# Patient Record
Sex: MALE | Race: BLACK OR AFRICAN AMERICAN | NOT HISPANIC OR LATINO | Employment: OTHER | URBAN - NONMETROPOLITAN AREA
[De-identification: names, ages, dates, MRNs, and addresses within clinical notes are randomized per-mention and may not be internally consistent; named-entity substitution may affect disease eponyms.]

---

## 2017-01-24 ENCOUNTER — OFFICE VISIT (OUTPATIENT)
Dept: OPHTHALMOLOGY | Facility: CLINIC | Age: 67
End: 2017-01-24

## 2017-01-24 DIAGNOSIS — IMO0002 NUCLEAR CATARACT, LEFT: ICD-10-CM

## 2017-01-24 DIAGNOSIS — H25.012 CORTICAL AGE-RELATED CATARACT, LEFT: ICD-10-CM

## 2017-01-24 DIAGNOSIS — H47.20 OPTIC ATROPHY: ICD-10-CM

## 2017-01-24 DIAGNOSIS — H25.049 POSTERIOR SUBCAPSULAR POLAR AGE-RELATED CATARACT, UNSPECIFIED LATERALITY: ICD-10-CM

## 2017-01-24 DIAGNOSIS — H04.551: ICD-10-CM

## 2017-01-24 DIAGNOSIS — H40.1122 PRIMARY OPEN ANGLE GLAUCOMA OF LEFT EYE, MODERATE STAGE: Primary | ICD-10-CM

## 2017-01-24 PROBLEM — H25.019 CORTICAL AGE-RELATED CATARACT: Status: ACTIVE | Noted: 2017-01-24

## 2017-01-24 PROCEDURE — 76514 ECHO EXAM OF EYE THICKNESS: CPT | Performed by: OPHTHALMOLOGY

## 2017-01-24 PROCEDURE — 92083 EXTENDED VISUAL FIELD XM: CPT | Performed by: OPHTHALMOLOGY

## 2017-01-24 PROCEDURE — 92014 COMPRE OPH EXAM EST PT 1/>: CPT | Performed by: OPHTHALMOLOGY

## 2017-01-24 RX ORDER — DORZOLAMIDE HCL 20 MG/ML
1 SOLUTION/ DROPS OPHTHALMIC 3 TIMES DAILY
Qty: 1 EACH | Refills: 12 | Status: SHIPPED | OUTPATIENT
Start: 2017-01-24

## 2017-01-24 NOTE — PROGRESS NOTES
Subjective   Cristo Musa is a 67 y.o. male.   Chief Complaint   Patient presents with   • Glaucoma     HPI     Glaucoma   In both eyes.  Treatment compliance is always.           Comments   decr vision OD>OS; CE OD, cataract OS       Last edited by Inderjit Grant MD on 1/24/2017  9:46 AM. (History)          Review of Systems    Objective   Visual Acuity (Snellen - Linear)      Right Left   Dist cc LP 20/100 -1            Wearing Rx      Sphere Cylinder Add   Right [place]rett Sphere +2.75   Left -1.00 Sphere +2.75                 Pupils      Light React APD   Right 3 1 +2   Left 3 2             Not recorded            Tonometry (Applanation, 9:46 AM)      Right Left   Pressure 15 17              Main Ophthalmology Exam     External Exam      Right Left    External NT mass over NLS no reflux Normal      Slit Lamp Exam      Right Left    Lids/Lashes Normal Normal    Conjunctiva/Sclera White and quiet White and quiet    Cornea Clear Clear    Anterior Chamber Deep and quiet Deep and quiet    Iris Round and reactive Round and reactive    Lens Posterior chamber intraocular lens 2+ Nuclear sclerosis, 1+ Cortical cataract, 3+ Posterior subcapsular cataract    Vitreous Normal Normal      Fundus Exam      Right Left    Disc 3+ Optic disc atrophy Thin rim 0.1 ST,IT diff view    Macula Normal Normal    Vessels Sheathing Normal    Periphery Normal Normal            Chavez Visual Field :   OD- unable  OS- central island, marked constriction, change from 2009    Assessment/Plan   Cristo was seen today for glaucoma.    Diagnoses and all orders for this visit:    Primary open angle glaucoma of left eye, moderate stage  Comments:  would like lower IOP   Orders:  -     dorzolamide (TRUSOPT) 2 % ophthalmic solution; Administer 1 drop into the left eye 3 (Three) Times a Day.    Nuclear cataract, left    Optic atrophy    Acquired stenosis of nasolacrimal duct, right  Comments:  s/p DCR    Posterior subcapsular polar  age-related cataract, unspecified laterality    Cortical age-related cataract, left        Add Trusopt OS tid, Alph TID, latanoprost  F/u 3 weeks  Consider CE OS, possible trabec

## 2017-01-30 ENCOUNTER — OFFICE VISIT (OUTPATIENT)
Dept: CARDIAC SURGERY | Facility: CLINIC | Age: 67
End: 2017-01-30

## 2017-01-30 VITALS
HEART RATE: 66 BPM | HEIGHT: 68 IN | OXYGEN SATURATION: 99 % | DIASTOLIC BLOOD PRESSURE: 71 MMHG | TEMPERATURE: 98.2 F | WEIGHT: 207.5 LBS | BODY MASS INDEX: 31.45 KG/M2 | SYSTOLIC BLOOD PRESSURE: 113 MMHG

## 2017-01-30 DIAGNOSIS — I10 ESSENTIAL HYPERTENSION: ICD-10-CM

## 2017-01-30 DIAGNOSIS — I77.0 ARTERIOVENOUS FISTULA, ACQUIRED (HCC): ICD-10-CM

## 2017-01-30 DIAGNOSIS — Z79.4 TYPE 2 DIABETES MELLITUS WITH BOTH EYES AFFECTED BY MODERATE NONPROLIFERATIVE RETINOPATHY WITHOUT MACULAR EDEMA, WITH LONG-TERM CURRENT USE OF INSULIN (HCC): ICD-10-CM

## 2017-01-30 DIAGNOSIS — N18.6 ESRD (END STAGE RENAL DISEASE) (HCC): Primary | ICD-10-CM

## 2017-01-30 DIAGNOSIS — E11.3393 TYPE 2 DIABETES MELLITUS WITH BOTH EYES AFFECTED BY MODERATE NONPROLIFERATIVE RETINOPATHY WITHOUT MACULAR EDEMA, WITH LONG-TERM CURRENT USE OF INSULIN (HCC): ICD-10-CM

## 2017-01-30 PROCEDURE — 99214 OFFICE O/P EST MOD 30 MIN: CPT | Performed by: THORACIC SURGERY (CARDIOTHORACIC VASCULAR SURGERY)

## 2017-01-30 NOTE — PROGRESS NOTES
1/30/2017    Cristo Musa  1950      Chief Complaint:  ESRD  HPI:      PCP:  Cory Rodríguez MD  Nephrology:  Dr Vilchis,  [facility]  Cardiology:  Dr Nieto        Resides:  [facility].    67 y.o. male with ESRD on  hemodialysis, HTN, COPD, peripheral neuropathy, DM, CAD, Hepatitis C, nonischemic cardiomyopathy (EF 30%)..  former smoker.  Long term access for hemodialysis placed 12/23/14.  He returns today after callling requesting to be seen due to LUE pain and bulging areas of RUE AV fistula areas.  Denies any neurosensory symptoms of RUE does have of LUE.  Has DM with neuropathy.  RIGHT AV graft functioning well at dialysis.  No problems during dialysis.  No other associated signs, symptoms or modifying factors.    11/13/2014 Upper extremity vein mapping:  RIGHT cephalic 1.2-2.5mm, basilic 1.6-2.0mm.  LEFT cephalic 0.8-2.6mm, basilic 1.2-2.6mm.  12/23/14  LEFT Brachial artery to axillary vein AV Graft (7mm Artegraft)  01/07/15 LUE Incisions  open thru skin only.  Proximal Length:  4.5-5 cm depth 0.25 width 0.2  Distal  Length: 4. cm depth 0.2 width 0.2  Beefy red bed  Small amt erythema proximal incision.  1/13/15: LUE Incision:healed  3/24/15  Revision of left arteriovenous graft (distal interposition graft with a 6 mm Artegraft).  Thrombectomy, left arteriovenous graft, open.  Left fistulogram.  Balloon angioplasty of left subclavian vein, innominate vein, swing site (6 x 20 mm Bard Conquest balloon, 8 x 20 mm Bard Conquest balloon). Venous ultrasound of unilateral extremity.  3/31/15 Revision of left arteriovenous graft, open. Open thrombectomy of left arteriovenous graft. Left upper extremity fistulogram.  Balloon angioplasty of left subclavian vein/innominate vein/swing site (6 x 21 mm Bard-Conquest balloon, 8 x 21 mm Bard-Conquest balloon). Venous ultrasound unilateral extremity.   4/10/15   Excision of left brachial to axillary arteriovenous bridge graft  (bovine) with reconstruction of both the brachial artery and axillary vein with vein patch angioplasty from left cephalic vein. Placement of femoral arterial and venous lines using ultrasound guidance.  4/19/15  Placement LEFT IJ Tunneled Cath  5/14/15   Placement of a right forearm arteriovenous bridge graft.  5/19/15   Exchange of right femoral tunneled dialysis catheter using fluoroscopic guidance.   8/11/15  Entrobacter bacteremia of LUE AV Graft, which was removed and completed 6 wks of IV antx with neg CS.    9/22/15  Fistula Duplex:  maxPSV 94cm/s. flows 258-632ml/m. size 5.7-9.2mm. Patent Multiple areas of hematomas with sm area of needle oozing.    10/15/15: RIGHT Fistula duplex: maxPSV 233cm/s. flows 214-701ml/m. size 4.4-6.2mm.  1/26/16: RIGHT fistula duplex: maxPSV 321cm/s. flows 612-1866ml/m. size 3.8-8.1mm.  7/29/16: RIGHT TPA Lysis/Mech Thrombectomy  9/23/16: RIGHT groin Tunnel Cath Placement  10/5/16: RIGHT brachial-axillary vein AV graft (artegraft)        12/5/2016 RIGHT fistula duplex: maxPSV 265cm/s. Flows 1612-1986ml/m. size 5.8-8.1mm.    The following portions of the patient's history were reviewed and updated as appropriate: allergies, current medications, past family history, past medical history, past social history, past surgical history and problem list.  Recent images independently reviewed.  Available laboratory values reviewed.    PMH:  Past Medical History   Diagnosis Date   • Cataract    • CHF (congestive heart failure)    • CKD (chronic kidney disease)    • Clostridium difficile infection    • COPD (chronic obstructive pulmonary disease)    • Coronary artery disease    • Diabetes mellitus    • Glaucoma    • Hepatitis C    • History of transfusion    • Hypertension    • Nephropathy, diabetic    • Pacemaker    • Pulmonary embolism        ALLERGIES:  Allergies   Allergen Reactions   • Motrin [Ibuprofen] Diarrhea and Nausea And Vomiting         MEDICATIONS:    Current Outpatient  Prescriptions:   •  acetaminophen (TYLENOL) 325 MG tablet, Take 650 mg by mouth every 6 (six) hours as needed for mild pain (1-3)., Disp: , Rfl:   •  brimonidine (ALPHAGAN P) 0.1 % solution ophthalmic solution, 1 drop 3 (three) times a day., Disp: , Rfl:   •  calcium acetate (PHOSLO) 667 MG capsule, Take 667 mg by mouth 3 (three) times a day with meals., Disp: , Rfl:   •  dorzolamide (TRUSOPT) 2 % ophthalmic solution, Administer 1 drop into the left eye 3 (Three) Times a Day., Disp: 1 each, Rfl: 12  •  fluticasone (FLONASE) 50 MCG/ACT nasal spray, 2 sprays into each nostril daily. Administer 2 sprays in each nostril for each dose., Disp: , Rfl:   •  gabapentin (NEURONTIN) 300 MG capsule, Take 300 mg by mouth 3 (three) times a day., Disp: , Rfl:   •  hydrALAZINE (APRESOLINE) 10 MG tablet, Take 10 mg by mouth daily as needed., Disp: , Rfl:   •  hydrALAZINE (APRESOLINE) 25 MG tablet, Take 25 mg by mouth 3 (three) times a day., Disp: , Rfl:   •  insulin aspart (NovoLOG) 100 UNIT/ML injection, Inject  under the skin 3 (three) times a day before meals. Patient unsure of scale, Disp: , Rfl:   •  insulin detemir (LEVEMIR) 100 UNIT/ML injection, Inject 7 Units under the skin every night., Disp: , Rfl:   •  latanoprost (XALATAN) 0.005 % ophthalmic solution, Administer 1 drop to both eyes every night., Disp: , Rfl:   •  lisinopril (PRINIVIL,ZESTRIL) 40 MG tablet, Take 40 mg by mouth daily., Disp: , Rfl:   •  loratadine (CLARITIN) 10 MG tablet, Take 10 mg by mouth daily., Disp: , Rfl:   •  lubiprostone (AMITIZA) 24 MCG capsule, Take 24 mcg by mouth 2 (two) times a day with meals., Disp: , Rfl:   •  omeprazole (PriLOSEC) 20 MG capsule, Take 20 mg by mouth 2 (two) times a day., Disp: , Rfl:   •  ondansetron (ZOFRAN) 8 MG tablet, Take 8 mg by mouth every 12 (twelve) hours as needed for nausea or vomiting., Disp: , Rfl:   •  PARoxetine (PAXIL) 20 MG tablet, Take 20 mg by mouth every night., Disp: , Rfl:   •  polyethylene glycol  (MIRALAX) packet, Take 17 g by mouth daily as needed., Disp: , Rfl:   •  traZODone (DESYREL) 50 MG tablet, Take 25 mg by mouth every night., Disp: , Rfl:   •  ZOLPIDEM TARTRATE PO, Take 5 mg by mouth every night., Disp: , Rfl:     Review of Systems   Review of Systems   Constitution: Positive for malaise/fatigue. Negative for weakness and weight loss.   Cardiovascular: Negative for chest pain, claudication and dyspnea on exertion.   Respiratory: Negative for cough and shortness of breath.    Hematologic/Lymphatic: Bruises/bleeds easily.   Skin: Negative for color change and poor wound healing.   Neurological: Negative for dizziness and numbness.       Physical Exam   Physical Exam   Constitutional: He is oriented to person, place, and time. He is active and cooperative. He does not appear ill. No distress.   HENT:   Right Ear: Hearing normal.   Left Ear: Hearing normal.   Nose: No nasal deformity. No epistaxis.   Mouth/Throat: He does not have dentures. Normal dentition.   Cardiovascular: Normal rate and regular rhythm.    No murmur heard.  Pulses:       Carotid pulses are 2+ on the right side, and 2+ on the left side.       Radial pulses are 2+ on the right side, and 2+ on the left side.        Dorsalis pedis pulses are 1+ on the right side, and 1+ on the left side.        Posterior tibial pulses are 1+ on the right side, and 1+ on the left side.   RIGHT AV graft good pulsatile thrill.   Pulmonary/Chest: Effort normal and breath sounds normal.   Abdominal: Soft. He exhibits no distension and no mass. There is no tenderness.   Musculoskeletal: He exhibits no deformity.   Gait normal.    Neurological: He is alert and oriented to person, place, and time. He has normal strength.   Skin: Skin is warm and dry. No cyanosis or erythema. No pallor.   No venous staining   Psychiatric: He has a normal mood and affect. His speech is normal. Judgment and thought content normal.   Results for JOSUE SÁNCHEZ (MRN  [id number]) as of 1/30/2017 16:45   Ref. Range 10/5/2016 11:39   Creatinine Latest Ref Range: 0.7 - 1.3 mg/dl 5.3 (H)   BUN Latest Ref Range: 7 - 21 mg/dl 44 (H)       ASSESSMENT:  Cristo was seen today for chronic renal failure.    Diagnoses and all orders for this visit:    ESRD (end stage renal disease)  -     Duplex Hemodialysis Access CAR; Future    Essential hypertension    Type 2 diabetes mellitus with both eyes affected by moderate nonproliferative retinopathy without macular edema, with long-term current use of insulin    Arteriovenous fistula, acquired    PLAN:  Detailed discussion with Mr Musa regarding situation and options.  Functional long term dialysis access.  ready for tunnel cath removal.      Risks:  bleeding, catheter breakage which may require retrieval.  Benefits:  catheter removal, decrease incidence infection, blood clots.  Options:  none.  Understands and wishes to proceed.    Remove tunnel cath today.  Return in 3 months with fistula duplex.  Recommended regular physical activity, progressive walking program.  Continue current medications as directed.  Will Obtain relevant old records.    Thank you for the opportunity to participate in this patient's care.    Copy to primary care provider.    EMR Dragon/Transcription disclaimer:   Much of this encounter note is an electronic transcription/translation of spoken language to printed text. The electronic translation of spoken language may permit erroneous, or at times, nonsensical words or phrases to be inadvertently transcribed; Although I have reviewed the note for such errors, some may still exist.

## 2017-01-30 NOTE — LETTER
January 30, 2017     Cory Rodríguez MD  [facility] [address] [place] [address]    Patient: Cristo Musa   YOB: 1950   Date of Visit: 1/30/2017       Dear Dr. Anne MD:    Thank you for referring Cristo Musa to me for evaluation. Below are the relevant portions of my assessment and plan of care.    If you have questions, please do not hesitate to call me. I look forward to following Cristo along with you.         Sincerely,        Jerson Singh MD        CC: MD Jerson Torres MD  1/30/2017  4:52 PM  Signed  1/30/2017    Cristo Musa  1950      Chief Complaint:  ESRD  HPI:      PCP:  Cory Rodríguez MD  Nephrology:  Dr Vilchis,  [facility]  Cardiology:  Dr Nieto        Resides:  [facility].    67 y.o. male with ESRD on  hemodialysis, HTN, COPD, peripheral neuropathy, DM, CAD, Hepatitis C, nonischemic cardiomyopathy (EF 30%)..  former smoker.  Long term access for hemodialysis placed 12/23/14.  He returns today after callling requesting to be seen due to LUE pain and bulging areas of RUE AV fistula areas.  Denies any neurosensory symptoms of RUE does have of LUE.  Has DM with neuropathy.  RIGHT AV graft functioning well at dialysis.  No problems during dialysis.  No other associated signs, symptoms or modifying factors.    11/13/2014 Upper extremity vein mapping:  RIGHT cephalic 1.2-2.5mm, basilic 1.6-2.0mm.  LEFT cephalic 0.8-2.6mm, basilic 1.2-2.6mm.  12/23/14  LEFT Brachial artery to axillary vein AV Graft (7mm Artegraft)  01/07/15 LUE Incisions  open thru skin only.  Proximal Length:  4.5-5 cm depth 0.25 width 0.2  Distal  Length: 4. cm depth 0.2 width 0.2  Beefy red bed  Small amt erythema proximal incision.  1/13/15: LUE Incision:healed  3/24/15  Revision of left arteriovenous graft (distal interposition graft with a 6 mm Artegraft).  Thrombectomy, left arteriovenous graft, open.  Left  fistulogram.  Balloon angioplasty of left subclavian vein, innominate vein, swing site (6 x 20 mm Bard Conquest balloon, 8 x 20 mm Bard Conquest balloon). Venous ultrasound of unilateral extremity.  3/31/15 Revision of left arteriovenous graft, open. Open thrombectomy of left arteriovenous graft. Left upper extremity fistulogram.  Balloon angioplasty of left subclavian vein/innominate vein/swing site (6 x 21 mm Bard-Conquest balloon, 8 x 21 mm Bard-Conquest balloon). Venous ultrasound unilateral extremity.   4/10/15   Excision of left brachial to axillary arteriovenous bridge graft (bovine) with reconstruction of both the brachial artery and axillary vein with vein patch angioplasty from left cephalic vein. Placement of femoral arterial and venous lines using ultrasound guidance.  4/19/15  Placement LEFT IJ Tunneled Cath  5/14/15   Placement of a right forearm arteriovenous bridge graft.  5/19/15   Exchange of right femoral tunneled dialysis catheter using fluoroscopic guidance.   8/11/15  Entrobacter bacteremia of LUE AV Graft, which was removed and completed 6 wks of IV antx with neg CS.    9/22/15  Fistula Duplex:  maxPSV 94cm/s. flows 258-632ml/m. size 5.7-9.2mm. Patent Multiple areas of hematomas with sm area of needle oozing.    10/15/15: RIGHT Fistula duplex: maxPSV 233cm/s. flows 214-701ml/m. size 4.4-6.2mm.  1/26/16: RIGHT fistula duplex: maxPSV 321cm/s. flows 612-1866ml/m. size 3.8-8.1mm.  7/29/16: RIGHT TPA Lysis/Mech Thrombectomy  9/23/16: RIGHT groin Tunnel Cath Placement  10/5/16: RIGHT brachial-axillary vein AV graft (artegraft)        12/5/2016 RIGHT fistula duplex: maxPSV 265cm/s. Flows 1612-1986ml/m. size 5.8-8.1mm.    The following portions of the patient's history were reviewed and updated as appropriate: allergies, current medications, past family history, past medical history, past social history, past surgical history and problem list.  Recent images independently reviewed.  Available  laboratory values reviewed.    PMH:  Past Medical History   Diagnosis Date   • Cataract    • CHF (congestive heart failure)    • CKD (chronic kidney disease)    • Clostridium difficile infection    • COPD (chronic obstructive pulmonary disease)    • Coronary artery disease    • Diabetes mellitus    • Glaucoma    • Hepatitis C    • History of transfusion    • Hypertension    • Nephropathy, diabetic    • Pacemaker    • Pulmonary embolism        ALLERGIES:  Allergies   Allergen Reactions   • Motrin [Ibuprofen] Diarrhea and Nausea And Vomiting         MEDICATIONS:    Current Outpatient Prescriptions:   •  acetaminophen (TYLENOL) 325 MG tablet, Take 650 mg by mouth every 6 (six) hours as needed for mild pain (1-3)., Disp: , Rfl:   •  brimonidine (ALPHAGAN P) 0.1 % solution ophthalmic solution, 1 drop 3 (three) times a day., Disp: , Rfl:   •  calcium acetate (PHOSLO) 667 MG capsule, Take 667 mg by mouth 3 (three) times a day with meals., Disp: , Rfl:   •  dorzolamide (TRUSOPT) 2 % ophthalmic solution, Administer 1 drop into the left eye 3 (Three) Times a Day., Disp: 1 each, Rfl: 12  •  fluticasone (FLONASE) 50 MCG/ACT nasal spray, 2 sprays into each nostril daily. Administer 2 sprays in each nostril for each dose., Disp: , Rfl:   •  gabapentin (NEURONTIN) 300 MG capsule, Take 300 mg by mouth 3 (three) times a day., Disp: , Rfl:   •  hydrALAZINE (APRESOLINE) 10 MG tablet, Take 10 mg by mouth daily as needed., Disp: , Rfl:   •  hydrALAZINE (APRESOLINE) 25 MG tablet, Take 25 mg by mouth 3 (three) times a day., Disp: , Rfl:   •  insulin aspart (NovoLOG) 100 UNIT/ML injection, Inject  under the skin 3 (three) times a day before meals. Patient unsure of scale, Disp: , Rfl:   •  insulin detemir (LEVEMIR) 100 UNIT/ML injection, Inject 7 Units under the skin every night., Disp: , Rfl:   •  latanoprost (XALATAN) 0.005 % ophthalmic solution, Administer 1 drop to both eyes every night., Disp: , Rfl:   •  lisinopril (PRINIVIL,ZESTRIL)  40 MG tablet, Take 40 mg by mouth daily., Disp: , Rfl:   •  loratadine (CLARITIN) 10 MG tablet, Take 10 mg by mouth daily., Disp: , Rfl:   •  lubiprostone (AMITIZA) 24 MCG capsule, Take 24 mcg by mouth 2 (two) times a day with meals., Disp: , Rfl:   •  omeprazole (PriLOSEC) 20 MG capsule, Take 20 mg by mouth 2 (two) times a day., Disp: , Rfl:   •  ondansetron (ZOFRAN) 8 MG tablet, Take 8 mg by mouth every 12 (twelve) hours as needed for nausea or vomiting., Disp: , Rfl:   •  PARoxetine (PAXIL) 20 MG tablet, Take 20 mg by mouth every night., Disp: , Rfl:   •  polyethylene glycol (MIRALAX) packet, Take 17 g by mouth daily as needed., Disp: , Rfl:   •  traZODone (DESYREL) 50 MG tablet, Take 25 mg by mouth every night., Disp: , Rfl:   •  ZOLPIDEM TARTRATE PO, Take 5 mg by mouth every night., Disp: , Rfl:     Review of Systems   Review of Systems   Constitution: Positive for malaise/fatigue. Negative for weakness and weight loss.   Cardiovascular: Negative for chest pain, claudication and dyspnea on exertion.   Respiratory: Negative for cough and shortness of breath.    Hematologic/Lymphatic: Bruises/bleeds easily.   Skin: Negative for color change and poor wound healing.   Neurological: Negative for dizziness and numbness.       Physical Exam   Physical Exam   Constitutional: He is oriented to person, place, and time. He is active and cooperative. He does not appear ill. No distress.   HENT:   Right Ear: Hearing normal.   Left Ear: Hearing normal.   Nose: No nasal deformity. No epistaxis.   Mouth/Throat: He does not have dentures. Normal dentition.   Cardiovascular: Normal rate and regular rhythm.    No murmur heard.  Pulses:       Carotid pulses are 2+ on the right side, and 2+ on the left side.       Radial pulses are 2+ on the right side, and 2+ on the left side.        Dorsalis pedis pulses are 1+ on the right side, and 1+ on the left side.        Posterior tibial pulses are 1+ on the right side, and 1+ on the left  side.   RIGHT AV graft good pulsatile thrill.   Pulmonary/Chest: Effort normal and breath sounds normal.   Abdominal: Soft. He exhibits no distension and no mass. There is no tenderness.   Musculoskeletal: He exhibits no deformity.   Gait normal.    Neurological: He is alert and oriented to person, place, and time. He has normal strength.   Skin: Skin is warm and dry. No cyanosis or erythema. No pallor.   No venous staining   Psychiatric: He has a normal mood and affect. His speech is normal. Judgment and thought content normal.   Results for CRISTO MUSA (MRN [id number]) as of 1/30/2017 16:45   Ref. Range 10/5/2016 11:39   Creatinine Latest Ref Range: 0.7 - 1.3 mg/dl 5.3 (H)   BUN Latest Ref Range: 7 - 21 mg/dl 44 (H)       ASSESSMENT:  Cristo was seen today for chronic renal failure.    Diagnoses and all orders for this visit:    ESRD (end stage renal disease)  -     Duplex Hemodialysis Access CAR; Future    Essential hypertension    Type 2 diabetes mellitus with both eyes affected by moderate nonproliferative retinopathy without macular edema, with long-term current use of insulin    Arteriovenous fistula, acquired    PLAN:  Detailed discussion with Mr Musa regarding situation and options.  Functional long term dialysis access.  ready for tunnel cath removal.      Risks:  bleeding, catheter breakage which may require retrieval.  Benefits:  catheter removal, decrease incidence infection, blood clots.  Options:  none.  Understands and wishes to proceed.    Remove tunnel cath today.  Return in 3 months with fistula duplex.  Recommended regular physical activity, progressive walking program.  Continue current medications as directed.  Will Obtain relevant old records.    Thank you for the opportunity to participate in this patient's care.    Copy to primary care provider.    EMR Dragon/Transcription disclaimer:   Much of this encounter note is an electronic transcription/translation of spoken  language to printed text. The electronic translation of spoken language may permit erroneous, or at times, nonsensical words or phrases to be inadvertently transcribed; Although I have reviewed the note for such errors, some may still exist.

## 2017-01-30 NOTE — MR AVS SNAPSHOT
Cristo Musa   1/30/2017 3:40 PM   Office Visit    Dept Phone:  [phone]   Encounter #:  [id number]    Provider:  Jerson Singh MD   Department:  [facility] CARDIOTHORACIC AND VASCULAR SURGERY                Your Full Care Plan              Today's Medication Changes          These changes are accurate as of: 1/30/17  4:10 PM.  If you have any questions, ask your nurse or doctor.               Stop taking medication(s)listed here:     albuterol (2.5 MG/3ML) 0.083% nebulizer solution   Commonly known as:  PROVENTIL   Stopped by:  Jerson Singh MD           albuterol 108 (90 BASE) MCG/ACT inhaler   Commonly known as:  PROVENTIL HFA;VENTOLIN HFA   Stopped by:  Jerson Singh MD           aspirin 81 MG chewable tablet   Stopped by:  Jerson Singh MD           bisacodyl 5 MG EC tablet   Commonly known as:  DULCOLAX   Stopped by:  Jerson Singh MD           docusate sodium 100 MG capsule   Commonly known as:  COLACE   Stopped by:  Jerson Singh MD           ipratropium 0.02 % nebulizer solution   Commonly known as:  ATROVENT   Stopped by:  Jerson Singh MD                      Your Updated Medication List          This list is accurate as of: 1/30/17  4:10 PM.  Always use your most recent med list.                acetaminophen 325 MG tablet   Commonly known as:  TYLENOL       brimonidine 0.1 % solution ophthalmic solution   Commonly known as:  ALPHAGAN P       calcium acetate 667 MG capsule   Commonly known as:  PHOSLO       dorzolamide 2 % ophthalmic solution   Commonly known as:  TRUSOPT   Administer 1 drop into the left eye 3 (Three) Times a Day.       fluticasone 50 MCG/ACT nasal spray   Commonly known as:  FLONASE       gabapentin 300 MG capsule   Commonly known as:  NEURONTIN       * hydrALAZINE 10 MG tablet   Commonly known as:  APRESOLINE       * hydrALAZINE 25 MG tablet   Commonly known as:   APRESOLINE       insulin aspart 100 UNIT/ML injection   Commonly known as:  novoLOG       insulin detemir 100 UNIT/ML injection   Commonly known as:  LEVEMIR       latanoprost 0.005 % ophthalmic solution   Commonly known as:  XALATAN       lisinopril 40 MG tablet   Commonly known as:  PRINIVIL,ZESTRIL       loratadine 10 MG tablet   Commonly known as:  CLARITIN       lubiprostone 24 MCG capsule   Commonly known as:  AMITIZA       omeprazole 20 MG capsule   Commonly known as:  priLOSEC       ondansetron 8 MG tablet   Commonly known as:  ZOFRAN       PARoxetine 20 MG tablet   Commonly known as:  PAXIL       polyethylene glycol packet   Commonly known as:  MIRALAX       traZODone 50 MG tablet   Commonly known as:  DESYREL       ZOLPIDEM TARTRATE PO       * Notice:  This list has 2 medication(s) that are the same as other medications prescribed for you. Read the directions carefully, and ask your doctor or other care provider to review them with you.            You Were Diagnosed With        Codes Comments    ESRD (end stage renal disease)    -  Primary ICD-10-CM: N18.6  ICD-9-CM: 585.6     Essential hypertension     ICD-10-CM: I10  ICD-9-CM: 401.9     Type 2 diabetes mellitus with both eyes affected by moderate nonproliferative retinopathy without macular edema, with long-term current use of insulin     ICD-10-CM: E11.3393, Z79.4  ICD-9-CM: 250.50, 362.05, V58.67     Arteriovenous fistula, acquired     ICD-10-CM: I77.0  ICD-9-CM: 447.0       Instructions     None    Patient Instructions History      Upcoming Appointments     Visit Type Date Time Department    OFFICE VISIT 1/30/2017  3:40 PM [facility] CT VAS SURGERY MAD    OFFICE VISIT 2/14/2017  9:20 AM [facility] OPHTHALMOLOGY MAD    OFFICE VISIT 5/4/2017  2:20 PM [facility] CT VAS SURGERY [facility] VASCULAR [place] 5/4/2017  2:00 PM [facility] CT VAS SURGERY [facility] Signup     [facility] allows you to send messages to your doctor, view your test results, renew your  "prescriptions, schedule appointments, and more. To sign up, go to [email or web address] and click on the Sign Up Now link in the New User? box. Enter your [employer] Activation Code exactly as it appears below along with the last four digits of your Social Security Number and your Date of Birth () to complete the sign-up process. If you do not sign up before the expiration date, you must request a new code.    [employer] Activation Code: [email or web address]  Expires: 2017 10:24 AM    If you have questions, you can email [email or web address] or call [phone] to talk to our [employer] staff. Remember, [employer] is NOT to be used for urgent needs. For medical emergencies, dial 911.               Other Info from Your Visit           Your Appointments     2017  9:20 AM CST   Office Visit with Inderjit Grant MD   [facility] OPHTHALMOLOGY (--)    [address] [place] [address]  Medical [address] [address] [phone]           Arrive 15 minutes prior to appointment.            May 04, 2017  2:00 PM CDT   VASCULAR ULTRASOUND VISIT with [facility] VAS ROOM   [facility] CARDIOTHORACIC AND VASCULAR SURGERY (--)    [address] [place] [address] Dr  Medical [address] [address] [phone]            May 04, 2017  2:20 PM CDT   Office Visit with Jerson Singh MD   [facility] CARDIOTHORACIC AND VASCULAR SURGERY (--)    [address] [place] [address]  Medical [address] [address] [phone]           Arrive 15 minutes prior to appointment.              Allergies     Motrin [Ibuprofen]  Diarrhea, Nausea And Vomiting      Reason for Visit     Chronic Renal Failure removal of tunneled catheter      Vital Signs     Blood Pressure Pulse Temperature Height Weight Oxygen Saturation    113/71 (BP Location: Left arm) 66 98.2 °F (36.8 °C) (Temporal Artery ) 68\" (172.7 cm) 207 lb 8 oz (94.1 kg) 99%    Body Mass Index Smoking " Status                31.55 kg/m2 Former Smoker          Problems and Diagnoses Noted     Acquired arteriovenous fistula    Diabetes    ESRD (end stage renal disease)    High blood pressure

## 2017-01-30 NOTE — Clinical Note
January 30, 2017     Cory Rodríguez MD  [address] [place] [address] [address] [place] [address]    Patient: Cristo Musa   YOB: 1950   Date of Visit: 1/30/2017       Dear Dr. Anne MD:    Cristo Taylorzier was in my office today. Below are the relevant portions of my assessment and plan of care.           If you have questions, please do not hesitate to call me. I look forward to following Cristo along with you.         Sincerely,        Jerson Singh MD        CC: No Recipients

## 2017-02-14 ENCOUNTER — OFFICE VISIT (OUTPATIENT)
Dept: OPHTHALMOLOGY | Facility: CLINIC | Age: 67
End: 2017-02-14

## 2017-02-14 DIAGNOSIS — H04.551: ICD-10-CM

## 2017-02-14 DIAGNOSIS — IMO0002 NUCLEAR CATARACT, LEFT: ICD-10-CM

## 2017-02-14 DIAGNOSIS — H47.20 OPTIC ATROPHY: ICD-10-CM

## 2017-02-14 DIAGNOSIS — H25.042: ICD-10-CM

## 2017-02-14 DIAGNOSIS — H40.1123 PRIMARY OPEN ANGLE GLAUCOMA OF LEFT EYE, SEVERE STAGE: Primary | ICD-10-CM

## 2017-02-14 DIAGNOSIS — Z96.1 PSEUDOPHAKIA: ICD-10-CM

## 2017-02-14 DIAGNOSIS — H25.012 CORTICAL AGE-RELATED CATARACT, LEFT: ICD-10-CM

## 2017-02-14 DIAGNOSIS — H50.10 EXOTROPIA: ICD-10-CM

## 2017-02-14 PROCEDURE — 99213 OFFICE O/P EST LOW 20 MIN: CPT | Performed by: OPHTHALMOLOGY

## 2017-02-14 NOTE — PROGRESS NOTES
Subjective   Cristo Musa is a 67 y.o. male.   Chief Complaint   Patient presents with   • Glaucoma     HPI     Glaucoma   Laterality: both eyes           Comments   F/u for IOP, desires cataract surgery       Last edited by Inderjit Grant MD on 2/14/2017 10:07 AM. (History)          Review of Systems    Objective   Visual Acuity (Snellen - Linear)      Right Left   Dist sc LP 20/400            Manifest Refraction      Sphere   Right    Left ni                Not recorded            Tonometry (Applanation, 9:57 AM)      Right Left   Pressure 15 13              Main Ophthalmology Exam     External Exam      Right Left    External NT mass over NLS no reflux Normal      Slit Lamp Exam      Right Left    Lids/Lashes Normal Normal    Conjunctiva/Sclera White and quiet White and quiet    Cornea Clear Clear    Anterior Chamber Deep and quiet Deep and quiet    Iris Round and reactive Round and reactive    Lens Posterior chamber intraocular lens 2+ Nuclear sclerosis, 1+ Cortical cataract, 3+ Posterior subcapsular cataract    Vitreous Normal Normal                Assessment/Plan   Diagnoses and all orders for this visit:    Primary open angle glaucoma of left eye, severe stage  Comments:  [place], IOP down with dorzolamide    Nuclear cataract, left    Posterior subcapsular polar age-related cataract, left    Optic atrophy  Comments:  OD    Pseudophakia  Comments:  OD    Exotropia    Cortical age-related cataract, left    Acquired stenosis of nasolacrimal duct, right        Continue current drops; Trusopt, latanoprost, Alphagan  Pt desires cataract surgery, has transportation prob, wants local surgeon  Dr Albina monroy

## 2017-02-24 ENCOUNTER — OFFICE VISIT (OUTPATIENT)
Dept: FAMILY MEDICINE CLINIC | Facility: CLINIC | Age: 67
End: 2017-02-24

## 2017-02-24 PROCEDURE — 99307 SBSQ NF CARE SF MDM 10: CPT | Performed by: STUDENT IN AN ORGANIZED HEALTH CARE EDUCATION/TRAINING PROGRAM

## 2017-02-24 NOTE — PROGRESS NOTES
Date of Nursing Home Visit:  2017  Encounter Provider:  Cory Rodríguez MD  Patient Name: Cristo Musa  :  1950    Subjective   Patient feeling well today.  He had recent fistula in his right arm.  He has cataract surgery coming up next week.  No complaints of chest pain, shortness of breath, abdominal pain, or nausea/vomiting/diarrhea/constipation.  He is sleeping well after stopping Ambien and using only trazodone.  His anxiety is well controlled with paroxetine and 0.5 mg Ativan as needed.    Objective    Vitals  Temperature: 98.6  Heart rate: 70  Blood pressure: 102/60  Respiratory rate: 18  Oxygen saturation: 100% on room air    Physical Exam   Constitutional: He is oriented to person, place, and time and well-developed, well-nourished, and in no distress.   HENT:   Head: Normocephalic and atraumatic.   Right Ear: External ear normal.   Left Ear: External ear normal.   Mouth/Throat: Oropharynx is clear and moist.   Eyes: Conjunctivae and EOM are normal. Pupils are equal, round, and reactive to light.   Cardiovascular: Normal rate, regular rhythm and normal heart sounds.    Pulmonary/Chest: Effort normal and breath sounds normal. He has no wheezes.   Abdominal: Soft. Bowel sounds are normal. He exhibits no distension. There is no tenderness.   Musculoskeletal: He exhibits no edema.   Neurological: He is alert and oriented to person, place, and time.   Skin: Skin is warm and dry.   Psychiatric: Mood, affect and judgment normal.       Assessment/Plan     Patient is a 67-year-old man with:  1.  ESRD: On  dialysis  2.  Hypertension: Hydralazine 3 times a day, well controlled  3.  Anxiety: Paroxetine nightly, Ativan PRN  4.  Insomnia: Trazodone  5.  Diabetes mellitus type 2: Controlled with sliding scale insulin  6.  Peripheral neuropathy: Gabapentin  7.  History of heart block: Followed by Dr. Nieto  8.  GERD: Famotidine  9.  Glaucoma: Continue  eyedrops  10.  Chronic pain: Norcos    FULL CODE          This document has been electronically signed by Cory Rodríguez MD on February 27, 2017 5:23 PM

## 2017-02-28 NOTE — PROGRESS NOTES
I reviewed the chart.Shirley JUDD        This document has been electronically signed by Oz Ayoub MD on February 28, 2017 5:52 PM

## 2017-03-23 ENCOUNTER — OFFICE VISIT (OUTPATIENT)
Dept: FAMILY MEDICINE CLINIC | Facility: CLINIC | Age: 67
End: 2017-03-23

## 2017-03-23 PROCEDURE — 99308 SBSQ NF CARE LOW MDM 20: CPT | Performed by: STUDENT IN AN ORGANIZED HEALTH CARE EDUCATION/TRAINING PROGRAM

## 2017-03-23 NOTE — PROGRESS NOTES
Date of NH Visit:  2017  Encounter Provider:  Cory Rodríguez MD  Patient Name: Cristo Musa  :  1950    Subjective   Patient feeling well.  He has his cataract surgery scheduled for 3/31.  It was moved back for insurance.  He needs clearance for a dental extraction.  He is denying any symptoms of chest pain, palpitations, shortness of breath, N/V/D/C, or leg edema.    Objective    Vitals:    17 1221   BP: 104/62   Pulse: 64   Resp: 20   Temp: 98.1 °F (36.7 °C)   SpO2: 100%      Gen.: A&Ox3, NAD  Pulm: CTAB  Cardiac: RRR  Abdomen: Soft, nontender, nondistended  Extremities no lower extremity edema, cyanosis, or clubbing  Feet:  Sensation to filament intact bilaterally, vibratory sensation absent bilaterally, sharp/dull intact bilaterally, 1+ pedal pulse bilaterally, proprioception intact bilaterally    Assessment/Plan     Patient is a 67-year-old man with:  1. ESRD: On  dialysis  2. Hypertension: Hydralazine 3 times a day, well controlled  3. Anxiety: increased Paroxetine to 30 mg nightly, Ativan PRN  4. Insomnia: Trazodone  5. Diabetes mellitus type 2: A1C was 5.8, will increased cutoffs by 30 for SSI, now:   <60: call MD   : 0 units   181-230: 3 units   231-280: 6 units   280-330: 9 units   330-350: 12 units   >350: 15 units, call MD  6. Peripheral neuropathy: Gabapentin  7. History of heart block: Followed by Dr. Nieto  8. GERD: Famotidine  9. Glaucoma: Continue eyedrops  10. Chronic pain: Norcos     FULL CODE          This document has been electronically signed by Cory Rodríguez MD on 2017 12:29 PM

## 2017-03-27 VITALS
RESPIRATION RATE: 20 BRPM | OXYGEN SATURATION: 100 % | TEMPERATURE: 98.1 F | DIASTOLIC BLOOD PRESSURE: 62 MMHG | SYSTOLIC BLOOD PRESSURE: 104 MMHG | HEART RATE: 64 BPM

## 2017-03-27 PROBLEM — F51.01 PRIMARY INSOMNIA: Status: ACTIVE | Noted: 2017-03-27

## 2017-03-27 PROBLEM — G89.29 CHRONIC PAIN: Status: ACTIVE | Noted: 2017-03-27

## 2017-03-27 PROBLEM — F41.9 ANXIETY: Status: ACTIVE | Noted: 2017-03-27

## 2017-03-30 ENCOUNTER — ANESTHESIA EVENT (OUTPATIENT)
Dept: PERIOP | Facility: HOSPITAL | Age: 67
End: 2017-03-30

## 2017-03-31 ENCOUNTER — ANESTHESIA (OUTPATIENT)
Dept: PERIOP | Facility: HOSPITAL | Age: 67
End: 2017-03-31

## 2017-03-31 ENCOUNTER — HOSPITAL ENCOUNTER (OUTPATIENT)
Facility: HOSPITAL | Age: 67
Setting detail: HOSPITAL OUTPATIENT SURGERY
Discharge: HOME | End: 2017-03-31
Attending: OPHTHALMOLOGY | Admitting: OPHTHALMOLOGY

## 2017-03-31 VITALS
BODY MASS INDEX: 31.78 KG/M2 | WEIGHT: 209.66 LBS | DIASTOLIC BLOOD PRESSURE: 68 MMHG | RESPIRATION RATE: 18 BRPM | TEMPERATURE: 96.9 F | SYSTOLIC BLOOD PRESSURE: 125 MMHG | HEIGHT: 68 IN | HEART RATE: 61 BPM | OXYGEN SATURATION: 95 %

## 2017-03-31 LAB — GLUCOSE BLDC GLUCOMTR-MCNC: 171 MG/DL (ref 70–130)

## 2017-03-31 PROCEDURE — 25010000002 VANCOMYCIN PER 500 MG: Performed by: OPHTHALMOLOGY

## 2017-03-31 PROCEDURE — 82962 GLUCOSE BLOOD TEST: CPT

## 2017-03-31 PROCEDURE — C1780 LENS, INTRAOCULAR (NEW TECH): HCPCS | Performed by: OPHTHALMOLOGY

## 2017-03-31 PROCEDURE — 25010000002 FENTANYL CITRATE (PF) 100 MCG/2ML SOLUTION: Performed by: NURSE ANESTHETIST, CERTIFIED REGISTERED

## 2017-03-31 PROCEDURE — 25010000002 MIDAZOLAM PER 1 MG: Performed by: NURSE ANESTHETIST, CERTIFIED REGISTERED

## 2017-03-31 DEVICE — LENS ACRYSOF IQ 6X13MM SN60WF 19.0: Type: IMPLANTABLE DEVICE | Site: EYE | Status: FUNCTIONAL

## 2017-03-31 RX ORDER — PHENYLEPHRINE HCL 2.5 %
1 DROPS OPHTHALMIC (EYE)
Status: COMPLETED | OUTPATIENT
Start: 2017-03-31 | End: 2017-03-31

## 2017-03-31 RX ORDER — BRIMONIDINE TARTRATE 0.15 %
DROPS OPHTHALMIC (EYE) AS NEEDED
Status: DISCONTINUED | OUTPATIENT
Start: 2017-03-31 | End: 2017-03-31 | Stop reason: HOSPADM

## 2017-03-31 RX ORDER — ONDANSETRON 2 MG/ML
4 INJECTION INTRAMUSCULAR; INTRAVENOUS ONCE AS NEEDED
Status: CANCELLED | OUTPATIENT
Start: 2017-03-31

## 2017-03-31 RX ORDER — BALANCED SALT SOLUTION ENRICHED WITH BICARBONATE, DEXTROSE, AND GLUTATHIONE
KIT INTRAOCULAR AS NEEDED
Status: DISCONTINUED | OUTPATIENT
Start: 2017-03-31 | End: 2017-03-31 | Stop reason: HOSPADM

## 2017-03-31 RX ORDER — SODIUM CHLORIDE 9 MG/ML
1000 INJECTION, SOLUTION INTRAVENOUS CONTINUOUS PRN
Status: DISCONTINUED | OUTPATIENT
Start: 2017-03-31 | End: 2017-03-31 | Stop reason: HOSPADM

## 2017-03-31 RX ORDER — TETRACAINE HYDROCHLORIDE 5 MG/ML
1 SOLUTION OPHTHALMIC
Status: COMPLETED | OUTPATIENT
Start: 2017-03-31 | End: 2017-03-31

## 2017-03-31 RX ORDER — PROMETHAZINE HYDROCHLORIDE 25 MG/1
25 TABLET ORAL ONCE AS NEEDED
Status: CANCELLED | OUTPATIENT
Start: 2017-03-31

## 2017-03-31 RX ORDER — FENTANYL CITRATE 50 UG/ML
INJECTION, SOLUTION INTRAMUSCULAR; INTRAVENOUS AS NEEDED
Status: DISCONTINUED | OUTPATIENT
Start: 2017-03-31 | End: 2017-03-31 | Stop reason: SURG

## 2017-03-31 RX ORDER — PROMETHAZINE HYDROCHLORIDE 25 MG/ML
12.5 INJECTION, SOLUTION INTRAMUSCULAR; INTRAVENOUS ONCE AS NEEDED
Status: CANCELLED | OUTPATIENT
Start: 2017-03-31

## 2017-03-31 RX ORDER — MIDAZOLAM HYDROCHLORIDE 1 MG/ML
INJECTION INTRAMUSCULAR; INTRAVENOUS AS NEEDED
Status: DISCONTINUED | OUTPATIENT
Start: 2017-03-31 | End: 2017-03-31 | Stop reason: SURG

## 2017-03-31 RX ORDER — PROMETHAZINE HYDROCHLORIDE 25 MG/1
25 SUPPOSITORY RECTAL ONCE AS NEEDED
Status: CANCELLED | OUTPATIENT
Start: 2017-03-31

## 2017-03-31 RX ORDER — FLURBIPROFEN SODIUM 0.3 MG/ML
1 SOLUTION/ DROPS OPHTHALMIC
Status: COMPLETED | OUTPATIENT
Start: 2017-03-31 | End: 2017-03-31

## 2017-03-31 RX ORDER — PREDNISOLONE ACETATE 10 MG/ML
SUSPENSION/ DROPS OPHTHALMIC AS NEEDED
Status: DISCONTINUED | OUTPATIENT
Start: 2017-03-31 | End: 2017-03-31 | Stop reason: HOSPADM

## 2017-03-31 RX ORDER — TETRACAINE HYDROCHLORIDE 5 MG/ML
SOLUTION OPHTHALMIC AS NEEDED
Status: DISCONTINUED | OUTPATIENT
Start: 2017-03-31 | End: 2017-03-31 | Stop reason: HOSPADM

## 2017-03-31 RX ORDER — CYCLOPENTOLATE HYDROCHLORIDE 10 MG/ML
1 SOLUTION/ DROPS OPHTHALMIC
Status: COMPLETED | OUTPATIENT
Start: 2017-03-31 | End: 2017-03-31

## 2017-03-31 RX ADMIN — PHENYLEPHRINE HYDROCHLORIDE 1 DROP: 25 SOLUTION/ DROPS OPHTHALMIC at 11:10

## 2017-03-31 RX ADMIN — FENTANYL CITRATE 25 MCG: 50 INJECTION, SOLUTION INTRAMUSCULAR; INTRAVENOUS at 12:02

## 2017-03-31 RX ADMIN — FLURBIPROFEN SODIUM 1 DROP: 0.3 SOLUTION/ DROPS OPHTHALMIC at 11:20

## 2017-03-31 RX ADMIN — FLURBIPROFEN SODIUM 1 DROP: 0.3 SOLUTION/ DROPS OPHTHALMIC at 11:30

## 2017-03-31 RX ADMIN — TETRACAINE HYDROCHLORIDE 1 DROP: 5 SOLUTION OPHTHALMIC at 11:31

## 2017-03-31 RX ADMIN — CYCLOPENTOLATE HYDROCHLORIDE 1 DROP: 10 SOLUTION/ DROPS OPHTHALMIC at 11:30

## 2017-03-31 RX ADMIN — PHENYLEPHRINE HYDROCHLORIDE 1 DROP: 25 SOLUTION/ DROPS OPHTHALMIC at 11:30

## 2017-03-31 RX ADMIN — CYCLOPENTOLATE HYDROCHLORIDE 1 DROP: 10 SOLUTION/ DROPS OPHTHALMIC at 11:10

## 2017-03-31 RX ADMIN — FENTANYL CITRATE 50 MCG: 50 INJECTION, SOLUTION INTRAMUSCULAR; INTRAVENOUS at 12:05

## 2017-03-31 RX ADMIN — SODIUM CHLORIDE 1000 ML: 900 INJECTION, SOLUTION INTRAVENOUS at 11:16

## 2017-03-31 RX ADMIN — PHENYLEPHRINE HYDROCHLORIDE 1 DROP: 25 SOLUTION/ DROPS OPHTHALMIC at 11:35

## 2017-03-31 RX ADMIN — FLURBIPROFEN SODIUM 1 DROP: 0.3 SOLUTION/ DROPS OPHTHALMIC at 11:10

## 2017-03-31 RX ADMIN — MIDAZOLAM 1 MG: 1 INJECTION INTRAMUSCULAR; INTRAVENOUS at 12:04

## 2017-03-31 RX ADMIN — FENTANYL CITRATE 25 MCG: 50 INJECTION, SOLUTION INTRAMUSCULAR; INTRAVENOUS at 12:09

## 2017-03-31 RX ADMIN — CYCLOPENTOLATE HYDROCHLORIDE 1 DROP: 10 SOLUTION/ DROPS OPHTHALMIC at 11:20

## 2017-03-31 RX ADMIN — TETRACAINE HYDROCHLORIDE 1 DROP: 5 SOLUTION OPHTHALMIC at 11:10

## 2017-03-31 RX ADMIN — MIDAZOLAM 1 MG: 1 INJECTION INTRAMUSCULAR; INTRAVENOUS at 12:07

## 2017-03-31 RX ADMIN — PHENYLEPHRINE HYDROCHLORIDE 1 DROP: 25 SOLUTION/ DROPS OPHTHALMIC at 11:20

## 2017-03-31 NOTE — ANESTHESIA PREPROCEDURE EVALUATION
Anesthesia Evaluation     Patient summary reviewed and Nursing notes reviewed   NPO Status: > 8 hours   Airway   Mallampati: II  TM distance: >3 FB  Neck ROM: full  possible difficult intubation  Dental    (+) poor dentation    Pulmonary - normal exam    breath sounds clear to auscultation  (+) pulmonary embolism, COPD moderate,   Cardiovascular     ECG reviewed  Rhythm: regular  Rate: normal    (+) hypertension, CAD, dysrhythmias (Complete Heart Block;pacemaker inserted 4/11/16), CHF, murmur (Grade II/VI systolic murmur.),     ROS comment: EKG:NSR with LBBB.    Neuro/Psych  (+) psychiatric history Anxiety,    GI/Hepatic/Renal/Endo    (+)  hepatitis C, chronic renal disease CRI, diabetes mellitus type 2 using insulin,     Musculoskeletal (-) negative ROS    Abdominal   (+) obese,    Substance History - negative use     OB/GYN negative ob/gyn ROS         Other - negative ROS                                   Anesthesia Plan    ASA 3     MAC     intravenous induction   Anesthetic plan and risks discussed with patient.

## 2017-03-31 NOTE — ANESTHESIA POSTPROCEDURE EVALUATION
Patient: Cristo Musa    Procedure Summary     Date Anesthesia Start Anesthesia Stop Room / Location    03/31/17 1201 1221 [facility] OR 06 / [facility]       Procedure Diagnosis Surgeon Provider    REMOVE CATARACT AND IMPLANT INTRAOCULAR LENS LEFT EYE (Left Eye) Age-related nuclear cataract of eye, left  (CATARACT OF EYE LEFT) MD Kristopher Quiroz MD          Anesthesia Type: MAC  Last vitals  BP      Temp      Pulse     Resp      SpO2        Post Anesthesia Care and Evaluation    Patient location during evaluation: bedside  Patient participation: complete - patient participated  Level of consciousness: awake and alert  Pain score: 0  Pain management: adequate  Airway patency: patent  Anesthetic complications: No anesthetic complications  PONV Status: none  Cardiovascular status: acceptable and stable  Respiratory status: acceptable and spontaneous ventilation  Hydration status: acceptable

## 2017-04-27 ENCOUNTER — OFFICE VISIT (OUTPATIENT)
Dept: FAMILY MEDICINE CLINIC | Facility: CLINIC | Age: 67
End: 2017-04-27

## 2017-04-27 PROCEDURE — 99308 SBSQ NF CARE LOW MDM 20: CPT | Performed by: STUDENT IN AN ORGANIZED HEALTH CARE EDUCATION/TRAINING PROGRAM

## 2017-04-27 NOTE — PROGRESS NOTES
Date of Office Visit:  2017  Encounter Provider:  Cory Rodríguez MD  Place of Service:  [facility] FAMILY MEDIC[facility]  Patient Name: Cristo Musa  :  1950        Subjective   Patient feeling well.  His cataract surgery was about 3 weeks ago. He can see better. His chronic pain is well controlled, as is his anxiety. He is denying any symptoms of chest pain, palpitations, shortness of breath, N/V/D/C, or leg edema.    Objective    Vitals:    17 1145   BP: 136/82   Pulse: 61   Resp: 18   Temp: 97.8 °F (36.6 °C)   SpO2: 95%      Gen.: A&Ox3, NAD  Pulm: CTAB  Cardiac: RRR  Abdomen: Soft, nontender, nondistended  Extremities no lower extremity edema, cyanosis, or clubbing    Assessment/Plan     Patient is a 67-year-old man with:  1. ESRD: On  dialysis  2. Hypertension: Hydralazine 3 times a day, well controlled  3. Anxiety: Paroxetine 30 mg nightly, Ativan PRN  4. Insomnia: Trazodone  5. Diabetes mellitus type 2: SSI  6. Peripheral neuropathy: Gabapentin  7. History of heart block: Followed by Dr. Nieto  8. GERD: Famotidine  9. Glaucoma: Continue eyedrops  10. Chronic pain: Norcos     FULL CODE          This document has been electronically signed by Cory Rodríguez MD on 2017 11:48 AM

## 2017-04-30 VITALS
RESPIRATION RATE: 18 BRPM | TEMPERATURE: 97.8 F | DIASTOLIC BLOOD PRESSURE: 82 MMHG | HEART RATE: 61 BPM | SYSTOLIC BLOOD PRESSURE: 136 MMHG | OXYGEN SATURATION: 95 %

## 2017-05-05 ENCOUNTER — TELEPHONE (OUTPATIENT)
Dept: FAMILY MEDICINE CLINIC | Facility: CLINIC | Age: 67
End: 2017-05-05

## 2017-05-22 ENCOUNTER — OFFICE VISIT (OUTPATIENT)
Dept: CARDIAC SURGERY | Facility: CLINIC | Age: 67
End: 2017-05-22

## 2017-05-22 VITALS
BODY MASS INDEX: 31.83 KG/M2 | HEIGHT: 68 IN | OXYGEN SATURATION: 93 % | DIASTOLIC BLOOD PRESSURE: 67 MMHG | WEIGHT: 210 LBS | HEART RATE: 70 BPM | SYSTOLIC BLOOD PRESSURE: 112 MMHG | TEMPERATURE: 98.9 F

## 2017-05-22 DIAGNOSIS — N18.6 ESRD (END STAGE RENAL DISEASE) (HCC): Primary | ICD-10-CM

## 2017-05-22 PROCEDURE — 99214 OFFICE O/P EST MOD 30 MIN: CPT | Performed by: THORACIC SURGERY (CARDIOTHORACIC VASCULAR SURGERY)

## 2017-05-22 RX ORDER — SODIUM CHLORIDE 0.9 % (FLUSH) 0.9 %
1-10 SYRINGE (ML) INJECTION AS NEEDED
Status: CANCELLED | OUTPATIENT
Start: 2017-06-01

## 2017-05-22 RX ORDER — SODIUM CHLORIDE 9 MG/ML
30 INJECTION, SOLUTION INTRAVENOUS CONTINUOUS
Status: CANCELLED | OUTPATIENT
Start: 2017-06-01

## 2017-05-22 RX ORDER — HYDROCODONE BITARTRATE AND ACETAMINOPHEN 10; 325 MG/1; MG/1
1 TABLET ORAL EVERY 6 HOURS PRN
COMMUNITY
End: 2017-09-29 | Stop reason: SDUPTHER

## 2017-05-30 ENCOUNTER — OFFICE VISIT (OUTPATIENT)
Dept: FAMILY MEDICINE CLINIC | Facility: CLINIC | Age: 67
End: 2017-05-30

## 2017-05-30 PROCEDURE — 99308 SBSQ NF CARE LOW MDM 20: CPT | Performed by: STUDENT IN AN ORGANIZED HEALTH CARE EDUCATION/TRAINING PROGRAM

## 2017-05-31 VITALS
OXYGEN SATURATION: 98 % | HEART RATE: 66 BPM | TEMPERATURE: 97.9 F | SYSTOLIC BLOOD PRESSURE: 114 MMHG | DIASTOLIC BLOOD PRESSURE: 56 MMHG | RESPIRATION RATE: 20 BRPM

## 2017-06-01 ENCOUNTER — HOSPITAL ENCOUNTER (OUTPATIENT)
Facility: HOSPITAL | Age: 67
Setting detail: HOSPITAL OUTPATIENT SURGERY
Discharge: NURSING, REHAB OR LONG-TERM CARE | End: 2017-06-01
Attending: THORACIC SURGERY (CARDIOTHORACIC VASCULAR SURGERY) | Admitting: THORACIC SURGERY (CARDIOTHORACIC VASCULAR SURGERY)

## 2017-06-01 VITALS
RESPIRATION RATE: 18 BRPM | DIASTOLIC BLOOD PRESSURE: 69 MMHG | SYSTOLIC BLOOD PRESSURE: 137 MMHG | BODY MASS INDEX: 32.28 KG/M2 | OXYGEN SATURATION: 96 % | HEART RATE: 56 BPM | TEMPERATURE: 96.3 F | WEIGHT: 212.96 LBS | HEIGHT: 68 IN

## 2017-06-01 DIAGNOSIS — N18.6 ESRD (END STAGE RENAL DISEASE) (HCC): ICD-10-CM

## 2017-06-01 LAB — GLUCOSE BLDC GLUCOMTR-MCNC: 281 MG/DL (ref 70–130)

## 2017-06-01 PROCEDURE — 25010000002 HEPARIN (PORCINE) PER 1000 UNITS: Performed by: THORACIC SURGERY (CARDIOTHORACIC VASCULAR SURGERY)

## 2017-06-01 PROCEDURE — 0 IOPAMIDOL 61 % SOLUTION: Performed by: THORACIC SURGERY (CARDIOTHORACIC VASCULAR SURGERY)

## 2017-06-01 PROCEDURE — C1725 CATH, TRANSLUMIN NON-LASER: HCPCS | Performed by: THORACIC SURGERY (CARDIOTHORACIC VASCULAR SURGERY)

## 2017-06-01 PROCEDURE — C1894 INTRO/SHEATH, NON-LASER: HCPCS | Performed by: THORACIC SURGERY (CARDIOTHORACIC VASCULAR SURGERY)

## 2017-06-01 PROCEDURE — 25010000002 MIDAZOLAM PER 1 MG: Performed by: THORACIC SURGERY (CARDIOTHORACIC VASCULAR SURGERY)

## 2017-06-01 PROCEDURE — 25010000002 FENTANYL CITRATE (PF) 100 MCG/2ML SOLUTION: Performed by: THORACIC SURGERY (CARDIOTHORACIC VASCULAR SURGERY)

## 2017-06-01 PROCEDURE — C1769 GUIDE WIRE: HCPCS | Performed by: THORACIC SURGERY (CARDIOTHORACIC VASCULAR SURGERY)

## 2017-06-01 PROCEDURE — 36907 BALO ANGIOP CTR DIALYSIS SEG: CPT | Performed by: THORACIC SURGERY (CARDIOTHORACIC VASCULAR SURGERY)

## 2017-06-01 PROCEDURE — 82962 GLUCOSE BLOOD TEST: CPT

## 2017-06-01 PROCEDURE — 36901 INTRO CATH DIALYSIS CIRCUIT: CPT | Performed by: THORACIC SURGERY (CARDIOTHORACIC VASCULAR SURGERY)

## 2017-06-01 RX ORDER — ASPIRIN 81 MG/1
81 TABLET, CHEWABLE ORAL DAILY
Status: DISCONTINUED | OUTPATIENT
Start: 2017-06-01 | End: 2017-06-01 | Stop reason: HOSPADM

## 2017-06-01 RX ORDER — SEVELAMER CARBONATE 800 MG/1
2400 TABLET, FILM COATED ORAL
Status: ON HOLD | COMMUNITY
End: 2018-07-26 | Stop reason: ALTCHOICE

## 2017-06-01 RX ORDER — LORAZEPAM 0.5 MG/1
0.5 TABLET ORAL EVERY 12 HOURS PRN
COMMUNITY
End: 2017-12-30

## 2017-06-01 RX ORDER — HYDROCODONE BITARTRATE AND ACETAMINOPHEN 10; 325 MG/1; MG/1
1 TABLET ORAL EVERY 4 HOURS PRN
Status: DISCONTINUED | OUTPATIENT
Start: 2017-06-01 | End: 2017-06-01 | Stop reason: HOSPADM

## 2017-06-01 RX ORDER — ACETAMINOPHEN 325 MG/1
650 TABLET ORAL EVERY 4 HOURS PRN
Status: DISCONTINUED | OUTPATIENT
Start: 2017-06-01 | End: 2017-06-01 | Stop reason: HOSPADM

## 2017-06-01 RX ORDER — ASPIRIN 81 MG/1
81 TABLET ORAL DAILY
Qty: 150 TABLET | Refills: 2 | Status: SHIPPED | OUTPATIENT
Start: 2017-06-01 | End: 2018-06-01

## 2017-06-01 RX ORDER — FENTANYL CITRATE 50 UG/ML
INJECTION, SOLUTION INTRAMUSCULAR; INTRAVENOUS AS NEEDED
Status: DISCONTINUED | OUTPATIENT
Start: 2017-06-01 | End: 2017-06-01 | Stop reason: HOSPADM

## 2017-06-01 RX ORDER — MIDAZOLAM HYDROCHLORIDE 1 MG/ML
INJECTION INTRAMUSCULAR; INTRAVENOUS AS NEEDED
Status: DISCONTINUED | OUTPATIENT
Start: 2017-06-01 | End: 2017-06-01 | Stop reason: HOSPADM

## 2017-06-01 RX ORDER — SODIUM CHLORIDE 0.9 % (FLUSH) 0.9 %
1-10 SYRINGE (ML) INJECTION AS NEEDED
Status: DISCONTINUED | OUTPATIENT
Start: 2017-06-01 | End: 2017-06-01 | Stop reason: HOSPADM

## 2017-06-01 RX ORDER — HEPARIN SODIUM 1000 [USP'U]/ML
INJECTION, SOLUTION INTRAVENOUS; SUBCUTANEOUS AS NEEDED
Status: DISCONTINUED | OUTPATIENT
Start: 2017-06-01 | End: 2017-06-01 | Stop reason: HOSPADM

## 2017-06-01 RX ORDER — SODIUM CHLORIDE 9 MG/ML
30 INJECTION, SOLUTION INTRAVENOUS CONTINUOUS
Status: DISCONTINUED | OUTPATIENT
Start: 2017-06-01 | End: 2017-06-01 | Stop reason: HOSPADM

## 2017-06-01 NOTE — PLAN OF CARE
Problem: Patient Care Overview (Adult)  Goal: Discharge Needs Assessment    06/01/17 0670   Self-Care   Equipment Currently Used at Home walker, rolling;wheelchair

## 2017-06-01 NOTE — H&P (VIEW-ONLY)
5/22/2017    Cristo Musa  1950      Chief Complaint:  ESRD  HPI:      PCP:  Cory Rodríguez MD  Nephrology:  Dr Vilchis,  [facility]  Cardiology:  Dr Nieto        Resides:  [facility].    67 y.o. male with ESRD on  hemodialysis, HTN, COPD, peripheral neuropathy, DM, CAD, Hepatitis C, nonischemic cardiomyopathy (EF 30%)..  former smoker.  Long term access for hemodialysis placed 12/23/14.  He returns today after callling requesting to be seen due to LUE pain and bulging areas of RUE AV fistula areas.  Denies any neurosensory symptoms of RUE does have of LUE.  Has DM with neuropathy.  RIGHT AV graft functioning well at dialysis.  No problems during dialysis.  No other associated signs, symptoms or modifying factors.    11/13/2014 Upper extremity vein mapping:  RIGHT cephalic 1.2-2.5mm, basilic 1.6-2.0mm.  LEFT cephalic 0.8-2.6mm, basilic 1.2-2.6mm.  12/23/14  LEFT Brachial artery to axillary vein AV Graft (7mm Artegraft)  01/07/15 LUE Incisions  open thru skin only.  Proximal Length:  4.5-5 cm depth 0.25 width 0.2  Distal  Length: 4. cm depth 0.2 width 0.2  Beefy red bed  Small amt erythema proximal incision.  1/13/15: LUE Incision:healed  3/24/15  Revision of left arteriovenous graft (distal interposition graft with a 6 mm Artegraft).  Thrombectomy, left arteriovenous graft, open.  Left fistulogram.  Balloon angioplasty of left subclavian vein, innominate vein, swing site (6 x 20 mm Bard Conquest balloon, 8 x 20 mm Bard Conquest balloon). Venous ultrasound of unilateral extremity.  3/31/15 Revision of left arteriovenous graft, open. Open thrombectomy of left arteriovenous graft. Left upper extremity fistulogram.  Balloon angioplasty of left subclavian vein/innominate vein/swing site (6 x 21 mm Bard-Conquest balloon, 8 x 21 mm Bard-Conquest balloon). Venous ultrasound unilateral extremity.   4/10/15   Excision of left brachial to axillary arteriovenous bridge graft  (bovine) with reconstruction of both the brachial artery and axillary vein with vein patch angioplasty from left cephalic vein. Placement of femoral arterial and venous lines using ultrasound guidance.  4/19/15  Placement LEFT IJ Tunneled Cath  5/14/15   Placement of a right forearm arteriovenous bridge graft.  5/19/15   Exchange of right femoral tunneled dialysis catheter using fluoroscopic guidance.   8/11/15  Entrobacter bacteremia of LUE AV Graft, which was removed and completed 6 wks of IV antx with neg CS.    9/22/15  Fistula Duplex:  maxPSV 94cm/s. flows 258-632ml/m. size 5.7-9.2mm. Patent Multiple areas of hematomas with sm area of needle oozing.    10/15/15: RIGHT Fistula duplex: maxPSV 233cm/s. flows 214-701ml/m. size 4.4-6.2mm.  1/26/16: RIGHT fistula duplex: maxPSV 321cm/s. flows 612-1866ml/m. size 3.8-8.1mm.  7/29/16: RIGHT TPA Lysis/Mech Thrombectomy  9/23/16: RIGHT groin Tunnel Cath Placement  10/5/16: RIGHT brachial-axillary vein AV graft (artegraft)        12/5/2016 RIGHT fistula duplex: maxPSV 265cm/s. Flows 1612-1986ml/m. size 5.8-8.1mm.         5/4/2017 RIGHT fistula duplex: maxPSV 530cm/s. Flows 642-1444ml/m. size 3.5-7.4mm.    The following portions of the patient's history were reviewed and updated as appropriate: allergies, current medications, past family history, past medical history, past social history, past surgical history and problem list.  Recent images independently reviewed.  Available laboratory values reviewed.    PMH:  Past Medical History:   Diagnosis Date   • Cataract    • CHF (congestive heart failure)    • CKD (chronic kidney disease)    • Clostridium difficile infection    • COPD (chronic obstructive pulmonary disease)    • Coronary artery disease    • Diabetes mellitus    • Glaucoma    • Hepatitis C    • History of transfusion    • Hypertension    • Nephropathy, diabetic    • Pacemaker    • Pulmonary embolism        ALLERGIES:  Allergies   Allergen Reactions   • Lisinopril  Other (See Comments)     unknown   • Motrin [Ibuprofen] Diarrhea and Nausea And Vomiting         MEDICATIONS:    Current Outpatient Prescriptions:   •  brimonidine (ALPHAGAN P) 0.1 % solution ophthalmic solution, 1 drop 3 (three) times a day., Disp: , Rfl:   •  calcium acetate (PHOSLO) 667 MG capsule, Take 667 mg by mouth 3 (Three) Times a Day With Meals. 2 capsules 3 times daily, Disp: , Rfl:   •  dorzolamide (TRUSOPT) 2 % ophthalmic solution, Administer 1 drop into the left eye 3 (Three) Times a Day., Disp: 1 each, Rfl: 12  •  fluticasone (FLONASE) 50 MCG/ACT nasal spray, 2 sprays into each nostril daily. Administer 2 sprays in each nostril for each dose., Disp: , Rfl:   •  gabapentin (NEURONTIN) 300 MG capsule, Take 300 mg by mouth 3 (three) times a day., Disp: , Rfl:   •  hydrALAZINE (APRESOLINE) 25 MG tablet, Take 25 mg by mouth 3 (three) times a day., Disp: , Rfl:   •  HYDROcodone-acetaminophen (NORCO)  MG per tablet, Take 1 tablet by mouth Every 6 (Six) Hours As Needed for Moderate Pain (4-6)., Disp: , Rfl:   •  insulin aspart (NovoLOG) 100 UNIT/ML injection, Inject  under the skin 3 (three) times a day before meals. Patient unsure of scale, Disp: , Rfl:   •  latanoprost (XALATAN) 0.005 % ophthalmic solution, Administer 1 drop to both eyes every night., Disp: , Rfl:   •  loratadine (CLARITIN) 10 MG tablet, Take 10 mg by mouth daily., Disp: , Rfl:   •  ondansetron (ZOFRAN) 8 MG tablet, Take 8 mg by mouth every 12 (twelve) hours as needed for nausea or vomiting., Disp: , Rfl:   •  PARoxetine (PAXIL) 30 MG tablet, Take 30 mg by mouth Every Morning., Disp: , Rfl:   •  traZODone (DESYREL) 50 MG tablet, Take 50 mg by mouth Every Night., Disp: , Rfl:     Review of Systems   Review of Systems   Constitution: Positive for malaise/fatigue. Negative for weakness and weight loss.   Eyes: Positive for blurred vision.   Cardiovascular: Negative for chest pain, claudication and dyspnea on exertion.   Respiratory:  Negative for cough and shortness of breath.    Hematologic/Lymphatic: Bruises/bleeds easily.   Skin: Negative for color change and poor wound healing.   Neurological: Negative for dizziness and numbness.       Physical Exam   Physical Exam   Constitutional: He is oriented to person, place, and time. He is active and cooperative. He does not appear ill. No distress.   HENT:   Right Ear: Hearing normal.   Left Ear: Hearing normal.   Nose: No nasal deformity. No epistaxis.   Mouth/Throat: He does not have dentures. Normal dentition.   Cardiovascular: Normal rate and regular rhythm.    No murmur heard.  Pulses:       Carotid pulses are 2+ on the right side, and 2+ on the left side.       Radial pulses are 2+ on the right side, and 2+ on the left side.        Dorsalis pedis pulses are 1+ on the right side, and 1+ on the left side.        Posterior tibial pulses are 1+ on the right side, and 1+ on the left side.   RIGHT AV graft  Pulsatile.   Pulmonary/Chest: Effort normal and breath sounds normal.   Abdominal: Soft. He exhibits no distension and no mass. There is no tenderness.   Musculoskeletal: He exhibits no deformity.   Gait normal.    Neurological: He is alert and oriented to person, place, and time. He has normal strength.   Skin: Skin is warm and dry. No cyanosis or erythema. No pallor.   No venous staining   Psychiatric: He has a normal mood and affect. His speech is normal. Judgment and thought content normal.   Results for CRISTO SÁNCHEZ (MRN [id number]) as of 1/30/2017 16:45   Ref. Range 10/5/2016 11:39   Creatinine Latest Ref Range: 0.7 - 1.3 mg/dl 5.3 (H)   BUN Latest Ref Range: 7 - 21 mg/dl 44 (H)       ASSESSMENT:  Cristo was seen today for hemodialysis access.    Diagnoses and all orders for this visit:    ESRD (end stage renal disease)  -     Case Request; Standing  -     sodium chloride 0.9 % flush 1-10 mL; Infuse 1-10 mL into a venous catheter As Needed for Line Care.  -     sodium chloride  0.9 % infusion; Infuse 30 mL/hr into a venous catheter Continuous.  -     ceFAZolin (ANCEF) 2 g in sodium chloride 0.9 % 100 mL IVPB; Infuse 2 g into a venous catheter 1 (One) Time.  -     Case Request    Other orders  -     Provide NPO Instructions to Patient  -     Clorhexidine Skin Prep  -     Obtain informed consent; Standing  -     Chlorhexidine 4%/Hibiclens Skin Prep; Standing  -     Insert Peripheral IV; Standing  -     Saline Lock & Maintain IV Access; Standing    PLAN:  Detailed discussion with Mr Musa regarding situation and options.  Functional long term dialysis access.   increased velocity by duplex ultrasound indicating significant stenosis and impending graft occlusion.      Risks including but not limited to bleeding, infection, blood vessel or nerve injury, inability to maintain patent graft, need for tunnel catheter placement.  Benefits:  maintenance of patent dialysis access.  Options:  surgical vs endovascular evaluation and treatment.  Understands and wishes to proceed.    RIGHT  fistulagram, possible thrombolysis, angioplasty, stent, tunnel cath.  Local/IVsedation.  SDS.  6/1/2017    Recommended regular physical activity, progressive walking program.  Continue current medications as directed.  Will Obtain relevant old records.    Thank you for the opportunity to participate in this patient's care.    Copy to primary care provider.    EMR Dragon/Transcription disclaimer:   Much of this encounter note is an electronic transcription/translation of spoken language to printed text. The electronic translation of spoken language may permit erroneous, or at times, nonsensical words or phrases to be inadvertently transcribed; Although I have reviewed the note for such errors, some may still exist.

## 2017-06-03 ENCOUNTER — HOSPITAL ENCOUNTER (EMERGENCY)
Facility: HOSPITAL | Age: 67
Discharge: HOME | End: 2017-06-04
Attending: FAMILY MEDICINE | Admitting: FAMILY MEDICINE

## 2017-06-03 VITALS
HEART RATE: 66 BPM | BODY MASS INDEX: 31.67 KG/M2 | DIASTOLIC BLOOD PRESSURE: 74 MMHG | TEMPERATURE: 97.9 F | RESPIRATION RATE: 18 BRPM | OXYGEN SATURATION: 98 % | SYSTOLIC BLOOD PRESSURE: 158 MMHG | WEIGHT: 209 LBS | HEIGHT: 68 IN

## 2017-06-03 DIAGNOSIS — Z79.4 TYPE 2 DIABETES MELLITUS WITH HYPERGLYCEMIA, WITH LONG-TERM CURRENT USE OF INSULIN (HCC): Primary | ICD-10-CM

## 2017-06-03 DIAGNOSIS — E11.65 TYPE 2 DIABETES MELLITUS WITH HYPERGLYCEMIA, WITH LONG-TERM CURRENT USE OF INSULIN (HCC): Primary | ICD-10-CM

## 2017-06-03 LAB
ALBUMIN SERPL-MCNC: 4.2 G/DL (ref 3.4–4.8)
ALBUMIN/GLOB SERPL: 0.7 G/DL (ref 1.1–1.8)
ALP SERPL-CCNC: 195 U/L (ref 38–126)
ALT SERPL W P-5'-P-CCNC: 30 U/L (ref 21–72)
ANION GAP SERPL CALCULATED.3IONS-SCNC: 16 MMOL/L (ref 5–15)
AST SERPL-CCNC: 44 U/L (ref 17–59)
BASOPHILS # BLD AUTO: 0.02 10*3/MM3 (ref 0–0.2)
BASOPHILS NFR BLD AUTO: 0.3 % (ref 0–2)
BILIRUB SERPL-MCNC: 0.8 MG/DL (ref 0.2–1.3)
BUN BLD-MCNC: 40 MG/DL (ref 7–21)
BUN/CREAT SERPL: 5.5 (ref 7–25)
CALCIUM SPEC-SCNC: 9.2 MG/DL (ref 8.4–10.2)
CHLORIDE SERPL-SCNC: 86 MMOL/L (ref 95–110)
CO2 SERPL-SCNC: 32 MMOL/L (ref 22–31)
CREAT BLD-MCNC: 7.25 MG/DL (ref 0.7–1.3)
DEPRECATED RDW RBC AUTO: 48.5 FL (ref 35.1–43.9)
EOSINOPHIL # BLD AUTO: 0.1 10*3/MM3 (ref 0–0.7)
EOSINOPHIL NFR BLD AUTO: 1.4 % (ref 0–7)
ERYTHROCYTE [DISTWIDTH] IN BLOOD BY AUTOMATED COUNT: 14.4 % (ref 11.5–14.5)
GFR SERPL CREATININE-BSD FRML MDRD: 9 ML/MIN/1.73 (ref 49–113)
GLOBULIN UR ELPH-MCNC: 5.8 GM/DL (ref 2.3–3.5)
GLUCOSE BLD-MCNC: 205 MG/DL (ref 60–100)
GLUCOSE BLDC GLUCOMTR-MCNC: 359 MG/DL (ref 70–130)
HCT VFR BLD AUTO: 34.6 % (ref 39–49)
HGB BLD-MCNC: 11.7 G/DL (ref 13.7–17.3)
HOLD SPECIMEN: NORMAL
HOLD SPECIMEN: NORMAL
IMM GRANULOCYTES # BLD: 0.03 10*3/MM3 (ref 0–0.02)
IMM GRANULOCYTES NFR BLD: 0.4 % (ref 0–0.5)
LYMPHOCYTES # BLD AUTO: 2.27 10*3/MM3 (ref 0.6–4.2)
LYMPHOCYTES NFR BLD AUTO: 31.7 % (ref 10–50)
MCH RBC QN AUTO: 31.5 PG (ref 26.5–34)
MCHC RBC AUTO-ENTMCNC: 33.8 G/DL (ref 31.5–36.3)
MCV RBC AUTO: 93.3 FL (ref 80–98)
MONOCYTES # BLD AUTO: 0.84 10*3/MM3 (ref 0–0.9)
MONOCYTES NFR BLD AUTO: 11.7 % (ref 0–12)
NEUTROPHILS # BLD AUTO: 3.89 10*3/MM3 (ref 2–8.6)
NEUTROPHILS NFR BLD AUTO: 54.5 % (ref 37–80)
PLATELET # BLD AUTO: 145 10*3/MM3 (ref 150–450)
PMV BLD AUTO: 9 FL (ref 8–12)
POTASSIUM BLD-SCNC: 3.9 MMOL/L (ref 3.5–5.1)
PROT SERPL-MCNC: 10 G/DL (ref 6.3–8.6)
RBC # BLD AUTO: 3.71 10*6/MM3 (ref 4.37–5.74)
SODIUM BLD-SCNC: 134 MMOL/L (ref 137–145)
WBC NRBC COR # BLD: 7.15 10*3/MM3 (ref 3.2–9.8)
WHOLE BLOOD HOLD SPECIMEN: NORMAL
WHOLE BLOOD HOLD SPECIMEN: NORMAL

## 2017-06-03 PROCEDURE — 80053 COMPREHEN METABOLIC PANEL: CPT | Performed by: FAMILY MEDICINE

## 2017-06-03 PROCEDURE — 82962 GLUCOSE BLOOD TEST: CPT

## 2017-06-03 PROCEDURE — 85025 COMPLETE CBC W/AUTO DIFF WBC: CPT | Performed by: FAMILY MEDICINE

## 2017-06-03 PROCEDURE — 36415 COLL VENOUS BLD VENIPUNCTURE: CPT

## 2017-06-03 PROCEDURE — 99284 EMERGENCY DEPT VISIT MOD MDM: CPT

## 2017-06-03 RX ORDER — SODIUM CHLORIDE 0.9 % (FLUSH) 0.9 %
10 SYRINGE (ML) INJECTION AS NEEDED
Status: DISCONTINUED | OUTPATIENT
Start: 2017-06-03 | End: 2017-06-04 | Stop reason: HOSPADM

## 2017-06-03 RX ORDER — DEXTROSE MONOHYDRATE 25 G/50ML
25 INJECTION, SOLUTION INTRAVENOUS
Status: DISCONTINUED | OUTPATIENT
Start: 2017-06-03 | End: 2017-06-04 | Stop reason: HOSPADM

## 2017-06-03 RX ORDER — NICOTINE POLACRILEX 4 MG
15 LOZENGE BUCCAL
Status: DISCONTINUED | OUTPATIENT
Start: 2017-06-03 | End: 2017-06-04 | Stop reason: HOSPADM

## 2017-06-03 RX ADMIN — Medication 10 ML: at 17:24

## 2017-06-03 NOTE — ED NOTES
Pt came in to the ED today from the nursing home with blood glucose level of 414. Upon arrival blood glucose was 359. Pt is a dialysis pt and has an AV fistula in his right arm. NO BP OR IV ON RIGHT SIDE!     Shyla Dye RN  06/03/17 4703

## 2017-06-03 NOTE — ED NOTES
Blood Glucose is currently 199. Dr. Gordon notified of this finding.      Shyla Dye RN  06/03/17 3534

## 2017-06-03 NOTE — ED NOTES
Contacted [facility] in [employer] and ordered a Diabetic Tray     Dianne Brooks  06/03/17 0418

## 2017-06-03 NOTE — ED PROVIDER NOTES
Subjective   HPI Comments: Was sent here from the nursing home because his sugar was too high. Patient says they gave him insulin in the nursing home.   Goes to  [facility], did not miss his session yesterday.      Review of Systems   Constitutional: Negative for activity change, appetite change, fatigue and fever.   HENT: Negative for ear pain and sore throat.    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 Negative for pain.   Respiratory: Negative for cough and shortness of breath.    Cardiovascular: Negative for chest pain and palpitations.   Gastrointestinal: Negative for abdominal pain and nausea.   Endocrine: Negative for cold intolerance and heat intolerance.   Genitourinary: Negative for difficulty urinating and dysuria.   Musculoskeletal: Positive for gait problem. Negative for arthralgias.   Skin: Negative for color change and rash.   Neurological: Negative for dizziness, weakness and headaches.   Hematological: Negative for adenopathy. Does not bruise/bleed easily.   Psychiatric/Behavioral: Negative for agitation, confusion and sleep disturbance.       Past Medical History:   Diagnosis Date   • Cataract    • CHF (congestive heart failure)    • CKD (chronic kidney disease)    • Clostridium difficile infection    • COPD (chronic obstructive pulmonary disease)    • Coronary artery disease    • Diabetes mellitus    • Glaucoma    • Hepatitis C    • History of transfusion    • Hypertension    • Nephropathy, diabetic    • Pacemaker    • Pulmonary embolism        Allergies   Allergen Reactions   • Lisinopril Other (See Comments)     unknown   • Motrin [Ibuprofen] Diarrhea and Nausea And Vomiting       Past Surgical History:   Procedure Laterality Date   • ABDOMINAL SURGERY     • ARTERIOVENOUS FISTULA/SHUNT SURGERY Right     forearm loop   • ARTERIOVENOUS FISTULA/SHUNT SURGERY Left     removed past upper arm   • ARTERIOVENOUS FISTULA/SHUNT SURGERY W/  HEMODIALYSIS CATHETER INSERTION Right 4/4/2016    Procedure: RIGHT ARM AV FISTULA DECLOT REVISION REPAIR BRACHIAL ANASTOMOTIC PSUEDOANEURYSM LEFT FEMORAL RICHARDSON FISTULOGRAM WITH ANGIOPLASTY;  Surgeon: Jerson Carpenter MD;  Location: [facility] OR 18/19;  Service:    • CATARACT EXTRACTION W/ INTRAOCULAR LENS IMPLANT Left 3/31/2017    Procedure: REMOVE CATARACT AND IMPLANT INTRAOCULAR LENS LEFT EYE;  Surgeon: Hilton Montemayor MD;  Location: [facility];  Service:    • EYE SURGERY     • HERNIA REPAIR     • IMPLANTABLE CARDIOVERTER DEFIBRILLATOR LEAD REPLACEMENT/POCKET REVISION Right 4/11/2016    Procedure: PACEMAKER LEADS X 3 AND BATTERY EXTRACTION WITH EXIMER LASER;  Surgeon: Vern Reeves MD;  Location: [facility] OR 18/19;  Service:    • INSERTION HEMODIALYSIS CATHETER Right 05/2015    jugular   • PACEMAKER IMPLANTATION  2008    dual chamber [employer] [place]   • REMOVAL HEMODIALYSIS CATHETER Right 05/2015    femoral vein       Family History   Problem Relation Age of Onset   • COPD Sister    • Diabetes Brother    • Early death Brother    • Hyperlipidemia Brother    • Hypertension Brother    • Coronary artery disease Mother    • Diabetes Mother    • Hypertension Mother    • Stroke Other    • Other Other      Respiratory disorder       Social History     Social History   • Marital status:      Spouse name: N/A   • Number of children: N/A   • Years of education: N/A     Social History Main Topics   • Smoking status: Former Smoker   • Smokeless tobacco: None      Comment: quit 20 years ago    • Alcohol use No   • Drug use: No   • Sexual activity: No     Other Topics Concern   • None     Social History Narrative           Objective   Physical Exam   Constitutional: He appears well-developed and well-nourished.       Procedures         ED Course  ED Course   Comment By Time   Patients blood glucose decreased from insulin administered in NH prior to arrival at ED. Janel Sena  MD Heidi 06/03 1746                  [facility]    Final diagnoses:   Type 2 diabetes mellitus with hyperglycemia, with long-term current use of insulin             This document has been electronically signed by Janel Gordon MD on Rosibel 3, 2017 6:00 PM         Janel Gordon MD  Resident  06/03/17 1087

## 2017-06-03 NOTE — ED NOTES
Pt report given to nursing home LPN (Crystal). Pt to return to nursing home via EMS.      Shyla Dye RN  06/03/17 8404

## 2017-06-04 LAB — GLUCOSE BLDC GLUCOMTR-MCNC: 199 MG/DL (ref 70–130)

## 2017-06-13 ENCOUNTER — PREP FOR SURGERY (OUTPATIENT)
Dept: OTHER | Facility: HOSPITAL | Age: 67
End: 2017-06-13

## 2017-06-13 DIAGNOSIS — T82.858D AV FISTULA STENOSIS, SUBSEQUENT ENCOUNTER: Primary | ICD-10-CM

## 2017-06-13 RX ORDER — SODIUM CHLORIDE 0.9 % (FLUSH) 0.9 %
1-10 SYRINGE (ML) INJECTION AS NEEDED
Status: CANCELLED | OUTPATIENT
Start: 2017-07-27

## 2017-06-18 NOTE — ED PROVIDER NOTES
Subjective   HPI Comments: Was sent here from the nursing home because his sugar was too high. Patient says they gave him insulin in the nursing home.   Goes to  [facility], did not miss his session yesterday.      Review of Systems   Constitutional: Negative for appetite change, chills, diaphoresis, fatigue and fever.   HENT: Negative for congestion, ear discharge, ear pain, nosebleeds, rhinorrhea, sinus pressure, sore throat and trouble swallowing.    Eyes: Positive for visual disturbance (chronic). Negative for discharge and redness.   Respiratory: Negative for apnea, cough, chest tightness, shortness of breath and wheezing.    Cardiovascular: Negative for chest pain.   Gastrointestinal: Negative for abdominal pain, diarrhea, nausea and vomiting.   Endocrine: Negative for polyuria.   Genitourinary: Negative for dysuria, frequency and urgency.   Musculoskeletal: Negative for myalgias and neck pain.   Skin: Negative for color change and rash.   Allergic/Immunologic: Negative for immunocompromised state.   Neurological: Negative for dizziness, seizures, syncope, weakness, light-headedness and headaches.   Hematological: Negative for adenopathy. Does not bruise/bleed easily.   Psychiatric/Behavioral: Negative for behavioral problems and confusion.   All other systems reviewed and are negative.      Past Medical History:   Diagnosis Date   • Cataract    • CHF (congestive heart failure)    • CKD (chronic kidney disease)    • Clostridium difficile infection    • COPD (chronic obstructive pulmonary disease)    • Coronary artery disease    • Diabetes mellitus    • Glaucoma    • Hepatitis C    • History of transfusion    • Hypertension    • Nephropathy, diabetic    • Pacemaker    • Pulmonary embolism        Allergies   Allergen Reactions   • Lisinopril Other (See Comments)     unknown   • Motrin [Ibuprofen] Diarrhea and Nausea And Vomiting       Past Surgical History:   Procedure Laterality Date   • ABDOMINAL SURGERY     •  ARTERIOVENOUS FISTULA/SHUNT SURGERY Right     forearm loop   • ARTERIOVENOUS FISTULA/SHUNT SURGERY Left     removed past upper arm   • ARTERIOVENOUS FISTULA/SHUNT SURGERY W/ HEMODIALYSIS CATHETER INSERTION Right 4/4/2016    Procedure: RIGHT ARM AV FISTULA DECLOT REVISION REPAIR BRACHIAL ANASTOMOTIC PSUEDOANEURYSM LEFT FEMORAL RICHARDSON FISTULOGRAM WITH ANGIOPLASTY;  Surgeon: Jerson Carpenter MD;  Location: [facility] OR 18/19;  Service:    • CATARACT EXTRACTION W/ INTRAOCULAR LENS IMPLANT Left 3/31/2017    Procedure: REMOVE CATARACT AND IMPLANT INTRAOCULAR LENS LEFT EYE;  Surgeon: Hilton Montemayor MD;  Location: [facility] OR;  Service:    • EYE SURGERY     • HERNIA REPAIR     • IMPLANTABLE CARDIOVERTER DEFIBRILLATOR LEAD REPLACEMENT/POCKET REVISION Right 4/11/2016    Procedure: PACEMAKER LEADS X 3 AND BATTERY EXTRACTION WITH EXIMER LASER;  Surgeon: Vern Reeves MD;  Location: [facility] OR 18/19;  Service:    • INSERTION HEMODIALYSIS CATHETER Right 05/2015    jugular   • INTERVENTIONAL RADIOLOGY PROCEDURE Right 6/1/2017    Procedure: RIGHT dialysis fistulagram & angioplasty;  Surgeon: Jerson Singh MD;  Location: [facility] ANGIO INVASIVE LOCATION;  Service:    • PACEMAKER IMPLANTATION  2008    dual chamber [employer] [place]   • REMOVAL HEMODIALYSIS CATHETER Right 05/2015    femoral vein       Family History   Problem Relation Age of Onset   • COPD Sister    • Diabetes Brother    • Early death Brother    • Hyperlipidemia Brother    • Hypertension Brother    • Coronary artery disease Mother    • Diabetes Mother    • Hypertension Mother    • Stroke Other    • Other Other      Respiratory disorder       Social History     Social History   • Marital status:      Spouse name: N/A   • Number of children: N/A   • Years of education: N/A     Social History Main Topics   • Smoking status: Former Smoker   • Smokeless tobacco: None      Comment: quit 20 years ago    • Alcohol use No   • Drug  use: No   • Sexual activity: No     Other Topics Concern   • None     Social History Narrative           Objective   Physical Exam   Constitutional: He is oriented to person, place, and time. He appears well-developed and well-nourished.   HENT:   Head: Normocephalic and atraumatic.   Nose: Nose normal.   Mouth/Throat: Oropharynx is clear and moist.   Eyes: Conjunctivae and EOM are normal. Right eye exhibits no discharge. Left eye exhibits no discharge. No scleral icterus.   Neck: Normal range of motion. Neck supple. No tracheal deviation present.   Cardiovascular: Normal rate, regular rhythm and normal heart sounds.    No murmur heard.  Pulmonary/Chest: Effort normal and breath sounds normal. No stridor. No respiratory distress. He has no wheezes. He has no rales.   Abdominal: Soft. Bowel sounds are normal. He exhibits no distension and no mass. There is no tenderness. There is no rebound and no guarding.   Musculoskeletal: He exhibits edema.   Neurological: He is alert and oriented to person, place, and time. Coordination normal.   Skin: Skin is warm and dry. No rash noted. No erythema.   Psychiatric: He has a normal mood and affect. His behavior is normal. Thought content normal.   Nursing note and vitals reviewed.      Procedures         ED Course  ED Course   Comment By Time   Patients blood glucose decreased from insulin administered in NH prior to arrival at ED. Janel Gordon MD 06/03 3316          Labs Reviewed   COMPREHENSIVE METABOLIC PANEL - Abnormal; Notable for the following:        Result Value    Glucose 205 (*)     BUN 40 (*)     Creatinine 7.25 (*)     Sodium 134 (*)     Chloride 86 (*)     CO2 32.0 (*)     Total Protein 10.0 (*)     Alkaline Phosphatase 195 (*)     eGFR  African Amer 9 (*)     Globulin 5.8 (*)     A/G Ratio 0.7 (*)     BUN/Creatinine Ratio 5.5 (*)     Anion Gap 16.0 (*)     All other components within normal limits   CBC WITH AUTO DIFFERENTIAL - Abnormal; Notable for  the following:     RBC 3.71 (*)     Hemoglobin 11.7 (*)     Hematocrit 34.6 (*)     RDW-SD 48.5 (*)     Platelets 145 (*)     Immature Grans, Absolute 0.03 (*)     All other components within normal limits   POCT GLUCOSE FINGERSTICK - Abnormal; Notable for the following:     Glucose 359 (*)     All other components within normal limits   POCT GLUCOSE FINGERSTICK - Abnormal; Notable for the following:     Glucose 199 (*)     All other components within normal limits   RAINBOW DRAW    Narrative:     The following orders were created for panel order [place] Draw.  Procedure                               Abnormality         Status                     ---------                               -----------         ------                     Light Blue Top[[id number]]                                   Final result               Green Top (Gel)[[id number]]                                  Final result               Lavender Top[[id number]]                                     Final result               Gold Top - SST[[id number]]                                   Final result                 Please view results for these tests on the individual orders.   CBC AND DIFFERENTIAL    Narrative:     The following orders were created for panel order CBC & Differential.  Procedure                               Abnormality         Status                     ---------                               -----------         ------                     CBC Auto Differential[[id number]]        Abnormal            Final result                 Please view results for these tests on the individual orders.   LIGHT BLUE TOP   GREEN TOP   LAVENDER TOP   GOLD TOP - SST       No orders to display                 MDM    Final diagnoses:   Type 2 diabetes mellitus with hyperglycemia, with long-term current use of insulin            Salvador Elmore MD  06/18/17 0711

## 2017-06-29 ENCOUNTER — OFFICE VISIT (OUTPATIENT)
Dept: FAMILY MEDICINE CLINIC | Facility: CLINIC | Age: 67
End: 2017-06-29

## 2017-06-29 PROCEDURE — 99308 SBSQ NF CARE LOW MDM 20: CPT | Performed by: STUDENT IN AN ORGANIZED HEALTH CARE EDUCATION/TRAINING PROGRAM

## 2017-06-29 NOTE — PROGRESS NOTES
MONTHLY NURSING HOME VISIT    NAME: Cristo Musa  : 1950  MRN: [id number]    DATE & TIME SEEN: 17 1700    PCP: Cory Rodríguez MD    NURSING HOME: [facility]    Chief Complaint: Monthly Nursing Home Visit     Subjective:     Patient is well.  His has clindamycin started  and has a dental appointment scheduled.  He reports good sleep and good control of his anxiety.  He denies chest pain, shortness of breath, N/V/D, or urinary complaints.    Current Meds:    Current Outpatient Prescriptions:   •  aspirin 81 MG EC tablet, Take 1 tablet by mouth Daily., Disp: 150 tablet, Rfl: 2  •  brimonidine (ALPHAGAN P) 0.1 % solution ophthalmic solution, 1 drop 3 (three) times a day., Disp: , Rfl:   •  calcium acetate (PHOSLO) 667 MG capsule, Take 667 mg by mouth 3 (Three) Times a Day With Meals. 2 capsules 3 times daily, Disp: , Rfl:   •  dorzolamide (TRUSOPT) 2 % ophthalmic solution, Administer 1 drop into the left eye 3 (Three) Times a Day., Disp: 1 each, Rfl: 12  •  fluticasone (FLONASE) 50 MCG/ACT nasal spray, 2 sprays into each nostril daily. Administer 2 sprays in each nostril for each dose., Disp: , Rfl:   •  gabapentin (NEURONTIN) 300 MG capsule, Take 300 mg by mouth 3 (three) times a day., Disp: , Rfl:   •  hydrALAZINE (APRESOLINE) 25 MG tablet, Take 25 mg by mouth 3 (three) times a day., Disp: , Rfl:   •  HYDROcodone-acetaminophen (NORCO)  MG per tablet, Take 1 tablet by mouth Every 6 (Six) Hours As Needed for Moderate Pain (4-6)., Disp: , Rfl:   •  insulin aspart (NovoLOG) 100 UNIT/ML injection, Inject  under the skin 3 (three) times a day before meals. Patient unsure of scale, Disp: , Rfl:   •  latanoprost (XALATAN) 0.005 % ophthalmic solution, Administer 1 drop to both eyes every night., Disp: , Rfl:   •  loratadine (CLARITIN) 10 MG tablet, Take 10 mg by mouth daily., Disp: , Rfl:   •  LORazepam (ATIVAN) 0.5 MG tablet, Take 0.5 mg by mouth Every 12 (Twelve) Hours As  Needed for Anxiety., Disp: , Rfl:   •  ondansetron (ZOFRAN) 8 MG tablet, Take 8 mg by mouth every 12 (twelve) hours as needed for nausea or vomiting., Disp: , Rfl:   •  PARoxetine (PAXIL) 30 MG tablet, Take 30 mg by mouth Every Morning., Disp: , Rfl:   •  sevelamer (RENVELA) 800 MG tablet, Take 800 mg by mouth 3 (Three) Times a Day With Meals., Disp: , Rfl:   •  traZODone (DESYREL) 50 MG tablet, Take 50 mg by mouth Every Night., Disp: , Rfl:     Allergies:  Lisinopril and Motrin [ibuprofen]    Review of Systems:  Review of Systems   Respiratory: Negative for cough and shortness of breath.    Cardiovascular: Negative for chest pain and palpitations.   Gastrointestinal: Negative for abdominal pain, diarrhea, nausea and vomiting.   Genitourinary: Negative for difficulty urinating.   Psychiatric/Behavioral: The patient is not nervous/anxious.        Objective:     /64  Pulse 67  Temp 97.7 °F (36.5 °C)  Resp 20  SpO2 99%  Physical Exam   Constitutional: He is oriented to person, place, and time. He appears well-developed and well-nourished.   HENT:   Head: Normocephalic and atraumatic.   Eyes: Conjunctivae and EOM are normal. Pupils are equal, round, and reactive to light.   Neck: Normal range of motion. Neck supple.   Cardiovascular: Normal rate, regular rhythm and normal heart sounds.    Pulmonary/Chest: Effort normal and breath sounds normal. He has no wheezes.   Abdominal: Soft. Bowel sounds are normal. There is no tenderness.   Musculoskeletal: He exhibits no edema.   Neurological: He is alert and oriented to person, place, and time.   Skin: Skin is warm and dry.          Assessment/Plan:      67 y.o. male with:  1.  ESRD: MWF HD  2.  Hypertension: Hydralazine  3.  Anxiety: Paroxetine, Ativan when necessary  4.  Dental abscess: Continue antibiotics and norcos for pain  5.  Insomnia: Trazodone  6.  Diabetes mellitus type 2: Currently getting 25 units Lantus nightly, 4 units NovoLog with meals, and sliding  scale coverage.  Will keep as is for now and reassess shortly to see if any changes are appropriate  7.  Peripheral neuropathy: Gabapentin  8.  GERD: Famotidine  9.  Glaucoma: Continue eyedrops  10.  History of heart block  11.  Chronic pain: Norcos    CODE STATUS: full code    Dr Ayoub is the attending on record for this patient, he is aware of the patient's status and agrees with the above.             This document has been electronically signed by Cory Rodríguez MD on June 30, 2017 5:21 PM

## 2017-06-30 VITALS
OXYGEN SATURATION: 99 % | SYSTOLIC BLOOD PRESSURE: 120 MMHG | HEART RATE: 67 BPM | DIASTOLIC BLOOD PRESSURE: 64 MMHG | RESPIRATION RATE: 20 BRPM | TEMPERATURE: 97.7 F

## 2017-07-27 ENCOUNTER — OFFICE VISIT (OUTPATIENT)
Dept: FAMILY MEDICINE CLINIC | Facility: CLINIC | Age: 67
End: 2017-07-27

## 2017-07-27 DIAGNOSIS — Z79.4 TYPE 2 DIABETES MELLITUS WITH BOTH EYES AFFECTED BY MODERATE NONPROLIFERATIVE RETINOPATHY WITHOUT MACULAR EDEMA, WITH LONG-TERM CURRENT USE OF INSULIN (HCC): ICD-10-CM

## 2017-07-27 DIAGNOSIS — I10 ESSENTIAL HYPERTENSION: Primary | ICD-10-CM

## 2017-07-27 DIAGNOSIS — E11.3393 TYPE 2 DIABETES MELLITUS WITH BOTH EYES AFFECTED BY MODERATE NONPROLIFERATIVE RETINOPATHY WITHOUT MACULAR EDEMA, WITH LONG-TERM CURRENT USE OF INSULIN (HCC): ICD-10-CM

## 2017-07-27 DIAGNOSIS — G89.29 OTHER CHRONIC PAIN: ICD-10-CM

## 2017-07-27 DIAGNOSIS — F41.9 ANXIETY: ICD-10-CM

## 2017-07-27 DIAGNOSIS — F51.01 PRIMARY INSOMNIA: ICD-10-CM

## 2017-07-27 PROCEDURE — 99308 SBSQ NF CARE LOW MDM 20: CPT | Performed by: STUDENT IN AN ORGANIZED HEALTH CARE EDUCATION/TRAINING PROGRAM

## 2017-07-27 NOTE — PROGRESS NOTES
MONTHLY NURSING HOME VISIT    NAME: Cristo Musa  : 1950  MRN: [id number]    DATE & TIME SEEN: 17 15:00    PCP: Cory Rodríguez MD    NURSING HOME: [facility]    Chief Complaint: Monthly Nursing Home Visit    Subjective:     Cristo Musa is a 67 y.o. male. He is feeling well. His tooth that was bothering him has come out. So he canceled his dentist appointment. His only complaint today is trouble sleeping. We recently lowered his trazodone from 50mg to 25mg. He also has a rash on his r arm for which he has been using a cream. No CP, sob, edema, n/v/d.     Current Meds:    Current Outpatient Prescriptions:   •  aspirin 81 MG EC tablet, Take 1 tablet by mouth Daily., Disp: 150 tablet, Rfl: 2  •  brimonidine (ALPHAGAN P) 0.1 % solution ophthalmic solution, 1 drop 3 (three) times a day., Disp: , Rfl:   •  calcium acetate (PHOSLO) 667 MG capsule, Take 667 mg by mouth 3 (Three) Times a Day With Meals. 2 capsules 3 times daily, Disp: , Rfl:   •  dorzolamide (TRUSOPT) 2 % ophthalmic solution, Administer 1 drop into the left eye 3 (Three) Times a Day., Disp: 1 each, Rfl: 12  •  fluticasone (FLONASE) 50 MCG/ACT nasal spray, 2 sprays into each nostril daily. Administer 2 sprays in each nostril for each dose., Disp: , Rfl:   •  gabapentin (NEURONTIN) 300 MG capsule, Take 300 mg by mouth 3 (three) times a day., Disp: , Rfl:   •  hydrALAZINE (APRESOLINE) 25 MG tablet, Take 25 mg by mouth 3 (three) times a day., Disp: , Rfl:   •  HYDROcodone-acetaminophen (NORCO)  MG per tablet, Take 1 tablet by mouth Every 6 (Six) Hours As Needed for Moderate Pain (4-6)., Disp: , Rfl:   •  insulin aspart (NovoLOG) 100 UNIT/ML injection, Inject  under the skin 3 (three) times a day before meals. Patient unsure of scale, Disp: , Rfl:   •  latanoprost (XALATAN) 0.005 % ophthalmic solution, Administer 1 drop to both eyes every night., Disp: , Rfl:   •  loratadine (CLARITIN) 10 MG tablet,  Take 10 mg by mouth daily., Disp: , Rfl:   •  LORazepam (ATIVAN) 0.5 MG tablet, Take 0.5 mg by mouth Every 12 (Twelve) Hours As Needed for Anxiety., Disp: , Rfl:   •  ondansetron (ZOFRAN) 8 MG tablet, Take 8 mg by mouth every 12 (twelve) hours as needed for nausea or vomiting., Disp: , Rfl:   •  PARoxetine (PAXIL) 30 MG tablet, Take 30 mg by mouth Every Morning., Disp: , Rfl:   •  sevelamer (RENVELA) 800 MG tablet, Take 800 mg by mouth 3 (Three) Times a Day With Meals., Disp: , Rfl:   •  traZODone (DESYREL) 50 MG tablet, Take 50 mg by mouth Every Night., Disp: , Rfl:     Allergies:  Lisinopril and Motrin [ibuprofen]    Review of Systems:  Review of Systems   Constitutional: Negative for activity change, appetite change, fatigue and fever.   HENT: Negative for ear pain and sore throat.    Eyes: Negative for pain and visual disturbance.   Respiratory: Negative for cough, shortness of breath and wheezing.    Cardiovascular: Negative for chest pain and palpitations.   Gastrointestinal: Negative for abdominal pain, diarrhea, nausea and vomiting.   Musculoskeletal: Positive for arthralgias and back pain. Negative for gait problem.   Skin: Positive for rash. Negative for color change.   Neurological: Negative for dizziness, weakness and headaches.   Psychiatric/Behavioral: Positive for sleep disturbance. Negative for agitation and confusion.       Objective:     /72  Pulse 74  Temp 96.8 °F (36 °C)  Resp 20  SpO2 98%  Physical Exam   Constitutional: He is oriented to person, place, and time. He appears well-developed and well-nourished.   HENT:   Head: Normocephalic and atraumatic.   Right Ear: External ear normal.   Left Ear: External ear normal.   Nose: Nose normal.   Eyes: Conjunctivae and EOM are normal. Pupils are equal, round, and reactive to light.   Neck: Normal range of motion. Neck supple.   Cardiovascular: Normal rate, regular rhythm and normal heart sounds.    Pulmonary/Chest: Effort normal and breath  sounds normal. He has no wheezes.   Abdominal: Soft. Bowel sounds are normal. He exhibits no distension. There is no tenderness.   Musculoskeletal: Normal range of motion. He exhibits no edema or deformity.   Neurological: He is alert and oriented to person, place, and time. No cranial nerve deficit.   Skin: Skin is warm.   Psychiatric: He has a normal mood and affect. His behavior is normal.   Nursing note and vitals reviewed.       Assessment/Plan:      67 y.o. male with:  Problem List Items Addressed This Visit        Cardiovascular and Mediastinum    Hypertension - Primary       Endocrine    Diabetes mellitus       Other    Primary insomnia    Chronic pain    Anxiety        Will increase trazodone from 25mg to 50mg.    CODE STATUS: Full code    Dr Ayoub is the attending on record for this patient, he is aware of the patient's status and agrees with the above.          This document has been electronically signed by Cory Rodríguez MD on July 31, 2017 11:51 AM

## 2017-07-28 DIAGNOSIS — N18.6 ESRD (END STAGE RENAL DISEASE) (HCC): Primary | ICD-10-CM

## 2017-07-31 VITALS
SYSTOLIC BLOOD PRESSURE: 126 MMHG | TEMPERATURE: 96.8 F | DIASTOLIC BLOOD PRESSURE: 72 MMHG | RESPIRATION RATE: 20 BRPM | OXYGEN SATURATION: 98 % | HEART RATE: 74 BPM

## 2017-08-01 ENCOUNTER — HOSPITAL ENCOUNTER (OUTPATIENT)
Facility: HOSPITAL | Age: 67
Setting detail: HOSPITAL OUTPATIENT SURGERY
End: 2017-08-01
Attending: THORACIC SURGERY (CARDIOTHORACIC VASCULAR SURGERY) | Admitting: THORACIC SURGERY (CARDIOTHORACIC VASCULAR SURGERY)

## 2017-08-01 DIAGNOSIS — N18.6 ESRD (END STAGE RENAL DISEASE) (HCC): ICD-10-CM

## 2017-08-14 DIAGNOSIS — N18.6 ESRD (END STAGE RENAL DISEASE) ON DIALYSIS (HCC): Primary | ICD-10-CM

## 2017-08-14 DIAGNOSIS — Z99.2 ESRD (END STAGE RENAL DISEASE) ON DIALYSIS (HCC): Primary | ICD-10-CM

## 2017-08-14 RX ORDER — SODIUM CHLORIDE 9 MG/ML
100 INJECTION, SOLUTION INTRAVENOUS CONTINUOUS
Status: CANCELLED | OUTPATIENT
Start: 2017-08-24

## 2017-08-14 RX ORDER — SODIUM CHLORIDE 0.9 % (FLUSH) 0.9 %
1-10 SYRINGE (ML) INJECTION AS NEEDED
Status: CANCELLED | OUTPATIENT
Start: 2017-08-24

## 2017-08-24 ENCOUNTER — HOSPITAL ENCOUNTER (OUTPATIENT)
Facility: HOSPITAL | Age: 67
Setting detail: HOSPITAL OUTPATIENT SURGERY
Discharge: HOME | End: 2017-08-24
Attending: THORACIC SURGERY (CARDIOTHORACIC VASCULAR SURGERY) | Admitting: THORACIC SURGERY (CARDIOTHORACIC VASCULAR SURGERY)

## 2017-08-24 VITALS
RESPIRATION RATE: 20 BRPM | TEMPERATURE: 97 F | HEART RATE: 55 BPM | SYSTOLIC BLOOD PRESSURE: 150 MMHG | BODY MASS INDEX: 33.18 KG/M2 | DIASTOLIC BLOOD PRESSURE: 71 MMHG | WEIGHT: 218.92 LBS | OXYGEN SATURATION: 98 % | HEIGHT: 68 IN

## 2017-08-24 DIAGNOSIS — Z99.2 ESRD (END STAGE RENAL DISEASE) ON DIALYSIS (HCC): ICD-10-CM

## 2017-08-24 DIAGNOSIS — N18.6 ESRD (END STAGE RENAL DISEASE) ON DIALYSIS (HCC): ICD-10-CM

## 2017-08-24 PROCEDURE — 36907 BALO ANGIOP CTR DIALYSIS SEG: CPT | Performed by: THORACIC SURGERY (CARDIOTHORACIC VASCULAR SURGERY)

## 2017-08-24 PROCEDURE — 36902 INTRO CATH DIALYSIS CIRCUIT: CPT | Performed by: THORACIC SURGERY (CARDIOTHORACIC VASCULAR SURGERY)

## 2017-08-24 PROCEDURE — C1894 INTRO/SHEATH, NON-LASER: HCPCS | Performed by: THORACIC SURGERY (CARDIOTHORACIC VASCULAR SURGERY)

## 2017-08-24 PROCEDURE — C1725 CATH, TRANSLUMIN NON-LASER: HCPCS | Performed by: THORACIC SURGERY (CARDIOTHORACIC VASCULAR SURGERY)

## 2017-08-24 PROCEDURE — 0 IOPAMIDOL 61 % SOLUTION: Performed by: THORACIC SURGERY (CARDIOTHORACIC VASCULAR SURGERY)

## 2017-08-24 PROCEDURE — 25010000002 FENTANYL CITRATE (PF) 100 MCG/2ML SOLUTION: Performed by: THORACIC SURGERY (CARDIOTHORACIC VASCULAR SURGERY)

## 2017-08-24 PROCEDURE — 25010000003 CEFAZOLIN PER 500 MG: Performed by: NURSE PRACTITIONER

## 2017-08-24 PROCEDURE — C1769 GUIDE WIRE: HCPCS | Performed by: THORACIC SURGERY (CARDIOTHORACIC VASCULAR SURGERY)

## 2017-08-24 PROCEDURE — 25010000002 HEPARIN (PORCINE) PER 1000 UNITS: Performed by: THORACIC SURGERY (CARDIOTHORACIC VASCULAR SURGERY)

## 2017-08-24 PROCEDURE — 25010000002 MIDAZOLAM PER 1 MG: Performed by: THORACIC SURGERY (CARDIOTHORACIC VASCULAR SURGERY)

## 2017-08-24 RX ORDER — MIDAZOLAM HYDROCHLORIDE 1 MG/ML
INJECTION INTRAMUSCULAR; INTRAVENOUS AS NEEDED
Status: DISCONTINUED | OUTPATIENT
Start: 2017-08-24 | End: 2017-08-24 | Stop reason: HOSPADM

## 2017-08-24 RX ORDER — SODIUM CHLORIDE 9 MG/ML
100 INJECTION, SOLUTION INTRAVENOUS CONTINUOUS
Status: DISCONTINUED | OUTPATIENT
Start: 2017-08-24 | End: 2017-08-24 | Stop reason: HOSPADM

## 2017-08-24 RX ORDER — INSULIN GLARGINE 100 [IU]/ML
25 INJECTION, SOLUTION SUBCUTANEOUS NIGHTLY
COMMUNITY
End: 2018-11-13 | Stop reason: HOSPADM

## 2017-08-24 RX ORDER — ACETAMINOPHEN 325 MG/1
650 TABLET ORAL EVERY 4 HOURS PRN
Status: CANCELLED | OUTPATIENT
Start: 2017-08-24

## 2017-08-24 RX ORDER — SODIUM CHLORIDE 0.9 % (FLUSH) 0.9 %
1-10 SYRINGE (ML) INJECTION AS NEEDED
Status: DISCONTINUED | OUTPATIENT
Start: 2017-08-24 | End: 2017-08-24 | Stop reason: HOSPADM

## 2017-08-24 RX ORDER — HEPARIN SODIUM 1000 [USP'U]/ML
INJECTION, SOLUTION INTRAVENOUS; SUBCUTANEOUS AS NEEDED
Status: DISCONTINUED | OUTPATIENT
Start: 2017-08-24 | End: 2017-08-24 | Stop reason: HOSPADM

## 2017-08-24 RX ORDER — FENTANYL CITRATE 50 UG/ML
INJECTION, SOLUTION INTRAMUSCULAR; INTRAVENOUS AS NEEDED
Status: DISCONTINUED | OUTPATIENT
Start: 2017-08-24 | End: 2017-08-24 | Stop reason: HOSPADM

## 2017-08-24 RX ORDER — FAMOTIDINE 20 MG/1
20 TABLET, FILM COATED ORAL
COMMUNITY

## 2017-08-24 RX ADMIN — CEFAZOLIN SODIUM 2 G: 1 INJECTION, POWDER, FOR SOLUTION INTRAMUSCULAR; INTRAVENOUS at 07:38

## 2017-08-30 ENCOUNTER — OFFICE VISIT (OUTPATIENT)
Dept: FAMILY MEDICINE CLINIC | Facility: CLINIC | Age: 67
End: 2017-08-30

## 2017-08-30 DIAGNOSIS — H40.1122 PRIMARY OPEN ANGLE GLAUCOMA OF LEFT EYE, MODERATE STAGE: ICD-10-CM

## 2017-08-30 DIAGNOSIS — I44.2 COMPLETE HEART BLOCK (HCC): ICD-10-CM

## 2017-08-30 DIAGNOSIS — F51.01 PRIMARY INSOMNIA: ICD-10-CM

## 2017-08-30 DIAGNOSIS — G89.29 OTHER CHRONIC PAIN: ICD-10-CM

## 2017-08-30 DIAGNOSIS — F41.9 ANXIETY: ICD-10-CM

## 2017-08-30 DIAGNOSIS — I10 ESSENTIAL HYPERTENSION: ICD-10-CM

## 2017-08-30 DIAGNOSIS — N18.6 ESRD (END STAGE RENAL DISEASE) (HCC): Primary | ICD-10-CM

## 2017-08-30 PROCEDURE — 99308 SBSQ NF CARE LOW MDM 20: CPT | Performed by: STUDENT IN AN ORGANIZED HEALTH CARE EDUCATION/TRAINING PROGRAM

## 2017-08-31 VITALS
TEMPERATURE: 98.2 F | DIASTOLIC BLOOD PRESSURE: 78 MMHG | RESPIRATION RATE: 18 BRPM | HEART RATE: 66 BPM | SYSTOLIC BLOOD PRESSURE: 102 MMHG | OXYGEN SATURATION: 96 %

## 2017-09-25 ENCOUNTER — TELEPHONE (OUTPATIENT)
Dept: FAMILY MEDICINE CLINIC | Facility: CLINIC | Age: 67
End: 2017-09-25

## 2017-09-25 NOTE — TELEPHONE ENCOUNTER
PATIENT SLIPPED WHEN GETTING INTO VEHICLE FOR DIALYSIS THIS MORNING.  HE HAD HIS WALKER. HE HAS NO INJURIES AND IS FINE.    LAURA FOY / STANTON SAWYER

## 2017-09-29 ENCOUNTER — TELEPHONE (OUTPATIENT)
Dept: FAMILY MEDICINE CLINIC | Facility: CLINIC | Age: 67
End: 2017-09-29

## 2017-09-29 RX ORDER — HYDROCODONE BITARTRATE AND ACETAMINOPHEN 10; 325 MG/1; MG/1
1 TABLET ORAL EVERY 6 HOURS PRN
Qty: 120 TABLET | Refills: 0 | Status: ON HOLD | OUTPATIENT
Start: 2017-09-29 | End: 2018-03-16

## 2017-10-02 ENCOUNTER — HOSPITAL ENCOUNTER (EMERGENCY)
Facility: HOSPITAL | Age: 67
Discharge: HOME | End: 2017-10-02
Attending: FAMILY MEDICINE | Admitting: FAMILY MEDICINE

## 2017-10-02 ENCOUNTER — APPOINTMENT (OUTPATIENT)
Dept: GENERAL RADIOLOGY | Facility: HOSPITAL | Age: 67
End: 2017-10-02

## 2017-10-02 VITALS
BODY MASS INDEX: 33.65 KG/M2 | DIASTOLIC BLOOD PRESSURE: 64 MMHG | OXYGEN SATURATION: 95 % | RESPIRATION RATE: 20 BRPM | SYSTOLIC BLOOD PRESSURE: 139 MMHG | HEART RATE: 46 BPM | TEMPERATURE: 97.4 F | HEIGHT: 68 IN | WEIGHT: 222 LBS

## 2017-10-02 DIAGNOSIS — R53.1 WEAKNESS: Primary | ICD-10-CM

## 2017-10-02 DIAGNOSIS — Z99.2 ESRD (END STAGE RENAL DISEASE) ON DIALYSIS (HCC): ICD-10-CM

## 2017-10-02 DIAGNOSIS — N18.6 ESRD (END STAGE RENAL DISEASE) ON DIALYSIS (HCC): ICD-10-CM

## 2017-10-02 LAB
ALBUMIN SERPL-MCNC: 4.1 G/DL (ref 3.4–4.8)
ALBUMIN/GLOB SERPL: 0.8 G/DL (ref 1.1–1.8)
ALP SERPL-CCNC: 132 U/L (ref 38–126)
ALT SERPL W P-5'-P-CCNC: 38 U/L (ref 21–72)
ANION GAP SERPL CALCULATED.3IONS-SCNC: 20 MMOL/L (ref 5–15)
AST SERPL-CCNC: 52 U/L (ref 17–59)
BASOPHILS # BLD AUTO: 0.02 10*3/MM3 (ref 0–0.2)
BASOPHILS NFR BLD AUTO: 0.3 % (ref 0–2)
BILIRUB SERPL-MCNC: 1 MG/DL (ref 0.2–1.3)
BUN BLD-MCNC: 70 MG/DL (ref 7–21)
BUN/CREAT SERPL: 6.1 (ref 7–25)
CALCIUM SPEC-SCNC: 9.1 MG/DL (ref 8.4–10.2)
CHLORIDE SERPL-SCNC: 84 MMOL/L (ref 95–110)
CK MB SERPL-CCNC: 0.41 NG/ML (ref 0–5)
CK SERPL-CCNC: 29 U/L (ref 55–170)
CO2 SERPL-SCNC: 32 MMOL/L (ref 22–31)
CREAT BLD-MCNC: 11.4 MG/DL (ref 0.7–1.3)
DEPRECATED RDW RBC AUTO: 45.9 FL (ref 35.1–43.9)
EOSINOPHIL # BLD AUTO: 0.08 10*3/MM3 (ref 0–0.7)
EOSINOPHIL NFR BLD AUTO: 1.1 % (ref 0–7)
ERYTHROCYTE [DISTWIDTH] IN BLOOD BY AUTOMATED COUNT: 13.4 % (ref 11.5–14.5)
GFR SERPL CREATININE-BSD FRML MDRD: 5 ML/MIN/1.73 (ref 49–113)
GLOBULIN UR ELPH-MCNC: 5 GM/DL (ref 2.3–3.5)
GLUCOSE BLD-MCNC: 236 MG/DL (ref 60–100)
GLUCOSE BLDC GLUCOMTR-MCNC: 229 MG/DL (ref 70–130)
HCT VFR BLD AUTO: 34.8 % (ref 39–49)
HGB BLD-MCNC: 11.5 G/DL (ref 13.7–17.3)
IMM GRANULOCYTES # BLD: 0.01 10*3/MM3 (ref 0–0.02)
IMM GRANULOCYTES NFR BLD: 0.1 % (ref 0–0.5)
LYMPHOCYTES # BLD AUTO: 1.95 10*3/MM3 (ref 0.6–4.2)
LYMPHOCYTES NFR BLD AUTO: 27 % (ref 10–50)
MAGNESIUM SERPL-MCNC: 2.2 MG/DL (ref 1.6–2.3)
MCH RBC QN AUTO: 31.1 PG (ref 26.5–34)
MCHC RBC AUTO-ENTMCNC: 33 G/DL (ref 31.5–36.3)
MCV RBC AUTO: 94.1 FL (ref 80–98)
MONOCYTES # BLD AUTO: 0.56 10*3/MM3 (ref 0–0.9)
MONOCYTES NFR BLD AUTO: 7.8 % (ref 0–12)
NEUTROPHILS # BLD AUTO: 4.6 10*3/MM3 (ref 2–8.6)
NEUTROPHILS NFR BLD AUTO: 63.7 % (ref 37–80)
NT-PROBNP SERPL-MCNC: 4630 PG/ML (ref 0–900)
PLATELET # BLD AUTO: 128 10*3/MM3 (ref 150–450)
PMV BLD AUTO: 9.5 FL (ref 8–12)
POTASSIUM BLD-SCNC: 4.8 MMOL/L (ref 3.5–5.1)
PROT SERPL-MCNC: 9.1 G/DL (ref 6.3–8.6)
RBC # BLD AUTO: 3.7 10*6/MM3 (ref 4.37–5.74)
SODIUM BLD-SCNC: 136 MMOL/L (ref 137–145)
TROPONIN I SERPL-MCNC: 0.02 NG/ML
TSH SERPL DL<=0.05 MIU/L-ACNC: 1.62 MIU/ML (ref 0.46–4.68)
WBC NRBC COR # BLD: 7.22 10*3/MM3 (ref 3.2–9.8)
WHOLE BLOOD HOLD SPECIMEN: NORMAL

## 2017-10-02 PROCEDURE — 82962 GLUCOSE BLOOD TEST: CPT

## 2017-10-02 PROCEDURE — 83735 ASSAY OF MAGNESIUM: CPT | Performed by: FAMILY MEDICINE

## 2017-10-02 PROCEDURE — 71020 HC CHEST PA AND LATERAL: CPT

## 2017-10-02 PROCEDURE — 82553 CREATINE MB FRACTION: CPT | Performed by: FAMILY MEDICINE

## 2017-10-02 PROCEDURE — 93005 ELECTROCARDIOGRAM TRACING: CPT

## 2017-10-02 PROCEDURE — 72100 X-RAY EXAM L-S SPINE 2/3 VWS: CPT

## 2017-10-02 PROCEDURE — 93010 ELECTROCARDIOGRAM REPORT: CPT | Performed by: INTERNAL MEDICINE

## 2017-10-02 PROCEDURE — 83880 ASSAY OF NATRIURETIC PEPTIDE: CPT | Performed by: FAMILY MEDICINE

## 2017-10-02 PROCEDURE — 82550 ASSAY OF CK (CPK): CPT | Performed by: FAMILY MEDICINE

## 2017-10-02 PROCEDURE — 80053 COMPREHEN METABOLIC PANEL: CPT | Performed by: FAMILY MEDICINE

## 2017-10-02 PROCEDURE — 99284 EMERGENCY DEPT VISIT MOD MDM: CPT

## 2017-10-02 PROCEDURE — 73030 X-RAY EXAM OF SHOULDER: CPT

## 2017-10-02 PROCEDURE — 73523 X-RAY EXAM HIPS BI 5/> VIEWS: CPT

## 2017-10-02 PROCEDURE — 84484 ASSAY OF TROPONIN QUANT: CPT | Performed by: FAMILY MEDICINE

## 2017-10-02 PROCEDURE — 85025 COMPLETE CBC W/AUTO DIFF WBC: CPT | Performed by: FAMILY MEDICINE

## 2017-10-02 PROCEDURE — 84443 ASSAY THYROID STIM HORMONE: CPT | Performed by: FAMILY MEDICINE

## 2017-10-02 RX ORDER — SODIUM CHLORIDE 0.9 % (FLUSH) 0.9 %
10 SYRINGE (ML) INJECTION AS NEEDED
Status: DISCONTINUED | OUTPATIENT
Start: 2017-10-02 | End: 2017-10-02 | Stop reason: HOSPADM

## 2017-10-02 NOTE — ED NOTES
Per , lab has attempted to stick pt x 4 unsuccessfully and that is the max amt of attempts allowed by lab per policy.     Roya Chavez RN  10/02/17 0924

## 2017-10-02 NOTE — ED PROVIDER NOTES
Subjective   HPI Comments: 67 year old with h/o end stage renal disease , hemodialysis , hypertension presents for weakness and hip pain and left shoulder pain after he fell.      Patient is a 67 y.o. male presenting with weakness.   History provided by:  Patient  Weakness - Generalized   Severity:  Moderate  Onset quality:  Sudden  Duration:  2 hours  Timing:  Constant  Progression:  Worsening  Chronicity:  New  Context: not alcohol use, not allergies, not dehydration and not drug use    Relieved by:  Nothing  Worsened by:  Nothing  Ineffective treatments:  None tried  Associated symptoms: no abdominal pain, no anorexia, no aphasia, no cough, no dizziness, no drooling, no dysuria, no numbness in extremities, no falls, no headaches, no myalgias, no nausea and no near-syncope    Risk factors: no anemia, no neurologic disease and no new medications        Review of Systems   Constitutional: Negative for activity change, appetite change, chills, diaphoresis and fatigue.   HENT: Negative for congestion, dental problem and drooling.    Respiratory: Negative for apnea, cough, choking and chest tightness.    Cardiovascular: Negative for near-syncope.   Gastrointestinal: Negative for abdominal pain, anorexia and nausea.   Endocrine: Negative for cold intolerance, heat intolerance, polydipsia and polyphagia.   Genitourinary: Negative for dysuria.   Musculoskeletal: Negative for falls and myalgias.   Neurological: Negative for dizziness, facial asymmetry, light-headedness and headaches.       Past Medical History:   Diagnosis Date   • Cataract    • CHF (congestive heart failure)    • CKD (chronic kidney disease)    • Clostridium difficile infection    • COPD (chronic obstructive pulmonary disease)    • Coronary artery disease    • Diabetes mellitus    • Glaucoma    • Hepatitis C    • History of transfusion    • Hypertension    • Nephropathy, diabetic    • Pacemaker    • Pulmonary embolism        Allergies   Allergen Reactions    • Lisinopril Other (See Comments)     unknown   • Motrin [Ibuprofen] Diarrhea and Nausea And Vomiting       Past Surgical History:   Procedure Laterality Date   • ABDOMINAL SURGERY     • ARTERIOVENOUS FISTULA/SHUNT SURGERY Right     forearm loop   • ARTERIOVENOUS FISTULA/SHUNT SURGERY Left     removed past upper arm   • ARTERIOVENOUS FISTULA/SHUNT SURGERY W/ HEMODIALYSIS CATHETER INSERTION Right 4/4/2016    Procedure: RIGHT ARM AV FISTULA DECLOT REVISION REPAIR BRACHIAL ANASTOMOTIC PSUEDOANEURYSM LEFT FEMORAL RICHARDSON FISTULOGRAM WITH ANGIOPLASTY;  Surgeon: Jerson Carpenter MD;  Location: [facility] OR 18/19;  Service:    • CATARACT EXTRACTION W/ INTRAOCULAR LENS IMPLANT Left 3/31/2017    Procedure: REMOVE CATARACT AND IMPLANT INTRAOCULAR LENS LEFT EYE;  Surgeon: Hilton Montemayor MD;  Location: [facility] OR;  Service:    • EYE SURGERY     • HERNIA REPAIR     • IMPLANTABLE CARDIOVERTER DEFIBRILLATOR LEAD REPLACEMENT/POCKET REVISION Right 4/11/2016    Procedure: PACEMAKER LEADS X 3 AND BATTERY EXTRACTION WITH EXIMER LASER;  Surgeon: Vern Reeves MD;  Location: [facility] OR 18/19;  Service:    • INSERTION HEMODIALYSIS CATHETER Right 05/2015    jugular   • INTERVENTIONAL RADIOLOGY PROCEDURE Right 6/1/2017    Procedure: RIGHT dialysis fistulagram & angioplasty;  Surgeon: Jerson Singh MD;  Location: [facility] ANGIO INVASIVE LOCATION;  Service:    • INTERVENTIONAL RADIOLOGY PROCEDURE Right 8/24/2017    Procedure: IR dialysis fistulagram;  Surgeon: Jerson Singh MD;  Location: [facility] ANGIO INVASIVE LOCATION;  Service:    • PACEMAKER IMPLANTATION  2008    dual chamber [employer]   • REMOVAL HEMODIALYSIS CATHETER Right 05/2015    femoral vein       Family History   Problem Relation Age of Onset   • COPD Sister    • Diabetes Brother    • Early death Brother    • Hyperlipidemia Brother    • Hypertension Brother    • Coronary artery disease Mother    • Diabetes Mother    •  "Hypertension Mother    • Stroke Other    • Other Other      Respiratory disorder       Social History     Social History   • Marital status: Single     Spouse name: N/A   • Number of children: N/A   • Years of education: N/A     Social History Main Topics   • Smoking status: Former Smoker   • Smokeless tobacco: None      Comment: quit 20 years ago    • Alcohol use No   • Drug use: No   • Sexual activity: No     Other Topics Concern   • None     Social History Narrative           Objective    /64 (BP Location: Left arm, Patient Position: Lying)  Pulse (!) 46  Temp 97.4 °F (36.3 °C) (Oral)   Resp 20  Ht 68\" (172.7 cm)  Wt 222 lb (101 kg)  SpO2 95%  BMI 33.75 kg/m2    Physical Exam   Constitutional: He is oriented to person, place, and time. He appears well-developed and well-nourished.   HENT:   Head: Normocephalic and atraumatic.   Right Ear: External ear normal.   Left Ear: External ear normal.   Nose: Nose normal.   Mouth/Throat: Oropharynx is clear and moist.   Eyes: Conjunctivae and EOM are normal. Pupils are equal, round, and reactive to light.   Neck: Normal range of motion. Neck supple.   Cardiovascular: Normal rate, regular rhythm, normal heart sounds and intact distal pulses.    Pulmonary/Chest: Effort normal and breath sounds normal. No accessory muscle usage. No respiratory distress. He exhibits no tenderness and no deformity.   Abdominal: Soft. Bowel sounds are normal. There is no tenderness.   Musculoskeletal: Normal range of motion.   Neurological: He is alert and oriented to person, place, and time. He has normal reflexes.   Skin: Skin is warm.   Psychiatric: He has a normal mood and affect. His behavior is normal. Judgment and thought content normal.   Nursing note and vitals reviewed.      Procedures         ED Course  ED Course      Awaiting blood work and x ray reports.     Multiple attempts for blood draw.   Reviewed blood results and x rays .   Discussed with him and reassured.   He " is to go to dialysis center via [facility]acs and then go to nursing home .    made arrangement .               MDM    Final diagnoses:   Weakness   ESRD (end stage renal disease) on dialysis            Angela Weathers MD  10/03/17 0262

## 2017-10-02 NOTE — ED NOTES
DC orders noted. Per Case Management, PAX coming to pick pt up to take to dialysis.     Roya Chavez RN  10/02/17 3661

## 2017-10-02 NOTE — ED NOTES
Attempted arterial stick from left radial per this RN. Unsuccessful. Lab at bedside to attempt collection.     Roya Chavez RN  10/02/17 0845       Roya Chavez RN  10/02/17 0957

## 2017-10-02 NOTE — NURSING NOTE
PACS notified need for transport to [facility] and then back to [place]. They will transport. Did not give ETA

## 2017-10-16 ENCOUNTER — DOCUMENTATION (OUTPATIENT)
Dept: FAMILY MEDICINE CLINIC | Facility: CLINIC | Age: 67
End: 2017-10-16

## 2017-10-16 DIAGNOSIS — N18.6 ESRD (END STAGE RENAL DISEASE) ON DIALYSIS (HCC): ICD-10-CM

## 2017-10-16 DIAGNOSIS — E11.3393 TYPE 2 DIABETES MELLITUS WITH BOTH EYES AFFECTED BY MODERATE NONPROLIFERATIVE RETINOPATHY WITHOUT MACULAR EDEMA, WITH LONG-TERM CURRENT USE OF INSULIN (HCC): ICD-10-CM

## 2017-10-16 DIAGNOSIS — I10 ESSENTIAL HYPERTENSION: Primary | ICD-10-CM

## 2017-10-16 DIAGNOSIS — Z79.4 TYPE 2 DIABETES MELLITUS WITH BOTH EYES AFFECTED BY MODERATE NONPROLIFERATIVE RETINOPATHY WITHOUT MACULAR EDEMA, WITH LONG-TERM CURRENT USE OF INSULIN (HCC): ICD-10-CM

## 2017-10-16 DIAGNOSIS — Z99.2 ESRD (END STAGE RENAL DISEASE) ON DIALYSIS (HCC): ICD-10-CM

## 2017-10-16 NOTE — PROGRESS NOTES
Subjective   Cristo Musa is a 67 y.o. male.     History of Present Illness   I wsaw the patient in Dialysis. He is getting dialyzed. He has no major complaints.  The following portions of the patient's history were reviewed and updated as appropriate: allergies, current medications, past family history, past medical history, past social history, past surgical history and problem list.    Review of Systems   Constitutional: Negative for fatigue and fever.   Respiratory: Negative for cough, chest tightness and stridor.    Cardiovascular: Negative for chest pain, palpitations and leg swelling.       Objective   Physical Exam   Constitutional: He appears well-developed and well-nourished.   HENT:   Head: Normocephalic and atraumatic.   Right Ear: External ear normal.   Left Ear: External ear normal.   Nose: Nose normal.   Mouth/Throat: Oropharynx is clear and moist.   Eyes: Pupils are equal, round, and reactive to light.   Cardiovascular: Normal rate, regular rhythm and normal heart sounds.  Exam reveals no gallop and no friction rub.    No murmur heard.  Pulmonary/Chest: Effort normal and breath sounds normal. No respiratory distress. He has no wheezes. He has no rales.   Abdominal: Soft. Bowel sounds are normal.   Musculoskeletal: He exhibits no edema.   Skin: Skin is warm and dry.   Vitals reviewed.  T-97.8,P-68,R-18,BP-145/97  Assessment/Plan   Diagnoses and all orders for this visit:    Essential hypertension    Type 2 diabetes mellitus with both eyes affected by moderate nonproliferative retinopathy without macular edema, with long-term current use of insulin    ESRD (end stage renal disease) on dialysis        Continue with current treatment. Will follow along with his resident physician.

## 2017-10-30 RX ORDER — GABAPENTIN 300 MG/1
300 CAPSULE ORAL 3 TIMES DAILY
Qty: 90 CAPSULE | Refills: 2 | Status: SHIPPED | OUTPATIENT
Start: 2017-10-30 | End: 2018-06-01 | Stop reason: SDUPTHER

## 2017-10-31 ENCOUNTER — OFFICE VISIT (OUTPATIENT)
Dept: FAMILY MEDICINE CLINIC | Facility: CLINIC | Age: 67
End: 2017-10-31

## 2017-10-31 DIAGNOSIS — Z79.4 TYPE 2 DIABETES MELLITUS WITH BOTH EYES AFFECTED BY MODERATE NONPROLIFERATIVE RETINOPATHY WITHOUT MACULAR EDEMA, WITH LONG-TERM CURRENT USE OF INSULIN (HCC): ICD-10-CM

## 2017-10-31 DIAGNOSIS — F41.9 ANXIETY: ICD-10-CM

## 2017-10-31 DIAGNOSIS — Z99.2 ESRD (END STAGE RENAL DISEASE) ON DIALYSIS (HCC): ICD-10-CM

## 2017-10-31 DIAGNOSIS — E11.3393 TYPE 2 DIABETES MELLITUS WITH BOTH EYES AFFECTED BY MODERATE NONPROLIFERATIVE RETINOPATHY WITHOUT MACULAR EDEMA, WITH LONG-TERM CURRENT USE OF INSULIN (HCC): ICD-10-CM

## 2017-10-31 DIAGNOSIS — F51.01 PRIMARY INSOMNIA: ICD-10-CM

## 2017-10-31 DIAGNOSIS — N18.6 ESRD (END STAGE RENAL DISEASE) ON DIALYSIS (HCC): ICD-10-CM

## 2017-10-31 DIAGNOSIS — G89.29 OTHER CHRONIC PAIN: Primary | ICD-10-CM

## 2017-10-31 PROCEDURE — 99308 SBSQ NF CARE LOW MDM 20: CPT | Performed by: FAMILY MEDICINE

## 2017-11-27 ENCOUNTER — OFFICE VISIT (OUTPATIENT)
Dept: FAMILY MEDICINE CLINIC | Facility: CLINIC | Age: 67
End: 2017-11-27

## 2017-11-27 ENCOUNTER — TELEPHONE (OUTPATIENT)
Dept: FAMILY MEDICINE CLINIC | Facility: CLINIC | Age: 67
End: 2017-11-27

## 2017-11-27 DIAGNOSIS — F41.9 ANXIETY: Primary | ICD-10-CM

## 2017-11-27 DIAGNOSIS — G89.29 OTHER CHRONIC PAIN: ICD-10-CM

## 2017-11-27 DIAGNOSIS — F51.01 PRIMARY INSOMNIA: ICD-10-CM

## 2017-11-27 DIAGNOSIS — I10 ESSENTIAL HYPERTENSION: ICD-10-CM

## 2017-11-27 DIAGNOSIS — N18.6 ESRD (END STAGE RENAL DISEASE) (HCC): ICD-10-CM

## 2017-11-27 DIAGNOSIS — E11.3393 TYPE 2 DIABETES MELLITUS WITH BOTH EYES AFFECTED BY MODERATE NONPROLIFERATIVE RETINOPATHY WITHOUT MACULAR EDEMA, WITH LONG-TERM CURRENT USE OF INSULIN (HCC): ICD-10-CM

## 2017-11-27 DIAGNOSIS — Z79.4 TYPE 2 DIABETES MELLITUS WITH BOTH EYES AFFECTED BY MODERATE NONPROLIFERATIVE RETINOPATHY WITHOUT MACULAR EDEMA, WITH LONG-TERM CURRENT USE OF INSULIN (HCC): ICD-10-CM

## 2017-11-27 PROCEDURE — 99308 SBSQ NF CARE LOW MDM 20: CPT | Performed by: FAMILY MEDICINE

## 2017-11-27 NOTE — TELEPHONE ENCOUNTER
STANTON DIGGSTONE CALLED PATIENT IS NEEDING A SCRIPT FOR HIS NORCO. [phone]. NEEDING BY END OF DAY PATIENT ONLY HAS ENOUGH FOR TODAY.

## 2017-11-28 ENCOUNTER — OFFICE VISIT (OUTPATIENT)
Dept: CARDIAC SURGERY | Facility: CLINIC | Age: 67
End: 2017-11-28

## 2017-11-28 VITALS
DIASTOLIC BLOOD PRESSURE: 65 MMHG | WEIGHT: 220 LBS | TEMPERATURE: 96.5 F | HEART RATE: 72 BPM | SYSTOLIC BLOOD PRESSURE: 120 MMHG | OXYGEN SATURATION: 91 % | HEIGHT: 68 IN | BODY MASS INDEX: 33.34 KG/M2

## 2017-11-28 DIAGNOSIS — Z99.2 ESRD (END STAGE RENAL DISEASE) ON DIALYSIS (HCC): ICD-10-CM

## 2017-11-28 DIAGNOSIS — I77.0 ARTERIOVENOUS FISTULA, ACQUIRED (HCC): Primary | ICD-10-CM

## 2017-11-28 DIAGNOSIS — N18.6 ESRD (END STAGE RENAL DISEASE) ON DIALYSIS (HCC): ICD-10-CM

## 2017-11-28 PROCEDURE — 99213 OFFICE O/P EST LOW 20 MIN: CPT | Performed by: NURSE PRACTITIONER

## 2017-11-28 NOTE — PATIENT INSTRUCTIONS
Patent RIGHT Arm Fistula  Continue dialysis as scheduled. If problems should arise including difficulty with access, high pressure alarms, or inability to complete dialysis please notify [facility] immediately for evaluation.   Follow up 6 mos - RIGHT Fistula duplex

## 2017-11-28 NOTE — PROGRESS NOTES
Subjective   Patient ID: Cristo Musa is a 67 y.o. male is here today for follow-up.    Chief Complaint:    Chief Complaint   Patient presents with   • Hemodialysis Access     3  month follow up       History of Present Illness  The following portions of the patient's history were reviewed and updated as appropriate: allergies, current medications, past family history, past medical history, past social history, past surgical history and problem list.  Recent images independently reviewed.  Available laboratory values reviewed.  PCP:  Cory Rodríguez MD  Nephrology:  Dr Vilchis,  [facility]  Cardiology:  Dr Nieto        Resides:  [facility].    67 y.o. male with ESRD on  hemodialysis, HTN, COPD, peripheral neuropathy, DM, CAD, Hepatitis C, nonischemic cardiomyopathy (EF 30%)..  former smoker.  Long term access for hemodialysis placed 12/23/14.  He returns today after callling requesting to be seen due to LUE pain and bulging areas of RUE AV fistula areas.  Denies any neurosensory symptoms of RUE does have of LUE.  Has DM with neuropathy.  RIGHT AV graft functioning well at dialysis.  No problems during dialysis.  No other associated signs, symptoms or modifying factors.    11/13/2014 Upper extremity vein mapping:  RIGHT cephalic 1.2-2.5mm, basilic 1.6-2.0mm.  LEFT cephalic 0.8-2.6mm, basilic 1.2-2.6mm.  12/23/14  LEFT Brachial artery to axillary vein AV Graft (7mm Artegraft)  01/07/15 LUE Incisions  open thru skin only.  Proximal Length:  4.5-5 cm depth 0.25 width 0.2  Distal  Length: 4. cm depth 0.2 width 0.2  Beefy red bed  Small amt erythema proximal incision.  1/13/15: LUE Incision:healed  3/24/15  Revision of left arteriovenous graft (distal interposition graft with a 6 mm Artegraft).  Thrombectomy, left arteriovenous graft, open.  Left fistulogram.  Balloon angioplasty of left subclavian vein, innominate vein, swing site (6 x 20 mm Bard Conquest balloon, 8 x 20 mm Bard  Conquest balloon). Venous ultrasound of unilateral extremity.  3/31/15 Revision of left arteriovenous graft, open. Open thrombectomy of left arteriovenous graft. Left upper extremity fistulogram.  Balloon angioplasty of left subclavian vein/innominate vein/swing site (6 x 21 mm Bard-Conquest balloon, 8 x 21 mm Bard-Conquest balloon). Venous ultrasound unilateral extremity.   4/10/15   Excision of left brachial to axillary arteriovenous bridge graft (bovine) with reconstruction of both the brachial artery and axillary vein with vein patch angioplasty from left cephalic vein. Placement of femoral arterial and venous lines using ultrasound guidance.  4/19/15  Placement LEFT IJ Tunneled Cath  5/14/15   Placement of a right forearm arteriovenous bridge graft.  5/19/15   Exchange of right femoral tunneled dialysis catheter using fluoroscopic guidance.   8/11/15  Entrobacter bacteremia of LUE AV Graft, which was removed and completed 6 wks of IV antx with neg CS.    9/22/15  Fistula Duplex:  maxPSV 94cm/s. flows 258-632ml/m. size 5.7-9.2mm. Patent Multiple areas of hematomas with sm area of needle oozing.    10/15/15: RIGHT Fistula duplex: maxPSV 233cm/s. flows 214-701ml/m. size 4.4-6.2mm.  1/26/16: RIGHT fistula duplex: maxPSV 321cm/s. flows 612-1866ml/m. size 3.8-8.1mm.  7/29/16: RIGHT TPA Lysis/Mech Thrombectomy  9/23/16: RIGHT groin Tunnel Cath Placement  10/5/16: RIGHT brachial-axillary vein AV graft (artegraft)        12/5/2016 RIGHT fistula duplex: maxPSV 265cm/s. Flows 1612-1986ml/m. size 5.8-8.1mm.         5/4/2017 RIGHT fistula duplex: maxPSV 530cm/s. Flows 642-1444ml/m. size 3.5-7.4mm.         11/28/17: RIGHT fistula duplex: mPSV 507cm/s. Flows 649-1519ml/m. Size 2.4-7.4mm.        Past Medical History:   Diagnosis Date   • Cataract    • CHF (congestive heart failure)    • CKD (chronic kidney disease)    • Clostridium difficile infection    • COPD (chronic obstructive pulmonary disease)    • Coronary artery  disease    • Diabetes mellitus    • Glaucoma    • Hepatitis C    • History of transfusion    • Hypertension    • Nephropathy, diabetic    • Pacemaker    • Pulmonary embolism      Past Surgical History:   Procedure Laterality Date   • ABDOMINAL SURGERY     • ARTERIOVENOUS FISTULA/SHUNT SURGERY Right     forearm loop   • ARTERIOVENOUS FISTULA/SHUNT SURGERY Left     removed past upper arm   • ARTERIOVENOUS FISTULA/SHUNT SURGERY W/ HEMODIALYSIS CATHETER INSERTION Right 4/4/2016    Procedure: RIGHT ARM AV FISTULA DECLOT REVISION REPAIR BRACHIAL ANASTOMOTIC PSUEDOANEURYSM LEFT FEMORAL RICHARDSON FISTULOGRAM WITH ANGIOPLASTY;  Surgeon: Jerson Carpenter MD;  Location: [facility] OR 18/19;  Service:    • CATARACT EXTRACTION W/ INTRAOCULAR LENS IMPLANT Left 3/31/2017    Procedure: REMOVE CATARACT AND IMPLANT INTRAOCULAR LENS LEFT EYE;  Surgeon: Hilton Montemayor MD;  Location: [facility] OR;  Service:    • EYE SURGERY     • HERNIA REPAIR     • IMPLANTABLE CARDIOVERTER DEFIBRILLATOR LEAD REPLACEMENT/POCKET REVISION Right 4/11/2016    Procedure: PACEMAKER LEADS X 3 AND BATTERY EXTRACTION WITH EXIMER LASER;  Surgeon: Vern Reeves MD;  Location: [facility] OR 18/19;  Service:    • INSERTION HEMODIALYSIS CATHETER Right 05/2015    jugular   • INTERVENTIONAL RADIOLOGY PROCEDURE Right 6/1/2017    Procedure: RIGHT dialysis fistulagram & angioplasty;  Surgeon: Jerson Singh MD;  Location: [facility] ANGIO INVASIVE LOCATION;  Service:    • INTERVENTIONAL RADIOLOGY PROCEDURE Right 8/24/2017    Procedure: IR dialysis fistulagram;  Surgeon: Jerson Singh MD;  Location: [facility] ANGIO INVASIVE LOCATION;  Service:    • PACEMAKER IMPLANTATION  2008    dual chamber [employer] [place]   • REMOVAL HEMODIALYSIS CATHETER Right 05/2015    femoral vein       ALLERGIES:   Lisinopril and Motrin [ibuprofen]    MEDICATIONS:    Current Outpatient Prescriptions:   •  aspirin 81 MG EC tablet, Take 1 tablet by mouth Daily.,  Disp: 150 tablet, Rfl: 2  •  brimonidine (ALPHAGAN P) 0.1 % solution ophthalmic solution, 1 drop 3 (three) times a day., Disp: , Rfl:   •  calcium acetate (PHOSLO) 667 MG capsule, Take 667 mg by mouth 3 (Three) Times a Day With Meals. 2 capsules 3 times daily, Disp: , Rfl:   •  dorzolamide (TRUSOPT) 2 % ophthalmic solution, Administer 1 drop into the left eye 3 (Three) Times a Day., Disp: 1 each, Rfl: 12  •  famotidine (PEPCID) 20 MG tablet, Take 20 mg by mouth At Night As Needed for Heartburn., Disp: , Rfl:   •  fluticasone (FLONASE) 50 MCG/ACT nasal spray, 2 sprays into each nostril daily. Administer 2 sprays in each nostril for each dose., Disp: , Rfl:   •  gabapentin (NEURONTIN) 300 MG capsule, Take 1 capsule by mouth 3 (Three) Times a Day., Disp: 90 capsule, Rfl: 2  •  hydrALAZINE (APRESOLINE) 25 MG tablet, Take 25 mg by mouth 3 (three) times a day., Disp: , Rfl:   •  HYDROcodone-acetaminophen (NORCO)  MG per tablet, Take 1 tablet by mouth Every 6 (Six) Hours As Needed for Moderate Pain ., Disp: 120 tablet, Rfl: 0  •  insulin aspart (NovoLOG) 100 UNIT/ML injection, Inject  under the skin 3 (three) times a day before meals. Patient unsure of scale, Disp: , Rfl:   •  insulin glargine (LANTUS) 100 UNIT/ML injection, Inject 25 Units under the skin Every Night., Disp: , Rfl:   •  latanoprost (XALATAN) 0.005 % ophthalmic solution, Administer 1 drop to both eyes every night., Disp: , Rfl:   •  loratadine (CLARITIN) 10 MG tablet, Take 10 mg by mouth daily., Disp: , Rfl:   •  LORazepam (ATIVAN) 0.5 MG tablet, Take 0.5 mg by mouth Every 12 (Twelve) Hours As Needed for Anxiety., Disp: , Rfl:   •  ondansetron (ZOFRAN) 8 MG tablet, Take 8 mg by mouth every 12 (twelve) hours as needed for nausea or vomiting., Disp: , Rfl:   •  PARoxetine (PAXIL) 30 MG tablet, Take 30 mg by mouth every night at bedtime., Disp: , Rfl:   •  sevelamer (RENVELA) 800 MG tablet, Take 800 mg by mouth 3 (Three) Times a Day With Meals., Disp: ,  Rfl:   •  traZODone (DESYREL) 50 MG tablet, Take 25 mg by mouth Every Night., Disp: , Rfl:     Review of Systems   Cardiovascular: Negative for cyanosis.        Fistula bleeding (N). Fistula Pain (N). Extremity Pain (N). Extremity Discoloration (N)       Hematologic/Lymphatic: Negative for bleeding problem.   Skin: Negative for color change and nail changes.   Neurological: Negative for numbness and paresthesias.        Objective   Temp:  [96.5 °F (35.8 °C)] 96.5 °F (35.8 °C)  Heart Rate:  [72] 72  BP: (120)/(65) 120/65  Physical Exam   Constitutional: He is oriented to person, place, and time. He appears well-developed.   HENT:   Head: Normocephalic.   Neck: Neck supple.   Cardiovascular: Normal rate.    Fistula Present RUE Extremity: (+)thrill/(+)bruit/(+)pulse. Aneurysmal (N). Water Hammer Pulse (N). Thinning (N).  Inderjit's Test <3sec (Y). Flows <800ml/min (Y). Flows < previous (N). Skin warm/dry/pink/intact (Y). Radial Pulse (Y).     Pulmonary/Chest: Effort normal and breath sounds normal.   Abdominal: Soft.   Musculoskeletal:   Walker for stability   Neurological: He is alert and oriented to person, place, and time.   Skin: Skin is warm and dry. No erythema.   Psychiatric: He has a normal mood and affect. Thought content normal.   Vitals reviewed.      Assessment/Plan   Independent Review of Radiographic Studies:    Detailed discussion regarding risks, benefits, and treatment plan.  Patient understands, agrees, and wishes to proceed with plan.     1. Arteriovenous fistula, acquired  Patent RIGHT Arm Fistula  Follow up 6 mos - RIGHT Fistula duplex  - Duplex Hemodialysis Access CAR; Future    2. ESRD (end stage renal disease) on dialysis  Continue dialysis as scheduled. If problems should arise including difficulty with access, high pressure alarms, or inability to complete dialysis please notify [facility] immediately for evaluation.             This document has been electronically signed by Mirlande  LUIS Vaughan on November 28, 2017 3:38 PM

## 2017-11-30 VITALS
HEART RATE: 84 BPM | RESPIRATION RATE: 20 BRPM | OXYGEN SATURATION: 93 % | SYSTOLIC BLOOD PRESSURE: 118 MMHG | DIASTOLIC BLOOD PRESSURE: 58 MMHG | TEMPERATURE: 97.2 F

## 2017-11-30 NOTE — PROGRESS NOTES
MONTHLY NURSING HOME VISIT    NAME: Cristo Musa  : 1950  MRN: [id number]    DATE & TIME SEEN: 17 15:00    PCP: Cory Rodríguez MD    NURSING HOME: [facility]    Chief Complaint: Monthly Nursing Home Visit    Subjective:     Pt states the bottom of his feet have been hurting, feeling like pins and needles. This is an ongoing problem for him. Sleep is good. Appetite NL. He states sometimes his stools are hard. No other complaints today.    Current Meds:    Current Outpatient Prescriptions:   •  aspirin 81 MG EC tablet, Take 1 tablet by mouth Daily., Disp: 150 tablet, Rfl: 2  •  brimonidine (ALPHAGAN P) 0.1 % solution ophthalmic solution, 1 drop 3 (three) times a day., Disp: , Rfl:   •  calcium acetate (PHOSLO) 667 MG capsule, Take 667 mg by mouth 3 (Three) Times a Day With Meals. 2 capsules 3 times daily, Disp: , Rfl:   •  dorzolamide (TRUSOPT) 2 % ophthalmic solution, Administer 1 drop into the left eye 3 (Three) Times a Day., Disp: 1 each, Rfl: 12  •  famotidine (PEPCID) 20 MG tablet, Take 20 mg by mouth At Night As Needed for Heartburn., Disp: , Rfl:   •  fluticasone (FLONASE) 50 MCG/ACT nasal spray, 2 sprays into each nostril daily. Administer 2 sprays in each nostril for each dose., Disp: , Rfl:   •  gabapentin (NEURONTIN) 300 MG capsule, Take 1 capsule by mouth 3 (Three) Times a Day., Disp: 90 capsule, Rfl: 2  •  hydrALAZINE (APRESOLINE) 25 MG tablet, Take 25 mg by mouth 3 (three) times a day., Disp: , Rfl:   •  HYDROcodone-acetaminophen (NORCO)  MG per tablet, Take 1 tablet by mouth Every 6 (Six) Hours As Needed for Moderate Pain ., Disp: 120 tablet, Rfl: 0  •  insulin aspart (NovoLOG) 100 UNIT/ML injection, Inject  under the skin 3 (three) times a day before meals. Patient unsure of scale, Disp: , Rfl:   •  insulin glargine (LANTUS) 100 UNIT/ML injection, Inject 25 Units under the skin Every Night., Disp: , Rfl:   •  latanoprost (XALATAN) 0.005 % ophthalmic  solution, Administer 1 drop to both eyes every night., Disp: , Rfl:   •  loratadine (CLARITIN) 10 MG tablet, Take 10 mg by mouth daily., Disp: , Rfl:   •  LORazepam (ATIVAN) 0.5 MG tablet, Take 0.5 mg by mouth Every 12 (Twelve) Hours As Needed for Anxiety., Disp: , Rfl:   •  ondansetron (ZOFRAN) 8 MG tablet, Take 8 mg by mouth every 12 (twelve) hours as needed for nausea or vomiting., Disp: , Rfl:   •  PARoxetine (PAXIL) 30 MG tablet, Take 30 mg by mouth every night at bedtime., Disp: , Rfl:   •  sevelamer (RENVELA) 800 MG tablet, Take 800 mg by mouth 3 (Three) Times a Day With Meals., Disp: , Rfl:   •  traZODone (DESYREL) 50 MG tablet, Take 25 mg by mouth Every Night., Disp: , Rfl:     Allergies:  Lisinopril and Motrin [ibuprofen]    Review of Systems:  Review of Systems   Constitutional: Negative for activity change, appetite change, chills and fever.   HENT: Negative for congestion, ear pain and sore throat.    Eyes: Negative for redness and visual disturbance.   Respiratory: Negative for cough, shortness of breath and wheezing.    Cardiovascular: Negative for chest pain and palpitations.   Gastrointestinal: Negative for abdominal pain, diarrhea, nausea and vomiting.   Genitourinary: Negative for difficulty urinating and dysuria.   Musculoskeletal: Negative for arthralgias and gait problem.   Skin: Negative for color change and rash.   Neurological: Negative for dizziness, weakness and headaches.        Foot pains   Psychiatric/Behavioral: Negative for dysphoric mood and sleep disturbance. The patient is not nervous/anxious.        Objective:     /58  Pulse 84  Temp 97.2 °F (36.2 °C)  Resp 20  SpO2 93%  Physical Exam   Constitutional: He is oriented to person, place, and time. He appears well-developed and well-nourished.   HENT:   Head: Normocephalic and atraumatic.   Right Ear: External ear normal.   Left Ear: External ear normal.   Nose: Nose normal.   Eyes: Conjunctivae and EOM are normal. Pupils  are equal, round, and reactive to light.   Neck: Normal range of motion. Neck supple.   Cardiovascular: Normal rate, regular rhythm, normal heart sounds and intact distal pulses.    Pulmonary/Chest: Effort normal and breath sounds normal. He has no wheezes.   Abdominal: Soft. Bowel sounds are normal. He exhibits no distension. There is no tenderness.   Musculoskeletal: Normal range of motion. He exhibits no edema or deformity.   Neurological: He is alert and oriented to person, place, and time. No cranial nerve deficit.   Sensation to light touch intact bilat feet   Skin: Skin is warm.   Psychiatric: He has a normal mood and affect. His behavior is normal. Thought content normal.   Nursing note and vitals reviewed.         Assessment/Plan:      67 y.o. male with:  Problem List Items Addressed This Visit        Cardiovascular and Mediastinum    Hypertension       Endocrine    Diabetes mellitus       Genitourinary    ESRD (end stage renal disease)       Other    Primary insomnia    Chronic pain    Anxiety - Primary        Will continue with gabapentin for neuropathy. Will change his insulin: will stop the scheduled pre-meal insulin and just do the SSI for mealtime coverage. Continue the basal as is.     CODE STATUS: Full code    Dr Ayoub is the attending on record for this patient, he is aware of the patient's status and agrees with the above.          This document has been electronically signed by Cory Rodríguez MD on November 30, 2017 3:40 PM

## 2017-12-22 ENCOUNTER — OFFICE VISIT (OUTPATIENT)
Dept: FAMILY MEDICINE CLINIC | Facility: CLINIC | Age: 67
End: 2017-12-22

## 2017-12-22 DIAGNOSIS — F41.9 ANXIETY: ICD-10-CM

## 2017-12-22 DIAGNOSIS — E11.3393 TYPE 2 DIABETES MELLITUS WITH BOTH EYES AFFECTED BY MODERATE NONPROLIFERATIVE RETINOPATHY WITHOUT MACULAR EDEMA, WITH LONG-TERM CURRENT USE OF INSULIN (HCC): ICD-10-CM

## 2017-12-22 DIAGNOSIS — G62.9 NEUROPATHY: ICD-10-CM

## 2017-12-22 DIAGNOSIS — Z79.4 TYPE 2 DIABETES MELLITUS WITH BOTH EYES AFFECTED BY MODERATE NONPROLIFERATIVE RETINOPATHY WITHOUT MACULAR EDEMA, WITH LONG-TERM CURRENT USE OF INSULIN (HCC): ICD-10-CM

## 2017-12-22 DIAGNOSIS — K21.9 GASTROESOPHAGEAL REFLUX DISEASE WITHOUT ESOPHAGITIS: ICD-10-CM

## 2017-12-22 DIAGNOSIS — G89.29 OTHER CHRONIC PAIN: ICD-10-CM

## 2017-12-22 DIAGNOSIS — F51.01 PRIMARY INSOMNIA: ICD-10-CM

## 2017-12-22 DIAGNOSIS — Z99.2 ESRD (END STAGE RENAL DISEASE) ON DIALYSIS (HCC): Primary | ICD-10-CM

## 2017-12-22 DIAGNOSIS — H40.1122 PRIMARY OPEN ANGLE GLAUCOMA OF LEFT EYE, MODERATE STAGE: ICD-10-CM

## 2017-12-22 DIAGNOSIS — N18.6 ESRD (END STAGE RENAL DISEASE) ON DIALYSIS (HCC): Primary | ICD-10-CM

## 2017-12-22 DIAGNOSIS — I10 ESSENTIAL HYPERTENSION: ICD-10-CM

## 2017-12-22 PROCEDURE — 99308 SBSQ NF CARE LOW MDM 20: CPT | Performed by: FAMILY MEDICINE

## 2017-12-29 NOTE — PROGRESS NOTES
MONTHLY NURSING HOME VISIT    NAME: Cristo Musa  : 1950  MRN: [id number]    DATE & TIME SEEN: 2017 13:00    PCP: Cory Rodríguez MD    NURSING HOME: [facility]     Chief Complaint: Monthly Nursing Home Visit      Subjective:     Pt is feeling well today. Only complaint is of firm stools. He is still regular though. Mood is good. Appetite and sleep good.    Current Meds:    Current Outpatient Prescriptions:   •  aspirin 81 MG EC tablet, Take 1 tablet by mouth Daily., Disp: 150 tablet, Rfl: 2  •  brimonidine (ALPHAGAN P) 0.1 % solution ophthalmic solution, 1 drop 3 (three) times a day., Disp: , Rfl:   •  calcium acetate (PHOSLO) 667 MG capsule, Take 667 mg by mouth 3 (Three) Times a Day With Meals. 2 capsules 3 times daily, Disp: , Rfl:   •  dorzolamide (TRUSOPT) 2 % ophthalmic solution, Administer 1 drop into the left eye 3 (Three) Times a Day., Disp: 1 each, Rfl: 12  •  famotidine (PEPCID) 20 MG tablet, Take 20 mg by mouth At Night As Needed for Heartburn., Disp: , Rfl:   •  fluticasone (FLONASE) 50 MCG/ACT nasal spray, 2 sprays into each nostril daily. Administer 2 sprays in each nostril for each dose., Disp: , Rfl:   •  gabapentin (NEURONTIN) 300 MG capsule, Take 1 capsule by mouth 3 (Three) Times a Day., Disp: 90 capsule, Rfl: 2  •  hydrALAZINE (APRESOLINE) 25 MG tablet, Take 25 mg by mouth 3 (three) times a day., Disp: , Rfl:   •  HYDROcodone-acetaminophen (NORCO)  MG per tablet, Take 1 tablet by mouth Every 6 (Six) Hours As Needed for Moderate Pain ., Disp: 120 tablet, Rfl: 0  •  insulin aspart (NovoLOG) 100 UNIT/ML injection, Inject  under the skin 3 (three) times a day before meals. Patient unsure of scale, Disp: , Rfl:   •  insulin glargine (LANTUS) 100 UNIT/ML injection, Inject 25 Units under the skin Every Night., Disp: , Rfl:   •  latanoprost (XALATAN) 0.005 % ophthalmic solution, Administer 1 drop to both eyes every night., Disp: , Rfl:   •  loratadine  (CLARITIN) 10 MG tablet, Take 10 mg by mouth daily., Disp: , Rfl:   •  ondansetron (ZOFRAN) 8 MG tablet, Take 8 mg by mouth every 12 (twelve) hours as needed for nausea or vomiting., Disp: , Rfl:   •  PARoxetine (PAXIL) 30 MG tablet, Take 30 mg by mouth every night at bedtime., Disp: , Rfl:   •  sevelamer (RENVELA) 800 MG tablet, Take 800 mg by mouth 3 (Three) Times a Day With Meals., Disp: , Rfl:   •  traZODone (DESYREL) 50 MG tablet, Take 25 mg by mouth Every Night., Disp: , Rfl:     Allergies:  Lisinopril and Motrin [ibuprofen]    Review of Systems:  Review of Systems   Constitutional: Negative for activity change, appetite change, chills and fever.   HENT: Negative for congestion, ear pain and sore throat.    Eyes: Negative for redness and visual disturbance.   Respiratory: Negative for cough, shortness of breath and wheezing.    Cardiovascular: Negative for chest pain and palpitations.   Gastrointestinal: Negative for abdominal pain, diarrhea, nausea and vomiting.   Genitourinary: Negative for difficulty urinating and dysuria.   Musculoskeletal: Negative for arthralgias and gait problem.   Skin: Negative for color change and rash.   Neurological: Negative for dizziness, weakness and headaches.   Psychiatric/Behavioral: Negative for dysphoric mood and sleep disturbance. The patient is not nervous/anxious.        Objective:     /54  Pulse 64  Temp 97.1 °F (36.2 °C)  Resp (!) 48  SpO2 95%  Physical Exam   Constitutional: He is oriented to person, place, and time. He appears well-developed and well-nourished.   HENT:   Head: Normocephalic and atraumatic.   Right Ear: External ear normal.   Left Ear: External ear normal.   Nose: Nose normal.   Eyes: Conjunctivae and EOM are normal. Pupils are equal, round, and reactive to light.   Neck: Normal range of motion. Neck supple.   Cardiovascular: Normal rate, regular rhythm and normal heart sounds.    Pulmonary/Chest: Effort normal and breath sounds normal. He  has no wheezes.   Abdominal: Soft. Bowel sounds are normal. He exhibits no distension. There is no tenderness.   Musculoskeletal: Normal range of motion. He exhibits no edema.   Neurological: He is alert and oriented to person, place, and time. No cranial nerve deficit.   Skin: Skin is warm.   Psychiatric: He has a normal mood and affect. His behavior is normal. Thought content normal.   Nursing note and vitals reviewed.           Assessment/Plan:      67 y.o. male with:   Diagnosis Plan   1. ESRD (end stage renal disease) on dialysis  MWF dialysis   2. Essential hypertension  hydralazine   3. Type 2 diabetes mellitus with both eyes affected by moderate nonproliferative retinopathy without macular edema, with long-term current use of insulin  lantus 25 U, SSI   4. Primary insomnia  trazodone   5. Other chronic pain  norcos   6. Anxiety  Hasn't used much of the prn ativan, will wean off, start vistaril   7. Gastroesophageal reflux disease without esophagitis  famotidine   8. Primary open angle glaucoma of left eye, moderate stage  Continue eye drops   9. Neuropathy  gabapentin        CODE STATUS: Full Code    Dr Ayoub is the attending on record for this patient, he is aware of the patient's status and agrees with the above.             This document has been electronically signed by Cory Rodríguez MD on December 30, 2017 12:54 PM

## 2017-12-30 VITALS
TEMPERATURE: 97.1 F | HEART RATE: 64 BPM | RESPIRATION RATE: 48 BRPM | OXYGEN SATURATION: 95 % | SYSTOLIC BLOOD PRESSURE: 118 MMHG | DIASTOLIC BLOOD PRESSURE: 54 MMHG

## 2017-12-30 PROBLEM — K21.9 GASTROESOPHAGEAL REFLUX DISEASE WITHOUT ESOPHAGITIS: Status: ACTIVE | Noted: 2017-12-30

## 2017-12-30 PROBLEM — G62.9 NEUROPATHY: Status: ACTIVE | Noted: 2017-12-30

## 2018-01-08 DIAGNOSIS — N18.6 END STAGE RENAL DISEASE (HCC): Primary | ICD-10-CM

## 2018-01-10 ENCOUNTER — DOCUMENTATION (OUTPATIENT)
Dept: FAMILY MEDICINE CLINIC | Facility: CLINIC | Age: 68
End: 2018-01-10

## 2018-01-10 NOTE — PROGRESS NOTES
TELEPHONE ENCOUNTER    [facility] called to report that patient is having liquid bowel movements and requesting Imodium. Imodium 2 tablets PO x1, and 1 tablet every 12 hours after that was ordered PRN for loose bowel movements. Patient is unwilling to go to hemodialysis today due to liquid bowel movement.    Signature  Esther Jean MD  [facility] Family Medicine Resident, PGY II        This document has been electronically signed by Esther Jean MD on January 10, 2018 6:29 AM

## 2018-01-12 ENCOUNTER — APPOINTMENT (OUTPATIENT)
Dept: GENERAL RADIOLOGY | Facility: HOSPITAL | Age: 68
End: 2018-01-12

## 2018-01-12 ENCOUNTER — HOSPITAL ENCOUNTER (EMERGENCY)
Facility: HOSPITAL | Age: 68
Discharge: NURSING, REHAB OR LONG-TERM CARE | End: 2018-01-12
Attending: EMERGENCY MEDICINE | Admitting: EMERGENCY MEDICINE

## 2018-01-12 VITALS
HEART RATE: 54 BPM | SYSTOLIC BLOOD PRESSURE: 154 MMHG | TEMPERATURE: 97.5 F | BODY MASS INDEX: 32.74 KG/M2 | DIASTOLIC BLOOD PRESSURE: 72 MMHG | WEIGHT: 216 LBS | RESPIRATION RATE: 18 BRPM | HEIGHT: 68 IN | OXYGEN SATURATION: 94 %

## 2018-01-12 DIAGNOSIS — N18.6 ESRD (END STAGE RENAL DISEASE) (HCC): ICD-10-CM

## 2018-01-12 DIAGNOSIS — R68.89 FLU-LIKE SYMPTOMS: ICD-10-CM

## 2018-01-12 DIAGNOSIS — J40 BRONCHITIS: Primary | ICD-10-CM

## 2018-01-12 LAB
ANION GAP SERPL CALCULATED.3IONS-SCNC: 15 MMOL/L (ref 5–15)
APTT PPP: 32.5 SECONDS (ref 20–40.3)
BASOPHILS # BLD AUTO: 0.01 10*3/MM3 (ref 0–0.2)
BASOPHILS NFR BLD AUTO: 0.2 % (ref 0–2)
BUN BLD-MCNC: 35 MG/DL (ref 7–21)
BUN/CREAT SERPL: 4.3 (ref 7–25)
CALCIUM SPEC-SCNC: 9.1 MG/DL (ref 8.4–10.2)
CHLORIDE SERPL-SCNC: 92 MMOL/L (ref 95–110)
CO2 SERPL-SCNC: 31 MMOL/L (ref 22–31)
CREAT BLD-MCNC: 8.19 MG/DL (ref 0.7–1.3)
DEPRECATED RDW RBC AUTO: 42.3 FL (ref 35.1–43.9)
EOSINOPHIL # BLD AUTO: 0.11 10*3/MM3 (ref 0–0.7)
EOSINOPHIL NFR BLD AUTO: 2.2 % (ref 0–7)
ERYTHROCYTE [DISTWIDTH] IN BLOOD BY AUTOMATED COUNT: 12.7 % (ref 11.5–14.5)
GFR SERPL CREATININE-BSD FRML MDRD: 8 ML/MIN/1.73 (ref 49–113)
GLUCOSE BLD-MCNC: 108 MG/DL (ref 60–100)
HCT VFR BLD AUTO: 33 % (ref 39–49)
HGB BLD-MCNC: 11.1 G/DL (ref 13.7–17.3)
HOLD SPECIMEN: NORMAL
IMM GRANULOCYTES # BLD: 0.01 10*3/MM3 (ref 0–0.02)
IMM GRANULOCYTES NFR BLD: 0.2 % (ref 0–0.5)
INR PPP: 1.15 (ref 0.8–1.2)
LYMPHOCYTES # BLD AUTO: 1.36 10*3/MM3 (ref 0.6–4.2)
LYMPHOCYTES NFR BLD AUTO: 26.9 % (ref 10–50)
MCH RBC QN AUTO: 30.8 PG (ref 26.5–34)
MCHC RBC AUTO-ENTMCNC: 33.6 G/DL (ref 31.5–36.3)
MCV RBC AUTO: 91.7 FL (ref 80–98)
MONOCYTES # BLD AUTO: 0.68 10*3/MM3 (ref 0–0.9)
MONOCYTES NFR BLD AUTO: 13.5 % (ref 0–12)
NEUTROPHILS # BLD AUTO: 2.88 10*3/MM3 (ref 2–8.6)
NEUTROPHILS NFR BLD AUTO: 57 % (ref 37–80)
PLATELET # BLD AUTO: 108 10*3/MM3 (ref 150–450)
PMV BLD AUTO: 8.5 FL (ref 8–12)
POTASSIUM BLD-SCNC: 4.4 MMOL/L (ref 3.5–5.1)
PROTHROMBIN TIME: 14.6 SECONDS (ref 11.1–15.3)
RBC # BLD AUTO: 3.6 10*6/MM3 (ref 4.37–5.74)
SODIUM BLD-SCNC: 138 MMOL/L (ref 137–145)
WBC NRBC COR # BLD: 5.05 10*3/MM3 (ref 3.2–9.8)

## 2018-01-12 PROCEDURE — 85610 PROTHROMBIN TIME: CPT | Performed by: EMERGENCY MEDICINE

## 2018-01-12 PROCEDURE — 85025 COMPLETE CBC W/AUTO DIFF WBC: CPT | Performed by: EMERGENCY MEDICINE

## 2018-01-12 PROCEDURE — 80048 BASIC METABOLIC PNL TOTAL CA: CPT | Performed by: EMERGENCY MEDICINE

## 2018-01-12 PROCEDURE — 93010 ELECTROCARDIOGRAM REPORT: CPT | Performed by: INTERNAL MEDICINE

## 2018-01-12 PROCEDURE — 99283 EMERGENCY DEPT VISIT LOW MDM: CPT

## 2018-01-12 PROCEDURE — 85730 THROMBOPLASTIN TIME PARTIAL: CPT | Performed by: EMERGENCY MEDICINE

## 2018-01-12 PROCEDURE — 71045 X-RAY EXAM CHEST 1 VIEW: CPT

## 2018-01-12 PROCEDURE — 93005 ELECTROCARDIOGRAM TRACING: CPT | Performed by: EMERGENCY MEDICINE

## 2018-01-12 RX ORDER — SODIUM CHLORIDE 0.9 % (FLUSH) 0.9 %
10 SYRINGE (ML) INJECTION AS NEEDED
Status: DISCONTINUED | OUTPATIENT
Start: 2018-01-12 | End: 2018-01-12 | Stop reason: HOSPADM

## 2018-01-13 NOTE — ED NOTES
Report of pts returning back  To  Nursing facility given to  Cristina Shrestha RN  01/12/18 [id number]

## 2018-01-13 NOTE — ED PROVIDER NOTES
Subjective   History of Present Illness  67-year-old male with a history of end-stage renal disease on Monday Wednesday Friday dialysis, COPD, coronary artery disease, diabetes presents emergency department after having an episode where he coughed up a small amount of blood.  He states that he is been ill since Monday after his dialysis session with what sounds like flulike symptoms.  He has had a cough as well as body aches for pain.  Today he had an episode where he coughed up a small amount of blood mixed with mucus.  Sounds like it was smaller than a teaspoon.  Overall he just feels relatively poor.  I have any significant amount of shortness of breath respiratory distress.  He has no fevers.  He has no chest pain.  Or diarrhea.  He was dialyzed today and had a normal dialysis session  Review of Systems   Constitutional: Negative for fever.   HENT: Negative for congestion, nosebleeds, postnasal drip, sinus pressure and sore throat.    Respiratory: Positive for cough. Negative for chest tightness, shortness of breath, wheezing and stridor.    Gastrointestinal: Positive for abdominal pain. Negative for constipation, diarrhea, nausea and vomiting.   Genitourinary: Negative for dysuria, flank pain, frequency, hematuria, testicular pain and urgency.   Musculoskeletal: Positive for myalgias. Negative for arthralgias.   Skin: Negative for rash.   Neurological: Negative for syncope, light-headedness and headaches.   Psychiatric/Behavioral: Negative.        Past Medical History:   Diagnosis Date   • Cataract    • CHF (congestive heart failure)    • CKD (chronic kidney disease)    • Clostridium difficile infection    • COPD (chronic obstructive pulmonary disease)    • Coronary artery disease    • Diabetes mellitus    • Glaucoma    • Hepatitis C    • History of transfusion    • Hypertension    • Nephropathy, diabetic    • Pacemaker    • Pulmonary embolism        Allergies   Allergen Reactions   • Lisinopril Other (See  Comments)     unknown   • Motrin [Ibuprofen] Diarrhea and Nausea And Vomiting       Past Surgical History:   Procedure Laterality Date   • ABDOMINAL SURGERY     • ARTERIOVENOUS FISTULA/SHUNT SURGERY Right     forearm loop   • ARTERIOVENOUS FISTULA/SHUNT SURGERY Left     removed past upper arm   • ARTERIOVENOUS FISTULA/SHUNT SURGERY W/ HEMODIALYSIS CATHETER INSERTION Right 4/4/2016    Procedure: RIGHT ARM AV FISTULA DECLOT REVISION REPAIR BRACHIAL ANASTOMOTIC PSUEDOANEURYSM LEFT FEMORAL RICHARDSON FISTULOGRAM WITH ANGIOPLASTY;  Surgeon: Jerson Carpetner MD;  Location: [facility] OR 18/19;  Service:    • CATARACT EXTRACTION W/ INTRAOCULAR LENS IMPLANT Left 3/31/2017    Procedure: REMOVE CATARACT AND IMPLANT INTRAOCULAR LENS LEFT EYE;  Surgeon: Hilton Montemayor MD;  Location: [facility] OR;  Service:    • EYE SURGERY     • HERNIA REPAIR     • IMPLANTABLE CARDIOVERTER DEFIBRILLATOR LEAD REPLACEMENT/POCKET REVISION Right 4/11/2016    Procedure: PACEMAKER LEADS X 3 AND BATTERY EXTRACTION WITH EXIMER LASER;  Surgeon: Vern Reeves MD;  Location: [facility] OR 18/19;  Service:    • INSERTION HEMODIALYSIS CATHETER Right 05/2015    jugular   • INTERVENTIONAL RADIOLOGY PROCEDURE Right 6/1/2017    Procedure: RIGHT dialysis fistulagram & angioplasty;  Surgeon: Jerson Singh MD;  Location: [facility] ANGIO INVASIVE LOCATION;  Service:    • INTERVENTIONAL RADIOLOGY PROCEDURE Right 8/24/2017    Procedure: IR dialysis fistulagram;  Surgeon: Jerson Singh MD;  Location: [facility] ANGIO INVASIVE LOCATION;  Service:    • PACEMAKER IMPLANTATION  2008    dual chamber [employer]   • REMOVAL HEMODIALYSIS CATHETER Right 05/2015    femoral vein       Family History   Problem Relation Age of Onset   • COPD Sister    • Diabetes Brother    • Early death Brother    • Hyperlipidemia Brother    • Hypertension Brother    • Coronary artery disease Mother    • Diabetes Mother    • Hypertension Mother    • Stroke  Other    • Other Other      Respiratory disorder       Social History     Social History   • Marital status: Single     Spouse name: N/A   • Number of children: N/A   • Years of education: N/A     Social History Main Topics   • Smoking status: Former Smoker   • Smokeless tobacco: None      Comment: quit 20 years ago    • Alcohol use No   • Drug use: No   • Sexual activity: No     Other Topics Concern   • None     Social History Narrative           Objective   Physical Exam   Constitutional: He is oriented to person, place, and time. He appears well-developed and well-nourished.   HENT:   Head: Normocephalic and atraumatic.   Right Ear: External ear normal.   Left Ear: External ear normal.   Eyes: Conjunctivae and EOM are normal. Pupils are equal, round, and reactive to light.   Neck: Normal range of motion. Neck supple.   Cardiovascular: Normal rate, regular rhythm and normal heart sounds.    Pulmonary/Chest: Effort normal and breath sounds normal. No respiratory distress. He has no rales.   Abdominal: Soft. Bowel sounds are normal. He exhibits no distension. There is no tenderness. There is no rebound and no guarding.   Genitourinary: Penis normal.   Musculoskeletal: Normal range of motion.   Right-sided dialysis site with audible bruit and palpable thrill   Neurological: He is alert and oriented to person, place, and time.   Skin: Skin is warm and dry.   Psychiatric: He has a normal mood and affect.   Nursing note and vitals reviewed.      ECG 12 Lead    Date/Time: 1/12/2018 7:30 PM  Performed by: ERENDIRA HOFFMANN  Authorized by: ERENDIRA HOFFMANN   Interpreted by physician  Comments: Sinus rate is 55.  Old left bundle-branch block.  No significant change from his previous EKG in October 2017.  No scarbosa criteria.  No STEMI               ED Course  ED Course      Labs Reviewed   BASIC METABOLIC PANEL - Abnormal; Notable for the following:        Result Value    Glucose 108 (*)     BUN 35 (*)      Creatinine 8.19 (*)     Chloride 92 (*)     eGFR   Amer 8 (*)     BUN/Creatinine Ratio 4.3 (*)     All other components within normal limits   CBC WITH AUTO DIFFERENTIAL - Abnormal; Notable for the following:     RBC 3.60 (*)     Hemoglobin 11.1 (*)     Hematocrit 33.0 (*)     Platelets 108 (*)     Monocyte % 13.5 (*)     All other components within normal limits   PROTIME-INR - Normal    Narrative:     Therapeutic range for most indications is 2.0-3.0 INR,  or 2.5-3.5 for mechanical heart valves.   APTT - Normal    Narrative:     The recommended Heparin therapeutic range is 68-97 seconds.   CBC AND DIFFERENTIAL    Narrative:     The following orders were created for panel order CBC & Differential.  Procedure                               Abnormality         Status                     ---------                               -----------         ------                     CBC Auto Differential[[id number]]        Abnormal            Final result                 Please view results for these tests on the individual orders.   EXTRA TUBES    Narrative:     The following orders were created for panel order Extra Tubes.  Procedure                               Abnormality         Status                     ---------                               -----------         ------                     Gold Top - SST[[id number]]                                   In process                   Please view results for these tests on the individual orders.   GOLD TOP - SST     XR Chest 1 View   Final Result   CONCLUSION:          1. No evidence of an acute cardiopulmonary process.                                                       Electronically signed by:  TIFFANY Juarez MD  1/12/2018 7:41   PM CST Workstation: [id number]                    [facility]  Number of Diagnoses or Management Options  Bronchitis:   ESRD (end stage renal disease):   Flu-like symptoms:   Diagnosis management comments: Overall this patient appears very well for  his chronic medical conditions.  I expect the small amount of hemoptysis that he had is likely secondary to bronchitis or other respiratory issue given his flulike symptoms and a cough the last 5 days.  They also send them because they're unable to obtain an oxygen saturation on the patient.  He is currently off of oxygen and satting 9697%.  Does not have any significant respiratory distress.  He is not tachycardic.  Although he has a previous history of pulmonary embolism documented in our system I have a low suspicion for this at this time given his presentation with this illness as well as like the symptoms suggest PE.  And check some basic blood work and a chest x-ray to evaluate for pneumonia.  I expect this patient will likely be discharged home after his evaluation.    No indication for emergent dialysis at this time.  Patient remained stable in the ED.  His oxygen saturations at this moment on room air 98%.  He did have an episode where his oxygen saturations were low he was sleeping however while he is awake they are normal.  I expect lower oxygen saturations were sleeping are secondary to sleep apnea.  There is no indication for any further workup at this time.  His blood pressure remained stable 119/79 his heart rate has been in the 50s this entire time.  A stated there is no indication for emergent dialysis or admission at this time.  We'll send him back to nursing home.      Final diagnoses:   Bronchitis   Flu-like symptoms   ESRD (end stage renal disease)            Jerson Elder MD  01/12/18 2039

## 2018-01-13 NOTE — ED NOTES
Await ems transfer to  [facility]     Rupali Shrestha RN  01/12/18 2944

## 2018-01-15 ENCOUNTER — TELEPHONE (OUTPATIENT)
Dept: FAMILY MEDICINE CLINIC | Facility: CLINIC | Age: 68
End: 2018-01-15

## 2018-01-23 ENCOUNTER — LAB REQUISITION (OUTPATIENT)
Dept: LAB | Facility: HOSPITAL | Age: 68
End: 2018-01-23

## 2018-01-23 DIAGNOSIS — N18.6 END STAGE RENAL DISEASE (HCC): ICD-10-CM

## 2018-01-23 DIAGNOSIS — J11.1 INFLUENZA DUE TO UNIDENTIFIED INFLUENZA VIRUS WITH OTHER RESPIRATORY MANIFESTATIONS: ICD-10-CM

## 2018-01-23 LAB
FLUAV AG NPH QL: NEGATIVE
FLUBV AG NPH QL IA: NEGATIVE

## 2018-01-23 PROCEDURE — 87804 INFLUENZA ASSAY W/OPTIC: CPT | Performed by: STUDENT IN AN ORGANIZED HEALTH CARE EDUCATION/TRAINING PROGRAM

## 2018-01-23 PROCEDURE — 87804 INFLUENZA ASSAY W/OPTIC: CPT | Performed by: FAMILY MEDICINE

## 2018-01-24 ENCOUNTER — LAB REQUISITION (OUTPATIENT)
Dept: LAB | Facility: HOSPITAL | Age: 68
End: 2018-01-24

## 2018-01-24 DIAGNOSIS — J11.1 INFLUENZA DUE TO UNIDENTIFIED INFLUENZA VIRUS WITH OTHER RESPIRATORY MANIFESTATIONS: ICD-10-CM

## 2018-01-24 LAB
FLUAV AG NPH QL: NEGATIVE
FLUBV AG NPH QL IA: NEGATIVE

## 2018-01-24 PROCEDURE — 87804 INFLUENZA ASSAY W/OPTIC: CPT | Performed by: FAMILY MEDICINE

## 2018-01-31 ENCOUNTER — OFFICE VISIT (OUTPATIENT)
Dept: FAMILY MEDICINE CLINIC | Facility: CLINIC | Age: 68
End: 2018-01-31

## 2018-01-31 VITALS
RESPIRATION RATE: 18 BRPM | DIASTOLIC BLOOD PRESSURE: 88 MMHG | HEART RATE: 79 BPM | TEMPERATURE: 97.6 F | OXYGEN SATURATION: 95 % | SYSTOLIC BLOOD PRESSURE: 168 MMHG

## 2018-01-31 DIAGNOSIS — F51.01 PRIMARY INSOMNIA: ICD-10-CM

## 2018-01-31 DIAGNOSIS — N18.6 ESRD (END STAGE RENAL DISEASE) ON DIALYSIS (HCC): Primary | ICD-10-CM

## 2018-01-31 DIAGNOSIS — Z99.2 ESRD (END STAGE RENAL DISEASE) ON DIALYSIS (HCC): Primary | ICD-10-CM

## 2018-01-31 DIAGNOSIS — G62.9 NEUROPATHY: ICD-10-CM

## 2018-01-31 DIAGNOSIS — F41.9 ANXIETY: ICD-10-CM

## 2018-01-31 DIAGNOSIS — H40.1122 PRIMARY OPEN ANGLE GLAUCOMA OF LEFT EYE, MODERATE STAGE: ICD-10-CM

## 2018-01-31 DIAGNOSIS — K21.9 GASTROESOPHAGEAL REFLUX DISEASE WITHOUT ESOPHAGITIS: ICD-10-CM

## 2018-01-31 DIAGNOSIS — G89.29 OTHER CHRONIC PAIN: ICD-10-CM

## 2018-01-31 DIAGNOSIS — E11.3393 TYPE 2 DIABETES MELLITUS WITH BOTH EYES AFFECTED BY MODERATE NONPROLIFERATIVE RETINOPATHY WITHOUT MACULAR EDEMA, WITH LONG-TERM CURRENT USE OF INSULIN (HCC): ICD-10-CM

## 2018-01-31 DIAGNOSIS — Z79.4 TYPE 2 DIABETES MELLITUS WITH BOTH EYES AFFECTED BY MODERATE NONPROLIFERATIVE RETINOPATHY WITHOUT MACULAR EDEMA, WITH LONG-TERM CURRENT USE OF INSULIN (HCC): ICD-10-CM

## 2018-01-31 PROCEDURE — 99307 SBSQ NF CARE SF MDM 10: CPT | Performed by: STUDENT IN AN ORGANIZED HEALTH CARE EDUCATION/TRAINING PROGRAM

## 2018-01-31 NOTE — PROGRESS NOTES
MONTHLY NURSING HOME VISIT    NAME: Cristo Musa  : 1950  MRN: [id number]    DATE & TIME SEEN: 18 18:00    PCP: Cory Rodríguez MD    NURSING HOME: [facility]    Chief Complaint: Monthly Nursing Home Visit for January    Subjective:     Cristo Musa is a 68 y.o. male. He is feeling well today. He has no new complaints. Eating, well. Sleeping well. Stools normal. He is trying to stay active around the nursing home.    Current Meds:    Current Outpatient Prescriptions:   •  aspirin 81 MG EC tablet, Take 1 tablet by mouth Daily., Disp: 150 tablet, Rfl: 2  •  brimonidine (ALPHAGAN P) 0.1 % solution ophthalmic solution, 1 drop 3 (three) times a day., Disp: , Rfl:   •  calcium acetate (PHOSLO) 667 MG capsule, Take 667 mg by mouth 3 (Three) Times a Day With Meals. 2 capsules 3 times daily, Disp: , Rfl:   •  dorzolamide (TRUSOPT) 2 % ophthalmic solution, Administer 1 drop into the left eye 3 (Three) Times a Day., Disp: 1 each, Rfl: 12  •  famotidine (PEPCID) 20 MG tablet, Take 20 mg by mouth At Night As Needed for Heartburn., Disp: , Rfl:   •  fluticasone (FLONASE) 50 MCG/ACT nasal spray, 2 sprays into each nostril daily. Administer 2 sprays in each nostril for each dose., Disp: , Rfl:   •  gabapentin (NEURONTIN) 300 MG capsule, Take 1 capsule by mouth 3 (Three) Times a Day., Disp: 90 capsule, Rfl: 2  •  hydrALAZINE (APRESOLINE) 25 MG tablet, Take 25 mg by mouth 3 (three) times a day., Disp: , Rfl:   •  HYDROcodone-acetaminophen (NORCO)  MG per tablet, Take 1 tablet by mouth Every 6 (Six) Hours As Needed for Moderate Pain ., Disp: 120 tablet, Rfl: 0  •  insulin aspart (NovoLOG) 100 UNIT/ML injection, Inject  under the skin 3 (three) times a day before meals. Patient unsure of scale, Disp: , Rfl:   •  insulin glargine (LANTUS) 100 UNIT/ML injection, Inject 25 Units under the skin Every Night., Disp: , Rfl:   •  latanoprost (XALATAN) 0.005 % ophthalmic solution,  Administer 1 drop to both eyes every night., Disp: , Rfl:   •  loratadine (CLARITIN) 10 MG tablet, Take 10 mg by mouth daily., Disp: , Rfl:   •  ondansetron (ZOFRAN) 8 MG tablet, Take 8 mg by mouth every 12 (twelve) hours as needed for nausea or vomiting., Disp: , Rfl:   •  PARoxetine (PAXIL) 30 MG tablet, Take 30 mg by mouth every night at bedtime., Disp: , Rfl:   •  sevelamer (RENVELA) 800 MG tablet, Take 800 mg by mouth 3 (Three) Times a Day With Meals., Disp: , Rfl:   •  traZODone (DESYREL) 50 MG tablet, Take 25 mg by mouth Every Night., Disp: , Rfl:     Allergies:  Lisinopril and Motrin [ibuprofen]    Review of Systems:  Review of Systems   Constitutional: Negative for activity change, appetite change, chills and fever.   HENT: Negative for congestion, ear pain and sore throat.    Eyes: Negative for redness and visual disturbance.   Respiratory: Negative for cough, shortness of breath and wheezing.    Cardiovascular: Negative for chest pain and palpitations.   Gastrointestinal: Negative for abdominal pain, diarrhea, nausea and vomiting.   Musculoskeletal: Positive for arthralgias, back pain and joint swelling. Negative for gait problem.   Skin: Negative for color change and rash.   Neurological: Negative for dizziness, weakness and headaches.   Psychiatric/Behavioral: Negative for dysphoric mood and sleep disturbance. The patient is not nervous/anxious.        Objective:     There were no vitals taken for this visit.  Physical Exam   Constitutional: He appears well-developed and well-nourished.   HENT:   Head: Normocephalic and atraumatic.   Eyes: Conjunctivae and EOM are normal. Pupils are equal, round, and reactive to light.   Neck: Normal range of motion. Neck supple.   Cardiovascular: Normal rate and regular rhythm.    Pulmonary/Chest: Effort normal and breath sounds normal.   Abdominal: Soft. Bowel sounds are normal. He exhibits no distension. There is no tenderness.   Musculoskeletal: He exhibits no  edema.   Neurological: He is alert.   Skin: Skin is warm and dry.   Psychiatric: He has a normal mood and affect. His behavior is normal.   Nursing note and vitals reviewed.           Assessment/Plan:      68 y.o. male with:  Problem List Items Addressed This Visit        Digestive    Gastroesophageal reflux disease without esophagitis- ranitidine       Endocrine    Diabetes mellitus- will adjust SSI       Nervous and Auditory    Neuropathy- gabapentin       Genitourinary    ESRD (end stage renal disease) on dialysis - Primary    Overview     Added automatically from request for surgery [id number]            Other    Primary open angle glaucoma of left eye, moderate stage- continue eye drops    Primary insomnia- trazadone    Chronic pain- norcos    Anxiety- paxil, vistaril prn        Goals     • Reduce sugar intake          Barriers: poor compliance with diet    CODE STATUS: Full Code    Dr Ayoub is the attending on record for this patient, he is aware of the patient's status and agrees with the above.          This document has been electronically signed by Cory Rodríguez MD on January 31, 2018 3:40 PM

## 2018-02-17 ENCOUNTER — HOSPITAL ENCOUNTER (EMERGENCY)
Facility: HOSPITAL | Age: 68
Discharge: NURSING, REHAB OR LONG-TERM CARE | End: 2018-02-17
Attending: FAMILY MEDICINE | Admitting: FAMILY MEDICINE

## 2018-02-17 ENCOUNTER — APPOINTMENT (OUTPATIENT)
Dept: ULTRASOUND IMAGING | Facility: HOSPITAL | Age: 68
End: 2018-02-17

## 2018-02-17 VITALS
SYSTOLIC BLOOD PRESSURE: 169 MMHG | WEIGHT: 216 LBS | HEART RATE: 58 BPM | OXYGEN SATURATION: 96 % | BODY MASS INDEX: 32.74 KG/M2 | DIASTOLIC BLOOD PRESSURE: 70 MMHG | HEIGHT: 68 IN | RESPIRATION RATE: 18 BRPM | TEMPERATURE: 98 F

## 2018-02-17 DIAGNOSIS — L03.113 CELLULITIS OF RIGHT UPPER EXTREMITY: Primary | ICD-10-CM

## 2018-02-17 LAB
ALBUMIN SERPL-MCNC: 4.1 G/DL (ref 3.4–4.8)
ALBUMIN/GLOB SERPL: 0.8 G/DL (ref 1.1–1.8)
ALP SERPL-CCNC: 166 U/L (ref 38–126)
ALT SERPL W P-5'-P-CCNC: 38 U/L (ref 21–72)
ANION GAP SERPL CALCULATED.3IONS-SCNC: 18 MMOL/L (ref 5–15)
AST SERPL-CCNC: 28 U/L (ref 17–59)
BASOPHILS # BLD AUTO: 0.02 10*3/MM3 (ref 0–0.2)
BASOPHILS NFR BLD AUTO: 0.3 % (ref 0–2)
BILIRUB SERPL-MCNC: 0.9 MG/DL (ref 0.2–1.3)
BUN BLD-MCNC: 38 MG/DL (ref 7–21)
BUN/CREAT SERPL: 5.2 (ref 7–25)
CALCIUM SPEC-SCNC: 9 MG/DL (ref 8.4–10.2)
CHLORIDE SERPL-SCNC: 88 MMOL/L (ref 95–110)
CO2 SERPL-SCNC: 29 MMOL/L (ref 22–31)
CREAT BLD-MCNC: 7.28 MG/DL (ref 0.7–1.3)
D-LACTATE SERPL-SCNC: 2 MMOL/L (ref 0.5–2)
DEPRECATED RDW RBC AUTO: 41.2 FL (ref 35.1–43.9)
EOSINOPHIL # BLD AUTO: 0.17 10*3/MM3 (ref 0–0.7)
EOSINOPHIL NFR BLD AUTO: 2.7 % (ref 0–7)
ERYTHROCYTE [DISTWIDTH] IN BLOOD BY AUTOMATED COUNT: 12.9 % (ref 11.5–14.5)
GFR SERPL CREATININE-BSD FRML MDRD: 9 ML/MIN/1.73 (ref 49–113)
GLOBULIN UR ELPH-MCNC: 5.2 GM/DL (ref 2.3–3.5)
GLUCOSE BLD-MCNC: 174 MG/DL (ref 60–100)
HCT VFR BLD AUTO: 34.2 % (ref 39–49)
HGB BLD-MCNC: 11.3 G/DL (ref 13.7–17.3)
IMM GRANULOCYTES # BLD: 0.01 10*3/MM3 (ref 0–0.02)
IMM GRANULOCYTES NFR BLD: 0.2 % (ref 0–0.5)
LYMPHOCYTES # BLD AUTO: 1.88 10*3/MM3 (ref 0.6–4.2)
LYMPHOCYTES NFR BLD AUTO: 30.2 % (ref 10–50)
MCH RBC QN AUTO: 28.8 PG (ref 26.5–34)
MCHC RBC AUTO-ENTMCNC: 33 G/DL (ref 31.5–36.3)
MCV RBC AUTO: 87.2 FL (ref 80–98)
MONOCYTES # BLD AUTO: 0.69 10*3/MM3 (ref 0–0.9)
MONOCYTES NFR BLD AUTO: 11.1 % (ref 0–12)
NEUTROPHILS # BLD AUTO: 3.45 10*3/MM3 (ref 2–8.6)
NEUTROPHILS NFR BLD AUTO: 55.5 % (ref 37–80)
PLATELET # BLD AUTO: 131 10*3/MM3 (ref 150–450)
PMV BLD AUTO: 8.5 FL (ref 8–12)
POTASSIUM BLD-SCNC: 4.1 MMOL/L (ref 3.5–5.1)
PROT SERPL-MCNC: 9.3 G/DL (ref 6.3–8.6)
RBC # BLD AUTO: 3.92 10*6/MM3 (ref 4.37–5.74)
SODIUM BLD-SCNC: 135 MMOL/L (ref 137–145)
WBC NRBC COR # BLD: 6.22 10*3/MM3 (ref 3.2–9.8)

## 2018-02-17 PROCEDURE — 83605 ASSAY OF LACTIC ACID: CPT | Performed by: FAMILY MEDICINE

## 2018-02-17 PROCEDURE — 87150 DNA/RNA AMPLIFIED PROBE: CPT | Performed by: FAMILY MEDICINE

## 2018-02-17 PROCEDURE — 93931 UPPER EXTREMITY STUDY: CPT

## 2018-02-17 PROCEDURE — 87040 BLOOD CULTURE FOR BACTERIA: CPT | Performed by: FAMILY MEDICINE

## 2018-02-17 PROCEDURE — 85025 COMPLETE CBC W/AUTO DIFF WBC: CPT | Performed by: FAMILY MEDICINE

## 2018-02-17 PROCEDURE — 93971 EXTREMITY STUDY: CPT

## 2018-02-17 PROCEDURE — 99284 EMERGENCY DEPT VISIT MOD MDM: CPT

## 2018-02-17 PROCEDURE — 87147 CULTURE TYPE IMMUNOLOGIC: CPT | Performed by: FAMILY MEDICINE

## 2018-02-17 PROCEDURE — 80053 COMPREHEN METABOLIC PANEL: CPT | Performed by: FAMILY MEDICINE

## 2018-02-17 RX ORDER — HYDROCODONE BITARTRATE AND ACETAMINOPHEN 5; 325 MG/1; MG/1
1 TABLET ORAL EVERY 6 HOURS PRN
Qty: 15 TABLET | Refills: 0 | Status: SHIPPED | OUTPATIENT
Start: 2018-02-17 | End: 2018-02-27

## 2018-02-17 RX ORDER — CLINDAMYCIN HYDROCHLORIDE 300 MG/1
300 CAPSULE ORAL 3 TIMES DAILY
Qty: 30 CAPSULE | Refills: 0 | Status: SHIPPED | OUTPATIENT
Start: 2018-02-17 | End: 2018-03-12

## 2018-02-17 RX ORDER — HYDROXYZINE HYDROCHLORIDE 25 MG/1
25 TABLET, FILM COATED ORAL 3 TIMES DAILY PRN
COMMUNITY
End: 2018-11-13 | Stop reason: HOSPADM

## 2018-02-17 RX ORDER — LOPERAMIDE HYDROCHLORIDE 2 MG/1
2 CAPSULE ORAL EVERY 6 HOURS PRN
COMMUNITY
End: 2018-12-22 | Stop reason: HOSPADM

## 2018-02-17 RX ORDER — POLYETHYLENE GLYCOL 3350 17 G/17G
17 POWDER, FOR SOLUTION ORAL DAILY PRN
COMMUNITY

## 2018-02-17 RX ORDER — GUAIFENESIN 400 MG/1
400 TABLET ORAL EVERY 4 HOURS PRN
COMMUNITY

## 2018-02-17 RX ORDER — ONDANSETRON 4 MG/1
4 TABLET, ORALLY DISINTEGRATING ORAL EVERY 6 HOURS PRN
Qty: 10 TABLET | Refills: 0 | Status: SHIPPED | OUTPATIENT
Start: 2018-02-17

## 2018-02-17 RX ORDER — ACETAMINOPHEN 500 MG
500 TABLET ORAL EVERY 4 HOURS PRN
COMMUNITY

## 2018-02-17 NOTE — ED PROVIDER NOTES
Subjective   Patient is a 68 y.o. male presenting with upper extremity pain.   Upper Extremity Issue   Location:  Arm  Arm location:  R forearm and R upper arm  Injury: no    Pain details:     Quality:  Aching    Radiates to:  Does not radiate    Severity:  Moderate    Onset quality:  Gradual    Duration:  2 days    Timing:  Constant    Progression:  Worsening  Dislocation: no    Relieved by:  Nothing  Worsened by:  Movement  Ineffective treatments:  None tried  Associated symptoms: swelling    Associated symptoms: no back pain, no fatigue, no fever, no neck pain and no numbness        Review of Systems   Constitutional: Negative for appetite change, chills, diaphoresis, fatigue and fever.   HENT: Negative for congestion, ear discharge, ear pain, nosebleeds, rhinorrhea, sinus pressure, sore throat and trouble swallowing.    Eyes: Negative for discharge and redness.   Respiratory: Negative for apnea, cough, chest tightness, shortness of breath and wheezing.    Cardiovascular: Negative for chest pain.   Gastrointestinal: Negative for abdominal pain, diarrhea, nausea and vomiting.   Endocrine: Negative for polyuria.   Genitourinary: Negative for dysuria, frequency and urgency.   Musculoskeletal: Negative for back pain, myalgias and neck pain.   Skin: Negative for color change and rash.   Allergic/Immunologic: Negative for immunocompromised state.   Neurological: Negative for dizziness, seizures, syncope, weakness, light-headedness and headaches.   Hematological: Negative for adenopathy. Does not bruise/bleed easily.   Psychiatric/Behavioral: Negative for behavioral problems and confusion.   All other systems reviewed and are negative.      Past Medical History:   Diagnosis Date   • Cataract    • CHF (congestive heart failure)    • CKD (chronic kidney disease)    • Clostridium difficile infection    • COPD (chronic obstructive pulmonary disease)    • Coronary artery disease    • Diabetes mellitus    • Glaucoma    •  Hepatitis C    • History of transfusion    • Hypertension    • Nephropathy, diabetic    • Pacemaker    • Pulmonary embolism        Allergies   Allergen Reactions   • Lisinopril Other (See Comments)     unknown   • Motrin [Ibuprofen] Diarrhea and Nausea And Vomiting       Past Surgical History:   Procedure Laterality Date   • ABDOMINAL SURGERY     • ARTERIOVENOUS FISTULA/SHUNT SURGERY Right     forearm loop   • ARTERIOVENOUS FISTULA/SHUNT SURGERY Left     removed past upper arm   • ARTERIOVENOUS FISTULA/SHUNT SURGERY W/ HEMODIALYSIS CATHETER INSERTION Right 4/4/2016    Procedure: RIGHT ARM AV FISTULA DECLOT REVISION REPAIR BRACHIAL ANASTOMOTIC PSUEDOANEURYSM LEFT FEMORAL RICHARDSON FISTULOGRAM WITH ANGIOPLASTY;  Surgeon: Jerson Carpenter MD;  Location: [facility] OR 18/19;  Service:    • CATARACT EXTRACTION W/ INTRAOCULAR LENS IMPLANT Left 3/31/2017    Procedure: REMOVE CATARACT AND IMPLANT INTRAOCULAR LENS LEFT EYE;  Surgeon: Hilton Montemayor MD;  Location: [facility] OR;  Service:    • EYE SURGERY     • HERNIA REPAIR     • IMPLANTABLE CARDIOVERTER DEFIBRILLATOR LEAD REPLACEMENT/POCKET REVISION Right 4/11/2016    Procedure: PACEMAKER LEADS X 3 AND BATTERY EXTRACTION WITH EXIMER LASER;  Surgeon: Vern Reeves MD;  Location: [facility] OR 18/19;  Service:    • INSERTION HEMODIALYSIS CATHETER Right 05/2015    jugular   • INTERVENTIONAL RADIOLOGY PROCEDURE Right 6/1/2017    Procedure: RIGHT dialysis fistulagram & angioplasty;  Surgeon: Jerson Singh MD;  Location: [facility] ANGIO INVASIVE LOCATION;  Service:    • INTERVENTIONAL RADIOLOGY PROCEDURE Right 8/24/2017    Procedure: IR dialysis fistulagram;  Surgeon: Jerson Singh MD;  Location: [facility] ANGIO INVASIVE LOCATION;  Service:    • PACEMAKER IMPLANTATION  2008    dual chamber [employer] [place]   • REMOVAL HEMODIALYSIS CATHETER Right 05/2015    femoral vein       Family History   Problem Relation Age of Onset   • COPD Sister    •  Diabetes Brother    • Early death Brother    • Hyperlipidemia Brother    • Hypertension Brother    • Coronary artery disease Mother    • Diabetes Mother    • Hypertension Mother    • Stroke Other    • Other Other      Respiratory disorder       Social History     Social History   • Marital status: Single     Spouse name: N/A   • Number of children: N/A   • Years of education: N/A     Social History Main Topics   • Smoking status: Former Smoker     Types: Cigarettes   • Smokeless tobacco: Never Used      Comment: quit 20 years ago    • Alcohol use No   • Drug use: No   • Sexual activity: No     Other Topics Concern   • None     Social History Narrative           Objective   Physical Exam   Constitutional: He is oriented to person, place, and time. He appears well-developed and well-nourished.   HENT:   Head: Normocephalic and atraumatic.   Nose: Nose normal.   Mouth/Throat: Oropharynx is clear and moist.   Eyes: Conjunctivae and EOM are normal. Pupils are equal, round, and reactive to light. Right eye exhibits no discharge. Left eye exhibits no discharge. No scleral icterus.   Neck: Normal range of motion. Neck supple. No tracheal deviation present.   Cardiovascular: Normal rate, regular rhythm and normal heart sounds.    No murmur heard.  Pulmonary/Chest: Effort normal and breath sounds normal. No stridor. No respiratory distress. He has no wheezes. He has no rales.   Abdominal: Soft. Bowel sounds are normal. He exhibits no distension and no mass. There is no tenderness. There is no rebound and no guarding.   Musculoskeletal: He exhibits no edema.        Right shoulder: He exhibits tenderness and swelling. He exhibits normal range of motion and no laceration.        Arms:  Neurological: He is alert and oriented to person, place, and time. Coordination normal.   Skin: Skin is warm and dry. No rash noted. No erythema.   Psychiatric: He has a normal mood and affect. His behavior is normal. Thought content normal.    Nursing note and vitals reviewed.      Procedures         ED Course  ED Course   Comment By Time   Family medicine resident, Dr. Lucas, present in the emergency department to evaluate patient.  Upon further review of results, it appears that the occlusion is old and is seen on past studies.  Dr. Lucas has consult to vascular surgery and made the appropriate follow-up care and will discharge patient back to nursing home. Salvador Elmore MD 02/24 0826          Labs Reviewed   BLOOD CULTURE - Abnormal; Notable for the following:        Result Value    Blood Culture Staphylococcus, coagulase negative (*)     All other components within normal limits   BLOOD CULTURE ID - Abnormal; Notable for the following:     BCID, PCR   (*)     Value: Staphylococcus species, not aureus. mecA (methicillin resistance gene) detected. Identification by BCID PCR.    All other components within normal limits   COMPREHENSIVE METABOLIC PANEL - Abnormal; Notable for the following:     Glucose 174 (*)     BUN 38 (*)     Creatinine 7.28 (*)     Sodium 135 (*)     Chloride 88 (*)     Total Protein 9.3 (*)     Alkaline Phosphatase 166 (*)     eGFR   Amer 9 (*)     Globulin 5.2 (*)     A/G Ratio 0.8 (*)     BUN/Creatinine Ratio 5.2 (*)     Anion Gap 18.0 (*)     All other components within normal limits   CBC WITH AUTO DIFFERENTIAL - Abnormal; Notable for the following:     RBC 3.92 (*)     Hemoglobin 11.3 (*)     Hematocrit 34.2 (*)     Platelets 131 (*)     All other components within normal limits   LACTIC ACID, PLASMA - Normal   CBC AND DIFFERENTIAL    Narrative:     The following orders were created for panel order CBC & Differential.  Procedure                               Abnormality         Status                     ---------                               -----------         ------                     CBC Auto Differential[[id number]]        Abnormal            Final result                 Please view results for these  tests on the individual orders.       US Evaluation Shunt   Final Result   There is patency of the active hemodialysis graft within the   upper arm.      Occlusion of the dialysis graft within the forearm. Surrounding   uniform hypoechoic changes are nonspecific but could represent   scar tissue with less favored consideration given to complex   fluid collection.      Electronically signed by:  Juan F Leon MD  2/17/2018 7:55 PM   CST Workstation: [id number]      US Venous Doppler Upper Extremity Right (duplex)   Final Result   Impression: Negative for DVT right upper extremity.      Electronically signed by:  Juan F Leon MD  2/17/2018 7:40 PM   CST Workstation: [id number]                    [facility]    Final diagnoses:   Cellulitis of right upper extremity            Salvador Elmore MD  02/17/18 4768       Salvador Elmore MD  02/24/18 3525

## 2018-02-18 NOTE — DISCHARGE INSTRUCTIONS
Cellulitis, Adult  Cellulitis is a skin infection. The infected area is usually red and sore. This condition occurs most often in the arms and lower legs. It is very important to get treated for this condition.  Follow these instructions at home:  · Take over-the-counter and prescription medicines only as told by your doctor.  · If you were prescribed an antibiotic medicine, take it as told by your doctor. Do not stop taking the antibiotic even if you start to feel better.  · Drink enough fluid to keep your pee (urine) clear or pale yellow.  · Do not touch or rub the infected area.  · Raise (elevate) the infected area above the level of your heart while you are sitting or lying down.  · Place warm or cold wet cloths (warm or cold compresses) on the infected area. Do this as told by your doctor.  · Keep all follow-up visits as told by your doctor. This is important. These visits let your doctor make sure your infection is not getting worse.  Contact a doctor if:  · You have a fever.  · Your symptoms do not get better after 1-2 days of treatment.  · Your bone or joint under the infected area starts to hurt after the skin has healed.  · Your infection comes back. This can happen in the same area or another area.  · You have a swollen bump in the infected area.  · You have new symptoms.  · You feel ill and also have muscle aches and pains.  Get help right away if:  · Your symptoms get worse.  · You feel very sleepy.  · You throw up (vomit) or have watery poop (diarrhea) for a long time.  · There are red streaks coming from the infected area.  · Your red area gets larger.  · Your red area turns darker.  This information is not intended to replace advice given to you by your health care provider. Make sure you discuss any questions you have with your health care provider.  Document Released: 06/05/2009 Document Revised: 05/25/2017 Document Reviewed: 10/26/2016  Elsevier Interactive Patient Education © 2017 [employer]

## 2018-02-18 NOTE — SIGNIFICANT NOTE
I discussed the case with Dr. Coelho with CT surgery. The abscess is on his former access site in his right forearm. His current HD fistula is patent and functioning well. As the abscess is not affecting the current access site, it is recommended that the patient can be started on oral antibiotics to treat the abscess with close follow up on Monday with CT surgery. These recommendations were relayed to Dr. Elmore in the E.D, who will discharge the patient with oral Clindamycin and a follow up with CT surgery on Monday after the patient's HD.           This document has been electronically signed by Devon Lucas MD on February 17, 2018 10:07 PM

## 2018-02-19 DIAGNOSIS — N18.6 ESRD (END STAGE RENAL DISEASE) (HCC): Primary | ICD-10-CM

## 2018-02-19 LAB — BACTERIA BLD CULT: ABNORMAL

## 2018-02-20 ENCOUNTER — TELEPHONE (OUTPATIENT)
Dept: FAMILY MEDICINE CLINIC | Facility: CLINIC | Age: 68
End: 2018-02-20

## 2018-02-20 LAB
BACTERIA SPEC AEROBE CULT: ABNORMAL
BACTERIA SPEC AEROBE CULT: ABNORMAL
GRAM STN SPEC: ABNORMAL
ISOLATED FROM: ABNORMAL

## 2018-02-20 NOTE — TELEPHONE ENCOUNTER
YULIET FROM [facility] CALLED AND REQUESTED A REFILL SCRIPT FOR PT'S NEURONTIN.     CALL BACK NUMBER [phone].

## 2018-02-20 NOTE — TELEPHONE ENCOUNTER
PATIENT IS COMPLETELY OUT OF GABAPENTIN 300 MG  HE HASN'T HAD A PILL TODAY.  IF YOU CAN WRITE THE SCRIPT AND CALL, SOMEONE WILL COME AND PICK IT UP TODAY.    THANK YOU

## 2018-02-22 ENCOUNTER — OFFICE VISIT (OUTPATIENT)
Dept: CARDIAC SURGERY | Facility: CLINIC | Age: 68
End: 2018-02-22

## 2018-02-22 VITALS
DIASTOLIC BLOOD PRESSURE: 85 MMHG | SYSTOLIC BLOOD PRESSURE: 128 MMHG | OXYGEN SATURATION: 98 % | TEMPERATURE: 98.9 F | HEART RATE: 73 BPM | BODY MASS INDEX: 32.13 KG/M2 | HEIGHT: 68 IN | WEIGHT: 212 LBS

## 2018-02-22 DIAGNOSIS — I77.0 ARTERIOVENOUS FISTULA, ACQUIRED (HCC): ICD-10-CM

## 2018-02-22 DIAGNOSIS — N18.6 ESRD (END STAGE RENAL DISEASE) ON DIALYSIS (HCC): Primary | ICD-10-CM

## 2018-02-22 DIAGNOSIS — T82.868D: ICD-10-CM

## 2018-02-22 DIAGNOSIS — Z99.2 ESRD (END STAGE RENAL DISEASE) ON DIALYSIS (HCC): Primary | ICD-10-CM

## 2018-02-22 PROCEDURE — 99214 OFFICE O/P EST MOD 30 MIN: CPT | Performed by: NURSE PRACTITIONER

## 2018-02-22 NOTE — PATIENT INSTRUCTIONS
Upper fistula is open with good  Continue dialysis as scheduled. If problems should arise including difficulty with access, high pressure alarms, or inability to complete dialysis please [facility] immediately for evaluation.    Lower non-functioning fistula will need closing possible skin debridement and repair.  Will call with surgical dates and instructions   Stephen Gomez

## 2018-02-23 ENCOUNTER — OFFICE VISIT (OUTPATIENT)
Dept: FAMILY MEDICINE CLINIC | Facility: CLINIC | Age: 68
End: 2018-02-23

## 2018-02-23 DIAGNOSIS — G62.9 NEUROPATHY: ICD-10-CM

## 2018-02-23 DIAGNOSIS — K21.9 GASTROESOPHAGEAL REFLUX DISEASE WITHOUT ESOPHAGITIS: ICD-10-CM

## 2018-02-23 DIAGNOSIS — I10 ESSENTIAL HYPERTENSION: ICD-10-CM

## 2018-02-23 DIAGNOSIS — R53.81 PHYSICAL DECONDITIONING: Chronic | ICD-10-CM

## 2018-02-23 DIAGNOSIS — Z79.4 TYPE 2 DIABETES MELLITUS WITH BOTH EYES AFFECTED BY MODERATE NONPROLIFERATIVE RETINOPATHY WITHOUT MACULAR EDEMA, WITH LONG-TERM CURRENT USE OF INSULIN (HCC): ICD-10-CM

## 2018-02-23 DIAGNOSIS — E11.3393 TYPE 2 DIABETES MELLITUS WITH BOTH EYES AFFECTED BY MODERATE NONPROLIFERATIVE RETINOPATHY WITHOUT MACULAR EDEMA, WITH LONG-TERM CURRENT USE OF INSULIN (HCC): ICD-10-CM

## 2018-02-23 DIAGNOSIS — F51.01 PRIMARY INSOMNIA: ICD-10-CM

## 2018-02-23 DIAGNOSIS — I77.0 A-V FISTULA (HCC): Primary | ICD-10-CM

## 2018-02-23 DIAGNOSIS — N18.6 ESRD (END STAGE RENAL DISEASE) (HCC): ICD-10-CM

## 2018-02-23 DIAGNOSIS — F41.9 ANXIETY: ICD-10-CM

## 2018-02-23 DIAGNOSIS — G89.29 OTHER CHRONIC PAIN: ICD-10-CM

## 2018-02-23 PROCEDURE — 99308 SBSQ NF CARE LOW MDM 20: CPT | Performed by: STUDENT IN AN ORGANIZED HEALTH CARE EDUCATION/TRAINING PROGRAM

## 2018-02-24 NOTE — PROGRESS NOTES
Subjective   Patient ID: Cristo Musa is a 68 y.o. male is here today for follow-up of AV fistula.  Concerned about forearm pain and open lesions.   Chief Complaint   Patient presents with   • Hemodialysis Access   VAS 8 RUE Forearm     History of Present Illness  CP:  Cory Rodríguez MD  Nephrology:  Dr Vilchis,  [facility]  Cardiology:  Dr Nieto        Resides:  [facility].    68 y.o. male with ESRD on  hemodialysis, HTN, COPD, peripheral neuropathy, DM, CAD, Hepatitis C, nonischemic cardiomyopathy (EF 30%)..  former smoker.  Long term access for hemodialysis placed 12/23/14.  He returns today after callling requesting to be seen due to LUE pain and bulging areas of RUE AV fistula areas.  Denies any neurosensory symptoms of RUE does have of LUE.  Has DM with neuropathy.  RIGHT AV graft functioning well at dialysis.  No problems during dialysis.  Currently residing at [facility].  RUE hurts at intervals, sharp aching pain.   No other associated signs, symptoms or modifying factors.    11/13/2014 Upper extremity vein mapping:  RIGHT cephalic 1.2-2.5mm, basilic 1.6-2.0mm.  LEFT cephalic 0.8-2.6mm, basilic 1.2-2.6mm.  12/23/14  LEFT Brachial artery to axillary vein AV Graft (7mm Artegraft)  01/07/15 LUE Incisions  open thru skin only.  Proximal Length:  4.5-5 cm depth 0.25 width 0.2  Distal  Length: 4. cm depth 0.2 width 0.2  Beefy red bed  Small amt erythema proximal incision.  1/13/15: LUE Incision:healed  3/24/15  Revision of left arteriovenous graft (distal interposition graft with a 6 mm Artegraft).  Thrombectomy, left arteriovenous graft, open.  Left fistulogram.  Balloon angioplasty of left subclavian vein, innominate vein, swing site (6 x 20 mm Bard Conquest balloon, 8 x 20 mm Bard Conquest balloon). Venous ultrasound of unilateral extremity.  3/31/15 Revision of left arteriovenous graft, open. Open thrombectomy of left arteriovenous graft. Left upper  extremity fistulogram.  Balloon angioplasty of left subclavian vein/innominate vein/swing site (6 x 21 mm Bard-Conquest balloon, 8 x 21 mm Bard-Conquest balloon). Venous ultrasound unilateral extremity.   4/10/15   Excision of left brachial to axillary arteriovenous bridge graft (bovine) with reconstruction of both the brachial artery and axillary vein with vein patch angioplasty from left cephalic vein. Placement of femoral arterial and venous lines using ultrasound guidance.  4/19/15  Placement LEFT IJ Tunneled Cath  5/14/15   Placement of a right forearm arteriovenous bridge graft.  5/19/15   Exchange of right femoral tunneled dialysis catheter using fluoroscopic guidance.   8/11/15  Entrobacter bacteremia of LUE AV Graft, which was removed and completed 6 wks of IV antx with neg CS.    9/22/15  Fistula Duplex:  maxPSV 94cm/s. flows 258-632ml/m. size 5.7-9.2mm. Patent Multiple areas of hematomas with sm area of needle oozing.    10/15/15: RIGHT Fistula duplex: maxPSV 233cm/s. flows 214-701ml/m. size 4.4-6.2mm.  1/26/16: RIGHT fistula duplex: maxPSV 321cm/s. flows 612-1866ml/m. size 3.8-8.1mm.  7/29/16: RIGHT TPA Lysis/Mech Thrombectomy  9/23/16: RIGHT groin Tunnel Cath Placement  10/5/16: RIGHT brachial-axillary vein AV graft (artegraft)        12/5/2016 RIGHT fistula duplex: maxPSV 265cm/s. Flows 1612-1986ml/m. size 5.8-8.1mm.         5/4/2017 RIGHT fistula duplex: maxPSV 530cm/s. Flows 642-1444ml/m. size 3.5-7.4mm.         11/28/17: RIGHT fistula duplex: mPSV 507cm/s. Flows 649-1519ml/m. Size 2.4-7.4mm.        02/22/18: RIGHT fistula duplex: Patent:  mPSV 523 cm/s (1906). Flows 489-2184 ml/m. Size 2.6-8.1mm. 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   See HPI   Allergies   Allergen  Reactions   • Lisinopril Other (See Comments)     unknown   • Motrin [Ibuprofen] Diarrhea and Nausea And Vomiting       Current Outpatient Prescriptions:   •  acetaminophen (TYLENOL) 500 MG tablet, Take 500 mg by mouth Every 4 (Four) Hours As Needed for Mild Pain  or Fever., Disp: , Rfl:   •  aspirin 81 MG EC tablet, Take 1 tablet by mouth Daily., Disp: 150 tablet, Rfl: 2  •  brimonidine (ALPHAGAN P) 0.1 % solution ophthalmic solution, Administer 1 drop to both eyes 3 (Three) Times a Day., Disp: , Rfl:   •  calcium acetate (PHOSLO) 667 MG capsule, Take 1,334 mg by mouth 3 (Three) Times a Day With Meals. 2 capsules 3 times daily, Disp: , Rfl:   •  clindamycin (CLEOCIN) 300 MG capsule, Take 1 capsule by mouth 3 (Three) Times a Day., Disp: 30 capsule, Rfl: 0  •  dorzolamide (TRUSOPT) 2 % ophthalmic solution, Administer 1 drop into the left eye 3 (Three) Times a Day., Disp: 1 each, Rfl: 12  •  famotidine (PEPCID) 20 MG tablet, Take 20 mg by mouth every night at bedtime., Disp: , Rfl:   •  fluticasone (FLONASE) 50 MCG/ACT nasal spray, 2 sprays into each nostril daily. Administer 2 sprays in each nostril for each dose., Disp: , Rfl:   •  gabapentin (NEURONTIN) 300 MG capsule, Take 1 capsule by mouth 3 (Three) Times a Day., Disp: 90 capsule, Rfl: 2  •  guaiFENesin 200 MG tablet, Take 400 mg by mouth Every 4 (Four) Hours As Needed for Cough., Disp: , Rfl:   •  HYDROcodone-acetaminophen (NORCO)  MG per tablet, Take 1 tablet by mouth Every 6 (Six) Hours As Needed for Moderate Pain ., Disp: 120 tablet, Rfl: 0  •  HYDROcodone-acetaminophen (NORCO) 5-325 MG per tablet, Take 1 tablet by mouth Every 6 (Six) Hours As Needed (pain)., Disp: 15 tablet, Rfl: 0  •  hydrOXYzine (ATARAX) 25 MG tablet, Take 25 mg by mouth 3 (Three) Times a Day As Needed for Anxiety., Disp: , Rfl:   •  insulin aspart (NovoLOG) 100 UNIT/ML injection, Inject  under the skin 3 (Three) Times a Day Before Meals. Sliding scale:  = 0 units; 150-200 = 3  units; 200-250 = 6 units; 250-300 = 9 units; 300-350 = 12 units; >350 = 15 units and call MD, Disp: , Rfl:   •  insulin glargine (LANTUS) 100 UNIT/ML injection, Inject 25 Units under the skin Every Night., Disp: , Rfl:   •  latanoprost (XALATAN) 0.005 % ophthalmic solution, Administer 1 drop to both eyes every night., Disp: , Rfl:   •  loperamide (IMODIUM) 2 MG capsule, Take 2 mg by mouth Every 6 (Six) Hours As Needed for Diarrhea., Disp: , Rfl:   •  loratadine (CLARITIN) 10 MG tablet, Take 10 mg by mouth daily., Disp: , Rfl:   •  ondansetron (ZOFRAN) 8 MG tablet, Take 8 mg by mouth Every 8 (Eight) Hours As Needed for Nausea or Vomiting., Disp: , Rfl:   •  ondansetron ODT (ZOFRAN-ODT) 4 MG disintegrating tablet, Take 1 tablet by mouth Every 6 (Six) Hours As Needed for Nausea or Vomiting., Disp: 10 tablet, Rfl: 0  •  PARoxetine (PAXIL) 30 MG tablet, Take 30 mg by mouth every night at bedtime., Disp: , Rfl:   •  polyethylene glycol (MIRALAX) packet, Take 17 g by mouth Daily As Needed (constipation)., Disp: , Rfl:   •  sevelamer (RENVELA) 800 MG tablet, Take 2,400 mg by mouth 3 (Three) Times a Day With Meals., Disp: , Rfl:   •  traZODone (DESYREL) 50 MG tablet, Take 50 mg by mouth every night at bedtime., Disp: , Rfl:     Review of Systems   Constitution: Positive for weakness. Negative for chills, decreased appetite and fever.   HENT: Negative for hoarse voice, nosebleeds and stridor.    Eyes: Negative for visual disturbance.   Cardiovascular: Negative for chest pain, claudication, irregular heartbeat and leg swelling.        Fistula bleeding:  N   Fistula pain:  N  Extremity pain:  Y Extremity discoloration:  N   Extremity numbness/tingling:  N  No problems with dialysis    Respiratory: Negative for cough, hemoptysis and shortness of breath.    Hematologic/Lymphatic: Does not bruise/bleed easily.   Skin: Positive for poor wound healing (R forearm ). Negative for dry skin and itching.   Musculoskeletal: Negative for  back pain, falls, muscle cramps and muscle weakness.        RUE VAS 8 pain or sharp ache in open lesion area    Gastrointestinal: Negative for abdominal pain, anorexia and melena.   Neurological: Negative for dizziness, loss of balance, numbness and paresthesias.   Psychiatric/Behavioral: Negative for altered mental status.   Allergic/Immunologic: Negative for hives.        Objective   Physical Exam   Constitutional: He is oriented to person, place, and time. He appears well-nourished.   Body mass index is 32.23 kg/(m^2).     HENT:   Head: Normocephalic.   Mouth/Throat: Oropharynx is clear and moist.   Eyes: Conjunctivae are normal. Pupils are equal, round, and reactive to light.   Neck: No JVD present.   Cardiovascular: Normal rate, regular rhythm, normal heart sounds and intact distal pulses.    Pulses:       Carotid pulses are 1+ on the right side, and 1+ on the left side.       Radial pulses are 2+ on the right side, and 2+ on the left side.        Posterior tibial pulses are 2+ on the right side, and 2+ on the left side.   Fistula Present RU Extremity: (Y)thrill/(Y)bruit/(Y)pulse. Aneurysmal (N). Pseudoaneurysm Y  Water Hammer Pulse (Y). Thinning (N).  Inderjit's Test <3sec (Y). Flows <800ml/min (Y). Flows < previous (Y). Skin warm/dry/pink/intact (Y). Radial Pulse (Y).  Distal hand warm + sensation + mobility     Pulmonary/Chest: Effort normal and breath sounds normal. No stridor.   Coarse    Abdominal: Soft. Bowel sounds are normal.   Neurological: He is alert and oriented to person, place, and time.   Skin: Skin is warm and dry. No erythema. No pallor.   RUE  Upper fistula Brachial-axillary  well healed  Lower fistula Forearm AV bridge graft with circular grater ulcer area X 2 1-1.5 cm area    Psychiatric: His behavior is normal.   Nursing note and vitals reviewed.      Vitals:    02/22/18 1127   BP: 128/85   Pulse: 73   Temp: 98.9 °F (37.2 °C)   SpO2: 98%         Assessment/Plan   Independent Review of  Radiographic Studies:       02/22/18: RIGHT fistula duplex: Patent:  mPSV 523 cm/s (1906). Flows 489-2184 ml/m. Size 2.6-8.1mm. 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 ESRD (end stage renal disease) on dialysis  Indication for AV fistula     2.Non functioning Arteriovenous , subsequent encounter RUE forearm bridge graft  Symptomatic   Will need closure with  possible skin debridement and repair.  Will call with surgical dates and instructions Stephen Gomez  Detailed discussion regarding risks, benefits, and treatment plan.  Patient understands, agrees, and wishes to proceed with plan.       3. Arteriovenous fistula, acquired  Patent fistula working well with dialysis.   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 If problems should arise including difficulty with access, high pressure alarms, or inability to complete dialysis please [facility] immediately for evaluation.

## 2018-02-26 NOTE — PROGRESS NOTES
MONTHLY NURSING HOME VISIT    NAME: Cristo Musa  : 1950  MRN: [id number]    DATE & TIME SEEN: 18 1415    PCP: Cory Rodríguez MD    NURSING HOME: [facility]     Chief Complaint: Monthly Nursing Home Visit for February    Subjective:     Cristo Musa is a 68 y.o. male. He is complaining of mild right arm pain.  He describes it as a throbbing sensation.  He has a lump where his access site used to be.  He was seen by Dr. Singh earlier this week and is following up there.  No other complaints today.  His appetite is good.  His sleep is good.  His bowels are moving normally.  He is working with therapy and trying to be more active around the nursing home.      Current Meds:    Current Outpatient Prescriptions:   •  acetaminophen (TYLENOL) 500 MG tablet, Take 500 mg by mouth Every 4 (Four) Hours As Needed for Mild Pain  or Fever., Disp: , Rfl:   •  aspirin 81 MG EC tablet, Take 1 tablet by mouth Daily., Disp: 150 tablet, Rfl: 2  •  brimonidine (ALPHAGAN P) 0.1 % solution ophthalmic solution, Administer 1 drop to both eyes 3 (Three) Times a Day., Disp: , Rfl:   •  calcium acetate (PHOSLO) 667 MG capsule, Take 1,334 mg by mouth 3 (Three) Times a Day With Meals. 2 capsules 3 times daily, Disp: , Rfl:   •  clindamycin (CLEOCIN) 300 MG capsule, Take 1 capsule by mouth 3 (Three) Times a Day., Disp: 30 capsule, Rfl: 0  •  dorzolamide (TRUSOPT) 2 % ophthalmic solution, Administer 1 drop into the left eye 3 (Three) Times a Day., Disp: 1 each, Rfl: 12  •  famotidine (PEPCID) 20 MG tablet, Take 20 mg by mouth every night at bedtime., Disp: , Rfl:   •  fluticasone (FLONASE) 50 MCG/ACT nasal spray, 2 sprays into each nostril daily. Administer 2 sprays in each nostril for each dose., Disp: , Rfl:   •  gabapentin (NEURONTIN) 300 MG capsule, Take 1 capsule by mouth 3 (Three) Times a Day., Disp: 90 capsule, Rfl: 2  •  guaiFENesin 200 MG tablet, Take 400 mg by mouth Every 4  (Four) Hours As Needed for Cough., Disp: , Rfl:   •  HYDROcodone-acetaminophen (NORCO)  MG per tablet, Take 1 tablet by mouth Every 6 (Six) Hours As Needed for Moderate Pain ., Disp: 120 tablet, Rfl: 0  •  HYDROcodone-acetaminophen (NORCO) 5-325 MG per tablet, Take 1 tablet by mouth Every 6 (Six) Hours As Needed (pain)., Disp: 15 tablet, Rfl: 0  •  hydrOXYzine (ATARAX) 25 MG tablet, Take 25 mg by mouth 3 (Three) Times a Day As Needed for Anxiety., Disp: , Rfl:   •  insulin aspart (NovoLOG) 100 UNIT/ML injection, Inject  under the skin 3 (Three) Times a Day Before Meals. Sliding scale:  = 0 units; 150-200 = 3 units; 200-250 = 6 units; 250-300 = 9 units; 300-350 = 12 units; >350 = 15 units and call MD, Disp: , Rfl:   •  insulin glargine (LANTUS) 100 UNIT/ML injection, Inject 25 Units under the skin Every Night., Disp: , Rfl:   •  latanoprost (XALATAN) 0.005 % ophthalmic solution, Administer 1 drop to both eyes every night., Disp: , Rfl:   •  loperamide (IMODIUM) 2 MG capsule, Take 2 mg by mouth Every 6 (Six) Hours As Needed for Diarrhea., Disp: , Rfl:   •  loratadine (CLARITIN) 10 MG tablet, Take 10 mg by mouth daily., Disp: , Rfl:   •  ondansetron (ZOFRAN) 8 MG tablet, Take 8 mg by mouth Every 8 (Eight) Hours As Needed for Nausea or Vomiting., Disp: , Rfl:   •  ondansetron ODT (ZOFRAN-ODT) 4 MG disintegrating tablet, Take 1 tablet by mouth Every 6 (Six) Hours As Needed for Nausea or Vomiting., Disp: 10 tablet, Rfl: 0  •  PARoxetine (PAXIL) 30 MG tablet, Take 30 mg by mouth every night at bedtime., Disp: , Rfl:   •  polyethylene glycol (MIRALAX) packet, Take 17 g by mouth Daily As Needed (constipation)., Disp: , Rfl:   •  sevelamer (RENVELA) 800 MG tablet, Take 2,400 mg by mouth 3 (Three) Times a Day With Meals., Disp: , Rfl:   •  traZODone (DESYREL) 50 MG tablet, Take 50 mg by mouth every night at bedtime., Disp: , Rfl:     Allergies:  Lisinopril and Motrin [ibuprofen]    Review of Systems:  Review of  Systems   Constitutional: Negative for activity change, appetite change, chills and fever.   HENT: Negative for congestion, ear pain and sore throat.    Eyes: Negative for redness and visual disturbance.   Respiratory: Negative for cough, shortness of breath and wheezing.    Cardiovascular: Negative for chest pain and palpitations.   Gastrointestinal: Negative for abdominal pain, diarrhea, nausea and vomiting.   Musculoskeletal: Positive for arthralgias and back pain. Negative for gait problem.   Skin: Negative for color change and rash.   Neurological: Negative for dizziness, weakness and headaches.   Psychiatric/Behavioral: Negative for dysphoric mood and sleep disturbance. The patient is nervous/anxious.        Objective:     /74  Pulse 79  Temp 96.7 °F (35.9 °C)  Resp 18  SpO2 93%  Physical Exam   Gen.: Patient is sitting comfortably, NAD  Home: CTAP, effort normal  CV: RRR, 2+ pedal pulses  Extremities: Nonedematous, right forearm mild swelling, site is clean and dry with minimal erythema  Feet: Warm with good cap refill    A1C: 5.9     Assessment/Plan:      68 y.o. male with:  Problem List Items Addressed This Visit        Cardiovascular and Mediastinum    Hypertension Hydralazine     A-V fistula - PrimaryFollowing up with Dr. Francisco CHANDLER            Digestive    Gastroesophageal reflux disease without esophagitis Continue famotidine         Endocrine    Diabetes mellitusMost recent A1c 5.9 will return to previous sliding scale       Nervous and Auditory    NeuropathyGabapentin        Genitourinary    ESRD (end stage renal disease)Monday Wednesday Friday dialysis        Other    Physical deconditioning (Chronic)We'll continue therapy     Primary insomniaTrazodone     Chronic painNorco's    Anxiety Paxil, Vistaril           CODE STATUS: full code    Dr Ayoub is the attending on record for this patient, he is aware of the patient's status and agrees with the above.             This  document has been electronically signed by Cory Rodríguez MD on February 26, 2018 11:05 AM

## 2018-02-27 VITALS
OXYGEN SATURATION: 93 % | SYSTOLIC BLOOD PRESSURE: 142 MMHG | HEART RATE: 79 BPM | TEMPERATURE: 96.7 F | RESPIRATION RATE: 18 BRPM | DIASTOLIC BLOOD PRESSURE: 74 MMHG

## 2018-02-27 PROBLEM — R53.81 PHYSICAL DECONDITIONING: Chronic | Status: ACTIVE | Noted: 2018-02-27

## 2018-03-05 ENCOUNTER — TELEPHONE (OUTPATIENT)
Dept: FAMILY MEDICINE CLINIC | Facility: CLINIC | Age: 68
End: 2018-03-05

## 2018-03-05 NOTE — TELEPHONE ENCOUNTER
CHERY FROM [facility] CALLED AND SAID PATIENT NEEDS A SCRIPT FOR CORNELIO 5, Q6H PRN.     SHE CAN BE REACHED IF ANY QUESTIONS [phone].    THANK YOU.

## 2018-03-12 ENCOUNTER — ANESTHESIA EVENT (OUTPATIENT)
Dept: PERIOP | Facility: HOSPITAL | Age: 68
End: 2018-03-12

## 2018-03-12 ENCOUNTER — HOSPITAL ENCOUNTER (INPATIENT)
Facility: HOSPITAL | Age: 68
LOS: 4 days | Discharge: NURSING, REHAB OR LONG-TERM CARE | End: 2018-03-16
Attending: THORACIC SURGERY (CARDIOTHORACIC VASCULAR SURGERY) | Admitting: THORACIC SURGERY (CARDIOTHORACIC VASCULAR SURGERY)

## 2018-03-12 ENCOUNTER — OFFICE VISIT (OUTPATIENT)
Dept: CARDIAC SURGERY | Facility: CLINIC | Age: 68
End: 2018-03-12

## 2018-03-12 VITALS
TEMPERATURE: 97.1 F | HEART RATE: 62 BPM | OXYGEN SATURATION: 96 % | WEIGHT: 220.7 LBS | SYSTOLIC BLOOD PRESSURE: 142 MMHG | DIASTOLIC BLOOD PRESSURE: 68 MMHG | HEIGHT: 68 IN | BODY MASS INDEX: 33.45 KG/M2

## 2018-03-12 DIAGNOSIS — Z99.2 ESRD ON DIALYSIS (HCC): Primary | ICD-10-CM

## 2018-03-12 DIAGNOSIS — I77.0 A-V FISTULA (HCC): ICD-10-CM

## 2018-03-12 DIAGNOSIS — I10 ESSENTIAL HYPERTENSION: ICD-10-CM

## 2018-03-12 DIAGNOSIS — Z99.2 ESRD ON DIALYSIS (HCC): ICD-10-CM

## 2018-03-12 DIAGNOSIS — E11.3393 TYPE 2 DIABETES MELLITUS WITH BOTH EYES AFFECTED BY MODERATE NONPROLIFERATIVE RETINOPATHY WITHOUT MACULAR EDEMA, WITH LONG-TERM CURRENT USE OF INSULIN (HCC): ICD-10-CM

## 2018-03-12 DIAGNOSIS — I44.2 COMPLETE HEART BLOCK (HCC): ICD-10-CM

## 2018-03-12 DIAGNOSIS — Z79.4 TYPE 2 DIABETES MELLITUS WITH BOTH EYES AFFECTED BY MODERATE NONPROLIFERATIVE RETINOPATHY WITHOUT MACULAR EDEMA, WITH LONG-TERM CURRENT USE OF INSULIN (HCC): ICD-10-CM

## 2018-03-12 DIAGNOSIS — N18.6 ESRD (END STAGE RENAL DISEASE) ON DIALYSIS (HCC): Primary | ICD-10-CM

## 2018-03-12 DIAGNOSIS — Z99.2 ESRD (END STAGE RENAL DISEASE) ON DIALYSIS (HCC): Primary | ICD-10-CM

## 2018-03-12 DIAGNOSIS — N18.6 ESRD ON DIALYSIS (HCC): ICD-10-CM

## 2018-03-12 DIAGNOSIS — J43.1 PANLOBULAR EMPHYSEMA (HCC): ICD-10-CM

## 2018-03-12 DIAGNOSIS — N18.6 ESRD ON DIALYSIS (HCC): Primary | ICD-10-CM

## 2018-03-12 PROBLEM — J44.9 COPD (CHRONIC OBSTRUCTIVE PULMONARY DISEASE) (HCC): Status: ACTIVE | Noted: 2018-03-12

## 2018-03-12 LAB
CRE SCREEN PCR: NOT DETECTED
GLUCOSE BLDC GLUCOMTR-MCNC: 205 MG/DL (ref 70–130)
GLUCOSE BLDC GLUCOMTR-MCNC: 228 MG/DL (ref 70–130)
IMP STRAIN: NOT DETECTED
KPC STRAIN: NOT DETECTED
NDM STRAIN: NOT DETECTED
OXA 48 STRAIN: NOT DETECTED
VIM STRAIN: NOT DETECTED

## 2018-03-12 PROCEDURE — 87081 CULTURE SCREEN ONLY: CPT | Performed by: THORACIC SURGERY (CARDIOTHORACIC VASCULAR SURGERY)

## 2018-03-12 PROCEDURE — 99223 1ST HOSP IP/OBS HIGH 75: CPT | Performed by: THORACIC SURGERY (CARDIOTHORACIC VASCULAR SURGERY)

## 2018-03-12 PROCEDURE — 82962 GLUCOSE BLOOD TEST: CPT

## 2018-03-12 RX ORDER — CALCIUM ACETATE 667 MG/1
1334 TABLET ORAL
Status: DISCONTINUED | OUTPATIENT
Start: 2018-03-12 | End: 2018-03-16 | Stop reason: HOSPADM

## 2018-03-12 RX ORDER — ACETAMINOPHEN 325 MG/1
650 TABLET ORAL EVERY 4 HOURS PRN
Status: CANCELLED | OUTPATIENT
Start: 2018-03-12

## 2018-03-12 RX ORDER — BISACODYL 10 MG
10 SUPPOSITORY, RECTAL RECTAL DAILY PRN
Status: CANCELLED | OUTPATIENT
Start: 2018-03-12

## 2018-03-12 RX ORDER — SODIUM CHLORIDE 9 MG/ML
30 INJECTION, SOLUTION INTRAVENOUS CONTINUOUS
Status: CANCELLED | OUTPATIENT
Start: 2018-03-13

## 2018-03-12 RX ORDER — SENNA AND DOCUSATE SODIUM 50; 8.6 MG/1; MG/1
2 TABLET, FILM COATED ORAL 2 TIMES DAILY PRN
Status: CANCELLED | OUTPATIENT
Start: 2018-03-12

## 2018-03-12 RX ORDER — SEVELAMER HYDROCHLORIDE 800 MG/1
2400 TABLET, FILM COATED ORAL
Status: DISCONTINUED | OUTPATIENT
Start: 2018-03-12 | End: 2018-03-16 | Stop reason: HOSPADM

## 2018-03-12 RX ORDER — SENNA AND DOCUSATE SODIUM 50; 8.6 MG/1; MG/1
2 TABLET, FILM COATED ORAL 2 TIMES DAILY PRN
Status: DISCONTINUED | OUTPATIENT
Start: 2018-03-12 | End: 2018-03-16 | Stop reason: HOSPADM

## 2018-03-12 RX ORDER — BISACODYL 10 MG
10 SUPPOSITORY, RECTAL RECTAL DAILY PRN
Status: DISCONTINUED | OUTPATIENT
Start: 2018-03-12 | End: 2018-03-16 | Stop reason: HOSPADM

## 2018-03-12 RX ORDER — FAMOTIDINE 20 MG/1
20 TABLET, FILM COATED ORAL DAILY
Status: DISCONTINUED | OUTPATIENT
Start: 2018-03-12 | End: 2018-03-16 | Stop reason: HOSPADM

## 2018-03-12 RX ORDER — ACETAMINOPHEN 325 MG/1
650 TABLET ORAL EVERY 4 HOURS PRN
Status: DISCONTINUED | OUTPATIENT
Start: 2018-03-12 | End: 2018-03-16 | Stop reason: HOSPADM

## 2018-03-12 RX ORDER — HYDROCODONE BITARTRATE AND ACETAMINOPHEN 5; 325 MG/1; MG/1
1 TABLET ORAL EVERY 6 HOURS PRN
Status: ON HOLD | COMMUNITY
End: 2018-03-16

## 2018-03-12 RX ADMIN — FAMOTIDINE 20 MG: 20 TABLET, FILM COATED ORAL at 18:08

## 2018-03-12 RX ADMIN — Medication 1334 MG: at 18:09

## 2018-03-12 RX ADMIN — RENAGEL 2400 MG: 800 TABLET ORAL at 18:09

## 2018-03-12 RX ADMIN — ACETAMINOPHEN 650 MG: 325 TABLET ORAL at 23:35

## 2018-03-12 NOTE — H&P
3/12/2018     Cristo Musa  1950     Chief Complaint:            Chief Complaint   Patient presents with   • Hemodialysis Access       swollen access right arm         HPI:       PCP:  Cory Rodríguez MD  Nephrology:  Dr Vilchis,  [facility]  Cardiology:  Dr Nieto        Resides:  [facility].     67 y.o. male with ESRD on  hemodialysis, HTN, COPD, peripheral neuropathy, DM, CAD, Hepatitis C, nonischemic cardiomyopathy (EF 30%)..  former smoker.  Long term access for hemodialysis placed 12/23/14.  moderate swelling RIGHT forearm at site of old occluded loop AV graft (not used since 2016).  Seen ER 2/17 with blood cultures positive for MRSA.  He returns today after callling requesting to be seen due to RUE pain and bulging areas of RUE AV fistula areas.  Denies any neurosensory symptoms of RUE does have of LUE.  Has DM with neuropathy.  RIGHT AV fistula functioning well at dialysis.  No problems during dialysis.  No other associated signs, symptoms or modifying factors.     11/13/2014 Upper extremity vein mapping:  RIGHT cephalic 1.2-2.5mm, basilic 1.6-2.0mm.  LEFT cephalic 0.8-2.6mm, basilic 1.2-2.6mm.  12/23/14  LEFT Brachial artery to axillary vein AV Graft (7mm Artegraft)  01/07/15 LUE Incisions  open thru skin only.  Proximal Length:  4.5-5 cm depth 0.25 width 0.2  Distal  Length: 4. cm depth 0.2 width 0.2  Beefy red bed  Small amt erythema proximal incision.  1/13/15: LUE Incision:healed  3/24/15  Revision of left arteriovenous graft (distal interposition graft with a 6 mm Artegraft).  Thrombectomy, left arteriovenous graft, open.  Left fistulogram.  Balloon angioplasty of left subclavian vein, innominate vein, swing site (6 x 20 mm Bard Conquest balloon, 8 x 20 mm Bard Conquest balloon). Venous ultrasound of unilateral extremity.  3/31/15 Revision of left arteriovenous graft, open. Open thrombectomy of left arteriovenous graft. Left upper extremity fistulogram.   Balloon angioplasty of left subclavian vein/innominate vein/swing site (6 x 21 mm Bard-Conquest balloon, 8 x 21 mm Bard-Conquest balloon). Venous ultrasound unilateral extremity.   4/10/15   Excision of left brachial to axillary arteriovenous bridge graft (bovine) with reconstruction of both the brachial artery and axillary vein with vein patch angioplasty from left cephalic vein. Placement of femoral arterial and venous lines using ultrasound guidance.  4/19/15  Placement LEFT IJ Tunneled Cath  5/14/15   Placement of a right forearm arteriovenous bridge graft.  5/19/15   Exchange of right femoral tunneled dialysis catheter using fluoroscopic guidance.   8/11/15  Entrobacter bacteremia of LUE AV Graft, which was removed and completed 6 wks of IV antx with neg CS.    9/22/15  Fistula Duplex:  maxPSV 94cm/s. flows 258-632ml/m. size 5.7-9.2mm. Patent Multiple areas of hematomas with sm area of needle oozing.    10/15/15: RIGHT Fistula duplex: maxPSV 233cm/s. flows 214-701ml/m. size 4.4-6.2mm.  1/26/16: RIGHT fistula duplex: maxPSV 321cm/s. flows 612-1866ml/m. size 3.8-8.1mm.  7/29/16: RIGHT TPA Lysis/Mech Thrombectomy  9/23/16: RIGHT groin Tunnel Cath Placement  10/5/16: RIGHT brachial-axillary vein AV graft (artegraft)        12/5/2016 RIGHT fistula duplex: maxPSV 265cm/s. Flows 1612-1986ml/m. size 5.8-8.1mm.         5/4/2017 RIGHT fistula duplex: maxPSV 530cm/s. Flows 642-1444ml/m. size 3.5-7.4mm.        11/28/17: RIGHT fistula duplex: mPSV 507cm/s. Flows 649-1519ml/m. Size 2.4-7.4mm.        2/22/18: RIGHT fistula duplex: Patent:  mPSV 523 cm/s. Flows 489-2184 ml/m. Size 2.6-8.1mm. 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  Recent images  independently reviewed.  Available laboratory values reviewed.     PMH:  Medical History           Past Medical History:   Diagnosis Date   • Cataract     • CHF (congestive heart failure)     • CKD (chronic kidney disease)     • Clostridium difficile infection     • COPD (chronic obstructive pulmonary disease)     • Coronary artery disease     • Diabetes mellitus     • Glaucoma     • Hepatitis C     • History of transfusion     • Hypertension     • Nephropathy, diabetic     • Pacemaker     • Pulmonary embolism               ALLERGIES:            Allergies   Allergen Reactions   • Lisinopril Other (See Comments)       unknown   • Motrin [Ibuprofen] Diarrhea and Nausea And Vomiting            MEDICATIONS:     Current Outpatient Prescriptions:   •  aspirin 81 MG EC tablet, Take 1 tablet by mouth Daily., Disp: 150 tablet, Rfl: 2  •  brimonidine (ALPHAGAN P) 0.1 % solution ophthalmic solution, Administer 1 drop to both eyes 3 (Three) Times a Day., Disp: , Rfl:   •  calcium acetate (PHOSLO) 667 MG capsule, Take 1,334 mg by mouth 3 (Three) Times a Day With Meals. 2 capsules 3 times daily, Disp: , Rfl:   •  dorzolamide (TRUSOPT) 2 % ophthalmic solution, Administer 1 drop into the left eye 3 (Three) Times a Day., Disp: 1 each, Rfl: 12  •  famotidine (PEPCID) 20 MG tablet, Take 20 mg by mouth every night at bedtime., Disp: , Rfl:   •  fluticasone (FLONASE) 50 MCG/ACT nasal spray, 2 sprays into each nostril daily. Administer 2 sprays in each nostril for each dose., Disp: , Rfl:   •  gabapentin (NEURONTIN) 300 MG capsule, Take 1 capsule by mouth 3 (Three) Times a Day., Disp: 90 capsule, Rfl: 2  •  guaiFENesin 200 MG tablet, Take 400 mg by mouth Every 4 (Four) Hours As Needed for Cough., Disp: , Rfl:   •  HYDROcodone-acetaminophen (NORCO)  MG per tablet, Take 1 tablet by mouth Every 6 (Six) Hours As Needed for Moderate Pain ., Disp: 120 tablet, Rfl: 0  •  hydrOXYzine (ATARAX) 25 MG tablet, Take 25 mg by mouth 3 (Three)  Times a Day As Needed for Anxiety., Disp: , Rfl:   •  insulin aspart (NovoLOG) 100 UNIT/ML injection, Inject  under the skin 3 (Three) Times a Day Before Meals. Sliding scale:  = 0 units; 150-200 = 3 units; 200-250 = 6 units; 250-300 = 9 units; 300-350 = 12 units; >350 = 15 units and call MD, Disp: , Rfl:   •  insulin glargine (LANTUS) 100 UNIT/ML injection, Inject 25 Units under the skin Every Night., Disp: , Rfl:   •  latanoprost (XALATAN) 0.005 % ophthalmic solution, Administer 1 drop to both eyes every night., Disp: , Rfl:   •  loperamide (IMODIUM) 2 MG capsule, Take 2 mg by mouth Every 6 (Six) Hours As Needed for Diarrhea., Disp: , Rfl:   •  loratadine (CLARITIN) 10 MG tablet, Take 10 mg by mouth daily., Disp: , Rfl:   •  ondansetron ODT (ZOFRAN-ODT) 4 MG disintegrating tablet, Take 1 tablet by mouth Every 6 (Six) Hours As Needed for Nausea or Vomiting., Disp: 10 tablet, Rfl: 0  •  PARoxetine (PAXIL) 30 MG tablet, Take 30 mg by mouth every night at bedtime., Disp: , Rfl:   •  polyethylene glycol (MIRALAX) packet, Take 17 g by mouth Daily As Needed (constipation)., Disp: , Rfl:   •  sevelamer (RENVELA) 800 MG tablet, Take 2,400 mg by mouth 3 (Three) Times a Day With Meals., Disp: , Rfl:   •  traZODone (DESYREL) 50 MG tablet, Take 50 mg by mouth every night at bedtime., Disp: , Rfl:   •  acetaminophen (TYLENOL) 500 MG tablet, Take 500 mg by mouth Every 4 (Four) Hours As Needed for Mild Pain  or Fever., Disp: , Rfl:      Review of Systems   Review of Systems   Constitution: Positive for malaise/fatigue. Negative for weakness and weight loss.   Eyes: Positive for blurred vision.   Cardiovascular: Negative for chest pain, claudication and dyspnea on exertion.   Respiratory: Negative for cough and shortness of breath.    Hematologic/Lymphatic: Bruises/bleeds easily.   Skin: Positive for color change and poor wound healing.   Musculoskeletal: Positive for arthritis and stiffness.   Neurological: Negative for  dizziness and numbness.         Physical Exam   Physical Exam   Constitutional: He is oriented to person, place, and time. He is active and cooperative. He does not appear ill. No distress.   HENT:   Right Ear: Hearing normal.   Left Ear: Hearing normal.   Nose: No nasal deformity. No epistaxis.   Mouth/Throat: He does not have dentures. Normal dentition.   Cardiovascular: Normal rate and regular rhythm.    No murmur heard.  Pulses:       Carotid pulses are 2+ on the right side, and 2+ on the left side.       Radial pulses are 2+ on the right side, and 2+ on the left side.   RIGHT arm AV fistula good thrill.   Pulmonary/Chest: Effort normal and breath sounds normal.   Abdominal: Soft. He exhibits no distension and no mass. There is no tenderness.   Musculoskeletal: He exhibits no deformity.   Gait normal.    Neurological: He is alert and oriented to person, place, and time. He has normal strength.   Skin: Skin is warm and dry. No cyanosis or erythema. No pallor.   RIGHT forearm moderate edema, 2 pustules medial aspect over graft.  Moderate tenderness, warmth.   Psychiatric: He has a normal mood and affect. His speech is normal. Judgment and thought content normal.      Results for CRISTO SÁNCHEZ (MRN [id number]) as of 3/12/2018 11:52    Ref. Range 2/17/2018 19:58   Creatinine Latest Ref Range: 0.70 - 1.30 mg/dL 7.28 (H)   BUN Latest Ref Range: 7 - 21 mg/dL 38 (H)      ASSESSMENT:  Cristo was seen today for hemodialysis access.     Diagnoses and all orders for this visit:     ESRD (end stage renal disease) on dialysis     ESRD on dialysis  -     Duplex Hemodialysis Access CAR  -     Case Request; Standing  -     sodium chloride 0.9 % infusion; Infuse 30 mL/hr into a venous catheter Continuous.  -     Case Request     Essential hypertension     A-V fistula     Complete heart block     Panlobular emphysema     Type 2 diabetes mellitus with both eyes affected by moderate nonproliferative retinopathy without  macular edema, with long-term current use of insulin     Other orders  -     Inpatient Admission; Standing  -     Obtain informed consent; Standing  -     Chlorhexidine 4%/Hibiclens Skin Prep; Standing  -     Place sequential compression device; Standing     PLAN:  Detailed discussion with Cristo Musa regarding situation and options.  ESRD on hemodialysis.  Sepsis with MRSA bacteremia, infected old AV graft.   Multiple risk factors with severe comorbidities.  Excision RIGHT forearm AV graft advisable.     Risks including but not limited to infection, bleeding, blood vessel and nerve injury, swelling, reduced circulation to distal extremity, need for revision and repeat intervention to maintain access.  Benefits:  avoidance of catheter, long term access for dialysis.  Options:  catheter, fistula vs graft, peritoneal dialysis, renal transplantation.  Understands and wishes to proceed.     Admit today IV abx.  RIGHT forearm AV graft revision (excision), debridement arm, wound vac.  [facility].  Inpt.  3/13/2018  [facility] for home wound vac.     Return after above studies complete  Recommended regular physical activity, progressive walking program.  Continue current medications as directed.     Thank you for the opportunity to participate in this patient's care.     Copy to primary care provider.     EMR Dragon/Transcription disclaimer:   Much of this encounter note is an electronic transcription/translation of spoken language to printed text. The electronic translation of spoken language may permit erroneous, or at times, nonsensical words or phrases to be inadvertently transcribed; Although I have reviewed the note for such errors, some may still exist.

## 2018-03-12 NOTE — PLAN OF CARE
Problem: Patient Care Overview  Goal: Plan of Care Review  Outcome: Ongoing (interventions implemented as appropriate)   03/12/18 1510   Coping/Psychosocial   Plan of Care Reviewed With patient   Plan of Care Review   Progress no change     Goal: Individualization and Mutuality  Outcome: Ongoing (interventions implemented as appropriate)    Goal: Discharge Needs Assessment  Outcome: Ongoing (interventions implemented as appropriate)    Goal: Interprofessional Rounds/Family Conf  Outcome: Ongoing (interventions implemented as appropriate)      Problem: Fall Risk (Adult)  Goal: Identify Related Risk Factors and Signs and Symptoms  Outcome: Outcome(s) achieved Date Met: 03/12/18    Goal: Absence of Fall  Outcome: Ongoing (interventions implemented as appropriate)      Problem: Pain, Acute (Adult)  Goal: Identify Related Risk Factors and Signs and Symptoms  Outcome: Outcome(s) achieved Date Met: 03/12/18    Goal: Acceptable Pain Control/Comfort Level  Outcome: Ongoing (interventions implemented as appropriate)

## 2018-03-12 NOTE — DISCHARGE PLACEMENT REQUEST
"Cristo Musa (68 y.o. Male)     Date of Birth Social Security Number Address Home Phone MRN    1950  [address] [address] [phone] [id number]    [employer] Marital Status          Spiritism Single       Admission Date Admission Type Admitting Provider Attending Provider Department, Room/Bed    3/12/18 Urgent Jerson Singh MD [place], Thomas Mark, MD [address], [address]    Discharge Date Discharge Disposition Discharge Destination                       Attending Provider:  Jerson Singh MD    Allergies:  Lisinopril, Motrin [Ibuprofen]    Isolation:  None   Infection:  C.difficile (03/30/16)   Code Status:  FULL    Ht:  172.7 cm (68\")   Wt:  100 kg (220 lb 11.2 oz)    Admission Cmt:  None   Principal Problem:  ESRD on dialysis [N18.6,Z99.2] More...                 Active Insurance as of 3/12/2018     Primary Coverage     Payor Plan Insurance Group Employer/Plan Group    MEDICARE MEDICARE A & B      Payor Plan Address Payor Plan Phone Number Effective From Effective To    [address] [phone] 8/1/1996     [place] [address]       Subscriber Name Subscriber Birth Date Member ID       CRISTO MUSA 1950 [id number]           Secondary Coverage     Payor Plan Insurance Group Employer/Plan Group    [place]      Payor Plan Address Payor Plan Phone Number Effective From Effective To    [address] [phone] 3/29/2016     [place] [address]       Subscriber Name Subscriber Birth Date Member ID       CRISTO MUSA 1950 [id number]                 Emergency Contacts      (Rel.) Home Phone Work Phone Mobile Phone    MusaFarhan huang (Brother) [phone]          "

## 2018-03-12 NOTE — PROGRESS NOTES
3/12/2018    Cristo Musa  1950    Chief Complaint:    Chief Complaint   Patient presents with   • Hemodialysis Access     swollen access right arm       HPI:      PCP:  Cory Rodríguez MD  Nephrology:  Dr Vilchis,  [facility]  Cardiology:  Dr Nieto        Resides:  [facility].     67 y.o. male with ESRD on  hemodialysis, HTN, COPD, peripheral neuropathy, DM, CAD, Hepatitis C, nonischemic cardiomyopathy (EF 30%)..  former smoker.  Long term access for hemodialysis placed 12/23/14.  moderate swelling RIGHT forearm at site of old occluded loop AV graft (not used since 2016).  Seen ER 2/17 with blood cultures positive for MRSA.  He returns today after callling requesting to be seen due to RUE pain and bulging areas of RUE AV fistula areas.  Denies any neurosensory symptoms of RUE does have of LUE.  Has DM with neuropathy.  RIGHT AV fistula functioning well at dialysis.  No problems during dialysis.  No other associated signs, symptoms or modifying factors.     11/13/2014 Upper extremity vein mapping:  RIGHT cephalic 1.2-2.5mm, basilic 1.6-2.0mm.  LEFT cephalic 0.8-2.6mm, basilic 1.2-2.6mm.  12/23/14  LEFT Brachial artery to axillary vein AV Graft (7mm Artegraft)  01/07/15 LUE Incisions  open thru skin only.  Proximal Length:  4.5-5 cm depth 0.25 width 0.2  Distal  Length: 4. cm depth 0.2 width 0.2  Beefy red bed  Small amt erythema proximal incision.  1/13/15: LUE Incision:healed  3/24/15  Revision of left arteriovenous graft (distal interposition graft with a 6 mm Artegraft).  Thrombectomy, left arteriovenous graft, open.  Left fistulogram.  Balloon angioplasty of left subclavian vein, innominate vein, swing site (6 x 20 mm Bard Conquest balloon, 8 x 20 mm Bard Conquest balloon). Venous ultrasound of unilateral extremity.  3/31/15 Revision of left arteriovenous graft, open. Open thrombectomy of left arteriovenous graft. Left upper extremity fistulogram.  Balloon  angioplasty of left subclavian vein/innominate vein/swing site (6 x 21 mm Bard-Conquest balloon, 8 x 21 mm Bard-Conquest balloon). Venous ultrasound unilateral extremity.   4/10/15   Excision of left brachial to axillary arteriovenous bridge graft (bovine) with reconstruction of both the brachial artery and axillary vein with vein patch angioplasty from left cephalic vein. Placement of femoral arterial and venous lines using ultrasound guidance.  4/19/15  Placement LEFT IJ Tunneled Cath  5/14/15   Placement of a right forearm arteriovenous bridge graft.  5/19/15   Exchange of right femoral tunneled dialysis catheter using fluoroscopic guidance.   8/11/15  Entrobacter bacteremia of LUE AV Graft, which was removed and completed 6 wks of IV antx with neg CS.    9/22/15  Fistula Duplex:  maxPSV 94cm/s. flows 258-632ml/m. size 5.7-9.2mm. Patent Multiple areas of hematomas with sm area of needle oozing.    10/15/15: RIGHT Fistula duplex: maxPSV 233cm/s. flows 214-701ml/m. size 4.4-6.2mm.  1/26/16: RIGHT fistula duplex: maxPSV 321cm/s. flows 612-1866ml/m. size 3.8-8.1mm.  7/29/16: RIGHT TPA Lysis/Mech Thrombectomy  9/23/16: RIGHT groin Tunnel Cath Placement  10/5/16: RIGHT brachial-axillary vein AV graft (artegraft)        12/5/2016 RIGHT fistula duplex: maxPSV 265cm/s. Flows 1612-1986ml/m. size 5.8-8.1mm.         5/4/2017 RIGHT fistula duplex: maxPSV 530cm/s. Flows 642-1444ml/m. size 3.5-7.4mm.        11/28/17: RIGHT fistula duplex: mPSV 507cm/s. Flows 649-1519ml/m. Size 2.4-7.4mm.        2/22/18: RIGHT fistula duplex: Patent:  mPSV 523 cm/s. Flows 489-2184 ml/m. Size 2.6-8.1mm. 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  Recent images independently  reviewed.  Available laboratory values reviewed.    PMH:  Past Medical History:   Diagnosis Date   • Cataract    • CHF (congestive heart failure)    • CKD (chronic kidney disease)    • Clostridium difficile infection    • COPD (chronic obstructive pulmonary disease)    • Coronary artery disease    • Diabetes mellitus    • Glaucoma    • Hepatitis C    • History of transfusion    • Hypertension    • Nephropathy, diabetic    • Pacemaker    • Pulmonary embolism        ALLERGIES:  Allergies   Allergen Reactions   • Lisinopril Other (See Comments)     unknown   • Motrin [Ibuprofen] Diarrhea and Nausea And Vomiting         MEDICATIONS:    Current Outpatient Prescriptions:   •  aspirin 81 MG EC tablet, Take 1 tablet by mouth Daily., Disp: 150 tablet, Rfl: 2  •  brimonidine (ALPHAGAN P) 0.1 % solution ophthalmic solution, Administer 1 drop to both eyes 3 (Three) Times a Day., Disp: , Rfl:   •  calcium acetate (PHOSLO) 667 MG capsule, Take 1,334 mg by mouth 3 (Three) Times a Day With Meals. 2 capsules 3 times daily, Disp: , Rfl:   •  dorzolamide (TRUSOPT) 2 % ophthalmic solution, Administer 1 drop into the left eye 3 (Three) Times a Day., Disp: 1 each, Rfl: 12  •  famotidine (PEPCID) 20 MG tablet, Take 20 mg by mouth every night at bedtime., Disp: , Rfl:   •  fluticasone (FLONASE) 50 MCG/ACT nasal spray, 2 sprays into each nostril daily. Administer 2 sprays in each nostril for each dose., Disp: , Rfl:   •  gabapentin (NEURONTIN) 300 MG capsule, Take 1 capsule by mouth 3 (Three) Times a Day., Disp: 90 capsule, Rfl: 2  •  guaiFENesin 200 MG tablet, Take 400 mg by mouth Every 4 (Four) Hours As Needed for Cough., Disp: , Rfl:   •  HYDROcodone-acetaminophen (NORCO)  MG per tablet, Take 1 tablet by mouth Every 6 (Six) Hours As Needed for Moderate Pain ., Disp: 120 tablet, Rfl: 0  •  hydrOXYzine (ATARAX) 25 MG tablet, Take 25 mg by mouth 3 (Three) Times a Day As Needed for Anxiety., Disp: , Rfl:   •  insulin aspart (NovoLOG)  100 UNIT/ML injection, Inject  under the skin 3 (Three) Times a Day Before Meals. Sliding scale:  = 0 units; 150-200 = 3 units; 200-250 = 6 units; 250-300 = 9 units; 300-350 = 12 units; >350 = 15 units and call MD, Disp: , Rfl:   •  insulin glargine (LANTUS) 100 UNIT/ML injection, Inject 25 Units under the skin Every Night., Disp: , Rfl:   •  latanoprost (XALATAN) 0.005 % ophthalmic solution, Administer 1 drop to both eyes every night., Disp: , Rfl:   •  loperamide (IMODIUM) 2 MG capsule, Take 2 mg by mouth Every 6 (Six) Hours As Needed for Diarrhea., Disp: , Rfl:   •  loratadine (CLARITIN) 10 MG tablet, Take 10 mg by mouth daily., Disp: , Rfl:   •  ondansetron ODT (ZOFRAN-ODT) 4 MG disintegrating tablet, Take 1 tablet by mouth Every 6 (Six) Hours As Needed for Nausea or Vomiting., Disp: 10 tablet, Rfl: 0  •  PARoxetine (PAXIL) 30 MG tablet, Take 30 mg by mouth every night at bedtime., Disp: , Rfl:   •  polyethylene glycol (MIRALAX) packet, Take 17 g by mouth Daily As Needed (constipation)., Disp: , Rfl:   •  sevelamer (RENVELA) 800 MG tablet, Take 2,400 mg by mouth 3 (Three) Times a Day With Meals., Disp: , Rfl:   •  traZODone (DESYREL) 50 MG tablet, Take 50 mg by mouth every night at bedtime., Disp: , Rfl:   •  acetaminophen (TYLENOL) 500 MG tablet, Take 500 mg by mouth Every 4 (Four) Hours As Needed for Mild Pain  or Fever., Disp: , Rfl:     Review of Systems   Review of Systems   Constitution: Positive for malaise/fatigue. Negative for weakness and weight loss.   Eyes: Positive for blurred vision.   Cardiovascular: Negative for chest pain, claudication and dyspnea on exertion.   Respiratory: Negative for cough and shortness of breath.    Hematologic/Lymphatic: Bruises/bleeds easily.   Skin: Positive for color change and poor wound healing.   Musculoskeletal: Positive for arthritis and stiffness.   Neurological: Negative for dizziness and numbness.       Physical Exam   Physical Exam   Constitutional: He is  oriented to person, place, and time. He is active and cooperative. He does not appear ill. No distress.   HENT:   Right Ear: Hearing normal.   Left Ear: Hearing normal.   Nose: No nasal deformity. No epistaxis.   Mouth/Throat: He does not have dentures. Normal dentition.   Cardiovascular: Normal rate and regular rhythm.    No murmur heard.  Pulses:       Carotid pulses are 2+ on the right side, and 2+ on the left side.       Radial pulses are 2+ on the right side, and 2+ on the left side.   RIGHT arm AV fistula good thrill.   Pulmonary/Chest: Effort normal and breath sounds normal.   Abdominal: Soft. He exhibits no distension and no mass. There is no tenderness.   Musculoskeletal: He exhibits no deformity.   Gait normal.    Neurological: He is alert and oriented to person, place, and time. He has normal strength.   Skin: Skin is warm and dry. No cyanosis or erythema. No pallor.   RIGHT forearm moderate edema, 2 pustules medial aspect over graft.  Moderate tenderness, warmth.   Psychiatric: He has a normal mood and affect. His speech is normal. Judgment and thought content normal.     Results for CRISTO SÁNCHEZ (MRN [id number]) as of 3/12/2018 11:52   Ref. Range 2/17/2018 19:58   Creatinine Latest Ref Range: 0.70 - 1.30 mg/dL 7.28 (H)   BUN Latest Ref Range: 7 - 21 mg/dL 38 (H)     ASSESSMENT:  Cristo was seen today for hemodialysis access.    Diagnoses and all orders for this visit:    ESRD (end stage renal disease) on dialysis    ESRD on dialysis  -     Duplex Hemodialysis Access CAR  -     Case Request; Standing  -     sodium chloride 0.9 % infusion; Infuse 30 mL/hr into a venous catheter Continuous.  -     Case Request    Essential hypertension    A-V fistula    Complete heart block    Panlobular emphysema    Type 2 diabetes mellitus with both eyes affected by moderate nonproliferative retinopathy without macular edema, with long-term current use of insulin    Other orders  -     Inpatient Admission;  Standing  -     Obtain informed consent; Standing  -     Chlorhexidine 4%/Hibiclens Skin Prep; Standing  -     Place sequential compression device; Standing    PLAN:  Detailed discussion with Cristo Musa regarding situation and options.  ESRD on hemodialysis.  Sepsis with MRSA bacteremia, infected old AV graft.   Multiple risk factors with severe comorbidities.  Excision RIGHT forearm AV graft advisable.    Risks including but not limited to infection, bleeding, blood vessel and nerve injury, swelling, reduced circulation to distal extremity, need for revision and repeat intervention to maintain access.  Benefits:  avoidance of catheter, long term access for dialysis.  Options:  catheter, fistula vs graft, peritoneal dialysis, renal transplantation.  Understands and wishes to proceed.    Admit today IV abx.  RIGHT forearm AV graft revision (excision), debridement arm, wound vac.  Regional/GEN.  Inpt.  3/13/2018  [facility] for home wound vac.    Return after above studies complete  Recommended regular physical activity, progressive walking program.  Continue current medications as directed.    Thank you for the opportunity to participate in this patient's care.    Copy to primary care provider.    EMR Dragon/Transcription disclaimer:   Much of this encounter note is an electronic transcription/translation of spoken language to printed text. The electronic translation of spoken language may permit erroneous, or at times, nonsensical words or phrases to be inadvertently transcribed; Although I have reviewed the note for such errors, some may still exist.

## 2018-03-12 NOTE — H&P
3/12/2018     Cristo Musa  1950     Chief Complaint:         Chief Complaint   Patient presents with   • Hemodialysis Access       swollen access right arm         HPI:       PCP:  Cory Rodríguez MD  Nephrology:  Dr Vilchis,  [facility]  Cardiology:  Dr Nieto        Resides:  [facility].     67 y.o. male with ESRD on  hemodialysis, HTN, COPD, peripheral neuropathy, DM, CAD, Hepatitis C, nonischemic cardiomyopathy (EF 30%)..  former smoker.  Long term access for hemodialysis placed 12/23/14.  moderate swelling RIGHT forearm at site of old occluded loop AV graft (not used since 2016).  Seen ER 2/17 with blood cultures positive for MRSA.  He returns today after callling requesting to be seen due to RUE pain and bulging areas of RUE AV fistula areas.  Denies any neurosensory symptoms of RUE does have of LUE.  Has DM with neuropathy.  RIGHT AV fistula functioning well at dialysis.  No problems during dialysis.  No other associated signs, symptoms or modifying factors.     11/13/2014 Upper extremity vein mapping:  RIGHT cephalic 1.2-2.5mm, basilic 1.6-2.0mm.  LEFT cephalic 0.8-2.6mm, basilic 1.2-2.6mm.  12/23/14  LEFT Brachial artery to axillary vein AV Graft (7mm Artegraft)  01/07/15 LUE Incisions  open thru skin only.  Proximal Length:  4.5-5 cm depth 0.25 width 0.2  Distal  Length: 4. cm depth 0.2 width 0.2  Beefy red bed  Small amt erythema proximal incision.  1/13/15: LUE Incision:healed  3/24/15  Revision of left arteriovenous graft (distal interposition graft with a 6 mm Artegraft).  Thrombectomy, left arteriovenous graft, open.  Left fistulogram.  Balloon angioplasty of left subclavian vein, innominate vein, swing site (6 x 20 mm Bard Conquest balloon, 8 x 20 mm Bard Conquest balloon). Venous ultrasound of unilateral extremity.  3/31/15 Revision of left arteriovenous graft, open. Open thrombectomy of left arteriovenous graft. Left upper extremity fistulogram.   Balloon angioplasty of left subclavian vein/innominate vein/swing site (6 x 21 mm Bard-Conquest balloon, 8 x 21 mm Bard-Conquest balloon). Venous ultrasound unilateral extremity.   4/10/15   Excision of left brachial to axillary arteriovenous bridge graft (bovine) with reconstruction of both the brachial artery and axillary vein with vein patch angioplasty from left cephalic vein. Placement of femoral arterial and venous lines using ultrasound guidance.  4/19/15  Placement LEFT IJ Tunneled Cath  5/14/15   Placement of a right forearm arteriovenous bridge graft.  5/19/15   Exchange of right femoral tunneled dialysis catheter using fluoroscopic guidance.   8/11/15  Entrobacter bacteremia of LUE AV Graft, which was removed and completed 6 wks of IV antx with neg CS.    9/22/15  Fistula Duplex:  maxPSV 94cm/s. flows 258-632ml/m. size 5.7-9.2mm. Patent Multiple areas of hematomas with sm area of needle oozing.    10/15/15: RIGHT Fistula duplex: maxPSV 233cm/s. flows 214-701ml/m. size 4.4-6.2mm.  1/26/16: RIGHT fistula duplex: maxPSV 321cm/s. flows 612-1866ml/m. size 3.8-8.1mm.  7/29/16: RIGHT TPA Lysis/Mech Thrombectomy  9/23/16: RIGHT groin Tunnel Cath Placement  10/5/16: RIGHT brachial-axillary vein AV graft (artegraft)        12/5/2016 RIGHT fistula duplex: maxPSV 265cm/s. Flows 1612-1986ml/m. size 5.8-8.1mm.         5/4/2017 RIGHT fistula duplex: maxPSV 530cm/s. Flows 642-1444ml/m. size 3.5-7.4mm.        11/28/17: RIGHT fistula duplex: mPSV 507cm/s. Flows 649-1519ml/m. Size 2.4-7.4mm.        2/22/18: RIGHT fistula duplex: Patent:  mPSV 523 cm/s. Flows 489-2184 ml/m. Size 2.6-8.1mm. 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  Recent images  independently reviewed.  Available laboratory values reviewed.     PMH:  Medical History        Past Medical History:   Diagnosis Date   • Cataract     • CHF (congestive heart failure)     • CKD (chronic kidney disease)     • Clostridium difficile infection     • COPD (chronic obstructive pulmonary disease)     • Coronary artery disease     • Diabetes mellitus     • Glaucoma     • Hepatitis C     • History of transfusion     • Hypertension     • Nephropathy, diabetic     • Pacemaker     • Pulmonary embolism              ALLERGIES:        Allergies   Allergen Reactions   • Lisinopril Other (See Comments)       unknown   • Motrin [Ibuprofen] Diarrhea and Nausea And Vomiting            MEDICATIONS:     Current Outpatient Prescriptions:   •  aspirin 81 MG EC tablet, Take 1 tablet by mouth Daily., Disp: 150 tablet, Rfl: 2  •  brimonidine (ALPHAGAN P) 0.1 % solution ophthalmic solution, Administer 1 drop to both eyes 3 (Three) Times a Day., Disp: , Rfl:   •  calcium acetate (PHOSLO) 667 MG capsule, Take 1,334 mg by mouth 3 (Three) Times a Day With Meals. 2 capsules 3 times daily, Disp: , Rfl:   •  dorzolamide (TRUSOPT) 2 % ophthalmic solution, Administer 1 drop into the left eye 3 (Three) Times a Day., Disp: 1 each, Rfl: 12  •  famotidine (PEPCID) 20 MG tablet, Take 20 mg by mouth every night at bedtime., Disp: , Rfl:   •  fluticasone (FLONASE) 50 MCG/ACT nasal spray, 2 sprays into each nostril daily. Administer 2 sprays in each nostril for each dose., Disp: , Rfl:   •  gabapentin (NEURONTIN) 300 MG capsule, Take 1 capsule by mouth 3 (Three) Times a Day., Disp: 90 capsule, Rfl: 2  •  guaiFENesin 200 MG tablet, Take 400 mg by mouth Every 4 (Four) Hours As Needed for Cough., Disp: , Rfl:   •  HYDROcodone-acetaminophen (NORCO)  MG per tablet, Take 1 tablet by mouth Every 6 (Six) Hours As Needed for Moderate Pain ., Disp: 120 tablet, Rfl: 0  •  hydrOXYzine (ATARAX) 25 MG tablet, Take 25 mg by mouth 3 (Three) Times a Day  As Needed for Anxiety., Disp: , Rfl:   •  insulin aspart (NovoLOG) 100 UNIT/ML injection, Inject  under the skin 3 (Three) Times a Day Before Meals. Sliding scale:  = 0 units; 150-200 = 3 units; 200-250 = 6 units; 250-300 = 9 units; 300-350 = 12 units; >350 = 15 units and call MD, Disp: , Rfl:   •  insulin glargine (LANTUS) 100 UNIT/ML injection, Inject 25 Units under the skin Every Night., Disp: , Rfl:   •  latanoprost (XALATAN) 0.005 % ophthalmic solution, Administer 1 drop to both eyes every night., Disp: , Rfl:   •  loperamide (IMODIUM) 2 MG capsule, Take 2 mg by mouth Every 6 (Six) Hours As Needed for Diarrhea., Disp: , Rfl:   •  loratadine (CLARITIN) 10 MG tablet, Take 10 mg by mouth daily., Disp: , Rfl:   •  ondansetron ODT (ZOFRAN-ODT) 4 MG disintegrating tablet, Take 1 tablet by mouth Every 6 (Six) Hours As Needed for Nausea or Vomiting., Disp: 10 tablet, Rfl: 0  •  PARoxetine (PAXIL) 30 MG tablet, Take 30 mg by mouth every night at bedtime., Disp: , Rfl:   •  polyethylene glycol (MIRALAX) packet, Take 17 g by mouth Daily As Needed (constipation)., Disp: , Rfl:   •  sevelamer (RENVELA) 800 MG tablet, Take 2,400 mg by mouth 3 (Three) Times a Day With Meals., Disp: , Rfl:   •  traZODone (DESYREL) 50 MG tablet, Take 50 mg by mouth every night at bedtime., Disp: , Rfl:   •  acetaminophen (TYLENOL) 500 MG tablet, Take 500 mg by mouth Every 4 (Four) Hours As Needed for Mild Pain  or Fever., Disp: , Rfl:      Review of Systems   Review of Systems   Constitution: Positive for malaise/fatigue. Negative for weakness and weight loss.   Eyes: Positive for blurred vision.   Cardiovascular: Negative for chest pain, claudication and dyspnea on exertion.   Respiratory: Negative for cough and shortness of breath.    Hematologic/Lymphatic: Bruises/bleeds easily.   Skin: Positive for color change and poor wound healing.   Musculoskeletal: Positive for arthritis and stiffness.   Neurological: Negative for dizziness and  numbness.         Physical Exam   Physical Exam   Constitutional: He is oriented to person, place, and time. He is active and cooperative. He does not appear ill. No distress.   HENT:   Right Ear: Hearing normal.   Left Ear: Hearing normal.   Nose: No nasal deformity. No epistaxis.   Mouth/Throat: He does not have dentures. Normal dentition.   Cardiovascular: Normal rate and regular rhythm.    No murmur heard.  Pulses:       Carotid pulses are 2+ on the right side, and 2+ on the left side.       Radial pulses are 2+ on the right side, and 2+ on the left side.   RIGHT arm AV fistula good thrill.   Pulmonary/Chest: Effort normal and breath sounds normal.   Abdominal: Soft. He exhibits no distension and no mass. There is no tenderness.   Musculoskeletal: He exhibits no deformity.   Gait normal.    Neurological: He is alert and oriented to person, place, and time. He has normal strength.   Skin: Skin is warm and dry. No cyanosis or erythema. No pallor.   RIGHT forearm moderate edema, 2 pustules medial aspect over graft.  Moderate tenderness, warmth.   Psychiatric: He has a normal mood and affect. His speech is normal. Judgment and thought content normal.      Results for CRISTO SÁNCHEZ (MRN [id number]) as of 3/12/2018 11:52    Ref. Range 2/17/2018 19:58   Creatinine Latest Ref Range: 0.70 - 1.30 mg/dL 7.28 (H)   BUN Latest Ref Range: 7 - 21 mg/dL 38 (H)      ASSESSMENT:  Cristo was seen today for hemodialysis access.     Diagnoses and all orders for this visit:     ESRD (end stage renal disease) on dialysis     ESRD on dialysis  -     Duplex Hemodialysis Access CAR  -     Case Request; Standing  -     sodium chloride 0.9 % infusion; Infuse 30 mL/hr into a venous catheter Continuous.  -     Case Request     Essential hypertension     A-V fistula     Complete heart block     Panlobular emphysema     Type 2 diabetes mellitus with both eyes affected by moderate nonproliferative retinopathy without macular edema,  with long-term current use of insulin     Other orders  -     Inpatient Admission; Standing  -     Obtain informed consent; Standing  -     Chlorhexidine 4%/Hibiclens Skin Prep; Standing  -     Place sequential compression device; Standing     PLAN:  Detailed discussion with Cristo Musa regarding situation and options.  ESRD on hemodialysis.  Sepsis with MRSA bacteremia, infected old AV graft.   Multiple risk factors with severe comorbidities.  Excision RIGHT forearm AV graft advisable.     Risks including but not limited to infection, bleeding, blood vessel and nerve injury, swelling, reduced circulation to distal extremity, need for revision and repeat intervention to maintain access.  Benefits:  avoidance of catheter, long term access for dialysis.  Options:  catheter, fistula vs graft, peritoneal dialysis, renal transplantation.  Understands and wishes to proceed.     Admit today IV abx.  RIGHT forearm AV graft revision (excision), debridement arm, wound vac.  Regional/GEN.  Inpt.  3/13/2018  [facility] for home wound vac.     Return after above studies complete  Recommended regular physical activity, progressive walking program.  Continue current medications as directed.     Thank you for the opportunity to participate in this patient's care.     Copy to primary care provider.     EMR Dragon/Transcription disclaimer:   Much of this encounter note is an electronic transcription/translation of spoken language to printed text. The electronic translation of spoken language may permit erroneous, or at times, nonsensical words or phrases to be inadvertently transcribed; Although I have reviewed the note for such errors, some may still exist.

## 2018-03-12 NOTE — CONSULTS
[facility]  [address] [place] [address]. [address]   - [phone]  F  [phone]     Consultation         PATIENT  DEMOGRAPHICS   PATIENT NAME: Cristo Musa                      PHYSICIAN: Pascual Vilchis MD  : 1950  MRN: [id number]    Subjective   SUBJECTIVE   Referring Provider: Dr Singh  Reason for Consultation: esrd  History of present illness:      Mr Musa is a 67 yo male with PMH Of diabetes mellitus type 2 and hypertension.  He is currently on hemodialysis  via right upper extremity AV fistula.  He also has old right upper extremity AV graft in the forearm.  4-5 days prior to this admission he has increasing redness and pain along with the multiple open wounds.  We have taken the blood culture and started him on vancomycin and ceftazidime.  He has seen Dr. Singh today and the plan is to take the old graft out and do the surgical Debridement and wound VAC.    Overall she feels well denies any nausea vomiting or shortness of air.  He denies any fever or chills.  Patient has received his hemodialysis today. He also has a history of chronic hepatitis B and Enterobacter bacteremia with infected pacemaker lead which is eventually removed.  He also has a coronary artery disease with ischemic cardiomyopathy with EF of 35%.  He also has a history of left arm AV graft which was also explanted due to infection.    Past Medical History:   Diagnosis Date   • Cataract    • CHF (congestive heart failure)    • CKD (chronic kidney disease)    • Clostridium difficile infection    • COPD (chronic obstructive pulmonary disease)    • Coronary artery disease    • Diabetes mellitus    • Glaucoma    • Hepatitis C    • History of transfusion    • Hypertension    • Nephropathy, diabetic    • Pacemaker    • Pulmonary embolism      Past Surgical History:   Procedure Laterality Date   • ABDOMINAL SURGERY     • ARTERIOVENOUS FISTULA/SHUNT SURGERY Right      forearm loop   • ARTERIOVENOUS FISTULA/SHUNT SURGERY Left     removed past upper arm   • ARTERIOVENOUS FISTULA/SHUNT SURGERY W/ HEMODIALYSIS CATHETER INSERTION Right 4/4/2016    Procedure: RIGHT ARM AV FISTULA DECLOT REVISION REPAIR BRACHIAL ANASTOMOTIC PSUEDOANEURYSM LEFT FEMORAL RICHARDSON FISTULOGRAM WITH ANGIOPLASTY;  Surgeon: Jerson Carpenter MD;  Location: [facility] OR 18/19;  Service:    • CATARACT EXTRACTION W/ INTRAOCULAR LENS IMPLANT Left 3/31/2017    Procedure: REMOVE CATARACT AND IMPLANT INTRAOCULAR LENS LEFT EYE;  Surgeon: Hilton Montemayor MD;  Location: [facility] OR;  Service:    • EYE SURGERY     • HERNIA REPAIR     • IMPLANTABLE CARDIOVERTER DEFIBRILLATOR LEAD REPLACEMENT/POCKET REVISION Right 4/11/2016    Procedure: PACEMAKER LEADS X 3 AND BATTERY EXTRACTION WITH EXIMER LASER;  Surgeon: Vern Reeves MD;  Location: [facility] OR 18/19;  Service:    • INSERTION HEMODIALYSIS CATHETER Right 05/2015    jugular   • INTERVENTIONAL RADIOLOGY PROCEDURE Right 6/1/2017    Procedure: RIGHT dialysis fistulagram & angioplasty;  Surgeon: Jerson Singh MD;  Location: [facility] ANGIO INVASIVE LOCATION;  Service:    • INTERVENTIONAL RADIOLOGY PROCEDURE Right 8/24/2017    Procedure: IR dialysis fistulagram;  Surgeon: Jerson Singh MD;  Location: [facility] ANGIO INVASIVE LOCATION;  Service:    • PACEMAKER IMPLANTATION  2008    dual chamber [employer] [place]   • REMOVAL HEMODIALYSIS CATHETER Right 05/2015    femoral vein     Family History   Problem Relation Age of Onset   • COPD Sister    • Diabetes Brother    • Early death Brother    • Hyperlipidemia Brother    • Hypertension Brother    • Coronary artery disease Mother    • Diabetes Mother    • Hypertension Mother    • Stroke Other    • Other Other      Respiratory disorder     Social History   Substance Use Topics   • Smoking status: Former Smoker     Types: Cigarettes   • Smokeless tobacco: Never Used      Comment: quit 20  "years ago    • Alcohol use No     Allergies:  Lisinopril and Motrin [ibuprofen]     REVIEW OF SYSTEMS    Review of Systems   Constitutional: Negative for chills and fever.   Respiratory: Negative for chest tightness and shortness of breath.    Cardiovascular: Negative for chest pain and leg swelling.   Gastrointestinal: Negative for abdominal pain, diarrhea and nausea.   Genitourinary: Negative for dysuria, flank pain and hematuria.   Neurological: Negative for dizziness, syncope and weakness.       Objective   OBJECTIVE   Vital Signs  Temp:  [97.1 °F (36.2 °C)-97.2 °F (36.2 °C)] 97.2 °F (36.2 °C)  Heart Rate:  [60-64] 60  Resp:  [20] 20  BP: (141-148)/(65-68) 148/65    Flowsheet Rows    Flowsheet Row First Filed Value   Admission Height 172.7 cm (67.99\") Documented at 03/12/2018 1500   Admission Weight 100 kg (220 lb 10.9 oz) Documented at 03/12/2018 1500           No intake/output data recorded.    PHYSICAL EXAM    Physical Exam   Constitutional: He is oriented to person, place, and time. He appears well-developed.   HENT:   Head: Normocephalic.   Eyes: Pupils are equal, round, and reactive to light.   Cardiovascular: Normal rate, regular rhythm and normal heart sounds.    Pulmonary/Chest: Effort normal and breath sounds normal.   Abdominal: Soft. Bowel sounds are normal.   Musculoskeletal:   Right arm redness 2 open wounds and increase temp   Neurological: He is alert and oriented to person, place, and time.       RESULTS   Results Review:          Invalid input(s): LABALBU, PROT    Estimated Creatinine Clearance: 11.3 mL/min (by C-G formula based on SCr of 7.28 mg/dL (H)).                             MEDICATIONS          Prescriptions Prior to Admission   Medication Sig Dispense Refill Last Dose   • acetaminophen (TYLENOL) 500 MG tablet Take 500 mg by mouth Every 4 (Four) Hours As Needed for Mild Pain  or Fever.   Not Taking   • aspirin 81 MG EC tablet Take 1 tablet by mouth Daily. 150 tablet 2 3/11/2018 at " Unknown time   • brimonidine (ALPHAGAN P) 0.1 % solution ophthalmic solution Administer 1 drop to both eyes 3 (Three) Times a Day.   3/11/2018 at Unknown time   • calcium acetate (PHOSLO) 667 MG capsule Take 1,334 mg by mouth 3 (Three) Times a Day With Meals. 2 capsules 3 times daily   3/11/2018 at Unknown time   • dorzolamide (TRUSOPT) 2 % ophthalmic solution Administer 1 drop into the left eye 3 (Three) Times a Day. 1 each 12 3/11/2018 at Unknown time   • famotidine (PEPCID) 20 MG tablet Take 20 mg by mouth every night at bedtime.   3/11/2018 at Unknown time   • fluticasone (FLONASE) 50 MCG/ACT nasal spray 2 sprays into each nostril daily. Administer 2 sprays in each nostril for each dose.   3/11/2018 at Unknown time   • gabapentin (NEURONTIN) 300 MG capsule Take 1 capsule by mouth 3 (Three) Times a Day. 90 capsule 2 3/11/2018 at Unknown time   • guaiFENesin 200 MG tablet Take 400 mg by mouth Every 4 (Four) Hours As Needed for Cough.   Past Month at Unknown time   • HYDROcodone-acetaminophen (NORCO) 5-325 MG per tablet Take 1 tablet by mouth Every 6 (Six) Hours As Needed.   3/11/2018 at Unknown time   • insulin aspart (NovoLOG) 100 UNIT/ML injection Inject  under the skin 3 (Three) Times a Day Before Meals. Sliding scale:  = 0 units; 150-200 = 3 units; 200-250 = 6 units; 250-300 = 9 units; 300-350 = 12 units; >350 = 15 units and call MD      • insulin glargine (LANTUS) 100 UNIT/ML injection Inject 25 Units under the skin Every Night.   3/11/2018 at Unknown time   • latanoprost (XALATAN) 0.005 % ophthalmic solution Administer 1 drop to both eyes every night.   3/11/2018 at Unknown time   • loperamide (IMODIUM) 2 MG capsule Take 2 mg by mouth Every 6 (Six) Hours As Needed for Diarrhea.   3/11/2018 at Unknown time   • loratadine (CLARITIN) 10 MG tablet Take 10 mg by mouth daily.   3/11/2018 at Unknown time   • ondansetron ODT (ZOFRAN-ODT) 4 MG disintegrating tablet Take 1 tablet by mouth Every 6 (Six) Hours As  Needed for Nausea or Vomiting. 10 tablet 0    • PARoxetine (PAXIL) 30 MG tablet Take 30 mg by mouth every night at bedtime.   3/11/2018 at Unknown time   • polyethylene glycol (MIRALAX) packet Take 17 g by mouth Daily As Needed (constipation).   Past Month at Unknown time   • sevelamer (RENVELA) 800 MG tablet Take 2,400 mg by mouth 3 (Three) Times a Day With Meals.   3/11/2018 at Unknown time   • traZODone (DESYREL) 50 MG tablet Take 50 mg by mouth every night at bedtime.   3/11/2018 at Unknown time   • HYDROcodone-acetaminophen (NORCO)  MG per tablet Take 1 tablet by mouth Every 6 (Six) Hours As Needed for Moderate Pain . 120 tablet 0 Unknown at Unknown time   • hydrOXYzine (ATARAX) 25 MG tablet Take 25 mg by mouth 3 (Three) Times a Day As Needed for Anxiety.   Taking     Assessment/Plan   ASSESSMENT / PLAN    Principal Problem:    ESRD on dialysis    1.end-stage renal disease on hemodialysis Monday Wednesday Friday.  Patient received his dialysis treatment today.  We'll check his lab in the morning.  He appears close to euvolemic    2.anemia chronic kidney disease patient hemoglobin is stable  He is on Mircera in the outpatient setting    3.secondary hyperparathyroidism/hyperphosphatemia he will remain on PhosLo and Renagel what he takes in the outpatient setting.      4.infected right forearm AV graft planned for explantation in the morning.  He has positive blood culture for staph about a month ago.  We have also taken the blood culture on last Friday.  He will remain on vancomycin and I put the consult for pharmacy to dose.  He did receive his vancomycin today of 1 g and also on Fortaz of 1 g         I discussed the patients findings and my recommendations with patient and nursing staff         This document has been electronically signed by Pascual Vilchis MD on March 12, 2018 4:41 PM

## 2018-03-13 ENCOUNTER — ANESTHESIA (OUTPATIENT)
Dept: PERIOP | Facility: HOSPITAL | Age: 68
End: 2018-03-13

## 2018-03-13 LAB
ABO GROUP BLD: NORMAL
ANION GAP SERPL CALCULATED.3IONS-SCNC: 17 MMOL/L (ref 5–15)
ARTERIAL PATENCY WRIST A: ABNORMAL
ATMOSPHERIC PRESS: ABNORMAL MMHG
BASE EXCESS BLDA CALC-SCNC: 0.3 MMOL/L (ref -2.4–2.4)
BASE EXCESS BLDA CALC-SCNC: 0.8 MMOL/L (ref -2.4–2.4)
BASE EXCESS BLDA CALC-SCNC: 6 MMOL/L (ref -2.4–2.4)
BASOPHILS # BLD AUTO: 0.03 10*3/MM3 (ref 0–0.2)
BASOPHILS NFR BLD AUTO: 0.4 % (ref 0–2)
BDY SITE: ABNORMAL
BLD GP AB SCN SERPL QL: NEGATIVE
BUN BLD-MCNC: 35 MG/DL (ref 7–21)
BUN/CREAT SERPL: 5.3 (ref 7–25)
CA-I BLD-MCNC: 3.6 MG/DL (ref 4.5–4.9)
CA-I BLD-MCNC: 3.8 MG/DL (ref 4.5–4.9)
CA-I BLD-MCNC: 4.1 MG/DL (ref 4.5–4.9)
CALCIUM SPEC-SCNC: 9 MG/DL (ref 8.4–10.2)
CHLORIDE SERPL-SCNC: 92 MMOL/L (ref 95–110)
CO2 BLDA-SCNC: 24.3 MMOL/L (ref 23–27)
CO2 BLDA-SCNC: 25.4 MMOL/L (ref 23–27)
CO2 BLDA-SCNC: 32 MMOL/L (ref 23–27)
CO2 SERPL-SCNC: 28 MMOL/L (ref 22–31)
CREAT BLD-MCNC: 6.63 MG/DL (ref 0.7–1.3)
DEPRECATED RDW RBC AUTO: 45.1 FL (ref 35.1–43.9)
EOSINOPHIL # BLD AUTO: 0.13 10*3/MM3 (ref 0–0.7)
EOSINOPHIL NFR BLD AUTO: 1.7 % (ref 0–7)
ERYTHROCYTE [DISTWIDTH] IN BLOOD BY AUTOMATED COUNT: 14.1 % (ref 11.5–14.5)
GFR SERPL CREATININE-BSD FRML MDRD: 10 ML/MIN/1.73 (ref 49–113)
GLUCOSE BLD-MCNC: 167 MG/DL (ref 60–100)
GLUCOSE BLDA-MCNC: 143 MMOL/L
GLUCOSE BLDA-MCNC: 149 MMOL/L
GLUCOSE BLDA-MCNC: 172 MMOL/L
GLUCOSE BLDC GLUCOMTR-MCNC: 179 MG/DL (ref 70–130)
GLUCOSE BLDC GLUCOMTR-MCNC: 318 MG/DL (ref 70–130)
HCO3 BLDA-SCNC: 23.3 MMOL/L (ref 22–26)
HCO3 BLDA-SCNC: 24.4 MMOL/L (ref 22–26)
HCO3 BLDA-SCNC: 30.6 MMOL/L (ref 22–26)
HCT VFR BLD AUTO: 31.2 % (ref 39–49)
HCT VFR BLD CALC: 23 % (ref 40–54)
HCT VFR BLD CALC: 27 % (ref 40–54)
HCT VFR BLD CALC: 28 % (ref 40–54)
HGB BLD-MCNC: 10.2 G/DL (ref 13.7–17.3)
HGB BLDA-MCNC: 7.9 G/DL (ref 14–18)
HGB BLDA-MCNC: 9.1 G/DL (ref 14–18)
HGB BLDA-MCNC: 9.5 G/DL (ref 14–18)
IMM GRANULOCYTES # BLD: 0.06 10*3/MM3 (ref 0–0.02)
IMM GRANULOCYTES NFR BLD: 0.8 % (ref 0–0.5)
LYMPHOCYTES # BLD AUTO: 1.86 10*3/MM3 (ref 0.6–4.2)
LYMPHOCYTES NFR BLD AUTO: 24.2 % (ref 10–50)
MCH RBC QN AUTO: 28.8 PG (ref 26.5–34)
MCHC RBC AUTO-ENTMCNC: 32.7 G/DL (ref 31.5–36.3)
MCV RBC AUTO: 88.1 FL (ref 80–98)
MODALITY: ABNORMAL
MONOCYTES # BLD AUTO: 1.01 10*3/MM3 (ref 0–0.9)
MONOCYTES NFR BLD AUTO: 13.1 % (ref 0–12)
NEUTROPHILS # BLD AUTO: 4.6 10*3/MM3 (ref 2–8.6)
NEUTROPHILS NFR BLD AUTO: 59.8 % (ref 37–80)
NRBC BLD MANUAL-RTO: 0 /100 WBC (ref 0–0)
PCO2 BLDA: 31.3 MM HG (ref 35–45)
PCO2 BLDA: 34.1 MM HG (ref 35–45)
PCO2 BLDA: 44.8 MM HG (ref 35–45)
PH BLDA: 7.45 PH UNITS (ref 7.35–7.45)
PH BLDA: 7.47 PH UNITS (ref 7.35–7.45)
PH BLDA: 7.49 PH UNITS (ref 7.35–7.45)
PLATELET # BLD AUTO: 138 10*3/MM3 (ref 150–450)
PMV BLD AUTO: 8.2 FL (ref 8–12)
PO2 BLDA: 174.6 MM HG (ref 80–105)
PO2 BLDA: 205.5 MM HG (ref 80–105)
PO2 BLDA: 25.3 MM HG (ref 80–105)
POTASSIUM BLD-SCNC: 4.9 MMOL/L (ref 3.5–5.1)
POTASSIUM BLDA-SCNC: 4.49 MMOL/L (ref 3.6–4.9)
POTASSIUM BLDA-SCNC: 4.5 MMOL/L (ref 3.6–4.9)
POTASSIUM BLDA-SCNC: 5.22 MMOL/L (ref 3.6–4.9)
RBC # BLD AUTO: 3.54 10*6/MM3 (ref 4.37–5.74)
RH BLD: POSITIVE
SAO2 % BLDCOA: 39.3 % (ref 94–100)
SAO2 % BLDCOA: 99.2 %
SAO2 % BLDCOA: 99.5 % (ref 94–100)
SODIUM BLD-SCNC: 137 MMOL/L (ref 137–145)
SODIUM BLDA-SCNC: 130.4 MMOL/L (ref 138–146)
SODIUM BLDA-SCNC: 131.8 MMOL/L (ref 138–146)
SODIUM BLDA-SCNC: 131.8 MMOL/L (ref 138–146)
WBC NRBC COR # BLD: 7.69 10*3/MM3 (ref 3.2–9.8)

## 2018-03-13 PROCEDURE — 0JBG0ZZ EXCISION OF RIGHT LOWER ARM SUBCUTANEOUS TISSUE AND FASCIA, OPEN APPROACH: ICD-10-PCS | Performed by: THORACIC SURGERY (CARDIOTHORACIC VASCULAR SURGERY)

## 2018-03-13 PROCEDURE — 63710000001 INSULIN DETEMIR PER 5 UNITS: Performed by: THORACIC SURGERY (CARDIOTHORACIC VASCULAR SURGERY)

## 2018-03-13 PROCEDURE — 85025 COMPLETE CBC W/AUTO DIFF WBC: CPT | Performed by: INTERNAL MEDICINE

## 2018-03-13 PROCEDURE — 86923 COMPATIBILITY TEST ELECTRIC: CPT

## 2018-03-13 PROCEDURE — 94799 UNLISTED PULMONARY SVC/PX: CPT

## 2018-03-13 PROCEDURE — 94760 N-INVAS EAR/PLS OXIMETRY 1: CPT

## 2018-03-13 PROCEDURE — 25010000002 FENTANYL CITRATE (PF) 100 MCG/2ML SOLUTION: Performed by: NURSE ANESTHETIST, CERTIFIED REGISTERED

## 2018-03-13 PROCEDURE — 36430 TRANSFUSION BLD/BLD COMPNT: CPT

## 2018-03-13 PROCEDURE — 25010000002 HYDROMORPHONE PER 4 MG: Performed by: ANESTHESIOLOGY

## 2018-03-13 PROCEDURE — 25010000002 PROPOFOL 10 MG/ML EMULSION: Performed by: NURSE ANESTHETIST, CERTIFIED REGISTERED

## 2018-03-13 PROCEDURE — 82803 BLOOD GASES ANY COMBINATION: CPT | Performed by: THORACIC SURGERY (CARDIOTHORACIC VASCULAR SURGERY)

## 2018-03-13 PROCEDURE — 82962 GLUCOSE BLOOD TEST: CPT

## 2018-03-13 PROCEDURE — 25010000002 DEXAMETHASONE PER 1 MG: Performed by: NURSE ANESTHETIST, CERTIFIED REGISTERED

## 2018-03-13 PROCEDURE — 86900 BLOOD TYPING SEROLOGIC ABO: CPT

## 2018-03-13 PROCEDURE — 87070 CULTURE OTHR SPECIMN AEROBIC: CPT | Performed by: THORACIC SURGERY (CARDIOTHORACIC VASCULAR SURGERY)

## 2018-03-13 PROCEDURE — 25010000002 PHENYLEPHRINE PER 1 ML: Performed by: NURSE ANESTHETIST, CERTIFIED REGISTERED

## 2018-03-13 PROCEDURE — 86900 BLOOD TYPING SEROLOGIC ABO: CPT | Performed by: THORACIC SURGERY (CARDIOTHORACIC VASCULAR SURGERY)

## 2018-03-13 PROCEDURE — 87340 HEPATITIS B SURFACE AG IA: CPT | Performed by: INTERNAL MEDICINE

## 2018-03-13 PROCEDURE — C1751 CATH, INF, PER/CENT/MIDLINE: HCPCS | Performed by: ANESTHESIOLOGY

## 2018-03-13 PROCEDURE — P9016 RBC LEUKOCYTES REDUCED: HCPCS

## 2018-03-13 PROCEDURE — 25010000002 ONDANSETRON PER 1 MG: Performed by: NURSE ANESTHETIST, CERTIFIED REGISTERED

## 2018-03-13 PROCEDURE — 25010000002 MORPHINE PER 10 MG: Performed by: THORACIC SURGERY (CARDIOTHORACIC VASCULAR SURGERY)

## 2018-03-13 PROCEDURE — 25010000002 SUCCINYLCHOLINE PER 20 MG: Performed by: NURSE ANESTHETIST, CERTIFIED REGISTERED

## 2018-03-13 PROCEDURE — 80048 BASIC METABOLIC PNL TOTAL CA: CPT | Performed by: THORACIC SURGERY (CARDIOTHORACIC VASCULAR SURGERY)

## 2018-03-13 PROCEDURE — 03PY0JZ REMOVAL OF SYNTHETIC SUBSTITUTE FROM UPPER ARTERY, OPEN APPROACH: ICD-10-PCS | Performed by: THORACIC SURGERY (CARDIOTHORACIC VASCULAR SURGERY)

## 2018-03-13 PROCEDURE — 86850 RBC ANTIBODY SCREEN: CPT | Performed by: THORACIC SURGERY (CARDIOTHORACIC VASCULAR SURGERY)

## 2018-03-13 PROCEDURE — 94002 VENT MGMT INPAT INIT DAY: CPT

## 2018-03-13 PROCEDURE — 86901 BLOOD TYPING SEROLOGIC RH(D): CPT | Performed by: THORACIC SURGERY (CARDIOTHORACIC VASCULAR SURGERY)

## 2018-03-13 PROCEDURE — 25010000002 ALBUMIN HUMAN 5% PER 50 ML: Performed by: NURSE ANESTHETIST, CERTIFIED REGISTERED

## 2018-03-13 PROCEDURE — P9041 ALBUMIN (HUMAN),5%, 50ML: HCPCS | Performed by: NURSE ANESTHETIST, CERTIFIED REGISTERED

## 2018-03-13 PROCEDURE — 97606 NEG PRS WND THER DME>50 SQCM: CPT | Performed by: THORACIC SURGERY (CARDIOTHORACIC VASCULAR SURGERY)

## 2018-03-13 PROCEDURE — 36832 AV FISTULA REVISION OPEN: CPT | Performed by: THORACIC SURGERY (CARDIOTHORACIC VASCULAR SURGERY)

## 2018-03-13 PROCEDURE — 25010000002 MIDAZOLAM PER 1 MG: Performed by: NURSE ANESTHETIST, CERTIFIED REGISTERED

## 2018-03-13 PROCEDURE — 63710000001 INSULIN ASPART PER 5 UNITS: Performed by: INTERNAL MEDICINE

## 2018-03-13 PROCEDURE — 87176 TISSUE HOMOGENIZATION CULTR: CPT | Performed by: THORACIC SURGERY (CARDIOTHORACIC VASCULAR SURGERY)

## 2018-03-13 PROCEDURE — 94640 AIRWAY INHALATION TREATMENT: CPT

## 2018-03-13 RX ORDER — LOPERAMIDE HYDROCHLORIDE 2 MG/1
2 CAPSULE ORAL EVERY 6 HOURS PRN
Status: DISCONTINUED | OUTPATIENT
Start: 2018-03-13 | End: 2018-03-16 | Stop reason: HOSPADM

## 2018-03-13 RX ORDER — NICOTINE POLACRILEX 4 MG
15 LOZENGE BUCCAL
Status: DISCONTINUED | OUTPATIENT
Start: 2018-03-13 | End: 2018-03-16 | Stop reason: HOSPADM

## 2018-03-13 RX ORDER — DEXAMETHASONE SODIUM PHOSPHATE 4 MG/ML
INJECTION, SOLUTION INTRA-ARTICULAR; INTRALESIONAL; INTRAMUSCULAR; INTRAVENOUS; SOFT TISSUE AS NEEDED
Status: DISCONTINUED | OUTPATIENT
Start: 2018-03-13 | End: 2018-03-13 | Stop reason: SURG

## 2018-03-13 RX ORDER — ALBUMIN, HUMAN INJ 5% 5 %
SOLUTION INTRAVENOUS CONTINUOUS PRN
Status: DISCONTINUED | OUTPATIENT
Start: 2018-03-13 | End: 2018-03-13 | Stop reason: SURG

## 2018-03-13 RX ORDER — SEVELAMER CARBONATE 800 MG/1
2400 TABLET, FILM COATED ORAL
Status: DISCONTINUED | OUTPATIENT
Start: 2018-03-13 | End: 2018-03-13 | Stop reason: SDUPTHER

## 2018-03-13 RX ORDER — HYDROCODONE BITARTRATE AND ACETAMINOPHEN 10; 325 MG/1; MG/1
1 TABLET ORAL EVERY 4 HOURS PRN
Status: DISCONTINUED | OUTPATIENT
Start: 2018-03-13 | End: 2018-03-15

## 2018-03-13 RX ORDER — LIDOCAINE HYDROCHLORIDE 20 MG/ML
INJECTION, SOLUTION INFILTRATION; PERINEURAL AS NEEDED
Status: DISCONTINUED | OUTPATIENT
Start: 2018-03-13 | End: 2018-03-13 | Stop reason: SURG

## 2018-03-13 RX ORDER — SUCCINYLCHOLINE CHLORIDE 20 MG/ML
INJECTION INTRAMUSCULAR; INTRAVENOUS AS NEEDED
Status: DISCONTINUED | OUTPATIENT
Start: 2018-03-13 | End: 2018-03-13 | Stop reason: SURG

## 2018-03-13 RX ORDER — DIPHENHYDRAMINE HYDROCHLORIDE 50 MG/ML
12.5 INJECTION INTRAMUSCULAR; INTRAVENOUS
Status: DISCONTINUED | OUTPATIENT
Start: 2018-03-13 | End: 2018-03-13 | Stop reason: HOSPADM

## 2018-03-13 RX ORDER — FAMOTIDINE 20 MG/1
20 TABLET, FILM COATED ORAL NIGHTLY
Status: DISCONTINUED | OUTPATIENT
Start: 2018-03-13 | End: 2018-03-13 | Stop reason: SDUPTHER

## 2018-03-13 RX ORDER — HYDROXYZINE HYDROCHLORIDE 25 MG/1
25 TABLET, FILM COATED ORAL 3 TIMES DAILY PRN
Status: DISCONTINUED | OUTPATIENT
Start: 2018-03-13 | End: 2018-03-16 | Stop reason: HOSPADM

## 2018-03-13 RX ORDER — GABAPENTIN 300 MG/1
300 CAPSULE ORAL EVERY 8 HOURS SCHEDULED
Status: DISCONTINUED | OUTPATIENT
Start: 2018-03-13 | End: 2018-03-16 | Stop reason: HOSPADM

## 2018-03-13 RX ORDER — ALBUMIN (HUMAN) 12.5 G/50ML
12.5 SOLUTION INTRAVENOUS AS NEEDED
Status: DISCONTINUED | OUTPATIENT
Start: 2018-03-13 | End: 2018-03-13 | Stop reason: HOSPADM

## 2018-03-13 RX ORDER — ONDANSETRON 2 MG/ML
4 INJECTION INTRAMUSCULAR; INTRAVENOUS ONCE AS NEEDED
Status: CANCELLED | OUTPATIENT
Start: 2018-03-13

## 2018-03-13 RX ORDER — CALCIUM ACETATE 667 MG/1
1334 TABLET ORAL
Status: DISCONTINUED | OUTPATIENT
Start: 2018-03-13 | End: 2018-03-13 | Stop reason: SDUPTHER

## 2018-03-13 RX ORDER — TRAZODONE HYDROCHLORIDE 50 MG/1
50 TABLET ORAL NIGHTLY
Status: DISCONTINUED | OUTPATIENT
Start: 2018-03-13 | End: 2018-03-16 | Stop reason: HOSPADM

## 2018-03-13 RX ORDER — CISATRACURIUM BESYLATE 2 MG/ML
10 INJECTION, SOLUTION INTRAVENOUS ONCE
Status: COMPLETED | OUTPATIENT
Start: 2018-03-13 | End: 2018-03-13

## 2018-03-13 RX ORDER — POLYETHYLENE GLYCOL 3350 17 G/17G
17 POWDER, FOR SOLUTION ORAL DAILY PRN
Status: DISCONTINUED | OUTPATIENT
Start: 2018-03-13 | End: 2018-03-16 | Stop reason: HOSPADM

## 2018-03-13 RX ORDER — ESMOLOL HYDROCHLORIDE 10 MG/ML
INJECTION INTRAVENOUS AS NEEDED
Status: DISCONTINUED | OUTPATIENT
Start: 2018-03-13 | End: 2018-03-13 | Stop reason: SURG

## 2018-03-13 RX ORDER — LATANOPROST 50 UG/ML
1 SOLUTION/ DROPS OPHTHALMIC NIGHTLY
Status: DISCONTINUED | OUTPATIENT
Start: 2018-03-13 | End: 2018-03-16 | Stop reason: HOSPADM

## 2018-03-13 RX ORDER — ATROPINE SULFATE 1 MG/ML
INJECTION, SOLUTION INTRAMUSCULAR; INTRAVENOUS; SUBCUTANEOUS AS NEEDED
Status: DISCONTINUED | OUTPATIENT
Start: 2018-03-13 | End: 2018-03-13 | Stop reason: SURG

## 2018-03-13 RX ORDER — ACETAMINOPHEN 325 MG/1
650 TABLET ORAL ONCE AS NEEDED
Status: DISCONTINUED | OUTPATIENT
Start: 2018-03-13 | End: 2018-03-13 | Stop reason: HOSPADM

## 2018-03-13 RX ORDER — SODIUM CHLORIDE 9 MG/ML
30 INJECTION, SOLUTION INTRAVENOUS CONTINUOUS
Status: DISCONTINUED | OUTPATIENT
Start: 2018-03-13 | End: 2018-03-13 | Stop reason: HOSPADM

## 2018-03-13 RX ORDER — NALOXONE HCL 0.4 MG/ML
0.4 VIAL (ML) INJECTION
Status: DISCONTINUED | OUTPATIENT
Start: 2018-03-13 | End: 2018-03-15

## 2018-03-13 RX ORDER — MORPHINE SULFATE 2 MG/ML
2 INJECTION, SOLUTION INTRAMUSCULAR; INTRAVENOUS EVERY 4 HOURS PRN
Status: DISCONTINUED | OUTPATIENT
Start: 2018-03-13 | End: 2018-03-15

## 2018-03-13 RX ORDER — ACETAMINOPHEN 325 MG/1
650 TABLET ORAL ONCE AS NEEDED
Status: CANCELLED | OUTPATIENT
Start: 2018-03-13

## 2018-03-13 RX ORDER — ONDANSETRON 2 MG/ML
4 INJECTION INTRAMUSCULAR; INTRAVENOUS ONCE AS NEEDED
Status: DISCONTINUED | OUTPATIENT
Start: 2018-03-13 | End: 2018-03-13 | Stop reason: HOSPADM

## 2018-03-13 RX ORDER — EPHEDRINE SULFATE 50 MG/ML
INJECTION, SOLUTION INTRAVENOUS AS NEEDED
Status: DISCONTINUED | OUTPATIENT
Start: 2018-03-13 | End: 2018-03-13 | Stop reason: SURG

## 2018-03-13 RX ORDER — ACETAMINOPHEN 325 MG/1
650 TABLET ORAL EVERY 4 HOURS PRN
Status: DISCONTINUED | OUTPATIENT
Start: 2018-03-13 | End: 2018-03-13 | Stop reason: SDUPTHER

## 2018-03-13 RX ORDER — BACITRACIN 50000 [IU]/1
INJECTION, POWDER, FOR SOLUTION INTRAMUSCULAR AS NEEDED
Status: DISCONTINUED | OUTPATIENT
Start: 2018-03-13 | End: 2018-03-16 | Stop reason: HOSPADM

## 2018-03-13 RX ORDER — LABETALOL HYDROCHLORIDE 5 MG/ML
5 INJECTION, SOLUTION INTRAVENOUS
Status: CANCELLED | OUTPATIENT
Start: 2018-03-13

## 2018-03-13 RX ORDER — NALOXONE HCL 0.4 MG/ML
0.2 VIAL (ML) INJECTION AS NEEDED
Status: DISCONTINUED | OUTPATIENT
Start: 2018-03-13 | End: 2018-03-13 | Stop reason: HOSPADM

## 2018-03-13 RX ORDER — FENTANYL CITRATE 50 UG/ML
INJECTION, SOLUTION INTRAMUSCULAR; INTRAVENOUS AS NEEDED
Status: DISCONTINUED | OUTPATIENT
Start: 2018-03-13 | End: 2018-03-13 | Stop reason: SURG

## 2018-03-13 RX ORDER — ACETAMINOPHEN 650 MG/1
650 SUPPOSITORY RECTAL ONCE AS NEEDED
Status: CANCELLED | OUTPATIENT
Start: 2018-03-13

## 2018-03-13 RX ORDER — EPHEDRINE SULFATE 50 MG/ML
5 INJECTION, SOLUTION INTRAVENOUS ONCE AS NEEDED
Status: CANCELLED | OUTPATIENT
Start: 2018-03-13

## 2018-03-13 RX ORDER — FLUMAZENIL 0.1 MG/ML
0.2 INJECTION INTRAVENOUS AS NEEDED
Status: DISCONTINUED | OUTPATIENT
Start: 2018-03-13 | End: 2018-03-13 | Stop reason: HOSPADM

## 2018-03-13 RX ORDER — BRIMONIDINE TARTRATE 0.15 %
1 DROPS OPHTHALMIC (EYE) 3 TIMES DAILY
Status: DISCONTINUED | OUTPATIENT
Start: 2018-03-13 | End: 2018-03-16 | Stop reason: HOSPADM

## 2018-03-13 RX ORDER — DEXTROSE MONOHYDRATE 25 G/50ML
25 INJECTION, SOLUTION INTRAVENOUS
Status: DISCONTINUED | OUTPATIENT
Start: 2018-03-13 | End: 2018-03-16 | Stop reason: HOSPADM

## 2018-03-13 RX ORDER — ONDANSETRON 4 MG/1
4 TABLET, ORALLY DISINTEGRATING ORAL EVERY 6 HOURS PRN
Status: DISCONTINUED | OUTPATIENT
Start: 2018-03-13 | End: 2018-03-16 | Stop reason: HOSPADM

## 2018-03-13 RX ORDER — LABETALOL HYDROCHLORIDE 5 MG/ML
5 INJECTION, SOLUTION INTRAVENOUS
Status: DISCONTINUED | OUTPATIENT
Start: 2018-03-13 | End: 2018-03-13 | Stop reason: HOSPADM

## 2018-03-13 RX ORDER — ASPIRIN 81 MG/1
81 TABLET ORAL DAILY
Status: DISCONTINUED | OUTPATIENT
Start: 2018-03-13 | End: 2018-03-16 | Stop reason: HOSPADM

## 2018-03-13 RX ORDER — BACTERIOSTATIC SODIUM CHLORIDE 0.9 %
VIAL (ML) INJECTION AS NEEDED
Status: DISCONTINUED | OUTPATIENT
Start: 2018-03-13 | End: 2018-03-16 | Stop reason: HOSPADM

## 2018-03-13 RX ORDER — ONDANSETRON 2 MG/ML
INJECTION INTRAMUSCULAR; INTRAVENOUS AS NEEDED
Status: DISCONTINUED | OUTPATIENT
Start: 2018-03-13 | End: 2018-03-13 | Stop reason: SURG

## 2018-03-13 RX ORDER — GLYCOPYRROLATE 0.2 MG/ML
INJECTION INTRAMUSCULAR; INTRAVENOUS AS NEEDED
Status: DISCONTINUED | OUTPATIENT
Start: 2018-03-13 | End: 2018-03-13 | Stop reason: SURG

## 2018-03-13 RX ORDER — CALCITRIOL 0.25 UG/1
0.5 CAPSULE, LIQUID FILLED ORAL 3 TIMES WEEKLY
Status: DISCONTINUED | OUTPATIENT
Start: 2018-03-14 | End: 2018-03-16 | Stop reason: HOSPADM

## 2018-03-13 RX ORDER — FLUTICASONE PROPIONATE 50 MCG
2 SPRAY, SUSPENSION (ML) NASAL DAILY
Status: DISCONTINUED | OUTPATIENT
Start: 2018-03-13 | End: 2018-03-16 | Stop reason: HOSPADM

## 2018-03-13 RX ORDER — DIPHENHYDRAMINE HYDROCHLORIDE 50 MG/ML
12.5 INJECTION INTRAMUSCULAR; INTRAVENOUS
Status: CANCELLED | OUTPATIENT
Start: 2018-03-13

## 2018-03-13 RX ORDER — LIDOCAINE AND PRILOCAINE 25; 25 MG/G; MG/G
CREAM TOPICAL AS NEEDED
Status: DISCONTINUED | OUTPATIENT
Start: 2018-03-14 | End: 2018-03-16 | Stop reason: HOSPADM

## 2018-03-13 RX ORDER — ACETAMINOPHEN 650 MG/1
650 SUPPOSITORY RECTAL ONCE AS NEEDED
Status: DISCONTINUED | OUTPATIENT
Start: 2018-03-13 | End: 2018-03-13 | Stop reason: HOSPADM

## 2018-03-13 RX ORDER — NALOXONE HCL 0.4 MG/ML
0.2 VIAL (ML) INJECTION AS NEEDED
Status: CANCELLED | OUTPATIENT
Start: 2018-03-13

## 2018-03-13 RX ORDER — SODIUM CHLORIDE 9 MG/ML
30 INJECTION, SOLUTION INTRAVENOUS CONTINUOUS
Status: DISCONTINUED | OUTPATIENT
Start: 2018-03-13 | End: 2018-03-14

## 2018-03-13 RX ORDER — FLUMAZENIL 0.1 MG/ML
0.2 INJECTION INTRAVENOUS AS NEEDED
Status: CANCELLED | OUTPATIENT
Start: 2018-03-13

## 2018-03-13 RX ORDER — SODIUM CHLORIDE 9 MG/ML
30 INJECTION, SOLUTION INTRAVENOUS CONTINUOUS
Status: DISCONTINUED | OUTPATIENT
Start: 2018-03-13 | End: 2018-03-13 | Stop reason: SDUPTHER

## 2018-03-13 RX ORDER — HEPARIN SODIUM 1000 [USP'U]/ML
2000 INJECTION, SOLUTION INTRAVENOUS; SUBCUTANEOUS ONCE
Status: DISCONTINUED | OUTPATIENT
Start: 2018-03-14 | End: 2018-03-13 | Stop reason: HOSPADM

## 2018-03-13 RX ORDER — EPHEDRINE SULFATE 50 MG/ML
5 INJECTION, SOLUTION INTRAVENOUS ONCE AS NEEDED
Status: DISCONTINUED | OUTPATIENT
Start: 2018-03-13 | End: 2018-03-13 | Stop reason: HOSPADM

## 2018-03-13 RX ORDER — CETIRIZINE HYDROCHLORIDE 10 MG/1
10 TABLET ORAL NIGHTLY
Status: DISCONTINUED | OUTPATIENT
Start: 2018-03-13 | End: 2018-03-16 | Stop reason: HOSPADM

## 2018-03-13 RX ORDER — DORZOLAMIDE HCL 20 MG/ML
1 SOLUTION/ DROPS OPHTHALMIC 3 TIMES DAILY
Status: DISCONTINUED | OUTPATIENT
Start: 2018-03-13 | End: 2018-03-16 | Stop reason: HOSPADM

## 2018-03-13 RX ORDER — PROPOFOL 10 MG/ML
VIAL (ML) INTRAVENOUS AS NEEDED
Status: DISCONTINUED | OUTPATIENT
Start: 2018-03-13 | End: 2018-03-13 | Stop reason: SURG

## 2018-03-13 RX ORDER — ALBUTEROL SULFATE 2.5 MG/3ML
2.5 SOLUTION RESPIRATORY (INHALATION)
Status: DISPENSED | OUTPATIENT
Start: 2018-03-13 | End: 2018-03-15

## 2018-03-13 RX ORDER — MIDAZOLAM HYDROCHLORIDE 1 MG/ML
INJECTION INTRAMUSCULAR; INTRAVENOUS AS NEEDED
Status: DISCONTINUED | OUTPATIENT
Start: 2018-03-13 | End: 2018-03-13 | Stop reason: SURG

## 2018-03-13 RX ORDER — MEPERIDINE HYDROCHLORIDE 50 MG/ML
12.5 INJECTION INTRAMUSCULAR; INTRAVENOUS; SUBCUTANEOUS
Status: DISCONTINUED | OUTPATIENT
Start: 2018-03-13 | End: 2018-03-13 | Stop reason: HOSPADM

## 2018-03-13 RX ADMIN — CISATRACURIUM BESYLATE 6 MG: 2 INJECTION INTRAVENOUS at 15:36

## 2018-03-13 RX ADMIN — HYDROMORPHONE HYDROCHLORIDE 0.5 MG: 1 INJECTION, SOLUTION INTRAMUSCULAR; INTRAVENOUS; SUBCUTANEOUS at 17:21

## 2018-03-13 RX ADMIN — PAROXETINE 30 MG: 10 TABLET, FILM COATED ORAL at 21:32

## 2018-03-13 RX ADMIN — GABAPENTIN 300 MG: 300 CAPSULE ORAL at 21:24

## 2018-03-13 RX ADMIN — ESMOLOL HYDROCHLORIDE 10 MG: 10 INJECTION, SOLUTION INTRAVENOUS at 15:25

## 2018-03-13 RX ADMIN — ALBUMIN HUMAN: 0.05 INJECTION, SOLUTION INTRAVENOUS at 15:07

## 2018-03-13 RX ADMIN — HYDROCODONE BITARTRATE AND ACETAMINOPHEN 1 TABLET: 10; 325 TABLET ORAL at 22:06

## 2018-03-13 RX ADMIN — SODIUM CHLORIDE 30 ML/HR: 9 INJECTION, SOLUTION INTRAVENOUS at 13:07

## 2018-03-13 RX ADMIN — ALBUMIN HUMAN: 0.05 INJECTION, SOLUTION INTRAVENOUS at 15:05

## 2018-03-13 RX ADMIN — ATROPINE SULFATE 0.5 MG: 1 INJECTION, SOLUTION INTRAMUSCULAR; INTRAVENOUS; SUBCUTANEOUS at 14:32

## 2018-03-13 RX ADMIN — LIDOCAINE HYDROCHLORIDE 50 MG: 20 INJECTION, SOLUTION INFILTRATION; PERINEURAL at 13:46

## 2018-03-13 RX ADMIN — CISATRACURIUM BESYLATE 4 MG: 2 INJECTION INTRAVENOUS at 15:52

## 2018-03-13 RX ADMIN — TRAZODONE HYDROCHLORIDE 50 MG: 50 TABLET ORAL at 21:24

## 2018-03-13 RX ADMIN — SUCCINYLCHOLINE CHLORIDE 150 MG: 20 INJECTION, SOLUTION INTRAMUSCULAR; INTRAVENOUS at 14:11

## 2018-03-13 RX ADMIN — EPHEDRINE SULFATE 10 MG: 50 INJECTION INTRAMUSCULAR; INTRAVENOUS; SUBCUTANEOUS at 15:40

## 2018-03-13 RX ADMIN — BRIMONIDINE TARTRATE 1 DROP: 1.5 SOLUTION OPHTHALMIC at 21:28

## 2018-03-13 RX ADMIN — CISATRACURIUM BESYLATE 8 MG: 2 INJECTION INTRAVENOUS at 14:30

## 2018-03-13 RX ADMIN — INSULIN ASPART 5 UNITS: 100 INJECTION, SOLUTION INTRAVENOUS; SUBCUTANEOUS at 21:25

## 2018-03-13 RX ADMIN — EPHEDRINE SULFATE 10 MG: 50 INJECTION INTRAMUSCULAR; INTRAVENOUS; SUBCUTANEOUS at 15:30

## 2018-03-13 RX ADMIN — MIDAZOLAM 0.5 MG: 1 INJECTION INTRAMUSCULAR; INTRAVENOUS at 13:46

## 2018-03-13 RX ADMIN — PROPOFOL 200 MG: 10 INJECTION, EMULSION INTRAVENOUS at 13:46

## 2018-03-13 RX ADMIN — PHENYLEPHRINE HYDROCHLORIDE 100 MCG: 10 INJECTION INTRAVENOUS at 14:32

## 2018-03-13 RX ADMIN — FENTANYL CITRATE 50 MCG: 50 INJECTION, SOLUTION INTRAMUSCULAR; INTRAVENOUS at 15:25

## 2018-03-13 RX ADMIN — GLYCOPYRROLATE 0.2 MG: 0.2 INJECTION, SOLUTION INTRAMUSCULAR; INTRAVENOUS at 14:26

## 2018-03-13 RX ADMIN — EPHEDRINE SULFATE 10 MG: 50 INJECTION INTRAMUSCULAR; INTRAVENOUS; SUBCUTANEOUS at 14:18

## 2018-03-13 RX ADMIN — FLUTICASONE PROPIONATE 2 SPRAY: 50 SPRAY, METERED NASAL at 21:27

## 2018-03-13 RX ADMIN — GLYCOPYRROLATE 0.2 MG: 0.2 INJECTION, SOLUTION INTRAMUSCULAR; INTRAVENOUS at 14:11

## 2018-03-13 RX ADMIN — EPHEDRINE SULFATE 10 MG: 50 INJECTION INTRAMUSCULAR; INTRAVENOUS; SUBCUTANEOUS at 15:48

## 2018-03-13 RX ADMIN — SODIUM CHLORIDE 30 ML/HR: 9 INJECTION, SOLUTION INTRAVENOUS at 22:07

## 2018-03-13 RX ADMIN — INSULIN DETEMIR 25 UNITS: 100 INJECTION, SOLUTION SUBCUTANEOUS at 21:25

## 2018-03-13 RX ADMIN — ALBUTEROL SULFATE 2.5 MG: 2.5 SOLUTION RESPIRATORY (INHALATION) at 23:54

## 2018-03-13 RX ADMIN — CETIRIZINE HYDROCHLORIDE 10 MG: 10 TABLET, FILM COATED ORAL at 21:24

## 2018-03-13 RX ADMIN — ONDANSETRON 4 MG: 2 INJECTION INTRAMUSCULAR; INTRAVENOUS at 14:34

## 2018-03-13 RX ADMIN — LATANOPROST 1 DROP: 50 SOLUTION OPHTHALMIC at 21:28

## 2018-03-13 RX ADMIN — DEXAMETHASONE SODIUM PHOSPHATE 4 MG: 4 INJECTION, SOLUTION INTRAMUSCULAR; INTRAVENOUS at 14:34

## 2018-03-13 RX ADMIN — MORPHINE SULFATE 2 MG: 2 INJECTION, SOLUTION INTRAMUSCULAR; INTRAVENOUS at 19:38

## 2018-03-13 RX ADMIN — FENTANYL CITRATE 100 MCG: 50 INJECTION, SOLUTION INTRAMUSCULAR; INTRAVENOUS at 13:46

## 2018-03-13 RX ADMIN — EPHEDRINE SULFATE 10 MG: 50 INJECTION INTRAMUSCULAR; INTRAVENOUS; SUBCUTANEOUS at 15:53

## 2018-03-13 RX ADMIN — PHENYLEPHRINE HYDROCHLORIDE 100 MCG: 10 INJECTION INTRAVENOUS at 14:36

## 2018-03-13 NOTE — ANESTHESIA PROCEDURE NOTES
Airway  Urgency: elective    Airway not difficult    General Information and Staff    Patient location during procedure: OR  CRNA: DESIREE VILLA    Indications and Patient Condition  Indications for airway management: airway protection    Preoxygenated: yes  Mask difficulty assessment: 0 - not attempted    Final Airway Details  Final airway type: endotracheal airway      Successful airway: ETT  Cuffed: yes   Successful intubation technique: direct laryngoscopy  Facilitating devices/methods: intubating stylet  Endotracheal tube insertion site: oral  Blade: Nathen  Blade size: #4  ETT size: 7.5 mm  Cormack-Lehane Classification: grade IIa - partial view of glottis  Placement verified by: chest auscultation   Measured from: lips  ETT to lips (cm): 23  Number of attempts at approach: 2    Additional Comments  Atraumatic intubation with Valencia and Bougie by SUZANNE Gary

## 2018-03-13 NOTE — OP NOTE
OPERATIVE NOTE  Cristo Musa  1950  3/13/2018    PREOP DIAGNOSES:  ESRD on dialysis [N18.6, Z99.2]   Infected old RIGHT forearm AV loop graft (last used 2015)    POSTOP DIAGNOSES:  ESRD on dialysis [N18.6, Z99.2]    PROCEDURE:  revision RIGHT forearm ARTERIOVENOUS graft (excision),   Debridement RIGHT forearm (skin, SQ, fascia)  Negative pressure wound therapy (wound vac [id number])    SURGEON: YUE Singh MD FACS RPVI    ASSISTANT: Genoveva Stinson CFA    ANESTHESIA: General ET     ESTIMATED BLOOD LOSS: 750ml     COMPLICATIONS: none    DESCRIPTION OF OPERATION:     Patient taken to the operating room placed in supine position, anesthesia was induced.  Prepped draped in sterile fashion.   Incision was made in the proximal forearm dissection carried down to proximal forearm AV loop graft arterial and venous limbs dissected down to native vein, graft material completely excised, vein oversewn.  Dissected down to arterial anastamosis, thin rim of graft left to close brachial artery. Good radial pulse.  Medial and lateral incisions made in forearm, dissected down to graft, loop section of PTFE graft completely excised.  Mild amount of purulent drainage from wound.  Excisional debridement skin subcutaneous tissue fascia with knife scissor and currettes. Significant venous hypertension and bleeding. Hemostasis was obtained. Negative pressure wound therapy with wound vac, silver sponge.  Patient tolerated procedure well transferred to PACU stable condition.          This document has been electronically signed by Jerson Singh MD on March 13, 2018 4:46 PM

## 2018-03-13 NOTE — ADDENDUM NOTE
Addendum  created 03/13/18 1735 by Devon Wilburn CRNA    Anesthesia Intra Flowsheets edited, Anesthesia Intra Meds edited

## 2018-03-13 NOTE — NURSING NOTE
1648. Came into pacu intubated. Placed on the vent. TV-500, Rate-12, FIO2-65, Dr. Ocasio here at bedside.  1655-Dr. Ocasio here-reversal given.  1702-extubated in pacu. Tolerated well.

## 2018-03-13 NOTE — PLAN OF CARE
Problem: Patient Care Overview  Goal: Plan of Care Review  Outcome: Outcome(s) achieved Date Met: 03/13/18 03/13/18 0440   Coping/Psychosocial   Plan of Care Reviewed With patient   Plan of Care Review   Progress no change   OTHER   Outcome Summary VSS, NPO since 0000, consent signed     Goal: Individualization and Mutuality  Outcome: Ongoing (interventions implemented as appropriate)    Goal: Discharge Needs Assessment  Outcome: Ongoing (interventions implemented as appropriate)      Problem: Fall Risk (Adult)  Goal: Absence of Fall  Outcome: Ongoing (interventions implemented as appropriate)      Problem: Pain, Acute (Adult)  Goal: Acceptable Pain Control/Comfort Level  Outcome: Ongoing (interventions implemented as appropriate)

## 2018-03-13 NOTE — ANESTHESIA PREPROCEDURE EVALUATION
Anesthesia Evaluation     Patient summary reviewed and Nursing notes reviewed   no history of anesthetic complications:  NPO Solid Status: > 8 hours  NPO Liquid Status: > 2 hours           Airway   Mallampati: II  TM distance: >3 FB  Neck ROM: full  possible difficult intubation  Dental    (+) poor dentation        Pulmonary - normal exam    breath sounds clear to auscultation  (+) pulmonary embolism, a smoker Former, COPD moderate,   Cardiovascular   Exercise tolerance: poor (<4 METS)    ECG reviewed  Rhythm: regular  Rate: normal    (+) pacemaker (has been removed for infxn) pacemaker, hypertension, CAD, dysrhythmias (Complete Heart Block;pacemaker inserted 4/11/16), CHF, murmur (Grade II/VI systolic murmur.),     ROS comment: Sinus bradycardia with 1st degree AV block with fusion complexes  Left axis deviation  Left bundle branch block  Abnormal ECG    Neuro/Psych  (+) numbness, psychiatric history Anxiety and Depression,     GI/Hepatic/Renal/Endo    (+)  GERD,  hepatitis C, renal disease dialysis, diabetes mellitus type 2 using insulin,     Musculoskeletal     (+) back pain, chronic pain,   Abdominal   (+) obese,    Substance History - negative use     OB/GYN negative ob/gyn ROS         Other - negative ROS                         Anesthesia Plan    ASA 3     general   (Patient requesting general over regional block)  intravenous induction   Anesthetic plan and risks discussed with patient.

## 2018-03-13 NOTE — ANESTHESIA POSTPROCEDURE EVALUATION
Patient: Cristo Musa    Procedure Summary     Date:  03/13/18 Room / Location:  [facility] OR 05 / [facility] OR    Anesthesia Start:  1346 Anesthesia Stop:  1656    Procedure:  revision RIGHT forearm ARTERIOVENOUS graft (excision), debridement, wound vac (Right ) Diagnosis:       ESRD on dialysis      (ESRD on dialysis [N18.6, Z99.2])    Surgeon:  Jerson Singh MD Provider:  Abdirashid Ocasio MD    Anesthesia Type:  general ASA Status:  3          Anesthesia Type: general  Last vitals  BP   150/67 (03/13/18 1230)   Temp   96.9 °F (36.1 °C) (03/13/18 1230)   Pulse   58 (03/13/18 1230)   Resp   20 (03/13/18 1230)     SpO2   99 % (03/13/18 1230)     Post Anesthesia Care and Evaluation    Patient location during evaluation: bedside  Patient participation: complete - patient cannot participate  Level of consciousness: awake  Pain score: 0  Pain management: adequate  Airway patency: patent  Anesthetic complications: No anesthetic complications    Cardiovascular status: acceptable  Respiratory status: acceptable  Hydration status: acceptable

## 2018-03-13 NOTE — ANESTHESIA PROCEDURE NOTES
Central Line    Patient location during procedure: OR  Indications: vascular access  Staff  Anesthesiologist: HANNAH OLIVAREZ  Preanesthetic Checklist  Completed: patient identified, site marked, surgical consent, pre-op evaluation, timeout performed, IV checked, risks and benefits discussed and monitors and equipment checked  Central Line Prep  Sterile Tech:cap, gloves, gown, mask and sterile barriers  Prep: chloraprep  Patient monitoring: blood pressure monitoring, continuous pulse oximetry and EKG  Central Line Procedure  Laterality:left  Location:femoral  Catheter Type:double lumen  Catheter Size:7.5 Fr  Guidance:landmark technique and palpation technique  Assessment  Post procedure:occlusive dressing applied and secured with tape  Assessement:blood return through all ports and free fluid flow  Complications:no  Patient Tolerance:patient tolerated the procedure well with no apparent complications

## 2018-03-13 NOTE — ANESTHESIA PROCEDURE NOTES
Arterial Line    Patient location during procedure: OR   Line placed for hemodynamic monitoring and ABGs/Labs/ISTAT.  Performed By   CRNA: DESIREE VILLA  Preanesthetic Checklist  Completed: patient identified, site marked, surgical consent, pre-op evaluation, timeout performed, IV checked, risks and benefits discussed and monitors and equipment checked  Arterial Line Prep   Sterile Tech: cap, gloves, gown, mask and sterile barriers  Prep: ChloraPrep  Patient monitoring: blood pressure monitoring, continuous pulse oximetry and EKG  Arterial Line Procedure   Laterality:left  Location:  radial artery  Catheter size: 20 G   Guidance: landmark technique and palpation technique  Number of attempts: 1  Successful placement: yes          Post Assessment   Dressing Type: occlusive dressing applied and secured with tape.   Complications no  Circ/Move/Sens Assessment: normal and unchanged.   Patient Tolerance: patient tolerated the procedure well with no apparent complications

## 2018-03-13 NOTE — PROGRESS NOTES
"Pharmacokinetics by Pharmacy - Vancomycin Initial Consult    Cristo Musa is a 68 y.o. male being initiated on vancomycin for skin and soft tissue infection.     Objective:     [Ht: 172.7 cm (67.99\"); Wt: 100 kg (220 lb 10.9 oz)]     Lab Results   Component Value Date    WBC 7.69 03/13/2018    WBC 6.22 02/17/2018    WBC 5.05 01/12/2018      Lab Results   Component Value Date    CRP 2.6 (H) 06/17/2016    CRP 5.9 (H) 06/02/2016    CRP 6.7 (H) 05/31/2016    LACTATE 2.0 02/17/2018    LACTATE 1.6 05/24/2016    LACTATE 2.0 05/24/2016    LACTATE 1.1 03/13/2016      Temp Readings from Last 1 Encounters:   03/13/18 97.1 °F (36.2 °C) (Oral)     Estimated Creatinine Clearance: 12.4 mL/min (by C-G formula based on SCr of 6.63 mg/dL (H)).   Lab Results   Component Value Date    CREATININE 6.63 (H) 03/13/2018    CREATININE 7.28 (H) 02/17/2018    CREATININE 8.19 (H) 01/12/2018       Baseline culture results:  Microbiology Results (last 10 days)       Procedure Component Value - Date/Time    CRE Screen by PCR - Swab, Large Intestine, Rectum [[id number]] Collected:  03/12/18 1508    Lab Status:  Final result Specimen:  Swab from Large Intestine, Rectum Updated:  03/12/18 1651     CRE SCREEN Not Detected     Comment: Test performed by real-time polymerase chain reaction (qPCR).        OXA 48 Strain Not Detected     Comment: Not Applicable.        IMP STRAIN Not Detected     Comment: Not Applicable        VIM STRAING Not Detected     Comment: Not Applicable        NDM Strain Not Detected     Comment: Not Applicable        KPC Strain Not Detected     Comment: Not Applicable                  Assessment  Will start pulse dosing on days of dialysis, M,W,F       Plan  1. vancomycin 1000mg IV x 1 given yesterday in dialysis.  2. Will order vancomycin random level for am, prior to dialysis  3. Pharmacy will monitor renal function and adjust dose accordingly.    Jayde Mills RPH  03/13/18 10:18 AM     "

## 2018-03-14 LAB
ABO + RH BLD: NORMAL
ABO + RH BLD: NORMAL
ALBUMIN SERPL-MCNC: 3.7 G/DL (ref 3.4–4.8)
ALBUMIN/GLOB SERPL: 0.9 G/DL (ref 1.1–1.8)
ALP SERPL-CCNC: 71 U/L (ref 38–126)
ALT SERPL W P-5'-P-CCNC: 12 U/L (ref 21–72)
ANION GAP SERPL CALCULATED.3IONS-SCNC: 17 MMOL/L (ref 5–15)
AST SERPL-CCNC: 23 U/L (ref 17–59)
BH BB BLOOD EXPIRATION DATE: NORMAL
BH BB BLOOD EXPIRATION DATE: NORMAL
BH BB BLOOD TYPE BARCODE: 7300
BH BB BLOOD TYPE BARCODE: 7300
BH BB DISPENSE STATUS: NORMAL
BH BB DISPENSE STATUS: NORMAL
BH BB PRODUCT CODE: NORMAL
BH BB PRODUCT CODE: NORMAL
BH BB UNIT NUMBER: NORMAL
BH BB UNIT NUMBER: NORMAL
BILIRUB SERPL-MCNC: 0.7 MG/DL (ref 0.2–1.3)
BUN BLD-MCNC: 44 MG/DL (ref 7–21)
BUN/CREAT SERPL: 5.4 (ref 7–25)
CALCIUM SPEC-SCNC: 8.2 MG/DL (ref 8.4–10.2)
CHLORIDE SERPL-SCNC: 90 MMOL/L (ref 95–110)
CO2 SERPL-SCNC: 27 MMOL/L (ref 22–31)
CREAT BLD-MCNC: 8.09 MG/DL (ref 0.7–1.3)
DEPRECATED RDW RBC AUTO: 45.7 FL (ref 35.1–43.9)
ERYTHROCYTE [DISTWIDTH] IN BLOOD BY AUTOMATED COUNT: 14.4 % (ref 11.5–14.5)
GFR SERPL CREATININE-BSD FRML MDRD: 8 ML/MIN/1.73 (ref 49–113)
GLOBULIN UR ELPH-MCNC: 4 GM/DL (ref 2.3–3.5)
GLUCOSE BLD-MCNC: 222 MG/DL (ref 60–100)
GLUCOSE BLDC GLUCOMTR-MCNC: 140 MG/DL (ref 70–130)
GLUCOSE BLDC GLUCOMTR-MCNC: 170 MG/DL (ref 70–130)
GLUCOSE BLDC GLUCOMTR-MCNC: 180 MG/DL (ref 70–130)
GLUCOSE BLDC GLUCOMTR-MCNC: 223 MG/DL (ref 70–130)
GLUCOSE BLDC GLUCOMTR-MCNC: 231 MG/DL (ref 70–130)
GLUCOSE BLDC GLUCOMTR-MCNC: 368 MG/DL (ref 70–130)
GRAM STN SPEC: NORMAL
GRAM STN SPEC: NORMAL
HBV SURFACE AG SERPL QL IA: NEGATIVE
HCT VFR BLD AUTO: 25.6 % (ref 39–49)
HGB BLD-MCNC: 8.5 G/DL (ref 13.7–17.3)
MCH RBC QN AUTO: 29.1 PG (ref 26.5–34)
MCHC RBC AUTO-ENTMCNC: 33.2 G/DL (ref 31.5–36.3)
MCV RBC AUTO: 87.7 FL (ref 80–98)
PLATELET # BLD AUTO: 124 10*3/MM3 (ref 150–450)
PMV BLD AUTO: 8.2 FL (ref 8–12)
POTASSIUM BLD-SCNC: 5.2 MMOL/L (ref 3.5–5.1)
PROT SERPL-MCNC: 7.7 G/DL (ref 6.3–8.6)
RBC # BLD AUTO: 2.92 10*6/MM3 (ref 4.37–5.74)
SODIUM BLD-SCNC: 134 MMOL/L (ref 137–145)
UNIT  ABO: NORMAL
UNIT  ABO: NORMAL
UNIT  RH: NORMAL
UNIT  RH: NORMAL
VANCOMYCIN SERPL-MCNC: 8.65 MCG/ML
WBC NRBC COR # BLD: 7.46 10*3/MM3 (ref 3.2–9.8)
WHOLE BLOOD HOLD SPECIMEN: NORMAL

## 2018-03-14 PROCEDURE — 25010000002 VANCOMYCIN PER 500 MG: Performed by: INTERNAL MEDICINE

## 2018-03-14 PROCEDURE — 94799 UNLISTED PULMONARY SVC/PX: CPT

## 2018-03-14 PROCEDURE — 5A1D70Z PERFORMANCE OF URINARY FILTRATION, INTERMITTENT, LESS THAN 6 HOURS PER DAY: ICD-10-PCS | Performed by: THORACIC SURGERY (CARDIOTHORACIC VASCULAR SURGERY)

## 2018-03-14 PROCEDURE — 63710000001 INSULIN ASPART PER 5 UNITS: Performed by: INTERNAL MEDICINE

## 2018-03-14 PROCEDURE — 82962 GLUCOSE BLOOD TEST: CPT

## 2018-03-14 PROCEDURE — 80053 COMPREHEN METABOLIC PANEL: CPT | Performed by: THORACIC SURGERY (CARDIOTHORACIC VASCULAR SURGERY)

## 2018-03-14 PROCEDURE — 63710000001 INSULIN DETEMIR PER 5 UNITS: Performed by: THORACIC SURGERY (CARDIOTHORACIC VASCULAR SURGERY)

## 2018-03-14 PROCEDURE — 99024 POSTOP FOLLOW-UP VISIT: CPT | Performed by: NURSE PRACTITIONER

## 2018-03-14 PROCEDURE — 25010000002 MORPHINE PER 10 MG: Performed by: THORACIC SURGERY (CARDIOTHORACIC VASCULAR SURGERY)

## 2018-03-14 PROCEDURE — 80202 ASSAY OF VANCOMYCIN: CPT | Performed by: INTERNAL MEDICINE

## 2018-03-14 PROCEDURE — 85027 COMPLETE CBC AUTOMATED: CPT | Performed by: THORACIC SURGERY (CARDIOTHORACIC VASCULAR SURGERY)

## 2018-03-14 RX ADMIN — BRIMONIDINE TARTRATE 1 DROP: 1.5 SOLUTION OPHTHALMIC at 20:12

## 2018-03-14 RX ADMIN — INSULIN ASPART 6 UNITS: 100 INJECTION, SOLUTION INTRAVENOUS; SUBCUTANEOUS at 20:11

## 2018-03-14 RX ADMIN — MORPHINE SULFATE 2 MG: 2 INJECTION, SOLUTION INTRAMUSCULAR; INTRAVENOUS at 06:02

## 2018-03-14 RX ADMIN — MORPHINE SULFATE 2 MG: 2 INJECTION, SOLUTION INTRAMUSCULAR; INTRAVENOUS at 13:41

## 2018-03-14 RX ADMIN — GABAPENTIN 300 MG: 300 CAPSULE ORAL at 15:35

## 2018-03-14 RX ADMIN — GABAPENTIN 300 MG: 300 CAPSULE ORAL at 06:02

## 2018-03-14 RX ADMIN — MORPHINE SULFATE 2 MG: 2 INJECTION, SOLUTION INTRAMUSCULAR; INTRAVENOUS at 22:22

## 2018-03-14 RX ADMIN — INSULIN ASPART 4 UNITS: 100 INJECTION, SOLUTION INTRAVENOUS; SUBCUTANEOUS at 16:31

## 2018-03-14 RX ADMIN — Medication 1334 MG: at 13:33

## 2018-03-14 RX ADMIN — ALBUTEROL SULFATE 2.5 MG: 2.5 SOLUTION RESPIRATORY (INHALATION) at 23:35

## 2018-03-14 RX ADMIN — ASPIRIN 81 MG: 81 TABLET, COATED ORAL at 13:34

## 2018-03-14 RX ADMIN — BRIMONIDINE TARTRATE 1 DROP: 1.5 SOLUTION OPHTHALMIC at 16:33

## 2018-03-14 RX ADMIN — CETIRIZINE HYDROCHLORIDE 10 MG: 10 TABLET, FILM COATED ORAL at 20:15

## 2018-03-14 RX ADMIN — Medication 1334 MG: at 17:31

## 2018-03-14 RX ADMIN — INSULIN DETEMIR 25 UNITS: 100 INJECTION, SOLUTION SUBCUTANEOUS at 21:20

## 2018-03-14 RX ADMIN — INSULIN ASPART 2 UNITS: 100 INJECTION, SOLUTION INTRAVENOUS; SUBCUTANEOUS at 13:36

## 2018-03-14 RX ADMIN — GABAPENTIN 300 MG: 300 CAPSULE ORAL at 22:00

## 2018-03-14 RX ADMIN — RENAGEL 2400 MG: 800 TABLET ORAL at 17:31

## 2018-03-14 RX ADMIN — RENAGEL 2400 MG: 800 TABLET ORAL at 13:33

## 2018-03-14 RX ADMIN — ALBUTEROL SULFATE 2.5 MG: 2.5 SOLUTION RESPIRATORY (INHALATION) at 15:02

## 2018-03-14 RX ADMIN — PAROXETINE 30 MG: 10 TABLET, FILM COATED ORAL at 13:32

## 2018-03-14 RX ADMIN — DORZOLAMIDE HCL 1 DROP: 20 SOLUTION/ DROPS OPHTHALMIC at 20:18

## 2018-03-14 RX ADMIN — LATANOPROST 1 DROP: 50 SOLUTION OPHTHALMIC at 20:12

## 2018-03-14 RX ADMIN — TRAZODONE HYDROCHLORIDE 50 MG: 50 TABLET ORAL at 20:15

## 2018-03-14 RX ADMIN — FAMOTIDINE 20 MG: 20 TABLET, FILM COATED ORAL at 13:33

## 2018-03-14 RX ADMIN — VANCOMYCIN HYDROCHLORIDE 1500 MG: 1 INJECTION, POWDER, LYOPHILIZED, FOR SOLUTION INTRAVENOUS at 15:36

## 2018-03-14 RX ADMIN — DORZOLAMIDE HCL 1 DROP: 20 SOLUTION/ DROPS OPHTHALMIC at 16:34

## 2018-03-14 NOTE — PLAN OF CARE
Problem: Patient Care Overview  Goal: Plan of Care Review  Outcome: Ongoing (interventions implemented as appropriate)   03/14/18 4278   OTHER   Outcome Summary Pt received hemodialysis today, 3L of fluid removed, pt tolerated well. NPWT to right arm surgical site, CDI, draining properly. Pain being controlled with prn IV morphine. VSS. Pt hyperglycemic, SSI ordered. A-line d/c'd today. Left femoral central line continues d/t limited IV access, pt receiving IV Vancomycin.      Goal: Individualization and Mutuality  Outcome: Ongoing (interventions implemented as appropriate)    Goal: Discharge Needs Assessment  Outcome: Ongoing (interventions implemented as appropriate)    Goal: Interprofessional Rounds/Family Conf  Outcome: Ongoing (interventions implemented as appropriate)      Problem: Fall Risk (Adult)  Goal: Absence of Fall  Outcome: Ongoing (interventions implemented as appropriate)      Problem: Pain, Acute (Adult)  Goal: Acceptable Pain Control/Comfort Level  Outcome: Ongoing (interventions implemented as appropriate)

## 2018-03-14 NOTE — PROGRESS NOTES
Cardiothoracic - Vascular Surgery Daily Note        LOS: 2 days   POD# 1  Revision RIGHT forearm ARTERIOVENOUS graft (excision),   Debridement RIGHT forearm (skin, SQ, fascia)  Negative pressure wound therapy (wound vac [id number])      Chief Complaint: Edema and pain RUE AV fistula arm      Subjective       VAS 5    ROS:    Review of Systems   Constitution: Positive for weakness. Negative for chills, decreased appetite and fever.   HENT: Negative for hearing loss, hoarse voice, nosebleeds and stridor.    Eyes: Negative for blurred vision and visual disturbance.   Cardiovascular: Negative for chest pain, claudication, cyanosis, dyspnea on exertion and leg swelling.   Respiratory: Negative for cough, hemoptysis, shortness of breath and wheezing.    Hematologic/Lymphatic: Negative for bleeding problem. Bruises/bleeds easily.   Skin: Positive for color change, dry skin, flushing and poor wound healing. Negative for rash.   Musculoskeletal: Positive for arthritis, joint pain and stiffness.   Gastrointestinal: Negative for abdominal pain, heartburn, hematemesis, melena and nausea.   Genitourinary:        CHD dialysis today    Neurological: Negative for brief paralysis, focal weakness, numbness, paresthesias and sensory change.   Psychiatric/Behavioral: Negative for altered mental status.   Allergic/Immunologic: Negative for hives.         Objective     Vital Signs  Temp:  [97 °F (36.1 °C)-98.1 °F (36.7 °C)] 97.7 °F (36.5 °C)  Heart Rate:  [58-73] 73  Resp:  [16-20] 18  BP: (102-158)/(46-82) 102/46  Arterial Line BP: (126-181)/(56-73) 148/59  Body mass index is 36.71 kg/m².    Intake/Output Summary (Last 24 hours) at 03/14/18 1403  Last data filed at 03/14/18 0730   Gross per 24 hour   Intake             2480 ml   Output             1800 ml   Net              680 ml     I/O this shift:  In: 480 [P.O.:480]  Out: -     Wt Readings from Last 3 Encounters:   03/14/18 110 kg (241 lb 6.5 oz)   03/12/18 100 kg (220 lb 11.2 oz)    02/22/18 96.2 kg (212 lb)         Physical Exam   Physical Exam   Constitutional: He is oriented to person, place, and time. He appears well-nourished.   Body mass index is 36.71 kg/m².     HENT:   Head: Normocephalic.   Mouth/Throat: Oropharynx is clear and moist.   Eyes: Conjunctivae are normal. Pupils are equal, round, and reactive to light.   Mild R eye conjunctival edema    Neck: Neck supple. No JVD present.   Cardiovascular: Normal rate, regular rhythm and intact distal pulses.    Pulses:       Radial pulses are 2+ on the right side, and 2+ on the left side.        Posterior tibial pulses are 2+ on the right side, and 2+ on the left side.   Fistula Present RU Extremity: (Y)thrill/(Y)bruit/(Y)pulse. Aneurysmal (Y). Water Hammer Pulse (Y). Thinning (N).  Inderjit's Test <3sec (Y).  Skin warm/dry/pink/intact (Y). Radial Pulse (Y).  2-3+ RUE edema  +sensation and mobility+    Pulmonary/Chest: Effort normal and breath sounds normal. No stridor. No respiratory distress. He has no wheezes. He has no rales.   Abdominal: Soft. Bowel sounds are normal.   Obese    Musculoskeletal: He exhibits edema (RUE ) and tenderness (RUE).   Neurological: He is alert and oriented to person, place, and time.   Skin: Skin is warm and dry. Capillary refill takes less than 2 seconds. No erythema. No pallor.   Wound vac to Lower RUE  Hand edema with sensation and mobility intact and refill  < 2 sec   Psychiatric: His behavior is normal.   Nursing note and vitals reviewed.        Results Review:    Lab Results   Component Value Date    WBC 7.46 03/14/2018    HGB 8.5 (L) 03/14/2018    HCT 25.6 (L) 03/14/2018    MCV 87.7 03/14/2018     (L) 03/14/2018         Lab Results   Component Value Date    GLUCOSE 222 (H) 03/14/2018    BUN 44 (H) 03/14/2018    CREATININE 8.09 (H) 03/14/2018    EGFRIFAFRI 8 (L) 03/14/2018    BCR 5.4 (L) 03/14/2018    K 5.2 (H) 03/14/2018    CO2 27.0 03/14/2018    CALCIUM 8.2 (L) 03/14/2018    ALBUMIN 3.70  03/14/2018    LABIL2 0.9 (L) 03/14/2018    AST 23 03/14/2018    ALT 12 (L) 03/14/2018                           PT/INR:  No results found for: PROTIME/No results found for: INR            albuterol 2.5 mg Nebulization Q8H - RT   aspirin 81 mg Oral Daily   brimonidine 1 drop Both Eyes TID   calcitriol 0.5 mcg Oral Once per day on Mon Wed Fri   calcium acetate 1,334 mg Oral TID With Meals   cetirizine 10 mg Oral Nightly   dorzolamide 1 drop Left Eye TID   famotidine 20 mg Oral Daily   fluticasone 2 spray Nasal Daily   gabapentin 300 mg Oral Q8H   insulin aspart 0-7 Units Subcutaneous 4x Daily AC & at Bedtime   insulin detemir 25 Units Subcutaneous Nightly   latanoprost 1 drop Both Eyes Nightly   PARoxetine 30 mg Oral Daily   sevelamer 2,400 mg Oral TID With Meals   traZODone 50 mg Oral Nightly   vancomycin 1,500 mg Intravenous Once       Pharmacy to dose vancomycin        Patient Active Problem List   Diagnosis Code   • Pacemaker infection T82.7XXA   • Renal failure, chronic N18.9   • Diabetes mellitus E11.9   • Hypertension I10   • Complete heart block I44.2   • Arteriovenous shunt thrombosis T82.868A   • Follow-up surgery care Z09   • Arteriovenous fistula, acquired I77.0   • 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   • Exotropia H50.10   • 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   • Pseudophakia Z96.1   • Posterior subcapsular polar age-related cataract H25.049   • Nuclear cataract H25.10   • Primary open angle glaucoma of left eye, moderate stage H40.1122   • Cortical age-related cataract H25.019   • ESRD (end stage renal disease) N18.6   • Primary insomnia F51.01   • Chronic pain G89.29   • Anxiety F41.9   • ESRD on dialysis N18.6, Z99.2   • Gastroesophageal reflux disease without esophagitis K21.9   • Neuropathy G62.9   • A-V fistula I77.0   • Physical deconditioning R53.81   • COPD (chronic obstructive pulmonary disease) J44.9         ASSESSMENT/PLAN     Satisfactory PO:  Eating well, assistance with ambulation.       Infected old Right AV Fistula SP ID:  Continue antibiotics  Continue wound care with wound vac.     Pain PO:  Controlled with 6 mg morphine and 10 mg hydrocodone in 24 hr     ESRD/CHD:  RUE AV fistula functioning well with dialysis     Rehab:  OT/PT    Disp:  Continue care.  Awaiting home wound vac

## 2018-03-14 NOTE — PLAN OF CARE
Problem: Patient Care Overview  Goal: Plan of Care Review  Outcome: Ongoing (interventions implemented as appropriate)   03/14/18 0246   Coping/Psychosocial   Plan of Care Reviewed With patient   Plan of Care Review   Progress improving   OTHER   Outcome Summary pt has wound vac, art line and femoral central line. pt is tolerating pain well.      Goal: Individualization and Mutuality  Outcome: Ongoing (interventions implemented as appropriate)    Goal: Discharge Needs Assessment  Outcome: Ongoing (interventions implemented as appropriate)      Problem: Fall Risk (Adult)  Goal: Absence of Fall  Outcome: Ongoing (interventions implemented as appropriate)      Problem: Pain, Acute (Adult)  Goal: Acceptable Pain Control/Comfort Level  Outcome: Ongoing (interventions implemented as appropriate)

## 2018-03-14 NOTE — PROGRESS NOTES
"[facility]  [address] [place] [address]. [address]  T - [phone]  F - [phone]     Progress Note          PATIENT  DEMOGRAPHICS   PATIENT NAME: Cristo Musa                      PHYSICIAN: Pascual Vilchis MD  : 1950  MRN: [id number]   LOS: 2 days    Patient Care Team:  Cory Rodríguez MD as PCP - General (Family Medicine)  Jett Nieto MD as PCP - Claims Attributed  Janelburak Gordon MD (Family Medicine)  Michelle Lo MD as Resident (Family Medicine)  John Sanchez MD as Resident (Family Medicine)  Eulogio Riley MD as Resident (Family Medicine)  Natasha De Los Santos MD as Resident (Family Medicine)  Subjective   SUBJECTIVE   Seen during dialysis pain well controlled         Objective   OBJECTIVE   Vital Signs  Temp:  [96.9 °F (36.1 °C)-98.1 °F (36.7 °C)] 98 °F (36.7 °C)  Heart Rate:  [58-72] 63  Resp:  [16-20] 16  BP: (112-158)/(58-82) 137/82  Arterial Line BP: (126-181)/(56-73) 126/56    Flowsheet Rows    Flowsheet Row First Filed Value   Admission Height 172.7 cm (67.99\") Documented at 2018 1500   Admission Weight 100 kg (220 lb 10.9 oz) Documented at 2018 1500           I/O last 3 completed shifts:  In:  [I.V.:900; Blood:600; IV Piggyback:500]  Out: 1800 [Blood:1800]    PHYSICAL EXAM    Physical Exam   Constitutional: He is oriented to person, place, and time. He appears well-developed.   HENT:   Head: Normocephalic.   Eyes: Pupils are equal, round, and reactive to light.   Cardiovascular: Normal rate, regular rhythm and normal heart sounds.    Pulmonary/Chest: Effort normal and breath sounds normal.   Abdominal: Soft. Bowel sounds are normal.   Musculoskeletal: He exhibits no edema.   Neurological: He is alert and oriented to person, place, and time.       RESULTS   Results Review:      Results from last 7 days  Lab Units 18  0751 18  1601 18  1521  18  0614   SODIUM mmol/L 134*  --   --   --  137 "   SODIUM, ARTERIAL mmol/L  --  130.4* 131.8*  < >  --    POTASSIUM mmol/L 5.2*  --   --   --  4.9   CHLORIDE mmol/L 90*  --   --   --  92*   CO2 mmol/L 27.0  --   --   --  28.0   BUN mg/dL 44*  --   --   --  35*   CREATININE mg/dL 8.09*  --   --   --  6.63*   CALCIUM mg/dL 8.2*  --   --   --  9.0   BILIRUBIN mg/dL 0.7  --   --   --   --    ALK PHOS U/L 71  --   --   --   --    ALT (SGPT) U/L 12*  --   --   --   --    AST (SGOT) U/L 23  --   --   --   --    GLUCOSE mg/dL 222*  --   --   --  167*   GLUCOSE, ARTERIAL mmol/L  --  172 149  < >  --    < > = values in this interval not displayed.    Estimated Creatinine Clearance: 10.7 mL/min (by C-G formula based on SCr of 8.09 mg/dL (H)).                  Results from last 7 days  Lab Units 03/14/18  0313 03/13/18  0614   WBC 10*3/mm3 7.46 7.69   HEMOGLOBIN g/dL 8.5* 10.2*   PLATELETS 10*3/mm3 124* 138*               Imaging Results (last 24 hours)     ** No results found for the last 24 hours. **           MEDICATIONS      albuterol 2.5 mg Nebulization Q8H - RT   aspirin 81 mg Oral Daily   brimonidine 1 drop Both Eyes TID   calcitriol 0.5 mcg Oral Once per day on Mon Wed Fri   calcium acetate 1,334 mg Oral TID With Meals   cetirizine 10 mg Oral Nightly   dorzolamide 1 drop Left Eye TID   famotidine 20 mg Oral Daily   fluticasone 2 spray Nasal Daily   gabapentin 300 mg Oral Q8H   insulin aspart 0-7 Units Subcutaneous 4x Daily AC & at Bedtime   insulin detemir 25 Units Subcutaneous Nightly   latanoprost 1 drop Both Eyes Nightly   PARoxetine 30 mg Oral Daily   sevelamer 2,400 mg Oral TID With Meals   traZODone 50 mg Oral Nightly       Pharmacy to dose vancomycin     sodium chloride 30 mL/hr Last Rate: 30 mL/hr (03/13/18 2207)       Assessment/Plan   ASSESSMENT / PLAN    Principal Problem:    ESRD on dialysis    1.end-stage renal disease on hemodialysis Monday Wednesday Friday.  hd today with low k bath. Last 1 hr 1 k.     2.anemia chronic kidney disease patient hemoglobin  is stable  He is on Mircera in the outpatient setting. Received mircera a week ago will start epogen either Friday or monday     3.secondary hyperparathyroidism/hyperphosphatemia he will remain on PhosLo and Renagel what he takes in the outpatient setting.       4.infected right forearm AV graft planned for explantation in the morning.  He has positive blood culture for staph (not aureus but methicillin resistant) about a month ago.  We have also taken the blood culture on last Friday.  NGSF from out patient dialysis unit. He will remain on vancomycin  S/p old graft removal and now wound vac. Vancomycin post hd today              This document has been electronically signed by Pascual Vilchis MD on March 14, 2018 8:49 AM

## 2018-03-14 NOTE — PROGRESS NOTES
"Pharmacokinetics by Pharmacy - Vancomycin    Cristo Musa is a 68 y.o. male receiving vancomycin 1500mg IV x 1, day 2 for skin and soft tissue infection      Objective:  [Ht: 172.7 cm (67.99\"); Wt: 110 kg (241 lb 6.5 oz)]     Lab Results   Component Value Date    WBC 7.46 03/14/2018    WBC 7.69 03/13/2018    WBC 6.22 02/17/2018      Lab Results   Component Value Date    CRP 2.6 (H) 06/17/2016    CRP 5.9 (H) 06/02/2016    CRP 6.7 (H) 05/31/2016    LACTATE 2.0 02/17/2018    LACTATE 1.6 05/24/2016    LACTATE 2.0 05/24/2016    LACTATE 1.1 03/13/2016      Temp Readings from Last 1 Encounters:   03/14/18 98 °F (36.7 °C) (Oral)     Estimated Creatinine Clearance: 10.7 mL/min (by C-G formula based on SCr of 8.09 mg/dL (H)).   Lab Results   Component Value Date    CREATININE 8.09 (H) 03/14/2018    CREATININE 6.63 (H) 03/13/2018    CREATININE 7.28 (H) 02/17/2018       Lab Results   Component Value Date    VANCOTROUGH 5.0 (L) 04/19/2015    VANCORANDOM 8.65 03/14/2018    VANCORANDOM 28.7 06/01/2016    VANCORANDOM 24.4 05/30/2016       Culture Results:  Microbiology Results (last 10 days)       Procedure Component Value - Date/Time    Tissue / Bone Culture - Tissue, Arm, Right [[id number]]  (Normal) Collected:  03/13/18 1716    Lab Status:  Preliminary result Specimen:  Tissue from Arm, Right Updated:  03/14/18 0642     Tissue Culture Culture in progress     Gram Stain Result Few (2+) WBCs seen      No organisms seen     Gram Stain Result --     Comment: The previously reported stain No WBCs or organisms seen is no longer being reported.       Gram Stain [[id number]] Collected:  03/13/18 1716    Lab Status:  Final result Specimen:  Tissue from Arm, Right Updated:  03/14/18 0640     Gram Stain Result Few (2+) WBCs seen      No organisms seen    CRE Screen by PCR - Swab, Large Intestine, Rectum [[id number]] Collected:  03/12/18 1508    Lab Status:  Final result Specimen:  Swab from Large Intestine, Rectum Updated:  " 03/12/18 1651     CRE SCREEN Not Detected     Comment: Test performed by real-time polymerase chain reaction (qPCR).        OXA 48 Strain Not Detected     Comment: Not Applicable.        IMP STRAIN Not Detected     Comment: Not Applicable        VIM STRAING Not Detected     Comment: Not Applicable        NDM Strain Not Detected     Comment: Not Applicable        KPC Strain Not Detected     Comment: Not Applicable                    Plan:  1. Will give vancomycin 1500 mg today after dialysis.  2. Will order vancomycin random level on 3/16 at 0600 prior to dialysis.  3. Pharmacy will monitor renal function and adjust dose accordingly.      Jayde Mills RPH   03/14/18 11:45 AM

## 2018-03-14 NOTE — NURSING NOTE
Spoke to case management concerning hpme vac . They will contact [facility] for ordering of vac.

## 2018-03-15 LAB
ALBUMIN SERPL-MCNC: 3.7 G/DL (ref 3.4–4.8)
ALBUMIN/GLOB SERPL: 0.9 G/DL (ref 1.1–1.8)
ALP SERPL-CCNC: 77 U/L (ref 38–126)
ALT SERPL W P-5'-P-CCNC: 21 U/L (ref 21–72)
ANION GAP SERPL CALCULATED.3IONS-SCNC: 18 MMOL/L (ref 5–15)
AST SERPL-CCNC: 22 U/L (ref 17–59)
BILIRUB SERPL-MCNC: 0.6 MG/DL (ref 0.2–1.3)
BUN BLD-MCNC: 25 MG/DL (ref 7–21)
BUN/CREAT SERPL: 4.4 (ref 7–25)
CALCIUM SPEC-SCNC: 8.2 MG/DL (ref 8.4–10.2)
CHLORIDE SERPL-SCNC: 93 MMOL/L (ref 95–110)
CO2 SERPL-SCNC: 25 MMOL/L (ref 22–31)
CREAT BLD-MCNC: 5.63 MG/DL (ref 0.7–1.3)
DEPRECATED RDW RBC AUTO: 49 FL (ref 35.1–43.9)
ERYTHROCYTE [DISTWIDTH] IN BLOOD BY AUTOMATED COUNT: 15.1 % (ref 11.5–14.5)
GFR SERPL CREATININE-BSD FRML MDRD: 12 ML/MIN/1.73 (ref 49–113)
GLOBULIN UR ELPH-MCNC: 4.2 GM/DL (ref 2.3–3.5)
GLUCOSE BLD-MCNC: 182 MG/DL (ref 60–100)
GLUCOSE BLDC GLUCOMTR-MCNC: 171 MG/DL (ref 70–130)
GLUCOSE BLDC GLUCOMTR-MCNC: 185 MG/DL (ref 70–130)
GLUCOSE BLDC GLUCOMTR-MCNC: 192 MG/DL (ref 70–130)
GLUCOSE BLDC GLUCOMTR-MCNC: 199 MG/DL (ref 70–130)
HCT VFR BLD AUTO: 23.6 % (ref 39–49)
HGB BLD-MCNC: 7.7 G/DL (ref 13.7–17.3)
MCH RBC QN AUTO: 29.2 PG (ref 26.5–34)
MCHC RBC AUTO-ENTMCNC: 32.6 G/DL (ref 31.5–36.3)
MCV RBC AUTO: 89.4 FL (ref 80–98)
PLATELET # BLD AUTO: 99 10*3/MM3 (ref 150–450)
PMV BLD AUTO: 8.2 FL (ref 8–12)
POTASSIUM BLD-SCNC: 4.8 MMOL/L (ref 3.5–5.1)
PROT SERPL-MCNC: 7.9 G/DL (ref 6.3–8.6)
RBC # BLD AUTO: 2.64 10*6/MM3 (ref 4.37–5.74)
SODIUM BLD-SCNC: 136 MMOL/L (ref 137–145)
WBC NRBC COR # BLD: 7.56 10*3/MM3 (ref 3.2–9.8)

## 2018-03-15 PROCEDURE — 80053 COMPREHEN METABOLIC PANEL: CPT | Performed by: THORACIC SURGERY (CARDIOTHORACIC VASCULAR SURGERY)

## 2018-03-15 PROCEDURE — 82962 GLUCOSE BLOOD TEST: CPT

## 2018-03-15 PROCEDURE — 94760 N-INVAS EAR/PLS OXIMETRY 1: CPT

## 2018-03-15 PROCEDURE — 63710000001 INSULIN DETEMIR PER 5 UNITS: Performed by: THORACIC SURGERY (CARDIOTHORACIC VASCULAR SURGERY)

## 2018-03-15 PROCEDURE — 63710000001 INSULIN ASPART PER 5 UNITS: Performed by: INTERNAL MEDICINE

## 2018-03-15 PROCEDURE — 25010000002 MORPHINE PER 10 MG: Performed by: THORACIC SURGERY (CARDIOTHORACIC VASCULAR SURGERY)

## 2018-03-15 PROCEDURE — 99024 POSTOP FOLLOW-UP VISIT: CPT | Performed by: NURSE PRACTITIONER

## 2018-03-15 PROCEDURE — 94799 UNLISTED PULMONARY SVC/PX: CPT

## 2018-03-15 PROCEDURE — 85027 COMPLETE CBC AUTOMATED: CPT | Performed by: THORACIC SURGERY (CARDIOTHORACIC VASCULAR SURGERY)

## 2018-03-15 RX ORDER — SODIUM CHLORIDE 0.9 % (FLUSH) 0.9 %
SYRINGE (ML) INJECTION
Status: COMPLETED
Start: 2018-03-15 | End: 2018-03-15

## 2018-03-15 RX ORDER — OXYCODONE HYDROCHLORIDE AND ACETAMINOPHEN 5; 325 MG/1; MG/1
1 TABLET ORAL EVERY 4 HOURS PRN
Status: DISCONTINUED | OUTPATIENT
Start: 2018-03-15 | End: 2018-03-16 | Stop reason: HOSPADM

## 2018-03-15 RX ORDER — OXYCODONE AND ACETAMINOPHEN 10; 325 MG/1; MG/1
1 TABLET ORAL EVERY 4 HOURS PRN
Status: DISCONTINUED | OUTPATIENT
Start: 2018-03-15 | End: 2018-03-16 | Stop reason: HOSPADM

## 2018-03-15 RX ORDER — MORPHINE SULFATE 20 MG/ML
1 SOLUTION ORAL ONCE
Status: COMPLETED | OUTPATIENT
Start: 2018-03-15 | End: 2018-03-15

## 2018-03-15 RX ADMIN — INSULIN DETEMIR 25 UNITS: 100 INJECTION, SOLUTION SUBCUTANEOUS at 21:28

## 2018-03-15 RX ADMIN — Medication 1334 MG: at 17:10

## 2018-03-15 RX ADMIN — CETIRIZINE HYDROCHLORIDE 10 MG: 10 TABLET, FILM COATED ORAL at 21:23

## 2018-03-15 RX ADMIN — MORPHINE SULFATE 1 MG: 100 SOLUTION ORAL at 15:30

## 2018-03-15 RX ADMIN — Medication 10 ML: at 17:12

## 2018-03-15 RX ADMIN — INSULIN ASPART 2 UNITS: 100 INJECTION, SOLUTION INTRAVENOUS; SUBCUTANEOUS at 21:23

## 2018-03-15 RX ADMIN — GABAPENTIN 300 MG: 300 CAPSULE ORAL at 15:29

## 2018-03-15 RX ADMIN — MORPHINE SULFATE 2 MG: 2 INJECTION, SOLUTION INTRAMUSCULAR; INTRAVENOUS at 11:26

## 2018-03-15 RX ADMIN — INSULIN ASPART 2 UNITS: 100 INJECTION, SOLUTION INTRAVENOUS; SUBCUTANEOUS at 11:26

## 2018-03-15 RX ADMIN — BRIMONIDINE TARTRATE 1 DROP: 1.5 SOLUTION OPHTHALMIC at 21:19

## 2018-03-15 RX ADMIN — OXYCODONE HYDROCHLORIDE AND ACETAMINOPHEN 1 TABLET: 10; 325 TABLET ORAL at 16:29

## 2018-03-15 RX ADMIN — ALBUTEROL SULFATE 2.5 MG: 2.5 SOLUTION RESPIRATORY (INHALATION) at 06:44

## 2018-03-15 RX ADMIN — BRIMONIDINE TARTRATE 1 DROP: 1.5 SOLUTION OPHTHALMIC at 17:11

## 2018-03-15 RX ADMIN — DORZOLAMIDE HCL 1 DROP: 20 SOLUTION/ DROPS OPHTHALMIC at 21:21

## 2018-03-15 RX ADMIN — Medication 1334 MG: at 11:26

## 2018-03-15 RX ADMIN — INSULIN ASPART 2 UNITS: 100 INJECTION, SOLUTION INTRAVENOUS; SUBCUTANEOUS at 17:09

## 2018-03-15 RX ADMIN — RENAGEL 2400 MG: 800 TABLET ORAL at 17:10

## 2018-03-15 RX ADMIN — RENAGEL 2400 MG: 800 TABLET ORAL at 11:26

## 2018-03-15 RX ADMIN — DORZOLAMIDE HCL 1 DROP: 20 SOLUTION/ DROPS OPHTHALMIC at 17:11

## 2018-03-15 RX ADMIN — LATANOPROST 1 DROP: 50 SOLUTION OPHTHALMIC at 21:20

## 2018-03-15 RX ADMIN — Medication 1334 MG: at 08:25

## 2018-03-15 RX ADMIN — GABAPENTIN 300 MG: 300 CAPSULE ORAL at 21:18

## 2018-03-15 RX ADMIN — ALBUTEROL SULFATE 2.5 MG: 2.5 SOLUTION RESPIRATORY (INHALATION) at 22:35

## 2018-03-15 RX ADMIN — FAMOTIDINE 20 MG: 20 TABLET, FILM COATED ORAL at 08:26

## 2018-03-15 RX ADMIN — OXYCODONE HYDROCHLORIDE AND ACETAMINOPHEN 1 TABLET: 10; 325 TABLET ORAL at 21:18

## 2018-03-15 RX ADMIN — INSULIN ASPART 2 UNITS: 100 INJECTION, SOLUTION INTRAVENOUS; SUBCUTANEOUS at 08:24

## 2018-03-15 RX ADMIN — DORZOLAMIDE HCL 1 DROP: 20 SOLUTION/ DROPS OPHTHALMIC at 08:27

## 2018-03-15 RX ADMIN — MORPHINE SULFATE 2 MG: 2 INJECTION, SOLUTION INTRAMUSCULAR; INTRAVENOUS at 05:23

## 2018-03-15 RX ADMIN — PAROXETINE 30 MG: 10 TABLET, FILM COATED ORAL at 08:26

## 2018-03-15 RX ADMIN — RENAGEL 2400 MG: 800 TABLET ORAL at 08:25

## 2018-03-15 RX ADMIN — TRAZODONE HYDROCHLORIDE 50 MG: 50 TABLET ORAL at 21:23

## 2018-03-15 RX ADMIN — BRIMONIDINE TARTRATE 1 DROP: 1.5 SOLUTION OPHTHALMIC at 08:29

## 2018-03-15 RX ADMIN — FLUTICASONE PROPIONATE 2 SPRAY: 50 SPRAY, METERED NASAL at 08:25

## 2018-03-15 RX ADMIN — ASPIRIN 81 MG: 81 TABLET, COATED ORAL at 08:29

## 2018-03-15 RX ADMIN — GABAPENTIN 300 MG: 300 CAPSULE ORAL at 05:24

## 2018-03-15 NOTE — PROGRESS NOTES
"[facility]  [address] [place] [address]. [address]  T - [phone]  F - [phone]     Progress Note          PATIENT  DEMOGRAPHICS   PATIENT NAME: Cristo Musa                      PHYSICIAN: Alethea Diamond MD  : 1950  MRN: [id number]   LOS: 3 days    Patient Care Team:  Cory Rodríguez MD as PCP - General (Family Medicine)  Jett Nieto MD as PCP - Claims Attributed  Janelburak Gordon MD (Family Medicine)  Michelle Lo MD as Resident (Family Medicine)  John Sanchez MD as Resident (Family Medicine)  Eulogio Riley MD as Resident (Family Medicine)  Natasha De Los Santos MD as Resident (Family Medicine)  Subjective   SUBJECTIVE   Doing well. Denied chest pain, SOB, nausea, vomiting.         Objective   OBJECTIVE   Vital Signs  Temp:  [97.7 °F (36.5 °C)-99.5 °F (37.5 °C)] 98.4 °F (36.9 °C)  Heart Rate:  [63-74] 67  Resp:  [18-20] 18  BP: (102-144)/(46-67) 137/54    Flowsheet Rows    Flowsheet Row First Filed Value   Admission Height 172.7 cm (67.99\") Documented at 2018 1500   Admission Weight 100 kg (220 lb 10.9 oz) Documented at 2018 1500           I/O last 3 completed shifts:  In: 1233 [P.O.:720; I.V.:180; IV Piggyback:333]  Out: 325     PHYSICAL EXAM    Physical Exam   Constitutional: He is oriented to person, place, and time. He appears well-developed. No distress.   Moderately nourished   Cardiovascular: Normal rate, regular rhythm and normal heart sounds.  Exam reveals no gallop and no friction rub.    No murmur heard.  Pulmonary/Chest: Effort normal and breath sounds normal. No respiratory distress. He has no wheezes. He has no rales.   Abdominal: Soft. Bowel sounds are normal. He exhibits no distension. There is no tenderness.   Musculoskeletal: He exhibits no edema or tenderness.   Right forearm wound vac present.    Neurological: He is alert and oriented to person, place, and time.   Skin: He is not diaphoretic. "   Nursing note and vitals reviewed.      RESULTS   Results Review:      Results from last 7 days  Lab Units 03/15/18  0859 03/14/18  0751 03/13/18  1601  03/13/18  0614   SODIUM mmol/L 136* 134*  --   --  137   SODIUM, ARTERIAL mmol/L  --   --  130.4*  < >  --    POTASSIUM mmol/L 4.8 5.2*  --   --  4.9   CHLORIDE mmol/L 93* 90*  --   --  92*   CO2 mmol/L 25.0 27.0  --   --  28.0   BUN mg/dL 25* 44*  --   --  35*   CREATININE mg/dL 5.63* 8.09*  --   --  6.63*   CALCIUM mg/dL 8.2* 8.2*  --   --  9.0   BILIRUBIN mg/dL 0.6 0.7  --   --   --    ALK PHOS U/L 77 71  --   --   --    ALT (SGPT) U/L 21 12*  --   --   --    AST (SGOT) U/L 22 23  --   --   --    GLUCOSE mg/dL 182* 222*  --   --  167*   GLUCOSE, ARTERIAL mmol/L  --   --  172  < >  --    < > = values in this interval not displayed.    Estimated Creatinine Clearance: 14.9 mL/min (by C-G formula based on SCr of 5.63 mg/dL (H)).        Results from last 7 days  Lab Units 03/15/18  0859 03/14/18  0313 03/13/18  0614   WBC 10*3/mm3 7.56 7.46 7.69   HEMOGLOBIN g/dL 7.7* 8.5* 10.2*   PLATELETS 10*3/mm3 99* 124* 138*       Imaging Results (last 24 hours)     ** No results found for the last 24 hours. **           MEDICATIONS      albuterol 2.5 mg Nebulization Q8H - RT   aspirin 81 mg Oral Daily   brimonidine 1 drop Both Eyes TID   calcitriol 0.5 mcg Oral Once per day on Mon Wed Fri   calcium acetate 1,334 mg Oral TID With Meals   cetirizine 10 mg Oral Nightly   dorzolamide 1 drop Left Eye TID   famotidine 20 mg Oral Daily   fluticasone 2 spray Nasal Daily   gabapentin 300 mg Oral Q8H   insulin aspart 0-7 Units Subcutaneous 4x Daily AC & at Bedtime   insulin detemir 25 Units Subcutaneous Nightly   latanoprost 1 drop Both Eyes Nightly   PARoxetine 30 mg Oral Daily   sevelamer 2,400 mg Oral TID With Meals   traZODone 50 mg Oral Nightly       Pharmacy to dose vancomycin        Assessment/Plan   ASSESSMENT / PLAN    Principal Problem:    ESRD on dialysis    1. ESRD on HD  MWF:  - No HD needs today. Will arrange for HD tomorrow.      2. Anemia chronic kidney disease:   - Hemoglobin is low at 7.7. Will give Epogen with HD.       3. Secondary hyperparathyroidism/hyperphosphatemia:  - Continue PhosLo and Renagel.       4. Infected right forearm AV graft:  - s/p explantation. He has positive blood culture for staph (not aureus but methicillin resistant) about a month ago.  - On vancomycin. Needs Vanc for 3 weeks per CT surgery.     5. Diabetes type 2    6. Hypertension:  - Blood pressure is controlled.            This document has been electronically signed by Alethea Diamond MD on March 15, 2018 12:08 PM

## 2018-03-15 NOTE — PLAN OF CARE
Problem: Patient Care Overview  Goal: Plan of Care Review  Outcome: Ongoing (interventions implemented as appropriate)   03/14/18 0246 03/14/18 1915   Coping/Psychosocial   Plan of Care Reviewed With --  patient   Plan of Care Review   Progress improving --      Goal: Individualization and Mutuality  Outcome: Ongoing (interventions implemented as appropriate)    Goal: Discharge Needs Assessment  Outcome: Outcome(s) achieved Date Met: 03/15/18      Problem: Fall Risk (Adult)  Goal: Absence of Fall  Outcome: Ongoing (interventions implemented as appropriate)      Problem: Pain, Acute (Adult)  Goal: Acceptable Pain Control/Comfort Level  Outcome: Ongoing (interventions implemented as appropriate)

## 2018-03-15 NOTE — PLAN OF CARE
Problem: Patient Care Overview  Goal: Plan of Care Review  Outcome: Ongoing (interventions implemented as appropriate)   03/15/18 5958   Coping/Psychosocial   Plan of Care Reviewed With patient   Plan of Care Review   Progress improving   OTHER   Outcome Summary Left femoral IV d/c'd today, pt up to chair for supper. Pain management med switched to po, pt tolerating well. NPWT dsg changed today, awaiting NH to have wound vac to d/c patient. Plans for HD tmrw am, then d/c. VSS.      Goal: Individualization and Mutuality  Outcome: Ongoing (interventions implemented as appropriate)    Goal: Interprofessional Rounds/Family Conf  Outcome: Ongoing (interventions implemented as appropriate)      Problem: Fall Risk (Adult)  Goal: Absence of Fall  Outcome: Ongoing (interventions implemented as appropriate)      Problem: Pain, Acute (Adult)  Goal: Acceptable Pain Control/Comfort Level  Outcome: Ongoing (interventions implemented as appropriate)

## 2018-03-15 NOTE — PROGRESS NOTES
Cardiothoracic - Vascular Surgery Daily Note        LOS: 3 days   POD# 2  Revision RIGHT forearm ARTERIOVENOUS graft (excision),   Debridement RIGHT forearm (skin, SQ, fascia)  Negative pressure wound therapy (wound vac [id number])      Chief Complaint: Edema and pain RUE AV fistula arm      Subjective       VAS 4    ROS:    Review of Systems   Constitution: Positive for weakness. Negative for chills, decreased appetite and fever.   HENT: Negative for hearing loss, hoarse voice, nosebleeds and stridor.    Eyes: Negative for blurred vision and visual disturbance.   Cardiovascular: Negative for chest pain, claudication, cyanosis, dyspnea on exertion and leg swelling.   Respiratory: Negative for cough, hemoptysis, shortness of breath and wheezing.    Hematologic/Lymphatic: Negative for bleeding problem. Does not bruise/bleed easily.   Skin: Positive for color change, dry skin, flushing and poor wound healing. Negative for rash.   Musculoskeletal: Positive for arthritis, joint pain and stiffness.   Gastrointestinal: Negative for abdominal pain, heartburn, hematemesis, melena and nausea.   Genitourinary:        CHD dialysis today    Neurological: Negative for brief paralysis, focal weakness, numbness, paresthesias and sensory change.   Psychiatric/Behavioral: Negative for altered mental status.   Allergic/Immunologic: Negative for hives.         Objective     Vital Signs  Temp:  [97.9 °F (36.6 °C)-99.5 °F (37.5 °C)] 98.4 °F (36.9 °C)  Heart Rate:  [63-74] 67  Resp:  [18-20] 18  BP: (114-144)/(54-67) 137/54  Body mass index is 34.83 kg/m².    Intake/Output Summary (Last 24 hours) at 03/15/18 1325  Last data filed at 03/15/18 0800   Gross per 24 hour   Intake              873 ml   Output              325 ml   Net              548 ml     I/O this shift:  In: 120 [P.O.:120]  Out: -     Wt Readings from Last 3 Encounters:   03/15/18 104 kg (229 lb)   03/12/18 100 kg (220 lb 11.2 oz)   02/22/18 96.2 kg (212 lb)          Physical Exam   Physical Exam   Constitutional: He is oriented to person, place, and time. He appears well-nourished.   Body mass index is 36.71 kg/m².     HENT:   Head: Normocephalic.   Mouth/Throat: Oropharynx is clear and moist.   Eyes: Conjunctivae are normal. Pupils are equal, round, and reactive to light.   Mild R eye conjunctival edema    Neck: Neck supple. No JVD present.   Cardiovascular: Normal rate, regular rhythm and intact distal pulses.    Pulses:       Radial pulses are 2+ on the right side, and 2+ on the left side.        Posterior tibial pulses are 2+ on the right side, and 2+ on the left side.   Fistula Present RU Extremity: (Y)thrill/(Y)bruit/(Y)pulse. Aneurysmal (Y). Water Hammer Pulse (Y). Thinning (N).  Inderjit's Test <3sec (Y).  Skin warm/dry/pink/intact (Y). Radial Pulse (Y).  2 RUE edema  +sensation and mobility+    Pulmonary/Chest: Effort normal and breath sounds normal. No stridor. No respiratory distress. He has no wheezes. He has no rales.   Abdominal: Soft. Bowel sounds are normal.   Obese    Musculoskeletal: He exhibits edema (RUE ) and tenderness (RUE).   L femoral IV line   Neurological: He is alert and oriented to person, place, and time.   Skin: Skin is warm and dry. Capillary refill takes less than 2 seconds. No erythema. No pallor.   Wound vac to Lower RUE  Hand edema with sensation and mobility intact and refill  < 2 sec   Psychiatric: His behavior is normal.   Nursing note and vitals reviewed.        Results Review:    Lab Results   Component Value Date    WBC 7.56 03/15/2018    HGB 7.7 (L) 03/15/2018    HCT 23.6 (L) 03/15/2018    MCV 89.4 03/15/2018    PLT 99 (L) 03/15/2018         Lab Results   Component Value Date    GLUCOSE 182 (H) 03/15/2018    BUN 25 (H) 03/15/2018    CREATININE 5.63 (H) 03/15/2018    EGFRIFAFRI 12 (L) 03/15/2018    BCR 4.4 (L) 03/15/2018    K 4.8 03/15/2018    CO2 25.0 03/15/2018    CALCIUM 8.2 (L) 03/15/2018    ALBUMIN 3.70 03/15/2018    LABIL2  0.9 (L) 03/15/2018    AST 22 03/15/2018    ALT 21 03/15/2018                           PT/INR:  No results found for: PROTIME/No results found for: INR            albuterol 2.5 mg Nebulization Q8H - RT   aspirin 81 mg Oral Daily   brimonidine 1 drop Both Eyes TID   calcitriol 0.5 mcg Oral Once per day on Mon Wed Fri   calcium acetate 1,334 mg Oral TID With Meals   cetirizine 10 mg Oral Nightly   dorzolamide 1 drop Left Eye TID   famotidine 20 mg Oral Daily   fluticasone 2 spray Nasal Daily   gabapentin 300 mg Oral Q8H   insulin aspart 0-7 Units Subcutaneous 4x Daily AC & at Bedtime   insulin detemir 25 Units Subcutaneous Nightly   latanoprost 1 drop Both Eyes Nightly   morphine 0.4 mg/mL oral solution 0.2 mg Oral Once   PARoxetine 30 mg Oral Daily   sevelamer 2,400 mg Oral TID With Meals   traZODone 50 mg Oral Nightly          Patient Active Problem List   Diagnosis Code   • Pacemaker infection T82.7XXA   • Renal failure, chronic N18.9   • Diabetes mellitus E11.9   • Hypertension I10   • Complete heart block I44.2   • Arteriovenous shunt thrombosis T82.868A   • Follow-up surgery care Z09   • Arteriovenous fistula, acquired I77.0   • 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   • Exotropia H50.10   • 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   • Pseudophakia Z96.1   • Posterior subcapsular polar age-related cataract H25.049   • Nuclear cataract H25.10   • Primary open angle glaucoma of left eye, moderate stage H40.1122   • Cortical age-related cataract H25.019   • ESRD (end stage renal disease) N18.6   • Primary insomnia F51.01   • Chronic pain G89.29   • Anxiety F41.9   • ESRD on dialysis N18.6, Z99.2   • Gastroesophageal reflux disease without esophagitis K21.9   • Neuropathy G62.9   • A-V fistula I77.0   • Physical deconditioning R53.81   • COPD (chronic obstructive pulmonary disease) J44.9         ASSESSMENT/PLAN     Satisfactory PO:  Eating well, assistance with ambulation.      Infected old Right AV Fistula SP ID:  Continue  antibiotics will be given with dialysis     Continue wound care with wound vac. Will change today with premed.    Pain PO:  Controlled with 8 mg morphine in 24 hr   Dc morphine  Add oral percocet with 1 dose oral morphine per wound vac change.     ESRD/CHD:  RUE AV fistula functioning well with dialysis     Rehab:  OT/PT    Disp:  Continue care.  Return to skilled nursing facility ([facility]) when wound vac available there, case management involved.

## 2018-03-15 NOTE — NURSING NOTE
Old wound vac dressing removed from right arm. Wounds measured: Lateral wound half [place] shaped 25 x 3.5 x 2.5 cm medial wound 11 x 1.6 x 2 cm. Good granulation bed. Moderate amount of serosanguinous drainage. Adaptic placed in wound beds before black sponge. One piece of black sponge used in each wound bed. Two pieces of black sponge used to bridge wounds. Wound vac dressing will need changing 03-. Then on a MON, Wed and Friday schedule. POA yes Wounds irrigated with normal saline before placing sponge into wound beds. Vac setting 100 mmhg 24 hours continuous.

## 2018-03-16 VITALS
DIASTOLIC BLOOD PRESSURE: 65 MMHG | BODY MASS INDEX: 35.55 KG/M2 | SYSTOLIC BLOOD PRESSURE: 113 MMHG | WEIGHT: 234.6 LBS | OXYGEN SATURATION: 96 % | TEMPERATURE: 96.9 F | HEIGHT: 68 IN | HEART RATE: 72 BPM | RESPIRATION RATE: 16 BRPM

## 2018-03-16 PROBLEM — G89.18 POST-OPERATIVE PAIN: Status: ACTIVE | Noted: 2018-03-16

## 2018-03-16 PROBLEM — S41.109A NON-HEALING WOUND OF UPPER EXTREMITY: Status: ACTIVE | Noted: 2018-03-16

## 2018-03-16 LAB
ALBUMIN SERPL-MCNC: 3.3 G/DL (ref 3.4–4.8)
ANION GAP SERPL CALCULATED.3IONS-SCNC: 16 MMOL/L (ref 5–15)
BUN BLD-MCNC: 27 MG/DL (ref 7–21)
BUN/CREAT SERPL: 4.2 (ref 7–25)
CALCIUM SPEC-SCNC: 8.4 MG/DL (ref 8.4–10.2)
CHLORIDE SERPL-SCNC: 88 MMOL/L (ref 95–110)
CO2 SERPL-SCNC: 27 MMOL/L (ref 22–31)
CREAT BLD-MCNC: 6.5 MG/DL (ref 0.7–1.3)
DEPRECATED RDW RBC AUTO: 45 FL (ref 35.1–43.9)
ERYTHROCYTE [DISTWIDTH] IN BLOOD BY AUTOMATED COUNT: 14.5 % (ref 11.5–14.5)
GFR SERPL CREATININE-BSD FRML MDRD: 10 ML/MIN/1.73 (ref 49–113)
GLUCOSE BLD-MCNC: 170 MG/DL (ref 60–100)
GLUCOSE BLDC GLUCOMTR-MCNC: 126 MG/DL (ref 70–130)
GLUCOSE BLDC GLUCOMTR-MCNC: 148 MG/DL (ref 70–130)
HCT VFR BLD AUTO: 22.2 % (ref 39–49)
HGB BLD-MCNC: 7.7 G/DL (ref 13.7–17.3)
MCH RBC QN AUTO: 30.1 PG (ref 26.5–34)
MCHC RBC AUTO-ENTMCNC: 34.7 G/DL (ref 31.5–36.3)
MCV RBC AUTO: 86.7 FL (ref 80–98)
PHOSPHATE SERPL-MCNC: 4.4 MG/DL (ref 2.4–4.4)
PLATELET # BLD AUTO: 112 10*3/MM3 (ref 150–450)
PMV BLD AUTO: 8.2 FL (ref 8–12)
POTASSIUM BLD-SCNC: 3.3 MMOL/L (ref 3.5–5.1)
RBC # BLD AUTO: 2.56 10*6/MM3 (ref 4.37–5.74)
SODIUM BLD-SCNC: 131 MMOL/L (ref 137–145)
VANCOMYCIN SERPL-MCNC: 19.97 MCG/ML
WBC NRBC COR # BLD: 6.16 10*3/MM3 (ref 3.2–9.8)

## 2018-03-16 PROCEDURE — 85027 COMPLETE CBC AUTOMATED: CPT | Performed by: INTERNAL MEDICINE

## 2018-03-16 PROCEDURE — 99024 POSTOP FOLLOW-UP VISIT: CPT | Performed by: NURSE PRACTITIONER

## 2018-03-16 PROCEDURE — 63510000001 EPOETIN ALFA PER 1000 UNITS: Performed by: INTERNAL MEDICINE

## 2018-03-16 PROCEDURE — 80202 ASSAY OF VANCOMYCIN: CPT | Performed by: INTERNAL MEDICINE

## 2018-03-16 PROCEDURE — 82962 GLUCOSE BLOOD TEST: CPT

## 2018-03-16 PROCEDURE — 80069 RENAL FUNCTION PANEL: CPT | Performed by: INTERNAL MEDICINE

## 2018-03-16 RX ORDER — HYDROCODONE BITARTRATE AND ACETAMINOPHEN 5; 325 MG/1; MG/1
1 TABLET ORAL EVERY 4 HOURS PRN
Qty: 30 TABLET | Refills: 0 | Status: SHIPPED | OUTPATIENT
Start: 2018-03-16 | End: 2018-06-19 | Stop reason: SDUPTHER

## 2018-03-16 RX ORDER — HYDROCODONE BITARTRATE AND ACETAMINOPHEN 10; 325 MG/1; MG/1
1 TABLET ORAL EVERY 4 HOURS PRN
Qty: 30 TABLET | Refills: 0 | Status: SHIPPED | OUTPATIENT
Start: 2018-03-16 | End: 2018-03-23 | Stop reason: SDUPTHER

## 2018-03-16 RX ORDER — ALBUMIN (HUMAN) 12.5 G/50ML
12.5 SOLUTION INTRAVENOUS AS NEEDED
Status: DISCONTINUED | OUTPATIENT
Start: 2018-03-16 | End: 2018-03-16 | Stop reason: HOSPADM

## 2018-03-16 RX ORDER — SEVELAMER HYDROCHLORIDE 800 MG/1
2400 TABLET, FILM COATED ORAL
Qty: 30 TABLET | Refills: 0 | Status: SHIPPED | OUTPATIENT
Start: 2018-03-16 | End: 2018-06-26 | Stop reason: SDUPTHER

## 2018-03-16 RX ORDER — SENNA AND DOCUSATE SODIUM 50; 8.6 MG/1; MG/1
2 TABLET, FILM COATED ORAL 2 TIMES DAILY PRN
Qty: 60 TABLET | Refills: 0 | Status: ON HOLD | OUTPATIENT
Start: 2018-03-16 | End: 2019-01-01

## 2018-03-16 RX ADMIN — GABAPENTIN 300 MG: 300 CAPSULE ORAL at 13:37

## 2018-03-16 RX ADMIN — GABAPENTIN 300 MG: 300 CAPSULE ORAL at 06:07

## 2018-03-16 RX ADMIN — FAMOTIDINE 20 MG: 20 TABLET, FILM COATED ORAL at 13:37

## 2018-03-16 RX ADMIN — OXYCODONE HYDROCHLORIDE AND ACETAMINOPHEN 1 TABLET: 10; 325 TABLET ORAL at 02:08

## 2018-03-16 RX ADMIN — RENAGEL 2400 MG: 800 TABLET ORAL at 13:37

## 2018-03-16 RX ADMIN — PAROXETINE 30 MG: 10 TABLET, FILM COATED ORAL at 13:37

## 2018-03-16 RX ADMIN — FLUTICASONE PROPIONATE 2 SPRAY: 50 SPRAY, METERED NASAL at 13:50

## 2018-03-16 RX ADMIN — ERYTHROPOIETIN 10000 UNITS: 10000 INJECTION, SOLUTION INTRAVENOUS; SUBCUTANEOUS at 08:52

## 2018-03-16 RX ADMIN — ASPIRIN 81 MG: 81 TABLET, COATED ORAL at 13:37

## 2018-03-16 RX ADMIN — CALCITRIOL 0.5 MCG: 0.25 CAPSULE, LIQUID FILLED ORAL at 13:38

## 2018-03-16 RX ADMIN — Medication 1334 MG: at 13:42

## 2018-03-16 NOTE — PROGRESS NOTES
"[facility]  [address] [place] [address]. [id number]  T - [phone]  F - [phone]     Progress Note          PATIENT  DEMOGRAPHICS   PATIENT NAME: Cristo Musa                      PHYSICIAN: Sudhir SteinerLUIS  : 1950  MRN: [id number]   LOS: 4 days    Patient Care Team:  Cory Rodríguez MD as PCP - General (Family Medicine)  Jett Nieto MD as PCP - Claims Attributed  Janel Gordon MD (Family Medicine)  Michelle Lo MD as Resident (Family Medicine)  John Sanchez MD as Resident (Family Medicine)  Eulogio Riley MD as Resident (Family Medicine)  Natasha De Los Santos MD as Resident (Family Medicine)  Subjective   SUBJECTIVE   Doing well. Denied chest pain, SOB, nausea, vomiting. Seen and examined on HD today.         Objective   OBJECTIVE   Vital Signs  Temp:  [96.2 °F (35.7 °C)-98.4 °F (36.9 °C)] 96.5 °F (35.8 °C)  Heart Rate:  [63-68] 63  Resp:  [16-18] 16  BP: (121-137)/(54-82) 136/56    Flowsheet Rows    Flowsheet Row First Filed Value   Admission Height 172.7 cm (67.99\") Documented at 2018 1500   Admission Weight 100 kg (220 lb 10.9 oz) Documented at 2018 1500           I/O last 3 completed shifts:  In: 480 [P.O.:480]  Out: 300     PHYSICAL EXAM    Physical Exam   Constitutional: He is oriented to person, place, and time. He appears well-developed. No distress.   Moderately nourished   Cardiovascular: Normal rate, regular rhythm and normal heart sounds.  Exam reveals no gallop and no friction rub.    No murmur heard.  Pulmonary/Chest: Effort normal and breath sounds normal. No respiratory distress. He has no wheezes. He has no rales.   Abdominal: Soft. Bowel sounds are normal. He exhibits no distension. There is no tenderness.   Musculoskeletal: He exhibits no edema or tenderness.   Right forearm wound vac present.    Neurological: He is alert and oriented to person, place, and time.   Skin: He is not diaphoretic. "   Nursing note and vitals reviewed.      RESULTS   Results Review:      Results from last 7 days  Lab Units 03/15/18  0859 03/14/18  0751 03/13/18  1601  03/13/18  0614   SODIUM mmol/L 136* 134*  --   --  137   SODIUM, ARTERIAL mmol/L  --   --  130.4*  < >  --    POTASSIUM mmol/L 4.8 5.2*  --   --  4.9   CHLORIDE mmol/L 93* 90*  --   --  92*   CO2 mmol/L 25.0 27.0  --   --  28.0   BUN mg/dL 25* 44*  --   --  35*   CREATININE mg/dL 5.63* 8.09*  --   --  6.63*   CALCIUM mg/dL 8.2* 8.2*  --   --  9.0   BILIRUBIN mg/dL 0.6 0.7  --   --   --    ALK PHOS U/L 77 71  --   --   --    ALT (SGPT) U/L 21 12*  --   --   --    AST (SGOT) U/L 22 23  --   --   --    GLUCOSE mg/dL 182* 222*  --   --  167*   GLUCOSE, ARTERIAL mmol/L  --   --  172  < >  --    < > = values in this interval not displayed.    Estimated Creatinine Clearance: 15 mL/min (by C-G formula based on SCr of 5.63 mg/dL (H)).        Results from last 7 days  Lab Units 03/15/18  0859 03/14/18  0313 03/13/18  0614   WBC 10*3/mm3 7.56 7.46 7.69   HEMOGLOBIN g/dL 7.7* 8.5* 10.2*   PLATELETS 10*3/mm3 99* 124* 138*       Imaging Results (last 24 hours)     ** No results found for the last 24 hours. **           MEDICATIONS      aspirin 81 mg Oral Daily   brimonidine 1 drop Both Eyes TID   calcitriol 0.5 mcg Oral Once per day on Mon Wed Fri   calcium acetate 1,334 mg Oral TID With Meals   cetirizine 10 mg Oral Nightly   dorzolamide 1 drop Left Eye TID   epoetin ziggy 10,000 Units Intravenous Once   famotidine 20 mg Oral Daily   fluticasone 2 spray Nasal Daily   gabapentin 300 mg Oral Q8H   insulin aspart 0-7 Units Subcutaneous 4x Daily AC & at Bedtime   insulin detemir 25 Units Subcutaneous Nightly   latanoprost 1 drop Both Eyes Nightly   PARoxetine 30 mg Oral Daily   sevelamer 2,400 mg Oral TID With Meals   traZODone 50 mg Oral Nightly          Assessment/Plan   ASSESSMENT / PLAN    Principal Problem:    ESRD on dialysis    1. ESRD on HD MWF:  - Currently on Hd, goal for  3L UF. Pt's reported weights do not match pt's presentation, suspect bed scale does not match outpatient scale. Today's labs pending, K stable as of yesterday.      2. Anemia chronic kidney disease:   - Hemoglobin is low at 7.7. Will give Epogen 10,000 with HD today.     3. Secondary hyperparathyroidism/hyperphosphatemia:  - Continue PhosLo and Renagel.       4. Infected right forearm AV graft:  - s/p explantation. He has positive blood culture for staph (not aureus but methicillin resistant) about a month ago.  - On vancomycin after HD MWF. Needs Vanc for 3 weeks per CT surgery.     5. Diabetes type 2    6. Hypertension:  - Blood pressure is controlled.     7. Disposition Pt is to return to [facility] when wound vac is available at facility.           This document has been electronically signed by LUIS Chamberlain on March 16, 2018 7:58 AM

## 2018-03-16 NOTE — PLAN OF CARE
Problem: Patient Care Overview  Goal: Plan of Care Review   03/16/18 0433   Coping/Psychosocial   Plan of Care Reviewed With patient   Plan of Care Review   Progress improving   OTHER   Outcome Summary Pt pain controlled with meds. VSS,        Problem: Fall Risk (Adult)  Goal: Absence of Fall  Outcome: Ongoing (interventions implemented as appropriate)      Problem: Pain, Acute (Adult)  Goal: Acceptable Pain Control/Comfort Level  Outcome: Ongoing (interventions implemented as appropriate)

## 2018-03-16 NOTE — PROGRESS NOTES
Cardiothoracic - Vascular Surgery Daily Note        LOS: 4 days   POD# 3  Revision RIGHT forearm ARTERIOVENOUS graft (excision),   Debridement RIGHT forearm (skin, SQ, fascia)  Negative pressure wound therapy (wound vac [id number])      Chief Complaint: Edema and pain RUE AV fistula arm      Subjective       VAS 4    ROS:    Review of Systems   Constitution: Positive for weakness. Negative for chills, decreased appetite and fever.   HENT: Negative for hearing loss, hoarse voice, nosebleeds and stridor.    Eyes: Negative for blurred vision and visual disturbance.   Cardiovascular: Negative for chest pain, claudication, cyanosis, dyspnea on exertion and leg swelling.   Respiratory: Negative for cough, hemoptysis, shortness of breath and wheezing.    Hematologic/Lymphatic: Negative for bleeding problem. Does not bruise/bleed easily.   Skin: Positive for color change, dry skin, flushing and poor wound healing. Negative for rash.   Musculoskeletal: Positive for arthritis, joint pain and stiffness.   Gastrointestinal: Negative for abdominal pain, heartburn, hematemesis, melena and nausea.   Genitourinary:        CHD dialysis today    Neurological: Negative for brief paralysis, focal weakness, numbness, paresthesias and sensory change.   Psychiatric/Behavioral: Negative for altered mental status.   Allergic/Immunologic: Negative for hives.         Objective     Vital Signs  Temp:  [96.2 °F (35.7 °C)-98.4 °F (36.9 °C)] 96.5 °F (35.8 °C)  Heart Rate:  [63-68] 63  Resp:  [16-18] 16  BP: (121-137)/(54-82) 136/56  Body mass index is 35.68 kg/m².    Intake/Output Summary (Last 24 hours) at 03/16/18 0945  Last data filed at 03/16/18 0700   Gross per 24 hour   Intake              360 ml   Output              200 ml   Net              160 ml     No intake/output data recorded.    Wt Readings from Last 3 Encounters:   03/16/18 106 kg (234 lb 9.6 oz)   03/12/18 100 kg (220 lb 11.2 oz)   02/22/18 96.2 kg (212 lb)     Seen in dialysis  today     Physical Exam   Physical Exam   Constitutional: He is oriented to person, place, and time. He appears well-nourished.   Body mass index is 36.71 kg/m².     HENT:   Head: Normocephalic.   Mouth/Throat: Oropharynx is clear and moist.   Eyes: Conjunctivae are normal. Pupils are equal, round, and reactive to light.   Mild R eye conjunctival edema    Neck: Neck supple. No JVD present.   Cardiovascular: Normal rate, regular rhythm and intact distal pulses.    Pulses:       Radial pulses are 2+ on the right side, and 2+ on the left side.        Posterior tibial pulses are 2+ on the right side, and 2+ on the left side.   Fistula Present RU Extremity: (Y)thrill/(Y)bruit/(Y)pulse. Aneurysmal (Y). Water Hammer Pulse (Y). Thinning (N).    Skin warm/dry/pink/intact (Y). Radial Pulse (Y).  1-2 RUE edema  +sensation and mobility+   Connected to dialysis    Pulmonary/Chest: Effort normal and breath sounds normal. No stridor. No respiratory distress. He has no wheezes. He has no rales.   Abdominal: Soft. Bowel sounds are normal.   Obese    Musculoskeletal: He exhibits edema (RUE ) and tenderness (RUE).   Neurological: He is alert and oriented to person, place, and time.   Skin: Skin is warm and dry. Capillary refill takes less than 2 seconds. No erythema. No pallor.   Wound vac to Lower RUE  Hand edema with sensation and mobility intact and refill  < 2 sec   Psychiatric: His behavior is normal.   Nursing note and vitals reviewed.        Results Review:    Lab Results   Component Value Date    WBC 7.56 03/15/2018    HGB 7.7 (L) 03/15/2018    HCT 23.6 (L) 03/15/2018    MCV 89.4 03/15/2018    PLT 99 (L) 03/15/2018         Lab Results   Component Value Date    GLUCOSE 182 (H) 03/15/2018    BUN 25 (H) 03/15/2018    CREATININE 5.63 (H) 03/15/2018    EGFRIFAFRI 12 (L) 03/15/2018    BCR 4.4 (L) 03/15/2018    K 4.8 03/15/2018    CO2 25.0 03/15/2018    CALCIUM 8.2 (L) 03/15/2018    ALBUMIN 3.70 03/15/2018    LABIL2 0.9 (L)  03/15/2018    AST 22 03/15/2018    ALT 21 03/15/2018                           PT/INR:  No results found for: PROTIME/No results found for: INR            aspirin 81 mg Oral Daily   brimonidine 1 drop Both Eyes TID   calcitriol 0.5 mcg Oral Once per day on Mon Wed Fri   calcium acetate 1,334 mg Oral TID With Meals   cetirizine 10 mg Oral Nightly   dorzolamide 1 drop Left Eye TID   famotidine 20 mg Oral Daily   fluticasone 2 spray Nasal Daily   gabapentin 300 mg Oral Q8H   insulin aspart 0-7 Units Subcutaneous 4x Daily AC & at Bedtime   insulin detemir 25 Units Subcutaneous Nightly   latanoprost 1 drop Both Eyes Nightly   PARoxetine 30 mg Oral Daily   sevelamer 2,400 mg Oral TID With Meals   traZODone 50 mg Oral Nightly          Patient Active Problem List   Diagnosis Code   • Pacemaker infection T82.7XXA   • Renal failure, chronic N18.9   • Diabetes mellitus E11.9   • Hypertension I10   • Complete heart block I44.2   • Arteriovenous shunt thrombosis T82.868A   • Follow-up surgery care Z09   • Arteriovenous fistula, acquired I77.0   • 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   • Exotropia H50.10   • 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   • Pseudophakia Z96.1   • Posterior subcapsular polar age-related cataract H25.049   • Nuclear cataract H25.10   • Primary open angle glaucoma of left eye, moderate stage H40.1122   • Cortical age-related cataract H25.019   • ESRD (end stage renal disease) N18.6   • Primary insomnia F51.01   • Chronic pain G89.29   • Anxiety F41.9   • ESRD on dialysis N18.6, Z99.2   • Gastroesophageal reflux disease without esophagitis K21.9   • Neuropathy G62.9   • A-V fistula I77.0   • Physical deconditioning R53.81   • COPD (chronic obstructive pulmonary disease) J44.9         ASSESSMENT/PLAN     Satisfactory PO:  Eating well, assistance with ambulation.      Infected old Right AV Fistula SP ID:  Continue antibiotics will be given with dialysis 3 weeks post dc    Continue wound care with wound vac. Change at  SNF MWF      Pain PO:  Controlled with 30mg percocet in 24 hrs.  States wound vac change pain moderate, tolerated well.     ESRD/CHD:  RUE AV fistula functioning well with dialysis     Rehab:  OT/PT    Disp:   Return to skilled nursing facility ([facility]) post dialysis.   Wound vac changes MWF  Return to office Wednesday 3/21/18 post dialysis for wound vac change.

## 2018-03-16 NOTE — DISCHARGE SUMMARY
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 male presented to outpatient clinic with infected previous AV site which had been ligated and MRSA sepsis.  He was admitted from outpatient to hospital.  Medical management was initiated with IV Antibiotics.  The following am he underwent Revision RIGHT forearm ARTERIOVENOUS graft (excision), Debridement RIGHT forearm (skin, SQ, fascia), and  Negative pressure wound therapy (wound vac [id number]).  He tolerated the procedure well.  Remain stable and improved.  Fever resolved.  Pain controlled.  Level of activity return to baseline.  He tolerated first wound vac change well.  Return of bodily functions.  SNF has wound vac.   He will complete dialysis today(receivng blood during CHD) and return to [facility].    Procedures Performed  Procedure(s):  revision RIGHT forearm ARTERIOVENOUS graft (excision), debridement, wound vac       Consults:   Consults     Date and Time Order Name Status Description    3/12/2018 1341 Inpatient Nephrology Consult Completed           Pertinent Test Results:   Lab Results   Component Value Date    WBC 7.56 03/15/2018    HGB 7.7 (L) 03/15/2018    HCT 23.6 (L) 03/15/2018    MCV 89.4 03/15/2018    PLT 99 (L) 03/15/2018     Lab Results   Component Value Date    GLUCOSE 182 (H) 03/15/2018    BUN 25 (H) 03/15/2018    CREATININE 5.63 (H) 03/15/2018    EGFRIFAFRI 12 (L) 03/15/2018    BCR 4.4 (L) 03/15/2018    K 4.8 03/15/2018    CO2 25.0 03/15/2018    CALCIUM 8.2  (L) 03/15/2018    ALBUMIN 3.70 03/15/2018    LABIL2 0.9 (L) 03/15/2018    AST 22 03/15/2018    ALT 21 03/15/2018         Condition on Discharge:  Satisfactory    Vital Signs  Temp:  [96.2 °F (35.7 °C)-98.4 °F (36.9 °C)] 96.5 °F (35.8 °C)  Heart Rate:  [63-68] 63  Resp:  [16-18] 16  BP: (121-137)/(54-82) 136/56  Physical Exam   Constitutional: He is oriented to person, place, and time. He appears well-nourished.   Body mass index is 36.71 kg/m².     HENT:   Head: Normocephalic.   Mouth/Throat: Oropharynx is clear and moist.   Eyes: Conjunctivae are normal. Pupils are equal, round, and reactive to light.   Mild R eye conjunctival edema    Neck: Neck supple. No JVD present.   Cardiovascular: Normal rate, regular rhythm and intact distal pulses.    Pulses:       Radial pulses are 2+ on the right side, and 2+ on the left side.        Posterior tibial pulses are 2+ on the right side, and 2+ on the left side.   Fistula Present RU Extremity: (Y)thrill/(Y)bruit/(Y)pulse. Aneurysmal (Y). Water Hammer Pulse (Y). Thinning (N).    Skin warm/dry/pink/intact (Y). Radial Pulse (Y).  1-2 RUE edema  +sensation and mobility+   Connected to dialysis    Pulmonary/Chest: Effort normal and breath sounds normal. No stridor. No respiratory distress. He has no wheezes. He has no rales.   Abdominal: Soft. Bowel sounds are normal.   Obese    Musculoskeletal: He exhibits edema (RUE ) and tenderness (RUE).   Neurological: He is alert and oriented to person, place, and time.   Skin: Skin is warm and dry. Capillary refill takes less than 2 seconds. No erythema. No pallor.   Wound vac to Lower RUE  Hand edema with sensation and mobility intact and refill  < 2 sec   Discharge Disposition  Skilled Nursing Facility (DC - External)    Discharge Medications   Cristo Musa [facility] Medication Instructions [id number]    Printed on:03/16/18 1001   Medication Information                      acetaminophen (TYLENOL) 500 MG tablet  Take 500  mg by mouth Every 4 (Four) Hours As Needed for Mild Pain  or Fever.             aspirin 81 MG EC tablet  Take 1 tablet by mouth Daily.             brimonidine (ALPHAGAN P) 0.1 % solution ophthalmic solution  Administer 1 drop to both eyes 3 (Three) Times a Day.             calcium acetate (PHOSLO) 667 MG capsule  Take 1,334 mg by mouth 3 (Three) Times a Day With Meals. 2 capsules 3 times daily             dorzolamide (TRUSOPT) 2 % ophthalmic solution  Administer 1 drop into the left eye 3 (Three) Times a Day.             famotidine (PEPCID) 20 MG tablet  Take 20 mg by mouth every night at bedtime.             fluticasone (FLONASE) 50 MCG/ACT nasal spray  2 sprays into each nostril daily. Administer 2 sprays in each nostril for each dose.             gabapentin (NEURONTIN) 300 MG capsule  Take 1 capsule by mouth 3 (Three) Times a Day.             guaiFENesin 200 MG tablet  Take 400 mg by mouth Every 4 (Four) Hours As Needed for Cough.             HYDROcodone-acetaminophen (NORCO)  MG per tablet  Take 1 tablet by mouth Every 4 (Four) Hours As Needed for Severe Pain  (Postop pain VAS 8-10 and pre wound vac change).             HYDROcodone-acetaminophen (NORCO) 5-325 MG per tablet  Take 1 tablet by mouth Every 4 (Four) Hours As Needed for Moderate Pain  (Postop pain VAS 5-7).             hydrOXYzine (ATARAX) 25 MG tablet  Take 25 mg by mouth 3 (Three) Times a Day As Needed for Anxiety.             insulin aspart (NovoLOG) 100 UNIT/ML injection  Inject  under the skin 3 (Three) Times a Day Before Meals. Sliding scale:  = 0 units; 150-200 = 3 units; 200-250 = 6 units; 250-300 = 9 units; 300-350 = 12 units; >350 = 15 units and call MD             insulin glargine (LANTUS) 100 UNIT/ML injection  Inject 25 Units under the skin Every Night.             latanoprost (XALATAN) 0.005 % ophthalmic solution  Administer 1 drop to both eyes every night.             loperamide (IMODIUM) 2 MG capsule  Take 2 mg by mouth  Every 6 (Six) Hours As Needed for Diarrhea.             loratadine (CLARITIN) 10 MG tablet  Take 10 mg by mouth daily.             ondansetron ODT (ZOFRAN-ODT) 4 MG disintegrating tablet  Take 1 tablet by mouth Every 6 (Six) Hours As Needed for Nausea or Vomiting.             PARoxetine (PAXIL) 30 MG tablet  Take 30 mg by mouth every night at bedtime.             polyethylene glycol (MIRALAX) packet  Take 17 g by mouth Daily As Needed (constipation).             sennosides-docusate sodium (SENOKOT-S) 8.6-50 MG tablet  Take 2 tablets by mouth 2 (Two) Times a Day As Needed for Constipation.             sevelamer (RENAGEL) 800 MG tablet  Take 3 tablets by mouth 3 (Three) Times a Day With Meals.             sevelamer (RENVELA) 800 MG tablet  Take 2,400 mg by mouth 3 (Three) Times a Day With Meals.             traZODone (DESYREL) 50 MG tablet  Take 50 mg by mouth every night at bedtime.             Reviewed risks, benefits, and habit forming potential and weaning from narcotic medication. Patient understands and wishes to receive prescription.  Prescription written for Norco 10/325 # 30 and Norco 5/325 # 30  for post-surgical pain after Felipe [id number]  placed on file.      Discharge Diet Renal diet       Activity at Discharge  Activity Instructions     Discharge Activity       No surgical restriction except no lifting >  10Lbs RUE          Follow-up Appointments  Future Appointments  Date Time Provider Department Center   3/21/2018 11:00 AM LUIS Johnson [facility] None   6/14/2018 1:00 PM [facility]   6/14/2018 1:40 PM LUIS Mclean [facility] None     Additional Instructions for the Follow-ups that You Need to Schedule     Discharge Follow-up with Specified Provider: Farhana Patel Wednesday post dialysis for wound vac change 3/21/; 1 Week    As directed      To:  Farhana Patel Wednesday post dialysis for wound vac change 3/21/    Follow Up:  1 Week         Notify Physician  or Go To The ED For the Following Conditions    As directed      Signs and symptoms of infection including drainage from operative site, redness, swelling, with associated fever and/or chills notify Heart and Vascular center immediately for wound check.    Order Comments:  Signs and symptoms of infection including drainage from operative site, redness, swelling, with associated fever and/or chills notify Heart and Vascular center immediately for wound check.                Test Results Pending at Discharge   Order Current Status    Blood Gas, Arterial Collected (03/13/18 6969)    Tissue / Bone Culture - Tissue, Arm, Right Preliminary result          Time: Discharge 50 min          This document has been electronically signed by LUIS Richard on March 16, 2018 10:01 AM

## 2018-03-17 LAB
ABO + RH BLD: NORMAL
BH BB BLOOD EXPIRATION DATE: NORMAL
BH BB BLOOD TYPE BARCODE: 1700
BH BB BLOOD TYPE BARCODE: 7300
BH BB DISPENSE STATUS: NORMAL
BH BB PRODUCT CODE: NORMAL
BH BB UNIT NUMBER: NORMAL
UNIT  ABO: NORMAL
UNIT  RH: NORMAL

## 2018-03-21 LAB
BACTERIA SPEC AEROBE CULT: ABNORMAL
BACTERIA SPEC AEROBE CULT: ABNORMAL
GRAM STN SPEC: ABNORMAL

## 2018-03-23 ENCOUNTER — OFFICE VISIT (OUTPATIENT)
Dept: CARDIAC SURGERY | Facility: CLINIC | Age: 68
End: 2018-03-23

## 2018-03-23 VITALS
SYSTOLIC BLOOD PRESSURE: 105 MMHG | BODY MASS INDEX: 35.42 KG/M2 | DIASTOLIC BLOOD PRESSURE: 67 MMHG | HEART RATE: 69 BPM | HEIGHT: 68 IN | TEMPERATURE: 97.4 F | WEIGHT: 233.69 LBS | OXYGEN SATURATION: 97 %

## 2018-03-23 DIAGNOSIS — I77.0 ARTERIOVENOUS FISTULA, ACQUIRED (HCC): Primary | ICD-10-CM

## 2018-03-23 DIAGNOSIS — Z48.812 SURGICAL AFTERCARE, CIRCULATORY SYSTEM: ICD-10-CM

## 2018-03-23 DIAGNOSIS — T82.868S: ICD-10-CM

## 2018-03-23 PROCEDURE — 99024 POSTOP FOLLOW-UP VISIT: CPT | Performed by: NURSE PRACTITIONER

## 2018-03-23 RX ORDER — HYDROCODONE BITARTRATE AND ACETAMINOPHEN 10; 325 MG/1; MG/1
1 TABLET ORAL EVERY 4 HOURS PRN
Qty: 30 TABLET | Refills: 0 | Status: SHIPPED | OUTPATIENT
Start: 2018-03-23 | End: 2018-07-13

## 2018-03-28 ENCOUNTER — OFFICE VISIT (OUTPATIENT)
Dept: CARDIAC SURGERY | Facility: CLINIC | Age: 68
End: 2018-03-28

## 2018-03-28 VITALS
DIASTOLIC BLOOD PRESSURE: 77 MMHG | SYSTOLIC BLOOD PRESSURE: 120 MMHG | TEMPERATURE: 97.3 F | HEIGHT: 68 IN | WEIGHT: 233.69 LBS | HEART RATE: 64 BPM | BODY MASS INDEX: 35.42 KG/M2 | OXYGEN SATURATION: 97 %

## 2018-03-28 DIAGNOSIS — Z99.2 ESRD ON DIALYSIS (HCC): Primary | ICD-10-CM

## 2018-03-28 DIAGNOSIS — N18.6 ESRD ON DIALYSIS (HCC): Primary | ICD-10-CM

## 2018-03-28 DIAGNOSIS — S41.101D NON-HEALING WOUND OF UPPER EXTREMITY, RIGHT, SUBSEQUENT ENCOUNTER: ICD-10-CM

## 2018-03-28 DIAGNOSIS — Z09 FOLLOW-UP SURGERY CARE: ICD-10-CM

## 2018-03-28 PROCEDURE — 99024 POSTOP FOLLOW-UP VISIT: CPT | Performed by: NURSE PRACTITIONER

## 2018-03-28 NOTE — PROGRESS NOTES
Subjective   Patient ID: Cristo Musa is a 68 y.o. male is here today for follow-up.    Chief Complaint:    Chief Complaint   Patient presents with   • Wound Check     Wound Vac RIGHT lower arm       History of Present Illness  The following portions of the patient's history were reviewed and updated as appropriate: allergies, current medications, past family history, past medical history, past social history, past surgical history and problem list.  Recent images independently reviewed.  Available laboratory values reviewed.  PCP:  Cory Rodríguez MD  Nephrology:  Dr Vilchis,  [facility]  Cardiology:  Dr Nieto        Resides:  [facility].     67 y.o. male with ESRD on  hemodialysis, HTN, COPD, peripheral neuropathy, DM, CAD, Hepatitis C, nonischemic cardiomyopathy (EF 30%)..  former smoker.  Long term access for hemodialysis placed 12/23/14.  moderate swelling RIGHT forearm at site of old occluded loop AV graft (not used since 2016).  Seen ER 2/17 with blood cultures positive for MRSA.  He returns today after callling requesting to be seen due to RUE pain and bulging areas of RUE AV fistula areas.  Denies any neurosensory symptoms of RUE does have of LUE.  Has DM with neuropathy.  RIGHT AV fistula functioning well at dialysis.  No problems during dialysis.  No other associated signs, symptoms or modifying factors.     11/13/2014 Upper extremity vein mapping:  RIGHT cephalic 1.2-2.5mm, basilic 1.6-2.0mm.  LEFT cephalic 0.8-2.6mm, basilic 1.2-2.6mm.  12/23/14  LEFT Brachial artery to axillary vein AV Graft (7mm Artegraft)  01/07/15 LUE Incisions  open thru skin only.  Proximal Length:  4.5-5 cm depth 0.25 width 0.2  Distal  Length: 4. cm depth 0.2 width 0.2  Beefy red bed  Small amt erythema proximal incision.  1/13/15: LUE Incision:healed  3/24/15  Revision of left arteriovenous graft (distal interposition graft with a 6 mm Artegraft).  Thrombectomy, left arteriovenous  graft, open.  Left fistulogram.  Balloon angioplasty of left subclavian vein, innominate vein, swing site (6 x 20 mm Bard Conquest balloon, 8 x 20 mm Bard Conquest balloon). Venous ultrasound of unilateral extremity.  3/31/15 Revision of left arteriovenous graft, open. Open thrombectomy of left arteriovenous graft. Left upper extremity fistulogram.  Balloon angioplasty of left subclavian vein/innominate vein/swing site (6 x 21 mm Bard-Conquest balloon, 8 x 21 mm Bard-Conquest balloon). Venous ultrasound unilateral extremity.   4/10/15   Excision of left brachial to axillary arteriovenous bridge graft (bovine) with reconstruction of both the brachial artery and axillary vein with vein patch angioplasty from left cephalic vein. Placement of femoral arterial and venous lines using ultrasound guidance.  4/19/15  Placement LEFT IJ Tunneled Cath  5/14/15   Placement of a right forearm arteriovenous bridge graft.  5/19/15   Exchange of right femoral tunneled dialysis catheter using fluoroscopic guidance.   8/11/15  Entrobacter bacteremia of LUE AV Graft, which was removed and completed 6 wks of IV antx with neg CS.    9/22/15  Fistula Duplex:  maxPSV 94cm/s. flows 258-632ml/m. size 5.7-9.2mm. Patent Multiple areas of hematomas with sm area of needle oozing.    10/15/15: RIGHT Fistula duplex: maxPSV 233cm/s. flows 214-701ml/m. size 4.4-6.2mm.  1/26/16: RIGHT fistula duplex: maxPSV 321cm/s. flows 612-1866ml/m. size 3.8-8.1mm.  7/29/16: RIGHT TPA Lysis/Mech Thrombectomy  9/23/16: RIGHT groin Tunnel Cath Placement  10/5/16: RIGHT brachial-axillary vein AV graft (artegraft)        12/5/2016 RIGHT fistula duplex: maxPSV 265cm/s. Flows 1612-1986ml/m. size 5.8-8.1mm.         5/4/2017 RIGHT fistula duplex: maxPSV 530cm/s. Flows 642-1444ml/m. size 3.5-7.4mm.        11/28/17: RIGHT fistula duplex: mPSV 507cm/s. Flows 649-1519ml/m. Size 2.4-7.4mm.        2/22/18: RIGHT fistula duplex: Patent:  mPSV 523 cm/s. Flows 489-2184 ml/m. Size  2.6-8.1mm. 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        3/12/2018 RIGHT fistula duplex:  Occluded         3/13/18: Revision RIGHT forearm ARTERIOVENOUS graft (excision), debridement, wound vac        3/28/18: Wound Vac Change: (1) 22x3x1 (2) 10x2x2         Past Medical History:   Diagnosis Date   • Cataract    • CHF (congestive heart failure)    • CKD (chronic kidney disease)    • Clostridium difficile infection    • COPD (chronic obstructive pulmonary disease)    • Coronary artery disease    • Diabetes mellitus    • Glaucoma    • Hepatitis C    • History of transfusion    • Hypertension    • Nephropathy, diabetic    • Pacemaker    • Pulmonary embolism      Past Surgical History:   Procedure Laterality Date   • ABDOMINAL SURGERY     • ARTERIOVENOUS FISTULA/SHUNT SURGERY Right     forearm loop   • ARTERIOVENOUS FISTULA/SHUNT SURGERY Left     removed past upper arm   • ARTERIOVENOUS FISTULA/SHUNT SURGERY Right 3/13/2018    Procedure: revision RIGHT forearm ARTERIOVENOUS graft (excision), debridement, wound vac;  Surgeon: Jerson Singh MD;  Location: [facility];  Service: Vascular   • ARTERIOVENOUS FISTULA/SHUNT SURGERY W/ HEMODIALYSIS CATHETER INSERTION Right 4/4/2016    Procedure: RIGHT ARM AV FISTULA DECLOT REVISION REPAIR BRACHIAL ANASTOMOTIC PSUEDOANEURYSM LEFT FEMORAL RICHARDSON FISTULOGRAM WITH ANGIOPLASTY;  Surgeon: Jerson Carpenter MD;  Location: [facility] OR 18/19;  Service:    • CATARACT EXTRACTION W/ INTRAOCULAR LENS IMPLANT Left 3/31/2017    Procedure: REMOVE CATARACT AND IMPLANT INTRAOCULAR LENS LEFT EYE;  Surgeon: Hilton Montemayor MD;  Location: [facility];  Service:    • EYE SURGERY     • HERNIA REPAIR     • IMPLANTABLE CARDIOVERTER DEFIBRILLATOR LEAD REPLACEMENT/POCKET REVISION Right 4/11/2016    Procedure: PACEMAKER LEADS X 3 AND BATTERY EXTRACTION WITH EXIMER LASER;  Surgeon: Vern Reeves MD;  Location: [facility] OR 18/19;  Service:    • INSERTION HEMODIALYSIS  CATHETER Right 05/2015    jugular   • INTERVENTIONAL RADIOLOGY PROCEDURE Right 6/1/2017    Procedure: RIGHT dialysis fistulagram & angioplasty;  Surgeon: Jerson Singh MD;  Location: [facility] ANGIO INVASIVE LOCATION;  Service:    • INTERVENTIONAL RADIOLOGY PROCEDURE Right 8/24/2017    Procedure: IR dialysis fistulagram;  Surgeon: Jerson Singh MD;  Location: [facility] ANGIO INVASIVE LOCATION;  Service:    • PACEMAKER IMPLANTATION  2008    dual chamber [employer]   • REMOVAL HEMODIALYSIS CATHETER Right 05/2015    femoral vein       ALLERGIES:   Lisinopril and Motrin [ibuprofen]    MEDICATIONS:  Current outpatient and discharge medications have been reconciled for the patient.  LUIS Mclean      Current Outpatient Prescriptions:   •  acetaminophen (TYLENOL) 500 MG tablet, Take 500 mg by mouth Every 4 (Four) Hours As Needed for Mild Pain  or Fever., Disp: , Rfl:   •  aspirin 81 MG EC tablet, Take 1 tablet by mouth Daily., Disp: 150 tablet, Rfl: 2  •  brimonidine (ALPHAGAN P) 0.1 % solution ophthalmic solution, Administer 1 drop to both eyes 3 (Three) Times a Day., Disp: , Rfl:   •  calcium acetate (PHOSLO) 667 MG capsule, Take 1,334 mg by mouth 3 (Three) Times a Day With Meals. 2 capsules 3 times daily, Disp: , Rfl:   •  dorzolamide (TRUSOPT) 2 % ophthalmic solution, Administer 1 drop into the left eye 3 (Three) Times a Day., Disp: 1 each, Rfl: 12  •  famotidine (PEPCID) 20 MG tablet, Take 20 mg by mouth every night at bedtime., Disp: , Rfl:   •  fluticasone (FLONASE) 50 MCG/ACT nasal spray, 2 sprays into each nostril daily. Administer 2 sprays in each nostril for each dose., Disp: , Rfl:   •  gabapentin (NEURONTIN) 300 MG capsule, Take 1 capsule by mouth 3 (Three) Times a Day., Disp: 90 capsule, Rfl: 2  •  guaiFENesin 200 MG tablet, Take 400 mg by mouth Every 4 (Four) Hours As Needed for Cough., Disp: , Rfl:   •  HYDROcodone-acetaminophen (NORCO)  MG per tablet, Take 1  tablet by mouth Every 4 (Four) Hours As Needed for Severe Pain  (Postop pain VAS 8-10 and pre wound vac change)., Disp: 30 tablet, Rfl: 0  •  HYDROcodone-acetaminophen (NORCO) 5-325 MG per tablet, Take 1 tablet by mouth Every 4 (Four) Hours As Needed for Moderate Pain  (Postop pain VAS 5-7)., Disp: 30 tablet, Rfl: 0  •  hydrOXYzine (ATARAX) 25 MG tablet, Take 25 mg by mouth 3 (Three) Times a Day As Needed for Anxiety., Disp: , Rfl:   •  insulin aspart (NovoLOG) 100 UNIT/ML injection, Inject  under the skin 3 (Three) Times a Day Before Meals. Sliding scale:  = 0 units; 150-200 = 3 units; 200-250 = 6 units; 250-300 = 9 units; 300-350 = 12 units; >350 = 15 units and call MD, Disp: , Rfl:   •  insulin glargine (LANTUS) 100 UNIT/ML injection, Inject 25 Units under the skin Every Night., Disp: , Rfl:   •  latanoprost (XALATAN) 0.005 % ophthalmic solution, Administer 1 drop to both eyes every night., Disp: , Rfl:   •  loperamide (IMODIUM) 2 MG capsule, Take 2 mg by mouth Every 6 (Six) Hours As Needed for Diarrhea., Disp: , Rfl:   •  loratadine (CLARITIN) 10 MG tablet, Take 10 mg by mouth daily., Disp: , Rfl:   •  ondansetron ODT (ZOFRAN-ODT) 4 MG disintegrating tablet, Take 1 tablet by mouth Every 6 (Six) Hours As Needed for Nausea or Vomiting., Disp: 10 tablet, Rfl: 0  •  PARoxetine (PAXIL) 30 MG tablet, Take 30 mg by mouth every night at bedtime., Disp: , Rfl:   •  polyethylene glycol (MIRALAX) packet, Take 17 g by mouth Daily As Needed (constipation)., Disp: , Rfl:   •  sennosides-docusate sodium (SENOKOT-S) 8.6-50 MG tablet, Take 2 tablets by mouth 2 (Two) Times a Day As Needed for Constipation., Disp: 60 tablet, Rfl: 0  •  sevelamer (RENAGEL) 800 MG tablet, Take 3 tablets by mouth 3 (Three) Times a Day With Meals., Disp: 30 tablet, Rfl: 0  •  sevelamer (RENVELA) 800 MG tablet, Take 2,400 mg by mouth 3 (Three) Times a Day With Meals., Disp: , Rfl:   •  traZODone (DESYREL) 50 MG tablet, Take 50 mg by mouth every  night at bedtime., Disp: , Rfl:     Review of Systems   Constitution: Negative for fever and weakness.   Cardiovascular: Negative for leg swelling.   Respiratory: Negative for shortness of breath.    Skin: Positive for poor wound healing.   Gastrointestinal: Negative for change in bowel habit and constipation.   Neurological: Negative for light-headedness.   Psychiatric/Behavioral: Negative for altered mental status.        Objective   Temp:  [97.3 °F (36.3 °C)] 97.3 °F (36.3 °C)  Heart Rate:  [64] 64  BP: (120)/(77) 120/77  Body mass index is 35.53 kg/m².  Physical Exam   Constitutional: He is oriented to person, place, and time. He appears well-developed.   HENT:   Head: Normocephalic.   Neck: Neck supple.   Cardiovascular: Normal rate.    Pulmonary/Chest: Effort normal and breath sounds normal.   Abdominal: Soft. Bowel sounds are normal.   Musculoskeletal:   WC   Neurological: He is alert and oriented to person, place, and time.   Skin: Skin is warm and dry. No erythema.   RFA Wound Vac: Red Beefy, Bleeding. Sponge Only No adaptic    Vitals reviewed.              Assessment/Plan   Independent Review of Radiographic Studies:    Detailed discussion regarding risks, benefits, and treatment plan. Images independently reviewed. Patient understands, agrees, and wishes to proceed with plan.     1. Follow-up surgery care  Continue wound vac changes MWF per SNF   Discontinue Adaptic dressing, sponge only.  Return 2 weeks for wound check    2. ESRD on dialysis      3. Non-healing wound of upper extremity, right, subsequent encounter              This document has been electronically signed by LUIS Mclean on March 28, 2018 4:44 PM

## 2018-03-30 ENCOUNTER — OFFICE VISIT (OUTPATIENT)
Dept: FAMILY MEDICINE CLINIC | Facility: CLINIC | Age: 68
End: 2018-03-30

## 2018-03-30 DIAGNOSIS — N18.6 ESRD ON DIALYSIS (HCC): Primary | ICD-10-CM

## 2018-03-30 DIAGNOSIS — G89.29 OTHER CHRONIC PAIN: ICD-10-CM

## 2018-03-30 DIAGNOSIS — F51.01 PRIMARY INSOMNIA: ICD-10-CM

## 2018-03-30 DIAGNOSIS — K21.9 GASTROESOPHAGEAL REFLUX DISEASE WITHOUT ESOPHAGITIS: ICD-10-CM

## 2018-03-30 DIAGNOSIS — H40.1122 PRIMARY OPEN ANGLE GLAUCOMA OF LEFT EYE, MODERATE STAGE: ICD-10-CM

## 2018-03-30 DIAGNOSIS — Z99.2 ESRD ON DIALYSIS (HCC): Primary | ICD-10-CM

## 2018-03-30 DIAGNOSIS — F41.9 ANXIETY: ICD-10-CM

## 2018-03-30 DIAGNOSIS — Z79.4 TYPE 2 DIABETES MELLITUS WITH BOTH EYES AFFECTED BY MODERATE NONPROLIFERATIVE RETINOPATHY WITHOUT MACULAR EDEMA, WITH LONG-TERM CURRENT USE OF INSULIN (HCC): ICD-10-CM

## 2018-03-30 DIAGNOSIS — E11.3393 TYPE 2 DIABETES MELLITUS WITH BOTH EYES AFFECTED BY MODERATE NONPROLIFERATIVE RETINOPATHY WITHOUT MACULAR EDEMA, WITH LONG-TERM CURRENT USE OF INSULIN (HCC): ICD-10-CM

## 2018-03-30 DIAGNOSIS — G62.9 NEUROPATHY: ICD-10-CM

## 2018-03-30 PROCEDURE — 99308 SBSQ NF CARE LOW MDM 20: CPT | Performed by: STUDENT IN AN ORGANIZED HEALTH CARE EDUCATION/TRAINING PROGRAM

## 2018-03-31 NOTE — PROGRESS NOTES
Subjective   Patient ID: Cristo Musa is a 68 y.o. male is here today for follow-up of AV fistula.  Concerned about forearm pain and open lesions.   Chief Complaint   Patient presents with   • Wound Check     RIGHT arm   VAS 8 RUE Forearm     History of Present Illness  CP:  Cory Rodríguez MD  Nephrology:  Dr Vilchis,  [facility]  Cardiology:  Dr Nieto        Resides:  [facility].    68 y.o. male with ESRD on  hemodialysis, HTN, COPD, peripheral neuropathy, DM, CAD, Hepatitis C, nonischemic cardiomyopathy (EF 30%)..  former smoker.  Long term access for hemodialysis placed 12/23/14.  He returns today after callling requesting to be seen due to LUE pain and bulging areas of RUE AV fistula areas.  Denies any neurosensory symptoms of RUE does have of LUE.  Has DM with neuropathy.  RIGHT AV graft functioning well at dialysis.  No problems during dialysis.  Currently residing at [facility].  RUE hurts at intervals, sharp aching pain.   No other associated signs, symptoms or modifying factors.    11/13/2014 Upper extremity vein mapping:  RIGHT cephalic 1.2-2.5mm, basilic 1.6-2.0mm.  LEFT cephalic 0.8-2.6mm, basilic 1.2-2.6mm.  12/23/14  LEFT Brachial artery to axillary vein AV Graft (7mm Artegraft)  01/07/15 LUE Incisions  open thru skin only.  Proximal Length:  4.5-5 cm depth 0.25 width 0.2  Distal  Length: 4. cm depth 0.2 width 0.2  Beefy red bed  Small amt erythema proximal incision.  1/13/15: LUE Incision:healed  3/24/15  Revision of left arteriovenous graft (distal interposition graft with a 6 mm Artegraft).  Thrombectomy, left arteriovenous graft, open.  Left fistulogram.  Balloon angioplasty of left subclavian vein, innominate vein, swing site (6 x 20 mm Bard Conquest balloon, 8 x 20 mm Bard Conquest balloon). Venous ultrasound of unilateral extremity.  3/31/15 Revision of left arteriovenous graft, open. Open thrombectomy of left arteriovenous graft. Left upper  extremity fistulogram.  Balloon angioplasty of left subclavian vein/innominate vein/swing site (6 x 21 mm Bard-Conquest balloon, 8 x 21 mm Bard-Conquest balloon). Venous ultrasound unilateral extremity.   4/10/15   Excision of left brachial to axillary arteriovenous bridge graft (bovine) with reconstruction of both the brachial artery and axillary vein with vein patch angioplasty from left cephalic vein. Placement of femoral arterial and venous lines using ultrasound guidance.  4/19/15  Placement LEFT IJ Tunneled Cath  5/14/15   Placement of a right forearm arteriovenous bridge graft.  5/19/15   Exchange of right femoral tunneled dialysis catheter using fluoroscopic guidance.   8/11/15  Entrobacter bacteremia of LUE AV Graft, which was removed and completed 6 wks of IV antx with neg CS.    9/22/15  Fistula Duplex:  maxPSV 94cm/s. flows 258-632ml/m. size 5.7-9.2mm. Patent Multiple areas of hematomas with sm area of needle oozing.    10/15/15: RIGHT Fistula duplex: maxPSV 233cm/s. flows 214-701ml/m. size 4.4-6.2mm.  1/26/16: RIGHT fistula duplex: maxPSV 321cm/s. flows 612-1866ml/m. size 3.8-8.1mm.  7/29/16: RIGHT TPA Lysis/Mech Thrombectomy  9/23/16: RIGHT groin Tunnel Cath Placement  10/5/16: RIGHT brachial-axillary vein AV graft (artegraft)        12/5/2016 RIGHT fistula duplex: maxPSV 265cm/s. Flows 1612-1986ml/m. size 5.8-8.1mm.         5/4/2017 RIGHT fistula duplex: maxPSV 530cm/s. Flows 642-1444ml/m. size 3.5-7.4mm.         11/28/17: RIGHT fistula duplex: mPSV 507cm/s. Flows 649-1519ml/m. Size 2.4-7.4mm.        02/22/18: RIGHT fistula duplex: Patent:  mPSV 523 cm/s (1906). Flows 489-2184 ml/m. Size 2.6-8.1mm. 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   See HPI   Allergies   Allergen Reactions   • Lisinopril Other (See Comments)     unknown   • Motrin [Ibuprofen] Diarrhea and Nausea And Vomiting       Current Outpatient Prescriptions:   •  acetaminophen (TYLENOL) 500 MG tablet, Take 500 mg by mouth Every 4 (Four) Hours As Needed for Mild Pain  or Fever., Disp: , Rfl:   •  aspirin 81 MG EC tablet, Take 1 tablet by mouth Daily., Disp: 150 tablet, Rfl: 2  •  brimonidine (ALPHAGAN P) 0.1 % solution ophthalmic solution, Administer 1 drop to both eyes 3 (Three) Times a Day., Disp: , Rfl:   •  calcium acetate (PHOSLO) 667 MG capsule, Take 1,334 mg by mouth 3 (Three) Times a Day With Meals. 2 capsules 3 times daily, Disp: , Rfl:   •  dorzolamide (TRUSOPT) 2 % ophthalmic solution, Administer 1 drop into the left eye 3 (Three) Times a Day., Disp: 1 each, Rfl: 12  •  famotidine (PEPCID) 20 MG tablet, Take 20 mg by mouth every night at bedtime., Disp: , Rfl:   •  fluticasone (FLONASE) 50 MCG/ACT nasal spray, 2 sprays into each nostril daily. Administer 2 sprays in each nostril for each dose., Disp: , Rfl:   •  gabapentin (NEURONTIN) 300 MG capsule, Take 1 capsule by mouth 3 (Three) Times a Day., Disp: 90 capsule, Rfl: 2  •  guaiFENesin 200 MG tablet, Take 400 mg by mouth Every 4 (Four) Hours As Needed for Cough., Disp: , Rfl:   •  HYDROcodone-acetaminophen (NORCO)  MG per tablet, Take 1 tablet by mouth Every 4 (Four) Hours As Needed for Severe Pain  (Postop pain VAS 8-10 and pre wound vac change)., Disp: 30 tablet, Rfl: 0  •  HYDROcodone-acetaminophen (NORCO) 5-325 MG per tablet, Take 1 tablet by mouth Every 4 (Four) Hours As Needed for Moderate Pain   (Postop pain VAS 5-7)., Disp: 30 tablet, Rfl: 0  •  hydrOXYzine (ATARAX) 25 MG tablet, Take 25 mg by mouth 3 (Three) Times a Day As Needed for Anxiety., Disp: , Rfl:   •  insulin aspart (NovoLOG) 100 UNIT/ML injection, Inject  under the skin 3 (Three) Times a Day Before Meals. Sliding scale:  = 0 units; 150-200 = 3 units; 200-250 = 6 units; 250-300 = 9 units; 300-350 = 12 units; >350 = 15 units and call MD, Disp: , Rfl:   •  insulin glargine (LANTUS) 100 UNIT/ML injection, Inject 25 Units under the skin Every Night., Disp: , Rfl:   •  latanoprost (XALATAN) 0.005 % ophthalmic solution, Administer 1 drop to both eyes every night., Disp: , Rfl:   •  loperamide (IMODIUM) 2 MG capsule, Take 2 mg by mouth Every 6 (Six) Hours As Needed for Diarrhea., Disp: , Rfl:   •  loratadine (CLARITIN) 10 MG tablet, Take 10 mg by mouth daily., Disp: , Rfl:   •  ondansetron ODT (ZOFRAN-ODT) 4 MG disintegrating tablet, Take 1 tablet by mouth Every 6 (Six) Hours As Needed for Nausea or Vomiting., Disp: 10 tablet, Rfl: 0  •  PARoxetine (PAXIL) 30 MG tablet, Take 30 mg by mouth every night at bedtime., Disp: , Rfl:   •  polyethylene glycol (MIRALAX) packet, Take 17 g by mouth Daily As Needed (constipation)., Disp: , Rfl:   •  sennosides-docusate sodium (SENOKOT-S) 8.6-50 MG tablet, Take 2 tablets by mouth 2 (Two) Times a Day As Needed for Constipation., Disp: 60 tablet, Rfl: 0  •  sevelamer (RENAGEL) 800 MG tablet, Take 3 tablets by mouth 3 (Three) Times a Day With Meals., Disp: 30 tablet, Rfl: 0  •  sevelamer (RENVELA) 800 MG tablet, Take 2,400 mg by mouth 3 (Three) Times a Day With Meals., Disp: , Rfl:   •  traZODone (DESYREL) 50 MG tablet, Take 50 mg by mouth every night at bedtime., Disp: , Rfl:     Review of Systems   Constitution: Positive for weakness. Negative for chills, decreased appetite and fever.   HENT: Negative for hoarse voice, nosebleeds and stridor.    Eyes: Negative for visual disturbance.   Cardiovascular: Negative  for chest pain, claudication, irregular heartbeat and leg swelling.        Fistula revised with surgical pain   No problems with dialysis    Respiratory: Negative for cough, hemoptysis and shortness of breath.    Hematologic/Lymphatic: Does not bruise/bleed easily.   Skin: Positive for poor wound healing (R forearm ). Negative for dry skin and itching.   Musculoskeletal: Negative for back pain, falls, muscle cramps and muscle weakness.        RUE VAS 8 pain or sharp ache in open lesion area    Gastrointestinal: Negative for abdominal pain, anorexia and melena.   Neurological: Negative for dizziness, loss of balance, numbness and paresthesias.   Psychiatric/Behavioral: Negative for altered mental status.   Allergic/Immunologic: Negative for hives.        Objective   Physical Exam   Constitutional: He is oriented to person, place, and time. He appears well-nourished.   Body mass index is 32.23 kg/(m^2).     HENT:   Head: Normocephalic.   Mouth/Throat: Oropharynx is clear and moist.   Eyes: Conjunctivae are normal. Pupils are equal, round, and reactive to light.   Neck: No JVD present.   Cardiovascular: Normal rate, regular rhythm, normal heart sounds and intact distal pulses.    Pulses:       Carotid pulses are 1+ on the right side, and 1+ on the left side.       Radial pulses are 2+ on the right side, and 2+ on the left side.        Posterior tibial pulses are 2+ on the right side, and 2+ on the left side.   Fistula Present RU Extremity:revised  Distal hand warm + sensation + mobility    RUE Upper fistula Brachial-axillary + thr8il + bruit + sensation    Pulmonary/Chest: Effort normal and breath sounds normal. No stridor.   Coarse    Abdominal: Soft. Bowel sounds are normal.   Neurological: He is alert and oriented to person, place, and time.   Skin: Skin is warm and dry. Capillary refill takes less than 2 seconds. No erythema. No pallor.   RUE  Upper fistula Brachial-axillary  well healed  Lower fistula Forearm AV  bridge graft to wound vac.    Psychiatric: His behavior is normal.   Nursing note and vitals reviewed.      Vitals:    03/23/18 1311   BP: 105/67   Pulse: 69   Temp: 97.4 °F (36.3 °C)   SpO2: 97%           Assessment/Plan   Independent Review of Radiographic Studies:       02/22/18: RIGHT fistula duplex: Patent:  mPSV 523 cm/s (1906). Flows 489-2184 ml/m. Size 2.6-8.1mm. 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 ESRD (end stage renal disease) on dialysis  Indication for AV fistula     2.Non functioning Arteriovenous , subsequent encounter RUE forearm bridge graft  Symptomatic   Continue wound vac.  .       3. Arteriovenous fistula, acquired  Patent fistula working well with dialysis.   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 If problems should arise including difficulty with access, high pressure alarms, or inability to complete dialysis please notify [facility] immediately for evaluation.

## 2018-04-02 NOTE — PROGRESS NOTES
MONTHLY NURSING HOME VISIT    NAME: Cristo Musa  : 1950  MRN: [id number]    DATE & TIME SEEN: 2018 17:30    PCP: Cory Rodríguez MD    NURSING HOME: [facility]    Chief Complaint: Monthly Nursing Home Visit for 2018    Subjective:     Cristo Musa is a 68 y.o. male. Patient is complaining of right arm pain.  It has been getting better since the surgery.  His pain medicine has been helping.  He is working with therapy.  He feels like he is getting stronger.  No other complaints or symptoms at this time    Current Meds:    Current Outpatient Prescriptions:   •  acetaminophen (TYLENOL) 500 MG tablet, Take 500 mg by mouth Every 4 (Four) Hours As Needed for Mild Pain  or Fever., Disp: , Rfl:   •  aspirin 81 MG EC tablet, Take 1 tablet by mouth Daily., Disp: 150 tablet, Rfl: 2  •  brimonidine (ALPHAGAN P) 0.1 % solution ophthalmic solution, Administer 1 drop to both eyes 3 (Three) Times a Day., Disp: , Rfl:   •  calcium acetate (PHOSLO) 667 MG capsule, Take 1,334 mg by mouth 3 (Three) Times a Day With Meals. 2 capsules 3 times daily, Disp: , Rfl:   •  dorzolamide (TRUSOPT) 2 % ophthalmic solution, Administer 1 drop into the left eye 3 (Three) Times a Day., Disp: 1 each, Rfl: 12  •  famotidine (PEPCID) 20 MG tablet, Take 20 mg by mouth every night at bedtime., Disp: , Rfl:   •  fluticasone (FLONASE) 50 MCG/ACT nasal spray, 2 sprays into each nostril daily. Administer 2 sprays in each nostril for each dose., Disp: , Rfl:   •  gabapentin (NEURONTIN) 300 MG capsule, Take 1 capsule by mouth 3 (Three) Times a Day., Disp: 90 capsule, Rfl: 2  •  guaiFENesin 200 MG tablet, Take 400 mg by mouth Every 4 (Four) Hours As Needed for Cough., Disp: , Rfl:   •  HYDROcodone-acetaminophen (NORCO)  MG per tablet, Take 1 tablet by mouth Every 4 (Four) Hours As Needed for Severe Pain  (Postop pain VAS 8-10 and pre wound vac change)., Disp: 30 tablet, Rfl: 0  •   HYDROcodone-acetaminophen (NORCO) 5-325 MG per tablet, Take 1 tablet by mouth Every 4 (Four) Hours As Needed for Moderate Pain  (Postop pain VAS 5-7)., Disp: 30 tablet, Rfl: 0  •  hydrOXYzine (ATARAX) 25 MG tablet, Take 25 mg by mouth 3 (Three) Times a Day As Needed for Anxiety., Disp: , Rfl:   •  insulin aspart (NovoLOG) 100 UNIT/ML injection, Inject  under the skin 3 (Three) Times a Day Before Meals. Sliding scale:  = 0 units; 150-200 = 3 units; 200-250 = 6 units; 250-300 = 9 units; 300-350 = 12 units; >350 = 15 units and call MD, Disp: , Rfl:   •  insulin glargine (LANTUS) 100 UNIT/ML injection, Inject 25 Units under the skin Every Night., Disp: , Rfl:   •  latanoprost (XALATAN) 0.005 % ophthalmic solution, Administer 1 drop to both eyes every night., Disp: , Rfl:   •  loperamide (IMODIUM) 2 MG capsule, Take 2 mg by mouth Every 6 (Six) Hours As Needed for Diarrhea., Disp: , Rfl:   •  loratadine (CLARITIN) 10 MG tablet, Take 10 mg by mouth daily., Disp: , Rfl:   •  ondansetron ODT (ZOFRAN-ODT) 4 MG disintegrating tablet, Take 1 tablet by mouth Every 6 (Six) Hours As Needed for Nausea or Vomiting., Disp: 10 tablet, Rfl: 0  •  PARoxetine (PAXIL) 30 MG tablet, Take 30 mg by mouth every night at bedtime., Disp: , Rfl:   •  polyethylene glycol (MIRALAX) packet, Take 17 g by mouth Daily As Needed (constipation)., Disp: , Rfl:   •  sennosides-docusate sodium (SENOKOT-S) 8.6-50 MG tablet, Take 2 tablets by mouth 2 (Two) Times a Day As Needed for Constipation., Disp: 60 tablet, Rfl: 0  •  sevelamer (RENAGEL) 800 MG tablet, Take 3 tablets by mouth 3 (Three) Times a Day With Meals., Disp: 30 tablet, Rfl: 0  •  sevelamer (RENVELA) 800 MG tablet, Take 2,400 mg by mouth 3 (Three) Times a Day With Meals., Disp: , Rfl:   •  traZODone (DESYREL) 50 MG tablet, Take 50 mg by mouth every night at bedtime., Disp: , Rfl:     Allergies:  Lisinopril and Motrin [ibuprofen]    Review of Systems:  Review of Systems   Constitutional:  Negative for activity change, appetite change, chills and fever.   HENT: Negative for congestion, ear pain and sore throat.    Eyes: Negative for redness and visual disturbance.   Respiratory: Negative for cough, shortness of breath and wheezing.    Cardiovascular: Negative for chest pain and palpitations.   Gastrointestinal: Negative for abdominal pain, diarrhea, nausea and vomiting.   Genitourinary: Negative for difficulty urinating and dysuria.   Musculoskeletal: Positive for arthralgias and back pain. Negative for gait problem.   Skin: Negative for color change and rash.   Neurological: Negative for dizziness, weakness and headaches.   Psychiatric/Behavioral: Negative for dysphoric mood and sleep disturbance. The patient is not nervous/anxious.        Objective:     /80   Pulse 74   Temp 99 °F (37.2 °C)   SpO2 96%   Physical Exam   Constitutional: He is oriented to person, place, and time. He appears well-developed and well-nourished.   Sleeping comfortably, no acute distress   HENT:   Head: Normocephalic and atraumatic.   Right Ear: External ear normal.   Left Ear: External ear normal.   Nose: Nose normal.   Eyes: EOM are normal. Pupils are equal, round, and reactive to light.   Neck: Normal range of motion. Neck supple.   Cardiovascular: Normal rate, regular rhythm, normal heart sounds and intact distal pulses.    Pulmonary/Chest: Effort normal and breath sounds normal. He has no wheezes.   Abdominal: Soft. Bowel sounds are normal. He exhibits no distension. There is no tenderness.   Musculoskeletal: Normal range of motion. He exhibits no edema or deformity.   Right forearm wound VAC in place, good seal, mild surrounding swelling, no surrounding erythema   Neurological: He is alert and oriented to person, place, and time. No cranial nerve deficit.   Skin: Skin is warm.   Psychiatric: He has a normal mood and affect. His behavior is normal. Thought content normal.   Nursing note and vitals reviewed.             Assessment/Plan:      68 y.o. male with:  1.  Status post right forearm fistula incision and drainage with vascular surgery: Follow-up for vascular  2.  ESRD: Monday Wednesday Friday dialysis  3 diabetes: Levemir and sliding scale insulin  4.  Anxiety: Paxil  5.  Reflux: Ranitidine  6.  Glaucoma: Continue eyedrops  7 deconditioning: Continue therapy  8.  Neuropathy: Gabapentin  9.  Chronic pain: Norco's    CODE STATUS: full code    Dr Barrera is the attending on record for this patient, he is aware of the patient's status and agrees with the above.          This document has been electronically signed by Cory Rodríguez MD on April 5, 2018 8:42 AM

## 2018-04-05 VITALS
TEMPERATURE: 99 F | HEART RATE: 74 BPM | SYSTOLIC BLOOD PRESSURE: 115 MMHG | OXYGEN SATURATION: 96 % | DIASTOLIC BLOOD PRESSURE: 80 MMHG

## 2018-04-05 NOTE — PROGRESS NOTES
I discussed the case with the resident and I agree with their assessment and plan as outlined by Dr. Lott .  Oz Ayoub MD.

## 2018-04-11 ENCOUNTER — OFFICE VISIT (OUTPATIENT)
Dept: CARDIAC SURGERY | Facility: CLINIC | Age: 68
End: 2018-04-11

## 2018-04-11 VITALS
HEIGHT: 68 IN | TEMPERATURE: 98.2 F | BODY MASS INDEX: 32.1 KG/M2 | DIASTOLIC BLOOD PRESSURE: 70 MMHG | SYSTOLIC BLOOD PRESSURE: 120 MMHG | OXYGEN SATURATION: 94 % | HEART RATE: 94 BPM | WEIGHT: 211.8 LBS

## 2018-04-11 DIAGNOSIS — Z09 FOLLOW-UP SURGERY CARE: Primary | ICD-10-CM

## 2018-04-11 DIAGNOSIS — S41.101D NON-HEALING WOUND OF UPPER EXTREMITY, RIGHT, SUBSEQUENT ENCOUNTER: ICD-10-CM

## 2018-04-11 DIAGNOSIS — Z99.2 ESRD ON DIALYSIS (HCC): ICD-10-CM

## 2018-04-11 DIAGNOSIS — N18.6 ESRD ON DIALYSIS (HCC): ICD-10-CM

## 2018-04-11 PROCEDURE — 99024 POSTOP FOLLOW-UP VISIT: CPT | Performed by: NURSE PRACTITIONER

## 2018-04-13 NOTE — PROGRESS NOTES
Subjective   Patient ID: Cristo Musa is a 68 y.o. male is here today for follow-up.    Chief Complaint:    Chief Complaint   Patient presents with   • Wound Check     2 wk       History of Present Illness  The following portions of the patient's history were reviewed and updated as appropriate: allergies, current medications, past family history, past medical history, past social history, past surgical history and problem list.  Recent images independently reviewed.  Available laboratory values reviewed.  PCP:  Cory Rodríguez MD  Nephrology:  Dr Vilchis,  [facility]  Cardiology:  Dr Nieto        Resides:  [facility].     67 y.o. male with ESRD on  hemodialysis, HTN, COPD, peripheral neuropathy, DM, CAD, Hepatitis C, nonischemic cardiomyopathy (EF 30%)..  former smoker.  Long term access for hemodialysis placed 12/23/14.  moderate swelling RIGHT forearm at site of old occluded loop AV graft (not used since 2016).  Seen ER 2/17 with blood cultures positive for MRSA.  He returns today after callling requesting to be seen due to RUE pain and bulging areas of RUE AV fistula areas.  Denies any neurosensory symptoms of RUE does have of LUE.  Has DM with neuropathy.  RIGHT AV fistula functioning well at dialysis.  No problems during dialysis.  No other associated signs, symptoms or modifying factors.     11/13/2014 Upper extremity vein mapping:  RIGHT cephalic 1.2-2.5mm, basilic 1.6-2.0mm.  LEFT cephalic 0.8-2.6mm, basilic 1.2-2.6mm.  12/23/14  LEFT Brachial artery to axillary vein AV Graft (7mm Artegraft)  01/07/15 LUE Incisions  open thru skin only.  Proximal Length:  4.5-5 cm depth 0.25 width 0.2  Distal  Length: 4. cm depth 0.2 width 0.2  Beefy red bed  Small amt erythema proximal incision.  1/13/15: LUE Incision:healed  3/24/15  Revision of left arteriovenous graft (distal interposition graft with a 6 mm Artegraft).  Thrombectomy, left arteriovenous graft, open.  Left  fistulogram.  Balloon angioplasty of left subclavian vein, innominate vein, swing site (6 x 20 mm Bard Conquest balloon, 8 x 20 mm Bard Conquest balloon). Venous ultrasound of unilateral extremity.  3/31/15 Revision of left arteriovenous graft, open. Open thrombectomy of left arteriovenous graft. Left upper extremity fistulogram.  Balloon angioplasty of left subclavian vein/innominate vein/swing site (6 x 21 mm Bard-Conquest balloon, 8 x 21 mm Bard-Conquest balloon). Venous ultrasound unilateral extremity.   4/10/15   Excision of left brachial to axillary arteriovenous bridge graft (bovine) with reconstruction of both the brachial artery and axillary vein with vein patch angioplasty from left cephalic vein. Placement of femoral arterial and venous lines using ultrasound guidance.  4/19/15  Placement LEFT IJ Tunneled Cath  5/14/15   Placement of a right forearm arteriovenous bridge graft.  5/19/15   Exchange of right femoral tunneled dialysis catheter using fluoroscopic guidance.   8/11/15  Entrobacter bacteremia of LUE AV Graft, which was removed and completed 6 wks of IV antx with neg CS.    9/22/15  Fistula Duplex:  maxPSV 94cm/s. flows 258-632ml/m. size 5.7-9.2mm. Patent Multiple areas of hematomas with sm area of needle oozing.    10/15/15: RIGHT Fistula duplex: maxPSV 233cm/s. flows 214-701ml/m. size 4.4-6.2mm.  1/26/16: RIGHT fistula duplex: maxPSV 321cm/s. flows 612-1866ml/m. size 3.8-8.1mm.  7/29/16: RIGHT TPA Lysis/Mech Thrombectomy  9/23/16: RIGHT groin Tunnel Cath Placement  10/5/16: RIGHT brachial-axillary vein AV graft (artegraft)        12/5/2016 RIGHT fistula duplex: maxPSV 265cm/s. Flows 1612-1986ml/m. size 5.8-8.1mm.         5/4/2017 RIGHT fistula duplex: maxPSV 530cm/s. Flows 642-1444ml/m. size 3.5-7.4mm.        11/28/17: RIGHT fistula duplex: mPSV 507cm/s. Flows 649-1519ml/m. Size 2.4-7.4mm.        2/22/18: RIGHT fistula duplex: Patent:  mPSV 523 cm/s. Flows 489-2184 ml/m. Size 2.6-8.1mm. As  previously noted, pseudoaneurysm 2.47 cm        3/12/2018 RIGHT fistula duplex:  Occluded         3/13/18: Revision RIGHT forearm ARTERIOVENOUS graft (excision), debridement, wound vac        3/28/18: Wound Vac Change: (1) 22x3x1 (2) 10x2x2        4/11/18 Wound Check: (1) 12.8x7.5x0.5 (2) 8.6x0.2x0.2                 Past Medical History:   Diagnosis Date   • Cataract    • CHF (congestive heart failure)    • CKD (chronic kidney disease)    • Clostridium difficile infection    • COPD (chronic obstructive pulmonary disease)    • Coronary artery disease    • Diabetes mellitus    • Glaucoma    • Hepatitis C    • History of transfusion    • Hypertension    • Nephropathy, diabetic    • Pacemaker    • Pulmonary embolism      Past Surgical History:   Procedure Laterality Date   • ABDOMINAL SURGERY     • ARTERIOVENOUS FISTULA/SHUNT SURGERY Right     forearm loop   • ARTERIOVENOUS FISTULA/SHUNT SURGERY Left     removed past upper arm   • ARTERIOVENOUS FISTULA/SHUNT SURGERY Right 3/13/2018    Procedure: revision RIGHT forearm ARTERIOVENOUS graft (excision), debridement, wound vac;  Surgeon: Jerson Singh MD;  Location: [facility];  Service: Vascular   • ARTERIOVENOUS FISTULA/SHUNT SURGERY W/ HEMODIALYSIS CATHETER INSERTION Right 4/4/2016    Procedure: RIGHT ARM AV FISTULA DECLOT REVISION REPAIR BRACHIAL ANASTOMOTIC PSUEDOANEURYSM LEFT FEMORAL RICHARDSON FISTULOGRAM WITH ANGIOPLASTY;  Surgeon: Jerson Carpenter MD;  Location: [facility] OR 18/19;  Service:    • CATARACT EXTRACTION W/ INTRAOCULAR LENS IMPLANT Left 3/31/2017    Procedure: REMOVE CATARACT AND IMPLANT INTRAOCULAR LENS LEFT EYE;  Surgeon: Hilton Montemayor MD;  Location: [facility];  Service:    • EYE SURGERY     • HERNIA REPAIR     • IMPLANTABLE CARDIOVERTER DEFIBRILLATOR LEAD REPLACEMENT/POCKET REVISION Right 4/11/2016    Procedure: PACEMAKER LEADS X 3 AND BATTERY EXTRACTION WITH EXIMER LASER;  Surgeon: Vern Reeves MD;  Location: [facility]  OR 18/19;  Service:    • INSERTION HEMODIALYSIS CATHETER Right 05/2015    jugular   • INTERVENTIONAL RADIOLOGY PROCEDURE Right 6/1/2017    Procedure: RIGHT dialysis fistulagram & angioplasty;  Surgeon: Jerson Singh MD;  Location: [facility] ANGIO INVASIVE LOCATION;  Service:    • INTERVENTIONAL RADIOLOGY PROCEDURE Right 8/24/2017    Procedure: IR dialysis fistulagram;  Surgeon: Jerson Singh MD;  Location: [facility] ANGIO INVASIVE LOCATION;  Service:    • PACEMAKER IMPLANTATION  2008    dual chamber [employer]   • REMOVAL HEMODIALYSIS CATHETER Right 05/2015    femoral vein       ALLERGIES:   Lisinopril and Motrin [ibuprofen]    MEDICATIONS:  Current outpatient and discharge medications have been reconciled for the patient.  LUIS Mclean      Current Outpatient Prescriptions:   •  acetaminophen (TYLENOL) 500 MG tablet, Take 500 mg by mouth Every 4 (Four) Hours As Needed for Mild Pain  or Fever., Disp: , Rfl:   •  aspirin 81 MG EC tablet, Take 1 tablet by mouth Daily., Disp: 150 tablet, Rfl: 2  •  brimonidine (ALPHAGAN P) 0.1 % solution ophthalmic solution, Administer 1 drop to both eyes 3 (Three) Times a Day., Disp: , Rfl:   •  calcium acetate (PHOSLO) 667 MG capsule, Take 1,334 mg by mouth 3 (Three) Times a Day With Meals. 2 capsules 3 times daily, Disp: , Rfl:   •  dorzolamide (TRUSOPT) 2 % ophthalmic solution, Administer 1 drop into the left eye 3 (Three) Times a Day., Disp: 1 each, Rfl: 12  •  famotidine (PEPCID) 20 MG tablet, Take 20 mg by mouth every night at bedtime., Disp: , Rfl:   •  fluticasone (FLONASE) 50 MCG/ACT nasal spray, 2 sprays into each nostril daily. Administer 2 sprays in each nostril for each dose., Disp: , Rfl:   •  gabapentin (NEURONTIN) 300 MG capsule, Take 1 capsule by mouth 3 (Three) Times a Day., Disp: 90 capsule, Rfl: 2  •  guaiFENesin 200 MG tablet, Take 400 mg by mouth Every 4 (Four) Hours As Needed for Cough., Disp: , Rfl:   •   HYDROcodone-acetaminophen (NORCO)  MG per tablet, Take 1 tablet by mouth Every 4 (Four) Hours As Needed for Severe Pain  (Postop pain VAS 8-10 and pre wound vac change)., Disp: 30 tablet, Rfl: 0  •  HYDROcodone-acetaminophen (NORCO) 5-325 MG per tablet, Take 1 tablet by mouth Every 4 (Four) Hours As Needed for Moderate Pain  (Postop pain VAS 5-7)., Disp: 30 tablet, Rfl: 0  •  hydrOXYzine (ATARAX) 25 MG tablet, Take 25 mg by mouth 3 (Three) Times a Day As Needed for Anxiety., Disp: , Rfl:   •  insulin aspart (NovoLOG) 100 UNIT/ML injection, Inject  under the skin 3 (Three) Times a Day Before Meals. Sliding scale:  = 0 units; 150-200 = 3 units; 200-250 = 6 units; 250-300 = 9 units; 300-350 = 12 units; >350 = 15 units and call MD, Disp: , Rfl:   •  insulin glargine (LANTUS) 100 UNIT/ML injection, Inject 25 Units under the skin Every Night., Disp: , Rfl:   •  latanoprost (XALATAN) 0.005 % ophthalmic solution, Administer 1 drop to both eyes every night., Disp: , Rfl:   •  loperamide (IMODIUM) 2 MG capsule, Take 2 mg by mouth Every 6 (Six) Hours As Needed for Diarrhea., Disp: , Rfl:   •  loratadine (CLARITIN) 10 MG tablet, Take 10 mg by mouth daily., Disp: , Rfl:   •  ondansetron ODT (ZOFRAN-ODT) 4 MG disintegrating tablet, Take 1 tablet by mouth Every 6 (Six) Hours As Needed for Nausea or Vomiting., Disp: 10 tablet, Rfl: 0  •  PARoxetine (PAXIL) 30 MG tablet, Take 30 mg by mouth every night at bedtime., Disp: , Rfl:   •  polyethylene glycol (MIRALAX) packet, Take 17 g by mouth Daily As Needed (constipation)., Disp: , Rfl:   •  sennosides-docusate sodium (SENOKOT-S) 8.6-50 MG tablet, Take 2 tablets by mouth 2 (Two) Times a Day As Needed for Constipation., Disp: 60 tablet, Rfl: 0  •  sevelamer (RENAGEL) 800 MG tablet, Take 3 tablets by mouth 3 (Three) Times a Day With Meals., Disp: 30 tablet, Rfl: 0  •  sevelamer (RENVELA) 800 MG tablet, Take 2,400 mg by mouth 3 (Three) Times a Day With Meals., Disp: , Rfl:   •   traZODone (DESYREL) 50 MG tablet, Take 50 mg by mouth every night at bedtime., Disp: , Rfl:     Review of Systems   Constitution: Negative for fever and weakness.   Cardiovascular: Negative for leg swelling.   Respiratory: Negative for shortness of breath.    Skin: Positive for poor wound healing.   Gastrointestinal: Negative for change in bowel habit and constipation.   Neurological: Negative for light-headedness.   Psychiatric/Behavioral: Negative for altered mental status.        Objective       Vitals:    04/11/18 1311   BP: 120/70   Pulse: 94   Temp: 98.2 °F (36.8 °C)   SpO2: 94%       Body mass index is 32.2 kg/m².  Physical Exam   Constitutional: He is oriented to person, place, and time. He appears well-developed.   HENT:   Head: Normocephalic.   Neck: Neck supple.   Cardiovascular: Normal rate.    Pulmonary/Chest: Effort normal and breath sounds normal.   Abdominal: Soft. Bowel sounds are normal.   Musculoskeletal:   WC   Neurological: He is alert and oriented to person, place, and time.   Skin: Skin is warm and dry. No erythema.   RFA Wound Vac: Red Beefy, Bleeding. Sponge Only No adaptic    Vitals reviewed.                  Assessment/Plan   Independent Review of Radiographic Studies:    Detailed discussion regarding risks, benefits, and treatment plan. Images independently reviewed. Patient understands, agrees, and wishes to proceed with plan.     1. Follow-up surgery care  Continue Wound Vac add'l week MWF per SNF  May require skin graft for complete closure. Will monitor.   Return 1 week.     2. Non-healing wound of upper extremity, right, subsequent encounter  Slow healing.   (2) wound: dc wound vac/ Place hydrogel twice daily.    3. ESRD on dialysis  Continue dialysis as scheduled. If problems should arise including difficulty with access, high pressure alarms, or inability to complete dialysis please notify [facility] immediately for evaluation.   Per Tunnel Cath                 This  document has been electronically signed by LUIS Mclean on April 13, 2018 8:52 AM

## 2018-04-21 ENCOUNTER — TELEPHONE (OUTPATIENT)
Dept: FAMILY MEDICINE CLINIC | Facility: CLINIC | Age: 68
End: 2018-04-21

## 2018-04-21 NOTE — TELEPHONE ENCOUNTER
Patient is almost out of Norco's, which is being prescribed by [facility].  They have no coverage this weekend.  Patient takes on average 3 Norco 10's per day.   Discussed with Dr Hwang, will write #15 Holmes 10's, and drop off at [facility], to last him through the weekend.          This document has been electronically signed by Jaime Luna MD on April 21, 2018 5:55 PM

## 2018-04-26 ENCOUNTER — TELEPHONE (OUTPATIENT)
Dept: FAMILY MEDICINE CLINIC | Facility: CLINIC | Age: 68
End: 2018-04-26

## 2018-04-30 ENCOUNTER — OFFICE VISIT (OUTPATIENT)
Dept: FAMILY MEDICINE CLINIC | Facility: CLINIC | Age: 68
End: 2018-04-30

## 2018-04-30 PROCEDURE — 99308 SBSQ NF CARE LOW MDM 20: CPT | Performed by: STUDENT IN AN ORGANIZED HEALTH CARE EDUCATION/TRAINING PROGRAM

## 2018-05-01 ENCOUNTER — TELEPHONE (OUTPATIENT)
Dept: FAMILY MEDICINE CLINIC | Facility: CLINIC | Age: 68
End: 2018-05-01

## 2018-05-01 NOTE — TELEPHONE ENCOUNTER
[facility] called and said they needed to know how often wet to dry dressing needs to be done.    Please call the [facility] nurse back at [phone]

## 2018-05-07 VITALS
OXYGEN SATURATION: 90 % | TEMPERATURE: 97.6 F | SYSTOLIC BLOOD PRESSURE: 116 MMHG | DIASTOLIC BLOOD PRESSURE: 64 MMHG | RESPIRATION RATE: 18 BRPM | HEART RATE: 62 BPM

## 2018-05-30 ENCOUNTER — OFFICE VISIT (OUTPATIENT)
Dept: FAMILY MEDICINE CLINIC | Facility: CLINIC | Age: 68
End: 2018-05-30

## 2018-05-30 VITALS
SYSTOLIC BLOOD PRESSURE: 126 MMHG | OXYGEN SATURATION: 95 % | DIASTOLIC BLOOD PRESSURE: 75 MMHG | RESPIRATION RATE: 18 BRPM | TEMPERATURE: 99.4 F | HEART RATE: 66 BPM

## 2018-05-30 PROCEDURE — 99308 SBSQ NF CARE LOW MDM 20: CPT | Performed by: STUDENT IN AN ORGANIZED HEALTH CARE EDUCATION/TRAINING PROGRAM

## 2018-05-30 NOTE — PROGRESS NOTES
MONTHLY NURSING HOME VISIT    NAME: Cristo Musa  : 1950  MRN: [id number]    DATE & TIME SEEN: 18 1530    PCP: Cory Rodríguez MD    NURSING HOME: [facility]    Chief Complaint: Monthly Nursing Home Visit for May     Subjective:     Reason 67-year-old male.  He has no new complaints today.  His wound on his right upper extremity is healing well.  He is ambulating throughout the day.  His anxiety is doing well.  Sleeping well.  Appetite good.  No bowel or bladder complaints.    Current Meds:    Current Outpatient Prescriptions:   •  acetaminophen (TYLENOL) 500 MG tablet, Take 500 mg by mouth Every 4 (Four) Hours As Needed for Mild Pain  or Fever., Disp: , Rfl:   •  aspirin 81 MG EC tablet, Take 1 tablet by mouth Daily., Disp: 150 tablet, Rfl: 2  •  brimonidine (ALPHAGAN P) 0.1 % solution ophthalmic solution, Administer 1 drop to both eyes 3 (Three) Times a Day., Disp: , Rfl:   •  calcium acetate (PHOSLO) 667 MG capsule, Take 1,334 mg by mouth 3 (Three) Times a Day With Meals. 2 capsules 3 times daily, Disp: , Rfl:   •  dorzolamide (TRUSOPT) 2 % ophthalmic solution, Administer 1 drop into the left eye 3 (Three) Times a Day., Disp: 1 each, Rfl: 12  •  famotidine (PEPCID) 20 MG tablet, Take 20 mg by mouth every night at bedtime., Disp: , Rfl:   •  fluticasone (FLONASE) 50 MCG/ACT nasal spray, 2 sprays into each nostril daily. Administer 2 sprays in each nostril for each dose., Disp: , Rfl:   •  gabapentin (NEURONTIN) 300 MG capsule, Take 1 capsule by mouth 3 (Three) Times a Day., Disp: 90 capsule, Rfl: 2  •  guaiFENesin 200 MG tablet, Take 400 mg by mouth Every 4 (Four) Hours As Needed for Cough., Disp: , Rfl:   •  HYDROcodone-acetaminophen (NORCO)  MG per tablet, Take 1 tablet by mouth Every 4 (Four) Hours As Needed for Severe Pain  (Postop pain VAS 8-10 and pre wound vac change)., Disp: 30 tablet, Rfl: 0  •  HYDROcodone-acetaminophen (NORCO) 5-325 MG per tablet, Take 1  tablet by mouth Every 4 (Four) Hours As Needed for Moderate Pain  (Postop pain VAS 5-7)., Disp: 30 tablet, Rfl: 0  •  hydrOXYzine (ATARAX) 25 MG tablet, Take 25 mg by mouth 3 (Three) Times a Day As Needed for Anxiety., Disp: , Rfl:   •  insulin aspart (NovoLOG) 100 UNIT/ML injection, Inject  under the skin 3 (Three) Times a Day Before Meals. Sliding scale:  = 0 units; 150-200 = 3 units; 200-250 = 6 units; 250-300 = 9 units; 300-350 = 12 units; >350 = 15 units and call MD, Disp: , Rfl:   •  insulin glargine (LANTUS) 100 UNIT/ML injection, Inject 25 Units under the skin Every Night., Disp: , Rfl:   •  latanoprost (XALATAN) 0.005 % ophthalmic solution, Administer 1 drop to both eyes every night., Disp: , Rfl:   •  loperamide (IMODIUM) 2 MG capsule, Take 2 mg by mouth Every 6 (Six) Hours As Needed for Diarrhea., Disp: , Rfl:   •  loratadine (CLARITIN) 10 MG tablet, Take 10 mg by mouth daily., Disp: , Rfl:   •  ondansetron ODT (ZOFRAN-ODT) 4 MG disintegrating tablet, Take 1 tablet by mouth Every 6 (Six) Hours As Needed for Nausea or Vomiting., Disp: 10 tablet, Rfl: 0  •  PARoxetine (PAXIL) 30 MG tablet, Take 30 mg by mouth every night at bedtime., Disp: , Rfl:   •  polyethylene glycol (MIRALAX) packet, Take 17 g by mouth Daily As Needed (constipation)., Disp: , Rfl:   •  sennosides-docusate sodium (SENOKOT-S) 8.6-50 MG tablet, Take 2 tablets by mouth 2 (Two) Times a Day As Needed for Constipation., Disp: 60 tablet, Rfl: 0  •  sevelamer (RENAGEL) 800 MG tablet, Take 3 tablets by mouth 3 (Three) Times a Day With Meals., Disp: 30 tablet, Rfl: 0  •  sevelamer (RENVELA) 800 MG tablet, Take 2,400 mg by mouth 3 (Three) Times a Day With Meals., Disp: , Rfl:   •  traZODone (DESYREL) 50 MG tablet, Take 50 mg by mouth every night at bedtime., Disp: , Rfl:     Allergies:  Lisinopril and Motrin [ibuprofen]    Review of Systems:  Review of Systems   Constitutional: Negative for activity change, appetite change, chills and fever.    HENT: Negative for congestion, ear pain and sore throat.    Eyes: Negative for redness and visual disturbance.   Respiratory: Negative for cough, shortness of breath and wheezing.    Cardiovascular: Negative for chest pain and palpitations.   Gastrointestinal: Negative for abdominal pain, diarrhea, nausea and vomiting.   Genitourinary: Negative for difficulty urinating and dysuria.   Musculoskeletal: Positive for arthralgias. Negative for gait problem.   Skin: Positive for wound. Negative for color change and rash.   Neurological: Negative for dizziness, weakness and headaches.   Psychiatric/Behavioral: Negative for dysphoric mood and sleep disturbance. The patient is not nervous/anxious.        Objective:     /75   Pulse 66   Temp 99.4 °F (37.4 °C)   Resp 18   SpO2 95%   Physical Exam   Gen.: Patient sitting up comfortably in chair  Pulmonary: CTAB  CV: RRR  Abdomen: Soft, nontender, nondistended, bowel sounds normal  Extremities: Right upper extremity healing incisions, no erythema or edema, picture attached.  No lower extremity edema         Assessment/Plan:      68 y.o. male with:  1.  Status post right forearm incision and drainage: continue BID dressing changes  2.  ESRD: Monday Wednesday and Friday dialysis  3.  Diabetes type 2: has Levemir and sliding scale insulin  4.  Anxiety: Paxil  5.  Gastroesophageal reflux disease: Ranitidine  6.  Glaucoma: Will continue eyedrops  7.  Neuropathy: Continue gabapentin  8.  Chronic pain: Continue Norco 5      CODE STATUS: full code    Dr Ayoub is the attending on record for this patient, he is aware of the patient's status and agrees with the above.          This document has been electronically signed by Cory Rodríguez MD on May 30, 2018 3:56 PM

## 2018-06-01 ENCOUNTER — TELEPHONE (OUTPATIENT)
Dept: FAMILY MEDICINE CLINIC | Facility: CLINIC | Age: 68
End: 2018-06-01

## 2018-06-01 RX ORDER — GABAPENTIN 300 MG/1
300 CAPSULE ORAL 3 TIMES DAILY
Qty: 90 CAPSULE | Refills: 2 | Status: ON HOLD | OUTPATIENT
Start: 2018-06-01 | End: 2018-07-27

## 2018-06-05 ENCOUNTER — OFFICE VISIT (OUTPATIENT)
Dept: CARDIAC SURGERY | Facility: CLINIC | Age: 68
End: 2018-06-05

## 2018-06-05 VITALS
HEIGHT: 68 IN | WEIGHT: 220.3 LBS | SYSTOLIC BLOOD PRESSURE: 128 MMHG | OXYGEN SATURATION: 98 % | BODY MASS INDEX: 33.39 KG/M2 | DIASTOLIC BLOOD PRESSURE: 78 MMHG | TEMPERATURE: 97.3 F | HEART RATE: 61 BPM

## 2018-06-05 DIAGNOSIS — I77.0 ARTERIOVENOUS FISTULA, ACQUIRED (HCC): ICD-10-CM

## 2018-06-05 DIAGNOSIS — Z09 FOLLOW-UP SURGERY CARE: ICD-10-CM

## 2018-06-05 DIAGNOSIS — I77.0 A-V FISTULA (HCC): Primary | ICD-10-CM

## 2018-06-05 PROCEDURE — 99024 POSTOP FOLLOW-UP VISIT: CPT | Performed by: NURSE PRACTITIONER

## 2018-06-05 RX ORDER — ASPIRIN 81 MG/1
81 TABLET, CHEWABLE ORAL DAILY
COMMUNITY
End: 2019-01-01

## 2018-06-05 NOTE — PATIENT INSTRUCTIONS
Wound care:  Clean and NS filling dressing 2 x a day  Recheck 1 wk possible to duroderm     Follow Up

## 2018-06-11 ENCOUNTER — DOCUMENTATION (OUTPATIENT)
Dept: FAMILY MEDICINE CLINIC | Facility: CLINIC | Age: 68
End: 2018-06-11

## 2018-06-11 NOTE — PROGRESS NOTES
Received a call from patients nursing home that Mr. Musa has continued to be non-compliant with his diet obtaining fast food, candies, and drinking soda. He has apparently been leaving his HD sessions early and is ~20lbs over his recommended weight from fluid overload and dietary noncompliance. Nursing home staff report that they have had multiple conversations with the patient with dietary education, disease education, but this has not resulted in any change on behalf of patient behavior and he has voiced his understanding. The nursing home is requesting that his PCP speak to patient in person to ensure patient understands that his treatment non compliance and behaviors are going to result in early death.          This document has been electronically signed by Janel Gordon MD on June 11, 2018 4:57 PM

## 2018-06-11 NOTE — PROGRESS NOTES
Subjective   Patient ID: Cristo Musa is a 68 y.o. male is here today for follow-up of AV fistula. Pain and wound improved   Chief Complaint   Patient presents with   • Wound Check   • Hemodialysis Access   VAS 1-2  RUE Forearm   VAS 6 tooth    Wound Check       CP:  Cory Rodríguez MD  Nephrology:  Dr Vilchis,  [facility]  Cardiology:  Dr Nieto        Resides:  [facility].    68 y.o. male with ESRD on  hemodialysis, HTN, COPD, peripheral neuropathy, DM, CAD, Hepatitis C, nonischemic cardiomyopathy (EF 30%)..  former smoker.  Long term access for hemodialysis placed 12/23/14.  He returns today after callling requesting to be seen due to LUE pain and bulging areas of RUE AV fistula areas.  Denies any neurosensory symptoms of RUE does have of LUE.  Has DM with neuropathy.  RIGHT AV graft functioning well at dialysis.  No problems during dialysis.  Currently residing at [facility].  RUE pain improved  Wound much improved  Wound vac changes much easier.    No other associated signs, symptoms or modifying factors.    11/13/2014 Upper extremity vein mapping:  RIGHT cephalic 1.2-2.5mm, basilic 1.6-2.0mm.  LEFT cephalic 0.8-2.6mm, basilic 1.2-2.6mm.  12/23/14  LEFT Brachial artery to axillary vein AV Graft (7mm Artegraft)  01/07/15 LUE Incisions  open thru skin only.  Proximal Length:  4.5-5 cm depth 0.25 width 0.2  Distal  Length: 4. cm depth 0.2 width 0.2  Beefy red bed  Small amt erythema proximal incision.  1/13/15: LUE Incision:healed  3/24/15  Revision of left arteriovenous graft (distal interposition graft with a 6 mm Artegraft).  Thrombectomy, left arteriovenous graft, open.  Left fistulogram.  Balloon angioplasty of left subclavian vein, innominate vein, swing site (6 x 20 mm Bard Conquest balloon, 8 x 20 mm Bard Conquest balloon). Venous ultrasound of unilateral extremity.  3/31/15 Revision of left arteriovenous graft, open. Open thrombectomy of left  arteriovenous graft. Left upper extremity fistulogram.  Balloon angioplasty of left subclavian vein/innominate vein/swing site (6 x 21 mm Bard-Conquest balloon, 8 x 21 mm Bard-Conquest balloon). Venous ultrasound unilateral extremity.   4/10/15   Excision of left brachial to axillary arteriovenous bridge graft (bovine) with reconstruction of both the brachial artery and axillary vein with vein patch angioplasty from left cephalic vein. Placement of femoral arterial and venous lines using ultrasound guidance.  4/19/15  Placement LEFT IJ Tunneled Cath  5/14/15   Placement of a right forearm arteriovenous bridge graft.  5/19/15   Exchange of right femoral tunneled dialysis catheter using fluoroscopic guidance.   8/11/15  Entrobacter bacteremia of LUE AV Graft, which was removed and completed 6 wks of IV antx with neg CS.    9/22/15  Fistula Duplex:  maxPSV 94cm/s. flows 258-632ml/m. size 5.7-9.2mm. Patent Multiple areas of hematomas with sm area of needle oozing.    10/15/15: RIGHT Fistula duplex: maxPSV 233cm/s. flows 214-701ml/m. size 4.4-6.2mm.  1/26/16: RIGHT fistula duplex: maxPSV 321cm/s. flows 612-1866ml/m. size 3.8-8.1mm.  7/29/16: RIGHT TPA Lysis/Mech Thrombectomy  9/23/16: RIGHT groin Tunnel Cath Placement  10/5/16: RIGHT brachial-axillary vein AV graft (artegraft)        12/5/2016 RIGHT fistula duplex: maxPSV 265cm/s. Flows 1612-1986ml/m. size 5.8-8.1mm.         5/4/2017 RIGHT fistula duplex: maxPSV 530cm/s. Flows 642-1444ml/m. size 3.5-7.4mm.         11/28/17: RIGHT fistula duplex: mPSV 507cm/s. Flows 649-1519ml/m. Size 2.4-7.4mm.        02/22/18: RIGHT fistula duplex: Patent:  mPSV 523 cm/s (1906). Flows 489-2184 ml/m. Size 2.6-8.1mm. 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        3/12/2018 RIGHT fistula duplex:  Occluded R Loop Graft  Edema around old fistula  3/12/18  Hospital adm with infected AV Fistula  3/13/18  Revision RIGHT forearm ARTERIOVENOUS graft (excision),  Debridement RIGHT forearm  (skin, SQ, fascia) Negative pressure wound therapy (wound vac [id number])  3/23/18  RUE Wound to wound vac (31.7x2.5x2)  See pic    3/28/18: Wound Vac Change: (1) 22x3x1 (2) 10x2x2   4/11/18 Wound Check: (1) 12.8x7.5x0.5 (2) 8.6x0.2x0.2  6/5/18  Wound Check:  L 8.5 cm w 1.2cm d 0.3cm  See Pic         The following portions of the patient's history were reviewed and updated as appropriate: allergies, current medications, past family history, past medical history, past social history, past surgical history and problem list.   See HPI   Allergies   Allergen Reactions   • Lisinopril Angioedema     unknown   • Motrin [Ibuprofen] Diarrhea and Nausea And Vomiting       Current Outpatient Prescriptions:   •  acetaminophen (TYLENOL) 500 MG tablet, Take 500 mg by mouth Every 4 (Four) Hours As Needed for Mild Pain  or Fever., Disp: , Rfl:   •  aspirin 81 MG chewable tablet, Chew 81 mg Daily., Disp: , Rfl:   •  brimonidine (ALPHAGAN P) 0.1 % solution ophthalmic solution, Administer 1 drop to both eyes 3 (Three) Times a Day., Disp: , Rfl:   •  dorzolamide (TRUSOPT) 2 % ophthalmic solution, Administer 1 drop into the left eye 3 (Three) Times a Day., Disp: 1 each, Rfl: 12  •  famotidine (PEPCID) 20 MG tablet, Take 20 mg by mouth every night at bedtime., Disp: , Rfl:   •  fluticasone (FLONASE) 50 MCG/ACT nasal spray, 2 sprays into each nostril daily. Administer 2 sprays in each nostril for each dose., Disp: , Rfl:   •  gabapentin (NEURONTIN) 300 MG capsule, Take 1 capsule by mouth 3 (Three) Times a Day., Disp: 90 capsule, Rfl: 2  •  guaiFENesin 200 MG tablet, Take 400 mg by mouth Every 4 (Four) Hours As Needed for Cough., Disp: , Rfl:   •  HYDROcodone-acetaminophen (NORCO)  MG per tablet, Take 1 tablet by mouth Every 4 (Four) Hours As Needed for Severe Pain  (Postop pain VAS 8-10 and pre wound vac change)., Disp: 30 tablet, Rfl: 0  •  HYDROcodone-acetaminophen (NORCO) 5-325 MG per tablet, Take 1 tablet by mouth Every 4 (Four)  Hours As Needed for Moderate Pain  (Postop pain VAS 5-7)., Disp: 30 tablet, Rfl: 0  •  hydrOXYzine (ATARAX) 25 MG tablet, Take 25 mg by mouth 3 (Three) Times a Day As Needed for Anxiety., Disp: , Rfl:   •  insulin aspart (NovoLOG) 100 UNIT/ML injection, Inject  under the skin 3 (Three) Times a Day Before Meals. Sliding scale:  = 0 units; 150-200 = 3 units; 200-250 = 6 units; 250-300 = 9 units; 300-350 = 12 units; >350 = 15 units and call MD, Disp: , Rfl:   •  insulin glargine (LANTUS) 100 UNIT/ML injection, Inject 25 Units under the skin Every Night., Disp: , Rfl:   •  latanoprost (XALATAN) 0.005 % ophthalmic solution, Administer 1 drop to both eyes every night., Disp: , Rfl:   •  loperamide (IMODIUM) 2 MG capsule, Take 2 mg by mouth Every 6 (Six) Hours As Needed for Diarrhea., Disp: , Rfl:   •  loratadine (CLARITIN) 10 MG tablet, Take 10 mg by mouth daily., Disp: , Rfl:   •  ondansetron ODT (ZOFRAN-ODT) 4 MG disintegrating tablet, Take 1 tablet by mouth Every 6 (Six) Hours As Needed for Nausea or Vomiting., Disp: 10 tablet, Rfl: 0  •  PARoxetine (PAXIL) 30 MG tablet, Take 30 mg by mouth every night at bedtime., Disp: , Rfl:   •  polyethylene glycol (MIRALAX) packet, Take 17 g by mouth Daily As Needed (constipation)., Disp: , Rfl:   •  sennosides-docusate sodium (SENOKOT-S) 8.6-50 MG tablet, Take 2 tablets by mouth 2 (Two) Times a Day As Needed for Constipation., Disp: 60 tablet, Rfl: 0  •  sevelamer (RENAGEL) 800 MG tablet, Take 3 tablets by mouth 3 (Three) Times a Day With Meals., Disp: 30 tablet, Rfl: 0  •  sevelamer (RENVELA) 800 MG tablet, Take 2,400 mg by mouth 3 (Three) Times a Day With Meals., Disp: , Rfl:   •  traZODone (DESYREL) 50 MG tablet, Take 50 mg by mouth every night at bedtime., Disp: , Rfl:     Review of Systems   Constitution: Positive for weakness. Negative for chills, decreased appetite and fever.   HENT: Negative for hoarse voice, nosebleeds and stridor.         Tooth hurts.   Eyes:  Negative for visual disturbance.   Cardiovascular: Negative for chest pain, claudication, irregular heartbeat and leg swelling.        Fistula revised with surgical pain   No problems with dialysis    Respiratory: Negative for cough, hemoptysis and shortness of breath.    Hematologic/Lymphatic: Does not bruise/bleed easily.   Skin: Positive for poor wound healing (R forearm ). Negative for dry skin and itching.   Musculoskeletal: Negative for back pain, falls, muscle cramps and muscle weakness.        RUE VAS 2 ache    Gastrointestinal: Negative for abdominal pain, anorexia and melena.   Neurological: Negative for dizziness, loss of balance, numbness and paresthesias.   Psychiatric/Behavioral: Negative for altered mental status.   Allergic/Immunologic: Negative for hives.        Objective   Physical Exam   Constitutional: He is oriented to person, place, and time. He appears well-nourished.   Body mass index is 32.23 kg/(m^2).     HENT:   Head: Normocephalic.   Mouth/Throat: Oropharynx is clear and moist.   Eyes: Conjunctivae are normal. Pupils are equal, round, and reactive to light.   Neck: No JVD present.   Cardiovascular: Normal rate, regular rhythm, normal heart sounds and intact distal pulses.    Pulses:       Carotid pulses are 1+ on the right side, and 1+ on the left side.       Radial pulses are 2+ on the right side, and 2+ on the left side.        Posterior tibial pulses are 2+ on the right side, and 2+ on the left side.   Fistula Present RU Extremity:revised  Distal hand warm + sensation + mobility    RUE Upper fistula Brachial-axillary + thr8il + bruit + sensation    Pulmonary/Chest: Effort normal and breath sounds normal. No stridor.   Coarse    Abdominal: Soft. Bowel sounds are normal.   Musculoskeletal: He exhibits edema.   Neurological: He is alert and oriented to person, place, and time.   Skin: Skin is warm and dry. Capillary refill takes less than 2 seconds. No erythema. No pallor.   RUE  Upper  fistula Brachial-axillary  well healed  Lower fistula Forearm AV bridge graft to wound vac. See picture.    Psychiatric: His behavior is normal.   Nursing note and vitals reviewed.      Vitals:    06/05/18 1016   BP: 128/78   Pulse: 61   Temp: 97.3 °F (36.3 °C)   SpO2: 98%   3/23/18 wound     6/5/18 Wound          Assessment/Plan   Independent Review of Radiographic Studies:    None with today's visit       1. ESRD (end stage renal disease) on dialysis  Indication for AV fistula     2.Non functioning Arteriovenous , subsequent encounter RUE forearm bridge graft  Symptomatic   Continue wound vac.  Excellent progress.       3. Arteriovenous fistula, acquired  Patent fistula working well with dialysis.   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 If problems should arise including difficulty with access, high pressure alarms, or inability to complete dialysis please [facility] immediately for evaluation.

## 2018-06-12 ENCOUNTER — OFFICE VISIT (OUTPATIENT)
Dept: CARDIAC SURGERY | Facility: CLINIC | Age: 68
End: 2018-06-12

## 2018-06-12 VITALS
SYSTOLIC BLOOD PRESSURE: 110 MMHG | TEMPERATURE: 97.1 F | BODY MASS INDEX: 33.95 KG/M2 | HEIGHT: 68 IN | DIASTOLIC BLOOD PRESSURE: 65 MMHG | WEIGHT: 224 LBS | OXYGEN SATURATION: 91 % | HEART RATE: 75 BPM

## 2018-06-12 DIAGNOSIS — S41.101D NON-HEALING WOUND OF UPPER EXTREMITY, RIGHT, SUBSEQUENT ENCOUNTER: ICD-10-CM

## 2018-06-12 DIAGNOSIS — I77.0 A-V FISTULA (HCC): Primary | ICD-10-CM

## 2018-06-12 PROCEDURE — 99024 POSTOP FOLLOW-UP VISIT: CPT | Performed by: NURSE PRACTITIONER

## 2018-06-12 NOTE — PROGRESS NOTES
Subjective   Patient ID: Cristo Musa is a 68 y.o. male is here today for follow-up of AV fistula. Pain and wound improved   Chief Complaint   Patient presents with   • Wound Check   • Hemodialysis Access   VAS 1-2  RUE Forearm   VAS 6 tooth    Wound Check       CP:  Cory Rodríguez MD  Nephrology:  Dr Vilchis,  [facility]  Cardiology:  Dr Nieto        Resides:  [facility].    68 y.o. male with ESRD on  hemodialysis, HTN, COPD, peripheral neuropathy, DM, CAD, Hepatitis C, nonischemic cardiomyopathy (EF 30%)..  former smoker.  Long term access for hemodialysis placed 12/23/14.  He returns today after callling requesting to be seen due to LUE pain and bulging areas of RUE AV fistula areas.  Denies any neurosensory symptoms of RUE does have of LUE.  Has DM with neuropathy.  RIGHT AV graft functioning well at dialysis.  No problems during dialysis.  Currently residing at [facility].  RUE pain improved  Wound much improved    No other associated signs, symptoms or modifying factors.    11/13/2014 Upper extremity vein mapping:  RIGHT cephalic 1.2-2.5mm, basilic 1.6-2.0mm.  LEFT cephalic 0.8-2.6mm, basilic 1.2-2.6mm.  12/23/14  LEFT Brachial artery to axillary vein AV Graft (7mm Artegraft)  01/07/15 LUE Incisions  open thru skin only.  Proximal Length:  4.5-5 cm depth 0.25 width 0.2  Distal  Length: 4. cm depth 0.2 width 0.2  Beefy red bed  Small amt erythema proximal incision.  1/13/15: LUE Incision:healed  3/24/15  Revision of left arteriovenous graft (distal interposition graft with a 6 mm Artegraft).  Thrombectomy, left arteriovenous graft, open.  Left fistulogram.  Balloon angioplasty of left subclavian vein, innominate vein, swing site (6 x 20 mm Bard Conquest balloon, 8 x 20 mm Bard Conquest balloon). Venous ultrasound of unilateral extremity.  3/31/15 Revision of left arteriovenous graft, open. Open thrombectomy of left arteriovenous graft. Left upper extremity  fistulogram.  Balloon angioplasty of left subclavian vein/innominate vein/swing site (6 x 21 mm Bard-Conquest balloon, 8 x 21 mm Bard-Conquest balloon). Venous ultrasound unilateral extremity.   4/10/15   Excision of left brachial to axillary arteriovenous bridge graft (bovine) with reconstruction of both the brachial artery and axillary vein with vein patch angioplasty from left cephalic vein. Placement of femoral arterial and venous lines using ultrasound guidance.  4/19/15  Placement LEFT IJ Tunneled Cath  5/14/15   Placement of a right forearm arteriovenous bridge graft.  5/19/15   Exchange of right femoral tunneled dialysis catheter using fluoroscopic guidance.   8/11/15  Entrobacter bacteremia of LUE AV Graft, which was removed and completed 6 wks of IV antx with neg CS.    9/22/15  Fistula Duplex:  maxPSV 94cm/s. flows 258-632ml/m. size 5.7-9.2mm. Patent Multiple areas of hematomas with sm area of needle oozing.    10/15/15: RIGHT Fistula duplex: maxPSV 233cm/s. flows 214-701ml/m. size 4.4-6.2mm.  1/26/16: RIGHT fistula duplex: maxPSV 321cm/s. flows 612-1866ml/m. size 3.8-8.1mm.  7/29/16: RIGHT TPA Lysis/Mech Thrombectomy  9/23/16: RIGHT groin Tunnel Cath Placement  10/5/16: RIGHT brachial-axillary vein AV graft (artegraft)        12/5/2016 RIGHT fistula duplex: maxPSV 265cm/s. Flows 1612-1986ml/m. size 5.8-8.1mm.         5/4/2017 RIGHT fistula duplex: maxPSV 530cm/s. Flows 642-1444ml/m. size 3.5-7.4mm.         11/28/17: RIGHT fistula duplex: mPSV 507cm/s. Flows 649-1519ml/m. Size 2.4-7.4mm.        02/22/18: RIGHT fistula duplex: Patent:  mPSV 523 cm/s (1906). Flows 489-2184 ml/m. Size 2.6-8.1mm. 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        3/12/2018 RIGHT fistula duplex:  Occluded R Loop Graft  Edema around old fistula  3/12/18  Hospital adm with infected AV Fistula  3/13/18  Revision RIGHT forearm ARTERIOVENOUS graft (excision),  Debridement RIGHT forearm (skin, SQ, fascia) Negative pressure wound  therapy (wound vac [id number])  3/23/18  RUE Wound to wound vac (31.7x2.5x2)  See pic    3/28/18: Wound Vac Change: (1) 22x3x1 (2) 10x2x2   4/11/18 Wound Check: (1) 12.8x7.5x0.5 (2) 8.6x0.2x0.2  6/5/18  Wound Check:  L 8.5 cm w 1.2cm d 0.3cm  See Pic   6/12/18  Wound Check:  L 7.2 cm w 1.2cm d 0.2 cm  See Pic         The following portions of the patient's history were reviewed and updated as appropriate: allergies, current medications, past family history, past medical history, past social history, past surgical history and problem list.   See HPI   Allergies   Allergen Reactions   • Lisinopril Angioedema     unknown   • Motrin [Ibuprofen] Diarrhea and Nausea And Vomiting       Current Outpatient Prescriptions:   •  acetaminophen (TYLENOL) 500 MG tablet, Take 500 mg by mouth Every 4 (Four) Hours As Needed for Mild Pain  or Fever., Disp: , Rfl:   •  aspirin 81 MG chewable tablet, Chew 81 mg Daily., Disp: , Rfl:   •  brimonidine (ALPHAGAN P) 0.1 % solution ophthalmic solution, Administer 1 drop to both eyes 3 (Three) Times a Day., Disp: , Rfl:   •  dorzolamide (TRUSOPT) 2 % ophthalmic solution, Administer 1 drop into the left eye 3 (Three) Times a Day., Disp: 1 each, Rfl: 12  •  famotidine (PEPCID) 20 MG tablet, Take 20 mg by mouth every night at bedtime., Disp: , Rfl:   •  fluticasone (FLONASE) 50 MCG/ACT nasal spray, 2 sprays into each nostril daily. Administer 2 sprays in each nostril for each dose., Disp: , Rfl:   •  gabapentin (NEURONTIN) 300 MG capsule, Take 1 capsule by mouth 3 (Three) Times a Day., Disp: 90 capsule, Rfl: 2  •  guaiFENesin 200 MG tablet, Take 400 mg by mouth Every 4 (Four) Hours As Needed for Cough., Disp: , Rfl:   •  HYDROcodone-acetaminophen (NORCO)  MG per tablet, Take 1 tablet by mouth Every 4 (Four) Hours As Needed for Severe Pain  (Postop pain VAS 8-10 and pre wound vac change)., Disp: 30 tablet, Rfl: 0  •  HYDROcodone-acetaminophen (NORCO) 5-325 MG per tablet, Take 1 tablet by mouth  Every 4 (Four) Hours As Needed for Moderate Pain  (Postop pain VAS 5-7)., Disp: 30 tablet, Rfl: 0  •  hydrOXYzine (ATARAX) 25 MG tablet, Take 25 mg by mouth 3 (Three) Times a Day As Needed for Anxiety., Disp: , Rfl:   •  insulin aspart (NovoLOG) 100 UNIT/ML injection, Inject  under the skin 3 (Three) Times a Day Before Meals. Sliding scale:  = 0 units; 150-200 = 3 units; 200-250 = 6 units; 250-300 = 9 units; 300-350 = 12 units; >350 = 15 units and call MD, Disp: , Rfl:   •  insulin glargine (LANTUS) 100 UNIT/ML injection, Inject 25 Units under the skin Every Night., Disp: , Rfl:   •  latanoprost (XALATAN) 0.005 % ophthalmic solution, Administer 1 drop to both eyes every night., Disp: , Rfl:   •  loperamide (IMODIUM) 2 MG capsule, Take 2 mg by mouth Every 6 (Six) Hours As Needed for Diarrhea., Disp: , Rfl:   •  loratadine (CLARITIN) 10 MG tablet, Take 10 mg by mouth daily., Disp: , Rfl:   •  ondansetron ODT (ZOFRAN-ODT) 4 MG disintegrating tablet, Take 1 tablet by mouth Every 6 (Six) Hours As Needed for Nausea or Vomiting., Disp: 10 tablet, Rfl: 0  •  PARoxetine (PAXIL) 30 MG tablet, Take 30 mg by mouth every night at bedtime., Disp: , Rfl:   •  polyethylene glycol (MIRALAX) packet, Take 17 g by mouth Daily As Needed (constipation)., Disp: , Rfl:   •  sennosides-docusate sodium (SENOKOT-S) 8.6-50 MG tablet, Take 2 tablets by mouth 2 (Two) Times a Day As Needed for Constipation., Disp: 60 tablet, Rfl: 0  •  sevelamer (RENAGEL) 800 MG tablet, Take 3 tablets by mouth 3 (Three) Times a Day With Meals., Disp: 30 tablet, Rfl: 0  •  sevelamer (RENVELA) 800 MG tablet, Take 2,400 mg by mouth 3 (Three) Times a Day With Meals., Disp: , Rfl:   •  traZODone (DESYREL) 50 MG tablet, Take 50 mg by mouth every night at bedtime., Disp: , Rfl:     Review of Systems   Constitution: Positive for weakness. Negative for chills, decreased appetite and fever.   HENT: Negative for hoarse voice, nosebleeds and stridor.         Tooth  hurts.   Eyes: Negative for visual disturbance.   Cardiovascular: Negative for chest pain, claudication, irregular heartbeat and leg swelling.        Fistula revised with surgical pain   No problems with dialysis    Respiratory: Negative for cough, hemoptysis and shortness of breath.    Hematologic/Lymphatic: Does not bruise/bleed easily.   Skin: Positive for poor wound healing (R forearm ). Negative for dry skin and itching.   Musculoskeletal: Negative for back pain, falls, muscle cramps and muscle weakness.        RUE VAS 2 ache    Gastrointestinal: Negative for abdominal pain, anorexia and melena.   Neurological: Negative for dizziness, loss of balance, numbness and paresthesias.   Psychiatric/Behavioral: Negative for altered mental status.   Allergic/Immunologic: Negative for hives.        Objective   Physical Exam   Constitutional: He is oriented to person, place, and time. He appears well-nourished.   Body mass index is 32.23 kg/(m^2).     HENT:   Head: Normocephalic.   Mouth/Throat: Oropharynx is clear and moist.   Eyes: Conjunctivae are normal. Pupils are equal, round, and reactive to light.   Neck: No JVD present.   Cardiovascular: Normal rate, regular rhythm, normal heart sounds and intact distal pulses.    Pulses:       Carotid pulses are 1+ on the right side, and 1+ on the left side.       Radial pulses are 2+ on the right side, and 2+ on the left side.        Posterior tibial pulses are 2+ on the right side, and 2+ on the left side.   Fistula Present RU Extremity:revised  Distal hand warm + sensation + mobility    RUE Upper fistula Brachial-axillary + thr8il + bruit + sensation    Pulmonary/Chest: Effort normal and breath sounds normal. No stridor.   Coarse    Abdominal: Soft. Bowel sounds are normal.   Musculoskeletal: He exhibits edema.   Neurological: He is alert and oriented to person, place, and time.   Skin: Skin is warm and dry. Capillary refill takes less than 2 seconds. No erythema. No pallor.    RUE  Upper fistula Brachial-axillary  well healed  Lower fistula Forearm AV bridge graft to wound vac. See picture.    Psychiatric: His behavior is normal.   Nursing note and vitals reviewed.      Vitals:    06/12/18 1308   BP: 110/65   Pulse: 75   Temp: 97.1 °F (36.2 °C)   SpO2: 91%   3/23/18 wound     6/12/18 Wound              Assessment/Plan   Independent Review of Radiographic Studies:    None with today's visit       1. ESRD (end stage renal disease) on dialysis  Indication for AV fistula     2.Non functioning Arteriovenous , subsequent encounter RUE forearm bridge graft  Symptomatic   Clean wound with NS 2 x a day   Fill wound light coat of duroderm BID  Apply non-adherent dressing  Apply coban to hold  Please do not place ace bandage to compress wound  FU 2 wks     3. Arteriovenous fistula, acquired  Patent fistula working well with dialysis.   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 If problems should arise including difficulty with access, high pressure alarms, or inability to complete dialysis please notify [facility] immediately for evaluation.

## 2018-06-12 NOTE — PATIENT INSTRUCTIONS
Clean wound with NS 2 x a day   Fill wound light coat of duroderm BID  Apply non-adherent dressing  Apply coban to hold  Please do not place ace bandage to compress wound  FU 2 wks

## 2018-06-19 RX ORDER — HYDROCODONE BITARTRATE AND ACETAMINOPHEN 5; 325 MG/1; MG/1
TABLET ORAL
Qty: 120 TABLET | Refills: 0 | Status: SHIPPED | OUTPATIENT
Start: 2018-06-19 | End: 2018-06-26 | Stop reason: SDUPTHER

## 2018-06-21 ENCOUNTER — TELEPHONE (OUTPATIENT)
Dept: FAMILY MEDICINE CLINIC | Facility: CLINIC | Age: 68
End: 2018-06-21

## 2018-06-26 ENCOUNTER — TELEPHONE (OUTPATIENT)
Dept: FAMILY MEDICINE CLINIC | Facility: CLINIC | Age: 68
End: 2018-06-26

## 2018-06-26 ENCOUNTER — OFFICE VISIT (OUTPATIENT)
Dept: CARDIAC SURGERY | Facility: CLINIC | Age: 68
End: 2018-06-26

## 2018-06-26 VITALS
HEART RATE: 69 BPM | SYSTOLIC BLOOD PRESSURE: 121 MMHG | BODY MASS INDEX: 33.43 KG/M2 | TEMPERATURE: 97.7 F | DIASTOLIC BLOOD PRESSURE: 65 MMHG | WEIGHT: 220.6 LBS | OXYGEN SATURATION: 97 % | HEIGHT: 68 IN

## 2018-06-26 DIAGNOSIS — I77.0 A-V FISTULA (HCC): Primary | ICD-10-CM

## 2018-06-26 DIAGNOSIS — S41.101D NON-HEALING WOUND OF UPPER EXTREMITY, RIGHT, SUBSEQUENT ENCOUNTER: ICD-10-CM

## 2018-06-26 PROCEDURE — 99024 POSTOP FOLLOW-UP VISIT: CPT | Performed by: NURSE PRACTITIONER

## 2018-06-26 NOTE — PROGRESS NOTES
Subjective   Patient ID: Cristo Musa is a 68 y.o. male is here today for follow-up of AV fistula. Pain and wound improved   Chief Complaint   Patient presents with   • Wound Check     RIGHT lower arm   VAS 1-2  RUE Forearm   VAS 6 L elbow    Wound Check       CP:  Cory Rodríguez MD  Nephrology:  Dr Vilchis,  Former[facility]  Cardiology:  Dr Nieto        Resides:  [facility].    68 y.o. male with ESRD on  hemodialysis, HTN, COPD, peripheral neuropathy, DM, CAD, Hepatitis C, nonischemic cardiomyopathy (EF 30%)..  former smoker.  Long term access for hemodialysis placed 12/23/14.  He returns today after callling requesting to be seen due to LUE pain and bulging areas of RUE AV fistula areas.  Denies any neurosensory symptoms of RUE does have of LUE.  Has DM with neuropathy.  RIGHT AV graft functioning well at dialysis.  No problems during dialysis.  Currently residing at [facility].  RUE pain improved  Wound much improved    No other associated signs, symptoms or modifying factors.    11/13/2014 Upper extremity vein mapping:  RIGHT cephalic 1.2-2.5mm, basilic 1.6-2.0mm.  LEFT cephalic 0.8-2.6mm, basilic 1.2-2.6mm.  12/23/14  LEFT Brachial artery to axillary vein AV Graft (7mm Artegraft)  01/07/15 LUE Incisions  open thru skin only.  Proximal Length:  4.5-5 cm depth 0.25 width 0.2  Distal  Length: 4. cm depth 0.2 width 0.2  Beefy red bed  Small amt erythema proximal incision.  1/13/15: LUE Incision:healed  3/24/15  Revision of left arteriovenous graft (distal interposition graft with a 6 mm Artegraft).  Thrombectomy, left arteriovenous graft, open.  Left fistulogram.  Balloon angioplasty of left subclavian vein, innominate vein, swing site (6 x 20 mm Bard Conquest balloon, 8 x 20 mm Bard Conquest balloon). Venous ultrasound of unilateral extremity.  3/31/15 Revision of left arteriovenous graft, open. Open thrombectomy of left arteriovenous graft. Left upper extremity  fistulogram.  Balloon angioplasty of left subclavian vein/innominate vein/swing site (6 x 21 mm Bard-Conquest balloon, 8 x 21 mm Bard-Conquest balloon). Venous ultrasound unilateral extremity.   4/10/15   Excision of left brachial to axillary arteriovenous bridge graft (bovine) with reconstruction of both the brachial artery and axillary vein with vein patch angioplasty from left cephalic vein. Placement of femoral arterial and venous lines using ultrasound guidance.  4/19/15  Placement LEFT IJ Tunneled Cath  5/14/15   Placement of a right forearm arteriovenous bridge graft.  5/19/15   Exchange of right femoral tunneled dialysis catheter using fluoroscopic guidance.   8/11/15  Entrobacter bacteremia of LUE AV Graft, which was removed and completed 6 wks of IV antx with neg CS.    9/22/15  Fistula Duplex:  maxPSV 94cm/s. flows 258-632ml/m. size 5.7-9.2mm. Patent Multiple areas of hematomas with sm area of needle oozing.    10/15/15: RIGHT Fistula duplex: maxPSV 233cm/s. flows 214-701ml/m. size 4.4-6.2mm.  1/26/16: RIGHT fistula duplex: maxPSV 321cm/s. flows 612-1866ml/m. size 3.8-8.1mm.  7/29/16: RIGHT TPA Lysis/Mech Thrombectomy  9/23/16: RIGHT groin Tunnel Cath Placement  10/5/16: RIGHT brachial-axillary vein AV graft (artegraft)        12/5/2016 RIGHT fistula duplex: maxPSV 265cm/s. Flows 1612-1986ml/m. size 5.8-8.1mm.         5/4/2017 RIGHT fistula duplex: maxPSV 530cm/s. Flows 642-1444ml/m. size 3.5-7.4mm.         11/28/17: RIGHT fistula duplex: mPSV 507cm/s. Flows 649-1519ml/m. Size 2.4-7.4mm.        02/22/18: RIGHT fistula duplex: Patent:  mPSV 523 cm/s (1906). Flows 489-2184 ml/m. Size 2.6-8.1mm. 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        3/12/2018 RIGHT fistula duplex:  Occluded R Loop Graft  Edema around old fistula  3/12/18  Hospital adm with infected AV Fistula  3/13/18  Revision RIGHT forearm ARTERIOVENOUS graft (excision),  Debridement RIGHT forearm (skin, SQ, fascia) Negative pressure wound  therapy (wound vac [id number])  3/23/18  RUE Wound to wound vac (31.7x2.5x2)  See pic    3/28/18: Wound Vac Change: (1) 22x3x1 (2) 10x2x2   4/11/18 Wound Check: (1) 12.8x7.5x0.5 (2) 8.6x0.2x0.2  6/5/18  Wound Check:  L 8.5 cm w 1.2cm d 0.3cm  See Pic   6/12/18  Wound Check:  L 7.2 cm w 1.2cm d 0.2 cm  See Pic   6/26/18   Wound Check:  L 5.2 cm w 1.cm d 0.1 cm  See Pic         The following portions of the patient's history were reviewed and updated as appropriate: allergies, current medications, past family history, past medical history, past social history, past surgical history and problem list.   See HPI   Allergies   Allergen Reactions   • Lisinopril Angioedema     unknown   • Motrin [Ibuprofen] Diarrhea and Nausea And Vomiting       Current Outpatient Prescriptions:   •  acetaminophen (TYLENOL) 500 MG tablet, Take 500 mg by mouth Every 4 (Four) Hours As Needed for Mild Pain  or Fever., Disp: , Rfl:   •  aspirin 81 MG chewable tablet, Chew 81 mg Daily., Disp: , Rfl:   •  brimonidine (ALPHAGAN P) 0.1 % solution ophthalmic solution, Administer 1 drop to both eyes 3 (Three) Times a Day., Disp: , Rfl:   •  dorzolamide (TRUSOPT) 2 % ophthalmic solution, Administer 1 drop into the left eye 3 (Three) Times a Day., Disp: 1 each, Rfl: 12  •  famotidine (PEPCID) 20 MG tablet, Take 20 mg by mouth every night at bedtime., Disp: , Rfl:   •  fluticasone (FLONASE) 50 MCG/ACT nasal spray, 2 sprays into each nostril daily. Administer 2 sprays in each nostril for each dose., Disp: , Rfl:   •  gabapentin (NEURONTIN) 300 MG capsule, Take 1 capsule by mouth 3 (Three) Times a Day., Disp: 90 capsule, Rfl: 2  •  guaiFENesin 200 MG tablet, Take 400 mg by mouth Every 4 (Four) Hours As Needed for Cough., Disp: , Rfl:   •  HYDROcodone-acetaminophen (NORCO)  MG per tablet, Take 1 tablet by mouth Every 4 (Four) Hours As Needed for Severe Pain  (Postop pain VAS 8-10 and pre wound vac change)., Disp: 30 tablet, Rfl: 0  •  hydrOXYzine  (ATARAX) 25 MG tablet, Take 25 mg by mouth 3 (Three) Times a Day As Needed for Anxiety., Disp: , Rfl:   •  insulin aspart (NovoLOG) 100 UNIT/ML injection, Inject  under the skin 3 (Three) Times a Day Before Meals. Sliding scale:  = 0 units; 150-200 = 3 units; 200-250 = 6 units; 250-300 = 9 units; 300-350 = 12 units; >350 = 15 units and call MD, Disp: , Rfl:   •  insulin glargine (LANTUS) 100 UNIT/ML injection, Inject 25 Units under the skin Every Night., Disp: , Rfl:   •  latanoprost (XALATAN) 0.005 % ophthalmic solution, Administer 1 drop to both eyes every night., Disp: , Rfl:   •  loperamide (IMODIUM) 2 MG capsule, Take 2 mg by mouth Every 6 (Six) Hours As Needed for Diarrhea., Disp: , Rfl:   •  ondansetron ODT (ZOFRAN-ODT) 4 MG disintegrating tablet, Take 1 tablet by mouth Every 6 (Six) Hours As Needed for Nausea or Vomiting., Disp: 10 tablet, Rfl: 0  •  PARoxetine (PAXIL) 30 MG tablet, Take 30 mg by mouth every night at bedtime., Disp: , Rfl:   •  polyethylene glycol (MIRALAX) packet, Take 17 g by mouth Daily As Needed (constipation)., Disp: , Rfl:   •  sennosides-docusate sodium (SENOKOT-S) 8.6-50 MG tablet, Take 2 tablets by mouth 2 (Two) Times a Day As Needed for Constipation., Disp: 60 tablet, Rfl: 0  •  sevelamer (RENVELA) 800 MG tablet, Take 2,400 mg by mouth 3 (Three) Times a Day With Meals., Disp: , Rfl:   •  traZODone (DESYREL) 50 MG tablet, Take 50 mg by mouth every night at bedtime., Disp: , Rfl:     Review of Systems   Constitution: Positive for weakness. Negative for chills, decreased appetite and fever.   HENT: Negative for hoarse voice, nosebleeds and stridor.    Eyes: Negative for visual disturbance.   Cardiovascular: Negative for chest pain, claudication, irregular heartbeat and leg swelling.        Fistula revised with surgical pain   No problems with dialysis    Respiratory: Negative for cough, hemoptysis and shortness of breath.    Hematologic/Lymphatic: Does not bruise/bleed easily.    Skin: Positive for poor wound healing (R forearm improved/near healed ). Negative for dry skin and itching.   Musculoskeletal: Positive for arthritis (L elbow hurts. ). Negative for back pain, falls, muscle cramps and muscle weakness.        RUE VAS 2 ache    Gastrointestinal: Negative for abdominal pain, anorexia and melena.   Neurological: Negative for dizziness, loss of balance, numbness and paresthesias.   Psychiatric/Behavioral: Negative for altered mental status.   Allergic/Immunologic: Negative for hives.        Objective   Physical Exam   Constitutional: He is oriented to person, place, and time. He appears well-nourished.   Body mass index is 32.23 kg/(m^2).     HENT:   Head: Normocephalic.   Mouth/Throat: Oropharynx is clear and moist.   Eyes: Conjunctivae are normal. Pupils are equal, round, and reactive to light.   Neck: No JVD present.   Cardiovascular: Normal rate, regular rhythm, normal heart sounds and intact distal pulses.    Pulses:       Carotid pulses are 1+ on the right side, and 1+ on the left side.       Radial pulses are 2+ on the right side, and 2+ on the left side.        Posterior tibial pulses are 2+ on the right side, and 2+ on the left side.   Fistula Present RU Extremity:revised  Distal hand warm + sensation + mobility    RUE Upper fistula Brachial-axillary + thr8il + bruit + sensation    Pulmonary/Chest: Effort normal and breath sounds normal. No stridor.   Coarse    Abdominal: Soft. Bowel sounds are normal.   Musculoskeletal: He exhibits edema.   Neurological: He is alert and oriented to person, place, and time.   Skin: Skin is warm and dry. Capillary refill takes less than 2 seconds. No erythema. No pallor.   RUE  Upper fistula Brachial-axillary  well healed  Lower fistula Forearm AV bridge graft to wound vac. See picture.    Psychiatric: His behavior is normal.   Nursing note and vitals reviewed.      Vitals:    06/26/18 1330   BP: 121/65   Pulse: 69   Temp: 97.7 °F (36.5 °C)    SpO2: 97%       6/26/18 Wound                  Assessment/Plan   Independent Review of Radiographic Studies:    None with today's visit       1. ESRD (end stage renal disease) on dialysis  Indication for AV fistula     2.Non functioning Arteriovenous , subsequent encounter RUE forearm bridge graft  Clean wound with NS 2 x a day   Fill wound light coat of duroderm BID  Apply non-adherent dressing  Apply web dressuing  FU 2 wks     3. Arteriovenous fistula, acquired  Patent fistula working well with dialysis.   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 If problems should arise including difficulty with access, high pressure alarms, or inability to complete dialysis please notify [facility] immediately for evaluation.

## 2018-06-26 NOTE — TELEPHONE ENCOUNTER
[facility] CALLED    PATIENT HAS COMPLAINED OF LEFT ARM PAIN.  VITALS ARE : 98.5,104,20,110/80,92; NO RADIATING PAIN OR CHEST PAIN.    YOU CHANGED HIS NORCO 5 TO Q 4 HRS PRN AND THEY NEED HARD SCRIPT FOR PHARMACY    THANK YOU

## 2018-07-01 ENCOUNTER — OFFICE VISIT (OUTPATIENT)
Dept: FAMILY MEDICINE CLINIC | Facility: CLINIC | Age: 68
End: 2018-07-01

## 2018-07-01 DIAGNOSIS — S41.101D NON-HEALING WOUND OF UPPER EXTREMITY, RIGHT, SUBSEQUENT ENCOUNTER: Primary | ICD-10-CM

## 2018-07-01 DIAGNOSIS — Z79.4 TYPE 2 DIABETES MELLITUS WITH BOTH EYES AFFECTED BY MODERATE NONPROLIFERATIVE RETINOPATHY WITHOUT MACULAR EDEMA, WITH LONG-TERM CURRENT USE OF INSULIN (HCC): ICD-10-CM

## 2018-07-01 DIAGNOSIS — N18.6 ESRD (END STAGE RENAL DISEASE) (HCC): ICD-10-CM

## 2018-07-01 DIAGNOSIS — G89.29 OTHER CHRONIC PAIN: ICD-10-CM

## 2018-07-01 DIAGNOSIS — K21.9 GASTROESOPHAGEAL REFLUX DISEASE WITHOUT ESOPHAGITIS: ICD-10-CM

## 2018-07-01 DIAGNOSIS — F41.9 ANXIETY: ICD-10-CM

## 2018-07-01 DIAGNOSIS — G62.9 NEUROPATHY: ICD-10-CM

## 2018-07-01 DIAGNOSIS — E11.3393 TYPE 2 DIABETES MELLITUS WITH BOTH EYES AFFECTED BY MODERATE NONPROLIFERATIVE RETINOPATHY WITHOUT MACULAR EDEMA, WITH LONG-TERM CURRENT USE OF INSULIN (HCC): ICD-10-CM

## 2018-07-01 PROCEDURE — 99308 SBSQ NF CARE LOW MDM 20: CPT | Performed by: STUDENT IN AN ORGANIZED HEALTH CARE EDUCATION/TRAINING PROGRAM

## 2018-07-02 VITALS
HEART RATE: 56 BPM | TEMPERATURE: 96.9 F | RESPIRATION RATE: 16 BRPM | SYSTOLIC BLOOD PRESSURE: 148 MMHG | DIASTOLIC BLOOD PRESSURE: 53 MMHG | OXYGEN SATURATION: 90 %

## 2018-07-02 NOTE — PROGRESS NOTES
I have reviewed the notes, assessments, and/or procedures performed. I concur with her/his documentation of Cristo Musa.     Darren Jimenez, DO

## 2018-07-02 NOTE — PROGRESS NOTES
MONTHLY NURSING HOME VISIT    NAME: Cristo Musa  : 1950  MRN: [id number]    DATE & TIME SEEN: 2018 1700    PCP: Cory Rodríguez MD    NURSING HOME: [facility]     Chief Complaint: Monthly Nursing Home Visit     Subjective:     Cristo Musa is a 68 y.o. male. Patient complaining of sharp pain throughout his right arm.  It is getting swollen and stiff.  He also has complaint of diarrhea for the last 2-3 days, nonbloody.  No other concerns today.    Current Meds:    Current Outpatient Prescriptions:   •  acetaminophen (TYLENOL) 500 MG tablet, Take 500 mg by mouth Every 4 (Four) Hours As Needed for Mild Pain  or Fever., Disp: , Rfl:   •  aspirin 81 MG chewable tablet, Chew 81 mg Daily., Disp: , Rfl:   •  brimonidine (ALPHAGAN P) 0.1 % solution ophthalmic solution, Administer 1 drop to both eyes 3 (Three) Times a Day., Disp: , Rfl:   •  dorzolamide (TRUSOPT) 2 % ophthalmic solution, Administer 1 drop into the left eye 3 (Three) Times a Day., Disp: 1 each, Rfl: 12  •  famotidine (PEPCID) 20 MG tablet, Take 20 mg by mouth every night at bedtime., Disp: , Rfl:   •  fluticasone (FLONASE) 50 MCG/ACT nasal spray, 2 sprays into each nostril daily. Administer 2 sprays in each nostril for each dose., Disp: , Rfl:   •  gabapentin (NEURONTIN) 300 MG capsule, Take 1 capsule by mouth 3 (Three) Times a Day., Disp: 90 capsule, Rfl: 2  •  guaiFENesin 200 MG tablet, Take 400 mg by mouth Every 4 (Four) Hours As Needed for Cough., Disp: , Rfl:   •  HYDROcodone-acetaminophen (NORCO)  MG per tablet, Take 1 tablet by mouth Every 4 (Four) Hours As Needed for Severe Pain  (Postop pain VAS 8-10 and pre wound vac change)., Disp: 30 tablet, Rfl: 0  •  hydrOXYzine (ATARAX) 25 MG tablet, Take 25 mg by mouth 3 (Three) Times a Day As Needed for Anxiety., Disp: , Rfl:   •  insulin aspart (NovoLOG) 100 UNIT/ML injection, Inject  under the skin 3 (Three) Times a Day Before Meals. Sliding scale:   = 0 units; 150-200 = 3 units; 200-250 = 6 units; 250-300 = 9 units; 300-350 = 12 units; >350 = 15 units and call MD, Disp: , Rfl:   •  insulin glargine (LANTUS) 100 UNIT/ML injection, Inject 25 Units under the skin Every Night., Disp: , Rfl:   •  latanoprost (XALATAN) 0.005 % ophthalmic solution, Administer 1 drop to both eyes every night., Disp: , Rfl:   •  loperamide (IMODIUM) 2 MG capsule, Take 2 mg by mouth Every 6 (Six) Hours As Needed for Diarrhea., Disp: , Rfl:   •  ondansetron ODT (ZOFRAN-ODT) 4 MG disintegrating tablet, Take 1 tablet by mouth Every 6 (Six) Hours As Needed for Nausea or Vomiting., Disp: 10 tablet, Rfl: 0  •  PARoxetine (PAXIL) 30 MG tablet, Take 30 mg by mouth every night at bedtime., Disp: , Rfl:   •  polyethylene glycol (MIRALAX) packet, Take 17 g by mouth Daily As Needed (constipation)., Disp: , Rfl:   •  sennosides-docusate sodium (SENOKOT-S) 8.6-50 MG tablet, Take 2 tablets by mouth 2 (Two) Times a Day As Needed for Constipation., Disp: 60 tablet, Rfl: 0  •  sevelamer (RENVELA) 800 MG tablet, Take 2,400 mg by mouth 3 (Three) Times a Day With Meals., Disp: , Rfl:   •  traZODone (DESYREL) 50 MG tablet, Take 50 mg by mouth every night at bedtime., Disp: , Rfl:     Allergies:  Lisinopril and Motrin [ibuprofen]    Review of Systems:  Review of Systems   Constitutional: Negative for activity change, appetite change, chills and fever.   HENT: Negative for congestion, ear pain and sore throat.    Eyes: Negative for redness and visual disturbance.   Respiratory: Negative for cough, shortness of breath and wheezing.    Cardiovascular: Negative for chest pain and palpitations.   Gastrointestinal: Positive for diarrhea. Negative for abdominal pain, nausea and vomiting.   Genitourinary: Negative for difficulty urinating and dysuria.   Musculoskeletal: Positive for arthralgias, back pain and joint swelling. Negative for gait problem.   Skin: Negative for color change and rash.   Neurological:  Negative for dizziness, weakness and headaches.   Psychiatric/Behavioral: Negative for dysphoric mood and sleep disturbance. The patient is not nervous/anxious.        Objective:     /53   Pulse 56   Temp 96.9 °F (36.1 °C)   Resp 16   SpO2 90%   Physical Exam   Constitutional: He is oriented to person, place, and time. He appears well-developed and well-nourished.   HENT:   Head: Normocephalic and atraumatic.   Right Ear: External ear normal.   Left Ear: External ear normal.   Nose: Nose normal.   Eyes: Conjunctivae and EOM are normal. Pupils are equal, round, and reactive to light.   Neck: Normal range of motion. Neck supple.   Cardiovascular: Normal rate, regular rhythm and normal heart sounds.    Pulmonary/Chest: Effort normal and breath sounds normal. He has no wheezes.   Abdominal: Soft. Bowel sounds are normal. He exhibits no distension. There is no tenderness.   Musculoskeletal:   Right upper extremity with swelling, tenderness over the upper arm   Neurological: He is alert and oriented to person, place, and time. No cranial nerve deficit.   Skin: Skin is warm.   Psychiatric: He has a normal mood and affect. His behavior is normal. Thought content normal.   Nursing note and vitals reviewed.         Assessment/Plan:      68 y.o. male with:  Problem List Items Addressed This Visit        Digestive    Gastroesophageal reflux disease without esophagitis ranitidine       Endocrine    Diabetes mellitus (CMS/formerly [facility]) levemir, SSI       Nervous and Auditory    Neuropathy gabapentin       Genitourinary    ESRD (end stage renal disease) (CMS/formerly [facility]) dialysis MWF       Other    Chronic pain norcos    Anxiety paxil    Non-healing wound of upper extremity - Primary will call and get pt in to see vascular this week                  CODE STATUS: full code    Dr Jimenez is the attending on record for this patient, he is aware of the patient's status and agrees with the above.             This document has been  electronically signed by Cory Rodríguez MD on July 2, 2018 3:56 PM

## 2018-07-05 ENCOUNTER — OFFICE VISIT (OUTPATIENT)
Dept: CARDIAC SURGERY | Facility: CLINIC | Age: 68
End: 2018-07-05

## 2018-07-05 VITALS
DIASTOLIC BLOOD PRESSURE: 65 MMHG | OXYGEN SATURATION: 98 % | WEIGHT: 225.7 LBS | HEIGHT: 68 IN | SYSTOLIC BLOOD PRESSURE: 120 MMHG | TEMPERATURE: 97.8 F | HEART RATE: 73 BPM | BODY MASS INDEX: 34.21 KG/M2

## 2018-07-05 DIAGNOSIS — I77.0 ARTERIOVENOUS FISTULA, ACQUIRED (HCC): Primary | ICD-10-CM

## 2018-07-05 DIAGNOSIS — S41.101D NON-HEALING WOUND OF UPPER EXTREMITY, RIGHT, SUBSEQUENT ENCOUNTER: ICD-10-CM

## 2018-07-05 DIAGNOSIS — Z09 FOLLOW-UP SURGERY CARE: ICD-10-CM

## 2018-07-05 PROCEDURE — 99213 OFFICE O/P EST LOW 20 MIN: CPT | Performed by: NURSE PRACTITIONER

## 2018-07-05 NOTE — PATIENT INSTRUCTIONS
Fistula Duplex:  Fistula patent and stable.  Edema at puncture sites.  No hematoma.  Continue dialysis  Lower RUE:  No cellulitis  Edema/venous congestion  Xiomara wrap today  Remove Saturday  Cleanse RUE lower arm daily with soap and water.  Leave open to air.   Elevate RUE 4 x a day and use stress ball right hand with elevation.   Wrist higher than right shoulder.

## 2018-07-06 NOTE — PROGRESS NOTES
Subjective   Patient ID: Cristo Musa is a 68 y.o. male is here today for follow-up of AV fistula, called reporting increase edema RUE.  Chief Complaint   Patient presents with   • Chronic Kidney Disease     recheck RIGHT arm   VAS 0 RUE edema      Wound Check     Chronic Kidney Disease   Associated symptoms include weakness. Pertinent negatives include no abdominal pain, anorexia, chest pain, chills, coughing, fever, numbness or rash.     CP:  Cory Rodríguez MD  Nephrology:  Dr Vilchis,  [facility]  Cardiology:  Dr Nieto        Resides:  [facility].    68 y.o. male with ESRD on  hemodialysis, HTN, COPD, peripheral neuropathy, DM, CAD, Hepatitis C, nonischemic cardiomyopathy (EF 30%)..  former smoker.  Long term access for hemodialysis placed 12/23/14.  He returns today after callling requesting to be seen due to LUE pain and bulging areas of RUE AV fistula areas.  Denies any neurosensory symptoms of RUE does have of LUE.  Has DM with neuropathy.  RIGHT AV graft functioning well at dialysis.  No problems during dialysis.  Currently residing at [facility].  RUE pain improved  Wound much improved    No other associated signs, symptoms or modifying factors.    11/13/2014 Upper extremity vein mapping:  RIGHT cephalic 1.2-2.5mm, basilic 1.6-2.0mm.  LEFT cephalic 0.8-2.6mm, basilic 1.2-2.6mm.  12/23/14  LEFT Brachial artery to axillary vein AV Graft (7mm Artegraft)  01/07/15 LUE Incisions  open thru skin only.  Proximal Length:  4.5-5 cm depth 0.25 width 0.2  Distal  Length: 4. cm depth 0.2 width 0.2  Beefy red bed  Small amt erythema proximal incision.  1/13/15: LUE Incision:healed  3/24/15  Revision of left arteriovenous graft (distal interposition graft with a 6 mm Artegraft).  Thrombectomy, left arteriovenous graft, open.  Left fistulogram.  Balloon angioplasty of left subclavian vein, innominate vein, swing site (6 x 20 mm Bard Conquest balloon, 8 x 20 mm Bard  Conquest balloon). Venous ultrasound of unilateral extremity.  3/31/15 Revision of left arteriovenous graft, open. Open thrombectomy of left arteriovenous graft. Left upper extremity fistulogram.  Balloon angioplasty of left subclavian vein/innominate vein/swing site (6 x 21 mm Bard-Conquest balloon, 8 x 21 mm Bard-Conquest balloon). Venous ultrasound unilateral extremity.   4/10/15   Excision of left brachial to axillary arteriovenous bridge graft (bovine) with reconstruction of both the brachial artery and axillary vein with vein patch angioplasty from left cephalic vein. Placement of femoral arterial and venous lines using ultrasound guidance.  4/19/15  Placement LEFT IJ Tunneled Cath  5/14/15   Placement of a right forearm arteriovenous bridge graft.  5/19/15   Exchange of right femoral tunneled dialysis catheter using fluoroscopic guidance.   8/11/15  Entrobacter bacteremia of LUE AV Graft, which was removed and completed 6 wks of IV antx with neg CS.    9/22/15  Fistula Duplex:  maxPSV 94cm/s. flows 258-632ml/m. size 5.7-9.2mm. Patent Multiple areas of hematomas with sm area of needle oozing.    10/15/15: RIGHT Fistula duplex: maxPSV 233cm/s. flows 214-701ml/m. size 4.4-6.2mm.  1/26/16: RIGHT fistula duplex: maxPSV 321cm/s. flows 612-1866ml/m. size 3.8-8.1mm.  7/29/16: RIGHT TPA Lysis/Mech Thrombectomy  9/23/16: RIGHT groin Tunnel Cath Placement  10/5/16: RIGHT brachial-axillary vein AV graft (artegraft)        12/5/2016 RIGHT fistula duplex: maxPSV 265cm/s. Flows 1612-1986ml/m. size 5.8-8.1mm.         5/4/2017 RIGHT fistula duplex: maxPSV 530cm/s. Flows 642-1444ml/m. size 3.5-7.4mm.         11/28/17: RIGHT fistula duplex: mPSV 507cm/s. Flows 649-1519ml/m. Size 2.4-7.4mm.        02/22/18: RIGHT fistula duplex: Patent:  mPSV 523 cm/s (1906). Flows 489-2184 ml/m. Size 2.6-8.1mm. 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        3/12/2018 RIGHT fistula duplex:  Occluded R Loop Graft  Edema around old  fistula  3/12/18  Hospital adm with infected AV Fistula  3/13/18  Revision RIGHT forearm ARTERIOVENOUS graft (excision),  Debridement RIGHT forearm (skin, SQ, fascia) Negative pressure wound therapy (wound vac [id number])  3/23/18  RUE Wound to wound vac (31.7x2.5x2)  See pic    3/28/18: Wound Vac Change: (1) 22x3x1 (2) 10x2x2   4/11/18 Wound Check: (1) 12.8x7.5x0.5 (2) 8.6x0.2x0.2  6/5/18  Wound Check:  L 8.5 cm w 1.2cm d 0.3cm  See Pic   6/12/18  Wound Check:  L 7.2 cm w 1.2cm d 0.2 cm  See Pic   6/26/18   Wound Check:  L 5.2 cm w 1.cm d 0.1 cm  See Pic   7/5/18  Wound Check:  L 4 cm w 1.cm d 0\]   7/5/18  RIGHT fistula duplex: Patent:  mPSV 590 cm/s (3039). Flows 523-3039 ml/m. Size 3.1-8.9mm. As previously noted, pseudoaneurysm, Edema at puncture sites.         The following portions of the patient's history were reviewed and updated as appropriate: allergies, current medications, past family history, past medical history, past social history, past surgical history and problem list.   See HPI   Allergies   Allergen Reactions   • Lisinopril Angioedema     unknown   • Motrin [Ibuprofen] Diarrhea and Nausea And Vomiting       Current Outpatient Prescriptions:   •  acetaminophen (TYLENOL) 500 MG tablet, Take 500 mg by mouth Every 4 (Four) Hours As Needed for Mild Pain  or Fever., Disp: , Rfl:   •  aspirin 81 MG chewable tablet, Chew 81 mg Daily., Disp: , Rfl:   •  brimonidine (ALPHAGAN P) 0.1 % solution ophthalmic solution, Administer 1 drop to both eyes 3 (Three) Times a Day., Disp: , Rfl:   •  dorzolamide (TRUSOPT) 2 % ophthalmic solution, Administer 1 drop into the left eye 3 (Three) Times a Day., Disp: 1 each, Rfl: 12  •  famotidine (PEPCID) 20 MG tablet, Take 20 mg by mouth every night at bedtime., Disp: , Rfl:   •  fluticasone (FLONASE) 50 MCG/ACT nasal spray, 2 sprays into each nostril daily. Administer 2 sprays in each nostril for each dose., Disp: , Rfl:   •  gabapentin (NEURONTIN) 300 MG capsule, Take 1  capsule by mouth 3 (Three) Times a Day., Disp: 90 capsule, Rfl: 2  •  guaiFENesin 200 MG tablet, Take 400 mg by mouth Every 4 (Four) Hours As Needed for Cough., Disp: , Rfl:   •  HYDROcodone-acetaminophen (NORCO)  MG per tablet, Take 1 tablet by mouth Every 4 (Four) Hours As Needed for Severe Pain  (Postop pain VAS 8-10 and pre wound vac change)., Disp: 30 tablet, Rfl: 0  •  hydrOXYzine (ATARAX) 25 MG tablet, Take 25 mg by mouth 3 (Three) Times a Day As Needed for Anxiety., Disp: , Rfl:   •  insulin aspart (NovoLOG) 100 UNIT/ML injection, Inject  under the skin 3 (Three) Times a Day Before Meals. Sliding scale:  = 0 units; 150-200 = 3 units; 200-250 = 6 units; 250-300 = 9 units; 300-350 = 12 units; >350 = 15 units and call MD, Disp: , Rfl:   •  insulin glargine (LANTUS) 100 UNIT/ML injection, Inject 25 Units under the skin Every Night., Disp: , Rfl:   •  latanoprost (XALATAN) 0.005 % ophthalmic solution, Administer 1 drop to both eyes every night., Disp: , Rfl:   •  loperamide (IMODIUM) 2 MG capsule, Take 2 mg by mouth Every 6 (Six) Hours As Needed for Diarrhea., Disp: , Rfl:   •  ondansetron ODT (ZOFRAN-ODT) 4 MG disintegrating tablet, Take 1 tablet by mouth Every 6 (Six) Hours As Needed for Nausea or Vomiting., Disp: 10 tablet, Rfl: 0  •  PARoxetine (PAXIL) 30 MG tablet, Take 30 mg by mouth every night at bedtime., Disp: , Rfl:   •  polyethylene glycol (MIRALAX) packet, Take 17 g by mouth Daily As Needed (constipation)., Disp: , Rfl:   •  sennosides-docusate sodium (SENOKOT-S) 8.6-50 MG tablet, Take 2 tablets by mouth 2 (Two) Times a Day As Needed for Constipation., Disp: 60 tablet, Rfl: 0  •  sevelamer (RENVELA) 800 MG tablet, Take 2,400 mg by mouth 3 (Three) Times a Day With Meals., Disp: , Rfl:   •  traZODone (DESYREL) 50 MG tablet, Take 50 mg by mouth every night at bedtime., Disp: , Rfl:     Review of Systems   Constitution: Positive for weakness. Negative for chills, decreased appetite and fever.    HENT: Negative for hoarse voice, nosebleeds and stridor.    Eyes: Negative for visual disturbance.   Cardiovascular: Negative for chest pain, claudication, irregular heartbeat and leg swelling.        Fistula bleeding:  N   Fistula pain:  N  Extremity pain:  N Extremity discoloration:  N   Extremity numbness/tingling:  N  No problems with dialysis    Respiratory: Negative for cough, hemoptysis and shortness of breath.    Hematologic/Lymphatic: Does not bruise/bleed easily.   Skin: Positive for poor wound healing (R forearm improved/near healed ). Negative for dry skin, itching and rash.        Swelling of RUE    Musculoskeletal: Positive for arthritis (L elbow hurts. ). Negative for back pain, falls, muscle cramps and muscle weakness.        RUE VAS 2 ache    Gastrointestinal: Negative for abdominal pain, anorexia and melena.   Neurological: Negative for dizziness, loss of balance, numbness and paresthesias.   Psychiatric/Behavioral: Negative for altered mental status.   Allergic/Immunologic: Negative for hives.        Objective   Physical Exam   Constitutional: He is oriented to person, place, and time. He appears well-nourished.   Body mass index is 32.23 kg/(m^2).     HENT:   Head: Normocephalic.   Mouth/Throat: Oropharynx is clear and moist.   Eyes: Conjunctivae are normal. Pupils are equal, round, and reactive to light.   Neck: No JVD present.   Cardiovascular: Normal rate, regular rhythm, normal heart sounds and intact distal pulses.    Pulses:       Carotid pulses are 1+ on the right side, and 1+ on the left side.       Radial pulses are 2+ on the right side, and 2+ on the left side.        Posterior tibial pulses are 2+ on the right side, and 2+ on the left side.   Fistula Present RU Extremity:revised  Distal hand warm + sensation + mobility    RUE Upper fistula Brachial-axillary + thrill + bruit + sensation    Pulmonary/Chest: Effort normal and breath sounds normal. No stridor.   Coarse    Abdominal:  Soft. Bowel sounds are normal.   Musculoskeletal: He exhibits edema (RUE 1+).   Neurological: He is alert and oriented to person, place, and time.   Skin: Skin is warm and dry. Capillary refill takes less than 2 seconds. No erythema. No pallor.   RUE Mild upper and lower arm edema 1+   Upper fistula Brachial-axillary  well healed  Lower fistula Forearm AV bridge graft wound near healed no depth skin intact     Psychiatric: His behavior is normal.   Nursing note and vitals reviewed.      Vitals:    07/05/18 1528   BP: 120/65   Pulse: 73   Temp: 97.8 °F (36.6 °C)   SpO2: 98%                     Assessment/Plan   Independent Review of Radiographic Studies:    7/5/18  RIGHT fistula duplex: Patent:  mPSV 590 cm/s (3039). Flows 523-3039 ml/m. Size 3.1-8.9mm. As previously noted, pseudoaneurysm, Edema at puncture sites.    1. ESRD (end stage renal disease) on dialysis  Indication for AV fistula     2.Non functioning Arteriovenous , subsequent encounter RUE forearm bridge graft  Lower RUE:  No cellulitis  Edema/venous congestion  Xiomara wrap today  Remove Saturday  Cleanse RUE lower arm daily with soap and water.  Leave open to air.   Elevate RUE 4 x a day and use stress ball right hand with elevation.   Wrist higher than right shoulder.      3. Arteriovenous fistula, acquired  Patent fistula working well with dialysis.   Patent with stable pseudoaneurysm   Edema at puncture sites  No hematoma.

## 2018-07-11 ENCOUNTER — DOCUMENTATION (OUTPATIENT)
Dept: FAMILY MEDICINE CLINIC | Facility: CLINIC | Age: 68
End: 2018-07-11

## 2018-07-12 NOTE — PROGRESS NOTES
Nurse from [facility] called to state patient is having 10/10 cramping hand pain which he has had previously.  He has Norco 10mg PRN ordered but states that didn't help last time.  CMP and Mg were ordered.  Nurse instructed to try NORCO as she had not administered that today.  She was also asked to give flexeril 5mg TID.  She is instructed to call back if she were to need further assistance.          This document has been electronically signed by Imani Alexander MD on July 11, 2018 7:53 PM

## 2018-07-13 RX ORDER — HYDROCODONE BITARTRATE AND ACETAMINOPHEN 7.5; 325 MG/1; MG/1
1 TABLET ORAL EVERY 6 HOURS PRN
Qty: 120 TABLET | Refills: 0 | Status: ON HOLD | OUTPATIENT
Start: 2018-07-13 | End: 2018-07-27

## 2018-07-16 ENCOUNTER — DOCUMENTATION (OUTPATIENT)
Dept: FAMILY MEDICINE CLINIC | Facility: CLINIC | Age: 68
End: 2018-07-16

## 2018-07-16 NOTE — PROGRESS NOTES
Radha with [facility] called and states patient's blood sugar after dinner is 418.  She was advised to give sliding scale novolog, per sliding scale patient will get 15 units.  She was advised to recheck sugar in 1 hour after insulin and to call back.          This document has been electronically signed by Imani Alexander MD on July 16, 2018 5:48 PM

## 2018-07-18 ENCOUNTER — OFFICE VISIT (OUTPATIENT)
Dept: CARDIAC SURGERY | Facility: CLINIC | Age: 68
End: 2018-07-18

## 2018-07-18 VITALS
OXYGEN SATURATION: 96 % | HEIGHT: 68 IN | BODY MASS INDEX: 35.31 KG/M2 | DIASTOLIC BLOOD PRESSURE: 65 MMHG | HEART RATE: 65 BPM | TEMPERATURE: 97.4 F | SYSTOLIC BLOOD PRESSURE: 120 MMHG | WEIGHT: 233 LBS

## 2018-07-18 DIAGNOSIS — S41.101D NON-HEALING WOUND OF UPPER EXTREMITY, RIGHT, SUBSEQUENT ENCOUNTER: Primary | ICD-10-CM

## 2018-07-18 DIAGNOSIS — I77.0 A-V FISTULA (HCC): ICD-10-CM

## 2018-07-18 PROCEDURE — 99213 OFFICE O/P EST LOW 20 MIN: CPT | Performed by: NURSE PRACTITIONER

## 2018-07-18 NOTE — PATIENT INSTRUCTIONS
Right lower arm wound much improved.  Incision healed   Edema localized +  Xiomara wrap.   Remove Friday after dialysis.  7/20/18     Follow Up

## 2018-07-26 ENCOUNTER — HOSPITAL ENCOUNTER (OUTPATIENT)
Facility: HOSPITAL | Age: 68
Setting detail: OBSERVATION
Discharge: NURSING, REHAB OR LONG-TERM CARE | End: 2018-07-27
Attending: EMERGENCY MEDICINE | Admitting: EMERGENCY MEDICINE

## 2018-07-26 ENCOUNTER — APPOINTMENT (OUTPATIENT)
Dept: GENERAL RADIOLOGY | Facility: HOSPITAL | Age: 68
End: 2018-07-26

## 2018-07-26 ENCOUNTER — APPOINTMENT (OUTPATIENT)
Dept: ULTRASOUND IMAGING | Facility: HOSPITAL | Age: 68
End: 2018-07-26

## 2018-07-26 DIAGNOSIS — R06.00 DYSPNEA, UNSPECIFIED TYPE: ICD-10-CM

## 2018-07-26 DIAGNOSIS — R07.9 CHEST PAIN, UNSPECIFIED TYPE: ICD-10-CM

## 2018-07-26 DIAGNOSIS — N18.6 ESRD (END STAGE RENAL DISEASE) (HCC): Primary | ICD-10-CM

## 2018-07-26 PROBLEM — R79.89 ELEVATED BRAIN NATRIURETIC PEPTIDE (BNP) LEVEL: Status: ACTIVE | Noted: 2018-07-26

## 2018-07-26 LAB
ALBUMIN SERPL-MCNC: 4.3 G/DL (ref 3.4–4.8)
ALBUMIN/GLOB SERPL: 0.8 G/DL (ref 1.1–1.8)
ALP SERPL-CCNC: 134 U/L (ref 38–126)
ALT SERPL W P-5'-P-CCNC: 27 U/L (ref 21–72)
ANION GAP SERPL CALCULATED.3IONS-SCNC: 12 MMOL/L (ref 5–15)
APTT PPP: 33.8 SECONDS (ref 20–40.3)
AST SERPL-CCNC: 44 U/L (ref 17–59)
BASOPHILS # BLD AUTO: 0.01 10*3/MM3 (ref 0–0.2)
BASOPHILS NFR BLD AUTO: 0.2 % (ref 0–2)
BILIRUB SERPL-MCNC: 1 MG/DL (ref 0.2–1.3)
BUN BLD-MCNC: 20 MG/DL (ref 7–21)
BUN/CREAT SERPL: 3 (ref 7–25)
CALCIUM SPEC-SCNC: 7.9 MG/DL (ref 8.4–10.2)
CHLORIDE SERPL-SCNC: 90 MMOL/L (ref 95–110)
CK MB SERPL-CCNC: 0.37 NG/ML (ref 0–5)
CK SERPL-CCNC: 71 U/L (ref 55–170)
CO2 SERPL-SCNC: 35 MMOL/L (ref 22–31)
CREAT BLD-MCNC: 6.63 MG/DL (ref 0.7–1.3)
D-DIMER, QUANTITATIVE (MAD,POW, STR): 627 NG/ML (FEU) (ref 0–470)
DEPRECATED RDW RBC AUTO: 47.4 FL (ref 35.1–43.9)
EOSINOPHIL # BLD AUTO: 0.12 10*3/MM3 (ref 0–0.7)
EOSINOPHIL NFR BLD AUTO: 2.7 % (ref 0–7)
ERYTHROCYTE [DISTWIDTH] IN BLOOD BY AUTOMATED COUNT: 13.9 % (ref 11.5–14.5)
GFR SERPL CREATININE-BSD FRML MDRD: 10 ML/MIN/1.73 (ref 49–113)
GLOBULIN UR ELPH-MCNC: 5.6 GM/DL (ref 2.3–3.5)
GLUCOSE BLD-MCNC: 189 MG/DL (ref 60–100)
GLUCOSE BLDC GLUCOMTR-MCNC: 105 MG/DL (ref 70–130)
HCT VFR BLD AUTO: 34.4 % (ref 39–49)
HGB BLD-MCNC: 11.3 G/DL (ref 13.7–17.3)
HOLD SPECIMEN: NORMAL
HOLD SPECIMEN: NORMAL
IMM GRANULOCYTES # BLD: 0.01 10*3/MM3 (ref 0–0.02)
IMM GRANULOCYTES NFR BLD: 0.2 % (ref 0–0.5)
INR PPP: 1.2 (ref 0.8–1.2)
LYMPHOCYTES # BLD AUTO: 1.1 10*3/MM3 (ref 0.6–4.2)
LYMPHOCYTES NFR BLD AUTO: 24.8 % (ref 10–50)
MCH RBC QN AUTO: 30.6 PG (ref 26.5–34)
MCHC RBC AUTO-ENTMCNC: 32.8 G/DL (ref 31.5–36.3)
MCV RBC AUTO: 93.2 FL (ref 80–98)
MONOCYTES # BLD AUTO: 0.48 10*3/MM3 (ref 0–0.9)
MONOCYTES NFR BLD AUTO: 10.8 % (ref 0–12)
NEUTROPHILS # BLD AUTO: 2.72 10*3/MM3 (ref 2–8.6)
NEUTROPHILS NFR BLD AUTO: 61.3 % (ref 37–80)
NT-PROBNP SERPL-MCNC: 5020 PG/ML (ref 0–900)
PLATELET # BLD AUTO: 116 10*3/MM3 (ref 150–450)
PMV BLD AUTO: 8.8 FL (ref 8–12)
POTASSIUM BLD-SCNC: 4.5 MMOL/L (ref 3.5–5.1)
PROT SERPL-MCNC: 9.9 G/DL (ref 6.3–8.6)
PROTHROMBIN TIME: 14.9 SECONDS (ref 11.1–15.3)
RBC # BLD AUTO: 3.69 10*6/MM3 (ref 4.37–5.74)
SODIUM BLD-SCNC: 137 MMOL/L (ref 137–145)
TROPONIN I SERPL-MCNC: 0.01 NG/ML
TROPONIN I SERPL-MCNC: 0.01 NG/ML
TROPONIN I SERPL-MCNC: <0.012 NG/ML
WBC NRBC COR # BLD: 4.44 10*3/MM3 (ref 3.2–9.8)
WHOLE BLOOD HOLD SPECIMEN: NORMAL
WHOLE BLOOD HOLD SPECIMEN: NORMAL

## 2018-07-26 PROCEDURE — G0378 HOSPITAL OBSERVATION PER HR: HCPCS

## 2018-07-26 PROCEDURE — 83880 ASSAY OF NATRIURETIC PEPTIDE: CPT

## 2018-07-26 PROCEDURE — 84484 ASSAY OF TROPONIN QUANT: CPT

## 2018-07-26 PROCEDURE — 93005 ELECTROCARDIOGRAM TRACING: CPT

## 2018-07-26 PROCEDURE — 85379 FIBRIN DEGRADATION QUANT: CPT | Performed by: EMERGENCY MEDICINE

## 2018-07-26 PROCEDURE — 85730 THROMBOPLASTIN TIME PARTIAL: CPT | Performed by: EMERGENCY MEDICINE

## 2018-07-26 PROCEDURE — 85610 PROTHROMBIN TIME: CPT | Performed by: EMERGENCY MEDICINE

## 2018-07-26 PROCEDURE — 99285 EMERGENCY DEPT VISIT HI MDM: CPT

## 2018-07-26 PROCEDURE — G0257 UNSCHED DIALYSIS ESRD PT HOS: HCPCS

## 2018-07-26 PROCEDURE — 93971 EXTREMITY STUDY: CPT

## 2018-07-26 PROCEDURE — 84484 ASSAY OF TROPONIN QUANT: CPT | Performed by: EMERGENCY MEDICINE

## 2018-07-26 PROCEDURE — 85025 COMPLETE CBC W/AUTO DIFF WBC: CPT

## 2018-07-26 PROCEDURE — 93005 ELECTROCARDIOGRAM TRACING: CPT | Performed by: EMERGENCY MEDICINE

## 2018-07-26 PROCEDURE — 80053 COMPREHEN METABOLIC PANEL: CPT

## 2018-07-26 PROCEDURE — 84484 ASSAY OF TROPONIN QUANT: CPT | Performed by: STUDENT IN AN ORGANIZED HEALTH CARE EDUCATION/TRAINING PROGRAM

## 2018-07-26 PROCEDURE — 93010 ELECTROCARDIOGRAM REPORT: CPT | Performed by: INTERNAL MEDICINE

## 2018-07-26 PROCEDURE — 71046 X-RAY EXAM CHEST 2 VIEWS: CPT

## 2018-07-26 PROCEDURE — 82553 CREATINE MB FRACTION: CPT | Performed by: EMERGENCY MEDICINE

## 2018-07-26 PROCEDURE — 82962 GLUCOSE BLOOD TEST: CPT

## 2018-07-26 PROCEDURE — 82550 ASSAY OF CK (CPK): CPT | Performed by: EMERGENCY MEDICINE

## 2018-07-26 RX ORDER — ASPIRIN 81 MG/1
81 TABLET, CHEWABLE ORAL DAILY
Status: DISCONTINUED | OUTPATIENT
Start: 2018-07-26 | End: 2018-07-27 | Stop reason: HOSPADM

## 2018-07-26 RX ORDER — ONDANSETRON 4 MG/1
4 TABLET, ORALLY DISINTEGRATING ORAL EVERY 6 HOURS PRN
Status: DISCONTINUED | OUTPATIENT
Start: 2018-07-26 | End: 2018-07-27 | Stop reason: HOSPADM

## 2018-07-26 RX ORDER — TRAZODONE HYDROCHLORIDE 50 MG/1
50 TABLET ORAL NIGHTLY
Status: DISCONTINUED | OUTPATIENT
Start: 2018-07-26 | End: 2018-07-27 | Stop reason: HOSPADM

## 2018-07-26 RX ORDER — LORATADINE 10 MG/1
10 CAPSULE, LIQUID FILLED ORAL EVERY OTHER DAY
COMMUNITY
Start: 2018-03-16

## 2018-07-26 RX ORDER — NITROGLYCERIN 0.4 MG/1
0.4 TABLET SUBLINGUAL
Status: DISCONTINUED | OUTPATIENT
Start: 2018-07-26 | End: 2018-07-27 | Stop reason: HOSPADM

## 2018-07-26 RX ORDER — LOPERAMIDE HYDROCHLORIDE 2 MG/1
2 CAPSULE ORAL EVERY 6 HOURS PRN
Status: DISCONTINUED | OUTPATIENT
Start: 2018-07-26 | End: 2018-07-27 | Stop reason: HOSPADM

## 2018-07-26 RX ORDER — ALBUMIN (HUMAN) 12.5 G/50ML
12.5 SOLUTION INTRAVENOUS AS NEEDED
Status: DISCONTINUED | OUTPATIENT
Start: 2018-07-26 | End: 2018-07-27 | Stop reason: HOSPADM

## 2018-07-26 RX ORDER — SENNA AND DOCUSATE SODIUM 50; 8.6 MG/1; MG/1
2 TABLET, FILM COATED ORAL 2 TIMES DAILY PRN
Status: DISCONTINUED | OUTPATIENT
Start: 2018-07-26 | End: 2018-07-27 | Stop reason: HOSPADM

## 2018-07-26 RX ORDER — HYDROCODONE BITARTRATE AND ACETAMINOPHEN 7.5; 325 MG/1; MG/1
1 TABLET ORAL EVERY 6 HOURS PRN
Status: DISCONTINUED | OUTPATIENT
Start: 2018-07-26 | End: 2018-07-27 | Stop reason: HOSPADM

## 2018-07-26 RX ORDER — ASPIRIN 81 MG/1
324 TABLET, CHEWABLE ORAL ONCE
Status: COMPLETED | OUTPATIENT
Start: 2018-07-26 | End: 2018-07-26

## 2018-07-26 RX ORDER — SODIUM CHLORIDE 0.9 % (FLUSH) 0.9 %
1-10 SYRINGE (ML) INJECTION AS NEEDED
Status: DISCONTINUED | OUTPATIENT
Start: 2018-07-26 | End: 2018-07-27 | Stop reason: HOSPADM

## 2018-07-26 RX ORDER — SODIUM CHLORIDE 0.9 % (FLUSH) 0.9 %
10 SYRINGE (ML) INJECTION AS NEEDED
Status: DISCONTINUED | OUTPATIENT
Start: 2018-07-26 | End: 2018-07-27 | Stop reason: HOSPADM

## 2018-07-26 RX ORDER — SEVELAMER CARBONATE 800 MG/1
2400 TABLET, FILM COATED ORAL
Status: DISCONTINUED | OUTPATIENT
Start: 2018-07-27 | End: 2018-07-27 | Stop reason: HOSPADM

## 2018-07-26 RX ORDER — ACETAMINOPHEN 500 MG
500 TABLET ORAL EVERY 4 HOURS PRN
Status: DISCONTINUED | OUTPATIENT
Start: 2018-07-26 | End: 2018-07-27 | Stop reason: HOSPADM

## 2018-07-26 RX ORDER — DEXTROSE MONOHYDRATE 25 G/50ML
25 INJECTION, SOLUTION INTRAVENOUS
Status: DISCONTINUED | OUTPATIENT
Start: 2018-07-26 | End: 2018-07-27 | Stop reason: HOSPADM

## 2018-07-26 RX ORDER — NICOTINE POLACRILEX 4 MG
15 LOZENGE BUCCAL
Status: DISCONTINUED | OUTPATIENT
Start: 2018-07-26 | End: 2018-07-27 | Stop reason: HOSPADM

## 2018-07-26 RX ORDER — HYDROXYZINE HYDROCHLORIDE 25 MG/1
25 TABLET, FILM COATED ORAL 3 TIMES DAILY PRN
Status: DISCONTINUED | OUTPATIENT
Start: 2018-07-26 | End: 2018-07-27 | Stop reason: HOSPADM

## 2018-07-26 RX ORDER — POLYETHYLENE GLYCOL 3350 17 G/17G
17 POWDER, FOR SOLUTION ORAL DAILY PRN
Status: DISCONTINUED | OUTPATIENT
Start: 2018-07-26 | End: 2018-07-27 | Stop reason: HOSPADM

## 2018-07-26 RX ORDER — FAMOTIDINE 20 MG/1
20 TABLET, FILM COATED ORAL NIGHTLY
Status: DISCONTINUED | OUTPATIENT
Start: 2018-07-26 | End: 2018-07-27 | Stop reason: HOSPADM

## 2018-07-26 RX ORDER — GABAPENTIN 300 MG/1
300 CAPSULE ORAL 3 TIMES DAILY
Status: DISCONTINUED | OUTPATIENT
Start: 2018-07-26 | End: 2018-07-27 | Stop reason: HOSPADM

## 2018-07-26 RX ADMIN — ASPIRIN 81 MG 81 MG: 81 TABLET ORAL at 21:25

## 2018-07-26 RX ADMIN — PAROXETINE 30 MG: 10 TABLET, FILM COATED ORAL at 21:26

## 2018-07-26 RX ADMIN — ASPIRIN 81 MG 324 MG: 81 TABLET ORAL at 14:03

## 2018-07-26 RX ADMIN — GABAPENTIN 300 MG: 300 CAPSULE ORAL at 21:25

## 2018-07-26 RX ADMIN — FAMOTIDINE 20 MG: 20 TABLET ORAL at 21:26

## 2018-07-26 RX ADMIN — LOPERAMIDE HYDROCHLORIDE 2 MG: 2 CAPSULE ORAL at 21:32

## 2018-07-26 RX ADMIN — HYDROCODONE BITARTRATE AND ACETAMINOPHEN 1 TABLET: 7.5; 325 TABLET ORAL at 21:25

## 2018-07-26 RX ADMIN — TRAZODONE HYDROCHLORIDE 50 MG: 50 TABLET ORAL at 21:26

## 2018-07-27 ENCOUNTER — APPOINTMENT (OUTPATIENT)
Dept: CARDIOLOGY | Facility: HOSPITAL | Age: 68
End: 2018-07-27
Attending: FAMILY MEDICINE

## 2018-07-27 VITALS
HEART RATE: 61 BPM | HEIGHT: 68 IN | SYSTOLIC BLOOD PRESSURE: 130 MMHG | DIASTOLIC BLOOD PRESSURE: 57 MMHG | BODY MASS INDEX: 33.28 KG/M2 | RESPIRATION RATE: 18 BRPM | OXYGEN SATURATION: 96 % | TEMPERATURE: 96.3 F | WEIGHT: 219.6 LBS

## 2018-07-27 PROBLEM — R07.9 CHEST PAIN WITH LOW RISK FOR CARDIAC ETIOLOGY: Status: RESOLVED | Noted: 2018-07-26 | Resolved: 2018-07-27

## 2018-07-27 LAB
ANION GAP SERPL CALCULATED.3IONS-SCNC: 14 MMOL/L (ref 5–15)
BASOPHILS # BLD AUTO: 0.02 10*3/MM3 (ref 0–0.2)
BASOPHILS NFR BLD AUTO: 0.5 % (ref 0–2)
BH CV ECHO MEAS - ACS: 1.6 CM
BH CV ECHO MEAS - AO ISTHMUS: 2.3 CM
BH CV ECHO MEAS - AO MAX PG (FULL): 4.2 MMHG
BH CV ECHO MEAS - AO MAX PG: 11.7 MMHG
BH CV ECHO MEAS - AO MEAN PG (FULL): 2.9 MMHG
BH CV ECHO MEAS - AO MEAN PG: 6.5 MMHG
BH CV ECHO MEAS - AO ROOT AREA (BSA CORRECTED): 1.3
BH CV ECHO MEAS - AO ROOT AREA: 6.4 CM^2
BH CV ECHO MEAS - AO ROOT DIAM: 2.9 CM
BH CV ECHO MEAS - AO V2 MAX: 171 CM/SEC
BH CV ECHO MEAS - AO V2 MEAN: 122.3 CM/SEC
BH CV ECHO MEAS - AO V2 VTI: 32.5 CM
BH CV ECHO MEAS - ASC AORTA: 3 CM
BH CV ECHO MEAS - AVA(I,A): 2.7 CM^2
BH CV ECHO MEAS - AVA(I,D): 2.7 CM^2
BH CV ECHO MEAS - AVA(V,A): 2.7 CM^2
BH CV ECHO MEAS - AVA(V,D): 2.7 CM^2
BH CV ECHO MEAS - BSA(HAYCOCK): 2.2 M^2
BH CV ECHO MEAS - BSA: 2.1 M^2
BH CV ECHO MEAS - BZI_BMI: 33.4 KILOGRAMS/M^2
BH CV ECHO MEAS - BZI_METRIC_HEIGHT: 173 CM
BH CV ECHO MEAS - BZI_METRIC_WEIGHT: 100 KG
BH CV ECHO MEAS - CONTRAST EF 4CH: 56.3 ML/M^2
BH CV ECHO MEAS - EDV(CUBED): 58.3 ML
BH CV ECHO MEAS - EDV(MOD-SP4): 91.8 ML
BH CV ECHO MEAS - EDV(TEICH): 65 ML
BH CV ECHO MEAS - EF(CUBED): 67.9 %
BH CV ECHO MEAS - EF(MOD-SP4): 56.3 %
BH CV ECHO MEAS - EF(TEICH): 60.1 %
BH CV ECHO MEAS - ESV(CUBED): 18.7 ML
BH CV ECHO MEAS - ESV(MOD-SP4): 40.1 ML
BH CV ECHO MEAS - ESV(TEICH): 25.9 ML
BH CV ECHO MEAS - FS: 31.5 %
BH CV ECHO MEAS - IVS/LVPW: 0.96
BH CV ECHO MEAS - IVSD: 1.6 CM
BH CV ECHO MEAS - LA DIMENSION: 4.5 CM
BH CV ECHO MEAS - LA/AO: 1.6
BH CV ECHO MEAS - LV DIASTOLIC VOL/BSA (35-75): 43 ML/M^2
BH CV ECHO MEAS - LV MASS(C)D: 257.1 GRAMS
BH CV ECHO MEAS - LV MASS(C)DI: 120.6 GRAMS/M^2
BH CV ECHO MEAS - LV MAX PG: 7.5 MMHG
BH CV ECHO MEAS - LV MEAN PG: 3.6 MMHG
BH CV ECHO MEAS - LV SYSTOLIC VOL/BSA (12-30): 18.8 ML/M^2
BH CV ECHO MEAS - LV V1 MAX: 137 CM/SEC
BH CV ECHO MEAS - LV V1 MEAN: 89.4 CM/SEC
BH CV ECHO MEAS - LV V1 VTI: 25.7 CM
BH CV ECHO MEAS - LVIDD: 3.9 CM
BH CV ECHO MEAS - LVIDS: 2.7 CM
BH CV ECHO MEAS - LVOT AREA: 3.4 CM^2
BH CV ECHO MEAS - LVOT DIAM: 2.1 CM
BH CV ECHO MEAS - LVPWD: 1.7 CM
BH CV ECHO MEAS - MV A MAX VEL: 79.8 CM/SEC
BH CV ECHO MEAS - MV E MAX VEL: 93 CM/SEC
BH CV ECHO MEAS - MV E/A: 1.2
BH CV ECHO MEAS - MV MAX PG: 3.9 MMHG
BH CV ECHO MEAS - MV MEAN PG: 1.3 MMHG
BH CV ECHO MEAS - MV P1/2T MAX VEL: 98 CM/SEC
BH CV ECHO MEAS - MV V2 MAX: 98.9 CM/SEC
BH CV ECHO MEAS - MV V2 MEAN: 51.5 CM/SEC
BH CV ECHO MEAS - MV V2 VTI: 31.5 CM
BH CV ECHO MEAS - MVA P1/2T LCG: 2.2 CM^2
BH CV ECHO MEAS - MVA(VTI): 2.8 CM^2
BH CV ECHO MEAS - PA MAX PG: 3 MMHG
BH CV ECHO MEAS - PA V2 MAX: 87 CM/SEC
BH CV ECHO MEAS - PI END-D VEL: 34.8 CM/SEC
BH CV ECHO MEAS - RVDD: 2.7 CM
BH CV ECHO MEAS - SI(AO): 97.5 ML/M^2
BH CV ECHO MEAS - SI(CUBED): 18.5 ML/M^2
BH CV ECHO MEAS - SI(LVOT): 40.8 ML/M^2
BH CV ECHO MEAS - SI(MOD-SP4): 24.2 ML/M^2
BH CV ECHO MEAS - SI(TEICH): 18.3 ML/M^2
BH CV ECHO MEAS - SV(AO): 207.9 ML
BH CV ECHO MEAS - SV(CUBED): 39.5 ML
BH CV ECHO MEAS - SV(LVOT): 87 ML
BH CV ECHO MEAS - SV(MOD-SP4): 51.7 ML
BH CV ECHO MEAS - SV(TEICH): 39.1 ML
BUN BLD-MCNC: 21 MG/DL (ref 7–21)
BUN/CREAT SERPL: 3.4 (ref 7–25)
CALCIUM SPEC-SCNC: 7.9 MG/DL (ref 8.4–10.2)
CHLORIDE SERPL-SCNC: 91 MMOL/L (ref 95–110)
CO2 SERPL-SCNC: 28 MMOL/L (ref 22–31)
CREAT BLD-MCNC: 6.19 MG/DL (ref 0.7–1.3)
DEPRECATED RDW RBC AUTO: 44.3 FL (ref 35.1–43.9)
EOSINOPHIL # BLD AUTO: 0.19 10*3/MM3 (ref 0–0.7)
EOSINOPHIL NFR BLD AUTO: 4.7 % (ref 0–7)
ERYTHROCYTE [DISTWIDTH] IN BLOOD BY AUTOMATED COUNT: 13.5 % (ref 11.5–14.5)
GFR SERPL CREATININE-BSD FRML MDRD: 11 ML/MIN/1.73 (ref 49–113)
GLUCOSE BLD-MCNC: 299 MG/DL (ref 60–100)
GLUCOSE BLDC GLUCOMTR-MCNC: 270 MG/DL (ref 70–130)
GLUCOSE BLDC GLUCOMTR-MCNC: 285 MG/DL (ref 70–130)
HCT VFR BLD AUTO: 33.4 % (ref 39–49)
HGB BLD-MCNC: 11 G/DL (ref 13.7–17.3)
IMM GRANULOCYTES # BLD: 0.01 10*3/MM3 (ref 0–0.02)
IMM GRANULOCYTES NFR BLD: 0.2 % (ref 0–0.5)
LV EF 2D ECHO EST: 58 %
LYMPHOCYTES # BLD AUTO: 1.09 10*3/MM3 (ref 0.6–4.2)
LYMPHOCYTES NFR BLD AUTO: 27 % (ref 10–50)
MAXIMAL PREDICTED HEART RATE: 153 BPM
MCH RBC QN AUTO: 29.6 PG (ref 26.5–34)
MCHC RBC AUTO-ENTMCNC: 32.9 G/DL (ref 31.5–36.3)
MCV RBC AUTO: 90 FL (ref 80–98)
MONOCYTES # BLD AUTO: 0.46 10*3/MM3 (ref 0–0.9)
MONOCYTES NFR BLD AUTO: 11.4 % (ref 0–12)
NEUTROPHILS # BLD AUTO: 2.26 10*3/MM3 (ref 2–8.6)
NEUTROPHILS NFR BLD AUTO: 56.2 % (ref 37–80)
PLATELET # BLD AUTO: 92 10*3/MM3 (ref 150–450)
PMV BLD AUTO: 9.2 FL (ref 8–12)
POTASSIUM BLD-SCNC: 4.1 MMOL/L (ref 3.5–5.1)
RBC # BLD AUTO: 3.71 10*6/MM3 (ref 4.37–5.74)
SODIUM BLD-SCNC: 133 MMOL/L (ref 137–145)
STRESS TARGET HR: 130 BPM
TROPONIN I SERPL-MCNC: 0.01 NG/ML
WBC NRBC COR # BLD: 4.03 10*3/MM3 (ref 3.2–9.8)

## 2018-07-27 PROCEDURE — 85025 COMPLETE CBC W/AUTO DIFF WBC: CPT | Performed by: FAMILY MEDICINE

## 2018-07-27 PROCEDURE — G0257 UNSCHED DIALYSIS ESRD PT HOS: HCPCS

## 2018-07-27 PROCEDURE — 63710000001 INSULIN ASPART PER 5 UNITS: Performed by: FAMILY MEDICINE

## 2018-07-27 PROCEDURE — G0378 HOSPITAL OBSERVATION PER HR: HCPCS

## 2018-07-27 PROCEDURE — 93306 TTE W/DOPPLER COMPLETE: CPT | Performed by: INTERNAL MEDICINE

## 2018-07-27 PROCEDURE — 82962 GLUCOSE BLOOD TEST: CPT

## 2018-07-27 PROCEDURE — 99204 OFFICE O/P NEW MOD 45 MIN: CPT | Performed by: INTERNAL MEDICINE

## 2018-07-27 PROCEDURE — 93306 TTE W/DOPPLER COMPLETE: CPT

## 2018-07-27 PROCEDURE — 84484 ASSAY OF TROPONIN QUANT: CPT | Performed by: STUDENT IN AN ORGANIZED HEALTH CARE EDUCATION/TRAINING PROGRAM

## 2018-07-27 PROCEDURE — 99220 PR INITIAL OBSERVATION CARE/DAY 70 MINUTES: CPT | Performed by: FAMILY MEDICINE

## 2018-07-27 PROCEDURE — 25010000002 HEPARIN (PORCINE) PER 1000 UNITS: Performed by: INTERNAL MEDICINE

## 2018-07-27 PROCEDURE — 80048 BASIC METABOLIC PNL TOTAL CA: CPT | Performed by: FAMILY MEDICINE

## 2018-07-27 RX ORDER — CALCITRIOL 0.25 UG/1
0.25 CAPSULE, LIQUID FILLED ORAL 3 TIMES WEEKLY
Status: DISCONTINUED | OUTPATIENT
Start: 2018-07-27 | End: 2018-07-27 | Stop reason: HOSPADM

## 2018-07-27 RX ORDER — HYDROCODONE BITARTRATE AND ACETAMINOPHEN 7.5; 325 MG/1; MG/1
1 TABLET ORAL EVERY 6 HOURS PRN
Qty: 120 TABLET | Refills: 0 | Status: SHIPPED | OUTPATIENT
Start: 2018-07-27 | End: 2018-09-24 | Stop reason: SDUPTHER

## 2018-07-27 RX ORDER — ALBUMIN (HUMAN) 12.5 G/50ML
12.5 SOLUTION INTRAVENOUS AS NEEDED
Status: DISCONTINUED | OUTPATIENT
Start: 2018-07-27 | End: 2018-07-27 | Stop reason: HOSPADM

## 2018-07-27 RX ORDER — LIDOCAINE AND PRILOCAINE 25; 25 MG/G; MG/G
CREAM TOPICAL AS NEEDED
Status: DISCONTINUED | OUTPATIENT
Start: 2018-07-27 | End: 2018-07-27 | Stop reason: HOSPADM

## 2018-07-27 RX ORDER — HEPARIN SODIUM 1000 [USP'U]/ML
2000 INJECTION, SOLUTION INTRAVENOUS; SUBCUTANEOUS ONCE
Status: COMPLETED | OUTPATIENT
Start: 2018-07-27 | End: 2018-07-27

## 2018-07-27 RX ORDER — GABAPENTIN 300 MG/1
300 CAPSULE ORAL 3 TIMES DAILY
Qty: 90 CAPSULE | Refills: 2 | Status: SHIPPED | OUTPATIENT
Start: 2018-07-27 | End: 2018-10-04 | Stop reason: SDUPTHER

## 2018-07-27 RX ADMIN — HEPARIN SODIUM 2000 UNITS: 1000 INJECTION, SOLUTION INTRAVENOUS; SUBCUTANEOUS at 15:50

## 2018-07-27 RX ADMIN — LIDOCAINE AND PRILOCAINE: 25; 25 CREAM TOPICAL at 07:36

## 2018-07-27 RX ADMIN — INSULIN ASPART 6 UNITS: 100 INJECTION, SOLUTION INTRAVENOUS; SUBCUTANEOUS at 17:28

## 2018-07-27 RX ADMIN — SEVELAMER CARBONATE 2400 MG: 800 TABLET, FILM COATED ORAL at 18:00

## 2018-07-27 RX ADMIN — ONDANSETRON 4 MG: 4 TABLET, ORALLY DISINTEGRATING ORAL at 18:20

## 2018-07-27 RX ADMIN — SEVELAMER CARBONATE 2400 MG: 800 TABLET, FILM COATED ORAL at 13:54

## 2018-07-27 RX ADMIN — INSULIN ASPART 6 UNITS: 100 INJECTION, SOLUTION INTRAVENOUS; SUBCUTANEOUS at 07:36

## 2018-07-27 RX ADMIN — CALCITRIOL 0.25 MCG: 0.25 CAPSULE, LIQUID FILLED ORAL at 15:04

## 2018-07-27 RX ADMIN — HYDROCODONE BITARTRATE AND ACETAMINOPHEN 1 TABLET: 7.5; 325 TABLET ORAL at 13:58

## 2018-07-27 RX ADMIN — GABAPENTIN 300 MG: 300 CAPSULE ORAL at 17:29

## 2018-07-27 NOTE — PROGRESS NOTES
Subjective   Patient ID: Cristo Musa is a 68 y.o. male is here today for follow-up of AV fistula, wound check.  Chief Complaint   Patient presents with   • Wound Check   • Hemodialysis Access   VAS 7 both hands   RUE edema improved    Chronic Kidney Disease   Associated symptoms include weakness. Pertinent negatives include no abdominal pain, anorexia, chest pain, chills, coughing, fever, numbness or rash.   Wound Check       CP:  Cory Rodríguez MD  Nephrology:  Dr Vilchis,  [facility]  Cardiology:  Dr Nieto        Resides:  [facility].    68 y.o. male with ESRD on  hemodialysis, HTN, COPD, peripheral neuropathy, DM, CAD, Hepatitis C, nonischemic cardiomyopathy (EF 30%)..  former smoker.  Long term access for hemodialysis placed 12/23/14.  He returns today after callling requesting to be seen due to LUE pain and bulging areas of RUE AV fistula areas.  Denies any neurosensory symptoms of RUE does have of LUE.  Has DM with neuropathy.  RIGHT AV graft functioning well at dialysis.  No problems during dialysis.  Currently residing at [facility].  RUE pain improved  Wound much improved    No other associated signs, symptoms or modifying factors.    11/13/2014 Upper extremity vein mapping:  RIGHT cephalic 1.2-2.5mm, basilic 1.6-2.0mm.  LEFT cephalic 0.8-2.6mm, basilic 1.2-2.6mm.  12/23/14  LEFT Brachial artery to axillary vein AV Graft (7mm Artegraft)  01/07/15 LUE Incisions  open thru skin only.  Proximal Length:  4.5-5 cm depth 0.25 width 0.2  Distal  Length: 4. cm depth 0.2 width 0.2  Beefy red bed  Small amt erythema proximal incision.  1/13/15: LUE Incision:healed  3/24/15  Revision of left arteriovenous graft (distal interposition graft with a 6 mm Artegraft).  Thrombectomy, left arteriovenous graft, open.  Left fistulogram.  Balloon angioplasty of left subclavian vein, innominate vein, swing site (6 x 20 mm Bard Conquest balloon, 8 x 20 mm Bard Conquest  balloon). Venous ultrasound of unilateral extremity.  3/31/15 Revision of left arteriovenous graft, open. Open thrombectomy of left arteriovenous graft. Left upper extremity fistulogram.  Balloon angioplasty of left subclavian vein/innominate vein/swing site (6 x 21 mm Bard-Conquest balloon, 8 x 21 mm Bard-Conquest balloon). Venous ultrasound unilateral extremity.   4/10/15   Excision of left brachial to axillary arteriovenous bridge graft (bovine) with reconstruction of both the brachial artery and axillary vein with vein patch angioplasty from left cephalic vein. Placement of femoral arterial and venous lines using ultrasound guidance.  4/19/15  Placement LEFT IJ Tunneled Cath  5/14/15   Placement of a right forearm arteriovenous bridge graft.  5/19/15   Exchange of right femoral tunneled dialysis catheter using fluoroscopic guidance.   8/11/15  Entrobacter bacteremia of LUE AV Graft, which was removed and completed 6 wks of IV antx with neg CS.    9/22/15  Fistula Duplex:  maxPSV 94cm/s. flows 258-632ml/m. size 5.7-9.2mm. Patent Multiple areas of hematomas with sm area of needle oozing.    10/15/15: RIGHT Fistula duplex: maxPSV 233cm/s. flows 214-701ml/m. size 4.4-6.2mm.  1/26/16: RIGHT fistula duplex: maxPSV 321cm/s. flows 612-1866ml/m. size 3.8-8.1mm.  7/29/16: RIGHT TPA Lysis/Mech Thrombectomy  9/23/16: RIGHT groin Tunnel Cath Placement  10/5/16: RIGHT brachial-axillary vein AV graft (artegraft)        12/5/2016 RIGHT fistula duplex: maxPSV 265cm/s. Flows 1612-1986ml/m. size 5.8-8.1mm.         5/4/2017 RIGHT fistula duplex: maxPSV 530cm/s. Flows 642-1444ml/m. size 3.5-7.4mm.         11/28/17: RIGHT fistula duplex: mPSV 507cm/s. Flows 649-1519ml/m. Size 2.4-7.4mm.        02/22/18: RIGHT fistula duplex: Patent:  mPSV 523 cm/s (1906). Flows 489-2184 ml/m. Size 2.6-8.1mm. 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        3/12/2018 RIGHT fistula duplex:  Occluded R Loop Graft  Edema around old fistula  3/12/18   Hospital adm with infected AV Fistula  3/13/18  Revision RIGHT forearm ARTERIOVENOUS graft (excision),  Debridement RIGHT forearm (skin, SQ, fascia) Negative pressure wound therapy (wound vac [id number])  3/23/18  RUE Wound to wound vac (31.7x2.5x2)  See pic    3/28/18: Wound Vac Change: (1) 22x3x1 (2) 10x2x2   4/11/18 Wound Check: (1) 12.8x7.5x0.5 (2) 8.6x0.2x0.2  6/5/18  Wound Check:  L 8.5 cm w 1.2cm d 0.3cm  See Pic   6/12/18  Wound Check:  L 7.2 cm w 1.2cm d 0.2 cm  See Pic   6/26/18   Wound Check:  L 5.2 cm w 1.cm d 0.1 cm  See Pic   7/5/18  Wound Check:  L 4 cm w 1.cm d 0\]   7/5/18  RIGHT fistula duplex: Patent:  mPSV 590 cm/s (3039). Flows 523-3039 ml/m. Size 3.1-8.9mm. As previously noted, pseudoaneurysm, Edema at puncture sites.   7/18/18  Wound healed sm eschar.          The following portions of the patient's history were reviewed and updated as appropriate: allergies, current medications, past family history, past medical history, past social history, past surgical history and problem list.   See HPI   Allergies   Allergen Reactions   • Lisinopril Angioedema     unknown   • Motrin [Ibuprofen] Diarrhea and Nausea And Vomiting     No current facility-administered medications for this visit.   No current outpatient prescriptions on file.    Facility-Administered Medications Ordered in Other Visits:   •  acetaminophen (TYLENOL) tablet 500 mg, 500 mg, Oral, Q4H PRN, John Allen MD  •  albumin human 25 % IV SOLN 12.5 g, 12.5 g, Intravenous, PRN, Pascual Vilchis MD  •  aspirin chewable tablet 81 mg, 81 mg, Oral, Daily, John Allen MD, 81 mg at 07/26/18 2126  •  dextrose (D50W) solution 25 g, 25 g, Intravenous, Q15 Min PRN, John Allen MD  •  dextrose (GLUTOSE) oral gel 15 g, 15 g, Oral, Q15 Min PRN, John Allen MD  •  famotidine (PEPCID) tablet 20 mg, 20 mg, Oral, Nightly, John Allen MD, 20 mg at 07/26/18 2126  •  gabapentin (NEURONTIN) capsule 300 mg,  300 mg, Oral, TID, John Allen MD, 300 mg at 07/26/18 2125  •  glucagon (human recombinant) (GLUCAGEN DIAGNOSTIC) injection 1 mg, 1 mg, Subcutaneous, PRN, John Allen MD  •  HYDROcodone-acetaminophen (NORCO) 7.5-325 MG per tablet 1 tablet, 1 tablet, Oral, Q6H PRN, John Allen MD, 1 tablet at 07/26/18 2125  •  hydrOXYzine (ATARAX) tablet 25 mg, 25 mg, Oral, TID PRN, John Allen MD  •  insulin aspart (novoLOG) injection 0-9 Units, 0-9 Units, Subcutaneous, 4x Daily AC & at Bedtime, John Allen MD, 6 Units at 07/27/18 0736  •  insulin detemir (LEVEMIR) injection 25 Units, 25 Units, Subcutaneous, Nightly, John Allen MD  •  lidocaine-prilocaine (EMLA) 2.5-2.5 % cream, , Topical, PRN, Pascual Vilchis MD  •  loperamide (IMODIUM) capsule 2 mg, 2 mg, Oral, Q6H PRN, John Allen MD, 2 mg at 07/26/18 2132  •  nitroglycerin (NITROSTAT) SL tablet 0.4 mg, 0.4 mg, Sublingual, Q5 Min PRN, Guero Rodgers MD  •  ondansetron ODT (ZOFRAN-ODT) disintegrating tablet 4 mg, 4 mg, Oral, Q6H PRN, John Allen MD  •  PARoxetine (PAXIL) tablet 30 mg, 30 mg, Oral, Nightly, John Allen MD, 30 mg at 07/26/18 2126  •  polyethylene glycol (MIRALAX) powder 17 g, 17 g, Oral, Daily PRN, John Allen MD  •  sennosides-docusate sodium (SENOKOT-S) 8.6-50 MG tablet 2 tablet, 2 tablet, Oral, BID PRN, John Allen MD  •  sevelamer (RENVELA) tablet 2,400 mg, 2,400 mg, Oral, TID With Meals, John Allen MD  •  sodium chloride 0.9 % flush 1-10 mL, 1-10 mL, Intravenous, PRN, John Allen MD  •  sodium chloride 0.9 % flush 10 mL, 10 mL, Intravenous, PRN, Guero Rodgers MD  •  traZODone (DESYREL) tablet 50 mg, 50 mg, Oral, Nightly, John Allen MD, 50 mg at 07/26/18 2126    Review of Systems   Constitution: Positive for weakness. Negative for chills, decreased appetite and fever.   HENT: Negative for hoarse  voice, nosebleeds and stridor.    Eyes: Negative for visual disturbance.   Cardiovascular: Positive for leg swelling. Negative for chest pain, claudication and irregular heartbeat.        Fistula bleeding:  N   Fistula pain:  N  Extremity pain:  N Extremity discoloration:  N   Extremity numbness/tingling:  N  No problems with dialysis    Respiratory: Negative for cough, hemoptysis and shortness of breath.    Hematologic/Lymphatic: Does not bruise/bleed easily.   Skin: Negative for dry skin, itching, poor wound healing and rash.        Swelling of RUE    Musculoskeletal: Positive for arthritis (VAS 7 both hands ). Negative for back pain, falls, muscle cramps and muscle weakness.        RUE VAS 2 ache    Gastrointestinal: Negative for abdominal pain, anorexia and melena.   Neurological: Negative for dizziness, loss of balance, numbness and paresthesias.   Psychiatric/Behavioral: Negative for altered mental status.   Allergic/Immunologic: Negative for hives.        Objective   Physical Exam   Constitutional: He is oriented to person, place, and time. He appears well-nourished.   Body mass index is 32.23 kg/(m^2).     HENT:   Head: Normocephalic.   Mouth/Throat: Oropharynx is clear and moist.   Eyes: Pupils are equal, round, and reactive to light. Conjunctivae are normal.   Neck: No JVD present.   Cardiovascular: Normal rate, regular rhythm, normal heart sounds and intact distal pulses.    Pulses:       Carotid pulses are 1+ on the right side, and 1+ on the left side.       Radial pulses are 2+ on the right side, and 2+ on the left side.        Posterior tibial pulses are 2+ on the right side, and 2+ on the left side.   Fistula Present RU Extremity:revised  Distal hand warm + sensation + mobility    RUE Upper fistula Brachial-axillary + thrill + bruit + sensation    Pulmonary/Chest: Effort normal and breath sounds normal. No stridor.   Coarse    Abdominal: Soft. Bowel sounds are normal.   Musculoskeletal: He exhibits  edema (RUE 0.5 r <).   Neurological: He is alert and oriented to person, place, and time.   Skin: Skin is warm and dry. Capillary refill takes less than 2 seconds. No erythema. No pallor.   Upper fistula Brachial-axillary  well healed  Lower fistula Forearm AV bridge graft wound healed sm eschar    Psychiatric: His behavior is normal.   Nursing note and vitals reviewed.        Vitals:    07/18/18 1429   BP: 120/65   Pulse: 65   Temp: 97.4 °F (36.3 °C)   SpO2: 96%                     Assessment/Plan   Independent Review of Radiographic Studies:    None with today's visit       1. ESRD (end stage renal disease) on dialysis  Indication for AV fistula     2.Non functioning Arteriovenous , subsequent encounter RUE forearm bridge graft  Lower RUE: edema improved.   Right lower arm wound much improved.  Incision healed   Edema localized +  Xiomara wrap.   Remove Friday after dialysis.  7/20/18   Cleanse RUE lower arm daily with soap and water.  Leave open to air.   Elevate RUE 4 x a day and use stress ball right hand with elevation.   Wrist higher than right shoulder.      3. Arteriovenous fistula, acquired  Patent fistula working well with dialysis.   Patent with stable pseudoaneurysm   Edema at puncture sites  No hematoma.

## 2018-07-31 ENCOUNTER — NURSING HOME (OUTPATIENT)
Dept: FAMILY MEDICINE CLINIC | Facility: CLINIC | Age: 68
End: 2018-07-31

## 2018-07-31 DIAGNOSIS — F41.9 ANXIETY: ICD-10-CM

## 2018-07-31 DIAGNOSIS — K21.9 GASTROESOPHAGEAL REFLUX DISEASE WITHOUT ESOPHAGITIS: ICD-10-CM

## 2018-07-31 DIAGNOSIS — G62.9 NEUROPATHY: ICD-10-CM

## 2018-07-31 DIAGNOSIS — Z79.4 TYPE 2 DIABETES MELLITUS WITH BOTH EYES AFFECTED BY MODERATE NONPROLIFERATIVE RETINOPATHY WITHOUT MACULAR EDEMA, WITH LONG-TERM CURRENT USE OF INSULIN (HCC): Primary | ICD-10-CM

## 2018-07-31 DIAGNOSIS — S41.101D NON-HEALING WOUND OF UPPER EXTREMITY, RIGHT, SUBSEQUENT ENCOUNTER: ICD-10-CM

## 2018-07-31 DIAGNOSIS — E11.3393 TYPE 2 DIABETES MELLITUS WITH BOTH EYES AFFECTED BY MODERATE NONPROLIFERATIVE RETINOPATHY WITHOUT MACULAR EDEMA, WITH LONG-TERM CURRENT USE OF INSULIN (HCC): Primary | ICD-10-CM

## 2018-07-31 DIAGNOSIS — H40.1122 PRIMARY OPEN ANGLE GLAUCOMA OF LEFT EYE, MODERATE STAGE: ICD-10-CM

## 2018-07-31 DIAGNOSIS — N18.6 ESRD (END STAGE RENAL DISEASE) (HCC): ICD-10-CM

## 2018-07-31 DIAGNOSIS — G89.29 OTHER CHRONIC PAIN: ICD-10-CM

## 2018-07-31 PROCEDURE — 99308 SBSQ NF CARE LOW MDM 20: CPT | Performed by: STUDENT IN AN ORGANIZED HEALTH CARE EDUCATION/TRAINING PROGRAM

## 2018-08-01 VITALS
DIASTOLIC BLOOD PRESSURE: 69 MMHG | RESPIRATION RATE: 18 BRPM | TEMPERATURE: 98.6 F | SYSTOLIC BLOOD PRESSURE: 138 MMHG | HEART RATE: 61 BPM | OXYGEN SATURATION: 98 %

## 2018-08-24 ENCOUNTER — APPOINTMENT (OUTPATIENT)
Dept: ULTRASOUND IMAGING | Facility: HOSPITAL | Age: 68
End: 2018-08-24

## 2018-08-24 ENCOUNTER — HOSPITAL ENCOUNTER (EMERGENCY)
Facility: HOSPITAL | Age: 68
Discharge: HOME | End: 2018-08-24
Admitting: NURSE PRACTITIONER

## 2018-08-24 VITALS
DIASTOLIC BLOOD PRESSURE: 59 MMHG | HEIGHT: 68 IN | OXYGEN SATURATION: 96 % | WEIGHT: 232 LBS | SYSTOLIC BLOOD PRESSURE: 112 MMHG | TEMPERATURE: 98.2 F | RESPIRATION RATE: 18 BRPM | BODY MASS INDEX: 35.16 KG/M2 | HEART RATE: 63 BPM

## 2018-08-24 DIAGNOSIS — T82.510A PSEUDOANEURYSM OF ARTERIOVENOUS DIALYSIS FISTULA, INITIAL ENCOUNTER (HCC): Primary | ICD-10-CM

## 2018-08-24 PROCEDURE — 99283 EMERGENCY DEPT VISIT LOW MDM: CPT

## 2018-08-24 PROCEDURE — 93971 EXTREMITY STUDY: CPT

## 2018-08-29 ENCOUNTER — APPOINTMENT (OUTPATIENT)
Dept: ULTRASOUND IMAGING | Facility: HOSPITAL | Age: 68
End: 2018-08-29

## 2018-08-29 ENCOUNTER — HOSPITAL ENCOUNTER (EMERGENCY)
Facility: HOSPITAL | Age: 68
Discharge: HOME | End: 2018-08-29
Attending: EMERGENCY MEDICINE | Admitting: EMERGENCY MEDICINE

## 2018-08-29 VITALS
WEIGHT: 224.4 LBS | BODY MASS INDEX: 34.01 KG/M2 | DIASTOLIC BLOOD PRESSURE: 78 MMHG | HEIGHT: 68 IN | HEART RATE: 54 BPM | OXYGEN SATURATION: 98 % | RESPIRATION RATE: 20 BRPM | TEMPERATURE: 97.5 F | SYSTOLIC BLOOD PRESSURE: 166 MMHG

## 2018-08-29 DIAGNOSIS — N18.6 END STAGE RENAL DISEASE (HCC): ICD-10-CM

## 2018-08-29 DIAGNOSIS — M79.89 SWELLING OF RIGHT UPPER EXTREMITY: Primary | ICD-10-CM

## 2018-08-29 LAB
ANION GAP SERPL CALCULATED.3IONS-SCNC: 16 MMOL/L (ref 5–15)
BASOPHILS # BLD AUTO: 0.02 10*3/MM3 (ref 0–0.2)
BASOPHILS NFR BLD AUTO: 0.4 % (ref 0–2)
BUN BLD-MCNC: 44 MG/DL (ref 7–21)
BUN/CREAT SERPL: 4.5 (ref 7–25)
CALCIUM SPEC-SCNC: 8.2 MG/DL (ref 8.4–10.2)
CHLORIDE SERPL-SCNC: 88 MMOL/L (ref 95–110)
CO2 SERPL-SCNC: 30 MMOL/L (ref 22–31)
CREAT BLD-MCNC: 9.71 MG/DL (ref 0.7–1.3)
DEPRECATED RDW RBC AUTO: 41.8 FL (ref 35.1–43.9)
EOSINOPHIL # BLD AUTO: 0.14 10*3/MM3 (ref 0–0.7)
EOSINOPHIL NFR BLD AUTO: 2.7 % (ref 0–7)
ERYTHROCYTE [DISTWIDTH] IN BLOOD BY AUTOMATED COUNT: 13.5 % (ref 11.5–14.5)
GFR SERPL CREATININE-BSD FRML MDRD: 7 ML/MIN/1.73 (ref 49–113)
GLUCOSE BLD-MCNC: 225 MG/DL (ref 60–100)
HCT VFR BLD AUTO: 34.8 % (ref 39–49)
HGB BLD-MCNC: 11.8 G/DL (ref 13.7–17.3)
HOLD SPECIMEN: NORMAL
IMM GRANULOCYTES # BLD: 0.02 10*3/MM3 (ref 0–0.02)
IMM GRANULOCYTES NFR BLD: 0.4 % (ref 0–0.5)
LYMPHOCYTES # BLD AUTO: 1.67 10*3/MM3 (ref 0.6–4.2)
LYMPHOCYTES NFR BLD AUTO: 32.4 % (ref 10–50)
MCH RBC QN AUTO: 29.6 PG (ref 26.5–34)
MCHC RBC AUTO-ENTMCNC: 33.9 G/DL (ref 31.5–36.3)
MCV RBC AUTO: 87.4 FL (ref 80–98)
MONOCYTES # BLD AUTO: 0.31 10*3/MM3 (ref 0–0.9)
MONOCYTES NFR BLD AUTO: 6 % (ref 0–12)
NEUTROPHILS # BLD AUTO: 3 10*3/MM3 (ref 2–8.6)
NEUTROPHILS NFR BLD AUTO: 58.1 % (ref 37–80)
PLATELET # BLD AUTO: 88 10*3/MM3 (ref 150–450)
PMV BLD AUTO: 8.9 FL (ref 8–12)
POTASSIUM BLD-SCNC: 5 MMOL/L (ref 3.5–5.1)
RBC # BLD AUTO: 3.98 10*6/MM3 (ref 4.37–5.74)
RBC MORPH BLD: NORMAL
SMALL PLATELETS BLD QL SMEAR: NORMAL
SODIUM BLD-SCNC: 134 MMOL/L (ref 137–145)
WBC MORPH BLD: NORMAL
WBC NRBC COR # BLD: 5.16 10*3/MM3 (ref 3.2–9.8)

## 2018-08-29 PROCEDURE — 80048 BASIC METABOLIC PNL TOTAL CA: CPT | Performed by: EMERGENCY MEDICINE

## 2018-08-29 PROCEDURE — 93005 ELECTROCARDIOGRAM TRACING: CPT | Performed by: EMERGENCY MEDICINE

## 2018-08-29 PROCEDURE — 36415 COLL VENOUS BLD VENIPUNCTURE: CPT

## 2018-08-29 PROCEDURE — 85007 BL SMEAR W/DIFF WBC COUNT: CPT | Performed by: EMERGENCY MEDICINE

## 2018-08-29 PROCEDURE — 93971 EXTREMITY STUDY: CPT

## 2018-08-29 PROCEDURE — 99284 EMERGENCY DEPT VISIT MOD MDM: CPT

## 2018-08-29 PROCEDURE — 93010 ELECTROCARDIOGRAM REPORT: CPT | Performed by: INTERNAL MEDICINE

## 2018-08-29 PROCEDURE — 85025 COMPLETE CBC W/AUTO DIFF WBC: CPT | Performed by: EMERGENCY MEDICINE

## 2018-08-29 RX ORDER — SODIUM CHLORIDE 0.9 % (FLUSH) 0.9 %
10 SYRINGE (ML) INJECTION AS NEEDED
Status: DISCONTINUED | OUTPATIENT
Start: 2018-08-29 | End: 2018-08-29 | Stop reason: HOSPADM

## 2018-08-30 ENCOUNTER — NURSING HOME (OUTPATIENT)
Dept: FAMILY MEDICINE CLINIC | Facility: CLINIC | Age: 68
End: 2018-08-30

## 2018-08-30 VITALS
OXYGEN SATURATION: 96 % | RESPIRATION RATE: 18 BRPM | DIASTOLIC BLOOD PRESSURE: 56 MMHG | SYSTOLIC BLOOD PRESSURE: 110 MMHG | TEMPERATURE: 97.1 F | HEART RATE: 59 BPM

## 2018-08-30 DIAGNOSIS — G62.9 NEUROPATHY: ICD-10-CM

## 2018-08-30 DIAGNOSIS — I10 ESSENTIAL HYPERTENSION: Primary | ICD-10-CM

## 2018-08-30 DIAGNOSIS — F41.9 ANXIETY: ICD-10-CM

## 2018-08-30 DIAGNOSIS — Z79.4 TYPE 2 DIABETES MELLITUS WITH BOTH EYES AFFECTED BY MODERATE NONPROLIFERATIVE RETINOPATHY WITHOUT MACULAR EDEMA, WITH LONG-TERM CURRENT USE OF INSULIN (HCC): ICD-10-CM

## 2018-08-30 DIAGNOSIS — E11.3393 TYPE 2 DIABETES MELLITUS WITH BOTH EYES AFFECTED BY MODERATE NONPROLIFERATIVE RETINOPATHY WITHOUT MACULAR EDEMA, WITH LONG-TERM CURRENT USE OF INSULIN (HCC): ICD-10-CM

## 2018-08-30 DIAGNOSIS — N18.5 CHRONIC RENAL FAILURE, STAGE 5 (HCC): ICD-10-CM

## 2018-08-30 DIAGNOSIS — G89.29 OTHER CHRONIC PAIN: ICD-10-CM

## 2018-08-30 DIAGNOSIS — Z09 FOLLOW-UP SURGERY CARE: ICD-10-CM

## 2018-08-30 DIAGNOSIS — K21.9 GASTROESOPHAGEAL REFLUX DISEASE WITHOUT ESOPHAGITIS: ICD-10-CM

## 2018-08-30 PROCEDURE — 99308 SBSQ NF CARE LOW MDM 20: CPT | Performed by: STUDENT IN AN ORGANIZED HEALTH CARE EDUCATION/TRAINING PROGRAM

## 2018-08-30 NOTE — PROGRESS NOTES
MONTHLY NURSING HOME VISIT    NAME: Cristo Musa  : 1950  MRN: [id number]    DATE & TIME SEEN: 18 1400    PCP: Cory Rodríguez MD    NURSING HOME: [facility]     Chief Complaint: Monthly Nursing Home Visit for August    Subjective:     Patient with complaint of right upper arm swelling.  He's been in the ER twice lately for this and was discharged home.  He will see his vascular surgeon in the clinic.  No other complaints at this time.  He is moving around well.  Bowels moving normal.  Appetite is fine, sleep is fine.    Current Meds:    Current Outpatient Prescriptions:   •  acetaminophen (TYLENOL) 500 MG tablet, Take 500 mg by mouth Every 4 (Four) Hours As Needed for Mild Pain  or Fever., Disp: , Rfl:   •  aspirin 81 MG chewable tablet, Chew 81 mg Daily., Disp: , Rfl:   •  brimonidine (ALPHAGAN P) 0.1 % solution ophthalmic solution, Administer 1 drop to both eyes 3 (Three) Times a Day., Disp: , Rfl:   •  dorzolamide (TRUSOPT) 2 % ophthalmic solution, Administer 1 drop into the left eye 3 (Three) Times a Day., Disp: 1 each, Rfl: 12  •  famotidine (PEPCID) 20 MG tablet, Take 20 mg by mouth every night at bedtime., Disp: , Rfl:   •  fluticasone (FLONASE) 50 MCG/ACT nasal spray, 2 sprays into each nostril daily. Administer 2 sprays in each nostril for each dose., Disp: , Rfl:   •  gabapentin (NEURONTIN) 300 MG capsule, Take 1 capsule by mouth 3 (Three) Times a Day., Disp: 90 capsule, Rfl: 2  •  guaiFENesin 200 MG tablet, Take 400 mg by mouth Every 4 (Four) Hours As Needed for Cough., Disp: , Rfl:   •  HYDROcodone-acetaminophen (NORCO) 7.5-325 MG per tablet, Take 1 tablet by mouth Every 6 (Six) Hours As Needed for Moderate Pain ., Disp: 120 tablet, Rfl: 0  •  hydrOXYzine (ATARAX) 25 MG tablet, Take 25 mg by mouth 3 (Three) Times a Day As Needed for Anxiety., Disp: , Rfl:   •  insulin aspart (NovoLOG) 100 UNIT/ML injection, Inject  under the skin 3 (Three) Times a Day Before  Meals. Sliding scale:  = 0 units; 150-200 = 3 units; 200-250 = 6 units; 250-300 = 9 units; 300-350 = 12 units; >350 = 15 units and call MD, Disp: , Rfl:   •  insulin glargine (LANTUS) 100 UNIT/ML injection, Inject 25 Units under the skin Every Night., Disp: , Rfl:   •  latanoprost (XALATAN) 0.005 % ophthalmic solution, Administer 1 drop to both eyes every night., Disp: , Rfl:   •  loperamide (IMODIUM) 2 MG capsule, Take 2 mg by mouth Every 6 (Six) Hours As Needed for Diarrhea., Disp: , Rfl:   •  Loratadine 10 MG capsule, Take 10 mg by mouth Every Night., Disp: , Rfl:   •  ondansetron ODT (ZOFRAN-ODT) 4 MG disintegrating tablet, Take 1 tablet by mouth Every 6 (Six) Hours As Needed for Nausea or Vomiting., Disp: 10 tablet, Rfl: 0  •  PARoxetine (PAXIL) 30 MG tablet, Take 30 mg by mouth every night at bedtime., Disp: , Rfl:   •  polyethylene glycol (MIRALAX) packet, Take 17 g by mouth Daily As Needed (constipation)., Disp: , Rfl:   •  sennosides-docusate sodium (SENOKOT-S) 8.6-50 MG tablet, Take 2 tablets by mouth 2 (Two) Times a Day As Needed for Constipation., Disp: 60 tablet, Rfl: 0  •  traZODone (DESYREL) 50 MG tablet, Take 50 mg by mouth every night at bedtime., Disp: , Rfl:   No current facility-administered medications for this visit.     Allergies:  Lisinopril and Motrin [ibuprofen]    Review of Systems:  Review of Systems   Constitutional: Negative for activity change, appetite change, chills and fever.   HENT: Negative for congestion, ear pain and sore throat.    Eyes: Negative for redness and visual disturbance.   Respiratory: Negative for cough, shortness of breath and wheezing.    Cardiovascular: Negative for chest pain and palpitations.   Gastrointestinal: Negative for abdominal pain, diarrhea, nausea and vomiting.   Genitourinary: Negative for difficulty urinating and dysuria.   Musculoskeletal: Positive for joint swelling (R arm). Negative for arthralgias and gait problem.   Skin: Negative for  color change and rash.   Neurological: Negative for dizziness, weakness and headaches.   Psychiatric/Behavioral: Negative for dysphoric mood and sleep disturbance. The patient is not nervous/anxious.        Objective:     /56   Pulse 59   Temp 97.1 °F (36.2 °C)   Resp 18   SpO2 96%   Physical Exam   Constitutional: He is oriented to person, place, and time. He appears well-developed and well-nourished.   HENT:   Head: Normocephalic and atraumatic.   Right Ear: External ear normal.   Left Ear: External ear normal.   Nose: Nose normal.   Eyes: Pupils are equal, round, and reactive to light. Conjunctivae and EOM are normal.   Neck: Normal range of motion. Neck supple.   Cardiovascular: Normal rate, regular rhythm and intact distal pulses.    Pulmonary/Chest: Effort normal and breath sounds normal. He has no wheezes.   Abdominal: Soft. Bowel sounds are normal. He exhibits no distension. There is no tenderness.   Musculoskeletal: Normal range of motion. He exhibits edema (R arm). He exhibits no deformity.   Neurological: He is alert and oriented to person, place, and time. No cranial nerve deficit.   Skin: Skin is warm.   Psychiatric: He has a normal mood and affect. His behavior is normal. Thought content normal.   Nursing note and vitals reviewed.      Assessment/Plan:      68 y.o. male with:  1. Type 2 diabetes mellitus with both eyes affected by moderate nonproliferative retinopathy without macular edema, with long-term current use of insulin (CMS/HCC)  Levemir and Sliding scale insulin    2. ESRD (end stage renal disease) (CMS/[facility])  Monday Wednesday Friday dialysis    3. Anxiety  Paxil    4. Other chronic pain  Norco's    5. Primary open angle glaucoma of left eye, moderate stage  Continue eyedrops    6. Non-healing wound of upper extremity, right, subsequent encounter  Healing, will see Justyna Patel 9/6   7. Gastroesophageal reflux disease without esophagitis  ranitidine   8. Neuropathy  gabapentin           CODE STATUS: full code    Dr Jimenez is the attending on record for this patient, he is aware of the patient's status and agrees with the above.

## 2018-08-31 ENCOUNTER — TELEPHONE (OUTPATIENT)
Dept: FAMILY MEDICINE CLINIC | Facility: CLINIC | Age: 68
End: 2018-08-31

## 2018-09-06 ENCOUNTER — OFFICE VISIT (OUTPATIENT)
Dept: CARDIAC SURGERY | Facility: CLINIC | Age: 68
End: 2018-09-06

## 2018-09-06 VITALS
OXYGEN SATURATION: 97 % | WEIGHT: 224.2 LBS | DIASTOLIC BLOOD PRESSURE: 70 MMHG | SYSTOLIC BLOOD PRESSURE: 132 MMHG | TEMPERATURE: 98 F | HEIGHT: 68 IN | BODY MASS INDEX: 33.98 KG/M2 | HEART RATE: 69 BPM

## 2018-09-06 DIAGNOSIS — Z99.2 ESRD ON DIALYSIS (HCC): ICD-10-CM

## 2018-09-06 DIAGNOSIS — N18.6 ESRD ON DIALYSIS (HCC): ICD-10-CM

## 2018-09-06 DIAGNOSIS — I77.0 A-V FISTULA (HCC): Primary | ICD-10-CM

## 2018-09-06 PROCEDURE — 99213 OFFICE O/P EST LOW 20 MIN: CPT | Performed by: NURSE PRACTITIONER

## 2018-09-06 NOTE — PATIENT INSTRUCTIONS
Compression on Right lower arm.  Remove rossi compression on Saturday   Elevate Right arm 4 x a day 10 minutes at a time Wrist  bone higher than shoulder bone.Do hand exercises with elevation  Take picture when removed Saturday morning and send to   No patient identifiers   If reduces swelling will do 2 x a week around dialysis   Fistula not bleeding   FU as scheduled unless problems occur

## 2018-09-17 NOTE — PROGRESS NOTES
Subjective   Patient ID: Cristo Musa is a 68 y.o. male is here today for follow-up of AV fistula, wound check.  Chief Complaint   Patient presents with   • Wound Check   • Hemodialysis Access     right arm AV access   VAS 6 both hands and RUE throbs    RUE edema improved    Wound Check     Chronic Kidney Disease   Associated symptoms include weakness. Pertinent negatives include no abdominal pain, anorexia, chest pain, chills, coughing, fever, numbness or rash.     CP:  Cory Rodríguze MD  Nephrology:  Dr Vilchis,  [facility]  Cardiology:  Dr Nieto        Resides:  [facility].    68 y.o. male with ESRD on  hemodialysis, HTN, COPD, peripheral neuropathy, DM, CAD, Hepatitis C, nonischemic cardiomyopathy (EF 30%)..  former smoker.  Long term access for hemodialysis placed 12/23/14.  He returns today after callling requesting to be seen due to LUE pain and bulging areas of RUE AV fistula areas.  Denies any neurosensory symptoms of RUE does have of LUE.  Has DM with neuropathy.  RIGHT AV graft functioning well at dialysis.  No problems during dialysis.  Currently residing at [facility].  RUE pain improved  Wound much improved    Went to ED 8/29/18 with RUE pain and swelling.  When viewed edema was mild to moderate and improved.  There was no bleeding.   Returns today in follow up.     11/13/2014 Upper extremity vein mapping:  RIGHT cephalic 1.2-2.5mm, basilic 1.6-2.0mm.  LEFT cephalic 0.8-2.6mm, basilic 1.2-2.6mm.  12/23/14  LEFT Brachial artery to axillary vein AV Graft (7mm Artegraft)  01/07/15 LUE Incisions  open thru skin only.  Proximal Length:  4.5-5 cm depth 0.25 width 0.2  Distal  Length: 4. cm depth 0.2 width 0.2  Beefy red bed  Small amt erythema proximal incision.  1/13/15: LUE Incision:healed  3/24/15  Revision of left arteriovenous graft (distal interposition graft with a 6 mm Artegraft).  Thrombectomy, left arteriovenous graft, open.  Left fistulogram.   Balloon angioplasty of left subclavian vein, innominate vein, swing site (6 x 20 mm Bard Conquest balloon, 8 x 20 mm Bard Conquest balloon). Venous ultrasound of unilateral extremity.  3/31/15 Revision of left arteriovenous graft, open. Open thrombectomy of left arteriovenous graft. Left upper extremity fistulogram.  Balloon angioplasty of left subclavian vein/innominate vein/swing site (6 x 21 mm Bard-Conquest balloon, 8 x 21 mm Bard-Conquest balloon). Venous ultrasound unilateral extremity.   4/10/15   Excision of left brachial to axillary arteriovenous bridge graft (bovine) with reconstruction of both the brachial artery and axillary vein with vein patch angioplasty from left cephalic vein. Placement of femoral arterial and venous lines using ultrasound guidance.  4/19/15  Placement LEFT IJ Tunneled Cath  5/14/15   Placement of a right forearm arteriovenous bridge graft.  5/19/15   Exchange of right femoral tunneled dialysis catheter using fluoroscopic guidance.   8/11/15  Entrobacter bacteremia of LUE AV Graft, which was removed and completed 6 wks of IV antx with neg CS.    9/22/15  Fistula Duplex:  maxPSV 94cm/s. flows 258-632ml/m. size 5.7-9.2mm. Patent Multiple areas of hematomas with sm area of needle oozing.    10/15/15: RIGHT Fistula duplex: maxPSV 233cm/s. flows 214-701ml/m. size 4.4-6.2mm.  1/26/16: RIGHT fistula duplex: maxPSV 321cm/s. flows 612-1866ml/m. size 3.8-8.1mm.  7/29/16: RIGHT TPA Lysis/Mech Thrombectomy  9/23/16: RIGHT groin Tunnel Cath Placement  10/5/16: RIGHT brachial-axillary vein AV graft (artegraft)        12/5/2016 RIGHT fistula duplex: maxPSV 265cm/s. Flows 1612-1986ml/m. size 5.8-8.1mm.         5/4/2017 RIGHT fistula duplex: maxPSV 530cm/s. Flows 642-1444ml/m. size 3.5-7.4mm.         11/28/17: RIGHT fistula duplex: mPSV 507cm/s. Flows 649-1519ml/m. Size 2.4-7.4mm.        02/22/18: RIGHT fistula duplex: Patent:  mPSV 523 cm/s (1906). Flows 489-2184 ml/m. Size 2.6-8.1mm. As previously  noted, pseudoaneurysm 2.47 cm        3/12/2018 RIGHT fistula duplex:  Occluded R Loop Graft  Edema around old fistula  3/12/18  Hospital adm with infected AV Fistula  3/13/18  Revision RIGHT forearm ARTERIOVENOUS graft (excision),  Debridement RIGHT forearm (skin, SQ, fascia) Negative pressure wound therapy (wound vac [id number])  3/23/18  RUE Wound to wound vac (31.7x2.5x2)  See pic    3/28/18: Wound Vac Change: (1) 22x3x1 (2) 10x2x2   4/11/18 Wound Check: (1) 12.8x7.5x0.5 (2) 8.6x0.2x0.2  6/5/18  Wound Check:  L 8.5 cm w 1.2cm d 0.3cm  See Pic   6/12/18  Wound Check:  L 7.2 cm w 1.2cm d 0.2 cm  See Pic   6/26/18   Wound Check:  L 5.2 cm w 1.cm d 0.1 cm  See Pic   7/5/18  Wound Check:  L 4 cm w 1.cm d 0\]   7/5/18  RIGHT fistula duplex: Patent:  mPSV 590 cm/s (3039). Flows 523-3039 ml/m. Size 3.1-8.9mm. As previously noted, pseudoaneurysm, Edema at puncture sites.   7/18/18  Wound healed sm eschar.    8/29/18  Venous US RUE:  No DVT.  No pseudo identified        The following portions of the patient's history were reviewed and updated as appropriate: allergies, current medications, past family history, past medical history, past social history, past surgical history and problem list.   See HPI   Allergies   Allergen Reactions   • Lisinopril Angioedema     unknown   • Motrin [Ibuprofen] Diarrhea and Nausea And Vomiting       Current Outpatient Prescriptions:   •  acetaminophen (TYLENOL) 500 MG tablet, Take 500 mg by mouth Every 4 (Four) Hours As Needed for Mild Pain  or Fever., Disp: , Rfl:   •  aspirin 81 MG chewable tablet, Chew 81 mg Daily., Disp: , Rfl:   •  brimonidine (ALPHAGAN P) 0.1 % solution ophthalmic solution, Administer 1 drop to both eyes 3 (Three) Times a Day., Disp: , Rfl:   •  dorzolamide (TRUSOPT) 2 % ophthalmic solution, Administer 1 drop into the left eye 3 (Three) Times a Day., Disp: 1 each, Rfl: 12  •  famotidine (PEPCID) 20 MG tablet, Take 20 mg by mouth every night at bedtime., Disp: , Rfl:   •   fluticasone (FLONASE) 50 MCG/ACT nasal spray, 2 sprays into each nostril daily. Administer 2 sprays in each nostril for each dose., Disp: , Rfl:   •  gabapentin (NEURONTIN) 300 MG capsule, Take 1 capsule by mouth 3 (Three) Times a Day., Disp: 90 capsule, Rfl: 2  •  guaiFENesin 200 MG tablet, Take 400 mg by mouth Every 4 (Four) Hours As Needed for Cough., Disp: , Rfl:   •  HYDROcodone-acetaminophen (NORCO) 7.5-325 MG per tablet, Take 1 tablet by mouth Every 6 (Six) Hours As Needed for Moderate Pain ., Disp: 120 tablet, Rfl: 0  •  hydrOXYzine (ATARAX) 25 MG tablet, Take 25 mg by mouth 3 (Three) Times a Day As Needed for Anxiety., Disp: , Rfl:   •  insulin aspart (NovoLOG) 100 UNIT/ML injection, Inject  under the skin 3 (Three) Times a Day Before Meals. Sliding scale:  = 0 units; 150-200 = 3 units; 200-250 = 6 units; 250-300 = 9 units; 300-350 = 12 units; >350 = 15 units and call MD, Disp: , Rfl:   •  insulin glargine (LANTUS) 100 UNIT/ML injection, Inject 25 Units under the skin Every Night., Disp: , Rfl:   •  latanoprost (XALATAN) 0.005 % ophthalmic solution, Administer 1 drop to both eyes every night., Disp: , Rfl:   •  loperamide (IMODIUM) 2 MG capsule, Take 2 mg by mouth Every 6 (Six) Hours As Needed for Diarrhea., Disp: , Rfl:   •  Loratadine 10 MG capsule, Take 10 mg by mouth Every Night., Disp: , Rfl:   •  ondansetron ODT (ZOFRAN-ODT) 4 MG disintegrating tablet, Take 1 tablet by mouth Every 6 (Six) Hours As Needed for Nausea or Vomiting., Disp: 10 tablet, Rfl: 0  •  PARoxetine (PAXIL) 30 MG tablet, Take 30 mg by mouth every night at bedtime., Disp: , Rfl:   •  polyethylene glycol (MIRALAX) packet, Take 17 g by mouth Daily As Needed (constipation)., Disp: , Rfl:   •  sennosides-docusate sodium (SENOKOT-S) 8.6-50 MG tablet, Take 2 tablets by mouth 2 (Two) Times a Day As Needed for Constipation., Disp: 60 tablet, Rfl: 0  •  traZODone (DESYREL) 50 MG tablet, Take 50 mg by mouth every night at bedtime., Disp:  , Rfl:     Review of Systems   Constitution: Positive for weakness. Negative for chills, decreased appetite and fever.   HENT: Negative for hoarse voice, nosebleeds and stridor.    Eyes: Negative for visual disturbance.   Cardiovascular: Positive for leg swelling. Negative for chest pain, claudication and irregular heartbeat.        Fistula bleeding:  N   Fistula pain: Y Extremity pain:  N Extremity discoloration:  N   Extremity numbness/tingling:  N  No problems with dialysis   RUE lower portion swells    Respiratory: Negative for cough, hemoptysis and shortness of breath.    Hematologic/Lymphatic: Does not bruise/bleed easily.   Skin: Negative for dry skin, itching, poor wound healing and rash.        Swelling of RUE    Musculoskeletal: Positive for arthritis (VAS 6 both hands ). Negative for back pain, falls, muscle cramps and muscle weakness.        RUE VAS 6 ache and throbs    Gastrointestinal: Negative for abdominal pain, anorexia, hematemesis and melena.   Neurological: Negative for dizziness, loss of balance, numbness and paresthesias.   Psychiatric/Behavioral: Negative for altered mental status.   Allergic/Immunologic: Negative for hives.        Objective   Physical Exam   Constitutional: He is oriented to person, place, and time. He appears well-nourished. No distress.   Body mass index is 34.1 kg/(m^2).     HENT:   Head: Normocephalic.   Mouth/Throat: Oropharynx is clear and moist.   Eyes: Pupils are equal, round, and reactive to light. Conjunctivae are normal.   Neck: No JVD present.   Cardiovascular: Normal rate, regular rhythm, normal heart sounds and intact distal pulses.    Pulses:       Carotid pulses are 1+ on the right side, and 1+ on the left side.       Radial pulses are 2+ on the right side, and 2+ on the left side.        Posterior tibial pulses are 2+ on the right side, and 2+ on the left side.   Fistula Present RU Extremity:revised  Distal hand warm + sensation + mobility    RUE Upper  fistula Brachial-axillary + thrill + bruit + sensation    Pulmonary/Chest: Effort normal and breath sounds normal. No stridor.   Coarse    Abdominal: Soft. Bowel sounds are normal.   Musculoskeletal: He exhibits edema (RUE 0.5 r <).   Neurological: He is alert and oriented to person, place, and time.   Skin: Skin is warm and dry. Capillary refill takes less than 2 seconds. No erythema. No pallor.   Upper fistula Brachial-axillary  well healed  Lower fistula Forearm AV bridge graft wound healed  Pink  new skin areas    Psychiatric: His behavior is normal.   Nursing note and vitals reviewed.        Vitals:    09/06/18 1320   BP: 132/70   Pulse: 69   Temp: 98 °F (36.7 °C)   SpO2: 97%     Lab Results   Component Value Date    WBC 5.16 08/29/2018    HGB 11.8 (L) 08/29/2018    HCT 34.8 (L) 08/29/2018    MCV 87.4 08/29/2018    PLT 88 (L) 08/29/2018     Lab Results   Component Value Date    GLUCOSE 225 (H) 08/29/2018    BUN 44 (H) 08/29/2018    CREATININE 9.71 (H) 08/29/2018    EGFRIFAFRI 7 (L) 08/29/2018    BCR 4.5 (L) 08/29/2018    K 5.0 08/29/2018    CO2 30.0 08/29/2018    CALCIUM 8.2 (L) 08/29/2018    ALBUMIN 4.30 07/26/2018    AST 44 07/26/2018    ALT 27 07/26/2018                     Assessment/Plan   Independent Review of Radiographic Studies:    ED Visit:  8/29/18  Venous US RUE:  No DVT.  No pseudo identified      1. ESRD (end stage renal disease) on dialysis  Indication for AV fistula     2.Non functioning Arteriovenous , subsequent encounter RUE forearm bridge graft  Lower RUE: edema improved.   Compression on Right lower arm.  Remove rossi compression on Saturday   Elevate Right arm 4 x a day 10 minutes at a time Wrist  bone higher than shoulder bone.Do hand exercises with elevation  Take picture when removed Saturday morning and send to   No patient identifiers   If reduces swelling will do 2 x a week around dialysis   Fistula not bleeding     3. Arteriovenous fistula, acquired  Patent fistula working  well with dialysis.   Continue dialysis as scheduled. If problems should arise including difficulty with access, high pressure alarms, or inability to complete dialysis please notify [facility] immediately for evaluation.

## 2018-09-23 ENCOUNTER — DOCUMENTATION (OUTPATIENT)
Dept: FAMILY MEDICINE CLINIC | Facility: CLINIC | Age: 68
End: 2018-09-23

## 2018-09-23 NOTE — PROGRESS NOTES
Called by the nursing home. The patient had a glucose of 504. He was given 15 units of Novolog. The Sliding scale instructions instruct to call the MD on call for further directions. I instructed the nurse to recheck the glucose in 90 mins and if elevated to give an additional 6 units of Novolog.           This document has been electronically signed by Devon Lucas MD on September 23, 2018 4:40 PM

## 2018-09-24 ENCOUNTER — TELEPHONE (OUTPATIENT)
Dept: FAMILY MEDICINE CLINIC | Facility: CLINIC | Age: 68
End: 2018-09-24

## 2018-09-24 RX ORDER — HYDROCODONE BITARTRATE AND ACETAMINOPHEN 7.5; 325 MG/1; MG/1
1 TABLET ORAL EVERY 6 HOURS PRN
Qty: 120 TABLET | Refills: 0 | Status: ON HOLD | OUTPATIENT
Start: 2018-09-24 | End: 2018-12-22 | Stop reason: SDUPTHER

## 2018-09-25 ENCOUNTER — TELEPHONE (OUTPATIENT)
Dept: FAMILY MEDICINE CLINIC | Facility: CLINIC | Age: 68
End: 2018-09-25

## 2018-09-26 NOTE — TELEPHONE ENCOUNTER
Cristo Musa is a 68 y.o. AA male with type II diabetes mellitus and ESRD residing at [facility]. Received a call from nursing staff that patient's post prandial glucose was elevated at 453 this afternoon. Patient received short acting insulin based on his sliding scale protocol. A few hours following his insulin dose, glucose was still elevated at 418. Nursing home was advise to give patient short acting insulin based on his sliding scale protocol. Repeat glucose was 395. Patient is due to receive his 25 units of his basal insulin tonight.    Discussed patient's glucose with patient's PCP, Anne, Cory Salinas MD. Dr. Rodríguez will see patient and consider continuing 5 units of short acting meal time insulin or increasing patient's basal insulin.    Signature  Esther Jean MD  [facility] Family Medicine Resident, PGY III        This document has been electronically signed by Esther Jean MD on September 25, 2018 8:52 PM

## 2018-09-28 ENCOUNTER — NURSING HOME (OUTPATIENT)
Dept: FAMILY MEDICINE CLINIC | Facility: CLINIC | Age: 68
End: 2018-09-28

## 2018-09-28 VITALS
HEART RATE: 64 BPM | TEMPERATURE: 97.9 F | DIASTOLIC BLOOD PRESSURE: 67 MMHG | OXYGEN SATURATION: 97 % | SYSTOLIC BLOOD PRESSURE: 110 MMHG | RESPIRATION RATE: 20 BRPM

## 2018-09-28 DIAGNOSIS — G62.9 NEUROPATHY: ICD-10-CM

## 2018-09-28 DIAGNOSIS — E11.3393 TYPE 2 DIABETES MELLITUS WITH BOTH EYES AFFECTED BY MODERATE NONPROLIFERATIVE RETINOPATHY WITHOUT MACULAR EDEMA, WITH LONG-TERM CURRENT USE OF INSULIN (HCC): ICD-10-CM

## 2018-09-28 DIAGNOSIS — K21.9 GASTROESOPHAGEAL REFLUX DISEASE WITHOUT ESOPHAGITIS: ICD-10-CM

## 2018-09-28 DIAGNOSIS — G89.29 OTHER CHRONIC PAIN: ICD-10-CM

## 2018-09-28 DIAGNOSIS — Z99.2 ESRD ON DIALYSIS (HCC): Primary | ICD-10-CM

## 2018-09-28 DIAGNOSIS — N18.6 ESRD ON DIALYSIS (HCC): Primary | ICD-10-CM

## 2018-09-28 DIAGNOSIS — F41.9 ANXIETY: ICD-10-CM

## 2018-09-28 DIAGNOSIS — R41.3 MEMORY LOSS: ICD-10-CM

## 2018-09-28 DIAGNOSIS — Z79.4 TYPE 2 DIABETES MELLITUS WITH BOTH EYES AFFECTED BY MODERATE NONPROLIFERATIVE RETINOPATHY WITHOUT MACULAR EDEMA, WITH LONG-TERM CURRENT USE OF INSULIN (HCC): ICD-10-CM

## 2018-09-28 DIAGNOSIS — S41.101D NON-HEALING WOUND OF UPPER EXTREMITY, RIGHT, SUBSEQUENT ENCOUNTER: ICD-10-CM

## 2018-09-28 PROCEDURE — 99318 PR E/M ANNUAL NURSING FACILITY ASSESS STABLE 30 MIN: CPT | Performed by: STUDENT IN AN ORGANIZED HEALTH CARE EDUCATION/TRAINING PROGRAM

## 2018-09-28 NOTE — PROGRESS NOTES
"HISTORY AND PHYSICAL  Nursing home encounter for 2018, [facility]    NAME: Cristo Musa  : 1950  MRN: [id number]    DATE OF ADMISSION: 18    DATE & TIME SEEN: 18 6:40 AM    PCP: Cory Rodríguez MD    CODE STATUS:   Full Code    CHIEF COMPLAINT memory loss    HPI:  Cristo Monae is a 67-year-old -American male with significant history of diabetes and ESRD who is a long-time resident of [facility]. Patient with a couple of complaints today.  First he has been having \"a little bit \"memory loss lately.  It pertains to the \"almost anything.\"  It has not been for \"too long\" but has been more than just a few days.  He seems to just have trouble remembering things and can't answer questions as accurately as as he thinks he used to be able to.  He has some anxiety about this as well.  Overall he states his mood is depressed sometimes on most days.  Talking to his brother does help a lot with this.  His brother lives in [place] but he did come down last week and.  He did bring in some food which gave him quite an elevated blood sugar reading.  His blood sugar has been very high several days lately.  He has history of this noncompliance with appropriate diet.  Per nursing staff the only change in his mentation that they have noticed is when he has had very elevated blood sugars, in the 400 or 500s.    He also complains of diarrhea that he says is been for several months.  However he is not complaining about this at previous visits.  He denies blood in the stool.  Denies feeling symptoms of dehydration, denies increasing weakness.    He states his appetite is good.  He usually gains 5 kg between dialysis sessions, however his overall weight is stable, he needs no assistance for eating nor toileting.  States he has no difficulty swallowing.  He states he does have regular BMs however the consistency is not better.  He does urinate, he states " he usually goes either daily or at least once every 2 or 3 days.  It is a very low volume however.  He also wears depends.    He is able to ambulate with a walker most of the time, he states 99% of the time.  He does occasionally need a wheelchair.  He is usually the most week immediately after dialysis, but recovers after sleeping.  He is not currently working with physical therapy, although he has in the past.  The ADLs: He ambulates with his walker, Wood independently, dresses independently most of the time, eats independently.        CONCURRENT MEDICAL HISTORY:  Past Medical History:   Diagnosis Date   • Anemia of chronic disease    • Cataract    • CHF (congestive heart failure) (CMS/HCC)    • CKD (chronic kidney disease)    • Clostridium difficile infection    • COPD (chronic obstructive pulmonary disease) (CMS/HCC)    • Coronary artery disease    • Diabetes mellitus (CMS/HCC)    • Glaucoma    • Heart block    • Hepatitis C    • History of transfusion    • Hypertension    • Nephropathy, diabetic (CMS/HCC)    • Pacemaker    • Pulmonary embolism (CMS/HCC)        PAST SURGICAL HISTORY:  Past Surgical History:   Procedure Laterality Date   • ABDOMINAL SURGERY     • ARTERIOVENOUS FISTULA/SHUNT SURGERY Right     forearm loop   • ARTERIOVENOUS FISTULA/SHUNT SURGERY Left     removed past upper arm   • ARTERIOVENOUS FISTULA/SHUNT SURGERY Right 3/13/2018    Procedure: revision RIGHT forearm ARTERIOVENOUS graft (excision), debridement, wound vac;  Surgeon: Jerson Singh MD;  Location: [facility];  Service: Vascular   • ARTERIOVENOUS FISTULA/SHUNT SURGERY W/ HEMODIALYSIS CATHETER INSERTION Right 4/4/2016    Procedure: RIGHT ARM AV FISTULA DECLOT REVISION REPAIR BRACHIAL ANASTOMOTIC PSUEDOANEURYSM LEFT FEMORAL RICHARDSON FISTULOGRAM WITH ANGIOPLASTY;  Surgeon: Jerson Carpenter MD;  Location: [facility] OR 18/19;  Service:    • CATARACT EXTRACTION W/ INTRAOCULAR LENS IMPLANT Left 3/31/2017    Procedure: REMOVE  CATARACT AND IMPLANT INTRAOCULAR LENS LEFT EYE;  Surgeon: Hilton Montemayor MD;  Location: [facility] OR;  Service:    • EYE SURGERY     • HERNIA REPAIR     • IMPLANTABLE CARDIOVERTER DEFIBRILLATOR LEAD REPLACEMENT/POCKET REVISION Right 4/11/2016    Procedure: PACEMAKER LEADS X 3 AND BATTERY EXTRACTION WITH EXIMER LASER;  Surgeon: Vern Reeves MD;  [facility];  Service:    • INSERTION HEMODIALYSIS CATHETER Right 05/2015    jugular   • INTERVENTIONAL RADIOLOGY PROCEDURE Right 6/1/2017    Procedure: RIGHT dialysis fistulagram & angioplasty;  Surgeon: Jerson Singh MD;  Location: [facility] ANGIO INVASIVE LOCATION;  Service:    • INTERVENTIONAL RADIOLOGY PROCEDURE Right 8/24/2017    Procedure: IR dialysis fistulagram;  Surgeon: Jerson Singh MD;  Location: [facility] ANGIO INVASIVE LOCATION;  Service:    • PACEMAKER IMPLANTATION  2008    dual chamber [employer]   • REMOVAL HEMODIALYSIS CATHETER Right 05/2015    femoral vein       FAMILY HISTORY:  Family History   Problem Relation Age of Onset   • COPD Sister    • Diabetes Brother    • Early death Brother    • Hyperlipidemia Brother    • Hypertension Brother    • Coronary artery disease Mother    • Diabetes Mother    • Hypertension Mother    • Stroke Other    • Other Other         Respiratory disorder        SOCIAL HISTORY:  Social History     Social History   • Marital status: Legally      Spouse name: N/A   • Number of children: N/A   • Years of education: N/A     Occupational History   • Not on file.     Social History Main Topics   • Smoking status: Former Smoker     Types: Cigarettes   • Smokeless tobacco: Never Used      Comment: quit 20 years ago    • Alcohol use No      Comment: former heavy use in his 50's, up to a fifth of liquor a day, currently no alcohol   • Drug use: No   • Sexual activity: No     Other Topics Concern   • Not on file     Social History Narrative   • No narrative on file        HOME MEDICATIONS:  Prior to Admission medications    Medication Sig Start Date End Date Taking? Authorizing Provider   acetaminophen (TYLENOL) 500 MG tablet Take 500 mg by mouth Every 4 (Four) Hours As Needed for Mild Pain  or Fever.    Cleve Nguyễn MD   aspirin 81 MG chewable tablet Chew 81 mg Daily.    Cleve Nguyễn MD   brimonidine (ALPHAGAN P) 0.1 % solution ophthalmic solution Administer 1 drop to both eyes 3 (Three) Times a Day.    Cleve Nguyễn MD   dorzolamide (TRUSOPT) 2 % ophthalmic solution Administer 1 drop into the left eye 3 (Three) Times a Day. 1/24/17   Inderjit Grant MD   famotidine (PEPCID) 20 MG tablet Take 20 mg by mouth every night at bedtime.    Cleve Nguyễn MD   fluticasone (FLONASE) 50 MCG/ACT nasal spray 2 sprays into each nostril daily. Administer 2 sprays in each nostril for each dose.    Cleve Nguyễn MD   gabapentin (NEURONTIN) 300 MG capsule Take 1 capsule by mouth 3 (Three) Times a Day. 7/27/18   Janel Gordon MD   guaiFENesin 200 MG tablet Take 400 mg by mouth Every 4 (Four) Hours As Needed for Cough.    Cleve Nguyễn MD   HYDROcodone-acetaminophen (NORCO) 7.5-325 MG per tablet Take 1 tablet by mouth Every 6 (Six) Hours As Needed for Moderate Pain . 9/24/18   Cory Rodríguez MD   hydrOXYzine (ATARAX) 25 MG tablet Take 25 mg by mouth 3 (Three) Times a Day As Needed for Anxiety.    Cleve Nguyễn MD   insulin aspart (NovoLOG) 100 UNIT/ML injection Inject  under the skin 3 (Three) Times a Day Before Meals. Sliding scale:  = 0 units; 150-200 = 3 units; 200-250 = 6 units; 250-300 = 9 units; 300-350 = 12 units; >350 = 15 units and call MD    Cleve Nguyễn MD   insulin glargine (LANTUS) 100 UNIT/ML injection Inject 25 Units under the skin Every Night.    Cleve Nguyễn MD   latanoprost (XALATAN) 0.005 % ophthalmic solution Administer 1 drop to both eyes every night.    Gopi  MD Cleve   loperamide (IMODIUM) 2 MG capsule Take 2 mg by mouth Every 6 (Six) Hours As Needed for Diarrhea.    Cleve Nguyễn MD   Loratadine 10 MG capsule Take 10 mg by mouth Every Night. 3/16/18   Cleve Nguyễn MD   ondansetron ODT (ZOFRAN-ODT) 4 MG disintegrating tablet Take 1 tablet by mouth Every 6 (Six) Hours As Needed for Nausea or Vomiting. 2/17/18   Salvador Elmore MD   PARoxetine (PAXIL) 30 MG tablet Take 30 mg by mouth every night at bedtime.    Cleve Nguyễn MD   polyethylene glycol (MIRALAX) packet Take 17 g by mouth Daily As Needed (constipation).    Cleve Nguyễn MD   sennosides-docusate sodium (SENOKOT-S) 8.6-50 MG tablet Take 2 tablets by mouth 2 (Two) Times a Day As Needed for Constipation. 3/16/18   Justyna Patel APRN   traZODone (DESYREL) 50 MG tablet Take 50 mg by mouth every night at bedtime.    Cleve Nguyễn MD       ALLERGIES:  Lisinopril and Motrin [ibuprofen]    REVIEW OF SYSTEMS  Review of Systems   Constitutional: Negative for activity change, appetite change, chills and fever.   HENT: Negative for congestion, ear pain and sore throat.    Eyes: Positive for visual disturbance. Negative for redness.   Respiratory: Negative for cough, shortness of breath and wheezing.    Cardiovascular: Negative for chest pain and palpitations.   Gastrointestinal: Positive for diarrhea. Negative for abdominal pain, nausea and vomiting.   Genitourinary: Negative for difficulty urinating and dysuria.   Musculoskeletal: Positive for arthralgias and joint swelling. Negative for gait problem.   Skin: Negative for color change and rash.   Neurological: Negative for dizziness, weakness and headaches.   Psychiatric/Behavioral: Positive for confusion and dysphoric mood. Negative for self-injury, sleep disturbance and suicidal ideas. The patient is not nervous/anxious.        PHYSICAL EXAM:      Vitals:    09/28/18 0647   BP: 110/67   Pulse: 64   Resp: 20   Temp:  97.9 °F (36.6 °C)   SpO2: 97%      Physical Exam   Constitutional: He is oriented to person, place, and time. He appears well-developed and well-nourished.   HENT:   Head: Normocephalic and atraumatic.   Right Ear: External ear normal.   Left Ear: External ear normal.   Nose: Nose normal.   Missing many teeth, however no caries noted, no abscess   Eyes: Pupils are equal, round, and reactive to light. Conjunctivae and EOM are normal.   Neck: Normal range of motion. Neck supple.   Cardiovascular: Normal rate, regular rhythm, normal heart sounds and intact distal pulses.    No murmur heard.  Pulmonary/Chest: Effort normal and breath sounds normal. He has no wheezes.   Abdominal: Soft. Bowel sounds are normal. He exhibits no distension. There is no tenderness.   Enlarged but not distended   Musculoskeletal: Normal range of motion. He exhibits edema (RUE). He exhibits no deformity.   RUE bandage c/d/i  Ambulates with walker   Neurological: He is alert and oriented to person, place, and time. No cranial nerve deficit or sensory deficit.   Skin: Skin is warm and dry. No rash noted.   Psychiatric: His behavior is normal. Thought content normal.   Nursing note and vitals reviewed.    LABS:  September labs from dialysis:  Albumin: 3.7  Potassium 4.4  Hemoglobin 10.5  Phosphorus 6.1  Calcium 8.3  Average fluid weight gains monthly 5.1 kg    I reviewed the patient's new clinical results.    ASSESSMENT AND PLAN: This is a 68 y.o. male with:  1.  ESRD: And tinea Monday Wednesday Friday dialysis, follows with Dr. Vilchis  2.  Status post right upper extremity incision and drainage: Still with edema, will monitor, follows with Dr. Singh for vascular  3.  Type 2 diabetes mellitus, with long-term insulin usage and diabetic neuropathy: 25 units nightly of Levemir with sliding scale insulin, will encourage compliance with diet, makes simplification of insulin difficult  4. Anxiety: Continue Paxil at lower dose of 20mg, will lexapro,  will do PHQ9, plan on switching from paxil to lexapro due to anticholinergic effects  5.  GERD: He is on famotidine, will discontinue and give Tums as needed  6. Glaucoma: Continue his eyedrops: Follows with Dr. Montemayor.  7.  Diabetic neuropathy: Continue gabapentin at decreased dose of 100mg nightly due to age and kidney function  8.  Right upper extremity pain, chronic pain: Continue Norco's and wean as appropriate  9.  Memory loss: We'll complete MOCA and monitor, may require further workup    Will check his vitamin D, B12, cholesterol, thyroid studies    Code status is   Full Code    I discussed the patients findings and my recommendations with patient and nursing staff.     Dr Jewell is the attending on record, she is aware of the patient's status and agrees with the above history and physical.          This document has been electronically signed by Cory Rodríguez MD on September 28, 2018 6:46 AM

## 2018-10-03 ENCOUNTER — TELEPHONE (OUTPATIENT)
Dept: FAMILY MEDICINE CLINIC | Facility: CLINIC | Age: 68
End: 2018-10-03

## 2018-10-04 RX ORDER — GABAPENTIN 100 MG/1
100 CAPSULE ORAL NIGHTLY
Qty: 30 CAPSULE | Refills: 5 | Status: ON HOLD | OUTPATIENT
Start: 2018-10-04 | End: 2019-01-01 | Stop reason: SDUPTHER

## 2018-11-06 ENCOUNTER — OFFICE VISIT (OUTPATIENT)
Dept: CARDIAC SURGERY | Facility: CLINIC | Age: 68
End: 2018-11-06

## 2018-11-06 ENCOUNTER — PREP FOR SURGERY (OUTPATIENT)
Dept: OTHER | Facility: HOSPITAL | Age: 68
End: 2018-11-06

## 2018-11-06 VITALS
WEIGHT: 204 LBS | OXYGEN SATURATION: 100 % | HEART RATE: 71 BPM | DIASTOLIC BLOOD PRESSURE: 65 MMHG | BODY MASS INDEX: 30.92 KG/M2 | SYSTOLIC BLOOD PRESSURE: 140 MMHG | TEMPERATURE: 98.2 F | HEIGHT: 68 IN

## 2018-11-06 DIAGNOSIS — T82.858A AV FISTULA STENOSIS (HCC): Primary | ICD-10-CM

## 2018-11-06 DIAGNOSIS — T82.858A ARTERIOVENOUS FISTULA STENOSIS, INITIAL ENCOUNTER (HCC): ICD-10-CM

## 2018-11-06 DIAGNOSIS — I77.0 A-V FISTULA (HCC): Primary | ICD-10-CM

## 2018-11-06 PROCEDURE — 99214 OFFICE O/P EST MOD 30 MIN: CPT | Performed by: NURSE PRACTITIONER

## 2018-11-06 RX ORDER — SODIUM CHLORIDE 0.9 % (FLUSH) 0.9 %
3 SYRINGE (ML) INJECTION EVERY 12 HOURS SCHEDULED
Status: CANCELLED | OUTPATIENT
Start: 2018-11-13

## 2018-11-06 RX ORDER — SODIUM CHLORIDE 0.9 % (FLUSH) 0.9 %
3-10 SYRINGE (ML) INJECTION AS NEEDED
Status: CANCELLED | OUTPATIENT
Start: 2018-11-13

## 2018-11-06 NOTE — PROGRESS NOTES
Subjective   Patient ID: Cristo Musa is a 68 y.o. male is here today for follow-up of AV fistula, wound check.  Chief Complaint   Patient presents with   • Hemodialysis Access     ESRD   VAS 6 both hands and RUE throbs    RUE edema improved    Wound Check     Chronic Kidney Disease   Associated symptoms include weakness. Pertinent negatives include no abdominal pain, anorexia, chest pain, chills, coughing, fever, numbness or rash.     CP:  Cory Rodríguez MD  Nephrology:  Dr Vilchis,  [facility]  Cardiology:  Dr Nieto        Resides:  [facility].    68 y.o. male with ESRD on  hemodialysis, HTN, COPD, peripheral neuropathy, DM, CAD, Hepatitis C, nonischemic cardiomyopathy (EF 30%)..  former smoker.  Long term access for hemodialysis placed 12/23/14.  He returns today after callling requesting to be seen due to LUE pain and bulging areas of RUE AV fistula areas.  Denies any neurosensory symptoms of RUE does have of LUE.  Has DM with neuropathy.  RIGHT AV graft functioning well at dialysis.  No problems during dialysis.  Currently residing at [facility].  RUE pain improved  Wound much improved    Went to ED 8/29/18 with RUE pain and swelling.  When viewed edema was mild to moderate and improved.  There was no bleeding.   Returns today in follow up.     11/13/2014 Upper extremity vein mapping:  RIGHT cephalic 1.2-2.5mm, basilic 1.6-2.0mm.  LEFT cephalic 0.8-2.6mm, basilic 1.2-2.6mm.  12/23/14  LEFT Brachial artery to axillary vein AV Graft (7mm Artegraft)  01/07/15 LUE Incisions  open thru skin only.  Proximal Length:  4.5-5 cm depth 0.25 width 0.2  Distal  Length: 4. cm depth 0.2 width 0.2  Beefy red bed  Small amt erythema proximal incision.  1/13/15: LUE Incision:healed  3/24/15  Revision of left arteriovenous graft (distal interposition graft with a 6 mm Artegraft).  Thrombectomy, left arteriovenous graft, open.  Left fistulogram.  Balloon angioplasty of left  subclavian vein, innominate vein, swing site (6 x 20 mm Bard Conquest balloon, 8 x 20 mm Bard Conquest balloon). Venous ultrasound of unilateral extremity.  3/31/15 Revision of left arteriovenous graft, open. Open thrombectomy of left arteriovenous graft. Left upper extremity fistulogram.  Balloon angioplasty of left subclavian vein/innominate vein/swing site (6 x 21 mm Bard-Conquest balloon, 8 x 21 mm Bard-Conquest balloon). Venous ultrasound unilateral extremity.   4/10/15   Excision of left brachial to axillary arteriovenous bridge graft (bovine) with reconstruction of both the brachial artery and axillary vein with vein patch angioplasty from left cephalic vein. Placement of femoral arterial and venous lines using ultrasound guidance.  4/19/15  Placement LEFT IJ Tunneled Cath  5/14/15   Placement of a right forearm arteriovenous bridge graft.  5/19/15   Exchange of right femoral tunneled dialysis catheter using fluoroscopic guidance.   8/11/15  Entrobacter bacteremia of LUE AV Graft, which was removed and completed 6 wks of IV antx with neg CS.    9/22/15  Fistula Duplex:  maxPSV 94cm/s. flows 258-632ml/m. size 5.7-9.2mm. Patent Multiple areas of hematomas with sm area of needle oozing.    10/15/15: RIGHT Fistula duplex: maxPSV 233cm/s. flows 214-701ml/m. size 4.4-6.2mm.  1/26/16: RIGHT fistula duplex: maxPSV 321cm/s. flows 612-1866ml/m. size 3.8-8.1mm.  7/29/16: RIGHT TPA Lysis/Mech Thrombectomy  9/23/16: RIGHT groin Tunnel Cath Placement  10/5/16: RIGHT brachial-axillary vein AV graft (artegraft)        12/5/2016 RIGHT fistula duplex: maxPSV 265cm/s. Flows 1612-1986ml/m. size 5.8-8.1mm.         5/4/2017 RIGHT fistula duplex: maxPSV 530cm/s. Flows 642-1444ml/m. size 3.5-7.4mm.         11/28/17: RIGHT fistula duplex: mPSV 507cm/s. Flows 649-1519ml/m. Size 2.4-7.4mm.        02/22/18: RIGHT fistula duplex: Patent:  mPSV 523 cm/s (1906). Flows 489-2184 ml/m. Size 2.6-8.1mm. As previously noted, pseudoaneurysm 2.47  cm        3/12/2018 RIGHT fistula duplex:  Occluded R Loop Graft  Edema around old fistula  3/12/18  Hospital adm with infected AV Fistula  3/13/18  Revision RIGHT forearm ARTERIOVENOUS graft (excision),  Debridement RIGHT forearm (skin, SQ, fascia) Negative pressure wound therapy (wound vac [id number])  3/23/18  RUE Wound to wound vac (31.7x2.5x2)  See pic    3/28/18: Wound Vac Change: (1) 22x3x1 (2) 10x2x2   4/11/18 Wound Check: (1) 12.8x7.5x0.5 (2) 8.6x0.2x0.2  6/5/18  Wound Check:  L 8.5 cm w 1.2cm d 0.3cm  See Pic   6/12/18  Wound Check:  L 7.2 cm w 1.2cm d 0.2 cm  See Pic   6/26/18   Wound Check:  L 5.2 cm w 1.cm d 0.1 cm  See Pic   7/5/18  Wound Check:  L 4 cm w 1.cm d 0\]   7/5/18  RIGHT fistula duplex: Patent:  mPSV 590 cm/s (3039). Flows 523-3039 ml/m. Size 3.1-8.9mm. As previously noted, pseudoaneurysm, Edema at puncture sites.   7/18/18  Wound healed sm eschar.    8/29/18  Venous US RUE:  No DVT.  No pseudo identified  11/06/2018 Right Fistula Diplex: Patent:  mPSV 690 cm/s (954.8). Flows 552-2050 ml/m. Size 3.6-9.2mm. As previously noted, pseudoaneurysm, and flap unidentified structure possibly a valve        The following portions of the patient's history were reviewed and updated as appropriate: allergies, current medications, past family history, past medical history, past social history, past surgical history and problem list.   See HPI   Allergies   Allergen Reactions   • Lisinopril Angioedema     unknown   • Motrin [Ibuprofen] Diarrhea and Nausea And Vomiting       Current Outpatient Prescriptions:   •  acetaminophen (TYLENOL) 500 MG tablet, Take 500 mg by mouth Every 4 (Four) Hours As Needed for Mild Pain  or Fever., Disp: , Rfl:   •  aspirin 81 MG chewable tablet, Chew 81 mg Daily., Disp: , Rfl:   •  brimonidine (ALPHAGAN P) 0.1 % solution ophthalmic solution, Administer 1 drop to both eyes 3 (Three) Times a Day., Disp: , Rfl:   •  dorzolamide (TRUSOPT) 2 % ophthalmic solution, Administer 1 drop  into the left eye 3 (Three) Times a Day., Disp: 1 each, Rfl: 12  •  famotidine (PEPCID) 20 MG tablet, Take 20 mg by mouth every night at bedtime., Disp: , Rfl:   •  fluticasone (FLONASE) 50 MCG/ACT nasal spray, 2 sprays into each nostril daily. Administer 2 sprays in each nostril for each dose., Disp: , Rfl:   •  gabapentin (NEURONTIN) 100 MG capsule, Take 1 capsule by mouth Every Night., Disp: 30 capsule, Rfl: 5  •  guaiFENesin 200 MG tablet, Take 400 mg by mouth Every 4 (Four) Hours As Needed for Cough., Disp: , Rfl:   •  HYDROcodone-acetaminophen (NORCO) 7.5-325 MG per tablet, Take 1 tablet by mouth Every 6 (Six) Hours As Needed for Moderate Pain ., Disp: 120 tablet, Rfl: 0  •  hydrOXYzine (ATARAX) 25 MG tablet, Take 25 mg by mouth 3 (Three) Times a Day As Needed for Anxiety., Disp: , Rfl:   •  insulin aspart (NovoLOG) 100 UNIT/ML injection, Inject  under the skin 3 (Three) Times a Day Before Meals. Sliding scale:  = 0 units; 150-200 = 3 units; 200-250 = 6 units; 250-300 = 9 units; 300-350 = 12 units; >350 = 15 units and call MD, Disp: , Rfl:   •  insulin glargine (LANTUS) 100 UNIT/ML injection, Inject 25 Units under the skin Every Night., Disp: , Rfl:   •  latanoprost (XALATAN) 0.005 % ophthalmic solution, Administer 1 drop to both eyes every night., Disp: , Rfl:   •  loperamide (IMODIUM) 2 MG capsule, Take 2 mg by mouth Every 6 (Six) Hours As Needed for Diarrhea., Disp: , Rfl:   •  Loratadine 10 MG capsule, Take 10 mg by mouth Every Night., Disp: , Rfl:   •  ondansetron ODT (ZOFRAN-ODT) 4 MG disintegrating tablet, Take 1 tablet by mouth Every 6 (Six) Hours As Needed for Nausea or Vomiting., Disp: 10 tablet, Rfl: 0  •  PARoxetine (PAXIL) 30 MG tablet, Take 30 mg by mouth every night at bedtime., Disp: , Rfl:   •  polyethylene glycol (MIRALAX) packet, Take 17 g by mouth Daily As Needed (constipation)., Disp: , Rfl:   •  sennosides-docusate sodium (SENOKOT-S) 8.6-50 MG tablet, Take 2 tablets by mouth 2 (Two)  Times a Day As Needed for Constipation., Disp: 60 tablet, Rfl: 0  •  traZODone (DESYREL) 50 MG tablet, Take 50 mg by mouth every night at bedtime., Disp: , Rfl:     Review of Systems   Constitution: Positive for weakness. Negative for chills, decreased appetite and fever.   HENT: Negative for hoarse voice, nosebleeds and stridor.    Eyes: Negative for visual disturbance.   Cardiovascular: Positive for leg swelling. Negative for chest pain, claudication and irregular heartbeat.        Fistula bleeding:  N   Fistula pain: Y Extremity pain:  N Extremity discoloration:  N   Extremity numbness/tingling:  N  Minor problems with dialysis   RUE lower portion swells    Respiratory: Negative for cough, hemoptysis and shortness of breath.    Hematologic/Lymphatic: Does not bruise/bleed easily.   Skin: Negative for dry skin, itching, poor wound healing and rash.        Swelling of RUE    Musculoskeletal: Positive for arthritis. Negative for back pain, falls, muscle cramps and muscle weakness.   Gastrointestinal: Negative for abdominal pain, anorexia, hematemesis and melena.   Neurological: Negative for dizziness, loss of balance, numbness and paresthesias.   Psychiatric/Behavioral: Negative for altered mental status.   Allergic/Immunologic: Negative for hives.        Objective   Physical Exam   Constitutional: He is oriented to person, place, and time. He appears well-nourished. No distress.   Body mass index is 34.1 kg/(m^2).     HENT:   Head: Normocephalic.   Mouth/Throat: Oropharynx is clear and moist.   Eyes: Pupils are equal, round, and reactive to light. Conjunctivae are normal.   Neck: No JVD present.   Cardiovascular: Normal rate, regular rhythm, normal heart sounds and intact distal pulses.    Pulses:       Carotid pulses are 1+ on the right side, and 1+ on the left side.       Radial pulses are 2+ on the right side, and 2+ on the left side.        Posterior tibial pulses are 2+ on the right side, and 2+ on the left  side.   Fistula Present RU Extremity:revised  Distal hand warm + sensation + mobility    RUE Upper fistula Brachial-axillary + thrill diminished + bruit + sensation    Pulmonary/Chest: Effort normal and breath sounds normal. No stridor.   Coarse    Abdominal: Soft. Bowel sounds are normal.   Musculoskeletal: He exhibits edema (RUE 0.5 r <).   Neurological: He is alert and oriented to person, place, and time.   Skin: Skin is warm and dry. Capillary refill takes less than 2 seconds. No erythema. No pallor.   Upper fistula Brachial-axillary  well healed  Lower fistula Forearm AV bridge graft wound healed  Pink  new skin areas    Psychiatric: His behavior is normal.   Nursing note and vitals reviewed.        Vitals:    11/06/18 1357   BP: 140/65   Pulse: 71   Temp: 98.2 °F (36.8 °C)   SpO2: 100%     Lab Results   Component Value Date    WBC 5.16 08/29/2018    HGB 11.8 (L) 08/29/2018    HCT 34.8 (L) 08/29/2018    MCV 87.4 08/29/2018    PLT 88 (L) 08/29/2018     Lab Results   Component Value Date    GLUCOSE 225 (H) 08/29/2018    BUN 44 (H) 08/29/2018    CREATININE 9.71 (H) 08/29/2018    EGFRIFAFRI 7 (L) 08/29/2018    BCR 4.5 (L) 08/29/2018    K 5.0 08/29/2018    CO2 30.0 08/29/2018    CALCIUM 8.2 (L) 08/29/2018    ALBUMIN 4.30 07/26/2018    AST 44 07/26/2018    ALT 27 07/26/2018                     Assessment/Plan   Independent Review of Radiographic Studies:    11/06/2018 Right Fistula Diplex: Patent:  mPSV 690 cm/s (954.8). Flows 552-2050 ml/m. Size 3.6-9.2mm. As previously noted, pseudoaneurysm, and flap unidentified structure possibly a valve      1. ESRD (end stage renal disease) on dialysis  Indication for AV fistula     2.Non functioning Arteriovenous , subsequent encounter RUE forearm bridge graft  Lower RUE: edema remains unchanged recommended elevation   Elevate Right arm 4 x a day 10 minutes at a time Wrist  bone higher than shoulder bone.Do hand exercises with elevation  Fistula not bleeding     3.  Arteriovenous fistula Stenosis Initial Encounter  Fistula patent, mild problems with dialysis.  Focal area of stenosis  Detailed discussion with Cristo Musa regarding situation and options. Increased velocity by duplex ultrasound indication stenosis and impending graft occlusion. Cristo Musa understands risks including but not limited to bleeding, infection, blood vessel or nerve injury, inability to maintain patent graft, need for tunnel cath placement.  Benefits: maintenance of patent dialysis access.  Options: surgical vs endovascular evaluation and treatment.  Understands and wishes to proceed.  (RIGHT) fistulogram, possible thrombolysis, angioplasty, stent, tunnel cath scheduled for 11/13/2018.  -case request orders completed

## 2018-11-06 NOTE — PATIENT INSTRUCTIONS
Ultrasound today shows a significant area of narrowing followed by an area of dilation.  We will schedule for outpatient fistulogram this Thursday.  Would recommend dialysis tomorrow to be ran at a lower flow.

## 2018-11-06 NOTE — H&P (VIEW-ONLY)
Subjective   Patient ID: Cristo Musa is a 68 y.o. male is here today for follow-up of AV fistula, wound check.  Chief Complaint   Patient presents with   • Hemodialysis Access     ESRD   VAS 6 both hands and RUE throbs    RUE edema improved    Wound Check     Chronic Kidney Disease   Associated symptoms include weakness. Pertinent negatives include no abdominal pain, anorexia, chest pain, chills, coughing, fever, numbness or rash.     CP:  Cory Rodríguez MD  Nephrology:  Dr Vilchis,  [facility]  Cardiology:  Dr Nieto        Resides:  [facility].    68 y.o. male with ESRD on  hemodialysis, HTN, COPD, peripheral neuropathy, DM, CAD, Hepatitis C, nonischemic cardiomyopathy (EF 30%)..  former smoker.  Long term access for hemodialysis placed 12/23/14.  He returns today after callling requesting to be seen due to LUE pain and bulging areas of RUE AV fistula areas.  Denies any neurosensory symptoms of RUE does have of LUE.  Has DM with neuropathy.  RIGHT AV graft functioning well at dialysis.  No problems during dialysis.  Currently residing at [facility].  RUE pain improved  Wound much improved    Went to ED 8/29/18 with RUE pain and swelling.  When viewed edema was mild to moderate and improved.  There was no bleeding.   Returns today in follow up.     11/13/2014 Upper extremity vein mapping:  RIGHT cephalic 1.2-2.5mm, basilic 1.6-2.0mm.  LEFT cephalic 0.8-2.6mm, basilic 1.2-2.6mm.  12/23/14  LEFT Brachial artery to axillary vein AV Graft (7mm Artegraft)  01/07/15 LUE Incisions  open thru skin only.  Proximal Length:  4.5-5 cm depth 0.25 width 0.2  Distal  Length: 4. cm depth 0.2 width 0.2  Beefy red bed  Small amt erythema proximal incision.  1/13/15: LUE Incision:healed  3/24/15  Revision of left arteriovenous graft (distal interposition graft with a 6 mm Artegraft).  Thrombectomy, left arteriovenous graft, open.  Left fistulogram.  Balloon angioplasty of left  subclavian vein, innominate vein, swing site (6 x 20 mm Bard Conquest balloon, 8 x 20 mm Bard Conquest balloon). Venous ultrasound of unilateral extremity.  3/31/15 Revision of left arteriovenous graft, open. Open thrombectomy of left arteriovenous graft. Left upper extremity fistulogram.  Balloon angioplasty of left subclavian vein/innominate vein/swing site (6 x 21 mm Bard-Conquest balloon, 8 x 21 mm Bard-Conquest balloon). Venous ultrasound unilateral extremity.   4/10/15   Excision of left brachial to axillary arteriovenous bridge graft (bovine) with reconstruction of both the brachial artery and axillary vein with vein patch angioplasty from left cephalic vein. Placement of femoral arterial and venous lines using ultrasound guidance.  4/19/15  Placement LEFT IJ Tunneled Cath  5/14/15   Placement of a right forearm arteriovenous bridge graft.  5/19/15   Exchange of right femoral tunneled dialysis catheter using fluoroscopic guidance.   8/11/15  Entrobacter bacteremia of LUE AV Graft, which was removed and completed 6 wks of IV antx with neg CS.    9/22/15  Fistula Duplex:  maxPSV 94cm/s. flows 258-632ml/m. size 5.7-9.2mm. Patent Multiple areas of hematomas with sm area of needle oozing.    10/15/15: RIGHT Fistula duplex: maxPSV 233cm/s. flows 214-701ml/m. size 4.4-6.2mm.  1/26/16: RIGHT fistula duplex: maxPSV 321cm/s. flows 612-1866ml/m. size 3.8-8.1mm.  7/29/16: RIGHT TPA Lysis/Mech Thrombectomy  9/23/16: RIGHT groin Tunnel Cath Placement  10/5/16: RIGHT brachial-axillary vein AV graft (artegraft)        12/5/2016 RIGHT fistula duplex: maxPSV 265cm/s. Flows 1612-1986ml/m. size 5.8-8.1mm.         5/4/2017 RIGHT fistula duplex: maxPSV 530cm/s. Flows 642-1444ml/m. size 3.5-7.4mm.         11/28/17: RIGHT fistula duplex: mPSV 507cm/s. Flows 649-1519ml/m. Size 2.4-7.4mm.        02/22/18: RIGHT fistula duplex: Patent:  mPSV 523 cm/s (1906). Flows 489-2184 ml/m. Size 2.6-8.1mm. As previously noted, pseudoaneurysm 2.47  cm        3/12/2018 RIGHT fistula duplex:  Occluded R Loop Graft  Edema around old fistula  3/12/18  Hospital adm with infected AV Fistula  3/13/18  Revision RIGHT forearm ARTERIOVENOUS graft (excision),  Debridement RIGHT forearm (skin, SQ, fascia) Negative pressure wound therapy (wound vac [id number])  3/23/18  RUE Wound to wound vac (31.7x2.5x2)  See pic    3/28/18: Wound Vac Change: (1) 22x3x1 (2) 10x2x2   4/11/18 Wound Check: (1) 12.8x7.5x0.5 (2) 8.6x0.2x0.2  6/5/18  Wound Check:  L 8.5 cm w 1.2cm d 0.3cm  See Pic   6/12/18  Wound Check:  L 7.2 cm w 1.2cm d 0.2 cm  See Pic   6/26/18   Wound Check:  L 5.2 cm w 1.cm d 0.1 cm  See Pic   7/5/18  Wound Check:  L 4 cm w 1.cm d 0\]   7/5/18  RIGHT fistula duplex: Patent:  mPSV 590 cm/s (3039). Flows 523-3039 ml/m. Size 3.1-8.9mm. As previously noted, pseudoaneurysm, Edema at puncture sites.   7/18/18  Wound healed sm eschar.    8/29/18  Venous US RUE:  No DVT.  No pseudo identified  11/06/2018 Right Fistula Diplex: Patent:  mPSV 690 cm/s (954.8). Flows 552-2050 ml/m. Size 3.6-9.2mm. As previously noted, pseudoaneurysm, and flap unidentified structure possibly a valve        The following portions of the patient's history were reviewed and updated as appropriate: allergies, current medications, past family history, past medical history, past social history, past surgical history and problem list.   See HPI   Allergies   Allergen Reactions   • Lisinopril Angioedema     unknown   • Motrin [Ibuprofen] Diarrhea and Nausea And Vomiting       Current Outpatient Prescriptions:   •  acetaminophen (TYLENOL) 500 MG tablet, Take 500 mg by mouth Every 4 (Four) Hours As Needed for Mild Pain  or Fever., Disp: , Rfl:   •  aspirin 81 MG chewable tablet, Chew 81 mg Daily., Disp: , Rfl:   •  brimonidine (ALPHAGAN P) 0.1 % solution ophthalmic solution, Administer 1 drop to both eyes 3 (Three) Times a Day., Disp: , Rfl:   •  dorzolamide (TRUSOPT) 2 % ophthalmic solution, Administer 1 drop  into the left eye 3 (Three) Times a Day., Disp: 1 each, Rfl: 12  •  famotidine (PEPCID) 20 MG tablet, Take 20 mg by mouth every night at bedtime., Disp: , Rfl:   •  fluticasone (FLONASE) 50 MCG/ACT nasal spray, 2 sprays into each nostril daily. Administer 2 sprays in each nostril for each dose., Disp: , Rfl:   •  gabapentin (NEURONTIN) 100 MG capsule, Take 1 capsule by mouth Every Night., Disp: 30 capsule, Rfl: 5  •  guaiFENesin 200 MG tablet, Take 400 mg by mouth Every 4 (Four) Hours As Needed for Cough., Disp: , Rfl:   •  HYDROcodone-acetaminophen (NORCO) 7.5-325 MG per tablet, Take 1 tablet by mouth Every 6 (Six) Hours As Needed for Moderate Pain ., Disp: 120 tablet, Rfl: 0  •  hydrOXYzine (ATARAX) 25 MG tablet, Take 25 mg by mouth 3 (Three) Times a Day As Needed for Anxiety., Disp: , Rfl:   •  insulin aspart (NovoLOG) 100 UNIT/ML injection, Inject  under the skin 3 (Three) Times a Day Before Meals. Sliding scale:  = 0 units; 150-200 = 3 units; 200-250 = 6 units; 250-300 = 9 units; 300-350 = 12 units; >350 = 15 units and call MD, Disp: , Rfl:   •  insulin glargine (LANTUS) 100 UNIT/ML injection, Inject 25 Units under the skin Every Night., Disp: , Rfl:   •  latanoprost (XALATAN) 0.005 % ophthalmic solution, Administer 1 drop to both eyes every night., Disp: , Rfl:   •  loperamide (IMODIUM) 2 MG capsule, Take 2 mg by mouth Every 6 (Six) Hours As Needed for Diarrhea., Disp: , Rfl:   •  Loratadine 10 MG capsule, Take 10 mg by mouth Every Night., Disp: , Rfl:   •  ondansetron ODT (ZOFRAN-ODT) 4 MG disintegrating tablet, Take 1 tablet by mouth Every 6 (Six) Hours As Needed for Nausea or Vomiting., Disp: 10 tablet, Rfl: 0  •  PARoxetine (PAXIL) 30 MG tablet, Take 30 mg by mouth every night at bedtime., Disp: , Rfl:   •  polyethylene glycol (MIRALAX) packet, Take 17 g by mouth Daily As Needed (constipation)., Disp: , Rfl:   •  sennosides-docusate sodium (SENOKOT-S) 8.6-50 MG tablet, Take 2 tablets by mouth 2 (Two)  Times a Day As Needed for Constipation., Disp: 60 tablet, Rfl: 0  •  traZODone (DESYREL) 50 MG tablet, Take 50 mg by mouth every night at bedtime., Disp: , Rfl:     Review of Systems   Constitution: Positive for weakness. Negative for chills, decreased appetite and fever.   HENT: Negative for hoarse voice, nosebleeds and stridor.    Eyes: Negative for visual disturbance.   Cardiovascular: Positive for leg swelling. Negative for chest pain, claudication and irregular heartbeat.        Fistula bleeding:  N   Fistula pain: Y Extremity pain:  N Extremity discoloration:  N   Extremity numbness/tingling:  N  Minor problems with dialysis   RUE lower portion swells    Respiratory: Negative for cough, hemoptysis and shortness of breath.    Hematologic/Lymphatic: Does not bruise/bleed easily.   Skin: Negative for dry skin, itching, poor wound healing and rash.        Swelling of RUE    Musculoskeletal: Positive for arthritis. Negative for back pain, falls, muscle cramps and muscle weakness.   Gastrointestinal: Negative for abdominal pain, anorexia, hematemesis and melena.   Neurological: Negative for dizziness, loss of balance, numbness and paresthesias.   Psychiatric/Behavioral: Negative for altered mental status.   Allergic/Immunologic: Negative for hives.        Objective   Physical Exam   Constitutional: He is oriented to person, place, and time. He appears well-nourished. No distress.   Body mass index is 34.1 kg/(m^2).     HENT:   Head: Normocephalic.   Mouth/Throat: Oropharynx is clear and moist.   Eyes: Pupils are equal, round, and reactive to light. Conjunctivae are normal.   Neck: No JVD present.   Cardiovascular: Normal rate, regular rhythm, normal heart sounds and intact distal pulses.    Pulses:       Carotid pulses are 1+ on the right side, and 1+ on the left side.       Radial pulses are 2+ on the right side, and 2+ on the left side.        Posterior tibial pulses are 2+ on the right side, and 2+ on the left  side.   Fistula Present RU Extremity:revised  Distal hand warm + sensation + mobility    RUE Upper fistula Brachial-axillary + thrill diminished + bruit + sensation    Pulmonary/Chest: Effort normal and breath sounds normal. No stridor.   Coarse    Abdominal: Soft. Bowel sounds are normal.   Musculoskeletal: He exhibits edema (RUE 0.5 r <).   Neurological: He is alert and oriented to person, place, and time.   Skin: Skin is warm and dry. Capillary refill takes less than 2 seconds. No erythema. No pallor.   Upper fistula Brachial-axillary  well healed  Lower fistula Forearm AV bridge graft wound healed  Pink  new skin areas    Psychiatric: His behavior is normal.   Nursing note and vitals reviewed.        Vitals:    11/06/18 1357   BP: 140/65   Pulse: 71   Temp: 98.2 °F (36.8 °C)   SpO2: 100%     Lab Results   Component Value Date    WBC 5.16 08/29/2018    HGB 11.8 (L) 08/29/2018    HCT 34.8 (L) 08/29/2018    MCV 87.4 08/29/2018    PLT 88 (L) 08/29/2018     Lab Results   Component Value Date    GLUCOSE 225 (H) 08/29/2018    BUN 44 (H) 08/29/2018    CREATININE 9.71 (H) 08/29/2018    EGFRIFAFRI 7 (L) 08/29/2018    BCR 4.5 (L) 08/29/2018    K 5.0 08/29/2018    CO2 30.0 08/29/2018    CALCIUM 8.2 (L) 08/29/2018    ALBUMIN 4.30 07/26/2018    AST 44 07/26/2018    ALT 27 07/26/2018                     Assessment/Plan   Independent Review of Radiographic Studies:    11/06/2018 Right Fistula Diplex: Patent:  mPSV 690 cm/s (954.8). Flows 552-2050 ml/m. Size 3.6-9.2mm. As previously noted, pseudoaneurysm, and flap unidentified structure possibly a valve      1. ESRD (end stage renal disease) on dialysis  Indication for AV fistula     2.Non functioning Arteriovenous , subsequent encounter RUE forearm bridge graft  Lower RUE: edema remains unchanged recommended elevation   Elevate Right arm 4 x a day 10 minutes at a time Wrist  bone higher than shoulder bone.Do hand exercises with elevation  Fistula not bleeding     3.  Arteriovenous fistula Stenosis Initial Encounter  Fistula patent, mild problems with dialysis.  Focal area of stenosis  Detailed discussion with Cristo Musa regarding situation and options. Increased velocity by duplex ultrasound indication stenosis and impending graft occlusion. Cristo Musa understands risks including but not limited to bleeding, infection, blood vessel or nerve injury, inability to maintain patent graft, need for tunnel cath placement.  Benefits: maintenance of patent dialysis access.  Options: surgical vs endovascular evaluation and treatment.  Understands and wishes to proceed.  (RIGHT) fistulogram, possible thrombolysis, angioplasty, stent, tunnel cath scheduled for 11/13/2018.  -case request orders completed

## 2018-11-10 PROBLEM — T82.858A AV FISTULA STENOSIS (HCC): Status: ACTIVE | Noted: 2018-11-06

## 2018-11-13 ENCOUNTER — HOSPITAL ENCOUNTER (OUTPATIENT)
Facility: HOSPITAL | Age: 68
Setting detail: HOSPITAL OUTPATIENT SURGERY
Discharge: HOME | End: 2018-11-13
Attending: THORACIC SURGERY (CARDIOTHORACIC VASCULAR SURGERY) | Admitting: THORACIC SURGERY (CARDIOTHORACIC VASCULAR SURGERY)

## 2018-11-13 VITALS
HEART RATE: 63 BPM | TEMPERATURE: 96.9 F | SYSTOLIC BLOOD PRESSURE: 117 MMHG | DIASTOLIC BLOOD PRESSURE: 63 MMHG | HEIGHT: 68 IN | OXYGEN SATURATION: 95 % | WEIGHT: 216.27 LBS | RESPIRATION RATE: 18 BRPM | BODY MASS INDEX: 32.78 KG/M2

## 2018-11-13 DIAGNOSIS — Z99.2 ESRD ON DIALYSIS (HCC): ICD-10-CM

## 2018-11-13 DIAGNOSIS — T82.858A ARTERIOVENOUS FISTULA STENOSIS, INITIAL ENCOUNTER (HCC): Primary | ICD-10-CM

## 2018-11-13 DIAGNOSIS — T82.858A AV FISTULA STENOSIS (HCC): ICD-10-CM

## 2018-11-13 DIAGNOSIS — I77.0 A-V FISTULA (HCC): ICD-10-CM

## 2018-11-13 DIAGNOSIS — N18.5 CHRONIC RENAL FAILURE, STAGE 5 (HCC): ICD-10-CM

## 2018-11-13 DIAGNOSIS — N18.6 ESRD ON DIALYSIS (HCC): ICD-10-CM

## 2018-11-13 DIAGNOSIS — N18.6 ESRD (END STAGE RENAL DISEASE) (HCC): ICD-10-CM

## 2018-11-13 PROCEDURE — C1769 GUIDE WIRE: HCPCS | Performed by: THORACIC SURGERY (CARDIOTHORACIC VASCULAR SURGERY)

## 2018-11-13 PROCEDURE — C1894 INTRO/SHEATH, NON-LASER: HCPCS | Performed by: THORACIC SURGERY (CARDIOTHORACIC VASCULAR SURGERY)

## 2018-11-13 PROCEDURE — C1874 STENT, COATED/COV W/DEL SYS: HCPCS | Performed by: THORACIC SURGERY (CARDIOTHORACIC VASCULAR SURGERY)

## 2018-11-13 PROCEDURE — 36902 INTRO CATH DIALYSIS CIRCUIT: CPT | Performed by: THORACIC SURGERY (CARDIOTHORACIC VASCULAR SURGERY)

## 2018-11-13 PROCEDURE — 25010000002 HEPARIN (PORCINE) PER 1000 UNITS: Performed by: THORACIC SURGERY (CARDIOTHORACIC VASCULAR SURGERY)

## 2018-11-13 PROCEDURE — C2623 CATH, TRANSLUMIN, DRUG-COAT: HCPCS | Performed by: THORACIC SURGERY (CARDIOTHORACIC VASCULAR SURGERY)

## 2018-11-13 PROCEDURE — 25010000002 MIDAZOLAM PER 1 MG: Performed by: THORACIC SURGERY (CARDIOTHORACIC VASCULAR SURGERY)

## 2018-11-13 PROCEDURE — 25010000002 FENTANYL CITRATE (PF) 100 MCG/2ML SOLUTION: Performed by: THORACIC SURGERY (CARDIOTHORACIC VASCULAR SURGERY)

## 2018-11-13 PROCEDURE — C1725 CATH, TRANSLUMIN NON-LASER: HCPCS | Performed by: THORACIC SURGERY (CARDIOTHORACIC VASCULAR SURGERY)

## 2018-11-13 PROCEDURE — 25010000002 IOPAMIDOL 61 % SOLUTION: Performed by: THORACIC SURGERY (CARDIOTHORACIC VASCULAR SURGERY)

## 2018-11-13 PROCEDURE — 36908 STENT PLMT CTR DIALYSIS SEG: CPT | Performed by: THORACIC SURGERY (CARDIOTHORACIC VASCULAR SURGERY)

## 2018-11-13 DEVICE — IMPLANTABLE DEVICE: Type: IMPLANTABLE DEVICE | Status: FUNCTIONAL

## 2018-11-13 RX ORDER — HYDROCODONE BITARTRATE AND ACETAMINOPHEN 7.5; 325 MG/1; MG/1
1 TABLET ORAL EVERY 4 HOURS PRN
Status: DISCONTINUED | OUTPATIENT
Start: 2018-11-13 | End: 2018-11-13 | Stop reason: HOSPADM

## 2018-11-13 RX ORDER — OMEPRAZOLE 20 MG/1
20 CAPSULE, DELAYED RELEASE ORAL DAILY
COMMUNITY
End: 2019-01-01 | Stop reason: HOSPADM

## 2018-11-13 RX ORDER — FENTANYL CITRATE 50 UG/ML
INJECTION, SOLUTION INTRAMUSCULAR; INTRAVENOUS AS NEEDED
Status: DISCONTINUED | OUTPATIENT
Start: 2018-11-13 | End: 2018-11-13 | Stop reason: HOSPADM

## 2018-11-13 RX ORDER — SODIUM CHLORIDE 0.9 % (FLUSH) 0.9 %
3-10 SYRINGE (ML) INJECTION AS NEEDED
Status: DISCONTINUED | OUTPATIENT
Start: 2018-11-13 | End: 2018-11-13 | Stop reason: HOSPADM

## 2018-11-13 RX ORDER — MIDAZOLAM HYDROCHLORIDE 1 MG/ML
INJECTION INTRAMUSCULAR; INTRAVENOUS AS NEEDED
Status: DISCONTINUED | OUTPATIENT
Start: 2018-11-13 | End: 2018-11-13 | Stop reason: HOSPADM

## 2018-11-13 RX ORDER — SODIUM CHLORIDE 0.9 % (FLUSH) 0.9 %
3 SYRINGE (ML) INJECTION EVERY 12 HOURS SCHEDULED
Status: DISCONTINUED | OUTPATIENT
Start: 2018-11-13 | End: 2018-11-13 | Stop reason: HOSPADM

## 2018-11-13 RX ORDER — SEVELAMER CARBONATE 800 MG/1
800 TABLET, FILM COATED ORAL
COMMUNITY

## 2018-11-13 RX ORDER — HEPARIN SODIUM 1000 [USP'U]/ML
INJECTION, SOLUTION INTRAVENOUS; SUBCUTANEOUS AS NEEDED
Status: DISCONTINUED | OUTPATIENT
Start: 2018-11-13 | End: 2018-11-13 | Stop reason: HOSPADM

## 2018-11-13 RX ORDER — ACETAMINOPHEN 325 MG/1
650 TABLET ORAL EVERY 4 HOURS PRN
Status: DISCONTINUED | OUTPATIENT
Start: 2018-11-13 | End: 2018-11-13 | Stop reason: HOSPADM

## 2018-11-13 RX ADMIN — Medication 3 ML: at 06:29

## 2018-11-13 NOTE — INTERVAL H&P NOTE
H&P reviewed. The patient was examined and there are no changes to the H&P.     Detailed discussion with Cristo Musa regarding situation and options.  increased velocity by duplex ultrasound indicating significant stenosis and impending graft occlusion. and functional problems at dialysis (increased venous pressures, recirculation, low flows on circuit, difficulty with bleeding after treatment)    Risks including but not limited to bleeding, infection, blood vessel or nerve injury, inability to maintain patent graft, need for tunnel catheter placement, open revision of fistula.  Benefits:  maintenance of functional dialysis access.  Options:  surgical vs endovascular evaluation and treatment.  Understands and wishes to proceed.    RIGHT fistulagram, possible thrombolysis, angioplasty, stent, tunnel cath.  Local/IVsedation.  SDS.  11/13/2018

## 2018-11-18 LAB
BH CV DIALYSIS ACCESS ARTERIAL ANASTOMOSIS DIAMETER MID: 0.45 CM
BH CV DIALYSIS ACCESS ARTERIAL ANASTOMOSIS FLOW VOLUME MID: 1273 ML/MIN
BH CV DIALYSIS ACCESS ARTERIAL ANASTOMOSIS VELOCITY MID: 416.1 CM/SEC
BH CV DIALYSIS ACCESS GRAFT DIAMETER DISTAL: 0.94 CM
BH CV DIALYSIS ACCESS GRAFT DIAMETER MID DISTAL: 0.74 CM
BH CV DIALYSIS ACCESS GRAFT DIAMETER MID: 0.82 CM
BH CV DIALYSIS ACCESS GRAFT DIAMETER PROXIMAL MID: 0.66 CM
BH CV DIALYSIS ACCESS GRAFT DIAMETER PROXIMAL: 0.36 CM
BH CV DIALYSIS ACCESS GRAFT FLOW VOLUME DISTAL: 2050 ML/MIN
BH CV DIALYSIS ACCESS GRAFT FLOW VOLUME MID DISTAL: 568.6 ML/MIN
BH CV DIALYSIS ACCESS GRAFT FLOW VOLUME MID: 678.5 ML/MIN
BH CV DIALYSIS ACCESS GRAFT FLOW VOLUME PROXIMAL MID: 552 ML/MIN
BH CV DIALYSIS ACCESS GRAFT FLOW VOLUME PROXIMAL: 954.8 ML/MIN
BH CV DIALYSIS ACCESS GRAFT VELOCITY DISTAL: 205.2 CM/SEC
BH CV DIALYSIS ACCESS GRAFT VELOCITY MID DISTAL: 86.3 CM/SEC
BH CV DIALYSIS ACCESS GRAFT VELOCITY MID: 52.4 CM/SEC
BH CV DIALYSIS ACCESS GRAFT VELOCITY PROXIMAL MID: 61.2 CM/SEC
BH CV DIALYSIS ACCESS GRAFT VELOCITY PROXIMAL: 690 CM/SEC
BH CV DIALYSIS ACCESS INFLOW DIAMETER DISTAL: 0.5 CM
BH CV DIALYSIS ACCESS INFLOW DIAMETER PROXIMAL: 0.39 CM
BH CV DIALYSIS ACCESS INFLOW FLOW VOLUME DISTAL: 140.2 ML/MIN
BH CV DIALYSIS ACCESS INFLOW FLOW VOLUME PROXIMAL: 403.3 ML/MIN
BH CV DIALYSIS ACCESS INFLOW VELOCITY DISTAL: 92.5 CM/SEC
BH CV DIALYSIS ACCESS INFLOW VELOCITY PROXIMAL: 232 CM/SEC
BH CV DIALYSIS ACCESS OUTFLOW DIAMETER POST OUTFLOW: 0.4 CM
BH CV DIALYSIS ACCESS OUTFLOW FLOW VOLUME POST OUTFLOW: 595.6 ML/MIN
BH CV DIALYSIS ACCESS OUTFLOW VELOCITY MID: 61.6 CM/SEC
BH CV DIALYSIS ACCESS OUTFLOW VELOCITY POST OUTFLOW: 291.7 CM/SEC
Lab: 0.56 CM
Lab: 180.3 ML/MIN

## 2018-12-04 ENCOUNTER — TELEPHONE (OUTPATIENT)
Dept: FAMILY MEDICINE CLINIC | Facility: CLINIC | Age: 68
End: 2018-12-04

## 2018-12-04 NOTE — TELEPHONE ENCOUNTER
Ania from [facility] called and wanted to speak to you in regards to the patient.     She did not give specifics as to why she wanted to speak with you.     She said you can call her cell at [phone] because she will be out on the floor.     Thank you

## 2018-12-19 ENCOUNTER — APPOINTMENT (OUTPATIENT)
Dept: GENERAL RADIOLOGY | Facility: HOSPITAL | Age: 68
End: 2018-12-19

## 2018-12-19 ENCOUNTER — HOSPITAL ENCOUNTER (INPATIENT)
Facility: HOSPITAL | Age: 68
LOS: 3 days | Discharge: NURSING, REHAB OR LONG-TERM CARE | End: 2018-12-22
Attending: EMERGENCY MEDICINE | Admitting: FAMILY MEDICINE

## 2018-12-19 ENCOUNTER — APPOINTMENT (OUTPATIENT)
Dept: CT IMAGING | Facility: HOSPITAL | Age: 68
End: 2018-12-19

## 2018-12-19 DIAGNOSIS — D64.9 SYMPTOMATIC ANEMIA: Primary | ICD-10-CM

## 2018-12-19 DIAGNOSIS — K92.2 ACUTE GI BLEEDING: ICD-10-CM

## 2018-12-19 LAB
ABO GROUP BLD: NORMAL
ALBUMIN SERPL-MCNC: 3.7 G/DL (ref 3.4–4.8)
ALBUMIN/GLOB SERPL: 0.8 G/DL (ref 1.1–1.8)
ALP SERPL-CCNC: 105 U/L (ref 38–126)
ALT SERPL W P-5'-P-CCNC: 11 U/L (ref 21–72)
ANION GAP SERPL CALCULATED.3IONS-SCNC: 11 MMOL/L (ref 5–15)
ANISOCYTOSIS BLD QL: NORMAL
AST SERPL-CCNC: 31 U/L (ref 17–59)
BILIRUB SERPL-MCNC: 0.9 MG/DL (ref 0.2–1.3)
BLD GP AB SCN SERPL QL: NEGATIVE
BUN BLD-MCNC: 31 MG/DL (ref 7–21)
BUN/CREAT SERPL: 7.8 (ref 7–25)
CALCIUM SPEC-SCNC: 8.6 MG/DL (ref 8.4–10.2)
CHLORIDE SERPL-SCNC: 88 MMOL/L (ref 95–110)
CO2 SERPL-SCNC: 34 MMOL/L (ref 22–31)
CREAT BLD-MCNC: 3.99 MG/DL (ref 0.7–1.3)
DEPRECATED RDW RBC AUTO: 58.4 FL (ref 35.1–43.9)
ERYTHROCYTE [DISTWIDTH] IN BLOOD BY AUTOMATED COUNT: 17.3 % (ref 11.5–14.5)
GFR SERPL CREATININE-BSD FRML MDRD: 18 ML/MIN/1.73 (ref 49–113)
GLOBULIN UR ELPH-MCNC: 4.4 GM/DL (ref 2.3–3.5)
GLUCOSE BLD-MCNC: 211 MG/DL (ref 60–100)
GLUCOSE BLDC GLUCOMTR-MCNC: 169 MG/DL (ref 70–130)
HCT VFR BLD AUTO: 19.7 % (ref 39–49)
HGB BLD-MCNC: 6.4 G/DL (ref 13.7–17.3)
HOLD SPECIMEN: NORMAL
HOLD SPECIMEN: NORMAL
INR PPP: 1.27 (ref 0.8–1.2)
LIPASE SERPL-CCNC: 217 U/L (ref 23–300)
LYMPHOCYTES # BLD MANUAL: 1.24 10*3/MM3 (ref 0.6–4.2)
LYMPHOCYTES NFR BLD MANUAL: 2 % (ref 0–12)
LYMPHOCYTES NFR BLD MANUAL: 20 % (ref 10–50)
Lab: NORMAL
MACROCYTES BLD QL SMEAR: NORMAL
MCH RBC QN AUTO: 32 PG (ref 26.5–34)
MCHC RBC AUTO-ENTMCNC: 32.5 G/DL (ref 31.5–36.3)
MCV RBC AUTO: 98.5 FL (ref 80–98)
MONOCYTES # BLD AUTO: 0.12 10*3/MM3 (ref 0–0.9)
NEUTROPHILS # BLD AUTO: 4.85 10*3/MM3 (ref 2–8.6)
NEUTROPHILS NFR BLD MANUAL: 78 % (ref 37–80)
NT-PROBNP SERPL-MCNC: 8890 PG/ML (ref 0–900)
PLATELET # BLD AUTO: 138 10*3/MM3 (ref 150–450)
PMV BLD AUTO: 7.9 FL (ref 8–12)
POLYCHROMASIA BLD QL SMEAR: NORMAL
POTASSIUM BLD-SCNC: 3.5 MMOL/L (ref 3.5–5.1)
PROT SERPL-MCNC: 8.1 G/DL (ref 6.3–8.6)
PROTHROMBIN TIME: 15.6 SECONDS (ref 11.1–15.3)
RBC # BLD AUTO: 2 10*6/MM3 (ref 4.37–5.74)
RH BLD: POSITIVE
SMALL PLATELETS BLD QL SMEAR: NORMAL
SODIUM BLD-SCNC: 133 MMOL/L (ref 137–145)
T&S EXPIRATION DATE: NORMAL
TROPONIN I SERPL-MCNC: <0.012 NG/ML
TROPONIN I SERPL-MCNC: <0.012 NG/ML
WBC MORPH BLD: NORMAL
WBC NRBC COR # BLD: 6.22 10*3/MM3 (ref 3.2–9.8)
WHOLE BLOOD HOLD SPECIMEN: NORMAL
WHOLE BLOOD HOLD SPECIMEN: NORMAL

## 2018-12-19 PROCEDURE — 85025 COMPLETE CBC W/AUTO DIFF WBC: CPT | Performed by: EMERGENCY MEDICINE

## 2018-12-19 PROCEDURE — 93010 ELECTROCARDIOGRAM REPORT: CPT | Performed by: INTERNAL MEDICINE

## 2018-12-19 PROCEDURE — 74022 RADEX COMPL AQT ABD SERIES: CPT

## 2018-12-19 PROCEDURE — 36430 TRANSFUSION BLD/BLD COMPNT: CPT

## 2018-12-19 PROCEDURE — 93005 ELECTROCARDIOGRAM TRACING: CPT | Performed by: EMERGENCY MEDICINE

## 2018-12-19 PROCEDURE — 82962 GLUCOSE BLOOD TEST: CPT

## 2018-12-19 PROCEDURE — 86901 BLOOD TYPING SEROLOGIC RH(D): CPT | Performed by: EMERGENCY MEDICINE

## 2018-12-19 PROCEDURE — 86923 COMPATIBILITY TEST ELECTRIC: CPT

## 2018-12-19 PROCEDURE — 86900 BLOOD TYPING SEROLOGIC ABO: CPT

## 2018-12-19 PROCEDURE — 86900 BLOOD TYPING SEROLOGIC ABO: CPT | Performed by: EMERGENCY MEDICINE

## 2018-12-19 PROCEDURE — 85610 PROTHROMBIN TIME: CPT | Performed by: EMERGENCY MEDICINE

## 2018-12-19 PROCEDURE — 83690 ASSAY OF LIPASE: CPT | Performed by: EMERGENCY MEDICINE

## 2018-12-19 PROCEDURE — 70450 CT HEAD/BRAIN W/O DYE: CPT

## 2018-12-19 PROCEDURE — 63710000001 INSULIN ASPART PER 5 UNITS: Performed by: STUDENT IN AN ORGANIZED HEALTH CARE EDUCATION/TRAINING PROGRAM

## 2018-12-19 PROCEDURE — 83880 ASSAY OF NATRIURETIC PEPTIDE: CPT | Performed by: EMERGENCY MEDICINE

## 2018-12-19 PROCEDURE — 86850 RBC ANTIBODY SCREEN: CPT | Performed by: EMERGENCY MEDICINE

## 2018-12-19 PROCEDURE — 99285 EMERGENCY DEPT VISIT HI MDM: CPT

## 2018-12-19 PROCEDURE — 84484 ASSAY OF TROPONIN QUANT: CPT | Performed by: EMERGENCY MEDICINE

## 2018-12-19 PROCEDURE — P9016 RBC LEUKOCYTES REDUCED: HCPCS

## 2018-12-19 PROCEDURE — 36415 COLL VENOUS BLD VENIPUNCTURE: CPT | Performed by: EMERGENCY MEDICINE

## 2018-12-19 PROCEDURE — 80053 COMPREHEN METABOLIC PANEL: CPT | Performed by: EMERGENCY MEDICINE

## 2018-12-19 PROCEDURE — 85007 BL SMEAR W/DIFF WBC COUNT: CPT | Performed by: EMERGENCY MEDICINE

## 2018-12-19 RX ORDER — TRAZODONE HYDROCHLORIDE 50 MG/1
50 TABLET ORAL NIGHTLY
Status: DISCONTINUED | OUTPATIENT
Start: 2018-12-19 | End: 2018-12-22 | Stop reason: HOSPADM

## 2018-12-19 RX ORDER — DEXTROSE MONOHYDRATE 25 G/50ML
25 INJECTION, SOLUTION INTRAVENOUS
Status: DISCONTINUED | OUTPATIENT
Start: 2018-12-19 | End: 2018-12-22 | Stop reason: HOSPADM

## 2018-12-19 RX ORDER — DORZOLAMIDE HCL 20 MG/ML
1 SOLUTION/ DROPS OPHTHALMIC 3 TIMES DAILY
Status: DISCONTINUED | OUTPATIENT
Start: 2018-12-19 | End: 2018-12-22 | Stop reason: HOSPADM

## 2018-12-19 RX ORDER — SODIUM CHLORIDE 0.9 % (FLUSH) 0.9 %
10 SYRINGE (ML) INJECTION AS NEEDED
Status: DISCONTINUED | OUTPATIENT
Start: 2018-12-19 | End: 2018-12-22 | Stop reason: HOSPADM

## 2018-12-19 RX ORDER — HYDROCODONE BITARTRATE AND ACETAMINOPHEN 5; 325 MG/1; MG/1
1 TABLET ORAL EVERY 8 HOURS PRN
Status: DISCONTINUED | OUTPATIENT
Start: 2018-12-19 | End: 2018-12-22 | Stop reason: HOSPADM

## 2018-12-19 RX ORDER — ASPIRIN 81 MG/1
81 TABLET, CHEWABLE ORAL DAILY
Status: DISCONTINUED | OUTPATIENT
Start: 2018-12-20 | End: 2018-12-22 | Stop reason: HOSPADM

## 2018-12-19 RX ORDER — ACETAMINOPHEN 325 MG/1
650 TABLET ORAL EVERY 4 HOURS PRN
Status: DISCONTINUED | OUTPATIENT
Start: 2018-12-19 | End: 2018-12-22 | Stop reason: HOSPADM

## 2018-12-19 RX ORDER — BRIMONIDINE TARTRATE 0.15 %
1 DROPS OPHTHALMIC (EYE) 3 TIMES DAILY
Status: DISCONTINUED | OUTPATIENT
Start: 2018-12-19 | End: 2018-12-22 | Stop reason: HOSPADM

## 2018-12-19 RX ORDER — GABAPENTIN 100 MG/1
100 CAPSULE ORAL NIGHTLY
Status: DISCONTINUED | OUTPATIENT
Start: 2018-12-19 | End: 2018-12-22 | Stop reason: HOSPADM

## 2018-12-19 RX ORDER — FAMOTIDINE 20 MG/1
20 TABLET, FILM COATED ORAL NIGHTLY
Status: DISCONTINUED | OUTPATIENT
Start: 2018-12-19 | End: 2018-12-21

## 2018-12-19 RX ORDER — CETIRIZINE HYDROCHLORIDE 5 MG/1
5 TABLET ORAL DAILY
Status: DISCONTINUED | OUTPATIENT
Start: 2018-12-20 | End: 2018-12-22 | Stop reason: HOSPADM

## 2018-12-19 RX ORDER — ESCITALOPRAM OXALATE 10 MG/1
10 TABLET ORAL DAILY
Status: DISCONTINUED | OUTPATIENT
Start: 2018-12-20 | End: 2018-12-22 | Stop reason: HOSPADM

## 2018-12-19 RX ORDER — SEVELAMER CARBONATE 800 MG/1
800 TABLET, FILM COATED ORAL
Status: DISCONTINUED | OUTPATIENT
Start: 2018-12-20 | End: 2018-12-20

## 2018-12-19 RX ORDER — FLUTICASONE PROPIONATE 50 MCG
2 SPRAY, SUSPENSION (ML) NASAL DAILY
Status: DISCONTINUED | OUTPATIENT
Start: 2018-12-20 | End: 2018-12-22 | Stop reason: HOSPADM

## 2018-12-19 RX ORDER — NICOTINE POLACRILEX 4 MG
15 LOZENGE BUCCAL
Status: DISCONTINUED | OUTPATIENT
Start: 2018-12-19 | End: 2018-12-22 | Stop reason: HOSPADM

## 2018-12-19 RX ORDER — SODIUM CHLORIDE 0.9 % (FLUSH) 0.9 %
3-10 SYRINGE (ML) INJECTION AS NEEDED
Status: DISCONTINUED | OUTPATIENT
Start: 2018-12-19 | End: 2018-12-22 | Stop reason: HOSPADM

## 2018-12-19 RX ORDER — SODIUM CHLORIDE 0.9 % (FLUSH) 0.9 %
3 SYRINGE (ML) INJECTION EVERY 12 HOURS SCHEDULED
Status: DISCONTINUED | OUTPATIENT
Start: 2018-12-19 | End: 2018-12-22 | Stop reason: HOSPADM

## 2018-12-19 RX ORDER — LATANOPROST 50 UG/ML
1 SOLUTION/ DROPS OPHTHALMIC NIGHTLY
Status: DISCONTINUED | OUTPATIENT
Start: 2018-12-19 | End: 2018-12-22 | Stop reason: HOSPADM

## 2018-12-19 RX ORDER — ONDANSETRON 2 MG/ML
4 INJECTION INTRAMUSCULAR; INTRAVENOUS EVERY 6 HOURS PRN
Status: DISCONTINUED | OUTPATIENT
Start: 2018-12-19 | End: 2018-12-21

## 2018-12-19 RX ADMIN — BRIMONIDINE TARTRATE 1 DROP: 1.5 SOLUTION OPHTHALMIC at 21:52

## 2018-12-19 RX ADMIN — FAMOTIDINE 20 MG: 20 TABLET ORAL at 21:52

## 2018-12-19 RX ADMIN — TRAZODONE HYDROCHLORIDE 50 MG: 50 TABLET ORAL at 21:52

## 2018-12-19 RX ADMIN — GABAPENTIN 100 MG: 100 CAPSULE ORAL at 21:52

## 2018-12-19 RX ADMIN — DORZOLAMIDE HCL 1 DROP: 20 SOLUTION/ DROPS OPHTHALMIC at 21:52

## 2018-12-19 RX ADMIN — INSULIN ASPART 3 UNITS: 100 INJECTION, SOLUTION INTRAVENOUS; SUBCUTANEOUS at 22:23

## 2018-12-19 RX ADMIN — LATANOPROST 1 DROP: 50 SOLUTION OPHTHALMIC at 21:53

## 2018-12-19 RX ADMIN — SODIUM CHLORIDE, PRESERVATIVE FREE 3 ML: 5 INJECTION INTRAVENOUS at 21:53

## 2018-12-19 NOTE — ED NOTES
This RN in room to initiate IV at this time, Patient not in room.     Paulina Giron, RN  12/19/18 [id number]

## 2018-12-19 NOTE — ED PROVIDER NOTES
Subjective   Patient presents to the ER, referred from [facility] with complaints of some Hemoccult positive stool, a decreasing hemoglobin to 7.7, weakness.  Patient was also noted to have a sodium of 125.  Patient has been feeling weak and feeling increasing itching and pain in his lower extremities.  He was sent at the patient's last hemoglobin was 11.8 in August.   Patient has been somnolent.  Patient presents the ED not knowing much about his history, when asked about the abnormal lab that he was sent for the patient seemed to have no idea.  Patient also denies any black tarry stools or blood per rectum.  Patient is a chronic dialysis patient.  Patient states that he does still make urine.  Patient denying any fevers at this time.  Patient did dialyze today.            Review of Systems   Constitutional: Positive for activity change and fatigue. Negative for appetite change, chills and fever.   HENT: Negative.  Negative for congestion.    Eyes: Negative.  Negative for photophobia and visual disturbance.   Respiratory: Negative.  Negative for cough, chest tightness and shortness of breath.    Cardiovascular: Negative.  Negative for chest pain and palpitations.   Gastrointestinal: Positive for blood in stool. Negative for abdominal pain, constipation, diarrhea, nausea and vomiting.   Endocrine: Negative.    Genitourinary: Negative.  Negative for decreased urine volume, dysuria, flank pain and hematuria.   Musculoskeletal: Negative.  Negative for arthralgias, back pain, myalgias, neck pain and neck stiffness.   Skin: Negative.  Negative for pallor.   Neurological: Positive for dizziness, weakness and light-headedness. Negative for syncope, numbness and headaches.   Psychiatric/Behavioral: Negative.  Negative for confusion and suicidal ideas. The patient is not nervous/anxious.    All other systems reviewed and are negative.      Past Medical History:   Diagnosis Date   • Anemia of chronic disease    •  Cataract    • CHF (congestive heart failure) (CMS/HCC)    • CKD (chronic kidney disease)    • Clostridium difficile infection    • COPD (chronic obstructive pulmonary disease) (CMS/HCC)    • Diabetes mellitus (CMS/HCC)    • Glaucoma    • Heart block    • Hepatitis C    • History of transfusion    • Hypertension    • Nephropathy, diabetic (CMS/HCC)    • Pacemaker    • Pulmonary embolism (CMS/HCC)        Allergies   Allergen Reactions   • Lisinopril Angioedema     unknown   • Motrin [Ibuprofen] Diarrhea and Nausea And Vomiting       Past Surgical History:   Procedure Laterality Date   • ABDOMINAL SURGERY     • ARTERIOVENOUS FISTULA/SHUNT SURGERY Right     forearm loop   • ARTERIOVENOUS FISTULA/SHUNT SURGERY Left     removed past upper arm   • ARTERIOVENOUS FISTULA/SHUNT SURGERY Right 3/13/2018    Procedure: revision RIGHT forearm ARTERIOVENOUS graft (excision), debridement, wound vac;  Surgeon: Jerson Singh MD;  Location: [facility] OR;  Service: Vascular   • ARTERIOVENOUS FISTULA/SHUNT SURGERY W/ HEMODIALYSIS CATHETER INSERTION Right 4/4/2016    Procedure: RIGHT ARM AV FISTULA DECLOT REVISION REPAIR BRACHIAL ANASTOMOTIC PSUEDOANEURYSM LEFT FEMORAL RICHARDSON FISTULOGRAM WITH ANGIOPLASTY;  Surgeon: Jerson Carpenter MD;  Location: [facility] OR 18/19;  Service:    • CATARACT EXTRACTION W/ INTRAOCULAR LENS IMPLANT Left 3/31/2017    Procedure: REMOVE CATARACT AND IMPLANT INTRAOCULAR LENS LEFT EYE;  Surgeon: Hilton Montemayor MD;  Location: [facility] OR;  Service:    • EYE SURGERY     • HERNIA REPAIR     • IMPLANTABLE CARDIOVERTER DEFIBRILLATOR LEAD REPLACEMENT/POCKET REVISION Right 4/11/2016    Procedure: PACEMAKER LEADS X 3 AND BATTERY EXTRACTION WITH EXIMER LASER;  Surgeon: Vern Reeves MD;  Location: [facility] OR 18/19;  Service:    • INSERTION HEMODIALYSIS CATHETER Right 05/2015    jugular   • INTERVENTIONAL RADIOLOGY PROCEDURE Right 6/1/2017    Procedure: RIGHT dialysis fistulagram &  angioplasty;  Surgeon: Jerson Singh MD;  Location: [facility]GIO INVASIVE LOCATION;  Service:    • INTERVENTIONAL RADIOLOGY PROCEDURE Right 8/24/2017    Procedure: IR dialysis fistulagram;  Surgeon: Jerson Singh MD;  Location: [facility] INVAS[facility] LOCATION;  Service:    • INTERVENTIONAL RADIOLOGY PROCEDURE Right 11/13/2018    Procedure: IR dialysis fistulagram;  Surgeon: Jerson Singh MD;  Location: [facility]IO INVASIVE LOCATION;  Service: Interventional Radiology   • PACEMAKER IMPLANTATION  2008    dual chamber [employer] [place]   • REMOVAL HEMODIALYSIS CATHETER Right 05/2015    femoral vein       Family History   Problem Relation Age of Onset   • COPD Sister    • Diabetes Brother    • Early death Brother    • Hyperlipidemia Brother    • Hypertension Brother    • Coronary artery disease Mother    • Diabetes Mother    • Hypertension Mother    • Stroke Other    • Other Other         Respiratory disorder       Social History     Socioeconomic History   • Marital status: Legally      Spouse name: Not on file   • Number of children: Not on file   • Years of education: Not on file   • Highest education level: Not on file   Tobacco Use   • Smoking status: Former Smoker     Types: Cigarettes   • Smokeless tobacco: Never Used   • Tobacco comment: quit 20 years ago    Substance and Sexual Activity   • Alcohol use: No     Comment: former heavy use in his 50's, up to a fifth of liquor a day, currently no alcohol   • Drug use: No   • Sexual activity: No           Objective   Physical Exam   Constitutional: He is oriented to person, place, and time. He appears well-developed and well-nourished. No distress.   Somnolent, though responsive, no acute distress.   HENT:   Head: Normocephalic and atraumatic.   Eyes: Conjunctivae and EOM are normal.   Patient has swelling and purulence coming from the right medial tear duct.  No conjunctival injection.   Neck: Normal range of motion. Neck  supple. No JVD present.   Cardiovascular: Normal rate, regular rhythm, normal heart sounds and intact distal pulses. Exam reveals no gallop and no friction rub.   No murmur heard.  Pulmonary/Chest: Effort normal. No respiratory distress. He has no wheezes. He has no rales. He exhibits no tenderness.   Abdominal: Soft. Bowel sounds are normal. He exhibits no distension and no mass. There is no tenderness. There is no rebound and no guarding.   Musculoskeletal: Normal range of motion. He exhibits no edema.   Patient with 2+ pulses bilateral lower extremities.  The feet are somewhat tender to palpation with no evidence of erythema warmth or signs of infection.  Patient no significant pedal edema.   Neurological: He is alert and oriented to person, place, and time.   Skin: Skin is warm and dry. Capillary refill takes less than 2 seconds. He is not diaphoretic.   Psychiatric: He has a normal mood and affect. His behavior is normal. Judgment and thought content normal.   Nursing note and vitals reviewed.      Procedures           ED Course        Labs Reviewed   COMPREHENSIVE METABOLIC PANEL - Abnormal; Notable for the following components:       Result Value    Glucose 211 (*)     BUN 31 (*)     Creatinine 3.99 (*)     Sodium 133 (*)     Chloride 88 (*)     CO2 34.0 (*)     ALT (SGPT) 11 (*)     eGFR   Amer 18 (*)     Globulin 4.4 (*)     A/G Ratio 0.8 (*)     All other components within normal limits   BNP (IN-HOUSE) - Abnormal; Notable for the following components:    proBNP 8,890.0 (*)     All other components within normal limits   PROTIME-INR - Abnormal; Notable for the following components:    Protime 15.6 (*)     INR 1.27 (*)     All other components within normal limits    Narrative:     Therapeutic range for most indications is 2.0-3.0 INR,  or 2.5-3.5 for mechanical heart valves.   CBC WITH AUTO DIFFERENTIAL - Abnormal; Notable for the following components:    RBC 2.00 (*)     Hemoglobin 6.4 (*)      Hematocrit 19.7 (*)     MCV 98.5 (*)     RDW 17.3 (*)     RDW-SD 58.4 (*)     MPV 7.9 (*)     Platelets 138 (*)     All other components within normal limits   TROPONIN (IN-HOUSE) - Normal   LIPASE - Normal   RAINBOW DRAW    Narrative:     The following orders were created for panel order [place] Draw.  Procedure                               Abnormality         Status                     ---------                               -----------         ------                     Light Blue Top[[id number]]                                   Final result               Green Top (Gel)[[id number]]                                  Final result               Lavender Top[[id number]]                                     Final result               Gold Top - SST[[id number]]                                   Final result                 Please view results for these tests on the individual orders.   TROPONIN (IN-HOUSE)   URINALYSIS W/ MICROSCOPIC IF INDICATED (NO CULTURE)   PREPARE RBC   LIGHT BLUE TOP   GREEN TOP   LAVENDER TOP   GOLD TOP - SST   CBC AND DIFFERENTIAL    Narrative:     The following orders were created for panel order CBC & Differential.  Procedure                               Abnormality         Status                     ---------                               -----------         ------                     Manual Differential[[id number]]                              In process                 CBC Auto Differential[[id number]]        Abnormal            Final result                 Please view results for these tests on the individual orders.   MANUAL DIFFERENTIAL       CT Head Without Contrast   Final Result      There is no acute intracranial abnormality.      Electronically signed by:  Beltran Lucio MD  12/19/2018 5:44 PM   CST Workstation: [id number]      XR Abdomen 2 View With Chest 1 View   Final Result       Mild constipation.      There are no acute findings in the  chest       Electronically signed by:   Beltran Lucio MD  12/19/2018 4:50 PM   CST Workstation: [id number]        Patient with this symptomatically anemia hemoglobin 6.4.  Patient with heme positive stools though no gross melena.  Patient is had loss of hemoglobin even from 2 days ago when his hemoglobin was noted to be 7.6.  Patient's sodium has corrected somewhat.  Patient will be admitted for GI consultation and evaluation of his anemia.          MDM      Final diagnoses:   Symptomatic anemia   Acute GI bleeding            Vishal Rdz MD  12/19/18 [id number]

## 2018-12-19 NOTE — ED NOTES
Patient states he has been having issues with his legs and constipation.     Paulina Giron, RN  12/19/18 [id number]

## 2018-12-19 NOTE — ED NOTES
This RN unsuccessful with IV initiation, Charge RN notified.     Paulina Giron, RN  12/19/18 0232

## 2018-12-20 LAB
ALBUMIN SERPL-MCNC: 3.5 G/DL (ref 3.4–4.8)
ALBUMIN/GLOB SERPL: 0.9 G/DL (ref 1.1–1.8)
ALP SERPL-CCNC: 89 U/L (ref 38–126)
ALT SERPL W P-5'-P-CCNC: 19 U/L (ref 21–72)
ANION GAP SERPL CALCULATED.3IONS-SCNC: 13 MMOL/L (ref 5–15)
AST SERPL-CCNC: 29 U/L (ref 17–59)
BASOPHILS # BLD AUTO: 0.04 10*3/MM3 (ref 0–0.2)
BASOPHILS NFR BLD AUTO: 0.7 % (ref 0–2)
BILIRUB SERPL-MCNC: 1.2 MG/DL (ref 0.2–1.3)
BUN BLD-MCNC: 46 MG/DL (ref 7–21)
BUN/CREAT SERPL: 9 (ref 7–25)
CALCIUM SPEC-SCNC: 8.5 MG/DL (ref 8.4–10.2)
CHLORIDE SERPL-SCNC: 91 MMOL/L (ref 95–110)
CO2 SERPL-SCNC: 29 MMOL/L (ref 22–31)
CRE SCREEN PCR: NOT DETECTED
CREAT BLD-MCNC: 5.09 MG/DL (ref 0.7–1.3)
DEPRECATED RDW RBC AUTO: 60 FL (ref 35.1–43.9)
EOSINOPHIL # BLD AUTO: 0.09 10*3/MM3 (ref 0–0.7)
EOSINOPHIL NFR BLD AUTO: 1.5 % (ref 0–7)
ERYTHROCYTE [DISTWIDTH] IN BLOOD BY AUTOMATED COUNT: 17.5 % (ref 11.5–14.5)
GFR SERPL CREATININE-BSD FRML MDRD: 14 ML/MIN/1.73 (ref 49–113)
GFR SERPL CREATININE-BSD FRML MDRD: ABNORMAL ML/MIN/1.73 (ref 49–113)
GLOBULIN UR ELPH-MCNC: 4.1 GM/DL (ref 2.3–3.5)
GLUCOSE BLD-MCNC: 181 MG/DL (ref 60–100)
GLUCOSE BLDC GLUCOMTR-MCNC: 131 MG/DL (ref 70–130)
GLUCOSE BLDC GLUCOMTR-MCNC: 134 MG/DL (ref 70–130)
GLUCOSE BLDC GLUCOMTR-MCNC: 147 MG/DL (ref 70–130)
GLUCOSE BLDC GLUCOMTR-MCNC: 180 MG/DL (ref 70–130)
GLUCOSE BLDC GLUCOMTR-MCNC: 181 MG/DL (ref 70–130)
GLUCOSE BLDC GLUCOMTR-MCNC: 206 MG/DL (ref 70–130)
HCT VFR BLD AUTO: 20 % (ref 39–49)
HGB BLD-MCNC: 6.2 G/DL (ref 13.7–17.3)
IMM GRANULOCYTES # BLD: 0.05 10*3/MM3 (ref 0–0.02)
IMM GRANULOCYTES NFR BLD: 0.8 % (ref 0–0.5)
IMP STRAIN: NOT DETECTED
KPC STRAIN: NOT DETECTED
LYMPHOCYTES # BLD AUTO: 1.33 10*3/MM3 (ref 0.6–4.2)
LYMPHOCYTES NFR BLD AUTO: 22.6 % (ref 10–50)
MCH RBC QN AUTO: 30.5 PG (ref 26.5–34)
MCHC RBC AUTO-ENTMCNC: 31 G/DL (ref 31.5–36.3)
MCV RBC AUTO: 98.5 FL (ref 80–98)
MONOCYTES # BLD AUTO: 0.7 10*3/MM3 (ref 0–0.9)
MONOCYTES NFR BLD AUTO: 11.9 % (ref 0–12)
NDM STRAIN: NOT DETECTED
NEUTROPHILS # BLD AUTO: 3.68 10*3/MM3 (ref 2–8.6)
NEUTROPHILS NFR BLD AUTO: 62.5 % (ref 37–80)
NRBC BLD MANUAL-RTO: 0.7 /100 WBC (ref 0–0)
OXA 48 STRAIN: NOT DETECTED
PLATELET # BLD AUTO: 82 10*3/MM3 (ref 150–450)
PMV BLD AUTO: 8.9 FL (ref 8–12)
POTASSIUM BLD-SCNC: 5.3 MMOL/L (ref 3.5–5.1)
PROT SERPL-MCNC: 7.6 G/DL (ref 6.3–8.6)
RBC # BLD AUTO: 2.03 10*6/MM3 (ref 4.37–5.74)
SODIUM BLD-SCNC: 133 MMOL/L (ref 137–145)
VIM STRAIN: NOT DETECTED
WBC NRBC COR # BLD: 5.89 10*3/MM3 (ref 3.2–9.8)

## 2018-12-20 PROCEDURE — 85025 COMPLETE CBC W/AUTO DIFF WBC: CPT | Performed by: STUDENT IN AN ORGANIZED HEALTH CARE EDUCATION/TRAINING PROGRAM

## 2018-12-20 PROCEDURE — P9016 RBC LEUKOCYTES REDUCED: HCPCS

## 2018-12-20 PROCEDURE — 63710000001 INSULIN ASPART PER 5 UNITS: Performed by: STUDENT IN AN ORGANIZED HEALTH CARE EDUCATION/TRAINING PROGRAM

## 2018-12-20 PROCEDURE — 82962 GLUCOSE BLOOD TEST: CPT

## 2018-12-20 PROCEDURE — 25010000002 FUROSEMIDE PER 20 MG: Performed by: FAMILY MEDICINE

## 2018-12-20 PROCEDURE — 87081 CULTURE SCREEN ONLY: CPT | Performed by: STUDENT IN AN ORGANIZED HEALTH CARE EDUCATION/TRAINING PROGRAM

## 2018-12-20 PROCEDURE — 86900 BLOOD TYPING SEROLOGIC ABO: CPT

## 2018-12-20 PROCEDURE — 63710000001 INSULIN DETEMIR PER 5 UNITS: Performed by: STUDENT IN AN ORGANIZED HEALTH CARE EDUCATION/TRAINING PROGRAM

## 2018-12-20 PROCEDURE — 99232 SBSQ HOSP IP/OBS MODERATE 35: CPT | Performed by: INTERNAL MEDICINE

## 2018-12-20 PROCEDURE — 36430 TRANSFUSION BLD/BLD COMPNT: CPT

## 2018-12-20 PROCEDURE — 80053 COMPREHEN METABOLIC PANEL: CPT | Performed by: STUDENT IN AN ORGANIZED HEALTH CARE EDUCATION/TRAINING PROGRAM

## 2018-12-20 PROCEDURE — 99223 1ST HOSP IP/OBS HIGH 75: CPT | Performed by: STUDENT IN AN ORGANIZED HEALTH CARE EDUCATION/TRAINING PROGRAM

## 2018-12-20 RX ORDER — DEXTROSE AND SODIUM CHLORIDE 5; .45 G/100ML; G/100ML
30 INJECTION, SOLUTION INTRAVENOUS CONTINUOUS PRN
Status: CANCELLED | OUTPATIENT
Start: 2018-12-20

## 2018-12-20 RX ORDER — SEVELAMER HYDROCHLORIDE 800 MG/1
800 TABLET, FILM COATED ORAL
Status: DISCONTINUED | OUTPATIENT
Start: 2018-12-20 | End: 2018-12-22 | Stop reason: HOSPADM

## 2018-12-20 RX ORDER — FUROSEMIDE 10 MG/ML
40 INJECTION INTRAMUSCULAR; INTRAVENOUS ONCE
Status: COMPLETED | OUTPATIENT
Start: 2018-12-20 | End: 2018-12-20

## 2018-12-20 RX ADMIN — BRIMONIDINE TARTRATE 1 DROP: 1.5 SOLUTION OPHTHALMIC at 16:49

## 2018-12-20 RX ADMIN — DORZOLAMIDE HCL 1 DROP: 20 SOLUTION/ DROPS OPHTHALMIC at 16:49

## 2018-12-20 RX ADMIN — GABAPENTIN 100 MG: 100 CAPSULE ORAL at 21:17

## 2018-12-20 RX ADMIN — RENAGEL 800 MG: 800 TABLET ORAL at 11:48

## 2018-12-20 RX ADMIN — FUROSEMIDE 40 MG: 10 INJECTION, SOLUTION INTRAMUSCULAR; INTRAVENOUS at 16:45

## 2018-12-20 RX ADMIN — ESCITALOPRAM OXALATE 10 MG: 10 TABLET ORAL at 08:32

## 2018-12-20 RX ADMIN — RENAGEL 800 MG: 800 TABLET ORAL at 08:32

## 2018-12-20 RX ADMIN — SODIUM CHLORIDE, PRESERVATIVE FREE 3 ML: 5 INJECTION INTRAVENOUS at 08:46

## 2018-12-20 RX ADMIN — LATANOPROST 1 DROP: 50 SOLUTION OPHTHALMIC at 21:18

## 2018-12-20 RX ADMIN — FLUTICASONE PROPIONATE 2 SPRAY: 50 SPRAY, METERED NASAL at 08:32

## 2018-12-20 RX ADMIN — CETIRIZINE HYDROCHLORIDE 5 MG: 5 TABLET, FILM COATED ORAL at 08:31

## 2018-12-20 RX ADMIN — BRIMONIDINE TARTRATE 1 DROP: 1.5 SOLUTION OPHTHALMIC at 08:33

## 2018-12-20 RX ADMIN — DORZOLAMIDE HCL 1 DROP: 20 SOLUTION/ DROPS OPHTHALMIC at 08:33

## 2018-12-20 RX ADMIN — RENAGEL 800 MG: 800 TABLET ORAL at 17:29

## 2018-12-20 RX ADMIN — SODIUM CHLORIDE, PRESERVATIVE FREE 3 ML: 5 INJECTION INTRAVENOUS at 21:17

## 2018-12-20 RX ADMIN — INSULIN ASPART 5 UNITS: 100 INJECTION, SOLUTION INTRAVENOUS; SUBCUTANEOUS at 11:48

## 2018-12-20 RX ADMIN — TRAZODONE HYDROCHLORIDE 50 MG: 50 TABLET ORAL at 21:17

## 2018-12-20 RX ADMIN — BRIMONIDINE TARTRATE 1 DROP: 1.5 SOLUTION OPHTHALMIC at 21:18

## 2018-12-20 RX ADMIN — INSULIN ASPART 3 UNITS: 100 INJECTION, SOLUTION INTRAVENOUS; SUBCUTANEOUS at 08:32

## 2018-12-20 RX ADMIN — INSULIN ASPART 5 UNITS: 100 INJECTION, SOLUTION INTRAVENOUS; SUBCUTANEOUS at 21:22

## 2018-12-20 RX ADMIN — FAMOTIDINE 20 MG: 20 TABLET ORAL at 21:17

## 2018-12-20 RX ADMIN — DORZOLAMIDE HCL 1 DROP: 20 SOLUTION/ DROPS OPHTHALMIC at 21:18

## 2018-12-20 RX ADMIN — INSULIN DETEMIR 25 UNITS: 100 INJECTION, SOLUTION SUBCUTANEOUS at 08:35

## 2018-12-20 NOTE — CONSULTS
SUBJECTIVE:   12/20/2018    Name: Cristo Musa  DOD: 1950    REASON FOR CONSULT: Severe anemia.    Chief Complaint:     Chief Complaint   Patient presents with   • Abnormal Lab   • Leg Pain   • Fatigue       Subjective     Patient is 68 y.o. male who presents with a complaint of increasing fatigue.  Patient was found to be significantly anemic the emergency room and was admitted to the hospital for transfusion.  Patient did receive 1 unit of packed RBCs and hemoglobin actually went down a little bit.   has end-stage renal disease and is currently dialysis dependent.  Patient's kidney problems could be part of the cause of the patient's anemia.  Patient did have heme positive stools but has had constipation with manual disimpaction fairly recently.  Patient does not remember having a recent colonoscopy.     ROS/HISTORY/ CURRENT MEDICATIONS/OBJECTIVE/VS/PE:   Review of Systems:   Review of Systems   Constitutional: Negative for activity change, appetite change and unexpected weight change.   HENT: Negative for congestion, sore throat and trouble swallowing.    Respiratory: Negative for cough, choking and shortness of breath.    Cardiovascular: Negative for chest pain.   Gastrointestinal: Positive for abdominal pain. Negative for abdominal distention, anal bleeding, blood in stool, constipation, diarrhea, nausea, rectal pain and vomiting.   Endocrine: Negative for heat intolerance, polydipsia and polyphagia.   Genitourinary: Negative for difficulty urinating.   Musculoskeletal: Negative for arthralgias.   Skin: Negative for color change, pallor, rash and wound.   Allergic/Immunologic: Negative for food allergies.   Neurological: Negative for dizziness, syncope, weakness and headaches.   Psychiatric/Behavioral: Negative for agitation, behavioral problems, confusion and decreased concentration.       History:     Past Medical History:   Diagnosis Date   • Anemia of chronic disease    • Cataract    •  CHF (congestive heart failure) (CMS/HCC)    • CKD (chronic kidney disease)    • Clostridium difficile infection    • COPD (chronic obstructive pulmonary disease) (CMS/HCC)    • Diabetes mellitus (CMS/HCC)    • Glaucoma    • Heart block    • Hepatitis C    • History of transfusion    • Hypertension    • Nephropathy, diabetic (CMS/HCC)    • Pacemaker    • Pulmonary embolism (CMS/HCC)      Past Surgical History:   Procedure Laterality Date   • ABDOMINAL SURGERY     • ARTERIOVENOUS FISTULA/SHUNT SURGERY Right     forearm loop   • ARTERIOVENOUS FISTULA/SHUNT SURGERY Left     removed past upper arm   • ARTERIOVENOUS FISTULA/SHUNT SURGERY Right 3/13/2018    Procedure: revision RIGHT forearm ARTERIOVENOUS graft (excision), debridement, wound vac;  Surgeon: Jerson Singh MD;  Location: [facility] OR;  Service: Vascular   • ARTERIOVENOUS FISTULA/SHUNT SURGERY W/ HEMODIALYSIS CATHETER INSERTION Right 4/4/2016    Procedure: RIGHT ARM AV FISTULA DECLOT REVISION REPAIR BRACHIAL ANASTOMOTIC PSUEDOANEURYSM LEFT FEMORAL RICHARDSON FISTULOGRAM WITH ANGIOPLASTY;  Surgeon: Jerson Carpenter MD;  Location: [facility] OR 18/19;  Service:    • CATARACT EXTRACTION W/ INTRAOCULAR LENS IMPLANT Left 3/31/2017    Procedure: REMOVE CATARACT AND IMPLANT INTRAOCULAR LENS LEFT EYE;  Surgeon: Hilton Montemayor MD;  Location: [facility] OR;  Service:    • EYE SURGERY     • HERNIA REPAIR     • IMPLANTABLE CARDIOVERTER DEFIBRILLATOR LEAD REPLACEMENT/POCKET REVISION Right 4/11/2016    Procedure: PACEMAKER LEADS X 3 AND BATTERY EXTRACTION WITH EXIMER LASER;  Surgeon: Vern Reeves MD;  Location: [facility] OR 18/19;  Service:    • INSERTION HEMODIALYSIS CATHETER Right 05/2015    jugular   • INTERVENTIONAL RADIOLOGY PROCEDURE Right 6/1/2017    Procedure: RIGHT dialysis fistulagram & angioplasty;  Surgeon: Jerson Singh MD;  Location: [facility] ANGIO INVASIVE LOCATION;  Service:    • INTERVENTIONAL RADIOLOGY PROCEDURE Right  8/24/2017    Procedure: IR dialysis fistulagram;  Surgeon: Jerson Singh MD;  Location: [facility] ANGIO INVASIVE LOCATION;  Service:    • INTERVENTIONAL RADIOLOGY PROCEDURE Right 11/13/2018    Procedure: IR dialysis fistulagram;  Surgeon: Jerson Singh MD;  Location: [facility] ANGIO INVASIVE LOCATION;  Service: Interventional Radiology   • PACEMAKER IMPLANTATION  2008    dual chamber [employer]   • REMOVAL HEMODIALYSIS CATHETER Right 05/2015    femoral vein     Family History   Problem Relation Age of Onset   • COPD Sister    • Diabetes Brother    • Early death Brother    • Hyperlipidemia Brother    • Hypertension Brother    • Coronary artery disease Mother    • Diabetes Mother    • Hypertension Mother    • Stroke Other    • Other Other         Respiratory disorder     Social History     Tobacco Use   • Smoking status: Former Smoker     Types: Cigarettes   • Smokeless tobacco: Never Used   • Tobacco comment: quit 20 years ago    Substance Use Topics   • Alcohol use: No     Comment: former heavy use in his 50's, up to a fifth of liquor a day, currently no alcohol   • Drug use: No     Medications Prior to Admission   Medication Sig Dispense Refill Last Dose   • acetaminophen (TYLENOL) 500 MG tablet Take 500 mg by mouth Every 4 (Four) Hours As Needed for Mild Pain  or Fever.   Unknown at Unknown time   • aspirin 81 MG chewable tablet Chew 81 mg Daily.   11/12/2018 at 0900   • brimonidine (ALPHAGAN P) 0.1 % solution ophthalmic solution Administer 1 drop to both eyes 3 (Three) Times a Day.   11/12/2018 at 2100   • dorzolamide (TRUSOPT) 2 % ophthalmic solution Administer 1 drop into the left eye 3 (Three) Times a Day. 1 each 12 Taking   • famotidine (PEPCID) 20 MG tablet Take 20 mg by mouth every night at bedtime.   Taking   • fluticasone (FLONASE) 50 MCG/ACT nasal spray 2 sprays into each nostril daily. Administer 2 sprays in each nostril for each dose.   11/12/2018 at 0900   • gabapentin  (NEURONTIN) 100 MG capsule Take 1 capsule by mouth Every Night. 30 capsule 5 11/12/2018 at 2100   • guaiFENesin 200 MG tablet Take 400 mg by mouth Every 4 (Four) Hours As Needed for Cough.   11/12/2018 at 2100   • HYDROcodone-acetaminophen (NORCO) 7.5-325 MG per tablet Take 1 tablet by mouth Every 6 (Six) Hours As Needed for Moderate Pain . 120 tablet 0 11/12/2018 at 2100   • insulin aspart (NovoLOG) 100 UNIT/ML injection Inject  under the skin 3 (Three) Times a Day Before Meals. Sliding scale:  = 0 units; 150-200 = 3 units; 200-250 = 6 units; 250-300 = 9 units; 300-350 = 12 units; >350 = 15 units and call MD   11/12/2018 at 0600   • insulin detemir (LEVEMIR) 100 UNIT/ML injection Inject 25 Units under the skin into the appropriate area as directed Daily.   11/12/2018 at 2000   • latanoprost (XALATAN) 0.005 % ophthalmic solution Administer 1 drop to both eyes every night.   11/12/2018 at 2100   • loperamide (IMODIUM) 2 MG capsule Take 2 mg by mouth Every 6 (Six) Hours As Needed for Diarrhea.   Unknown at Unknown time   • Loratadine 10 MG capsule Take 10 mg by mouth Every Night.   11/12/2018 at 2100   • Nutritional Supplements (NEPRO PO) Take 1 tablet by mouth Daily.   11/12/2018 at 0900   • omeprazole (priLOSEC) 20 MG capsule Take 20 mg by mouth Daily.   11/12/2018 at 2100   • ondansetron ODT (ZOFRAN-ODT) 4 MG disintegrating tablet Take 1 tablet by mouth Every 6 (Six) Hours As Needed for Nausea or Vomiting. 10 tablet 0 Unknown at Unknown time   • PARoxetine (PAXIL) 30 MG tablet Take 30 mg by mouth every night at bedtime.   11/12/2018 at 2100   • polyethylene glycol (MIRALAX) packet Take 17 g by mouth Daily As Needed (constipation).   Unknown at Unknown time   • sennosides-docusate sodium (SENOKOT-S) 8.6-50 MG tablet Take 2 tablets by mouth 2 (Two) Times a Day As Needed for Constipation. 60 tablet 0 Unknown at Unknown time   • sevelamer (RENVELA) 800 MG tablet Take 800 mg by mouth 3 (Three) Times a Day With  Meals.   11/12/2018 at 1700   • traZODone (DESYREL) 50 MG tablet Take 50 mg by mouth every night at bedtime.   11/12/2018 at 2100     Allergies:  Lisinopril and Motrin [ibuprofen]    I have reviewed the patient's medical history, surgical history and family history in the available medical record system.     Current Medications:     Current Facility-Administered Medications   Medication Dose Route Frequency Provider Last Rate Last Dose   • acetaminophen (TYLENOL) tablet 650 mg  650 mg Oral Q4H PRN Cory Rodríguez MD       • aspirin chewable tablet 81 mg  81 mg Oral Daily Cory Rodríguez MD       • brimonidine (ALPHAGAN) 0.15 % ophthalmic solution 1 drop  1 drop Both Eyes TID Cory Rodríguez MD   1 drop at 12/20/18 1649   • cetirizine (zyrTEC) tablet 5 mg  5 mg Oral Daily Cory Rodríguez MD   5 mg at 12/20/18 0831   • dextrose (D50W) 25 g/ 50mL Intravenous Solution 25 g  25 g Intravenous Q15 Min PRN Cory Rodríguez MD       • dextrose (GLUTOSE) oral gel 15 g  15 g Oral Q15 Min PRN Cory Rodríguez MD       • dorzolamide (TRUSOPT) 2 % ophthalmic solution 1 drop  1 drop Left Eye TID Cory Rodríguez MD   1 drop at 12/20/18 1649   • escitalopram (LEXAPRO) tablet 10 mg  10 mg Oral Daily Cory Rodríguez MD   10 mg at 12/20/18 0832   • famotidine (PEPCID) tablet 20 mg  20 mg Oral Nightly Cory Rodríguez MD   20 mg at 12/19/18 2152   • fluticasone (FLONASE) 50 MCG/ACT nasal spray 2 spray  2 spray Nasal Daily Cory Rodríguez MD   2 spray at 12/20/18 0832   • gabapentin (NEURONTIN) capsule 100 mg  100 mg Oral Nightly Cory Rodríguez MD   100 mg at 12/19/18 2152   • glucagon (human recombinant) (GLUCAGEN DIAGNOSTIC) injection 1 mg  1 mg Subcutaneous PRN Cory Rodríguez MD       • HYDROcodone-acetaminophen (NORCO) 5-325 MG per tablet 1 tablet  1 tablet Oral Q8H PRN Cory Rodríguez MD       • insulin aspart (novoLOG) injection  0-14 Units  0-14 Units Subcutaneous 4x Daily AC & at Bedtime Cory Rodríguez MD   5 Units at 12/20/18 1148   • insulin detemir (LEVEMIR) injection 25 Units  25 Units Subcutaneous Daily Cory Rodríguez MD   25 Units at 12/20/18 0835   • latanoprost (XALATAN) 0.005 % ophthalmic solution 1 drop  1 drop Both Eyes Nightly Cory Rodríguez MD   1 drop at 12/19/18 2153   • ondansetron (ZOFRAN) injection 4 mg  4 mg Intravenous Q6H PRN Cory Rodríguez MD       • polyethylene glycol 3350 powder (packet)  17 g Oral Daily PRN Cory Rodríguez MD       • sevelamer (RENAGEL) tablet 800 mg  800 mg Oral TID With Meals Cory Rodríguez MD   800 mg at 12/20/18 1729   • sodium chloride 0.9 % flush 10 mL  10 mL Intravenous PRN Vishal Rdz MD       • sodium chloride 0.9 % flush 10 mL  10 mL Intravenous PRN Vishal Rdz MD       • sodium chloride 0.9 % flush 3 mL  3 mL Intravenous Q12H Cory Rodríguez MD   3 mL at 12/20/18 0846   • sodium chloride 0.9 % flush 3-10 mL  3-10 mL Intravenous PRN Cory Rodríguez MD       • traZODone (DESYREL) tablet 50 mg  50 mg Oral Nightly Cory Rodríguez MD   50 mg at 12/19/18 2152       Objective     Physical Exam:   Temp:  [95.4 °F (35.2 °C)-97.8 °F (36.6 °C)] 96.9 °F (36.1 °C)  Heart Rate:  [] 59  Resp:  [18] 18  BP: ()/(52-70) 139/68    Physical Exam:  General Appearance:    Alert, cooperative, in no acute distress   Head:    Normocephalic, without obvious abnormality, atraumatic   Eyes:            Lids and lashes normal, conjunctivae and sclerae normal, no   icterus, no pallor, corneas clear, PERRLA   Ears:    Ears appear intact with no abnormalities noted   Throat:   No oral lesions, no thrush, oral mucosa moist   Neck:   No adenopathy, supple, trachea midline, no thyromegaly, no     carotid bruit, no JVD   Back:     No kyphosis present, no scoliosis present, no skin lesions,       erythema or scars, no  tenderness to percussion or                   palpation,   range of motion normal   Lungs:     Clear to auscultation,respirations regular, even and                   unlabored    Heart:    Regular rhythm and normal rate, normal S1 and S2, no            murmur, no gallop, no rub, no click   Breast Exam:    Deferred   Abdomen:     Positive bowel sounds positive tenderness to epigastric region to deep palpation no rebound tenderness.     Genitalia:    Deferred   Extremities:   Moves all extremities well, no edema, no cyanosis, no              redness   Pulses:   Pulses palpable and equal bilaterally   Skin:   No bleeding, bruising or rash   Lymph nodes:   No palpable adenopathy   Neurologic:   Cranial nerves 2 - 12 grossly intact, sensation intact, DTR        present and equal bilaterally      Results Review:     Lab Results   Component Value Date    WBC 5.89 12/20/2018    WBC 6.22 12/19/2018    WBC 5.16 08/29/2018    HGB 6.2 (C) 12/20/2018    HGB 6.4 (C) 12/19/2018    HGB 11.8 (L) 08/29/2018    HCT 20.0 (L) 12/20/2018    HCT 19.7 (L) 12/19/2018    HCT 34.8 (L) 08/29/2018    PLT 82 (L) 12/20/2018     (L) 12/19/2018    PLT 88 (L) 08/29/2018     Results from last 7 days   Lab Units  12/20/18   0728  12/19/18   1700   ALK PHOS U/L  89  105   ALT (SGPT) U/L  19*  11*   AST (SGOT) U/L  29  31     Results from last 7 days   Lab Units  12/20/18   0728  12/19/18   1700   BILIRUBIN mg/dL  1.2  0.9   ALK PHOS U/L  89  105     Lipase   Date Value Ref Range Status   12/19/2018 217 23 - 300 U/L Final     Lab Results   Component Value Date    INR 1.27 (H) 12/19/2018    INR 1.20 07/26/2018    INR 1.15 01/12/2018         Radiology Review:  Imaging Results (last 72 hours)     Procedure Component Value Units Date/Time    CT Head Without Contrast [[id number]] Collected:  12/19/18 1722     Updated:  12/19/18 1746    Narrative:       PROCEDURE: CT HEAD WO CONTRAST    HISTORY: weakness    Indication: Same as above    Comparison:  None    Technique:     CT of the head was done without intravenous contrast was done in  the orthogonal planes.    This exam was performed according to our departmental  dose-optimization program, which includes automated exposure  control, adjustment of the mA and/or KV according to the  patient's size and/or use of iterative reconstruction technique.      FINDINGS:     There is no intracranial hemorrhage, midline shift mass effect or  acute focal infarct.    There is minimal prominence of the sylvian fissures and the  cortical sulci reflecting age related volume loss.   There is minimal periventricular and deep white matter low  attenuation, most likely related to small vessel white matter  ischemic disease.    If clinical concern exists regarding an acute ischemic/vascular  pathology being responsible for patient's symptomatology, an MRI  of the brain is more sensitive than the current study, in ruling  out such a possibility.    There is good gray/white matter differentiation.    The ventricular system is normal.    The mastoid air cells are unremarkable .     The paranasal sinuses are unremarkable .    There is no visualization of acute fractures involving the  calvarium or the skull base.       Impression:         There is no acute intracranial abnormality.    Electronically signed by:  Beltran Lucio MD  12/19/2018 5:44 PM  CST Workstation: [id number]    XR Abdomen 2 View With Chest 1 View [[id number]] Collected:  12/19/18 1630     Updated:  12/19/18 1651    Narrative:       PROCEDURE: XR ABDOMEN 2 VW W CHEST 1 VW    HISTORY: constipation    COMPARISON: 7/26/2018 .    TECHNIQUE:     Two views of the abdomen and pelvis and Oneview of the chest,  all projections in the frontal projection.    FINDINGS:     There are no discrete airspace infiltrates, pneumothoraces or  pleural effusions.    The pulmonary vascularity is normal.    The cardiomediastinal silhouette is stable    The bowel gas pattern does not show  any evidence of obstruction,  ileus, or bowel wall thickening.    There is no gross evidence of free air in the abdomen or the  pelvis.    There is no visualization of radiopaque calculi in the outline of  the urinary tract.    Small amount of retained fecal material is seen in the large  bowel. Vascular calcifications are seen in the pelvis .      Impression:          Mild constipation.    There are no acute findings in the  chest     Electronically signed by:  Beltran Lucio MD  12/19/2018 4:50 PM  CST Workstation: [id number]          I reviewed the patient's new clinical results.    I reviewed the patient's new imaging results and agree with the interpretation.     ASSESSMENT/PLAN:   ASSESSMENT: Patient with significant anemia with possible GI blood loss.  Patient also with epigastric abdominal pain which she describes as severe nature.  Patient will need EGD to evaluate for possible causes of anemia.  Do not believe at this time patient can drink a prep for colonoscopy if no findings on upper endoscopy patient may need to have colonoscopy as an outpatient    PLAN: #1 we'll schedule patient for EGD tomorrow.  #2 patient should be on a proton pump inhibitor.  #3 patient should be off of any anticoagulation.  #4 continue to monitor patient's hemoglobin and transfuse 2 patient to hemoglobin above 7.  The risks, benefits, and alternatives of this procedure have been discussed with the patient or the responsible party. The patient understands and agrees to proceed.         Flako Montoya MD  12/20/18  6:00 PM         This document has been electronically signed by Flako Montoya MD on December 20, 2018 6:00 PM

## 2018-12-20 NOTE — H&P
HISTORY AND PHYSICAL  NAME: Cristo Musa  : 1950  MRN: [id number]    DATE OF ADMISSION: 18    DATE & TIME SEEN: 18 10:12 PM    PCP: Cory Rodríguez MD    CODE STATUS:   Code Status and Medical Interventions:   Ordered at: 18     Level Of Support Discussed With:    Patient     Code Status:    CPR     Medical Interventions (Level of Support Prior to Arrest):    Full       CHIEF COMPLAINT weakness, leg pain    HPI:  Cristo Musa is a 68 y.o. male with CMHx of DM2, ESRD on MWF dialysis, anxiety, GERD presented to ER for complaint of weakness and leg pain. Patient had labs drawn with Monday dialysis.  He is noted to have a hemoglobin 7.7 at that time he is also complaining of some weakness.  The defect wheezes be going on for last 2 weeks.  He was complaining of extreme weakness and leg pain today at the nursing home.  This is medicine in the ER for further evaluation.  Here in the ER, he is noted to have a hemoglobin of 6.4.  He's had 2 instances recently of heme positive stools in the nursing home.  He does report having one episode of bright red blood from the rectum about a week ago.  He denies any episodes since then.  He was having constipation at that time and this was shortly after he had disimpaction.  No problems with chest pain or shortness of air.  No problems with headaches or dizziness.  Just generalized weakness all over and leg pain.    CONCURRENT MEDICAL HISTORY:  Past Medical History:   Diagnosis Date   • Anemia of chronic disease    • Cataract    • CHF (congestive heart failure) (CMS/HCC)    • CKD (chronic kidney disease)    • Clostridium difficile infection    • COPD (chronic obstructive pulmonary disease) (CMS/HCC)    • Diabetes mellitus (CMS/HCC)    • Glaucoma    • Heart block    • Hepatitis C    • History of transfusion    • Hypertension    • Nephropathy, diabetic (CMS/HCC)    • Pacemaker    • Pulmonary embolism (CMS/HCC)        PAST  SURGICAL HISTORY:  Past Surgical History:   Procedure Laterality Date   • ABDOMINAL SURGERY     • ARTERIOVENOUS FISTULA/SHUNT SURGERY Right     forearm loop   • ARTERIOVENOUS FISTULA/SHUNT SURGERY Left     removed past upper arm   • ARTERIOVENOUS FISTULA/SHUNT SURGERY Right 3/13/2018    Procedure: revision RIGHT forearm ARTERIOVENOUS graft (excision), debridement, wound vac;  Surgeon: Jerson Singh MD;  Location: [facility];  Service: Vascular   • ARTERIOVENOUS FISTULA/SHUNT SURGERY W/ HEMODIALYSIS CATHETER INSERTION Right 4/4/2016    Procedure: RIGHT ARM AV FISTULA DECLOT REVISION REPAIR BRACHIAL ANASTOMOTIC PSUEDOANEURYSM LEFT FEMORAL RICHARDSON FISTULOGRAM WITH ANGIOPLASTY;  Surgeon: Jerson Carpenter MD;  Location: [facility] OR 18/19;  Service:    • CATARACT EXTRACTION W/ INTRAOCULAR LENS IMPLANT Left 3/31/2017    Procedure: REMOVE CATARACT AND IMPLANT INTRAOCULAR LENS LEFT EYE;  Surgeon: Hilton Montemayor MD;  Location: [facility];  Service:    • EYE SURGERY     • HERNIA REPAIR     • IMPLANTABLE CARDIOVERTER DEFIBRILLATOR LEAD REPLACEMENT/POCKET REVISION Right 4/11/2016    Procedure: PACEMAKER LEADS X 3 AND BATTERY EXTRACTION WITH EXIMER LASER;  Surgeon: Vern Reeves MD;  Location: [facility] OR 18/19;  Service:    • INSERTION HEMODIALYSIS CATHETER Right 05/2015    jugular   • INTERVENTIONAL RADIOLOGY PROCEDURE Right 6/1/2017    Procedure: RIGHT dialysis fistulagram & angioplasty;  Surgeon: Jerson Singh MD;  Location: [facility] ANGIO INVASIVE LOCATION;  Service:    • INTERVENTIONAL RADIOLOGY PROCEDURE Right 8/24/2017    Procedure: IR dialysis fistulagram;  Surgeon: Jerson Singh MD;  Location: [facility] ANGIO INVASIVE LOCATION;  Service:    • INTERVENTIONAL RADIOLOGY PROCEDURE Right 11/13/2018    Procedure: IR dialysis fistulagram;  Surgeon: Jerson Singh MD;  Location: [facility] ANGIO INVASIVE LOCATION;  Service: Interventional Radiology   • PACEMAKER IMPLANTATION   2008    dual chamber [employer] [place]   • REMOVAL HEMODIALYSIS CATHETER Right 05/2015    femoral vein       FAMILY HISTORY:  Family History   Problem Relation Age of Onset   • COPD Sister    • Diabetes Brother    • Early death Brother    • Hyperlipidemia Brother    • Hypertension Brother    • Coronary artery disease Mother    • Diabetes Mother    • Hypertension Mother    • Stroke Other    • Other Other         Respiratory disorder        SOCIAL HISTORY:  Social History     Socioeconomic History   • Marital status: Legally      Spouse name: Not on file   • Number of children: Not on file   • Years of education: Not on file   • Highest education level: Not on file   Social Needs   • Financial resource strain: Not on file   • Food insecurity - worry: Not on file   • Food insecurity - inability: Not on file   • Transportation needs - medical: Not on file   • Transportation needs - non-medical: Not on file   Occupational History   • Not on file   Tobacco Use   • Smoking status: Former Smoker     Types: Cigarettes   • Smokeless tobacco: Never Used   • Tobacco comment: quit 20 years ago    Substance and Sexual Activity   • Alcohol use: No     Comment: former heavy use in his 50's, up to a fifth of liquor a day, currently no alcohol   • Drug use: No   • Sexual activity: No   Other Topics Concern   • Not on file   Social History Narrative   • Not on file       HOME MEDICATIONS:  Prior to Admission medications    Medication Sig Start Date End Date Taking? Authorizing Provider   acetaminophen (TYLENOL) 500 MG tablet Take 500 mg by mouth Every 4 (Four) Hours As Needed for Mild Pain  or Fever.    ProviderCleve MD   aspirin 81 MG chewable tablet Chew 81 mg Daily.    ProviderCleve MD   brimonidine (ALPHAGAN P) 0.1 % solution ophthalmic solution Administer 1 drop to both eyes 3 (Three) Times a Day.    ProviderCleve MD   dorzolamide (TRUSOPT) 2 % ophthalmic solution Administer 1 drop into the  left eye 3 (Three) Times a Day. 1/24/17   Inderjit Grant MD   famotidine (PEPCID) 20 MG tablet Take 20 mg by mouth every night at bedtime.    Cleve Nguyễn MD   fluticasone (FLONASE) 50 MCG/ACT nasal spray 2 sprays into each nostril daily. Administer 2 sprays in each nostril for each dose.    Cleve Nguyễn MD   gabapentin (NEURONTIN) 100 MG capsule Take 1 capsule by mouth Every Night. 10/4/18   Cory Rodríguez MD   guaiFENesin 200 MG tablet Take 400 mg by mouth Every 4 (Four) Hours As Needed for Cough.    Cleve Nguyễn MD   HYDROcodone-acetaminophen (NORCO) 7.5-325 MG per tablet Take 1 tablet by mouth Every 6 (Six) Hours As Needed for Moderate Pain . 9/24/18   Cory Rodríguez MD   insulin aspart (NovoLOG) 100 UNIT/ML injection Inject  under the skin 3 (Three) Times a Day Before Meals. Sliding scale:  = 0 units; 150-200 = 3 units; 200-250 = 6 units; 250-300 = 9 units; 300-350 = 12 units; >350 = 15 units and call MD    Cleve Nguyễn MD   insulin detemir (LEVEMIR) 100 UNIT/ML injection Inject 25 Units under the skin into the appropriate area as directed Daily.    Cleve Nguyễn MD   latanoprost (XALATAN) 0.005 % ophthalmic solution Administer 1 drop to both eyes every night.    Cleve Nguyễn MD   loperamide (IMODIUM) 2 MG capsule Take 2 mg by mouth Every 6 (Six) Hours As Needed for Diarrhea.    Cleve Nguyễn MD   Loratadine 10 MG capsule Take 10 mg by mouth Every Night. 3/16/18   Cleve Nguyễn MD   Nutritional Supplements (NEPRO PO) Take 1 tablet by mouth Daily.    Cleve Nguyễn MD   omeprazole (priLOSEC) 20 MG capsule Take 20 mg by mouth Daily.    Cleve Nguyễn MD   ondansetron ODT (ZOFRAN-ODT) 4 MG disintegrating tablet Take 1 tablet by mouth Every 6 (Six) Hours As Needed for Nausea or Vomiting. 2/17/18   Salvador Elmore MD   PARoxetine (PAXIL) 30 MG tablet Take 30 mg by mouth every night at bedtime.     ProviderCleve MD   polyethylene glycol (MIRALAX) packet Take 17 g by mouth Daily As Needed (constipation).    Cleve Nguyễn MD   sennosides-docusate sodium (SENOKOT-S) 8.6-50 MG tablet Take 2 tablets by mouth 2 (Two) Times a Day As Needed for Constipation. 3/16/18   Justyna Patel APRN   sevelamer (RENVELA) 800 MG tablet Take 800 mg by mouth 3 (Three) Times a Day With Meals.    Cleve Nguyễn MD   traZODone (DESYREL) 50 MG tablet Take 50 mg by mouth every night at bedtime.    Cleve Nguyễn MD       ALLERGIES:  Lisinopril and Motrin [ibuprofen]    REVIEW OF SYSTEMS  Review of Systems   Constitutional: Negative for activity change, appetite change, chills and fever.   HENT: Negative for congestion, ear pain and sore throat.    Eyes: Negative for redness and visual disturbance.   Respiratory: Negative for cough, shortness of breath and wheezing.    Cardiovascular: Negative for chest pain and palpitations.   Gastrointestinal: Positive for blood in stool and constipation. Negative for abdominal pain, diarrhea, nausea and vomiting.   Genitourinary: Negative for difficulty urinating and dysuria.   Musculoskeletal: Positive for arthralgias. Negative for gait problem.   Skin: Negative for color change and rash.   Neurological: Positive for weakness. Negative for dizziness and headaches.   Psychiatric/Behavioral: Negative for dysphoric mood and sleep disturbance. The patient is not nervous/anxious.        PHYSICAL EXAM:  Temp:  [97.8 °F (36.6 °C)-98.9 °F (37.2 °C)] 97.8 °F (36.6 °C)  Heart Rate:  [72-79] 72  Resp:  [18-20] 18  BP: (112-117)/(56-86) 112/56  Body mass index is 34.21 kg/m².  Physical Exam   Constitutional: He is oriented to person, place, and time. He appears well-developed and well-nourished.   HENT:   Head: Normocephalic and atraumatic.   Right Ear: External ear normal.   Left Ear: External ear normal.   Nose: Nose normal.   Eyes: Conjunctivae and EOM are normal. Pupils are  equal, round, and reactive to light.   Neck: Normal range of motion. Neck supple.   Cardiovascular: Normal rate, regular rhythm and intact distal pulses.   Murmur (2/6, holosystolic) heard.  Pulmonary/Chest: Effort normal and breath sounds normal. He has no wheezes.   Abdominal: Soft. Bowel sounds are normal. He exhibits no distension. There is no tenderness.   Genitourinary:   Genitourinary Comments: No fissures or hemorrhoids seen   Musculoskeletal: Normal range of motion. He exhibits no edema or deformity.   Neurological: He is alert and oriented to person, place, and time. No cranial nerve deficit.   Skin: Skin is warm and dry.   Dry areas of skin on feet   Psychiatric: He has a normal mood and affect.   Slow to answer questions.  Seems to have less energy than a normal conversation.  His answers themselves are consistent with his baseline however.   Nursing note and vitals reviewed.      DIAGNOSTIC DATA:   Lab Results (last 24 hours)     Procedure Component Value Units Date/Time    Troponin [[id number]]  (Normal) Collected:  12/19/18 1958    Specimen:  Blood Updated:  12/19/18 2045     Troponin I <0.012 ng/mL     CBC & Differential [[id number]] Collected:  12/19/18 1700    Specimen:  Blood Updated:  12/19/18 1831    Narrative:       The following orders were created for panel order CBC & Differential.  Procedure                               Abnormality         Status                     ---------                               -----------         ------                     Manual Differential[[id number]]                              Final result               CBC Auto Differential[[id number]]        Abnormal            Final result                 Please view results for these tests on the individual orders.    Manual Differential [[id number]] Collected:  12/19/18 1700    Specimen:  Blood Updated:  12/19/18 1831     Neutrophil % 78.0 %      Lymphocyte % 20.0 %      Monocyte % 2.0 %      Neutrophils Absolute 4.85  10*3/mm3      Lymphocytes Absolute 1.24 10*3/mm3      Monocytes Absolute 0.12 10*3/mm3      Anisocytosis Slight/1+     Macrocytes Slight/1+     Polychromasia Slight/1+     WBC Morphology Normal     Platelet Estimate Decreased    [place] Draw [[id number]] Collected:  12/19/18 1700    Specimen:  Blood Updated:  12/19/18 1815    Narrative:       The following orders were created for panel order [place] Draw.  Procedure                               Abnormality         Status                     ---------                               -----------         ------                     Light Blue Top[[id number]]                                   Final result               Green Top (Gel)[[id number]]                                  Final result               Lavender Top[[id number]]                                     Final result               Gold Top - SST[[id number]]                                   Final result                 Please view results for these tests on the individual orders.    Light Blue Top [[id number]] Collected:  12/19/18 1700    Specimen:  Blood Updated:  12/19/18 1815     Extra Tube hold for add-on     Comment: Auto resulted       Green Top (Gel) [[id number]] Collected:  12/19/18 1700    Specimen:  Blood Updated:  12/19/18 1815     Extra Tube Hold for add-ons.     Comment: Auto resulted.       Lavender Top [[id number]] Collected:  12/19/18 1700    Specimen:  Blood Updated:  12/19/18 1815     Extra Tube hold for add-on     Comment: Auto resulted       Gold Top - SST [[id number]] Collected:  12/19/18 1700    Specimen:  Blood Updated:  12/19/18 1815     Extra Tube Hold for add-ons.     Comment: Auto resulted.       Protime-INR [[id number]]  (Abnormal) Collected:  12/19/18 1700    Specimen:  Blood Updated:  12/19/18 1742     Protime 15.6 Seconds      INR 1.27    Narrative:       Therapeutic range for most indications is 2.0-3.0 INR,  or 2.5-3.5 for mechanical heart valves.    Troponin [[id number]]  (Normal)  Collected:  12/19/18 1700    Specimen:  Blood Updated:  12/19/18 1736     Troponin I <0.012 ng/mL     BNP [[id number]]  (Abnormal) Collected:  12/19/18 1700    Specimen:  Blood Updated:  12/19/18 1736     proBNP 8,890.0 pg/mL     Comprehensive Metabolic Panel [[id number]]  (Abnormal) Collected:  12/19/18 1700    Specimen:  Blood Updated:  12/19/18 1725     Glucose 211 mg/dL      BUN 31 mg/dL      Creatinine 3.99 mg/dL      Sodium 133 mmol/L      Potassium 3.5 mmol/L      Chloride 88 mmol/L      CO2 34.0 mmol/L      Calcium 8.6 mg/dL      Total Protein 8.1 g/dL      Albumin 3.70 g/dL      ALT (SGPT) 11 U/L      AST (SGOT) 31 U/L      Alkaline Phosphatase 105 U/L      Total Bilirubin 0.9 mg/dL      eGFR  African Amer 18 mL/min/1.73      Globulin 4.4 gm/dL      A/G Ratio 0.8 g/dL      BUN/Creatinine Ratio 7.8     Anion Gap 11.0 mmol/L     Lipase [[id number]]  (Normal) Collected:  12/19/18 1700    Specimen:  Blood Updated:  12/19/18 1725     Lipase 217 U/L     CBC Auto Differential [[id number]]  (Abnormal) Collected:  12/19/18 1700    Specimen:  Blood Updated:  12/19/18 1720     WBC 6.22 10*3/mm3      RBC 2.00 10*6/mm3      Hemoglobin 6.4 g/dL      Hematocrit 19.7 %      MCV 98.5 fL      MCH 32.0 pg      MCHC 32.5 g/dL      RDW 17.3 %      RDW-SD 58.4 fl      MPV 7.9 fL      Platelets 138 10*3/mm3            Imaging Results (last 24 hours)     Procedure Component Value Units Date/Time    CT Head Without Contrast [[id number]] Collected:  12/19/18 1722     Updated:  12/19/18 1746    Narrative:       PROCEDURE: CT HEAD WO CONTRAST    HISTORY: weakness    Indication: Same as above    Comparison: None    Technique:     CT of the head was done without intravenous contrast was done in  the orthogonal planes.    This exam was performed according to our departmental  dose-optimization program, which includes automated exposure  control, adjustment of the mA and/or KV according to the  patient's size and/or use of iterative  reconstruction technique.      FINDINGS:     There is no intracranial hemorrhage, midline shift mass effect or  acute focal infarct.    There is minimal prominence of the sylvian fissures and the  cortical sulci reflecting age related volume loss.   There is minimal periventricular and deep white matter low  attenuation, most likely related to small vessel white matter  ischemic disease.    If clinical concern exists regarding an acute ischemic/vascular  pathology being responsible for patient's symptomatology, an MRI  of the brain is more sensitive than the current study, in ruling  out such a possibility.    There is good gray/white matter differentiation.    The ventricular system is normal.    The mastoid air cells are unremarkable .     The paranasal sinuses are unremarkable .    There is no visualization of acute fractures involving the  calvarium or the skull base.       Impression:         There is no acute intracranial abnormality.    Electronically signed by:  Beltran Lucio MD  12/19/2018 5:44 PM  CST Workstation: [id number]    XR Abdomen 2 View With Chest 1 View [[id number]] Collected:  12/19/18 1630     Updated:  12/19/18 1651    Narrative:       PROCEDURE: XR ABDOMEN 2 VW W CHEST 1 VW    HISTORY: constipation    COMPARISON: 7/26/2018 .    TECHNIQUE:     Two views of the abdomen and pelvis and Oneview of the chest,  all projections in the frontal projection.    FINDINGS:     There are no discrete airspace infiltrates, pneumothoraces or  pleural effusions.    The pulmonary vascularity is normal.    The cardiomediastinal silhouette is stable    The bowel gas pattern does not show any evidence of obstruction,  ileus, or bowel wall thickening.    There is no gross evidence of free air in the abdomen or the  pelvis.    There is no visualization of radiopaque calculi in the outline of  the urinary tract.    Small amount of retained fecal material is seen in the large  bowel. Vascular calcifications are seen  in the pelvis .      Impression:          Mild constipation.    There are no acute findings in the  chest     Electronically signed by:  Beltran Lucio MD  12/19/2018 4:50 PM  CST Workstation: [id number]          I reviewed the patient's new clinical results.    ASSESSMENT AND PLAN: This is a 68 y.o. male with:    Active Hospital Problems    Diagnosis Date Noted   • **Symptomatic anemia [D64.9] 12/19/2018     Acute blood loss anemia  GI, , consulted, will see pt in AM  After discussion with nephrology, will give 1 unit over night over course of 4 hours, they will evaluate in the AM     • Physical deconditioning [R53.81] 02/27/2018     PT/OT     • Gastroesophageal reflux disease without esophagitis [K21.9] 12/30/2017     Continue home pepcid     • Chronic pain [G89.29] 03/27/2017     Continue home medications     • Anxiety [F41.9] 03/27/2017     Continue home lexapro, trazodone     • ESRD (end stage renal disease) (CMS/[facility]) [N18.6] 01/30/2017     HD F  Nephrologist Dr. Vilchis       • Diabetes mellitus (CMS/[facility]) [E11.9] 03/30/2016     Continue home lantus 30 untis qhs  ISS         DVT prophylaxis: SCDs  Code status is   Code Status and Medical Interventions:   Ordered at: 12/19/18 2006     Level Of Support Discussed With:    Patient     Code Status:    CPR     Medical Interventions (Level of Support Prior to Arrest):    Full      GIOVANNI # [id number], reviewed and consistent with patient reported medications.    I discussed the patients findings and my recommendations with patient.     Dr Jewell is the attending on record at time of admission, she is aware of the patient's status and agrees with the above history and physical.          This document has been electronically signed by Cory Rodríguez MD on December 20, 2018 12:40 AM

## 2018-12-20 NOTE — PLAN OF CARE
Problem: Patient Care Overview  Goal: Plan of Care Review  Outcome: Ongoing (interventions implemented as appropriate)   12/20/18 2392   Coping/Psychosocial   Plan of Care Reviewed With patient   Plan of Care Review   Progress no change   OTHER   Outcome Summary VSS, pt received 1 unit of RBC, resting well in between care, needs to set up code word for wife, cont to monitor

## 2018-12-20 NOTE — CONSULTS
[facility]  [address] [place] [address]. [id number]   - [phone]    [phone]     Consultation         PATIENT  DEMOGRAPHICS   PATIENT NAME: Cristo Musa                      PHYSICIAN: Alethea Diamond MD  : 1950  MRN: [id number]    Subjective   SUBJECTIVE   Referring Provider: Dr. Gordon  Reason for Consultation: Management of renal replacement therapy  History of present illness:     This is a 68 year old  male with past medical history of DM2, ESRD on MWF dialysis, anxiety, GERD who presented to the ER with weakness and leg pain.     He had symptoms for about 2 weeks and were progressively getting worse. Also had pain in his legs. In the ER, he was hemodynamically stable. Labs showed severe anemia with a hemoglobin of 6.4. Currently receiving 2 units of pRBCs. Denied chest pain, SOB, nausea, vomiting, fever, chills.     From a renal standpoint, he has ESRD and receives dialysis MWF. Last dialysis was yesterday. Nephrology is consulted for management of renal replacement therapy.       Past Medical History:   Diagnosis Date   • Anemia of chronic disease    • Cataract    • CHF (congestive heart failure) (CMS/HCC)    • CKD (chronic kidney disease)    • Clostridium difficile infection    • COPD (chronic obstructive pulmonary disease) (CMS/HCC)    • Diabetes mellitus (CMS/HCC)    • Glaucoma    • Heart block    • Hepatitis C    • History of transfusion    • Hypertension    • Nephropathy, diabetic (CMS/HCC)    • Pacemaker    • Pulmonary embolism (CMS/HCC)      Past Surgical History:   Procedure Laterality Date   • ABDOMINAL SURGERY     • ARTERIOVENOUS FISTULA/SHUNT SURGERY Right     forearm loop   • ARTERIOVENOUS FISTULA/SHUNT SURGERY Left     removed past upper arm   • ARTERIOVENOUS FISTULA/SHUNT SURGERY Right 3/13/2018    Procedure: revision RIGHT forearm ARTERIOVENOUS graft (excision), debridement, wound vac;  Surgeon: Jerson Rogel  MD Francisco;  Location: [facility];  Service: Vascular   • ARTERIOVENOUS FISTULA/SHUNT SURGERY W/ HEMODIALYSIS CATHETER INSERTION Right 4/4/2016    Procedure: RIGHT ARM AV FISTULA DECLOT REVISION REPAIR BRACHIAL ANASTOMOTIC PSUEDOANEURYSM LEFT FEMORAL RICHARDSON FISTULOGRAM WITH ANGIOPLASTY;  Surgeon: Jerson Carpenter MD;  Location: [facility] OR 18/19;  Service:    • CATARACT EXTRACTION W/ INTRAOCULAR LENS IMPLANT Left 3/31/2017    Procedure: REMOVE CATARACT AND IMPLANT INTRAOCULAR LENS LEFT EYE;  Surgeon: Hilton Montemayor MD;  Location: [facility];  Service:    • EYE SURGERY     • HERNIA REPAIR     • IMPLANTABLE CARDIOVERTER DEFIBRILLATOR LEAD REPLACEMENT/POCKET REVISION Right 4/11/2016    Procedure: PACEMAKER LEADS X 3 AND BATTERY EXTRACTION WITH EXIMER LASER;  Surgeon: Vern Reeves MD;  Location: [facility] OR 18/19;  Service:    • INSERTION HEMODIALYSIS CATHETER Right 05/2015    jugular   • INTERVENTIONAL RADIOLOGY PROCEDURE Right 6/1/2017    Procedure: RIGHT dialysis fistulagram & angioplasty;  Surgeon: Jerson Singh MD;  [facility];  Service:    • INTERVENTIONAL RADIOLOGY PROCEDURE Right 8/24/2017    Procedure: IR dialysis fistulagram;  Surgeon: Jerson Singh MD;  [facility]: [facility];  Service:    • INTERVENTIONAL RADIOLOGY PROCEDURE Right 11/13/2018    Procedure: IR dialysis fistulagram;  Surgeon: Jerson Singh MD;  Location: [facility];  Service: Interventional Radiology   • PACEMAKER IMPLANTATION  2008    dual chamber [employer]   • REMOVAL HEMODIALYSIS CATHETER Right 05/2015    femoral vein     Family History   Problem Relation Age of Onset   • COPD Sister    • Diabetes Brother    • Early death Brother    • Hyperlipidemia Brother    • Hypertension Brother    • Coronary artery disease Mother    • Diabetes Mother    • Hypertension Mother    • Stroke Other    • Other Other        "  Respiratory disorder     Social History     Tobacco Use   • Smoking status: Former Smoker     Types: Cigarettes   • Smokeless tobacco: Never Used   • Tobacco comment: quit 20 years ago    Substance Use Topics   • Alcohol use: No     Comment: former heavy use in his 50's, up to a fifth of liquor a day, currently no alcohol   • Drug use: No     Allergies:  Lisinopril and Motrin [ibuprofen]     REVIEW OF SYSTEMS    Review of Systems   All other systems reviewed and are negative.      Objective   OBJECTIVE   Vital Signs  Temp:  [96 °F (35.6 °C)-98.9 °F (37.2 °C)] 96 °F (35.6 °C)  Heart Rate:  [] 62  Resp:  [18-20] 18  BP: ()/(52-86) 95/55    Flowsheet Rows      First Filed Value   Admission Height  172.7 cm (68\") Documented at 12/19/2018 1556   Admission Weight  102 kg (225 lb) Documented at 12/19/2018 1556           I/O last 3 completed shifts:  In: 512.5 [P.O.:240; Blood:272.5]  Out: -     PHYSICAL EXAM    Physical Exam   Constitutional: He is oriented to person, place, and time. He appears well-developed and well-nourished. No distress.   HENT:   Head: Normocephalic and atraumatic.   Mouth/Throat: Oropharynx is clear and moist. No oropharyngeal exudate.   Eyes: Pupils are equal, round, and reactive to light. Right eye exhibits no discharge. Left eye exhibits no discharge. No scleral icterus.   Neck: Neck supple. No tracheal deviation present. No thyromegaly present.   Cardiovascular: Normal rate, regular rhythm and normal heart sounds. Exam reveals no gallop and no friction rub.   No murmur heard.  Pulmonary/Chest: Effort normal and breath sounds normal. No stridor. No respiratory distress. He has no wheezes. He has no rales.   Abdominal: Soft. Bowel sounds are normal. He exhibits no distension and no mass. There is no tenderness. There is no guarding.   Musculoskeletal: He exhibits no edema or tenderness.   Lymphadenopathy:     He has no cervical adenopathy.   Neurological: He is alert and oriented to " person, place, and time.   Skin: Skin is warm and dry. He is not diaphoretic.   Nursing note and vitals reviewed.      RESULTS   Results Review:    Results from last 7 days   Lab Units  12/20/18   0728  12/19/18   1700   SODIUM mmol/L  133*  133*   POTASSIUM mmol/L  5.3*  3.5   CHLORIDE mmol/L  91*  88*   CO2 mmol/L  29.0  34.0*   BUN mg/dL  46*  31*   CREATININE mg/dL  5.09*  3.99*   CALCIUM mg/dL  8.5  8.6   BILIRUBIN mg/dL  1.2  0.9   ALK PHOS U/L  89  105   ALT (SGPT) U/L  19*  11*   AST (SGOT) U/L  29  31   GLUCOSE mg/dL  181*  211*       Estimated Creatinine Clearance: 16.2 mL/min (A) (by C-G formula based on SCr of 5.09 mg/dL (H)).        Results from last 7 days   Lab Units  12/20/18   0728  12/19/18   1700   WBC 10*3/mm3  5.89  6.22   HEMOGLOBIN g/dL  6.2*  6.4*   PLATELETS 10*3/mm3  82*  138*       Results from last 7 days   Lab Units  12/19/18   1700   INR   1.27*        MEDICATIONS      aspirin 81 mg Oral Daily   brimonidine 1 drop Both Eyes TID   cetirizine 5 mg Oral Daily   dorzolamide 1 drop Left Eye TID   escitalopram 10 mg Oral Daily   famotidine 20 mg Oral Nightly   fluticasone 2 spray Nasal Daily   furosemide 40 mg Intravenous Once   gabapentin 100 mg Oral Nightly   insulin aspart 0-14 Units Subcutaneous 4x Daily AC & at Bedtime   insulin detemir 25 Units Subcutaneous Daily   latanoprost 1 drop Both Eyes Nightly   sevelamer 800 mg Oral TID With Meals   sodium chloride 3 mL Intravenous Q12H   traZODone 50 mg Oral Nightly        Medications Prior to Admission   Medication Sig Dispense Refill Last Dose   • acetaminophen (TYLENOL) 500 MG tablet Take 500 mg by mouth Every 4 (Four) Hours As Needed for Mild Pain  or Fever.   Unknown at Unknown time   • aspirin 81 MG chewable tablet Chew 81 mg Daily.   11/12/2018 at 0900   • brimonidine (ALPHAGAN P) 0.1 % solution ophthalmic solution Administer 1 drop to both eyes 3 (Three) Times a Day.   11/12/2018 at 2100   • dorzolamide (TRUSOPT) 2 % ophthalmic  solution Administer 1 drop into the left eye 3 (Three) Times a Day. 1 each 12 Taking   • famotidine (PEPCID) 20 MG tablet Take 20 mg by mouth every night at bedtime.   Taking   • fluticasone (FLONASE) 50 MCG/ACT nasal spray 2 sprays into each nostril daily. Administer 2 sprays in each nostril for each dose.   11/12/2018 at 0900   • gabapentin (NEURONTIN) 100 MG capsule Take 1 capsule by mouth Every Night. 30 capsule 5 11/12/2018 at 2100   • guaiFENesin 200 MG tablet Take 400 mg by mouth Every 4 (Four) Hours As Needed for Cough.   11/12/2018 at 2100   • HYDROcodone-acetaminophen (NORCO) 7.5-325 MG per tablet Take 1 tablet by mouth Every 6 (Six) Hours As Needed for Moderate Pain . 120 tablet 0 11/12/2018 at 2100   • insulin aspart (NovoLOG) 100 UNIT/ML injection Inject  under the skin 3 (Three) Times a Day Before Meals. Sliding scale:  = 0 units; 150-200 = 3 units; 200-250 = 6 units; 250-300 = 9 units; 300-350 = 12 units; >350 = 15 units and call MD   11/12/2018 at 0600   • insulin detemir (LEVEMIR) 100 UNIT/ML injection Inject 25 Units under the skin into the appropriate area as directed Daily.   11/12/2018 at 2000   • latanoprost (XALATAN) 0.005 % ophthalmic solution Administer 1 drop to both eyes every night.   11/12/2018 at 2100   • loperamide (IMODIUM) 2 MG capsule Take 2 mg by mouth Every 6 (Six) Hours As Needed for Diarrhea.   Unknown at Unknown time   • Loratadine 10 MG capsule Take 10 mg by mouth Every Night.   11/12/2018 at 2100   • Nutritional Supplements (NEPRO PO) Take 1 tablet by mouth Daily.   11/12/2018 at 0900   • omeprazole (priLOSEC) 20 MG capsule Take 20 mg by mouth Daily.   11/12/2018 at 2100   • ondansetron ODT (ZOFRAN-ODT) 4 MG disintegrating tablet Take 1 tablet by mouth Every 6 (Six) Hours As Needed for Nausea or Vomiting. 10 tablet 0 Unknown at Unknown time   • PARoxetine (PAXIL) 30 MG tablet Take 30 mg by mouth every night at bedtime.   11/12/2018 at 2100   • polyethylene glycol  (MIRALAX) packet Take 17 g by mouth Daily As Needed (constipation).   Unknown at Unknown time   • sennosides-docusate sodium (SENOKOT-S) 8.6-50 MG tablet Take 2 tablets by mouth 2 (Two) Times a Day As Needed for Constipation. 60 tablet 0 Unknown at Unknown time   • sevelamer (RENVELA) 800 MG tablet Take 800 mg by mouth 3 (Three) Times a Day With Meals.   11/12/2018 at 1700   • traZODone (DESYREL) 50 MG tablet Take 50 mg by mouth every night at bedtime.   11/12/2018 at 2100     Assessment/Plan   ASSESSMENT / PLAN      Symptomatic anemia    Diabetes mellitus (CMS/HCC)    ESRD (end stage renal disease) (CMS/[facility])    Chronic pain    Anxiety    Gastroesophageal reflux disease without esophagitis    1. ESRD on HD MWF:  - Received HD yesterday and completed his treatment.   - Okay to give 2 units of pRBC transfusion. Will do HD early in AM.    2. Hypertension:  - Blood pressure is acceptable    3. Anemia:  - Hemoglobin is low at 6.2. Receiving pRBC transfusion    4. Secondary hyperparathyroidism    5. Diabetes type 2      Thank you for the consult. Will follow.          I discussed the patients findings and my recommendations with patient and primary care team    This document has been electronically signed by Alethea Diamond MD on December 20, 2018 1:58 PM

## 2018-12-20 NOTE — PROGRESS NOTES
Blood Transfusion Consent    I spoke with the patient about the risks and benefits of blood transfusion, as outlined below:    Benefits:   Blood transfusion is a life-saving treatment that benefits patients by treating or preventing blood loss, which can lead to a seriously low hemoglobin level and cause damage to body organs due to a lack of oxygen.      Risks:   Patient acknowledged understanding that among the use of blood or blood products has the following general risks:      Uncommon (1-5%) chance)   • Mild reactions resulting in itching, rash, fever, headaches.      Rare (<1% chance)   • Respiratory distress (shortness of breath) or lung injury   • Exposure to blood borne micro-organisms (bacteria and parasites) that could result in an infection   • Possible effects on the immune system, which may decrease the body’s ability to fight infection   • Exposure to blood borne viruses such as hepatitis B (an inflammatory disease affecting the liver)   • Shock      Extremely rare (one in a million or less)   • Exposure to blood borne viruses such as hepatitis C (an inflammatory disease affecting the liver) and Human Immunodeficiency Virus (HIV, the virus that causes AIDS)   • Death     I answered all the patient'squestions, inquires, and concerns. Patient gave consent to receive blood products if it becomes medically necessary. Patient did not have any questions at the time I left the room.          This document has been electronically signed by Cory Rodríguez MD on December 19, 2018 11:07 PM

## 2018-12-20 NOTE — H&P (VIEW-ONLY)
SUBJECTIVE:   12/20/2018    Name: Cristo Musa  DOD: 1950    REASON FOR CONSULT: Severe anemia.    Chief Complaint:     Chief Complaint   Patient presents with   • Abnormal Lab   • Leg Pain   • Fatigue       Subjective     Patient is 68 y.o. male who presents with a complaint of increasing fatigue.  Patient was found to be significantly anemic the emergency room and was admitted to the hospital for transfusion.  Patient did receive 1 unit of packed RBCs and hemoglobin actually went down a little bit.   has end-stage renal disease and is currently dialysis dependent.  Patient's kidney problems could be part of the cause of the patient's anemia.  Patient did have heme positive stools but has had constipation with manual disimpaction fairly recently.  Patient does not remember having a recent colonoscopy.     ROS/HISTORY/ CURRENT MEDICATIONS/OBJECTIVE/VS/PE:   Review of Systems:   Review of Systems   Constitutional: Negative for activity change, appetite change and unexpected weight change.   HENT: Negative for congestion, sore throat and trouble swallowing.    Respiratory: Negative for cough, choking and shortness of breath.    Cardiovascular: Negative for chest pain.   Gastrointestinal: Positive for abdominal pain. Negative for abdominal distention, anal bleeding, blood in stool, constipation, diarrhea, nausea, rectal pain and vomiting.   Endocrine: Negative for heat intolerance, polydipsia and polyphagia.   Genitourinary: Negative for difficulty urinating.   Musculoskeletal: Negative for arthralgias.   Skin: Negative for color change, pallor, rash and wound.   Allergic/Immunologic: Negative for food allergies.   Neurological: Negative for dizziness, syncope, weakness and headaches.   Psychiatric/Behavioral: Negative for agitation, behavioral problems, confusion and decreased concentration.       History:     Past Medical History:   Diagnosis Date   • Anemia of chronic disease    • Cataract    •  CHF (congestive heart failure) (CMS/HCC)    • CKD (chronic kidney disease)    • Clostridium difficile infection    • COPD (chronic obstructive pulmonary disease) (CMS/HCC)    • Diabetes mellitus (CMS/HCC)    • Glaucoma    • Heart block    • Hepatitis C    • History of transfusion    • Hypertension    • Nephropathy, diabetic (CMS/HCC)    • Pacemaker    • Pulmonary embolism (CMS/HCC)      Past Surgical History:   Procedure Laterality Date   • ABDOMINAL SURGERY     • ARTERIOVENOUS FISTULA/SHUNT SURGERY Right     forearm loop   • ARTERIOVENOUS FISTULA/SHUNT SURGERY Left     removed past upper arm   • ARTERIOVENOUS FISTULA/SHUNT SURGERY Right 3/13/2018    Procedure: revision RIGHT forearm ARTERIOVENOUS graft (excision), debridement, wound vac;  Surgeon: Jerson Singh MD;  Location: [facility] OR;  Service: Vascular   • ARTERIOVENOUS FISTULA/SHUNT SURGERY W/ HEMODIALYSIS CATHETER INSERTION Right 4/4/2016    Procedure: RIGHT ARM AV FISTULA DECLOT REVISION REPAIR BRACHIAL ANASTOMOTIC PSUEDOANEURYSM LEFT FEMORAL RICHARDSON FISTULOGRAM WITH ANGIOPLASTY;  Surgeon: Jerson Carpenter MD;  Location: [facility] OR 18/19;  Service:    • CATARACT EXTRACTION W/ INTRAOCULAR LENS IMPLANT Left 3/31/2017    Procedure: REMOVE CATARACT AND IMPLANT INTRAOCULAR LENS LEFT EYE;  Surgeon: Hilton Montemayor MD;  Location: [facility] OR;  Service:    • EYE SURGERY     • HERNIA REPAIR     • IMPLANTABLE CARDIOVERTER DEFIBRILLATOR LEAD REPLACEMENT/POCKET REVISION Right 4/11/2016    Procedure: PACEMAKER LEADS X 3 AND BATTERY EXTRACTION WITH EXIMER LASER;  Surgeon: Vern Reeves MD;  Location: [facility] OR 18/19;  Service:    • INSERTION HEMODIALYSIS CATHETER Right 05/2015    jugular   • INTERVENTIONAL RADIOLOGY PROCEDURE Right 6/1/2017    Procedure: RIGHT dialysis fistulagram & angioplasty;  Surgeon: Jerson Singh MD;  Location: [facility] ANGIO INVASIVE LOCATION;  Service:    • INTERVENTIONAL RADIOLOGY PROCEDURE Right  8/24/2017    Procedure: IR dialysis fistulagram;  Surgeon: Jerson Singh MD;  Location: [facility] ANGIO INVASIVE LOCATION;  Service:    • INTERVENTIONAL RADIOLOGY PROCEDURE Right 11/13/2018    Procedure: IR dialysis fistulagram;  Surgeon: Jerson Singh MD;  Location: [facility] ANGIO INVASIVE LOCATION;  Service: Interventional Radiology   • PACEMAKER IMPLANTATION  2008    dual chamber [employer] [place]   • REMOVAL HEMODIALYSIS CATHETER Right 05/2015    femoral vein     Family History   Problem Relation Age of Onset   • COPD Sister    • Diabetes Brother    • Early death Brother    • Hyperlipidemia Brother    • Hypertension Brother    • Coronary artery disease Mother    • Diabetes Mother    • Hypertension Mother    • Stroke Other    • Other Other         Respiratory disorder     Social History     Tobacco Use   • Smoking status: Former Smoker     Types: Cigarettes   • Smokeless tobacco: Never Used   • Tobacco comment: quit 20 years ago    Substance Use Topics   • Alcohol use: No     Comment: former heavy use in his 50's, up to a fifth of liquor a day, currently no alcohol   • Drug use: No     Medications Prior to Admission   Medication Sig Dispense Refill Last Dose   • acetaminophen (TYLENOL) 500 MG tablet Take 500 mg by mouth Every 4 (Four) Hours As Needed for Mild Pain  or Fever.   Unknown at Unknown time   • aspirin 81 MG chewable tablet Chew 81 mg Daily.   11/12/2018 at 0900   • brimonidine (ALPHAGAN P) 0.1 % solution ophthalmic solution Administer 1 drop to both eyes 3 (Three) Times a Day.   11/12/2018 at 2100   • dorzolamide (TRUSOPT) 2 % ophthalmic solution Administer 1 drop into the left eye 3 (Three) Times a Day. 1 each 12 Taking   • famotidine (PEPCID) 20 MG tablet Take 20 mg by mouth every night at bedtime.   Taking   • fluticasone (FLONASE) 50 MCG/ACT nasal spray 2 sprays into each nostril daily. Administer 2 sprays in each nostril for each dose.   11/12/2018 at 0900   • gabapentin  (NEURONTIN) 100 MG capsule Take 1 capsule by mouth Every Night. 30 capsule 5 11/12/2018 at 2100   • guaiFENesin 200 MG tablet Take 400 mg by mouth Every 4 (Four) Hours As Needed for Cough.   11/12/2018 at 2100   • HYDROcodone-acetaminophen (NORCO) 7.5-325 MG per tablet Take 1 tablet by mouth Every 6 (Six) Hours As Needed for Moderate Pain . 120 tablet 0 11/12/2018 at 2100   • insulin aspart (NovoLOG) 100 UNIT/ML injection Inject  under the skin 3 (Three) Times a Day Before Meals. Sliding scale:  = 0 units; 150-200 = 3 units; 200-250 = 6 units; 250-300 = 9 units; 300-350 = 12 units; >350 = 15 units and call MD   11/12/2018 at 0600   • insulin detemir (LEVEMIR) 100 UNIT/ML injection Inject 25 Units under the skin into the appropriate area as directed Daily.   11/12/2018 at 2000   • latanoprost (XALATAN) 0.005 % ophthalmic solution Administer 1 drop to both eyes every night.   11/12/2018 at 2100   • loperamide (IMODIUM) 2 MG capsule Take 2 mg by mouth Every 6 (Six) Hours As Needed for Diarrhea.   Unknown at Unknown time   • Loratadine 10 MG capsule Take 10 mg by mouth Every Night.   11/12/2018 at 2100   • Nutritional Supplements (NEPRO PO) Take 1 tablet by mouth Daily.   11/12/2018 at 0900   • omeprazole (priLOSEC) 20 MG capsule Take 20 mg by mouth Daily.   11/12/2018 at 2100   • ondansetron ODT (ZOFRAN-ODT) 4 MG disintegrating tablet Take 1 tablet by mouth Every 6 (Six) Hours As Needed for Nausea or Vomiting. 10 tablet 0 Unknown at Unknown time   • PARoxetine (PAXIL) 30 MG tablet Take 30 mg by mouth every night at bedtime.   11/12/2018 at 2100   • polyethylene glycol (MIRALAX) packet Take 17 g by mouth Daily As Needed (constipation).   Unknown at Unknown time   • sennosides-docusate sodium (SENOKOT-S) 8.6-50 MG tablet Take 2 tablets by mouth 2 (Two) Times a Day As Needed for Constipation. 60 tablet 0 Unknown at Unknown time   • sevelamer (RENVELA) 800 MG tablet Take 800 mg by mouth 3 (Three) Times a Day With  Meals.   11/12/2018 at 1700   • traZODone (DESYREL) 50 MG tablet Take 50 mg by mouth every night at bedtime.   11/12/2018 at 2100     Allergies:  Lisinopril and Motrin [ibuprofen]    I have reviewed the patient's medical history, surgical history and family history in the available medical record system.     Current Medications:     Current Facility-Administered Medications   Medication Dose Route Frequency Provider Last Rate Last Dose   • acetaminophen (TYLENOL) tablet 650 mg  650 mg Oral Q4H PRN Cory Rodríguez MD       • aspirin chewable tablet 81 mg  81 mg Oral Daily Cory Rodríguez MD       • brimonidine (ALPHAGAN) 0.15 % ophthalmic solution 1 drop  1 drop Both Eyes TID Cory Rodríguez MD   1 drop at 12/20/18 1649   • cetirizine (zyrTEC) tablet 5 mg  5 mg Oral Daily Cory Rodríguez MD   5 mg at 12/20/18 0831   • dextrose (D50W) 25 g/ 50mL Intravenous Solution 25 g  25 g Intravenous Q15 Min PRN Cory Rodríguez MD       • dextrose (GLUTOSE) oral gel 15 g  15 g Oral Q15 Min PRN Cory Rodríguez MD       • dorzolamide (TRUSOPT) 2 % ophthalmic solution 1 drop  1 drop Left Eye TID Cory Rodríguez MD   1 drop at 12/20/18 1649   • escitalopram (LEXAPRO) tablet 10 mg  10 mg Oral Daily Cory Rodríguez MD   10 mg at 12/20/18 0832   • famotidine (PEPCID) tablet 20 mg  20 mg Oral Nightly Cory Rodríguez MD   20 mg at 12/19/18 2152   • fluticasone (FLONASE) 50 MCG/ACT nasal spray 2 spray  2 spray Nasal Daily Cory Rodríguez MD   2 spray at 12/20/18 0832   • gabapentin (NEURONTIN) capsule 100 mg  100 mg Oral Nightly Cory Rodríguez MD   100 mg at 12/19/18 2152   • glucagon (human recombinant) (GLUCAGEN DIAGNOSTIC) injection 1 mg  1 mg Subcutaneous PRN Cory Rodríguez MD       • HYDROcodone-acetaminophen (NORCO) 5-325 MG per tablet 1 tablet  1 tablet Oral Q8H PRN Cory Rodríguez MD       • insulin aspart (novoLOG) injection  0-14 Units  0-14 Units Subcutaneous 4x Daily AC & at Bedtime Cory Rodríguez MD   5 Units at 12/20/18 1148   • insulin detemir (LEVEMIR) injection 25 Units  25 Units Subcutaneous Daily Cory Rodríguez MD   25 Units at 12/20/18 0835   • latanoprost (XALATAN) 0.005 % ophthalmic solution 1 drop  1 drop Both Eyes Nightly Cory Rodríguez MD   1 drop at 12/19/18 2153   • ondansetron (ZOFRAN) injection 4 mg  4 mg Intravenous Q6H PRN Cory Rodríguez MD       • polyethylene glycol 3350 powder (packet)  17 g Oral Daily PRN Cory Rodríguez MD       • sevelamer (RENAGEL) tablet 800 mg  800 mg Oral TID With Meals Cory Rodríguez MD   800 mg at 12/20/18 1729   • sodium chloride 0.9 % flush 10 mL  10 mL Intravenous PRN Vishal Rdz MD       • sodium chloride 0.9 % flush 10 mL  10 mL Intravenous PRN Vishal Rdz MD       • sodium chloride 0.9 % flush 3 mL  3 mL Intravenous Q12H Cory Rodríguez MD   3 mL at 12/20/18 0846   • sodium chloride 0.9 % flush 3-10 mL  3-10 mL Intravenous PRN Cory Rodríguez MD       • traZODone (DESYREL) tablet 50 mg  50 mg Oral Nightly Cory Rodríguez MD   50 mg at 12/19/18 2152       Objective     Physical Exam:   Temp:  [95.4 °F (35.2 °C)-97.8 °F (36.6 °C)] 96.9 °F (36.1 °C)  Heart Rate:  [] 59  Resp:  [18] 18  BP: ()/(52-70) 139/68    Physical Exam:  General Appearance:    Alert, cooperative, in no acute distress   Head:    Normocephalic, without obvious abnormality, atraumatic   Eyes:            Lids and lashes normal, conjunctivae and sclerae normal, no   icterus, no pallor, corneas clear, PERRLA   Ears:    Ears appear intact with no abnormalities noted   Throat:   No oral lesions, no thrush, oral mucosa moist   Neck:   No adenopathy, supple, trachea midline, no thyromegaly, no     carotid bruit, no JVD   Back:     No kyphosis present, no scoliosis present, no skin lesions,       erythema or scars, no  tenderness to percussion or                   palpation,   range of motion normal   Lungs:     Clear to auscultation,respirations regular, even and                   unlabored    Heart:    Regular rhythm and normal rate, normal S1 and S2, no            murmur, no gallop, no rub, no click   Breast Exam:    Deferred   Abdomen:     Positive bowel sounds positive tenderness to epigastric region to deep palpation no rebound tenderness.     Genitalia:    Deferred   Extremities:   Moves all extremities well, no edema, no cyanosis, no              redness   Pulses:   Pulses palpable and equal bilaterally   Skin:   No bleeding, bruising or rash   Lymph nodes:   No palpable adenopathy   Neurologic:   Cranial nerves 2 - 12 grossly intact, sensation intact, DTR        present and equal bilaterally      Results Review:     Lab Results   Component Value Date    WBC 5.89 12/20/2018    WBC 6.22 12/19/2018    WBC 5.16 08/29/2018    HGB 6.2 (C) 12/20/2018    HGB 6.4 (C) 12/19/2018    HGB 11.8 (L) 08/29/2018    HCT 20.0 (L) 12/20/2018    HCT 19.7 (L) 12/19/2018    HCT 34.8 (L) 08/29/2018    PLT 82 (L) 12/20/2018     (L) 12/19/2018    PLT 88 (L) 08/29/2018     Results from last 7 days   Lab Units  12/20/18   0728  12/19/18   1700   ALK PHOS U/L  89  105   ALT (SGPT) U/L  19*  11*   AST (SGOT) U/L  29  31     Results from last 7 days   Lab Units  12/20/18   0728  12/19/18   1700   BILIRUBIN mg/dL  1.2  0.9   ALK PHOS U/L  89  105     Lipase   Date Value Ref Range Status   12/19/2018 217 23 - 300 U/L Final     Lab Results   Component Value Date    INR 1.27 (H) 12/19/2018    INR 1.20 07/26/2018    INR 1.15 01/12/2018         Radiology Review:  Imaging Results (last 72 hours)     Procedure Component Value Units Date/Time    CT Head Without Contrast [[id number]] Collected:  12/19/18 1722     Updated:  12/19/18 1746    Narrative:       PROCEDURE: CT HEAD WO CONTRAST    HISTORY: weakness    Indication: Same as above    Comparison:  None    Technique:     CT of the head was done without intravenous contrast was done in  the orthogonal planes.    This exam was performed according to our departmental  dose-optimization program, which includes automated exposure  control, adjustment of the mA and/or KV according to the  patient's size and/or use of iterative reconstruction technique.      FINDINGS:     There is no intracranial hemorrhage, midline shift mass effect or  acute focal infarct.    There is minimal prominence of the sylvian fissures and the  cortical sulci reflecting age related volume loss.   There is minimal periventricular and deep white matter low  attenuation, most likely related to small vessel white matter  ischemic disease.    If clinical concern exists regarding an acute ischemic/vascular  pathology being responsible for patient's symptomatology, an MRI  of the brain is more sensitive than the current study, in ruling  out such a possibility.    There is good gray/white matter differentiation.    The ventricular system is normal.    The mastoid air cells are unremarkable .     The paranasal sinuses are unremarkable .    There is no visualization of acute fractures involving the  calvarium or the skull base.       Impression:         There is no acute intracranial abnormality.    Electronically signed by:  Beltran Lucio MD  12/19/2018 5:44 PM  CST Workstation: [id number]    XR Abdomen 2 View With Chest 1 View [[id number]] Collected:  12/19/18 1630     Updated:  12/19/18 1651    Narrative:       PROCEDURE: XR ABDOMEN 2 VW W CHEST 1 VW    HISTORY: constipation    COMPARISON: 7/26/2018 .    TECHNIQUE:     Two views of the abdomen and pelvis and Oneview of the chest,  all projections in the frontal projection.    FINDINGS:     There are no discrete airspace infiltrates, pneumothoraces or  pleural effusions.    The pulmonary vascularity is normal.    The cardiomediastinal silhouette is stable    The bowel gas pattern does not show  any evidence of obstruction,  ileus, or bowel wall thickening.    There is no gross evidence of free air in the abdomen or the  pelvis.    There is no visualization of radiopaque calculi in the outline of  the urinary tract.    Small amount of retained fecal material is seen in the large  bowel. Vascular calcifications are seen in the pelvis .      Impression:          Mild constipation.    There are no acute findings in the  chest     Electronically signed by:  Beltran Lucio MD  12/19/2018 4:50 PM  CST Workstation: [email or web address]          I reviewed the patient's new clinical results.    I reviewed the patient's new imaging results and agree with the interpretation.     ASSESSMENT/PLAN:   ASSESSMENT: Patient with significant anemia with possible GI blood loss.  Patient also with epigastric abdominal pain which she describes as severe nature.  Patient will need EGD to evaluate for possible causes of anemia.  Do not believe at this time patient can drink a prep for colonoscopy if no findings on upper endoscopy patient may need to have colonoscopy as an outpatient    PLAN: #1 we'll schedule patient for EGD tomorrow.  #2 patient should be on a proton pump inhibitor.  #3 patient should be off of any anticoagulation.  #4 continue to monitor patient's hemoglobin and transfuse 2 patient to hemoglobin above 7.  The risks, benefits, and alternatives of this procedure have been discussed with the patient or the responsible party. The patient understands and agrees to proceed.         Flako Montoya MD  12/20/18  6:00 PM         This document has been electronically signed by Flako Montoya MD on December 20, 2018 6:00 PM

## 2018-12-20 NOTE — PLAN OF CARE
Problem: Patient Care Overview  Goal: Plan of Care Review  Outcome: Ongoing (interventions implemented as appropriate)   12/20/18 0753   Coping/Psychosocial   Plan of Care Reviewed With patient   Plan of Care Review   Progress no change   OTHER   Outcome Summary vss, receiving 2 units prbc's this shift, sleeping between care, requiring O2 at 2L NC while sleeping. No c/o pain or N/V. Pt does not remember last BM      Goal: Individualization and Mutuality  Outcome: Ongoing (interventions implemented as appropriate)      Problem: Fall Risk (Adult)  Goal: Identify Related Risk Factors and Signs and Symptoms  Outcome: Outcome(s) achieved Date Met: 12/20/18    Goal: Absence of Fall  Outcome: Ongoing (interventions implemented as appropriate)

## 2018-12-20 NOTE — PROGRESS NOTES
FAMILY MEDICINE DAILY PROGRESS NOTE  NAME: Cristo Musa  : 1950  MRN: [id number]     LOS: 1 day     PROVIDER OF SERVICE: Cory Rodríguez MD    Chief Complaint: Symptomatic anemia    Subjective:     Interval History:   Patient says he feels a little bit stronger after receiving the 1 unit of PRBCs overnight.  Only complaint is of some arthritis flaring up in his lower back, he says is not that bad though.  No other complaints today.  No problems with fevers or chills, chest pain, shortness of air.    Review of Systems:   Review of Systems   Constitutional: Positive for fatigue. Negative for activity change, appetite change, chills and fever.   HENT: Negative for congestion, ear pain and sore throat.    Eyes: Negative for redness and visual disturbance.   Respiratory: Negative for cough, shortness of breath and wheezing.    Cardiovascular: Negative for chest pain and palpitations.   Gastrointestinal: Positive for constipation. Negative for abdominal pain, diarrhea, nausea and vomiting.   Genitourinary: Negative for difficulty urinating and dysuria.   Musculoskeletal: Negative for arthralgias and gait problem.   Skin: Negative for color change and rash.   Neurological: Positive for weakness. Negative for dizziness and headaches.   Psychiatric/Behavioral: Negative for dysphoric mood and sleep disturbance. The patient is not nervous/anxious.        Objective:     Vital Signs  Temp:  [96.2 °F (35.7 °C)-98.9 °F (37.2 °C)] 97.2 °F (36.2 °C)  Heart Rate:  [71-82] 78  Resp:  [18-20] 18  BP: (112-134)/(56-86) 120/60    Physical Exam  Physical Exam   Constitutional: He is oriented to person, place, and time. He appears well-developed and well-nourished.   HENT:   Head: Normocephalic and atraumatic.   Right Ear: External ear normal.   Left Ear: External ear normal.   Nose: Nose normal.   Eyes: Conjunctivae and EOM are normal. Pupils are equal, round, and reactive to light.   Neck: Normal range of  motion. Neck supple.   Cardiovascular: Normal rate, regular rhythm and intact distal pulses.   Murmur heard.  Pulmonary/Chest: Effort normal and breath sounds normal. He has no wheezes.   Abdominal: Soft. Bowel sounds are normal. He exhibits no distension. There is no tenderness.   Musculoskeletal: Normal range of motion. He exhibits no edema or deformity.   Neurological: He is alert and oriented to person, place, and time. No cranial nerve deficit.   Skin: Skin is warm and dry.   Psychiatric: He has a normal mood and affect. His behavior is normal. Thought content normal.   Nursing note and vitals reviewed.      Medication Review    Current Facility-Administered Medications:   •  acetaminophen (TYLENOL) tablet 650 mg, 650 mg, Oral, Q4H PRN, Cory Rodríguez MD  •  aspirin chewable tablet 81 mg, 81 mg, Oral, Daily, Cory Rodríguez MD  •  brimonidine (ALPHAGAN) 0.15 % ophthalmic solution 1 drop, 1 drop, Both Eyes, TID, Cory Rodríguez MD, 1 drop at 12/19/18 2152  •  cetirizine (zyrTEC) tablet 5 mg, 5 mg, Oral, Daily, Cory Rodríguez MD  •  dextrose (D50W) 25 g/ 50mL Intravenous Solution 25 g, 25 g, Intravenous, Q15 Min PRN, Cory Rodríguez MD  •  dextrose (GLUTOSE) oral gel 15 g, 15 g, Oral, Q15 Min PRN, Cory Rodríguez MD  •  dorzolamide (TRUSOPT) 2 % ophthalmic solution 1 drop, 1 drop, Left Eye, TID, Cory Rodríguez MD, 1 drop at 12/19/18 2152  •  escitalopram (LEXAPRO) tablet 10 mg, 10 mg, Oral, Daily, Cory Rodríguez MD  •  famotidine (PEPCID) tablet 20 mg, 20 mg, Oral, Nightly, Cory Rodríguez MD, 20 mg at 12/19/18 2152  •  fluticasone (FLONASE) 50 MCG/ACT nasal spray 2 spray, 2 spray, Nasal, Daily, Cory Rodríguez MD  •  gabapentin (NEURONTIN) capsule 100 mg, 100 mg, Oral, Nightly, Cory Rodríguez MD, 100 mg at 12/19/18 2152  •  glucagon (human recombinant) (GLUCAGEN DIAGNOSTIC) injection 1 mg, 1 mg, Subcutaneous, PRN, Rodríguez,  Cory Salinas MD  •  HYDROcodone-acetaminophen (NORCO) 5-325 MG per tablet 1 tablet, 1 tablet, Oral, Q8H PRN, Cory Rodríguez MD  •  insulin aspart (novoLOG) injection 0-14 Units, 0-14 Units, Subcutaneous, 4x Daily AC & at Bedtime, Cory Rodríguez MD, 3 Units at 12/19/18 2223  •  insulin detemir (LEVEMIR) injection 25 Units, 25 Units, Subcutaneous, Daily, Cory Rodríguez MD  •  latanoprost (XALATAN) 0.005 % ophthalmic solution 1 drop, 1 drop, Both Eyes, Nightly, Cory Rodríguez MD, 1 drop at 12/19/18 2153  •  ondansetron (ZOFRAN) injection 4 mg, 4 mg, Intravenous, Q6H PRN, Cory Rodríguez MD  •  sevelamer (RENAGEL) tablet 800 mg, 800 mg, Oral, TID With Meals, Cory Rodríguez MD  •  sodium chloride 0.9 % flush 10 mL, 10 mL, Intravenous, PRN, Vishal Rdz MD  •  [COMPLETED] Insert peripheral IV, , , Once **AND** sodium chloride 0.9 % flush 10 mL, 10 mL, Intravenous, PRN, Vishal Rdz MD  •  sodium chloride 0.9 % flush 3 mL, 3 mL, Intravenous, Q12H, Cory Rodríguez MD, 3 mL at 12/19/18 2153  •  sodium chloride 0.9 % flush 3-10 mL, 3-10 mL, Intravenous, PRN, Cory Rodríguez MD  •  traZODone (DESYREL) tablet 50 mg, 50 mg, Oral, Nightly, Cory Rodríguez MD, 50 mg at 12/19/18 2152     Diagnostic Data    Lab Results (last 24 hours)     Procedure Component Value Units Date/Time    POC Glucose Once [[id number]]  (Abnormal) Collected:  12/19/18 2151    Specimen:  Blood Updated:  12/19/18 2219     Glucose 169 mg/dL      Comment: RN NotifiedOperator: [id number] MARTI MENJIVARFERMeter ID: [id number]       Troponin [[id number]]  (Normal) Collected:  12/19/18 1958    Specimen:  Blood Updated:  12/19/18 2045     Troponin I <0.012 ng/mL     CBC & Differential [[id number]] Collected:  12/19/18 1700    Specimen:  Blood Updated:  12/19/18 1831    Narrative:       The following orders were created for panel order CBC & Differential.  Procedure                                Abnormality         Status                     ---------                               -----------         ------                     Manual Differential[[id number]]                              Final result               CBC Auto Differential[[id number]]        Abnormal            Final result                 Please view results for these tests on the individual orders.    Manual Differential [[id number]] Collected:  12/19/18 1700    Specimen:  Blood Updated:  12/19/18 1831     Neutrophil % 78.0 %      Lymphocyte % 20.0 %      Monocyte % 2.0 %      Neutrophils Absolute 4.85 10*3/mm3      Lymphocytes Absolute 1.24 10*3/mm3      Monocytes Absolute 0.12 10*3/mm3      Anisocytosis Slight/1+     Macrocytes Slight/1+     Polychromasia Slight/1+     WBC Morphology Normal     Platelet Estimate Decreased    Elizabeth Draw [[id number]] Collected:  12/19/18 1700    Specimen:  Blood Updated:  12/19/18 1815    Narrative:       The following orders were created for panel order Elizabeth Draw.  Procedure                               Abnormality         Status                     ---------                               -----------         ------                     Light Blue Top[[id number]]                                   Final result               Green Top (Gel)[[id number]]                                  Final result               Lavender Top[[id number]]                                     Final result               Gold Top - SST[[id number]]                                   Final result                 Please view results for these tests on the individual orders.    Light Blue Top [[id number]] Collected:  12/19/18 1700    Specimen:  Blood Updated:  12/19/18 1815     Extra Tube hold for add-on     Comment: Auto resulted       Green Top (Gel) [[id number]] Collected:  12/19/18 1700    Specimen:  Blood Updated:  12/19/18 1815     Extra Tube Hold for add-ons.     Comment: Auto resulted.       Lavender Top [[id number]]  Collected:  12/19/18 1700    Specimen:  Blood Updated:  12/19/18 1815     Extra Tube hold for add-on     Comment: Auto resulted       Gold Top - SST [[id number]] Collected:  12/19/18 1700    Specimen:  Blood Updated:  12/19/18 1815     Extra Tube Hold for add-ons.     Comment: Auto resulted.       Protime-INR [[id number]]  (Abnormal) Collected:  12/19/18 1700    Specimen:  Blood Updated:  12/19/18 1742     Protime 15.6 Seconds      INR 1.27    Narrative:       Therapeutic range for most indications is 2.0-3.0 INR,  or 2.5-3.5 for mechanical heart valves.    Troponin [[id number]]  (Normal) Collected:  12/19/18 1700    Specimen:  Blood Updated:  12/19/18 1736     Troponin I <0.012 ng/mL     BNP [[id number]]  (Abnormal) Collected:  12/19/18 1700    Specimen:  Blood Updated:  12/19/18 1736     proBNP 8,890.0 pg/mL     Comprehensive Metabolic Panel [[id number]]  (Abnormal) Collected:  12/19/18 1700    Specimen:  Blood Updated:  12/19/18 1725     Glucose 211 mg/dL      BUN 31 mg/dL      Creatinine 3.99 mg/dL      Sodium 133 mmol/L      Potassium 3.5 mmol/L      Chloride 88 mmol/L      CO2 34.0 mmol/L      Calcium 8.6 mg/dL      Total Protein 8.1 g/dL      Albumin 3.70 g/dL      ALT (SGPT) 11 U/L      AST (SGOT) 31 U/L      Alkaline Phosphatase 105 U/L      Total Bilirubin 0.9 mg/dL      eGFR  African Amer 18 mL/min/1.73      Globulin 4.4 gm/dL      A/G Ratio 0.8 g/dL      BUN/Creatinine Ratio 7.8     Anion Gap 11.0 mmol/L     Lipase [[id number]]  (Normal) Collected:  12/19/18 1700    Specimen:  Blood Updated:  12/19/18 1725     Lipase 217 U/L     CBC Auto Differential [[id number]]  (Abnormal) Collected:  12/19/18 1700    Specimen:  Blood Updated:  12/19/18 1720     WBC 6.22 10*3/mm3      RBC 2.00 10*6/mm3      Hemoglobin 6.4 g/dL      Hematocrit 19.7 %      MCV 98.5 fL      MCH 32.0 pg      MCHC 32.5 g/dL      RDW 17.3 %      RDW-SD 58.4 fl      MPV 7.9 fL      Platelets 138 10*3/mm3           I reviewed the patient's new  clinical results.    Assessment/Plan:     Active Hospital Problems    Diagnosis Date Noted   • **Symptomatic anemia [D64.9] 12/19/2018     Acute blood loss anemia  GI, , consulted, will see pt in AM  After discussion with nephrology, will give 1 unit over night over course of 4 hours, they will evaluate in the AM     • Gastroesophageal reflux disease without esophagitis [K21.9] 12/30/2017     Continue home pepcid     • Chronic pain [G89.29] 03/27/2017     Continue home medications     • Anxiety [F41.9] 03/27/2017     Continue home lexapro, trazodone     • ESRD (end stage renal disease) (CMS/[facility]) [N18.6] 01/30/2017     HD MWF  Nephrologist Dr. Vilchis       • Diabetes mellitus (CMS/[facility]) [E11.9] 03/30/2016     Continue home lantus 30 untis qhs  ISS         DVT prophylaxis: SCDs  Code status is   Code Status and Medical Interventions:   Ordered at: 12/19/18 2006     Level Of Support Discussed With:    Patient     Code Status:    CPR     Medical Interventions (Level of Support Prior to Arrest):    Full       Plan for disposition:back to [facility] when hemoglobin stable and evaluated by GI          This document has been electronically signed by Cory Rodríguez MD on December 20, 2018 6:44 AM

## 2018-12-21 ENCOUNTER — ANESTHESIA (OUTPATIENT)
Dept: GASTROENTEROLOGY | Facility: HOSPITAL | Age: 68
End: 2018-12-21

## 2018-12-21 ENCOUNTER — ANESTHESIA EVENT (OUTPATIENT)
Dept: GASTROENTEROLOGY | Facility: HOSPITAL | Age: 68
End: 2018-12-21

## 2018-12-21 ENCOUNTER — APPOINTMENT (OUTPATIENT)
Dept: GENERAL RADIOLOGY | Facility: HOSPITAL | Age: 68
End: 2018-12-21

## 2018-12-21 PROBLEM — K92.2 ACUTE GI BLEEDING: Status: ACTIVE | Noted: 2018-12-21

## 2018-12-21 LAB
ABO + RH BLD: NORMAL
ABO + RH BLD: NORMAL
ALBUMIN SERPL-MCNC: 3.5 G/DL (ref 3.4–4.8)
ALBUMIN/GLOB SERPL: 0.8 G/DL (ref 1.1–1.8)
ALP SERPL-CCNC: 84 U/L (ref 38–126)
ALT SERPL W P-5'-P-CCNC: 14 U/L (ref 21–72)
ANION GAP SERPL CALCULATED.3IONS-SCNC: 13 MMOL/L (ref 5–15)
AST SERPL-CCNC: 30 U/L (ref 17–59)
BASOPHILS # BLD AUTO: 0.03 10*3/MM3 (ref 0–0.2)
BASOPHILS NFR BLD AUTO: 0.4 % (ref 0–2)
BH BB BLOOD EXPIRATION DATE: NORMAL
BH BB BLOOD EXPIRATION DATE: NORMAL
BH BB BLOOD TYPE BARCODE: 7300
BH BB BLOOD TYPE BARCODE: 7300
BH BB DISPENSE STATUS: NORMAL
BH BB DISPENSE STATUS: NORMAL
BH BB PRODUCT CODE: NORMAL
BH BB PRODUCT CODE: NORMAL
BH BB UNIT NUMBER: NORMAL
BH BB UNIT NUMBER: NORMAL
BILIRUB SERPL-MCNC: 1.4 MG/DL (ref 0.2–1.3)
BUN BLD-MCNC: 56 MG/DL (ref 7–21)
BUN/CREAT SERPL: 8.5 (ref 7–25)
CALCIUM SPEC-SCNC: 8.3 MG/DL (ref 8.4–10.2)
CHLORIDE SERPL-SCNC: 88 MMOL/L (ref 95–110)
CO2 SERPL-SCNC: 28 MMOL/L (ref 22–31)
CREAT BLD-MCNC: 6.57 MG/DL (ref 0.7–1.3)
DEPRECATED RDW RBC AUTO: 61 FL (ref 35.1–43.9)
EOSINOPHIL # BLD AUTO: 0.14 10*3/MM3 (ref 0–0.7)
EOSINOPHIL NFR BLD AUTO: 2 % (ref 0–7)
ERYTHROCYTE [DISTWIDTH] IN BLOOD BY AUTOMATED COUNT: 18.4 % (ref 11.5–14.5)
GFR SERPL CREATININE-BSD FRML MDRD: 10 ML/MIN/1.73 (ref 49–113)
GFR SERPL CREATININE-BSD FRML MDRD: ABNORMAL ML/MIN/1.73 (ref 49–113)
GLOBULIN UR ELPH-MCNC: 4.2 GM/DL (ref 2.3–3.5)
GLUCOSE BLD-MCNC: 109 MG/DL (ref 60–100)
GLUCOSE BLDC GLUCOMTR-MCNC: 103 MG/DL (ref 70–130)
GLUCOSE BLDC GLUCOMTR-MCNC: 192 MG/DL (ref 70–130)
GLUCOSE BLDC GLUCOMTR-MCNC: 70 MG/DL (ref 70–130)
HCT VFR BLD AUTO: 25.8 % (ref 39–49)
HGB BLD-MCNC: 8.6 G/DL (ref 13.7–17.3)
IMM GRANULOCYTES # BLD AUTO: 0.05 10*3/MM3 (ref 0–0.02)
IMM GRANULOCYTES NFR BLD AUTO: 0.7 % (ref 0–0.5)
LYMPHOCYTES # BLD AUTO: 1.55 10*3/MM3 (ref 0.6–4.2)
LYMPHOCYTES NFR BLD AUTO: 22.2 % (ref 10–50)
MCH RBC QN AUTO: 31.2 PG (ref 26.5–34)
MCHC RBC AUTO-ENTMCNC: 33.3 G/DL (ref 31.5–36.3)
MCV RBC AUTO: 93.5 FL (ref 80–98)
MONOCYTES # BLD AUTO: 0.82 10*3/MM3 (ref 0–0.9)
MONOCYTES NFR BLD AUTO: 11.7 % (ref 0–12)
NEUTROPHILS # BLD AUTO: 4.39 10*3/MM3 (ref 2–8.6)
NEUTROPHILS NFR BLD AUTO: 63 % (ref 37–80)
NRBC BLD AUTO-RTO: 1.2 /100 WBC (ref 0–0)
PLATELET # BLD AUTO: 112 10*3/MM3 (ref 150–450)
PMV BLD AUTO: 9.4 FL (ref 8–12)
POTASSIUM BLD-SCNC: 5.3 MMOL/L (ref 3.5–5.1)
PROT SERPL-MCNC: 7.7 G/DL (ref 6.3–8.6)
RBC # BLD AUTO: 2.76 10*6/MM3 (ref 4.37–5.74)
SODIUM BLD-SCNC: 129 MMOL/L (ref 137–145)
UNIT  ABO: NORMAL
UNIT  ABO: NORMAL
UNIT  RH: NORMAL
UNIT  RH: NORMAL
WBC NRBC COR # BLD: 6.98 10*3/MM3 (ref 3.2–9.8)

## 2018-12-21 PROCEDURE — 99232 SBSQ HOSP IP/OBS MODERATE 35: CPT | Performed by: INTERNAL MEDICINE

## 2018-12-21 PROCEDURE — 74018 RADEX ABDOMEN 1 VIEW: CPT

## 2018-12-21 PROCEDURE — 5A1D70Z PERFORMANCE OF URINARY FILTRATION, INTERMITTENT, LESS THAN 6 HOURS PER DAY: ICD-10-PCS | Performed by: INTERNAL MEDICINE

## 2018-12-21 PROCEDURE — 82962 GLUCOSE BLOOD TEST: CPT

## 2018-12-21 PROCEDURE — 63710000001 INSULIN ASPART PER 5 UNITS: Performed by: STUDENT IN AN ORGANIZED HEALTH CARE EDUCATION/TRAINING PROGRAM

## 2018-12-21 PROCEDURE — 99233 SBSQ HOSP IP/OBS HIGH 50: CPT | Performed by: STUDENT IN AN ORGANIZED HEALTH CARE EDUCATION/TRAINING PROGRAM

## 2018-12-21 PROCEDURE — 63710000001 INSULIN DETEMIR PER 5 UNITS: Performed by: STUDENT IN AN ORGANIZED HEALTH CARE EDUCATION/TRAINING PROGRAM

## 2018-12-21 PROCEDURE — 80053 COMPREHEN METABOLIC PANEL: CPT | Performed by: STUDENT IN AN ORGANIZED HEALTH CARE EDUCATION/TRAINING PROGRAM

## 2018-12-21 PROCEDURE — 85025 COMPLETE CBC W/AUTO DIFF WBC: CPT | Performed by: STUDENT IN AN ORGANIZED HEALTH CARE EDUCATION/TRAINING PROGRAM

## 2018-12-21 RX ORDER — SODIUM CHLORIDE 9 MG/ML
50 INJECTION, SOLUTION INTRAVENOUS ONCE
Status: DISCONTINUED | OUTPATIENT
Start: 2018-12-21 | End: 2018-12-21 | Stop reason: HOSPADM

## 2018-12-21 RX ORDER — ALBUMIN (HUMAN) 12.5 G/50ML
12.5 SOLUTION INTRAVENOUS AS NEEDED
Status: DISCONTINUED | OUTPATIENT
Start: 2018-12-21 | End: 2018-12-22 | Stop reason: HOSPADM

## 2018-12-21 RX ORDER — PANTOPRAZOLE SODIUM 40 MG/1
40 TABLET, DELAYED RELEASE ORAL
Status: DISCONTINUED | OUTPATIENT
Start: 2018-12-22 | End: 2018-12-22 | Stop reason: HOSPADM

## 2018-12-21 RX ORDER — ONDANSETRON 4 MG/1
4 TABLET, ORALLY DISINTEGRATING ORAL EVERY 6 HOURS PRN
Status: DISCONTINUED | OUTPATIENT
Start: 2018-12-21 | End: 2018-12-22 | Stop reason: HOSPADM

## 2018-12-21 RX ORDER — PANTOPRAZOLE SODIUM 40 MG/10ML
40 INJECTION, POWDER, LYOPHILIZED, FOR SOLUTION INTRAVENOUS
Status: DISCONTINUED | OUTPATIENT
Start: 2018-12-21 | End: 2018-12-21

## 2018-12-21 RX ADMIN — ESCITALOPRAM OXALATE 10 MG: 10 TABLET ORAL at 14:34

## 2018-12-21 RX ADMIN — INSULIN ASPART 3 UNITS: 100 INJECTION, SOLUTION INTRAVENOUS; SUBCUTANEOUS at 20:48

## 2018-12-21 RX ADMIN — ONDANSETRON 4 MG: 4 TABLET, ORALLY DISINTEGRATING ORAL at 17:39

## 2018-12-21 RX ADMIN — GABAPENTIN 100 MG: 100 CAPSULE ORAL at 20:48

## 2018-12-21 RX ADMIN — BRIMONIDINE TARTRATE 1 DROP: 1.5 SOLUTION OPHTHALMIC at 20:49

## 2018-12-21 RX ADMIN — HYDROCODONE BITARTRATE AND ACETAMINOPHEN 1 TABLET: 5; 325 TABLET ORAL at 21:21

## 2018-12-21 RX ADMIN — TRAZODONE HYDROCHLORIDE 50 MG: 50 TABLET ORAL at 20:48

## 2018-12-21 RX ADMIN — BRIMONIDINE TARTRATE 1 DROP: 1.5 SOLUTION OPHTHALMIC at 17:12

## 2018-12-21 RX ADMIN — LATANOPROST 1 DROP: 50 SOLUTION OPHTHALMIC at 20:49

## 2018-12-21 RX ADMIN — INSULIN DETEMIR 25 UNITS: 100 INJECTION, SOLUTION SUBCUTANEOUS at 20:49

## 2018-12-21 RX ADMIN — POLYETHYLENE GLYCOL 3350 17 G: 17 POWDER, FOR SOLUTION ORAL at 20:48

## 2018-12-21 RX ADMIN — DORZOLAMIDE HCL 1 DROP: 20 SOLUTION/ DROPS OPHTHALMIC at 20:49

## 2018-12-21 RX ADMIN — DORZOLAMIDE HCL 1 DROP: 20 SOLUTION/ DROPS OPHTHALMIC at 17:13

## 2018-12-21 RX ADMIN — INSULIN ASPART 3 UNITS: 100 INJECTION, SOLUTION INTRAVENOUS; SUBCUTANEOUS at 18:20

## 2018-12-21 RX ADMIN — RENAGEL 800 MG: 800 TABLET ORAL at 18:20

## 2018-12-21 RX ADMIN — CETIRIZINE HYDROCHLORIDE 5 MG: 5 TABLET, FILM COATED ORAL at 14:34

## 2018-12-21 RX ADMIN — RENAGEL 800 MG: 800 TABLET ORAL at 14:34

## 2018-12-21 NOTE — PROGRESS NOTES
SUBJECTIVE:   12/21/2018  Chief Complaint:     Subjective      Patient is 68 y.o. male with anemia.  Patient denies any abdominal pain nausea vomiting or melena.     CURRENT MEDICATIONS/OBJECTIVE/VS/PE:     Current Medications:     Current Facility-Administered Medications   Medication Dose Route Frequency Provider Last Rate Last Dose   • acetaminophen (TYLENOL) tablet 650 mg  650 mg Oral Q4H PRN Cory Rodríguez MD       • aspirin chewable tablet 81 mg  81 mg Oral Daily Cory Rodríguez MD       • brimonidine (ALPHAGAN) 0.15 % ophthalmic solution 1 drop  1 drop Both Eyes TID Cory Rodríguez MD   1 drop at 12/20/18 2118   • cetirizine (zyrTEC) tablet 5 mg  5 mg Oral Daily Cory Rodríguez MD   5 mg at 12/20/18 0831   • dextrose (D50W) 25 g/ 50mL Intravenous Solution 25 g  25 g Intravenous Q15 Min PRN Cory Rodríguez MD       • dextrose (GLUTOSE) oral gel 15 g  15 g Oral Q15 Min PRN Cory Rodríguez MD       • dorzolamide (TRUSOPT) 2 % ophthalmic solution 1 drop  1 drop Left Eye TID Cory Rodríguez MD   1 drop at 12/20/18 2118   • escitalopram (LEXAPRO) tablet 10 mg  10 mg Oral Daily Cory Rodríguez MD   10 mg at 12/20/18 0832   • fluticasone (FLONASE) 50 MCG/ACT nasal spray 2 spray  2 spray Nasal Daily Cory Rodríguez MD   2 spray at 12/20/18 0832   • gabapentin (NEURONTIN) capsule 100 mg  100 mg Oral Nightly Cory Rodríguez MD   100 mg at 12/20/18 2117   • glucagon (human recombinant) (GLUCAGEN DIAGNOSTIC) injection 1 mg  1 mg Subcutaneous PRN Cory Rodríguez MD       • HYDROcodone-acetaminophen (NORCO) 5-325 MG per tablet 1 tablet  1 tablet Oral Q8H PRN Cory Rodríguez MD       • insulin aspart (novoLOG) injection 0-14 Units  0-14 Units Subcutaneous 4x Daily AC & at Bedtime Cory Rodríguez MD   5 Units at 12/20/18 2122   • insulin detemir (LEVEMIR) injection 25 Units  25 Units Subcutaneous Daily Anne  Cory Salinas MD   25 Units at 12/20/18 0835   • latanoprost (XALATAN) 0.005 % ophthalmic solution 1 drop  1 drop Both Eyes Nightly Cory Rodríguez MD   1 drop at 12/20/18 2118   • ondansetron (ZOFRAN) injection 4 mg  4 mg Intravenous Q6H PRN Cory Rodríguez MD       • pantoprazole (PROTONIX) injection 40 mg  40 mg Intravenous Q AM Cory Rodríguez MD       • polyethylene glycol 3350 powder (packet)  17 g Oral Daily PRN Cory Rodríguez MD       • sevelamer (RENAGEL) tablet 800 mg  800 mg Oral TID With Meals Cory Rodríguez MD   800 mg at 12/20/18 1729   • sodium chloride 0.9 % flush 10 mL  10 mL Intravenous PRN Vishal Rdz MD       • sodium chloride 0.9 % flush 10 mL  10 mL Intravenous PRN Vishal Rdz MD       • sodium chloride 0.9 % flush 3 mL  3 mL Intravenous Q12H Cory Rodríguez MD   3 mL at 12/20/18 2117   • sodium chloride 0.9 % flush 3-10 mL  3-10 mL Intravenous PRN Cory Rodríguez MD       • Sodium chloride 0.9 % infusion  50 mL/hr Intravenous Once Flako Montoya MD       • traZODone (DESYREL) tablet 50 mg  50 mg Oral Nightly Cory Rodríguez MD   50 mg at 12/20/18 2117       Objective     Physical Exam:   Temp:  [95.4 °F (35.2 °C)-98.2 °F (36.8 °C)] 97.2 °F (36.2 °C)  Heart Rate:  [59-72] 60  Resp:  [18-20] 19  BP: (111-156)/(54-71) 137/70     Physical Exam:  General Appearance:    Alert, cooperative, in no acute distress   Head:    Normocephalic, without obvious abnormality, atraumatic   Eyes:            Lids and lashes normal, conjunctivae and sclerae normal, no   icterus, no pallor, corneas clear, PERRLA   Ears:    Ears appear intact with no abnormalities noted   Throat:   No oral lesions, no thrush, oral mucosa moist   Neck:   No adenopathy, supple, trachea midline, no thyromegaly, no     carotid bruit, no JVD   Back:     No kyphosis present, no scoliosis present, no skin lesions,       erythema or scars, no tenderness to  percussion or                   palpation,   range of motion normal   Lungs:     Clear to auscultation,respirations regular, even and                   unlabored    Heart:    Regular rhythm and normal rate, normal S1 and S2, no            murmur, no gallop, no rub, no click   Breast Exam:    Deferred   Abdomen:     Normal bowel sounds, no masses, no organomegaly, soft        non-tender, non-distended, no guarding, no rebound                 tenderness   Genitalia:    Deferred   Extremities:   Moves all extremities well, no edema, no cyanosis, no              redness   Pulses:   Pulses palpable and equal bilaterally   Skin:   No bleeding, bruising or rash   Lymph nodes:   No palpable adenopathy   Neurologic:   Cranial nerves 2 - 12 grossly intact, sensation intact, DTR        present and equal bilaterally      Results Review:     Lab Results (last 24 hours)     Procedure Component Value Units Date/Time    POC Glucose Once [[id number]]  (Normal) Collected:  12/21/18 0722    Specimen:  Blood Updated:  12/21/18 0742     Glucose 103 mg/dL      Comment: : [id number] ASHLEY ANGELITAMeter ID: [id number]       CBC Auto Differential [[id number]]  (Abnormal) Collected:  12/21/18 0649    Specimen:  Blood Updated:  12/21/18 0733     WBC 6.98 10*3/mm3      RBC 2.76 10*6/mm3      Hemoglobin 8.6 g/dL      Hematocrit 25.8 %      MCV 93.5 fL      MCH 31.2 pg      MCHC 33.3 g/dL      RDW 18.4 %      RDW-SD 61.0 fl      MPV 9.4 fL      Platelets 112 10*3/mm3      Neutrophil % 63.0 %      Lymphocyte % 22.2 %      Monocyte % 11.7 %      Eosinophil % 2.0 %      Basophil % 0.4 %      Immature Grans % 0.7 %      Neutrophils, Absolute 4.39 10*3/mm3      Lymphocytes, Absolute 1.55 10*3/mm3      Monocytes, Absolute 0.82 10*3/mm3      Eosinophils, Absolute 0.14 10*3/mm3      Basophils, Absolute 0.03 10*3/mm3      Immature Grans, Absolute 0.05 10*3/mm3      nRBC 1.2 /100 WBC     Comprehensive Metabolic Panel [[id number]]  (Abnormal)  Collected:  12/21/18 0649    Specimen:  Blood Updated:  12/21/18 0727     Glucose 109 mg/dL      BUN 56 mg/dL      Creatinine 6.57 mg/dL      Sodium 129 mmol/L      Potassium 5.3 mmol/L      Chloride 88 mmol/L      CO2 28.0 mmol/L      Calcium 8.3 mg/dL      Total Protein 7.7 g/dL      Albumin 3.50 g/dL      ALT (SGPT) 14 U/L      AST (SGOT) 30 U/L      Alkaline Phosphatase 84 U/L      Total Bilirubin 1.4 mg/dL      eGFR Non African Amer -- mL/min/1.73      Comment: <15 Indicative of kidney failure.        eGFR  African Amer 10 mL/min/1.73      Comment: <15 Indicative of kidney failure.        Globulin 4.2 gm/dL      A/G Ratio 0.8 g/dL      BUN/Creatinine Ratio 8.5     Anion Gap 13.0 mmol/L     POC Glucose Once [[id number]]  (Abnormal) Collected:  12/20/18 2231    Specimen:  Blood Updated:  12/20/18 2248     Glucose 180 mg/dL      Comment: RN NotifiedOperator: [id number] RADHAOK EDILIAISSAMeter ID: [id number]       POC Glucose Once [[id number]]  (Abnormal) Collected:  12/20/18 2137    Specimen:  Blood Updated:  12/20/18 2149     Glucose 147 mg/dL      Comment: RN NotifiedOperator: [id number] VIKTORIA HEATHERMeter ID: [id number]       POC Glucose Once [[id number]]  (Abnormal) Collected:  12/20/18 1935    Specimen:  Blood Updated:  12/20/18 2006     Glucose 134 mg/dL      Comment: RN NotifiedOperator: [id number] MARTI TIARAFERMeter ID: [id number]       POC Glucose Once [[id number]]  (Abnormal) Collected:  12/20/18 1642    Specimen:  Blood Updated:  12/20/18 1702     Glucose 131 mg/dL      Comment: RN NotifiedOperator: [id number] IRASEMA AZEVEDONAMeter ID: [id number]              I reviewed the patient's new clinical results.  I reviewed the patient's new imaging results and agree with the interpretation.     ASSESSMENT/PLAN:   ASSESSMENT: Patient with anemia and heme positive stools.  Patient does not appear to be actively bleeding.  Patient was transfused 3 units of packed RBCs and blood count has gone up to 8.6.  On  arriving to the endoscopy patient does not have IV access and no IV access can be seen in the arms.  Because of patient's multiple medical conditions will not pursue difficult IV access to do endoscopy as the bleeding is extremely unlikely.  If patient does have worsening of anemia patient will need to be transferred for GI evaluation.  There will be no GI coverage this weekend.  I will be away for the next 2 weeks and will not be able to continue with GI care for this patient at this time.  If patient is discharged home patient can follow-up with GI in the next week as an outpatient      PLAN: #1 patient to go back to room for follow-up blood work tomorrow.  If blood work shows improvement or no worsening of hemoglobin patient to be discharged back to home if patient's hemoglobin worsens patient be transferred to a facility with GI coverage.  The risks, benefits, and alternatives of this procedure have been discussed with the patient or the responsible party- the patient understands and agrees to proceed.         Flako Montoya MD  12/21/18  1:24 PM           This document has been electronically signed by Flako Montoya MD on December 21, 2018 1:24 PM

## 2018-12-21 NOTE — NURSING NOTE
Received pt to endo with no iv access. Anethesia attempted to access with no success. Dr Montoya notified and new orders received. MD related to pt cancelling procedure d/t labs improving. Pt states understanding. Nurse Star Treviño RN notified.

## 2018-12-21 NOTE — PROGRESS NOTES
FAMILY MEDICINE DAILY PROGRESS NOTE  NAME: Cristo Musa  : 1950  MRN: [id number]     LOS: 2 days     PROVIDER OF SERVICE: Cory Rodríguez MD    Chief Complaint: Symptomatic anemia    Subjective:     Interval History:   Patient received 2 more units of blood yesterday.  After his hemoglobin had dropped a little bit after receiving one on his admission day.  He was seen by GI and decision made for EGD today.  He is placed on Protonix.  He is also due for dialysis today, as his normal schedule is Monday/Wednesday/Friday.  He states that he is feeling a little bit stronger, he thinks the blood helped him.  He is not having any other complaints.  He is not complaining of the foot pain today that he was when he came in.  He also is not having any problems with headaches or with dizziness or chest pain or shortness of air.    Review of Systems:   Review of Systems   Constitutional: Positive for fatigue. Negative for activity change, appetite change, chills and fever.   HENT: Negative for congestion, ear pain and sore throat.    Eyes: Negative for redness and visual disturbance.   Respiratory: Negative for cough, shortness of breath and wheezing.    Cardiovascular: Negative for chest pain and palpitations.   Gastrointestinal: Positive for constipation. Negative for abdominal pain, diarrhea, nausea and vomiting.   Genitourinary: Negative for difficulty urinating and dysuria.   Musculoskeletal: Negative for arthralgias and gait problem.   Skin: Negative for color change and rash.   Neurological: Positive for weakness. Negative for dizziness and headaches.   Psychiatric/Behavioral: Negative for dysphoric mood and sleep disturbance. The patient is not nervous/anxious.        Objective:     Vital Signs  Temp:  [95.4 °F (35.2 °C)-97.4 °F (36.3 °C)] 97.4 °F (36.3 °C)  Heart Rate:  [] 61  Resp:  [18] 18  BP: ()/(52-71) 113/71    Physical Exam  Physical Exam   Constitutional: He is oriented to  person, place, and time. He appears well-developed and well-nourished.   HENT:   Head: Normocephalic and atraumatic.   Right Ear: External ear normal.   Left Ear: External ear normal.   Nose: Nose normal.   Eyes: Conjunctivae and EOM are normal. Pupils are equal, round, and reactive to light.   Neck: Normal range of motion. Neck supple.   Cardiovascular: Normal rate, regular rhythm and intact distal pulses.   Murmur heard.  Pulmonary/Chest: Effort normal and breath sounds normal. He has no wheezes.   Abdominal: Soft. Bowel sounds are normal. He exhibits no distension. There is no tenderness.   Musculoskeletal: Normal range of motion. He exhibits no edema or deformity.   Neurological: He is alert and oriented to person, place, and time. No cranial nerve deficit.   Skin: Skin is warm and dry.   Psychiatric: He has a normal mood and affect. His behavior is normal. Thought content normal.   Nursing note and vitals reviewed.      Medication Review    Current Facility-Administered Medications:   •  acetaminophen (TYLENOL) tablet 650 mg, 650 mg, Oral, Q4H PRN, Cory Rodríguez MD  •  aspirin chewable tablet 81 mg, 81 mg, Oral, Daily, Cory Rodríguez MD  •  brimonidine (ALPHAGAN) 0.15 % ophthalmic solution 1 drop, 1 drop, Both Eyes, TID, Cory Rodríguez MD, 1 drop at 12/20/18 2118  •  cetirizine (zyrTEC) tablet 5 mg, 5 mg, Oral, Daily, Cory Rodríguez MD, 5 mg at 12/20/18 0831  •  dextrose (D50W) 25 g/ 50mL Intravenous Solution 25 g, 25 g, Intravenous, Q15 Min PRN, Cory Rodríguez MD  •  dextrose (GLUTOSE) oral gel 15 g, 15 g, Oral, Q15 Min PRN, Cory Rodríguez MD  •  dorzolamide (TRUSOPT) 2 % ophthalmic solution 1 drop, 1 drop, Left Eye, TID, Cory Rodríguez MD, 1 drop at 12/20/18 2118  •  escitalopram (LEXAPRO) tablet 10 mg, 10 mg, Oral, Daily, Cory Rodríguez MD, 10 mg at 12/20/18 0832  •  famotidine (PEPCID) tablet 20 mg, 20 mg, Oral, Nightly, Cory Rodríguez  MD Brad, 20 mg at 12/20/18 2117  •  fluticasone (FLONASE) 50 MCG/ACT nasal spray 2 spray, 2 spray, Nasal, Daily, Cory Rodríguez MD, 2 spray at 12/20/18 0832  •  gabapentin (NEURONTIN) capsule 100 mg, 100 mg, Oral, Nightly, Cory Rodríguez MD, 100 mg at 12/20/18 2117  •  glucagon (human recombinant) (GLUCAGEN DIAGNOSTIC) injection 1 mg, 1 mg, Subcutaneous, PRN, Cory Rodríguez MD  •  HYDROcodone-acetaminophen (NORCO) 5-325 MG per tablet 1 tablet, 1 tablet, Oral, Q8H PRN, Cory Rodríguez MD  •  insulin aspart (novoLOG) injection 0-14 Units, 0-14 Units, Subcutaneous, 4x Daily AC & at Bedtime, Cory Rodríguez MD, 5 Units at 12/20/18 2122  •  insulin detemir (LEVEMIR) injection 25 Units, 25 Units, Subcutaneous, Daily, Cory Rodríguez MD, 25 Units at 12/20/18 0835  •  latanoprost (XALATAN) 0.005 % ophthalmic solution 1 drop, 1 drop, Both Eyes, Nightly, Cory Rodríguez MD, 1 drop at 12/20/18 2118  •  ondansetron (ZOFRAN) injection 4 mg, 4 mg, Intravenous, Q6H PRN, Cory Rodríguez MD  •  polyethylene glycol 3350 powder (packet), 17 g, Oral, Daily PRN, Cory Rodríguez MD  •  sevelamer (RENAGEL) tablet 800 mg, 800 mg, Oral, TID With Meals, Cory Rodríguez MD, 800 mg at 12/20/18 1729  •  sodium chloride 0.9 % flush 10 mL, 10 mL, Intravenous, PRN, Vishal Rdz MD  •  [COMPLETED] Insert peripheral IV, , , Once **AND** sodium chloride 0.9 % flush 10 mL, 10 mL, Intravenous, PRN, Vishal Rdz MD  •  sodium chloride 0.9 % flush 3 mL, 3 mL, Intravenous, Q12H, Cory Rodríguez MD, 3 mL at 12/20/18 2117  •  sodium chloride 0.9 % flush 3-10 mL, 3-10 mL, Intravenous, PRN, Cory Rodríguez MD  •  traZODone (DESYREL) tablet 50 mg, 50 mg, Oral, Nightly, Cory Rodríguez MD, 50 mg at 12/20/18 2118     Diagnostic Data    Lab Results (last 24 hours)     Procedure Component Value Units Date/Time    Scan Slide [[id number]]  Collected:  12/21/18 0649    Specimen:  Blood Updated:  12/21/18 0706    CBC Auto Differential [[id number]] Collected:  12/21/18 0649    Specimen:  Blood Updated:  12/21/18 0658    Comprehensive Metabolic Panel [[id number]] Collected:  12/21/18 0649    Specimen:  Blood Updated:  12/21/18 0658    POC Glucose Once [[id number]]  (Abnormal) Collected:  12/20/18 2231    Specimen:  Blood Updated:  12/20/18 2248     Glucose 180 mg/dL      Comment: RN NotifiedOperator: [id number] RADHA CALISSAMeter ID: [id number]       POC Glucose Once [[id number]]  (Abnormal) Collected:  12/20/18 2137    Specimen:  Blood Updated:  12/20/18 2149     Glucose 147 mg/dL      Comment: RN NotifiedOperator: [id number] [facility] HEATHERMeter ID: [id number]       POC Glucose Once [[id number]]  (Abnormal) Collected:  12/20/18 1935    Specimen:  Blood Updated:  12/20/18 2006     Glucose 134 mg/dL      Comment: RN NotifiedOperator: [id number] MARTI MELARANIFERMeter ID: [id number]       POC Glucose Once [[id number]]  (Abnormal) Collected:  12/20/18 1642    Specimen:  Blood Updated:  12/20/18 1702     Glucose 131 mg/dL      Comment: RN NotifiedOperator: [id number] VILLALPANDO BRIANNAMeter ID: [id number]       POC Glucose Once [[id number]]  (Abnormal) Collected:  12/20/18 1045    Specimen:  Blood Updated:  12/20/18 1105     Glucose 206 mg/dL      Comment: RN NotifiedOperator: [id number] [facility] BRIANNAMeter ID: [id number]       CRE Screen by PCR - Swab, Large Intestine, Rectum [[id number]] Collected:  12/20/18 0642    Specimen:  Swab from Large Intestine, Rectum Updated:  12/20/18 0908     CRE SCREEN Not Detected     Comment: Test performed by real-time polymerase chain reaction (qPCR).        OXA 48 Strain Not Detected     IMP STRAIN Not Detected     VIM STRAIN Not Detected     NDM Strain Not Detected     KPC Strain Not Detected    Comprehensive Metabolic Panel [[id number]]  (Abnormal) Collected:  12/20/18 0728    Specimen:  Blood Updated:  12/20/18 0815      Glucose 181 mg/dL      BUN 46 mg/dL      Creatinine 5.09 mg/dL      Sodium 133 mmol/L      Potassium 5.3 mmol/L      Chloride 91 mmol/L      CO2 29.0 mmol/L      Calcium 8.5 mg/dL      Total Protein 7.6 g/dL      Albumin 3.50 g/dL      ALT (SGPT) 19 U/L      AST (SGOT) 29 U/L      Alkaline Phosphatase 89 U/L      Total Bilirubin 1.2 mg/dL      eGFR Non African Amer -- mL/min/1.73      Comment: <15 Indicative of kidney failure.        eGFR   Amer 14 mL/min/1.73      Comment: <15 Indicative of kidney failure.        Globulin 4.1 gm/dL      A/G Ratio 0.9 g/dL      BUN/Creatinine Ratio 9.0     Anion Gap 13.0 mmol/L     CBC Auto Differential [[id number]]  (Abnormal) Collected:  12/20/18 0728    Specimen:  Blood Updated:  12/20/18 0814     WBC 5.89 10*3/mm3      RBC 2.03 10*6/mm3      Hemoglobin 6.2 g/dL      Hematocrit 20.0 %      MCV 98.5 fL      MCH 30.5 pg      MCHC 31.0 g/dL      RDW 17.5 %      RDW-SD 60.0 fl      MPV 8.9 fL      Platelets 82 10*3/mm3      Neutrophil % 62.5 %      Lymphocyte % 22.6 %      Monocyte % 11.9 %      Eosinophil % 1.5 %      Basophil % 0.7 %      Immature Grans % 0.8 %      Neutrophils, Absolute 3.68 10*3/mm3      Lymphocytes, Absolute 1.33 10*3/mm3      Monocytes, Absolute 0.70 10*3/mm3      Eosinophils, Absolute 0.09 10*3/mm3      Basophils, Absolute 0.04 10*3/mm3      Immature Grans, Absolute 0.05 10*3/mm3      nRBC 0.7 /100 WBC     POC Glucose Once [[id number]]  (Abnormal) Collected:  12/20/18 0720    Specimen:  Blood Updated:  12/20/18 0743     Glucose 181 mg/dL      Comment: RN NotifiedOperator: [id number] IRASEMA Mann ID: [id number]             I reviewed the patient's new clinical results.    Assessment/Plan:     Active Hospital Problems    Diagnosis Date Noted   • **Symptomatic anemia [D64.9] 12/19/2018     Acute blood loss anemia  GI, , consulted, will have EGD  Has received 3 units since admission     • Gastroesophageal reflux disease without esophagitis  [K21.9] 12/30/2017     Continue home pepcid     • Chronic pain [G89.29] 03/27/2017     Continue home medications     • Anxiety [F41.9] 03/27/2017     Continue home lexapro, trazodone     • ESRD (end stage renal disease) (CMS/[facility]) [N18.6] 01/30/2017     HD MWF  Nephrologist Dr. Vilchis       • Diabetes mellitus (CMS/[facility]) [E11.9] 03/30/2016     Continue home lantus 30 untis qhs  ISS         DVT prophylaxis: SCDs  Code status is   Code Status and Medical Interventions:   Ordered at: 12/19/18 2006     Level Of Support Discussed With:    Patient     Code Status:    CPR     Medical Interventions (Level of Support Prior to Arrest):    Full       Plan for disposition:back to [facility] when hemoglobin stable and evaluated by GI          This document has been electronically signed by Cory Rodríguez MD on December 21, 2018 7:06 AM

## 2018-12-21 NOTE — PROGRESS NOTES
"[facility]  [address] [place] [address]. [address]  T - [phone]  F - [phone]     Progress Note          PATIENT  DEMOGRAPHICS   PATIENT NAME: Cristo Musa                      PHYSICIAN: Sudhir SteinerLUIS  : 1950  MRN: [id number]   LOS: 2 days    Patient Care Team:  Cory Rodríguez MD as PCP - General (Family Medicine)  Jett Nieto MD as PCP - Claims Attributed  Janel Gordon MD (Family Medicine)  Michelle Lo MD as Resident (Family Medicine)  John Sanchez MD as Resident (Family Medicine)  Warren Stewart MD as Resident (Family Medicine)  Demarcus Oliveira MD as Resident (Family Medicine)  Subjective   SUBJECTIVE   Seen on HD, tolerating well.         Objective   OBJECTIVE   Vital Signs  Temp:  [95.4 °F (35.2 °C)-97.4 °F (36.3 °C)] 97.4 °F (36.3 °C)  Heart Rate:  [] 61  Resp:  [18] 18  BP: ()/(52-71) 113/71    Flowsheet Rows      First Filed Value   Admission Height  172.7 cm (68\") Documented at 2018 1556   Admission Weight  102 kg (225 lb) Documented at 2018 1556           I/O last 3 completed shifts:  In: 1819.1 [P.O.:840; Blood:979.1]  Out: -     PHYSICAL EXAM    Physical Exam   Constitutional: He is oriented to person, place, and time. He appears well-developed and well-nourished.   HENT:   Head: Normocephalic and atraumatic.   Eyes: Conjunctivae and EOM are normal. Pupils are equal, round, and reactive to light.   Cardiovascular: Normal rate and regular rhythm.   Pulmonary/Chest: Effort normal and breath sounds normal.   Abdominal: Soft.   Neurological: He is alert and oriented to person, place, and time.   Skin: Skin is warm and dry.       RESULTS   Results Review:    Results from last 7 days   Lab Units  18   0649  18   0728  18   1700   SODIUM mmol/L  129*  133*  133*   POTASSIUM mmol/L  5.3*  5.3*  3.5   CHLORIDE mmol/L  88*  91*  88*   CO2 mmol/L  28.0  29.0  34.0*   BUN " mg/dL  56*  46*  31*   CREATININE mg/dL  6.57*  5.09*  3.99*   CALCIUM mg/dL  8.3*  8.5  8.6   BILIRUBIN mg/dL  1.4*  1.2  0.9   ALK PHOS U/L  84  89  105   ALT (SGPT) U/L  14*  19*  11*   AST (SGOT) U/L  30  29  31   GLUCOSE mg/dL  109*  181*  211*       Estimated Creatinine Clearance: 12.7 mL/min (A) (by C-G formula based on SCr of 6.57 mg/dL (H)).                Results from last 7 days   Lab Units  12/21/18   0649  12/20/18   0728  12/19/18   1700   WBC 10*3/mm3  6.98  5.89  6.22   HEMOGLOBIN g/dL  8.6*  6.2*  6.4*   PLATELETS 10*3/mm3  112*  82*  138*       Results from last 7 days   Lab Units  12/19/18   1700   INR   1.27*         Imaging Results (last 24 hours)     ** No results found for the last 24 hours. **           MEDICATIONS      aspirin 81 mg Oral Daily   brimonidine 1 drop Both Eyes TID   cetirizine 5 mg Oral Daily   dorzolamide 1 drop Left Eye TID   escitalopram 10 mg Oral Daily   fluticasone 2 spray Nasal Daily   gabapentin 100 mg Oral Nightly   insulin aspart 0-14 Units Subcutaneous 4x Daily AC & at Bedtime   insulin detemir 25 Units Subcutaneous Daily   latanoprost 1 drop Both Eyes Nightly   pantoprazole 40 mg Intravenous Q AM   sevelamer 800 mg Oral TID With Meals   sodium chloride 3 mL Intravenous Q12H   traZODone 50 mg Oral Nightly          Assessment/Plan   ASSESSMENT / PLAN      Symptomatic anemia    Diabetes mellitus (CMS/HCC)    ESRD (end stage renal disease) (CMS/[facility])    Chronic pain    Anxiety    Gastroesophageal reflux disease without esophagitis    Acute GI bleeding    1. ESRD on HD MWF:  - Received HD Wednesday and completed his treatment.   - Seen on HD today, tolerating well. Next HD Monday.     2. Hypertension:  - Blood pressure is acceptable     3. Anemia:  - Hemoglobin is better at 8.6 after receiving pRBC transfusion.  - Plan noted for EGD today.     4. Secondary hyperparathyroidism     5. Diabetes type 2           This document has been electronically signed by Sudhir Steiner  APRN on December 21, 2018 9:03 AM

## 2018-12-21 NOTE — PLAN OF CARE
Problem: Patient Care Overview  Goal: Plan of Care Review  Outcome: Ongoing (interventions implemented as appropriate)   12/21/18 1522   Coping/Psychosocial   Plan of Care Reviewed With patient   Plan of Care Review   Progress improving   OTHER   Outcome Summary H&H stable, EGD not done today, No IV access MD aware, patient alert, vitals stable     Goal: Individualization and Mutuality  Outcome: Ongoing (interventions implemented as appropriate)    Goal: Discharge Needs Assessment  Outcome: Ongoing (interventions implemented as appropriate)    Goal: Interprofessional Rounds/Family Conf  Outcome: Ongoing (interventions implemented as appropriate)      Problem: Fall Risk (Adult)  Goal: Absence of Fall  Outcome: Ongoing (interventions implemented as appropriate)      Problem: Skin Injury Risk (Adult)  Goal: Identify Related Risk Factors and Signs and Symptoms  Outcome: Ongoing (interventions implemented as appropriate)    Goal: Skin Health and Integrity  Outcome: Ongoing (interventions implemented as appropriate)

## 2018-12-21 NOTE — PLAN OF CARE
Problem: Patient Care Overview  Goal: Plan of Care Review  Outcome: Ongoing (interventions implemented as appropriate)   12/21/18 0343   Coping/Psychosocial   Plan of Care Reviewed With patient   Plan of Care Review   Progress no change   OTHER   Outcome Summary VSS, resting well, NPO since midnight, cont to monitor

## 2018-12-21 NOTE — ANESTHESIA PREPROCEDURE EVALUATION
Anesthesia Evaluation     NPO Solid Status: > 8 hours  NPO Liquid Status: > 8 hours           Airway   Mallampati: II  TM distance: >3 FB  Neck ROM: full  No difficulty expected  Dental      Pulmonary - normal exam   (+) COPD moderate,   Cardiovascular - normal exam    (+) pacemaker interrogated unknown, hypertension, dysrhythmias, CHF,     ROS comment: History of CHC    Neuro/Psych  (+) psychiatric history Anxiety and Depression,     GI/Hepatic/Renal/Endo    (+)  GERD, GI bleeding, hepatitis, liver disease, diabetes mellitus,     Musculoskeletal     Abdominal  - normal exam   Substance History      OB/GYN          Other                      Anesthesia Plan    ASA 4     MAC     intravenous induction   Anesthetic plan, all risks, benefits, and alternatives have been provided, discussed and informed consent has been obtained with: patient.

## 2018-12-22 VITALS
SYSTOLIC BLOOD PRESSURE: 140 MMHG | HEIGHT: 68 IN | RESPIRATION RATE: 20 BRPM | BODY MASS INDEX: 35.13 KG/M2 | HEART RATE: 56 BPM | TEMPERATURE: 96.2 F | DIASTOLIC BLOOD PRESSURE: 73 MMHG | WEIGHT: 231.8 LBS | OXYGEN SATURATION: 95 %

## 2018-12-22 PROBLEM — D64.9 SYMPTOMATIC ANEMIA: Status: RESOLVED | Noted: 2018-12-19 | Resolved: 2018-12-22

## 2018-12-22 PROBLEM — K92.2 ACUTE GI BLEEDING: Status: RESOLVED | Noted: 2018-12-21 | Resolved: 2018-12-22

## 2018-12-22 LAB
ALBUMIN SERPL-MCNC: 3.4 G/DL (ref 3.4–4.8)
ALBUMIN/GLOB SERPL: 0.8 G/DL (ref 1.1–1.8)
ALP SERPL-CCNC: 136 U/L (ref 38–126)
ALT SERPL W P-5'-P-CCNC: 20 U/L (ref 21–72)
ANION GAP SERPL CALCULATED.3IONS-SCNC: 13 MMOL/L (ref 5–15)
AST SERPL-CCNC: 26 U/L (ref 17–59)
BASOPHILS # BLD AUTO: 0.01 10*3/MM3 (ref 0–0.2)
BASOPHILS NFR BLD AUTO: 0.2 % (ref 0–2)
BILIRUB SERPL-MCNC: 0.9 MG/DL (ref 0.2–1.3)
BUN BLD-MCNC: 32 MG/DL (ref 7–21)
BUN/CREAT SERPL: 6.7 (ref 7–25)
CALCIUM SPEC-SCNC: 7.8 MG/DL (ref 8.4–10.2)
CHLORIDE SERPL-SCNC: 92 MMOL/L (ref 95–110)
CO2 SERPL-SCNC: 26 MMOL/L (ref 22–31)
CREAT BLD-MCNC: 4.75 MG/DL (ref 0.7–1.3)
DEPRECATED RDW RBC AUTO: 63.8 FL (ref 35.1–43.9)
EOSINOPHIL # BLD AUTO: 0.14 10*3/MM3 (ref 0–0.7)
EOSINOPHIL NFR BLD AUTO: 2.5 % (ref 0–7)
ERYTHROCYTE [DISTWIDTH] IN BLOOD BY AUTOMATED COUNT: 18.6 % (ref 11.5–14.5)
GFR SERPL CREATININE-BSD FRML MDRD: 15 ML/MIN/1.73 (ref 49–113)
GLOBULIN UR ELPH-MCNC: 4.2 GM/DL (ref 2.3–3.5)
GLUCOSE BLD-MCNC: 133 MG/DL (ref 60–100)
GLUCOSE BLDC GLUCOMTR-MCNC: 134 MG/DL (ref 70–130)
GLUCOSE BLDC GLUCOMTR-MCNC: 141 MG/DL (ref 70–130)
GLUCOSE BLDC GLUCOMTR-MCNC: 149 MG/DL (ref 70–130)
GLUCOSE BLDC GLUCOMTR-MCNC: 195 MG/DL (ref 70–130)
HCT VFR BLD AUTO: 26.4 % (ref 39–49)
HGB BLD-MCNC: 8.6 G/DL (ref 13.7–17.3)
IMM GRANULOCYTES # BLD AUTO: 0.02 10*3/MM3 (ref 0–0.02)
IMM GRANULOCYTES NFR BLD AUTO: 0.4 % (ref 0–0.5)
LYMPHOCYTES # BLD AUTO: 1.5 10*3/MM3 (ref 0.6–4.2)
LYMPHOCYTES NFR BLD AUTO: 27 % (ref 10–50)
MCH RBC QN AUTO: 31 PG (ref 26.5–34)
MCHC RBC AUTO-ENTMCNC: 32.6 G/DL (ref 31.5–36.3)
MCV RBC AUTO: 95.3 FL (ref 80–98)
MONOCYTES # BLD AUTO: 0.62 10*3/MM3 (ref 0–0.9)
MONOCYTES NFR BLD AUTO: 11.2 % (ref 0–12)
NEUTROPHILS # BLD AUTO: 3.26 10*3/MM3 (ref 2–8.6)
NEUTROPHILS NFR BLD AUTO: 58.7 % (ref 37–80)
PLATELET # BLD AUTO: 89 10*3/MM3 (ref 150–450)
PMV BLD AUTO: 8.1 FL (ref 8–12)
POTASSIUM BLD-SCNC: 4.2 MMOL/L (ref 3.5–5.1)
PROT SERPL-MCNC: 7.6 G/DL (ref 6.3–8.6)
RBC # BLD AUTO: 2.77 10*6/MM3 (ref 4.37–5.74)
SODIUM BLD-SCNC: 131 MMOL/L (ref 137–145)
WBC NRBC COR # BLD: 5.55 10*3/MM3 (ref 3.2–9.8)

## 2018-12-22 PROCEDURE — 82962 GLUCOSE BLOOD TEST: CPT

## 2018-12-22 PROCEDURE — 99238 HOSP IP/OBS DSCHRG MGMT 30/<: CPT | Performed by: STUDENT IN AN ORGANIZED HEALTH CARE EDUCATION/TRAINING PROGRAM

## 2018-12-22 PROCEDURE — 80053 COMPREHEN METABOLIC PANEL: CPT | Performed by: STUDENT IN AN ORGANIZED HEALTH CARE EDUCATION/TRAINING PROGRAM

## 2018-12-22 PROCEDURE — 85025 COMPLETE CBC W/AUTO DIFF WBC: CPT | Performed by: STUDENT IN AN ORGANIZED HEALTH CARE EDUCATION/TRAINING PROGRAM

## 2018-12-22 RX ORDER — ESCITALOPRAM OXALATE 10 MG/1
10 TABLET ORAL DAILY
Qty: 30 TABLET | Refills: 3 | Status: SHIPPED | OUTPATIENT
Start: 2018-12-23

## 2018-12-22 RX ORDER — HYDROCODONE BITARTRATE AND ACETAMINOPHEN 7.5; 325 MG/1; MG/1
1 TABLET ORAL EVERY 6 HOURS PRN
Qty: 120 TABLET | Refills: 0 | Status: SHIPPED | OUTPATIENT
Start: 2018-12-22 | End: 2019-01-01 | Stop reason: HOSPADM

## 2018-12-22 RX ADMIN — RENAGEL 800 MG: 800 TABLET ORAL at 08:23

## 2018-12-22 RX ADMIN — PANTOPRAZOLE SODIUM 40 MG: 40 TABLET, DELAYED RELEASE ORAL at 05:18

## 2018-12-22 RX ADMIN — POLYETHYLENE GLYCOL 3350 17 G: 17 POWDER, FOR SOLUTION ORAL at 08:22

## 2018-12-22 RX ADMIN — ESCITALOPRAM OXALATE 10 MG: 10 TABLET ORAL at 08:24

## 2018-12-22 RX ADMIN — FLUTICASONE PROPIONATE 2 SPRAY: 50 SPRAY, METERED NASAL at 08:25

## 2018-12-22 RX ADMIN — ASPIRIN 81 MG CHEWABLE TABLET 81 MG: 81 TABLET CHEWABLE at 08:23

## 2018-12-22 RX ADMIN — HYDROCODONE BITARTRATE AND ACETAMINOPHEN 1 TABLET: 5; 325 TABLET ORAL at 08:29

## 2018-12-22 RX ADMIN — CETIRIZINE HYDROCHLORIDE 5 MG: 5 TABLET, FILM COATED ORAL at 08:24

## 2018-12-22 RX ADMIN — BRIMONIDINE TARTRATE 1 DROP: 1.5 SOLUTION OPHTHALMIC at 08:30

## 2018-12-22 RX ADMIN — DORZOLAMIDE HCL 1 DROP: 20 SOLUTION/ DROPS OPHTHALMIC at 08:31

## 2018-12-22 NOTE — PLAN OF CARE
Problem: Patient Care Overview  Goal: Plan of Care Review  Outcome: Ongoing (interventions implemented as appropriate)  Vss, EGD not done, No IV access, Poss d/c to [facility] in the am, resting w/o complaint   12/22/18 0259   Coping/Psychosocial   Plan of Care Reviewed With patient   Plan of Care Review   Progress improving     Goal: Individualization and Mutuality  Outcome: Ongoing (interventions implemented as appropriate)   12/20/18 1632   Individualization   Patient Specific Preferences likes to eat ice

## 2018-12-22 NOTE — PROGRESS NOTES
"FAMILY MEDICINE DAILY PROGRESS NOTE  NAME: Cristo Musa  : 1950  MRN: [id number]     LOS: 3 days     PROVIDER OF SERVICE: Michelle Lo MD    Chief Complaint: Symptomatic anemia    Subjective:     Interval History:     No acute events overnight. Patient is resting comfortably in bed. He denies chest pain, shortness of breath, palpitations, dizziness. He denies pain or discomfort and states he feels \"okay.\" Per GI, if hemoglobin stable, patient can be discharged to home.    Review of Systems:   Review of Systems   Constitutional: Positive for fatigue. Negative for activity change, appetite change, chills and fever.   HENT: Negative for congestion, ear pain and sore throat.    Eyes: Negative for redness and visual disturbance.   Respiratory: Negative for cough, shortness of breath and wheezing.    Cardiovascular: Negative for chest pain and palpitations.   Gastrointestinal: Positive for constipation. Negative for abdominal pain, diarrhea, nausea and vomiting.   Genitourinary: Negative for difficulty urinating and dysuria.   Musculoskeletal: Negative for arthralgias and gait problem.   Skin: Negative for color change and rash.   Neurological: Positive for weakness. Negative for dizziness and headaches.   Psychiatric/Behavioral: Negative for dysphoric mood and sleep disturbance. The patient is not nervous/anxious.        Objective:     Vital Signs  Temp:  [96.1 °F (35.6 °C)-98.2 °F (36.8 °C)] 96.2 °F (35.7 °C)  Heart Rate:  [59-87] 59  Resp:  [18-20] 18  BP: ()/(55-78) 91/69    Physical Exam  Physical Exam   Constitutional: He is oriented to person, place, and time. He appears well-developed and well-nourished.   HENT:   Head: Normocephalic and atraumatic.   Right Ear: External ear normal.   Left Ear: External ear normal.   Nose: Nose normal.   Eyes: Conjunctivae and EOM are normal. Pupils are equal, round, and reactive to light.   Neck: Normal range of motion. Neck supple.   Cardiovascular: " Normal rate, regular rhythm and intact distal pulses.   Murmur heard.  Pulmonary/Chest: Effort normal and breath sounds normal. He has no wheezes.   Abdominal: Soft. Bowel sounds are normal. He exhibits no distension. There is no tenderness.   Musculoskeletal: Normal range of motion. He exhibits no edema or deformity.   Neurological: He is alert and oriented to person, place, and time. No cranial nerve deficit.   Skin: Skin is warm and dry.   Psychiatric: He has a normal mood and affect. His behavior is normal. Thought content normal.   Nursing note and vitals reviewed.      Medication Review    Current Facility-Administered Medications:   •  acetaminophen (TYLENOL) tablet 650 mg, 650 mg, Oral, Q4H PRN, Cory Rodríguez MD  •  albumin human 25 % IV SOLN 12.5 g, 12.5 g, Intravenous, PRN, Alethea Diamond MD  •  aspirin chewable tablet 81 mg, 81 mg, Oral, Daily, Cory Rodríguez MD  •  brimonidine (ALPHAGAN) 0.15 % ophthalmic solution 1 drop, 1 drop, Both Eyes, TID, Cory Rodríguez MD, 1 drop at 12/21/18 2049  •  cetirizine (zyrTEC) tablet 5 mg, 5 mg, Oral, Daily, Cory Rodríguez MD, 5 mg at 12/21/18 1434  •  dextrose (D50W) 25 g/ 50mL Intravenous Solution 25 g, 25 g, Intravenous, Q15 Min PRN, Cory Rodríguez MD  •  dextrose (GLUTOSE) oral gel 15 g, 15 g, Oral, Q15 Min PRN, Cory Rodríguez MD  •  dorzolamide (TRUSOPT) 2 % ophthalmic solution 1 drop, 1 drop, Left Eye, TID, Cory Rodríguez MD, 1 drop at 12/21/18 2049  •  escitalopram (LEXAPRO) tablet 10 mg, 10 mg, Oral, Daily, Cory Rodríguez MD, 10 mg at 12/21/18 1434  •  fluticasone (FLONASE) 50 MCG/ACT nasal spray 2 spray, 2 spray, Nasal, Daily, Cory Rodríguez MD, 2 spray at 12/20/18 0832  •  gabapentin (NEURONTIN) capsule 100 mg, 100 mg, Oral, Nightly, Cory Rodríguez MD, 100 mg at 12/21/18 2048  •  glucagon (human recombinant) (GLUCAGEN DIAGNOSTIC) injection 1 mg, 1 mg, Subcutaneous,  PRN, Cory Rodríguez MD  •  HYDROcodone-acetaminophen (NORCO) 5-325 MG per tablet 1 tablet, 1 tablet, Oral, Q8H PRN, Cory Rodríguez MD, 1 tablet at 12/21/18 2121  •  insulin aspart (novoLOG) injection 0-14 Units, 0-14 Units, Subcutaneous, 4x Daily AC & at Bedtime, Cory Rodríguez MD, 3 Units at 12/21/18 2048  •  insulin detemir (LEVEMIR) injection 25 Units, 25 Units, Subcutaneous, Daily, Cory Rodríguez MD, 25 Units at 12/21/18 2049  •  latanoprost (XALATAN) 0.005 % ophthalmic solution 1 drop, 1 drop, Both Eyes, Nightly, Cory Rodríguez MD, 1 drop at 12/21/18 2049  •  ondansetron ODT (ZOFRAN-ODT) disintegrating tablet 4 mg, 4 mg, Oral, Q6H PRN, Janel Gordon MD, 4 mg at 12/21/18 1739  •  pantoprazole (PROTONIX) EC tablet 40 mg, 40 mg, Oral, Q AM, Janel Gordon MD, 40 mg at 12/22/18 0518  •  polyethylene glycol 3350 powder (packet), 17 g, Oral, Daily, Janel Gordon MD, 17 g at 12/21/18 2048  •  sevelamer (RENAGEL) tablet 800 mg, 800 mg, Oral, TID With Meals, Cory Rodríguez MD, 800 mg at 12/21/18 1820  •  sodium chloride 0.9 % bolus 1,000 mL, 1,000 mL, Intravenous, PRN, Alethea Diamond MD  •  sodium chloride 0.9 % flush 10 mL, 10 mL, Intravenous, PRN, Vishal Rdz MD  •  [COMPLETED] Insert peripheral IV, , , Once **AND** sodium chloride 0.9 % flush 10 mL, 10 mL, Intravenous, PRN, Vishal Rdz MD  •  sodium chloride 0.9 % flush 3 mL, 3 mL, Intravenous, Q12H, Cory Rodríguez MD, 3 mL at 12/20/18 2117  •  sodium chloride 0.9 % flush 3-10 mL, 3-10 mL, Intravenous, PRN, Cory Rodríguez MD  •  traZODone (DESYREL) tablet 50 mg, 50 mg, Oral, Nightly, Cory Rodríguez MD, 50 mg at 12/21/18 2048     Diagnostic Data    Lab Results (last 24 hours)     Procedure Component Value Units Date/Time    CBC Auto Differential [[id number]]  (Abnormal) Collected:  12/22/18 0717    Specimen:  Blood Updated:  12/22/18  0727     WBC 5.55 10*3/mm3      RBC 2.77 10*6/mm3      Hemoglobin 8.6 g/dL      Hematocrit 26.4 %      MCV 95.3 fL      MCH 31.0 pg      MCHC 32.6 g/dL      RDW 18.6 %      RDW-SD 63.8 fl      MPV 8.1 fL      Platelets 89 10*3/mm3      Neutrophil % 58.7 %      Lymphocyte % 27.0 %      Monocyte % 11.2 %      Eosinophil % 2.5 %      Basophil % 0.2 %      Immature Grans % 0.4 %      Neutrophils, Absolute 3.26 10*3/mm3      Lymphocytes, Absolute 1.50 10*3/mm3      Monocytes, Absolute 0.62 10*3/mm3      Eosinophils, Absolute 0.14 10*3/mm3      Basophils, Absolute 0.01 10*3/mm3      Immature Grans, Absolute 0.02 10*3/mm3     Comprehensive Metabolic Panel [[id number]] Collected:  12/22/18 0717    Specimen:  Blood Updated:  12/22/18 0720    POC Glucose Once [[id number]]  (Abnormal) Collected:  12/22/18 0524    Specimen:  Blood Updated:  12/22/18 0536     Glucose 149 mg/dL      Comment: : [id number] [facility]: [id number]       POC Glucose Once [[id number]]  (Abnormal) Collected:  12/21/18 1926    Specimen:  Blood Updated:  12/22/18 0516     Glucose 195 mg/dL      Comment: RN NotifiedOperator: [id number] [facility] [id number]       POC Glucose Once [[id number]]  (Abnormal) Collected:  12/21/18 1628    Specimen:  Blood Updated:  12/21/18 1757     Glucose 192 mg/dL      Comment: Result Not ConfirmedOperator: [id number] [facility] ID: [id number]       POC Glucose Once [[id number]]  (Normal) Collected:  12/21/18 1358    Specimen:  Blood Updated:  12/21/18 1413     Glucose 70 mg/dL      Comment: : [id number] TIRE[facility] ANGELITAMeter ID: [id number]       POC Glucose Once [[id number]]  (Normal) Collected:  12/21/18 0722    Specimen:  Blood Updated:  12/21/18 0742     Glucose 103 mg/dL      Comment: : [id number] [facility] ANGEL[facility] ID: [id number]       CBC Auto Differential [[id number]]  (Abnormal) Collected:  12/21/18 0649    Specimen:  Blood Updated:  12/21/18 0733     WBC 6.98  10*3/mm3      RBC 2.76 10*6/mm3      Hemoglobin 8.6 g/dL      Hematocrit 25.8 %      MCV 93.5 fL      MCH 31.2 pg      MCHC 33.3 g/dL      RDW 18.4 %      RDW-SD 61.0 fl      MPV 9.4 fL      Platelets 112 10*3/mm3      Neutrophil % 63.0 %      Lymphocyte % 22.2 %      Monocyte % 11.7 %      Eosinophil % 2.0 %      Basophil % 0.4 %      Immature Grans % 0.7 %      Neutrophils, Absolute 4.39 10*3/mm3      Lymphocytes, Absolute 1.55 10*3/mm3      Monocytes, Absolute 0.82 10*3/mm3      Eosinophils, Absolute 0.14 10*3/mm3      Basophils, Absolute 0.03 10*3/mm3      Immature Grans, Absolute 0.05 10*3/mm3      nRBC 1.2 /100 WBC           I reviewed the patient's new clinical results.    Assessment/Plan:     Active Hospital Problems    Diagnosis Date Noted   • **Symptomatic anemia [D64.9] 12/19/2018     Acute blood loss anemia  GI, , consulted, will have EGD  Has received 3 units since admission      • Acute GI bleeding [K92.2] 12/21/2018     -GI consulted, appreciate recommendations  -will trend H/H, transfuse for hemoglobin less than 7  -No GI coverage this weekend, if hemoglobin decreases below 7 will need to transfer to facility with GI coverage  -Per GI, if hemoglobin stable, patient can be discharge back to [facility]       • Gastroesophageal reflux disease without esophagitis [K21.9] 12/30/2017     Continue protonix     • Chronic pain [G89.29] 03/27/2017     Continue home medications      • Anxiety [F41.9] 03/27/2017     Continue home lexapro, trazodone      • ESRD (end stage renal disease) (CMS/[facility]) [N18.6] 01/30/2017     HD [facility]  Nephrologist Dr. Vilchis        • Diabetes mellitus (CMS/[facility]) [E11.9] 03/30/2016     Continue home lantus 30 units qhs  SSI         DVT prophylaxis: SCDs  Code status is   Code Status and Medical Interventions:   Ordered at: 12/19/18 2006     Level Of Support Discussed With:    Patient     Code Status:    CPR     Medical Interventions (Level of Support Prior to Arrest):     Full       Plan for disposition: Anticipate discharge to [facility] today or tomorrow as hemoglobin is stable.          Michelle Lo MD PGY2   [facility]  This document has been electronically signed by Michelle Lo MD on December 22, 2018 7:42 AM

## 2018-12-22 NOTE — PROGRESS NOTES
"[facility]  [address] [place] [address]. [address]  T - [phone]  F - [phone]     Progress Note          PATIENT  DEMOGRAPHICS   PATIENT NAME: Cristo Musa                      PHYSICIAN: Alethea Diamond MD  : 1950  MRN: [id number]   LOS: 3 days    Patient Care Team:  Coyr Rodríguez MD as PCP - General (Family Medicine)  Jett Nieto MD as PCP - Claims Attributed  Janel Gordon MD (Family Medicine)  Michelle Lo MD as Resident (Family Medicine)  John Sanchez MD as Resident (Family Medicine)  Warren Stewart MD as Resident (Family Medicine)  Demarcus Oliveira MD as Resident (Family Medicine)  Subjective   SUBJECTIVE   Feels that his legs are weak. Denied pain, SOB, nausea, vomiting, fever, chills.          Objective   OBJECTIVE   Vital Signs  Temp:  [96.1 °F (35.6 °C)-98.2 °F (36.8 °C)] 96.2 °F (35.7 °C)  Heart Rate:  [59-87] 59  Resp:  [18-20] 18  BP: ()/(55-78) 91/69    Flowsheet Rows      First Filed Value   Admission Height  172.7 cm (68\") Documented at 2018 1556   Admission Weight  102 kg (225 lb) Documented at 2018 1556           I/O last 3 completed shifts:  In: 1249.6 [P.O.:840; Blood:409.6]  Out: 3500 [Other:3500]    PHYSICAL EXAM    Physical Exam   Constitutional: He is oriented to person, place, and time. He appears well-developed and well-nourished.   HENT:   Head: Normocephalic and atraumatic.   Eyes: Conjunctivae and EOM are normal. Pupils are equal, round, and reactive to light.   Cardiovascular: Normal rate and regular rhythm.   Pulmonary/Chest: Effort normal and breath sounds normal.   Abdominal: Soft.   Neurological: He is alert and oriented to person, place, and time.   Skin: Skin is warm and dry.       RESULTS   Results Review:    Results from last 7 days   Lab Units  18   0717  18   0649  18   0728   SODIUM mmol/L  131*  129*  133*   POTASSIUM mmol/L  4.2  5.3*  5.3* "   CHLORIDE mmol/L  92*  88*  91*   CO2 mmol/L  26.0  28.0  29.0   BUN mg/dL  32*  56*  46*   CREATININE mg/dL  4.75*  6.57*  5.09*   CALCIUM mg/dL  7.8*  8.3*  8.5   BILIRUBIN mg/dL  0.9  1.4*  1.2   ALK PHOS U/L  136*  84  89   ALT (SGPT) U/L  20*  14*  19*   AST (SGOT) U/L  26  30  29   GLUCOSE mg/dL  133*  109*  181*       Estimated Creatinine Clearance: 17.7 mL/min (A) (by C-G formula based on SCr of 4.75 mg/dL (H)).                Results from last 7 days   Lab Units  12/22/18   0717  12/21/18   0649  12/20/18   0728  12/19/18   1700   WBC 10*3/mm3  5.55  6.98  5.89  6.22   HEMOGLOBIN g/dL  8.6*  8.6*  6.2*  6.4*   PLATELETS 10*3/mm3  89*  112*  82*  138*       Results from last 7 days   Lab Units  12/19/18   1700   INR   1.27*         Imaging Results (last 24 hours)     Procedure Component Value Units Date/Time    XR Abdomen KUB [[id number]] Collected:  12/21/18 1242     Updated:  12/21/18 1256    Narrative:       PROCEDURE: XR ABDOMEN KUB    Clinical History: worsening abdominal distension, D64.9 Anemia,  unspecified K92.2 Gastrointestinal hemorrhage, unspecified    Indication:     Same as above     Comparison:     12/19/2018     Technique:    Single supine view of the abdomen and pelvis.    Findings:     There is no gross evidence of free air in the abdomen or the  pelvis on this single supine view of the abdomen and pelvis.    The small and large bowel gas pattern does not show any evidence  of obstruction, ileus or bowel wall thickening.    There is no visualization of radiopaque calculi in the outline of  the urinary tract.    There is no significant constipation. Vascular calcifications are  seen in the pelvis      Impression:       Impression:     Unremarkable bowel gas pattern      Location of Interpretation:   [id number]    Electronically signed by:  Beltran Lucio MD  12/21/2018 12:55 PM  CST Workstation: [id number]           MEDICATIONS      aspirin 81 mg Oral Daily   brimonidine 1 drop Both Eyes  TID   cetirizine 5 mg Oral Daily   dorzolamide 1 drop Left Eye TID   escitalopram 10 mg Oral Daily   fluticasone 2 spray Nasal Daily   gabapentin 100 mg Oral Nightly   insulin aspart 0-14 Units Subcutaneous 4x Daily AC & at Bedtime   insulin detemir 25 Units Subcutaneous Daily   latanoprost 1 drop Both Eyes Nightly   pantoprazole 40 mg Oral Q AM   polyethylene glycol 17 g Oral Daily   sevelamer 800 mg Oral TID With Meals   sodium chloride 3 mL Intravenous Q12H   traZODone 50 mg Oral Nightly          Assessment/Plan   ASSESSMENT / PLAN      Diabetes mellitus (CMS/[facility])    ESRD (end stage renal disease) (CMS/[facility])    Chronic pain    Anxiety    Gastroesophageal reflux disease without esophagitis    1. ESRD on HD MWF:  - Received HD yesterday with UF of 3.5 kgs. Tolerated well.   - No HD needs today. Noted plans for discharge today. Next HD tomorrow (Sunday) as outpatient due to holidays.      2. Hypertension:  - Blood pressure is acceptable     3. Anemia:  - Hemoglobin is stable at 8.6 after receiving pRBC transfusion.     4. Secondary hyperparathyroidism     5. Diabetes type 2           This document has been electronically signed by Alethea Diamond MD on December 22, 2018 10:19 AM

## 2018-12-22 NOTE — DISCHARGE SUMMARY
"    DISCHARGE SUMMARY    PATIENT NAME: Cristo Musa       PHYSICIAN: Michelle Lo MD  : 1950  MRN: [id number]    ADMITTED: 2018     DISCHARGED: 18      ADMISSION DIAGNOSES:  Active Hospital Problems    Diagnosis Date Noted   • Gastroesophageal reflux disease without esophagitis [K21.9] 2017   • Chronic pain [G89.29] 2017   • Anxiety [F41.9] 2017   • ESRD (end stage renal disease) (CMS/[facility]) [N18.6] 2017   • Diabetes mellitus (CMS/[facility]) [E11.9] 2016      Resolved Hospital Problems    Diagnosis Date Noted Date Resolved   • **Symptomatic anemia [D64.9] 2018   • Acute GI bleeding [K92.2] 2018     DISCHARGE DIAGNOSES:   Active Hospital Problems    Diagnosis Date Noted   • Gastroesophageal reflux disease without esophagitis [K21.9] 2017   • Chronic pain [G89.29] 2017   • Anxiety [F41.9] 2017   • ESRD (end stage renal disease) (CMS/[facility]) [N18.6] 2017   • Diabetes mellitus (CMS/[facility]) [E11.9] 2016      Resolved Hospital Problems    Diagnosis Date Noted Date Resolved   • **Symptomatic anemia [D64.9] 2018   • Acute GI bleeding [K92.2] 2018       SERVICE: Family Medicine.  Attending: Dr. Haider  Resident: Michelle Lo MD    CONSULTS:   Consult Orders (all) (From admission, onward)    Start     Ordered    18  Inpatient Nephrology Consult  Once     Specialty:  Nephrology  Provider:  Alethea Diamond MD    18  Inpatient Gastroenterology Consult  Once     Specialty:  Gastroenterology  Provider:  Flako Montoya MD    18          PROCEDURES:   None    HISTORY OF PRESENT ILLNESS:   Copied from Dr. Rodríguez's H&P on 18:    \"Cristo Musa is a 68 y.o. male with CMHx of DM2, ESRD on MWF dialysis, anxiety, GERD presented to ER for complaint of weakness and leg pain. Patient had labs drawn with Monday dialysis. " " He is noted to have a hemoglobin 7.7 at that time he is also complaining of some weakness.  The defect wheezes be going on for last 2 weeks.  He was complaining of extreme weakness and leg pain today at the nursing home.  This is medicine in the ER for further evaluation.  Here in the ER, he is noted to have a hemoglobin of 6.4.  He's had 2 instances recently of heme positive stools in the nursing home.  He does report having one episode of bright red blood from the rectum about a week ago.  He denies any episodes since then.  He was having constipation at that time and this was shortly after he had disimpaction.  No problems with chest pain or shortness of air.  No problems with headaches or dizziness.  Just generalized weakness all over and leg pain.\"      DIAGNOSTIC DATA:   Lab Results (last 72 hours)     Procedure Component Value Units Date/Time    Comprehensive Metabolic Panel [[id number]]  (Abnormal) Collected:  12/22/18 0717    Specimen:  Blood Updated:  12/22/18 0759     Glucose 133 mg/dL      BUN 32 mg/dL      Creatinine 4.75 mg/dL      Sodium 131 mmol/L      Potassium 4.2 mmol/L      Chloride 92 mmol/L      CO2 26.0 mmol/L      Calcium 7.8 mg/dL      Total Protein 7.6 g/dL      Albumin 3.40 g/dL      ALT (SGPT) 20 U/L      AST (SGOT) 26 U/L      Alkaline Phosphatase 136 U/L      Total Bilirubin 0.9 mg/dL      eGFR  African Amer 15 mL/min/1.73      Globulin 4.2 gm/dL      A/G Ratio 0.8 g/dL      BUN/Creatinine Ratio 6.7     Anion Gap 13.0 mmol/L     POC Glucose Once [[id number]]  (Abnormal) Collected:  12/22/18 0716    Specimen:  Blood Updated:  12/22/18 0732     Glucose 134 mg/dL      Comment: RN NotifiedOperator: [id number] ASHLEY ANGELITAMeter ID: [id number]       CBC Auto Differential [[id number]]  (Abnormal) Collected:  12/22/18 0717    Specimen:  Blood Updated:  12/22/18 0727     WBC 5.55 10*3/mm3      RBC 2.77 10*6/mm3      Hemoglobin 8.6 g/dL      Hematocrit 26.4 %      MCV 95.3 fL      MCH 31.0 pg  "     MCHC 32.6 g/dL      RDW 18.6 %      RDW-SD 63.8 fl      MPV 8.1 fL      Platelets 89 10*3/mm3      Neutrophil % 58.7 %      Lymphocyte % 27.0 %      Monocyte % 11.2 %      Eosinophil % 2.5 %      Basophil % 0.2 %      Immature Grans % 0.4 %      Neutrophils, Absolute 3.26 10*3/mm3      Lymphocytes, Absolute 1.50 10*3/mm3      Monocytes, Absolute 0.62 10*3/mm3      Eosinophils, Absolute 0.14 10*3/mm3      Basophils, Absolute 0.01 10*3/mm3      Immature Grans, Absolute 0.02 10*3/mm3     POC Glucose Once [[id number]]  (Abnormal) Collected:  12/22/18 0524    Specimen:  Blood Updated:  12/22/18 0536     Glucose 149 mg/dL      Comment: : [id number] [facility] ID: [id number]       POC Glucose Once [[id number]]  (Abnormal) Collected:  12/21/18 1926    Specimen:  Blood Updated:  12/22/18 0516     Glucose 195 mg/dL      Comment: RN NotifiedOperator: [id number] [facility] ID: [id number]       POC Glucose Once [[id number]]  (Abnormal) Collected:  12/21/18 1628    Specimen:  Blood Updated:  12/21/18 1757     Glucose 192 mg/dL      Comment: Result Not ConfirmedOperator: [id number] [facility] [facility] ANGELITAMeter ID: [id number]       POC Glucose Once [[id number]]  (Normal) Collected:  12/21/18 1358    Specimen:  Blood Updated:  12/21/18 1413     Glucose 70 mg/dL      Comment: : [id number] [facility] ANGELITAMeter ID: [id number]       POC Glucose Once [[id number]]  (Normal) Collected:  12/21/18 0722    Specimen:  Blood Updated:  12/21/18 0742     Glucose 103 mg/dL      Comment: : [id number] [employer] ANGELITAMeter ID: [id number]       CBC Auto Differential [[id number]]  (Abnormal) Collected:  12/21/18 0649    Specimen:  Blood Updated:  12/21/18 0733     WBC 6.98 10*3/mm3      RBC 2.76 10*6/mm3      Hemoglobin 8.6 g/dL      Hematocrit 25.8 %      MCV 93.5 fL      MCH 31.2 pg      MCHC 33.3 g/dL      RDW 18.4 %      RDW-SD 61.0 fl      MPV 9.4 fL      Platelets 112 10*3/mm3      Neutrophil % 63.0 %       Lymphocyte % 22.2 %      Monocyte % 11.7 %      Eosinophil % 2.0 %      Basophil % 0.4 %      Immature Grans % 0.7 %      Neutrophils, Absolute 4.39 10*3/mm3      Lymphocytes, Absolute 1.55 10*3/mm3      Monocytes, Absolute 0.82 10*3/mm3      Eosinophils, Absolute 0.14 10*3/mm3      Basophils, Absolute 0.03 10*3/mm3      Immature Grans, Absolute 0.05 10*3/mm3      nRBC 1.2 /100 WBC     Comprehensive Metabolic Panel [[id number]]  (Abnormal) Collected:  12/21/18 0649    Specimen:  Blood Updated:  12/21/18 0727     Glucose 109 mg/dL      BUN 56 mg/dL      Creatinine 6.57 mg/dL      Sodium 129 mmol/L      Potassium 5.3 mmol/L      Chloride 88 mmol/L      CO2 28.0 mmol/L      Calcium 8.3 mg/dL      Total Protein 7.7 g/dL      Albumin 3.50 g/dL      ALT (SGPT) 14 U/L      AST (SGOT) 30 U/L      Alkaline Phosphatase 84 U/L      Total Bilirubin 1.4 mg/dL      eGFR Non African Amer -- mL/min/1.73      Comment: <15 Indicative of kidney failure.        eGFR  African Amer 10 mL/min/1.73      Comment: <15 Indicative of kidney failure.        Globulin 4.2 gm/dL      A/G Ratio 0.8 g/dL      BUN/Creatinine Ratio 8.5     Anion Gap 13.0 mmol/L     POC Glucose Once [[id number]]  (Abnormal) Collected:  12/20/18 2231    Specimen:  Blood Updated:  12/20/18 2248     Glucose 180 mg/dL      Comment: RN NotifiedOperator: [id number] RADHA CALISSAMeter ID: [id number]       POC Glucose Once [[id number]]  (Abnormal) Collected:  12/20/18 2137    Specimen:  Blood Updated:  12/20/18 2149     Glucose 147 mg/dL      Comment: RN NotifiedOperator: [id number] VIKTORIA HEATHERMeter ID: [id number]       POC Glucose Once [[id number]]  (Abnormal) Collected:  12/20/18 1935    Specimen:  Blood Updated:  12/20/18 2006     Glucose 134 mg/dL      Comment: RN NotifiedOperator: [id number] MARTI MENJIVARFERMeter ID: [id number]       POC Glucose Once [[id number]]  (Abnormal) Collected:  12/20/18 1642    Specimen:  Blood Updated:  12/20/18 1702     Glucose 131 mg/dL       Comment: RN NotifiedOperator: [id number] [facility] ID: [id number]       POC Glucose Once [[id number]]  (Abnormal) Collected:  12/20/18 1045    Specimen:  Blood Updated:  12/20/18 1105     Glucose 206 mg/dL      Comment: RN NotifiedOperator: [id number] [facility] ID: [id number]       CRE Screen by PCR - Swab, Large Intestine, Rectum [[id number]] Collected:  12/20/18 0642    Specimen:  Swab from Large Intestine, Rectum Updated:  12/20/18 0908     CRE SCREEN Not Detected     Comment: Test performed by real-time polymerase chain reaction (qPCR).        OXA 48 Strain Not Detected     IMP STRAIN Not Detected     VIM STRAIN Not Detected     NDM Strain Not Detected     KPC Strain Not Detected    Comprehensive Metabolic Panel [[id number]]  (Abnormal) Collected:  12/20/18 0728    Specimen:  Blood Updated:  12/20/18 0815     Glucose 181 mg/dL      BUN 46 mg/dL      Creatinine 5.09 mg/dL      Sodium 133 mmol/L      Potassium 5.3 mmol/L      Chloride 91 mmol/L      CO2 29.0 mmol/L      Calcium 8.5 mg/dL      Total Protein 7.6 g/dL      Albumin 3.50 g/dL      ALT (SGPT) 19 U/L      AST (SGOT) 29 U/L      Alkaline Phosphatase 89 U/L      Total Bilirubin 1.2 mg/dL      eGFR Non African Amer -- mL/min/1.73      Comment: <15 Indicative of kidney failure.        eGFR   Amer 14 mL/min/1.73      Comment: <15 Indicative of kidney failure.        Globulin 4.1 gm/dL      A/G Ratio 0.9 g/dL      BUN/Creatinine Ratio 9.0     Anion Gap 13.0 mmol/L     CBC Auto Differential [[id number]]  (Abnormal) Collected:  12/20/18 0728    Specimen:  Blood Updated:  12/20/18 0814     WBC 5.89 10*3/mm3      RBC 2.03 10*6/mm3      Hemoglobin 6.2 g/dL      Hematocrit 20.0 %      MCV 98.5 fL      MCH 30.5 pg      MCHC 31.0 g/dL      RDW 17.5 %      RDW-SD 60.0 fl      MPV 8.9 fL      Platelets 82 10*3/mm3      Neutrophil % 62.5 %      Lymphocyte % 22.6 %      Monocyte % 11.9 %      Eosinophil % 1.5 %      Basophil % 0.7 %       Immature Grans % 0.8 %      Neutrophils, Absolute 3.68 10*3/mm3      Lymphocytes, Absolute 1.33 10*3/mm3      Monocytes, Absolute 0.70 10*3/mm3      Eosinophils, Absolute 0.09 10*3/mm3      Basophils, Absolute 0.04 10*3/mm3      Immature Grans, Absolute 0.05 10*3/mm3      nRBC 0.7 /100 WBC     POC Glucose Once [[id number]]  (Abnormal) Collected:  12/20/18 0720    Specimen:  Blood Updated:  12/20/18 0743     Glucose 181 mg/dL      Comment: RN NotifiedOperator: [id number] IRASEMA AZEVEDONAMeter ID: [id number]       POC Glucose Once [[id number]]  (Abnormal) Collected:  12/19/18 2151    Specimen:  Blood Updated:  12/19/18 2219     Glucose 169 mg/dL      Comment: RN NotifiedOperator: [id number] MARTI MELARANIFERMeter ID: [id number]       Troponin [[id number]]  (Normal) Collected:  12/19/18 1958    Specimen:  Blood Updated:  12/19/18 2045     Troponin I <0.012 ng/mL     CBC & Differential [[id number]] Collected:  12/19/18 1700    Specimen:  Blood Updated:  12/19/18 1831    Narrative:       The following orders were created for panel order CBC & Differential.  Procedure                               Abnormality         Status                     ---------                               -----------         ------                     Manual Differential[[id number]]                              Final result               CBC Auto Differential[[id number]]        Abnormal            Final result                 Please view results for these tests on the individual orders.    Manual Differential [[id number]] Collected:  12/19/18 1700    Specimen:  Blood Updated:  12/19/18 1831     Neutrophil % 78.0 %      Lymphocyte % 20.0 %      Monocyte % 2.0 %      Neutrophils Absolute 4.85 10*3/mm3      Lymphocytes Absolute 1.24 10*3/mm3      Monocytes Absolute 0.12 10*3/mm3      Anisocytosis Slight/1+     Macrocytes Slight/1+     Polychromasia Slight/1+     WBC Morphology Normal     Platelet Estimate Decreased    [place] Draw [[id number]] Collected:   12/19/18 1700    Specimen:  Blood Updated:  12/19/18 1815    Narrative:       The following orders were created for panel order [place] Draw.  Procedure                               Abnormality         Status                     ---------                               -----------         ------                     Light Blue Top[[id number]]                                   Final result               Green Top (Gel)[[id number]]                                  Final result               Lavender Top[[id number]]                                     Final result               Gold Top - SST[[id number]]                                   Final result                 Please view results for these tests on the individual orders.    Light Blue Top [[id number]] Collected:  12/19/18 1700    Specimen:  Blood Updated:  12/19/18 1815     Extra Tube hold for add-on     Comment: Auto resulted       Green Top (Gel) [[id number]] Collected:  12/19/18 1700    Specimen:  Blood Updated:  12/19/18 1815     Extra Tube Hold for add-ons.     Comment: Auto resulted.       Lavender Top [[id number]] Collected:  12/19/18 1700    Specimen:  Blood Updated:  12/19/18 1815     Extra Tube hold for add-on     Comment: Auto resulted       Gold Top - SST [[id number]] Collected:  12/19/18 1700    Specimen:  Blood Updated:  12/19/18 1815     Extra Tube Hold for add-ons.     Comment: Auto resulted.       Protime-INR [[id number]]  (Abnormal) Collected:  12/19/18 1700    Specimen:  Blood Updated:  12/19/18 1742     Protime 15.6 Seconds      INR 1.27    Narrative:       Therapeutic range for most indications is 2.0-3.0 INR,  or 2.5-3.5 for mechanical heart valves.    Troponin [[id number]]  (Normal) Collected:  12/19/18 1700    Specimen:  Blood Updated:  12/19/18 1736     Troponin I <0.012 ng/mL     BNP [[id number]]  (Abnormal) Collected:  12/19/18 1700    Specimen:  Blood Updated:  12/19/18 1736     proBNP 8,890.0 pg/mL     Comprehensive Metabolic Panel [[id number]]   (Abnormal) Collected:  12/19/18 1700    Specimen:  Blood Updated:  12/19/18 1725     Glucose 211 mg/dL      BUN 31 mg/dL      Creatinine 3.99 mg/dL      Sodium 133 mmol/L      Potassium 3.5 mmol/L      Chloride 88 mmol/L      CO2 34.0 mmol/L      Calcium 8.6 mg/dL      Total Protein 8.1 g/dL      Albumin 3.70 g/dL      ALT (SGPT) 11 U/L      AST (SGOT) 31 U/L      Alkaline Phosphatase 105 U/L      Total Bilirubin 0.9 mg/dL      eGFR  African Amer 18 mL/min/1.73      Globulin 4.4 gm/dL      A/G Ratio 0.8 g/dL      BUN/Creatinine Ratio 7.8     Anion Gap 11.0 mmol/L     Lipase [[id number]]  (Normal) Collected:  12/19/18 1700    Specimen:  Blood Updated:  12/19/18 1725     Lipase 217 U/L     CBC Auto Differential [[id number]]  (Abnormal) Collected:  12/19/18 1700    Specimen:  Blood Updated:  12/19/18 1720     WBC 6.22 10*3/mm3      RBC 2.00 10*6/mm3      Hemoglobin 6.4 g/dL      Hematocrit 19.7 %      MCV 98.5 fL      MCH 32.0 pg      MCHC 32.5 g/dL      RDW 17.3 %      RDW-SD 58.4 fl      MPV 7.9 fL      Platelets 138 10*3/mm3         Imaging Results (last 72 hours)     Procedure Component Value Units Date/Time    XR Abdomen KUB [[id number]] Collected:  12/21/18 1242     Updated:  12/21/18 1256    Narrative:       PROCEDURE: XR ABDOMEN KUB    Clinical History: worsening abdominal distension, D64.9 Anemia,  unspecified K92.2 Gastrointestinal hemorrhage, unspecified    Indication:     Same as above     Comparison:     12/19/2018     Technique:    Single supine view of the abdomen and pelvis.    Findings:     There is no gross evidence of free air in the abdomen or the  pelvis on this single supine view of the abdomen and pelvis.    The small and large bowel gas pattern does not show any evidence  of obstruction, ileus or bowel wall thickening.    There is no visualization of radiopaque calculi in the outline of  the urinary tract.    There is no significant constipation. Vascular calcifications are  seen in the pelvis       Impression:       Impression:     Unremarkable bowel gas pattern      Location of Interpretation:   [id number]    Electronically signed by:  Beltran Lucio MD  12/21/2018 12:55 PM  CST Workstation: [id number]    CT Head Without Contrast [[id number]] Collected:  12/19/18 1722     Updated:  12/19/18 1746    Narrative:       PROCEDURE: CT HEAD WO CONTRAST    HISTORY: weakness    Indication: Same as above    Comparison: None    Technique:     CT of the head was done without intravenous contrast was done in  the orthogonal planes.    This exam was performed according to our departmental  dose-optimization program, which includes automated exposure  control, adjustment of the mA and/or KV according to the  patient's size and/or use of iterative reconstruction technique.      FINDINGS:     There is no intracranial hemorrhage, midline shift mass effect or  acute focal infarct.    There is minimal prominence of the sylvian fissures and the  cortical sulci reflecting age related volume loss.   There is minimal periventricular and deep white matter low  attenuation, most likely related to small vessel white matter  ischemic disease.    If clinical concern exists regarding an acute ischemic/vascular  pathology being responsible for patient's symptomatology, an MRI  of the brain is more sensitive than the current study, in ruling  out such a possibility.    There is good gray/white matter differentiation.    The ventricular system is normal.    The mastoid air cells are unremarkable .     The paranasal sinuses are unremarkable .    There is no visualization of acute fractures involving the  calvarium or the skull base.       Impression:         There is no acute intracranial abnormality.    Electronically signed by:  Beltran Lucio MD  12/19/2018 5:44 PM  CST Workstation: [email or web address]    XR Abdomen 2 View With Chest 1 View [[id number]] Collected:  12/19/18 1630     Updated:  12/19/18 1651    Narrative:       PROCEDURE: XR  ABDOMEN 2 VW W CHEST 1 VW    HISTORY: constipation    COMPARISON: 7/26/2018 .    TECHNIQUE:     Two views of the abdomen and pelvis and Oneview of the chest,  all projections in the frontal projection.    FINDINGS:     There are no discrete airspace infiltrates, pneumothoraces or  pleural effusions.    The pulmonary vascularity is normal.    The cardiomediastinal silhouette is stable    The bowel gas pattern does not show any evidence of obstruction,  ileus, or bowel wall thickening.    There is no gross evidence of free air in the abdomen or the  pelvis.    There is no visualization of radiopaque calculi in the outline of  the urinary tract.    Small amount of retained fecal material is seen in the large  bowel. Vascular calcifications are seen in the pelvis .      Impression:          Mild constipation.    There are no acute findings in the  chest     Electronically signed by:  Beltran Lucio MD  12/19/2018 4:50 PM  CST Workstation: [id number]-           HOSPITAL COURSE:  Patient was admitted from [facility] with anemia secondary to acute GI bleed. He was admitted with a hemoglobin of 6.4. Nephrology was consulted as patient has ESRD and undergoes hemodialysis. They recommended transfusing 1 unit PRBCs until they could evaluate the patient one day 2 of admission. Dr. Vilchis evaluated the patient and continued dialysis while patient was admitted. GI was consulted as the patient had an acute GI bleed and recommended EGD during this hospitalization, however IV access was lost and access was unable to be obtained, so EGD was not able to be performed. He subsequently received 2 more units of PRBCs without complication. Hemoglobin and hematrocrit were trended while patient was admitted and increased appropriately after transfusion with H/H remaining stable at 8.6 prior to discharge. He was cleared by GI to be discharged back to [facility]. Electrolytes were monitored and replaced  as necessary. Nephrology recommended hemodialysis on Sunday, the day after discharge, as his normal schedule is M/W/F and given the holiday he will unable to receive hemodialysis on Monday of the upcoming week. GI recommended outpatient follow up within a week of discharge.    DISCHARGE CONDITION:   stable    DISPOSITION:  Skilled Nursing Facility ([place] - External)    DISCHARGE MEDICATIONS     Discharge Medications      New Medications      Instructions Start Date   escitalopram 10 MG tablet  Commonly known as:  LEXAPRO   10 mg, Oral, Daily         Continue These Medications      Instructions Start Date   acetaminophen 500 MG tablet  Commonly known as:  TYLENOL   500 mg, Oral, Every 4 Hours PRN      aspirin 81 MG chewable tablet   81 mg, Oral, Daily      brimonidine 0.1 % solution ophthalmic solution  Commonly known as:  ALPHAGAN P   1 drop, Both Eyes, 3 Times Daily      dorzolamide 2 % ophthalmic solution  Commonly known as:  TRUSOPT   1 drop, Left Eye, 3 Times Daily      famotidine 20 MG tablet  Commonly known as:  PEPCID   20 mg, Oral, Every Night at Bedtime      fluticasone 50 MCG/ACT nasal spray  Commonly known as:  FLONASE   2 sprays, Nasal, Daily, Administer 2 sprays in each nostril for each dose.      gabapentin 100 MG capsule  Commonly known as:  NEURONTIN   100 mg, Oral, Nightly      guaiFENesin 200 MG tablet   400 mg, Oral, Every 4 Hours PRN      HYDROcodone-acetaminophen 7.5-325 MG per tablet  Commonly known as:  NORCO   1 tablet, Oral, Every 6 Hours PRN      insulin aspart 100 UNIT/ML injection  Commonly known as:  novoLOG   Subcutaneous, 3 Times Daily Before Meals, Sliding scale:  = 0 units; 150-200 = 3 units; 200-250 = 6 units; 250-300 = 9 units; 300-350 = 12 units; >350 = 15 units and call MD      insulin detemir 100 UNIT/ML injection  Commonly known as:  LEVEMIR   25 Units, Subcutaneous, Daily      latanoprost 0.005 % ophthalmic solution  Commonly known as:  XALATAN   1 drop, Both Eyes,  Nightly      Loratadine 10 MG capsule   10 mg, Oral, Nightly      NEPRO PO   1 tablet, Oral, Daily      omeprazole 20 MG capsule  Commonly known as:  priLOSEC   20 mg, Oral, Daily      ondansetron ODT 4 MG disintegrating tablet  Commonly known as:  ZOFRAN-ODT   4 mg, Oral, Every 6 Hours PRN      PARoxetine 30 MG tablet  Commonly known as:  PAXIL   30 mg, Oral, Every Night at Bedtime      polyethylene glycol packet  Commonly known as:  MIRALAX   17 g, Oral, Daily PRN      sennosides-docusate sodium 8.6-50 MG tablet  Commonly known as:  SENOKOT-S   2 tablets, Oral, 2 Times Daily PRN      sevelamer 800 MG tablet  Commonly known as:  RENVELA   800 mg, Oral, 3 Times Daily With Meals      traZODone 50 MG tablet  Commonly known as:  DESYREL   50 mg, Oral, Every Night at Bedtime         Stop These Medications    loperamide 2 MG capsule  Commonly known as:  IMODIUM            INSTRUCTIONS:  Activity:   Activity Instructions     Activity as Tolerated          Diet:   Diet Instructions     Diet: Consistent Carbohydrate, Cardiac, Renal      Discharge Diet:   Consistent Carbohydrate  Cardiac  Renal           Special instructions: Patient instructed to call MD or return to ED with worsening shortness of breath, chest pain, fever greater than 100.4 degrees F or any other medical concerns..    FOLLOW UP:   Follow-up Information     Cory Rodríguez MD Follow up.    Specialty:  Family Medicine  Contact information:  [address] [place] [address]  [phone]             Jett Nieto MD Follow up.    Specialty:  Cardiology  Contact information:  [address] [place] [address] [address]  [place] [id number] [phone]             Flako Montoya MD Follow up.    Specialty:  Gastroenterology  Contact information:  [address] [place] [address] [address]  [place] [address]  [phone]                   PENDING TEST RESULTS AT DISCHARGE      Time: less than 30 minutes were spent in the planning of this discharge.    Dr. Haider is the  attending at time of discharge, He is aware of the patient's status and agrees with the above discharge summary.        Michelle Lo MD PGY2   [facility]  This document has been electronically signed by Michelle Lo MD on December 22, 2018 12:10 PM

## 2018-12-23 LAB
ABO + RH BLD: NORMAL
ABO + RH BLD: NORMAL
BH BB BLOOD EXPIRATION DATE: NORMAL
BH BB BLOOD EXPIRATION DATE: NORMAL
BH BB BLOOD TYPE BARCODE: 7300
BH BB BLOOD TYPE BARCODE: 7300
BH BB DISPENSE STATUS: NORMAL
BH BB DISPENSE STATUS: NORMAL
BH BB PRODUCT CODE: NORMAL
BH BB PRODUCT CODE: NORMAL
BH BB UNIT NUMBER: NORMAL
BH BB UNIT NUMBER: NORMAL
UNIT  ABO: NORMAL
UNIT  ABO: NORMAL
UNIT  RH: NORMAL
UNIT  RH: NORMAL

## 2018-12-24 ENCOUNTER — NURSING HOME (OUTPATIENT)
Dept: FAMILY MEDICINE CLINIC | Facility: CLINIC | Age: 68
End: 2018-12-24

## 2018-12-24 DIAGNOSIS — R06.02 SOB (SHORTNESS OF BREATH): Primary | ICD-10-CM

## 2018-12-24 PROCEDURE — 99308 SBSQ NF CARE LOW MDM 20: CPT | Performed by: STUDENT IN AN ORGANIZED HEALTH CARE EDUCATION/TRAINING PROGRAM

## 2018-12-25 ENCOUNTER — LAB REQUISITION (OUTPATIENT)
Dept: LAB | Facility: HOSPITAL | Age: 68
End: 2018-12-25

## 2018-12-25 DIAGNOSIS — R50.9 FEVER: ICD-10-CM

## 2018-12-25 DIAGNOSIS — Z00.00 ROUTINE GENERAL MEDICAL EXAMINATION AT A HEALTH CARE FACILITY: ICD-10-CM

## 2018-12-25 LAB
ALBUMIN SERPL-MCNC: 3.5 G/DL (ref 3.5–5)
ALBUMIN/GLOB SERPL: 0.8 G/DL (ref 1.1–2.5)
ALP SERPL-CCNC: 193 U/L (ref 24–120)
ALT SERPL W P-5'-P-CCNC: 18 U/L (ref 0–54)
ANION GAP SERPL CALCULATED.3IONS-SCNC: 16 MMOL/L (ref 4–13)
AST SERPL-CCNC: 32 U/L (ref 7–45)
B PERT DNA SPEC QL NAA+PROBE: NOT DETECTED
BASOPHILS # BLD AUTO: 0.02 10*3/MM3 (ref 0–0.2)
BASOPHILS NFR BLD AUTO: 0.5 % (ref 0–2)
BILIRUB SERPL-MCNC: 0.6 MG/DL (ref 0.1–1)
BUN BLD-MCNC: 41 MG/DL (ref 5–21)
BUN/CREAT SERPL: 5 (ref 7–25)
C PNEUM DNA NPH QL NAA+NON-PROBE: NOT DETECTED
CALCIUM SPEC-SCNC: 7.3 MG/DL (ref 8.4–10.4)
CHLORIDE SERPL-SCNC: 88 MMOL/L (ref 98–110)
CO2 SERPL-SCNC: 27 MMOL/L (ref 24–31)
CREAT BLD-MCNC: 8.17 MG/DL (ref 0.5–1.4)
DEPRECATED RDW RBC AUTO: 55.9 FL (ref 40–54)
EOSINOPHIL # BLD AUTO: 0.08 10*3/MM3 (ref 0–0.7)
EOSINOPHIL NFR BLD AUTO: 2 % (ref 0–4)
ERYTHROCYTE [DISTWIDTH] IN BLOOD BY AUTOMATED COUNT: 15.9 % (ref 12–15)
FLUAV H1 2009 PAND RNA NPH QL NAA+PROBE: NOT DETECTED
FLUAV H1 HA GENE NPH QL NAA+PROBE: NOT DETECTED
FLUAV H3 RNA NPH QL NAA+PROBE: NOT DETECTED
FLUAV SUBTYP SPEC NAA+PROBE: NOT DETECTED
FLUBV RNA ISLT QL NAA+PROBE: NOT DETECTED
GFR SERPL CREATININE-BSD FRML MDRD: 8 ML/MIN/1.73
GFR SERPL CREATININE-BSD FRML MDRD: ABNORMAL ML/MIN/1.73
GLOBULIN UR ELPH-MCNC: 4.6 GM/DL
GLUCOSE BLD-MCNC: 293 MG/DL (ref 70–100)
HADV DNA SPEC NAA+PROBE: NOT DETECTED
HCOV 229E RNA SPEC QL NAA+PROBE: NOT DETECTED
HCOV HKU1 RNA SPEC QL NAA+PROBE: NOT DETECTED
HCOV NL63 RNA SPEC QL NAA+PROBE: NOT DETECTED
HCOV OC43 RNA SPEC QL NAA+PROBE: NOT DETECTED
HCT VFR BLD AUTO: 27.9 % (ref 40–52)
HGB BLD-MCNC: 8.9 G/DL (ref 14–18)
HMPV RNA NPH QL NAA+NON-PROBE: NOT DETECTED
HPIV1 RNA SPEC QL NAA+PROBE: NOT DETECTED
HPIV2 RNA SPEC QL NAA+PROBE: NOT DETECTED
HPIV3 RNA NPH QL NAA+PROBE: NOT DETECTED
HPIV4 P GENE NPH QL NAA+PROBE: NOT DETECTED
IMM GRANULOCYTES # BLD AUTO: 0.01 10*3/MM3 (ref 0–0.03)
IMM GRANULOCYTES NFR BLD AUTO: 0.3 % (ref 0–5)
LYMPHOCYTES # BLD AUTO: 0.84 10*3/MM3 (ref 0.72–4.86)
LYMPHOCYTES NFR BLD AUTO: 21.3 % (ref 15–45)
M PNEUMO IGG SER IA-ACNC: NOT DETECTED
MCH RBC QN AUTO: 30.9 PG (ref 28–32)
MCHC RBC AUTO-ENTMCNC: 31.9 G/DL (ref 33–36)
MCV RBC AUTO: 96.9 FL (ref 82–95)
MONOCYTES # BLD AUTO: 0.36 10*3/MM3 (ref 0.19–1.3)
MONOCYTES NFR BLD AUTO: 9.1 % (ref 4–12)
NEUTROPHILS # BLD AUTO: 2.64 10*3/MM3 (ref 1.87–8.4)
NEUTROPHILS NFR BLD AUTO: 66.8 % (ref 39–78)
NRBC BLD AUTO-RTO: 0 /100 WBC (ref 0–0)
PLATELET # BLD AUTO: 102 10*3/MM3 (ref 130–400)
PMV BLD AUTO: 9.8 FL (ref 6–12)
POTASSIUM BLD-SCNC: 3.6 MMOL/L (ref 3.5–5.3)
PROT SERPL-MCNC: 8.1 G/DL (ref 6.3–8.7)
RBC # BLD AUTO: 2.88 10*6/MM3 (ref 4.8–5.9)
RHINOVIRUS RNA SPEC NAA+PROBE: NOT DETECTED
RSV RNA NPH QL NAA+NON-PROBE: NOT DETECTED
SODIUM BLD-SCNC: 131 MMOL/L (ref 135–145)
WBC NRBC COR # BLD: 3.95 10*3/MM3 (ref 4.8–10.8)

## 2018-12-25 PROCEDURE — 87486 CHLMYD PNEUM DNA AMP PROBE: CPT | Performed by: FAMILY MEDICINE

## 2018-12-25 PROCEDURE — 87581 M.PNEUMON DNA AMP PROBE: CPT | Performed by: FAMILY MEDICINE

## 2018-12-25 PROCEDURE — 87798 DETECT AGENT NOS DNA AMP: CPT | Performed by: FAMILY MEDICINE

## 2018-12-25 PROCEDURE — 87633 RESP VIRUS 12-25 TARGETS: CPT | Performed by: FAMILY MEDICINE

## 2018-12-25 PROCEDURE — 80053 COMPREHEN METABOLIC PANEL: CPT

## 2018-12-25 PROCEDURE — 85025 COMPLETE CBC W/AUTO DIFF WBC: CPT

## 2019-01-01 ENCOUNTER — DOCUMENTATION (OUTPATIENT)
Dept: FAMILY MEDICINE CLINIC | Facility: CLINIC | Age: 69
End: 2019-01-01

## 2019-01-01 ENCOUNTER — NURSING HOME (OUTPATIENT)
Dept: FAMILY MEDICINE CLINIC | Facility: CLINIC | Age: 69
End: 2019-01-01

## 2019-01-01 ENCOUNTER — EPISODE CHANGES (OUTPATIENT)
Dept: CASE MANAGEMENT | Facility: OTHER | Age: 69
End: 2019-01-01

## 2019-01-01 ENCOUNTER — APPOINTMENT (OUTPATIENT)
Dept: GENERAL RADIOLOGY | Facility: HOSPITAL | Age: 69
End: 2019-01-01

## 2019-01-01 ENCOUNTER — TELEPHONE (OUTPATIENT)
Dept: FAMILY MEDICINE CLINIC | Facility: CLINIC | Age: 69
End: 2019-01-01

## 2019-01-01 ENCOUNTER — INFUSION (OUTPATIENT)
Dept: PEDIATRICS | Facility: HOSPITAL | Age: 69
End: 2019-01-01

## 2019-01-01 ENCOUNTER — ANESTHESIA (OUTPATIENT)
Dept: GASTROENTEROLOGY | Facility: HOSPITAL | Age: 69
End: 2019-01-01

## 2019-01-01 ENCOUNTER — ANESTHESIA EVENT (OUTPATIENT)
Dept: GASTROENTEROLOGY | Facility: HOSPITAL | Age: 69
End: 2019-01-01

## 2019-01-01 ENCOUNTER — TELEPHONE (OUTPATIENT)
Dept: GASTROENTEROLOGY | Facility: CLINIC | Age: 69
End: 2019-01-01

## 2019-01-01 ENCOUNTER — LAB REQUISITION (OUTPATIENT)
Dept: LAB | Facility: HOSPITAL | Age: 69
End: 2019-01-01

## 2019-01-01 ENCOUNTER — APPOINTMENT (OUTPATIENT)
Dept: CT IMAGING | Facility: HOSPITAL | Age: 69
End: 2019-01-01

## 2019-01-01 ENCOUNTER — HOSPITAL ENCOUNTER (OUTPATIENT)
Facility: HOSPITAL | Age: 69
Setting detail: OBSERVATION
Discharge: NURSING, REHAB OR LONG-TERM CARE | End: 2019-11-20
Attending: EMERGENCY MEDICINE | Admitting: FAMILY MEDICINE

## 2019-01-01 ENCOUNTER — HOSPITAL ENCOUNTER (OUTPATIENT)
Facility: HOSPITAL | Age: 69
Setting detail: HOSPITAL OUTPATIENT SURGERY
Discharge: HOME | End: 2019-03-12
Attending: INTERNAL MEDICINE | Admitting: INTERNAL MEDICINE

## 2019-01-01 ENCOUNTER — HOSPITAL ENCOUNTER (OUTPATIENT)
Facility: HOSPITAL | Age: 69
Setting detail: OBSERVATION
Discharge: NURSING, REHAB OR LONG-TERM CARE | End: 2019-04-02
Attending: EMERGENCY MEDICINE | Admitting: EMERGENCY MEDICINE

## 2019-01-01 ENCOUNTER — OFFICE VISIT (OUTPATIENT)
Dept: GASTROENTEROLOGY | Facility: CLINIC | Age: 69
End: 2019-01-01

## 2019-01-01 ENCOUNTER — PATIENT OUTREACH (OUTPATIENT)
Dept: CASE MANAGEMENT | Facility: OTHER | Age: 69
End: 2019-01-01

## 2019-01-01 ENCOUNTER — HOSPITAL ENCOUNTER (OUTPATIENT)
Facility: HOSPITAL | Age: 69
Setting detail: OBSERVATION
Discharge: NURSING, REHAB OR LONG-TERM CARE | End: 2019-02-28
Attending: EMERGENCY MEDICINE | Admitting: STUDENT IN AN ORGANIZED HEALTH CARE EDUCATION/TRAINING PROGRAM

## 2019-01-01 ENCOUNTER — HOSPITAL ENCOUNTER (EMERGENCY)
Facility: HOSPITAL | Age: 69
Discharge: NURSING, REHAB OR LONG-TERM CARE | End: 2019-12-06
Attending: EMERGENCY MEDICINE | Admitting: EMERGENCY MEDICINE

## 2019-01-01 ENCOUNTER — HOSPITAL ENCOUNTER (INPATIENT)
Facility: HOSPITAL | Age: 69
LOS: 3 days | Discharge: NURSING, REHAB OR LONG-TERM CARE | End: 2019-11-07
Attending: EMERGENCY MEDICINE | Admitting: FAMILY MEDICINE

## 2019-01-01 ENCOUNTER — HOSPITAL ENCOUNTER (OUTPATIENT)
Facility: HOSPITAL | Age: 69
Setting detail: OBSERVATION
Discharge: NURSING, REHAB OR LONG-TERM CARE | End: 2019-04-23
Attending: EMERGENCY MEDICINE | Admitting: EMERGENCY MEDICINE

## 2019-01-01 ENCOUNTER — DOCUMENTATION (OUTPATIENT)
Dept: OTOLARYNGOLOGY | Facility: CLINIC | Age: 69
End: 2019-01-01

## 2019-01-01 ENCOUNTER — HOSPITAL ENCOUNTER (EMERGENCY)
Facility: HOSPITAL | Age: 69
Discharge: NURSING, REHAB OR LONG-TERM CARE | End: 2019-12-31
Attending: EMERGENCY MEDICINE | Admitting: EMERGENCY MEDICINE

## 2019-01-01 ENCOUNTER — LAB (OUTPATIENT)
Dept: LAB | Facility: HOSPITAL | Age: 69
End: 2019-01-01

## 2019-01-01 ENCOUNTER — HOSPITAL ENCOUNTER (OUTPATIENT)
Facility: HOSPITAL | Age: 69
Setting detail: OBSERVATION
Discharge: NURSING, REHAB OR LONG-TERM CARE | End: 2019-04-06
Attending: EMERGENCY MEDICINE | Admitting: FAMILY MEDICINE

## 2019-01-01 ENCOUNTER — OFFICE VISIT (OUTPATIENT)
Dept: CARDIAC SURGERY | Facility: CLINIC | Age: 69
End: 2019-01-01

## 2019-01-01 ENCOUNTER — HOSPITAL ENCOUNTER (INPATIENT)
Facility: HOSPITAL | Age: 69
LOS: 7 days | Discharge: NURSING, REHAB OR LONG-TERM CARE | End: 2019-08-06
Attending: FAMILY MEDICINE | Admitting: FAMILY MEDICINE

## 2019-01-01 ENCOUNTER — APPOINTMENT (OUTPATIENT)
Dept: ULTRASOUND IMAGING | Facility: HOSPITAL | Age: 69
End: 2019-01-01

## 2019-01-01 ENCOUNTER — HOSPITAL ENCOUNTER (OUTPATIENT)
Facility: HOSPITAL | Age: 69
Setting detail: OBSERVATION
Discharge: NURSING, REHAB OR LONG-TERM CARE | End: 2019-06-21
Attending: FAMILY MEDICINE | Admitting: FAMILY MEDICINE

## 2019-01-01 ENCOUNTER — HOSPITAL ENCOUNTER (EMERGENCY)
Facility: HOSPITAL | Age: 69
End: 2019-01-01

## 2019-01-01 VITALS
OXYGEN SATURATION: 98 % | DIASTOLIC BLOOD PRESSURE: 74 MMHG | SYSTOLIC BLOOD PRESSURE: 138 MMHG | TEMPERATURE: 97.3 F | RESPIRATION RATE: 16 BRPM | HEART RATE: 68 BPM

## 2019-01-01 VITALS
DIASTOLIC BLOOD PRESSURE: 64 MMHG | TEMPERATURE: 98.2 F | RESPIRATION RATE: 18 BRPM | HEART RATE: 68 BPM | OXYGEN SATURATION: 96 % | SYSTOLIC BLOOD PRESSURE: 120 MMHG

## 2019-01-01 VITALS
HEART RATE: 69 BPM | SYSTOLIC BLOOD PRESSURE: 122 MMHG | DIASTOLIC BLOOD PRESSURE: 68 MMHG | BODY MASS INDEX: 35.04 KG/M2 | WEIGHT: 231.2 LBS | HEIGHT: 68 IN

## 2019-01-01 VITALS
HEIGHT: 68 IN | WEIGHT: 234.2 LBS | DIASTOLIC BLOOD PRESSURE: 62 MMHG | HEART RATE: 68 BPM | BODY MASS INDEX: 35.49 KG/M2 | SYSTOLIC BLOOD PRESSURE: 128 MMHG

## 2019-01-01 VITALS
SYSTOLIC BLOOD PRESSURE: 138 MMHG | RESPIRATION RATE: 18 BRPM | TEMPERATURE: 97.6 F | HEART RATE: 76 BPM | OXYGEN SATURATION: 94 % | DIASTOLIC BLOOD PRESSURE: 90 MMHG

## 2019-01-01 VITALS
WEIGHT: 219.2 LBS | OXYGEN SATURATION: 95 % | RESPIRATION RATE: 14 BRPM | HEIGHT: 68 IN | TEMPERATURE: 97.1 F | SYSTOLIC BLOOD PRESSURE: 110 MMHG | BODY MASS INDEX: 33.22 KG/M2 | HEART RATE: 62 BPM | DIASTOLIC BLOOD PRESSURE: 70 MMHG

## 2019-01-01 VITALS
HEART RATE: 68 BPM | SYSTOLIC BLOOD PRESSURE: 122 MMHG | DIASTOLIC BLOOD PRESSURE: 64 MMHG | OXYGEN SATURATION: 97 % | RESPIRATION RATE: 18 BRPM | TEMPERATURE: 98.4 F

## 2019-01-01 VITALS
RESPIRATION RATE: 20 BRPM | SYSTOLIC BLOOD PRESSURE: 138 MMHG | OXYGEN SATURATION: 86 % | HEART RATE: 78 BPM | DIASTOLIC BLOOD PRESSURE: 76 MMHG | TEMPERATURE: 97.8 F

## 2019-01-01 VITALS
BODY MASS INDEX: 29.22 KG/M2 | HEART RATE: 68 BPM | SYSTOLIC BLOOD PRESSURE: 112 MMHG | WEIGHT: 192.1 LBS | DIASTOLIC BLOOD PRESSURE: 68 MMHG

## 2019-01-01 VITALS
HEART RATE: 65 BPM | TEMPERATURE: 97.2 F | WEIGHT: 218.8 LBS | RESPIRATION RATE: 18 BRPM | OXYGEN SATURATION: 100 % | SYSTOLIC BLOOD PRESSURE: 151 MMHG | DIASTOLIC BLOOD PRESSURE: 76 MMHG | HEIGHT: 68 IN | BODY MASS INDEX: 33.16 KG/M2

## 2019-01-01 VITALS
OXYGEN SATURATION: 100 % | RESPIRATION RATE: 20 BRPM | DIASTOLIC BLOOD PRESSURE: 82 MMHG | HEIGHT: 68 IN | HEART RATE: 70 BPM | WEIGHT: 228 LBS | TEMPERATURE: 96.5 F | SYSTOLIC BLOOD PRESSURE: 155 MMHG | BODY MASS INDEX: 34.56 KG/M2

## 2019-01-01 VITALS
OXYGEN SATURATION: 95 % | BODY MASS INDEX: 33.27 KG/M2 | SYSTOLIC BLOOD PRESSURE: 123 MMHG | TEMPERATURE: 97.9 F | DIASTOLIC BLOOD PRESSURE: 57 MMHG | HEIGHT: 68 IN | HEART RATE: 69 BPM | RESPIRATION RATE: 18 BRPM

## 2019-01-01 VITALS
BODY MASS INDEX: 31.49 KG/M2 | WEIGHT: 207.8 LBS | HEIGHT: 68 IN | TEMPERATURE: 97.5 F | DIASTOLIC BLOOD PRESSURE: 74 MMHG | SYSTOLIC BLOOD PRESSURE: 136 MMHG | RESPIRATION RATE: 17 BRPM | OXYGEN SATURATION: 99 % | HEART RATE: 71 BPM

## 2019-01-01 VITALS
SYSTOLIC BLOOD PRESSURE: 120 MMHG | DIASTOLIC BLOOD PRESSURE: 64 MMHG | TEMPERATURE: 98.2 F | HEART RATE: 68 BPM | OXYGEN SATURATION: 96 % | RESPIRATION RATE: 18 BRPM

## 2019-01-01 VITALS
HEART RATE: 67 BPM | OXYGEN SATURATION: 95 % | SYSTOLIC BLOOD PRESSURE: 134 MMHG | DIASTOLIC BLOOD PRESSURE: 83 MMHG | RESPIRATION RATE: 19 BRPM | TEMPERATURE: 97.5 F

## 2019-01-01 VITALS
WEIGHT: 213 LBS | OXYGEN SATURATION: 94 % | DIASTOLIC BLOOD PRESSURE: 59 MMHG | BODY MASS INDEX: 32.28 KG/M2 | TEMPERATURE: 97.9 F | SYSTOLIC BLOOD PRESSURE: 123 MMHG | HEIGHT: 68 IN | RESPIRATION RATE: 18 BRPM | HEART RATE: 67 BPM

## 2019-01-01 VITALS
HEART RATE: 64 BPM | DIASTOLIC BLOOD PRESSURE: 58 MMHG | HEIGHT: 68 IN | BODY MASS INDEX: 33.04 KG/M2 | SYSTOLIC BLOOD PRESSURE: 120 MMHG | WEIGHT: 218 LBS

## 2019-01-01 VITALS
HEIGHT: 68 IN | RESPIRATION RATE: 18 BRPM | HEART RATE: 62 BPM | BODY MASS INDEX: 34.57 KG/M2 | TEMPERATURE: 98 F | DIASTOLIC BLOOD PRESSURE: 79 MMHG | WEIGHT: 228.1 LBS | SYSTOLIC BLOOD PRESSURE: 148 MMHG | OXYGEN SATURATION: 100 %

## 2019-01-01 VITALS
RESPIRATION RATE: 20 BRPM | BODY MASS INDEX: 33.56 KG/M2 | HEART RATE: 63 BPM | WEIGHT: 221.4 LBS | SYSTOLIC BLOOD PRESSURE: 142 MMHG | OXYGEN SATURATION: 96 % | TEMPERATURE: 96.6 F | DIASTOLIC BLOOD PRESSURE: 80 MMHG | HEIGHT: 68 IN

## 2019-01-01 VITALS
DIASTOLIC BLOOD PRESSURE: 70 MMHG | OXYGEN SATURATION: 100 % | HEART RATE: 70 BPM | HEIGHT: 68 IN | SYSTOLIC BLOOD PRESSURE: 150 MMHG | WEIGHT: 213 LBS | RESPIRATION RATE: 18 BRPM | BODY MASS INDEX: 32.28 KG/M2 | TEMPERATURE: 97.8 F

## 2019-01-01 VITALS
WEIGHT: 219.2 LBS | TEMPERATURE: 97.5 F | OXYGEN SATURATION: 100 % | HEIGHT: 68 IN | RESPIRATION RATE: 18 BRPM | DIASTOLIC BLOOD PRESSURE: 59 MMHG | SYSTOLIC BLOOD PRESSURE: 134 MMHG | BODY MASS INDEX: 33.22 KG/M2 | HEART RATE: 66 BPM

## 2019-01-01 VITALS
WEIGHT: 201.6 LBS | SYSTOLIC BLOOD PRESSURE: 126 MMHG | BODY MASS INDEX: 30.55 KG/M2 | HEART RATE: 61 BPM | DIASTOLIC BLOOD PRESSURE: 68 MMHG | HEIGHT: 68 IN | TEMPERATURE: 96.5 F | OXYGEN SATURATION: 100 % | RESPIRATION RATE: 20 BRPM

## 2019-01-01 VITALS
OXYGEN SATURATION: 94 % | TEMPERATURE: 97 F | RESPIRATION RATE: 18 BRPM | SYSTOLIC BLOOD PRESSURE: 146 MMHG | DIASTOLIC BLOOD PRESSURE: 78 MMHG | HEART RATE: 74 BPM

## 2019-01-01 VITALS
HEART RATE: 68 BPM | DIASTOLIC BLOOD PRESSURE: 78 MMHG | RESPIRATION RATE: 17 BRPM | SYSTOLIC BLOOD PRESSURE: 120 MMHG | OXYGEN SATURATION: 98 %

## 2019-01-01 VITALS
HEART RATE: 54 BPM | RESPIRATION RATE: 18 BRPM | TEMPERATURE: 97.6 F | OXYGEN SATURATION: 99 % | SYSTOLIC BLOOD PRESSURE: 116 MMHG | DIASTOLIC BLOOD PRESSURE: 60 MMHG

## 2019-01-01 VITALS
BODY MASS INDEX: 34.65 KG/M2 | SYSTOLIC BLOOD PRESSURE: 102 MMHG | DIASTOLIC BLOOD PRESSURE: 60 MMHG | HEIGHT: 68 IN | HEART RATE: 64 BPM | WEIGHT: 228.6 LBS

## 2019-01-01 VITALS
HEIGHT: 68 IN | SYSTOLIC BLOOD PRESSURE: 135 MMHG | BODY MASS INDEX: 34.76 KG/M2 | OXYGEN SATURATION: 97 % | DIASTOLIC BLOOD PRESSURE: 85 MMHG | HEART RATE: 88 BPM | TEMPERATURE: 98.9 F

## 2019-01-01 VITALS
BODY MASS INDEX: 33.15 KG/M2 | OXYGEN SATURATION: 98 % | SYSTOLIC BLOOD PRESSURE: 117 MMHG | HEART RATE: 87 BPM | DIASTOLIC BLOOD PRESSURE: 71 MMHG | RESPIRATION RATE: 18 BRPM | TEMPERATURE: 97.9 F | HEIGHT: 68 IN

## 2019-01-01 VITALS
RESPIRATION RATE: 20 BRPM | OXYGEN SATURATION: 99 % | HEART RATE: 74 BPM | DIASTOLIC BLOOD PRESSURE: 85 MMHG | TEMPERATURE: 97.7 F | SYSTOLIC BLOOD PRESSURE: 132 MMHG

## 2019-01-01 VITALS
DIASTOLIC BLOOD PRESSURE: 54 MMHG | SYSTOLIC BLOOD PRESSURE: 116 MMHG | TEMPERATURE: 96.9 F | RESPIRATION RATE: 16 BRPM | HEART RATE: 68 BPM | OXYGEN SATURATION: 94 %

## 2019-01-01 DIAGNOSIS — Z79.4 TYPE 2 DIABETES MELLITUS WITH DIABETIC PERIPHERAL ANGIOPATHY AND GANGRENE, WITH LONG-TERM CURRENT USE OF INSULIN (HCC): Primary | ICD-10-CM

## 2019-01-01 DIAGNOSIS — Z00.00 ROUTINE GENERAL MEDICAL EXAMINATION AT A HEALTH CARE FACILITY: ICD-10-CM

## 2019-01-01 DIAGNOSIS — N18.6 ANEMIA DUE TO CHRONIC KIDNEY DISEASE, ON CHRONIC DIALYSIS (HCC): Primary | ICD-10-CM

## 2019-01-01 DIAGNOSIS — E72.20 HYPERAMMONEMIA (HCC): Primary | ICD-10-CM

## 2019-01-01 DIAGNOSIS — R94.5 ABNORMAL RESULTS OF LIVER FUNCTION STUDIES: ICD-10-CM

## 2019-01-01 DIAGNOSIS — Z51.81 ENCOUNTER FOR THERAPEUTIC DRUG LEVEL MONITORING: ICD-10-CM

## 2019-01-01 DIAGNOSIS — Z99.2 ANEMIA DUE TO CHRONIC KIDNEY DISEASE, ON CHRONIC DIALYSIS (HCC): Primary | ICD-10-CM

## 2019-01-01 DIAGNOSIS — Z74.09 IMPAIRED MOBILITY AND ADLS: ICD-10-CM

## 2019-01-01 DIAGNOSIS — R05.9 COUGH: ICD-10-CM

## 2019-01-01 DIAGNOSIS — F32.A DEPRESSION, UNSPECIFIED DEPRESSION TYPE: ICD-10-CM

## 2019-01-01 DIAGNOSIS — D64.9 CHRONIC ANEMIA: ICD-10-CM

## 2019-01-01 DIAGNOSIS — R06.02 SHORTNESS OF BREATH: ICD-10-CM

## 2019-01-01 DIAGNOSIS — N18.6 ESRD (END STAGE RENAL DISEASE) (HCC): ICD-10-CM

## 2019-01-01 DIAGNOSIS — K63.5 POLYP OF CECUM: Primary | ICD-10-CM

## 2019-01-01 DIAGNOSIS — J18.9 PNEUMONIA OF RIGHT UPPER LOBE DUE TO INFECTIOUS ORGANISM: Primary | ICD-10-CM

## 2019-01-01 DIAGNOSIS — N18.6 ESRD ON DIALYSIS (HCC): ICD-10-CM

## 2019-01-01 DIAGNOSIS — K92.2 GASTROINTESTINAL HEMORRHAGE, UNSPECIFIED GASTROINTESTINAL HEMORRHAGE TYPE: ICD-10-CM

## 2019-01-01 DIAGNOSIS — K92.2 GASTROINTESTINAL HEMORRHAGE, UNSPECIFIED GASTROINTESTINAL HEMORRHAGE TYPE: Primary | ICD-10-CM

## 2019-01-01 DIAGNOSIS — R10.84 GENERALIZED ABDOMINAL PAIN: ICD-10-CM

## 2019-01-01 DIAGNOSIS — Z79.4 TYPE 2 DIABETES MELLITUS WITH BOTH EYES AFFECTED BY MODERATE NONPROLIFERATIVE RETINOPATHY WITHOUT MACULAR EDEMA, WITH LONG-TERM CURRENT USE OF INSULIN (HCC): ICD-10-CM

## 2019-01-01 DIAGNOSIS — K59.00 CONSTIPATION, UNSPECIFIED CONSTIPATION TYPE: ICD-10-CM

## 2019-01-01 DIAGNOSIS — G47.30 SLEEP APNEA, UNSPECIFIED TYPE: ICD-10-CM

## 2019-01-01 DIAGNOSIS — F41.9 ANXIETY: ICD-10-CM

## 2019-01-01 DIAGNOSIS — R40.0 DAYTIME SOMNOLENCE: ICD-10-CM

## 2019-01-01 DIAGNOSIS — K62.5 GASTROINTESTINAL HEMORRHAGE ASSOCIATED WITH ANORECTAL SOURCE: ICD-10-CM

## 2019-01-01 DIAGNOSIS — E11.22 TYPE 2 DIABETES MELLITUS WITH CHRONIC KIDNEY DISEASE ON CHRONIC DIALYSIS, WITH LONG-TERM CURRENT USE OF INSULIN (HCC): ICD-10-CM

## 2019-01-01 DIAGNOSIS — G47.19 EXCESSIVE DAYTIME SLEEPINESS: ICD-10-CM

## 2019-01-01 DIAGNOSIS — G93.41 METABOLIC ENCEPHALOPATHY: Primary | ICD-10-CM

## 2019-01-01 DIAGNOSIS — M79.671 PAIN IN BOTH FEET: Primary | ICD-10-CM

## 2019-01-01 DIAGNOSIS — D63.1 ANEMIA DUE TO CHRONIC KIDNEY DISEASE, ON CHRONIC DIALYSIS (HCC): ICD-10-CM

## 2019-01-01 DIAGNOSIS — I50.9 ACUTE ON CHRONIC CONGESTIVE HEART FAILURE, UNSPECIFIED HEART FAILURE TYPE (HCC): ICD-10-CM

## 2019-01-01 DIAGNOSIS — M79.672 PAIN IN BOTH FEET: Primary | ICD-10-CM

## 2019-01-01 DIAGNOSIS — R40.0 SOMNOLENCE: Primary | ICD-10-CM

## 2019-01-01 DIAGNOSIS — E72.20 HYPERAMMONEMIA (HCC): ICD-10-CM

## 2019-01-01 DIAGNOSIS — R09.02 HYPOXIA: ICD-10-CM

## 2019-01-01 DIAGNOSIS — D64.9 ANEMIA, UNSPECIFIED TYPE: Primary | ICD-10-CM

## 2019-01-01 DIAGNOSIS — K80.20 CALCULUS OF GALLBLADDER WITHOUT CHOLECYSTITIS WITHOUT OBSTRUCTION: ICD-10-CM

## 2019-01-01 DIAGNOSIS — Z99.2 ANEMIA DUE TO CHRONIC KIDNEY DISEASE, ON CHRONIC DIALYSIS (HCC): ICD-10-CM

## 2019-01-01 DIAGNOSIS — Z86.19 HISTORY OF HEPATITIS C: ICD-10-CM

## 2019-01-01 DIAGNOSIS — D64.9 ANEMIA, UNSPECIFIED TYPE: ICD-10-CM

## 2019-01-01 DIAGNOSIS — R41.0 CONFUSION: Primary | ICD-10-CM

## 2019-01-01 DIAGNOSIS — J06.9 VIRAL URI: Primary | ICD-10-CM

## 2019-01-01 DIAGNOSIS — R53.81 PHYSICAL DECONDITIONING: Primary | ICD-10-CM

## 2019-01-01 DIAGNOSIS — Z09 FOLLOW UP: Primary | ICD-10-CM

## 2019-01-01 DIAGNOSIS — K76.82 HEPATIC ENCEPHALOPATHY (HCC): ICD-10-CM

## 2019-01-01 DIAGNOSIS — N18.6 ANEMIA DUE TO CHRONIC KIDNEY DISEASE, ON CHRONIC DIALYSIS (HCC): ICD-10-CM

## 2019-01-01 DIAGNOSIS — E87.1 HYPONATREMIA: ICD-10-CM

## 2019-01-01 DIAGNOSIS — R41.0 CONFUSION: ICD-10-CM

## 2019-01-01 DIAGNOSIS — D50.0 IRON DEFICIENCY ANEMIA DUE TO CHRONIC BLOOD LOSS: ICD-10-CM

## 2019-01-01 DIAGNOSIS — B18.2 CHRONIC HEPATITIS C WITHOUT HEPATIC COMA (HCC): Primary | ICD-10-CM

## 2019-01-01 DIAGNOSIS — E11.52 TYPE 2 DIABETES MELLITUS WITH DIABETIC PERIPHERAL ANGIOPATHY AND GANGRENE, WITH LONG-TERM CURRENT USE OF INSULIN (HCC): Primary | ICD-10-CM

## 2019-01-01 DIAGNOSIS — E11.3393 TYPE 2 DIABETES MELLITUS WITH BOTH EYES AFFECTED BY MODERATE NONPROLIFERATIVE RETINOPATHY WITHOUT MACULAR EDEMA, WITH LONG-TERM CURRENT USE OF INSULIN (HCC): ICD-10-CM

## 2019-01-01 DIAGNOSIS — I77.0 ARTERIOVENOUS FISTULA, ACQUIRED (HCC): Primary | ICD-10-CM

## 2019-01-01 DIAGNOSIS — R04.2 HEMOPTYSIS: Primary | ICD-10-CM

## 2019-01-01 DIAGNOSIS — I10 ESSENTIAL HYPERTENSION: ICD-10-CM

## 2019-01-01 DIAGNOSIS — R41.82 ALTERED MENTAL STATUS, UNSPECIFIED ALTERED MENTAL STATUS TYPE: Primary | ICD-10-CM

## 2019-01-01 DIAGNOSIS — G93.40 ACUTE ENCEPHALOPATHY: Primary | ICD-10-CM

## 2019-01-01 DIAGNOSIS — Z99.2 ESRD ON DIALYSIS (HCC): ICD-10-CM

## 2019-01-01 DIAGNOSIS — K92.2 ACUTE LOWER GI BLEEDING: ICD-10-CM

## 2019-01-01 DIAGNOSIS — Z99.2 ESRD (END STAGE RENAL DISEASE) ON DIALYSIS (HCC): ICD-10-CM

## 2019-01-01 DIAGNOSIS — A41.9 SEPSIS, DUE TO UNSPECIFIED ORGANISM, UNSPECIFIED WHETHER ACUTE ORGAN DYSFUNCTION PRESENT (HCC): ICD-10-CM

## 2019-01-01 DIAGNOSIS — Z79.4 TYPE 2 DIABETES MELLITUS WITH CHRONIC KIDNEY DISEASE ON CHRONIC DIALYSIS, WITH LONG-TERM CURRENT USE OF INSULIN (HCC): ICD-10-CM

## 2019-01-01 DIAGNOSIS — D63.1 ANEMIA DUE TO CHRONIC KIDNEY DISEASE, ON CHRONIC DIALYSIS (HCC): Primary | ICD-10-CM

## 2019-01-01 DIAGNOSIS — Z78.9 IMPAIRED MOBILITY AND ADLS: ICD-10-CM

## 2019-01-01 DIAGNOSIS — N18.6 TYPE 2 DIABETES MELLITUS WITH CHRONIC KIDNEY DISEASE ON CHRONIC DIALYSIS, WITH LONG-TERM CURRENT USE OF INSULIN (HCC): ICD-10-CM

## 2019-01-01 DIAGNOSIS — G89.29 OTHER CHRONIC PAIN: ICD-10-CM

## 2019-01-01 DIAGNOSIS — K29.50 CHRONIC GASTRITIS WITHOUT BLEEDING, UNSPECIFIED GASTRITIS TYPE: ICD-10-CM

## 2019-01-01 DIAGNOSIS — N18.6 END STAGE RENAL DISEASE (HCC): ICD-10-CM

## 2019-01-01 DIAGNOSIS — R79.89 INCREASED AMMONIA LEVEL: ICD-10-CM

## 2019-01-01 DIAGNOSIS — Z99.2 TYPE 2 DIABETES MELLITUS WITH CHRONIC KIDNEY DISEASE ON CHRONIC DIALYSIS, WITH LONG-TERM CURRENT USE OF INSULIN (HCC): ICD-10-CM

## 2019-01-01 DIAGNOSIS — Z99.2 STAGE 5 CHRONIC KIDNEY DISEASE ON CHRONIC DIALYSIS (HCC): ICD-10-CM

## 2019-01-01 DIAGNOSIS — N18.6 STAGE 5 CHRONIC KIDNEY DISEASE ON CHRONIC DIALYSIS (HCC): ICD-10-CM

## 2019-01-01 DIAGNOSIS — I10 ESSENTIAL HYPERTENSION: Primary | ICD-10-CM

## 2019-01-01 DIAGNOSIS — R53.81 PHYSICAL DECONDITIONING: ICD-10-CM

## 2019-01-01 DIAGNOSIS — Z86.19 HISTORY OF HEPATITIS C: Primary | ICD-10-CM

## 2019-01-01 DIAGNOSIS — I21.4 NSTEMI (NON-ST ELEVATED MYOCARDIAL INFARCTION) (HCC): Primary | ICD-10-CM

## 2019-01-01 DIAGNOSIS — N18.6 ESRD (END STAGE RENAL DISEASE) ON DIALYSIS (HCC): ICD-10-CM

## 2019-01-01 DIAGNOSIS — R10.84 GENERALIZED ABDOMINAL PAIN: Primary | ICD-10-CM

## 2019-01-01 DIAGNOSIS — R05.9 COUGH: Primary | ICD-10-CM

## 2019-01-01 DIAGNOSIS — B18.2 CHRONIC HEPATITIS C WITHOUT HEPATIC COMA (HCC): ICD-10-CM

## 2019-01-01 DIAGNOSIS — Z74.09 IMPAIRED FUNCTIONAL MOBILITY, BALANCE, GAIT, AND ENDURANCE: ICD-10-CM

## 2019-01-01 LAB
A2 MACROGLOB SERPL-MCNC: 202 MG/DL (ref 110–276)
ABO + RH BLD: NORMAL
ABO GROUP BLD: NORMAL
ALBUMIN SERPL-MCNC: 2.8 G/DL (ref 3.5–5.2)
ALBUMIN SERPL-MCNC: 2.8 G/DL (ref 3.5–5.2)
ALBUMIN SERPL-MCNC: 3 G/DL (ref 3.5–5.2)
ALBUMIN SERPL-MCNC: 3 G/DL (ref 3.5–5.2)
ALBUMIN SERPL-MCNC: 3.1 G/DL (ref 3.5–5.2)
ALBUMIN SERPL-MCNC: 3.3 G/DL (ref 3.5–5.2)
ALBUMIN SERPL-MCNC: 3.4 G/DL (ref 3.5–5.2)
ALBUMIN SERPL-MCNC: 3.5 G/DL (ref 3.5–5.2)
ALBUMIN SERPL-MCNC: 3.7 G/DL (ref 3.4–4.8)
ALBUMIN SERPL-MCNC: 3.7 G/DL (ref 3.5–5.2)
ALBUMIN SERPL-MCNC: 3.7 G/DL (ref 3.5–5.2)
ALBUMIN SERPL-MCNC: 3.9 G/DL (ref 3.4–4.8)
ALBUMIN SERPL-MCNC: 4.1 G/DL (ref 3.4–4.8)
ALBUMIN/GLOB SERPL: 0.4 G/DL
ALBUMIN/GLOB SERPL: 0.5 G/DL
ALBUMIN/GLOB SERPL: 0.6 G/DL
ALBUMIN/GLOB SERPL: 0.6 G/DL (ref 1.1–1.8)
ALBUMIN/GLOB SERPL: 0.7 G/DL (ref 1.1–1.8)
ALBUMIN/GLOB SERPL: 0.7 G/DL (ref 1.1–1.8)
ALP SERPL-CCNC: 108 U/L (ref 39–117)
ALP SERPL-CCNC: 116 U/L (ref 39–117)
ALP SERPL-CCNC: 120 U/L (ref 39–117)
ALP SERPL-CCNC: 125 U/L (ref 39–117)
ALP SERPL-CCNC: 131 U/L (ref 39–117)
ALP SERPL-CCNC: 134 U/L (ref 39–117)
ALP SERPL-CCNC: 138 U/L (ref 39–117)
ALP SERPL-CCNC: 143 U/L (ref 39–117)
ALP SERPL-CCNC: 147 U/L (ref 39–117)
ALP SERPL-CCNC: 154 U/L (ref 38–126)
ALP SERPL-CCNC: 155 U/L (ref 39–117)
ALP SERPL-CCNC: 162 U/L (ref 39–117)
ALP SERPL-CCNC: 164 U/L (ref 39–117)
ALP SERPL-CCNC: 167 U/L (ref 39–117)
ALP SERPL-CCNC: 171 U/L (ref 39–117)
ALP SERPL-CCNC: 173 U/L (ref 39–117)
ALP SERPL-CCNC: 182 U/L (ref 38–126)
ALP SERPL-CCNC: 190 U/L (ref 39–117)
ALP SERPL-CCNC: 195 U/L (ref 38–126)
ALT SERPL W P-5'-P-CCNC: 10 U/L (ref 1–41)
ALT SERPL W P-5'-P-CCNC: 10 U/L (ref 1–41)
ALT SERPL W P-5'-P-CCNC: 11 U/L (ref 1–41)
ALT SERPL W P-5'-P-CCNC: 12 U/L (ref 1–41)
ALT SERPL W P-5'-P-CCNC: 12 U/L (ref 1–41)
ALT SERPL W P-5'-P-CCNC: 13 IU/L (ref 0–55)
ALT SERPL W P-5'-P-CCNC: 13 U/L (ref 1–41)
ALT SERPL W P-5'-P-CCNC: 14 U/L (ref 1–41)
ALT SERPL W P-5'-P-CCNC: 15 U/L (ref 1–41)
ALT SERPL W P-5'-P-CCNC: 5 U/L (ref 1–41)
ALT SERPL W P-5'-P-CCNC: 6 U/L (ref 1–41)
ALT SERPL W P-5'-P-CCNC: 7 U/L (ref 21–72)
ALT SERPL W P-5'-P-CCNC: 8 U/L (ref 1–41)
ALT SERPL W P-5'-P-CCNC: 8 U/L (ref 21–72)
ALT SERPL W P-5'-P-CCNC: <6 U/L (ref 21–72)
AMMONIA BLD-SCNC: 100 UMOL/L (ref 16–60)
AMMONIA BLD-SCNC: 124 UMOL/L (ref 16–60)
AMMONIA BLD-SCNC: 193 UMOL/L (ref 16–60)
AMMONIA BLD-SCNC: 24 UMOL/L (ref 9–30)
AMMONIA BLD-SCNC: 38 UMOL/L (ref 16–60)
AMMONIA BLD-SCNC: 50 UMOL/L (ref 16–60)
AMMONIA BLD-SCNC: 53 UMOL/L (ref 16–60)
AMMONIA BLD-SCNC: 61 UMOL/L (ref 16–60)
AMMONIA BLD-SCNC: 65 UMOL/L (ref 16–60)
AMMONIA BLD-SCNC: 69 UMOL/L (ref 16–60)
AMMONIA BLD-SCNC: 71 UMOL/L (ref 16–60)
AMMONIA BLD-SCNC: 81 UMOL/L (ref 16–60)
AMMONIA BLD-SCNC: 89 UMOL/L (ref 16–60)
AMMONIA BLD-SCNC: 98 UMOL/L (ref 16–60)
AMPHETAMINES SERPL QL SCN: NEGATIVE NG/ML
ANION GAP SERPL CALCULATED.3IONS-SCNC: 11 MMOL/L (ref 5–15)
ANION GAP SERPL CALCULATED.3IONS-SCNC: 11 MMOL/L (ref 5–15)
ANION GAP SERPL CALCULATED.3IONS-SCNC: 12 MMOL/L
ANION GAP SERPL CALCULATED.3IONS-SCNC: 12 MMOL/L
ANION GAP SERPL CALCULATED.3IONS-SCNC: 12 MMOL/L (ref 4–13)
ANION GAP SERPL CALCULATED.3IONS-SCNC: 13 MMOL/L (ref 5–15)
ANION GAP SERPL CALCULATED.3IONS-SCNC: 14 MMOL/L
ANION GAP SERPL CALCULATED.3IONS-SCNC: 14 MMOL/L (ref 5–15)
ANION GAP SERPL CALCULATED.3IONS-SCNC: 15 MMOL/L (ref 5–15)
ANION GAP SERPL CALCULATED.3IONS-SCNC: 16 MMOL/L (ref 5–15)
ANION GAP SERPL CALCULATED.3IONS-SCNC: 16 MMOL/L (ref 5–15)
ANION GAP SERPL CALCULATED.3IONS-SCNC: 17 MMOL/L
ANION GAP SERPL CALCULATED.3IONS-SCNC: 17 MMOL/L
ANION GAP SERPL CALCULATED.3IONS-SCNC: 17 MMOL/L (ref 5–15)
ANION GAP SERPL CALCULATED.3IONS-SCNC: 18 MMOL/L (ref 4–13)
ANION GAP SERPL CALCULATED.3IONS-SCNC: 18 MMOL/L (ref 4–13)
ANION GAP SERPL CALCULATED.3IONS-SCNC: 18 MMOL/L (ref 5–15)
ANION GAP SERPL CALCULATED.3IONS-SCNC: 19 MMOL/L
ANION GAP SERPL CALCULATED.3IONS-SCNC: 19 MMOL/L
ANION GAP SERPL CALCULATED.3IONS-SCNC: 19 MMOL/L (ref 5–15)
ANION GAP SERPL CALCULATED.3IONS-SCNC: 20 MMOL/L (ref 5–15)
ANION GAP SERPL CALCULATED.3IONS-SCNC: 21 MMOL/L
ANION GAP SERPL CALCULATED.3IONS-SCNC: 21 MMOL/L (ref 5–15)
ANION GAP SERPL CALCULATED.3IONS-SCNC: 22 MMOL/L (ref 5–15)
ANION GAP SERPL CALCULATED.3IONS-SCNC: 25 MMOL/L
ANION GAP SERPL CALCULATED.3IONS-SCNC: 9 MMOL/L (ref 5–15)
ANISOCYTOSIS BLD QL: NORMAL
ANISOCYTOSIS BLD QL: NORMAL
APO A-I SERPL-MCNC: 132 MG/DL (ref 101–178)
APTT PPP: 32.7 SECONDS (ref 20–40.3)
APTT PPP: 34.3 SECONDS (ref 20–40.3)
APTT PPP: 35.4 SECONDS (ref 20–40.3)
ARTERIAL PATENCY WRIST A: ABNORMAL
AST SERPL-CCNC: 11 U/L (ref 1–40)
AST SERPL-CCNC: 13 U/L (ref 1–40)
AST SERPL-CCNC: 14 U/L (ref 1–40)
AST SERPL-CCNC: 16 U/L (ref 1–40)
AST SERPL-CCNC: 17 U/L (ref 1–40)
AST SERPL-CCNC: 18 U/L (ref 1–40)
AST SERPL-CCNC: 18 U/L (ref 1–40)
AST SERPL-CCNC: 20 U/L (ref 1–40)
AST SERPL-CCNC: 22 U/L (ref 1–40)
AST SERPL-CCNC: 22 U/L (ref 1–40)
AST SERPL-CCNC: 23 U/L (ref 1–40)
AST SERPL-CCNC: 24 U/L (ref 1–40)
AST SERPL-CCNC: 25 U/L (ref 17–59)
AST SERPL-CCNC: 26 U/L (ref 1–40)
AST SERPL-CCNC: 28 U/L (ref 1–40)
AST SERPL-CCNC: 29 U/L (ref 17–59)
AST SERPL-CCNC: 31 U/L (ref 17–59)
ATMOSPHERIC PRESS: 749 MMHG
ATMOSPHERIC PRESS: 749 MMHG
ATMOSPHERIC PRESS: 750 MMHG
ATMOSPHERIC PRESS: 752 MMHG
ATMOSPHERIC PRESS: 753 MMHG
BACTERIA SPEC AEROBE CULT: NORMAL
BARBITURATES SERPLBLD QL: NEGATIVE UG/ML
BASE EXCESS BLDA CALC-SCNC: 1.6 MMOL/L (ref 0–2)
BASE EXCESS BLDA CALC-SCNC: 2.8 MMOL/L (ref 0–2)
BASE EXCESS BLDA CALC-SCNC: 4 MMOL/L (ref 0–2)
BASE EXCESS BLDA CALC-SCNC: 4.2 MMOL/L (ref 0–2)
BASE EXCESS BLDA CALC-SCNC: 6.8 MMOL/L (ref 0–2)
BASE EXCESS BLDA CALC-SCNC: 7.7 MMOL/L (ref 0–2)
BASE EXCESS BLDA CALC-SCNC: 9.2 MMOL/L (ref 0–2)
BASOPHILS # BLD AUTO: 0.01 10*3/MM3 (ref 0–0.2)
BASOPHILS # BLD AUTO: 0.02 10*3/MM3 (ref 0–0.2)
BASOPHILS # BLD AUTO: 0.03 10*3/MM3 (ref 0–0.2)
BASOPHILS # BLD AUTO: 0.04 10*3/MM3 (ref 0–0.2)
BASOPHILS # BLD AUTO: 0.05 10*3/MM3 (ref 0–0.2)
BASOPHILS # BLD AUTO: 0.05 10*3/MM3 (ref 0–0.2)
BASOPHILS # BLD AUTO: 0.07 10*3/MM3 (ref 0–0.2)
BASOPHILS NFR BLD AUTO: 0.2 % (ref 0–1.5)
BASOPHILS NFR BLD AUTO: 0.3 % (ref 0–1.5)
BASOPHILS NFR BLD AUTO: 0.4 % (ref 0–1.5)
BASOPHILS NFR BLD AUTO: 0.6 % (ref 0–1.5)
BASOPHILS NFR BLD AUTO: 0.6 % (ref 0–2)
BASOPHILS NFR BLD AUTO: 0.7 % (ref 0–1.5)
BASOPHILS NFR BLD AUTO: 0.8 % (ref 0–1.5)
BASOPHILS NFR BLD AUTO: 0.9 % (ref 0–1.5)
BASOPHILS NFR BLD AUTO: 1 % (ref 0–1.5)
BASOPHILS NFR BLD AUTO: 1.1 % (ref 0–1.5)
BASOPHILS NFR BLD AUTO: 1.2 % (ref 0–1.5)
BASOPHILS NFR BLD AUTO: 1.3 % (ref 0–1.5)
BDY SITE: ABNORMAL
BENZODIAZ SERPL QL: NEGATIVE NG/ML
BH BB BLOOD EXPIRATION DATE: NORMAL
BH BB BLOOD TYPE BARCODE: 5100
BH BB BLOOD TYPE BARCODE: 5100
BH BB BLOOD TYPE BARCODE: 6200
BH BB BLOOD TYPE BARCODE: 7300
BH BB DISPENSE STATUS: NORMAL
BH BB PRODUCT CODE: NORMAL
BH BB UNIT NUMBER: NORMAL
BILIRUB SERPL-MCNC: 0.6 MG/DL (ref 0.2–1.2)
BILIRUB SERPL-MCNC: 0.8 MG/DL (ref 0–1.2)
BILIRUB SERPL-MCNC: 0.9 MG/DL (ref 0.2–1.2)
BILIRUB SERPL-MCNC: 0.9 MG/DL (ref 0.2–1.2)
BILIRUB SERPL-MCNC: 1 MG/DL (ref 0.2–1.2)
BILIRUB SERPL-MCNC: 1.1 MG/DL (ref 0.2–1.2)
BILIRUB SERPL-MCNC: 1.1 MG/DL (ref 0.2–1.3)
BILIRUB SERPL-MCNC: 1.2 MG/DL (ref 0.2–1.2)
BILIRUB SERPL-MCNC: 1.2 MG/DL (ref 0.2–1.2)
BILIRUB SERPL-MCNC: 1.3 MG/DL (ref 0.2–1.2)
BILIRUB SERPL-MCNC: 1.3 MG/DL (ref 0.2–1.2)
BILIRUB SERPL-MCNC: 1.4 MG/DL (ref 0.2–1.2)
BILIRUB SERPL-MCNC: 1.4 MG/DL (ref 0.2–1.3)
BILIRUB SERPL-MCNC: 1.4 MG/DL (ref 0.2–1.3)
BLD GP AB SCN SERPL QL: NEGATIVE
BUN BLD-MCNC: 12 MG/DL (ref 8–23)
BUN BLD-MCNC: 15 MG/DL (ref 8–23)
BUN BLD-MCNC: 20 MG/DL (ref 8–23)
BUN BLD-MCNC: 20 MG/DL (ref 8–23)
BUN BLD-MCNC: 21 MG/DL (ref 8–23)
BUN BLD-MCNC: 22 MG/DL (ref 8–23)
BUN BLD-MCNC: 25 MG/DL (ref 7–21)
BUN BLD-MCNC: 25 MG/DL (ref 8–23)
BUN BLD-MCNC: 26 MG/DL (ref 8–23)
BUN BLD-MCNC: 27 MG/DL (ref 8–23)
BUN BLD-MCNC: 29 MG/DL (ref 8–23)
BUN BLD-MCNC: 30 MG/DL (ref 8–23)
BUN BLD-MCNC: 32 MG/DL (ref 8–23)
BUN BLD-MCNC: 33 MG/DL (ref 8–23)
BUN BLD-MCNC: 34 MG/DL (ref 5–21)
BUN BLD-MCNC: 34 MG/DL (ref 7–21)
BUN BLD-MCNC: 34 MG/DL (ref 8–23)
BUN BLD-MCNC: 37 MG/DL (ref 8–23)
BUN BLD-MCNC: 39 MG/DL (ref 7–21)
BUN BLD-MCNC: 39 MG/DL (ref 8–23)
BUN BLD-MCNC: 40 MG/DL (ref 8–23)
BUN BLD-MCNC: 41 MG/DL (ref 8–23)
BUN BLD-MCNC: 43 MG/DL (ref 8–23)
BUN BLD-MCNC: 45 MG/DL (ref 8–23)
BUN BLD-MCNC: 47 MG/DL (ref 5–21)
BUN BLD-MCNC: 47 MG/DL (ref 8–23)
BUN BLD-MCNC: 51 MG/DL (ref 5–21)
BUN BLD-MCNC: 57 MG/DL (ref 8–23)
BUN BLD-MCNC: 59 MG/DL (ref 8–23)
BUN BLD-MCNC: 80 MG/DL (ref 8–23)
BUN BLD-MCNC: 84 MG/DL (ref 8–23)
BUN/CREAT SERPL: 3.4 (ref 7–25)
BUN/CREAT SERPL: 3.8 (ref 7–25)
BUN/CREAT SERPL: 4.3 (ref 7–25)
BUN/CREAT SERPL: 4.5 (ref 7–25)
BUN/CREAT SERPL: 4.5 (ref 7–25)
BUN/CREAT SERPL: 4.7 (ref 7–25)
BUN/CREAT SERPL: 4.8 (ref 7–25)
BUN/CREAT SERPL: 4.9 (ref 7–25)
BUN/CREAT SERPL: 5 (ref 7–25)
BUN/CREAT SERPL: 5.2 (ref 7–25)
BUN/CREAT SERPL: 5.3 (ref 7–25)
BUN/CREAT SERPL: 5.5 (ref 7–25)
BUN/CREAT SERPL: 5.6 (ref 7–25)
BUN/CREAT SERPL: 5.7 (ref 7–25)
BUN/CREAT SERPL: 5.9 (ref 7–25)
BUN/CREAT SERPL: 6 (ref 7–25)
BUN/CREAT SERPL: 6.1 (ref 7–25)
BUN/CREAT SERPL: 6.9 (ref 7–25)
BUN/CREAT SERPL: 6.9 (ref 7–25)
BUN/CREAT SERPL: 7.5 (ref 7–25)
BUN/CREAT SERPL: 7.6 (ref 7–25)
BUN/CREAT SERPL: 7.7 (ref 7–25)
BUN/CREAT SERPL: 7.8 (ref 7–25)
BUN/CREAT SERPL: 7.9 (ref 7–25)
BUN/CREAT SERPL: 7.9 (ref 7–25)
BUN/CREAT SERPL: 8.1 (ref 7–25)
BUN/CREAT SERPL: 8.5 (ref 7–25)
BZE BLD QL SCN: NEGATIVE NG/ML
CALCIUM SPEC-SCNC: 6.9 MG/DL (ref 8.6–10.5)
CALCIUM SPEC-SCNC: 7.7 MG/DL (ref 8.6–10.5)
CALCIUM SPEC-SCNC: 7.8 MG/DL (ref 8.6–10.5)
CALCIUM SPEC-SCNC: 7.8 MG/DL (ref 8.6–10.5)
CALCIUM SPEC-SCNC: 7.9 MG/DL (ref 8.6–10.5)
CALCIUM SPEC-SCNC: 7.9 MG/DL (ref 8.6–10.5)
CALCIUM SPEC-SCNC: 8.3 MG/DL (ref 8.4–10.4)
CALCIUM SPEC-SCNC: 8.3 MG/DL (ref 8.6–10.5)
CALCIUM SPEC-SCNC: 8.3 MG/DL (ref 8.6–10.5)
CALCIUM SPEC-SCNC: 8.4 MG/DL (ref 8.6–10.5)
CALCIUM SPEC-SCNC: 8.5 MG/DL (ref 8.4–10.2)
CALCIUM SPEC-SCNC: 8.6 MG/DL (ref 8.4–10.2)
CALCIUM SPEC-SCNC: 8.6 MG/DL (ref 8.4–10.4)
CALCIUM SPEC-SCNC: 8.6 MG/DL (ref 8.6–10.5)
CALCIUM SPEC-SCNC: 8.7 MG/DL (ref 8.6–10.5)
CALCIUM SPEC-SCNC: 8.8 MG/DL (ref 8.4–10.4)
CALCIUM SPEC-SCNC: 8.8 MG/DL (ref 8.6–10.5)
CALCIUM SPEC-SCNC: 8.8 MG/DL (ref 8.6–10.5)
CALCIUM SPEC-SCNC: 8.9 MG/DL (ref 8.4–10.2)
CALCIUM SPEC-SCNC: 8.9 MG/DL (ref 8.6–10.5)
CALCIUM SPEC-SCNC: 9 MG/DL (ref 8.6–10.5)
CALCIUM SPEC-SCNC: 9.1 MG/DL (ref 8.6–10.5)
CALCIUM SPEC-SCNC: 9.2 MG/DL (ref 8.6–10.5)
CALCIUM SPEC-SCNC: 9.3 MG/DL (ref 8.6–10.5)
CALCIUM SPEC-SCNC: 9.6 MG/DL (ref 8.6–10.5)
CALCIUM SPEC-SCNC: 9.9 MG/DL (ref 8.6–10.5)
CHLORIDE SERPL-SCNC: 78 MMOL/L (ref 98–107)
CHLORIDE SERPL-SCNC: 80 MMOL/L (ref 98–107)
CHLORIDE SERPL-SCNC: 81 MMOL/L (ref 98–107)
CHLORIDE SERPL-SCNC: 82 MMOL/L (ref 95–110)
CHLORIDE SERPL-SCNC: 83 MMOL/L (ref 98–107)
CHLORIDE SERPL-SCNC: 84 MMOL/L (ref 98–107)
CHLORIDE SERPL-SCNC: 86 MMOL/L (ref 98–107)
CHLORIDE SERPL-SCNC: 86 MMOL/L (ref 98–107)
CHLORIDE SERPL-SCNC: 86 MMOL/L (ref 98–110)
CHLORIDE SERPL-SCNC: 87 MMOL/L (ref 95–110)
CHLORIDE SERPL-SCNC: 87 MMOL/L (ref 98–107)
CHLORIDE SERPL-SCNC: 88 MMOL/L (ref 95–110)
CHLORIDE SERPL-SCNC: 88 MMOL/L (ref 98–107)
CHLORIDE SERPL-SCNC: 88 MMOL/L (ref 98–110)
CHLORIDE SERPL-SCNC: 90 MMOL/L (ref 98–107)
CHLORIDE SERPL-SCNC: 90 MMOL/L (ref 98–110)
CHLORIDE SERPL-SCNC: 91 MMOL/L (ref 98–107)
CHLORIDE SERPL-SCNC: 92 MMOL/L (ref 98–107)
CHLORIDE SERPL-SCNC: 93 MMOL/L (ref 98–107)
CHLORIDE SERPL-SCNC: 93 MMOL/L (ref 98–107)
CHLORIDE SERPL-SCNC: 94 MMOL/L (ref 98–107)
CHLORIDE SERPL-SCNC: 95 MMOL/L (ref 98–107)
CHLORIDE SERPL-SCNC: 97 MMOL/L (ref 98–107)
CK SERPL-CCNC: 32 U/L (ref 20–200)
CLUMPED PLATELETS: NORMAL
CO2 SERPL-SCNC: 19 MMOL/L (ref 22–29)
CO2 SERPL-SCNC: 21 MMOL/L (ref 22–29)
CO2 SERPL-SCNC: 21 MMOL/L (ref 22–29)
CO2 SERPL-SCNC: 22 MMOL/L (ref 22–29)
CO2 SERPL-SCNC: 23 MMOL/L (ref 22–29)
CO2 SERPL-SCNC: 24 MMOL/L (ref 22–29)
CO2 SERPL-SCNC: 25 MMOL/L (ref 22–29)
CO2 SERPL-SCNC: 26 MMOL/L (ref 22–29)
CO2 SERPL-SCNC: 26 MMOL/L (ref 22–31)
CO2 SERPL-SCNC: 27 MMOL/L (ref 22–29)
CO2 SERPL-SCNC: 27 MMOL/L (ref 22–29)
CO2 SERPL-SCNC: 28 MMOL/L (ref 22–29)
CO2 SERPL-SCNC: 28 MMOL/L (ref 24–31)
CO2 SERPL-SCNC: 29 MMOL/L (ref 22–29)
CO2 SERPL-SCNC: 29 MMOL/L (ref 22–31)
CO2 SERPL-SCNC: 29 MMOL/L (ref 24–31)
CO2 SERPL-SCNC: 30 MMOL/L (ref 22–29)
CO2 SERPL-SCNC: 30 MMOL/L (ref 22–29)
CO2 SERPL-SCNC: 31 MMOL/L (ref 22–29)
CO2 SERPL-SCNC: 31 MMOL/L (ref 22–31)
CO2 SERPL-SCNC: 33 MMOL/L (ref 22–29)
CO2 SERPL-SCNC: 33 MMOL/L (ref 24–31)
CRE SCREEN PCR: NOT DETECTED
CREAT BLD-MCNC: 10.27 MG/DL (ref 0.76–1.27)
CREAT BLD-MCNC: 2.81 MG/DL (ref 0.76–1.27)
CREAT BLD-MCNC: 3.88 MG/DL (ref 0.76–1.27)
CREAT BLD-MCNC: 4.11 MG/DL (ref 0.76–1.27)
CREAT BLD-MCNC: 4.36 MG/DL (ref 0.76–1.27)
CREAT BLD-MCNC: 4.39 MG/DL (ref 0.76–1.27)
CREAT BLD-MCNC: 4.56 MG/DL (ref 0.76–1.27)
CREAT BLD-MCNC: 4.68 MG/DL (ref 0.76–1.27)
CREAT BLD-MCNC: 4.71 MG/DL (ref 0.76–1.27)
CREAT BLD-MCNC: 5.36 MG/DL (ref 0.76–1.27)
CREAT BLD-MCNC: 5.38 MG/DL (ref 0.76–1.27)
CREAT BLD-MCNC: 5.51 MG/DL (ref 0.7–1.3)
CREAT BLD-MCNC: 5.52 MG/DL (ref 0.76–1.27)
CREAT BLD-MCNC: 5.62 MG/DL (ref 0.76–1.27)
CREAT BLD-MCNC: 5.63 MG/DL (ref 0.76–1.27)
CREAT BLD-MCNC: 5.68 MG/DL (ref 0.76–1.27)
CREAT BLD-MCNC: 5.85 MG/DL (ref 0.76–1.27)
CREAT BLD-MCNC: 5.86 MG/DL (ref 0.76–1.27)
CREAT BLD-MCNC: 5.86 MG/DL (ref 0.76–1.27)
CREAT BLD-MCNC: 6.13 MG/DL (ref 0.76–1.27)
CREAT BLD-MCNC: 6.22 MG/DL (ref 0.76–1.27)
CREAT BLD-MCNC: 6.62 MG/DL (ref 0.76–1.27)
CREAT BLD-MCNC: 6.67 MG/DL (ref 0.76–1.27)
CREAT BLD-MCNC: 6.8 MG/DL (ref 0.5–1.4)
CREAT BLD-MCNC: 7.06 MG/DL (ref 0.76–1.27)
CREAT BLD-MCNC: 7.19 MG/DL (ref 0.7–1.3)
CREAT BLD-MCNC: 7.44 MG/DL (ref 0.76–1.27)
CREAT BLD-MCNC: 7.63 MG/DL (ref 0.76–1.27)
CREAT BLD-MCNC: 7.64 MG/DL (ref 0.76–1.27)
CREAT BLD-MCNC: 7.95 MG/DL (ref 0.5–1.4)
CREAT BLD-MCNC: 8.07 MG/DL (ref 0.7–1.3)
CREAT BLD-MCNC: 9.57 MG/DL (ref 0.5–1.4)
CREAT BLD-MCNC: 9.87 MG/DL (ref 0.76–1.27)
CRP SERPL-MCNC: 1.91 MG/DL (ref 0–0.99)
CRP SERPL-MCNC: 2.4 MG/DL (ref 0–1)
D-LACTATE SERPL-SCNC: 1.9 MMOL/L (ref 0.5–2)
D-LACTATE SERPL-SCNC: 2 MMOL/L (ref 0.5–2)
D-LACTATE SERPL-SCNC: 2 MMOL/L (ref 0.5–2)
D-LACTATE SERPL-SCNC: 2.2 MMOL/L (ref 0.5–2)
D-LACTATE SERPL-SCNC: 2.3 MMOL/L (ref 0.5–2)
D-LACTATE SERPL-SCNC: 2.5 MMOL/L (ref 0.5–2)
D-LACTATE SERPL-SCNC: 2.6 MMOL/L (ref 0.5–2)
D-LACTATE SERPL-SCNC: 2.8 MMOL/L (ref 0.5–2)
D-LACTATE SERPL-SCNC: 2.9 MMOL/L (ref 0.5–2)
D-LACTATE SERPL-SCNC: 3.2 MMOL/L (ref 0.5–2)
DEPRECATED RDW RBC AUTO: 42.7 FL (ref 37–54)
DEPRECATED RDW RBC AUTO: 43.2 FL (ref 37–54)
DEPRECATED RDW RBC AUTO: 44.5 FL (ref 37–54)
DEPRECATED RDW RBC AUTO: 45.3 FL (ref 37–54)
DEPRECATED RDW RBC AUTO: 45.3 FL (ref 37–54)
DEPRECATED RDW RBC AUTO: 45.5 FL (ref 37–54)
DEPRECATED RDW RBC AUTO: 45.8 FL (ref 37–54)
DEPRECATED RDW RBC AUTO: 46 FL (ref 37–54)
DEPRECATED RDW RBC AUTO: 46.3 FL (ref 37–54)
DEPRECATED RDW RBC AUTO: 46.4 FL (ref 37–54)
DEPRECATED RDW RBC AUTO: 46.6 FL (ref 37–54)
DEPRECATED RDW RBC AUTO: 46.8 FL (ref 37–54)
DEPRECATED RDW RBC AUTO: 46.8 FL (ref 37–54)
DEPRECATED RDW RBC AUTO: 46.9 FL (ref 40–54)
DEPRECATED RDW RBC AUTO: 47.2 FL (ref 37–54)
DEPRECATED RDW RBC AUTO: 47.7 FL (ref 37–54)
DEPRECATED RDW RBC AUTO: 47.8 FL (ref 37–54)
DEPRECATED RDW RBC AUTO: 47.9 FL (ref 37–54)
DEPRECATED RDW RBC AUTO: 48 FL (ref 37–54)
DEPRECATED RDW RBC AUTO: 48.1 FL (ref 37–54)
DEPRECATED RDW RBC AUTO: 48.1 FL (ref 37–54)
DEPRECATED RDW RBC AUTO: 48.2 FL (ref 37–54)
DEPRECATED RDW RBC AUTO: 48.9 FL (ref 37–54)
DEPRECATED RDW RBC AUTO: 50.2 FL (ref 37–54)
DEPRECATED RDW RBC AUTO: 51.4 FL (ref 37–54)
DEPRECATED RDW RBC AUTO: 52.2 FL (ref 37–54)
DEPRECATED RDW RBC AUTO: 53.1 FL (ref 37–54)
DEPRECATED RDW RBC AUTO: 53.2 FL (ref 37–54)
DEPRECATED RDW RBC AUTO: 53.4 FL (ref 37–54)
DEPRECATED RDW RBC AUTO: 53.6 FL (ref 37–54)
DEPRECATED RDW RBC AUTO: 53.9 FL (ref 37–54)
DEPRECATED RDW RBC AUTO: 54 FL (ref 37–54)
DEPRECATED RDW RBC AUTO: 54.7 FL (ref 37–54)
DEPRECATED RDW RBC AUTO: 54.7 FL (ref 37–54)
EOSINOPHIL # BLD AUTO: 0.01 10*3/MM3 (ref 0–0.4)
EOSINOPHIL # BLD AUTO: 0.05 10*3/MM3 (ref 0–0.4)
EOSINOPHIL # BLD AUTO: 0.08 10*3/MM3 (ref 0–0.4)
EOSINOPHIL # BLD AUTO: 0.09 10*3/MM3 (ref 0–0.4)
EOSINOPHIL # BLD AUTO: 0.1 10*3/MM3 (ref 0–0.4)
EOSINOPHIL # BLD AUTO: 0.1 10*3/MM3 (ref 0–0.4)
EOSINOPHIL # BLD AUTO: 0.1 10*3/MM3 (ref 0–0.7)
EOSINOPHIL # BLD AUTO: 0.11 10*3/MM3 (ref 0–0.4)
EOSINOPHIL # BLD AUTO: 0.12 10*3/MM3 (ref 0–0.4)
EOSINOPHIL # BLD AUTO: 0.13 10*3/MM3 (ref 0–0.4)
EOSINOPHIL # BLD AUTO: 0.14 10*3/MM3 (ref 0–0.4)
EOSINOPHIL # BLD AUTO: 0.15 10*3/MM3 (ref 0–0.4)
EOSINOPHIL # BLD AUTO: 0.17 10*3/MM3 (ref 0–0.4)
EOSINOPHIL # BLD AUTO: 0.19 10*3/MM3 (ref 0–0.4)
EOSINOPHIL # BLD AUTO: 0.2 10*3/MM3 (ref 0–0.4)
EOSINOPHIL # BLD AUTO: 0.21 10*3/MM3 (ref 0–0.4)
EOSINOPHIL # BLD AUTO: 0.23 10*3/MM3 (ref 0–0.4)
EOSINOPHIL # BLD AUTO: 0.23 10*3/MM3 (ref 0–0.4)
EOSINOPHIL NFR BLD AUTO: 0.2 % (ref 0.3–6.2)
EOSINOPHIL NFR BLD AUTO: 1 % (ref 0.3–6.2)
EOSINOPHIL NFR BLD AUTO: 1.9 % (ref 0.3–6.2)
EOSINOPHIL NFR BLD AUTO: 2 % (ref 0–4)
EOSINOPHIL NFR BLD AUTO: 2.1 % (ref 0.3–6.2)
EOSINOPHIL NFR BLD AUTO: 2.1 % (ref 0.3–6.2)
EOSINOPHIL NFR BLD AUTO: 2.2 % (ref 0.3–6.2)
EOSINOPHIL NFR BLD AUTO: 2.3 % (ref 0.3–6.2)
EOSINOPHIL NFR BLD AUTO: 2.4 % (ref 0.3–6.2)
EOSINOPHIL NFR BLD AUTO: 2.4 % (ref 0.3–6.2)
EOSINOPHIL NFR BLD AUTO: 2.5 % (ref 0.3–6.2)
EOSINOPHIL NFR BLD AUTO: 2.6 % (ref 0.3–6.2)
EOSINOPHIL NFR BLD AUTO: 2.7 % (ref 0.3–6.2)
EOSINOPHIL NFR BLD AUTO: 2.9 % (ref 0.3–6.2)
EOSINOPHIL NFR BLD AUTO: 2.9 % (ref 0.3–6.2)
EOSINOPHIL NFR BLD AUTO: 3 % (ref 0.3–6.2)
EOSINOPHIL NFR BLD AUTO: 3 % (ref 0.3–6.2)
EOSINOPHIL NFR BLD AUTO: 3.1 % (ref 0.3–6.2)
EOSINOPHIL NFR BLD AUTO: 3.2 % (ref 0.3–6.2)
EOSINOPHIL NFR BLD AUTO: 3.3 % (ref 0.3–6.2)
EOSINOPHIL NFR BLD AUTO: 3.4 % (ref 0.3–6.2)
EOSINOPHIL NFR BLD AUTO: 3.5 % (ref 0.3–6.2)
EOSINOPHIL NFR BLD AUTO: 4.4 % (ref 0.3–6.2)
EOSINOPHIL NFR BLD AUTO: 4.4 % (ref 0.3–6.2)
EOSINOPHIL NFR BLD AUTO: 4.5 % (ref 0.3–6.2)
EOSINOPHIL NFR BLD AUTO: 4.8 % (ref 0.3–6.2)
EPAP: 5
ERYTHROCYTE [DISTWIDTH] IN BLOOD BY AUTOMATED COUNT: 14.5 % (ref 12.3–15.4)
ERYTHROCYTE [DISTWIDTH] IN BLOOD BY AUTOMATED COUNT: 14.6 % (ref 12.3–15.4)
ERYTHROCYTE [DISTWIDTH] IN BLOOD BY AUTOMATED COUNT: 14.6 % (ref 12.3–15.4)
ERYTHROCYTE [DISTWIDTH] IN BLOOD BY AUTOMATED COUNT: 14.6 % (ref 12–15)
ERYTHROCYTE [DISTWIDTH] IN BLOOD BY AUTOMATED COUNT: 14.7 % (ref 12.3–15.4)
ERYTHROCYTE [DISTWIDTH] IN BLOOD BY AUTOMATED COUNT: 14.7 % (ref 12.3–15.4)
ERYTHROCYTE [DISTWIDTH] IN BLOOD BY AUTOMATED COUNT: 14.8 % (ref 12.3–15.4)
ERYTHROCYTE [DISTWIDTH] IN BLOOD BY AUTOMATED COUNT: 15 % (ref 12.3–15.4)
ERYTHROCYTE [DISTWIDTH] IN BLOOD BY AUTOMATED COUNT: 15.1 % (ref 12.3–15.4)
ERYTHROCYTE [DISTWIDTH] IN BLOOD BY AUTOMATED COUNT: 15.1 % (ref 12.3–15.4)
ERYTHROCYTE [DISTWIDTH] IN BLOOD BY AUTOMATED COUNT: 15.2 % (ref 12.3–15.4)
ERYTHROCYTE [DISTWIDTH] IN BLOOD BY AUTOMATED COUNT: 15.3 % (ref 12.3–15.4)
ERYTHROCYTE [DISTWIDTH] IN BLOOD BY AUTOMATED COUNT: 15.4 % (ref 12.3–15.4)
ERYTHROCYTE [DISTWIDTH] IN BLOOD BY AUTOMATED COUNT: 15.4 % (ref 12.3–15.4)
ERYTHROCYTE [DISTWIDTH] IN BLOOD BY AUTOMATED COUNT: 15.5 % (ref 12.3–15.4)
ERYTHROCYTE [DISTWIDTH] IN BLOOD BY AUTOMATED COUNT: 15.7 % (ref 12.3–15.4)
ERYTHROCYTE [DISTWIDTH] IN BLOOD BY AUTOMATED COUNT: 15.7 % (ref 12.3–15.4)
ERYTHROCYTE [DISTWIDTH] IN BLOOD BY AUTOMATED COUNT: 15.8 % (ref 12.3–15.4)
ERYTHROCYTE [DISTWIDTH] IN BLOOD BY AUTOMATED COUNT: 16.1 % (ref 12.3–15.4)
ERYTHROCYTE [DISTWIDTH] IN BLOOD BY AUTOMATED COUNT: 16.2 % (ref 12.3–15.4)
ERYTHROCYTE [DISTWIDTH] IN BLOOD BY AUTOMATED COUNT: 16.4 % (ref 12.3–15.4)
ERYTHROCYTE [DISTWIDTH] IN BLOOD BY AUTOMATED COUNT: 16.5 % (ref 12.3–15.4)
ERYTHROCYTE [DISTWIDTH] IN BLOOD BY AUTOMATED COUNT: 16.7 % (ref 12.3–15.4)
ERYTHROCYTE [DISTWIDTH] IN BLOOD BY AUTOMATED COUNT: 16.8 % (ref 12.3–15.4)
ERYTHROCYTE [DISTWIDTH] IN BLOOD BY AUTOMATED COUNT: 16.9 % (ref 12.3–15.4)
ERYTHROCYTE [DISTWIDTH] IN BLOOD BY AUTOMATED COUNT: 17.1 % (ref 12.3–15.4)
ERYTHROCYTE [DISTWIDTH] IN BLOOD BY AUTOMATED COUNT: 17.2 % (ref 12.3–15.4)
ERYTHROCYTE [DISTWIDTH] IN BLOOD BY AUTOMATED COUNT: 17.2 % (ref 12.3–15.4)
ERYTHROCYTE [DISTWIDTH] IN BLOOD BY AUTOMATED COUNT: 17.3 % (ref 12.3–15.4)
ERYTHROCYTE [DISTWIDTH] IN BLOOD BY AUTOMATED COUNT: 17.4 % (ref 12.3–15.4)
ERYTHROCYTE [DISTWIDTH] IN BLOOD BY AUTOMATED COUNT: 17.5 % (ref 12.3–15.4)
ERYTHROCYTE [DISTWIDTH] IN BLOOD BY AUTOMATED COUNT: 17.7 % (ref 12.3–15.4)
FIBROSIS SCORING:: ABNORMAL
FIBROSIS STAGE SERPL QL: ABNORMAL
FLUAV AG NPH QL: NEGATIVE
FLUBV AG NPH QL IA: NEGATIVE
GAS FLOW AIRWAY: 21 LPM
GFR SERPL CREATININE-BSD FRML MDRD: 10 ML/MIN/1.73
GFR SERPL CREATININE-BSD FRML MDRD: 11 ML/MIN/1.73
GFR SERPL CREATININE-BSD FRML MDRD: 11 ML/MIN/1.73
GFR SERPL CREATININE-BSD FRML MDRD: 12 ML/MIN/1.73
GFR SERPL CREATININE-BSD FRML MDRD: 13 ML/MIN/1.73
GFR SERPL CREATININE-BSD FRML MDRD: 13 ML/MIN/1.73 (ref 49–113)
GFR SERPL CREATININE-BSD FRML MDRD: 15 ML/MIN/1.73
GFR SERPL CREATININE-BSD FRML MDRD: 15 ML/MIN/1.73
GFR SERPL CREATININE-BSD FRML MDRD: 16 ML/MIN/1.73
GFR SERPL CREATININE-BSD FRML MDRD: 18 ML/MIN/1.73
GFR SERPL CREATININE-BSD FRML MDRD: 19 ML/MIN/1.73
GFR SERPL CREATININE-BSD FRML MDRD: 27 ML/MIN/1.73
GFR SERPL CREATININE-BSD FRML MDRD: 6 ML/MIN/1.73
GFR SERPL CREATININE-BSD FRML MDRD: 6 ML/MIN/1.73
GFR SERPL CREATININE-BSD FRML MDRD: 7 ML/MIN/1.73
GFR SERPL CREATININE-BSD FRML MDRD: 8 ML/MIN/1.73
GFR SERPL CREATININE-BSD FRML MDRD: 8 ML/MIN/1.73 (ref 49–113)
GFR SERPL CREATININE-BSD FRML MDRD: 9 ML/MIN/1.73
GFR SERPL CREATININE-BSD FRML MDRD: 9 ML/MIN/1.73 (ref 49–113)
GFR SERPL CREATININE-BSD FRML MDRD: ABNORMAL ML/MIN/1.73
GFR SERPL CREATININE-BSD FRML MDRD: ABNORMAL ML/MIN/1.73 (ref 49–113)
GFR SERPL CREATININE-BSD FRML MDRD: ABNORMAL ML/MIN/{1.73_M2}
GGT SERPL-CCNC: 63 IU/L (ref 0–65)
GLOBULIN UR ELPH-MCNC: 5.3 GM/DL (ref 2.3–3.5)
GLOBULIN UR ELPH-MCNC: 5.7 GM/DL
GLOBULIN UR ELPH-MCNC: 5.7 GM/DL (ref 2.3–3.5)
GLOBULIN UR ELPH-MCNC: 5.8 GM/DL
GLOBULIN UR ELPH-MCNC: 6 GM/DL
GLOBULIN UR ELPH-MCNC: 6 GM/DL
GLOBULIN UR ELPH-MCNC: 6.1 GM/DL
GLOBULIN UR ELPH-MCNC: 6.1 GM/DL
GLOBULIN UR ELPH-MCNC: 6.1 GM/DL (ref 2.3–3.5)
GLOBULIN UR ELPH-MCNC: 6.2 GM/DL
GLOBULIN UR ELPH-MCNC: 6.3 GM/DL
GLOBULIN UR ELPH-MCNC: 6.4 GM/DL
GLOBULIN UR ELPH-MCNC: 6.4 GM/DL
GLOBULIN UR ELPH-MCNC: 6.6 GM/DL
GLOBULIN UR ELPH-MCNC: 6.7 GM/DL
GLOBULIN UR ELPH-MCNC: 6.8 GM/DL
GLOBULIN UR ELPH-MCNC: 6.8 GM/DL
GLOBULIN UR ELPH-MCNC: 6.9 GM/DL
GLOBULIN UR ELPH-MCNC: 6.9 GM/DL
GLUCOSE BLD-MCNC: 101 MG/DL (ref 65–99)
GLUCOSE BLD-MCNC: 102 MG/DL (ref 65–99)
GLUCOSE BLD-MCNC: 103 MG/DL (ref 65–99)
GLUCOSE BLD-MCNC: 113 MG/DL (ref 65–99)
GLUCOSE BLD-MCNC: 120 MG/DL (ref 60–100)
GLUCOSE BLD-MCNC: 129 MG/DL (ref 65–99)
GLUCOSE BLD-MCNC: 129 MG/DL (ref 65–99)
GLUCOSE BLD-MCNC: 130 MG/DL (ref 65–99)
GLUCOSE BLD-MCNC: 133 MG/DL (ref 65–99)
GLUCOSE BLD-MCNC: 144 MG/DL (ref 65–99)
GLUCOSE BLD-MCNC: 148 MG/DL (ref 65–99)
GLUCOSE BLD-MCNC: 150 MG/DL (ref 65–99)
GLUCOSE BLD-MCNC: 161 MG/DL (ref 65–99)
GLUCOSE BLD-MCNC: 163 MG/DL (ref 65–99)
GLUCOSE BLD-MCNC: 171 MG/DL (ref 65–99)
GLUCOSE BLD-MCNC: 172 MG/DL (ref 65–99)
GLUCOSE BLD-MCNC: 174 MG/DL (ref 65–99)
GLUCOSE BLD-MCNC: 177 MG/DL (ref 70–100)
GLUCOSE BLD-MCNC: 178 MG/DL (ref 65–99)
GLUCOSE BLD-MCNC: 186 MG/DL (ref 65–99)
GLUCOSE BLD-MCNC: 190 MG/DL (ref 65–99)
GLUCOSE BLD-MCNC: 200 MG/DL (ref 60–100)
GLUCOSE BLD-MCNC: 209 MG/DL (ref 65–99)
GLUCOSE BLD-MCNC: 213 MG/DL (ref 65–99)
GLUCOSE BLD-MCNC: 214 MG/DL (ref 60–100)
GLUCOSE BLD-MCNC: 218 MG/DL (ref 70–100)
GLUCOSE BLD-MCNC: 244 MG/DL (ref 65–99)
GLUCOSE BLD-MCNC: 262 MG/DL (ref 70–100)
GLUCOSE BLD-MCNC: 297 MG/DL (ref 65–99)
GLUCOSE BLD-MCNC: 305 MG/DL (ref 65–99)
GLUCOSE BLD-MCNC: 79 MG/DL (ref 65–99)
GLUCOSE BLD-MCNC: 94 MG/DL (ref 65–99)
GLUCOSE BLD-MCNC: 96 MG/DL (ref 65–99)
GLUCOSE BLDC GLUCOMTR-MCNC: 105 MG/DL (ref 70–130)
GLUCOSE BLDC GLUCOMTR-MCNC: 109 MG/DL (ref 70–130)
GLUCOSE BLDC GLUCOMTR-MCNC: 111 MG/DL (ref 70–130)
GLUCOSE BLDC GLUCOMTR-MCNC: 112 MG/DL (ref 70–130)
GLUCOSE BLDC GLUCOMTR-MCNC: 113 MG/DL (ref 70–130)
GLUCOSE BLDC GLUCOMTR-MCNC: 115 MG/DL (ref 70–130)
GLUCOSE BLDC GLUCOMTR-MCNC: 119 MG/DL (ref 70–130)
GLUCOSE BLDC GLUCOMTR-MCNC: 119 MG/DL (ref 70–130)
GLUCOSE BLDC GLUCOMTR-MCNC: 123 MG/DL (ref 70–130)
GLUCOSE BLDC GLUCOMTR-MCNC: 127 MG/DL (ref 70–130)
GLUCOSE BLDC GLUCOMTR-MCNC: 128 MG/DL (ref 70–130)
GLUCOSE BLDC GLUCOMTR-MCNC: 131 MG/DL (ref 70–130)
GLUCOSE BLDC GLUCOMTR-MCNC: 132 MG/DL (ref 70–130)
GLUCOSE BLDC GLUCOMTR-MCNC: 136 MG/DL (ref 70–130)
GLUCOSE BLDC GLUCOMTR-MCNC: 138 MG/DL (ref 70–130)
GLUCOSE BLDC GLUCOMTR-MCNC: 139 MG/DL (ref 70–130)
GLUCOSE BLDC GLUCOMTR-MCNC: 139 MG/DL (ref 70–130)
GLUCOSE BLDC GLUCOMTR-MCNC: 140 MG/DL (ref 70–130)
GLUCOSE BLDC GLUCOMTR-MCNC: 141 MG/DL (ref 70–130)
GLUCOSE BLDC GLUCOMTR-MCNC: 141 MG/DL (ref 70–130)
GLUCOSE BLDC GLUCOMTR-MCNC: 144 MG/DL (ref 70–130)
GLUCOSE BLDC GLUCOMTR-MCNC: 145 MG/DL (ref 70–130)
GLUCOSE BLDC GLUCOMTR-MCNC: 146 MG/DL (ref 70–130)
GLUCOSE BLDC GLUCOMTR-MCNC: 148 MG/DL (ref 70–130)
GLUCOSE BLDC GLUCOMTR-MCNC: 149 MG/DL (ref 70–130)
GLUCOSE BLDC GLUCOMTR-MCNC: 150 MG/DL (ref 70–130)
GLUCOSE BLDC GLUCOMTR-MCNC: 153 MG/DL (ref 70–130)
GLUCOSE BLDC GLUCOMTR-MCNC: 176 MG/DL (ref 70–130)
GLUCOSE BLDC GLUCOMTR-MCNC: 178 MG/DL (ref 70–130)
GLUCOSE BLDC GLUCOMTR-MCNC: 178 MG/DL (ref 70–130)
GLUCOSE BLDC GLUCOMTR-MCNC: 180 MG/DL (ref 70–130)
GLUCOSE BLDC GLUCOMTR-MCNC: 183 MG/DL (ref 70–130)
GLUCOSE BLDC GLUCOMTR-MCNC: 187 MG/DL (ref 70–130)
GLUCOSE BLDC GLUCOMTR-MCNC: 190 MG/DL (ref 70–130)
GLUCOSE BLDC GLUCOMTR-MCNC: 191 MG/DL (ref 70–130)
GLUCOSE BLDC GLUCOMTR-MCNC: 194 MG/DL (ref 70–130)
GLUCOSE BLDC GLUCOMTR-MCNC: 196 MG/DL (ref 70–130)
GLUCOSE BLDC GLUCOMTR-MCNC: 199 MG/DL (ref 70–130)
GLUCOSE BLDC GLUCOMTR-MCNC: 199 MG/DL (ref 70–130)
GLUCOSE BLDC GLUCOMTR-MCNC: 225 MG/DL (ref 70–130)
GLUCOSE BLDC GLUCOMTR-MCNC: 226 MG/DL (ref 70–130)
GLUCOSE BLDC GLUCOMTR-MCNC: 230 MG/DL (ref 70–130)
GLUCOSE BLDC GLUCOMTR-MCNC: 231 MG/DL (ref 70–130)
GLUCOSE BLDC GLUCOMTR-MCNC: 233 MG/DL (ref 70–130)
GLUCOSE BLDC GLUCOMTR-MCNC: 233 MG/DL (ref 70–130)
GLUCOSE BLDC GLUCOMTR-MCNC: 237 MG/DL (ref 70–130)
GLUCOSE BLDC GLUCOMTR-MCNC: 250 MG/DL (ref 70–130)
GLUCOSE BLDC GLUCOMTR-MCNC: 263 MG/DL (ref 70–130)
GLUCOSE BLDC GLUCOMTR-MCNC: 69 MG/DL (ref 70–130)
GLUCOSE BLDC GLUCOMTR-MCNC: 89 MG/DL (ref 70–130)
GLUCOSE BLDC GLUCOMTR-MCNC: 90 MG/DL (ref 70–130)
GLUCOSE BLDC GLUCOMTR-MCNC: 92 MG/DL (ref 70–130)
GLUCOSE BLDC GLUCOMTR-MCNC: 92 MG/DL (ref 70–130)
GLUCOSE BLDC GLUCOMTR-MCNC: 93 MG/DL (ref 70–130)
GLUCOSE BLDC GLUCOMTR-MCNC: 97 MG/DL (ref 70–130)
HAPTOGLOB SERPL-MCNC: <10 MG/DL (ref 34–200)
HBV SURFACE AG SERPL QL IA: NEGATIVE
HBV SURFACE AG SERPL QL IA: NORMAL
HCO3 BLDA-SCNC: 25.8 MMOL/L (ref 20–26)
HCO3 BLDA-SCNC: 26.4 MMOL/L (ref 20–26)
HCO3 BLDA-SCNC: 27.1 MMOL/L (ref 20–26)
HCO3 BLDA-SCNC: 28.9 MMOL/L (ref 20–26)
HCO3 BLDA-SCNC: 31.4 MMOL/L (ref 20–26)
HCO3 BLDA-SCNC: 34.4 MMOL/L (ref 20–26)
HCO3 BLDA-SCNC: 34.7 MMOL/L (ref 20–26)
HCT VFR BLD AUTO: 21.8 % (ref 37.5–51)
HCT VFR BLD AUTO: 22 % (ref 37.5–51)
HCT VFR BLD AUTO: 22.1 % (ref 37.5–51)
HCT VFR BLD AUTO: 22.2 % (ref 37.5–51)
HCT VFR BLD AUTO: 22.5 % (ref 37.5–51)
HCT VFR BLD AUTO: 22.9 % (ref 37.5–51)
HCT VFR BLD AUTO: 23 % (ref 37.5–51)
HCT VFR BLD AUTO: 23 % (ref 37.5–51)
HCT VFR BLD AUTO: 23.2 % (ref 37.5–51)
HCT VFR BLD AUTO: 24 % (ref 37.5–51)
HCT VFR BLD AUTO: 24.3 % (ref 37.5–51)
HCT VFR BLD AUTO: 24.6 % (ref 37.5–51)
HCT VFR BLD AUTO: 24.8 % (ref 37.5–51)
HCT VFR BLD AUTO: 25.4 % (ref 37.5–51)
HCT VFR BLD AUTO: 25.4 % (ref 37.5–51)
HCT VFR BLD AUTO: 25.5 % (ref 37.5–51)
HCT VFR BLD AUTO: 25.8 % (ref 37.5–51)
HCT VFR BLD AUTO: 25.9 % (ref 37.5–51)
HCT VFR BLD AUTO: 27.2 % (ref 37.5–51)
HCT VFR BLD AUTO: 27.4 % (ref 37.5–51)
HCT VFR BLD AUTO: 28.1 % (ref 37.5–51)
HCT VFR BLD AUTO: 28.2 % (ref 37.5–51)
HCT VFR BLD AUTO: 28.5 % (ref 37.5–51)
HCT VFR BLD AUTO: 28.5 % (ref 37.5–51)
HCT VFR BLD AUTO: 28.7 % (ref 37.5–51)
HCT VFR BLD AUTO: 29.9 % (ref 37.5–51)
HCT VFR BLD AUTO: 30.5 % (ref 37.5–51)
HCT VFR BLD AUTO: 30.6 % (ref 37.5–51)
HCT VFR BLD AUTO: 30.7 % (ref 37.5–51)
HCT VFR BLD AUTO: 30.8 % (ref 37.5–51)
HCT VFR BLD AUTO: 31.2 % (ref 40–52)
HCT VFR BLD AUTO: 31.5 % (ref 37.5–51)
HCT VFR BLD AUTO: 31.5 % (ref 37.5–51)
HCT VFR BLD AUTO: 32 % (ref 37.5–51)
HCT VFR BLD AUTO: 32.8 % (ref 37.5–51)
HCT VFR BLD AUTO: 34.3 % (ref 37.5–51)
HCT VFR BLD AUTO: 34.4 % (ref 37.5–51)
HCT VFR BLD AUTO: 36.3 % (ref 37.5–51)
HCV AB SER QL: ABNORMAL
HCV GENTYP SERPL NAA+PROBE: NORMAL
HCV RNA SERPL NAA+PROBE-ACNC: NORMAL IU/ML
HCV RNA SERPL NAA+PROBE-ACNC: NORMAL IU/ML
HCV RNA SERPL NAA+PROBE-LOG IU: 6.13 LOG10 IU/ML
HCV RNA SERPL NAA+PROBE-LOG IU: 6.36 LOG10 IU/ML
HEMOCCULT STL QL: POSITIVE
HGB BLD-MCNC: 10 G/DL (ref 13–17.7)
HGB BLD-MCNC: 10.3 G/DL (ref 13–17.7)
HGB BLD-MCNC: 10.4 G/DL (ref 13–17.7)
HGB BLD-MCNC: 10.5 G/DL (ref 13–17.7)
HGB BLD-MCNC: 10.6 G/DL (ref 13–17.7)
HGB BLD-MCNC: 10.6 G/DL (ref 13–17.7)
HGB BLD-MCNC: 11 G/DL (ref 13–17.7)
HGB BLD-MCNC: 11.1 G/DL (ref 13–17.7)
HGB BLD-MCNC: 11.5 G/DL (ref 13–17.7)
HGB BLD-MCNC: 6.9 G/DL (ref 13–17.7)
HGB BLD-MCNC: 7.1 G/DL (ref 13–17.7)
HGB BLD-MCNC: 7.2 G/DL (ref 13–17.7)
HGB BLD-MCNC: 7.2 G/DL (ref 13–17.7)
HGB BLD-MCNC: 7.3 G/DL (ref 13–17.7)
HGB BLD-MCNC: 7.4 G/DL (ref 13–17.7)
HGB BLD-MCNC: 7.5 G/DL (ref 13–17.7)
HGB BLD-MCNC: 7.6 G/DL (ref 13–17.7)
HGB BLD-MCNC: 7.9 G/DL (ref 13–17.7)
HGB BLD-MCNC: 7.9 G/DL (ref 13–17.7)
HGB BLD-MCNC: 8.1 G/DL (ref 13–17.7)
HGB BLD-MCNC: 8.1 G/DL (ref 13–17.7)
HGB BLD-MCNC: 8.2 G/DL (ref 13–17.7)
HGB BLD-MCNC: 8.2 G/DL (ref 13–17.7)
HGB BLD-MCNC: 8.4 G/DL (ref 13–17.7)
HGB BLD-MCNC: 8.6 G/DL (ref 13–17.7)
HGB BLD-MCNC: 8.7 G/DL (ref 13–17.7)
HGB BLD-MCNC: 8.7 G/DL (ref 13–17.7)
HGB BLD-MCNC: 9 G/DL (ref 13–17.7)
HGB BLD-MCNC: 9.1 G/DL (ref 13–17.7)
HGB BLD-MCNC: 9.2 G/DL (ref 13–17.7)
HGB BLD-MCNC: 9.3 G/DL (ref 13–17.7)
HGB BLD-MCNC: 9.4 G/DL (ref 13–17.7)
HGB BLD-MCNC: 9.5 G/DL (ref 13–17.7)
HGB BLD-MCNC: 9.7 G/DL (ref 13–17.7)
HGB BLD-MCNC: 9.7 G/DL (ref 13–17.7)
HGB BLD-MCNC: 9.9 G/DL (ref 13–17.7)
HGB BLD-MCNC: 9.9 G/DL (ref 13–17.7)
HGB BLD-MCNC: 9.9 G/DL (ref 14–18)
HOLD SPECIMEN: NORMAL
HOROWITZ INDEX BLD+IHG-RTO: 21 %
HOROWITZ INDEX BLD+IHG-RTO: 21 %
HYPOCHROMIA BLD QL: NORMAL
HYPOCHROMIA BLD QL: NORMAL
IMM GRANULOCYTES # BLD AUTO: 0.01 10*3/MM3 (ref 0–0.05)
IMM GRANULOCYTES # BLD AUTO: 0.02 10*3/MM3 (ref 0–0.03)
IMM GRANULOCYTES # BLD AUTO: 0.02 10*3/MM3 (ref 0–0.05)
IMM GRANULOCYTES # BLD AUTO: 0.03 10*3/MM3 (ref 0–0.05)
IMM GRANULOCYTES # BLD AUTO: 0.03 10*3/MM3 (ref 0–0.05)
IMM GRANULOCYTES # BLD AUTO: 0.04 10*3/MM3 (ref 0–0.05)
IMM GRANULOCYTES # BLD AUTO: 0.05 10*3/MM3 (ref 0–0.05)
IMM GRANULOCYTES # BLD AUTO: 0.05 10*3/MM3 (ref 0–0.05)
IMM GRANULOCYTES # BLD AUTO: 0.06 10*3/MM3 (ref 0–0.05)
IMM GRANULOCYTES NFR BLD AUTO: 0.2 % (ref 0–0.5)
IMM GRANULOCYTES NFR BLD AUTO: 0.3 % (ref 0–0.5)
IMM GRANULOCYTES NFR BLD AUTO: 0.3 % (ref 0–0.5)
IMM GRANULOCYTES NFR BLD AUTO: 0.4 % (ref 0–0.5)
IMM GRANULOCYTES NFR BLD AUTO: 0.4 % (ref 0–5)
IMM GRANULOCYTES NFR BLD AUTO: 0.5 % (ref 0–0.5)
IMM GRANULOCYTES NFR BLD AUTO: 0.6 % (ref 0–0.5)
IMM GRANULOCYTES NFR BLD AUTO: 0.6 % (ref 0–0.5)
IMM GRANULOCYTES NFR BLD AUTO: 0.9 % (ref 0–0.5)
IMM GRANULOCYTES NFR BLD AUTO: 1 % (ref 0–0.5)
IMM GRANULOCYTES NFR BLD AUTO: 1.2 % (ref 0–0.5)
IMP STRAIN: NOT DETECTED
INR PPP: 1.11 (ref 0.8–1.2)
INR PPP: 1.11 (ref 0.8–1.2)
INR PPP: 1.12 (ref 0.8–1.2)
INR PPP: 1.18 (ref 0.8–1.2)
INR PPP: 1.18 (ref 0.8–1.2)
INR PPP: 1.19 (ref 0.8–1.2)
INR PPP: 1.2 (ref 0.8–1.2)
INR PPP: 1.23 (ref 0.8–1.2)
INR PPP: 1.23 (ref 0.8–1.2)
IPAP: 12
KPC STRAIN: NOT DETECTED
LAB AP CASE REPORT: NORMAL
LABORATORY COMMENT REPORT: ABNORMAL
LIMITATIONS:: ABNORMAL
LIPASE SERPL-CCNC: 41 U/L (ref 13–60)
LIPASE SERPL-CCNC: 49 U/L (ref 13–60)
LIPASE SERPL-CCNC: 51 U/L (ref 13–60)
LIVER FIBR SCORE SERPL CALC.FIBROSURE: 0.84 (ref 0–0.21)
LYMPHOCYTES # BLD AUTO: 0.73 10*3/MM3 (ref 0.7–3.1)
LYMPHOCYTES # BLD AUTO: 0.77 10*3/MM3 (ref 0.7–3.1)
LYMPHOCYTES # BLD AUTO: 0.78 10*3/MM3 (ref 0.7–3.1)
LYMPHOCYTES # BLD AUTO: 0.79 10*3/MM3 (ref 0.7–3.1)
LYMPHOCYTES # BLD AUTO: 0.84 10*3/MM3 (ref 0.7–3.1)
LYMPHOCYTES # BLD AUTO: 0.87 10*3/MM3 (ref 0.72–4.86)
LYMPHOCYTES # BLD AUTO: 0.91 10*3/MM3 (ref 0.7–3.1)
LYMPHOCYTES # BLD AUTO: 0.92 10*3/MM3 (ref 0.7–3.1)
LYMPHOCYTES # BLD AUTO: 0.93 10*3/MM3 (ref 0.7–3.1)
LYMPHOCYTES # BLD AUTO: 0.94 10*3/MM3 (ref 0.7–3.1)
LYMPHOCYTES # BLD AUTO: 0.95 10*3/MM3 (ref 0.7–3.1)
LYMPHOCYTES # BLD AUTO: 0.97 10*3/MM3 (ref 0.7–3.1)
LYMPHOCYTES # BLD AUTO: 0.98 10*3/MM3 (ref 0.7–3.1)
LYMPHOCYTES # BLD AUTO: 0.99 10*3/MM3 (ref 0.7–3.1)
LYMPHOCYTES # BLD AUTO: 1 10*3/MM3 (ref 0.7–3.1)
LYMPHOCYTES # BLD AUTO: 1.04 10*3/MM3 (ref 0.7–3.1)
LYMPHOCYTES # BLD AUTO: 1.06 10*3/MM3 (ref 0.7–3.1)
LYMPHOCYTES # BLD AUTO: 1.06 10*3/MM3 (ref 0.7–3.1)
LYMPHOCYTES # BLD AUTO: 1.07 10*3/MM3 (ref 0.7–3.1)
LYMPHOCYTES # BLD AUTO: 1.1 10*3/MM3 (ref 0.7–3.1)
LYMPHOCYTES # BLD AUTO: 1.14 10*3/MM3 (ref 0.7–3.1)
LYMPHOCYTES # BLD AUTO: 1.14 10*3/MM3 (ref 0.7–3.1)
LYMPHOCYTES # BLD AUTO: 1.15 10*3/MM3 (ref 0.7–3.1)
LYMPHOCYTES # BLD AUTO: 1.16 10*3/MM3 (ref 0.7–3.1)
LYMPHOCYTES # BLD AUTO: 1.16 10*3/MM3 (ref 0.7–3.1)
LYMPHOCYTES # BLD AUTO: 1.18 10*3/MM3 (ref 0.7–3.1)
LYMPHOCYTES # BLD AUTO: 1.22 10*3/MM3 (ref 0.7–3.1)
LYMPHOCYTES # BLD AUTO: 1.28 10*3/MM3 (ref 0.7–3.1)
LYMPHOCYTES # BLD AUTO: 1.33 10*3/MM3 (ref 0.7–3.1)
LYMPHOCYTES # BLD AUTO: 1.47 10*3/MM3 (ref 0.7–3.1)
LYMPHOCYTES # BLD AUTO: 1.83 10*3/MM3 (ref 0.7–3.1)
LYMPHOCYTES NFR BLD AUTO: 12.5 % (ref 19.6–45.3)
LYMPHOCYTES NFR BLD AUTO: 16.6 % (ref 19.6–45.3)
LYMPHOCYTES NFR BLD AUTO: 17 % (ref 19.6–45.3)
LYMPHOCYTES NFR BLD AUTO: 17.2 % (ref 15–45)
LYMPHOCYTES NFR BLD AUTO: 17.4 % (ref 19.6–45.3)
LYMPHOCYTES NFR BLD AUTO: 17.5 % (ref 19.6–45.3)
LYMPHOCYTES NFR BLD AUTO: 18.4 % (ref 19.6–45.3)
LYMPHOCYTES NFR BLD AUTO: 18.8 % (ref 19.6–45.3)
LYMPHOCYTES NFR BLD AUTO: 18.8 % (ref 19.6–45.3)
LYMPHOCYTES NFR BLD AUTO: 19.5 % (ref 19.6–45.3)
LYMPHOCYTES NFR BLD AUTO: 20.1 % (ref 19.6–45.3)
LYMPHOCYTES NFR BLD AUTO: 20.2 % (ref 19.6–45.3)
LYMPHOCYTES NFR BLD AUTO: 20.9 % (ref 19.6–45.3)
LYMPHOCYTES NFR BLD AUTO: 21 % (ref 19.6–45.3)
LYMPHOCYTES NFR BLD AUTO: 22.2 % (ref 19.6–45.3)
LYMPHOCYTES NFR BLD AUTO: 22.4 % (ref 19.6–45.3)
LYMPHOCYTES NFR BLD AUTO: 23 % (ref 19.6–45.3)
LYMPHOCYTES NFR BLD AUTO: 23.3 % (ref 19.6–45.3)
LYMPHOCYTES NFR BLD AUTO: 23.5 % (ref 19.6–45.3)
LYMPHOCYTES NFR BLD AUTO: 23.9 % (ref 19.6–45.3)
LYMPHOCYTES NFR BLD AUTO: 24.4 % (ref 19.6–45.3)
LYMPHOCYTES NFR BLD AUTO: 24.9 % (ref 19.6–45.3)
LYMPHOCYTES NFR BLD AUTO: 25 % (ref 19.6–45.3)
LYMPHOCYTES NFR BLD AUTO: 25.5 % (ref 19.6–45.3)
LYMPHOCYTES NFR BLD AUTO: 25.6 % (ref 19.6–45.3)
LYMPHOCYTES NFR BLD AUTO: 25.7 % (ref 19.6–45.3)
LYMPHOCYTES NFR BLD AUTO: 26.1 % (ref 19.6–45.3)
LYMPHOCYTES NFR BLD AUTO: 26.2 % (ref 19.6–45.3)
LYMPHOCYTES NFR BLD AUTO: 26.9 % (ref 19.6–45.3)
LYMPHOCYTES NFR BLD AUTO: 27.1 % (ref 19.6–45.3)
LYMPHOCYTES NFR BLD AUTO: 27.3 % (ref 19.6–45.3)
LYMPHOCYTES NFR BLD AUTO: 27.6 % (ref 19.6–45.3)
LYMPHOCYTES NFR BLD AUTO: 34.7 % (ref 19.6–45.3)
Lab: ABNORMAL
Lab: NORMAL
MAGNESIUM SERPL-MCNC: 2.2 MG/DL (ref 1.6–2.3)
MAGNESIUM SERPL-MCNC: 2.2 MG/DL (ref 1.6–2.4)
MAGNESIUM SERPL-MCNC: 2.3 MG/DL (ref 1.6–2.4)
MAGNESIUM SERPL-MCNC: 2.3 MG/DL (ref 1.6–2.4)
MAGNESIUM SERPL-MCNC: 2.4 MG/DL (ref 1.6–2.4)
MAGNESIUM SERPL-MCNC: 3 MG/DL (ref 1.6–2.4)
MCH RBC QN AUTO: 26.1 PG (ref 26.6–33)
MCH RBC QN AUTO: 26.6 PG (ref 26.6–33)
MCH RBC QN AUTO: 26.8 PG (ref 26.6–33)
MCH RBC QN AUTO: 26.9 PG (ref 26.6–33)
MCH RBC QN AUTO: 26.9 PG (ref 26.6–33)
MCH RBC QN AUTO: 27 PG (ref 26.6–33)
MCH RBC QN AUTO: 27.1 PG (ref 26.6–33)
MCH RBC QN AUTO: 27.2 PG (ref 26.6–33)
MCH RBC QN AUTO: 27.2 PG (ref 26.6–33)
MCH RBC QN AUTO: 28.1 PG (ref 28–32)
MCH RBC QN AUTO: 28.2 PG (ref 26.6–33)
MCH RBC QN AUTO: 28.3 PG (ref 26.6–33)
MCH RBC QN AUTO: 28.3 PG (ref 26.6–33)
MCH RBC QN AUTO: 28.4 PG (ref 26.6–33)
MCH RBC QN AUTO: 28.5 PG (ref 26.6–33)
MCH RBC QN AUTO: 28.5 PG (ref 26.6–33)
MCH RBC QN AUTO: 28.6 PG (ref 26.6–33)
MCH RBC QN AUTO: 28.7 PG (ref 26.6–33)
MCH RBC QN AUTO: 28.8 PG (ref 26.6–33)
MCH RBC QN AUTO: 28.9 PG (ref 26.6–33)
MCH RBC QN AUTO: 28.9 PG (ref 26.6–33)
MCH RBC QN AUTO: 29.1 PG (ref 26.6–33)
MCH RBC QN AUTO: 29.2 PG (ref 26.6–33)
MCH RBC QN AUTO: 29.4 PG (ref 26.6–33)
MCH RBC QN AUTO: 29.4 PG (ref 26.6–33)
MCH RBC QN AUTO: 29.8 PG (ref 26.6–33)
MCHC RBC AUTO-ENTMCNC: 30.7 G/DL (ref 31.5–35.7)
MCHC RBC AUTO-ENTMCNC: 31.7 G/DL (ref 31.5–35.7)
MCHC RBC AUTO-ENTMCNC: 31.7 G/DL (ref 33–36)
MCHC RBC AUTO-ENTMCNC: 31.8 G/DL (ref 31.5–35.7)
MCHC RBC AUTO-ENTMCNC: 31.8 G/DL (ref 31.5–35.7)
MCHC RBC AUTO-ENTMCNC: 31.9 G/DL (ref 31.5–35.7)
MCHC RBC AUTO-ENTMCNC: 32 G/DL (ref 31.5–35.7)
MCHC RBC AUTO-ENTMCNC: 32.1 G/DL (ref 31.5–35.7)
MCHC RBC AUTO-ENTMCNC: 32.1 G/DL (ref 31.5–35.7)
MCHC RBC AUTO-ENTMCNC: 32.3 G/DL (ref 31.5–35.7)
MCHC RBC AUTO-ENTMCNC: 32.4 G/DL (ref 31.5–35.7)
MCHC RBC AUTO-ENTMCNC: 32.5 G/DL (ref 31.5–35.7)
MCHC RBC AUTO-ENTMCNC: 32.6 G/DL (ref 31.5–35.7)
MCHC RBC AUTO-ENTMCNC: 32.9 G/DL (ref 31.5–35.7)
MCHC RBC AUTO-ENTMCNC: 33 G/DL (ref 31.5–35.7)
MCHC RBC AUTO-ENTMCNC: 33.1 G/DL (ref 31.5–35.7)
MCHC RBC AUTO-ENTMCNC: 33.1 G/DL (ref 31.5–35.7)
MCHC RBC AUTO-ENTMCNC: 33.2 G/DL (ref 31.5–35.7)
MCHC RBC AUTO-ENTMCNC: 33.3 G/DL (ref 31.5–35.7)
MCHC RBC AUTO-ENTMCNC: 33.3 G/DL (ref 31.5–35.7)
MCHC RBC AUTO-ENTMCNC: 33.6 G/DL (ref 31.5–35.7)
MCHC RBC AUTO-ENTMCNC: 33.8 G/DL (ref 31.5–35.7)
MCHC RBC AUTO-ENTMCNC: 33.8 G/DL (ref 31.5–35.7)
MCHC RBC AUTO-ENTMCNC: 34 G/DL (ref 31.5–35.7)
MCHC RBC AUTO-ENTMCNC: 34.1 G/DL (ref 31.5–35.7)
MCHC RBC AUTO-ENTMCNC: 34.9 G/DL (ref 31.5–35.7)
MCHC RBC AUTO-ENTMCNC: 35.4 G/DL (ref 31.5–35.7)
MCHC RBC AUTO-ENTMCNC: 36.3 G/DL (ref 31.5–35.7)
MCV RBC AUTO: 81.5 FL (ref 79–97)
MCV RBC AUTO: 82.2 FL (ref 79–97)
MCV RBC AUTO: 82.2 FL (ref 79–97)
MCV RBC AUTO: 82.4 FL (ref 79–97)
MCV RBC AUTO: 82.6 FL (ref 79–97)
MCV RBC AUTO: 83.1 FL (ref 79–97)
MCV RBC AUTO: 83.2 FL (ref 79–97)
MCV RBC AUTO: 84.2 FL (ref 79–97)
MCV RBC AUTO: 84.4 FL (ref 79–97)
MCV RBC AUTO: 84.6 FL (ref 79–97)
MCV RBC AUTO: 84.8 FL (ref 79–97)
MCV RBC AUTO: 85.2 FL (ref 79–97)
MCV RBC AUTO: 85.3 FL (ref 79–97)
MCV RBC AUTO: 85.3 FL (ref 79–97)
MCV RBC AUTO: 85.4 FL (ref 79–97)
MCV RBC AUTO: 85.5 FL (ref 79–97)
MCV RBC AUTO: 85.6 FL (ref 79–97)
MCV RBC AUTO: 85.9 FL (ref 79–97)
MCV RBC AUTO: 86.3 FL (ref 79–97)
MCV RBC AUTO: 86.3 FL (ref 79–97)
MCV RBC AUTO: 86.5 FL (ref 79–97)
MCV RBC AUTO: 87 FL (ref 79–97)
MCV RBC AUTO: 87.2 FL (ref 79–97)
MCV RBC AUTO: 87.9 FL (ref 79–97)
MCV RBC AUTO: 88.1 FL (ref 79–97)
MCV RBC AUTO: 88.4 FL (ref 79–97)
MCV RBC AUTO: 88.4 FL (ref 79–97)
MCV RBC AUTO: 88.6 FL (ref 82–95)
MCV RBC AUTO: 89.6 FL (ref 79–97)
MCV RBC AUTO: 89.9 FL (ref 79–97)
METHADONE UR QL: NEGATIVE NG/ML
MODALITY: ABNORMAL
MONOCYTES # BLD AUTO: 0.34 10*3/MM3 (ref 0.1–0.9)
MONOCYTES # BLD AUTO: 0.35 10*3/MM3 (ref 0.1–0.9)
MONOCYTES # BLD AUTO: 0.42 10*3/MM3 (ref 0.1–0.9)
MONOCYTES # BLD AUTO: 0.42 10*3/MM3 (ref 0.1–0.9)
MONOCYTES # BLD AUTO: 0.44 10*3/MM3 (ref 0.1–0.9)
MONOCYTES # BLD AUTO: 0.45 10*3/MM3 (ref 0.1–0.9)
MONOCYTES # BLD AUTO: 0.45 10*3/MM3 (ref 0.1–0.9)
MONOCYTES # BLD AUTO: 0.46 10*3/MM3 (ref 0.1–0.9)
MONOCYTES # BLD AUTO: 0.47 10*3/MM3 (ref 0.1–0.9)
MONOCYTES # BLD AUTO: 0.49 10*3/MM3 (ref 0.1–0.9)
MONOCYTES # BLD AUTO: 0.52 10*3/MM3 (ref 0.19–1.3)
MONOCYTES # BLD AUTO: 0.52 10*3/MM3 (ref 0.1–0.9)
MONOCYTES # BLD AUTO: 0.53 10*3/MM3 (ref 0.1–0.9)
MONOCYTES # BLD AUTO: 0.54 10*3/MM3 (ref 0.1–0.9)
MONOCYTES # BLD AUTO: 0.55 10*3/MM3 (ref 0.1–0.9)
MONOCYTES # BLD AUTO: 0.56 10*3/MM3 (ref 0.1–0.9)
MONOCYTES # BLD AUTO: 0.57 10*3/MM3 (ref 0.1–0.9)
MONOCYTES # BLD AUTO: 0.63 10*3/MM3 (ref 0.1–0.9)
MONOCYTES # BLD AUTO: 0.64 10*3/MM3 (ref 0.1–0.9)
MONOCYTES # BLD AUTO: 0.65 10*3/MM3 (ref 0.1–0.9)
MONOCYTES # BLD AUTO: 0.66 10*3/MM3 (ref 0.1–0.9)
MONOCYTES # BLD AUTO: 0.69 10*3/MM3 (ref 0.1–0.9)
MONOCYTES # BLD AUTO: 0.7 10*3/MM3 (ref 0.1–0.9)
MONOCYTES # BLD AUTO: 0.78 10*3/MM3 (ref 0.1–0.9)
MONOCYTES # BLD AUTO: 0.8 10*3/MM3 (ref 0.1–0.9)
MONOCYTES # BLD AUTO: 0.81 10*3/MM3 (ref 0.1–0.9)
MONOCYTES # BLD AUTO: 0.85 10*3/MM3 (ref 0.1–0.9)
MONOCYTES # BLD AUTO: 0.88 10*3/MM3 (ref 0.1–0.9)
MONOCYTES NFR BLD AUTO: 10.2 % (ref 5–12)
MONOCYTES NFR BLD AUTO: 10.3 % (ref 4–12)
MONOCYTES NFR BLD AUTO: 10.5 % (ref 5–12)
MONOCYTES NFR BLD AUTO: 10.5 % (ref 5–12)
MONOCYTES NFR BLD AUTO: 10.6 % (ref 5–12)
MONOCYTES NFR BLD AUTO: 11.3 % (ref 5–12)
MONOCYTES NFR BLD AUTO: 11.3 % (ref 5–12)
MONOCYTES NFR BLD AUTO: 12.2 % (ref 5–12)
MONOCYTES NFR BLD AUTO: 12.3 % (ref 5–12)
MONOCYTES NFR BLD AUTO: 12.9 % (ref 5–12)
MONOCYTES NFR BLD AUTO: 12.9 % (ref 5–12)
MONOCYTES NFR BLD AUTO: 13.4 % (ref 5–12)
MONOCYTES NFR BLD AUTO: 14.1 % (ref 5–12)
MONOCYTES NFR BLD AUTO: 14.1 % (ref 5–12)
MONOCYTES NFR BLD AUTO: 14.2 % (ref 5–12)
MONOCYTES NFR BLD AUTO: 14.5 % (ref 5–12)
MONOCYTES NFR BLD AUTO: 14.7 % (ref 5–12)
MONOCYTES NFR BLD AUTO: 14.9 % (ref 5–12)
MONOCYTES NFR BLD AUTO: 15.3 % (ref 5–12)
MONOCYTES NFR BLD AUTO: 15.5 % (ref 5–12)
MONOCYTES NFR BLD AUTO: 16.6 % (ref 5–12)
MONOCYTES NFR BLD AUTO: 16.6 % (ref 5–12)
MONOCYTES NFR BLD AUTO: 17.6 % (ref 5–12)
MONOCYTES NFR BLD AUTO: 19.7 % (ref 5–12)
MONOCYTES NFR BLD AUTO: 7.6 % (ref 5–12)
MONOCYTES NFR BLD AUTO: 7.8 % (ref 5–12)
MONOCYTES NFR BLD AUTO: 8.9 % (ref 5–12)
MONOCYTES NFR BLD AUTO: 9.1 % (ref 5–12)
MONOCYTES NFR BLD AUTO: 9.2 % (ref 5–12)
MONOCYTES NFR BLD AUTO: 9.3 % (ref 5–12)
MONOCYTES NFR BLD AUTO: 9.3 % (ref 5–12)
MONOCYTES NFR BLD AUTO: 9.4 % (ref 5–12)
MONOCYTES NFR BLD AUTO: 9.8 % (ref 5–12)
MYCOPLASMAE PNEUMONIAE BY PCR: NEGATIVE
NDM STRAIN: NOT DETECTED
NECROINFLAMM ACTIVITY SCORING:: ABNORMAL
NECROINFLAMMATORY ACT GRADE SERPL QL: ABNORMAL
NECROINFLAMMATORY ACT SCORE SERPL: 0.09 (ref 0–0.17)
NEUTROPHILS # BLD AUTO: 2.08 10*3/MM3 (ref 1.4–7)
NEUTROPHILS # BLD AUTO: 2.12 10*3/MM3 (ref 1.7–7)
NEUTROPHILS # BLD AUTO: 2.16 10*3/MM3 (ref 1.7–7)
NEUTROPHILS # BLD AUTO: 2.26 10*3/MM3 (ref 1.7–7)
NEUTROPHILS # BLD AUTO: 2.29 10*3/MM3 (ref 1.4–7)
NEUTROPHILS # BLD AUTO: 2.29 10*3/MM3 (ref 1.7–7)
NEUTROPHILS # BLD AUTO: 2.34 10*3/MM3 (ref 1.4–7)
NEUTROPHILS # BLD AUTO: 2.37 10*3/MM3 (ref 1.4–7)
NEUTROPHILS # BLD AUTO: 2.43 10*3/MM3 (ref 1.4–7)
NEUTROPHILS # BLD AUTO: 2.56 10*3/MM3 (ref 1.4–7)
NEUTROPHILS # BLD AUTO: 2.63 10*3/MM3 (ref 1.4–7)
NEUTROPHILS # BLD AUTO: 2.65 10*3/MM3 (ref 1.4–7)
NEUTROPHILS # BLD AUTO: 2.7 10*3/MM3 (ref 1.4–7)
NEUTROPHILS # BLD AUTO: 2.78 10*3/MM3 (ref 1.7–7)
NEUTROPHILS # BLD AUTO: 2.91 10*3/MM3 (ref 1.7–7)
NEUTROPHILS # BLD AUTO: 2.91 10*3/MM3 (ref 1.7–7)
NEUTROPHILS # BLD AUTO: 2.98 10*3/MM3 (ref 1.7–7)
NEUTROPHILS # BLD AUTO: 3 10*3/MM3 (ref 1.4–7)
NEUTROPHILS # BLD AUTO: 3.04 10*3/MM3 (ref 1.7–7)
NEUTROPHILS # BLD AUTO: 3.13 10*3/MM3 (ref 1.7–7)
NEUTROPHILS # BLD AUTO: 3.16 10*3/MM3 (ref 1.7–7)
NEUTROPHILS # BLD AUTO: 3.18 10*3/MM3 (ref 1.7–7)
NEUTROPHILS # BLD AUTO: 3.2 10*3/MM3 (ref 1.7–7)
NEUTROPHILS # BLD AUTO: 3.22 10*3/MM3 (ref 1.7–7)
NEUTROPHILS # BLD AUTO: 3.22 10*3/MM3 (ref 1.7–7)
NEUTROPHILS # BLD AUTO: 3.35 10*3/MM3 (ref 1.7–7)
NEUTROPHILS # BLD AUTO: 3.36 10*3/MM3 (ref 1.7–7)
NEUTROPHILS # BLD AUTO: 3.49 10*3/MM3 (ref 1.7–7)
NEUTROPHILS # BLD AUTO: 3.52 10*3/MM3 (ref 1.87–8.4)
NEUTROPHILS # BLD AUTO: 3.62 10*3/MM3 (ref 1.7–7)
NEUTROPHILS # BLD AUTO: 4.56 10*3/MM3 (ref 1.7–7)
NEUTROPHILS NFR BLD AUTO: 48.2 % (ref 42.7–76)
NEUTROPHILS NFR BLD AUTO: 52.5 % (ref 42.7–76)
NEUTROPHILS NFR BLD AUTO: 53.9 % (ref 42.7–76)
NEUTROPHILS NFR BLD AUTO: 54.5 % (ref 42.7–76)
NEUTROPHILS NFR BLD AUTO: 55.6 % (ref 42.7–76)
NEUTROPHILS NFR BLD AUTO: 55.9 % (ref 42.7–76)
NEUTROPHILS NFR BLD AUTO: 56.6 % (ref 42.7–76)
NEUTROPHILS NFR BLD AUTO: 56.8 % (ref 42.7–76)
NEUTROPHILS NFR BLD AUTO: 57.2 % (ref 42.7–76)
NEUTROPHILS NFR BLD AUTO: 57.5 % (ref 42.7–76)
NEUTROPHILS NFR BLD AUTO: 57.8 % (ref 42.7–76)
NEUTROPHILS NFR BLD AUTO: 57.9 % (ref 42.7–76)
NEUTROPHILS NFR BLD AUTO: 58.8 % (ref 42.7–76)
NEUTROPHILS NFR BLD AUTO: 59.4 % (ref 42.7–76)
NEUTROPHILS NFR BLD AUTO: 59.8 % (ref 42.7–76)
NEUTROPHILS NFR BLD AUTO: 60 % (ref 42.7–76)
NEUTROPHILS NFR BLD AUTO: 60.1 % (ref 42.7–76)
NEUTROPHILS NFR BLD AUTO: 61.3 % (ref 42.7–76)
NEUTROPHILS NFR BLD AUTO: 61.6 % (ref 42.7–76)
NEUTROPHILS NFR BLD AUTO: 62.8 % (ref 42.7–76)
NEUTROPHILS NFR BLD AUTO: 63.6 % (ref 42.7–76)
NEUTROPHILS NFR BLD AUTO: 64.5 % (ref 42.7–76)
NEUTROPHILS NFR BLD AUTO: 64.8 % (ref 42.7–76)
NEUTROPHILS NFR BLD AUTO: 65.1 % (ref 42.7–76)
NEUTROPHILS NFR BLD AUTO: 66 % (ref 42.7–76)
NEUTROPHILS NFR BLD AUTO: 66.1 % (ref 42.7–76)
NEUTROPHILS NFR BLD AUTO: 66.9 % (ref 42.7–76)
NEUTROPHILS NFR BLD AUTO: 67.5 % (ref 42.7–76)
NEUTROPHILS NFR BLD AUTO: 67.8 % (ref 42.7–76)
NEUTROPHILS NFR BLD AUTO: 68.6 % (ref 42.7–76)
NEUTROPHILS NFR BLD AUTO: 69.5 % (ref 39–78)
NEUTROPHILS NFR BLD AUTO: 69.9 % (ref 42.7–76)
NEUTROPHILS NFR BLD AUTO: 74 % (ref 42.7–76)
NEUTS VAC BLD QL SMEAR: NORMAL
NRBC BLD AUTO-RTO: 0 /100 WBC (ref 0–0)
NRBC BLD AUTO-RTO: 0 /100 WBC (ref 0–0.2)
NRBC BLD AUTO-RTO: 0.3 /100 WBC (ref 0–0.2)
NRBC BLD AUTO-RTO: 0.3 /100 WBC (ref 0–0.2)
NT-PROBNP SERPL-MCNC: 4278 PG/ML (ref 5–900)
NT-PROBNP SERPL-MCNC: 4985 PG/ML (ref 5–900)
NT-PROBNP SERPL-MCNC: 5132 PG/ML (ref 5–900)
NT-PROBNP SERPL-MCNC: 5599 PG/ML (ref 5–900)
OPIATES SERPL QL SCN: NEGATIVE NG/ML
OXA 48 STRAIN: NOT DETECTED
OXYCODONE SERPL-MCNC: NEGATIVE NG/ML
PATH REPORT.FINAL DX SPEC: NORMAL
PATH REPORT.GROSS SPEC: NORMAL
PAW @ PEAK INSP FLOW SETTING VENT: 13 CMH2O
PCO2 BLDA: 32.6 MM HG (ref 35–45)
PCO2 BLDA: 34.1 MM HG (ref 35–45)
PCO2 BLDA: 41.6 MM HG (ref 35–45)
PCO2 BLDA: 43.6 MM HG (ref 35–45)
PCO2 BLDA: 44.2 MM HG (ref 35–45)
PCO2 BLDA: 51.1 MM HG (ref 35–45)
PCO2 BLDA: 59.4 MM HG (ref 35–45)
PCP SPEC-MCNC: NEGATIVE NG/ML
PH BLDA: 7.37 PH UNITS (ref 7.35–7.45)
PH BLDA: 7.41 PH UNITS (ref 7.35–7.45)
PH BLDA: 7.43 PH UNITS (ref 7.35–7.45)
PH BLDA: 7.44 PH UNITS (ref 7.35–7.45)
PH BLDA: 7.46 PH UNITS (ref 7.35–7.45)
PH BLDA: 7.51 PH UNITS (ref 7.35–7.45)
PH BLDA: 7.51 PH UNITS (ref 7.35–7.45)
PHOSPHATE SERPL-MCNC: 5.6 MG/DL (ref 2.5–4.5)
PLATELET # BLD AUTO: 100 10*3/MM3 (ref 140–450)
PLATELET # BLD AUTO: 100 10*3/MM3 (ref 140–450)
PLATELET # BLD AUTO: 103 10*3/MM3 (ref 140–450)
PLATELET # BLD AUTO: 104 10*3/MM3 (ref 140–450)
PLATELET # BLD AUTO: 105 10*3/MM3 (ref 140–450)
PLATELET # BLD AUTO: 110 10*3/MM3 (ref 140–450)
PLATELET # BLD AUTO: 111 10*3/MM3 (ref 140–450)
PLATELET # BLD AUTO: 113 10*3/MM3 (ref 140–450)
PLATELET # BLD AUTO: 115 10*3/MM3 (ref 140–450)
PLATELET # BLD AUTO: 115 10*3/MM3 (ref 140–450)
PLATELET # BLD AUTO: 116 10*3/MM3 (ref 140–450)
PLATELET # BLD AUTO: 117 10*3/MM3 (ref 140–450)
PLATELET # BLD AUTO: 118 10*3/MM3 (ref 140–450)
PLATELET # BLD AUTO: 122 10*3/MM3 (ref 140–450)
PLATELET # BLD AUTO: 126 10*3/MM3 (ref 140–450)
PLATELET # BLD AUTO: 126 10*3/MM3 (ref 140–450)
PLATELET # BLD AUTO: 127 10*3/MM3 (ref 140–450)
PLATELET # BLD AUTO: 129 10*3/MM3 (ref 140–450)
PLATELET # BLD AUTO: 130 10*3/MM3 (ref 140–450)
PLATELET # BLD AUTO: 142 10*3/MM3 (ref 140–450)
PLATELET # BLD AUTO: 148 10*3/MM3 (ref 140–450)
PLATELET # BLD AUTO: 164 10*3/MM3 (ref 140–450)
PLATELET # BLD AUTO: 78 10*3/MM3 (ref 140–450)
PLATELET # BLD AUTO: 79 10*3/MM3 (ref 140–450)
PLATELET # BLD AUTO: 88 10*3/MM3 (ref 140–450)
PLATELET # BLD AUTO: 92 10*3/MM3 (ref 130–400)
PLATELET # BLD AUTO: 94 10*3/MM3 (ref 140–450)
PMV BLD AUTO: 10 FL (ref 6–12)
PMV BLD AUTO: 8.2 FL (ref 6–12)
PMV BLD AUTO: 8.4 FL (ref 6–12)
PMV BLD AUTO: 8.6 FL (ref 6–12)
PMV BLD AUTO: 8.7 FL (ref 6–12)
PMV BLD AUTO: 8.8 FL (ref 6–12)
PMV BLD AUTO: 8.9 FL (ref 6–12)
PMV BLD AUTO: 9 FL (ref 6–12)
PMV BLD AUTO: 9.1 FL (ref 6–12)
PMV BLD AUTO: 9.1 FL (ref 6–12)
PMV BLD AUTO: 9.2 FL (ref 6–12)
PMV BLD AUTO: 9.3 FL (ref 6–12)
PMV BLD AUTO: 9.3 FL (ref 6–12)
PMV BLD AUTO: 9.4 FL (ref 6–12)
PMV BLD AUTO: 9.5 FL (ref 6–12)
PMV BLD AUTO: 9.6 FL (ref 6–12)
PMV BLD AUTO: 9.7 FL (ref 6–12)
PMV BLD AUTO: 9.7 FL (ref 6–12)
PMV BLD AUTO: 9.8 FL (ref 6–12)
PMV BLD AUTO: 9.9 FL (ref 6–12)
PO2 BLDA: 126 MM HG (ref 83–108)
PO2 BLDA: 151 MM HG (ref 83–108)
PO2 BLDA: 21 MM HG (ref 83–108)
PO2 BLDA: 22.5 MM HG (ref 83–108)
PO2 BLDA: 78.6 MM HG (ref 83–108)
PO2 BLDA: 84.2 MM HG (ref 83–108)
PO2 BLDA: 85 MM HG (ref 83–108)
POTASSIUM BLD-SCNC: 3.3 MMOL/L (ref 3.5–5.2)
POTASSIUM BLD-SCNC: 3.6 MMOL/L (ref 3.5–5.1)
POTASSIUM BLD-SCNC: 3.6 MMOL/L (ref 3.5–5.1)
POTASSIUM BLD-SCNC: 3.6 MMOL/L (ref 3.5–5.2)
POTASSIUM BLD-SCNC: 3.7 MMOL/L (ref 3.5–5.1)
POTASSIUM BLD-SCNC: 3.7 MMOL/L (ref 3.5–5.2)
POTASSIUM BLD-SCNC: 3.8 MMOL/L (ref 3.5–5.2)
POTASSIUM BLD-SCNC: 3.8 MMOL/L (ref 3.5–5.2)
POTASSIUM BLD-SCNC: 3.8 MMOL/L (ref 3.5–5.3)
POTASSIUM BLD-SCNC: 3.9 MMOL/L (ref 3.5–5.2)
POTASSIUM BLD-SCNC: 3.9 MMOL/L (ref 3.5–5.3)
POTASSIUM BLD-SCNC: 4 MMOL/L (ref 3.5–5.2)
POTASSIUM BLD-SCNC: 4.1 MMOL/L (ref 3.5–5.2)
POTASSIUM BLD-SCNC: 4.2 MMOL/L (ref 3.5–5.2)
POTASSIUM BLD-SCNC: 4.3 MMOL/L (ref 3.5–5.2)
POTASSIUM BLD-SCNC: 4.5 MMOL/L (ref 3.5–5.2)
POTASSIUM BLD-SCNC: 4.6 MMOL/L (ref 3.5–5.2)
POTASSIUM BLD-SCNC: 4.6 MMOL/L (ref 3.5–5.3)
POTASSIUM BLD-SCNC: 4.7 MMOL/L (ref 3.5–5.2)
POTASSIUM BLD-SCNC: 5.3 MMOL/L (ref 3.5–5.2)
POTASSIUM BLD-SCNC: 5.6 MMOL/L (ref 3.5–5.2)
POTASSIUM BLD-SCNC: 6.2 MMOL/L (ref 3.5–5.2)
PROPOXYPH SPEC QL: NEGATIVE NG/ML
PROT SERPL-MCNC: 10 G/DL (ref 6.3–8.6)
PROT SERPL-MCNC: 10.1 G/DL (ref 6–8.5)
PROT SERPL-MCNC: 10.2 G/DL (ref 6–8.5)
PROT SERPL-MCNC: 10.3 G/DL (ref 6–8.5)
PROT SERPL-MCNC: 10.3 G/DL (ref 6–8.5)
PROT SERPL-MCNC: 10.5 G/DL (ref 6–8.5)
PROT SERPL-MCNC: 8.9 G/DL (ref 6–8.5)
PROT SERPL-MCNC: 9 G/DL (ref 6.3–8.6)
PROT SERPL-MCNC: 9 G/DL (ref 6–8.5)
PROT SERPL-MCNC: 9.1 G/DL (ref 6–8.5)
PROT SERPL-MCNC: 9.3 G/DL (ref 6–8.5)
PROT SERPL-MCNC: 9.5 G/DL (ref 6–8.5)
PROT SERPL-MCNC: 9.5 G/DL (ref 6–8.5)
PROT SERPL-MCNC: 9.7 G/DL (ref 6–8.5)
PROT SERPL-MCNC: 9.8 G/DL (ref 6.3–8.6)
PROT SERPL-MCNC: 9.8 G/DL (ref 6–8.5)
PROT SERPL-MCNC: 9.8 G/DL (ref 6–8.5)
PROTHROMBIN TIME: 14.1 SECONDS (ref 11.1–15.3)
PROTHROMBIN TIME: 14.1 SECONDS (ref 11.1–15.3)
PROTHROMBIN TIME: 14.2 SECONDS (ref 11.1–15.3)
PROTHROMBIN TIME: 14.7 SECONDS (ref 11.1–15.3)
PROTHROMBIN TIME: 14.7 SECONDS (ref 11.1–15.3)
PROTHROMBIN TIME: 14.9 SECONDS (ref 11.1–15.3)
PROTHROMBIN TIME: 15 SECONDS (ref 11.1–15.3)
PROTHROMBIN TIME: 15.2 SECONDS (ref 11.1–15.3)
PROTHROMBIN TIME: 15.3 SECONDS (ref 11.1–15.3)
RBC # BLD AUTO: 2.47 10*6/MM3 (ref 4.14–5.8)
RBC # BLD AUTO: 2.52 10*6/MM3 (ref 4.14–5.8)
RBC # BLD AUTO: 2.53 10*6/MM3 (ref 4.14–5.8)
RBC # BLD AUTO: 2.56 10*6/MM3 (ref 4.14–5.8)
RBC # BLD AUTO: 2.61 10*6/MM3 (ref 4.14–5.8)
RBC # BLD AUTO: 2.66 10*6/MM3 (ref 4.14–5.8)
RBC # BLD AUTO: 2.71 10*6/MM3 (ref 4.14–5.8)
RBC # BLD AUTO: 2.78 10*6/MM3 (ref 4.14–5.8)
RBC # BLD AUTO: 2.78 10*6/MM3 (ref 4.14–5.8)
RBC # BLD AUTO: 2.93 10*6/MM3 (ref 4.14–5.8)
RBC # BLD AUTO: 2.96 10*6/MM3 (ref 4.14–5.8)
RBC # BLD AUTO: 2.97 10*6/MM3 (ref 4.14–5.8)
RBC # BLD AUTO: 2.98 10*6/MM3 (ref 4.14–5.8)
RBC # BLD AUTO: 3.02 10*6/MM3 (ref 4.14–5.8)
RBC # BLD AUTO: 3.15 10*6/MM3 (ref 4.14–5.8)
RBC # BLD AUTO: 3.23 10*6/MM3 (ref 4.14–5.8)
RBC # BLD AUTO: 3.33 10*6/MM3 (ref 4.14–5.8)
RBC # BLD AUTO: 3.34 10*6/MM3 (ref 4.14–5.8)
RBC # BLD AUTO: 3.34 10*6/MM3 (ref 4.14–5.8)
RBC # BLD AUTO: 3.36 10*6/MM3 (ref 4.14–5.8)
RBC # BLD AUTO: 3.4 10*6/MM3 (ref 4.14–5.8)
RBC # BLD AUTO: 3.51 10*6/MM3 (ref 4.14–5.8)
RBC # BLD AUTO: 3.52 10*6/MM3 (ref 4.8–5.9)
RBC # BLD AUTO: 3.59 10*6/MM3 (ref 4.14–5.8)
RBC # BLD AUTO: 3.64 10*6/MM3 (ref 4.14–5.8)
RBC # BLD AUTO: 3.65 10*6/MM3 (ref 4.14–5.8)
RBC # BLD AUTO: 3.67 10*6/MM3 (ref 4.14–5.8)
RBC # BLD AUTO: 3.71 10*6/MM3 (ref 4.14–5.8)
RBC # BLD AUTO: 3.73 10*6/MM3 (ref 4.14–5.8)
RBC # BLD AUTO: 3.75 10*6/MM3 (ref 4.14–5.8)
RBC # BLD AUTO: 3.88 10*6/MM3 (ref 4.14–5.8)
RBC # BLD AUTO: 3.89 10*6/MM3 (ref 4.14–5.8)
RBC # BLD AUTO: 3.94 10*6/MM3 (ref 4.14–5.8)
RBC # BLD AUTO: 4.05 10*6/MM3 (ref 4.14–5.8)
REF LAB TEST REF RANGE: NORMAL
RH BLD: POSITIVE
S PYO AG THROAT QL: NEGATIVE
SAO2 % BLDCOA: 25.4 % (ref 94–99)
SAO2 % BLDCOA: 27.9 % (ref 94–99)
SAO2 % BLDCOA: 94.7 % (ref 94–99)
SAO2 % BLDCOA: 97 % (ref 94–99)
SAO2 % BLDCOA: 97 % (ref 94–99)
SAO2 % BLDCOA: 97.2 % (ref 94–99)
SAO2 % BLDCOA: 99.9 % (ref 94–99)
SET MECH RESP RATE: 18
SMALL PLATELETS BLD QL SMEAR: NORMAL
SMALL PLATELETS BLD QL SMEAR: NORMAL
SODIUM BLD-SCNC: 118 MMOL/L (ref 136–145)
SODIUM BLD-SCNC: 121 MMOL/L (ref 136–145)
SODIUM BLD-SCNC: 122 MMOL/L (ref 136–145)
SODIUM BLD-SCNC: 125 MMOL/L (ref 136–145)
SODIUM BLD-SCNC: 125 MMOL/L (ref 136–145)
SODIUM BLD-SCNC: 126 MMOL/L (ref 136–145)
SODIUM BLD-SCNC: 126 MMOL/L (ref 136–145)
SODIUM BLD-SCNC: 127 MMOL/L (ref 136–145)
SODIUM BLD-SCNC: 127 MMOL/L (ref 137–145)
SODIUM BLD-SCNC: 129 MMOL/L (ref 137–145)
SODIUM BLD-SCNC: 130 MMOL/L (ref 136–145)
SODIUM BLD-SCNC: 130 MMOL/L (ref 136–145)
SODIUM BLD-SCNC: 130 MMOL/L (ref 137–145)
SODIUM BLD-SCNC: 131 MMOL/L (ref 136–145)
SODIUM BLD-SCNC: 131 MMOL/L (ref 136–145)
SODIUM BLD-SCNC: 132 MMOL/L (ref 136–145)
SODIUM BLD-SCNC: 132 MMOL/L (ref 136–145)
SODIUM BLD-SCNC: 133 MMOL/L (ref 135–145)
SODIUM BLD-SCNC: 133 MMOL/L (ref 136–145)
SODIUM BLD-SCNC: 133 MMOL/L (ref 136–145)
SODIUM BLD-SCNC: 134 MMOL/L (ref 135–145)
SODIUM BLD-SCNC: 134 MMOL/L (ref 136–145)
SODIUM BLD-SCNC: 134 MMOL/L (ref 136–145)
SODIUM BLD-SCNC: 135 MMOL/L (ref 135–145)
SODIUM BLD-SCNC: 135 MMOL/L (ref 136–145)
SODIUM BLD-SCNC: 136 MMOL/L (ref 136–145)
SODIUM BLD-SCNC: 137 MMOL/L (ref 136–145)
SODIUM BLD-SCNC: 137 MMOL/L (ref 136–145)
SODIUM BLD-SCNC: 138 MMOL/L (ref 136–145)
SODIUM BLD-SCNC: 138 MMOL/L (ref 136–145)
SODIUM BLD-SCNC: 139 MMOL/L (ref 136–145)
T&S EXPIRATION DATE: NORMAL
TEST INFORMATION: NORMAL
THC SERPLBLD CFM-MCNC: NEGATIVE NG/ML
TROPONIN I SERPL-MCNC: 0.01 NG/ML
TROPONIN I SERPL-MCNC: <0.012 NG/ML (ref 0–0.03)
TROPONIN T SERPL-MCNC: 0.05 NG/ML (ref 0–0.03)
TROPONIN T SERPL-MCNC: 0.06 NG/ML (ref 0–0.03)
TROPONIN T SERPL-MCNC: 0.07 NG/ML (ref 0–0.03)
TROPONIN T SERPL-MCNC: 0.09 NG/ML (ref 0–0.03)
TROPONIN T SERPL-MCNC: 0.1 NG/ML (ref 0–0.03)
TSH SERPL DL<=0.05 MIU/L-ACNC: 2.13 MIU/ML (ref 0.27–4.2)
UNIT  ABO: NORMAL
UNIT  RH: NORMAL
VENTILATOR MODE: ABNORMAL
VIM STRAIN: NOT DETECTED
WBC MORPH BLD: NORMAL
WBC NRBC COR # BLD: 3.69 10*3/MM3 (ref 3.4–10.8)
WBC NRBC COR # BLD: 3.73 10*3/MM3 (ref 3.4–10.8)
WBC NRBC COR # BLD: 3.76 10*3/MM3 (ref 3.4–10.8)
WBC NRBC COR # BLD: 3.95 10*3/MM3 (ref 3.4–10.8)
WBC NRBC COR # BLD: 4.2 10*3/MM3 (ref 3.4–10.8)
WBC NRBC COR # BLD: 4.21 10*3/MM3 (ref 3.4–10.8)
WBC NRBC COR # BLD: 4.25 10*3/MM3 (ref 3.4–10.8)
WBC NRBC COR # BLD: 4.27 10*3/MM3 (ref 3.4–10.8)
WBC NRBC COR # BLD: 4.28 10*3/MM3 (ref 3.4–10.8)
WBC NRBC COR # BLD: 4.29 10*3/MM3 (ref 3.4–10.8)
WBC NRBC COR # BLD: 4.31 10*3/MM3 (ref 3.4–10.8)
WBC NRBC COR # BLD: 4.48 10*3/MM3 (ref 3.4–10.8)
WBC NRBC COR # BLD: 4.49 10*3/MM3 (ref 3.4–10.8)
WBC NRBC COR # BLD: 4.53 10*3/MM3 (ref 3.4–10.8)
WBC NRBC COR # BLD: 4.58 10*3/MM3 (ref 3.4–10.8)
WBC NRBC COR # BLD: 4.61 10*3/MM3 (ref 3.4–10.8)
WBC NRBC COR # BLD: 4.75 10*3/MM3 (ref 3.4–10.8)
WBC NRBC COR # BLD: 4.78 10*3/MM3 (ref 3.4–10.8)
WBC NRBC COR # BLD: 4.81 10*3/MM3 (ref 3.4–10.8)
WBC NRBC COR # BLD: 4.83 10*3/MM3 (ref 3.4–10.8)
WBC NRBC COR # BLD: 4.87 10*3/MM3 (ref 3.4–10.8)
WBC NRBC COR # BLD: 4.9 10*3/MM3 (ref 3.4–10.8)
WBC NRBC COR # BLD: 4.94 10*3/MM3 (ref 3.4–10.8)
WBC NRBC COR # BLD: 5.01 10*3/MM3 (ref 3.4–10.8)
WBC NRBC COR # BLD: 5.06 10*3/MM3 (ref 3.4–10.8)
WBC NRBC COR # BLD: 5.06 10*3/MM3 (ref 4.8–10.8)
WBC NRBC COR # BLD: 5.1 10*3/MM3 (ref 3.4–10.8)
WBC NRBC COR # BLD: 5.17 10*3/MM3 (ref 3.4–10.8)
WBC NRBC COR # BLD: 5.24 10*3/MM3 (ref 3.4–10.8)
WBC NRBC COR # BLD: 5.28 10*3/MM3 (ref 3.4–10.8)
WBC NRBC COR # BLD: 5.32 10*3/MM3 (ref 3.4–10.8)
WBC NRBC COR # BLD: 5.36 10*3/MM3 (ref 3.4–10.8)
WBC NRBC COR # BLD: 5.76 10*3/MM3 (ref 3.4–10.8)
WBC NRBC COR # BLD: 6.17 10*3/MM3 (ref 3.4–10.8)
WHOLE BLOOD HOLD SPECIMEN: NORMAL

## 2019-01-01 PROCEDURE — 84484 ASSAY OF TROPONIN QUANT: CPT | Performed by: STUDENT IN AN ORGANIZED HEALTH CARE EDUCATION/TRAINING PROGRAM

## 2019-01-01 PROCEDURE — 25010000002 ONDANSETRON PER 1 MG: Performed by: EMERGENCY MEDICINE

## 2019-01-01 PROCEDURE — 85025 COMPLETE CBC W/AUTO DIFF WBC: CPT

## 2019-01-01 PROCEDURE — 86900 BLOOD TYPING SEROLOGIC ABO: CPT

## 2019-01-01 PROCEDURE — 94799 UNLISTED PULMONARY SVC/PX: CPT

## 2019-01-01 PROCEDURE — 80053 COMPREHEN METABOLIC PANEL: CPT | Performed by: STUDENT IN AN ORGANIZED HEALTH CARE EDUCATION/TRAINING PROGRAM

## 2019-01-01 PROCEDURE — 83735 ASSAY OF MAGNESIUM: CPT | Performed by: FAMILY MEDICINE

## 2019-01-01 PROCEDURE — 82272 OCCULT BLD FECES 1-3 TESTS: CPT | Performed by: STUDENT IN AN ORGANIZED HEALTH CARE EDUCATION/TRAINING PROGRAM

## 2019-01-01 PROCEDURE — 84484 ASSAY OF TROPONIN QUANT: CPT | Performed by: FAMILY MEDICINE

## 2019-01-01 PROCEDURE — 71046 X-RAY EXAM CHEST 2 VIEWS: CPT

## 2019-01-01 PROCEDURE — G0378 HOSPITAL OBSERVATION PER HR: HCPCS

## 2019-01-01 PROCEDURE — 82962 GLUCOSE BLOOD TEST: CPT

## 2019-01-01 PROCEDURE — G0257 UNSCHED DIALYSIS ESRD PT HOS: HCPCS

## 2019-01-01 PROCEDURE — 36430 TRANSFUSION BLD/BLD COMPNT: CPT

## 2019-01-01 PROCEDURE — 99308 SBSQ NF CARE LOW MDM 20: CPT | Performed by: STUDENT IN AN ORGANIZED HEALTH CARE EDUCATION/TRAINING PROGRAM

## 2019-01-01 PROCEDURE — 36600 WITHDRAWAL OF ARTERIAL BLOOD: CPT

## 2019-01-01 PROCEDURE — 99217 PR OBSERVATION CARE DISCHARGE MANAGEMENT: CPT | Performed by: STUDENT IN AN ORGANIZED HEALTH CARE EDUCATION/TRAINING PROGRAM

## 2019-01-01 PROCEDURE — 85025 COMPLETE CBC W/AUTO DIFF WBC: CPT | Performed by: EMERGENCY MEDICINE

## 2019-01-01 PROCEDURE — 36415 COLL VENOUS BLD VENIPUNCTURE: CPT | Performed by: STUDENT IN AN ORGANIZED HEALTH CARE EDUCATION/TRAINING PROGRAM

## 2019-01-01 PROCEDURE — 94660 CPAP INITIATION&MGMT: CPT

## 2019-01-01 PROCEDURE — 25010000002 PIPERACILLIN SOD-TAZOBACTAM PER 1 G: Performed by: EMERGENCY MEDICINE

## 2019-01-01 PROCEDURE — 97116 GAIT TRAINING THERAPY: CPT

## 2019-01-01 PROCEDURE — 93005 ELECTROCARDIOGRAM TRACING: CPT | Performed by: EMERGENCY MEDICINE

## 2019-01-01 PROCEDURE — 70450 CT HEAD/BRAIN W/O DYE: CPT

## 2019-01-01 PROCEDURE — 45378 DIAGNOSTIC COLONOSCOPY: CPT | Performed by: INTERNAL MEDICINE

## 2019-01-01 PROCEDURE — 87340 HEPATITIS B SURFACE AG IA: CPT | Performed by: INTERNAL MEDICINE

## 2019-01-01 PROCEDURE — 99213 OFFICE O/P EST LOW 20 MIN: CPT | Performed by: NURSE PRACTITIONER

## 2019-01-01 PROCEDURE — 84484 ASSAY OF TROPONIN QUANT: CPT

## 2019-01-01 PROCEDURE — 87081 CULTURE SCREEN ONLY: CPT | Performed by: STUDENT IN AN ORGANIZED HEALTH CARE EDUCATION/TRAINING PROGRAM

## 2019-01-01 PROCEDURE — 86850 RBC ANTIBODY SCREEN: CPT | Performed by: EMERGENCY MEDICINE

## 2019-01-01 PROCEDURE — 99219 PR INITIAL OBSERVATION CARE/DAY 50 MINUTES: CPT | Performed by: STUDENT IN AN ORGANIZED HEALTH CARE EDUCATION/TRAINING PROGRAM

## 2019-01-01 PROCEDURE — 83690 ASSAY OF LIPASE: CPT | Performed by: FAMILY MEDICINE

## 2019-01-01 PROCEDURE — 71045 X-RAY EXAM CHEST 1 VIEW: CPT

## 2019-01-01 PROCEDURE — 25010000002 EPOETIN ALFA PER 1000 UNITS: Performed by: INTERNAL MEDICINE

## 2019-01-01 PROCEDURE — 82803 BLOOD GASES ANY COMBINATION: CPT

## 2019-01-01 PROCEDURE — 86923 COMPATIBILITY TEST ELECTRIC: CPT

## 2019-01-01 PROCEDURE — 80053 COMPREHEN METABOLIC PANEL: CPT | Performed by: EMERGENCY MEDICINE

## 2019-01-01 PROCEDURE — P9016 RBC LEUKOCYTES REDUCED: HCPCS

## 2019-01-01 PROCEDURE — 85025 COMPLETE CBC W/AUTO DIFF WBC: CPT | Performed by: STUDENT IN AN ORGANIZED HEALTH CARE EDUCATION/TRAINING PROGRAM

## 2019-01-01 PROCEDURE — 99232 SBSQ HOSP IP/OBS MODERATE 35: CPT | Performed by: STUDENT IN AN ORGANIZED HEALTH CARE EDUCATION/TRAINING PROGRAM

## 2019-01-01 PROCEDURE — 63710000001 INSULIN DETEMIR PER 5 UNITS: Performed by: STUDENT IN AN ORGANIZED HEALTH CARE EDUCATION/TRAINING PROGRAM

## 2019-01-01 PROCEDURE — 85018 HEMOGLOBIN: CPT | Performed by: STUDENT IN AN ORGANIZED HEALTH CARE EDUCATION/TRAINING PROGRAM

## 2019-01-01 PROCEDURE — 63710000001 INSULIN ASPART PER 5 UNITS: Performed by: STUDENT IN AN ORGANIZED HEALTH CARE EDUCATION/TRAINING PROGRAM

## 2019-01-01 PROCEDURE — 82140 ASSAY OF AMMONIA: CPT | Performed by: STUDENT IN AN ORGANIZED HEALTH CARE EDUCATION/TRAINING PROGRAM

## 2019-01-01 PROCEDURE — 85610 PROTHROMBIN TIME: CPT | Performed by: EMERGENCY MEDICINE

## 2019-01-01 PROCEDURE — 83605 ASSAY OF LACTIC ACID: CPT | Performed by: EMERGENCY MEDICINE

## 2019-01-01 PROCEDURE — 99285 EMERGENCY DEPT VISIT HI MDM: CPT

## 2019-01-01 PROCEDURE — 99225 PR SBSQ OBSERVATION CARE/DAY 25 MINUTES: CPT | Performed by: STUDENT IN AN ORGANIZED HEALTH CARE EDUCATION/TRAINING PROGRAM

## 2019-01-01 PROCEDURE — 83735 ASSAY OF MAGNESIUM: CPT | Performed by: EMERGENCY MEDICINE

## 2019-01-01 PROCEDURE — 83880 ASSAY OF NATRIURETIC PEPTIDE: CPT | Performed by: EMERGENCY MEDICINE

## 2019-01-01 PROCEDURE — 80048 BASIC METABOLIC PNL TOTAL CA: CPT

## 2019-01-01 PROCEDURE — 93010 ELECTROCARDIOGRAM REPORT: CPT | Performed by: INTERNAL MEDICINE

## 2019-01-01 PROCEDURE — 80053 COMPREHEN METABOLIC PANEL: CPT | Performed by: FAMILY MEDICINE

## 2019-01-01 PROCEDURE — 94760 N-INVAS EAR/PLS OXIMETRY 1: CPT

## 2019-01-01 PROCEDURE — 25010000002 HALOPERIDOL LACTATE PER 5 MG: Performed by: STUDENT IN AN ORGANIZED HEALTH CARE EDUCATION/TRAINING PROGRAM

## 2019-01-01 PROCEDURE — 80053 COMPREHEN METABOLIC PANEL: CPT | Performed by: NURSE PRACTITIONER

## 2019-01-01 PROCEDURE — 85610 PROTHROMBIN TIME: CPT | Performed by: NURSE PRACTITIONER

## 2019-01-01 PROCEDURE — 5A1D70Z PERFORMANCE OF URINARY FILTRATION, INTERMITTENT, LESS THAN 6 HOURS PER DAY: ICD-10-PCS | Performed by: INTERNAL MEDICINE

## 2019-01-01 PROCEDURE — 63710000001 INSULIN DETEMIR PER 5 UNITS: Performed by: HOSPITALIST

## 2019-01-01 PROCEDURE — 82140 ASSAY OF AMMONIA: CPT | Performed by: INTERNAL MEDICINE

## 2019-01-01 PROCEDURE — 80048 BASIC METABOLIC PNL TOTAL CA: CPT | Performed by: STUDENT IN AN ORGANIZED HEALTH CARE EDUCATION/TRAINING PROGRAM

## 2019-01-01 PROCEDURE — 85025 COMPLETE CBC W/AUTO DIFF WBC: CPT | Performed by: FAMILY MEDICINE

## 2019-01-01 PROCEDURE — 36415 COLL VENOUS BLD VENIPUNCTURE: CPT | Performed by: NURSE PRACTITIONER

## 2019-01-01 PROCEDURE — 87804 INFLUENZA ASSAY W/OPTIC: CPT | Performed by: EMERGENCY MEDICINE

## 2019-01-01 PROCEDURE — 85027 COMPLETE CBC AUTOMATED: CPT | Performed by: NURSE PRACTITIONER

## 2019-01-01 PROCEDURE — 0DJD8ZZ INSPECTION OF LOWER INTESTINAL TRACT, VIA NATURAL OR ARTIFICIAL OPENING ENDOSCOPIC: ICD-10-PCS | Performed by: INTERNAL MEDICINE

## 2019-01-01 PROCEDURE — 83605 ASSAY OF LACTIC ACID: CPT | Performed by: FAMILY MEDICINE

## 2019-01-01 PROCEDURE — 82140 ASSAY OF AMMONIA: CPT

## 2019-01-01 PROCEDURE — 86900 BLOOD TYPING SEROLOGIC ABO: CPT | Performed by: EMERGENCY MEDICINE

## 2019-01-01 PROCEDURE — 99223 1ST HOSP IP/OBS HIGH 75: CPT | Performed by: STUDENT IN AN ORGANIZED HEALTH CARE EDUCATION/TRAINING PROGRAM

## 2019-01-01 PROCEDURE — 93005 ELECTROCARDIOGRAM TRACING: CPT | Performed by: FAMILY MEDICINE

## 2019-01-01 PROCEDURE — 76705 ECHO EXAM OF ABDOMEN: CPT

## 2019-01-01 PROCEDURE — 36415 COLL VENOUS BLD VENIPUNCTURE: CPT | Performed by: EMERGENCY MEDICINE

## 2019-01-01 PROCEDURE — 86900 BLOOD TYPING SEROLOGIC ABO: CPT | Performed by: FAMILY MEDICINE

## 2019-01-01 PROCEDURE — 87902 NFCT AGT GNTYP ALYS HEP C: CPT | Performed by: NURSE PRACTITIONER

## 2019-01-01 PROCEDURE — 86901 BLOOD TYPING SEROLOGIC RH(D): CPT | Performed by: EMERGENCY MEDICINE

## 2019-01-01 PROCEDURE — 84443 ASSAY THYROID STIM HORMONE: CPT | Performed by: EMERGENCY MEDICINE

## 2019-01-01 PROCEDURE — 83605 ASSAY OF LACTIC ACID: CPT | Performed by: STUDENT IN AN ORGANIZED HEALTH CARE EDUCATION/TRAINING PROGRAM

## 2019-01-01 PROCEDURE — 99214 OFFICE O/P EST MOD 30 MIN: CPT | Performed by: NURSE PRACTITIONER

## 2019-01-01 PROCEDURE — 0DJ08ZZ INSPECTION OF UPPER INTESTINAL TRACT, VIA NATURAL OR ARTIFICIAL OPENING ENDOSCOPIC: ICD-10-PCS | Performed by: INTERNAL MEDICINE

## 2019-01-01 PROCEDURE — 87880 STREP A ASSAY W/OPTIC: CPT | Performed by: EMERGENCY MEDICINE

## 2019-01-01 PROCEDURE — 83605 ASSAY OF LACTIC ACID: CPT | Performed by: PHYSICIAN ASSISTANT

## 2019-01-01 PROCEDURE — 99222 1ST HOSP IP/OBS MODERATE 55: CPT | Performed by: STUDENT IN AN ORGANIZED HEALTH CARE EDUCATION/TRAINING PROGRAM

## 2019-01-01 PROCEDURE — 87581 M.PNEUMON DNA AMP PROBE: CPT | Performed by: STUDENT IN AN ORGANIZED HEALTH CARE EDUCATION/TRAINING PROGRAM

## 2019-01-01 PROCEDURE — 85014 HEMATOCRIT: CPT | Performed by: STUDENT IN AN ORGANIZED HEALTH CARE EDUCATION/TRAINING PROGRAM

## 2019-01-01 PROCEDURE — 99233 SBSQ HOSP IP/OBS HIGH 50: CPT | Performed by: STUDENT IN AN ORGANIZED HEALTH CARE EDUCATION/TRAINING PROGRAM

## 2019-01-01 PROCEDURE — 97162 PT EVAL MOD COMPLEX 30 MIN: CPT | Performed by: PHYSICAL THERAPIST

## 2019-01-01 PROCEDURE — 85610 PROTHROMBIN TIME: CPT | Performed by: PHYSICIAN ASSISTANT

## 2019-01-01 PROCEDURE — 84484 ASSAY OF TROPONIN QUANT: CPT | Performed by: EMERGENCY MEDICINE

## 2019-01-01 PROCEDURE — 96367 TX/PROPH/DG ADDL SEQ IV INF: CPT

## 2019-01-01 PROCEDURE — 87902 NFCT AGT GNTYP ALYS HEP C: CPT

## 2019-01-01 PROCEDURE — 86901 BLOOD TYPING SEROLOGIC RH(D): CPT | Performed by: INTERNAL MEDICINE

## 2019-01-01 PROCEDURE — 25010000002 PROPOFOL 10 MG/ML EMULSION: Performed by: NURSE ANESTHETIST, CERTIFIED REGISTERED

## 2019-01-01 PROCEDURE — 80048 BASIC METABOLIC PNL TOTAL CA: CPT | Performed by: FAMILY MEDICINE

## 2019-01-01 PROCEDURE — 93005 ELECTROCARDIOGRAM TRACING: CPT | Performed by: STUDENT IN AN ORGANIZED HEALTH CARE EDUCATION/TRAINING PROGRAM

## 2019-01-01 PROCEDURE — 87522 HEPATITIS C REVRS TRNSCRPJ: CPT | Performed by: NURSE PRACTITIONER

## 2019-01-01 PROCEDURE — 87081 CULTURE SCREEN ONLY: CPT | Performed by: EMERGENCY MEDICINE

## 2019-01-01 PROCEDURE — 85730 THROMBOPLASTIN TIME PARTIAL: CPT | Performed by: EMERGENCY MEDICINE

## 2019-01-01 PROCEDURE — 82140 ASSAY OF AMMONIA: CPT | Performed by: EMERGENCY MEDICINE

## 2019-01-01 PROCEDURE — 45382 COLONOSCOPY W/CONTROL BLEED: CPT | Performed by: INTERNAL MEDICINE

## 2019-01-01 PROCEDURE — 36415 COLL VENOUS BLD VENIPUNCTURE: CPT

## 2019-01-01 PROCEDURE — 0W3P8ZZ CONTROL BLEEDING IN GASTROINTESTINAL TRACT, VIA NATURAL OR ARTIFICIAL OPENING ENDOSCOPIC: ICD-10-PCS | Performed by: INTERNAL MEDICINE

## 2019-01-01 PROCEDURE — 85610 PROTHROMBIN TIME: CPT | Performed by: FAMILY MEDICINE

## 2019-01-01 PROCEDURE — 81596 NFCT DS CHRNC HCV 6 ASSAYS: CPT | Performed by: NURSE PRACTITIONER

## 2019-01-01 PROCEDURE — 99238 HOSP IP/OBS DSCHRG MGMT 30/<: CPT | Performed by: STUDENT IN AN ORGANIZED HEALTH CARE EDUCATION/TRAINING PROGRAM

## 2019-01-01 PROCEDURE — 82140 ASSAY OF AMMONIA: CPT | Performed by: GENERAL PRACTICE

## 2019-01-01 PROCEDURE — 82550 ASSAY OF CK (CPK): CPT | Performed by: FAMILY MEDICINE

## 2019-01-01 PROCEDURE — 99220 PR INITIAL OBSERVATION CARE/DAY 70 MINUTES: CPT | Performed by: FAMILY MEDICINE

## 2019-01-01 PROCEDURE — 83735 ASSAY OF MAGNESIUM: CPT | Performed by: STUDENT IN AN ORGANIZED HEALTH CARE EDUCATION/TRAINING PROGRAM

## 2019-01-01 PROCEDURE — 45380 COLONOSCOPY AND BIOPSY: CPT | Performed by: INTERNAL MEDICINE

## 2019-01-01 PROCEDURE — 87081 CULTURE SCREEN ONLY: CPT | Performed by: HOSPITALIST

## 2019-01-01 PROCEDURE — 25010000002 AZITHROMYCIN: Performed by: PHYSICIAN ASSISTANT

## 2019-01-01 PROCEDURE — 99284 EMERGENCY DEPT VISIT MOD MDM: CPT

## 2019-01-01 PROCEDURE — 82140 ASSAY OF AMMONIA: CPT | Performed by: PHYSICIAN ASSISTANT

## 2019-01-01 PROCEDURE — 97166 OT EVAL MOD COMPLEX 45 MIN: CPT

## 2019-01-01 PROCEDURE — 25010000002 ONDANSETRON PER 1 MG: Performed by: FAMILY MEDICINE

## 2019-01-01 PROCEDURE — 87040 BLOOD CULTURE FOR BACTERIA: CPT | Performed by: EMERGENCY MEDICINE

## 2019-01-01 PROCEDURE — P9035 PLATELET PHERES LEUKOREDUCED: HCPCS

## 2019-01-01 PROCEDURE — 74176 CT ABD & PELVIS W/O CONTRAST: CPT

## 2019-01-01 PROCEDURE — 45385 COLONOSCOPY W/LESION REMOVAL: CPT | Performed by: INTERNAL MEDICINE

## 2019-01-01 PROCEDURE — 43235 EGD DIAGNOSTIC BRUSH WASH: CPT | Performed by: INTERNAL MEDICINE

## 2019-01-01 PROCEDURE — 94640 AIRWAY INHALATION TREATMENT: CPT

## 2019-01-01 PROCEDURE — 63510000001 EPOETIN ALFA PER 1000 UNITS: Performed by: INTERNAL MEDICINE

## 2019-01-01 PROCEDURE — 85610 PROTHROMBIN TIME: CPT | Performed by: STUDENT IN AN ORGANIZED HEALTH CARE EDUCATION/TRAINING PROGRAM

## 2019-01-01 PROCEDURE — 97162 PT EVAL MOD COMPLEX 30 MIN: CPT

## 2019-01-01 PROCEDURE — 99232 SBSQ HOSP IP/OBS MODERATE 35: CPT | Performed by: INTERNAL MEDICINE

## 2019-01-01 PROCEDURE — 86901 BLOOD TYPING SEROLOGIC RH(D): CPT | Performed by: FAMILY MEDICINE

## 2019-01-01 PROCEDURE — 82140 ASSAY OF AMMONIA: CPT | Performed by: FAMILY MEDICINE

## 2019-01-01 PROCEDURE — 71275 CT ANGIOGRAPHY CHEST: CPT

## 2019-01-01 PROCEDURE — 83690 ASSAY OF LIPASE: CPT | Performed by: EMERGENCY MEDICINE

## 2019-01-01 PROCEDURE — 80307 DRUG TEST PRSMV CHEM ANLYZR: CPT | Performed by: FAMILY MEDICINE

## 2019-01-01 PROCEDURE — 88305 TISSUE EXAM BY PATHOLOGIST: CPT | Performed by: PATHOLOGY

## 2019-01-01 PROCEDURE — 25010000002 CEFTRIAXONE PER 250 MG: Performed by: PHYSICIAN ASSISTANT

## 2019-01-01 PROCEDURE — 99217 PR OBSERVATION CARE DISCHARGE MANAGEMENT: CPT | Performed by: FAMILY MEDICINE

## 2019-01-01 PROCEDURE — 96374 THER/PROPH/DIAG INJ IV PUSH: CPT

## 2019-01-01 PROCEDURE — 96375 TX/PRO/DX INJ NEW DRUG ADDON: CPT

## 2019-01-01 PROCEDURE — 88305 TISSUE EXAM BY PATHOLOGIST: CPT | Performed by: INTERNAL MEDICINE

## 2019-01-01 PROCEDURE — 85007 BL SMEAR W/DIFF WBC COUNT: CPT | Performed by: EMERGENCY MEDICINE

## 2019-01-01 PROCEDURE — 25010000002 MORPHINE PER 10 MG: Performed by: EMERGENCY MEDICINE

## 2019-01-01 PROCEDURE — 86850 RBC ANTIBODY SCREEN: CPT | Performed by: FAMILY MEDICINE

## 2019-01-01 PROCEDURE — 86140 C-REACTIVE PROTEIN: CPT | Performed by: STUDENT IN AN ORGANIZED HEALTH CARE EDUCATION/TRAINING PROGRAM

## 2019-01-01 PROCEDURE — 87040 BLOOD CULTURE FOR BACTERIA: CPT | Performed by: PHYSICIAN ASSISTANT

## 2019-01-01 PROCEDURE — 85730 THROMBOPLASTIN TIME PARTIAL: CPT | Performed by: STUDENT IN AN ORGANIZED HEALTH CARE EDUCATION/TRAINING PROGRAM

## 2019-01-01 PROCEDURE — 25010000002 NALOXONE PER 1 MG: Performed by: PHYSICIAN ASSISTANT

## 2019-01-01 PROCEDURE — 80053 COMPREHEN METABOLIC PANEL: CPT

## 2019-01-01 PROCEDURE — 84100 ASSAY OF PHOSPHORUS: CPT | Performed by: INTERNAL MEDICINE

## 2019-01-01 PROCEDURE — 83880 ASSAY OF NATRIURETIC PEPTIDE: CPT | Performed by: FAMILY MEDICINE

## 2019-01-01 PROCEDURE — 97530 THERAPEUTIC ACTIVITIES: CPT

## 2019-01-01 PROCEDURE — 0 IOPAMIDOL PER 1 ML: Performed by: HOSPITALIST

## 2019-01-01 PROCEDURE — 96365 THER/PROPH/DIAG IV INF INIT: CPT

## 2019-01-01 PROCEDURE — 86850 RBC ANTIBODY SCREEN: CPT | Performed by: INTERNAL MEDICINE

## 2019-01-01 PROCEDURE — 25010000002 DESMOPRESSIN PER 1 MCG: Performed by: STUDENT IN AN ORGANIZED HEALTH CARE EDUCATION/TRAINING PROGRAM

## 2019-01-01 PROCEDURE — 86140 C-REACTIVE PROTEIN: CPT

## 2019-01-01 PROCEDURE — 43239 EGD BIOPSY SINGLE/MULTIPLE: CPT | Performed by: INTERNAL MEDICINE

## 2019-01-01 PROCEDURE — 87081 CULTURE SCREEN ONLY: CPT | Performed by: FAMILY MEDICINE

## 2019-01-01 PROCEDURE — 99305 1ST NF CARE MODERATE MDM 35: CPT | Performed by: STUDENT IN AN ORGANIZED HEALTH CARE EDUCATION/TRAINING PROGRAM

## 2019-01-01 PROCEDURE — 99220 PR INITIAL OBSERVATION CARE/DAY 70 MINUTES: CPT | Performed by: STUDENT IN AN ORGANIZED HEALTH CARE EDUCATION/TRAINING PROGRAM

## 2019-01-01 PROCEDURE — 86900 BLOOD TYPING SEROLOGIC ABO: CPT | Performed by: INTERNAL MEDICINE

## 2019-01-01 PROCEDURE — 99231 SBSQ HOSP IP/OBS SF/LOW 25: CPT | Performed by: INTERNAL MEDICINE

## 2019-01-01 PROCEDURE — 99283 EMERGENCY DEPT VISIT LOW MDM: CPT

## 2019-01-01 PROCEDURE — 74174 CTA ABD&PLVS W/CONTRAST: CPT

## 2019-01-01 PROCEDURE — 0 IOPAMIDOL PER 1 ML: Performed by: STUDENT IN AN ORGANIZED HEALTH CARE EDUCATION/TRAINING PROGRAM

## 2019-01-01 PROCEDURE — 85025 COMPLETE CBC W/AUTO DIFF WBC: CPT | Performed by: NURSE PRACTITIONER

## 2019-01-01 PROCEDURE — 25010000002 DIPHENHYDRAMINE PER 50 MG: Performed by: STUDENT IN AN ORGANIZED HEALTH CARE EDUCATION/TRAINING PROGRAM

## 2019-01-01 DEVICE — DEV CLIP ENDO RESOLUTION360 CONTRL ROT 235CM: Type: IMPLANTABLE DEVICE | Site: ASCENDING COLON | Status: FUNCTIONAL

## 2019-01-01 RX ORDER — ALBUMIN (HUMAN) 12.5 G/50ML
12.5 SOLUTION INTRAVENOUS AS NEEDED
Status: CANCELLED | OUTPATIENT
Start: 2019-01-01 | End: 2019-01-01

## 2019-01-01 RX ORDER — CYCLOBENZAPRINE HCL 5 MG
5 TABLET ORAL EVERY 8 HOURS PRN
COMMUNITY

## 2019-01-01 RX ORDER — LANOLIN ALCOHOL/MO/W.PET/CERES
5 CREAM (GRAM) TOPICAL NIGHTLY
Status: DISCONTINUED | OUTPATIENT
Start: 2019-01-01 | End: 2019-01-01 | Stop reason: HOSPADM

## 2019-01-01 RX ORDER — FAMOTIDINE 20 MG/1
20 TABLET, FILM COATED ORAL NIGHTLY
Status: DISCONTINUED | OUTPATIENT
Start: 2019-01-01 | End: 2019-01-01 | Stop reason: HOSPADM

## 2019-01-01 RX ORDER — GABAPENTIN 100 MG/1
100 CAPSULE ORAL NIGHTLY
Status: DISCONTINUED | OUTPATIENT
Start: 2019-01-01 | End: 2019-01-01 | Stop reason: HOSPADM

## 2019-01-01 RX ORDER — ALBUMIN (HUMAN) 12.5 G/50ML
25 SOLUTION INTRAVENOUS AS NEEDED
Status: DISCONTINUED | OUTPATIENT
Start: 2019-01-01 | End: 2019-01-01 | Stop reason: HOSPADM

## 2019-01-01 RX ORDER — NICOTINE POLACRILEX 4 MG
15 LOZENGE BUCCAL
Status: DISCONTINUED | OUTPATIENT
Start: 2019-01-01 | End: 2019-01-01 | Stop reason: HOSPADM

## 2019-01-01 RX ORDER — GABAPENTIN 100 MG/1
100 CAPSULE ORAL NIGHTLY
Qty: 30 CAPSULE | Refills: 5 | Status: SHIPPED | OUTPATIENT
Start: 2019-01-01 | End: 2019-01-01 | Stop reason: SDUPTHER

## 2019-01-01 RX ORDER — PROMETHAZINE HYDROCHLORIDE 25 MG/1
25 SUPPOSITORY RECTAL ONCE AS NEEDED
Status: DISCONTINUED | OUTPATIENT
Start: 2019-01-01 | End: 2019-01-01 | Stop reason: HOSPADM

## 2019-01-01 RX ORDER — SEVELAMER CARBONATE 800 MG/1
800 TABLET, FILM COATED ORAL
Status: DISCONTINUED | OUTPATIENT
Start: 2019-01-01 | End: 2019-01-01 | Stop reason: HOSPADM

## 2019-01-01 RX ORDER — PROPOFOL 10 MG/ML
VIAL (ML) INTRAVENOUS AS NEEDED
Status: DISCONTINUED | OUTPATIENT
Start: 2019-01-01 | End: 2019-01-01 | Stop reason: SURG

## 2019-01-01 RX ORDER — TRAMADOL HYDROCHLORIDE 50 MG/1
50 TABLET ORAL EVERY 12 HOURS PRN
Status: DISCONTINUED | OUTPATIENT
Start: 2019-01-01 | End: 2019-01-01 | Stop reason: HOSPADM

## 2019-01-01 RX ORDER — ONDANSETRON 4 MG/1
4 TABLET, FILM COATED ORAL EVERY 6 HOURS PRN
Status: DISCONTINUED | OUTPATIENT
Start: 2019-01-01 | End: 2019-01-01 | Stop reason: SDUPTHER

## 2019-01-01 RX ORDER — GABAPENTIN 100 MG/1
100 CAPSULE ORAL NIGHTLY
Qty: 30 CAPSULE | Refills: 0 | Status: SHIPPED | OUTPATIENT
Start: 2019-01-01 | End: 2019-01-01 | Stop reason: SDUPTHER

## 2019-01-01 RX ORDER — DIPHENHYDRAMINE HYDROCHLORIDE 50 MG/ML
25 INJECTION INTRAMUSCULAR; INTRAVENOUS ONCE
Status: COMPLETED | OUTPATIENT
Start: 2019-01-01 | End: 2019-01-01

## 2019-01-01 RX ORDER — VELPATASVIR AND SOFOSBUVIR 100; 400 MG/1; MG/1
1 TABLET, FILM COATED ORAL DAILY
Qty: 30 TABLET | Refills: 2 | Status: CANCELLED | OUTPATIENT
Start: 2019-01-01

## 2019-01-01 RX ORDER — SODIUM CHLORIDE 9 MG/ML
50 INJECTION, SOLUTION INTRAVENOUS CONTINUOUS
Status: DISCONTINUED | OUTPATIENT
Start: 2019-01-01 | End: 2019-01-01

## 2019-01-01 RX ORDER — FLUTICASONE PROPIONATE 50 MCG
2 SPRAY, SUSPENSION (ML) NASAL DAILY
Status: DISCONTINUED | OUTPATIENT
Start: 2019-01-01 | End: 2019-01-01 | Stop reason: HOSPADM

## 2019-01-01 RX ORDER — SODIUM CHLORIDE 0.9 % (FLUSH) 0.9 %
3-10 SYRINGE (ML) INJECTION AS NEEDED
Status: DISCONTINUED | OUTPATIENT
Start: 2019-01-01 | End: 2019-01-01 | Stop reason: HOSPADM

## 2019-01-01 RX ORDER — ONDANSETRON 2 MG/ML
4 INJECTION INTRAMUSCULAR; INTRAVENOUS EVERY 6 HOURS PRN
Status: DISCONTINUED | OUTPATIENT
Start: 2019-01-01 | End: 2019-01-01 | Stop reason: HOSPADM

## 2019-01-01 RX ORDER — ALBUMIN (HUMAN) 12.5 G/50ML
12.5 SOLUTION INTRAVENOUS AS NEEDED
Status: DISCONTINUED | OUTPATIENT
Start: 2019-01-01 | End: 2019-01-01 | Stop reason: HOSPADM

## 2019-01-01 RX ORDER — DORZOLAMIDE HCL 20 MG/ML
1 SOLUTION/ DROPS OPHTHALMIC 3 TIMES DAILY
Status: DISCONTINUED | OUTPATIENT
Start: 2019-01-01 | End: 2019-01-01 | Stop reason: HOSPADM

## 2019-01-01 RX ORDER — SODIUM CHLORIDE 0.9 % (FLUSH) 0.9 %
10 SYRINGE (ML) INJECTION AS NEEDED
Status: DISCONTINUED | OUTPATIENT
Start: 2019-01-01 | End: 2019-01-01 | Stop reason: HOSPADM

## 2019-01-01 RX ORDER — DEXTROSE MONOHYDRATE 25 G/50ML
25 INJECTION, SOLUTION INTRAVENOUS
Status: DISCONTINUED | OUTPATIENT
Start: 2019-01-01 | End: 2019-01-01 | Stop reason: HOSPADM

## 2019-01-01 RX ORDER — PROMETHAZINE HYDROCHLORIDE 25 MG/ML
12.5 INJECTION, SOLUTION INTRAMUSCULAR; INTRAVENOUS ONCE AS NEEDED
Status: DISCONTINUED | OUTPATIENT
Start: 2019-01-01 | End: 2019-01-01 | Stop reason: HOSPADM

## 2019-01-01 RX ORDER — MELATONIN
2000 3 TIMES WEEKLY
Status: DISCONTINUED | OUTPATIENT
Start: 2019-01-01 | End: 2019-01-01 | Stop reason: HOSPADM

## 2019-01-01 RX ORDER — CALCITRIOL 0.25 UG/1
0.25 CAPSULE, LIQUID FILLED ORAL 3 TIMES WEEKLY
Status: DISCONTINUED | OUTPATIENT
Start: 2019-01-01 | End: 2019-01-01 | Stop reason: HOSPADM

## 2019-01-01 RX ORDER — AZITHROMYCIN 250 MG/1
250 TABLET, FILM COATED ORAL
Status: DISCONTINUED | OUTPATIENT
Start: 2019-01-01 | End: 2019-01-01 | Stop reason: HOSPADM

## 2019-01-01 RX ORDER — PROMETHAZINE HYDROCHLORIDE 25 MG/ML
12.5 INJECTION, SOLUTION INTRAMUSCULAR; INTRAVENOUS ONCE AS NEEDED
Status: DISCONTINUED | OUTPATIENT
Start: 2019-01-01 | End: 2019-01-01

## 2019-01-01 RX ORDER — HYDROXYZINE PAMOATE 25 MG/1
25 CAPSULE ORAL ONCE
Status: COMPLETED | OUTPATIENT
Start: 2019-01-01 | End: 2019-01-01

## 2019-01-01 RX ORDER — ESCITALOPRAM OXALATE 10 MG/1
10 TABLET ORAL DAILY
Status: DISCONTINUED | OUTPATIENT
Start: 2019-01-01 | End: 2019-01-01 | Stop reason: HOSPADM

## 2019-01-01 RX ORDER — MELATONIN
1000 DAILY
Status: DISCONTINUED | OUTPATIENT
Start: 2019-01-01 | End: 2019-01-01 | Stop reason: HOSPADM

## 2019-01-01 RX ORDER — ACETAMINOPHEN 500 MG
500 TABLET ORAL EVERY 4 HOURS PRN
Status: DISCONTINUED | OUTPATIENT
Start: 2019-01-01 | End: 2019-01-01 | Stop reason: HOSPADM

## 2019-01-01 RX ORDER — PROMETHAZINE HYDROCHLORIDE 25 MG/1
25 TABLET ORAL ONCE AS NEEDED
Status: DISCONTINUED | OUTPATIENT
Start: 2019-01-01 | End: 2019-01-01

## 2019-01-01 RX ORDER — MELATONIN
2000 DAILY
Status: DISCONTINUED | OUTPATIENT
Start: 2019-01-01 | End: 2019-01-01 | Stop reason: HOSPADM

## 2019-01-01 RX ORDER — ALBUMIN (HUMAN) 12.5 G/50ML
12.5 SOLUTION INTRAVENOUS AS NEEDED
Status: ACTIVE | OUTPATIENT
Start: 2019-01-01 | End: 2019-01-01

## 2019-01-01 RX ORDER — SEVELAMER CARBONATE 800 MG/1
800 TABLET, FILM COATED ORAL ONCE
Status: COMPLETED | OUTPATIENT
Start: 2019-01-01 | End: 2019-01-01

## 2019-01-01 RX ORDER — LACTULOSE 10 G/15ML
30 SOLUTION ORAL 3 TIMES DAILY
Status: DISCONTINUED | OUTPATIENT
Start: 2019-01-01 | End: 2019-01-01 | Stop reason: HOSPADM

## 2019-01-01 RX ORDER — POLYETHYLENE GLYCOL 3350 17 G/17G
17 POWDER, FOR SOLUTION ORAL DAILY PRN
Status: DISCONTINUED | OUTPATIENT
Start: 2019-01-01 | End: 2019-01-01 | Stop reason: HOSPADM

## 2019-01-01 RX ORDER — BRIMONIDINE TARTRATE 0.15 %
1 DROPS OPHTHALMIC (EYE) 3 TIMES DAILY
Status: DISCONTINUED | OUTPATIENT
Start: 2019-01-01 | End: 2019-01-01 | Stop reason: HOSPADM

## 2019-01-01 RX ORDER — NALOXONE HYDROCHLORIDE 1 MG/ML
2 INJECTION INTRAMUSCULAR; INTRAVENOUS; SUBCUTANEOUS ONCE
Status: COMPLETED | OUTPATIENT
Start: 2019-01-01 | End: 2019-01-01

## 2019-01-01 RX ORDER — PANTOPRAZOLE SODIUM 40 MG/10ML
40 INJECTION, POWDER, LYOPHILIZED, FOR SOLUTION INTRAVENOUS
Status: DISCONTINUED | OUTPATIENT
Start: 2019-01-01 | End: 2019-01-01

## 2019-01-01 RX ORDER — ALBUTEROL SULFATE 2.5 MG/3ML
2.5 SOLUTION RESPIRATORY (INHALATION) EVERY 6 HOURS PRN
Status: DISCONTINUED | OUTPATIENT
Start: 2019-01-01 | End: 2019-01-01 | Stop reason: SDUPTHER

## 2019-01-01 RX ORDER — PANTOPRAZOLE SODIUM 40 MG/10ML
40 INJECTION, POWDER, LYOPHILIZED, FOR SOLUTION INTRAVENOUS EVERY 12 HOURS SCHEDULED
Status: DISCONTINUED | OUTPATIENT
Start: 2019-01-01 | End: 2019-01-01 | Stop reason: HOSPADM

## 2019-01-01 RX ORDER — ASPIRIN 81 MG/1
81 TABLET, CHEWABLE ORAL ONCE
Status: COMPLETED | OUTPATIENT
Start: 2019-01-01 | End: 2019-01-01

## 2019-01-01 RX ORDER — SEVELAMER HYDROCHLORIDE 800 MG/1
800 TABLET, FILM COATED ORAL
Status: DISCONTINUED | OUTPATIENT
Start: 2019-01-01 | End: 2019-01-01

## 2019-01-01 RX ORDER — SODIUM CHLORIDE 0.9 % (FLUSH) 0.9 %
10 SYRINGE (ML) INJECTION EVERY 12 HOURS SCHEDULED
Status: DISCONTINUED | OUTPATIENT
Start: 2019-01-01 | End: 2019-01-01 | Stop reason: HOSPADM

## 2019-01-01 RX ORDER — PANTOPRAZOLE SODIUM 40 MG/10ML
40 INJECTION, POWDER, LYOPHILIZED, FOR SOLUTION INTRAVENOUS
Status: DISCONTINUED | OUTPATIENT
Start: 2019-01-01 | End: 2019-01-01 | Stop reason: HOSPADM

## 2019-01-01 RX ORDER — ASPIRIN 81 MG/1
81 TABLET, CHEWABLE ORAL DAILY
COMMUNITY

## 2019-01-01 RX ORDER — CETIRIZINE HYDROCHLORIDE 5 MG/1
10 TABLET ORAL DAILY
Status: DISCONTINUED | OUTPATIENT
Start: 2019-01-01 | End: 2019-01-01 | Stop reason: HOSPADM

## 2019-01-01 RX ORDER — SODIUM CHLORIDE 9 MG/ML
250 INJECTION, SOLUTION INTRAVENOUS AS NEEDED
Status: CANCELLED | OUTPATIENT
Start: 2019-01-01

## 2019-01-01 RX ORDER — CYCLOBENZAPRINE HCL 5 MG
5 TABLET ORAL EVERY 8 HOURS PRN
Status: DISCONTINUED | OUTPATIENT
Start: 2019-01-01 | End: 2019-01-01 | Stop reason: HOSPADM

## 2019-01-01 RX ORDER — SODIUM CHLORIDE 9 MG/ML
INJECTION, SOLUTION INTRAVENOUS
Status: COMPLETED
Start: 2019-01-01 | End: 2019-01-01

## 2019-01-01 RX ORDER — PANTOPRAZOLE SODIUM 40 MG/1
40 TABLET, DELAYED RELEASE ORAL EVERY MORNING
Status: DISCONTINUED | OUTPATIENT
Start: 2019-01-01 | End: 2019-01-01

## 2019-01-01 RX ORDER — LACTULOSE 10 G/15ML
20 SOLUTION ORAL 3 TIMES DAILY
Status: DISCONTINUED | OUTPATIENT
Start: 2019-01-01 | End: 2019-01-01

## 2019-01-01 RX ORDER — SODIUM CHLORIDE 0.9 % (FLUSH) 0.9 %
3 SYRINGE (ML) INJECTION EVERY 12 HOURS SCHEDULED
Status: DISCONTINUED | OUTPATIENT
Start: 2019-01-01 | End: 2019-01-01 | Stop reason: HOSPADM

## 2019-01-01 RX ORDER — LACTULOSE 10 G/15ML
30 SOLUTION ORAL 4 TIMES DAILY
Status: DISCONTINUED | OUTPATIENT
Start: 2019-01-01 | End: 2019-01-01 | Stop reason: HOSPADM

## 2019-01-01 RX ORDER — TRAMADOL HYDROCHLORIDE 50 MG/1
50 TABLET ORAL 2 TIMES DAILY PRN
Status: DISCONTINUED | OUTPATIENT
Start: 2019-01-01 | End: 2019-01-01

## 2019-01-01 RX ORDER — IPRATROPIUM BROMIDE AND ALBUTEROL SULFATE 2.5; .5 MG/3ML; MG/3ML
3 SOLUTION RESPIRATORY (INHALATION) EVERY 4 HOURS PRN
Status: DISCONTINUED | OUTPATIENT
Start: 2019-01-01 | End: 2019-01-01 | Stop reason: HOSPADM

## 2019-01-01 RX ORDER — PROMETHAZINE HYDROCHLORIDE 12.5 MG/1
25 TABLET ORAL ONCE AS NEEDED
Status: DISCONTINUED | OUTPATIENT
Start: 2019-01-01 | End: 2019-01-01 | Stop reason: HOSPADM

## 2019-01-01 RX ORDER — DEXAMETHASONE SODIUM PHOSPHATE 4 MG/ML
8 INJECTION, SOLUTION INTRA-ARTICULAR; INTRALESIONAL; INTRAMUSCULAR; INTRAVENOUS; SOFT TISSUE ONCE AS NEEDED
Status: DISCONTINUED | OUTPATIENT
Start: 2019-01-01 | End: 2019-01-01 | Stop reason: HOSPADM

## 2019-01-01 RX ORDER — SODIUM CHLORIDE 0.9 % (FLUSH) 0.9 %
1-10 SYRINGE (ML) INJECTION AS NEEDED
Status: DISCONTINUED | OUTPATIENT
Start: 2019-01-01 | End: 2019-01-01 | Stop reason: HOSPADM

## 2019-01-01 RX ORDER — HEPARIN SODIUM 1000 [USP'U]/ML
2000 INJECTION, SOLUTION INTRAVENOUS; SUBCUTANEOUS AS NEEDED
Status: DISCONTINUED | OUTPATIENT
Start: 2019-01-01 | End: 2019-01-01 | Stop reason: HOSPADM

## 2019-01-01 RX ORDER — MELATONIN
500 DAILY
Status: DISCONTINUED | OUTPATIENT
Start: 2019-01-01 | End: 2019-01-01 | Stop reason: HOSPADM

## 2019-01-01 RX ORDER — LIDOCAINE 4 G/G
1 PATCH TOPICAL DAILY PRN
COMMUNITY

## 2019-01-01 RX ORDER — ONDANSETRON 4 MG/1
4 TABLET, ORALLY DISINTEGRATING ORAL EVERY 6 HOURS PRN
Status: DISCONTINUED | OUTPATIENT
Start: 2019-01-01 | End: 2019-01-01 | Stop reason: HOSPADM

## 2019-01-01 RX ORDER — TRAZODONE HYDROCHLORIDE 50 MG/1
50 TABLET ORAL NIGHTLY
Status: DISCONTINUED | OUTPATIENT
Start: 2019-01-01 | End: 2019-01-01 | Stop reason: HOSPADM

## 2019-01-01 RX ORDER — CETIRIZINE HYDROCHLORIDE 5 MG/1
5 TABLET ORAL DAILY
Status: DISCONTINUED | OUTPATIENT
Start: 2019-01-01 | End: 2019-01-01 | Stop reason: HOSPADM

## 2019-01-01 RX ORDER — ACETAMINOPHEN 325 MG/1
650 TABLET ORAL EVERY 4 HOURS PRN
Status: DISCONTINUED | OUTPATIENT
Start: 2019-01-01 | End: 2019-01-01

## 2019-01-01 RX ORDER — SEVELAMER HYDROCHLORIDE 800 MG/1
1600 TABLET, FILM COATED ORAL
Status: DISCONTINUED | OUTPATIENT
Start: 2019-01-01 | End: 2019-01-01 | Stop reason: HOSPADM

## 2019-01-01 RX ORDER — FAMOTIDINE 20 MG/1
20 TABLET, FILM COATED ORAL DAILY
Status: DISCONTINUED | OUTPATIENT
Start: 2019-01-01 | End: 2019-01-01 | Stop reason: HOSPADM

## 2019-01-01 RX ORDER — HEPARIN SODIUM 1000 [USP'U]/ML
2000 INJECTION, SOLUTION INTRAVENOUS; SUBCUTANEOUS AS NEEDED
Status: CANCELLED | OUTPATIENT
Start: 2019-01-01

## 2019-01-01 RX ORDER — TRAMADOL HYDROCHLORIDE 50 MG/1
50 TABLET ORAL 2 TIMES DAILY PRN
Qty: 60 TABLET | Refills: 2 | Status: SHIPPED | OUTPATIENT
Start: 2019-01-01 | End: 2019-01-01 | Stop reason: HOSPADM

## 2019-01-01 RX ORDER — TRAZODONE HYDROCHLORIDE 50 MG/1
50 TABLET ORAL NIGHTLY PRN
Status: DISCONTINUED | OUTPATIENT
Start: 2019-01-01 | End: 2019-01-01 | Stop reason: HOSPADM

## 2019-01-01 RX ORDER — SENNA AND DOCUSATE SODIUM 50; 8.6 MG/1; MG/1
2 TABLET, FILM COATED ORAL 2 TIMES DAILY PRN
Status: DISCONTINUED | OUTPATIENT
Start: 2019-01-01 | End: 2019-01-01 | Stop reason: HOSPADM

## 2019-01-01 RX ORDER — ASPIRIN 81 MG/1
81 TABLET, CHEWABLE ORAL DAILY
Status: DISCONTINUED | OUTPATIENT
Start: 2019-01-01 | End: 2019-01-01 | Stop reason: HOSPADM

## 2019-01-01 RX ORDER — LACTULOSE 10 G/15ML
20 SOLUTION ORAL ONCE
Status: COMPLETED | OUTPATIENT
Start: 2019-01-01 | End: 2019-01-01

## 2019-01-01 RX ORDER — LIDOCAINE AND PRILOCAINE 25; 25 MG/G; MG/G
CREAM TOPICAL AS NEEDED
Status: DISCONTINUED | OUTPATIENT
Start: 2019-01-01 | End: 2019-01-01 | Stop reason: HOSPADM

## 2019-01-01 RX ORDER — LIDOCAINE HYDROCHLORIDE 20 MG/ML
INJECTION, SOLUTION EPIDURAL; INFILTRATION; INTRACAUDAL; PERINEURAL AS NEEDED
Status: DISCONTINUED | OUTPATIENT
Start: 2019-01-01 | End: 2019-01-01 | Stop reason: SURG

## 2019-01-01 RX ORDER — SODIUM CHLORIDE 9 MG/ML
75 INJECTION, SOLUTION INTRAVENOUS ONCE
Status: DISCONTINUED | OUTPATIENT
Start: 2019-01-01 | End: 2019-01-01 | Stop reason: HOSPADM

## 2019-01-01 RX ORDER — LACTULOSE 10 G/15ML
20 SOLUTION ORAL 3 TIMES DAILY
Status: DISCONTINUED | OUTPATIENT
Start: 2019-01-01 | End: 2019-01-01 | Stop reason: HOSPADM

## 2019-01-01 RX ORDER — BENZONATATE 100 MG/1
100 CAPSULE ORAL 3 TIMES DAILY PRN
Status: DISCONTINUED | OUTPATIENT
Start: 2019-01-01 | End: 2019-01-01 | Stop reason: HOSPADM

## 2019-01-01 RX ORDER — LACTULOSE 10 G/15ML
300 SOLUTION ORAL ONCE
Status: COMPLETED | OUTPATIENT
Start: 2019-01-01 | End: 2019-01-01

## 2019-01-01 RX ORDER — DEXTROSE AND SODIUM CHLORIDE 5; .45 G/100ML; G/100ML
30 INJECTION, SOLUTION INTRAVENOUS CONTINUOUS PRN
Status: DISCONTINUED | OUTPATIENT
Start: 2019-01-01 | End: 2019-01-01 | Stop reason: HOSPADM

## 2019-01-01 RX ORDER — HYDROCODONE BITARTRATE AND ACETAMINOPHEN 5; 325 MG/1; MG/1
1 TABLET ORAL 2 TIMES DAILY PRN
Status: DISCONTINUED | OUTPATIENT
Start: 2019-01-01 | End: 2019-01-01

## 2019-01-01 RX ORDER — AZITHROMYCIN 250 MG/1
TABLET, FILM COATED ORAL
Qty: 4 TABLET | Refills: 0 | Status: SHIPPED | OUTPATIENT
Start: 2019-01-01 | End: 2019-01-01

## 2019-01-01 RX ORDER — CETIRIZINE HYDROCHLORIDE 10 MG/1
10 TABLET ORAL DAILY
Status: DISCONTINUED | OUTPATIENT
Start: 2019-01-01 | End: 2019-01-01 | Stop reason: HOSPADM

## 2019-01-01 RX ORDER — DEXTROSE MONOHYDRATE 25 G/50ML
50 INJECTION, SOLUTION INTRAVENOUS
Status: DISCONTINUED | OUTPATIENT
Start: 2019-01-01 | End: 2019-01-01 | Stop reason: HOSPADM

## 2019-01-01 RX ORDER — DEXAMETHASONE SODIUM PHOSPHATE 4 MG/ML
8 INJECTION, SOLUTION INTRA-ARTICULAR; INTRALESIONAL; INTRAMUSCULAR; INTRAVENOUS; SOFT TISSUE ONCE AS NEEDED
Status: DISCONTINUED | OUTPATIENT
Start: 2019-01-01 | End: 2019-01-01 | Stop reason: SDUPTHER

## 2019-01-01 RX ORDER — HALOPERIDOL 5 MG/ML
2 INJECTION INTRAMUSCULAR ONCE
Status: COMPLETED | OUTPATIENT
Start: 2019-01-01 | End: 2019-01-01

## 2019-01-01 RX ORDER — LACTULOSE 10 G/15ML
30 SOLUTION ORAL 4 TIMES DAILY
Qty: 946 ML | Refills: 3
Start: 2019-01-01 | End: 2019-01-01

## 2019-01-01 RX ORDER — LATANOPROST 50 UG/ML
1 SOLUTION/ DROPS OPHTHALMIC NIGHTLY
Status: DISCONTINUED | OUTPATIENT
Start: 2019-01-01 | End: 2019-01-01 | Stop reason: HOSPADM

## 2019-01-01 RX ORDER — CETIRIZINE HYDROCHLORIDE 10 MG/1
10 TABLET ORAL DAILY
Status: DISCONTINUED | OUTPATIENT
Start: 2019-01-01 | End: 2019-01-01

## 2019-01-01 RX ORDER — ACETAMINOPHEN 325 MG/1
650 TABLET ORAL EVERY 4 HOURS PRN
Status: DISCONTINUED | OUTPATIENT
Start: 2019-01-01 | End: 2019-01-01 | Stop reason: HOSPADM

## 2019-01-01 RX ORDER — AZITHROMYCIN 250 MG/1
250 TABLET, FILM COATED ORAL DAILY
Status: DISCONTINUED | OUTPATIENT
Start: 2019-01-01 | End: 2019-01-01 | Stop reason: HOSPADM

## 2019-01-01 RX ORDER — CALCITRIOL 0.25 UG/1
1.5 CAPSULE, LIQUID FILLED ORAL 3 TIMES WEEKLY
Status: DISCONTINUED | OUTPATIENT
Start: 2019-01-01 | End: 2019-01-01 | Stop reason: HOSPADM

## 2019-01-01 RX ORDER — ONDANSETRON 2 MG/ML
4 INJECTION INTRAMUSCULAR; INTRAVENOUS ONCE AS NEEDED
Status: DISCONTINUED | OUTPATIENT
Start: 2019-01-01 | End: 2019-01-01 | Stop reason: SDUPTHER

## 2019-01-01 RX ORDER — LIDOCAINE AND PRILOCAINE 25; 25 MG/G; MG/G
CREAM TOPICAL AS NEEDED
Status: CANCELLED | OUTPATIENT
Start: 2019-01-01

## 2019-01-01 RX ORDER — CEFDINIR 300 MG/1
300 CAPSULE ORAL EVERY OTHER DAY
Qty: 3 CAPSULE | Refills: 0 | Status: SHIPPED | OUTPATIENT
Start: 2019-01-01 | End: 2019-01-01

## 2019-01-01 RX ORDER — PEG-3350, SODIUM SULFATE, SODIUM CHLORIDE, POTASSIUM CHLORIDE, SODIUM ASCORBATE AND ASCORBIC ACID 7.5-2.691G
1000 KIT ORAL ONCE
Qty: 1 EACH | Refills: 0 | Status: SHIPPED | OUTPATIENT
Start: 2019-01-01 | End: 2019-01-01

## 2019-01-01 RX ORDER — ONDANSETRON 2 MG/ML
4 INJECTION INTRAMUSCULAR; INTRAVENOUS ONCE AS NEEDED
Status: DISCONTINUED | OUTPATIENT
Start: 2019-01-01 | End: 2019-01-01

## 2019-01-01 RX ORDER — LIDOCAINE HYDROCHLORIDE 20 MG/ML
INJECTION, SOLUTION INTRAVENOUS AS NEEDED
Status: DISCONTINUED | OUTPATIENT
Start: 2019-01-01 | End: 2019-01-01 | Stop reason: SURG

## 2019-01-01 RX ORDER — CHOLECALCIFEROL (VITAMIN D3) 125 MCG
5 CAPSULE ORAL NIGHTLY
COMMUNITY

## 2019-01-01 RX ORDER — SEVELAMER CARBONATE 800 MG/1
800 TABLET, FILM COATED ORAL
Status: DISCONTINUED | OUTPATIENT
Start: 2019-01-01 | End: 2019-01-01 | Stop reason: CLARIF

## 2019-01-01 RX ORDER — SODIUM CHLORIDE 9 MG/ML
250 INJECTION, SOLUTION INTRAVENOUS AS NEEDED
Status: DISCONTINUED | OUTPATIENT
Start: 2019-01-01 | End: 2019-01-01 | Stop reason: HOSPADM

## 2019-01-01 RX ORDER — CALCITRIOL 0.5 UG/1
1 CAPSULE, LIQUID FILLED ORAL 3 TIMES WEEKLY
Qty: 30 CAPSULE | Refills: 5 | Status: SHIPPED | OUTPATIENT
Start: 2019-01-01 | End: 2019-01-01

## 2019-01-01 RX ORDER — NITROGLYCERIN 0.4 MG/1
0.4 TABLET SUBLINGUAL
Status: DISCONTINUED | OUTPATIENT
Start: 2019-01-01 | End: 2019-01-01 | Stop reason: HOSPADM

## 2019-01-01 RX ORDER — ONDANSETRON 2 MG/ML
4 INJECTION INTRAMUSCULAR; INTRAVENOUS ONCE
Status: COMPLETED | OUTPATIENT
Start: 2019-01-01 | End: 2019-01-01

## 2019-01-01 RX ORDER — LANOLIN ALCOHOL/MO/W.PET/CERES
3 CREAM (GRAM) TOPICAL NIGHTLY
Status: DISCONTINUED | OUTPATIENT
Start: 2019-01-01 | End: 2019-01-01 | Stop reason: HOSPADM

## 2019-01-01 RX ORDER — NALOXONE HYDROCHLORIDE 1 MG/ML
1 INJECTION INTRAMUSCULAR; INTRAVENOUS; SUBCUTANEOUS AS NEEDED
Status: DISCONTINUED | OUTPATIENT
Start: 2019-01-01 | End: 2019-01-01 | Stop reason: HOSPADM

## 2019-01-01 RX ORDER — DEXTROSE AND SODIUM CHLORIDE 5; .45 G/100ML; G/100ML
30 INJECTION, SOLUTION INTRAVENOUS CONTINUOUS PRN
Status: CANCELLED | OUTPATIENT
Start: 2019-01-01

## 2019-01-01 RX ORDER — AZITHROMYCIN 250 MG/1
500 TABLET, FILM COATED ORAL DAILY
Status: COMPLETED | OUTPATIENT
Start: 2019-01-01 | End: 2019-01-01

## 2019-01-01 RX ORDER — DOCUSATE SODIUM 100 MG/1
100 CAPSULE, LIQUID FILLED ORAL 2 TIMES DAILY PRN
Status: DISCONTINUED | OUTPATIENT
Start: 2019-01-01 | End: 2019-01-01 | Stop reason: HOSPADM

## 2019-01-01 RX ORDER — TIZANIDINE 4 MG/1
4 TABLET ORAL EVERY 6 HOURS PRN
COMMUNITY
End: 2019-01-01 | Stop reason: HOSPADM

## 2019-01-01 RX ORDER — SEVELAMER HYDROCHLORIDE 800 MG/1
800 TABLET, FILM COATED ORAL
Status: DISCONTINUED | OUTPATIENT
Start: 2019-01-01 | End: 2019-01-01 | Stop reason: HOSPADM

## 2019-01-01 RX ORDER — PANTOPRAZOLE SODIUM 40 MG/10ML
80 INJECTION, POWDER, LYOPHILIZED, FOR SOLUTION INTRAVENOUS ONCE
Status: COMPLETED | OUTPATIENT
Start: 2019-01-01 | End: 2019-01-01

## 2019-01-01 RX ORDER — BENZONATATE 100 MG/1
100 CAPSULE ORAL 3 TIMES DAILY PRN
COMMUNITY

## 2019-01-01 RX ORDER — PROMETHAZINE HYDROCHLORIDE 25 MG/1
25 TABLET ORAL ONCE AS NEEDED
Status: DISCONTINUED | OUTPATIENT
Start: 2019-01-01 | End: 2019-01-01 | Stop reason: HOSPADM

## 2019-01-01 RX ORDER — ONDANSETRON 2 MG/ML
4 INJECTION INTRAMUSCULAR; INTRAVENOUS ONCE AS NEEDED
Status: DISCONTINUED | OUTPATIENT
Start: 2019-01-01 | End: 2019-01-01 | Stop reason: HOSPADM

## 2019-01-01 RX ORDER — PEG-3350, SODIUM SULFATE, SODIUM CHLORIDE, POTASSIUM CHLORIDE, SODIUM ASCORBATE AND ASCORBIC ACID 7.5-2.691G
1000 KIT ORAL ONCE
Qty: 1 EACH | Refills: 0 | Status: SHIPPED | OUTPATIENT
Start: 2019-01-01 | End: 2019-01-01 | Stop reason: SDUPTHER

## 2019-01-01 RX ORDER — ACETAMINOPHEN 650 MG/1
650 SUPPOSITORY RECTAL EVERY 4 HOURS PRN
Status: DISCONTINUED | OUTPATIENT
Start: 2019-01-01 | End: 2019-01-01 | Stop reason: HOSPADM

## 2019-01-01 RX ORDER — HYDROCODONE BITARTRATE AND ACETAMINOPHEN 5; 325 MG/1; MG/1
1 TABLET ORAL 2 TIMES DAILY PRN
Qty: 60 TABLET | Refills: 0 | Status: SHIPPED | OUTPATIENT
Start: 2019-01-01 | End: 2019-01-01 | Stop reason: HOSPADM

## 2019-01-01 RX ORDER — ONDANSETRON 4 MG/1
4 TABLET, FILM COATED ORAL EVERY 6 HOURS PRN
Status: ON HOLD | COMMUNITY
End: 2019-01-01

## 2019-01-01 RX ORDER — ALBUMIN (HUMAN) 12.5 G/50ML
12.5 SOLUTION INTRAVENOUS AS NEEDED
Status: DISCONTINUED | OUTPATIENT
Start: 2019-01-01 | End: 2019-01-01

## 2019-01-01 RX ORDER — ONDANSETRON 4 MG/1
4 TABLET, FILM COATED ORAL EVERY 6 HOURS PRN
Status: DISCONTINUED | OUTPATIENT
Start: 2019-01-01 | End: 2019-01-01 | Stop reason: HOSPADM

## 2019-01-01 RX ORDER — CEFDINIR 300 MG/1
300 CAPSULE ORAL EVERY OTHER DAY
Status: DISCONTINUED | OUTPATIENT
Start: 2019-01-01 | End: 2019-01-01 | Stop reason: HOSPADM

## 2019-01-01 RX ORDER — MELATONIN
2000 DAILY
Status: DISCONTINUED | OUTPATIENT
Start: 2019-01-01 | End: 2019-01-01

## 2019-01-01 RX ORDER — SODIUM CHLORIDE 0.9 % (FLUSH) 0.9 %
3 SYRINGE (ML) INJECTION EVERY 12 HOURS SCHEDULED
Status: DISCONTINUED | OUTPATIENT
Start: 2019-01-01 | End: 2019-01-01

## 2019-01-01 RX ORDER — SODIUM CHLORIDE 9 MG/ML
50 INJECTION, SOLUTION INTRAVENOUS ONCE
Status: COMPLETED | OUTPATIENT
Start: 2019-01-01 | End: 2019-01-01

## 2019-01-01 RX ORDER — DIPHENHYDRAMINE HYDROCHLORIDE 50 MG/ML
25 INJECTION INTRAMUSCULAR; INTRAVENOUS EVERY 6 HOURS PRN
Status: DISCONTINUED | OUTPATIENT
Start: 2019-01-01 | End: 2019-01-01

## 2019-01-01 RX ORDER — FAMOTIDINE 20 MG/1
20 TABLET, FILM COATED ORAL NIGHTLY
Status: DISCONTINUED | OUTPATIENT
Start: 2019-01-01 | End: 2019-01-01

## 2019-01-01 RX ORDER — CALCITRIOL 0.25 UG/1
1 CAPSULE, LIQUID FILLED ORAL 3 TIMES WEEKLY
Status: DISCONTINUED | OUTPATIENT
Start: 2019-01-01 | End: 2019-01-01 | Stop reason: HOSPADM

## 2019-01-01 RX ORDER — GABAPENTIN 300 MG/1
1 CAPSULE ORAL NIGHTLY
Status: ON HOLD | COMMUNITY
Start: 2019-01-01 | End: 2019-01-01

## 2019-01-01 RX ORDER — PROMETHAZINE HYDROCHLORIDE 25 MG/1
25 SUPPOSITORY RECTAL ONCE AS NEEDED
Status: DISCONTINUED | OUTPATIENT
Start: 2019-01-01 | End: 2019-01-01

## 2019-01-01 RX ORDER — ACETAMINOPHEN 160 MG/5ML
650 SOLUTION ORAL EVERY 4 HOURS PRN
Status: DISCONTINUED | OUTPATIENT
Start: 2019-01-01 | End: 2019-01-01

## 2019-01-01 RX ORDER — LACTULOSE 10 G/15ML
20 SOLUTION ORAL 3 TIMES DAILY
Qty: 946 ML | Refills: 3 | Status: SHIPPED | OUTPATIENT
Start: 2019-01-01

## 2019-01-01 RX ORDER — ALBUTEROL SULFATE 2.5 MG/3ML
10 SOLUTION RESPIRATORY (INHALATION) EVERY 6 HOURS PRN
Status: DISCONTINUED | OUTPATIENT
Start: 2019-01-01 | End: 2019-01-01

## 2019-01-01 RX ORDER — AZITHROMYCIN 250 MG/1
250 TABLET, FILM COATED ORAL DAILY
Qty: 3 TABLET | Refills: 0 | Status: SHIPPED | OUTPATIENT
Start: 2019-01-01 | End: 2019-01-01

## 2019-01-01 RX ADMIN — CETIRIZINE HYDROCHLORIDE 5 MG: 5 TABLET ORAL at 09:26

## 2019-01-01 RX ADMIN — GABAPENTIN 100 MG: 100 CAPSULE ORAL at 21:05

## 2019-01-01 RX ADMIN — SEVELAMER CARBONATE 800 MG: 800 TABLET, FILM COATED ORAL at 12:26

## 2019-01-01 RX ADMIN — BRIMONIDINE TARTRATE 1 DROP: 1.5 SOLUTION OPHTHALMIC at 22:04

## 2019-01-01 RX ADMIN — PANTOPRAZOLE SODIUM 40 MG: 40 INJECTION, POWDER, FOR SOLUTION INTRAVENOUS at 10:35

## 2019-01-01 RX ADMIN — RIFAXIMIN 550 MG: 550 TABLET ORAL at 20:09

## 2019-01-01 RX ADMIN — ESCITALOPRAM OXALATE 10 MG: 10 TABLET ORAL at 08:41

## 2019-01-01 RX ADMIN — GABAPENTIN 100 MG: 100 CAPSULE ORAL at 22:00

## 2019-01-01 RX ADMIN — INSULIN ASPART 2 UNITS: 100 INJECTION, SOLUTION INTRAVENOUS; SUBCUTANEOUS at 17:16

## 2019-01-01 RX ADMIN — BRIMONIDINE TARTRATE 1 DROP: 1.5 SOLUTION OPHTHALMIC at 18:08

## 2019-01-01 RX ADMIN — LACTULOSE 300 ML: 10 SOLUTION ORAL at 13:47

## 2019-01-01 RX ADMIN — FAMOTIDINE 20 MG: 20 TABLET ORAL at 20:38

## 2019-01-01 RX ADMIN — LATANOPROST 1 DROP: 50 SOLUTION OPHTHALMIC at 22:00

## 2019-01-01 RX ADMIN — BRIMONIDINE TARTRATE 1 DROP: 1.5 SOLUTION OPHTHALMIC at 15:58

## 2019-01-01 RX ADMIN — BRIMONIDINE TARTRATE 1 DROP: 1.5 SOLUTION OPHTHALMIC at 08:47

## 2019-01-01 RX ADMIN — FLUTICASONE PROPIONATE 2 SPRAY: 50 SPRAY, METERED NASAL at 08:47

## 2019-01-01 RX ADMIN — LACTULOSE 30 G: 10 SOLUTION ORAL at 10:13

## 2019-01-01 RX ADMIN — DORZOLAMIDE HCL 1 DROP: 20 SOLUTION/ DROPS OPHTHALMIC at 21:34

## 2019-01-01 RX ADMIN — INSULIN DETEMIR 25 UNITS: 100 INJECTION, SOLUTION SUBCUTANEOUS at 09:51

## 2019-01-01 RX ADMIN — ESCITALOPRAM OXALATE 10 MG: 10 TABLET ORAL at 08:44

## 2019-01-01 RX ADMIN — SODIUM CHLORIDE, PRESERVATIVE FREE 3 ML: 5 INJECTION INTRAVENOUS at 08:30

## 2019-01-01 RX ADMIN — SODIUM CHLORIDE, PRESERVATIVE FREE 10 ML: 5 INJECTION INTRAVENOUS at 15:52

## 2019-01-01 RX ADMIN — ONDANSETRON 4 MG: 2 INJECTION INTRAMUSCULAR; INTRAVENOUS at 05:11

## 2019-01-01 RX ADMIN — INSULIN DETEMIR 25 UNITS: 100 INJECTION, SOLUTION SUBCUTANEOUS at 08:12

## 2019-01-01 RX ADMIN — EPOETIN ALFA 10000 UNITS: 10000 SOLUTION INTRAVENOUS; SUBCUTANEOUS at 11:16

## 2019-01-01 RX ADMIN — RIFAXIMIN 550 MG: 550 TABLET ORAL at 22:05

## 2019-01-01 RX ADMIN — SODIUM CHLORIDE 50 ML/HR: 9 INJECTION, SOLUTION INTRAVENOUS at 21:56

## 2019-01-01 RX ADMIN — FLUTICASONE PROPIONATE 2 SPRAY: 50 SPRAY, METERED NASAL at 10:10

## 2019-01-01 RX ADMIN — CETIRIZINE HYDROCHLORIDE 5 MG: 5 TABLET ORAL at 08:29

## 2019-01-01 RX ADMIN — ESCITALOPRAM OXALATE 10 MG: 10 TABLET ORAL at 08:19

## 2019-01-01 RX ADMIN — MELATONIN 5.25 MG: at 20:38

## 2019-01-01 RX ADMIN — SODIUM CHLORIDE 50 ML: 9 INJECTION, SOLUTION INTRAVENOUS at 15:50

## 2019-01-01 RX ADMIN — BRIMONIDINE TARTRATE 1 DROP: 1.5 SOLUTION OPHTHALMIC at 09:23

## 2019-01-01 RX ADMIN — LACTULOSE 30 G: 10 SOLUTION ORAL at 20:39

## 2019-01-01 RX ADMIN — GABAPENTIN 100 MG: 100 CAPSULE ORAL at 20:36

## 2019-01-01 RX ADMIN — LATANOPROST 1 DROP: 50 SOLUTION OPHTHALMIC at 20:36

## 2019-01-01 RX ADMIN — MELATONIN 5.25 MG: at 22:05

## 2019-01-01 RX ADMIN — FLUTICASONE PROPIONATE 2 SPRAY: 50 SPRAY, METERED NASAL at 08:49

## 2019-01-01 RX ADMIN — SODIUM CHLORIDE 50 ML: 9 INJECTION, SOLUTION INTRAVENOUS at 16:01

## 2019-01-01 RX ADMIN — SEVELAMER CARBONATE 800 MG: 800 TABLET, FILM COATED ORAL at 17:16

## 2019-01-01 RX ADMIN — FLUTICASONE PROPIONATE 2 SPRAY: 50 SPRAY, METERED NASAL at 08:56

## 2019-01-01 RX ADMIN — BRIMONIDINE TARTRATE 1 DROP: 1.5 SOLUTION OPHTHALMIC at 09:46

## 2019-01-01 RX ADMIN — SODIUM CHLORIDE, PRESERVATIVE FREE 10 ML: 5 INJECTION INTRAVENOUS at 12:58

## 2019-01-01 RX ADMIN — GABAPENTIN 100 MG: 100 CAPSULE ORAL at 20:38

## 2019-01-01 RX ADMIN — FLUTICASONE PROPIONATE 2 SPRAY: 50 SPRAY, METERED NASAL at 08:02

## 2019-01-01 RX ADMIN — FLUTICASONE PROPIONATE 2 SPRAY: 50 SPRAY, METERED NASAL at 08:29

## 2019-01-01 RX ADMIN — POLYETHYLENE GLYCOL 3350, SODIUM SULFATE ANHYDROUS, SODIUM BICARBONATE, SODIUM CHLORIDE, POTASSIUM CHLORIDE 4000 ML: 236; 22.74; 6.74; 5.86; 2.97 POWDER, FOR SOLUTION ORAL at 19:52

## 2019-01-01 RX ADMIN — BRIMONIDINE TARTRATE 1 DROP: 1.5 SOLUTION OPHTHALMIC at 17:04

## 2019-01-01 RX ADMIN — INSULIN ASPART 4 UNITS: 100 INJECTION, SOLUTION INTRAVENOUS; SUBCUTANEOUS at 22:41

## 2019-01-01 RX ADMIN — LACTULOSE 30 G: 20 SOLUTION ORAL at 21:33

## 2019-01-01 RX ADMIN — SEVELAMER CARBONATE 800 MG: 800 TABLET, FILM COATED ORAL at 15:07

## 2019-01-01 RX ADMIN — LACTULOSE 20 G: 20 SOLUTION ORAL at 08:49

## 2019-01-01 RX ADMIN — ASPIRIN 81 MG CHEWABLE TABLET 81 MG: 81 TABLET CHEWABLE at 08:40

## 2019-01-01 RX ADMIN — SODIUM CHLORIDE, PRESERVATIVE FREE 10 ML: 5 INJECTION INTRAVENOUS at 08:45

## 2019-01-01 RX ADMIN — VITAMIN D, TAB 1000IU (100/BT) 2000 UNITS: 25 TAB at 08:29

## 2019-01-01 RX ADMIN — RENAGEL 800 MG: 800 TABLET ORAL at 08:29

## 2019-01-01 RX ADMIN — PANTOPRAZOLE SODIUM 40 MG: 40 TABLET, DELAYED RELEASE ORAL at 08:20

## 2019-01-01 RX ADMIN — CETIRIZINE HYDROCHLORIDE 5 MG: 10 TABLET, FILM COATED ORAL at 08:48

## 2019-01-01 RX ADMIN — PANTOPRAZOLE SODIUM 40 MG: 40 INJECTION, POWDER, FOR SOLUTION INTRAVENOUS at 20:36

## 2019-01-01 RX ADMIN — PANTOPRAZOLE SODIUM 40 MG: 40 INJECTION, POWDER, FOR SOLUTION INTRAVENOUS at 17:03

## 2019-01-01 RX ADMIN — FLUTICASONE PROPIONATE 2 SPRAY: 50 SPRAY, METERED NASAL at 10:26

## 2019-01-01 RX ADMIN — LATANOPROST 1 DROP: 50 SOLUTION OPHTHALMIC at 20:53

## 2019-01-01 RX ADMIN — SODIUM CHLORIDE, PRESERVATIVE FREE 10 ML: 5 INJECTION INTRAVENOUS at 06:13

## 2019-01-01 RX ADMIN — INSULIN ASPART 2 UNITS: 100 INJECTION, SOLUTION INTRAVENOUS; SUBCUTANEOUS at 08:42

## 2019-01-01 RX ADMIN — PROPOFOL 40 MG: 10 INJECTION, EMULSION INTRAVENOUS at 13:11

## 2019-01-01 RX ADMIN — SEVELAMER CARBONATE 800 MG: 800 TABLET, FILM COATED ORAL at 08:50

## 2019-01-01 RX ADMIN — INSULIN ASPART 2 UNITS: 100 INJECTION, SOLUTION INTRAVENOUS; SUBCUTANEOUS at 12:31

## 2019-01-01 RX ADMIN — RIFAXIMIN 550 MG: 550 TABLET ORAL at 21:51

## 2019-01-01 RX ADMIN — VITAMIN D, TAB 1000IU (100/BT) 2000 UNITS: 25 TAB at 08:40

## 2019-01-01 RX ADMIN — INSULIN ASPART 2 UNITS: 100 INJECTION, SOLUTION INTRAVENOUS; SUBCUTANEOUS at 12:13

## 2019-01-01 RX ADMIN — LATANOPROST 1 DROP: 50 SOLUTION OPHTHALMIC at 21:09

## 2019-01-01 RX ADMIN — SEVELAMER CARBONATE 800 MG: 800 TABLET, FILM COATED ORAL at 10:22

## 2019-01-01 RX ADMIN — TRAZODONE HYDROCHLORIDE 50 MG: 50 TABLET ORAL at 22:06

## 2019-01-01 RX ADMIN — BRIMONIDINE TARTRATE 1 DROP: 1.5 SOLUTION OPHTHALMIC at 21:30

## 2019-01-01 RX ADMIN — AZITHROMYCIN 250 MG: 250 TABLET, FILM COATED ORAL at 21:21

## 2019-01-01 RX ADMIN — LATANOPROST 1 DROP: 50 SOLUTION OPHTHALMIC at 20:00

## 2019-01-01 RX ADMIN — PANTOPRAZOLE SODIUM 40 MG: 40 INJECTION, POWDER, FOR SOLUTION INTRAVENOUS at 09:25

## 2019-01-01 RX ADMIN — GABAPENTIN 100 MG: 100 CAPSULE ORAL at 22:05

## 2019-01-01 RX ADMIN — GABAPENTIN 100 MG: 100 CAPSULE ORAL at 21:34

## 2019-01-01 RX ADMIN — LACTULOSE 20 G: 20 SOLUTION ORAL at 22:03

## 2019-01-01 RX ADMIN — BRIMONIDINE TARTRATE 1 DROP: 1.5 SOLUTION OPHTHALMIC at 21:51

## 2019-01-01 RX ADMIN — TRAZODONE HYDROCHLORIDE 50 MG: 50 TABLET ORAL at 21:35

## 2019-01-01 RX ADMIN — FLUTICASONE PROPIONATE 2 SPRAY: 50 SPRAY, METERED NASAL at 09:12

## 2019-01-01 RX ADMIN — PROPOFOL 50 MG: 10 INJECTION, EMULSION INTRAVENOUS at 16:39

## 2019-01-01 RX ADMIN — RIFAXIMIN 550 MG: 550 TABLET ORAL at 10:22

## 2019-01-01 RX ADMIN — LACTULOSE 20 G: 20 SOLUTION ORAL at 16:19

## 2019-01-01 RX ADMIN — VITAMIN D, TAB 1000IU (100/BT) 500 UNITS: 25 TAB at 08:49

## 2019-01-01 RX ADMIN — ESCITALOPRAM OXALATE 10 MG: 10 TABLET ORAL at 08:55

## 2019-01-01 RX ADMIN — INSULIN DETEMIR 25 UNITS: 100 INJECTION, SOLUTION SUBCUTANEOUS at 08:32

## 2019-01-01 RX ADMIN — PROPOFOL 20 MG: 10 INJECTION, EMULSION INTRAVENOUS at 13:13

## 2019-01-01 RX ADMIN — ACETAMINOPHEN 500 MG: 500 TABLET ORAL at 13:54

## 2019-01-01 RX ADMIN — PANTOPRAZOLE SODIUM 40 MG: 40 INJECTION, POWDER, FOR SOLUTION INTRAVENOUS at 20:11

## 2019-01-01 RX ADMIN — AZITHROMYCIN 500 MG: 250 TABLET, FILM COATED ORAL at 13:50

## 2019-01-01 RX ADMIN — PANTOPRAZOLE SODIUM 40 MG: 40 INJECTION, POWDER, FOR SOLUTION INTRAVENOUS at 12:58

## 2019-01-01 RX ADMIN — EPOETIN ALFA 10000 UNITS: 10000 SOLUTION INTRAVENOUS; SUBCUTANEOUS at 12:29

## 2019-01-01 RX ADMIN — DESMOPRESSIN ACETATE 26.56 MCG: 4 SOLUTION INTRAVENOUS at 11:20

## 2019-01-01 RX ADMIN — LATANOPROST 1 DROP: 50 SOLUTION OPHTHALMIC at 23:22

## 2019-01-01 RX ADMIN — BRIMONIDINE TARTRATE 1 DROP: 1.5 SOLUTION OPHTHALMIC at 16:19

## 2019-01-01 RX ADMIN — RENAGEL 1600 MG: 800 TABLET ORAL at 12:13

## 2019-01-01 RX ADMIN — SODIUM CHLORIDE, PRESERVATIVE FREE 3 ML: 5 INJECTION INTRAVENOUS at 20:10

## 2019-01-01 RX ADMIN — SODIUM CHLORIDE, PRESERVATIVE FREE 3 ML: 5 INJECTION INTRAVENOUS at 23:52

## 2019-01-01 RX ADMIN — RIFAXIMIN 550 MG: 550 TABLET ORAL at 08:55

## 2019-01-01 RX ADMIN — LACTULOSE 20 G: 20 SOLUTION ORAL at 18:03

## 2019-01-01 RX ADMIN — DEXTROSE AND SODIUM CHLORIDE 30 ML/HR: 5; 450 INJECTION, SOLUTION INTRAVENOUS at 12:36

## 2019-01-01 RX ADMIN — PROPOFOL 20 MG: 10 INJECTION, EMULSION INTRAVENOUS at 13:18

## 2019-01-01 RX ADMIN — INSULIN ASPART 2 UNITS: 100 INJECTION, SOLUTION INTRAVENOUS; SUBCUTANEOUS at 22:00

## 2019-01-01 RX ADMIN — LACTULOSE 30 ML: 20 SOLUTION ORAL at 20:00

## 2019-01-01 RX ADMIN — ASPIRIN 81 MG CHEWABLE TABLET 81 MG: 81 TABLET CHEWABLE at 08:28

## 2019-01-01 RX ADMIN — BRIMONIDINE TARTRATE 1 DROP: 1.5 SOLUTION OPHTHALMIC at 23:54

## 2019-01-01 RX ADMIN — SODIUM CHLORIDE, PRESERVATIVE FREE 10 ML: 5 INJECTION INTRAVENOUS at 08:55

## 2019-01-01 RX ADMIN — MELATONIN 5.25 MG: at 20:36

## 2019-01-01 RX ADMIN — SODIUM CHLORIDE 500 ML: 9 INJECTION, SOLUTION INTRAVENOUS at 20:21

## 2019-01-01 RX ADMIN — LACTULOSE 30 G: 10 SOLUTION ORAL at 17:51

## 2019-01-01 RX ADMIN — INSULIN ASPART 2 UNITS: 100 INJECTION, SOLUTION INTRAVENOUS; SUBCUTANEOUS at 09:34

## 2019-01-01 RX ADMIN — ASPIRIN 81 MG CHEWABLE TABLET 81 MG: 81 TABLET CHEWABLE at 10:13

## 2019-01-01 RX ADMIN — BRIMONIDINE TARTRATE 1 DROP: 1.5 SOLUTION OPHTHALMIC at 20:53

## 2019-01-01 RX ADMIN — GABAPENTIN 100 MG: 100 CAPSULE ORAL at 20:09

## 2019-01-01 RX ADMIN — DORZOLAMIDE HCL 1 DROP: 20 SOLUTION/ DROPS OPHTHALMIC at 23:22

## 2019-01-01 RX ADMIN — SEVELAMER CARBONATE 800 MG: 800 TABLET, FILM COATED ORAL at 17:04

## 2019-01-01 RX ADMIN — SODIUM CHLORIDE, PRESERVATIVE FREE 3 ML: 5 INJECTION INTRAVENOUS at 10:14

## 2019-01-01 RX ADMIN — CETIRIZINE HYDROCHLORIDE 10 MG: 10 TABLET, FILM COATED ORAL at 08:40

## 2019-01-01 RX ADMIN — RIFAXIMIN 550 MG: 550 TABLET ORAL at 08:41

## 2019-01-01 RX ADMIN — PROPOFOL 30 MG: 10 INJECTION, EMULSION INTRAVENOUS at 15:56

## 2019-01-01 RX ADMIN — CYCLOBENZAPRINE 5 MG: 5 TABLET, FILM COATED ORAL at 20:09

## 2019-01-01 RX ADMIN — INSULIN DETEMIR 25 UNITS: 100 INJECTION, SOLUTION SUBCUTANEOUS at 10:14

## 2019-01-01 RX ADMIN — SODIUM CHLORIDE 500 ML: 9 INJECTION, SOLUTION INTRAVENOUS at 21:16

## 2019-01-01 RX ADMIN — GABAPENTIN 100 MG: 100 CAPSULE ORAL at 21:51

## 2019-01-01 RX ADMIN — SODIUM CHLORIDE 125 ML/HR: 9 INJECTION, SOLUTION INTRAVENOUS at 00:34

## 2019-01-01 RX ADMIN — SODIUM CHLORIDE, PRESERVATIVE FREE 10 ML: 5 INJECTION INTRAVENOUS at 23:23

## 2019-01-01 RX ADMIN — CYCLOBENZAPRINE 5 MG: 5 TABLET, FILM COATED ORAL at 10:13

## 2019-01-01 RX ADMIN — SODIUM CHLORIDE, PRESERVATIVE FREE 3 ML: 5 INJECTION INTRAVENOUS at 21:52

## 2019-01-01 RX ADMIN — RIFAXIMIN 550 MG: 550 TABLET ORAL at 20:11

## 2019-01-01 RX ADMIN — BRIMONIDINE TARTRATE 1 DROP: 1.5 SOLUTION OPHTHALMIC at 08:45

## 2019-01-01 RX ADMIN — IOPAMIDOL 84 ML: 755 INJECTION, SOLUTION INTRAVENOUS at 21:30

## 2019-01-01 RX ADMIN — VITAMIN D, TAB 1000IU (100/BT) 500 UNITS: 25 TAB at 08:42

## 2019-01-01 RX ADMIN — LACTULOSE 20 G: 20 SOLUTION ORAL at 08:28

## 2019-01-01 RX ADMIN — FAMOTIDINE 20 MG: 20 TABLET ORAL at 08:44

## 2019-01-01 RX ADMIN — SODIUM CHLORIDE, PRESERVATIVE FREE 3 ML: 5 INJECTION INTRAVENOUS at 22:20

## 2019-01-01 RX ADMIN — SEVELAMER CARBONATE 800 MG: 800 TABLET, FILM COATED ORAL at 18:03

## 2019-01-01 RX ADMIN — INSULIN ASPART 2 UNITS: 100 INJECTION, SOLUTION INTRAVENOUS; SUBCUTANEOUS at 12:23

## 2019-01-01 RX ADMIN — SEVELAMER CARBONATE 800 MG: 800 TABLET, FILM COATED ORAL at 12:32

## 2019-01-01 RX ADMIN — RIFAXIMIN 550 MG: 550 TABLET ORAL at 22:00

## 2019-01-01 RX ADMIN — RIFAXIMIN 550 MG: 550 TABLET ORAL at 20:36

## 2019-01-01 RX ADMIN — SEVELAMER CARBONATE 800 MG: 800 TABLET, FILM COATED ORAL at 12:58

## 2019-01-01 RX ADMIN — SEVELAMER CARBONATE 800 MG: 800 TABLET, FILM COATED ORAL at 08:40

## 2019-01-01 RX ADMIN — TRAZODONE HYDROCHLORIDE 50 MG: 50 TABLET ORAL at 20:52

## 2019-01-01 RX ADMIN — FLUTICASONE PROPIONATE 2 SPRAY: 50 SPRAY, METERED NASAL at 09:02

## 2019-01-01 RX ADMIN — ESCITALOPRAM OXALATE 10 MG: 10 TABLET ORAL at 08:23

## 2019-01-01 RX ADMIN — FAMOTIDINE 20 MG: 20 TABLET ORAL at 08:20

## 2019-01-01 RX ADMIN — MELATONIN 5.25 MG: at 22:04

## 2019-01-01 RX ADMIN — PANTOPRAZOLE SODIUM 40 MG: 40 INJECTION, POWDER, FOR SOLUTION INTRAVENOUS at 00:37

## 2019-01-01 RX ADMIN — PANTOPRAZOLE SODIUM 40 MG: 40 INJECTION, POWDER, FOR SOLUTION INTRAVENOUS at 18:00

## 2019-01-01 RX ADMIN — ASPIRIN 81 MG CHEWABLE TABLET 81 MG: 81 TABLET CHEWABLE at 08:48

## 2019-01-01 RX ADMIN — VITAMIN D, TAB 1000IU (100/BT) 2000 UNITS: 25 TAB at 08:20

## 2019-01-01 RX ADMIN — SEVELAMER CARBONATE 800 MG: 800 TABLET, FILM COATED ORAL at 08:55

## 2019-01-01 RX ADMIN — PANTOPRAZOLE SODIUM 40 MG: 40 INJECTION, POWDER, FOR SOLUTION INTRAVENOUS at 09:47

## 2019-01-01 RX ADMIN — RENAGEL 800 MG: 800 TABLET ORAL at 17:51

## 2019-01-01 RX ADMIN — HALOPERIDOL LACTATE 2 MG: 5 INJECTION, SOLUTION INTRAMUSCULAR at 02:48

## 2019-01-01 RX ADMIN — CETIRIZINE HYDROCHLORIDE 10 MG: 10 TABLET, FILM COATED ORAL at 08:44

## 2019-01-01 RX ADMIN — PIPERACILLIN SODIUM,TAZOBACTAM SODIUM 2.25 G: 2; .25 INJECTION, POWDER, FOR SOLUTION INTRAVENOUS at 20:49

## 2019-01-01 RX ADMIN — FLUTICASONE PROPIONATE 2 SPRAY: 50 SPRAY, METERED NASAL at 12:59

## 2019-01-01 RX ADMIN — MELATONIN 5.25 MG: at 20:52

## 2019-01-01 RX ADMIN — LIDOCAINE HYDROCHLORIDE 50 MG: 20 INJECTION, SOLUTION EPIDURAL; INFILTRATION; INTRACAUDAL; PERINEURAL at 15:54

## 2019-01-01 RX ADMIN — ACETAMINOPHEN 500 MG: 500 TABLET ORAL at 09:03

## 2019-01-01 RX ADMIN — FLUTICASONE PROPIONATE 2 SPRAY: 50 SPRAY, METERED NASAL at 10:15

## 2019-01-01 RX ADMIN — SODIUM CHLORIDE, PRESERVATIVE FREE 3 ML: 5 INJECTION INTRAVENOUS at 08:22

## 2019-01-01 RX ADMIN — LATANOPROST 1 DROP: 50 SOLUTION OPHTHALMIC at 21:30

## 2019-01-01 RX ADMIN — DORZOLAMIDE HCL 1 DROP: 20 SOLUTION/ DROPS OPHTHALMIC at 08:22

## 2019-01-01 RX ADMIN — SODIUM CHLORIDE, PRESERVATIVE FREE 10 ML: 5 INJECTION INTRAVENOUS at 22:28

## 2019-01-01 RX ADMIN — PROPOFOL 40 MG: 10 INJECTION, EMULSION INTRAVENOUS at 16:42

## 2019-01-01 RX ADMIN — BRIMONIDINE TARTRATE 1 DROP: 1.5 SOLUTION OPHTHALMIC at 20:09

## 2019-01-01 RX ADMIN — TRAZODONE HYDROCHLORIDE 50 MG: 50 TABLET ORAL at 23:22

## 2019-01-01 RX ADMIN — RIFAXIMIN 550 MG: 550 TABLET ORAL at 13:01

## 2019-01-01 RX ADMIN — SODIUM CHLORIDE, PRESERVATIVE FREE 10 ML: 5 INJECTION INTRAVENOUS at 08:02

## 2019-01-01 RX ADMIN — SEVELAMER CARBONATE 800 MG: 800 TABLET, FILM COATED ORAL at 18:22

## 2019-01-01 RX ADMIN — SODIUM CHLORIDE, PRESERVATIVE FREE 3 ML: 5 INJECTION INTRAVENOUS at 20:37

## 2019-01-01 RX ADMIN — SEVELAMER CARBONATE 800 MG: 800 TABLET, FILM COATED ORAL at 08:24

## 2019-01-01 RX ADMIN — BRIMONIDINE TARTRATE 1 DROP: 1.5 SOLUTION OPHTHALMIC at 08:37

## 2019-01-01 RX ADMIN — RIFAXIMIN 550 MG: 550 TABLET ORAL at 08:49

## 2019-01-01 RX ADMIN — LACTULOSE 20 G: 20 SOLUTION ORAL at 20:10

## 2019-01-01 RX ADMIN — SODIUM CHLORIDE 250 ML: 9 INJECTION, SOLUTION INTRAVENOUS at 04:54

## 2019-01-01 RX ADMIN — RIFAXIMIN 550 MG: 550 TABLET ORAL at 09:26

## 2019-01-01 RX ADMIN — PANTOPRAZOLE SODIUM 40 MG: 40 INJECTION, POWDER, FOR SOLUTION INTRAVENOUS at 22:07

## 2019-01-01 RX ADMIN — LATANOPROST 1 DROP: 50 SOLUTION OPHTHALMIC at 21:34

## 2019-01-01 RX ADMIN — LATANOPROST 1 DROP: 50 SOLUTION OPHTHALMIC at 22:06

## 2019-01-01 RX ADMIN — Medication 10 ML: at 10:04

## 2019-01-01 RX ADMIN — IOPAMIDOL 90 ML: 755 INJECTION, SOLUTION INTRAVENOUS at 10:32

## 2019-01-01 RX ADMIN — GABAPENTIN 100 MG: 100 CAPSULE ORAL at 22:03

## 2019-01-01 RX ADMIN — RENAGEL 1600 MG: 800 TABLET ORAL at 12:17

## 2019-01-01 RX ADMIN — BRIMONIDINE TARTRATE 1 DROP: 1.5 SOLUTION OPHTHALMIC at 20:39

## 2019-01-01 RX ADMIN — ESCITALOPRAM OXALATE 10 MG: 10 TABLET ORAL at 13:02

## 2019-01-01 RX ADMIN — RIFAXIMIN 550 MG: 550 TABLET ORAL at 12:57

## 2019-01-01 RX ADMIN — DIPHENHYDRAMINE HYDROCHLORIDE 25 MG: 50 INJECTION INTRAMUSCULAR; INTRAVENOUS at 00:40

## 2019-01-01 RX ADMIN — CALCITRIOL CAPSULES 0.25 MCG 1.5 MCG: 0.25 CAPSULE ORAL at 11:16

## 2019-01-01 RX ADMIN — LATANOPROST 1 DROP: 50 SOLUTION OPHTHALMIC at 22:05

## 2019-01-01 RX ADMIN — INSULIN ASPART 2 UNITS: 100 INJECTION, SOLUTION INTRAVENOUS; SUBCUTANEOUS at 11:45

## 2019-01-01 RX ADMIN — ASPIRIN 81 MG 81 MG: 81 TABLET ORAL at 08:44

## 2019-01-01 RX ADMIN — Medication: at 12:58

## 2019-01-01 RX ADMIN — CETIRIZINE HYDROCHLORIDE 5 MG: 5 TABLET, FILM COATED ORAL at 08:28

## 2019-01-01 RX ADMIN — EPOETIN ALFA 6000 UNITS: 10000 SOLUTION INTRAVENOUS; SUBCUTANEOUS at 09:38

## 2019-01-01 RX ADMIN — PANTOPRAZOLE SODIUM 40 MG: 40 INJECTION, POWDER, FOR SOLUTION INTRAVENOUS at 06:13

## 2019-01-01 RX ADMIN — SODIUM CHLORIDE, PRESERVATIVE FREE 10 ML: 5 INJECTION INTRAVENOUS at 08:49

## 2019-01-01 RX ADMIN — PROPOFOL 20 MG: 10 INJECTION, EMULSION INTRAVENOUS at 13:15

## 2019-01-01 RX ADMIN — INSULIN ASPART 5 UNITS: 100 INJECTION, SOLUTION INTRAVENOUS; SUBCUTANEOUS at 10:40

## 2019-01-01 RX ADMIN — BRIMONIDINE TARTRATE 1 DROP: 1.5 SOLUTION OPHTHALMIC at 20:36

## 2019-01-01 RX ADMIN — SODIUM CHLORIDE, PRESERVATIVE FREE 3 ML: 5 INJECTION INTRAVENOUS at 20:39

## 2019-01-01 RX ADMIN — SODIUM CHLORIDE, PRESERVATIVE FREE 10 ML: 5 INJECTION INTRAVENOUS at 20:00

## 2019-01-01 RX ADMIN — SEVELAMER CARBONATE 800 MG: 800 TABLET, FILM COATED ORAL at 09:12

## 2019-01-01 RX ADMIN — ESCITALOPRAM OXALATE 10 MG: 10 TABLET ORAL at 08:29

## 2019-01-01 RX ADMIN — MELATONIN 3 MG: at 23:22

## 2019-01-01 RX ADMIN — PROPOFOL 30 MG: 10 INJECTION, EMULSION INTRAVENOUS at 15:54

## 2019-01-01 RX ADMIN — LATANOPROST 1 DROP: 50 SOLUTION OPHTHALMIC at 23:52

## 2019-01-01 RX ADMIN — BRIMONIDINE TARTRATE 1 DROP: 1.5 SOLUTION OPHTHALMIC at 08:56

## 2019-01-01 RX ADMIN — CETIRIZINE HYDROCHLORIDE 10 MG: 10 TABLET, FILM COATED ORAL at 13:02

## 2019-01-01 RX ADMIN — CALCITRIOL CAPSULES 0.25 MCG 1.5 MCG: 0.25 CAPSULE ORAL at 10:46

## 2019-01-01 RX ADMIN — DORZOLAMIDE HCL 1 DROP: 20 SOLUTION/ DROPS OPHTHALMIC at 21:08

## 2019-01-01 RX ADMIN — SEVELAMER CARBONATE 800 MG: 800 TABLET, FILM COATED ORAL at 06:37

## 2019-01-01 RX ADMIN — GABAPENTIN 100 MG: 100 CAPSULE ORAL at 20:10

## 2019-01-01 RX ADMIN — RIFAXIMIN 550 MG: 550 TABLET ORAL at 09:12

## 2019-01-01 RX ADMIN — SEVELAMER CARBONATE 800 MG: 800 TABLET, FILM COATED ORAL at 08:49

## 2019-01-01 RX ADMIN — LACTULOSE 20 G: 20 SOLUTION ORAL at 10:21

## 2019-01-01 RX ADMIN — MELATONIN 5.25 MG: at 21:05

## 2019-01-01 RX ADMIN — MELATONIN 5.25 MG: at 00:35

## 2019-01-01 RX ADMIN — INSULIN ASPART 5 UNITS: 100 INJECTION, SOLUTION INTRAVENOUS; SUBCUTANEOUS at 08:42

## 2019-01-01 RX ADMIN — PANTOPRAZOLE SODIUM 40 MG: 40 INJECTION, POWDER, FOR SOLUTION INTRAVENOUS at 21:30

## 2019-01-01 RX ADMIN — PANTOPRAZOLE SODIUM 40 MG: 40 INJECTION, POWDER, FOR SOLUTION INTRAVENOUS at 21:51

## 2019-01-01 RX ADMIN — BRIMONIDINE TARTRATE 1 DROP: 1.5 SOLUTION OPHTHALMIC at 09:04

## 2019-01-01 RX ADMIN — LACTULOSE 20 G: 20 SOLUTION ORAL at 22:05

## 2019-01-01 RX ADMIN — SODIUM CHLORIDE, PRESERVATIVE FREE 3 ML: 5 INJECTION INTRAVENOUS at 10:27

## 2019-01-01 RX ADMIN — SODIUM CHLORIDE, PRESERVATIVE FREE 10 ML: 5 INJECTION INTRAVENOUS at 08:19

## 2019-01-01 RX ADMIN — INSULIN DETEMIR 10 UNITS: 100 INJECTION, SOLUTION SUBCUTANEOUS at 09:01

## 2019-01-01 RX ADMIN — CALCITRIOL 0.25 MCG: 0.25 CAPSULE ORAL at 11:16

## 2019-01-01 RX ADMIN — DORZOLAMIDE HCL 1 DROP: 20 SOLUTION/ DROPS OPHTHALMIC at 08:47

## 2019-01-01 RX ADMIN — LACTULOSE 30 ML: 20 SOLUTION ORAL at 22:27

## 2019-01-01 RX ADMIN — SODIUM CHLORIDE, PRESERVATIVE FREE 10 ML: 5 INJECTION INTRAVENOUS at 18:00

## 2019-01-01 RX ADMIN — BRIMONIDINE TARTRATE 1 DROP: 1.5 SOLUTION OPHTHALMIC at 18:04

## 2019-01-01 RX ADMIN — SODIUM CHLORIDE 50 ML: 9 INJECTION, SOLUTION INTRAVENOUS at 15:42

## 2019-01-01 RX ADMIN — CETIRIZINE HYDROCHLORIDE 5 MG: 10 TABLET, FILM COATED ORAL at 08:20

## 2019-01-01 RX ADMIN — PROPOFOL 30 MG: 10 INJECTION, EMULSION INTRAVENOUS at 15:51

## 2019-01-01 RX ADMIN — SODIUM CHLORIDE, PRESERVATIVE FREE 3 ML: 5 INJECTION INTRAVENOUS at 01:45

## 2019-01-01 RX ADMIN — Medication: at 09:23

## 2019-01-01 RX ADMIN — LIDOCAINE HYDROCHLORIDE 50 MG: 20 INJECTION, SOLUTION EPIDURAL; INFILTRATION; INTRACAUDAL; PERINEURAL at 16:02

## 2019-01-01 RX ADMIN — RENAGEL 800 MG: 800 TABLET ORAL at 08:20

## 2019-01-01 RX ADMIN — SEVELAMER CARBONATE 800 MG: 800 TABLET, FILM COATED ORAL at 18:09

## 2019-01-01 RX ADMIN — SODIUM CHLORIDE, PRESERVATIVE FREE 10 ML: 5 INJECTION INTRAVENOUS at 09:12

## 2019-01-01 RX ADMIN — CYCLOBENZAPRINE 5 MG: 5 TABLET, FILM COATED ORAL at 23:25

## 2019-01-01 RX ADMIN — LACTULOSE 30 ML: 20 SOLUTION ORAL at 08:53

## 2019-01-01 RX ADMIN — BRIMONIDINE TARTRATE 1 DROP: 1.5 SOLUTION OPHTHALMIC at 08:27

## 2019-01-01 RX ADMIN — LIDOCAINE HYDROCHLORIDE 100 MG: 20 INJECTION, SOLUTION INTRAVENOUS at 16:35

## 2019-01-01 RX ADMIN — RENAGEL 1600 MG: 800 TABLET ORAL at 21:22

## 2019-01-01 RX ADMIN — VITAMIN D, TAB 1000IU (100/BT) 500 UNITS: 25 TAB at 12:58

## 2019-01-01 RX ADMIN — CYCLOBENZAPRINE 5 MG: 5 TABLET, FILM COATED ORAL at 00:49

## 2019-01-01 RX ADMIN — SODIUM CHLORIDE 125 ML/HR: 9 INJECTION, SOLUTION INTRAVENOUS at 03:08

## 2019-01-01 RX ADMIN — ASPIRIN 81 MG 81 MG: 81 TABLET ORAL at 22:39

## 2019-01-01 RX ADMIN — GABAPENTIN 100 MG: 100 CAPSULE ORAL at 23:52

## 2019-01-01 RX ADMIN — ESCITALOPRAM OXALATE 10 MG: 10 TABLET ORAL at 10:14

## 2019-01-01 RX ADMIN — CETIRIZINE HYDROCHLORIDE 10 MG: 5 TABLET ORAL at 09:12

## 2019-01-01 RX ADMIN — BRIMONIDINE TARTRATE 1 DROP: 1.5 SOLUTION OPHTHALMIC at 13:05

## 2019-01-01 RX ADMIN — RIFAXIMIN 550 MG: 550 TABLET ORAL at 21:05

## 2019-01-01 RX ADMIN — PANTOPRAZOLE SODIUM 40 MG: 40 INJECTION, POWDER, FOR SOLUTION INTRAVENOUS at 08:49

## 2019-01-01 RX ADMIN — PROPOFOL 50 MG: 10 INJECTION, EMULSION INTRAVENOUS at 15:49

## 2019-01-01 RX ADMIN — TRAZODONE HYDROCHLORIDE 50 MG: 50 TABLET ORAL at 21:51

## 2019-01-01 RX ADMIN — HYDROXYZINE PAMOATE 25 MG: 25 CAPSULE ORAL at 11:56

## 2019-01-01 RX ADMIN — CEFTRIAXONE SODIUM 1 G: 1 INJECTION, POWDER, FOR SOLUTION INTRAMUSCULAR; INTRAVENOUS at 18:20

## 2019-01-01 RX ADMIN — LACTULOSE 20 G: 20 SOLUTION ORAL at 13:04

## 2019-01-01 RX ADMIN — INSULIN DETEMIR 10 UNITS: 100 INJECTION, SOLUTION SUBCUTANEOUS at 13:09

## 2019-01-01 RX ADMIN — SODIUM CHLORIDE, PRESERVATIVE FREE 3 ML: 5 INJECTION INTRAVENOUS at 20:14

## 2019-01-01 RX ADMIN — GABAPENTIN 100 MG: 100 CAPSULE ORAL at 23:22

## 2019-01-01 RX ADMIN — ESCITALOPRAM OXALATE 10 MG: 10 TABLET ORAL at 09:12

## 2019-01-01 RX ADMIN — PROPOFOL 50 MG: 10 INJECTION, EMULSION INTRAVENOUS at 15:54

## 2019-01-01 RX ADMIN — NYSTATIN: 100000 CREAM TOPICAL at 01:05

## 2019-01-01 RX ADMIN — GABAPENTIN 100 MG: 100 CAPSULE ORAL at 20:52

## 2019-01-01 RX ADMIN — INSULIN ASPART 2 UNITS: 100 INJECTION, SOLUTION INTRAVENOUS; SUBCUTANEOUS at 20:24

## 2019-01-01 RX ADMIN — VITAMIN D, TAB 1000IU (100/BT) 1000 UNITS: 25 TAB at 13:03

## 2019-01-01 RX ADMIN — SODIUM CHLORIDE: 9 INJECTION, SOLUTION INTRAVENOUS at 15:43

## 2019-01-01 RX ADMIN — PROPOFOL 30 MG: 10 INJECTION, EMULSION INTRAVENOUS at 13:23

## 2019-01-01 RX ADMIN — BRIMONIDINE TARTRATE 1 DROP: 1.5 SOLUTION OPHTHALMIC at 11:55

## 2019-01-01 RX ADMIN — PANTOPRAZOLE SODIUM 40 MG: 40 INJECTION, POWDER, FOR SOLUTION INTRAVENOUS at 10:21

## 2019-01-01 RX ADMIN — SODIUM CHLORIDE, PRESERVATIVE FREE 3 ML: 5 INJECTION INTRAVENOUS at 21:20

## 2019-01-01 RX ADMIN — BRIMONIDINE TARTRATE 1 DROP: 1.5 SOLUTION OPHTHALMIC at 21:08

## 2019-01-01 RX ADMIN — SEVELAMER CARBONATE 800 MG: 800 TABLET, FILM COATED ORAL at 11:45

## 2019-01-01 RX ADMIN — LACTULOSE 20 G: 20 SOLUTION ORAL at 20:12

## 2019-01-01 RX ADMIN — DORZOLAMIDE HCL 1 DROP: 20 SOLUTION/ DROPS OPHTHALMIC at 20:54

## 2019-01-01 RX ADMIN — RIFAXIMIN 550 MG: 550 TABLET ORAL at 08:44

## 2019-01-01 RX ADMIN — VITAMIN D, TAB 1000IU (100/BT) 500 UNITS: 25 TAB at 09:26

## 2019-01-01 RX ADMIN — CYCLOBENZAPRINE 5 MG: 5 TABLET, FILM COATED ORAL at 15:08

## 2019-01-01 RX ADMIN — NYSTATIN: 100000 CREAM TOPICAL at 09:14

## 2019-01-01 RX ADMIN — EPOETIN ALFA 10000 UNITS: 10000 SOLUTION INTRAVENOUS; SUBCUTANEOUS at 10:55

## 2019-01-01 RX ADMIN — PANTOPRAZOLE SODIUM 40 MG: 40 INJECTION, POWDER, FOR SOLUTION INTRAVENOUS at 09:12

## 2019-01-01 RX ADMIN — LACTULOSE 30 ML: 20 SOLUTION ORAL at 08:43

## 2019-01-01 RX ADMIN — SODIUM CHLORIDE: 9 INJECTION, SOLUTION INTRAVENOUS at 11:23

## 2019-01-01 RX ADMIN — SEVELAMER CARBONATE 800 MG: 800 TABLET, FILM COATED ORAL at 13:02

## 2019-01-01 RX ADMIN — BRIMONIDINE TARTRATE 1 DROP: 1.5 SOLUTION OPHTHALMIC at 20:11

## 2019-01-01 RX ADMIN — CALCITRIOL CAPSULES 0.25 MCG 1 MCG: 0.25 CAPSULE ORAL at 09:38

## 2019-01-01 RX ADMIN — BRIMONIDINE TARTRATE 1 DROP: 1.5 SOLUTION OPHTHALMIC at 10:26

## 2019-01-01 RX ADMIN — ESCITALOPRAM OXALATE 10 MG: 10 TABLET ORAL at 09:26

## 2019-01-01 RX ADMIN — CETIRIZINE HYDROCHLORIDE 5 MG: 5 TABLET ORAL at 10:14

## 2019-01-01 RX ADMIN — RENAGEL 800 MG: 800 TABLET ORAL at 10:13

## 2019-01-01 RX ADMIN — PANTOPRAZOLE SODIUM 40 MG: 40 TABLET, DELAYED RELEASE ORAL at 06:28

## 2019-01-01 RX ADMIN — GABAPENTIN 100 MG: 100 CAPSULE ORAL at 22:27

## 2019-01-01 RX ADMIN — LACTULOSE 20 G: 20 SOLUTION ORAL at 20:36

## 2019-01-01 RX ADMIN — DORZOLAMIDE HCL 1 DROP: 20 SOLUTION/ DROPS OPHTHALMIC at 18:02

## 2019-01-01 RX ADMIN — PROPOFOL 30 MG: 10 INJECTION, EMULSION INTRAVENOUS at 15:52

## 2019-01-01 RX ADMIN — TRAZODONE HYDROCHLORIDE 50 MG: 50 TABLET ORAL at 21:05

## 2019-01-01 RX ADMIN — INSULIN ASPART 3 UNITS: 100 INJECTION, SOLUTION INTRAVENOUS; SUBCUTANEOUS at 20:15

## 2019-01-01 RX ADMIN — MELATONIN 5.25 MG: at 20:10

## 2019-01-01 RX ADMIN — FAMOTIDINE 20 MG: 20 TABLET ORAL at 22:05

## 2019-01-01 RX ADMIN — SEVELAMER CARBONATE 800 MG: 800 TABLET, FILM COATED ORAL at 17:07

## 2019-01-01 RX ADMIN — SODIUM CHLORIDE, PRESERVATIVE FREE 3 ML: 5 INJECTION INTRAVENOUS at 09:03

## 2019-01-01 RX ADMIN — LACTULOSE 30 ML: 20 SOLUTION ORAL at 17:03

## 2019-01-01 RX ADMIN — CETIRIZINE HYDROCHLORIDE 5 MG: 5 TABLET ORAL at 12:57

## 2019-01-01 RX ADMIN — INSULIN DETEMIR 25 UNITS: 100 INJECTION, SOLUTION SUBCUTANEOUS at 08:19

## 2019-01-01 RX ADMIN — GABAPENTIN 100 MG: 100 CAPSULE ORAL at 21:20

## 2019-01-01 RX ADMIN — ONDANSETRON 4 MG: 2 INJECTION INTRAMUSCULAR; INTRAVENOUS at 21:16

## 2019-01-01 RX ADMIN — LATANOPROST 1 DROP: 50 SOLUTION OPHTHALMIC at 20:39

## 2019-01-01 RX ADMIN — LIDOCAINE HYDROCHLORIDE 50 MG: 20 INJECTION, SOLUTION EPIDURAL; INFILTRATION; INTRACAUDAL; PERINEURAL at 15:50

## 2019-01-01 RX ADMIN — PANTOPRAZOLE SODIUM 40 MG: 40 INJECTION, POWDER, FOR SOLUTION INTRAVENOUS at 08:51

## 2019-01-01 RX ADMIN — INSULIN DETEMIR 25 UNITS: 100 INJECTION, SOLUTION SUBCUTANEOUS at 09:34

## 2019-01-01 RX ADMIN — FLUTICASONE PROPIONATE 2 SPRAY: 50 SPRAY, METERED NASAL at 08:22

## 2019-01-01 RX ADMIN — CYCLOBENZAPRINE 5 MG: 5 TABLET, FILM COATED ORAL at 22:06

## 2019-01-01 RX ADMIN — CETIRIZINE HYDROCHLORIDE 5 MG: 5 TABLET ORAL at 08:41

## 2019-01-01 RX ADMIN — SODIUM CHLORIDE, PRESERVATIVE FREE 3 ML: 5 INJECTION INTRAVENOUS at 11:23

## 2019-01-01 RX ADMIN — LATANOPROST 1 DROP: 50 SOLUTION OPHTHALMIC at 21:51

## 2019-01-01 RX ADMIN — ESCITALOPRAM OXALATE 10 MG: 10 TABLET ORAL at 10:22

## 2019-01-01 RX ADMIN — LATANOPROST 1 DROP: 50 SOLUTION OPHTHALMIC at 20:11

## 2019-01-01 RX ADMIN — BRIMONIDINE TARTRATE 1 DROP: 1.5 SOLUTION OPHTHALMIC at 22:30

## 2019-01-01 RX ADMIN — FAMOTIDINE 20 MG: 20 TABLET, FILM COATED ORAL at 15:07

## 2019-01-01 RX ADMIN — INSULIN DETEMIR 25 UNITS: 100 INJECTION, SOLUTION SUBCUTANEOUS at 08:55

## 2019-01-01 RX ADMIN — LACTULOSE 30 ML: 20 SOLUTION ORAL at 23:22

## 2019-01-01 RX ADMIN — LACTULOSE 20 G: 20 SOLUTION ORAL at 15:53

## 2019-01-01 RX ADMIN — CETIRIZINE HYDROCHLORIDE 5 MG: 5 TABLET, FILM COATED ORAL at 08:20

## 2019-01-01 RX ADMIN — SEVELAMER CARBONATE 800 MG: 800 TABLET, FILM COATED ORAL at 11:51

## 2019-01-01 RX ADMIN — TRAZODONE HYDROCHLORIDE 50 MG: 50 TABLET ORAL at 20:09

## 2019-01-01 RX ADMIN — IPRATROPIUM BROMIDE AND ALBUTEROL SULFATE 3 ML: 2.5; .5 SOLUTION RESPIRATORY (INHALATION) at 15:48

## 2019-01-01 RX ADMIN — AZITHROMYCIN 500 MG: 500 INJECTION, POWDER, LYOPHILIZED, FOR SOLUTION INTRAVENOUS at 17:56

## 2019-01-01 RX ADMIN — LACTULOSE 20 G: 20 SOLUTION ORAL at 21:51

## 2019-01-01 RX ADMIN — INSULIN ASPART 5 UNITS: 100 INJECTION, SOLUTION INTRAVENOUS; SUBCUTANEOUS at 21:51

## 2019-01-01 RX ADMIN — SODIUM CHLORIDE, PRESERVATIVE FREE 10 ML: 5 INJECTION INTRAVENOUS at 08:41

## 2019-01-01 RX ADMIN — VITAMIN D, TAB 1000IU (100/BT) 500 UNITS: 25 TAB at 10:22

## 2019-01-01 RX ADMIN — TRAZODONE HYDROCHLORIDE 50 MG: 50 TABLET ORAL at 22:05

## 2019-01-01 RX ADMIN — MORPHINE SULFATE 4 MG: 4 INJECTION INTRAVENOUS at 05:11

## 2019-01-01 RX ADMIN — RIFAXIMIN 550 MG: 550 TABLET ORAL at 23:22

## 2019-01-01 RX ADMIN — ESCITALOPRAM OXALATE 10 MG: 10 TABLET ORAL at 08:48

## 2019-01-01 RX ADMIN — LACTULOSE 30 ML: 20 SOLUTION ORAL at 18:08

## 2019-01-01 RX ADMIN — FAMOTIDINE 20 MG: 20 TABLET ORAL at 08:48

## 2019-01-01 RX ADMIN — CETIRIZINE HYDROCHLORIDE 10 MG: 5 TABLET ORAL at 08:55

## 2019-01-01 RX ADMIN — ONDANSETRON 4 MG: 4 TABLET, ORALLY DISINTEGRATING ORAL at 20:38

## 2019-01-01 RX ADMIN — RIFAXIMIN 550 MG: 550 TABLET ORAL at 22:27

## 2019-01-01 RX ADMIN — INSULIN ASPART 3 UNITS: 100 INJECTION, SOLUTION INTRAVENOUS; SUBCUTANEOUS at 10:35

## 2019-01-01 RX ADMIN — PANTOPRAZOLE SODIUM 80 MG: 40 INJECTION, POWDER, FOR SOLUTION INTRAVENOUS at 20:21

## 2019-01-01 RX ADMIN — BRIMONIDINE TARTRATE 1 DROP: 1.5 SOLUTION OPHTHALMIC at 17:29

## 2019-01-01 RX ADMIN — BRIMONIDINE TARTRATE 1 DROP: 1.5 SOLUTION OPHTHALMIC at 08:01

## 2019-01-01 RX ADMIN — LATANOPROST 1 DROP: 50 SOLUTION OPHTHALMIC at 20:09

## 2019-01-01 RX ADMIN — CETIRIZINE HYDROCHLORIDE 5 MG: 5 TABLET ORAL at 08:50

## 2019-01-01 RX ADMIN — SODIUM CHLORIDE 50 ML/HR: 9 INJECTION, SOLUTION INTRAVENOUS at 16:33

## 2019-01-01 RX ADMIN — SODIUM CHLORIDE, PRESERVATIVE FREE 3 ML: 5 INJECTION INTRAVENOUS at 13:02

## 2019-01-01 RX ADMIN — ASPIRIN 81 MG 81 MG: 81 TABLET ORAL at 08:20

## 2019-01-01 RX ADMIN — BRIMONIDINE TARTRATE 1 DROP: 1.5 SOLUTION OPHTHALMIC at 18:02

## 2019-01-01 RX ADMIN — GABAPENTIN 100 MG: 100 CAPSULE ORAL at 20:00

## 2019-01-01 RX ADMIN — NALOXONE HYDROCHLORIDE 1 MG/HR: 0.4 INJECTION, SOLUTION INTRAMUSCULAR; INTRAVENOUS; SUBCUTANEOUS at 19:19

## 2019-01-01 RX ADMIN — INSULIN DETEMIR 30 UNITS: 100 INJECTION, SOLUTION SUBCUTANEOUS at 22:37

## 2019-01-01 RX ADMIN — LATANOPROST 1 DROP: 50 SOLUTION OPHTHALMIC at 22:35

## 2019-01-01 RX ADMIN — INSULIN ASPART 2 UNITS: 100 INJECTION, SOLUTION INTRAVENOUS; SUBCUTANEOUS at 21:25

## 2019-01-01 RX ADMIN — LACTULOSE 30 ML: 20 SOLUTION ORAL at 09:11

## 2019-01-01 RX ADMIN — ASPIRIN 81 MG 81 MG: 81 TABLET ORAL at 08:19

## 2019-01-01 RX ADMIN — INSULIN DETEMIR 10 UNITS: 100 INJECTION, SOLUTION SUBCUTANEOUS at 10:25

## 2019-01-01 RX ADMIN — LATANOPROST 1 DROP: 50 SOLUTION OPHTHALMIC at 21:21

## 2019-01-01 RX ADMIN — SODIUM CHLORIDE, PRESERVATIVE FREE 5 ML: 5 INJECTION INTRAVENOUS at 22:08

## 2019-01-01 RX ADMIN — RIFAXIMIN 550 MG: 550 TABLET ORAL at 20:00

## 2019-01-01 RX ADMIN — BRIMONIDINE TARTRATE 1 DROP: 1.5 SOLUTION OPHTHALMIC at 22:06

## 2019-01-01 RX ADMIN — PROPOFOL 40 MG: 10 INJECTION, EMULSION INTRAVENOUS at 15:58

## 2019-01-01 RX ADMIN — FLUTICASONE PROPIONATE 2 SPRAY: 50 SPRAY, METERED NASAL at 09:25

## 2019-01-01 RX ADMIN — TRAZODONE HYDROCHLORIDE 50 MG: 50 TABLET ORAL at 21:22

## 2019-01-01 RX ADMIN — AZITHROMYCIN 250 MG: 250 TABLET, FILM COATED ORAL at 08:20

## 2019-01-01 RX ADMIN — BRIMONIDINE TARTRATE 1 DROP: 1.5 SOLUTION OPHTHALMIC at 10:15

## 2019-01-01 RX ADMIN — Medication: at 08:49

## 2019-01-01 RX ADMIN — RENAGEL 1600 MG: 800 TABLET ORAL at 08:19

## 2019-01-01 RX ADMIN — CEFDINIR 300 MG: 300 CAPSULE ORAL at 13:50

## 2019-01-01 RX ADMIN — INSULIN DETEMIR 25 UNITS: 100 INJECTION, SOLUTION SUBCUTANEOUS at 08:45

## 2019-01-01 RX ADMIN — SEVELAMER CARBONATE 800 MG: 800 TABLET, FILM COATED ORAL at 09:26

## 2019-01-01 RX ADMIN — ESCITALOPRAM OXALATE 10 MG: 10 TABLET ORAL at 08:50

## 2019-01-01 RX ADMIN — SODIUM CHLORIDE 250 ML: 9 INJECTION, SOLUTION INTRAVENOUS at 12:29

## 2019-01-01 RX ADMIN — NALOXONE HYDROCHLORIDE 2 MG: 1 INJECTION PARENTERAL at 16:41

## 2019-01-01 RX ADMIN — ESCITALOPRAM OXALATE 10 MG: 10 TABLET ORAL at 12:58

## 2019-01-01 RX ADMIN — VITAMIN D, TAB 1000IU (100/BT) 2000 UNITS: 25 TAB at 10:13

## 2019-01-01 RX ADMIN — SODIUM CHLORIDE, PRESERVATIVE FREE 10 ML: 5 INJECTION INTRAVENOUS at 20:13

## 2019-01-01 RX ADMIN — FLUTICASONE PROPIONATE 2 SPRAY: 50 SPRAY, METERED NASAL at 08:21

## 2019-01-01 RX ADMIN — BRIMONIDINE TARTRATE 1 DROP: 1.5 SOLUTION OPHTHALMIC at 09:12

## 2019-01-01 RX ADMIN — SEVELAMER CARBONATE 800 MG: 800 TABLET, FILM COATED ORAL at 12:48

## 2019-01-01 RX ADMIN — SODIUM CHLORIDE, PRESERVATIVE FREE 3 ML: 5 INJECTION INTRAVENOUS at 21:42

## 2019-01-01 RX ADMIN — BRIMONIDINE TARTRATE 1 DROP: 1.5 SOLUTION OPHTHALMIC at 21:34

## 2019-01-01 RX ADMIN — BRIMONIDINE TARTRATE 1 DROP: 1.5 SOLUTION OPHTHALMIC at 20:01

## 2019-01-01 RX ADMIN — BRIMONIDINE TARTRATE 1 DROP: 1.5 SOLUTION OPHTHALMIC at 17:07

## 2019-01-01 RX ADMIN — ESCITALOPRAM OXALATE 10 MG: 10 TABLET ORAL at 08:20

## 2019-01-01 RX ADMIN — EPOETIN ALFA 10000 UNITS: 10000 SOLUTION INTRAVENOUS; SUBCUTANEOUS at 14:41

## 2019-01-01 RX ADMIN — BRIMONIDINE TARTRATE 1 DROP: 1.5 SOLUTION OPHTHALMIC at 08:49

## 2019-01-01 RX ADMIN — DORZOLAMIDE HCL 1 DROP: 20 SOLUTION/ DROPS OPHTHALMIC at 08:45

## 2019-01-01 RX ADMIN — MELATONIN 5.25 MG: at 21:34

## 2019-01-01 RX ADMIN — PROPOFOL 50 MG: 10 INJECTION, EMULSION INTRAVENOUS at 16:37

## 2019-01-01 RX ADMIN — Medication 10 ML: at 17:36

## 2019-01-01 RX ADMIN — BRIMONIDINE TARTRATE 1 DROP: 1.5 SOLUTION OPHTHALMIC at 17:35

## 2019-01-01 RX ADMIN — ESCITALOPRAM OXALATE 10 MG: 10 TABLET ORAL at 08:40

## 2019-01-01 RX ADMIN — FLUTICASONE PROPIONATE 2 SPRAY: 50 SPRAY, METERED NASAL at 13:03

## 2019-01-01 RX ADMIN — BRIMONIDINE TARTRATE 1 DROP: 1.5 SOLUTION OPHTHALMIC at 16:40

## 2019-01-01 RX ADMIN — ASPIRIN 81 MG 81 MG: 81 TABLET ORAL at 13:02

## 2019-01-01 RX ADMIN — SODIUM CHLORIDE, PRESERVATIVE FREE 3 ML: 5 INJECTION INTRAVENOUS at 21:35

## 2019-01-01 RX ADMIN — FAMOTIDINE 20 MG: 20 TABLET ORAL at 21:35

## 2019-01-01 RX ADMIN — SODIUM CHLORIDE, PRESERVATIVE FREE 10 ML: 5 INJECTION INTRAVENOUS at 22:00

## 2019-01-01 RX ADMIN — SEVELAMER CARBONATE 800 MG: 800 TABLET, FILM COATED ORAL at 18:04

## 2019-01-01 RX ADMIN — CETIRIZINE HYDROCHLORIDE 5 MG: 5 TABLET ORAL at 10:22

## 2019-01-01 RX ADMIN — EPOETIN ALFA 10000 UNITS: 10000 SOLUTION INTRAVENOUS; SUBCUTANEOUS at 11:26

## 2019-01-01 RX ADMIN — BRIMONIDINE TARTRATE 1 DROP: 1.5 SOLUTION OPHTHALMIC at 23:22

## 2019-01-01 RX ADMIN — PANTOPRAZOLE SODIUM 40 MG: 40 INJECTION, POWDER, FOR SOLUTION INTRAVENOUS at 20:09

## 2019-01-01 RX ADMIN — FAMOTIDINE 20 MG: 20 TABLET ORAL at 08:19

## 2019-01-02 VITALS
OXYGEN SATURATION: 95 % | RESPIRATION RATE: 18 BRPM | DIASTOLIC BLOOD PRESSURE: 78 MMHG | HEART RATE: 66 BPM | TEMPERATURE: 98.8 F | SYSTOLIC BLOOD PRESSURE: 121 MMHG

## 2019-01-03 NOTE — PROGRESS NOTES
"MONTHLY NURSING HOME VISIT    NAME: Cristo Musa  : 1950  MRN: [id number]    DATE & TIME SEEN:2018    PCP: Cory Rodríguez MD    NURSING HOME: [facility]     Chief Complaint: SOB    Subjective:     I was called by the Nursing Home regarding the pt for concern of worsening SOB. Pt was breathing fast and having decreased saturations on room air into the 80s%. RN also thought he had decreased air movement on lung auscultation. I ordered a CBC, electrolyte panel, Duo-neb treatment, and CXR. I went to the home and examined the pt personally. He was A&Ox3, decent air movement. He reported feeling \"a little SOB,\" but otherwise though Nursing placed him on 3L O2 NC. Pt was satting well. CXR showed no acute cardiopulmonary processes. Laboratory workup is unremarkable. He is on dialysis chronically for renal failure.    Current Meds:    Current Outpatient Medications:   •  acetaminophen (TYLENOL) 500 MG tablet, Take 500 mg by mouth Every 4 (Four) Hours As Needed for Mild Pain  or Fever., Disp: , Rfl:   •  aspirin 81 MG chewable tablet, Chew 81 mg Daily., Disp: , Rfl:   •  brimonidine (ALPHAGAN P) 0.1 % solution ophthalmic solution, Administer 1 drop to both eyes 3 (Three) Times a Day., Disp: , Rfl:   •  dorzolamide (TRUSOPT) 2 % ophthalmic solution, Administer 1 drop into the left eye 3 (Three) Times a Day., Disp: 1 each, Rfl: 12  •  escitalopram (LEXAPRO) 10 MG tablet, Take 1 tablet by mouth Daily., Disp: 30 tablet, Rfl: 3  •  famotidine (PEPCID) 20 MG tablet, Take 20 mg by mouth every night at bedtime., Disp: , Rfl:   •  fluticasone (FLONASE) 50 MCG/ACT nasal spray, 2 sprays into each nostril daily. Administer 2 sprays in each nostril for each dose., Disp: , Rfl:   •  gabapentin (NEURONTIN) 100 MG capsule, Take 1 capsule by mouth Every Night., Disp: 30 capsule, Rfl: 5  •  guaiFENesin 200 MG tablet, Take 400 mg by mouth Every 4 (Four) Hours As Needed for Cough., Disp: , Rfl:   •  " HYDROcodone-acetaminophen (NORCO) 7.5-325 MG per tablet, Take 1 tablet by mouth Every 6 (Six) Hours As Needed for Moderate Pain ., Disp: 120 tablet, Rfl: 0  •  insulin aspart (NovoLOG) 100 UNIT/ML injection, Inject  under the skin 3 (Three) Times a Day Before Meals. Sliding scale:  = 0 units; 150-200 = 3 units; 200-250 = 6 units; 250-300 = 9 units; 300-350 = 12 units; >350 = 15 units and call MD, Disp: , Rfl:   •  insulin detemir (LEVEMIR) 100 UNIT/ML injection, Inject 25 Units under the skin into the appropriate area as directed Daily., Disp: , Rfl:   •  latanoprost (XALATAN) 0.005 % ophthalmic solution, Administer 1 drop to both eyes every night., Disp: , Rfl:   •  Loratadine 10 MG capsule, Take 10 mg by mouth Every Night., Disp: , Rfl:   •  Nutritional Supplements (NEPRO PO), Take 1 tablet by mouth Daily., Disp: , Rfl:   •  omeprazole (priLOSEC) 20 MG capsule, Take 20 mg by mouth Daily., Disp: , Rfl:   •  ondansetron ODT (ZOFRAN-ODT) 4 MG disintegrating tablet, Take 1 tablet by mouth Every 6 (Six) Hours As Needed for Nausea or Vomiting., Disp: 10 tablet, Rfl: 0  •  PARoxetine (PAXIL) 30 MG tablet, Take 30 mg by mouth every night at bedtime., Disp: , Rfl:   •  polyethylene glycol (MIRALAX) packet, Take 17 g by mouth Daily As Needed (constipation)., Disp: , Rfl:   •  sennosides-docusate sodium (SENOKOT-S) 8.6-50 MG tablet, Take 2 tablets by mouth 2 (Two) Times a Day As Needed for Constipation., Disp: 60 tablet, Rfl: 0  •  sevelamer (RENVELA) 800 MG tablet, Take 800 mg by mouth 3 (Three) Times a Day With Meals., Disp: , Rfl:   •  traZODone (DESYREL) 50 MG tablet, Take 50 mg by mouth every night at bedtime., Disp: , Rfl:     Allergies:  Lisinopril and Motrin [ibuprofen]    Review of Systems:  Review of Systems   Constitutional: Negative for activity change, appetite change, chills, fatigue and fever.   HENT: Negative for congestion and rhinorrhea.    Eyes: Negative for photophobia and visual disturbance.    Respiratory: Positive for shortness of breath. Negative for apnea, cough, choking, chest tightness, wheezing and stridor.    Cardiovascular: Negative for chest pain and palpitations.   Gastrointestinal: Negative for abdominal pain.   Endocrine: Negative for polyuria.   Neurological: Negative for dizziness, syncope and weakness.   Psychiatric/Behavioral: Negative for agitation, confusion and sleep disturbance.       Objective:     /78   Pulse 66   Temp 98.8 °F (37.1 °C)   Resp 18   SpO2 95%   Physical Exam   Constitutional: He is oriented to person, place, and time. He appears well-developed and well-nourished. He is active.  Non-toxic appearance. He does not have a sickly appearance. He does not appear ill. No distress. Nasal cannula in place.   HENT:   Head: Normocephalic and atraumatic.   Right Ear: External ear normal.   Left Ear: External ear normal.   Nose: Nose normal.   Eyes: Conjunctivae are normal. Pupils are equal, round, and reactive to light. No scleral icterus.   Cardiovascular: Intact distal pulses.   Pulmonary/Chest: Effort normal. No accessory muscle usage. No tachypnea and no bradypnea. No respiratory distress. He has no decreased breath sounds. He has no wheezes. He has no rhonchi. He has no rales. He exhibits no tenderness.   Abdominal: Soft. Bowel sounds are normal. There is no tenderness.   Neurological: He is alert and oriented to person, place, and time. No cranial nerve deficit.   Skin: Skin is warm. Capillary refill takes less than 2 seconds. No rash noted. He is not diaphoretic.   Nursing note and vitals reviewed.      Diagnostic Data:     CXR    Assessment/Plan:      68 y.o. male with:  Problem List Items Addressed This Visit     None      Visit Diagnoses     SOB (shortness of breath)    -  Primary    Continue O2 NC PRN  Duo Neb q4h prn                This document has been electronically signed by John Sanchez MD on January 2, 2019 7:26 PM

## 2019-01-07 NOTE — PROGRESS NOTES
I have reviewed the notes, assessments, and/or procedures performed by the resident, I concur with her/his documentation and assessment and plan for Cristo Musa.    When RN called pt was waking up from a nap which is likely why saturations was low. However, Work up was negative and once awake pt was breathing just fine.     This document has been electronically signed by Spike Jewell MD on January 7, 2019 2:12 PM

## 2019-01-17 NOTE — TELEPHONE ENCOUNTER
DR SCHAFFER    [facility] SHE FAXED YOU LABS AND XRAYS ON THE PATIENT SHE WANTS TO DISCUSS WITH YOU.    THANK YOU

## 2019-01-20 NOTE — PROGRESS NOTES
Cristo Musa 69-year-old male residing at [facility].  Nursing staff called to report new onset lethargy.  Patient has been complaining of cough to family and requested Robitussin when necessary.  Patient's most recent set of vitals: Blood pressure 160/80, heart rate 66 bpm, respiratory rate 18, temperature 97.7°F, oxygen saturation 100% on room air.  Orders were placed for a CBC, basic metabolic panel, CRP, and chest x-ray.    Signature  Esther Jean MD  [facility] Family Medicine Resident, PGY III        This document has been electronically signed by Esther Jean MD on January 20, 2019 5:30 PM

## 2019-01-22 NOTE — TELEPHONE ENCOUNTER
Jerica from [facility] and the patient is having some soreness and spasms in his shoulders and neck and was wondering if Dr. Rodríguez could call in some cream called voltaren. If any questions she said you can call her back.      Thank you,      Patricia

## 2019-01-31 PROBLEM — D50.0 IRON DEFICIENCY ANEMIA DUE TO CHRONIC BLOOD LOSS: Status: ACTIVE | Noted: 2019-01-01

## 2019-01-31 PROBLEM — R10.84 GENERALIZED ABDOMINAL PAIN: Status: ACTIVE | Noted: 2019-01-01

## 2019-01-31 NOTE — PATIENT INSTRUCTIONS
Colonoscopy, Adult  A colonoscopy is an exam to look at the entire large intestine. During the exam, a lubricated, bendable tube is inserted into the anus and then passed into the rectum, colon, and other parts of the large intestine.  A colonoscopy is often done as a part of normal colorectal screening or in response to certain symptoms, such as anemia, persistent diarrhea, abdominal pain, and blood in the stool. The exam can help screen for and diagnose medical problems, including:  · Tumors.  · Polyps.  · Inflammation.  · Areas of bleeding.    Tell a health care provider about:  · Any allergies you have.  · All medicines you are taking, including vitamins, herbs, eye drops, creams, and over-the-counter medicines.  · Any problems you or family members have had with anesthetic medicines.  · Any blood disorders you have.  · Any surgeries you have had.  · Any medical conditions you have.  · Any problems you have had passing stool.  What are the risks?  Generally, this is a safe procedure. However, problems may occur, including:  · Bleeding.  · A tear in the intestine.  · A reaction to medicines given during the exam.  · Infection (rare).    What happens before the procedure?  Eating and drinking restrictions  Follow instructions from your health care provider about eating and drinking, which may include:  · A few days before the procedure - follow a low-fiber diet. Avoid nuts, seeds, dried fruit, raw fruits, and vegetables.  · 1-3 days before the procedure - follow a clear liquid diet. Drink only clear liquids, such as clear broth or bouillon, black coffee or tea, clear juice, clear soft drinks or sports drinks, gelatin dessert, and popsicles. Avoid any liquids that contain red or purple dye.  · On the day of the procedure - do not eat or drink anything during the 2 hours before the procedure, or within the time period that your health care provider recommends.    Bowel prep  If you were prescribed an oral bowel prep  to clean out your colon:  · Take it as told by your health care provider. Starting the day before your procedure, you will need to drink a large amount of medicated liquid. The liquid will cause you to have multiple loose stools until your stool is almost clear or light green.  · If your skin or anus gets irritated from diarrhea, you may use these to relieve the irritation:  ? Medicated wipes, such as adult wet wipes with aloe and vitamin E.  ? A skin soothing-product like petroleum jelly.  · If you vomit while drinking the bowel prep, take a break for up to 60 minutes and then begin the bowel prep again. If vomiting continues and you cannot take the bowel prep without vomiting, call your health care provider.    General instructions  · Ask your health care provider about changing or stopping your regular medicines. This is especially important if you are taking diabetes medicines or blood thinners.  · Plan to have someone take you home from the hospital or clinic.  What happens during the procedure?  · An IV tube may be inserted into one of your veins.  · You will be given medicine to help you relax (sedative).  · To reduce your risk of infection:  ? Your health care team will wash or sanitize their hands.  ? Your anal area will be washed with soap.  · You will be asked to lie on your side with your knees bent.  · Your health care provider will lubricate a long, thin, flexible tube. The tube will have a camera and a light on the end.  · The tube will be inserted into your anus.  · The tube will be gently eased through your rectum and colon.  · Air will be delivered into your colon to keep it open. You may feel some pressure or cramping.  · The camera will be used to take images during the procedure.  · A small tissue sample may be removed from your body to be examined under a microscope (biopsy). If any potential problems are found, the tissue will be sent to a lab for testing.  · If small polyps are found, your  health care provider may remove them and have them checked for cancer cells.  · The tube that was inserted into your anus will be slowly removed.  The procedure may vary among health care providers and hospitals.  What happens after the procedure?  · Your blood pressure, heart rate, breathing rate, and blood oxygen level will be monitored until the medicines you were given have worn off.  · Do not drive for 24 hours after the exam.  · You may have a small amount of blood in your stool.  · You may pass gas and have mild abdominal cramping or bloating due to the air that was used to inflate your colon during the exam.  · It is up to you to get the results of your procedure. Ask your health care provider, or the department performing the procedure, when your results will be ready.  This information is not intended to replace advice given to you by your health care provider. Make sure you discuss any questions you have with your health care provider.  Document Released: 12/15/2001 Document Revised: 10/18/2017 Document Reviewed: 02/28/2017  [employer] Interactive Patient Education © 2018 [employer]

## 2019-01-31 NOTE — PROGRESS NOTES
Chief Complaint   Patient presents with   • positive fecal occult       Subjective    Cristo Musa is a 69 y.o. male. he is here today for follow-up.    History of Present Illness  69-year-old male presents to discuss Hemoccult positive stool.  He has end-stage renal disease on dialysis Monday Wednesday Friday was hospitalized due to severe drop in hemoglobin to 6.6 in mid December.  He was unable to proceed with EGD due to no IV access he was given 3 units packed red blood cells and anemia has improved with current hemoglobin 8 days ago 9.9 with hematocrit of 31.2.  Patient states his bowel movements are regular about 2-3 times per day he lives at [facility] and states he does not look at his stool in over a month.  Denies any nausea or vomiting however reports intermittent abdominal pain and soreness.  Denies any changes in weight or decrease in appetite.  His last colonoscopy completed per Dr. Medina was 4/3/14 noted diverticulosis and hemorrhoids otherwise normal exam.  Plan; schedule patient for EGD and colonoscopy due to generalized abdominal pain on exam, symptomatic anemia and Hemoccult-positive stool.  Follow-up after test return to office sooner if needed.       The following portions of the patient's history were reviewed and updated as appropriate:   Past Medical History:   Diagnosis Date   • Anemia of chronic disease    • Cataract    • CHF (congestive heart failure) (CMS/HCC)    • CKD (chronic kidney disease)    • Clostridium difficile infection    • COPD (chronic obstructive pulmonary disease) (CMS/HCC)    • Diabetes mellitus (CMS/HCC)    • Glaucoma    • Heart block    • Hepatitis C    • History of transfusion    • Hypertension    • Nephropathy, diabetic (CMS/HCC)    • Pacemaker    • Pulmonary embolism (CMS/HCC)      Past Surgical History:   Procedure Laterality Date   • ABDOMINAL SURGERY     • ARTERIOVENOUS FISTULA/SHUNT SURGERY Right     forearm loop   • ARTERIOVENOUS  FISTULA/SHUNT SURGERY Left     removed past upper arm   • ARTERIOVENOUS FISTULA/SHUNT SURGERY Right 3/13/2018    Procedure: revision RIGHT forearm ARTERIOVENOUS graft (excision), debridement, wound vac;  Surgeon: Jerson Singh MD;  Location: [facility];  Service: Vascular   • ARTERIOVENOUS FISTULA/SHUNT SURGERY W/ HEMODIALYSIS CATHETER INSERTION Right 4/4/2016    Procedure: RIGHT ARM AV FISTULA DECLOT REVISION REPAIR BRACHIAL ANASTOMOTIC PSUEDOANEURYSM LEFT FEMORAL RICHARDSON FISTULOGRAM WITH ANGIOPLASTY;  Surgeon: Jerson Carpenter MD;  Location: [facility] OR 18/19;  Service:    • CATARACT EXTRACTION W/ INTRAOCULAR LENS IMPLANT Left 3/31/2017    Procedure: REMOVE CATARACT AND IMPLANT INTRAOCULAR LENS LEFT EYE;  Surgeon: Hilton Montemayor MD;  Location: [facility];  Service:    • EYE SURGERY     • HERNIA REPAIR     • IMPLANTABLE CARDIOVERTER DEFIBRILLATOR LEAD REPLACEMENT/POCKET REVISION Right 4/11/2016    Procedure: PACEMAKER LEADS X 3 AND BATTERY EXTRACTION WITH EXIMER LASER;  Surgeon: Vern Reeves MD;  Location: [facility] OR 18/19;  Service:    • INSERTION HEMODIALYSIS CATHETER Right 05/2015    jugular   • INTERVENTIONAL RADIOLOGY PROCEDURE Right 6/1/2017    Procedure: RIGHT dialysis fistulagram & angioplasty;  Surgeon: Jerson Singh MD;  Location: [facility] ANGIO INVASIVE LOCATION;  Service:    • INTERVENTIONAL RADIOLOGY PROCEDURE Right 8/24/2017    Procedure: IR dialysis fistulagram;  Surgeon: Jerson Singh MD;  Location: [facility] ANGIO INVASIVE LOCATION;  Service:    • INTERVENTIONAL RADIOLOGY PROCEDURE Right 11/13/2018    Procedure: IR dialysis fistulagram;  Surgeon: Jerson Singh MD;  Location: [facility] ANGIO INVASIVE LOCATION;  Service: Interventional Radiology   • PACEMAKER IMPLANTATION  2008    dual chamber [employer]   • REMOVAL HEMODIALYSIS CATHETER Right 05/2015    femoral vein     Family History   Problem Relation Age of Onset   • COPD  Sister    • Diabetes Brother    • Early death Brother    • Hyperlipidemia Brother    • Hypertension Brother    • Coronary artery disease Mother    • Diabetes Mother    • Hypertension Mother    • Stroke Other    • Other Other         Respiratory disorder       Current Outpatient Medications   Medication Sig Dispense Refill   • acetaminophen (TYLENOL) 500 MG tablet Take 500 mg by mouth Every 4 (Four) Hours As Needed for Mild Pain  or Fever.     • aspirin 81 MG chewable tablet Chew 81 mg Daily.     • brimonidine (ALPHAGAN P) 0.1 % solution ophthalmic solution Administer 1 drop to both eyes 3 (Three) Times a Day.     • dorzolamide (TRUSOPT) 2 % ophthalmic solution Administer 1 drop into the left eye 3 (Three) Times a Day. 1 each 12   • escitalopram (LEXAPRO) 10 MG tablet Take 1 tablet by mouth Daily. 30 tablet 3   • famotidine (PEPCID) 20 MG tablet Take 20 mg by mouth every night at bedtime.     • fluticasone (FLONASE) 50 MCG/ACT nasal spray 2 sprays into each nostril daily. Administer 2 sprays in each nostril for each dose.     • gabapentin (NEURONTIN) 100 MG capsule Take 1 capsule by mouth Every Night. 30 capsule 5   • guaiFENesin 200 MG tablet Take 400 mg by mouth Every 4 (Four) Hours As Needed for Cough.     • HYDROcodone-acetaminophen (NORCO) 7.5-325 MG per tablet Take 1 tablet by mouth Every 6 (Six) Hours As Needed for Moderate Pain . 120 tablet 0   • insulin aspart (NovoLOG) 100 UNIT/ML injection Inject  under the skin 3 (Three) Times a Day Before Meals. Sliding scale:  = 0 units; 150-200 = 3 units; 200-250 = 6 units; 250-300 = 9 units; 300-350 = 12 units; >350 = 15 units and call MD     • insulin detemir (LEVEMIR) 100 UNIT/ML injection Inject 25 Units under the skin into the appropriate area as directed Daily.     • latanoprost (XALATAN) 0.005 % ophthalmic solution Administer 1 drop to both eyes every night.     • Loratadine 10 MG capsule Take 10 mg by mouth Every Night.     • Nutritional Supplements  (NEPRO PO) Take 1 tablet by mouth Daily.     • omeprazole (priLOSEC) 20 MG capsule Take 20 mg by mouth Daily.     • ondansetron ODT (ZOFRAN-ODT) 4 MG disintegrating tablet Take 1 tablet by mouth Every 6 (Six) Hours As Needed for Nausea or Vomiting. 10 tablet 0   • PARoxetine (PAXIL) 30 MG tablet Take 30 mg by mouth every night at bedtime.     • PEG-KCl-NaCl-NaSulf-Na Asc-C (MOVIPREP) 100 g reconstituted solution powder Take 1,000 mL by mouth 1 (One) Time for 1 dose. Take as per instruction sheet for colonoscopy prep. 1 each 0   • polyethylene glycol (MIRALAX) packet Take 17 g by mouth Daily As Needed (constipation).     • sennosides-docusate sodium (SENOKOT-S) 8.6-50 MG tablet Take 2 tablets by mouth 2 (Two) Times a Day As Needed for Constipation. 60 tablet 0   • sevelamer (RENVELA) 800 MG tablet Take 800 mg by mouth 3 (Three) Times a Day With Meals.     • traZODone (DESYREL) 50 MG tablet Take 50 mg by mouth every night at bedtime.       No current facility-administered medications for this visit.      Allergies   Allergen Reactions   • Lisinopril Angioedema     unknown   • Motrin [Ibuprofen] Diarrhea and Nausea And Vomiting     Social History     Socioeconomic History   • Marital status: Legally      Spouse name: Not on file   • Number of children: Not on file   • Years of education: Not on file   • Highest education level: Not on file   Tobacco Use   • Smoking status: Former Smoker     Types: Cigarettes   • Smokeless tobacco: Never Used   • Tobacco comment: quit 20 years ago    Substance and Sexual Activity   • Alcohol use: No     Comment: former heavy use in his 50's, up to a fifth of liquor a day, currently no alcohol   • Drug use: No   • Sexual activity: No       Review of Systems  Review of Systems   Constitutional: Positive for fatigue. Negative for activity change, appetite change, chills, diaphoresis, fever and unexpected weight change.   HENT: Negative for sore throat and trouble swallowing.   "  Respiratory: Negative for shortness of breath.    Gastrointestinal: Positive for abdominal pain. Negative for abdominal distention, anal bleeding, blood in stool, constipation, diarrhea, nausea, rectal pain and vomiting.   Musculoskeletal: Negative for arthralgias.   Skin: Negative for pallor.   Neurological: Negative for light-headedness.        /60 (BP Location: Left arm)   Pulse 64   Ht 172.7 cm (68\")   Wt 104 kg (228 lb 9.6 oz)   BMI 34.76 kg/m²     Objective    Physical Exam   Constitutional: He is oriented to person, place, and time. He appears well-developed and well-nourished. He is cooperative. No distress.   Wheelchair    HENT:   Head: Normocephalic and atraumatic.   Neck: Normal range of motion. Neck supple. No thyromegaly present.   Cardiovascular: Normal rate, regular rhythm and normal heart sounds.   Pulmonary/Chest: Effort normal and breath sounds normal. He has no wheezes. He has no rhonchi. He has no rales.   Abdominal: Soft. Normal appearance and bowel sounds are normal. There is no hepatosplenomegaly. There is generalized tenderness. There is no rigidity and no guarding. No hernia.   Lymphadenopathy:     He has no cervical adenopathy.   Neurological: He is alert and oriented to person, place, and time.   Skin: Skin is warm, dry and intact. No rash noted. No pallor.   Psychiatric: He has a normal mood and affect. His speech is normal.     Lab Requisition on 01/20/2019   Component Date Value Ref Range Status   • Glucose 01/20/2019 177* 70 - 100 mg/dL Final   • BUN 01/20/2019 51* 5 - 21 mg/dL Final   • Creatinine 01/20/2019 9.57* 0.50 - 1.40 mg/dL Final   • Sodium 01/20/2019 134* 135 - 145 mmol/L Final   • Potassium 01/20/2019 4.6  3.5 - 5.3 mmol/L Final   • Chloride 01/20/2019 88* 98 - 110 mmol/L Final   • CO2 01/20/2019 28.0  24.0 - 31.0 mmol/L Final   • Calcium 01/20/2019 8.3* 8.4 - 10.4 mg/dL Final   • eGFR   Amer 01/20/2019 7* >60 mL/min/1.73 Final    <15 Indicative of kidney " failure.   • eGFR Non  Amer 01/20/2019   >60 mL/min/1.73 Final    <15 Indicative of kidney failure.   • BUN/Creatinine Ratio 01/20/2019 5.3* 7.0 - 25.0 Final   • Anion Gap 01/20/2019 18.0* 4.0 - 13.0 mmol/L Final   • C-Reactive Protein 01/20/2019 1.91* 0.00 - 0.99 mg/dL Final   • WBC 01/20/2019 5.06  4.80 - 10.80 10*3/mm3 Final   • RBC 01/20/2019 3.52* 4.80 - 5.90 10*6/mm3 Final   • Hemoglobin 01/20/2019 9.9* 14.0 - 18.0 g/dL Final   • Hematocrit 01/20/2019 31.2* 40.0 - 52.0 % Final   • MCV 01/20/2019 88.6  82.0 - 95.0 fL Final   • MCH 01/20/2019 28.1  28.0 - 32.0 pg Final   • MCHC 01/20/2019 31.7* 33.0 - 36.0 g/dL Final   • RDW 01/20/2019 14.6  12.0 - 15.0 % Final   • RDW-SD 01/20/2019 46.9  40.0 - 54.0 fl Final   • MPV 01/20/2019 10.0  6.0 - 12.0 fL Final   • Platelets 01/20/2019 92* 130 - 400 10*3/mm3 Final   • Neutrophil % 01/20/2019 69.5  39.0 - 78.0 % Final   • Lymphocyte % 01/20/2019 17.2  15.0 - 45.0 % Final   • Monocyte % 01/20/2019 10.3  4.0 - 12.0 % Final   • Eosinophil % 01/20/2019 2.0  0.0 - 4.0 % Final   • Basophil % 01/20/2019 0.6  0.0 - 2.0 % Final   • Immature Grans % 01/20/2019 0.4  0.0 - 5.0 % Final   • Neutrophils, Absolute 01/20/2019 3.52  1.87 - 8.40 10*3/mm3 Final   • Lymphocytes, Absolute 01/20/2019 0.87  0.72 - 4.86 10*3/mm3 Final   • Monocytes, Absolute 01/20/2019 0.52  0.19 - 1.30 10*3/mm3 Final   • Eosinophils, Absolute 01/20/2019 0.10  0.00 - 0.70 10*3/mm3 Final   • Basophils, Absolute 01/20/2019 0.03  0.00 - 0.20 10*3/mm3 Final   • Immature Grans, Absolute 01/20/2019 0.02  0.00 - 0.03 10*3/mm3 Final   • nRBC 01/20/2019 0.0  0.0 - 0.0 /100 WBC Final     Assessment/Plan      1. Generalized abdominal pain    2. Iron deficiency anemia due to chronic blood loss    .       Orders placed during this encounter include:  Orders Placed This Encounter   Procedures   • Follow Anesthesia Guidelines / Standing Orders     Standing Status:   Future       ESOPHAGOGASTRODUODENOSCOPY (N/A),  COLONOSCOPY (N/A)    Review and/or summary of lab tests, radiology, procedures, medications. Review and summary of old records and obtaining of history. The risks and benefits of my recommendations, as well as other treatment options were discussed with the patient today. Questions were answered.    No orders of the defined types were placed in this encounter.      Follow-up: Return in about 4 weeks (around 2/28/2019).          This document has been electronically signed by LUIS Hernandez on January 31, 2019 11:12 AM             Results for orders placed or performed in visit on 01/20/19   CBC Auto Differential   Result Value Ref Range    WBC 5.06 4.80 - 10.80 10*3/mm3    RBC 3.52 (L) 4.80 - 5.90 10*6/mm3    Hemoglobin 9.9 (L) 14.0 - 18.0 g/dL    Hematocrit 31.2 (L) 40.0 - 52.0 %    MCV 88.6 82.0 - 95.0 fL    MCH 28.1 28.0 - 32.0 pg    MCHC 31.7 (L) 33.0 - 36.0 g/dL    RDW 14.6 12.0 - 15.0 %    RDW-SD 46.9 40.0 - 54.0 fl    MPV 10.0 6.0 - 12.0 fL    Platelets 92 (L) 130 - 400 10*3/mm3    Neutrophil % 69.5 39.0 - 78.0 %    Lymphocyte % 17.2 15.0 - 45.0 %    Monocyte % 10.3 4.0 - 12.0 %    Eosinophil % 2.0 0.0 - 4.0 %    Basophil % 0.6 0.0 - 2.0 %    Immature Grans % 0.4 0.0 - 5.0 %    Neutrophils, Absolute 3.52 1.87 - 8.40 10*3/mm3    Lymphocytes, Absolute 0.87 0.72 - 4.86 10*3/mm3    Monocytes, Absolute 0.52 0.19 - 1.30 10*3/mm3    Eosinophils, Absolute 0.10 0.00 - 0.70 10*3/mm3    Basophils, Absolute 0.03 0.00 - 0.20 10*3/mm3    Immature Grans, Absolute 0.02 0.00 - 0.03 10*3/mm3    nRBC 0.0 0.0 - 0.0 /100 WBC   C-reactive Protein   Result Value Ref Range    C-Reactive Protein 1.91 (H) 0.00 - 0.99 mg/dL   Basic Metabolic Panel   Result Value Ref Range    Glucose 177 (H) 70 - 100 mg/dL    BUN 51 (H) 5 - 21 mg/dL    Creatinine 9.57 (H) 0.50 - 1.40 mg/dL    Sodium 134 (L) 135 - 145 mmol/L    Potassium 4.6 3.5 - 5.3 mmol/L    Chloride 88 (L) 98 - 110 mmol/L    CO2 28.0 24.0 - 31.0 mmol/L    Calcium 8.3 (L) 8.4 -  10.4 mg/dL    eGFR  African Amer 7 (L) >60 mL/min/1.73    eGFR Non African Amer  >60 mL/min/1.73    BUN/Creatinine Ratio 5.3 (L) 7.0 - 25.0    Anion Gap 18.0 (H) 4.0 - 13.0 mmol/L   Results for orders placed or performed in visit on 12/25/18   CBC Auto Differential   Result Value Ref Range    WBC 3.95 (L) 4.80 - 10.80 10*3/mm3    RBC 2.88 (L) 4.80 - 5.90 10*6/mm3    Hemoglobin 8.9 (L) 14.0 - 18.0 g/dL    Hematocrit 27.9 (L) 40.0 - 52.0 %    MCV 96.9 (H) 82.0 - 95.0 fL    MCH 30.9 28.0 - 32.0 pg    MCHC 31.9 (L) 33.0 - 36.0 g/dL    RDW 15.9 (H) 12.0 - 15.0 %    RDW-SD 55.9 (H) 40.0 - 54.0 fl    MPV 9.8 6.0 - 12.0 fL    Platelets 102 (L) 130 - 400 10*3/mm3    Neutrophil % 66.8 39.0 - 78.0 %    Lymphocyte % 21.3 15.0 - 45.0 %    Monocyte % 9.1 4.0 - 12.0 %    Eosinophil % 2.0 0.0 - 4.0 %    Basophil % 0.5 0.0 - 2.0 %    Immature Grans % 0.3 0.0 - 5.0 %    Neutrophils, Absolute 2.64 1.87 - 8.40 10*3/mm3    Lymphocytes, Absolute 0.84 0.72 - 4.86 10*3/mm3    Monocytes, Absolute 0.36 0.19 - 1.30 10*3/mm3    Eosinophils, Absolute 0.08 0.00 - 0.70 10*3/mm3    Basophils, Absolute 0.02 0.00 - 0.20 10*3/mm3    Immature Grans, Absolute 0.01 0.00 - 0.03 10*3/mm3    nRBC 0.0 0.0 - 0.0 /100 WBC   Comprehensive Metabolic Panel   Result Value Ref Range    Glucose 293 (H) 70 - 100 mg/dL    BUN 41 (H) 5 - 21 mg/dL    Creatinine 8.17 (H) 0.50 - 1.40 mg/dL    Sodium 131 (L) 135 - 145 mmol/L    Potassium 3.6 3.5 - 5.3 mmol/L    Chloride 88 (L) 98 - 110 mmol/L    CO2 27.0 24.0 - 31.0 mmol/L    Calcium 7.3 (L) 8.4 - 10.4 mg/dL    Total Protein 8.1 6.3 - 8.7 g/dL    Albumin 3.50 3.50 - 5.00 g/dL    ALT (SGPT) 18 0 - 54 U/L    AST (SGOT) 32 7 - 45 U/L    Alkaline Phosphatase 193 (H) 24 - 120 U/L    Total Bilirubin 0.6 0.1 - 1.0 mg/dL    eGFR Non African Amer  >60 mL/min/1.73    eGFR  African Amer 8 (L) >60 mL/min/1.73    Globulin 4.6 gm/dL    A/G Ratio 0.8 (L) 1.1 - 2.5 g/dL    BUN/Creatinine Ratio 5.0 (L) 7.0 - 25.0    Anion Gap 16.0 (H) 4.0  - 13.0 mmol/L   Results for orders placed or performed in visit on 12/25/18   Respiratory Panel, PCR - Swab, Nasopharynx   Result Value Ref Range    ADENOVIRUS, PCR Not Detected Not Detected    Coronavirus 229E Not Detected Not Detected    Coronavirus HKU1 Not Detected Not Detected    Coronavirus NL63 Not Detected Not Detected    Coronavirus OC43 Not Detected Not Detected    Human Metapneumovirus Not Detected Not Detected    Human Rhinovirus/Enterovirus Not Detected Not Detected    Influenza B PCR Not Detected Not Detected    Parainfluenza Virus 1 Not Detected Not Detected    Parainfluenza Virus 2 Not Detected Not Detected    Parainfluenza Virus 3 Not Detected Not Detected    Parainfluenza Virus 4 Not Detected Not Detected    Bordetella pertussis pcr Not Detected Not Detected    Influenza A H1 2009 PCR Not Detected Not Detected    Chlamydophila pneumoniae PCR Not Detected Not Detected    Mycoplasma pneumo by PCR Not Detected Not Detected    Influenza A PCR Not Detected Not Detected    Influenza A H3 Not Detected Not Detected    Influenza A H1 Not Detected Not Detected    RSV, PCR Not Detected Not Detected     *Note: Due to a large number of results and/or encounters for the requested time period, some results have not been displayed. A complete set of results can be found in Results Review.

## 2019-02-01 NOTE — PROGRESS NOTES
MONTHLY NURSING HOME VISIT    NAME: Cristo Musa  : 1950  MRN: [id number]    DATE & TIME SEEN: 2019 17:00    PCP: Cory Rodríguez MD    NURSING HOME: [facility]    Chief Complaint: Monthly Nursing Home Visit for January    Subjective:     Cristo Musa is a 69 y.o. male.  Patient with complaint of cough for several days now.  Producing yellow and blood-streaked sputum.  No fever or chills or sweats.  He had negative chest x-ray on .  Also complains of trapezius tightness.  Flexeril as added to medicines, minimal help.  He is working with physical therapy.  No other complaints today.    Current Meds:    Current Outpatient Medications:   •  acetaminophen (TYLENOL) 500 MG tablet, Take 500 mg by mouth Every 4 (Four) Hours As Needed for Mild Pain  or Fever., Disp: , Rfl:   •  aspirin 81 MG chewable tablet, Chew 81 mg Daily., Disp: , Rfl:   •  brimonidine (ALPHAGAN P) 0.1 % solution ophthalmic solution, Administer 1 drop to both eyes 3 (Three) Times a Day., Disp: , Rfl:   •  dorzolamide (TRUSOPT) 2 % ophthalmic solution, Administer 1 drop into the left eye 3 (Three) Times a Day., Disp: 1 each, Rfl: 12  •  escitalopram (LEXAPRO) 10 MG tablet, Take 1 tablet by mouth Daily., Disp: 30 tablet, Rfl: 3  •  famotidine (PEPCID) 20 MG tablet, Take 20 mg by mouth every night at bedtime., Disp: , Rfl:   •  fluticasone (FLONASE) 50 MCG/ACT nasal spray, 2 sprays into each nostril daily. Administer 2 sprays in each nostril for each dose., Disp: , Rfl:   •  gabapentin (NEURONTIN) 100 MG capsule, Take 1 capsule by mouth Every Night., Disp: 30 capsule, Rfl: 5  •  guaiFENesin 200 MG tablet, Take 400 mg by mouth Every 4 (Four) Hours As Needed for Cough., Disp: , Rfl:   •  HYDROcodone-acetaminophen (NORCO) 7.5-325 MG per tablet, Take 1 tablet by mouth Every 6 (Six) Hours As Needed for Moderate Pain ., Disp: 120 tablet, Rfl: 0  •  insulin aspart (NovoLOG) 100 UNIT/ML injection, Inject   under the skin 3 (Three) Times a Day Before Meals. Sliding scale:  = 0 units; 150-200 = 3 units; 200-250 = 6 units; 250-300 = 9 units; 300-350 = 12 units; >350 = 15 units and call MD, Disp: , Rfl:   •  insulin detemir (LEVEMIR) 100 UNIT/ML injection, Inject 25 Units under the skin into the appropriate area as directed Daily., Disp: , Rfl:   •  latanoprost (XALATAN) 0.005 % ophthalmic solution, Administer 1 drop to both eyes every night., Disp: , Rfl:   •  Loratadine 10 MG capsule, Take 10 mg by mouth Every Night., Disp: , Rfl:   •  Nutritional Supplements (NEPRO PO), Take 1 tablet by mouth Daily., Disp: , Rfl:   •  omeprazole (priLOSEC) 20 MG capsule, Take 20 mg by mouth Daily., Disp: , Rfl:   •  ondansetron ODT (ZOFRAN-ODT) 4 MG disintegrating tablet, Take 1 tablet by mouth Every 6 (Six) Hours As Needed for Nausea or Vomiting., Disp: 10 tablet, Rfl: 0  •  PARoxetine (PAXIL) 30 MG tablet, Take 30 mg by mouth every night at bedtime., Disp: , Rfl:   •  polyethylene glycol (MIRALAX) packet, Take 17 g by mouth Daily As Needed (constipation)., Disp: , Rfl:   •  sennosides-docusate sodium (SENOKOT-S) 8.6-50 MG tablet, Take 2 tablets by mouth 2 (Two) Times a Day As Needed for Constipation., Disp: 60 tablet, Rfl: 0  •  sevelamer (RENVELA) 800 MG tablet, Take 800 mg by mouth 3 (Three) Times a Day With Meals., Disp: , Rfl:   •  traZODone (DESYREL) 50 MG tablet, Take 50 mg by mouth every night at bedtime., Disp: , Rfl:     Allergies:  Lisinopril and Motrin [ibuprofen]    Review of Systems:  Review of Systems   Constitutional: Negative for activity change, appetite change, chills and fever.   HENT: Negative for congestion, ear pain and sore throat.    Eyes: Negative for redness and visual disturbance.   Respiratory: Positive for cough. Negative for shortness of breath and wheezing.    Cardiovascular: Negative for chest pain and palpitations.   Gastrointestinal: Negative for abdominal pain, diarrhea, nausea and vomiting.    Genitourinary: Negative for difficulty urinating and dysuria.   Musculoskeletal: Positive for back pain and myalgias. Negative for arthralgias and gait problem.   Skin: Negative for color change and rash.   Neurological: Negative for dizziness, weakness and headaches.   Psychiatric/Behavioral: Negative for dysphoric mood and sleep disturbance. The patient is not nervous/anxious.        Objective:     /78   Pulse 74   Temp 97 °F (36.1 °C)   Resp 18   SpO2 94%   Physical Exam   Constitutional: He is oriented to person, place, and time. He appears well-developed and well-nourished.   HENT:   Head: Normocephalic and atraumatic.   Right Ear: External ear normal.   Left Ear: External ear normal.   Nose: Nose normal.   Eyes: EOM are normal. Pupils are equal, round, and reactive to light.   Neck: Normal range of motion. Neck supple.   Cardiovascular: Normal rate, regular rhythm and normal heart sounds.   Pulmonary/Chest:   cough with deep inspiration, congestion   Abdominal: Soft. Bowel sounds are normal. He exhibits no distension. There is no tenderness.   Musculoskeletal: Normal range of motion. He exhibits no edema or deformity.   Neurological: He is alert and oriented to person, place, and time. No cranial nerve deficit.   Skin: Skin is warm.   Psychiatric: He has a normal mood and affect. His behavior is normal. Thought content normal.   Nursing note and vitals reviewed.       Assessment/Plan:      69 y.o. male with:  Problem List Items Addressed This Visit        Other    Anxiety    Overview     lexapro           Other Visit Diagnoses     Viral URI    -  Primary - continue Mucinex, incentive spirometry, add OPEP, add Tessalon Perles     Constipation, unspecified constipation type    doing well, asking to make Metamucil and docusate/senna as needed medicines, continue this, but instructed staff to really push for him to be taking the Metamucil as much as possible.            CODE STATUS: full code      Vitaly is the attending on record for this patient, he is aware of the patient's status and agrees with the above.          This document has been electronically signed by Cory Rodríguez MD on February 1, 2019 3:01 PM

## 2019-02-03 NOTE — PROGRESS NOTES
Spoke with nursing staff at [facility] who reported Mr. Musa had had 1-2 episodes of bloody stool with BRBPR and guaiac + stools over the last month. He had an episode of dark stools today. Last episode of bright red blood was yesterday. He has also had blood tinged sputum on cough since yesterday and blood on tissues when he blows his nose. He is hemodynamically stable with blood pressure of 142/63, heart rate of 63, and respiratory rate of 20. He has been afebrile.     He has been seen recently on 1/31 by GI as an outpatient and had EGD/colonoscopy scheduled at that time for March.    Since he is stable and has not had an episode of BRBPR today, only yesterday, will obtain CBC to compare to most recent CBC on 1/20. Will also order saline nasal spray and hold flonase for 2-3 days as nasal bleeding could be due to dry mucous membranes. CXR on 1/20 was negative for acute pulmonary pathology.        Michelle Lo MD PGY2   [facility]  This document has been electronically signed by Michelle Lo MD on February 2, 2019 6:30 PM

## 2019-02-12 NOTE — TELEPHONE ENCOUNTER
CHERY WITH [facility] CALLED. PATIENT'S BLOOD SUGAR . I PAGED DR SCHAFFER AND LET HIM KNOW.

## 2019-02-14 NOTE — PATIENT INSTRUCTIONS
Patent RIGHT Fistula  Increased velocities arterial anastomosis/ Flows adequate  No problems with dialysis currently  Continue dialysis as scheduled. If problems should arise including difficulty with access, high pressure alarms, or inability to complete dialysis please [facility] immediately for evaluation.   Return 3 mos- Repeat RIGHT Fistula duplex

## 2019-02-22 NOTE — PROGRESS NOTES
CVTS Office Progress Note       Subjective   Patient ID: Cristo Musa is a 69 y.o. male is here today for follow-up.    Chief Complaint:    Chief Complaint   Patient presents with   • Hemodialysis Access     3 month follow up        The following portions of the patient's history were reviewed and updated as appropriate: allergies, current medications, past family history, past medical history, past social history, past surgical history and problem list.  Recent images independently reviewed.  Available laboratory values reviewed.    PCP:  Cory Rodríguez MD  Cardiology:  Emilia  Nephrology: Gladys Vilchis MW    69 y.o. male with ESRD on  hemodialysis, HTN, COPD, peripheral neuropathy, DM, CAD, Hepatitis C, nonischemic cardiomyopathy (EF 30%)..  former smoker.  Long term access for hemodialysis placed 12/23/14.  He returns today after callling requesting to be seen due to LUE pain and bulging areas of RUE AV fistula areas.  Denies any neurosensory symptoms of RUE does have of LUE.  Has DM with neuropathy.  RIGHT AV graft functioning well at dialysis.  No problems during dialysis.  Currently residing at [facility].     Returns today in follow up.     11/13/2014 Upper extremity vein mapping:  RIGHT cephalic 1.2-2.5mm, basilic 1.6-2.0mm.  LEFT cephalic 0.8-2.6mm, basilic 1.2-2.6mm.  12/23/14  LEFT Brachial artery to axillary vein AV Graft (7mm Artegraft)  01/07/15 LUE Incisions  open thru skin only.  Proximal Length:  4.5-5 cm depth 0.25 width 0.2  Distal  Length: 4. cm depth 0.2 width 0.2  Beefy red bed  Small amt erythema proximal incision.  1/13/15: LUE Incision:healed  3/24/15  Revision of left arteriovenous graft (distal interposition graft with a 6 mm Artegraft).  Thrombectomy, left arteriovenous graft, open.  Left fistulogram.  Balloon angioplasty of left subclavian vein, innominate vein, swing site (6 x 20 mm Bard Conquest balloon, 8 x 20 mm Bard Conquest balloon).  Venous ultrasound of unilateral extremity.  3/31/15 Revision of left arteriovenous graft, open. Open thrombectomy of left arteriovenous graft. Left upper extremity fistulogram.  Balloon angioplasty of left subclavian vein/innominate vein/swing site (6 x 21 mm Bard-Conquest balloon, 8 x 21 mm Bard-Conquest balloon). Venous ultrasound unilateral extremity.   4/10/15   Excision of left brachial to axillary arteriovenous bridge graft (bovine) with reconstruction of both the brachial artery and axillary vein with vein patch angioplasty from left cephalic vein. Placement of femoral arterial and venous lines using ultrasound guidance.  4/19/15  Placement LEFT IJ Tunneled Cath    5/14/15   Placement of a right forearm arteriovenous bridge graft.  5/19/15   Exchange of right femoral tunneled dialysis catheter using fluoroscopic guidance.   8/11/15  Entrobacter bacteremia of LUE AV Graft, which was removed and completed 6 wks of IV antx with neg CS.    9/22/15  Fistula Duplex:  maxPSV 94cm/s. flows 258-632ml/m. size 5.7-9.2mm. Patent Multiple areas of hematomas with sm area of needle oozing.    10/15/15: RIGHT Fistula duplex: maxPSV 233cm/s. flows 214-701ml/m. size 4.4-6.2mm.  1/26/16: RIGHT fistula duplex: maxPSV 321cm/s. flows 612-1866ml/m. size 3.8-8.1mm.  7/29/16: RIGHT TPA Lysis/Mech Thrombectomy  9/23/16: RIGHT groin Tunnel Cath Placement    10/5/16: RIGHT brachial-axillary vein AV graft (artegraft)        12/5/2016 RIGHT fistula duplex: maxPSV 265cm/s. Flows 1612-1986ml/m. size 5.8-8.1mm.         5/4/2017 RIGHT fistula duplex: maxPSV 530cm/s. Flows 642-1444ml/m. size 3.5-7.4mm.         11/28/17: RIGHT fistula duplex: mPSV 507cm/s. Flows 649-1519ml/m. Size 2.4-7.4mm.        02/22/18: RIGHT fistula duplex: Patent:  mPSV 523 cm/s (1906). Flows 489-2184 ml/m. Size 2.6-8.1mm. 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        3/12/2018 RIGHT fistula duplex:  Occluded R Loop Graft  Edema around old fistula       12/18/18   Hospital adm with infected AV Fistula        3/13/18  Revision RIGHT forearm ARTERIOVENOUS graft (excision),  Debridement RIGHT forearm (skin, SQ, fascia) Negative pressure wound therapy (wound vac [id number])      7/5/18  RIGHT fistula duplex: Patent:  mPSV 590 cm/s (3039). Flows 523-3039 ml/m. Size 3.1-8.9mm. As previously noted, pseudoaneurysm, Edema at puncture sites.       7/18/18  Wound healed sm eschar.        8/29/18  Venous US RUE:  No DVT.  No pseudo identified     11/06/2018 Right Fistula Duplex: Patent:  mPSV 690 cm/s (954.8). Flows 552-2050 ml/m. Size 3.6-9.2mm. As previously noted, pseudoaneurysm, and flap unidentified structure             possibly a valve      11/13/19: RIGHT PTA/STENT AX/SC Vein      2/14/19: RIGHT Fistula duplex: Patent. mPSV 823cm/s. Size .20-2.1cm. Flows [id number]        Past Medical History:   Diagnosis Date   • Anemia of chronic disease    • Cataract    • CHF (congestive heart failure) (CMS/HCC)    • CKD (chronic kidney disease)    • Clostridium difficile infection    • COPD (chronic obstructive pulmonary disease) (CMS/HCC)    • Diabetes mellitus (CMS/HCC)    • Glaucoma    • Heart block    • Hepatitis C    • History of transfusion    • Hypertension    • Nephropathy, diabetic (CMS/HCC)    • Pacemaker    • Pulmonary embolism (CMS/HCC)      Past Surgical History:   Procedure Laterality Date   • ABDOMINAL SURGERY     • ARTERIOVENOUS FISTULA/SHUNT SURGERY Right     forearm loop   • ARTERIOVENOUS FISTULA/SHUNT SURGERY Left     removed past upper arm   • ARTERIOVENOUS FISTULA/SHUNT SURGERY Right 3/13/2018    Procedure: revision RIGHT forearm ARTERIOVENOUS graft (excision), debridement, wound vac;  Surgeon: Jerson Singh MD;  Location: [facility];  Service: Vascular   • ARTERIOVENOUS FISTULA/SHUNT SURGERY W/ HEMODIALYSIS CATHETER INSERTION Right 4/4/2016    Procedure: RIGHT ARM AV FISTULA DECLOT REVISION REPAIR BRACHIAL ANASTOMOTIC PSUEDOANEURYSM LEFT FEMORAL RICHARDSON FISTULOGRAM  WITH ANGIOPLASTY;  Surgeon: Jerson Carpenter MD;  Location: [facility] OR 18/19;  Service:    • CATARACT EXTRACTION W/ INTRAOCULAR LENS IMPLANT Left 3/31/2017    Procedure: REMOVE CATARACT AND IMPLANT INTRAOCULAR LENS LEFT EYE;  Surgeon: Hilton Montemayor MD;  Location: [facility] OR;  Service:    • EYE SURGERY     • HERNIA REPAIR     • IMPLANTABLE CARDIOVERTER DEFIBRILLATOR LEAD REPLACEMENT/POCKET REVISION Right 4/11/2016    Procedure: PACEMAKER LEADS X 3 AND BATTERY EXTRACTION WITH EXIMER LASER;  Surgeon: Vern eReves MD;  Location: [facility] OR 18/19;  Service:    • INSERTION HEMODIALYSIS CATHETER Right 05/2015    jugular   • INTERVENTIONAL RADIOLOGY PROCEDURE Right 6/1/2017    Procedure: RIGHT dialysis fistulagram & angioplasty;  Surgeon: Jerson Singh MD;  Location: [facility] ANGIO INVASIVE LOCATION;  Service:    • INTERVENTIONAL RADIOLOGY PROCEDURE Right 8/24/2017    Procedure: IR dialysis fistulagram;  Surgeon: Jerson Singh MD;  Location: [facility] ANGIO INVASIVE LOCATION;  Service:    • INTERVENTIONAL RADIOLOGY PROCEDURE Right 11/13/2018    Procedure: IR dialysis fistulagram;  Surgeon: Jerson Singh MD;  Location: [facility] ANGIO INVASIVE LOCATION;  Service: Interventional Radiology   • PACEMAKER IMPLANTATION  2008    dual chamber [employer]   • REMOVAL HEMODIALYSIS CATHETER Right 05/2015    femoral vein     Family History   Problem Relation Age of Onset   • COPD Sister    • Diabetes Brother    • Early death Brother    • Hyperlipidemia Brother    • Hypertension Brother    • Coronary artery disease Mother    • Diabetes Mother    • Hypertension Mother    • Stroke Other    • Other Other         Respiratory disorder     Social History     Tobacco Use   • Smoking status: Former Smoker     Types: Cigarettes   • Smokeless tobacco: Never Used   • Tobacco comment: quit 20 years ago    Substance Use Topics   • Alcohol use: No     Comment: former heavy use in his  50's, up to a fifth of liquor a day, currently no alcohol   • Drug use: No       ALLERGIES:   Lisinopril and Motrin [ibuprofen]    MEDICATIONS:      Current Outpatient Medications:   •  acetaminophen (TYLENOL) 500 MG tablet, Take 500 mg by mouth Every 4 (Four) Hours As Needed for Mild Pain  or Fever., Disp: , Rfl:   •  aspirin 81 MG chewable tablet, Chew 81 mg Daily., Disp: , Rfl:   •  brimonidine (ALPHAGAN P) 0.1 % solution ophthalmic solution, Administer 1 drop to both eyes 3 (Three) Times a Day., Disp: , Rfl:   •  dorzolamide (TRUSOPT) 2 % ophthalmic solution, Administer 1 drop into the left eye 3 (Three) Times a Day., Disp: 1 each, Rfl: 12  •  escitalopram (LEXAPRO) 10 MG tablet, Take 1 tablet by mouth Daily., Disp: 30 tablet, Rfl: 3  •  famotidine (PEPCID) 20 MG tablet, Take 20 mg by mouth every night at bedtime., Disp: , Rfl:   •  fluticasone (FLONASE) 50 MCG/ACT nasal spray, 2 sprays into each nostril daily. Administer 2 sprays in each nostril for each dose., Disp: , Rfl:   •  gabapentin (NEURONTIN) 100 MG capsule, Take 1 capsule by mouth Every Night., Disp: 30 capsule, Rfl: 5  •  guaiFENesin 200 MG tablet, Take 400 mg by mouth Every 4 (Four) Hours As Needed for Cough., Disp: , Rfl:   •  HYDROcodone-acetaminophen (NORCO) 7.5-325 MG per tablet, Take 1 tablet by mouth Every 6 (Six) Hours As Needed for Moderate Pain ., Disp: 120 tablet, Rfl: 0  •  insulin aspart (NovoLOG) 100 UNIT/ML injection, Inject  under the skin 3 (Three) Times a Day Before Meals. Sliding scale:  = 0 units; 150-200 = 3 units; 200-250 = 6 units; 250-300 = 9 units; 300-350 = 12 units; >350 = 15 units and call MD, Disp: , Rfl:   •  insulin detemir (LEVEMIR) 100 UNIT/ML injection, Inject 25 Units under the skin into the appropriate area as directed Daily., Disp: , Rfl:   •  latanoprost (XALATAN) 0.005 % ophthalmic solution, Administer 1 drop to both eyes every night., Disp: , Rfl:   •  Loratadine 10 MG capsule, Take 10 mg by mouth Every  Night., Disp: , Rfl:   •  Nutritional Supplements (NEPRO PO), Take 1 tablet by mouth Daily., Disp: , Rfl:   •  omeprazole (priLOSEC) 20 MG capsule, Take 20 mg by mouth Daily., Disp: , Rfl:   •  ondansetron ODT (ZOFRAN-ODT) 4 MG disintegrating tablet, Take 1 tablet by mouth Every 6 (Six) Hours As Needed for Nausea or Vomiting., Disp: 10 tablet, Rfl: 0  •  polyethylene glycol (MIRALAX) packet, Take 17 g by mouth Daily As Needed (constipation)., Disp: , Rfl:   •  sennosides-docusate sodium (SENOKOT-S) 8.6-50 MG tablet, Take 2 tablets by mouth 2 (Two) Times a Day As Needed for Constipation., Disp: 60 tablet, Rfl: 0  •  sevelamer (RENVELA) 800 MG tablet, Take 800 mg by mouth 3 (Three) Times a Day With Meals., Disp: , Rfl:   •  traZODone (DESYREL) 50 MG tablet, Take 50 mg by mouth every night at bedtime., Disp: , Rfl:     ROS  Constitution: Positive for weakness. Negative for chills, decreased appetite and fever.   HENT: Negative for hoarse voice, nosebleeds and stridor.    Eyes: Negative for visual disturbance.   Cardiovascular: Positive for leg swelling. Negative for chest pain, claudication and irregular heartbeat.        Fistula bleeding:  N   Fistula pain: N Extremity pain:  N Extremity discoloration:  N   Extremity numbness/tingling:  N  Respiratory: Negative for cough, hemoptysis and shortness of breath.    Hematologic/Lymphatic: Does not bruise/bleed easily.   Skin: Negative for dry skin, itching, poor wound healing and rash.    Musculoskeletal: Positive for arthritis. Negative for back pain, falls, muscle cramps and muscle weakness.   Gastrointestinal: Negative for abdominal pain, anorexia, hematemesis and melena.   Neurological: Negative for dizziness, loss of balance, numbness and paresthesias.   Psychiatric/Behavioral: Negative for altered mental status.   Allergic/Immunologic: Negative for hives.      Objective       Vitals:    02/14/19 1428   BP: 135/85   Pulse: 88   Temp: 98.9 °F (37.2 °C)   SpO2: 97%       Body mass index is 34.76 kg/m².  Physical Exam  Constitutional: He is oriented to person, place, and time. He appears well-nourished. No distress.   Body mass index is 34.1 kg/(m^2).     HENT:   Head: Normocephalic.   Mouth/Throat: Oropharynx is clear and moist.   Eyes: Pupils are equal, round, and reactive to light. Conjunctivae are normal.   Neck: No JVD present.   Cardiovascular: Normal rate, regular rhythm, normal heart sounds and intact distal pulses.    Fistula Present RIGHT Extremity: (Y)thrill/(Y)bruit/(Y)pulse. Aneurysmal (Y). Water Hammer Pulse (Y). Thinning (N).  Inderjit's Test <3sec (Y). Flows <800ml/min (N). Flows < previous (N). Skin warm/dry/pink/intact (Y). Radial Pulse (Y).    Pulses:       Carotid pulses are 1+ on the right side, and 1+ on the left side.       Radial pulses are 2+ on the right side, and 2+ on the left side.        Posterior tibial pulses are 2+ on the right side, and 2+ on the left side.   Pulmonary/Chest: Effort normal and breath sounds normal. No stridor.   Coarse    Abdominal: Soft. Bowel sounds are normal.   Musculoskeletal: He exhibits edema   Neurological: He is alert and oriented to person, place, and time.   Skin: Skin is warm and dry. Capillary refill takes less than 2 seconds. No erythema. No pallor.   Psychiatric: His behavior is normal.   Nursing note and vitals reviewed.      Assessment & Plan     Independent Review of Radiographic Studies:  Detailed discussion regarding risks, benefits, and treatment plan. Images independently reviewed. Patient understands, agrees, and wishes to proceed with plan.     1. Arteriovenous fistula, acquired (CMS/HCC)  Patent RIGHT Fistula  Increased velocities arterial anastomosis/ Flows adequate  No problems with dialysis currently  Return 3 mos- Repeat RIGHT Fistula duplex  - Duplex Hemodialysis Access CAR; Future    2. ESRD (end stage renal disease) (CMS/HCC)  Continue dialysis as scheduled. If problems should arise including difficulty  with access, high pressure alarms, or inability to complete dialysis please [facility] immediately for evaluation.             This document has been electronically signed by LUIS Mclean on February 22, 2019 1:17 PM

## 2019-02-26 PROBLEM — J18.9 PNEUMONIA OF RIGHT UPPER LOBE DUE TO INFECTIOUS ORGANISM: Status: ACTIVE | Noted: 2019-01-01

## 2019-02-27 PROBLEM — E87.3 METABOLIC ALKALOSIS: Status: ACTIVE | Noted: 2019-01-01

## 2019-02-27 PROBLEM — J18.9 PNEUMONIA OF RIGHT UPPER LOBE DUE TO INFECTIOUS ORGANISM: Status: RESOLVED | Noted: 2019-01-01 | Resolved: 2019-01-01

## 2019-02-28 PROBLEM — E87.3 METABOLIC ALKALOSIS: Status: RESOLVED | Noted: 2019-01-01 | Resolved: 2019-01-01

## 2019-02-28 PROBLEM — J18.9 PNEUMONIA OF RIGHT UPPER LOBE DUE TO INFECTIOUS ORGANISM: Status: RESOLVED | Noted: 2019-01-01 | Resolved: 2019-01-01

## 2019-03-02 NOTE — PROGRESS NOTES
Nursing home called.  Patient has 8/10 dull chest pain.  Vitals stable, increased lethargy.  Glucose normal, weight gain.  BNP, Troponin and chest X-ray ordered.  Patient refuses evaluation in ED.

## 2019-03-06 NOTE — DISCHARGE SUMMARY
DISCHARGE SUMMARY    PATIENT NAME: Cristo Musa       PHYSICIAN: Cory Rodríguez MD  : 1950  MRN: [id number]    ADMITTED: 2019     DISCHARGED: 2019    ADMISSION DIAGNOSES:  Active Hospital Problems    Diagnosis  POA   • **Pneumonia of right upper lobe due to infectious organism (CMS/HCC) [J18.1]  Yes   • COPD (chronic obstructive pulmonary disease) (CMS/HCC) [J44.9]  Yes   • Physical deconditioning [R53.81]  Yes   • ESRD (end stage renal disease) (CMS/HCC) [N18.6]  Yes   • Diabetes mellitus (CMS/HCC) [E11.9]  Yes   • Hypertension [I10]  Yes      Resolved Hospital Problems    Diagnosis Date Resolved POA   • Metabolic alkalosis [E87.3] 2019 Yes     DISCHARGE DIAGNOSES:   Active Hospital Problems    Diagnosis  POA   • **Pneumonia of right upper lobe due to infectious organism (CMS/HCC) [J18.1]  Yes   • COPD (chronic obstructive pulmonary disease) (CMS/HCC) [J44.9]  Yes   • Physical deconditioning [R53.81]  Yes   • ESRD (end stage renal disease) (CMS/HCC) [N18.6]  Yes   • Diabetes mellitus (CMS/HCC) [E11.9]  Yes   • Hypertension [I10]  Yes      Resolved Hospital Problems    Diagnosis Date Resolved POA   • Metabolic alkalosis [E87.3] 2019 Yes       SERVICE: Family Medicine.  Attending: Dr. Haider  Resident: Cory Rodríguez MD    CONSULTS:   Consult Orders (all) (From admission, onward)    Start     Ordered    19 0758  Inpatient Nephrology Consult  Once     Specialty:  Nephrology  Provider:  Pascual Vilchis MD    19 0757    19 0540  Inpatient Case Management  Consult  Once     Provider:  (Not yet assigned)    19 0546    19 1817  Family Practice - Resident (on-call MD unless specified)  Once,   Status:  Canceled     Specialty:  Family Medicine  Provider:  Flako Hope Jr., MD    19 1816          PROCEDURES:   none    HISTORY OF PRESENT ILLNESS:   (Taken from Dr. Hope's history and physical)    Cristo Araiza  Lencho is a 69 y.o. male with a CMH of ESRD, HTN, T2DM, GERD,  who presents complaining from [facility] where he is a resident after staff found him briefly unresponsive in his wheel chair.  Staff was able to arouse him after approximately 1-2 minutes with sternal rubs and took patient to his room where they took vitals & noted a lower than normal BP to 90/60 & and O2 sat of 85% per NH staff on the phone.  He then transferred by ambulance to [facility].      On interview patient is mildly confused & appears sleepy.  Patient states that he cannot remember anything about the events from earlier in the day that led him to coming to the hospital he denies any chest pains, shortness of breath, diaphoresis, nausea, vomiting.  He endorses a cough of approximately 1 week duration productive of green sputum.  No fevers or chills.  No hemoptysis.     In the emergency department the patient was taken for chest x-ray which showed right upper lung field perihilar interstitial opacity suspicious for mild early atypical pulmonary edema and/or pneumonia.  Normal white blood count.  Patient given dose of Narcan, to whichshe responded well and was subsequently put on a naloxone drip by the ED staff.         DIAGNOSTIC DATA:   Lab Results (last 24 hours)     Procedure Component Value Units Date/Time    POC Glucose Once [[id number]]  (Abnormal) Collected:  02/28/19 1034    Specimen:  Blood Updated:  02/28/19 1052     Glucose 233 mg/dL      Comment: RN NotifiedOperator: [id number] MERARI PELAEZMeter ID: [id number]       Comprehensive Metabolic Panel [[id number]]  (Abnormal) Collected:  02/28/19 0805    Specimen:  Blood Updated:  02/28/19 0849     Glucose 200 mg/dL      BUN 25 mg/dL      Creatinine 5.51 mg/dL      Sodium 130 mmol/L      Potassium 3.6 mmol/L      Chloride 88 mmol/L      CO2 26.0 mmol/L      Calcium 8.6 mg/dL      Total Protein 10.0 g/dL      Albumin 3.90 g/dL      ALT (SGPT) <6 U/L      AST (SGOT) 29 U/L       Alkaline Phosphatase 195 U/L      Total Bilirubin 1.4 mg/dL      eGFR Non African Amer -- mL/min/1.73      Comment: <15 Indicative of kidney failure.        eGFR  African Amer 13 mL/min/1.73      Comment: <15 Indicative of kidney failure.        Globulin 6.1 gm/dL      A/G Ratio 0.6 g/dL      BUN/Creatinine Ratio 4.5     Anion Gap 16.0 mmol/L     CBC & Differential [[id number]] Collected:  02/28/19 0805    Specimen:  Blood Updated:  02/28/19 0833    Narrative:       The following orders were created for panel order CBC & Differential.  Procedure                               Abnormality         Status                     ---------                               -----------         ------                     CBC Auto Differential[[id number]]        Abnormal            Final result                 Please view results for these tests on the individual orders.    CBC Auto Differential [[id number]]  (Abnormal) Collected:  02/28/19 0805    Specimen:  Blood Updated:  02/28/19 0833     WBC 4.27 10*3/mm3      RBC 3.94 10*6/mm3      Hemoglobin 10.6 g/dL      Hematocrit 32.8 %      MCV 83.2 fL      MCH 26.9 pg      MCHC 32.3 g/dL      RDW 17.1 %      RDW-SD 48.0 fl      MPV 9.5 fL      Platelets 104 10*3/mm3      Neutrophil % 61.6 %      Lymphocyte % 22.2 %      Monocyte % 12.9 %      Eosinophil % 1.9 %      Basophil % 0.9 %      Immature Grans % 0.5 %      Neutrophils, Absolute 2.63 10*3/mm3      Lymphocytes, Absolute 0.95 10*3/mm3      Monocytes, Absolute 0.55 10*3/mm3      Eosinophils, Absolute 0.08 10*3/mm3      Basophils, Absolute 0.04 10*3/mm3      Immature Grans, Absolute 0.02 10*3/mm3      nRBC 0.0 /100 WBC     POC Glucose Once [[id number]]  (Abnormal) Collected:  02/28/19 0719    Specimen:  Blood Updated:  02/28/19 0743     Glucose 233 mg/dL      Comment: RN NotifiedOperator: [id number] MERARI Álvarez ID: [id number]       Hepatitis B Surface Antigen [[id number]]  (Normal) Collected:  02/26/19 1601    Specimen:   Blood Updated:  02/27/19 2007     Hepatitis B Surface Ag Negative    POC Glucose Once [[id number]]  (Abnormal) Collected:  02/27/19 1929    Specimen:  Blood Updated:  02/27/19 1947     Glucose 194 mg/dL      Comment: RN NotifiedOperator: [id number] MICHAEL TORIMeter ID: [id number]       POC Glucose Once [[id number]]  (Normal) Collected:  02/27/19 1744    Specimen:  Blood Updated:  02/27/19 1758     Glucose 123 mg/dL      Comment: RN NotifiedOperator: [id number] CAM CRYSTALMeter ID: [id number]           Imaging Results (last 72 hours)     Procedure Component Value Units Date/Time    CT Head Without Contrast [[id number]] Collected:  02/26/19 1723     Updated:  02/26/19 1755    Narrative:       .      EXAMINATION:  Computed Tomography      REGION:  Head             INDICATION:  AMS    HISTORY:  CORRELATIVE IMAGING:    CT head 12/19/18    TECHNIQUE:  iv contrast:  no    This exam was performed according to the departmental  dose-optimization program which includes automated exposure  control, adjustment of the mA and/or kV according to patient size  and/or use of iterative reconstruction technique.              COMMENTS:                - atrophy:              wnl for age    - cortex: age related degenerated changes      - deep white mat: age related degenerated changes       - hemorrhage:       none       - fluid collection: no intra/extra axial fluid collection     - mass / lesion:     no focal parenchymal lesion(s)       - gray/white jxn:   borders preserved         - brain stem:         wnl       - cerebellum:        wnl       - globes / retro:     wnl       - ventricles:          normal size / configuration       - midline shift:      no       - sinuses:              wnl       - mastoids:           wnl        - osseous:             wnl       - misc.:  .       Impression:       CONCLUSION:    1.  Negative examination for acute intracranial pathology.           If signs or symptoms persist beyond reasonable  expectations, a  MRI examination is suggested as is deemed clinically appropriate.                 Electronically signed by:  TIFFANY Juarez MD  2/26/2019 5:54  PM CST Workstation: [id number]    XR Chest 1 View [[id number]] Collected:  02/26/19 1631     Updated:  02/26/19 1646    Narrative:         PROCEDURE: Single chest view portable    REASON FOR EXAM:Weak/Dizzy/AMS protocol    FINDINGS: Comparison exam dated December 19, 2018. Cardiac size  appears within normal limits. Mild pulmonary vasculature  redistribution. Right upper lung field perihilar small  interstitial opacity. Lungs are otherwise clear. Pleural spaces  are normal. No acute osseous abnormality. Artifact is seen  overlying the superior mid left chest.      Impression:       1.  Pulmonary vasculature redistribution with associated right  upper lung field perihilar interstitial opacity suspicious for  mild early atypical pulmonary edema and/or pneumonia.    Electronically signed by:  Alexander Mccormack MD  2/26/2019 4:45 PM CST  Workstation: [id number]            HOSPITAL COURSE:  Patient was admitted to ICU unit and given Narcan drip.  Next morning he was off the drip and mental state was returned to baseline.  He was sent to the regular medicine floor.  He was given dosage of Rocephin and azithromycin.  He was then transitioned to oral azithromycin to finish 5-day course.  On the floor he was ambulatory, tolerating diet and breathing comfortably on room air.  Ready to go home on second day in hospital.    DISCHARGE CONDITION:   stable    DISPOSITION:  Skilled Nursing Facility (DC - External)    DISCHARGE MEDICATIONS     Discharge Medications      New Medications      Instructions Start Date   azithromycin 250 MG tablet  Commonly known as:  ZITHROMAX  Notes to patient:  03/01/2019 at 9AM   250 mg, Oral, Daily      HYDROcodone-acetaminophen 5-325 MG per tablet  Commonly known as:  NORCO  Replaces:  HYDROcodone-acetaminophen 7.5-325 MG per tablet  Notes to  patient:  As needed   1 tablet, Oral, 2 Times Daily PRN         Changes to Medications      Instructions Start Date   escitalopram 10 MG tablet  Commonly known as:  LEXAPRO  What changed:  how much to take  Notes to patient:  03/01/2019 at 9AM   10 mg, Oral, Daily         Continue These Medications      Instructions Start Date   acetaminophen 500 MG tablet  Commonly known as:  TYLENOL  Notes to patient:  As needed   500 mg, Oral, Every 4 Hours PRN      aspirin 81 MG chewable tablet  Notes to patient:  03/01/2019 at 9AM   81 mg, Oral, Daily      brimonidine 0.1 % solution ophthalmic solution  Commonly known as:  ALPHAGAN P  Notes to patient:  02/28/2019 at 2PM   1 drop, Both Eyes, 3 Times Daily      cyclobenzaprine 5 MG tablet  Commonly known as:  FLEXERIL  Notes to patient:  As needed.   5 mg, Oral, Every 8 Hours PRN      dorzolamide 2 % ophthalmic solution  Commonly known as:  TRUSOPT  Notes to patient:  03/01/2019 at 2PM   1 drop, Left Eye, 3 Times Daily      famotidine 20 MG tablet  Commonly known as:  PEPCID  Notes to patient:  03/01/2019 at 9PM   20 mg, Oral, Every Night at Bedtime      fluticasone 50 MCG/ACT nasal spray  Commonly known as:  FLONASE  Notes to patient:  03/01/2019 at 9AM   2 sprays, Nasal, Daily, Administer 2 sprays in each nostril for each dose.      gabapentin 100 MG capsule  Commonly known as:  NEURONTIN  Notes to patient:  02/28/2019 at 9PM   100 mg, Oral, Nightly      guaiFENesin 200 MG tablet  Notes to patient:  As needed.   400 mg, Oral, Every 4 Hours PRN      insulin aspart 100 UNIT/ML injection  Commonly known as:  novoLOG  Notes to patient:  02/28/2019 at 5PM   Subcutaneous, 3 Times Daily Before Meals, Sliding scale:  = 0 units; 150-200 = 3 units; 200-250 = 6 units; 250-300 = 9 units; 300-350 = 12 units; >350 = 15 units and call MD      insulin detemir 100 UNIT/ML injection  Commonly known as:  LEVEMIR  Notes to patient:  03/01/2019 at 9AM   25 Units, Subcutaneous, Daily       latanoprost 0.005 % ophthalmic solution  Commonly known as:  XALATAN  Notes to patient:  02/28/2019 at 9PM   1 drop, Both Eyes, Nightly      Loratadine 10 MG capsule  Notes to patient:  02/28/2019 at 9PM   10 mg, Oral, Nightly      NEPRO PO  Notes to patient:  03/02/2019 at 9AM   1 tablet, Oral, Daily, On Sun, Tues, Thur, Sat      ondansetron ODT 4 MG disintegrating tablet  Commonly known as:  ZOFRAN-ODT  Notes to patient:  As needed.   4 mg, Oral, Every 6 Hours PRN      polyethylene glycol packet  Commonly known as:  MIRALAX  Notes to patient:  As needed.   17 g, Oral, Daily PRN      sennosides-docusate sodium 8.6-50 MG tablet  Commonly known as:  SENOKOT-S  Notes to patient:  As needed.   2 tablets, Oral, 2 Times Daily PRN      sevelamer 800 MG tablet  Commonly known as:  RENVELA  Notes to patient:  02/28/2019 at 5PM   800 mg, Oral, 3 Times Daily With Meals      traZODone 50 MG tablet  Commonly known as:  DESYREL  Notes to patient:  02/28/2019 at 9PM   50 mg, Oral, Every Night at Bedtime         Stop These Medications    HYDROcodone-acetaminophen 7.5-325 MG per tablet  Commonly known as:  NORCO  Replaced by:  HYDROcodone-acetaminophen 5-325 MG per tablet     omeprazole 20 MG capsule  Commonly known as:  priLOSEC     tiZANidine 4 MG tablet  Commonly known as:  ZANAFLEX            INSTRUCTIONS:  Activity:   Activity Instructions     Activity as Tolerated          Diet:   Diet Instructions     Diet: Consistent Carbohydrate, Renal      Discharge Diet:   Consistent Carbohydrate  Renal           Special instructions: Patient instructed to call MD or return to ED with worsening shortness of breath, chest pain, fever greater than 100.4 degrees F or any other medical concerns..    FOLLOW UP:   Additional Instructions for the Follow-ups that You Need to Schedule     Call MD With Problems / Concerns   As directed      Instructions: call your doctor or go to the ER if you have chest pain, shortness of breath, or fever of  100.4.    Order Comments:  Instructions: call your doctor or go to the ER if you have chest pain, shortness of breath, or fever of 100.4.          Discharge Follow-up with PCP   As directed       Currently Documented PCP:    Cory Rodríguez MD    PCP Phone Number:    [phone]     Follow Up Details:  1 day            Contact information for follow-up providers     Cory Rodríguez MD .    Specialty:  Family Medicine  Why:  1 day  Contact information:  [address] [facility] [address]  [phone]                   Contact information for after-discharge care     Destination     [facility] .    Service:  Intermediate Care  Contact information:  [address] [facility] [address] [phone]                             PENDING TEST RESULTS AT DISCHARGE      Time: Discharge 35 min    Dr. Haider is the attending at time of discharge, He is aware of the patient's status and agrees with the above discharge summary.          This document has been electronically signed by Cory Rodríguez MD on March 5, 2019 11:01 PM

## 2019-03-12 NOTE — ANESTHESIA POSTPROCEDURE EVALUATION
Patient: Cristo Musa    Procedure Summary     Date:  03/12/19 Room / Location:  [facility] ENDOSCOPY 2 / [facility] ENDOSCOPY    Anesthesia Start:  1307 Anesthesia Stop:  1327    Procedures:       ESOPHAGOGASTRODUODENOSCOPY (N/A )      COLONOSCOPY (N/A ) Diagnosis:       Generalized abdominal pain      Iron deficiency anemia due to chronic blood loss      (Generalized abdominal pain [R10.84])      (Iron deficiency anemia due to chronic blood loss [D50.0])    Surgeon:  Flako Montoya MD Provider:  Zenaida Carson CRNA    Anesthesia Type:  MAC ASA Status:  4          Anesthesia Type: MAC  Last vitals  BP   140/71 (03/12/19 1203)   Temp   97.2 °F (36.2 °C) (03/12/19 1203)   Pulse   68 (03/12/19 1203)   Resp   18 (03/12/19 1203)     SpO2         Post Anesthesia Care and Evaluation    Patient location during evaluation: bedside  Level of consciousness: sleepy but conscious  Pain score: 0  Pain management: adequate  Airway patency: patent  Anesthetic complications: No anesthetic complications  PONV Status: none  Cardiovascular status: acceptable  Respiratory status: acceptable  Hydration status: acceptable

## 2019-03-12 NOTE — H&P
No chief complaint on file.      Subjective    Cristo Musa is a 69 y.o. male. he is here today for follow-up.    History of Present Illness  69-year-old male presents to discuss Hemoccult positive stool.  He has end-stage renal disease on dialysis Monday Wednesday Friday was hospitalized due to severe drop in hemoglobin to 6.6 in mid December.  He was unable to proceed with EGD due to no IV access he was given 3 units packed red blood cells and anemia has improved with current hemoglobin 8 days ago 9.9 with hematocrit of 31.2.  Patient states his bowel movements are regular about 2-3 times per day he lives at [facility] and states he does not look at his stool in over a month.  Denies any nausea or vomiting however reports intermittent abdominal pain and soreness.  Denies any changes in weight or decrease in appetite.  His last colonoscopy completed per Dr. Medina was 4/3/14 noted diverticulosis and hemorrhoids otherwise normal exam.  Plan; schedule patient for EGD and colonoscopy due to generalized abdominal pain on exam, symptomatic anemia and Hemoccult-positive stool.  Follow-up after test return to office sooner if needed.       The following portions of the patient's history were reviewed and updated as appropriate:   Past Medical History:   Diagnosis Date   • Anemia of chronic disease    • Cataract    • CHF (congestive heart failure) (CMS/HCC)    • CKD (chronic kidney disease)    • Clostridium difficile infection    • COPD (chronic obstructive pulmonary disease) (CMS/HCC)    • Diabetes mellitus (CMS/HCC)    • Glaucoma    • Heart block    • Hepatitis C    • History of transfusion    • Hypertension    • Nephropathy, diabetic (CMS/HCC)    • Pacemaker    • Pulmonary embolism (CMS/HCC)      Past Surgical History:   Procedure Laterality Date   • ABDOMINAL SURGERY     • ARTERIOVENOUS FISTULA/SHUNT SURGERY Right     forearm loop   • ARTERIOVENOUS FISTULA/SHUNT SURGERY Left     removed past  upper arm   • ARTERIOVENOUS FISTULA/SHUNT SURGERY Right 3/13/2018    Procedure: revision RIGHT forearm ARTERIOVENOUS graft (excision), debridement, wound vac;  Surgeon: Jerson Singh MD;  Location: [facility];  Service: Vascular   • ARTERIOVENOUS FISTULA/SHUNT SURGERY W/ HEMODIALYSIS CATHETER INSERTION Right 4/4/2016    Procedure: RIGHT ARM AV FISTULA DECLOT REVISION REPAIR BRACHIAL ANASTOMOTIC PSUEDOANEURYSM LEFT FEMORAL RICHARDSON FISTULOGRAM WITH ANGIOPLASTY;  Surgeon: Jerson Carpenter MD;  Location: [facility] OR 18/19;  Service:    • CATARACT EXTRACTION W/ INTRAOCULAR LENS IMPLANT Left 3/31/2017    Procedure: REMOVE CATARACT AND IMPLANT INTRAOCULAR LENS LEFT EYE;  Surgeon: Hilton Montemayor MD;  Location: [facility] OR;  Service:    • EYE SURGERY     • HERNIA REPAIR     • IMPLANTABLE CARDIOVERTER DEFIBRILLATOR LEAD REPLACEMENT/POCKET REVISION Right 4/11/2016    Procedure: PACEMAKER LEADS X 3 AND BATTERY EXTRACTION WITH EXIMER LASER;  Surgeon: Vern Reeves MD;  Location: [facility] OR 18/19;  Service:    • INSERTION HEMODIALYSIS CATHETER Right 05/2015    jugular   • INTERVENTIONAL RADIOLOGY PROCEDURE Right 6/1/2017    Procedure: RIGHT dialysis fistulagram & angioplasty;  Surgeon: Jerson Singh MD;  Location: [facility] ANGIO INVASIVE LOCATION;  Service:    • INTERVENTIONAL RADIOLOGY PROCEDURE Right 8/24/2017    Procedure: IR dialysis fistulagram;  Surgeon: Jerson Singh MD;  Location: [facility] ANGIO INVASIVE LOCATION;  Service:    • INTERVENTIONAL RADIOLOGY PROCEDURE Right 11/13/2018    Procedure: IR dialysis fistulagram;  Surgeon: Jerson Singh MD;  Location: [facility] ANGIO INVASIVE LOCATION;  Service: Interventional Radiology   • PACEMAKER IMPLANTATION  2008    dual chamber [employer]   • REMOVAL HEMODIALYSIS CATHETER Right 05/2015    femoral vein     Family History   Problem Relation Age of Onset   • COPD Sister    • Diabetes Brother    • Early death  "Brother    • Hyperlipidemia Brother    • Hypertension Brother    • Coronary artery disease Mother    • Diabetes Mother    • Hypertension Mother    • Stroke Other    • Other Other         Respiratory disorder       Current Facility-Administered Medications   Medication Dose Route Frequency Provider Last Rate Last Dose   • dextrose 5 % and sodium chloride 0.45 % infusion  30 mL/hr Intravenous Continuous PRN Beth Porras APRN         Allergies   Allergen Reactions   • Lisinopril Angioedema     unknown   • Motrin [Ibuprofen] Diarrhea and Nausea And Vomiting     Social History     Socioeconomic History   • Marital status: Legally      Spouse name: Not on file   • Number of children: Not on file   • Years of education: Not on file   • Highest education level: Not on file   Tobacco Use   • Smoking status: Former Smoker     Types: Cigarettes   • Smokeless tobacco: Never Used   • Tobacco comment: quit 20 years ago    Substance and Sexual Activity   • Alcohol use: No     Comment: former heavy use in his 50's, up to a fifth of liquor a day, currently no alcohol   • Drug use: No   • Sexual activity: No       Review of Systems  Review of Systems   Constitutional: Positive for fatigue. Negative for activity change, appetite change, chills, diaphoresis, fever and unexpected weight change.   HENT: Negative for sore throat and trouble swallowing.    Respiratory: Negative for shortness of breath.    Gastrointestinal: Positive for abdominal pain. Negative for abdominal distention, anal bleeding, blood in stool, constipation, diarrhea, nausea, rectal pain and vomiting.   Musculoskeletal: Negative for arthralgias.   Skin: Negative for pallor.   Neurological: Negative for light-headedness.        /71   Pulse 68   Temp 97.2 °F (36.2 °C)   Resp 18   Ht 172.7 cm (68\")   BMI 33.27 kg/m²     Objective    Physical Exam   Constitutional: He is oriented to person, place, and time. He appears well-developed and " well-nourished. He is cooperative. No distress.   Wheelchair    HENT:   Head: Normocephalic and atraumatic.   Neck: Normal range of motion. Neck supple. No thyromegaly present.   Cardiovascular: Normal rate, regular rhythm and normal heart sounds.   Pulmonary/Chest: Effort normal and breath sounds normal. He has no wheezes. He has no rhonchi. He has no rales.   Abdominal: Soft. Normal appearance and bowel sounds are normal. There is no hepatosplenomegaly. There is generalized tenderness. There is no rigidity and no guarding. No hernia.   Lymphadenopathy:     He has no cervical adenopathy.   Neurological: He is alert and oriented to person, place, and time.   Skin: Skin is warm, dry and intact. No rash noted. No pallor.   Psychiatric: He has a normal mood and affect. His speech is normal.     Lab Requisition on 01/20/2019   Component Date Value Ref Range Status   • Glucose 01/20/2019 177* 70 - 100 mg/dL Final   • BUN 01/20/2019 51* 5 - 21 mg/dL Final   • Creatinine 01/20/2019 9.57* 0.50 - 1.40 mg/dL Final   • Sodium 01/20/2019 134* 135 - 145 mmol/L Final   • Potassium 01/20/2019 4.6  3.5 - 5.3 mmol/L Final   • Chloride 01/20/2019 88* 98 - 110 mmol/L Final   • CO2 01/20/2019 28.0  24.0 - 31.0 mmol/L Final   • Calcium 01/20/2019 8.3* 8.4 - 10.4 mg/dL Final   • eGFR   Amer 01/20/2019 7* >60 mL/min/1.73 Final    <15 Indicative of kidney failure.   • eGFR Non  Amer 01/20/2019   >60 mL/min/1.73 Final    <15 Indicative of kidney failure.   • BUN/Creatinine Ratio 01/20/2019 5.3* 7.0 - 25.0 Final   • Anion Gap 01/20/2019 18.0* 4.0 - 13.0 mmol/L Final   • C-Reactive Protein 01/20/2019 1.91* 0.00 - 0.99 mg/dL Final   • WBC 01/20/2019 5.06  4.80 - 10.80 10*3/mm3 Final   • RBC 01/20/2019 3.52* 4.80 - 5.90 10*6/mm3 Final   • Hemoglobin 01/20/2019 9.9* 14.0 - 18.0 g/dL Final   • Hematocrit 01/20/2019 31.2* 40.0 - 52.0 % Final   • MCV 01/20/2019 88.6  82.0 - 95.0 fL Final   • MCH 01/20/2019 28.1  28.0 - 32.0 pg Final    • MCHC 01/20/2019 31.7* 33.0 - 36.0 g/dL Final   • RDW 01/20/2019 14.6  12.0 - 15.0 % Final   • RDW-SD 01/20/2019 46.9  40.0 - 54.0 fl Final   • MPV 01/20/2019 10.0  6.0 - 12.0 fL Final   • Platelets 01/20/2019 92* 130 - 400 10*3/mm3 Final   • Neutrophil % 01/20/2019 69.5  39.0 - 78.0 % Final   • Lymphocyte % 01/20/2019 17.2  15.0 - 45.0 % Final   • Monocyte % 01/20/2019 10.3  4.0 - 12.0 % Final   • Eosinophil % 01/20/2019 2.0  0.0 - 4.0 % Final   • Basophil % 01/20/2019 0.6  0.0 - 2.0 % Final   • Immature Grans % 01/20/2019 0.4  0.0 - 5.0 % Final   • Neutrophils, Absolute 01/20/2019 3.52  1.87 - 8.40 10*3/mm3 Final   • Lymphocytes, Absolute 01/20/2019 0.87  0.72 - 4.86 10*3/mm3 Final   • Monocytes, Absolute 01/20/2019 0.52  0.19 - 1.30 10*3/mm3 Final   • Eosinophils, Absolute 01/20/2019 0.10  0.00 - 0.70 10*3/mm3 Final   • Basophils, Absolute 01/20/2019 0.03  0.00 - 0.20 10*3/mm3 Final   • Immature Grans, Absolute 01/20/2019 0.02  0.00 - 0.03 10*3/mm3 Final   • nRBC 01/20/2019 0.0  0.0 - 0.0 /100 WBC Final     Assessment/Plan      1. Generalized abdominal pain    2. Iron deficiency anemia due to chronic blood loss    .       Orders placed during this encounter include:  Orders Placed This Encounter   Procedures   • Obtain Informed Consent     Standing Status:   Standing     Number of Occurrences:   1     Order Specific Question:   Informed Consent Given For     Answer:   EGD and Colonoscopy   • POC Glucose Once     Prior to Procedure on ALL Diabetic Patients     Standing Status:   Standing     Number of Occurrences:   1   • POC Glucose Once     Standing Status:   Standing     Number of Occurrences:   1   • Insert Peripheral IV     Standing Status:   Standing     Number of Occurrences:   1       ESOPHAGOGASTRODUODENOSCOPY (N/A), COLONOSCOPY (N/A)    Review and/or summary of lab tests, radiology, procedures, medications. Review and summary of old records and obtaining of history. The risks and benefits of my  recommendations, as well as other treatment options were discussed with the patient today. Questions were answered.    New Medications Ordered This Visit   Medications   • dextrose 5 % and sodium chloride 0.45 % infusion       Follow-up: No Follow-up on file.          This document has been electronically signed by Flako Montoya MD on March 12, 2019 12:17 PM             Results for orders placed or performed during the hospital encounter of 03/12/19   POC Glucose Once   Result Value Ref Range    Glucose 190 (H) 70 - 130 mg/dL   Results for orders placed or performed in visit on 03/02/19   Troponin   Result Value Ref Range    Troponin I <0.012 0.000 - 0.034 ng/mL   Basic Metabolic Panel   Result Value Ref Range    Glucose 218 (H) 70 - 100 mg/dL    BUN 34 (H) 5 - 21 mg/dL    Creatinine 6.80 (H) 0.50 - 1.40 mg/dL    Sodium 135 135 - 145 mmol/L    Potassium 3.8 3.5 - 5.3 mmol/L    Chloride 90 (L) 98 - 110 mmol/L    CO2 33.0 (H) 24.0 - 31.0 mmol/L    Calcium 8.6 8.4 - 10.4 mg/dL    eGFR  African Amer 10 (L) >60 mL/min/1.73    eGFR Non African Amer  >60 mL/min/1.73    BUN/Creatinine Ratio 5.0 (L) 7.0 - 25.0    Anion Gap 12.0 4.0 - 13.0 mmol/L   Results for orders placed or performed during the hospital encounter of 02/26/19   Lactic Acid, Reflex Timer (This will reflex a repeat order 3-3:15 hours after ordered.)   Result Value Ref Range    Extra Tube Hold for add-ons.    Lactic Acid, Reflex   Result Value Ref Range    Lactate 2.3 (C) 0.5 - 2.0 mmol/L   Gold Top - SST   Result Value Ref Range    Extra Tube Hold for add-ons.    Green Top (Gel)   Result Value Ref Range    Extra Tube Hold for add-ons.    CBC Auto Differential   Result Value Ref Range    WBC 4.27 3.40 - 10.80 10*3/mm3    RBC 3.94 (L) 4.14 - 5.80 10*6/mm3    Hemoglobin 10.6 (L) 13.0 - 17.7 g/dL    Hematocrit 32.8 (L) 37.5 - 51.0 %    MCV 83.2 79.0 - 97.0 fL    MCH 26.9 26.6 - 33.0 pg    MCHC 32.3 31.5 - 35.7 g/dL    RDW 17.1 (H) 12.3 - 15.4 %    RDW-SD 48.0  37.0 - 54.0 fl    MPV 9.5 6.0 - 12.0 fL    Platelets 104 (L) 140 - 450 10*3/mm3    Neutrophil % 61.6 42.7 - 76.0 %    Lymphocyte % 22.2 19.6 - 45.3 %    Monocyte % 12.9 (H) 5.0 - 12.0 %    Eosinophil % 1.9 0.3 - 6.2 %    Basophil % 0.9 0.0 - 1.5 %    Immature Grans % 0.5 0.0 - 0.5 %    Neutrophils, Absolute 2.63 1.40 - 7.00 10*3/mm3    Lymphocytes, Absolute 0.95 0.70 - 3.10 10*3/mm3    Monocytes, Absolute 0.55 0.10 - 0.90 10*3/mm3    Eosinophils, Absolute 0.08 0.00 - 0.40 10*3/mm3    Basophils, Absolute 0.04 0.00 - 0.20 10*3/mm3    Immature Grans, Absolute 0.02 0.00 - 0.05 10*3/mm3    nRBC 0.0 0.0 - 0.0 /100 WBC   CBC Auto Differential   Result Value Ref Range    WBC 4.28 3.40 - 10.80 10*3/mm3    RBC 3.71 (L) 4.14 - 5.80 10*6/mm3    Hemoglobin 9.7 (L) 13.0 - 17.7 g/dL    Hematocrit 30.5 (L) 37.5 - 51.0 %    MCV 82.2 79.0 - 97.0 fL    MCH 26.1 (L) 26.6 - 33.0 pg    MCHC 31.8 31.5 - 35.7 g/dL    RDW 16.5 (H) 12.3 - 15.4 %    RDW-SD 46.4 37.0 - 54.0 fl    MPV 9.2 6.0 - 12.0 fL    Platelets 103 (L) 140 - 450 10*3/mm3    Neutrophil % 56.8 42.7 - 76.0 %    Lymphocyte % 25.0 19.6 - 45.3 %    Monocyte % 14.7 (H) 5.0 - 12.0 %    Eosinophil % 2.1 0.3 - 6.2 %    Basophil % 0.9 0.0 - 1.5 %    Immature Grans % 0.5 0.0 - 0.5 %    Neutrophils, Absolute 2.43 1.40 - 7.00 10*3/mm3    Lymphocytes, Absolute 1.07 0.70 - 3.10 10*3/mm3    Monocytes, Absolute 0.63 0.10 - 0.90 10*3/mm3    Eosinophils, Absolute 0.09 0.00 - 0.40 10*3/mm3    Basophils, Absolute 0.04 0.00 - 0.20 10*3/mm3    Immature Grans, Absolute 0.02 0.00 - 0.05 10*3/mm3    nRBC 0.0 0.0 - 0.0 /100 WBC   CBC Auto Differential   Result Value Ref Range    WBC 3.95 3.40 - 10.80 10*3/mm3    RBC 3.67 (L) 4.14 - 5.80 10*6/mm3    Hemoglobin 9.9 (L) 13.0 - 17.7 g/dL    Hematocrit 29.9 (L) 37.5 - 51.0 %    MCV 81.5 79.0 - 97.0 fL    MCH 27.0 26.6 - 33.0 pg    MCHC 33.1 31.5 - 35.7 g/dL    RDW 16.2 (H) 12.3 - 15.4 %    RDW-SD 45.5 37.0 - 54.0 fl    MPV 9.9 6.0 - 12.0 fL    Platelets  100 (L) 140 - 450 10*3/mm3    Neutrophil % 60.0 42.7 - 76.0 %    Lymphocyte % 23.0 19.6 - 45.3 %    Monocyte % 13.4 (H) 5.0 - 12.0 %    Eosinophil % 2.3 0.3 - 6.2 %    Basophil % 0.8 0.0 - 1.5 %    Immature Grans % 0.5 0.0 - 0.5 %    Neutrophils, Absolute 2.37 1.40 - 7.00 10*3/mm3    Lymphocytes, Absolute 0.91 0.70 - 3.10 10*3/mm3    Monocytes, Absolute 0.53 0.10 - 0.90 10*3/mm3    Eosinophils, Absolute 0.09 0.00 - 0.40 10*3/mm3    Basophils, Absolute 0.03 0.00 - 0.20 10*3/mm3    Immature Grans, Absolute 0.02 0.00 - 0.05 10*3/mm3    nRBC 0.0 0.0 - 0.0 /100 WBC   CBC Auto Differential   Result Value Ref Range    WBC 4.83 3.40 - 10.80 10*3/mm3    RBC 3.73 (L) 4.14 - 5.80 10*6/mm3    Hemoglobin 10.0 (L) 13.0 - 17.7 g/dL    Hematocrit 30.8 (L) 37.5 - 51.0 %    MCV 82.6 79.0 - 97.0 fL    MCH 26.8 26.6 - 33.0 pg    MCHC 32.5 31.5 - 35.7 g/dL    RDW 16.7 (H) 12.3 - 15.4 %    RDW-SD 47.2 37.0 - 54.0 fl    MPV 9.6 6.0 - 12.0 fL    Platelets 115 (L) 140 - 450 10*3/mm3    Neutrophil % 55.9 42.7 - 76.0 %    Lymphocyte % 24.4 19.6 - 45.3 %    Monocyte % 16.6 (H) 5.0 - 12.0 %    Eosinophil % 1.9 0.3 - 6.2 %    Basophil % 0.8 0.0 - 1.5 %    Immature Grans % 0.4 0.0 - 0.5 %    Neutrophils, Absolute 2.70 1.40 - 7.00 10*3/mm3    Lymphocytes, Absolute 1.18 0.70 - 3.10 10*3/mm3    Monocytes, Absolute 0.80 0.10 - 0.90 10*3/mm3    Eosinophils, Absolute 0.09 0.00 - 0.40 10*3/mm3    Basophils, Absolute 0.04 0.00 - 0.20 10*3/mm3    Immature Grans, Absolute 0.02 0.00 - 0.05 10*3/mm3    nRBC 0.0 0.0 - 0.0 /100 WBC     *Note: Due to a large number of results and/or encounters for the requested time period, some results have not been displayed. A complete set of results can be found in Results Review.

## 2019-03-12 NOTE — ANESTHESIA PREPROCEDURE EVALUATION
Anesthesia Evaluation     Patient summary reviewed and Nursing notes reviewed   NPO Solid Status: > 8 hours  NPO Liquid Status: > 8 hours           Airway   Mallampati: IV  TM distance: >3 FB  Neck ROM: limited  Possible difficult intubation and Large neck circumference  Dental          Pulmonary - normal exam   (+) pneumonia resolved , pulmonary embolism, a smoker Former, COPD moderate,   Cardiovascular - normal exam    (+) hypertension poorly controlled, CHF,       Neuro/Psych  (+) psychiatric history Anxiety,     GI/Hepatic/Renal/Endo    (+) obesity,  GERD poorly controlled,  hepatitis C, liver disease, renal disease dialysis and ESRD, diabetes mellitus type 2 poorly controlled using insulin,     Musculoskeletal     Abdominal   (+) obese,    Substance History      OB/GYN          Other   (+) blood dyscrasia     History of cancer: anemia.                  Anesthesia Plan    ASA 4     MAC     intravenous induction   Anesthetic plan, all risks, benefits, and alternatives have been provided, discussed and informed consent has been obtained with: patient.

## 2019-03-16 NOTE — PROGRESS NOTES
Stephen Angulo called.  Patient refused to finish dialysis yesterday and today is more somnolent than usual with normal vital signs.  Blood pressure of 150/70, no elevation of temperature, pulse 84, and 93% oxygen saturation on room air.  Patient's blood sugar is also 351.  Nurse told to get a stat BMP, she states they cannot collect ABG there.  She was also told to give prescribed sliding scale for hyperglycemia.        This document has been electronically signed by Imani Alexander MD on March 16, 2019 4:52 PM

## 2019-03-17 NOTE — PROGRESS NOTES
Nurse called and states patient has symptoms of sleep apnea and needs a sleep study.  Patient did not get full dose of dialysis on Friday.  She was instructed to wait and see if dialysis resolved this if not Dr. Rodríguez can order a sleep study on Monday.           This document has been electronically signed by Imani Alexander MD on March 17, 2019 9:14 AM

## 2019-03-21 NOTE — TELEPHONE ENCOUNTER
Spoke with Carmen from [facility].  She reported that she wanted to make his PCP Dr. Rodríguez aware that this patient would be undergoing dialysis on 3/21/19.  On speaking with her the patient was hemodynamically stable, with a blood pressure of 112/98, heart rate 60, respirations 18, temp 97.6, and O2 saturations 93%.  Will pass the message along to his PCP.        Michelle Lo MD PGY2   [facility]  This document has been electronically signed by Michelle Lo MD on March 21, 2019 3:15 AM

## 2019-03-25 NOTE — TELEPHONE ENCOUNTER
Jerica from [facility] called concerning this patient. His blood sugar this morning was 491. He is scheduled for dialysis today. Jerica stated she had called dialysis and made them aware of his blood sugar as well. Will adjust insulin if necessary after dialysis.        Michelle Lo MD PGY2   [facility]  This document has been electronically signed by Michelle Lo MD on March 25, 2019 6:29 AM

## 2019-03-28 NOTE — PROGRESS NOTES
Chief Complaint   Patient presents with   • Colon Polyps       Subjective    Cristo Musa is a 69 y.o. male. he is here today for follow-up.    History of Present Illness  69-year-old male presents to discuss EGD and colonoscopy results.  Last seen due to anemia Hemoccult positive stool generalized abdominal pain.  He reports pain has been somewhat better has been eating regularly reports regular bowel movements.  He is unsure if there is any further blood in the stool as he is legally blind and does not have anyone look at his stool.  He resides at [facility].  EGD noted normal duodenum normal esophagus and gastritis in the stomach.  Antral biopsy noted mild chronic gastritis.  Negative for H. pylori.  Gastric body noted mild chronic gastritis.  Colonoscopy had a poor prep and noted medium polyp in the cecum and a small polyp in the transverse colon.  Transverse colon polyp was adenomatous.  Cecal polyp was villotubular 1.2 cm.  Repeat is recommended in 3 years however recommend patient repeat colonoscopy in 1 year due to poor prep and villotubular adenoma in the cecum.  Plan; continue PPI daily for gastritis.  Follow-up in 1 year for repeat colonoscopy due to tubular adenoma in the cecum follow-up in GI office sooner if needed       The following portions of the patient's history were reviewed and updated as appropriate:   Past Medical History:   Diagnosis Date   • Anemia of chronic disease    • Cataract    • CHF (congestive heart failure) (CMS/HCC)    • CKD (chronic kidney disease)    • Clostridium difficile infection    • COPD (chronic obstructive pulmonary disease) (CMS/HCC)    • Diabetes mellitus (CMS/HCC)    • Glaucoma    • Heart block    • Hepatitis C    • History of transfusion    • Hypertension    • Nephropathy, diabetic (CMS/HCC)    • Pacemaker    • Pulmonary embolism (CMS/HCC)      Past Surgical History:   Procedure Laterality Date   • ABDOMINAL SURGERY     •  ARTERIOVENOUS FISTULA/SHUNT SURGERY Right     forearm loop   • ARTERIOVENOUS FISTULA/SHUNT SURGERY Left     removed past upper arm   • ARTERIOVENOUS FISTULA/SHUNT SURGERY Right 3/13/2018    Procedure: revision RIGHT forearm ARTERIOVENOUS graft (excision), debridement, wound vac;  Surgeon: Jerson Singh MD;  Location: [facility] OR;  Service: Vascular   • ARTERIOVENOUS FISTULA/SHUNT SURGERY W/ HEMODIALYSIS CATHETER INSERTION Right 4/4/2016    Procedure: RIGHT ARM AV FISTULA DECLOT REVISION REPAIR BRACHIAL ANASTOMOTIC PSUEDOANEURYSM LEFT FEMORAL RICHARDSON FISTULOGRAM WITH ANGIOPLASTY;  Surgeon: Jerson Carpenter MD;  Location: [facility] OR 18/19;  Service:    • CATARACT EXTRACTION W/ INTRAOCULAR LENS IMPLANT Left 3/31/2017    Procedure: REMOVE CATARACT AND IMPLANT INTRAOCULAR LENS LEFT EYE;  Surgeon: Hilton Montemayor MD;  Location: [facility] OR;  Service:    • COLONOSCOPY N/A 3/12/2019    Procedure: COLONOSCOPY;  Surgeon: Flako Montoya MD;  Location: [facility] ENDOSCOPY;  Service: Gastroenterology   • ENDOSCOPY N/A 3/12/2019    Procedure: ESOPHAGOGASTRODUODENOSCOPY;  Surgeon: Flako Montoya MD;  Location: [facility] ENDOSCOPY;  Service: Gastroenterology   • EYE SURGERY     • HERNIA REPAIR     • IMPLANTABLE CARDIOVERTER DEFIBRILLATOR LEAD REPLACEMENT/POCKET REVISION Right 4/11/2016    Procedure: PACEMAKER LEADS X 3 AND BATTERY EXTRACTION WITH EXIMER LASER;  Surgeon: Vern Reeves MD;  Location: [facility] OR 18/19;  Service:    • INSERTION HEMODIALYSIS CATHETER Right 05/2015    jugular   • INTERVENTIONAL RADIOLOGY PROCEDURE Right 6/1/2017    Procedure: RIGHT dialysis fistulagram & angioplasty;  Surgeon: Jerson Singh MD;  Location: [facility] ANGIO INVASIVE LOCATION;  Service:    • INTERVENTIONAL RADIOLOGY PROCEDURE Right 8/24/2017    Procedure: IR dialysis fistulagram;  Surgeon: Jerson Singh MD;  Location: [facility] ANGIO INVASIVE LOCATION;  Service:    • INTERVENTIONAL RADIOLOGY  PROCEDURE Right 11/13/2018    Procedure: IR dialysis fistulagram;  Surgeon: Jerson Singh MD;  Location: [facility] ANGIO INVASIVE LOCATION;  Service: Interventional Radiology   • PACEMAKER IMPLANTATION  2008    dual chamber [employer]   • REMOVAL HEMODIALYSIS CATHETER Right 05/2015    femoral vein     Family History   Problem Relation Age of Onset   • COPD Sister    • Diabetes Brother    • Early death Brother    • Hyperlipidemia Brother    • Hypertension Brother    • Coronary artery disease Mother    • Diabetes Mother    • Hypertension Mother    • Stroke Other    • Other Other         Respiratory disorder       Prior to Admission medications    Medication Sig Start Date End Date Taking? Authorizing Provider   acetaminophen (TYLENOL) 500 MG tablet Take 500 mg by mouth Every 4 (Four) Hours As Needed for Mild Pain  or Fever.   Yes Cleve Nguyễn MD   aspirin 81 MG chewable tablet Chew 81 mg Daily.   Yes Cleve Nguyễn MD   azithromycin (ZITHROMAX) 250 MG tablet Take 1 tablet by mouth Daily. 2/28/19  Yes Cory Rodríguez MD   brimonidine (ALPHAGAN P) 0.1 % solution ophthalmic solution Administer 1 drop to both eyes 3 (Three) Times a Day.   Yes Cleve Nguyễn MD   cyclobenzaprine (FLEXERIL) 5 MG tablet Take 5 mg by mouth Every 8 (Eight) Hours As Needed for Muscle Spasms.   Yes Cleve Nguyễn MD   dorzolamide (TRUSOPT) 2 % ophthalmic solution Administer 1 drop into the left eye 3 (Three) Times a Day. 1/24/17  Yes Inderjit Grant MD   escitalopram (LEXAPRO) 10 MG tablet Take 1 tablet by mouth Daily.  Patient taking differently: Take 20 mg by mouth Daily. 12/23/18  Yes Janel Gordon MD   famotidine (PEPCID) 20 MG tablet Take 20 mg by mouth every night at bedtime.   Yes Cleev Nguyễn MD   fluticasone (FLONASE) 50 MCG/ACT nasal spray 2 sprays into each nostril daily. Administer 2 sprays in each nostril for each dose.   Yes Gopi  MD Cleve   gabapentin (NEURONTIN) 100 MG capsule Take 1 capsule by mouth Every Night. 2/28/19  Yes Cory Rodríguez MD   guaiFENesin 200 MG tablet Take 400 mg by mouth Every 4 (Four) Hours As Needed for Cough.   Yes Cleve Nguyễn MD   HYDROcodone-acetaminophen (NORCO) 5-325 MG per tablet Take 1 tablet by mouth 2 (Two) Times a Day As Needed for Severe Pain . 2/28/19  Yes Cory Rodríguez MD   insulin aspart (NovoLOG) 100 UNIT/ML injection Inject  under the skin 3 (Three) Times a Day Before Meals. Sliding scale:  = 0 units; 150-200 = 3 units; 200-250 = 6 units; 250-300 = 9 units; 300-350 = 12 units; >350 = 15 units and call MD   Yes Cleve Nguyễn MD   insulin detemir (LEVEMIR) 100 UNIT/ML injection Inject 25 Units under the skin into the appropriate area as directed Daily.   Yes Cleve Nguyễn MD   latanoprost (XALATAN) 0.005 % ophthalmic solution Administer 1 drop to both eyes every night.   Yes Cleve Nguyễn MD   Loratadine 10 MG capsule Take 10 mg by mouth Every Night. 3/16/18  Yes Cleve Nguyễn MD   Nutritional Supplements (NEPRO PO) Take 1 tablet by mouth Daily. On Sun, Tues, Thur, Sat   Yes Cleve Nguyễn MD   ondansetron ODT (ZOFRAN-ODT) 4 MG disintegrating tablet Take 1 tablet by mouth Every 6 (Six) Hours As Needed for Nausea or Vomiting. 2/17/18  Yes Salvador Elmore MD   polyethylene glycol (MIRALAX) packet Take 17 g by mouth Daily As Needed (constipation).   Yes Cleve Nguyễn MD   sennosides-docusate sodium (SENOKOT-S) 8.6-50 MG tablet Take 2 tablets by mouth 2 (Two) Times a Day As Needed for Constipation. 3/16/18  Yes Justyna Patel APRN   sevelamer (RENVELA) 800 MG tablet Take 800 mg by mouth 3 (Three) Times a Day With Meals.   Yes Cleve Nguyễn MD   traZODone (DESYREL) 50 MG tablet Take 50 mg by mouth every night at bedtime.   Yes Cleve Nguyễn MD     Allergies   Allergen Reactions   • Lisinopril  "Angioedema     unknown   • Motrin [Ibuprofen] Diarrhea and Nausea And Vomiting     Social History     Socioeconomic History   • Marital status: Legally      Spouse name: Not on file   • Number of children: Not on file   • Years of education: Not on file   • Highest education level: Not on file   Tobacco Use   • Smoking status: Former Smoker     Types: Cigarettes   • Smokeless tobacco: Never Used   • Tobacco comment: quit 20 years ago    Substance and Sexual Activity   • Alcohol use: No     Comment: former heavy use in his 50's, up to a fifth of liquor a day, currently no alcohol   • Drug use: No   • Sexual activity: No       Review of Systems  Review of Systems   Constitutional: Positive for fatigue. Negative for activity change, appetite change, chills, diaphoresis, fever and unexpected weight change.   HENT: Negative for sore throat and trouble swallowing.    Respiratory: Negative for shortness of breath.    Gastrointestinal: Negative for abdominal distention, abdominal pain, anal bleeding, blood in stool, constipation, diarrhea, nausea, rectal pain and vomiting.   Musculoskeletal: Negative for arthralgias.   Skin: Negative for pallor.   Neurological: Negative for light-headedness.        /68 (BP Location: Left arm)   Pulse 69   Ht 172.7 cm (68\")   Wt 105 kg (231 lb 3.2 oz)   BMI 35.15 kg/m²     Objective    Physical Exam   Constitutional: He is oriented to person, place, and time. He appears well-developed and well-nourished. He is cooperative. No distress.   HENT:   Head: Normocephalic and atraumatic.   Neck: Normal range of motion. Neck supple. No thyromegaly present.   Cardiovascular: Normal rate, regular rhythm and normal heart sounds.   Pulmonary/Chest: Effort normal and breath sounds normal. He has no wheezes. He has no rhonchi. He has no rales.   Abdominal: Soft. Normal appearance and bowel sounds are normal. He exhibits no shifting dullness, no distension, no fluid wave and no ascites. " There is no hepatosplenomegaly. There is no tenderness. There is no rigidity and no guarding. No hernia.   Lymphadenopathy:     He has no cervical adenopathy.   Neurological: He is alert and oriented to person, place, and time.   Skin: Skin is warm, dry and intact. No rash noted. No pallor.   Psychiatric: He has a normal mood and affect. His speech is normal.     Lab Requisition on 03/16/2019   Component Date Value Ref Range Status   • Glucose 03/16/2019 262* 70 - 100 mg/dL Final   • BUN 03/16/2019 47* 5 - 21 mg/dL Final   • Creatinine 03/16/2019 7.95* 0.50 - 1.40 mg/dL Final   • Sodium 03/16/2019 133* 135 - 145 mmol/L Final   • Potassium 03/16/2019 3.9  3.5 - 5.3 mmol/L Final   • Chloride 03/16/2019 86* 98 - 110 mmol/L Final   • CO2 03/16/2019 29.0  24.0 - 31.0 mmol/L Final   • Calcium 03/16/2019 8.8  8.4 - 10.4 mg/dL Final   • eGFR   Amer 03/16/2019 8* >60 mL/min/1.73 Final    <15 Indicative of kidney failure.   • eGFR Non  Amer 03/16/2019   >60 mL/min/1.73 Final    <15 Indicative of kidney failure.   • BUN/Creatinine Ratio 03/16/2019 5.9* 7.0 - 25.0 Final   • Anion Gap 03/16/2019 18.0* 4.0 - 13.0 mmol/L Final     Assessment/Plan      1. Polyp of cecum    2. Chronic gastritis without bleeding, unspecified gastritis type    .       Orders placed during this encounter include:  No orders of the defined types were placed in this encounter.      * Surgery not found *    Review and/or summary of lab tests, radiology, procedures, medications. Review and summary of old records and obtaining of history. The risks and benefits of my recommendations, as well as other treatment options were discussed with the patient today. Questions were answered.    No orders of the defined types were placed in this encounter.      Follow-up: Return in about 1 year (around 3/28/2020) for Recheck repeat colon .          This document has been electronically signed by LUIS Hernandez on March 29, 2019 6:17 PM              Results for orders placed or performed in visit on 03/16/19   Basic Metabolic Panel   Result Value Ref Range    Glucose 262 (H) 70 - 100 mg/dL    BUN 47 (H) 5 - 21 mg/dL    Creatinine 7.95 (H) 0.50 - 1.40 mg/dL    Sodium 133 (L) 135 - 145 mmol/L    Potassium 3.9 3.5 - 5.3 mmol/L    Chloride 86 (L) 98 - 110 mmol/L    CO2 29.0 24.0 - 31.0 mmol/L    Calcium 8.8 8.4 - 10.4 mg/dL    eGFR  African Amer 8 (L) >60 mL/min/1.73    eGFR Non African Amer  >60 mL/min/1.73    BUN/Creatinine Ratio 5.9 (L) 7.0 - 25.0    Anion Gap 18.0 (H) 4.0 - 13.0 mmol/L   Results for orders placed or performed during the hospital encounter of 03/12/19   Tissue Pathology Exam   Result Value Ref Range    Case Report       Surgical Pathology Report                         Case: [id number]                                  Authorizing Provider:  Flako Montoya MD        Collected:           03/12/2019 01:12 PM          Ordering Location:     [facility]             Received:            03/12/2019 01:54 PM                                 [place] [facility]                                                     Pathologist:           Mina Lopez MD                                                        Specimens:   1) - Gastric, Antrum                                                                                2) - Gastric, Body                                                                                  3) - Large Intestine, Transverse Colon, polyps                                                      4) - Large Intestine, Cecum, polyp hot snare                                               Final Diagnosis       1.  GASTRIC ANTRUM, MUCOSAL BIOPSY:   MILD CHRONIC GASTRITIS.   NO EVIDENCE OF HELICOBACTER PYLORI.     2.  GASTRIC BODY, MUCOSAL BIOPSY:   MILD CHRONIC GASTRITIS.     3.  TUBULAR ADENOMA, TRANSVERSE COLON.     4.  VILLOTUBULAR ADENOMA (1.2 CM), CECUM.       Gross Description       In 3 containers, each of these  show mucosal biopsies measuring up to 0.3 cm in greatest dimension.  Embedded accordingly:  1A antrum, 2A gastric body, 3A transverse colon.     Specimen #4 consists of 2 polypoid structures measuring from 0.5-1.2 cm in greatest dimension.  The bigger piece is bisected and all embedded as 4A.        POC Glucose Once   Result Value Ref Range    Glucose 190 (H) 70 - 130 mg/dL   Results for orders placed or performed in visit on 03/02/19   Troponin   Result Value Ref Range    Troponin I <0.012 0.000 - 0.034 ng/mL   Basic Metabolic Panel   Result Value Ref Range    Glucose 218 (H) 70 - 100 mg/dL    BUN 34 (H) 5 - 21 mg/dL    Creatinine 6.80 (H) 0.50 - 1.40 mg/dL    Sodium 135 135 - 145 mmol/L    Potassium 3.8 3.5 - 5.3 mmol/L    Chloride 90 (L) 98 - 110 mmol/L    CO2 33.0 (H) 24.0 - 31.0 mmol/L    Calcium 8.6 8.4 - 10.4 mg/dL    eGFR  African Amer 10 (L) >60 mL/min/1.73    eGFR Non African Amer  >60 mL/min/1.73    BUN/Creatinine Ratio 5.0 (L) 7.0 - 25.0    Anion Gap 12.0 4.0 - 13.0 mmol/L   Results for orders placed or performed during the hospital encounter of 02/26/19   Lactic Acid, Reflex Timer (This will reflex a repeat order 3-3:15 hours after ordered.)   Result Value Ref Range    Extra Tube Hold for add-ons.    Lactic Acid, Reflex   Result Value Ref Range    Lactate 2.3 (C) 0.5 - 2.0 mmol/L   Gold Top - SST   Result Value Ref Range    Extra Tube Hold for add-ons.    Green Top (Gel)   Result Value Ref Range    Extra Tube Hold for add-ons.    CBC Auto Differential   Result Value Ref Range    WBC 4.27 3.40 - 10.80 10*3/mm3    RBC 3.94 (L) 4.14 - 5.80 10*6/mm3    Hemoglobin 10.6 (L) 13.0 - 17.7 g/dL    Hematocrit 32.8 (L) 37.5 - 51.0 %    MCV 83.2 79.0 - 97.0 fL    MCH 26.9 26.6 - 33.0 pg    MCHC 32.3 31.5 - 35.7 g/dL    RDW 17.1 (H) 12.3 - 15.4 %    RDW-SD 48.0 37.0 - 54.0 fl    MPV 9.5 6.0 - 12.0 fL    Platelets 104 (L) 140 - 450 10*3/mm3    Neutrophil % 61.6 42.7 - 76.0 %    Lymphocyte % 22.2 19.6 - 45.3 %     Monocyte % 12.9 (H) 5.0 - 12.0 %    Eosinophil % 1.9 0.3 - 6.2 %    Basophil % 0.9 0.0 - 1.5 %    Immature Grans % 0.5 0.0 - 0.5 %    Neutrophils, Absolute 2.63 1.40 - 7.00 10*3/mm3    Lymphocytes, Absolute 0.95 0.70 - 3.10 10*3/mm3    Monocytes, Absolute 0.55 0.10 - 0.90 10*3/mm3    Eosinophils, Absolute 0.08 0.00 - 0.40 10*3/mm3    Basophils, Absolute 0.04 0.00 - 0.20 10*3/mm3    Immature Grans, Absolute 0.02 0.00 - 0.05 10*3/mm3    nRBC 0.0 0.0 - 0.0 /100 WBC   CBC Auto Differential   Result Value Ref Range    WBC 4.28 3.40 - 10.80 10*3/mm3    RBC 3.71 (L) 4.14 - 5.80 10*6/mm3    Hemoglobin 9.7 (L) 13.0 - 17.7 g/dL    Hematocrit 30.5 (L) 37.5 - 51.0 %    MCV 82.2 79.0 - 97.0 fL    MCH 26.1 (L) 26.6 - 33.0 pg    MCHC 31.8 31.5 - 35.7 g/dL    RDW 16.5 (H) 12.3 - 15.4 %    RDW-SD 46.4 37.0 - 54.0 fl    MPV 9.2 6.0 - 12.0 fL    Platelets 103 (L) 140 - 450 10*3/mm3    Neutrophil % 56.8 42.7 - 76.0 %    Lymphocyte % 25.0 19.6 - 45.3 %    Monocyte % 14.7 (H) 5.0 - 12.0 %    Eosinophil % 2.1 0.3 - 6.2 %    Basophil % 0.9 0.0 - 1.5 %    Immature Grans % 0.5 0.0 - 0.5 %    Neutrophils, Absolute 2.43 1.40 - 7.00 10*3/mm3    Lymphocytes, Absolute 1.07 0.70 - 3.10 10*3/mm3    Monocytes, Absolute 0.63 0.10 - 0.90 10*3/mm3    Eosinophils, Absolute 0.09 0.00 - 0.40 10*3/mm3    Basophils, Absolute 0.04 0.00 - 0.20 10*3/mm3    Immature Grans, Absolute 0.02 0.00 - 0.05 10*3/mm3    nRBC 0.0 0.0 - 0.0 /100 WBC   CBC Auto Differential   Result Value Ref Range    WBC 3.95 3.40 - 10.80 10*3/mm3    RBC 3.67 (L) 4.14 - 5.80 10*6/mm3    Hemoglobin 9.9 (L) 13.0 - 17.7 g/dL    Hematocrit 29.9 (L) 37.5 - 51.0 %    MCV 81.5 79.0 - 97.0 fL    MCH 27.0 26.6 - 33.0 pg    MCHC 33.1 31.5 - 35.7 g/dL    RDW 16.2 (H) 12.3 - 15.4 %    RDW-SD 45.5 37.0 - 54.0 fl    MPV 9.9 6.0 - 12.0 fL    Platelets 100 (L) 140 - 450 10*3/mm3    Neutrophil % 60.0 42.7 - 76.0 %    Lymphocyte % 23.0 19.6 - 45.3 %    Monocyte % 13.4 (H) 5.0 - 12.0 %    Eosinophil % 2.3  0.3 - 6.2 %    Basophil % 0.8 0.0 - 1.5 %    Immature Grans % 0.5 0.0 - 0.5 %    Neutrophils, Absolute 2.37 1.40 - 7.00 10*3/mm3    Lymphocytes, Absolute 0.91 0.70 - 3.10 10*3/mm3    Monocytes, Absolute 0.53 0.10 - 0.90 10*3/mm3    Eosinophils, Absolute 0.09 0.00 - 0.40 10*3/mm3    Basophils, Absolute 0.03 0.00 - 0.20 10*3/mm3    Immature Grans, Absolute 0.02 0.00 - 0.05 10*3/mm3    nRBC 0.0 0.0 - 0.0 /100 WBC     *Note: Due to a large number of results and/or encounters for the requested time period, some results have not been displayed. A complete set of results can be found in Results Review.

## 2019-03-28 NOTE — PATIENT INSTRUCTIONS
Colon Polyps  Polyps are tissue growths inside the body. Polyps can grow in many places, including the large intestine (colon). A polyp may be a round bump or a mushroom-shaped growth. You could have one polyp or several.  Most colon polyps are noncancerous (benign). However, some colon polyps can become cancerous over time.  What are the causes?  The exact cause of colon polyps is not known.  What increases the risk?  This condition is more likely to develop in people who:  · Have a family history of colon cancer or colon polyps.  · Are older than 50 or older than 45 if they are .  · Have inflammatory bowel disease, such as ulcerative colitis or Crohn disease.  · Are overweight.  · Smoke cigarettes.  · Do not get enough exercise.  · Drink too much alcohol.  · Eat a diet that is:  ? High in fat and red meat.  ? Low in fiber.  · Had childhood cancer that was treated with abdominal radiation.    What are the signs or symptoms?  Most polyps do not cause symptoms. If you have symptoms, they may include:  · Blood coming from your rectum when having a bowel movement.  · Blood in your stool. The stool may look dark red or black.  · A change in bowel habits, such as constipation or diarrhea.    How is this diagnosed?  This condition is diagnosed with a colonoscopy. This is a procedure that uses a lighted, flexible scope to look at the inside of your colon.  How is this treated?  Treatment for this condition involves removing any polyps that are found. Those polyps will then be tested for cancer. If cancer is found, your health care provider will talk to you about options for colon cancer treatment.  Follow these instructions at home:  Diet  · Eat plenty of fiber, such as fruits, vegetables, and whole grains.  · Eat foods that are high in calcium and vitamin D, such as milk, cheese, yogurt, eggs, liver, fish, and broccoli.  · Limit foods high in fat, red meats, and processed meats, such as hot dogs, sausage,  camacho, and lunch meats.  · Maintain a healthy weight, or lose weight if recommended by your health care provider.  General instructions  · Do not smoke cigarettes.  · Do not drink alcohol excessively.  · Keep all follow-up visits as told by your health care provider. This is important. This includes keeping regularly scheduled colonoscopies. Talk to your health care provider about when you need a colonoscopy.  · Exercise every day or as told by your health care provider.  Contact a health care provider if:  · You have new or worsening bleeding during a bowel movement.  · You have new or increased blood in your stool.  · You have a change in bowel habits.  · You unexpectedly lose weight.  This information is not intended to replace advice given to you by your health care provider. Make sure you discuss any questions you have with your health care provider.  Document Released: 09/13/2005 Document Revised: 05/25/2017 Document Reviewed: 11/07/2016  [employer] Patient Education © 2019 [employer]

## 2019-03-31 PROBLEM — N18.6 ANEMIA DUE TO CHRONIC KIDNEY DISEASE, ON CHRONIC DIALYSIS (HCC): Status: ACTIVE | Noted: 2019-01-01

## 2019-03-31 PROBLEM — D69.6 THROMBOCYTOPENIA (HCC): Status: ACTIVE | Noted: 2019-01-01

## 2019-03-31 PROBLEM — N19 UREMIC ENCEPHALOPATHY: Status: ACTIVE | Noted: 2019-01-01

## 2019-03-31 PROBLEM — D63.1 ANEMIA DUE TO CHRONIC KIDNEY DISEASE, ON CHRONIC DIALYSIS (HCC): Status: ACTIVE | Noted: 2019-01-01

## 2019-03-31 PROBLEM — R77.8 ELEVATED TROPONIN: Status: ACTIVE | Noted: 2019-01-01

## 2019-03-31 PROBLEM — G93.49 UREMIC ENCEPHALOPATHY: Status: ACTIVE | Noted: 2019-01-01

## 2019-03-31 PROBLEM — Z99.2 ANEMIA DUE TO CHRONIC KIDNEY DISEASE, ON CHRONIC DIALYSIS (HCC): Status: ACTIVE | Noted: 2019-01-01

## 2019-03-31 PROBLEM — E87.1 HYPONATREMIA: Status: ACTIVE | Noted: 2019-01-01

## 2019-03-31 PROBLEM — R41.82 ALTERED MENTAL STATUS: Status: ACTIVE | Noted: 2019-01-01

## 2019-03-31 NOTE — TELEPHONE ENCOUNTER
Spoke with Carmen from [facility]. She reports that the patient was unresponsive for 10-15 minutes despite sternal rubs. Vitals were temp 97.9, heart rate 63, respirations 16, oxygen saturation 98%, and /70. Blood sugar was in the 400s despite receiving 15 units novolog at 4:00 PM. Instructed Carmen to send the patient to the ER for further evaluation.        Michelle Lo MD PGY2   [facility]  This document has been electronically signed by Michelle Lo MD on March 31, 2019 5:42 PM

## 2019-03-31 NOTE — PROGRESS NOTES
MONTHLY NURSING HOME VISIT    NAME: Cristo Musa  : 1950  MRN: [id number]    DATE & TIME SEEN:  3/25/2019 1300    PCP: Cory Rodríguez MD    NURSING HOME: [facility]    Chief Complaint: Monthly Nursing Home Visit for 2019.    Subjective:     Cristo Musa is a 69 y.o. male. Nursing reporting sleepiness during the days and some snoring. Pt reporting L ear fulness. He went to colonoscopy and had 2 biopsies. No other concerns or complaints.    Current Meds:    Current Outpatient Medications:   •  acetaminophen (TYLENOL) 500 MG tablet, Take 500 mg by mouth Every 4 (Four) Hours As Needed for Mild Pain  or Fever., Disp: , Rfl:   •  aspirin 81 MG chewable tablet, Chew 81 mg Daily., Disp: , Rfl:   •  azithromycin (ZITHROMAX) 250 MG tablet, Take 1 tablet by mouth Daily., Disp: 3 tablet, Rfl: 0  •  brimonidine (ALPHAGAN P) 0.1 % solution ophthalmic solution, Administer 1 drop to both eyes 3 (Three) Times a Day., Disp: , Rfl:   •  Cholecalciferol (VITAMIN D3) 2000 units chewable tablet, Chew 2,000 Units Daily., Disp: , Rfl:   •  cyclobenzaprine (FLEXERIL) 5 MG tablet, Take 5 mg by mouth Every 8 (Eight) Hours As Needed for Muscle Spasms., Disp: , Rfl:   •  dorzolamide (TRUSOPT) 2 % ophthalmic solution, Administer 1 drop into the left eye 3 (Three) Times a Day., Disp: 1 each, Rfl: 12  •  escitalopram (LEXAPRO) 10 MG tablet, Take 1 tablet by mouth Daily., Disp: 30 tablet, Rfl: 3  •  famotidine (PEPCID) 20 MG tablet, Take 20 mg by mouth every night at bedtime., Disp: , Rfl:   •  fluticasone (FLONASE) 50 MCG/ACT nasal spray, 2 sprays into each nostril daily. Administer 2 sprays in each nostril for each dose., Disp: , Rfl:   •  gabapentin (NEURONTIN) 100 MG capsule, Take 1 capsule by mouth Every Night., Disp: 30 capsule, Rfl: 5  •  guaiFENesin 200 MG tablet, Take 400 mg by mouth Every 4 (Four) Hours As Needed for Cough., Disp: , Rfl:   •  HYDROcodone-acetaminophen (NORCO) 5-325  MG per tablet, Take 1 tablet by mouth 2 (Two) Times a Day As Needed for Severe Pain ., Disp: 60 tablet, Rfl: 0  •  insulin aspart (NovoLOG) 100 UNIT/ML injection, Inject  under the skin 3 (Three) Times a Day Before Meals. Sliding scale:  = 0 units; 150-200 = 3 units; 200-250 = 6 units; 250-300 = 9 units; 300-350 = 12 units; >350 = 15 units and call MD, Disp: , Rfl:   •  insulin detemir (LEVEMIR) 100 UNIT/ML injection, Inject 25 Units under the skin into the appropriate area as directed Daily., Disp: , Rfl:   •  latanoprost (XALATAN) 0.005 % ophthalmic solution, Administer 1 drop to both eyes every night., Disp: , Rfl:   •  Loratadine 10 MG capsule, Take 10 mg by mouth Every Night., Disp: , Rfl:   •  melatonin 5 MG tablet tablet, Take 5 mg by mouth Every Night., Disp: , Rfl:   •  Nutritional Supplements (NEPRO PO), Take 1 tablet by mouth Daily. On Sun, Tues, Thur, Sat, Disp: , Rfl:   •  ondansetron ODT (ZOFRAN-ODT) 4 MG disintegrating tablet, Take 1 tablet by mouth Every 6 (Six) Hours As Needed for Nausea or Vomiting., Disp: 10 tablet, Rfl: 0  •  polyethylene glycol (MIRALAX) packet, Take 17 g by mouth Daily As Needed (constipation)., Disp: , Rfl:   •  sennosides-docusate sodium (SENOKOT-S) 8.6-50 MG tablet, Take 2 tablets by mouth 2 (Two) Times a Day As Needed for Constipation., Disp: 60 tablet, Rfl: 0  •  sevelamer (RENVELA) 800 MG tablet, Take 800 mg by mouth 3 (Three) Times a Day With Meals., Disp: , Rfl:   •  traZODone (DESYREL) 50 MG tablet, Take 50 mg by mouth every night at bedtime., Disp: , Rfl:     Allergies:  Lisinopril and Motrin [ibuprofen]    Review of Systems:  Review of Systems   Constitutional: Positive for activity change and fatigue. Negative for appetite change, chills and fever.   HENT: Positive for ear pain. Negative for congestion and sore throat.    Eyes: Negative for redness and visual disturbance.   Respiratory: Negative for cough, shortness of breath and wheezing.    Cardiovascular:  Negative for chest pain and palpitations.   Gastrointestinal: Negative for abdominal pain, diarrhea, nausea and vomiting.   Genitourinary: Negative for difficulty urinating and dysuria.   Musculoskeletal: Negative for arthralgias and gait problem.   Skin: Negative for color change and rash.   Neurological: Negative for dizziness, weakness and headaches.   Psychiatric/Behavioral: Negative for dysphoric mood and sleep disturbance. The patient is not nervous/anxious.        Objective:     /78   Pulse 68   Resp 17   SpO2 98%   Physical Exam   Constitutional: He is oriented to person, place, and time. He appears well-developed and well-nourished.   HENT:   Head: Normocephalic and atraumatic.   Right Ear: External ear normal.   Left Ear: External ear normal.   Nose: Nose normal.   Eyes: Conjunctivae and EOM are normal. Pupils are equal, round, and reactive to light.   Neck: Normal range of motion. Neck supple.   Cardiovascular: Normal rate, regular rhythm and normal heart sounds.   Pulmonary/Chest: Effort normal and breath sounds normal. He has no wheezes.   Abdominal: Soft. Bowel sounds are normal. He exhibits no distension. There is no tenderness.   Musculoskeletal: Normal range of motion. He exhibits edema (1+ pitting to midshin). He exhibits no deformity.   Neurological: He is alert and oriented to person, place, and time. No cranial nerve deficit.   Skin: Skin is warm.   Psychiatric: He has a normal mood and affect. His behavior is normal. Thought content normal.   Nursing note and vitals reviewed.         Assessment/Plan:      69 y.o. male with:  Problem List Items Addressed This Visit        Other    Physical deconditioning - Primary (Chronic)    Overview     PT/OT           Other Visit Diagnoses     Excessive daytime sleepiness    - Pt had not been going to all of his dialysis. This has been improved since he has been doing dialysis. Will get sleep study if continues.           CODE STATUS: full  code    Dr Haider is the attending on record for this patient, he is aware of the patient's status and agrees with the above.          This document has been electronically signed by Cory Rodríguez MD on March 31, 2019 6:16 PM

## 2019-04-01 NOTE — PROGRESS NOTES
FAMILY MEDICINE DAILY PROGRESS NOTE  NAME: Cristo Musa  : 1950  MRN: [id number]     LOS: 0 days     PROVIDER OF SERVICE: Cory Rodríguez MD    Chief Complaint: Uremic encephalopathy    Subjective:     Interval History:  History taken from: patient chart family  Patient tired but arousable this morning.  He is alert and oriented to person and place and date.  His nephrologist has been contacted and he will get dialysis today.  We also had some concern about his opioid usage and sees had a similar presentation a few weeks ago.  We will stop the [place] and consult with pharmacy about pain control options for this patient.  Aside from fatigue, patient's only complaint is of foot pains.    Review of Systems:   Review of Systems   Constitutional: Positive for fatigue. Negative for activity change, appetite change, chills and fever.   HENT: Negative for congestion, ear pain and sore throat.    Eyes: Negative for redness and visual disturbance.   Respiratory: Negative for cough, shortness of breath and wheezing.    Cardiovascular: Negative for chest pain and palpitations.   Gastrointestinal: Negative for abdominal pain, diarrhea, nausea and vomiting.   Genitourinary: Negative for difficulty urinating and dysuria.   Musculoskeletal: Positive for arthralgias. Negative for gait problem.   Skin: Negative for color change and rash.   Neurological: Negative for dizziness, weakness and headaches.   Psychiatric/Behavioral: Negative for dysphoric mood and sleep disturbance. The patient is not nervous/anxious.        Objective:     Vital Signs  Temp:  [96.5 °F (35.8 °C)-97.7 °F (36.5 °C)] 96.8 °F (36 °C)  Heart Rate:  [62-96] 67  Resp:  [18-20] 18  BP: (117-142)/(71-79) 117/73    Physical Exam  Physical Exam   Constitutional: He is oriented to person, place, and time. He appears well-developed and well-nourished.   HENT:   Head: Normocephalic and atraumatic.   Right Ear: External ear normal.   Left Ear:  External ear normal.   Nose: Nose normal.   Eyes: Conjunctivae and EOM are normal. Pupils are equal, round, and reactive to light.   Neck: Normal range of motion. Neck supple.   Cardiovascular: Normal rate and regular rhythm.   Murmur heard.  Pulmonary/Chest: Effort normal and breath sounds normal. He has no wheezes.   Abdominal: Soft. Bowel sounds are normal. He exhibits no distension. There is no tenderness.   Musculoskeletal: Normal range of motion. He exhibits edema (1+ bilat to mid shin). He exhibits no deformity.   Neurological: He is alert and oriented to person, place, and time. No cranial nerve deficit.   Skin: Skin is warm.   Psychiatric: He has a normal mood and affect. His behavior is normal. Thought content normal.   Nursing note and vitals reviewed.      Medication Review    Current Facility-Administered Medications:   •  acetaminophen (TYLENOL) tablet 500 mg, 500 mg, Oral, Q4H PRN, Michelle Lo MD  •  aspirin chewable tablet 81 mg, 81 mg, Oral, Daily, Michelle Lo MD  •  brimonidine (ALPHAGAN) 0.15 % ophthalmic solution 1 drop, 1 drop, Both Eyes, TID, Michelle Lo MD  •  cetirizine (zyrTEC) tablet 5 mg, 5 mg, Oral, Daily, Cory Rodríguez MD  •  cholecalciferol (VITAMIN D3) tablet 2,000 Units, 2,000 Units, Oral, Daily, Michelle Lo MD  •  cyclobenzaprine (FLEXERIL) tablet 5 mg, 5 mg, Oral, Q8H PRN, Micehlle Lo MD  •  dextrose (D50W) 25 g/ 50mL Intravenous Solution 25 g, 25 g, Intravenous, Q15 Min PRN, Michelle Lo MD  •  dextrose (GLUTOSE) oral gel 15 g, 15 g, Oral, Q15 Min PRN, Michelle Lo MD  •  dorzolamide (TRUSOPT) 2 % ophthalmic solution 1 drop, 1 drop, Left Eye, TID, Michelle Lo MD  •  escitalopram (LEXAPRO) tablet 10 mg, 10 mg, Oral, Daily, Michelle Lo MD  •  famotidine (PEPCID) tablet 20 mg, 20 mg, Oral, Daily, Michelle Lo MD  •  fluticasone (FLONASE) 50 MCG/ACT nasal spray 2 spray, 2 spray, Nasal, Daily, Michelle Lo MD  •   gabapentin (NEURONTIN) capsule 100 mg, 100 mg, Oral, Nightly, Michelle Lo MD, 100 mg at 03/31/19 2352  •  glucagon (human recombinant) (GLUCAGEN DIAGNOSTIC) injection 1 mg, 1 mg, Subcutaneous, PRN, Michelle Lo MD  •  insulin aspart (novoLOG) injection 0-7 Units, 0-7 Units, Subcutaneous, 4x Daily AC & at Bedtime, Michelle Lo MD  •  insulin detemir (LEVEMIR) injection 25 Units, 25 Units, Subcutaneous, Nightly, Michelle Lo MD  •  latanoprost (XALATAN) 0.005 % ophthalmic solution 1 drop, 1 drop, Both Eyes, Nightly, Michelle Lo MD, 1 drop at 03/31/19 2352  •  melatonin tablet 5.25 mg, 5.25 mg, Oral, Nightly, Michelle Lo MD  •  ondansetron (ZOFRAN) injection 4 mg, 4 mg, Intravenous, Q6H PRN, Michelle Lo MD  •  polyethylene glycol (MIRALAX) powder 17 g, 17 g, Oral, Daily PRN, Michelle Lo MD  •  sennosides-docusate sodium (SENOKOT-S) 8.6-50 MG tablet 2 tablet, 2 tablet, Oral, BID PRN, Michelle Lo MD  •  sevelamer (RENVELA) tablet 800 mg, 800 mg, Oral, TID With Meals, Michelle Lo MD  •  sodium chloride 0.9 % flush 3 mL, 3 mL, Intravenous, Q12H, Michelle Lo MD, 3 mL at 03/31/19 2352  •  sodium chloride 0.9 % flush 3-10 mL, 3-10 mL, Intravenous, PRN, Michelle Lo MD  •  traZODone (DESYREL) tablet 50 mg, 50 mg, Oral, Nightly, Michelle Lo MD     Diagnostic Data    Lab Results (last 24 hours)     Procedure Component Value Units Date/Time    POC Glucose Once [[id number]]  (Abnormal) Collected:  04/01/19 0727    Specimen:  Blood Updated:  04/01/19 0754     Glucose 138 mg/dL      Comment: RN NotifiedOperator: [id number] WILIAN MENJIVARBanner Heart HospitalMeter ID: [id number]       Blood Culture - Blood, Arm, Right [[id number]] Collected:  03/31/19 1920    Specimen:  Blood from Arm, Right Updated:  04/01/19 0730     Blood Culture No growth at less than 24 hours    POC Glucose Once [[id number]]  (Abnormal) Collected:  04/01/19 0407    Specimen:  Blood Updated:  04/01/19 0704      Glucose 141 mg/dL      Comment: RN NotifiedOperator: [id number] DONELL Toscano ID: [id number]       Troponin [[id number]]  (Abnormal) Collected:  04/01/19 0339    Specimen:  Blood Updated:  04/01/19 0418     Troponin T 0.059 ng/mL     Narrative:       Troponin T Reference Range:  <= 0.03 ng/mL-   Negative for AMI  >0.03 ng/mL-     Abnormal for myocardial necrosis.  Clinicians would have to utilize clinical acumen, EKG, Troponin and serial changes to determine if it is an Acute Myocardial Infarction or myocardial injury due to an underlying chronic condition.     Troponin [[id number]]  (Abnormal) Collected:  04/01/19 0119    Specimen:  Blood Updated:  04/01/19 0208     Troponin T 0.053 ng/mL     Narrative:       Troponin T Reference Range:  <= 0.03 ng/mL-   Negative for AMI  >0.03 ng/mL-     Abnormal for myocardial necrosis.  Clinicians would have to utilize clinical acumen, EKG, Troponin and serial changes to determine if it is an Acute Myocardial Infarction or myocardial injury due to an underlying chronic condition.     Basic Metabolic Panel [[id number]]  (Abnormal) Collected:  04/01/19 0119    Specimen:  Blood Updated:  04/01/19 0207     Glucose 150 mg/dL      BUN 59 mg/dL      Creatinine 7.44 mg/dL      Sodium 130 mmol/L      Potassium 4.2 mmol/L      Chloride 86 mmol/L      CO2 25.0 mmol/L      Calcium 8.9 mg/dL      eGFR  African Amer 9 mL/min/1.73      Comment: <15 Indicative of kidney failure.        eGFR Non African Amer -- mL/min/1.73      Comment: <15 Indicative of kidney failure.        BUN/Creatinine Ratio 7.9     Anion Gap 19.0 mmol/L     Narrative:       GFR Normal >60  Chronic Kidney Disease <60  Kidney Failure <15    CBC & Differential [[id number]] Collected:  04/01/19 0119    Specimen:  Blood Updated:  04/01/19 0147    Narrative:       The following orders were created for panel order CBC & Differential.  Procedure                               Abnormality         Status                      ---------                               -----------         ------                     CBC Auto Differential[[id number]]        Abnormal            Final result                 Please view results for these tests on the individual orders.    CBC Auto Differential [[id number]]  (Abnormal) Collected:  04/01/19 0119    Specimen:  Blood Updated:  04/01/19 0147     WBC 4.58 10*3/mm3      RBC 3.59 10*6/mm3      Hemoglobin 10.3 g/dL      Hematocrit 30.7 %      MCV 85.5 fL      MCH 28.7 pg      MCHC 33.6 g/dL      RDW 17.5 %      RDW-SD 54.0 fl      MPV 9.8 fL      Platelets 78 10*3/mm3      Neutrophil % 57.8 %      Lymphocyte % 24.9 %      Monocyte % 14.2 %      Eosinophil % 2.2 %      Basophil % 0.7 %      Immature Grans % 0.2 %      Neutrophils, Absolute 2.65 10*3/mm3      Lymphocytes, Absolute 1.14 10*3/mm3      Monocytes, Absolute 0.65 10*3/mm3      Eosinophils, Absolute 0.10 10*3/mm3      Basophils, Absolute 0.03 10*3/mm3      Immature Grans, Absolute 0.01 10*3/mm3      nRBC 0.0 /100 WBC     Extra Tubes [[id number]] Collected:  03/31/19 2127    Specimen:  Blood, Venous Line Updated:  04/01/19 0130    Narrative:       The following orders were created for panel order Extra Tubes.  Procedure                               Abnormality         Status                     ---------                               -----------         ------                     [place] Top[[id number]]                                         Final result                 Please view results for these tests on the individual orders.    Gray Top [[id number]] Collected:  03/31/19 2127    Specimen:  Blood Updated:  04/01/19 0130     Extra Tube Hold for add-ons.     Comment: Auto resulted.       POC Glucose Once [[id number]]  (Normal) Collected:  03/31/19 2326    Specimen:  Blood Updated:  03/31/19 2340     Glucose 93 mg/dL      Comment: RN NotifiedOperator: [id number] DONELL HORNERMeter ID: [id number]       Extra Tubes [[id number]] Collected:  03/31/19  2126    Specimen:  Blood, Venous Line Updated:  03/31/19 2230    Narrative:       The following orders were created for panel order Extra Tubes.  Procedure                               Abnormality         Status                     ---------                               -----------         ------                     Light Blue Top[[id number]]                                   Final result               Lavender Top[[id number]]                                     Final result               Lavender Top[[id number]]                                     Final result               Gold Top - SST[[id number]]                                   Final result                 Please view results for these tests on the individual orders.    Lavender Top [[id number]] Collected:  03/31/19 2126    Specimen:  Blood Updated:  03/31/19 2230     Extra Tube hold for add-on     Comment: Auto resulted       Lavender Top [[id number]] Collected:  03/31/19 2126    Specimen:  Blood Updated:  03/31/19 2230     Extra Tube hold for add-on     Comment: Auto resulted       Gold Top - SST [[id number]] Collected:  03/31/19 2126    Specimen:  Blood Updated:  03/31/19 2230     Extra Tube Hold for add-ons.     Comment: Auto resulted.       [place] Draw [[id number]] Collected:  03/31/19 1920    Specimen:  Blood Updated:  03/31/19 2230    Narrative:       The following orders were created for panel order [place] Draw.  Procedure                               Abnormality         Status                     ---------                               -----------         ------                     Light Blue Top[[id number]]                                   Final result               Green Top (Gel)[[id number]]                                  Final result               Lavender Top[[id number]]                                     Final result               Gold Top - SST[[id number]]                                   Final result                 Please view results for  these tests on the individual orders.    Green Top (Gel) [[id number]] Collected:  03/31/19 2118    Specimen:  Blood from Arm, Left Updated:  03/31/19 2230     Extra Tube Hold for add-ons.     Comment: Auto resulted.       Gold Top - SST [[id number]] Collected:  03/31/19 2118    Specimen:  Blood from Arm, Left Updated:  03/31/19 2230     Extra Tube Hold for add-ons.     Comment: Auto resulted.       Light Blue Top [[id number]] Collected:  03/31/19 2126    Specimen:  Blood Updated:  03/31/19 2230     Extra Tube hold for add-on     Comment: Auto resulted       Troponin [[id number]]  (Abnormal) Collected:  03/31/19 2118    Specimen:  Blood from Arm, Left Updated:  03/31/19 2158     Troponin T 0.055 ng/mL     Narrative:       Troponin T Reference Range:  <= 0.03 ng/mL-   Negative for AMI  >0.03 ng/mL-     Abnormal for myocardial necrosis.  Clinicians would have to utilize clinical acumen, EKG, Troponin and serial changes to determine if it is an Acute Myocardial Infarction or myocardial injury due to an underlying chronic condition.     BNP [[id number]]  (Abnormal) Collected:  03/31/19 2118    Specimen:  Blood from Arm, Left Updated:  03/31/19 2153     proBNP 4,278.0 pg/mL     Narrative:       Among patients with dyspnea, NT-proBNP is highly sensitive for the detection of acute congestive heart failure. In addition NT-proBNP of <300 pg/ml effectively rules out acute congestive heart failure with 99% negative predictive value.    TSH [[id number]]  (Normal) Collected:  03/31/19 2118    Specimen:  Blood from Arm, Left Updated:  03/31/19 2153     TSH 2.130 mIU/mL     Comprehensive Metabolic Panel [[id number]]  (Abnormal) Collected:  03/31/19 2118    Specimen:  Blood from Arm, Left Updated:  03/31/19 2151     Glucose 102 mg/dL      BUN 57 mg/dL      Creatinine 7.06 mg/dL      Sodium 131 mmol/L      Potassium 4.2 mmol/L      Chloride 78 mmol/L      CO2 28.0 mmol/L      Calcium 9.0 mg/dL      Total Protein 10.1 g/dL      Albumin 3.40  g/dL      ALT (SGPT) 10 U/L      AST (SGOT) 22 U/L      Alkaline Phosphatase 171 U/L      Total Bilirubin 1.1 mg/dL      eGFR Non African Amer -- mL/min/1.73      Comment: <15 Indicative of kidney failure.        eGFR  African Amer 9 mL/min/1.73      Comment: <15 Indicative of kidney failure.        Globulin 6.7 gm/dL      A/G Ratio 0.5 g/dL      BUN/Creatinine Ratio 8.1     Anion Gap 25.0 mmol/L     Narrative:       GFR Normal >60  Chronic Kidney Disease <60  Kidney Failure <15    Magnesium [[id number]]  (Normal) Collected:  03/31/19 2118    Specimen:  Blood from Arm, Left Updated:  03/31/19 2148     Magnesium 2.3 mg/dL     Blood Culture - Blood, Arm, Left [[id number]] Collected:  03/31/19 2118    Specimen:  Blood from Arm, Left Updated:  03/31/19 2124    CBC & Differential [[id number]] Collected:  03/31/19 1920    Specimen:  Blood Updated:  03/31/19 2041    Narrative:       The following orders were created for panel order CBC & Differential.  Procedure                               Abnormality         Status                     ---------                               -----------         ------                     Scan Slide[[id number]]                                       Final result               CBC Auto Differential[[id number]]        Abnormal            Final result                 Please view results for these tests on the individual orders.    Scan Slide [[id number]] Collected:  03/31/19 1920    Specimen:  Blood Updated:  03/31/19 2041     Anisocytosis Mod/2+     Hypochromia Slight/1+     Vacuolated Neutrophils Slight/1+     Platelet Estimate Decreased     Clumped Platelets --     Comment: NONE SEEN       Light Blue Top [[id number]] Collected:  03/31/19 1920    Specimen:  Blood Updated:  03/31/19 2030     Extra Tube hold for add-on     Comment: Auto resulted       Lavender Top [[id number]] Collected:  03/31/19 1920    Specimen:  Blood Updated:  03/31/19 2030     Extra Tube hold for add-on     Comment: Auto  resulted       POC Glucose Once [[id number]]  (Abnormal) Collected:  03/31/19 2017    Specimen:  Blood Updated:  03/31/19 2028     Glucose 141 mg/dL      Comment: Result Not ConfirmedOperator: [id number] KLARISSA ALONAMeter ID: [id number]       Lactic Acid, Plasma [[id number]]  (Normal) Collected:  03/31/19 1920    Specimen:  Blood Updated:  03/31/19 1942     Lactate 1.9 mmol/L     CBC Auto Differential [[id number]]  (Abnormal) Collected:  03/31/19 1920    Specimen:  Blood Updated:  03/31/19 1933     WBC 4.53 10*3/mm3      RBC 3.65 10*6/mm3      Hemoglobin 10.5 g/dL      Hematocrit 31.5 %      MCV 86.3 fL      MCH 28.8 pg      MCHC 33.3 g/dL      RDW 17.3 %      RDW-SD 53.6 fl      MPV 8.9 fL      Platelets 88 10*3/mm3      Neutrophil % 56.6 %      Lymphocyte % 25.6 %      Monocyte % 14.1 %      Eosinophil % 2.6 %      Basophil % 0.7 %      Immature Grans % 0.4 %      Neutrophils, Absolute 2.56 10*3/mm3      Lymphocytes, Absolute 1.16 10*3/mm3      Monocytes, Absolute 0.64 10*3/mm3      Eosinophils, Absolute 0.12 10*3/mm3      Basophils, Absolute 0.03 10*3/mm3      Immature Grans, Absolute 0.02 10*3/mm3      nRBC 0.0 /100 WBC             I reviewed the patient's new clinical results.    Assessment/Plan:     Active Hospital Problems    Diagnosis POA   • **Uremic encephalopathy [G93.41, N19] Yes     -A & Ox2, slightly confused  -BUN 57, up from 40s on 3/16/19; will continue to trend  -Sees Dr. Vilchis outpatient for M/W/F HD, completed HD on Friday  -consulted nephrology for dialysis  -will closely monitor his mental status       • Hyponatremia [E87.1] Yes     -sodium 131 on admission  -given ESRD, will hold on IVFs  -anticipate correction with dialysis  -monitor     • Elevated troponin [R74.8] Yes     -initial troponin 0.055, most likely elevated secondary to ESRD  -EKG in ER shows no ST segment or T wave changes  -Patient denies chest pain  -will trend       • Anemia due to chronic kidney disease, on chronic dialysis  (CMS/[facility]) [N18.6, D63.1, Z99.2] Not Applicable     -H/H 10.5/31.5, stable  -monitor  -likely multifactorial in etiology, primarily due to ESRD but also iron deficiency from chronic blood loss     • Thrombocytopenia (CMS/[facility]) [D69.6] Yes     -platelets 88 on admission, decreased from 110s at last CBC in February 2019  -appears to be chronic thrombocytopenia  -no active bleeding  -continue to monitor     • Elevated brain natriuretic peptide (BNP) level [R79.89] Yes     -Echo in 7/2018 shows EF on 55-60% with grade Ia diastolic dysfunction  -BNP 4278 on admission  -no evidence of fluid overload on chest xray or physical exam  -care with fluids  -monitor fluid status carefully     • COPD (chronic obstructive pulmonary disease) (CMS/[facility]) [J44.9] Yes     -Continue flonase, loratadine  -not on home O2     • Physical deconditioning [R53.81] Yes     PT/OT      • Gastroesophageal reflux disease without esophagitis [K21.9] Yes     Continue H2 blocker     • Anxiety [F41.9] Yes     Continue lexapro     • ESRD (end stage renal disease) (CMS/[facility]) [N18.6] Yes     HD F  Nephrologist Dr. Vilchis         • Diabetes mellitus (CMS/[facility]) [E11.9] Yes     -Continue home lantus 25 units nightly, starting tomorrow night  -SSI         DVT prophylaxis: scd    Code status is   Code Status and Medical Interventions:   Ordered at: 03/31/19 2907     Level Of Support Discussed With:    Patient     Code Status:    CPR     Medical Interventions (Level of Support Prior to Arrest):    Full       Plan for disposition:to [facility], likely 1-2 days          This document has been electronically signed by Cory Rodríguez MD on April 1, 2019 8:03 AM

## 2019-04-01 NOTE — H&P
HISTORY AND PHYSICAL  NAME: Cristo Musa  : 1950  MRN: [id number]    DATE OF ADMISSION: 19    DATE & TIME SEEN: 19 11:14 PM    PCP: Cory Rodríguez MD    CODE STATUS: Full code    CHIEF COMPLAINT altered mental status, unresponsiveness    HPI:  Cristo Musa is a 69 y.o. male with a CMH of end-stage renal disease on hemodialysis, COPD, chronic heart failure, type 2 diabetes, GERD who presents from [facility] after being found unresponsive.  Nursing staff at [facility] report patient was unresponsive for 10-15 minutes before he began to respond to sternal rubs.  Vital signs at the time were stable and a blood sugar was found to be in the 400s.  EMS was called and patient was transferred to the ER for further evaluation.  He had not been having fevers, chills, or other symptoms to this point.  At the time of exam patient was alert and oriented x2, to person and to place.  He was able to state that he had last had dialysis on Friday and that over the last couple of days he has been increasingly sleepy.  He appeared mildly confused on exam, as several questions had to be re-asked several times or reworded before he would respond appropriately.  He denied chest pain, shortness of breath, nausea, vomiting, constipation, diarrhea, dysuria.  He does report that he is oliguric at baseline.    In the ER, labs and imaging were obtained.  Chest x-ray and head CT were within normal limits and he did not have signs or symptoms of fluid overload.  Blood sugar was in the low 100s on arrival.  BUN was found to be 57 and creatinine 7 which is a significant increase from the last renal function panel.  He has no signs of active infection.  He is being admitted for uremic encephalopathy.  He follows with Dr. Vilchis outpatient for nephrology.    CONCURRENT MEDICAL HISTORY:  Past Medical History:   Diagnosis Date   • Anemia of chronic disease    • Cataract    •  CHF (congestive heart failure) (CMS/HCC)    • CKD (chronic kidney disease)    • Clostridium difficile infection    • COPD (chronic obstructive pulmonary disease) (CMS/HCC)    • Diabetes mellitus (CMS/HCC)    • Glaucoma    • Heart block    • Hepatitis C    • History of transfusion    • Hypertension    • Nephropathy, diabetic (CMS/HCC)    • Pacemaker    • Pulmonary embolism (CMS/HCC)        PAST SURGICAL HISTORY:  Past Surgical History:   Procedure Laterality Date   • ABDOMINAL SURGERY     • ARTERIOVENOUS FISTULA/SHUNT SURGERY Right     forearm loop   • ARTERIOVENOUS FISTULA/SHUNT SURGERY Left     removed past upper arm   • ARTERIOVENOUS FISTULA/SHUNT SURGERY Right 3/13/2018    Procedure: revision RIGHT forearm ARTERIOVENOUS graft (excision), debridement, wound vac;  Surgeon: Jerson Singh MD;  Location: [facility] OR;  Service: Vascular   • ARTERIOVENOUS FISTULA/SHUNT SURGERY W/ HEMODIALYSIS CATHETER INSERTION Right 4/4/2016    Procedure: RIGHT ARM AV FISTULA DECLOT REVISION REPAIR BRACHIAL ANASTOMOTIC PSUEDOANEURYSM LEFT FEMORAL RICHARDSON FISTULOGRAM WITH ANGIOPLASTY;  Surgeon: Jerson Carpenter MD;  Location: [facility] OR 18/19;  Service:    • CATARACT EXTRACTION W/ INTRAOCULAR LENS IMPLANT Left 3/31/2017    Procedure: REMOVE CATARACT AND IMPLANT INTRAOCULAR LENS LEFT EYE;  Surgeon: Hilton Montemayor MD;  Location: [facility] OR;  Service:    • COLONOSCOPY N/A 3/12/2019    Procedure: COLONOSCOPY;  Surgeon: Flako Montoya MD;  Location: [facility] ENDOSCOPY;  Service: Gastroenterology   • ENDOSCOPY N/A 3/12/2019    Procedure: ESOPHAGOGASTRODUODENOSCOPY;  Surgeon: Flako Montoya MD;  Location: [facility] ENDOSCOPY;  Service: Gastroenterology   • EYE SURGERY     • HERNIA REPAIR     • IMPLANTABLE CARDIOVERTER DEFIBRILLATOR LEAD REPLACEMENT/POCKET REVISION Right 4/11/2016    Procedure: PACEMAKER LEADS X 3 AND BATTERY EXTRACTION WITH EXIMER LASER;  Surgeon: Vern Reeves MD;  Location: [facility] OR 18/19;   Service:    • INSERTION HEMODIALYSIS CATHETER Right 05/2015    jugular   • INTERVENTIONAL RADIOLOGY PROCEDURE Right 6/1/2017    Procedure: RIGHT dialysis fistulagram & angioplasty;  Surgeon: Jerson Singh MD;  Location: [facility] ANGIO INVASIVE LOCATION;  Service:    • INTERVENTIONAL RADIOLOGY PROCEDURE Right 8/24/2017    Procedure: IR dialysis fistulagram;  Surgeon: Jerson Singh MD;  Location: [facility] ANGIO INVASIVE LOCATION;  Service:    • INTERVENTIONAL RADIOLOGY PROCEDURE Right 11/13/2018    Procedure: IR dialysis fistulagram;  Surgeon: Jerson Singh MD;  Location: [facility] ANGIO INVASIVE LOCATION;  Service: Interventional Radiology   • PACEMAKER IMPLANTATION  2008    dual chamber [employer] [place]   • REMOVAL HEMODIALYSIS CATHETER Right 05/2015    femoral vein       FAMILY HISTORY:  Family History   Problem Relation Age of Onset   • COPD Sister    • Diabetes Brother    • Early death Brother    • Hyperlipidemia Brother    • Hypertension Brother    • Coronary artery disease Mother    • Diabetes Mother    • Hypertension Mother    • Stroke Other    • Other Other         Respiratory disorder        SOCIAL HISTORY:  Social History     Socioeconomic History   • Marital status: Legally      Spouse name: Not on file   • Number of children: Not on file   • Years of education: Not on file   • Highest education level: Not on file   Tobacco Use   • Smoking status: Former Smoker     Types: Cigarettes   • Smokeless tobacco: Never Used   • Tobacco comment: quit 20 years ago    Substance and Sexual Activity   • Alcohol use: No     Comment: former heavy use in his 50's, up to a fifth of liquor a day, currently no alcohol   • Drug use: No   • Sexual activity: No       HOME MEDICATIONS:  Prior to Admission medications    Medication Sig Start Date End Date Taking? Authorizing Provider   acetaminophen (TYLENOL) 500 MG tablet Take 500 mg by mouth Every 4 (Four) Hours As Needed for Mild Pain  or  Fever.   Yes Cleve Nguyễn MD   aspirin 81 MG chewable tablet Chew 81 mg Daily.   Yes Cleve Nguyễn MD   azithromycin (ZITHROMAX) 250 MG tablet Take 1 tablet by mouth Daily. 2/28/19  Yes Cory Rodríguez MD   brimonidine (ALPHAGAN P) 0.1 % solution ophthalmic solution Administer 1 drop to both eyes 3 (Three) Times a Day.   Yes Cleve Nguyễn MD   Cholecalciferol (VITAMIN D3) 2000 units chewable tablet Chew 2,000 Units Daily.   Yes Cleve Nguyễn MD   cyclobenzaprine (FLEXERIL) 5 MG tablet Take 5 mg by mouth Every 8 (Eight) Hours As Needed for Muscle Spasms.   Yes Cleve Nguyễn MD   dorzolamide (TRUSOPT) 2 % ophthalmic solution Administer 1 drop into the left eye 3 (Three) Times a Day. 1/24/17  Yes Inderjit Grant MD   escitalopram (LEXAPRO) 10 MG tablet Take 1 tablet by mouth Daily. 12/23/18  Yes Janel Gordon MD   famotidine (PEPCID) 20 MG tablet Take 20 mg by mouth every night at bedtime.   Yes Cleve Nguyễn MD   fluticasone (FLONASE) 50 MCG/ACT nasal spray 2 sprays into each nostril daily. Administer 2 sprays in each nostril for each dose.   Yes Cleve Nguyễn MD   gabapentin (NEURONTIN) 100 MG capsule Take 1 capsule by mouth Every Night. 2/28/19  Yes Cory Rodríguez MD   guaiFENesin 200 MG tablet Take 400 mg by mouth Every 4 (Four) Hours As Needed for Cough.   Yes Cleve Nguyễn MD   HYDROcodone-acetaminophen (NORCO) 5-325 MG per tablet Take 1 tablet by mouth 2 (Two) Times a Day As Needed for Severe Pain . 2/28/19  Yes Cory Rodríguez MD   insulin aspart (NovoLOG) 100 UNIT/ML injection Inject  under the skin 3 (Three) Times a Day Before Meals. Sliding scale:  = 0 units; 150-200 = 3 units; 200-250 = 6 units; 250-300 = 9 units; 300-350 = 12 units; >350 = 15 units and call MD   Yes Cleve Nguyễn MD   insulin detemir (LEVEMIR) 100 UNIT/ML injection Inject 25 Units under the skin into the appropriate  area as directed Daily.   Yes Cleve Nguyễn MD   latanoprost (XALATAN) 0.005 % ophthalmic solution Administer 1 drop to both eyes every night.   Yes Cleve Nguyễn MD   Loratadine 10 MG capsule Take 10 mg by mouth Every Night. 3/16/18  Yes Cleve Nguyễn MD   melatonin 5 MG tablet tablet Take 5 mg by mouth Every Night.   Yes Cleve Nguyễn MD   Nutritional Supplements (NEPRO PO) Take 1 tablet by mouth Daily. On Sun, Tues, Thur, Sat   Yes Cleve Nguyễn MD   ondansetron ODT (ZOFRAN-ODT) 4 MG disintegrating tablet Take 1 tablet by mouth Every 6 (Six) Hours As Needed for Nausea or Vomiting. 2/17/18  Yes Salvador Elmore MD   polyethylene glycol (MIRALAX) packet Take 17 g by mouth Daily As Needed (constipation).   Yes Cleve Nguyễn MD   sennosides-docusate sodium (SENOKOT-S) 8.6-50 MG tablet Take 2 tablets by mouth 2 (Two) Times a Day As Needed for Constipation. 3/16/18  Yes Justyna Patel APRN   sevelamer (RENVELA) 800 MG tablet Take 800 mg by mouth 3 (Three) Times a Day With Meals.   Yes Cleve Nguyễn MD   traZODone (DESYREL) 50 MG tablet Take 50 mg by mouth every night at bedtime.   Yes Cleve Nguyễn MD       ALLERGIES:  Lisinopril and Motrin [ibuprofen]    REVIEW OF SYSTEMS  Review of Systems   Constitutional: Positive for activity change (increased somnolence x 2 days). Negative for appetite change, chills, fatigue and fever.   HENT: Negative for hearing loss, sneezing, sore throat and trouble swallowing.    Eyes: Positive for visual disturbance.   Respiratory: Negative for cough, chest tightness and shortness of breath.    Cardiovascular: Negative for chest pain, palpitations and leg swelling.   Gastrointestinal: Negative for abdominal pain, blood in stool, constipation, diarrhea, nausea and vomiting.   Genitourinary: Positive for decreased urine volume (oliguria at baseline). Negative for difficulty urinating, dysuria and frequency.    Allergic/Immunologic: Negative for environmental allergies and food allergies.   Neurological: Positive for weakness. Negative for dizziness, light-headedness and headaches.   Psychiatric/Behavioral: Positive for confusion. Negative for agitation and hallucinations.       PHYSICAL EXAM:  Temp:  [97.7 °F (36.5 °C)] 97.7 °F (36.5 °C)  Heart Rate:  [62-64] 64  Resp:  [18-20] 20  BP: (129-139)/(71-79) 139/79  Body mass index is 35.2 kg/m².  Physical Exam   Constitutional: He appears well-developed and well-nourished. No distress.   HENT:   Head: Normocephalic and atraumatic.   Right Ear: External ear normal.   Left Ear: External ear normal.   Eyes: EOM are normal. Left eye exhibits discharge (clear discharge present in corner of left eye).   Eyes are slightly protuberant bilaterally   Neck: Normal range of motion. Neck supple.   Cardiovascular: Normal rate, regular rhythm and normal heart sounds.   Pulmonary/Chest: Effort normal and breath sounds normal. No respiratory distress.   Abdominal: Soft. Bowel sounds are normal. He exhibits no distension and no mass. There is no tenderness.   Musculoskeletal: Normal range of motion. He exhibits no edema.   Neurological: He is alert. He has normal reflexes. He displays tremor. No cranial nerve deficit.   Oriented x 2, to person and place   Skin: Skin is warm and dry.   Psychiatric: He has a normal mood and affect. His behavior is normal.       DIAGNOSTIC DATA:   Lab Results (last 24 hours)     Procedure Component Value Units Date/Time    Extra Tubes [[id number]] Collected:  03/31/19 2126    Specimen:  Blood, Venous Line Updated:  03/31/19 2230    Narrative:       The following orders were created for panel order Extra Tubes.  Procedure                               Abnormality         Status                     ---------                               -----------         ------                     Light Blue Top[[id number]]                                   Final result                Lavender Top[[id number]]                                     Final result               Lavender Top[[id number]]                                     Final result               Gold Top - SST[[id number]]                                   Final result                 Please view results for these tests on the individual orders.    Lavender Top [[id number]] Collected:  03/31/19 2126    Specimen:  Blood Updated:  03/31/19 2230     Extra Tube hold for add-on     Comment: Auto resulted       Lavender Top [[id number]] Collected:  03/31/19 2126    Specimen:  Blood Updated:  03/31/19 2230     Extra Tube hold for add-on     Comment: Auto resulted       Gold Top - SST [[id number]] Collected:  03/31/19 2126    Specimen:  Blood Updated:  03/31/19 2230     Extra Tube Hold for add-ons.     Comment: Auto resulted.       [place] Draw [[id number]] Collected:  03/31/19 1920    Specimen:  Blood Updated:  03/31/19 2230    Narrative:       The following orders were created for panel order [place] Draw.  Procedure                               Abnormality         Status                     ---------                               -----------         ------                     Light Blue Top[[id number]]                                   Final result               Green Top (Gel)[[id number]]                                  Final result               Lavender Top[[id number]]                                     Final result               Gold Top - SST[[id number]]                                   Final result                 Please view results for these tests on the individual orders.    Green Top (Gel) [[id number]] Collected:  03/31/19 2118    Specimen:  Blood from Arm, Left Updated:  03/31/19 2230     Extra Tube Hold for add-ons.     Comment: Auto resulted.       Gold Top - SST [[id number]] Collected:  03/31/19 2118    Specimen:  Blood from Arm, Left Updated:  03/31/19 2230     Extra Tube Hold for add-ons.     Comment: Auto resulted.       Light  Blue Top [[id number]] Collected:  03/31/19 2126    Specimen:  Blood Updated:  03/31/19 2230     Extra Tube hold for add-on     Comment: Auto resulted       Troponin [[id number]]  (Abnormal) Collected:  03/31/19 2118    Specimen:  Blood from Arm, Left Updated:  03/31/19 2158     Troponin T 0.055 ng/mL     Narrative:       Troponin T Reference Range:  <= 0.03 ng/mL-   Negative for AMI  >0.03 ng/mL-     Abnormal for myocardial necrosis.  Clinicians would have to utilize clinical acumen, EKG, Troponin and serial changes to determine if it is an Acute Myocardial Infarction or myocardial injury due to an underlying chronic condition.     BNP [[id number]]  (Abnormal) Collected:  03/31/19 2118    Specimen:  Blood from Arm, Left Updated:  03/31/19 2153     proBNP 4,278.0 pg/mL     Narrative:       Among patients with dyspnea, NT-proBNP is highly sensitive for the detection of acute congestive heart failure. In addition NT-proBNP of <300 pg/ml effectively rules out acute congestive heart failure with 99% negative predictive value.    TSH [[id number]]  (Normal) Collected:  03/31/19 2118    Specimen:  Blood from Arm, Left Updated:  03/31/19 2153     TSH 2.130 mIU/mL     Comprehensive Metabolic Panel [[id number]]  (Abnormal) Collected:  03/31/19 2118    Specimen:  Blood from Arm, Left Updated:  03/31/19 2151     Glucose 102 mg/dL      BUN 57 mg/dL      Creatinine 7.06 mg/dL      Sodium 131 mmol/L      Potassium 4.2 mmol/L      Chloride 78 mmol/L      CO2 28.0 mmol/L      Calcium 9.0 mg/dL      Total Protein 10.1 g/dL      Albumin 3.40 g/dL      ALT (SGPT) 10 U/L      AST (SGOT) 22 U/L      Alkaline Phosphatase 171 U/L      Total Bilirubin 1.1 mg/dL      eGFR Non African Amer -- mL/min/1.73      Comment: <15 Indicative of kidney failure.        eGFR  African Amer 9 mL/min/1.73      Comment: <15 Indicative of kidney failure.        Globulin 6.7 gm/dL      A/G Ratio 0.5 g/dL      BUN/Creatinine Ratio 8.1     Anion Gap 25.0 mmol/L      Narrative:       GFR Normal >60  Chronic Kidney Disease <60  Kidney Failure <15    Magnesium [[id number]]  (Normal) Collected:  03/31/19 2118    Specimen:  Blood from Arm, Left Updated:  03/31/19 2148     Magnesium 2.3 mg/dL     Extra Tubes [[id number]] Collected:  03/31/19 2127    Specimen:  Blood, Venous Line Updated:  03/31/19 2127    Narrative:       The following orders were created for panel order Extra Tubes.  Procedure                               Abnormality         Status                     ---------                               -----------         ------                     [place] Top[[id number]]                                         In process                   Please view results for these tests on the individual orders.    Gray Top [[id number]] Collected:  03/31/19 2127    Specimen:  Blood Updated:  03/31/19 2127    Blood Culture - Blood, Arm, Left [[id number]] Collected:  03/31/19 2118    Specimen:  Blood from Arm, Left Updated:  03/31/19 2124    CBC & Differential [[id number]] Collected:  03/31/19 1920    Specimen:  Blood Updated:  03/31/19 2041    Narrative:       The following orders were created for panel order CBC & Differential.  Procedure                               Abnormality         Status                     ---------                               -----------         ------                     Scan Slide[[id number]]                                       Final result               CBC Auto Differential[[id number]]        Abnormal            Final result                 Please view results for these tests on the individual orders.    Scan Slide [[id number]] Collected:  03/31/19 1920    Specimen:  Blood Updated:  03/31/19 2041     Anisocytosis Mod/2+     Hypochromia Slight/1+     Vacuolated Neutrophils Slight/1+     Platelet Estimate Decreased     Clumped Platelets --     Comment: NONE SEEN       Light Blue Top [[id number]] Collected:  03/31/19 1920    Specimen:  Blood Updated:  03/31/19 2030      Extra Tube hold for add-on     Comment: Auto resulted       Lavender Top [[id number]] Collected:  03/31/19 1920    Specimen:  Blood Updated:  03/31/19 2030     Extra Tube hold for add-on     Comment: Auto resulted       POC Glucose Once [[id number]]  (Abnormal) Collected:  03/31/19 2017    Specimen:  Blood Updated:  03/31/19 2028     Glucose 141 mg/dL      Comment: Result Not ConfirmedOperator: [id number] KLARISSA ALONAMeter ID: [id number]       Lactic Acid, Plasma [[id number]]  (Normal) Collected:  03/31/19 1920    Specimen:  Blood Updated:  03/31/19 1942     Lactate 1.9 mmol/L     CBC Auto Differential [[id number]]  (Abnormal) Collected:  03/31/19 1920    Specimen:  Blood Updated:  03/31/19 1933     WBC 4.53 10*3/mm3      RBC 3.65 10*6/mm3      Hemoglobin 10.5 g/dL      Hematocrit 31.5 %      MCV 86.3 fL      MCH 28.8 pg      MCHC 33.3 g/dL      RDW 17.3 %      RDW-SD 53.6 fl      MPV 8.9 fL      Platelets 88 10*3/mm3      Neutrophil % 56.6 %      Lymphocyte % 25.6 %      Monocyte % 14.1 %      Eosinophil % 2.6 %      Basophil % 0.7 %      Immature Grans % 0.4 %      Neutrophils, Absolute 2.56 10*3/mm3      Lymphocytes, Absolute 1.16 10*3/mm3      Monocytes, Absolute 0.64 10*3/mm3      Eosinophils, Absolute 0.12 10*3/mm3      Basophils, Absolute 0.03 10*3/mm3      Immature Grans, Absolute 0.02 10*3/mm3      nRBC 0.0 /100 WBC     Blood Culture - Blood, Arm, Right [[id number]] Collected:  03/31/19 1920    Specimen:  Blood from Arm, Right Updated:  03/31/19 1927           Imaging Results (last 24 hours)     Procedure Component Value Units Date/Time    XR Chest 1 View [[id number]] Collected:  03/31/19 1832     Updated:  03/31/19 1857    Narrative:       PROCEDURE: XR CHEST 1 VW    VIEWS:Single    INDICATION: Generalized weakness    COMPARISON: CXR: 2/26/2018    FINDINGS:       - lines/tubes: None    - cardiac: Borderline size   - mediastinum: Stable contour compared to the prior study of  2/26/2019.     - lungs: Mild  central vascular congestion. Streaky opacity in  the right lung base may represent atelectasis or infiltrate    - pleura: No evidence of  fluid.      - osseous: Unremarkable for age.      Impression:         1. Stable borderline cardiomegaly  2. Right base atelectasis versus infiltrate.      Electronically signed by:  Renetta Fountain MD  3/31/2019 6:56 PM CDT  Workstation: [id number]    CT Head Without Contrast [[id number]] Collected:  03/31/19 1821     Updated:  03/31/19 1849    Narrative:       PROCEDURE: CT HEAD WO CONTRAST    INDICATION:  Generalized weakness, sleepiness    COMPARISON:  2/26/2019    TECHNIQUE:  CT head, without iv contrast.       This exam was performed according to our departmental  dose-optimization program, which includes automated exposure  control, adjustment of the mA and/or kV according to patient size  and/or use of iterative reconstruction technique.  DLP is 904.0    FINDINGS:    The degree of cerebral atrophy is within normal limits for age.    There is preservation of the gray-white matter differentiation.      No focal parenchymal lesions.    There are mild age related degenerative cortical and deep white  matter changes.    There is no evidence of a hemorrhage or intracranial mass.    There is no midline shift.    The ventricles are normal in size and configuration.    The brain stem, cerebellum, globes, paranasal sinuses, and  osseous structures are within normal limits.        Impression:       No acute intracranial abnormality.    If pain or symptoms persist beyond reasonable expectations, an  MRI examination is suggested as is deemed clinically appropriate.    Electronically signed by:  Renetta Fountain MD  3/31/2019 6:47 PM CDT  Workstation: [id number]            I reviewed the patient's new clinical results.    ASSESSMENT AND PLAN: This is a 69 y.o. male with:    Active Hospital Problems    Diagnosis POA   • **Uremic encephalopathy [G93.41, N19] Yes     -A & Ox2, slightly confused  -BUN  57, up from 40s on 3/16/19; will continue to trend  -Sees Dr. Vilchis outpatient for M/W/F HD, completed HD on Friday  -will consult nephrology for dialysis, believe based on his current mental status that dialysis is urgent and not emergent and can be done tomorrow during his scheduled time   -will closely monitor his mental status       • Hyponatremia [E87.1] Yes     -sodium 131 on admission  -given ESRD, will hold on IVFs  -anticipate correction with dialysis  -monitor     • Elevated troponin [R74.8] Yes     -initial troponin 0.055, most likely elevated secondary to ESRD  -EKG in ER shows no ST segment or T wave changes  -Patient denies chest pain  -will trend       • Anemia due to chronic kidney disease, on chronic dialysis (CMS/[facility]) [N18.6, D63.1, Z99.2] Not Applicable     -H/H 10.5/31.5, stable  -monitor  -likely multifactorial in etiology, primarily due to ESRD but also iron deficiency from chronic blood loss     • Thrombocytopenia (CMS/[facility]) [D69.6] Yes     -platelets 88 on admission, decreased from 110s at last CBC in February 2019  -appears to be chronic thrombocytopenia  -no active bleeding  -continue to monitor     • Elevated brain natriuretic peptide (BNP) level [R79.89] Yes     -Echo in 7/2018 shows EF on 55-60% with grade Ia diastolic dysfunction  -BNP 4278 on admission  -no evidence of fluid overload on chest xray or physical exam  -care with fluids  -monitor fluid status carefully     • COPD (chronic obstructive pulmonary disease) (CMS/[facility]) [J44.9] Yes     -Continue flonase, loratadine  -not on home O2     • Physical deconditioning [R53.81] Yes     PT/OT      • Gastroesophageal reflux disease without esophagitis [K21.9] Yes     Continue H2 blocker     • Anxiety [F41.9] Yes     Continue lexapro     • ESRD (end stage renal disease) (CMS/[facility]) [N18.6] Yes     HD [facility]  Nephrologist Dr. Vilchis         • Diabetes mellitus (CMS/[facility]) [E11.9] Yes     -Continue home lantus 25 units nightly, starting tomorrow  night  -SSI         DVT prophylaxis: SCDs/TEDs     [facility] #[id number], reviewed and consistent with patient reported medications.    Expected Length of Stay: Anticipate discharge back to nursing home in 2-4 days    I discussed the patients findings and my recommendations with patient.     Dr. Haider is the attending on record at time of admission, He is aware of the patient's status and agrees with the above history and physical.        Michelle Lo MD PGY2   [facility]  This document has been electronically signed by Michelle Lo MD on March 31, 2019 11:44 PM

## 2019-04-01 NOTE — PLAN OF CARE
Problem: Patient Care Overview  Goal: Plan of Care Review  Outcome: Ongoing (interventions implemented as appropriate)   04/01/19 0523   Coping/Psychosocial   Plan of Care Reviewed With patient   Plan of Care Review   Progress no change   OTHER   Outcome Summary pt alert but got alittle confused early this morning, vs stable, bedfast at this time, will continue to monitor.     Goal: Individualization and Mutuality  Outcome: Ongoing (interventions implemented as appropriate)    Goal: Discharge Needs Assessment  Outcome: Ongoing (interventions implemented as appropriate)    Goal: Interprofessional Rounds/Family Conf  Outcome: Ongoing (interventions implemented as appropriate)      Problem: Skin Injury Risk (Adult)  Goal: Identify Related Risk Factors and Signs and Symptoms  Outcome: Ongoing (interventions implemented as appropriate)    Goal: Skin Health and Integrity  Outcome: Ongoing (interventions implemented as appropriate)      Problem: Fall Risk (Adult)  Goal: Identify Related Risk Factors and Signs and Symptoms  Outcome: Ongoing (interventions implemented as appropriate)    Goal: Absence of Fall  Outcome: Ongoing (interventions implemented as appropriate)      Problem: Kidney Disease, Chronic/End Stage Renal Disease (Adult)  Goal: Signs and Symptoms of Listed Potential Problems Will be Absent, Minimized or Managed (Kidney Disease, Chronic/End Stage Renal Disease)  Outcome: Ongoing (interventions implemented as appropriate)      Problem: Thought Process Alteration (Adult)  Goal: Identify Related Risk Factors and Signs and Symptoms  Outcome: Ongoing (interventions implemented as appropriate)    Goal: Improved Thought Process  Outcome: Ongoing (interventions implemented as appropriate)

## 2019-04-01 NOTE — SIGNIFICANT NOTE
04/01/19 1423   Rehab Time/Intention   Evaluation Not Performed other (see comments)  (PT unable to wake pt for PT eval. Pt did not wake to verbal stimuli or to PT shaking his shoulder. PT will check back tomorrow.)

## 2019-04-01 NOTE — ED NOTES
Attempted to call report, was hung up on once. Second call was placed on hold x 6 minutes. Will call again at a later time.      Diana Morgan RN  03/31/19 [id number]

## 2019-04-02 NOTE — PLAN OF CARE
Problem: Patient Care Overview  Goal: Plan of Care Review  Outcome: Ongoing (interventions implemented as appropriate)   04/02/19 1115   Coping/Psychosocial   Plan of Care Reviewed With patient   Plan of Care Review   Progress improving   OTHER   Outcome Summary Patient will be discharged today.

## 2019-04-02 NOTE — THERAPY EVALUATION
Acute Care - Occupational Therapy Initial Evaluation  [facility]     Patient Name: Cristo Musa  : 1950  MRN: [id number]  Today's Date: 2019  Onset of Illness/Injury or Date of Surgery: 19  Date of Referral to OT: 19  Referring Physician: Dr. Michelle Lo    Admit Date: 3/31/2019       ICD-10-CM ICD-9-CM   1. Altered mental status, unspecified altered mental status type R41.82 780.97   2. End stage renal disease (CMS/HCC) N18.6 585.6   3. Impaired mobility and ADLs Z74.09 799.89     Patient Active Problem List   Diagnosis   • Pacemaker infection (CMS/HCC)   • Renal failure, chronic   • Diabetes mellitus (CMS/HCC)   • Hypertension   • Complete heart block (CMS/HCC)   • Arteriovenous shunt thrombosis (CMS/HCC)   • Follow-up surgery care   • Arteriovenous fistula, acquired (CMS/[facility])   • Acquired stenosis of nasolacrimal duct   • Exotropia   • Optic atrophy   • Pseudophakia   • Posterior subcapsular polar age-related cataract   • Nuclear cataract   • Primary open angle glaucoma of left eye, moderate stage   • Cortical age-related cataract   • ESRD (end stage renal disease) (CMS/HCC)   • Primary insomnia   • Chronic pain   • Anxiety   • ESRD on dialysis (CMS/HCC)   • Gastroesophageal reflux disease without esophagitis   • Neuropathy   • A-V fistula (CMS/HCC)   • Physical deconditioning   • COPD (chronic obstructive pulmonary disease) (CMS/HCC)   • Non-healing wound of upper extremity   • Post-operative pain   • Elevated brain natriuretic peptide (BNP) level   • Arteriovenous fistula stenosis (CMS/HCC)   • AV fistula stenosis (CMS/HCC)   • Generalized abdominal pain   • Iron deficiency anemia due to chronic blood loss   • Pneumonia of right upper lobe due to infectious organism (CMS/HCC)   • Uremic encephalopathy   • Hyponatremia   • Elevated troponin   • Anemia due to chronic kidney disease, on chronic dialysis (CMS/HCC)   • Thrombocytopenia (CMS/HCC)     Past Medical History:    Diagnosis Date   • Anemia of chronic disease    • Cataract    • CHF (congestive heart failure) (CMS/HCC)    • CKD (chronic kidney disease)    • Clostridium difficile infection    • COPD (chronic obstructive pulmonary disease) (CMS/HCC)    • Diabetes mellitus (CMS/HCC)    • Glaucoma    • Heart block    • Hepatitis C    • History of transfusion    • Hypertension    • Nephropathy, diabetic (CMS/HCC)    • Pacemaker    • Pulmonary embolism (CMS/HCC)      Past Surgical History:   Procedure Laterality Date   • ABDOMINAL SURGERY     • ARTERIOVENOUS FISTULA/SHUNT SURGERY Right     forearm loop   • ARTERIOVENOUS FISTULA/SHUNT SURGERY Left     removed past upper arm   • ARTERIOVENOUS FISTULA/SHUNT SURGERY Right 3/13/2018    Procedure: revision RIGHT forearm ARTERIOVENOUS graft (excision), debridement, wound vac;  Surgeon: Jersno Singh MD;  Location: [facility] OR;  Service: Vascular   • ARTERIOVENOUS FISTULA/SHUNT SURGERY W/ HEMODIALYSIS CATHETER INSERTION Right 4/4/2016    Procedure: RIGHT ARM AV FISTULA DECLOT REVISION REPAIR BRACHIAL ANASTOMOTIC PSUEDOANEURYSM LEFT FEMORAL RICHARDSON FISTULOGRAM WITH ANGIOPLASTY;  Surgeon: Jerson Carpenter MD;  Location: [facility] OR 18/19;  Service:    • CATARACT EXTRACTION W/ INTRAOCULAR LENS IMPLANT Left 3/31/2017    Procedure: REMOVE CATARACT AND IMPLANT INTRAOCULAR LENS LEFT EYE;  Surgeon: Hilton Montemayor MD;  Location: [facility] OR;  Service:    • COLONOSCOPY N/A 3/12/2019    Procedure: COLONOSCOPY;  Surgeon: Flako Montoya MD;  Location: [facility] ENDOSCOPY;  Service: Gastroenterology   • ENDOSCOPY N/A 3/12/2019    Procedure: ESOPHAGOGASTRODUODENOSCOPY;  Surgeon: Flako Montoya MD;  Location: [facility] ENDOSCOPY;  Service: Gastroenterology   • EYE SURGERY     • HERNIA REPAIR     • IMPLANTABLE CARDIOVERTER DEFIBRILLATOR LEAD REPLACEMENT/POCKET REVISION Right 4/11/2016    Procedure: PACEMAKER LEADS X 3 AND BATTERY EXTRACTION WITH EXIMER LASER;  Surgeon: Vern SERRATO  MD Leandro;  Location: [facility] HYBRID OR 18/19;  Service:    • INSERTION HEMODIALYSIS CATHETER Right 05/2015    jugular   • INTERVENTIONAL RADIOLOGY PROCEDURE Right 6/1/2017    Procedure: RIGHT dialysis fistulagram & angioplasty;  Surgeon: Jerson Singh MD;  Location: [facility] ANGIO INVASIVE LOCATION;  Service:    • INTERVENTIONAL RADIOLOGY PROCEDURE Right 8/24/2017    Procedure: IR dialysis fistulagram;  Surgeon: Jerson Singh MD;  Location: [facility] ANGIO INVASIVE LOCATION;  Service:    • INTERVENTIONAL RADIOLOGY PROCEDURE Right 11/13/2018    Procedure: IR dialysis fistulagram;  Surgeon: Jerson Singh MD;  Location: [facility] ANGIO INVASIVE LOCATION;  Service: Interventional Radiology   • PACEMAKER IMPLANTATION  2008    dual chamber [employer]   • REMOVAL HEMODIALYSIS CATHETER Right 05/2015    femoral vein          OT ASSESSMENT FLOWSHEET (last 12 hours)      Occupational Therapy Evaluation     Row Name 04/02/19 0800                   OT Evaluation Time/Intention    Subjective Information  complains of;fatigue  -AS        Document Type  evaluation 8:20-8:47  -AS        Mode of Treatment  individual therapy;occupational therapy  -AS        Total Evaluation Minutes, Occupational Therapy  27  -AS        Patient Effort  adequate  -AS        Symptoms Noted During/After Treatment  fatigue  -AS        Comment  Pt rec'd attempting to eat breakfast with great difficulty; pt appeared to have problem seeing as well as initating and sequencing   -AS           General Information    Patient Profile Reviewed?  yes  -AS        Onset of Illness/Injury or Date of Surgery  04/01/19  -AS        Referring Physician  Michelle Lo MD  -AS        Patient Observations  cooperative;agree to therapy;lethargic  -AS        Patient/Family Observations  no family present  -AS        General Observations of Patient  supine in bed, RA,tele, bed exit alarm, attempting to eat breakfast with difficulty  -AS         Prior Level of Function  independent:;gait;transfer;ADL's  -AS        Equipment Currently Used at Home  walker, rolling;wheelchair  -AS        Pertinent History of Current Functional Problem  Pt is 68 yo M admitted for AMS; dx uremic encephalopahty; recieves dialysis  -AS        Existing Precautions/Restrictions  fall  -AS        Limitations/Impairments  safety/cognitive;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  -AS        Equipment Issued to Patient  gait belt  -AS        Risks Reviewed  patient:;other:;LOB;nausea/vomiting;dizziness;increased discomfort;change in vital signs;increased drainage;lines disloged  -AS        Benefits Reviewed  patient:;improve function;increase independence;increase strength;increase balance;decrease pain;decrease risk of DVT;improve skin integrity;increase knowledge  -AS        Barriers to Rehab  cognitive status;visual deficit  -AS           Relationship/Environment    Lives With  facility resident  -AS           Resource/Environmental Concerns    Current Living Arrangements  extended care facility  -AS           Cognitive Assessment/Intervention- PT/OT    Orientation Status (Cognition)  oriented to;person;place  -AS        Follows Commands (Cognition)  follows one step commands;25-49% accuracy;delayed response/completion;increased processing time needed;initiation impaired;physical/tactile prompts required;repetition of directions required;verbal cues/prompting required  -AS        Safety Deficit (Cognitive)  moderate deficit  -AS           Safety Issues, Functional Mobility    Impairments Affecting Function (Mobility)  endurance/activity tolerance;strength;balance  -AS           Bed Mobility Assessment/Treatment    Bed Mobility Assessment/Treatment  sit-supine  -AS        Sit-Supine [place] (Bed Mobility)  maximum assist (25% patient effort)  -AS        Bed Mobility, Safety Issues  cognitive deficits limit understanding;decreased use of legs for  bridging/pushing  -AS        Assistive Device (Bed Mobility)  bed rails;head of bed elevated  -AS        Comment (Bed Mobility)  Pt tolerated sitting EOB approx 15 minutes with supervision  -AS           Functional Mobility    Functional Mobility- Comment  unable to asssess on this date; pt reported fatigue after sitting EOB for breakfast and requested to return supine.   -AS           Transfer Assessment/Treatment    Comment (Transfers)  unable to asssess on this date; pt reported fatigue after sitting EOB for breakfast and requested to return supine.   -AS           ADL Assessment/Intervention    BADL Assessment/Intervention  feeding  -AS           Self-Feeding Assessment/Training    [place] Level (Feeding)  feeding skills;set up;supervision;verbal cues  -AS        Position (Self-Feeding)  unsupported sitting sitting EOB  -AS           BADL Safety/Performance    Impairments, BADL Safety/Performance  balance;endurance/activity tolerance;cognition;strength  -AS           General ROM    GENERAL ROM COMMENTS  difficult to formally assess 2/2 lethargy and inability to consistently follow commands  -AS           MMT (Manual Muscle Testing)    General MMT Comments  difficult to formally assess 2/2 lethargy and inability to consistently follow commands  -AS           Motor Assessment/Interventions    Additional Documentation  Balance (Group)  -AS           Balance    Balance  static sitting balance  -AS           Static Sitting Balance    Level of [place] (Unsupported Sitting, Static Balance)  supervision  -AS        Sitting Position (Unsupported Sitting, Static Balance)  sitting on edge of bed  -AS           Sensory Assessment/Intervention    Sensory General Assessment  other (see comments) unable to assess 2/2 cognitive deficits  -AS           Positioning and Restraints    Pre-Treatment Position  in bed  -AS        Post Treatment Position  bed  -AS        In Bed  exit alarm on;encouraged to call for  assist;call light within reach;with nsg  -AS           Plan of Care Review    Plan of Care Reviewed With  patient  -AS           Clinical Impression (OT)    Date of Referral to OT  03/31/19  -AS        OT Diagnosis  impaired mobility and ADLs  -AS        Criteria for Skilled Therapeutic Interventions Met (OT Eval)  yes;treatment indicated  -AS        Rehab Potential (OT Eval)  fair, will monitor progress closely  -AS        Therapy Frequency (OT Eval)  other (see comments) 5-7x/wk  -AS        Predicted Duration of Therapy Intervention (Therapy Eval)  until goals met or d/c from facility  -AS        Care Plan Review (OT)  evaluation/treatment results reviewed;care plan/treatment goals reviewed;risks/benefits reviewed;current/potential barriers reviewed;patient/other agree to care plan  -AS        Anticipated Discharge Disposition (OT)  skilled nursing facility  -AS           Vital Signs    Post Systolic BP Rehab  148  -AS        Post Treatment Diastolic BP  79  -AS        Post SpO2 (%)  97  -AS        O2 Delivery Post Treatment  room air  -AS        Pre Patient Position  Supine  -AS        Post Patient Position  Supine  -AS           Planned OT Interventions    Planned Therapy Interventions (OT Eval)  activity tolerance training;adaptive equipment training;BADL retraining;functional balance retraining;patient/caregiver education/training;strengthening exercise;transfer/mobility retraining;occupation/activity based interventions  -AS           OT Goals    Transfer Goal Selection (OT)  transfer, OT goal 1  -AS        Dressing Goal Selection (OT)  dressing, OT goal 1  -AS        Toileting Goal Selection (OT)  toileting, OT goal 1  -AS           Transfer Goal 1 (OT)    Activity/Assistive Device (Transfer Goal 1, OT)  sit-to-stand/stand-to-sit;bed-to-chair/chair-to-bed;toilet  -AS        [place] Level/Cues Needed (Transfer Goal 1, OT)  minimum assist (75% or more patient effort)  -AS        Time Frame (Transfer Goal  1, OT)  long term goal (LTG);by discharge  -AS        Barriers (Transfers Goal 1, OT)  confusion  -AS        Progress/Outcome (Transfer Goal 1, OT)  goal not met  -AS           Dressing Goal 1 (OT)    Activity/Assistive Device (Dressing Goal 1, OT)  dressing skills, all  -AS        [place]/Cues Needed (Dressing Goal 1, OT)  minimum assist (75% or more patient effort)  -AS        Time Frame (Dressing Goal 1, OT)  long term goal (LTG);by discharge  -AS        Barriers (Dressing Goal 1, OT)  confusion  -AS        Progress/Outcome (Dressing Goal 1, OT)  goal not met  -AS           Toileting Goal 1 (OT)    Activity/Device (Toileting Goal 1, OT)  toileting skills, all  -AS        [place] Level/Cues Needed (Toileting Goal 1, OT)  supervision required  -AS        Time Frame (Toileting Goal 1, OT)  long term goal (LTG);by discharge  -AS        Barriers (Toileting Goal 1, OT)  confusion  -AS        Progress/Outcome (Toileting Goal 1, OT)  goal not met  -AS           Living Environment    Home Accessibility  wheelchair accessible  -AS          User Key  (r) = Recorded By, (t) = Taken By, (c) = Cosigned By    Initials Name Effective Dates    AS Alesia Pierre OT 03/25/19 -          Occupational Therapy Education     Title: PT OT SLP Therapies (In Progress)     Topic: Occupational Therapy (In Progress)     Point: ADL training (In Progress)     Description: Instruct learner(s) on proper safety adaptation and remediation techniques during self care or transfers.   Instruct in proper use of assistive devices.    Learning Progress Summary           Patient SOFI White, NR by AS at 4/2/2019 12:23 PM    Comment:  role of OT, OT POC, ADL training                               User Key     Initials Effective Dates Name Provider Type Discipline    AS 03/25/19 -  Alesia Pierre OT Occupational Therapist OT                  OT Recommendation and Plan  Outcome Summary/Treatment Plan (OT)  Anticipated Discharge Disposition (OT):  skilled nursing facility  Planned Therapy Interventions (OT Eval): activity tolerance training, adaptive equipment training, BADL retraining, functional balance retraining, patient/caregiver education/training, strengthening exercise, transfer/mobility retraining, occupation/activity based interventions  Therapy Frequency (OT Eval): other (see comments)(5-7x/wk)  Plan of Care Review  Plan of Care Reviewed With: patient  Plan of Care Reviewed With: patient  Outcome Summary: OT eval completed. Pt confused and lethargic but cooperative. Pt able to tolerate sitting EOB approx 15 min with supervision and required s/u/S for breakfast. Pt max A sit>supine. Pt demo difficulty following simple commands, staying alert, and initiating/sequencing task. Pt presents with decreased cognition, balance, activity tolerance, strength, and mobility limiting independence in ADLs. Pt would benefit from continued skilled OT services to address areas of deficit and promote return to highest level of functioning. Rec continued OT services upon d/c back to SNF.     Outcome Measures     Row Name 04/02/19 0800             How much help from another is currently needed...    Putting on and taking off regular lower body clothing?  2  -AS      Bathing (including washing, rinsing, and drying)  2  -AS      Toileting (which includes using toilet bed pan or urinal)  2  -AS      Putting on and taking off regular upper body clothing  2  -AS      Taking care of personal grooming (such as brushing teeth)  3  -AS      Eating meals  3  -AS      Score  14  -AS         Functional Assessment    Outcome Measure Options  AM-PAC 6 Clicks Daily Activity (OT)  -AS        User Key  (r) = Recorded By, (t) = Taken By, (c) = Cosigned By    Initials Name Provider Type    AS Alesia Pierre OT Occupational Therapist          Time Calculation:   Time Calculation- OT     Row Name 04/02/19 1230             Time Calculation- OT    OT Start Time  0820  -AS      OT Stop  Time  0847  -AS      OT Time Calculation (min)  27 min  -AS      OT Received On  04/02/19  -AS      OT Goal Re-Cert Due Date  04/15/19  -AS        User Key  (r) = Recorded By, (t) = Taken By, (c) = Cosigned By    Initials Name Provider Type    AS Alesia Pierre OT Occupational Therapist        Therapy Charges for Today     Code Description Service Date Service Provider Modifiers Qty    [id number] HC OT EVAL MOD COMPLEXITY 2 4/2/2019 Alesia Pierre OT GO 1               Alesia Pierre OT  4/2/2019

## 2019-04-02 NOTE — THERAPY DISCHARGE NOTE
Acute Care - Physical Therapy Discharge Summary  [facility]       Patient Name: Cristo Musa  : 1950  MRN: [id number]    Today's Date: 2019  Onset of Illness/Injury or Date of Surgery: 19    Date of Referral to PT: 19  Referring Physician: Dr. Michelle Lo      Admit Date: 3/31/2019      PT Recommendation and Plan    Visit Dx:    ICD-10-CM ICD-9-CM   1. Altered mental status, unspecified altered mental status type R41.82 780.97   2. End stage renal disease (CMS/[facility]) N18.6 585.6   3. Impaired mobility and ADLs Z74.09 799.89   4. Impaired functional mobility, balance, gait, and endurance Z74.09 V49.89       Outcome Measures     Row Name 19 1113 19 0800          How much help from another person do you currently need...    Turning from your back to your side while in flat bed without using bedrails?  3  -LAVELL  --     Moving from lying on back to sitting on the side of a flat bed without bedrails?  3  -LAVELL  --     Moving to and from a bed to a chair (including a wheelchair)?  3  -LAVELL  --     Standing up from a chair using your arms (e.g., wheelchair, bedside chair)?  3  -LAVELL  --     Climbing 3-5 steps with a railing?  2  -LAVELL  --     To walk in hospital room?  2  -LAVELL  --     AM-PAC 6 Clicks Score  16  -LAVELL  --        How much help from another is currently needed...    Putting on and taking off regular lower body clothing?  --  2  -AS     Bathing (including washing, rinsing, and drying)  --  2  -AS     Toileting (which includes using toilet bed pan or urinal)  --  2  -AS     Putting on and taking off regular upper body clothing  --  2  -AS     Taking care of personal grooming (such as brushing teeth)  --  3  -AS     Eating meals  --  3  -AS     Score  --  14  -AS        Functional Assessment    Outcome Measure Options  AM-PAC 6 Clicks Basic Mobility (PT)  -LAVELL  AM-PAC 6 Clicks Daily Activity (OT)  -AS       User Key  (r) = Recorded By, (t) = Taken By, (c) = Cosigned By     Initials Name Provider Type    LAVELL Sherry Patel, PT Physical Therapist    AS Alesia Pierre OT Occupational Therapist          PT Charges     Row Name 04/02/19 1344             Time Calculation    Start Time  1113  -      Stop Time  1210  -      Time Calculation (min)  57 min  -      PT Received On  04/02/09  -      PT Goal Re-Cert Due Date  04/15/19  -         Time Calculation- PT    Total Timed Code Minutes- PT  40 minute(s)  -        User Key  (r) = Recorded By, (t) = Taken By, (c) = Cosigned By    Initials Name Provider Type    LAVELL Sherry Patel, PT Physical Therapist          Rehab Goal Summary     Row Name 04/02/19 1113 04/02/19 0800          Physical Therapy Goals    Bed Mobility Goal Selection (PT)  bed mobility, PT goal 1  -LAVELL  --     Transfer Goal Selection (PT)  transfer, PT goal 1  -  --     Gait Training Goal Selection (PT)  gait training, PT goal 1  -LAVELL  --        Bed Mobility Goal 1 (PT)    Activity/Assistive Device (Bed Mobility Goal 1, PT)  sit to supine;supine to sit  -  --     [place] Level/Cues Needed (Bed Mobility Goal 1, PT)  standby assist  -  --     Time Frame (Bed Mobility Goal 1, PT)  by discharge  -  --     Progress/Outcomes (Bed Mobility Goal 1, PT)  goal not met  -  --        Transfer Goal 1 (PT)    Activity/Assistive Device (Transfer Goal 1, PT)  sit-to-stand/stand-to-sit;bed-to-chair/chair-to-bed;toilet  -  --     [place] Level/Cues Needed (Transfer Goal 1, PT)  standby assist  -  --     Time Frame (Transfer Goal 1, PT)  by discharge  -  --     Progress/Outcome (Transfer Goal 1, PT)  goal not met  -  --        Gait Training Goal 1 (PT)    Activity/Assistive Device (Gait Training Goal 1, PT)  gait (walking locomotion);assistive device use;walker, rolling  -  --     Stan[place] Level (Gait Training Goal 1, PT)  contact guard assist  -  --     Distance (Gait Goal 1, PT)  150ft  -  --     Time Frame (Gait Training Goal 1, PT)  2 - 3 days   -LAVELL  --     Progress/Outcome (Gait Training Goal 1, PT)  goal not met  -LAVELL  --        Occupational Therapy Goals    Transfer Goal Selection (OT)  --  transfer, OT goal 1  -AS     Dressing Goal Selection (OT)  --  dressing, OT goal 1  -AS     Toileting Goal Selection (OT)  --  toileting, OT goal 1  -AS        Transfer Goal 1 (OT)    Activity/Assistive Device (Transfer Goal 1, OT)  --  sit-to-stand/stand-to-sit;bed-to-chair/chair-to-bed;toilet  -AS     [place] Level/Cues Needed (Transfer Goal 1, OT)  --  minimum assist (75% or more patient effort)  -AS     Time Frame (Transfer Goal 1, OT)  --  long term goal (LTG);by discharge  -AS     Barriers (Transfers Goal 1, OT)  --  confusion  -AS     Progress/Outcome (Transfer Goal 1, OT)  --  goal not met  -AS        Dressing Goal 1 (OT)    Activity/Assistive Device (Dressing Goal 1, OT)  --  dressing skills, all  -AS     [place]/Cues Needed (Dressing Goal 1, OT)  --  minimum assist (75% or more patient effort)  -AS     Time Frame (Dressing Goal 1, OT)  --  long term goal (LTG);by discharge  -AS     Barriers (Dressing Goal 1, OT)  --  confusion  -AS     Progress/Outcome (Dressing Goal 1, OT)  --  goal not met  -AS        Toileting Goal 1 (OT)    Activity/Device (Toileting Goal 1, OT)  --  toileting skills, all  -AS     [place] Level/Cues Needed (Toileting Goal 1, OT)  --  supervision required  -AS     Time Frame (Toileting Goal 1, OT)  --  long term goal (LTG);by discharge  -AS     Barriers (Toileting Goal 1, OT)  --  confusion  -AS     Progress/Outcome (Toileting Goal 1, OT)  --  goal not met  -AS       User Key  (r) = Recorded By, (t) = Taken By, (c) = Cosigned By    Initials Name Provider Type Discipline    Sherry Thompson, PT Physical Therapist PT    AS Alesia Pierre, OT Occupational Therapist OT              PT Discharge Summary  Anticipated Discharge Disposition (PT): skilled nursing facility  Reason for Discharge: Per MD order, Discharge from  facility  Outcomes Achieved: Unable to make functional progress toward goals at this time  Discharge Destination: SNF      Alin Rojo, PT   4/2/2019

## 2019-04-02 NOTE — DISCHARGE SUMMARY
DISCHARGE SUMMARY    PATIENT NAME: Cristo Musa       PHYSICIAN: Cory Rodríguez MD  : 1950  MRN: [id number]    ADMITTED: 3/31/2019     DISCHARGED: 2019    ADMISSION DIAGNOSES:  Active Hospital Problems    Diagnosis  POA   • **Uremic encephalopathy [G93.41, N19]  Yes   • Hyponatremia [E87.1]  Yes   • Elevated troponin [R74.8]  Yes   • Anemia due to chronic kidney disease, on chronic dialysis (CMS/HCC) [N18.6, D63.1, Z99.2]  Not Applicable   • Thrombocytopenia (CMS/HCC) [D69.6]  Yes   • Elevated brain natriuretic peptide (BNP) level [R79.89]  Yes   • COPD (chronic obstructive pulmonary disease) (CMS/HCC) [J44.9]  Yes   • Physical deconditioning [R53.81]  Yes   • Gastroesophageal reflux disease without esophagitis [K21.9]  Yes   • Anxiety [F41.9]  Yes   • ESRD (end stage renal disease) (CMS/HCC) [N18.6]  Yes   • Diabetes mellitus (CMS/HCC) [E11.9]  Yes      Resolved Hospital Problems   No resolved problems to display.     DISCHARGE DIAGNOSES:   Active Hospital Problems    Diagnosis  POA   • **Uremic encephalopathy [G93.41, N19]  Yes   • Hyponatremia [E87.1]  Yes   • Elevated troponin [R74.8]  Yes   • Anemia due to chronic kidney disease, on chronic dialysis (CMS/HCC) [N18.6, D63.1, Z99.2]  Not Applicable   • Thrombocytopenia (CMS/HCC) [D69.6]  Yes   • Elevated brain natriuretic peptide (BNP) level [R79.89]  Yes   • COPD (chronic obstructive pulmonary disease) (CMS/HCC) [J44.9]  Yes   • Physical deconditioning [R53.81]  Yes   • Gastroesophageal reflux disease without esophagitis [K21.9]  Yes   • Anxiety [F41.9]  Yes   • ESRD (end stage renal disease) (CMS/HCC) [N18.6]  Yes   • Diabetes mellitus (CMS/HCC) [E11.9]  Yes      Resolved Hospital Problems   No resolved problems to display.       SERVICE: Family Medicine.  Attending: Dr. Haider  Resident: Cory Rodríguez MD    CONSULTS:   Consult Orders (all) (From admission, onward)    Start     Ordered    19 0843  Inpatient  Nephrology Consult  Once     Specialty:  Nephrology  Provider:  Pascual Vilchis MD    04/01/19 0843          PROCEDURES:   dialysis    HISTORY OF PRESENT ILLNESS:   (Taken from Dr. Mcnally's history and physical)    Cristo Musa is a 69 y.o. male with a CMH of end-stage renal disease on hemodialysis, COPD, chronic heart failure, type 2 diabetes, GERD who presents from [facility] after being found unresponsive.  Nursing staff at [facility] report patient was unresponsive for 10-15 minutes before he began to respond to sternal rubs.  Vital signs at the time were stable and a blood sugar was found to be in the 400s.  EMS was called and patient was transferred to the ER for further evaluation.  He had not been having fevers, chills, or other symptoms to this point.  At the time of exam patient was alert and oriented x2, to person and to place.  He was able to state that he had last had dialysis on Friday and that over the last couple of days he has been increasingly sleepy.  He appeared mildly confused on exam, as several questions had to be re-asked several times or reworded before he would respond appropriately.  He denied chest pain, shortness of breath, nausea, vomiting, constipation, diarrhea, dysuria.  He does report that he is oliguric at baseline.     In the ER, labs and imaging were obtained.  Chest x-ray and head CT were within normal limits and he did not have signs or symptoms of fluid overload.  Blood sugar was in the low 100s on arrival.  BUN was found to be 57 and creatinine 7 which is a significant increase from the last renal function panel.  He has no signs of active infection.  He is being admitted for uremic encephalopathy.  He follows with Dr. Vilchis outpatient for nephrology.        DIAGNOSTIC DATA:   Lab Results (last 24 hours)     Procedure Component Value Units Date/Time    POC Glucose Once [[id number]]  (Abnormal) Collected:  04/02/19 1037    Specimen:  Blood Updated:   04/02/19 1127     Glucose 191 mg/dL      Comment: RN NotifiedOperator: [id number] BEVERLEY CABRERAMeter ID: [id number]       POC Glucose Once [[id number]]  (Abnormal) Collected:  04/02/19 0743    Specimen:  Blood Updated:  04/02/19 0905     Glucose 146 mg/dL      Comment: RN NotifiedOperator: [id number] BEVERLEY CABRERAMeter ID: [id number]       Basic Metabolic Panel [[id number]]  (Abnormal) Collected:  04/02/19 0543    Specimen:  Blood Updated:  04/02/19 0629     Glucose 178 mg/dL      BUN 37 mg/dL      Creatinine 5.38 mg/dL      Sodium 125 mmol/L      Potassium 4.0 mmol/L      Chloride 86 mmol/L      CO2 22.0 mmol/L      Calcium 9.0 mg/dL      eGFR  African Amer 13 mL/min/1.73      Comment: <15 Indicative of kidney failure.        eGFR Non African Amer -- mL/min/1.73      Comment: <15 Indicative of kidney failure.        BUN/Creatinine Ratio 6.9     Anion Gap 17.0 mmol/L     Narrative:       GFR Normal >60  Chronic Kidney Disease <60  Kidney Failure <15    CBC & Differential [[id number]] Collected:  04/02/19 0543    Specimen:  Blood Updated:  04/02/19 0619    Narrative:       The following orders were created for panel order CBC & Differential.  Procedure                               Abnormality         Status                     ---------                               -----------         ------                     CBC Auto Differential[[id number]]        Abnormal            Final result                 Please view results for these tests on the individual orders.    CBC Auto Differential [[id number]]  (Abnormal) Collected:  04/02/19 0543    Specimen:  Blood Updated:  04/02/19 0619     WBC 4.20 10*3/mm3      RBC 3.75 10*6/mm3      Hemoglobin 10.6 g/dL      Hematocrit 32.0 %      MCV 85.3 fL      MCH 28.3 pg      MCHC 33.1 g/dL      RDW 17.2 %      RDW-SD 53.4 fl      MPV 9.3 fL      Platelets 79 10*3/mm3      Neutrophil % 54.5 %      Lymphocyte % 26.2 %      Monocyte % 15.5 %      Eosinophil % 2.6 %      Basophil %  0.7 %      Immature Grans % 0.5 %      Neutrophils, Absolute 2.29 10*3/mm3      Lymphocytes, Absolute 1.10 10*3/mm3      Monocytes, Absolute 0.65 10*3/mm3      Eosinophils, Absolute 0.11 10*3/mm3      Basophils, Absolute 0.03 10*3/mm3      Immature Grans, Absolute 0.02 10*3/mm3      nRBC 0.0 /100 WBC     POC Glucose Once [[id number]]  (Abnormal) Collected:  04/01/19 1622    Specimen:  Blood Updated:  04/01/19 2000     Glucose 132 mg/dL      Comment: : [id number] WILIAN GUTIERRESMeter ID: [id number]       POC Glucose Once [[id number]]  (Normal) Collected:  04/01/19 1910    Specimen:  Blood Updated:  04/01/19 1959     Glucose 119 mg/dL      Comment: RN NotifiedOperator: [id number] LIVIER DESTINYMeter ID: [id number]            Imaging Results (last 24 hours)     ** No results found for the last 24 hours. **            HOSPITAL COURSE:  Patient was admitted for his encephalopathy, thought to be from uremia.  This also likely is could have been due to opioid medications.  He has been on these long-term and is slowly weaned down, however he has been increasingly more somnolent lately.  He is also having trouble with snoring so this could be due to some sleep apnea.  Likely combination of all 3.  He does seem to be better when he gets his dialysis.  He had dialysis next day with Dr. Vilchis and his nephrologist.  His mentation was better on first day in hospital.  We will also continue without Norco's going forward weaning down to tramadol.  Will get an outpatient sleep study to further evaluate for sleep apnea.  Keep a close eye on his mental state going forward. See again at nursing home in 3-5 days.    Physical Exam:  Constitutional: oriented to person, place, and time. well-developed and well-nourished.   HENT:   Head: Normocephalic and atraumatic.   Right Ear: External ear normal.   Left Ear: External ear normal.   Nose: Nose normal.   Eyes: Conjunctivae and EOM are normal. Pupils are equal, round, and  reactive to light.   Neck: Normal range of motion. Neck supple.   Cardiovascular: Normal rate, regular rhythm and murmur heard    Pulmonary/Chest: Effort normal and breath sounds normal. no wheezes.   Abdominal: Soft. Bowel sounds are normal. exhibits no distension. There is no tenderness.   Musculoskeletal: Normal range of motion.  exhibits no edema or deformity.   Neurological: alert and oriented to person, place, and time. No cranial nerve deficit.   Skin: Skin is warm.   Psychiatric: has a normal mood and affect. behavior is normal. Thought content normal.   Nursing note and vitals reviewed.     DISCHARGE CONDITION:   stable    DISPOSITION:  Skilled Nursing Facility (OR - External)    DISCHARGE MEDICATIONS     Discharge Medications      New Medications      Instructions Start Date   traMADol 50 MG tablet  Commonly known as:  ULTRAM   50 mg, Oral, 2 Times Daily PRN         Continue These Medications      Instructions Start Date   acetaminophen 500 MG tablet  Commonly known as:  TYLENOL   500 mg, Oral, Every 4 Hours PRN      aspirin 81 MG chewable tablet   81 mg, Oral, Daily      azithromycin 250 MG tablet  Commonly known as:  ZITHROMAX   250 mg, Oral, Daily      brimonidine 0.1 % solution ophthalmic solution  Commonly known as:  ALPHAGAN P   1 drop, Both Eyes, 3 Times Daily      cyclobenzaprine 5 MG tablet  Commonly known as:  FLEXERIL   5 mg, Oral, Every 8 Hours PRN      dorzolamide 2 % ophthalmic solution  Commonly known as:  TRUSOPT   1 drop, Left Eye, 3 Times Daily      escitalopram 10 MG tablet  Commonly known as:  LEXAPRO   10 mg, Oral, Daily      famotidine 20 MG tablet  Commonly known as:  PEPCID   20 mg, Oral, Every Night at Bedtime      fluticasone 50 MCG/ACT nasal spray  Commonly known as:  FLONASE   2 sprays, Nasal, Daily, Administer 2 sprays in each nostril for each dose.      gabapentin 100 MG capsule  Commonly known as:  NEURONTIN   100 mg, Oral, Nightly      guaiFENesin 200 MG tablet   400 mg,  Oral, Every 4 Hours PRN      insulin aspart 100 UNIT/ML injection  Commonly known as:  novoLOG   Subcutaneous, 3 Times Daily Before Meals, Sliding scale:  = 0 units; 150-200 = 3 units; 200-250 = 6 units; 250-300 = 9 units; 300-350 = 12 units; >350 = 15 units and call MD      insulin detemir 100 UNIT/ML injection  Commonly known as:  LEVEMIR   25 Units, Subcutaneous, Daily      latanoprost 0.005 % ophthalmic solution  Commonly known as:  XALATAN   1 drop, Both Eyes, Nightly      Loratadine 10 MG capsule   10 mg, Oral, Nightly      melatonin 5 MG tablet tablet   5 mg, Oral, Nightly      NEPRO PO  Notes to patient:  Take as directed   1 tablet, Oral, Daily, On Sun, Tues, Thur, Sat      ondansetron ODT 4 MG disintegrating tablet  Commonly known as:  ZOFRAN-ODT   4 mg, Oral, Every 6 Hours PRN      polyethylene glycol packet  Commonly known as:  MIRALAX   17 g, Oral, Daily PRN      sennosides-docusate sodium 8.6-50 MG tablet  Commonly known as:  SENOKOT-S   2 tablets, Oral, 2 Times Daily PRN      sevelamer 800 MG tablet  Commonly known as:  RENVELA   800 mg, Oral, 3 Times Daily With Meals      traZODone 50 MG tablet  Commonly known as:  DESYREL   50 mg, Oral, Every Night at Bedtime      Vitamin D3 2000 units chewable tablet   2,000 Units, Oral, Daily         Stop These Medications    HYDROcodone-acetaminophen 5-325 MG per tablet  Commonly known as:  NORCO            INSTRUCTIONS:  Activity:   Activity Instructions     Activity as Tolerated          Diet:   Diet Instructions     Diet: Consistent Carbohydrate, Renal      Discharge Diet:   Consistent Carbohydrate  Renal           Special instructions: Patient instructed to call MD or return to ED with worsening shortness of breath, chest pain, fever greater than 100.4 degrees F or any other medical concerns..    FOLLOW UP:   Additional Instructions for the Follow-ups that You Need to Schedule     Call MD With Problems / Concerns   As directed      Instructions: call  your doctor or go to the ER if you have chest pain, shortness of breath, or fever of 100.4.    Order Comments:  Instructions: call your doctor or go to the ER if you have chest pain, shortness of breath, or fever of 100.4.          Discharge Follow-up with PCP   As directed       Currently Documented PCP:    Cory Rodríguez MD    PCP Phone Number:    [phone]     Follow Up Details:  3-5 days            Contact information for follow-up providers     Cory Rodríguez MD Follow up.    Specialty:  Family Medicine  Contact information:  [address]   [facility] [id number] [phone]             Justyna Patel, APRN .    Specialty:  Cardiothoracic Surgery  Contact information:  [address] [facility] [address] [phone]             Cory Rodríguez MD .    Specialty:  Family Medicine  Why:  3-5 days  Contact information:  [address]   [facility] [address] [phone]                   Contact information for after-discharge care     Destination     [facility] .    Service:  Intermediate Care  Contact information:  [address] [place] [id number] [phone]                             PENDING TEST RESULTS AT DISCHARGE   Order Current Status    Blood Culture - Blood, Arm, Left Preliminary result    Blood Culture - Blood, Arm, Right Preliminary result          Time: Discharge 35 min    Dr. Haider is the attending at time of discharge, He is aware of the patient's status and agrees with the above discharge summary.          This document has been electronically signed by Cory Rodríguez MD on April 4, 2019 10:16 AM

## 2019-04-02 NOTE — PLAN OF CARE
Problem: Patient Care Overview  Goal: Plan of Care Review   04/02/19 1340   Coping/Psychosocial   Plan of Care Reviewed With patient   OTHER   Outcome Summary PT evaluation completed. Pt still groggy and slow processing, but rousable. Pt transferred supine to sit with CGA with bed rail and HOB elevated. Pt transferred sit to stand to sit with min assistance. Pt ambulated with RW 4 ft with min assistance of 1 with verbal and tactile cues to obtain and maintain balance. Function limited by decreased strength, balance, and tolerance for functional mobility and activities. Pt will benefit from PT to regain lost function. anticipate return to SNF at discharge with continued Pt services.

## 2019-04-02 NOTE — PROGRESS NOTES
"[facility]  [address] [place] [address]. [address]  T - [phone]  F - [phone]     Progress Note          PATIENT  DEMOGRAPHICS   PATIENT NAME: Cristo Musa                      PHYSICIAN: Pascual Vilchis MD  : 1950  MRN: [id number]   LOS: 0 days    Patient Care Team:  Cory Rodríguez MD as PCP - General (Family Medicine)  Justyna Patel APRN as PCP - Claims Attributed  Janel Gordon MD (Family Medicine)  Michelle Lo MD as Resident (Family Medicine)  John Sanchez MD as Resident (Family Medicine)  Warren Stewart MD as Resident (Family Medicine)  Demarcus Oliveira MD as Resident (Family Medicine)  Jennifer Gaxiola RN as Care Coordinator (Population Health)  Subjective   SUBJECTIVE   Doing PT no marked soa, alert and awake today         Objective   OBJECTIVE   Vital Signs  Temp:  [97 °F (36.1 °C)-98 °F (36.7 °C)] 98 °F (36.7 °C)  Heart Rate:  [62-70] 62  Resp:  [16-18] 18  BP: (123-148)/(59-89) 148/79    Flowsheet Rows      First Filed Value   Admission Height  172.7 cm (68\") Documented at 2019 1700   Admission Weight  105 kg (231 lb 7.7 oz) Documented at 2019 1700           No intake/output data recorded.    PHYSICAL EXAM    Physical Exam   Constitutional: He is oriented to person, place, and time. He appears well-developed.   HENT:   Head: Normocephalic.   Eyes: Pupils are equal, round, and reactive to light.   Cardiovascular: Normal rate, regular rhythm and normal heart sounds.   Pulmonary/Chest: Effort normal and breath sounds normal.   Abdominal: Soft. Bowel sounds are normal.   Musculoskeletal: He exhibits no edema.   Neurological: He is alert and oriented to person, place, and time.       RESULTS   Results Review:    Results from last 7 days   Lab Units 19  0543 19  0119 19  2118   SODIUM mmol/L 125* 130* 131*   POTASSIUM mmol/L 4.0 4.2 4.2   CHLORIDE mmol/L 86* 86* 78*   CO2 mmol/L 22.0 25.0 " 28.0   BUN mg/dL 37* 59* 57*   CREATININE mg/dL 5.38* 7.44* 7.06*   CALCIUM mg/dL 9.0 8.9 9.0   BILIRUBIN mg/dL  --   --  1.1   ALK PHOS U/L  --   --  171*   ALT (SGPT) U/L  --   --  10   AST (SGOT) U/L  --   --  22   GLUCOSE mg/dL 178* 150* 102*       Estimated Creatinine Clearance: 15.3 mL/min (A) (by C-G formula based on SCr of 5.38 mg/dL (H)).    Results from last 7 days   Lab Units 03/31/19  2118   MAGNESIUM mg/dL 2.3             Results from last 7 days   Lab Units 04/02/19  0543 04/01/19  0119 03/31/19  1920   WBC 10*3/mm3 4.20 4.58 4.53   HEMOGLOBIN g/dL 10.6* 10.3* 10.5*   PLATELETS 10*3/mm3 79* 78* 88*               Imaging Results (last 24 hours)     ** No results found for the last 24 hours. **           MEDICATIONS      aspirin 81 mg Oral Daily   brimonidine 1 drop Both Eyes TID   calcitriol 1.5 mcg Oral Once per day on Mon Wed Fri   cetirizine 5 mg Oral Daily   [START ON 4/3/2019] cholecalciferol 2,000 Units Oral Once per day on Mon Wed Fri   dorzolamide 1 drop Left Eye TID   escitalopram 10 mg Oral Daily   famotidine 20 mg Oral Daily   fluticasone 2 spray Nasal Daily   gabapentin 100 mg Oral Nightly   insulin aspart 0-7 Units Subcutaneous 4x Daily AC & at Bedtime   insulin detemir 25 Units Subcutaneous Nightly   latanoprost 1 drop Both Eyes Nightly   melatonin 5.25 mg Oral Nightly   sevelamer 800 mg Oral TID With Meals   sodium chloride 3 mL Intravenous Q12H   traZODone 50 mg Oral Nightly          Assessment/Plan   ASSESSMENT / PLAN      Uremic encephalopathy    Diabetes mellitus (CMS/HCC)    ESRD (end stage renal disease) (CMS/[facility])    Anxiety    Gastroesophageal reflux disease without esophagitis    Physical deconditioning    COPD (chronic obstructive pulmonary disease) (CMS/[facility])    Elevated brain natriuretic peptide (BNP) level    Hyponatremia    Elevated troponin    Anemia due to chronic kidney disease, on chronic dialysis (CMS/[facility])    Thrombocytopenia (CMS/[facility])    1.end-stage renal disease on  Statement Selected hemodialysis 3 days a week Monday Wednesday Friday.  His estimated dry weight is 98.5 kg.  He is at 103 kg yesterday, no wt today. Hd tomorrow at out pt, plan for possible dc today     2.anemia of chronic kidney disease patient is chronically on mircera and his last dose was March 22.  His hemoglobin is  stable     3.secondary hyperparathyroidism/hyperphosphatemia he is chronically on VELPHORO and Renvela.  We will continue with the Renvela for now.  He is also on Sensipar on dialysis days.  He takes Calcitrol 1.5 mcg with each dialysis treatment     4.altered mental status.  better now post hd                This document has been electronically signed by Pascual Vilchis MD on April 2, 2019 11:44 AM

## 2019-04-02 NOTE — PLAN OF CARE
Problem: Patient Care Overview  Goal: Plan of Care Review  Outcome: Ongoing (interventions implemented as appropriate)   04/02/19 0026   Coping/Psychosocial   Plan of Care Reviewed With patient   Plan of Care Review   Progress no change   OTHER   Outcome Summary Patient currently resting with no complaints. Patient confused. VSS. Will continue to reorient and monitor.      Goal: Individualization and Mutuality  Outcome: Ongoing (interventions implemented as appropriate)    Goal: Discharge Needs Assessment  Outcome: Ongoing (interventions implemented as appropriate)    Goal: Interprofessional Rounds/Family Conf  Outcome: Ongoing (interventions implemented as appropriate)      Problem: Skin Injury Risk (Adult)  Goal: Identify Related Risk Factors and Signs and Symptoms  Outcome: Ongoing (interventions implemented as appropriate)    Goal: Skin Health and Integrity  Outcome: Ongoing (interventions implemented as appropriate)      Problem: Fall Risk (Adult)  Goal: Identify Related Risk Factors and Signs and Symptoms  Outcome: Ongoing (interventions implemented as appropriate)    Goal: Absence of Fall  Outcome: Outcome(s) achieved Date Met: 04/02/19      Problem: Kidney Disease, Chronic/End Stage Renal Disease (Adult)  Goal: Signs and Symptoms of Listed Potential Problems Will be Absent, Minimized or Managed (Kidney Disease, Chronic/End Stage Renal Disease)  Outcome: Ongoing (interventions implemented as appropriate)      Problem: Thought Process Alteration (Adult)  Goal: Identify Related Risk Factors and Signs and Symptoms  Outcome: Ongoing (interventions implemented as appropriate)    Goal: Improved Thought Process  Outcome: Ongoing (interventions implemented as appropriate)

## 2019-04-02 NOTE — THERAPY EVALUATION
Acute Care - Physical Therapy Initial Evaluation  [facility]     Patient Name: Cristo Musa  : 1950  MRN: [id number]  Today's Date: 2019   Onset of Illness/Injury or Date of Surgery: 19  Date of Referral to PT: 19  Referring Physician: Dr. Michelle Lo      Admit Date: 3/31/2019    Visit Dx:     ICD-10-CM ICD-9-CM   1. Altered mental status, unspecified altered mental status type R41.82 780.97   2. End stage renal disease (CMS/HCC) N18.6 585.6   3. Impaired mobility and ADLs Z74.09 799.89   4. Impaired functional mobility, balance, gait, and endurance Z74.09 V49.89     Patient Active Problem List   Diagnosis   • Pacemaker infection (CMS/HCC)   • Renal failure, chronic   • Diabetes mellitus (CMS/[facility])   • Hypertension   • Complete heart block (CMS/HCC)   • Arteriovenous shunt thrombosis (CMS/[facility])   • Follow-up surgery care   • Arteriovenous fistula, acquired (CMS/[facility])   • Acquired stenosis of nasolacrimal duct   • Exotropia   • Optic atrophy   • Pseudophakia   • Posterior subcapsular polar age-related cataract   • Nuclear cataract   • Primary open angle glaucoma of left eye, moderate stage   • Cortical age-related cataract   • ESRD (end stage renal disease) (CMS/[facility])   • Primary insomnia   • Chronic pain   • Anxiety   • ESRD on dialysis (CMS/[facility])   • Gastroesophageal reflux disease without esophagitis   • Neuropathy   • A-V fistula (CMS/[facility])   • Physical deconditioning   • COPD (chronic obstructive pulmonary disease) (CMS/[facility])   • Non-healing wound of upper extremity   • Post-operative pain   • Elevated brain natriuretic peptide (BNP) level   • Arteriovenous fistula stenosis (CMS/HCC)   • AV fistula stenosis (CMS/HCC)   • Generalized abdominal pain   • Iron deficiency anemia due to chronic blood loss   • Pneumonia of right upper lobe due to infectious organism (CMS/HCC)   • Uremic encephalopathy   • Hyponatremia   • Elevated troponin   • Anemia due to chronic kidney disease,  on chronic dialysis (CMS/HCC)   • Thrombocytopenia (CMS/HCC)     Past Medical History:   Diagnosis Date   • Anemia of chronic disease    • Cataract    • CHF (congestive heart failure) (CMS/HCC)    • CKD (chronic kidney disease)    • Clostridium difficile infection    • COPD (chronic obstructive pulmonary disease) (CMS/HCC)    • Diabetes mellitus (CMS/HCC)    • Glaucoma    • Heart block    • Hepatitis C    • History of transfusion    • Hypertension    • Nephropathy, diabetic (CMS/HCC)    • Pacemaker    • Pulmonary embolism (CMS/HCC)      Past Surgical History:   Procedure Laterality Date   • ABDOMINAL SURGERY     • ARTERIOVENOUS FISTULA/SHUNT SURGERY Right     forearm loop   • ARTERIOVENOUS FISTULA/SHUNT SURGERY Left     removed past upper arm   • ARTERIOVENOUS FISTULA/SHUNT SURGERY Right 3/13/2018    Procedure: revision RIGHT forearm ARTERIOVENOUS graft (excision), debridement, wound vac;  Surgeon: Jerson Singh MD;  Location: [facility] OR;  Service: Vascular   • ARTERIOVENOUS FISTULA/SHUNT SURGERY W/ HEMODIALYSIS CATHETER INSERTION Right 4/4/2016    Procedure: RIGHT ARM AV FISTULA DECLOT REVISION REPAIR BRACHIAL ANASTOMOTIC PSUEDOANEURYSM LEFT FEMORAL RICHARDSON FISTULOGRAM WITH ANGIOPLASTY;  Surgeon: Jerson Carpenter MD;  Location: [facility] OR 18/19;  Service:    • CATARACT EXTRACTION W/ INTRAOCULAR LENS IMPLANT Left 3/31/2017    Procedure: REMOVE CATARACT AND IMPLANT INTRAOCULAR LENS LEFT EYE;  Surgeon: Hilton Montemayor MD;  Location: [facility] OR;  Service:    • COLONOSCOPY N/A 3/12/2019    Procedure: COLONOSCOPY;  Surgeon: Flako Montoya MD;  Location: [facility] ENDOSCOPY;  Service: Gastroenterology   • ENDOSCOPY N/A 3/12/2019    Procedure: ESOPHAGOGASTRODUODENOSCOPY;  Surgeon: Flako Montoya MD;  Location: [facility] ENDOSCOPY;  Service: Gastroenterology   • EYE SURGERY     • HERNIA REPAIR     • IMPLANTABLE CARDIOVERTER DEFIBRILLATOR LEAD REPLACEMENT/POCKET REVISION Right 4/11/2016    Procedure:  PACEMAKER LEADS X 3 AND BATTERY EXTRACTION WITH EXIMER LASER;  Surgeon: Vern Reeves MD;  [facility];  Service:    • INSERTION HEMODIALYSIS CATHETER Right 05/2015    jugular   • INTERVENTIONAL RADIOLOGY PROCEDURE Right 6/1/2017    Procedure: RIGHT dialysis fistulagram & angioplasty;  Surgeon: Jerson Singh MD;  Location: [facility] ANGIO INVASIVE LOCATION;  Service:    • INTERVENTIONAL RADIOLOGY PROCEDURE Right 8/24/2017    Procedure: IR dialysis fistulagram;  Surgeon: Jerson Singh MD;  Location: [facility] ANGIO INVASIVE LOCATION;  Service:    • INTERVENTIONAL RADIOLOGY PROCEDURE Right 11/13/2018    Procedure: IR dialysis fistulagram;  Surgeon: Jerson Singh MD;  Location: [facility] ANGIO INVASIVE LOCATION;  Service: Interventional Radiology   • PACEMAKER IMPLANTATION  2008    dual chamber [employer]   • REMOVAL HEMODIALYSIS CATHETER Right 05/2015    femoral vein        PT ASSESSMENT (last 12 hours)      Physical Therapy Evaluation     Row Name 04/02/19 1113          PT Evaluation Time/Intention    Subjective Information  no complaints  -     Document Type  evaluation  -     Mode of Treatment  individual therapy;physical therapy  -     Row Name 04/02/19 1113          General Information    Patient Profile Reviewed?  yes  -LAVELL     Onset of Illness/Injury or Date of Surgery  03/31/19  -     Referring Physician  Dr. Michelle Lo  -     Patient Observations  alert;cooperative;agree to therapy  -     Patient/Family Observations  no family present  -     General Observations of Patient  Pt supine in bed, RA,tele, bed exit alarm  -     Prior Level of Function  independent:;all household mobility;gait;transfer;w/c or scooter  -     Equipment Currently Used at Home  wheelchair;walker, rolling  -     Pertinent History of Current Functional Problem  Pt presented to [facility] with AMS with uremic encephalopathy;h/o ESRD on HD  -LAVELL     Existing  Precautions/Restrictions  fall  -     Limitations/Impairments  safety/cognitive  -     Risks Reviewed  patient:;LOB;nausea/vomiting;dizziness;increased discomfort;change in vital signs  -     Benefits Reviewed  improve function;increase independence;increase strength;increase balance;decrease pain;decrease risk of DVT;improve skin integrity;increase knowledge  -     Barriers to Rehab  visual deficit;cognitive status  -     Row Name 04/02/19 1113          Relationship/Environment    Lives With  facility resident Stephen Angulo  -     Row Name 04/02/19 1113          Resource/Environmental Concerns    Current Living Arrangements  extended care facility  -     Row Name 04/02/19 1113          Cognitive Assessment/Intervention- PT/OT    Orientation Status (Cognition)  oriented to;person;place  -     Follows Commands (Cognition)  follows one step commands;25-49% accuracy;delayed response/completion;increased processing time needed;physical/tactile prompts required;repetition of directions required;verbal cues/prompting required  -     Safety Deficit (Cognitive)  insight into deficits/self awareness;safety precautions awareness;safety precautions follow-through/compliance  -     Row Name 04/02/19 1113          Safety Issues, Functional Mobility    Safety Issues Affecting Function (Mobility)  insight into deficits/self awareness;safety precaution awareness;safety precautions follow-through/compliance  -     Impairments Affecting Function (Mobility)  balance;endurance/activity tolerance;strength  -     Row Name 04/02/19 1113          Bed Mobility Assessment/Treatment    Bed Mobility Assessment/Treatment  supine-sit;sit-supine  -     Supine-Sit [place] (Bed Mobility)  contact guard  -     Sit-Supine [place] (Bed Mobility)  minimum assist (75% patient effort)  -     Bed Mobility, Safety Issues  cognitive deficits limit understanding  -     Assistive Device (Bed Mobility)  bed  rails;head of bed elevated  -     Comment (Bed Mobility)  Pt sat EOB 15 minutes with SBA..Pt groggy by easily roused  -[facility] Name 04/02/19 1113          Transfer Assessment/Treatment    Transfer Assessment/Treatment  sit-stand transfer;stand-sit transfer  -     Sit-Stand [place] (Transfers)  moderate assist (50% patient effort);verbal cues;nonverbal cues (demo/gesture)  -     Stand-Sit [place] (Transfers)  minimum assist (75% patient effort);verbal cues;nonverbal cues (demo/gesture)  -[facility] Name 04/02/19 1113          Gait/Stairs Assessment/Training    Gait/Stairs Assessment/Training  gait/ambulation assistive device  -     [place] Level (Gait)  minimum assist (75% patient effort);verbal cues;nonverbal cues (demo/gesture)  -     Assistive Device (Gait)  walker, front-wheeled  -     Distance in Feet (Gait)  4  -     Comment (Gait/Stairs)  Pt required physical assistance initially to gain balance. Once patient had balance point, he was able to advance RW without assistance  -[facility] Name 04/02/19 1113          General ROM    GENERAL ROM COMMENTS  BUE: AROM WFL except decreased endrange shoulder flexion.BLE: AROM WFL except lacks endrange knee extension bilaterally  -[facility] Name 04/02/19 1113          MMT (Manual Muscle Testing)    General MMT Comments  BUE: grossly 4-/5;BLE: 3+/5 ankles/feet, knees,3-/5 hips  -[facility] Name 04/02/19 1113          Balance    Balance  static sitting balance;static standing balance;dynamic standing balance  -LAVELL     Row Name 04/02/19 1113          Static Sitting Balance    Level of [place] (Unsupported Sitting, Static Balance)  supervision  -     Sitting Position (Unsupported Sitting, Static Balance)  sitting on edge of bed  -[facility] Name 04/02/19 1113          Static Standing Balance    Level of [place] (Supported Standing, Static Balance)  minimal assist, 75% patient effort  -     Assistive Device Utilized (Supported  Standing, Static Balance)  walker, rolling  -[facility] Name 04/02/19 1113          Dynamic Standing Balance    Level of [place], Reaches Outside Midline (Standing, Dynamic Balance)  minimal assist, 75% patient effort  -     Assistive Device Utilized (Supported Standing, Dynamic Balance)  walker, rolling  -[facility] Name 04/02/19 1113          Sensory Assessment/Intervention    Sensory General Assessment  -- unable to follow instructions for conventional testing  -LAVELL     Row Name 04/02/19 1113          Vision Assessment/Intervention    Visual Impairment/Limitations  -- per pt: 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  -LAVELL     Row Name 04/02/19 1113          Pain Assessment    Additional Documentation  Pain Scale: Numbers Pre/Post-Treatment (Group)  -LAVELL     Row Name 04/02/19 1113          Pain Scale: Numbers Pre/Post-Treatment    Pain Scale: Numbers, Pretreatment  0/10 - no pain  -     Pain Scale: Numbers, Post-Treatment  0/10 - no pain  -LAVELL     Row Name 04/02/19 1113          Plan of Care Review    Plan of Care Reviewed With  patient  -LAVELL     Row Name 04/02/19 1113          Physical Therapy Clinical Impression    Date of Referral to PT  03/31/19  -     PT Diagnosis (PT Clinical Impression)  impaired gait and mobility  -     Prognosis (PT Clinical Impression)  good  -LAVELL     Patient/Family Goals Statement (PT Clinical Impression)  able to increase walking  -LAVELL     Criteria for Skilled Interventions Met (PT Clinical Impression)  yes;treatment indicated  -     Pathology/Pathophysiology Noted (Describe Specifically for Each System)  musculoskeletal  -     Impairments Found (describe specific impairments)  aerobic capacity/endurance;arousal, attention, and cognition;ergonomics and body mechanics;gait, locomotion, and balance  -     Functional Limitations in Following Categories (Describe Specific Limitations)  self-care;home management;community/leisure  -     Disability: Inability to Perform  Actions/Activities of Required Roles (describe specific disability)  community/leisure  -     Rehab Potential (PT Clinical Summary)  good, to achieve stated therapy goals  -     Predicted Duration of Therapy (PT)  until discharge or goals met  -     Care Plan Review (PT)  evaluation/treatment results reviewed;care plan/treatment goals reviewed;risks/benefits reviewed;current/potential barriers reviewed;patient/other agree to care plan  -     Row Name 04/02/19 1113          Vital Signs    Pre Systolic BP Rehab  148  -LAVELL     Pre Treatment Diastolic BP  79  -LAVELL     Post Systolic BP Rehab  138  -LAVELL     Post Treatment Diastolic BP  68  -LAVELL     Pretreatment Heart Rate (beats/min)  84  -LAVELL     Posttreatment Heart Rate (beats/min)  67  -LAVELL     Pre SpO2 (%)  95  -LAVELL     O2 Delivery Pre Treatment  room air  -     Post SpO2 (%)  99  -     O2 Delivery Post Treatment  room air  -LAVELL     Pre Patient Position  Supine  -LAVELL     Post Patient Position  Supine HOB elevated to eat lunch  -     Row Name 04/02/19 1113          Physical Therapy Goals    Bed Mobility Goal Selection (PT)  bed mobility, PT goal 1  -     Transfer Goal Selection (PT)  transfer, PT goal 1  -     Gait Training Goal Selection (PT)  gait training, PT goal 1  -[facility] Name 04/02/19 1113          Bed Mobility Goal 1 (PT)    Activity/Assistive Device (Bed Mobility Goal 1, PT)  sit to supine;supine to sit  -     [place] Level/Cues Needed (Bed Mobility Goal 1, PT)  standby assist  -     Time Frame (Bed Mobility Goal 1, PT)  by discharge  -     Progress/Outcomes (Bed Mobility Goal 1, PT)  goal not met  -[facility] Name 04/02/19 1113          Transfer Goal 1 (PT)    Activity/Assistive Device (Transfer Goal 1, PT)  sit-to-stand/stand-to-sit;bed-to-chair/chair-to-bed;toilet  -     [place] Level/Cues Needed (Transfer Goal 1, PT)  standby assist  -     Time Frame (Transfer Goal 1, PT)  by discharge  -     Progress/Outcome (Transfer  Goal 1, PT)  goal not met  -LAVELL     Row Name 04/02/19 1113          Gait Training Goal 1 (PT)    Activity/Assistive Device (Gait Training Goal 1, PT)  gait (walking locomotion);assistive device use;walker, rolling  -LAVELL     [place] Level (Gait Training Goal 1, PT)  contact guard assist  -LAVELL     Distance (Gait Goal 1, PT)  150ft  -LAVELL     Time Frame (Gait Training Goal 1, PT)  2 - 3 days  -LAVELL     Progress/Outcome (Gait Training Goal 1, PT)  goal not met  -LAVELL     Row Name 04/02/19 1113          Positioning and Restraints    Pre-Treatment Position  in bed  -LAVELL     Post Treatment Position  bed  -LAVELL     In Bed  call light within reach;encouraged to call for assist;exit alarm on;side rails up x2  -LAVELL     Row Name 04/02/19 1113          Living Environment    Home Accessibility  wheelchair accessible  -       User Key  (r) = Recorded By, (t) = Taken By, (c) = Cosigned By    Initials Name Provider Type    Sherry Thompson PT Physical Therapist          PT Recommendation and Plan  Anticipated Discharge Disposition (PT): anticipate therapy at next level of care, skilled nursing facility  Planned Therapy Interventions (PT Eval): balance training, bed mobility training, gait training, patient/family education, strengthening, transfer training  Therapy Frequency (PT Clinical Impression): other (see comments)(5-14 times per week)  Outcome Summary/Treatment Plan (PT)  Anticipated Discharge Disposition (PT): anticipate therapy at next level of care, skilled nursing facility  Plan of Care Reviewed With: patient  Outcome Summary: PT evaluation completed. Pt still groggy and slow processing, but rousable. Pt transferred supine to sit with CGA with bed rail and HOB elevated. Pt transferred sit to stand to sit with min assistance. Pt ambulated with RW 4 ft with min assistance of 1 with verbal and tactile cues to obtain and maintain balance. Function limited by decreased strength, balance, and tolerance for functional mobility and  activities. Pt will benefit from PT to regain lost function. anticipate return to SNF at discharge with continued Pt services.  Outcome Measures     Row Name 04/02/19 1113 04/02/19 0800          How much help from another person do you currently need...    Turning from your back to your side while in flat bed without using bedrails?  3  -LAVELL  --     Moving from lying on back to sitting on the side of a flat bed without bedrails?  3  -LAVELL  --     Moving to and from a bed to a chair (including a wheelchair)?  3  -LAVELL  --     Standing up from a chair using your arms (e.g., wheelchair, bedside chair)?  3  -LAVELL  --     Climbing 3-5 steps with a railing?  2  -LAVELL  --     To walk in hospital room?  2  -LAVELL  --     AM-PAC 6 Clicks Score  16  -LAVELL  --        How much help from another is currently needed...    Putting on and taking off regular lower body clothing?  --  2  -AS     Bathing (including washing, rinsing, and drying)  --  2  -AS     Toileting (which includes using toilet bed pan or urinal)  --  2  -AS     Putting on and taking off regular upper body clothing  --  2  -AS     Taking care of personal grooming (such as brushing teeth)  --  3  -AS     Eating meals  --  3  -AS     Score  --  14  -AS        Functional Assessment    Outcome Measure Options  AM-PAC 6 Clicks Basic Mobility (PT)  -  AM-[facility] 6 Clicks Daily Activity (OT)  -AS       User Key  (r) = Recorded By, (t) = Taken By, (c) = Cosigned By    Initials Name Provider Type     Sherry Patel, PT Physical Therapist    AS Alesia Pierre, OT Occupational Therapist         Time Calculation:   PT Charges     Row Name 04/02/19 6222             Time Calculation    Start Time  1113  -      Stop Time  1210  -      Time Calculation (min)  57 min  -      PT Received On  04/02/09  -      PT Goal Re-Cert Due Date  04/15/19  -         Time Calculation- PT    Total Timed Code Minutes- PT  40 minute(s)  -        User Key  (r) = Recorded By, (t) = Taken By, (c) =  Cosigned By    Initials Name Provider Type    LAVELL Sherry Patel, PT Physical Therapist        Therapy Charges for Today     Code Description Service Date Service Provider Modifiers Qty    83161895881 HC PT EVAL MOD COMPLEXITY 1 4/2/2019 Sherry Patel, PT GP 1    80855527105 HC GAIT TRAINING EA 15 MIN 4/2/2019 Sherry Patel, PT GP 1    65629105598 HC PT THERAPEUTIC ACT EA 15 MIN 4/2/2019 Sherry Patel, PT GP 2          PT G-Codes  Outcome Measure Options: AM-PAC 6 Clicks Basic Mobility (PT)  AM-PAC 6 Clicks Score: 16  Score: 14      Sherry Patel, PT  4/2/2019

## 2019-04-02 NOTE — PLAN OF CARE
Problem: Patient Care Overview  Goal: Plan of Care Review  Outcome: Ongoing (interventions implemented as appropriate)   04/02/19 1224   Coping/Psychosocial   Plan of Care Reviewed With patient   OTHER   Outcome Summary OT eval completed. Pt confused and lethargic but cooperative. Pt able to tolerate sitting EOB approx 15 min with supervision and required s/u/S for breakfast. Pt max A sit>supine. Pt demo difficulty following simple commands, staying alert, and initiating/sequencing task. Pt presents with decreased cognition, balance, activity tolerance, strength, and mobility limiting independence in ADLs. Pt would benefit from continued skilled OT services to address areas of deficit and promote return to highest level of functioning. Rec continued OT services upon d/c back to SNF.

## 2019-04-02 NOTE — THERAPY DISCHARGE NOTE
Acute Care - Occupational Therapy Discharge Summary  [facility]     Patient Name: Cristo Musa  : 1950  MRN: [id number]    Today's Date: 2019  Onset of Illness/Injury or Date of Surgery: 19    Date of Referral to OT: 19  Referring Physician: Dr. Michelle Lo      Admit Date: 3/31/2019        OT Recommendation and Plan    Visit Dx:    ICD-10-CM ICD-9-CM   1. Altered mental status, unspecified altered mental status type R41.82 780.97   2. End stage renal disease (CMS/[facility]) N18.6 585.6   3. Impaired mobility and ADLs Z74.09 799.89   4. Impaired functional mobility, balance, gait, and endurance Z74.09 V49.89         Time Calculation- OT     Row Name 19 1230             Time Calculation- OT    OT Start Time  0820  -AS      OT Stop Time  0847  -AS      OT Time Calculation (min)  27 min  -AS      OT Received On  19  -AS      OT Goal Re-Cert Due Date  04/15/19  -AS        User Key  (r) = Recorded By, (t) = Taken By, (c) = Cosigned By    Initials Name Provider Type    AS Alesia Pierre OT Occupational Therapist            Rehab Goal Summary     Row Name 19 1113 19 0800          Physical Therapy Goals    Bed Mobility Goal Selection (PT)  bed mobility, PT goal 1  -LAVELL  --     Transfer Goal Selection (PT)  transfer, PT goal 1  -LAVELL  --     Gait Training Goal Selection (PT)  gait training, PT goal 1  -LAVELL  --        Bed Mobility Goal 1 (PT)    Activity/Assistive Device (Bed Mobility Goal 1, PT)  sit to supine;supine to sit  -  --     [place] Level/Cues Needed (Bed Mobility Goal 1, PT)  standby assist  -  --     Time Frame (Bed Mobility Goal 1, PT)  by discharge  -  --     Progress/Outcomes (Bed Mobility Goal 1, PT)  goal not met  -  --        Transfer Goal 1 (PT)    Activity/Assistive Device (Transfer Goal 1, PT)  sit-to-stand/stand-to-sit;bed-to-chair/chair-to-bed;toilet  -  --     [place] Level/Cues Needed (Transfer Goal 1, PT)  standby assist   -  --     Time Frame (Transfer Goal 1, PT)  by discharge  -  --     Progress/Outcome (Transfer Goal 1, PT)  goal not met  -  --        Gait Training Goal 1 (PT)    Activity/Assistive Device (Gait Training Goal 1, PT)  gait (walking locomotion);assistive device use;walker, rolling  -  --     [place] Level (Gait Training Goal 1, PT)  contact guard assist  -  --     Distance (Gait Goal 1, PT)  150ft  -  --     Time Frame (Gait Training Goal 1, PT)  2 - 3 days  -  --     Progress/Outcome (Gait Training Goal 1, PT)  goal not met  -  --        Occupational Therapy Goals    Transfer Goal Selection (OT)  --  transfer, OT goal 1  -AS     Dressing Goal Selection (OT)  --  dressing, OT goal 1  -AS     Toileting Goal Selection (OT)  --  toileting, OT goal 1  -AS        Transfer Goal 1 (OT)    Activity/Assistive Device (Transfer Goal 1, OT)  --  sit-to-stand/stand-to-sit;bed-to-chair/chair-to-bed;toilet  -AS     [place] Level/Cues Needed (Transfer Goal 1, OT)  --  minimum assist (75% or more patient effort)  -AS     Time Frame (Transfer Goal 1, OT)  --  long term goal (LTG);by discharge  -AS     Barriers (Transfers Goal 1, OT)  --  confusion  -AS     Progress/Outcome (Transfer Goal 1, OT)  --  goal not met  -AS        Dressing Goal 1 (OT)    Activity/Assistive Device (Dressing Goal 1, OT)  --  dressing skills, all  -AS     [place]/Cues Needed (Dressing Goal 1, OT)  --  minimum assist (75% or more patient effort)  -AS     Time Frame (Dressing Goal 1, OT)  --  long term goal (LTG);by discharge  -AS     Barriers (Dressing Goal 1, OT)  --  confusion  -AS     Progress/Outcome (Dressing Goal 1, OT)  --  goal not met  -AS        Toileting Goal 1 (OT)    Activity/Device (Toileting Goal 1, OT)  --  toileting skills, all  -AS     [place] Level/Cues Needed (Toileting Goal 1, OT)  --  supervision required  -AS     Time Frame (Toileting Goal 1, OT)  --  long term goal (LTG);by discharge  -AS     Barriers  (Toileting Goal 1, OT)  --  confusion  -AS     Progress/Outcome (Toileting Goal 1, OT)  --  goal not met  -AS       User Key  (r) = Recorded By, (t) = Taken By, (c) = Cosigned By    Initials Name Provider Type Discipline    Sherry Thompson, PT Physical Therapist PT    AS Alesia Pierre, OT Occupational Therapist OT          Outcome Measures     Row Name 04/02/19 1113 04/02/19 0800          How much help from another person do you currently need...    Turning from your back to your side while in flat bed without using bedrails?  3  -LAVELL  --     Moving from lying on back to sitting on the side of a flat bed without bedrails?  3  -LAVELL  --     Moving to and from a bed to a chair (including a wheelchair)?  3  -LAVELL  --     Standing up from a chair using your arms (e.g., wheelchair, bedside chair)?  3  -LAVELL  --     Climbing 3-5 steps with a railing?  2  -LAVELL  --     To walk in hospital room?  2  -LAVELL  --     AM-PAC 6 Clicks Score  16  -LAVELL  --        How much help from another is currently needed...    Putting on and taking off regular lower body clothing?  --  2  -AS     Bathing (including washing, rinsing, and drying)  --  2  -AS     Toileting (which includes using toilet bed pan or urinal)  --  2  -AS     Putting on and taking off regular upper body clothing  --  2  -AS     Taking care of personal grooming (such as brushing teeth)  --  3  -AS     Eating meals  --  3  -AS     Score  --  14  -AS        Functional Assessment    Outcome Measure Options  AM-PAC 6 Clicks Basic Mobility (PT)  -  AM-PAC 6 Clicks Daily Activity (OT)  -AS       User Key  (r) = Recorded By, (t) = Taken By, (c) = Cosigned By    Initials Name Provider Type    Sherry Thompson PT Physical Therapist    AS Alesia Pierre, OT Occupational Therapist          Therapy Suggested Charges     Code   Minutes Charges    None                 OT Discharge Summary  Anticipated Discharge Disposition (OT): skilled nursing facility  Reason for Discharge: Discharge  from facility, Per MD order  Outcomes Achieved: Discharge from facility occurred on same date as evluation  Discharge Destination: SNF      Zaria Baez, OTR/L  4/2/2019

## 2019-04-04 PROBLEM — G93.40 ACUTE ENCEPHALOPATHY: Status: ACTIVE | Noted: 2019-01-01

## 2019-04-04 NOTE — ED PROVIDER NOTES
Subjective   69 years old male with multiple medical problems including end-stage renal disease on dialysis 3 times a week, congestive heart failure, diabetes mellitus, COPD, hypertension, hyperlipidemia, is brought in the ER via EMS with chief complaint of altered mental status, difficulty waking him up.  Patient was sitting on the wheelchair at nursing home when they had a hard time waking him up.  When EMS got in patient was awake and alert.  On presentation in the ER patient is not in any distress.  He is maintaining his vitals.  Patient denies any chest pain, palpitations or shortness of breath.  He denies any focal weakness.  Patient does not know the exact cause why he has been feeling too sleepy.  He has been admitted recently for similar symptoms which improved and was discharged.  She is not a good historian and history is limited.        History provided by:  Patient      Review of Systems   Constitutional: Positive for fatigue.   HENT: Negative for congestion, sinus pressure and sore throat.    Respiratory: Negative for cough and shortness of breath.    Cardiovascular: Negative for chest pain.   Gastrointestinal: Negative for abdominal pain.   Genitourinary: Negative for flank pain.   Neurological: Negative for headaches.   Psychiatric/Behavioral: Negative for agitation.       Past Medical History:   Diagnosis Date   • Anemia of chronic disease    • Cataract    • CHF (congestive heart failure) (CMS/HCC)    • CKD (chronic kidney disease)    • Clostridium difficile infection    • COPD (chronic obstructive pulmonary disease) (CMS/HCC)    • Diabetes mellitus (CMS/HCC)    • Glaucoma    • Heart block    • Hepatitis C    • History of transfusion    • Hypertension    • Nephropathy, diabetic (CMS/HCC)    • Pacemaker    • Pulmonary embolism (CMS/HCC)        Allergies   Allergen Reactions   • Lisinopril Angioedema     unknown       Past Surgical History:   Procedure Laterality Date   • ABDOMINAL SURGERY     •  ARTERIOVENOUS FISTULA/SHUNT SURGERY Right     forearm loop   • ARTERIOVENOUS FISTULA/SHUNT SURGERY Left     removed past upper arm   • ARTERIOVENOUS FISTULA/SHUNT SURGERY Right 3/13/2018    Procedure: revision RIGHT forearm ARTERIOVENOUS graft (excision), debridement, wound vac;  Surgeon: Jerson Singh MD;  Location: [facility] OR;  Service: Vascular   • ARTERIOVENOUS FISTULA/SHUNT SURGERY W/ HEMODIALYSIS CATHETER INSERTION Right 4/4/2016    Procedure: RIGHT ARM AV FISTULA DECLOT REVISION REPAIR BRACHIAL ANASTOMOTIC PSUEDOANEURYSM LEFT FEMORAL RICHARDSON FISTULOGRAM WITH ANGIOPLASTY;  Surgeon: Jerson Carpenter MD;  Location: [facility] OR 18/19;  Service:    • CATARACT EXTRACTION W/ INTRAOCULAR LENS IMPLANT Left 3/31/2017    Procedure: REMOVE CATARACT AND IMPLANT INTRAOCULAR LENS LEFT EYE;  Surgeon: Hilton Montemayor MD;  Location: [facility] OR;  Service:    • COLONOSCOPY N/A 3/12/2019    Procedure: COLONOSCOPY;  Surgeon: Flako Montoya MD;  Location: [facility] ENDOSCOPY;  Service: Gastroenterology   • ENDOSCOPY N/A 3/12/2019    Procedure: ESOPHAGOGASTRODUODENOSCOPY;  Surgeon: Flako Montoya MD;  Location: [facility] ENDOSCOPY;  Service: Gastroenterology   • EYE SURGERY     • HERNIA REPAIR     • IMPLANTABLE CARDIOVERTER DEFIBRILLATOR LEAD REPLACEMENT/POCKET REVISION Right 4/11/2016    Procedure: PACEMAKER LEADS X 3 AND BATTERY EXTRACTION WITH EXIMER LASER;  Surgeon: Vern Reeves MD;  Location: [facility] OR 18/19;  Service:    • INSERTION HEMODIALYSIS CATHETER Right 05/2015    jugular   • INTERVENTIONAL RADIOLOGY PROCEDURE Right 6/1/2017    Procedure: RIGHT dialysis fistulagram & angioplasty;  Surgeon: Jerson Singh MD;  Location: [facility] ANGIO INVASIVE LOCATION;  Service:    • INTERVENTIONAL RADIOLOGY PROCEDURE Right 8/24/2017    Procedure: IR dialysis fistulagram;  Surgeon: Jerson Singh MD;  Location: [facility] ANGIO INVASIVE LOCATION;  Service:    • INTERVENTIONAL RADIOLOGY  PROCEDURE Right 11/13/2018    Procedure: IR dialysis fistulagram;  Surgeon: Jerson Singh MD;  Location: [facility] ANGIO INVASIVE LOCATION;  Service: Interventional Radiology   • PACEMAKER IMPLANTATION  2008    dual chamber [employer]   • REMOVAL HEMODIALYSIS CATHETER Right 05/2015    femoral vein       Family History   Problem Relation Age of Onset   • COPD Sister    • Diabetes Brother    • Early death Brother    • Hyperlipidemia Brother    • Hypertension Brother    • Coronary artery disease Mother    • Diabetes Mother    • Hypertension Mother    • Stroke Other    • Other Other         Respiratory disorder       Social History     Socioeconomic History   • Marital status: Legally      Spouse name: Not on file   • Number of children: Not on file   • Years of education: Not on file   • Highest education level: Not on file   Tobacco Use   • Smoking status: Former Smoker     Types: Cigarettes   • Smokeless tobacco: Never Used   • Tobacco comment: quit 20 years ago    Substance and Sexual Activity   • Alcohol use: No     Comment: former heavy use in his 50's, up to a fifth of liquor a day, currently no alcohol   • Drug use: No   • Sexual activity: No           Objective   Physical Exam   Constitutional: He is oriented to person, place, and time. He appears well-developed and well-nourished.   HENT:   Head: Normocephalic and atraumatic.   Mouth/Throat: Oropharynx is clear and moist.   Eyes: EOM are normal. Pupils are equal, round, and reactive to light.   Neck: Normal range of motion. Neck supple.   Cardiovascular: Normal rate, regular rhythm and normal heart sounds.   Pulmonary/Chest: Effort normal. He has wheezes.   Abdominal: Soft. Bowel sounds are normal.   Musculoskeletal: He exhibits edema.   Neurological: He is alert and oriented to person, place, and time. He has normal strength. He is not disoriented. He displays normal reflexes. No cranial nerve deficit or sensory deficit.   Skin:  Skin is warm. Capillary refill takes less than 2 seconds.   Psychiatric: His affect is blunt.   Nursing note and vitals reviewed.      ECG 12 Lead    Date/Time: 4/4/2019 4:44 PM  Performed by: Javier Duff MD  Authorized by: Javier Duff MD   Interpreted by physician  Rhythm: sinus rhythm  Rate: normal  BPM: 60  QRS axis: left  Conduction: left bundle branch block  Clinical impression: abnormal ECG                 ED Course                  MDM  Number of Diagnoses or Management Options  Acute encephalopathy:   Increased ammonia level:   Diagnosis management comments: Patient is awake alert and oriented x4.  He has generalized weakness and fatigue.  Has grossly nonfocal neuro exam.  EKG did not show any acute ST elevations.  He has mildly elevated troponin but with his chronic kidney disease they are usually elevated.  He is not complaining of any chest pain.  Lactate is mildly high, does not seem septic.  I would not start him on antibiotics.  His ammonia is elevated and his altered mentation is probably secondary to that.  Have given a dose of lactulose here.  Discussed with Dr. Marylou Eugene and patient is admitted to resident service.       Amount and/or Complexity of Data Reviewed  Clinical lab tests: ordered and reviewed  Tests in the radiology section of CPT®: ordered and reviewed  Decide to obtain previous medical records or to obtain history from someone other than the patient: yes      Labs Reviewed   COMPREHENSIVE METABOLIC PANEL - Abnormal; Notable for the following components:       Result Value    Glucose 113 (*)     BUN 34 (*)     Creatinine 5.63 (*)     Sodium 132 (*)     Chloride 80 (*)     CO2 31.0 (*)     Total Protein 10.5 (*)     Alkaline Phosphatase 190 (*)     Total Bilirubin 1.3 (*)     eGFR   Amer 12 (*)     BUN/Creatinine Ratio 6.0 (*)     All other components within normal limits    Narrative:     GFR Normal >60  Chronic Kidney Disease <60  Kidney Failure <15   BNP (IN-HOUSE)  - Abnormal; Notable for the following components:    proBNP 5,132.0 (*)     All other components within normal limits    Narrative:     Among patients with dyspnea, NT-proBNP is highly sensitive for the detection of acute congestive heart failure. In addition NT-proBNP of <300 pg/ml effectively rules out acute congestive heart failure with 99% negative predictive value.   TROPONIN (IN-HOUSE) - Abnormal; Notable for the following components:    Troponin T 0.074 (*)     All other components within normal limits    Narrative:     Troponin T Reference Range:  <= 0.03 ng/mL-   Negative for AMI  >0.03 ng/mL-     Abnormal for myocardial necrosis.  Clinicians would have to utilize clinical acumen, EKG, Troponin and serial changes to determine if it is an Acute Myocardial Infarction or myocardial injury due to an underlying chronic condition.    LACTIC ACID, PLASMA - Abnormal; Notable for the following components:    Lactate 2.5 (*)     All other components within normal limits   AMMONIA - Abnormal; Notable for the following components:    Ammonia 98 (*)     All other components within normal limits   CBC WITH AUTO DIFFERENTIAL - Abnormal; Notable for the following components:    RBC 3.88 (*)     Hemoglobin 11.0 (*)     Hematocrit 34.3 (*)     RDW 16.8 (*)     RDW-SD 54.7 (*)     Platelets 94 (*)     Monocyte % 19.7 (*)     All other components within normal limits   BLOOD GAS, ARTERIAL - Abnormal; Notable for the following components:    pCO2, Arterial 59.4 (*)     pO2, Arterial 22.5 (*)     HCO3, Arterial 34.7 (*)     Base Excess, Arterial 7.7 (*)     O2 Saturation, Arterial 27.9 (*)     All other components within normal limits   RAINBOW DRAW    Narrative:     The following orders were created for panel order [place] Draw.  Procedure                               Abnormality         Status                     ---------                               -----------         ------                     Light Blue Top[[id number]]                                    Final result               Green Top (Gel)[[id number]]                                  Final result               Lavender Top[[id number]]                                     Final result               Gold Top - SST[[id number]]                                   Final result                 Please view results for these tests on the individual orders.   SCAN SLIDE   LACTIC ACID REFLEX TIMER   LIGHT BLUE TOP   GREEN TOP   LAVENDER TOP   GOLD TOP - SST   CBC AND DIFFERENTIAL    Narrative:     The following orders were created for panel order CBC & Differential.  Procedure                               Abnormality         Status                     ---------                               -----------         ------                     Scan Slide[[id number]]                                       In process                 CBC Auto Differential[[id number]]        Abnormal            Final result                 Please view results for these tests on the individual orders.       Ct Head Without Contrast    Result Date: 3/31/2019  Narrative: PROCEDURE: CT HEAD WO CONTRAST INDICATION:  Generalized weakness, sleepiness COMPARISON:  2/26/2019 TECHNIQUE:  CT head, without iv contrast.   This exam was performed according to our departmental dose-optimization program, which includes automated exposure control, adjustment of the mA and/or kV according to patient size and/or use of iterative reconstruction technique. DLP is 904.0 FINDINGS: The degree of cerebral atrophy is within normal limits for age. There is preservation of the gray-white matter differentiation.  No focal parenchymal lesions. There are mild age related degenerative cortical and deep white matter changes. There is no evidence of a hemorrhage or intracranial mass. There is no midline shift. The ventricles are normal in size and configuration. The brain stem, cerebellum, globes, paranasal sinuses, and osseous structures are within normal  limits.     Impression: No acute intracranial abnormality. If pain or symptoms persist beyond reasonable expectations, an MRI examination is suggested as is deemed clinically appropriate. Electronically signed by:  Renetta Fountain MD  3/31/2019 6:47 PM CDT Workstation: [id number]    Xr Chest 1 View    Result Date: 4/4/2019  Narrative: [facility] Patient Name: JOSUE SÁNCHEZ ATTENDING: JAVIER ROJAS REFERRING: JAVIER ROJAS ORDERING: JAVIER ROJAS ----------------------- PROCEDURE: Chest, AP portable Date of exam: 4/4/2019 at 1626 hours HISTORY: Altered mental status A single portable view of the chest was obtained. Study is compared to prior exam of 3/31/2019. The lungs are satisfactorily expanded and clear of focal infiltrates or effusions. The heart size is borderline and there is mild degree of vascular and interstitial congestion. The costophrenic angles are clear.     Impression: Mild vascular and interstitial congestion without focal infiltrates or effusions. Electronically signed by:  Brandt Spencer MD  4/4/2019 5:06 PM CDT Workstation: [id number]    Xr Chest 1 View    Result Date: 3/31/2019  Narrative: PROCEDURE: XR CHEST 1 VW VIEWS:Single INDICATION: Generalized weakness COMPARISON: CXR: 2/26/2018 FINDINGS:   - lines/tubes: None   - cardiac: Borderline size  - mediastinum: Stable contour compared to the prior study of 2/26/2019.   - lungs: Mild central vascular congestion. Streaky opacity in the right lung base may represent atelectasis or infiltrate   - pleura: No evidence of  fluid.    - osseous: Unremarkable for age.     Impression: 1. Stable borderline cardiomegaly 2. Right base atelectasis versus infiltrate. Electronically signed by:  Renetta Fountain MD  3/31/2019 6:56 PM CDT Workstation: [id number]            Final diagnoses:   Acute encephalopathy   Increased ammonia level            Javier Rojas MD  04/04/19 1927       Javier Rojas MD  04/04/19 1945

## 2019-04-04 NOTE — TELEPHONE ENCOUNTER
Jerica from [facility] called and wanted to let Dr. Rodríguez know that the patient has been readmitted to the hospital and needs to be seen by Dr. Rodríguez.      Thank you,      Patricia

## 2019-04-05 NOTE — PLAN OF CARE
"Problem: Patient Care Overview  Goal: Plan of Care Review  Outcome: Outcome(s) achieved Date Met: 04/05/19 04/05/19 1276   Coping/Psychosocial   Plan of Care Reviewed With patient   OTHER   Outcome Summary PT evaluation completed. PT evaluation only as pt demonstrated independence with all functional mobility tasks. Conditional indep with ambulation x 180 ft with RW, conditional indep with all transfers and bed mobility tasks. Limited by neck and low back pain. No LE strength deficits (MMT 5/5) nor ROM deficits for B LE's. Recommend d/c back to a ECF. However, pt not pleased with [facility], would prefer to d/c to a differenct ECF, citing [facility] is too \"dirty.\" No DME recommended at this time.          "

## 2019-04-05 NOTE — PROGRESS NOTES
FAMILY MEDICINE DAILY PROGRESS NOTE  NAME: Cristo Musa  : 1950  MRN: [id number]     LOS: 0 days     PROVIDER OF SERVICE: Cory Rodríguez MD    Chief Complaint: Acute encephalopathy    Subjective:     Interval History:  History taken from: patient chart  Patient is feeling well this morning with no acute complaints.  No overnight events.  He is stable on room air this morning satting at 99%.  He is oriented x3 and well alert.  He is sitting up comfortably eating breakfast.    Review of Systems:   Review of Systems   Constitutional: Negative for activity change, appetite change, chills and fever.   HENT: Negative for congestion, ear pain and sore throat.    Eyes: Negative for redness and visual disturbance.   Respiratory: Negative for cough, shortness of breath and wheezing.    Cardiovascular: Negative for chest pain and palpitations.   Gastrointestinal: Negative for abdominal pain, diarrhea, nausea and vomiting.   Genitourinary: Negative for difficulty urinating and dysuria.   Musculoskeletal: Negative for arthralgias and gait problem.   Skin: Negative for color change and rash.   Neurological: Negative for dizziness, weakness and headaches.   Psychiatric/Behavioral: Negative for dysphoric mood and sleep disturbance. The patient is not nervous/anxious.        Objective:     Vital Signs  Temp:  [96.2 °F (35.7 °C)-98.7 °F (37.1 °C)] 97.3 °F (36.3 °C)  Heart Rate:  [] 68  Resp:  [18] 18  BP: (138-176)/(63-89) 138/64    Physical Exam  Physical Exam   Constitutional: He is oriented to person, place, and time. He appears well-developed and well-nourished.   HENT:   Head: Normocephalic and atraumatic.   Right Ear: External ear normal.   Left Ear: External ear normal.   Nose: Nose normal.   Eyes: Conjunctivae and EOM are normal. Pupils are equal, round, and reactive to light.   Neck: Normal range of motion. Neck supple.   Cardiovascular: Normal rate and regular rhythm.   Murmur  heard.  Pulmonary/Chest: Effort normal and breath sounds normal. He has no wheezes.   Abdominal: Soft. Bowel sounds are normal. He exhibits no distension. There is no tenderness.   Musculoskeletal: Normal range of motion. He exhibits edema (trace bilat). He exhibits no deformity.   Neurological: He is alert and oriented to person, place, and time. No cranial nerve deficit.   Skin: Skin is warm.   Psychiatric: He has a normal mood and affect. His behavior is normal. Thought content normal.   Nursing note and vitals reviewed.      Medication Review    Current Facility-Administered Medications:   •  acetaminophen (TYLENOL) tablet 500 mg, 500 mg, Oral, Q4H PRN, Marylou Eugene MD  •  albumin human 25 % IV SOLN 12.5 g, 12.5 g, Intravenous, PRN, Pascual Vilchis MD  •  aspirin chewable tablet 81 mg, 81 mg, Oral, Daily, Marylou Eugene MD, 81 mg at 04/05/19 0828  •  brimonidine (ALPHAGAN) 0.15 % ophthalmic solution 1 drop, 1 drop, Both Eyes, TID, Marylou Eugene MD, 1 drop at 04/05/19 0837  •  cetirizine (zyrTEC) tablet 5 mg, 5 mg, Oral, Daily, Marylou Eugene MD, 5 mg at 04/05/19 0829  •  cholecalciferol (VITAMIN D3) tablet 2,000 Units, 2,000 Units, Oral, Daily, Marylou Eugene MD, 2,000 Units at 04/05/19 0829  •  cyclobenzaprine (FLEXERIL) tablet 5 mg, 5 mg, Oral, Q8H PRN, Marylou Eugene MD  •  escitalopram (LEXAPRO) tablet 10 mg, 10 mg, Oral, Daily, Marylou Eugene MD, 10 mg at 04/05/19 0829  •  famotidine (PEPCID) tablet 20 mg, 20 mg, Oral, Nightly, Marylou Eugene MD, 20 mg at 04/04/19 2205  •  fluticasone (FLONASE) 50 MCG/ACT nasal spray 2 spray, 2 spray, Nasal, Daily, Marylou Eugene MD, 2 spray at 04/05/19 0813  •  gabapentin (NEURONTIN) capsule 100 mg, 100 mg, Oral, Nightly, Marylou Eugene MD, 100 mg at 04/04/19 2200  •  heparin (porcine) injection 2,000 Units, 2,000 Units, Intravenous, PRN, Pascual Vilchis MD  •  insulin detemir (LEVEMIR) injection 25  Units, 25 Units, Subcutaneous, Daily, Marylou Eugene MD, 25 Units at 04/05/19 0832  •  lactulose (CHRONULAC) 10 GM/15ML solution 20 g, 20 g, Oral, TID, Marylou Eugene MD, 20 g at 04/05/19 0828  •  latanoprost (XALATAN) 0.005 % ophthalmic solution 1 drop, 1 drop, Both Eyes, Nightly, Marylou Eugene MD, 1 drop at 04/04/19 2205  •  melatonin tablet 5.25 mg, 5.25 mg, Oral, Nightly, Marylou Eugene MD, 5.25 mg at 04/04/19 2205  •  ondansetron ODT (ZOFRAN-ODT) disintegrating tablet 4 mg, 4 mg, Oral, Q6H PRN, Marylou Eugene MD  •  polyethylene glycol (MIRALAX) powder 17 g, 17 g, Oral, Daily PRN, Marylou Eugene MD  •  sennosides-docusate sodium (SENOKOT-S) 8.6-50 MG tablet 2 tablet, 2 tablet, Oral, BID PRN, Marylou Eugene MD  •  sevelamer (RENAGEL) tablet 800 mg, 800 mg, Oral, TID With Meals, Marylou Eugene MD, 800 mg at 04/05/19 0829  •  sodium chloride 0.9 % flush 3 mL, 3 mL, Intravenous, Q12H, Marylou Eugene MD, 3 mL at 04/05/19 0830  •  sodium chloride 0.9 % flush 3-10 mL, 3-10 mL, Intravenous, PRN, Marylou Eugene MD  •  traZODone (DESYREL) tablet 50 mg, 50 mg, Oral, Nightly PRN, Marylou Eugene MD     Diagnostic Data    Lab Results (last 24 hours)     Procedure Component Value Units Date/Time    Ammonia [[id number]]  (Abnormal) Collected:  04/05/19 0930    Specimen:  Blood Updated:  04/05/19 1054     Ammonia 124 umol/L     Comprehensive Metabolic Panel [[id number]]  (Abnormal) Collected:  04/05/19 0930    Specimen:  Blood Updated:  04/05/19 1018     Glucose 305 mg/dL      BUN 41 mg/dL      Creatinine 6.67 mg/dL      Sodium 127 mmol/L      Potassium 4.3 mmol/L      Chloride 84 mmol/L      CO2 24.0 mmol/L      Calcium 9.2 mg/dL      Total Protein 9.7 g/dL      Albumin 3.40 g/dL      ALT (SGPT) 12 U/L      AST (SGOT) 24 U/L      Alkaline Phosphatase 162 U/L      Total Bilirubin 1.2 mg/dL      eGFR Non African Amer -- mL/min/1.73      Comment: <15  Indicative of kidney failure.        eGFR  African Amer 10 mL/min/1.73      Comment: <15 Indicative of kidney failure.        Globulin 6.3 gm/dL      A/G Ratio 0.5 g/dL      BUN/Creatinine Ratio 6.1     Anion Gap 19.0 mmol/L     Narrative:       GFR Normal >60  Chronic Kidney Disease <60  Kidney Failure <15    Lactic Acid, Plasma [[id number]]  (Normal) Collected:  04/05/19 0930    Specimen:  Blood Updated:  04/05/19 1006     Lactate 2.0 mmol/L     Magnesium [[id number]]  (Normal) Collected:  04/05/19 0930    Specimen:  Blood Updated:  04/05/19 1006     Magnesium 2.4 mg/dL     CBC & Differential [[id number]] Collected:  04/05/19 0930    Specimen:  Blood Updated:  04/05/19 0938    Narrative:       The following orders were created for panel order CBC & Differential.  Procedure                               Abnormality         Status                     ---------                               -----------         ------                     CBC Auto Differential[[id number]]        Abnormal            Final result                 Please view results for these tests on the individual orders.    CBC Auto Differential [[id number]]  (Abnormal) Collected:  04/05/19 0930    Specimen:  Blood Updated:  04/05/19 0938     WBC 4.21 10*3/mm3      RBC 3.64 10*6/mm3      Hemoglobin 10.4 g/dL      Hematocrit 31.5 %      MCV 86.5 fL      MCH 28.6 pg      MCHC 33.0 g/dL      RDW 16.4 %      RDW-SD 51.4 fl      MPV 9.2 fL      Platelets 110 10*3/mm3      Neutrophil % 55.6 %      Lymphocyte % 23.5 %      Monocyte % 16.6 %      Eosinophil % 3.1 %      Basophil % 1.0 %      Immature Grans % 0.2 %      Neutrophils, Absolute 2.34 10*3/mm3      Lymphocytes, Absolute 0.99 10*3/mm3      Monocytes, Absolute 0.70 10*3/mm3      Eosinophils, Absolute 0.13 10*3/mm3      Basophils, Absolute 0.04 10*3/mm3      Immature Grans, Absolute 0.01 10*3/mm3      nRBC 0.0 /100 WBC     POC Glucose Once [[id number]]  (Abnormal) Collected:  04/05/19 0736    Specimen:   Blood Updated:  04/05/19 0806     Glucose 153 mg/dL      Comment: RN NotifiedOperator: [id number] FEMI Olivier ID: [id number]       CRE Screen by PCR - Swab, Large Intestine, Rectum [[id number]] Collected:  04/04/19 2220    Specimen:  Swab from Large Intestine, Rectum Updated:  04/05/19 0542     CRE SCREEN Not Detected     Comment: Test performed by real-time polymerase chain reaction (qPCR).        OXA 48 Strain Not Detected     IMP STRAIN Not Detected     VIM STRAIN Not Detected     NDM Strain Not Detected     KPC Strain Not Detected    Lactic Acid, Reflex [[id number]]  (Abnormal) Collected:  04/04/19 2116    Specimen:  Blood Updated:  04/04/19 2151     Lactate 2.6 mmol/L     CBC & Differential [[id number]] Collected:  04/04/19 1644    Specimen:  Blood Updated:  04/04/19 2128    Narrative:       The following orders were created for panel order CBC & Differential.  Procedure                               Abnormality         Status                     ---------                               -----------         ------                     Scan Slide[[id number]]                                       Final result               CBC Auto Differential[[id number]]        Abnormal            Final result                 Please view results for these tests on the individual orders.    Scan Slide [[id number]] Collected:  04/04/19 1644    Specimen:  Blood Updated:  04/04/19 2128     Anisocytosis Slight/1+     Hypochromia Slight/1+     WBC Morphology Normal     Platelet Estimate Decreased    Lactic Acid, Reflex Timer (This will reflex a repeat order 3-3:15 hours after ordered.) [[id number]] Collected:  04/04/19 1645    Specimen:  Blood Updated:  04/04/19 2100     Extra Tube Hold for add-ons.     Comment: Auto resulted.       POC Glucose Once [[id number]]  (Normal) Collected:  04/04/19 2035    Specimen:  Blood Updated:  04/04/19 2052     Glucose 112 mg/dL      Comment: RN NotifiedOperator: [id number] GUILLERMO Mueller ID:  [id number]       Troponin [[id number]]  (Abnormal) Collected:  04/04/19 1644    Specimen:  Blood Updated:  04/04/19 1809     Troponin T 0.074 ng/mL     Narrative:       Troponin T Reference Range:  <= 0.03 ng/mL-   Negative for AMI  >0.03 ng/mL-     Abnormal for myocardial necrosis.  Clinicians would have to utilize clinical acumen, EKG, Troponin and serial changes to determine if it is an Acute Myocardial Infarction or myocardial injury due to an underlying chronic condition.     BNP [[id number]]  (Abnormal) Collected:  04/04/19 1644    Specimen:  Blood Updated:  04/04/19 1803     proBNP 5,132.0 pg/mL     Narrative:       Among patients with dyspnea, NT-proBNP is highly sensitive for the detection of acute congestive heart failure. In addition NT-proBNP of <300 pg/ml effectively rules out acute congestive heart failure with 99% negative predictive value.    Comprehensive Metabolic Panel [[id number]]  (Abnormal) Collected:  04/04/19 1644    Specimen:  Blood Updated:  04/04/19 1758     Glucose 113 mg/dL      BUN 34 mg/dL      Creatinine 5.63 mg/dL      Sodium 132 mmol/L      Potassium 3.9 mmol/L      Chloride 80 mmol/L      CO2 31.0 mmol/L      Calcium 9.6 mg/dL      Total Protein 10.5 g/dL      Albumin 3.70 g/dL      ALT (SGPT) 14 U/L      AST (SGOT) 28 U/L      Alkaline Phosphatase 190 U/L      Total Bilirubin 1.3 mg/dL      eGFR Non African Amer -- mL/min/1.73      Comment: <15 Indicative of kidney failure.        eGFR  African Amer 12 mL/min/1.73      Comment: <15 Indicative of kidney failure.        Globulin 6.8 gm/dL      A/G Ratio 0.5 g/dL      BUN/Creatinine Ratio 6.0     Anion Gap 21.0 mmol/L     Narrative:       GFR Normal >60  Chronic Kidney Disease <60  Kidney Failure <15    Lactic Acid, Plasma [[id number]]  (Abnormal) Collected:  04/04/19 1645    Specimen:  Blood Updated:  04/04/19 1757     Lactate 2.5 mmol/L     Ammonia [[id number]]  (Abnormal) Collected:  04/04/19 1645    Specimen:  Blood Updated:  04/04/19  1754     Ammonia 98 umol/L     [place] Draw [[id number]] Collected:  04/04/19 1644    Specimen:  Blood Updated:  04/04/19 1745    Narrative:       The following orders were created for panel order [place] Draw.  Procedure                               Abnormality         Status                     ---------                               -----------         ------                     Light Blue Top[[id number]]                                   Final result               Green Top (Gel)[[id number]]                                  Final result               Lavender Top[[id number]]                                     Final result               Gold Top - SST[[id number]]                                   Final result                 Please view results for these tests on the individual orders.    Light Blue Top [[id number]] Collected:  04/04/19 1644    Specimen:  Blood Updated:  04/04/19 1745     Extra Tube hold for add-on     Comment: Auto resulted       Green Top (Gel) [[id number]] Collected:  04/04/19 1644    Specimen:  Blood Updated:  04/04/19 1745     Extra Tube Hold for add-ons.     Comment: Auto resulted.       Lavender Top [[id number]] Collected:  04/04/19 1644    Specimen:  Blood Updated:  04/04/19 1745     Extra Tube hold for add-on     Comment: Auto resulted       Gold Top - SST [[id number]] Collected:  04/04/19 1644    Specimen:  Blood Updated:  04/04/19 1745     Extra Tube Hold for add-ons.     Comment: Auto resulted.       Blood Gas, Arterial [[id number]]  (Abnormal) Collected:  04/04/19 1716    Specimen:  Arterial Blood Updated:  04/04/19 1730     Site Left Radial     Inderjit's Test N/A     pH, Arterial 7.374 pH units      pCO2, Arterial 59.4 mm Hg      Comment: 83 Value above reference range        pO2, Arterial 22.5 mm Hg      Comment: 85 Value below critical limit        HCO3, Arterial 34.7 mmol/L      Comment: 83 Value above reference range        Base Excess, Arterial 7.7 mmol/L      Comment: 83 Value above  reference range        O2 Saturation, Arterial 27.9 %      Comment: 84 Value below reference range        Barometric Pressure for Blood Gas 749 mmHg      Modality Room Air     Ventilator Mode NA     Collected by KW     Comment: Meter: [id number]     :  [id number]       CBC Auto Differential [[id number]]  (Abnormal) Collected:  04/04/19 1644    Specimen:  Blood Updated:  04/04/19 1659     WBC 4.31 10*3/mm3      RBC 3.88 10*6/mm3      Hemoglobin 11.0 g/dL      Hematocrit 34.3 %      MCV 88.4 fL      MCH 28.4 pg      MCHC 32.1 g/dL      RDW 16.8 %      RDW-SD 54.7 fl      MPV 9.7 fL      Platelets 94 10*3/mm3      Neutrophil % 48.2 %      Lymphocyte % 26.9 %      Monocyte % 19.7 %      Eosinophil % 3.5 %      Basophil % 1.2 %      Immature Grans % 0.5 %      Neutrophils, Absolute 2.08 10*3/mm3      Lymphocytes, Absolute 1.16 10*3/mm3      Monocytes, Absolute 0.85 10*3/mm3      Eosinophils, Absolute 0.15 10*3/mm3      Basophils, Absolute 0.05 10*3/mm3      Immature Grans, Absolute 0.02 10*3/mm3      nRBC 0.0 /100 WBC           I reviewed the patient's new clinical results.    Assessment/Plan:     Active Hospital Problems    Diagnosis POA   • **Acute encephalopathy [G93.40] Yes     -ammonia 98->124  -given lactulose 20mg once in ED  -will give lactulose 20mg Q8H     • Hyponatremia [E87.1] Yes     -132 on admission  -given ESRD, will hold on IVFs  -anticipate correction with dialysis  -monitor     • Thrombocytopenia (CMS/HCC) [D69.6] Yes     -appears to be chronic thrombocytopenia  -no active bleeding  -continue to monitor     • Elevated troponin [R74.8] Yes     -troponin 0.055, 0.074  -most likely elevated secondary to ESRD  -Patient denies chest pain  -will trend     • Anemia due to chronic kidney disease, on chronic dialysis (CMS/HCC) [N18.6, D63.1, Z99.2] Not Applicable     -H/H 11/34.3, stable  -monitor       • Elevated brain natriuretic peptide (BNP) level [R79.89] Yes     -Echo in 7/2018 shows EF on  55-60% with grade Ia diastolic dysfunction  -BNP 5132 on admission  -CXR: Mild vascular and interstitial congestion without focal infiltrates or effusions.  -monitor fluid status carefully         • COPD (chronic obstructive pulmonary disease) (CMS/[facility]) [J44.9] Yes     -Continue flonase, loratadine  -not on home O2     • Physical deconditioning [R53.81] Yes     PT/OT      • Gastroesophageal reflux disease without esophagitis [K21.9] Yes     Continue H2 blocker     • Anxiety [F41.9] Yes     Continue lexapro     • ESRD (end stage renal disease) (CMS/[facility]) [N18.6] Yes     HD [facility]  Nephrologist Dr. Vilchis         • Diabetes mellitus (CMS/[facility]) [E11.9] Yes     -Continue home lantus 25 units nightly  -SSI     • Hypertension [I10] Yes     Continue home medicatons       Will get US liver today    DVT prophylaxis: scd    Code status is   Code Status and Medical Interventions:   Ordered at: 04/04/19 2010     Level Of Support Discussed With:    Patient     Code Status:    CPR     Medical Interventions (Level of Support Prior to Arrest):    Full       Plan for disposition:to [facility] 2-3 days          This document has been electronically signed by Cory Rodríguez MD on April 5, 2019 11:38 AM

## 2019-04-05 NOTE — THERAPY DISCHARGE NOTE
Acute Care - Physical Therapy Initial Eval/Discharge  [facility]     Patient Name: Cristo Musa  : 1950  MRN: [id number]  Today's Date: 2019   Onset of Illness/Injury or Date of Surgery: 19  Date of Referral to PT: 19  Referring Physician: Dr. SOFI Eugene      Admit Date: 2019    Visit Dx:    ICD-10-CM ICD-9-CM   1. Acute encephalopathy G93.40 348.30   2. Increased ammonia level R79.89 790.6     Patient Active Problem List   Diagnosis   • Pacemaker infection (CMS/HCC)   • Renal failure, chronic   • Diabetes mellitus (CMS/HCC)   • Hypertension   • Complete heart block (CMS/HCC)   • Arteriovenous shunt thrombosis (CMS/[facility])   • Follow-up surgery care   • Arteriovenous fistula, acquired (CMS/[facility])   • Acquired stenosis of nasolacrimal duct   • Exotropia   • Optic atrophy   • Pseudophakia   • Posterior subcapsular polar age-related cataract   • Nuclear cataract   • Primary open angle glaucoma of left eye, moderate stage   • Cortical age-related cataract   • ESRD (end stage renal disease) (CMS/[facility])   • Primary insomnia   • Chronic pain   • Anxiety   • ESRD on dialysis (CMS/[facility])   • Gastroesophageal reflux disease without esophagitis   • Neuropathy   • A-V fistula (CMS/[facility])   • Physical deconditioning   • COPD (chronic obstructive pulmonary disease) (CMS/HCC)   • Non-healing wound of upper extremity   • Post-operative pain   • Elevated brain natriuretic peptide (BNP) level   • Arteriovenous fistula stenosis (CMS/[facility])   • AV fistula stenosis (CMS/[facility])   • Generalized abdominal pain   • Iron deficiency anemia due to chronic blood loss   • Pneumonia of right upper lobe due to infectious organism (CMS/[facility])   • Uremic encephalopathy   • Hyponatremia   • Elevated troponin   • Anemia due to chronic kidney disease, on chronic dialysis (CMS/HCC)   • Thrombocytopenia (CMS/HCC)   • Acute encephalopathy     Past Medical History:   Diagnosis Date   • Anemia of chronic disease    • Cataract    •  CHF (congestive heart failure) (CMS/HCC)    • CKD (chronic kidney disease)    • Clostridium difficile infection    • COPD (chronic obstructive pulmonary disease) (CMS/HCC)    • Diabetes mellitus (CMS/HCC)    • Glaucoma    • Heart block    • Hepatitis C    • History of transfusion    • Hypertension    • Nephropathy, diabetic (CMS/HCC)    • Pacemaker    • Pulmonary embolism (CMS/HCC)      Past Surgical History:   Procedure Laterality Date   • ABDOMINAL SURGERY     • ARTERIOVENOUS FISTULA/SHUNT SURGERY Right     forearm loop   • ARTERIOVENOUS FISTULA/SHUNT SURGERY Left     removed past upper arm   • ARTERIOVENOUS FISTULA/SHUNT SURGERY Right 3/13/2018    Procedure: revision RIGHT forearm ARTERIOVENOUS graft (excision), debridement, wound vac;  Surgeon: Jerson Singh MD;  Location: [facility] OR;  Service: Vascular   • ARTERIOVENOUS FISTULA/SHUNT SURGERY W/ HEMODIALYSIS CATHETER INSERTION Right 4/4/2016    Procedure: RIGHT ARM AV FISTULA DECLOT REVISION REPAIR BRACHIAL ANASTOMOTIC PSUEDOANEURYSM LEFT FEMORAL RICHARDSON FISTULOGRAM WITH ANGIOPLASTY;  Surgeon: Jerson Carpenter MD;  Location: [facility] OR 18/19;  Service:    • CATARACT EXTRACTION W/ INTRAOCULAR LENS IMPLANT Left 3/31/2017    Procedure: REMOVE CATARACT AND IMPLANT INTRAOCULAR LENS LEFT EYE;  Surgeon: Hilton Montemayor MD;  Location: [facility] OR;  Service:    • COLONOSCOPY N/A 3/12/2019    Procedure: COLONOSCOPY;  Surgeon: Flako Montoya MD;  Location: [facility] ENDOSCOPY;  Service: Gastroenterology   • ENDOSCOPY N/A 3/12/2019    Procedure: ESOPHAGOGASTRODUODENOSCOPY;  Surgeon: Flako Montoya MD;  Location: [facility] ENDOSCOPY;  Service: Gastroenterology   • EYE SURGERY     • HERNIA REPAIR     • IMPLANTABLE CARDIOVERTER DEFIBRILLATOR LEAD REPLACEMENT/POCKET REVISION Right 4/11/2016    Procedure: PACEMAKER LEADS X 3 AND BATTERY EXTRACTION WITH EXIMER LASER;  Surgeon: Vern Reeves MD;  Location: [facility] OR 18/19;  Service:    • INSERTION  HEMODIALYSIS CATHETER Right 05/2015    jugular   • INTERVENTIONAL RADIOLOGY PROCEDURE Right 6/1/2017    Procedure: RIGHT dialysis fistulagram & angioplasty;  Surgeon: Jerson Singh MD;  Location: [facility] ANGIO INVASIVE LOCATION;  Service:    • INTERVENTIONAL RADIOLOGY PROCEDURE Right 8/24/2017    Procedure: IR dialysis fistulagram;  Surgeon: Jerson Singh MD;  Location: [facility] ANGIO INVASIVE LOCATION;  Service:    • INTERVENTIONAL RADIOLOGY PROCEDURE Right 11/13/2018    Procedure: IR dialysis fistulagram;  Surgeon: Jerson Singh MD;  Location: [facility] ANGIO INVASIVE LOCATION;  Service: Interventional Radiology   • PACEMAKER IMPLANTATION  2008    dual chamber [employer] [place]   • REMOVAL HEMODIALYSIS CATHETER Right 05/2015    femoral vein          PT ASSESSMENT (last 12 hours)      Physical Therapy Evaluation     Row Name 04/05/19 1541          PT Evaluation Time/Intention    Subjective Information  complains of;pain neck  -BS     Document Type  evaluation  -BS     Mode of Treatment  individual therapy;physical therapy  -BS     Patient Effort  good  -BS     Symptoms Noted During/After Treatment  none  -BS     Comment  no fatigue noted with gait assessment  -BS     Row Name 04/05/19 1541          General Information    Patient Profile Reviewed?  yes  -BS     Onset of Illness/Injury or Date of Surgery  04/04/19  -BS     Referring Physician  Dr. SOFI Eugene  -BS     Patient Observations  alert;cooperative;agree to therapy  -BS     Patient/Family Observations  pt sitting at EOB eating lunch  -BS     General Observations of Patient  no family present  -BS     Prior Level of Function  independent:;community mobility;ADL's;dressing;grooming;feeding assisted by staff at SNF with bathing  -BS     Equipment Currently Used at Home  none  -BS     Pertinent History of Current Functional Problem  70 yo male presenting with high ammonia levels and altered mental status on 4/4/19  -BS     Existing  Precautions/Restrictions  other (see comments) No BP in R UE-dialysis shunt  -BS     Equipment Issued to Patient  gait belt  -BS     Row Name 04/05/19 1541          Relationship/Environment    Lives With  facility resident [facility]  -BS     Row Name 04/05/19 1541          Resource/Environmental Concerns    Current Living Arrangements  extended care facility  -BS     Row Name 04/05/19 1541          Cognitive Assessment/Intervention- PT/OT    Orientation Status (Cognition)  oriented x 3  -BS     Follows Commands (Cognition)  follows multi-step commands  -BS     Row Name 04/05/19 1541          Bed Mobility Assessment/Treatment    Bed Mobility Assessment/Treatment  rolling left;rolling right;sit-supine  -BS     Rolling Left [place] (Bed Mobility)  conditional independence  -BS     Row Name 04/05/19 1541          Transfer Assessment/Treatment    Transfer Assessment/Treatment  sit-stand transfer;stand-sit transfer  -BS     Sit-Stand [place] (Transfers)  conditional independence  -BS     Stand-Sit [place] (Transfers)  conditional independence  -BS     Row Name 04/05/19 1541          Sit-Stand Transfer    Assistive Device (Sit-Stand Transfers)  walker, front-wheeled  -BS     Row Name 04/05/19 1541          Stand-Sit Transfer    Assistive Device (Stand-Sit Transfers)  walker, front-wheeled  -BS     Row Name 04/05/19 1541          Gait/Stairs Assessment/Training    Gait/Stairs Assessment/Training  gait/ambulation independence  -BS     [place] Level (Gait)  conditional independence  -BS     Assistive Device (Gait)  walker, front-wheeled  -BS     Distance in Feet (Gait)  180 ft (one standing rest break)  -BS     Pattern (Gait)  step-through  -BS     Comment (Gait/Stairs)  no fatigue noted w/ gait assessment  -BS     Row Name 04/05/19 1541          General ROM    GENERAL ROM COMMENTS  B LE's grossly WFL for AROM  -BS     Row Name 04/05/19 1541          MMT (Manual Muscle Testing)    General MMT  "Comments  B LE's grossly 5/5  -BS     Row Name 04/05/19 1541          Sensory Assessment/Intervention    Sensory General Assessment  no sensation deficits identified  -BS     Row Name 04/05/19 1541          Pain Assessment    Additional Documentation  Pain Scale: Numbers Pre/Post-Treatment (Group)  -BS     Row Name 04/05/19 1541          Pain Scale: Numbers Pre/Post-Treatment    Pain Scale: Numbers, Pretreatment  8/10  -BS     Pain Scale: Numbers, Post-Treatment  8/10  -BS     Pain Location  neck  -BS     Pain Intervention(s)  Medication (See MAR)  -BS     Row Name 04/05/19 1541          Plan of Care Review    Plan of Care Reviewed With  patient  -BS     Row Name 04/05/19 1541          Physical Therapy Clinical Impression    Date of Referral to PT  04/04/19  -BS     PT Diagnosis (PT Clinical Impression)  acute encephalopathy  -BS     Patient/Family Goals Statement (PT Clinical Impression)  return to a clean SNF, does not prefer [facility], pt citing the facility is \"dirty.\"  -BS     Criteria for Skilled Interventions Met (PT Clinical Impression)  no  -BS     Row Name 04/05/19 1541          Vital Signs    Pre Systolic BP Rehab  162  -BS     Pre Treatment Diastolic BP  56  -BS     Pretreatment Heart Rate (beats/min)  66  -BS     Pre SpO2 (%)  97  -BS     O2 Delivery Pre Treatment  room air  -BS     Pre Patient Position  Sitting  -BS     Intra Patient Position  Standing  -BS     Post Patient Position  Supine  -BS     Row Name 04/05/19 1541          Positioning and Restraints    Pre-Treatment Position  in bed  -BS     Post Treatment Position  bed  -BS     In Bed  supine;call light within reach;exit alarm on  -BS     Row Name 04/05/19 1541          Living Environment    Home Accessibility  wheelchair accessible  -BS       User Key  (r) = Recorded By, (t) = Taken By, (c) = Cosigned By    Initials Name Provider Type    BS Alexander Ferreira, PT Physical Therapist              PT Recommendation and Plan  Anticipated " "Discharge Disposition (PT): community-based residential facility ([facility])  Therapy Frequency (PT Clinical Impression): evaluation only  Outcome Summary/Treatment Plan (PT)  Anticipated Discharge Disposition (PT): community-based residential facility ([facility])  Plan of Care Reviewed With: patient  Outcome Summary: PT evaluation completed. PT evaluation only as pt demonstrated independence with all functional mobility tasks. Conditional indep with ambulation x 180 ft with RW,  conditional indep with all transfers and bed mobility tasks. Limited by neck and low back pain. No LE strength deficits (MMT 5/5) nor ROM deficits for B LE's. Recommend d/c back to a ECF. However, pt not pleased with [facility], would prefer to d/c to a differenct ECF, citing [facility] is too \"dirty.\" No DME recommended at this time.          Time Calculation:   PT Charges     Row Name 04/05/19 1642             Time Calculation    Start Time  1541  -BS      Stop Time  1620  -BS      Time Calculation (min)  39 min  -BS      PT Received On  04/05/19  -BS      PT Goal Re-Cert Due Date  04/05/19  -BS        User Key  (r) = Recorded By, (t) = Taken By, (c) = Cosigned By    Initials Name Provider Type    Alexander Sanchez, PT Physical Therapist        Therapy Charges for Today     Code Description Service Date Service Provider Modifiers Qty    [id number]  PT EVAL MOD COMPLEXITY 3 4/5/2019 Alexander Ferreira, PT GP 1               PT Discharge Summary  Anticipated Discharge Disposition (PT): community-based residential facility ([facility])  Reason for Discharge: Independent  Outcomes Achieved: Other(Pt at independent level with all mobility tasks.)    Alexander Ferreira PT  4/5/2019       "

## 2019-04-05 NOTE — PLAN OF CARE
Problem: Patient Care Overview  Goal: Plan of Care Review  Outcome: Ongoing (interventions implemented as appropriate)   04/05/19 0053   Coping/Psychosocial   Plan of Care Reviewed With patient   Plan of Care Review   Progress no change     Goal: Individualization and Mutuality  Outcome: Ongoing (interventions implemented as appropriate)    Goal: Discharge Needs Assessment  Outcome: Ongoing (interventions implemented as appropriate)    Goal: Interprofessional Rounds/Family Conf  Outcome: Ongoing (interventions implemented as appropriate)      Problem: Fall Risk (Adult)  Goal: Identify Related Risk Factors and Signs and Symptoms  Outcome: Ongoing (interventions implemented as appropriate)    Goal: Absence of Fall  Outcome: Ongoing (interventions implemented as appropriate)      Problem: Fluid Volume Deficit (Adult)  Goal: Identify Related Risk Factors and Signs and Symptoms  Outcome: Ongoing (interventions implemented as appropriate)    Goal: Optimal Fluid Balance  Outcome: Ongoing (interventions implemented as appropriate)      Problem: Anemia (Adult)  Goal: Identify Related Risk Factors and Signs and Symptoms  Outcome: Ongoing (interventions implemented as appropriate)    Goal: Symptom Improvement  Outcome: Ongoing (interventions implemented as appropriate)      Problem: Cardiac Output Decreased (Adult)  Goal: Identify Related Risk Factors and Signs and Symptoms  Outcome: Ongoing (interventions implemented as appropriate)    Goal: Effective Tissue Perfusion  Outcome: Ongoing (interventions implemented as appropriate)      Problem: Renal Failure/Kidney Injury, Acute (Adult)  Goal: Signs and Symptoms of Listed Potential Problems Will be Absent, Minimized or Managed (Renal Failure/Kidney Injury, Acute)  Outcome: Ongoing (interventions implemented as appropriate)

## 2019-04-05 NOTE — SIGNIFICANT NOTE
04/05/19 1015   Rehab Time/Intention   Evaluation Not Performed patient unavailable for evaluation  (OT initial evaluation attempted this date, pt is out of room for dialysis. OT will follow up as time permits. )   Rehab Treatment   Discipline occupational therapist

## 2019-04-05 NOTE — H&P
HISTORY AND PHYSICAL  NAME: Cristo Musa  : 1950  MRN: [id number]    DATE OF ADMISSION: 19    DATE & TIME SEEN: 19 8:28 PM    PCP: Cory Rodríguez MD    CODE STATUS:   Code Status and Medical Interventions:   Ordered at: 19     Level Of Support Discussed With:    Patient     Code Status:    CPR     Medical Interventions (Level of Support Prior to Arrest):    Full       CHIEF COMPLAINT altered mental status    HPI:  Cristo Musa is a 69 y.o. male with concurrent medical history to include CFH, CKD on dialysis MWF, DM, COPD, Hep C, HTN, and anemia of chronic disease who was recently discharged two days ago presents from [facility] after he appeared to be unresponsive and somnolent. He was found to be sleeping in his wheelchair and did not respond, so they sent him to ED for further evaluation. Patient states he was sleeping during this time. Family at bedside was not present at the [facility] to provide additional history.     In the ER, ammonia was found to be 98. He was admitted to family medicine service for encephalopathy. He is alert and oriented x3 on exam. He is able to answer all questions appropriately. Family says he has had periods of confusion.       CONCURRENT MEDICAL HISTORY:  Past Medical History:   Diagnosis Date   • Anemia of chronic disease    • Cataract    • CHF (congestive heart failure) (CMS/HCC)    • CKD (chronic kidney disease)    • Clostridium difficile infection    • COPD (chronic obstructive pulmonary disease) (CMS/HCC)    • Diabetes mellitus (CMS/HCC)    • Glaucoma    • Heart block    • Hepatitis C    • History of transfusion    • Hypertension    • Nephropathy, diabetic (CMS/HCC)    • Pacemaker    • Pulmonary embolism (CMS/HCC)        PAST SURGICAL HISTORY:  Past Surgical History:   Procedure Laterality Date   • ABDOMINAL SURGERY     • ARTERIOVENOUS FISTULA/SHUNT SURGERY Right     forearm loop   •  ARTERIOVENOUS FISTULA/SHUNT SURGERY Left     removed past upper arm   • ARTERIOVENOUS FISTULA/SHUNT SURGERY Right 3/13/2018    Procedure: revision RIGHT forearm ARTERIOVENOUS graft (excision), debridement, wound vac;  Surgeon: Jerson Singh MD;  Location: [facility];  Service: Vascular   • ARTERIOVENOUS FISTULA/SHUNT SURGERY W/ HEMODIALYSIS CATHETER INSERTION Right 4/4/2016    Procedure: RIGHT ARM AV FISTULA DECLOT REVISION REPAIR BRACHIAL ANASTOMOTIC PSUEDOANEURYSM LEFT FEMORAL RICHARDSON FISTULOGRAM WITH ANGIOPLASTY;  Surgeon: Jerson Carpenter MD;  Location: Pending sale to [facility] OR 18/19;  Service:    • CATARACT EXTRACTION W/ INTRAOCULAR LENS IMPLANT Left 3/31/2017    Procedure: REMOVE CATARACT AND IMPLANT INTRAOCULAR LENS LEFT EYE;  Surgeon: Hilton Montemayor MD;  Location: [facility] OR;  Service:    • COLONOSCOPY N/A 3/12/2019    Procedure: COLONOSCOPY;  Surgeon: Flako Montoya MD;  Location: [facility] ENDOSCOPY;  Service: Gastroenterology   • ENDOSCOPY N/A 3/12/2019    Procedure: ESOPHAGOGASTRODUODENOSCOPY;  Surgeon: Flako Montoya MD;  Location: [facility] ENDOSCOPY;  Service: Gastroenterology   • EYE SURGERY     • HERNIA REPAIR     • IMPLANTABLE CARDIOVERTER DEFIBRILLATOR LEAD REPLACEMENT/POCKET REVISION Right 4/11/2016    Procedure: PACEMAKER LEADS X 3 AND BATTERY EXTRACTION WITH EXIMER LASER;  Surgeon: Vern Reeves MD;  Location: Pending sale to [facility] OR 18/19;  Service:    • INSERTION HEMODIALYSIS CATHETER Right 05/2015    jugular   • INTERVENTIONAL RADIOLOGY PROCEDURE Right 6/1/2017    Procedure: RIGHT dialysis fistulagram & angioplasty;  Surgeon: Jerson Singh MD;  Location: [facility] ANGIO INVASIVE LOCATION;  Service:    • INTERVENTIONAL RADIOLOGY PROCEDURE Right 8/24/2017    Procedure: IR dialysis fistulagram;  Surgeon: Jerson Singh MD;  Location: [facility] ANGIO INVASIVE LOCATION;  Service:    • INTERVENTIONAL RADIOLOGY PROCEDURE Right 11/13/2018    Procedure: IR dialysis fistulagram;   Surgeon: Jerson Singh MD;  Location: [facility] ANGIO INVASIVE LOCATION;  Service: Interventional Radiology   • PACEMAKER IMPLANTATION  2008    dual chamber [employer] [place]   • REMOVAL HEMODIALYSIS CATHETER Right 05/2015    femoral vein       FAMILY HISTORY:  Family History   Problem Relation Age of Onset   • COPD Sister    • Diabetes Brother    • Early death Brother    • Hyperlipidemia Brother    • Hypertension Brother    • Coronary artery disease Mother    • Diabetes Mother    • Hypertension Mother    • Stroke Other    • Other Other         Respiratory disorder        SOCIAL HISTORY:  Social History     Socioeconomic History   • Marital status: Legally      Spouse name: Not on file   • Number of children: Not on file   • Years of education: Not on file   • Highest education level: Not on file   Tobacco Use   • Smoking status: Former Smoker     Types: Cigarettes   • Smokeless tobacco: Never Used   • Tobacco comment: quit 20 years ago    Substance and Sexual Activity   • Alcohol use: No     Comment: former heavy use in his 50's, up to a fifth of liquor a day, currently no alcohol   • Drug use: No   • Sexual activity: No       HOME MEDICATIONS:  No current facility-administered medications for this encounter.     Current Outpatient Medications:   •  acetaminophen (TYLENOL) 500 MG tablet, Take 500 mg by mouth Every 4 (Four) Hours As Needed for Mild Pain  or Fever., Disp: , Rfl:   •  aspirin 81 MG chewable tablet, Chew 81 mg Daily., Disp: , Rfl:   •  brimonidine (ALPHAGAN P) 0.1 % solution ophthalmic solution, Administer 1 drop to both eyes 3 (Three) Times a Day., Disp: , Rfl:   •  Cholecalciferol (VITAMIN D3) 2000 units chewable tablet, Chew 2,000 Units Daily., Disp: , Rfl:   •  cyclobenzaprine (FLEXERIL) 5 MG tablet, Take 5 mg by mouth Every 8 (Eight) Hours As Needed for Muscle Spasms., Disp: , Rfl:   •  dorzolamide (TRUSOPT) 2 % ophthalmic solution, Administer 1 drop into the left eye 3  (Three) Times a Day., Disp: 1 each, Rfl: 12  •  escitalopram (LEXAPRO) 10 MG tablet, Take 1 tablet by mouth Daily., Disp: 30 tablet, Rfl: 3  •  famotidine (PEPCID) 20 MG tablet, Take 20 mg by mouth every night at bedtime., Disp: , Rfl:   •  fluticasone (FLONASE) 50 MCG/ACT nasal spray, 2 sprays into each nostril daily. Administer 2 sprays in each nostril for each dose., Disp: , Rfl:   •  gabapentin (NEURONTIN) 100 MG capsule, Take 1 capsule by mouth Every Night., Disp: 30 capsule, Rfl: 5  •  guaiFENesin 200 MG tablet, Take 400 mg by mouth Every 4 (Four) Hours As Needed for Cough., Disp: , Rfl:   •  insulin aspart (NovoLOG) 100 UNIT/ML injection, Inject  under the skin 3 (Three) Times a Day Before Meals. Sliding scale:  = 0 units; 150-200 = 3 units; 200-250 = 6 units; 250-300 = 9 units; 300-350 = 12 units; >350 = 15 units and call MD, Disp: , Rfl:   •  insulin detemir (LEVEMIR) 100 UNIT/ML injection, Inject 25 Units under the skin into the appropriate area as directed Daily., Disp: , Rfl:   •  latanoprost (XALATAN) 0.005 % ophthalmic solution, Administer 1 drop to both eyes every night., Disp: , Rfl:   •  Loratadine 10 MG capsule, Take 10 mg by mouth Every Night., Disp: , Rfl:   •  melatonin 5 MG tablet tablet, Take 5 mg by mouth Every Night., Disp: , Rfl:   •  Nutritional Supplements (NEPRO PO), Take 1 tablet by mouth Daily. On Sun, Tues, Thur, Sat, Disp: , Rfl:   •  ondansetron ODT (ZOFRAN-ODT) 4 MG disintegrating tablet, Take 1 tablet by mouth Every 6 (Six) Hours As Needed for Nausea or Vomiting., Disp: 10 tablet, Rfl: 0  •  polyethylene glycol (MIRALAX) packet, Take 17 g by mouth Daily As Needed (constipation)., Disp: , Rfl:   •  sennosides-docusate sodium (SENOKOT-S) 8.6-50 MG tablet, Take 2 tablets by mouth 2 (Two) Times a Day As Needed for Constipation., Disp: 60 tablet, Rfl: 0  •  sevelamer (RENVELA) 800 MG tablet, Take 800 mg by mouth 3 (Three) Times a Day With Meals., Disp: , Rfl:   •  traMADol  (ULTRAM) 50 MG tablet, Take 1 tablet by mouth 2 (Two) Times a Day As Needed for Severe Pain  for up to 9 days., Disp: 60 tablet, Rfl: 2  •  traZODone (DESYREL) 50 MG tablet, Take 50 mg by mouth every night at bedtime., Disp: , Rfl:   •  azithromycin (ZITHROMAX) 250 MG tablet, Take 1 tablet by mouth Daily., Disp: 3 tablet, Rfl: 0    ALLERGIES:  Lisinopril    REVIEW OF SYSTEMS  Review of Systems   Constitutional: Positive for activity change and fever. Negative for chills and diaphoresis.   HENT: Negative.  Negative for sneezing and sore throat.    Eyes: Negative.  Negative for pain and discharge.   Respiratory: Positive for shortness of breath (chronic). Negative for apnea, cough, choking and chest tightness.    Cardiovascular: Negative for chest pain, palpitations and leg swelling.   Gastrointestinal: Positive for abdominal pain. Negative for constipation, diarrhea, nausea and vomiting.   Endocrine: Negative.  Negative for cold intolerance and heat intolerance.   Genitourinary: Negative.  Negative for difficulty urinating, dysuria, frequency and urgency.   Musculoskeletal: Positive for gait problem. Negative for arthralgias and myalgias.   Skin: Negative.  Negative for color change and pallor.   Allergic/Immunologic: Negative.  Negative for environmental allergies and food allergies.   Neurological: Positive for weakness. Negative for dizziness and syncope.   Hematological: Negative.    Psychiatric/Behavioral: Positive for confusion. Negative for sleep disturbance.       PHYSICAL EXAM:  Temp:  [96.8 °F (36 °C)] 96.8 °F (36 °C)  Heart Rate:  [] 62  Resp:  [18] 18  BP: (138-176)/(63-89) 166/89  Body mass index is 36.8 kg/m².  Physical Exam   Constitutional: He appears well-developed and well-nourished. No distress.   HENT:   Head: Normocephalic and atraumatic.   Right Ear: External ear normal.   Left Ear: External ear normal.   Nose: Nose normal.   Eyes: Scleral icterus (mild) is present.   Neck: Normal range of  motion.   Cardiovascular: Normal rate and regular rhythm. Exam reveals no gallop and no friction rub.   Murmur heard.  Pulmonary/Chest: Effort normal and breath sounds normal. No stridor. No respiratory distress. He has no wheezes.   Musculoskeletal: Normal range of motion. He exhibits edema and tenderness.   Skin: Skin is warm. He is not diaphoretic. No erythema. No pallor.   Psychiatric: He has a normal mood and affect. His behavior is normal. Judgment and thought content normal.   Vitals reviewed.      DIAGNOSTIC DATA:   Lab Results (last 24 hours)     Procedure Component Value Units Date/Time    Troponin [[id number]]  (Abnormal) Collected:  04/04/19 1644    Specimen:  Blood Updated:  04/04/19 1809     Troponin T 0.074 ng/mL     Narrative:       Troponin T Reference Range:  <= 0.03 ng/mL-   Negative for AMI  >0.03 ng/mL-     Abnormal for myocardial necrosis.  Clinicians would have to utilize clinical acumen, EKG, Troponin and serial changes to determine if it is an Acute Myocardial Infarction or myocardial injury due to an underlying chronic condition.     BNP [[id number]]  (Abnormal) Collected:  04/04/19 1644    Specimen:  Blood Updated:  04/04/19 1803     proBNP 5,132.0 pg/mL     Narrative:       Among patients with dyspnea, NT-proBNP is highly sensitive for the detection of acute congestive heart failure. In addition NT-proBNP of <300 pg/ml effectively rules out acute congestive heart failure with 99% negative predictive value.    Comprehensive Metabolic Panel [[id number]]  (Abnormal) Collected:  04/04/19 1644    Specimen:  Blood Updated:  04/04/19 1758     Glucose 113 mg/dL      BUN 34 mg/dL      Creatinine 5.63 mg/dL      Sodium 132 mmol/L      Potassium 3.9 mmol/L      Chloride 80 mmol/L      CO2 31.0 mmol/L      Calcium 9.6 mg/dL      Total Protein 10.5 g/dL      Albumin 3.70 g/dL      ALT (SGPT) 14 U/L      AST (SGOT) 28 U/L      Alkaline Phosphatase 190 U/L      Total Bilirubin 1.3 mg/dL      eGFR Non   Amer -- mL/min/1.73      Comment: <15 Indicative of kidney failure.        eGFR  African Amer 12 mL/min/1.73      Comment: <15 Indicative of kidney failure.        Globulin 6.8 gm/dL      A/G Ratio 0.5 g/dL      BUN/Creatinine Ratio 6.0     Anion Gap 21.0 mmol/L     Narrative:       GFR Normal >60  Chronic Kidney Disease <60  Kidney Failure <15    Lactic Acid, Plasma [[id number]]  (Abnormal) Collected:  04/04/19 1645    Specimen:  Blood Updated:  04/04/19 1757     Lactate 2.5 mmol/L     Lactic Acid, Reflex Timer (This will reflex a repeat order 3-3:15 hours after ordered.) [[id number]] Collected:  04/04/19 1645    Specimen:  Blood Updated:  04/04/19 1757    Ammonia [[id number]]  (Abnormal) Collected:  04/04/19 1645    Specimen:  Blood Updated:  04/04/19 1754     Ammonia 98 umol/L     Filion Draw [[id number]] Collected:  04/04/19 1644    Specimen:  Blood Updated:  04/04/19 1745    Narrative:       The following orders were created for panel order Filion Draw.  Procedure                               Abnormality         Status                     ---------                               -----------         ------                     Light Blue Top[[id number]]                                   Final result               Green Top (Gel)[[id number]]                                  Final result               Lavender Top[[id number]]                                     Final result               Gold Top - SST[[id number]]                                   Final result                 Please view results for these tests on the individual orders.    Light Blue Top [[id number]] Collected:  04/04/19 1644    Specimen:  Blood Updated:  04/04/19 1745     Extra Tube hold for add-on     Comment: Auto resulted       Green Top (Gel) [[id number]] Collected:  04/04/19 1644    Specimen:  Blood Updated:  04/04/19 1745     Extra Tube Hold for add-ons.     Comment: Auto resulted.       Lavender Top [[id number]] Collected:  04/04/19 1644     Specimen:  Blood Updated:  04/04/19 1745     Extra Tube hold for add-on     Comment: Auto resulted       Gold Top - SST [[id number]] Collected:  04/04/19 1644    Specimen:  Blood Updated:  04/04/19 1745     Extra Tube Hold for add-ons.     Comment: Auto resulted.       Blood Gas, Arterial [[id number]]  (Abnormal) Collected:  04/04/19 1716    Specimen:  Arterial Blood Updated:  04/04/19 1730     Site Left Radial     Inderjit's Test N/A     pH, Arterial 7.374 pH units      pCO2, Arterial 59.4 mm Hg      Comment: 83 Value above reference range        pO2, Arterial 22.5 mm Hg      Comment: 85 Value below critical limit        HCO3, Arterial 34.7 mmol/L      Comment: 83 Value above reference range        Base Excess, Arterial 7.7 mmol/L      Comment: 83 Value above reference range        O2 Saturation, Arterial 27.9 %      Comment: 84 Value below reference range        Barometric Pressure for Blood Gas 749 mmHg      Modality Room Air     Ventilator Mode NA     Collected by KW     Comment: Meter: [id number]     :  [id number]       CBC & Differential [[id number]] Collected:  04/04/19 1644    Specimen:  Blood Updated:  04/04/19 1659    Narrative:       The following orders were created for panel order CBC & Differential.  Procedure                               Abnormality         Status                     ---------                               -----------         ------                     Scan Slide[[id number]]                                       In process                 CBC Auto Differential[[id number]]        Abnormal            Final result                 Please view results for these tests on the individual orders.    CBC Auto Differential [[id number]]  (Abnormal) Collected:  04/04/19 1644    Specimen:  Blood Updated:  04/04/19 1659     WBC 4.31 10*3/mm3      RBC 3.88 10*6/mm3      Hemoglobin 11.0 g/dL      Hematocrit 34.3 %      MCV 88.4 fL      MCH 28.4 pg      MCHC 32.1 g/dL      RDW 16.8 %      RDW-SD  54.7 fl      MPV 9.7 fL      Platelets 94 10*3/mm3      Neutrophil % 48.2 %      Lymphocyte % 26.9 %      Monocyte % 19.7 %      Eosinophil % 3.5 %      Basophil % 1.2 %      Immature Grans % 0.5 %      Neutrophils, Absolute 2.08 10*3/mm3      Lymphocytes, Absolute 1.16 10*3/mm3      Monocytes, Absolute 0.85 10*3/mm3      Eosinophils, Absolute 0.15 10*3/mm3      Basophils, Absolute 0.05 10*3/mm3      Immature Grans, Absolute 0.02 10*3/mm3      nRBC 0.0 /100 WBC     Scan Slide [[id number]] Collected:  04/04/19 1644    Specimen:  Blood Updated:  04/04/19 1659           Imaging Results (last 24 hours)     Procedure Component Value Units Date/Time    XR Chest 1 View [[id number]] Collected:  04/04/19 1635     Updated:  04/04/19 1707    Narrative:         [facility] [place]    Patient Name: JOSUE SÁNCHEZ    ATTENDING: KEVYN ROJAS     REFERRING: KEVYN ROJAS    ORDERING: KEVYN ROJAS    -----------------------    PROCEDURE: Chest, AP portable    Date of exam: 4/4/2019 at 1626 hours    HISTORY: Altered mental status    A single portable view of the chest was obtained. Study is  compared to prior exam of 3/31/2019.    The lungs are satisfactorily expanded and clear of focal  infiltrates or effusions. The heart size is borderline and there  is mild degree of vascular and interstitial congestion. The  costophrenic angles are clear.       Impression:       Mild vascular and interstitial congestion without  focal infiltrates or effusions.      Electronically signed by:  Brandt Spencer MD  4/4/2019 5:06 PM CDT  Workstation: [id number]          I reviewed the patient's new clinical results.    ASSESSMENT AND PLAN: This is a 69 y.o. male with:    Active Hospital Problems    Diagnosis POA   • **Acute encephalopathy [G93.40] Yes     -ammonia 98  -given lactulose 20mg once in ED  -will give lactulose 20mg Q8H     • Hyponatremia [E87.1] Yes     -132 on admission  -given ESRD, will hold on IVFs  -anticipate correction with  dialysis  -monitor     • Thrombocytopenia (CMS/[facility]) [D69.6] Yes     -appears to be chronic thrombocytopenia  -no active bleeding  -continue to monitor     • Elevated troponin [R74.8] Yes     -troponin 0.055, 0.074  -most likely elevated secondary to ESRD  -Patient denies chest pain  -will trend     • Anemia due to chronic kidney disease, on chronic dialysis (CMS/[facility]) [N18.6, D63.1, Z99.2] Not Applicable     -H/H 11/34.3, stable  -monitor       • Elevated brain natriuretic peptide (BNP) level [R79.89] Yes     -Echo in 7/2018 shows EF on 55-60% with grade Ia diastolic dysfunction  -BNP 5132 on admission  -CXR: Mild vascular and interstitial congestion without focal infiltrates or effusions.  -monitor fluid status carefully         • COPD (chronic obstructive pulmonary disease) (CMS/[facility]) [J44.9] Yes     -Continue flonase, loratadine  -not on home O2     • Physical deconditioning [R53.81] Yes     PT/OT      • Gastroesophageal reflux disease without esophagitis [K21.9] Yes     Continue H2 blocker     • Anxiety [F41.9] Yes     Continue lexapro     • ESRD (end stage renal disease) (CMS/[facility]) [N18.6] Yes     HD F  Nephrologist Dr. Vilchis         • Diabetes mellitus (CMS/[facility]) [E11.9] Yes     -Continue home lantus 25 units nightly  -SSI     • Hypertension [I10] Yes     Continue home medicatons           DVT prophylaxis: SCDs  Code status is   Code Status and Medical Interventions:   Ordered at: 04/04/19 2010     Level Of Support Discussed With:    Patient     Code Status:    CPR     Medical Interventions (Level of Support Prior to Arrest):    Full      GIOVANNI #[id number], reviewed and consistent with patient reported medications.      I discussed the patients findings and my recommendations with patient.     Dr. Rodriguez is the attending on record at time of admission, he is aware of the patient's status and agrees with the above history and physical.        This document has been electronically signed by Marylou Eugene MD  on April 4, 2019 8:28 PM

## 2019-04-06 PROBLEM — G93.40 ACUTE ENCEPHALOPATHY: Status: RESOLVED | Noted: 2019-01-01 | Resolved: 2019-01-01

## 2019-04-06 NOTE — CONSULTS
[facility]  [address] [place] [address]. [address]   - [phone]  F  [phone]     Consultation         PATIENT  DEMOGRAPHICS   PATIENT NAME: Cristo Musa                      PHYSICIAN: Pascual Vilchis MD  : 1950  MRN: [id number]    Subjective   SUBJECTIVE   Referring Provider: Dr Eugene  Reason for Consultation: esrd  History of present illness:      Mr. Musa is a 69-year-old gentleman who is well-known to me for chronic intermittent hemodialysis.  He is currently dialyzing on  via right upper extremity AV graft.  He has a previous AV graft in the forearm which was removed in the past.  His estimated dry weight is 98.5 kg.  He has a large interdialytic weight gain.    Patient had a recent admission with altered mental status.  After hemodialysis he was improved and discharged recently but came back again with altered mental status and found to have a hyperammonemia.  Patient has been treated with lactulose and this morning he is much more awake.  He denies any marked shortness of air.  His weight is 102 kg.    Past Medical History:   Diagnosis Date   • Anemia of chronic disease    • Cataract    • CHF (congestive heart failure) (CMS/HCC)    • CKD (chronic kidney disease)    • Clostridium difficile infection    • COPD (chronic obstructive pulmonary disease) (CMS/HCC)    • Diabetes mellitus (CMS/HCC)    • Glaucoma    • Heart block    • Hepatitis C    • History of transfusion    • Hypertension    • Nephropathy, diabetic (CMS/HCC)    • Pacemaker    • Pulmonary embolism (CMS/HCC)      Past Surgical History:   Procedure Laterality Date   • ABDOMINAL SURGERY     • ARTERIOVENOUS FISTULA/SHUNT SURGERY Right     forearm loop   • ARTERIOVENOUS FISTULA/SHUNT SURGERY Left     removed past upper arm   • ARTERIOVENOUS FISTULA/SHUNT SURGERY Right 3/13/2018    Procedure: revision RIGHT forearm ARTERIOVENOUS graft (excision), debridement, wound vac;   Surgeon: Jerson Singh MD;  Location: [facility] OR;  Service: Vascular   • ARTERIOVENOUS FISTULA/SHUNT SURGERY W/ HEMODIALYSIS CATHETER INSERTION Right 4/4/2016    Procedure: RIGHT ARM AV FISTULA DECLOT REVISION REPAIR BRACHIAL ANASTOMOTIC PSUEDOANEURYSM LEFT FEMORAL RICHARDSON FISTULOGRAM WITH ANGIOPLASTY;  Surgeon: Jerson Carpenter MD;  Location: [facility] OR 18/19;  Service:    • CATARACT EXTRACTION W/ INTRAOCULAR LENS IMPLANT Left 3/31/2017    Procedure: REMOVE CATARACT AND IMPLANT INTRAOCULAR LENS LEFT EYE;  Surgeon: Hilton Montemayor MD;  Location: [facility] OR;  Service:    • COLONOSCOPY N/A 3/12/2019    Procedure: COLONOSCOPY;  Surgeon: Flako Montoya MD;  Location: [facility] ENDOSCOPY;  Service: Gastroenterology   • ENDOSCOPY N/A 3/12/2019    Procedure: ESOPHAGOGASTRODUODENOSCOPY;  Surgeon: Flako Montoya MD;  Location: [facility] ENDOSCOPY;  Service: Gastroenterology   • EYE SURGERY     • HERNIA REPAIR     • IMPLANTABLE CARDIOVERTER DEFIBRILLATOR LEAD REPLACEMENT/POCKET REVISION Right 4/11/2016    Procedure: PACEMAKER LEADS X 3 AND BATTERY EXTRACTION WITH EXIMER LASER;  Surgeon: Vern Reeves MD;  Location: [facility] OR 18/19;  Service:    • INSERTION HEMODIALYSIS CATHETER Right 05/2015    jugular   • INTERVENTIONAL RADIOLOGY PROCEDURE Right 6/1/2017    Procedure: RIGHT dialysis fistulagram & angioplasty;  Surgeon: Jerson Singh MD;  Location: [facility] ANGIO INVASIVE LOCATION;  Service:    • INTERVENTIONAL RADIOLOGY PROCEDURE Right 8/24/2017    Procedure: IR dialysis fistulagram;  Surgeon: Jerson Singh MD;  Location: [facility] ANGIO INVASIVE LOCATION;  Service:    • INTERVENTIONAL RADIOLOGY PROCEDURE Right 11/13/2018    Procedure: IR dialysis fistulagram;  Surgeon: Jerson Singh MD;  Location: [facility] ANGIO INVASIVE LOCATION;  Service: Interventional Radiology   • PACEMAKER IMPLANTATION  2008    dual chamber [employer] [place]   • REMOVAL HEMODIALYSIS  "CATHETER Right 05/2015    femoral vein     Family History   Problem Relation Age of Onset   • COPD Sister    • Diabetes Brother    • Early death Brother    • Hyperlipidemia Brother    • Hypertension Brother    • Coronary artery disease Mother    • Diabetes Mother    • Hypertension Mother    • Stroke Other    • Other Other         Respiratory disorder     Social History     Tobacco Use   • Smoking status: Former Smoker     Types: Cigarettes   • Smokeless tobacco: Never Used   • Tobacco comment: quit 20 years ago    Substance Use Topics   • Alcohol use: No     Comment: former heavy use in his 50's, up to a fifth of liquor a day, currently no alcohol   • Drug use: No     Allergies:  Lisinopril     REVIEW OF SYSTEMS    Review of Systems   Constitutional: Negative for chills and fever.   Respiratory: Negative for chest tightness and shortness of breath.    Cardiovascular: Negative for chest pain and leg swelling.   Gastrointestinal: Negative for abdominal pain, diarrhea and nausea.   Genitourinary: Negative for dysuria, flank pain and hematuria.   Neurological: Negative for dizziness, syncope and weakness.   Psychiatric/Behavioral: Positive for confusion.       Objective   OBJECTIVE   Vital Signs  Temp:  [96.2 °F (35.7 °C)-97.3 °F (36.3 °C)] 97 °F (36.1 °C)  Heart Rate:  [63-68] 68  Resp:  [18] 18  BP: (108-158)/(50-76) 108/50    Flowsheet Rows      First Filed Value   Admission Height  172.7 cm (68\") Documented at 04/04/2019 1600   Admission Weight  110 kg (242 lb) Documented at 04/04/2019 1600           No intake/output data recorded.    PHYSICAL EXAM    Physical Exam   Constitutional: He is oriented to person, place, and time. He appears well-developed.   HENT:   Head: Normocephalic.   Eyes: Pupils are equal, round, and reactive to light.   Cardiovascular: Normal rate, regular rhythm and normal heart sounds.   Pulmonary/Chest: Effort normal and breath sounds normal.   Abdominal: Soft. Bowel sounds are normal. "   Musculoskeletal: He exhibits no edema.   Neurological: He is alert and oriented to person, place, and time. He exhibits normal muscle tone.       RESULTS   Results Review:    Results from last 7 days   Lab Units 04/05/19  0930 04/04/19  1644 04/02/19  0543  03/31/19  2118   SODIUM mmol/L 127* 132* 125*   < > 131*   POTASSIUM mmol/L 4.3 3.9 4.0   < > 4.2   CHLORIDE mmol/L 84* 80* 86*   < > 78*   CO2 mmol/L 24.0 31.0* 22.0   < > 28.0   BUN mg/dL 41* 34* 37*   < > 57*   CREATININE mg/dL 6.67* 5.63* 5.38*   < > 7.06*   CALCIUM mg/dL 9.2 9.6 9.0   < > 9.0   BILIRUBIN mg/dL 1.2 1.3*  --   --  1.1   ALK PHOS U/L 162* 190*  --   --  171*   ALT (SGPT) U/L 12 14  --   --  10   AST (SGOT) U/L 24 28  --   --  22   GLUCOSE mg/dL 305* 113* 178*   < > 102*    < > = values in this interval not displayed.       Estimated Creatinine Clearance: 12.3 mL/min (A) (by C-G formula based on SCr of 6.67 mg/dL (H)).    Results from last 7 days   Lab Units 04/05/19 0930 03/31/19  2118   MAGNESIUM mg/dL 2.4 2.3             Results from last 7 days   Lab Units 04/05/19  0930 04/04/19  1644 04/02/19  0543 04/01/19  0119 03/31/19  1920   WBC 10*3/mm3 4.21 4.31 4.20 4.58 4.53   HEMOGLOBIN g/dL 10.4* 11.0* 10.6* 10.3* 10.5*   PLATELETS 10*3/mm3 110* 94* 79* 78* 88*              MEDICATIONS      aspirin 81 mg Oral Daily   brimonidine 1 drop Both Eyes TID   cetirizine 5 mg Oral Daily   cholecalciferol 2,000 Units Oral Daily   escitalopram 10 mg Oral Daily   famotidine 20 mg Oral Nightly   fluticasone 2 spray Nasal Daily   gabapentin 100 mg Oral Nightly   insulin detemir 25 Units Subcutaneous Daily   lactulose 30 g Oral 4x Daily   latanoprost 1 drop Both Eyes Nightly   melatonin 5.25 mg Oral Nightly   sevelamer 800 mg Oral TID With Meals   sodium chloride 3 mL Intravenous Q12H        Medications Prior to Admission   Medication Sig Dispense Refill Last Dose   • aspirin 81 MG chewable tablet Chew 81 mg Daily.   4/4/2019 at Unknown time   • brimonidine  (ALPHAGAN P) 0.1 % solution ophthalmic solution Administer  to both eyes 3 (Three) Times a Day.   4/4/2019 at 0900   • Cholecalciferol (VITAMIN D3) 2000 units chewable tablet Chew 2,000 Units Daily.   4/4/2019 at Unknown time   • dorzolamide (TRUSOPT) 2 % ophthalmic solution Administer 1 drop into the left eye 3 (Three) Times a Day. 1 each 12 4/4/2019 at Unknown time   • escitalopram (LEXAPRO) 10 MG tablet Take 1 tablet by mouth Daily. 30 tablet 3 4/4/2019 at Unknown time   • famotidine (PEPCID) 20 MG tablet Take 20 mg by mouth every night at bedtime.   4/3/2019 at Unknown time   • fluticasone (FLONASE) 50 MCG/ACT nasal spray 2 sprays into each nostril daily. Administer 2 sprays in each nostril for each dose.   4/4/2019 at Unknown time   • gabapentin (NEURONTIN) 100 MG capsule Take 1 capsule by mouth Every Night. 30 capsule 5 4/3/2019 at Unknown time   • insulin aspart (NovoLOG) 100 UNIT/ML injection Inject  under the skin 3 (Three) Times a Day Before Meals. Sliding scale:  = 0 units; 150-200 = 3 units; 200-250 = 6 units; 250-300 = 9 units; 300-350 = 12 units; >350 = 15 units and call MD   4/4/2019 at Unknown time   • insulin detemir (LEVEMIR) 100 UNIT/ML injection Inject 25 Units under the skin into the appropriate area as directed Daily.   4/4/2019 at Unknown time   • latanoprost (XALATAN) 0.005 % ophthalmic solution Administer 1 drop to both eyes every night.   4/3/2019 at Unknown time   • Loratadine 10 MG capsule Take 10 mg by mouth Every Night.   4/3/2019 at Unknown time   • melatonin 5 MG tablet tablet Take 5 mg by mouth Every Night.   4/3/2019 at Unknown time   • Nutritional Supplements (NEPRO PO) Take 1 tablet by mouth Daily. On Sun, Tues, Thur, Sat   4/4/2019 at Unknown time   • sevelamer (RENVELA) 800 MG tablet Take 800 mg by mouth 3 (Three) Times a Day With Meals.   4/4/2019 at Unknown time   • traZODone (DESYREL) 50 MG tablet Take 50 mg by mouth every night at bedtime.   4/3/2019 at Unknown time    • acetaminophen (TYLENOL) 500 MG tablet Take 500 mg by mouth Every 4 (Four) Hours As Needed for Mild Pain  or Fever.   Unknown at Unknown time   • azithromycin (ZITHROMAX) 250 MG tablet Take 1 tablet by mouth Daily. 3 tablet 0 Taking   • cyclobenzaprine (FLEXERIL) 5 MG tablet Take 5 mg by mouth Every 8 (Eight) Hours As Needed for Muscle Spasms.   Unknown at Unknown time   • guaiFENesin 200 MG tablet Take 400 mg by mouth Every 4 (Four) Hours As Needed for Cough.   Unknown at Unknown time   • ondansetron ODT (ZOFRAN-ODT) 4 MG disintegrating tablet Take 1 tablet by mouth Every 6 (Six) Hours As Needed for Nausea or Vomiting. 10 tablet 0 Unknown at Unknown time   • polyethylene glycol (MIRALAX) packet Take 17 g by mouth Daily As Needed (constipation).   Unknown at Unknown time   • sennosides-docusate sodium (SENOKOT-S) 8.6-50 MG tablet Take 2 tablets by mouth 2 (Two) Times a Day As Needed for Constipation. 60 tablet 0 Unknown at Unknown time   • traMADol (ULTRAM) 50 MG tablet Take 1 tablet by mouth 2 (Two) Times a Day As Needed for Severe Pain  for up to 9 days. 60 tablet 2 Unknown at Unknown time     Assessment/Plan   ASSESSMENT / PLAN      Acute encephalopathy    Diabetes mellitus (CMS/[facility])    Hypertension    ESRD (end stage renal disease) (CMS/[facility])    Anxiety    Gastroesophageal reflux disease without esophagitis    Physical deconditioning    COPD (chronic obstructive pulmonary disease) (CMS/[facility])    Elevated brain natriuretic peptide (BNP) level    Hyponatremia    Elevated troponin    Anemia due to chronic kidney disease, on chronic dialysis (CMS/[facility])    Thrombocytopenia (CMS/[facility])    1.end-stage renal disease on hemodialysis 3 days a week Monday Wednesday Friday.  His estimated dry weight is 98.5 kg.  We will try to remove 4-5 L today to bring him down to his dry weight.  His electrolytes are stable.  He is more awake and alert now.  There is no marked fluid overload.    2.anemia chronic kidney disease.  Patient  hemoglobin is overall stable    3.secondary hyperparathyroidism/hyperphosphatemia.  He is chronically on VELPHORO and Renvela.  We will continue with the Renvela here.  He is also on Sensipar on dialysis days.  Patient takes calcitriol with each dialysis treatment    4.altered mental status likely secondary to hyperammonemia.  Patient has a history of hepatitis C.  He is currently on lactulose.  Overall symptoms has been improved plan to get an ultrasound of the liver.    Thank you for the referral we will continue to follow the patient during the hospital stay         I discussed the patients findings and my recommendations with patient, nursing staff and consulting provider         This document has been electronically signed by Pascual Vilchis MD on April 5, 2019 9:09 PM

## 2019-04-06 NOTE — PROGRESS NOTES
"[facility]  [address] [place] [address]. [address]  T - [phone]  F - [phone]     Progress Note          PATIENT  DEMOGRAPHICS   PATIENT NAME: Cristo Musa                      PHYSICIAN: Pascual Vilchis MD  : 1950  MRN: [id number]   LOS: 0 days    Patient Care Team:  Cory Rodríguez MD as PCP - General (Family Medicine)  Justyna Patel APRN as PCP - Claims Attributed  Janel Gordon MD (Family Medicine)  Michelle Lo MD as Resident (Family Medicine)  John Sanchez MD as Resident (Family Medicine)  Warren Stewart MD as Resident (Family Medicine)  Demarcus Oliveira MD as Resident (Family Medicine)  Jennifer Gaxiola RN as Care Coordinator (Population Health)  Subjective   SUBJECTIVE   More alert          Objective   OBJECTIVE   Vital Signs  Temp:  [95.7 °F (35.4 °C)-97 °F (36.1 °C)] 96.3 °F (35.7 °C)  Heart Rate:  [64-70] 64  Resp:  [18-20] 20  BP: (108-156)/(50-81) 156/70    Flowsheet Rows      First Filed Value   Admission Height  172.7 cm (68\") Documented at 2019 1600   Admission Weight  110 kg (242 lb) Documented at 2019 1600           No intake/output data recorded.    PHYSICAL EXAM    Physical Exam   Constitutional: He is oriented to person, place, and time. He appears well-developed.   HENT:   Head: Normocephalic.   Eyes: Pupils are equal, round, and reactive to light.   Cardiovascular: Normal rate, regular rhythm and normal heart sounds.   Pulmonary/Chest: Effort normal and breath sounds normal.   Abdominal: Soft. Bowel sounds are normal.   Musculoskeletal: He exhibits no edema.   Neurological: He is alert and oriented to person, place, and time.       RESULTS   Results Review:    Results from last 7 days   Lab Units 19  0553 19  0930 19  1644   SODIUM mmol/L 126* 127* 132*   POTASSIUM mmol/L 4.2 4.3 3.9   CHLORIDE mmol/L 83* 84* 80*   CO2 mmol/L 26.0 24.0 31.0*   BUN mg/dL 20 41* 34* "   CREATININE mg/dL 4.68* 6.67* 5.63*   CALCIUM mg/dL 9.1 9.2 9.6   BILIRUBIN mg/dL 1.3* 1.2 1.3*   ALK PHOS U/L 164* 162* 190*   ALT (SGPT) U/L 13 12 14   AST (SGOT) U/L 26 24 28   GLUCOSE mg/dL 148* 305* 113*       Estimated Creatinine Clearance: 17.3 mL/min (A) (by C-G formula based on SCr of 4.68 mg/dL (H)).    Results from last 7 days   Lab Units 04/06/19  0553 04/05/19  0930 03/31/19  2118   MAGNESIUM mg/dL 2.2 2.4 2.3             Results from last 7 days   Lab Units 04/06/19  0553 04/05/19  0930 04/04/19  1644 04/02/19  0543 04/01/19  0119   WBC 10*3/mm3 5.24 4.21 4.31 4.20 4.58   HEMOGLOBIN g/dL 11.1* 10.4* 11.0* 10.6* 10.3*   PLATELETS 10*3/mm3 118* 110* 94* 79* 78*               Imaging Results (last 24 hours)     Procedure Component Value Units Date/Time    US Liver [[id number]] Collected:  04/05/19 1359     Updated:  04/05/19 1456    Narrative:         [facility] [place]    Patient Name: YOANA SÁNCHEZMOND    ATTENDING: LISA SCHAFFER     REFERRING: LISA SCHAFFER    ORDERING: LISA SCHAFFER    -----------------------    PROCEDURE: Ultrasound liver    DATE OF EXAM: 4/5/2019    HISTORY: Hyperammonemia.    Ultrasound examination of the liver was performed. No focal  masses are seen. There is mild diffuse increased attenuation  liver parenchyma suggesting fatty infiltration. Contour of the  liver is smooth. There is no fluid or ascites in the upper  abdomen.    Examination the gallbladder shows a small stone in the  gallbladder neck. No wall thickening or pericholecystic fluid is  seen. Biliary ducts do not appear dilated with common bile duct  measuring 6 mm.    Right kidney shows a small simple cyst 1.1 cm cyst in the midpole  region. Pancreas is obscured by bowel gas.      Impression:       1. Diffuse increased attenuation liver parenchyma suggests fatty  infiltration. No focal masses are seen.  2. Cholelithiasis. Single small stone in the gallbladder neck is  seen without evidence of  acute abnormalities.  3. Small right renal cyst.      Electronically signed by:  Brandt Spencer MD  4/5/2019 2:54 PM CDT  Workstation: [id number]           MEDICATIONS      aspirin 81 mg Oral Daily   brimonidine 1 drop Both Eyes TID   cetirizine 5 mg Oral Daily   cholecalciferol 2,000 Units Oral Daily   escitalopram 10 mg Oral Daily   famotidine 20 mg Oral Nightly   fluticasone 2 spray Nasal Daily   gabapentin 100 mg Oral Nightly   insulin detemir 25 Units Subcutaneous Daily   lactulose 30 g Oral 4x Daily   latanoprost 1 drop Both Eyes Nightly   melatonin 5.25 mg Oral Nightly   sevelamer 800 mg Oral TID With Meals   sodium chloride 3 mL Intravenous Q12H          Assessment/Plan   ASSESSMENT / PLAN      Diabetes mellitus (CMS/HCC)    Hypertension    ESRD (end stage renal disease) (CMS/HCC)    Anxiety    Gastroesophageal reflux disease without esophagitis    Physical deconditioning    COPD (chronic obstructive pulmonary disease) (CMS/HCC)    Elevated brain natriuretic peptide (BNP) level    Hyponatremia    Elevated troponin    Anemia due to chronic kidney disease, on chronic dialysis (CMS/HCC)    Thrombocytopenia (CMS/HCC)    1.end-stage renal disease on hemodialysis 3 days a week Monday Wednesday Friday.  His estimated dry weight is 98.5 kg.  Hd next on Monday.His electrolytes are stable.      2.anemia chronic kidney disease.  Patient hemoglobin is overall stable    3.secondary hyperparathyroidism/hyperphosphatemia.  He is chronically on VELPHORO and Renvela.  We will continue with the Renvela here.  He is also on Sensipar on dialysis days.  Patient takes calcitriol with each dialysis treatment    4.altered mental status likely secondary to hyperammonemia.  Patient has a history of hepatitis C.  He is currently on lactulose.  Overall symptoms has been improved u/s echogenic liver. Needs to refer to GI as outpt for hep c treatment with newer agents (Elbasvir/Grazoprevir)     5. dispo dc today or tomorrow                 This document has been electronically signed by Pascual Vilchis MD on April 6, 2019 10:48 AM

## 2019-04-06 NOTE — DISCHARGE SUMMARY
DISCHARGE SUMMARY    PATIENT NAME: Cristo Musa       PHYSICIAN: Devon Lucas MD  : 1950  MRN: [id number]    ADMITTED: 2019     DISCHARGED: 2019    ADMISSION DIAGNOSES:  Active Hospital Problems    Diagnosis  POA   • Hyponatremia [E87.1]  Yes   • Thrombocytopenia (CMS/HCC) [D69.6]  Yes   • Elevated troponin [R74.8]  Yes   • Anemia due to chronic kidney disease, on chronic dialysis (CMS/HCC) [N18.6, D63.1, Z99.2]  Not Applicable   • Elevated brain natriuretic peptide (BNP) level [R79.89]  Yes   • COPD (chronic obstructive pulmonary disease) (CMS/HCC) [J44.9]  Yes   • Physical deconditioning [R53.81]  Yes   • Gastroesophageal reflux disease without esophagitis [K21.9]  Yes   • Anxiety [F41.9]  Yes   • ESRD (end stage renal disease) (CMS/HCC) [N18.6]  Yes   • Diabetes mellitus (CMS/HCC) [E11.9]  Yes   • Hypertension [I10]  Yes      Resolved Hospital Problems    Diagnosis Date Resolved POA   • **Acute encephalopathy [G93.40] 2019 Yes     DISCHARGE DIAGNOSES:   Active Hospital Problems    Diagnosis  POA   • Hyponatremia [E87.1]  Yes   • Thrombocytopenia (CMS/HCC) [D69.6]  Yes   • Elevated troponin [R74.8]  Yes   • Anemia due to chronic kidney disease, on chronic dialysis (CMS/HCC) [N18.6, D63.1, Z99.2]  Not Applicable   • Elevated brain natriuretic peptide (BNP) level [R79.89]  Yes   • COPD (chronic obstructive pulmonary disease) (CMS/HCC) [J44.9]  Yes   • Physical deconditioning [R53.81]  Yes   • Gastroesophageal reflux disease without esophagitis [K21.9]  Yes   • Anxiety [F41.9]  Yes   • ESRD (end stage renal disease) (CMS/HCC) [N18.6]  Yes   • Diabetes mellitus (CMS/HCC) [E11.9]  Yes   • Hypertension [I10]  Yes      Resolved Hospital Problems    Diagnosis Date Resolved POA   • **Acute encephalopathy [G93.40] 2019 Yes       SERVICE: Family Medicine.  Attending: Dr Rodriguez  Resident: Devon Lucas MD    CONSULTS:   Consult Orders (all) (From admission, onward)     Start     Ordered    04/04/19 2244  Inpatient Case Management  Consult  Once     Provider:  (Not yet assigned)    04/04/19 2244    04/04/19 2241  Inpatient Nephrology Consult  Once     Specialty:  Nephrology  Provider:  Pascual Vilchis MD    04/04/19 2240 04/04/19 2145  Inpatient Nephrology Consult  Once,   Status:  Canceled     Specialty:  Nephrology  Provider:  Pascual Vilchis MD    04/04/19 2145          PROCEDURES:   dialysis    HISTORY OF PRESENT ILLNESS:   (taken from Dr Eugene's H&P)  Cristo Musa is a 69 y.o. male with concurrent medical history to include CFH, CKD on dialysis MWF, DM, COPD, Hep C, HTN, and anemia of chronic disease who was recently discharged two days ago presents from [facility] after he appeared to be unresponsive and somnolent. He was found to be sleeping in his wheelchair and did not respond, so they sent him to ED for further evaluation. Patient states he was sleeping during this time. Family at bedside was not present at the [facility] to provide additional history.      In the ER, ammonia was found to be 98. He was admitted to family medicine service for encephalopathy. He is alert and oriented x3 on exam. He is able to answer all questions appropriately. Family says he has had periods of confusion.         DIAGNOSTIC DATA:   Lab Results (last 24 hours)     Procedure Component Value Units Date/Time    POC Glucose Once [[id number]]  (Abnormal) Collected:  04/06/19 1117    Specimen:  Blood Updated:  04/06/19 1235     Glucose 250 mg/dL      Comment: RN NotifiedOperator: [id number] ULISES Mcmillan ID: [id number]       Ammonia [[id number]]  (Abnormal) Collected:  04/06/19 0919    Specimen:  Blood Updated:  04/06/19 1016     Ammonia 100 umol/L     POC Glucose Once [[id number]]  (Abnormal) Collected:  04/06/19 0744    Specimen:  Blood Updated:  04/06/19 0809     Glucose 148 mg/dL      Comment: RN NotifiedOperator: [id number]  ULISES Mcmillan ID: [id number]       Comprehensive Metabolic Panel [[id number]]  (Abnormal) Collected:  04/06/19 0553    Specimen:  Blood Updated:  04/06/19 0703     Glucose 148 mg/dL      BUN 20 mg/dL      Creatinine 4.68 mg/dL      Sodium 126 mmol/L      Potassium 4.2 mmol/L      Chloride 83 mmol/L      CO2 26.0 mmol/L      Calcium 9.1 mg/dL      Total Protein 10.3 g/dL      Albumin 3.70 g/dL      ALT (SGPT) 13 U/L      AST (SGOT) 26 U/L      Alkaline Phosphatase 164 U/L      Total Bilirubin 1.3 mg/dL      eGFR  African Amer 15 mL/min/1.73      Globulin 6.6 gm/dL      A/G Ratio 0.6 g/dL      BUN/Creatinine Ratio 4.3     Anion Gap 17.0 mmol/L     Narrative:       GFR Normal >60  Chronic Kidney Disease <60  Kidney Failure <15    Magnesium [[id number]]  (Normal) Collected:  04/06/19 0553    Specimen:  Blood Updated:  04/06/19 0702     Magnesium 2.2 mg/dL     CBC & Differential [[id number]] Collected:  04/06/19 0553    Specimen:  Blood Updated:  04/06/19 0610    Narrative:       The following orders were created for panel order CBC & Differential.  Procedure                               Abnormality         Status                     ---------                               -----------         ------                     CBC Auto Differential[[id number]]        Abnormal            Final result                 Please view results for these tests on the individual orders.    CBC Auto Differential [[id number]]  (Abnormal) Collected:  04/06/19 0553    Specimen:  Blood Updated:  04/06/19 0610     WBC 5.24 10*3/mm3      RBC 3.89 10*6/mm3      Hemoglobin 11.1 g/dL      Hematocrit 34.4 %      MCV 88.4 fL      MCH 28.5 pg      MCHC 32.3 g/dL      RDW 16.1 %      RDW-SD 52.2 fl      MPV 9.3 fL      Platelets 118 10*3/mm3      Neutrophil % 57.2 %      Lymphocyte % 23.3 %      Monocyte % 14.9 %      Eosinophil % 2.9 %      Basophil % 1.3 %      Immature Grans % 0.4 %      Neutrophils, Absolute 3.00 10*3/mm3      Lymphocytes,  Absolute 1.22 10*3/mm3      Monocytes, Absolute 0.78 10*3/mm3      Eosinophils, Absolute 0.15 10*3/mm3      Basophils, Absolute 0.07 10*3/mm3      Immature Grans, Absolute 0.02 10*3/mm3      nRBC 0.0 /100 WBC     POC Glucose Once [[id number]]  (Abnormal) Collected:  04/05/19 1932    Specimen:  Blood Updated:  04/05/19 2007     Glucose 226 mg/dL      Comment: RN NotifiedOperator: [id number] JOLANTA Turner ID: [id number]       POC Glucose Once [[id number]]  (Abnormal) Collected:  04/05/19 1632    Specimen:  Blood Updated:  04/05/19 1659     Glucose 196 mg/dL      Comment: RN NotifiedOperator: [id number] ELIAS Arvizu ID: [id number]       Blood Gas, Arterial [[id number]]  (Abnormal) Collected:  04/05/19 1629    Specimen:  Arterial Blood Updated:  04/05/19 1635     Site Left Brachial     Inderjit's Test N/A     pH, Arterial 7.412 pH units      pCO2, Arterial 41.6 mm Hg      pO2, Arterial 78.6 mm Hg      Comment: 84 Value below reference range        HCO3, Arterial 26.4 mmol/L      Comment: 83 Value above reference range        Base Excess, Arterial 1.6 mmol/L      O2 Saturation, Arterial 94.7 %      Barometric Pressure for Blood Gas 749 mmHg      Modality Room Air     Ventilator Mode NA     Collected by RT     Comment: Meter: [id number]     :  [id number]            Imaging Results (last 24 hours)     Procedure Component Value Units Date/Time    US Liver [[id number]] Collected:  04/05/19 1359     Updated:  04/05/19 1456    Narrative:         [facility] [place]    Patient Name: JOSUE SÁNCHEZ    ATTENDING: LISA SCHAFFER     REFERRING: LISA SCHAFFER    ORDERING: LISA SCHAFFER    -----------------------    PROCEDURE: Ultrasound liver    DATE OF EXAM: 4/5/2019    HISTORY: Hyperammonemia.    Ultrasound examination of the liver was performed. No focal  masses are seen. There is mild diffuse increased attenuation  liver parenchyma suggesting fatty infiltration. Contour of  the  liver is smooth. There is no fluid or ascites in the upper  abdomen.    Examination the gallbladder shows a small stone in the  gallbladder neck. No wall thickening or pericholecystic fluid is  seen. Biliary ducts do not appear dilated with common bile duct  measuring 6 mm.    Right kidney shows a small simple cyst 1.1 cm cyst in the midpole  region. Pancreas is obscured by bowel gas.      Impression:       1. Diffuse increased attenuation liver parenchyma suggests fatty  infiltration. No focal masses are seen.  2. Cholelithiasis. Single small stone in the gallbladder neck is  seen without evidence of acute abnormalities.  3. Small right renal cyst.      Electronically signed by:  Brandt Spencer MD  4/5/2019 2:54 PM CDT  Workstation: [id number]            HOSPITAL COURSE:  Patient was admitted to the floor for his acute encephalopathy.  He is given lactulose and had proper response with bowel movements.  His ammonia improved throughout the course of his stay.  His mental state throughout his stay was at his baseline.  He is alert and oriented x3.  He did get dialysis on 4/5.  On 4/6 he continued to have good bowel movements and appropriate mentation, was ready for discharge.  He is tolerating food and drink without issue, ambulating with assistance of walker which is his baseline, and mentation was stable.  He will return [facility] and be seen by his PCP, Dr. Rodríguez, in 1-2 days.  Opioid medications will be stopped completely, and he will get outpatient sleep study.  He also be referred to GI for follow-up.    DISCHARGE CONDITION:   stable    DISPOSITION:  Skilled Nursing [place] ([place])    DISCHARGE MEDICATIONS     Discharge Medications      New Medications      Instructions Start Date   lactulose 10 GM/15ML solution  Commonly known as:  CHRONULAC   30 g, Oral, 4 Times Daily         Continue These Medications      Instructions Start Date   acetaminophen 500 MG tablet  Commonly known as:   TYLENOL   500 mg, Oral, Every 4 Hours PRN      aspirin 81 MG chewable tablet   81 mg, Oral, Daily      azithromycin 250 MG tablet  Commonly known as:  ZITHROMAX   250 mg, Oral, Daily      brimonidine 0.1 % solution ophthalmic solution  Commonly known as:  ALPHAGAN P   Both Eyes, 3 Times Daily      cyclobenzaprine 5 MG tablet  Commonly known as:  FLEXERIL   5 mg, Oral, Every 8 Hours PRN      dorzolamide 2 % ophthalmic solution  Commonly known as:  TRUSOPT   1 drop, Left Eye, 3 Times Daily      escitalopram 10 MG tablet  Commonly known as:  LEXAPRO   10 mg, Oral, Daily      famotidine 20 MG tablet  Commonly known as:  PEPCID   20 mg, Oral, Every Night at Bedtime      fluticasone 50 MCG/ACT nasal spray  Commonly known as:  FLONASE   2 sprays, Nasal, Daily, Administer 2 sprays in each nostril for each dose.      gabapentin 100 MG capsule  Commonly known as:  NEURONTIN   100 mg, Oral, Nightly      guaiFENesin 200 MG tablet   400 mg, Oral, Every 4 Hours PRN      insulin aspart 100 UNIT/ML injection  Commonly known as:  novoLOG   Subcutaneous, 3 Times Daily Before Meals, Sliding scale:  = 0 units; 150-200 = 3 units; 200-250 = 6 units; 250-300 = 9 units; 300-350 = 12 units; >350 = 15 units and call MD      insulin detemir 100 UNIT/ML injection  Commonly known as:  LEVEMIR   25 Units, Subcutaneous, Daily      latanoprost 0.005 % ophthalmic solution  Commonly known as:  XALATAN   1 drop, Both Eyes, Nightly      Loratadine 10 MG capsule   10 mg, Oral, Nightly      melatonin 5 MG tablet tablet   5 mg, Oral, Nightly      NEPRO PO  Notes to patient:  As directed   1 tablet, Oral, Daily, On Sun, Tues, Thur, Sat      ondansetron ODT 4 MG disintegrating tablet  Commonly known as:  ZOFRAN-ODT   4 mg, Oral, Every 6 Hours PRN      polyethylene glycol packet  Commonly known as:  MIRALAX   17 g, Oral, Daily PRN      sennosides-docusate sodium 8.6-50 MG tablet  Commonly known as:  SENOKOT-S   2 tablets, Oral, 2 Times Daily PRN       sevelamer 800 MG tablet  Commonly known as:  RENVELA   800 mg, Oral, 3 Times Daily With Meals      traZODone 50 MG tablet  Commonly known as:  DESYREL   50 mg, Oral, Every Night at Bedtime      Vitamin D3 2000 units chewable tablet   2,000 Units, Oral, Daily         Stop These Medications    traMADol 50 MG tablet  Commonly known as:  ULTRAM            INSTRUCTIONS:  Activity:   Activity Instructions     Activity as Tolerated          Diet:   Diet Instructions     Diet: Consistent Carbohydrate, Renal      Discharge Diet:   Consistent Carbohydrate  Renal           Special instructions: Patient instructed to call MD or return to ED with worsening shortness of breath, chest pain, fever greater than 100.4 degrees F or any other medical concerns..    FOLLOW UP:   Additional Instructions for the Follow-ups that You Need to Schedule     Call MD With Problems / Concerns   As directed      Instructions: call your doctor or go to the ER if you have chest pain, shortness of breath, or fever of 100.4.    Order Comments:  Instructions: call your doctor or go to the ER if you have chest pain, shortness of breath, or fever of 100.4.          Discharge Follow-up with PCP   As directed       Currently Documented PCP:    Cory Rodríguez MD    PCP Phone Number:    [phone]     Follow Up Details:  1-2 days            Contact information for follow-up providers     Cory Rodríguez MD .    Specialty:  Family Medicine  Why:  1-2 days  Contact information:  [address]   [place] [address] [phone]                   Contact information for after-discharge care     Destination     [facility] .    Service:  Intermediate Care  Contact information:  [address] [facility] [address] [phone]                             PENDING TEST RESULTS AT DISCHARGE      Time: Discharge 30 min    Dr Rodriguez is the attending at time of discharge, He is aware of the patient's status and  agrees with the above discharge summary.          This document has been electronically signed by Devon Lucas MD on April 6, 2019 7:28 PM

## 2019-04-06 NOTE — PLAN OF CARE
Problem: Patient Care Overview  Goal: Individualization and Mutuality  Outcome: Ongoing (interventions implemented as appropriate)    Goal: Discharge Needs Assessment  Outcome: Ongoing (interventions implemented as appropriate)    Goal: Interprofessional Rounds/Family Conf  Outcome: Ongoing (interventions implemented as appropriate)      Problem: Fall Risk (Adult)  Goal: Identify Related Risk Factors and Signs and Symptoms  Outcome: Ongoing (interventions implemented as appropriate)    Goal: Absence of Fall  Outcome: Ongoing (interventions implemented as appropriate)      Problem: Fluid Volume Deficit (Adult)  Goal: Identify Related Risk Factors and Signs and Symptoms  Outcome: Ongoing (interventions implemented as appropriate)    Goal: Optimal Fluid Balance  Outcome: Ongoing (interventions implemented as appropriate)      Problem: Anemia (Adult)  Goal: Identify Related Risk Factors and Signs and Symptoms  Outcome: Ongoing (interventions implemented as appropriate)    Goal: Symptom Improvement  Outcome: Ongoing (interventions implemented as appropriate)      Problem: Cardiac Output Decreased (Adult)  Goal: Identify Related Risk Factors and Signs and Symptoms  Outcome: Ongoing (interventions implemented as appropriate)    Goal: Effective Tissue Perfusion  Outcome: Ongoing (interventions implemented as appropriate)      Problem: Renal Failure/Kidney Injury, Acute (Adult)  Goal: Signs and Symptoms of Listed Potential Problems Will be Absent, Minimized or Managed (Renal Failure/Kidney Injury, Acute)  Outcome: Ongoing (interventions implemented as appropriate)      Problem: Hemodialysis (Adult)  Goal: Signs and Symptoms of Listed Potential Problems Will be Absent, Minimized or Managed (Hemodialysis)  Outcome: Ongoing (interventions implemented as appropriate)

## 2019-04-06 NOTE — PROGRESS NOTES
FAMILY MEDICINE DAILY PROGRESS NOTE  NAME: Cristo Musa  : 1950  MRN: [id number]     LOS: 0 days     PROVIDER OF SERVICE: Cory Rodríguez MD    Chief Complaint: Acute encephalopathy    Subjective:     Interval History:  History taken from: patient chart  Patient feeling well this morning with no complaints.  He is sitting up comfortably eating breakfast.  No further episodes of unresponsiveness always been here in the hospital.  He got hemodialysis yesterday with appropriate changes in his metabolic panel today.  He states approximately 10 bowel movements since yesterday.    Review of Systems:   Review of Systems   Constitutional: Negative for activity change, appetite change, chills and fever.   HENT: Negative for congestion, ear pain and sore throat.    Eyes: Negative for redness and visual disturbance.   Respiratory: Negative for cough, shortness of breath and wheezing.    Cardiovascular: Negative for chest pain and palpitations.   Gastrointestinal: Negative for abdominal pain, constipation, diarrhea, nausea and vomiting.   Genitourinary: Negative for difficulty urinating and dysuria.   Musculoskeletal: Negative for arthralgias and gait problem.   Skin: Negative for color change and rash.   Neurological: Negative for dizziness, weakness and headaches.   Psychiatric/Behavioral: Negative for dysphoric mood and sleep disturbance. The patient is not nervous/anxious.        Objective:     Vital Signs  Temp:  [95.7 °F (35.4 °C)-97.3 °F (36.3 °C)] 96.1 °F (35.6 °C)  Heart Rate:  [65-70] 70  Resp:  [18-19] 19  BP: (108-151)/(50-81) 146/75    Physical Exam  Physical Exam   Constitutional: He is oriented to person, place, and time. He appears well-developed and well-nourished.   HENT:   Head: Normocephalic and atraumatic.   Right Ear: External ear normal.   Left Ear: External ear normal.   Nose: Nose normal.   Eyes: Conjunctivae and EOM are normal. Pupils are equal, round, and reactive to light.    Neck: Normal range of motion. Neck supple.   Cardiovascular: Normal rate and regular rhythm.   Murmur heard.  Pulmonary/Chest: Effort normal and breath sounds normal. He has no wheezes.   Abdominal: Soft. Bowel sounds are normal. He exhibits no distension. There is no tenderness.   Musculoskeletal: Normal range of motion. He exhibits no edema or deformity.   Neurological: He is alert and oriented to person, place, and time. No cranial nerve deficit.   Skin: Skin is warm.   Psychiatric: He has a normal mood and affect. His behavior is normal. Thought content normal.   Nursing note and vitals reviewed.      Medication Review    Current Facility-Administered Medications:   •  acetaminophen (TYLENOL) tablet 500 mg, 500 mg, Oral, Q4H PRN, Marylou Eugene MD  •  albumin human 25 % IV SOLN 12.5 g, 12.5 g, Intravenous, PRN, Pascual Vilchis MD  •  aspirin chewable tablet 81 mg, 81 mg, Oral, Daily, Marylou Eugene MD, 81 mg at 04/05/19 0828  •  brimonidine (ALPHAGAN) 0.15 % ophthalmic solution 1 drop, 1 drop, Both Eyes, TID, Marylou Eugene MD, 1 drop at 04/05/19 2039  •  cetirizine (zyrTEC) tablet 5 mg, 5 mg, Oral, Daily, Marylou Eugene MD, 5 mg at 04/05/19 0829  •  cholecalciferol (VITAMIN D3) tablet 2,000 Units, 2,000 Units, Oral, Daily, Marylou Eugene MD, 2,000 Units at 04/05/19 0829  •  cyclobenzaprine (FLEXERIL) tablet 5 mg, 5 mg, Oral, Q8H PRN, Marylou Eugene MD, 5 mg at 04/05/19 1508  •  escitalopram (LEXAPRO) tablet 10 mg, 10 mg, Oral, Daily, Marylou Eugene MD, 10 mg at 04/05/19 0829  •  famotidine (PEPCID) tablet 20 mg, 20 mg, Oral, Nightly, Marylou Eugene MD, 20 mg at 04/05/19 2038  •  fluticasone (FLONASE) 50 MCG/ACT nasal spray 2 spray, 2 spray, Nasal, Daily, Marylou Eugene MD, 2 spray at 04/05/19 0829  •  gabapentin (NEURONTIN) capsule 100 mg, 100 mg, Oral, Nightly, Marylou Eugene MD, 100 mg at 04/05/19 2038  •  heparin (porcine) injection 2,000  Units, 2,000 Units, Intravenous, PRN, Pascual Vilchis MD  •  insulin detemir (LEVEMIR) injection 25 Units, 25 Units, Subcutaneous, Daily, Marylou Eugene MD, 25 Units at 04/05/19 0832  •  lactulose (CHRONULAC) 10 GM/15ML solution 30 g, 30 g, Oral, 4x Daily, John Allen MD, 30 g at 04/05/19 2039  •  latanoprost (XALATAN) 0.005 % ophthalmic solution 1 drop, 1 drop, Both Eyes, Nightly, Marylou Eugene MD, 1 drop at 04/05/19 2039  •  melatonin tablet 5.25 mg, 5.25 mg, Oral, Nightly, Marylou Eugene MD, 5.25 mg at 04/05/19 2038  •  ondansetron ODT (ZOFRAN-ODT) disintegrating tablet 4 mg, 4 mg, Oral, Q6H PRN, Marylou Eugene MD, 4 mg at 04/05/19 2038  •  polyethylene glycol (MIRALAX) powder 17 g, 17 g, Oral, Daily PRN, Marylou Eugene MD  •  sennosides-docusate sodium (SENOKOT-S) 8.6-50 MG tablet 2 tablet, 2 tablet, Oral, BID PRN, Marylou Eugene MD  •  sevelamer (RENAGEL) tablet 800 mg, 800 mg, Oral, TID With Meals, Marylou Eugene MD, 800 mg at 04/05/19 1751  •  sodium chloride 0.9 % flush 3 mL, 3 mL, Intravenous, Q12H, Marylou Eugene MD, 3 mL at 04/05/19 2039  •  sodium chloride 0.9 % flush 3-10 mL, 3-10 mL, Intravenous, PRN, Marylou Eugene MD  •  traZODone (DESYREL) tablet 50 mg, 50 mg, Oral, Nightly PRN, Marylou Eugene MD     Diagnostic Data    Lab Results (last 24 hours)     Procedure Component Value Units Date/Time    Comprehensive Metabolic Panel [[id number]]  (Abnormal) Collected:  04/06/19 0553    Specimen:  Blood Updated:  04/06/19 0703     Glucose 148 mg/dL      BUN 20 mg/dL      Creatinine 4.68 mg/dL      Sodium 126 mmol/L      Potassium 4.2 mmol/L      Chloride 83 mmol/L      CO2 26.0 mmol/L      Calcium 9.1 mg/dL      Total Protein 10.3 g/dL      Albumin 3.70 g/dL      ALT (SGPT) 13 U/L      AST (SGOT) 26 U/L      Alkaline Phosphatase 164 U/L      Total Bilirubin 1.3 mg/dL      eGFR  African Amer 15 mL/min/1.73      Globulin 6.6 gm/dL       A/G Ratio 0.6 g/dL      BUN/Creatinine Ratio 4.3     Anion Gap 17.0 mmol/L     Narrative:       GFR Normal >60  Chronic Kidney Disease <60  Kidney Failure <15    Magnesium [[id number]]  (Normal) Collected:  04/06/19 0553    Specimen:  Blood Updated:  04/06/19 0702     Magnesium 2.2 mg/dL     CBC & Differential [[id number]] Collected:  04/06/19 0553    Specimen:  Blood Updated:  04/06/19 0610    Narrative:       The following orders were created for panel order CBC & Differential.  Procedure                               Abnormality         Status                     ---------                               -----------         ------                     CBC Auto Differential[[id number]]        Abnormal            Final result                 Please view results for these tests on the individual orders.    CBC Auto Differential [[id number]]  (Abnormal) Collected:  04/06/19 0553    Specimen:  Blood Updated:  04/06/19 0610     WBC 5.24 10*3/mm3      RBC 3.89 10*6/mm3      Hemoglobin 11.1 g/dL      Hematocrit 34.4 %      MCV 88.4 fL      MCH 28.5 pg      MCHC 32.3 g/dL      RDW 16.1 %      RDW-SD 52.2 fl      MPV 9.3 fL      Platelets 118 10*3/mm3      Neutrophil % 57.2 %      Lymphocyte % 23.3 %      Monocyte % 14.9 %      Eosinophil % 2.9 %      Basophil % 1.3 %      Immature Grans % 0.4 %      Neutrophils, Absolute 3.00 10*3/mm3      Lymphocytes, Absolute 1.22 10*3/mm3      Monocytes, Absolute 0.78 10*3/mm3      Eosinophils, Absolute 0.15 10*3/mm3      Basophils, Absolute 0.07 10*3/mm3      Immature Grans, Absolute 0.02 10*3/mm3      nRBC 0.0 /100 WBC     POC Glucose Once [[id number]]  (Abnormal) Collected:  04/05/19 1932    Specimen:  Blood Updated:  04/05/19 2007     Glucose 226 mg/dL      Comment: RN NotifiedOperator: [id number] JOLANTA MCCARTHYsofi ID: [id number]       POC Glucose Once [[id number]]  (Abnormal) Collected:  04/05/19 1632    Specimen:  Blood Updated:  04/05/19 1659     Glucose 196 mg/dL      Comment:  RN NotifiedOperator: [id number] ELIAS BOWSERMeter ID: [id number]       Blood Gas, Arterial [[id number]]  (Abnormal) Collected:  04/05/19 1629    Specimen:  Arterial Blood Updated:  04/05/19 1635     Site Left Brachial     Inderjit's Test N/A     pH, Arterial 7.412 pH units      pCO2, Arterial 41.6 mm Hg      pO2, Arterial 78.6 mm Hg      Comment: 84 Value below reference range        HCO3, Arterial 26.4 mmol/L      Comment: 83 Value above reference range        Base Excess, Arterial 1.6 mmol/L      O2 Saturation, Arterial 94.7 %      Barometric Pressure for Blood Gas 749 mmHg      Modality Room Air     Ventilator Mode NA     Collected by RT     Comment: Meter: [id number]     :  [id number]       Ammonia [[id number]]  (Abnormal) Collected:  04/05/19 0930    Specimen:  Blood Updated:  04/05/19 1054     Ammonia 124 umol/L     Comprehensive Metabolic Panel [[id number]]  (Abnormal) Collected:  04/05/19 0930    Specimen:  Blood Updated:  04/05/19 1018     Glucose 305 mg/dL      BUN 41 mg/dL      Creatinine 6.67 mg/dL      Sodium 127 mmol/L      Potassium 4.3 mmol/L      Chloride 84 mmol/L      CO2 24.0 mmol/L      Calcium 9.2 mg/dL      Total Protein 9.7 g/dL      Albumin 3.40 g/dL      ALT (SGPT) 12 U/L      AST (SGOT) 24 U/L      Alkaline Phosphatase 162 U/L      Total Bilirubin 1.2 mg/dL      eGFR Non African Amer -- mL/min/1.73      Comment: <15 Indicative of kidney failure.        eGFR  African Amer 10 mL/min/1.73      Comment: <15 Indicative of kidney failure.        Globulin 6.3 gm/dL      A/G Ratio 0.5 g/dL      BUN/Creatinine Ratio 6.1     Anion Gap 19.0 mmol/L     Narrative:       GFR Normal >60  Chronic Kidney Disease <60  Kidney Failure <15    Lactic Acid, Plasma [[id number]]  (Normal) Collected:  04/05/19 0930    Specimen:  Blood Updated:  04/05/19 1006     Lactate 2.0 mmol/L     Magnesium [[id number]]  (Normal) Collected:  04/05/19 0930    Specimen:  Blood Updated:  04/05/19 1006     Magnesium 2.4  mg/dL     CBC & Differential [[id number]] Collected:  04/05/19 0930    Specimen:  Blood Updated:  04/05/19 0938    Narrative:       The following orders were created for panel order CBC & Differential.  Procedure                               Abnormality         Status                     ---------                               -----------         ------                     CBC Auto Differential[[id number]]        Abnormal            Final result                 Please view results for these tests on the individual orders.    CBC Auto Differential [[id number]]  (Abnormal) Collected:  04/05/19 0930    Specimen:  Blood Updated:  04/05/19 0938     WBC 4.21 10*3/mm3      RBC 3.64 10*6/mm3      Hemoglobin 10.4 g/dL      Hematocrit 31.5 %      MCV 86.5 fL      MCH 28.6 pg      MCHC 33.0 g/dL      RDW 16.4 %      RDW-SD 51.4 fl      MPV 9.2 fL      Platelets 110 10*3/mm3      Neutrophil % 55.6 %      Lymphocyte % 23.5 %      Monocyte % 16.6 %      Eosinophil % 3.1 %      Basophil % 1.0 %      Immature Grans % 0.2 %      Neutrophils, Absolute 2.34 10*3/mm3      Lymphocytes, Absolute 0.99 10*3/mm3      Monocytes, Absolute 0.70 10*3/mm3      Eosinophils, Absolute 0.13 10*3/mm3      Basophils, Absolute 0.04 10*3/mm3      Immature Grans, Absolute 0.01 10*3/mm3      nRBC 0.0 /100 WBC     POC Glucose Once [[id number]]  (Abnormal) Collected:  04/05/19 0736    Specimen:  Blood Updated:  04/05/19 0806     Glucose 153 mg/dL      Comment: RN NotifiedOperator: [id number] FEMI COCHRANElizabeth ID: [id number]               I reviewed the patient's new clinical results.    Assessment/Plan:     Active Hospital Problems    Diagnosis POA   • **Acute encephalopathy [G93.40] Yes     -ammonia 98->124  -given lactulose 20mg once in ED  -will give lactulose 20mg Q8H     • Hyponatremia [E87.1] Yes     -132 on admission  -given ESRD, will hold on IVFs  -monitor     • Thrombocytopenia (CMS/HCC) [D69.6] Yes     -appears to be chronic  thrombocytopenia  -no active bleeding  -continue to monitor     • Elevated troponin [R74.8] Yes     -troponin 0.055, 0.074  -most likely elevated secondary to ESRD  -Patient denies chest pain  -will trend     • Anemia due to chronic kidney disease, on chronic dialysis (CMS/LTAC, located within [facility]) [N18.6, D63.1, Z99.2] Not Applicable     -H/H 11/34.3, stable  -monitor       • Elevated brain natriuretic peptide (BNP) level [R79.89] Yes     -Echo in 7/2018 shows EF on 55-60% with grade Ia diastolic dysfunction  -BNP 5132 on admission  -CXR: Mild vascular and interstitial congestion without focal infiltrates or effusions.  -monitor fluid status carefully         • COPD (chronic obstructive pulmonary disease) (CMS/LTAC, located within [facility]) [J44.9] Yes     -Continue flonase, loratadine  -not on home O2     • Physical deconditioning [R53.81] Yes     PT/OT      • Gastroesophageal reflux disease without esophagitis [K21.9] Yes     Continue H2 blocker     • Anxiety [F41.9] Yes     Continue lexapro     • ESRD (end stage renal disease) (CMS/LTAC, located within [facility]) [N18.6] Yes     HD [facility]  Nephrologist Dr. Vilchis         • Diabetes mellitus (CMS/LTAC, located within [facility]) [E11.9] Yes     -Continue home lantus 25 units nightly  -SSI     • Hypertension [I10] Yes     Continue home medicatons       Stopped tramadol, will get sleep study outpatient.    DVT prophylaxis: scd  Code status is   Code Status and Medical Interventions:   Ordered at: 04/04/19 2010     Level Of Support Discussed With:    Patient     Code Status:    CPR     Medical Interventions (Level of Support Prior to Arrest):    Full       Plan for disposition:[facility] today or tomorrow          This document has been electronically signed by Cory Rodríguez MD on April 6, 2019 7:47 AM

## 2019-04-09 NOTE — TELEPHONE ENCOUNTER
Carmen with [facility] called for dr rodríguez regarding patient being lethargic, Dr. Rodríguez was paged and said that he would return the call.

## 2019-04-11 NOTE — PROGRESS NOTES
Cristo Musa   [id number]   4/9/2019    Subjective: Pt seen for follow-up from hospital.  He was in hospital twice now recently with altered mental status.  First time he was diagnosed with uremic encephalopathy.  He improved after dialysis.  Most recently was found to have quite elevated ammonia.  He is given lactulose and ammonia is downtrending.  His still having difficulty arousing at times.  Patient states compliance with his lactulose.  He has been having many bowel movements, although none today yet.  No pain despite stopping opioids.      Current Outpatient Medications:   •  acetaminophen (TYLENOL) 500 MG tablet, Take 500 mg by mouth Every 4 (Four) Hours As Needed for Mild Pain  or Fever., Disp: , Rfl:   •  aspirin 81 MG chewable tablet, Chew 81 mg Daily., Disp: , Rfl:   •  azithromycin (ZITHROMAX) 250 MG tablet, Take 1 tablet by mouth Daily., Disp: 3 tablet, Rfl: 0  •  brimonidine (ALPHAGAN P) 0.1 % solution ophthalmic solution, Administer  to both eyes 3 (Three) Times a Day., Disp: , Rfl:   •  Cholecalciferol (VITAMIN D3) 2000 units chewable tablet, Chew 2,000 Units Daily., Disp: , Rfl:   •  cyclobenzaprine (FLEXERIL) 5 MG tablet, Take 5 mg by mouth Every 8 (Eight) Hours As Needed for Muscle Spasms., Disp: , Rfl:   •  dorzolamide (TRUSOPT) 2 % ophthalmic solution, Administer 1 drop into the left eye 3 (Three) Times a Day., Disp: 1 each, Rfl: 12  •  escitalopram (LEXAPRO) 10 MG tablet, Take 1 tablet by mouth Daily., Disp: 30 tablet, Rfl: 3  •  famotidine (PEPCID) 20 MG tablet, Take 20 mg by mouth every night at bedtime., Disp: , Rfl:   •  fluticasone (FLONASE) 50 MCG/ACT nasal spray, 2 sprays into each nostril daily. Administer 2 sprays in each nostril for each dose., Disp: , Rfl:   •  gabapentin (NEURONTIN) 100 MG capsule, Take 1 capsule by mouth Every Night., Disp: 30 capsule, Rfl: 5  •  guaiFENesin 200 MG tablet, Take 400 mg by mouth Every 4 (Four) Hours As Needed for Cough., Disp: , Rfl:   •   insulin aspart (NovoLOG) 100 UNIT/ML injection, Inject  under the skin 3 (Three) Times a Day Before Meals. Sliding scale:  = 0 units; 150-200 = 3 units; 200-250 = 6 units; 250-300 = 9 units; 300-350 = 12 units; >350 = 15 units and call MD, Disp: , Rfl:   •  insulin detemir (LEVEMIR) 100 UNIT/ML injection, Inject 25 Units under the skin into the appropriate area as directed Daily., Disp: , Rfl:   •  lactulose (CHRONULAC) 10 GM/15ML solution, Take 45 mL by mouth 4 (Four) Times a Day., Disp: 946 mL, Rfl: 3  •  latanoprost (XALATAN) 0.005 % ophthalmic solution, Administer 1 drop to both eyes every night., Disp: , Rfl:   •  Loratadine 10 MG capsule, Take 10 mg by mouth Every Night., Disp: , Rfl:   •  melatonin 5 MG tablet tablet, Take 5 mg by mouth Every Night., Disp: , Rfl:   •  Nutritional Supplements (NEPRO PO), Take 1 tablet by mouth Daily. On Sun, Tues, Thur, Sat, Disp: , Rfl:   •  ondansetron ODT (ZOFRAN-ODT) 4 MG disintegrating tablet, Take 1 tablet by mouth Every 6 (Six) Hours As Needed for Nausea or Vomiting., Disp: 10 tablet, Rfl: 0  •  polyethylene glycol (MIRALAX) packet, Take 17 g by mouth Daily As Needed (constipation)., Disp: , Rfl:   •  sennosides-docusate sodium (SENOKOT-S) 8.6-50 MG tablet, Take 2 tablets by mouth 2 (Two) Times a Day As Needed for Constipation., Disp: 60 tablet, Rfl: 0  •  sevelamer (RENVELA) 800 MG tablet, Take 800 mg by mouth 3 (Three) Times a Day With Meals., Disp: , Rfl:   •  traZODone (DESYREL) 50 MG tablet, Take 50 mg by mouth every night at bedtime., Disp: , Rfl:      Review of systems:  Review of Systems   Constitutional: Negative for activity change, appetite change, chills and fever.   HENT: Negative for congestion, ear pain and sore throat.    Eyes: Negative for redness and visual disturbance.   Respiratory: Negative for cough, shortness of breath and wheezing.    Cardiovascular: Negative for chest pain and palpitations.   Gastrointestinal: Negative for abdominal pain,  nausea and vomiting.   Genitourinary: Negative for difficulty urinating and dysuria.   Musculoskeletal: Negative for arthralgias and gait problem.   Skin: Negative for color change and rash.   Neurological: Negative for dizziness, weakness and headaches.   Psychiatric/Behavioral: Negative for dysphoric mood and positive for sleep disturbance. The patient is not nervous/anxious.         Objective  Vitals:    04/09/19 1200   BP: 122/64   Pulse: 68   Resp: 18   Temp: 98.4 °F (36.9 °C)   SpO2: 97%      General: He is lying in bed comfortably, alert and oriented x3  Pulmonology: CTA B  CV: RRR, 2+ holosystolic murmur  Abdomen: Mild distention, soft, bowel sounds normal, nontender  Extremities: 2+ pedal edema    Assessment and plan:  69-year-old man with:  1: hyperammonemia: Check ammonia, continue lactulose, refer for GI  2: Chronic pain: Tylenol, stop Norco's and tramadol  3: Concern for sleep apnea, referral has been placed for sleep medicine          This document has been electronically signed by Croy Rodríguez MD on April 11, 2019 9:57 AM

## 2019-04-15 NOTE — PROGRESS NOTES
I have seen the patient.  I have reviewed the notes, assessments, and/or procedures performed by Dr. Rodríguez, I concur with her/his documentation and assessment and plan for Cristo Musa.       This document has been electronically signed by César Haider MD on April 15, 2019 11:55 AM

## 2019-04-19 NOTE — PROGRESS NOTES
Nursing home called me about patient's respiratory status.  He dropped down to 51% on nasal cannula.  I recommended ED evaluation.  911 was called.  They evaluated the patient.  He refused transport to the emergency room. At that time oxygen saturations improved to 99%.  Nursing will keep a close eye on him and let me know if anything changes.        This document has been electronically signed by Marylou Eugene MD on April 18, 2019 9:53 PM

## 2019-04-19 NOTE — PROGRESS NOTES
MONTHLY NURSING HOME VISIT    NAME: Cristo Musa  : 1950  MRN: [id number]    DATE & TIME SEEN: 19 4185    PCP: Cory Rodríguez MD    NURSING HOME: [facility]     Chief Complaint: Monthly Nursing Home Visit for April    Subjective:     Cristo Musa is a 69 y.o. male.  Patient with complaints of bilateral foot pain.  It is worse at rest.  His confusion is improving.  Ammonia is slowly downtrending, he is taking his lactulose, states 1 or 2 bowel movements daily.    Current Meds:    Current Outpatient Medications:   •  acetaminophen (TYLENOL) 500 MG tablet, Take 500 mg by mouth Every 4 (Four) Hours As Needed for Mild Pain  or Fever., Disp: , Rfl:   •  aspirin 81 MG chewable tablet, Chew 81 mg Daily., Disp: , Rfl:   •  azithromycin (ZITHROMAX) 250 MG tablet, Take 1 tablet by mouth Daily., Disp: 3 tablet, Rfl: 0  •  brimonidine (ALPHAGAN P) 0.1 % solution ophthalmic solution, Administer  to both eyes 3 (Three) Times a Day., Disp: , Rfl:   •  Cholecalciferol (VITAMIN D3) 2000 units chewable tablet, Chew 2,000 Units Daily., Disp: , Rfl:   •  cyclobenzaprine (FLEXERIL) 5 MG tablet, Take 5 mg by mouth Every 8 (Eight) Hours As Needed for Muscle Spasms., Disp: , Rfl:   •  dorzolamide (TRUSOPT) 2 % ophthalmic solution, Administer 1 drop into the left eye 3 (Three) Times a Day., Disp: 1 each, Rfl: 12  •  escitalopram (LEXAPRO) 10 MG tablet, Take 1 tablet by mouth Daily., Disp: 30 tablet, Rfl: 3  •  famotidine (PEPCID) 20 MG tablet, Take 20 mg by mouth every night at bedtime., Disp: , Rfl:   •  fluticasone (FLONASE) 50 MCG/ACT nasal spray, 2 sprays into each nostril daily. Administer 2 sprays in each nostril for each dose., Disp: , Rfl:   •  gabapentin (NEURONTIN) 100 MG capsule, Take 1 capsule by mouth Every Night., Disp: 30 capsule, Rfl: 5  •  guaiFENesin 200 MG tablet, Take 400 mg by mouth Every 4 (Four) Hours As Needed for Cough., Disp: , Rfl:   •  insulin aspart  (NovoLOG) 100 UNIT/ML injection, Inject  under the skin 3 (Three) Times a Day Before Meals. Sliding scale:  = 0 units; 150-200 = 3 units; 200-250 = 6 units; 250-300 = 9 units; 300-350 = 12 units; >350 = 15 units and call MD, Disp: , Rfl:   •  insulin detemir (LEVEMIR) 100 UNIT/ML injection, Inject 25 Units under the skin into the appropriate area as directed Daily., Disp: , Rfl:   •  lactulose (CHRONULAC) 10 GM/15ML solution, Take 45 mL by mouth 4 (Four) Times a Day., Disp: 946 mL, Rfl: 3  •  latanoprost (XALATAN) 0.005 % ophthalmic solution, Administer 1 drop to both eyes every night., Disp: , Rfl:   •  Loratadine 10 MG capsule, Take 10 mg by mouth Every Night., Disp: , Rfl:   •  melatonin 5 MG tablet tablet, Take 5 mg by mouth Every Night., Disp: , Rfl:   •  Nutritional Supplements (NEPRO PO), Take 1 tablet by mouth Daily. On Sun, Tues, Thur, Sat, Disp: , Rfl:   •  ondansetron ODT (ZOFRAN-ODT) 4 MG disintegrating tablet, Take 1 tablet by mouth Every 6 (Six) Hours As Needed for Nausea or Vomiting., Disp: 10 tablet, Rfl: 0  •  polyethylene glycol (MIRALAX) packet, Take 17 g by mouth Daily As Needed (constipation)., Disp: , Rfl:   •  sennosides-docusate sodium (SENOKOT-S) 8.6-50 MG tablet, Take 2 tablets by mouth 2 (Two) Times a Day As Needed for Constipation., Disp: 60 tablet, Rfl: 0  •  sevelamer (RENVELA) 800 MG tablet, Take 800 mg by mouth 3 (Three) Times a Day With Meals., Disp: , Rfl:   •  traZODone (DESYREL) 50 MG tablet, Take 50 mg by mouth every night at bedtime., Disp: , Rfl:     Allergies:  Lisinopril    Review of Systems:  Review of Systems   Constitutional: Negative for activity change, appetite change, chills and fever.   HENT: Negative for congestion, ear pain and sore throat.    Eyes: Negative for redness and visual disturbance.   Respiratory: Positive for shortness of breath. Negative for cough and wheezing.    Cardiovascular: Negative for chest pain and palpitations.   Gastrointestinal:  Negative for abdominal pain, diarrhea, nausea and vomiting.   Genitourinary: Negative for difficulty urinating and dysuria.   Musculoskeletal: Positive for arthralgias. Negative for gait problem.   Skin: Negative for color change and rash.   Neurological: Negative for dizziness, weakness and headaches.   Psychiatric/Behavioral: Positive for confusion. Negative for dysphoric mood and sleep disturbance. The patient is not nervous/anxious.        Objective:     /90   Pulse 76   Temp 97.6 °F (36.4 °C)   Resp 18   SpO2 94%   Physical Exam   Constitutional: He is oriented to person, place, and time. He appears well-developed and well-nourished.   HENT:   Head: Normocephalic and atraumatic.   Right Ear: External ear normal.   Left Ear: External ear normal.   Nose: Nose normal.   Eyes: Conjunctivae and EOM are normal. Pupils are equal, round, and reactive to light.   Neck: Normal range of motion. Neck supple.   Cardiovascular: Normal rate, regular rhythm and normal heart sounds.   Pulmonary/Chest: Effort normal and breath sounds normal. He has no wheezes.   Abdominal: Soft. Bowel sounds are normal. He exhibits distension (mild). There is no tenderness.   Musculoskeletal: Normal range of motion. He exhibits edema (1+ bilat feet). He exhibits no deformity.   Neurological: He is alert and oriented to person, place, and time. No cranial nerve deficit.   Skin: Skin is warm.   L 3rd digit nail consistent with fungal infection.   Psychiatric: He has a normal mood and affect. His behavior is normal. Thought content normal.   Nursing note and vitals reviewed.         Assessment/Plan:      69 y.o. male with:  Problem List Items Addressed This Visit     None      Visit Diagnoses     Pain in both feet    -  Primary -check last MARKY in order if not recent, continue gabapentin 100 mg nightly    Hyperammonemia (CMS/HCC)    continue lactulose, he is seeing GI on 4/25    Daytime somnolence    referred for sleep medicine           CODE STATUS: full code    Dr Haider is the attending on record for this patient, he is aware of the patient's status and agrees with the above.            This document has been electronically signed by Cory Rodríguez MD on April 19, 2019 9:32 AM

## 2019-04-19 NOTE — PROGRESS NOTES
MONTHLY NURSING HOME VISIT    NAME: Cristo Musa  : 1950  MRN: [id number]    DATE & TIME SEEN: 19 8454    PCP: Cory Rodríguez MD    NURSING HOME: [facility]    Chief Complaint: hypoxemia overnight    Subjective:     Cristo Musa is a 69 y.o. male.  Patient had time to decrease response overnight and very low oxygen saturations.  By time ambulance came his O2 sat was back up to 90% he was refusing to go the emergency room.  He was then refusing to go to dialysis this morning because he does not feel well.  He understands that he could die if he does not keep dialysis appointments.  He says he is okay with this.  However he also says that he is not ready to die.  He worked to stay is full code and has more living to do.  [facility] contacted for [facility] and he is able to get a chair for tomorrow.  Patient is agreeable to going    Current Meds:    Current Outpatient Medications:   •  acetaminophen (TYLENOL) 500 MG tablet, Take 500 mg by mouth Every 4 (Four) Hours As Needed for Mild Pain  or Fever., Disp: , Rfl:   •  aspirin 81 MG chewable tablet, Chew 81 mg Daily., Disp: , Rfl:   •  azithromycin (ZITHROMAX) 250 MG tablet, Take 1 tablet by mouth Daily., Disp: 3 tablet, Rfl: 0  •  brimonidine (ALPHAGAN P) 0.1 % solution ophthalmic solution, Administer  to both eyes 3 (Three) Times a Day., Disp: , Rfl:   •  Cholecalciferol (VITAMIN D3) 2000 units chewable tablet, Chew 2,000 Units Daily., Disp: , Rfl:   •  cyclobenzaprine (FLEXERIL) 5 MG tablet, Take 5 mg by mouth Every 8 (Eight) Hours As Needed for Muscle Spasms., Disp: , Rfl:   •  dorzolamide (TRUSOPT) 2 % ophthalmic solution, Administer 1 drop into the left eye 3 (Three) Times a Day., Disp: 1 each, Rfl: 12  •  escitalopram (LEXAPRO) 10 MG tablet, Take 1 tablet by mouth Daily., Disp: 30 tablet, Rfl: 3  •  famotidine (PEPCID) 20 MG tablet, Take 20 mg by mouth every night at bedtime., Disp: , Rfl:   •  fluticasone  (FLONASE) 50 MCG/ACT nasal spray, 2 sprays into each nostril daily. Administer 2 sprays in each nostril for each dose., Disp: , Rfl:   •  gabapentin (NEURONTIN) 100 MG capsule, Take 1 capsule by mouth Every Night., Disp: 30 capsule, Rfl: 5  •  guaiFENesin 200 MG tablet, Take 400 mg by mouth Every 4 (Four) Hours As Needed for Cough., Disp: , Rfl:   •  insulin aspart (NovoLOG) 100 UNIT/ML injection, Inject  under the skin 3 (Three) Times a Day Before Meals. Sliding scale:  = 0 units; 150-200 = 3 units; 200-250 = 6 units; 250-300 = 9 units; 300-350 = 12 units; >350 = 15 units and call MD, Disp: , Rfl:   •  insulin detemir (LEVEMIR) 100 UNIT/ML injection, Inject 25 Units under the skin into the appropriate area as directed Daily., Disp: , Rfl:   •  lactulose (CHRONULAC) 10 GM/15ML solution, Take 45 mL by mouth 4 (Four) Times a Day., Disp: 946 mL, Rfl: 3  •  latanoprost (XALATAN) 0.005 % ophthalmic solution, Administer 1 drop to both eyes every night., Disp: , Rfl:   •  Loratadine 10 MG capsule, Take 10 mg by mouth Every Night., Disp: , Rfl:   •  melatonin 5 MG tablet tablet, Take 5 mg by mouth Every Night., Disp: , Rfl:   •  Nutritional Supplements (NEPRO PO), Take 1 tablet by mouth Daily. On Sun, Tues, Thur, Sat, Disp: , Rfl:   •  ondansetron ODT (ZOFRAN-ODT) 4 MG disintegrating tablet, Take 1 tablet by mouth Every 6 (Six) Hours As Needed for Nausea or Vomiting., Disp: 10 tablet, Rfl: 0  •  polyethylene glycol (MIRALAX) packet, Take 17 g by mouth Daily As Needed (constipation)., Disp: , Rfl:   •  sennosides-docusate sodium (SENOKOT-S) 8.6-50 MG tablet, Take 2 tablets by mouth 2 (Two) Times a Day As Needed for Constipation., Disp: 60 tablet, Rfl: 0  •  sevelamer (RENVELA) 800 MG tablet, Take 800 mg by mouth 3 (Three) Times a Day With Meals., Disp: , Rfl:   •  traZODone (DESYREL) 50 MG tablet, Take 50 mg by mouth every night at bedtime., Disp: , Rfl:     Allergies:  Lisinopril    Review of Systems:  Review of  Systems   Constitutional: Negative for activity change, appetite change, chills and fever.   HENT: Negative for congestion, ear pain and sore throat.    Eyes: Negative for redness and visual disturbance.   Respiratory: Negative for cough, shortness of breath and wheezing.    Cardiovascular: Negative for chest pain and palpitations.   Gastrointestinal: Negative for abdominal pain, diarrhea, nausea and vomiting.   Genitourinary: Negative for difficulty urinating and dysuria.   Musculoskeletal: Negative for arthralgias and gait problem.   Skin: Negative for color change and rash.   Neurological: Negative for dizziness, weakness and headaches.   Psychiatric/Behavioral: Negative for dysphoric mood and sleep disturbance. The patient is not nervous/anxious.        Objective:     There were no vitals taken for this visit.  Physical Exam  General: Patient is alert and oriented x3, sitting up comfortably, speaking full sentences  Pulm: Mild upper airway congestion, otherwise clear  CV: RRR  Extremities: 1+ pitting edema bilateral feet  Abdomen: Soft but mildly distended, bowel sounds normal       Assessment/Plan:      69 y.o. male with:  Problem List Items Addressed This Visit     None      Visit Diagnoses     Confusion    -  Primary -resolved.  We will keep a close eye on him.  He will attend dialysis tomorrow and then resume his normal schedule after that.          CODE STATUS: full code    Dr Haider is the attending on record for this patient, he is aware of the patient's status and agrees with the above.          This document has been electronically signed by Cory Rodríguez MD on April 19, 2019 9:48 AM

## 2019-04-22 PROBLEM — E72.20 HYPERAMMONEMIA (HCC): Status: ACTIVE | Noted: 2019-01-01

## 2019-04-22 PROBLEM — E87.8 HYPOCHLOREMIA: Status: ACTIVE | Noted: 2019-01-01

## 2019-04-22 PROBLEM — D64.9 ANEMIA: Status: ACTIVE | Noted: 2019-01-01

## 2019-04-22 PROBLEM — N18.5 STAGE 5 CHRONIC KIDNEY DISEASE NOT ON CHRONIC DIALYSIS (HCC): Status: ACTIVE | Noted: 2019-01-01

## 2019-04-23 PROBLEM — G93.40 ACUTE ENCEPHALOPATHY: Status: RESOLVED | Noted: 2019-01-01 | Resolved: 2019-01-01

## 2019-04-23 NOTE — TELEPHONE ENCOUNTER
[facility] called to asked to speak to provider regarding labs.  They are requesting a call back when possible.      They are also going to fax something over for provider.    Contact number for facility is  [phone]  Thanks, Coby

## 2019-04-23 NOTE — TELEPHONE ENCOUNTER
Rosemary at [facility] would like for you to give her a call regarding labs for patient. The number is [phone].

## 2019-04-25 NOTE — PROGRESS NOTES
I have seen the patient.  I have reviewed the notes, assessments, and/or procedures performed by Dr. Rodríguez, I concur with her/his documentation and assessment and plan for Cristo Musa.       This document has been electronically signed by César Haider MD on April 25, 2019 9:58 AM

## 2019-04-26 NOTE — PROGRESS NOTES
Received call from [facility] that patient was cleaning his genital area and alerted the nurse about a bloody washcloth. Nursing home is obtaining guaiac stool. Requested that nurse obtain H/H. She is sending a picture to this provider. Will also notify patient's PCP.      Sebastian Atwood M.D. PGY2  [facility]  [address] [place] [address]  Office: [phone]      This document has been electronically signed by Sebastian Atwood MD on April 26, 2019 6:18 PM

## 2019-04-29 NOTE — PROGRESS NOTES
I have reviewed the notes, assessments, and/or procedures performed by Dr. Rodríguez, I concur with her/his documentation and assessment and plan for Cristo Musa.       This document has been electronically signed by César Haider MD on April 29, 2019 3:17 PM

## 2019-05-09 NOTE — TELEPHONE ENCOUNTER
Stephen Angulo called to let us know conditions on patient. Requested to speak with provider. Provider paged.    Thanks,  Coby    Call them back at    [phone]

## 2019-05-23 NOTE — PATIENT INSTRUCTIONS
Hepatitis C  Hepatitis C is a viral infection of the liver. It can lead to scarring of the liver (cirrhosis), liver failure, or liver cancer. Hepatitis C may go undetected for months or years because people with the infection may not have symptoms, or they may have only mild symptoms.  What are the causes?  This condition is caused by the hepatitis C virus (HCV). The virus can spread from person to person (is contagious) through:  · Blood.  · Childbirth. A woman who has hepatitis C can pass it to her baby during birth.  · Bodily fluids, such as breast milk, tears, semen, vaginal fluids, and saliva.  · Blood transfusions or organ transplants done in the [place] before 1992.    What increases the risk?  The following factors may make you more likely to develop this condition:  · Having contact with unclean (contaminated) needles or syringes. This may result from:  ? Acupuncture.  ? Tattoing.  ? Body piercing.  ? Injecting drugs.  · Having unprotected sex with someone who is infected.  · Needing treatment to filter your blood (kidney dialysis).  · Having HIV (human immunodeficiency virus) or AIDS (acquired immunodeficiency syndrome).  · Working in a job that involves contact with blood or bodily fluids, such as health care.    What are the signs or symptoms?  Symptoms of this condition include:  · Fatigue.  · Loss of appetite.  · Nausea.  · Vomiting.  · Abdominal pain.  · Dark yellow urine.  · Yellowish skin and eyes (jaundice).  · Itchy skin.  · Vishal-colored bowel movements.  · Joint pain.  · Bleeding and bruising easily.  · Fluid building up in your stomach (ascites).    In some cases, you may not have any symptoms.  How is this diagnosed?  This condition is diagnosed with:  · Blood tests.  · Other tests to check how well your liver is functioning. They may include:  ? Magnetic resonance elastography (MRE). This imaging test uses MRIs and sound waves to measure liver stiffness.  ? Transient elastography. This  imaging test uses ultrasounds to measure liver stiffness.  ? Liver biopsy. This test requires taking a small tissue sample from your liver to examine it under a microscope.    How is this treated?  Your health care provider may perform noninvasive tests or a liver biopsy to help decide the best course of treatment. Treatment may include:  · Antiviral medicines and other medicines.  · Follow-up treatments every 6-12 months for infections or other liver conditions.  · Receiving a donated liver (liver transplant).    Follow these instructions at home:  Medicines  · Take over-the-counter and prescription medicines only as told by your health care provider.  · Take your antiviral medicine as told by your health care provider. Do not stop taking the antiviral even if you start to feel better.  · Do not take any medicines unless approved by your health care provider, including over-the-counter medicines and birth control pills.  Activity  · Rest as needed.  · Do not have sex unless approved by your health care provider.  · Ask your health care provider when you may return to school or work.  Eating and drinking  · Eat a balanced diet with plenty of fruits and vegetables, whole grains, and lowfat (lean) meats or non-meat proteins (such as beans or tofu).  · Drink enough fluids to keep your urine clear or pale yellow.  · Do not drink alcohol.  General instructions  · Do not share toothbrushes, nail clippers, or razors.  · Wash your hands frequently with soap and water. If soap and water are not available, use hand .  · Cover any cuts or open sores on your skin to prevent spreading the virus.  · Keep all follow-up visits as told by your health care provider. This is important. You may need follow-up visits every 6-12 months.  How is this prevented?  There is no vaccine for hepatitis C. The only way to prevent the disease is to reduce the risk of exposure to the virus. Make sure you:  · Wash your hands frequently with  soap and water. If soap and water are not available, use hand .  · Do not share needles or syringes.  · Practice safe sex and use condoms.  · Avoid handling blood or bodily fluids without gloves or other protection.  · Avoid getting tattoos or piercings in shops or other locations that are not clean.    Contact a health care provider if:  · You have a fever.  · You develop abdominal pain.  · You pass dark urine.  · You pass linnette-colored stools.  · You develop joint pain.  Get help right away if:  · You have increasing fatigue or weakness.  · You lose your appetite.  · You cannot eat or drink without vomiting.  · You develop jaundice or your jaundice gets worse.  · You bruise or bleed easily.  Summary  · Hepatitis C is a viral infection of the liver. It can lead to scarring of the liver (cirrhosis), liver failure, or liver cancer.  · The hepatitis C virus (HCV) causes this condition. The virus can pass from person to person (is contagious).  · You should not take any medicines unless approved by your health care provider. This includes over-the-counter medicines and birth control pills.  This information is not intended to replace advice given to you by your health care provider. Make sure you discuss any questions you have with your health care provider.  Document Released: 12/15/2001 Document Revised: 01/23/2018 Document Reviewed: 01/23/2018  [employer] Interactive Patient Education © 2019 [employer]

## 2019-05-23 NOTE — PROGRESS NOTES
Chief Complaint   Patient presents with   • Hepatitis   • Nausea       Subjective    Cristo Musa is a 69 y.o. male. he is here today for follow-up.    History of Present Illness  69-year-old male presents for hospital follow-up.  He was last seen in this office 3/28/2019 to discuss EGD and colonoscopy results and due to anemia.  Patient has history of end-stage renal disease on Monday Wednesday Friday dialysis, hypertension, GERD, glaucoma and hepatitis C which he reports was treated greater than 20 years ago and he is unsure which regimen he was treated with.  He was found to be  difficult to arouse in nursing home and was brought to emergency department.  Ammonia was very elevated he was started on lactulose and ammonia began to trend down.  He was on several different opioid pain medications which were discontinued.  EGD in March noted normal duodenum normal esophagus and gastritis in the stomach.  Colonoscopy did have a poor prep and noted medium polyp in the cecum and a small polyp in the transverse colon.  Transverse colon polyp was adenomatous and cecal polyp was villotubular at 1.2 cm.  Have recommended repeat colonoscopy in 1 year due to poor prep of villotubular adenoma in the cecum.  Patient reports pain has intensified since opioids have been discontinued.  But denies any localized abdominal pain.  He was placed on lactulose in the hospital and reports he has issues with diarrhea constantly and sometimes incontinence as he cannot ambulate well to the bathroom.  Review of his medication record notes he is taking 45 mL's of lactulose 4 times per day.  Plan; will reorder lab work and decrease lactulose to 30 mL's 3 times per day we will add Xifaxan for hepatic encephalopathy.  Will order HCV quantitation level to ensure viral load is nondetectable.  Follow-up in 1 month for recheck if anemia worsens we will go ahead and plan repeat endoscopy at that point.  Follow-up in GI office sooner if  needed       The following portions of the patient's history were reviewed and updated as appropriate:   Past Medical History:   Diagnosis Date   • Anemia of chronic disease    • Cataract    • CHF (congestive heart failure) (CMS/HCC)    • CKD (chronic kidney disease)    • Clostridium difficile infection    • COPD (chronic obstructive pulmonary disease) (CMS/HCC)    • Diabetes mellitus (CMS/HCC)    • Glaucoma    • Heart block    • Hepatitis C    • History of transfusion    • Hypertension    • Nephropathy, diabetic (CMS/HCC)    • Pacemaker    • Pulmonary embolism (CMS/HCC)      Past Surgical History:   Procedure Laterality Date   • ABDOMINAL SURGERY     • ARTERIOVENOUS FISTULA/SHUNT SURGERY Right     forearm loop   • ARTERIOVENOUS FISTULA/SHUNT SURGERY Left     removed past upper arm   • ARTERIOVENOUS FISTULA/SHUNT SURGERY Right 3/13/2018    Procedure: revision RIGHT forearm ARTERIOVENOUS graft (excision), debridement, wound vac;  Surgeon: Jerson Signh MD;  Location: [facility] OR;  Service: Vascular   • ARTERIOVENOUS FISTULA/SHUNT SURGERY W/ HEMODIALYSIS CATHETER INSERTION Right 4/4/2016    Procedure: RIGHT ARM AV FISTULA DECLOT REVISION REPAIR BRACHIAL ANASTOMOTIC PSUEDOANEURYSM LEFT FEMORAL RICHARDSON FISTULOGRAM WITH ANGIOPLASTY;  Surgeon: Jerson Carpenter MD;  Location: [facility] OR 18/19;  Service:    • CATARACT EXTRACTION W/ INTRAOCULAR LENS IMPLANT Left 3/31/2017    Procedure: REMOVE CATARACT AND IMPLANT INTRAOCULAR LENS LEFT EYE;  Surgeon: Hilton Montemayor MD;  Location: [facility] OR;  Service:    • COLONOSCOPY N/A 3/12/2019    Procedure: COLONOSCOPY;  Surgeon: Flako Montoya MD;  Location: [facility] ENDOSCOPY;  Service: Gastroenterology   • ENDOSCOPY N/A 3/12/2019    Procedure: ESOPHAGOGASTRODUODENOSCOPY;  Surgeon: Flako Montoya MD;  Location: [facility] ENDOSCOPY;  Service: Gastroenterology   • EYE SURGERY     • HERNIA REPAIR     • IMPLANTABLE CARDIOVERTER DEFIBRILLATOR LEAD REPLACEMENT/POCKET  REVISION Right 4/11/2016    Procedure: PACEMAKER LEADS X 3 AND BATTERY EXTRACTION WITH EXIMER LASER;  Surgeon: Vern Reeves MD;  Location: [facility] OR 18/19;  Service:    • INSERTION HEMODIALYSIS CATHETER Right 05/2015    jugular   • INTERVENTIONAL RADIOLOGY PROCEDURE Right 6/1/2017    Procedure: RIGHT dialysis fistulagram & angioplasty;  Surgeon: Jerson Singh MD;  Location: [facility] ANGIO INVASIVE LOCATION;  Service:    • INTERVENTIONAL RADIOLOGY PROCEDURE Right 8/24/2017    Procedure: IR dialysis fistulagram;  Surgeon: Jerson Singh MD;  Location: [facility] ANGIO INVASIVE LOCATION;  Service:    • INTERVENTIONAL RADIOLOGY PROCEDURE Right 11/13/2018    Procedure: IR dialysis fistulagram;  Surgeon: Jerson Singh MD;  Location: [facility] ANGIO INVASIVE LOCATION;  Service: Interventional Radiology   • PACEMAKER IMPLANTATION  2008    dual chamber [employer]   • REMOVAL HEMODIALYSIS CATHETER Right 05/2015    femoral vein     Family History   Problem Relation Age of Onset   • COPD Sister    • Diabetes Brother    • Early death Brother    • Hyperlipidemia Brother    • Hypertension Brother    • Coronary artery disease Mother    • Diabetes Mother    • Hypertension Mother    • Stroke Other    • Other Other         Respiratory disorder       Prior to Admission medications    Medication Sig Start Date End Date Taking? Authorizing Provider   acetaminophen (TYLENOL) 500 MG tablet Take 500 mg by mouth Every 4 (Four) Hours As Needed for Mild Pain  or Fever.   Yes ProviderCleve MD   azithromycin (ZITHROMAX) 250 MG tablet Take 1 tablet by mouth Daily. 2/28/19  Yes Cory Rodríguez MD   brimonidine (ALPHAGAN P) 0.1 % solution ophthalmic solution Administer  to both eyes 3 (Three) Times a Day.   Yes ProviderCleve MD   Cholecalciferol (VITAMIN D3) 2000 units chewable tablet Chew 2,000 Units Daily.   Yes Cleve Nguyễn MD   cyclobenzaprine (FLEXERIL) 5 MG tablet  Take 5 mg by mouth Every 8 (Eight) Hours As Needed for Muscle Spasms.   Yes Cleve Nguyễn MD   dorzolamide (TRUSOPT) 2 % ophthalmic solution Administer 1 drop into the left eye 3 (Three) Times a Day. 1/24/17  Yes Inderjit Grant MD   escitalopram (LEXAPRO) 10 MG tablet Take 1 tablet by mouth Daily. 12/23/18  Yes Janel Gordon MD   famotidine (PEPCID) 20 MG tablet Take 20 mg by mouth every night at bedtime.   Yes Cleve Nguyễn MD   fluticasone (FLONASE) 50 MCG/ACT nasal spray 2 sprays into each nostril daily. Administer 2 sprays in each nostril for each dose.   Yes Cleve Nguyễn MD   gabapentin (NEURONTIN) 100 MG capsule Take 1 capsule by mouth Every Night. 2/28/19  Yes Cory Rodríguez MD   guaiFENesin 200 MG tablet Take 400 mg by mouth Every 4 (Four) Hours As Needed for Cough.   Yes Cleve Nguyễn MD   insulin aspart (NovoLOG) 100 UNIT/ML injection Inject  under the skin 3 (Three) Times a Day Before Meals. Sliding scale:  = 0 units; 150-200 = 3 units; 200-250 = 6 units; 250-300 = 9 units; 300-350 = 12 units; >350 = 15 units and call MD   Yes Cleve Nguyễn MD   insulin detemir (LEVEMIR) 100 UNIT/ML injection Inject 25 Units under the skin into the appropriate area as directed Daily.   Yes Cleve Nguyễn MD   lactulose (CHRONULAC) 10 GM/15ML solution Take 45 mL by mouth 4 (Four) Times a Day. 4/6/19  Yes Devon Lucas MD   latanoprost (XALATAN) 0.005 % ophthalmic solution Administer 1 drop to both eyes every night.   Yes Cleve Nguyễn MD   Loratadine 10 MG capsule Take 10 mg by mouth Every Night. 3/16/18  Yes Cleve Nguyễn MD   melatonin 5 MG tablet tablet Take 5 mg by mouth Every Night.   Yes Cleve Nguyễn MD   Nutritional Supplements (NEPRO PO) Take 1 tablet by mouth Daily. On Sun, Tues, Thur, Sat   Yes Cleve Nguyễn MD   ondansetron ODT (ZOFRAN-ODT) 4 MG disintegrating tablet Take 1 tablet by  mouth Every 6 (Six) Hours As Needed for Nausea or Vomiting. 2/17/18  Yes Salvador Elmore MD   polyethylene glycol (MIRALAX) packet Take 17 g by mouth Daily As Needed (constipation).   Yes ProviderCleve MD   sennosides-docusate sodium (SENOKOT-S) 8.6-50 MG tablet Take 2 tablets by mouth 2 (Two) Times a Day As Needed for Constipation. 3/16/18  Yes Justyna Patel APRN   sevelamer (RENVELA) 800 MG tablet Take 800 mg by mouth 3 (Three) Times a Day With Meals.   Yes ProviderCleve MD   traZODone (DESYREL) 50 MG tablet Take 50 mg by mouth every night at bedtime.   Yes Cleve Nguyễn MD   aspirin 81 MG chewable tablet Chew 81 mg Daily.  5/23/19 Yes Cleve Nguyễn MD     Allergies   Allergen Reactions   • Lisinopril Angioedema     unknown     Social History     Socioeconomic History   • Marital status: Legally      Spouse name: Not on file   • Number of children: Not on file   • Years of education: Not on file   • Highest education level: Not on file   Tobacco Use   • Smoking status: Former Smoker     Types: Cigarettes   • Smokeless tobacco: Never Used   • Tobacco comment: quit 20 years ago    Substance and Sexual Activity   • Alcohol use: No     Comment: former heavy use in his 50's, up to a fifth of liquor a day, currently no alcohol   • Drug use: No   • Sexual activity: No       Review of Systems  Review of Systems   Constitutional: Positive for fatigue. Negative for activity change, appetite change, chills, diaphoresis, fever and unexpected weight change.   HENT: Negative for sore throat and trouble swallowing.    Respiratory: Negative for shortness of breath.    Gastrointestinal: Positive for abdominal pain, diarrhea and nausea. Negative for abdominal distention, anal bleeding, blood in stool, constipation, rectal pain and vomiting.   Musculoskeletal: Negative for arthralgias.   Skin: Negative for pallor.   Neurological: Negative for light-headedness.        /62 (BP  "Location: Left arm)   Pulse 68   Ht 172.7 cm (68\")   Wt 106 kg (234 lb 3.2 oz)   BMI 35.61 kg/m²     Objective    Physical Exam   Constitutional: He is oriented to person, place, and time. He appears well-developed and well-nourished. He is cooperative. No distress.   HENT:   Head: Normocephalic and atraumatic.   Neck: Normal range of motion. Neck supple. No thyromegaly present.   Cardiovascular: Normal rate, regular rhythm and normal heart sounds.   Pulmonary/Chest: Effort normal and breath sounds normal. He has no wheezes. He has no rhonchi. He has no rales.   Abdominal: Soft. Normal appearance and bowel sounds are normal. He exhibits no distension. There is no hepatosplenomegaly. There is no tenderness. There is no rigidity and no guarding. No hernia.   Lymphadenopathy:     He has no cervical adenopathy.   Neurological: He is alert and oriented to person, place, and time.   Skin: Skin is warm, dry and intact. No rash noted. No pallor.   Psychiatric: He has a normal mood and affect. His speech is normal.     Admission on 04/22/2019, Discharged on 04/23/2019   Component Date Value Ref Range Status   • Glucose 04/22/2019 163* 65 - 99 mg/dL Final   • BUN 04/22/2019 43* 8 - 23 mg/dL Final   • Creatinine 04/22/2019 5.68* 0.76 - 1.27 mg/dL Final   • Sodium 04/22/2019 125* 136 - 145 mmol/L Final   • Potassium 04/22/2019 3.8  3.5 - 5.2 mmol/L Final   • Chloride 04/22/2019 81* 98 - 107 mmol/L Final   • CO2 04/22/2019 30.0* 22.0 - 29.0 mmol/L Final   • Calcium 04/22/2019 8.8  8.6 - 10.5 mg/dL Final   • Total Protein 04/22/2019 9.3* 6.0 - 8.5 g/dL Final   • Albumin 04/22/2019 3.30* 3.50 - 5.20 g/dL Final   • ALT (SGPT) 04/22/2019 11  1 - 41 U/L Final   • AST (SGOT) 04/22/2019 20  1 - 40 U/L Final   • Alkaline Phosphatase 04/22/2019 131* 39 - 117 U/L Final   • Total Bilirubin 04/22/2019 1.0  0.2 - 1.2 mg/dL Final   • eGFR Non African Amer 04/22/2019   >60 mL/min/1.73 Final    <15 Indicative of kidney failure.   • eGFR  "  Amer 04/22/2019 12* >60 mL/min/1.73 Final    <15 Indicative of kidney failure.   • Globulin 04/22/2019 6.0  gm/dL Final   • A/G Ratio 04/22/2019 0.6  g/dL Final   • BUN/Creatinine Ratio 04/22/2019 7.6  7.0 - 25.0 Final   • Anion Gap 04/22/2019 14.0  mmol/L Final   • Protime 04/22/2019 14.7  11.1 - 15.3 Seconds Final   • INR 04/22/2019 1.18  0.80 - 1.20 Final   • Lipase 04/22/2019 51  13 - 60 U/L Final   • proBNP 04/22/2019 5,599.0* 5.0 - 900.0 pg/mL Final   • Troponin T 04/22/2019 0.069* 0.000 - 0.030 ng/mL Final   • Influenza A Ag, EIA 04/22/2019 Negative  Negative Final   • Influenza B Ag, EIA 04/22/2019 Negative  Negative Final   • Strep A Ag 04/22/2019 Negative  Negative Final   • Magnesium 04/22/2019 2.3  1.6 - 2.4 mg/dL Final   • WBC 04/22/2019 5.76  3.40 - 10.80 10*3/mm3 Final   • RBC 04/22/2019 2.61* 4.14 - 5.80 10*6/mm3 Final   • Hemoglobin 04/22/2019 7.5* 13.0 - 17.7 g/dL Final   • Hematocrit 04/22/2019 23.0* 37.5 - 51.0 % Final   • MCV 04/22/2019 88.1  79.0 - 97.0 fL Final   • MCH 04/22/2019 28.7  26.6 - 33.0 pg Final   • MCHC 04/22/2019 32.6  31.5 - 35.7 g/dL Final   • RDW 04/22/2019 15.2  12.3 - 15.4 % Final   • RDW-SD 04/22/2019 48.1  37.0 - 54.0 fl Final   • MPV 04/22/2019 9.0  6.0 - 12.0 fL Final   • Platelets 04/22/2019 113* 140 - 450 10*3/mm3 Final   • Neutrophil % 04/22/2019 62.8  42.7 - 76.0 % Final   • Lymphocyte % 04/22/2019 18.4* 19.6 - 45.3 % Final   • Monocyte % 04/22/2019 14.1* 5.0 - 12.0 % Final   • Eosinophil % 04/22/2019 3.3  0.3 - 6.2 % Final   • Basophil % 04/22/2019 0.9  0.0 - 1.5 % Final   • Immature Grans % 04/22/2019 0.5  0.0 - 0.5 % Final   • Neutrophils, Absolute 04/22/2019 3.62  1.70 - 7.00 10*3/mm3 Final   • Lymphocytes, Absolute 04/22/2019 1.06  0.70 - 3.10 10*3/mm3 Final   • Monocytes, Absolute 04/22/2019 0.81  0.10 - 0.90 10*3/mm3 Final   • Eosinophils, Absolute 04/22/2019 0.19  0.00 - 0.40 10*3/mm3 Final   • Basophils, Absolute 04/22/2019 0.05  0.00 - 0.20 10*3/mm3  Final   • Immature Grans, Absolute 04/22/2019 0.03  0.00 - 0.05 10*3/mm3 Final   • nRBC 04/22/2019 0.3* 0.0 - 0.2 /100 WBC Final   • Throat Culture, Beta Strep 04/22/2019 No Beta Hemolytic Streptococcus Isolated   Final   • Site 04/22/2019 Left Radial   Final   • Inderjit's Test 04/22/2019 N/A   Final   • pH, Arterial 04/22/2019 7.429  7.350 - 7.450 pH units Final   • pCO2, Arterial 04/22/2019 43.6  35.0 - 45.0 mm Hg Final   • pO2, Arterial 04/22/2019 151.0* 83.0 - 108.0 mm Hg Final    83 Value above reference range   • HCO3, Arterial 04/22/2019 28.9* 20.0 - 26.0 mmol/L Final    83 Value above reference range   • Base Excess, Arterial 04/22/2019 4.2* 0.0 - 2.0 mmol/L Final    83 Value above reference range   • O2 Saturation, Arterial 04/22/2019 99.9* 94.0 - 99.0 % Final    83 Value above reference range   • Barometric Pressure for Blood Gas 04/22/2019 752  mmHg Final   • Modality 04/22/2019 Room Air   Final   • Ventilator Mode 04/22/2019 NA   Final   • Collected by 04/22/2019 PRICE   Final    Meter: [id number]     :  [id number]   • Ammonia 04/22/2019 69* 16 - 60 umol/L Final   • Glucose 04/22/2019 136* 70 - 130 mg/dL Final    RN NotifiedNotify DoctorOperator: [id number] MARLA GAYMetsal ID: [id number]   • Hepatitis B Surface Ag 04/22/2019 Non-Reactive  Non-Reactive Final   • Troponin T 04/22/2019 0.058* 0.000 - 0.030 ng/mL Final   • Troponin T 04/22/2019 0.067* 0.000 - 0.030 ng/mL Final   • CRE SCREEN 04/22/2019 Not Detected  Not Detected, Invalid Final    Test performed by real-time polymerase chain reaction (qPCR).   • OXA 48 Strain 04/22/2019 Not Detected   Final   • IMP STRAIN 04/22/2019 Not Detected   Final   • VIM STRAIN 04/22/2019 Not Detected   Final   • NDM Strain 04/22/2019 Not Detected   Final   • KPC Strain 04/22/2019 Not Detected   Final   • Glucose 04/22/2019 97  70 - 130 mg/dL Final    : [id number] [facility] ID: [id number]   • Glucose 04/22/2019 105  70 - 130 mg/dL  Final    : [id number] KEISHA BOBOMeter ID: [id number]   • Glucose 04/23/2019 139* 70 - 130 mg/dL Final    RN NotifiedOperator: [id number] KEISHA BOBOMeter ID: [id number]   • Glucose 04/23/2019 183* 70 - 130 mg/dL Final    RN NotifiedOperator: [id number] REAGAN Sanabria ID: [id number]     Assessment/Plan      1. History of hepatitis C    2. Hepatic encephalopathy (CMS/HCC)    .       Orders placed during this encounter include:  Orders Placed This Encounter   Procedures   • Ammonia     Standing Status:   Future     Number of Occurrences:   1     Standing Expiration Date:   5/23/2020   • HCV RNA By PCR, Qn Rfx Tonie   • CBC (No Diff)   • Protime-INR       * Surgery not found *    Review and/or summary of lab tests, radiology, procedures, medications. Review and summary of old records and obtaining of history. The risks and benefits of my recommendations, as well as other treatment options were discussed with the patient today. Questions were answered.    New Medications Ordered This Visit   Medications   • lactulose (CHRONULAC) 10 GM/15ML solution     Sig: Take 30 mL by mouth 3 (Three) Times a Day.     Dispense:  946 mL     Refill:  3   • rifaximin (XIFAXAN) 550 MG tablet     Sig: Take 1 tablet by mouth Every 12 (Twelve) Hours.     Dispense:  60 tablet     Refill:  5       Follow-up: Return in about 4 weeks (around 6/20/2019).          This document has been electronically signed by LUIS Hernandez on May 24, 2019 8:04 AM             Results for orders placed or performed in visit on 05/23/19   Ammonia   Result Value Ref Range    Ammonia 71 (H) 16 - 60 umol/L   Results for orders placed or performed in visit on 05/23/19   Protime-INR   Result Value Ref Range    Protime 15.2 11.1 - 15.3 Seconds    INR 1.23 (H) 0.80 - 1.20   CBC (No Diff)   Result Value Ref Range    WBC 4.49 3.40 - 10.80 10*3/mm3    RBC 2.78 (L) 4.14 - 5.80 10*6/mm3    Hemoglobin 7.4 (L) 13.0 - 17.7 g/dL    Hematocrit 22.9 (L) 37.5 -  51.0 %    MCV 82.4 79.0 - 97.0 fL    MCH 26.6 26.6 - 33.0 pg    MCHC 32.3 31.5 - 35.7 g/dL    RDW 14.7 12.3 - 15.4 %    RDW-SD 43.2 37.0 - 54.0 fl    MPV 9.0 6.0 - 12.0 fL    Platelets 126 (L) 140 - 450 10*3/mm3   Results for orders placed or performed during the hospital encounter of 04/22/19   CRE Screen by PCR - Swab, Large Intestine, Rectum   Result Value Ref Range    CRE SCREEN Not Detected Not Detected, Invalid    OXA 48 Strain Not Detected     IMP STRAIN Not Detected     VIM STRAIN Not Detected     NDM Strain Not Detected     KPC Strain Not Detected    CBC Auto Differential   Result Value Ref Range    WBC 5.76 3.40 - 10.80 10*3/mm3    RBC 2.61 (L) 4.14 - 5.80 10*6/mm3    Hemoglobin 7.5 (L) 13.0 - 17.7 g/dL    Hematocrit 23.0 (L) 37.5 - 51.0 %    MCV 88.1 79.0 - 97.0 fL    MCH 28.7 26.6 - 33.0 pg    MCHC 32.6 31.5 - 35.7 g/dL    RDW 15.2 12.3 - 15.4 %    RDW-SD 48.1 37.0 - 54.0 fl    MPV 9.0 6.0 - 12.0 fL    Platelets 113 (L) 140 - 450 10*3/mm3    Neutrophil % 62.8 42.7 - 76.0 %    Lymphocyte % 18.4 (L) 19.6 - 45.3 %    Monocyte % 14.1 (H) 5.0 - 12.0 %    Eosinophil % 3.3 0.3 - 6.2 %    Basophil % 0.9 0.0 - 1.5 %    Immature Grans % 0.5 0.0 - 0.5 %    Neutrophils, Absolute 3.62 1.70 - 7.00 10*3/mm3    Lymphocytes, Absolute 1.06 0.70 - 3.10 10*3/mm3    Monocytes, Absolute 0.81 0.10 - 0.90 10*3/mm3    Eosinophils, Absolute 0.19 0.00 - 0.40 10*3/mm3    Basophils, Absolute 0.05 0.00 - 0.20 10*3/mm3    Immature Grans, Absolute 0.03 0.00 - 0.05 10*3/mm3    nRBC 0.3 (H) 0.0 - 0.2 /100 WBC   Troponin   Result Value Ref Range    Troponin T 0.067 (C) 0.000 - 0.030 ng/mL   Troponin   Result Value Ref Range    Troponin T 0.058 (C) 0.000 - 0.030 ng/mL   Troponin   Result Value Ref Range    Troponin T 0.069 (C) 0.000 - 0.030 ng/mL   Rapid Strep A Screen - Swab, Throat   Result Value Ref Range    Strep A Ag Negative Negative   Hepatitis B Surface Antigen   Result Value Ref Range    Hepatitis B Surface Ag Non-Reactive  Non-Reactive   Protime-INR   Result Value Ref Range    Protime 14.7 11.1 - 15.3 Seconds    INR 1.18 0.80 - 1.20   POC Glucose Once   Result Value Ref Range    Glucose 183 (H) 70 - 130 mg/dL   POC Glucose Once   Result Value Ref Range    Glucose 139 (H) 70 - 130 mg/dL   POC Glucose Once   Result Value Ref Range    Glucose 105 70 - 130 mg/dL   POC Glucose Once   Result Value Ref Range    Glucose 97 70 - 130 mg/dL   POC Glucose Once   Result Value Ref Range    Glucose 136 (H) 70 - 130 mg/dL   Influenza Antigen, Rapid - Swab, Nasopharynx   Result Value Ref Range    Influenza A Ag, EIA Negative Negative    Influenza B Ag, EIA Negative Negative   Beta Strep Culture, Throat - Swab, Throat   Result Value Ref Range    Throat Culture, Beta Strep No Beta Hemolytic Streptococcus Isolated    BNP   Result Value Ref Range    proBNP 5,599.0 (H) 5.0 - 900.0 pg/mL   Magnesium   Result Value Ref Range    Magnesium 2.3 1.6 - 2.4 mg/dL   Lipase   Result Value Ref Range    Lipase 51 13 - 60 U/L   Blood Gas, Arterial   Result Value Ref Range    Site Left Radial     Inderjit's Test N/A     pH, Arterial 7.429 7.350 - 7.450 pH units    pCO2, Arterial 43.6 35.0 - 45.0 mm Hg    pO2, Arterial 151.0 (H) 83.0 - 108.0 mm Hg    HCO3, Arterial 28.9 (H) 20.0 - 26.0 mmol/L    Base Excess, Arterial 4.2 (H) 0.0 - 2.0 mmol/L    O2 Saturation, Arterial 99.9 (H) 94.0 - 99.0 %    Barometric Pressure for Blood Gas 752 mmHg    Modality Room Air     Ventilator Mode NA     Collected by PRICE    Ammonia   Result Value Ref Range    Ammonia 69 (H) 16 - 60 umol/L   Comprehensive Metabolic Panel   Result Value Ref Range    Glucose 163 (H) 65 - 99 mg/dL    BUN 43 (H) 8 - 23 mg/dL    Creatinine 5.68 (H) 0.76 - 1.27 mg/dL    Sodium 125 (L) 136 - 145 mmol/L    Potassium 3.8 3.5 - 5.2 mmol/L    Chloride 81 (L) 98 - 107 mmol/L    CO2 30.0 (H) 22.0 - 29.0 mmol/L    Calcium 8.8 8.6 - 10.5 mg/dL    Total Protein 9.3 (H) 6.0 - 8.5 g/dL    Albumin 3.30 (L) 3.50 - 5.20 g/dL     ALT (SGPT) 11 1 - 41 U/L    AST (SGOT) 20 1 - 40 U/L    Alkaline Phosphatase 131 (H) 39 - 117 U/L    Total Bilirubin 1.0 0.2 - 1.2 mg/dL    eGFR Non African Amer  >60 mL/min/1.73    eGFR  African Amer 12 (L) >60 mL/min/1.73    Globulin 6.0 gm/dL    A/G Ratio 0.6 g/dL    BUN/Creatinine Ratio 7.6 7.0 - 25.0    Anion Gap 14.0 mmol/L   Results for orders placed or performed in visit on 04/19/19   Drug Screen (10), Serum   Result Value Ref Range    Amphetamine Screen Negative Cutoff:50 ng/mL    Barbiturates Screen Negative Cutoff:0.1 ug/mL    Benzodiazepine Screen Negative Cutoff:20 ng/mL    Cocaine + Metabolite Screen Negative Cutoff:25 ng/mL    Phencyclidine Screen Negative Cutoff:8 ng/mL    THC, Screen Negative Cutoff:5 ng/mL    Opiates Negative Cutoff:5 ng/mL    Oxycodone Negative Cutoff:5 ng/mL    Methadone Screen Negative Cutoff:25 ng/mL    Propoxyphene Negative Cutoff:50 ng/mL     *Note: Due to a large number of results and/or encounters for the requested time period, some results have not been displayed. A complete set of results can be found in Results Review.

## 2019-05-24 NOTE — TELEPHONE ENCOUNTER
Contacted [facility] regarding ammonia levels and prescription for xifacin. They asked that script be faxed to them so they can fax it to their pharmacy. Prescription was printed and faxed.

## 2019-05-28 PROBLEM — G93.41 METABOLIC ENCEPHALOPATHY: Status: ACTIVE | Noted: 2019-01-01

## 2019-05-28 NOTE — PROGRESS NOTES
MONTHLY NURSING HOME VISIT    NAME: Cristo Musa  : 1950  MRN: [id number]    DATE & TIME SEEN: 2019 12:00    PCP: Cory Rodríguez MD    NURSING HOME: [facility]    Chief Complaint: Monthly Nursing Home Visit for May    Subjective:     Cristo Musa is a 69 y.o. male. No new complaints. Several BM daily, on lactulose. No confusion/lethargic spells since last visit.    Current Meds:    Current Outpatient Medications:   •  acetaminophen (TYLENOL) 500 MG tablet, Take 500 mg by mouth Every 4 (Four) Hours As Needed for Mild Pain  or Fever., Disp: , Rfl:   •  azithromycin (ZITHROMAX) 250 MG tablet, Take 1 tablet by mouth Daily., Disp: 3 tablet, Rfl: 0  •  brimonidine (ALPHAGAN P) 0.1 % solution ophthalmic solution, Administer  to both eyes 3 (Three) Times a Day., Disp: , Rfl:   •  Cholecalciferol (VITAMIN D3) 2000 units chewable tablet, Chew 2,000 Units Daily., Disp: , Rfl:   •  cyclobenzaprine (FLEXERIL) 5 MG tablet, Take 5 mg by mouth Every 8 (Eight) Hours As Needed for Muscle Spasms., Disp: , Rfl:   •  dorzolamide (TRUSOPT) 2 % ophthalmic solution, Administer 1 drop into the left eye 3 (Three) Times a Day., Disp: 1 each, Rfl: 12  •  escitalopram (LEXAPRO) 10 MG tablet, Take 1 tablet by mouth Daily., Disp: 30 tablet, Rfl: 3  •  famotidine (PEPCID) 20 MG tablet, Take 20 mg by mouth every night at bedtime., Disp: , Rfl:   •  fluticasone (FLONASE) 50 MCG/ACT nasal spray, 2 sprays into each nostril daily. Administer 2 sprays in each nostril for each dose., Disp: , Rfl:   •  gabapentin (NEURONTIN) 100 MG capsule, Take 1 capsule by mouth Every Night., Disp: 30 capsule, Rfl: 5  •  guaiFENesin 200 MG tablet, Take 400 mg by mouth Every 4 (Four) Hours As Needed for Cough., Disp: , Rfl:   •  insulin aspart (NovoLOG) 100 UNIT/ML injection, Inject  under the skin 3 (Three) Times a Day Before Meals. Sliding scale:  = 0 units; 150-200 = 3 units; 200-250 = 6 units; 250-300  = 9 units; 300-350 = 12 units; >350 = 15 units and call MD, Disp: , Rfl:   •  insulin detemir (LEVEMIR) 100 UNIT/ML injection, Inject 25 Units under the skin into the appropriate area as directed Daily., Disp: , Rfl:   •  lactulose (CHRONULAC) 10 GM/15ML solution, Take 30 mL by mouth 3 (Three) Times a Day., Disp: 946 mL, Rfl: 3  •  latanoprost (XALATAN) 0.005 % ophthalmic solution, Administer 1 drop to both eyes every night., Disp: , Rfl:   •  Loratadine 10 MG capsule, Take 10 mg by mouth Every Night., Disp: , Rfl:   •  melatonin 5 MG tablet tablet, Take 5 mg by mouth Every Night., Disp: , Rfl:   •  Nutritional Supplements (NEPRO PO), Take 1 tablet by mouth Daily. On Sun, Tues, Thur, Sat, Disp: , Rfl:   •  ondansetron ODT (ZOFRAN-ODT) 4 MG disintegrating tablet, Take 1 tablet by mouth Every 6 (Six) Hours As Needed for Nausea or Vomiting., Disp: 10 tablet, Rfl: 0  •  polyethylene glycol (MIRALAX) packet, Take 17 g by mouth Daily As Needed (constipation)., Disp: , Rfl:   •  rifaximin (XIFAXAN) 550 MG tablet, Take 1 tablet by mouth Every 12 (Twelve) Hours., Disp: 60 tablet, Rfl: 5  •  sennosides-docusate sodium (SENOKOT-S) 8.6-50 MG tablet, Take 2 tablets by mouth 2 (Two) Times a Day As Needed for Constipation., Disp: 60 tablet, Rfl: 0  •  sevelamer (RENVELA) 800 MG tablet, Take 800 mg by mouth 3 (Three) Times a Day With Meals., Disp: , Rfl:   •  traZODone (DESYREL) 50 MG tablet, Take 50 mg by mouth every night at bedtime., Disp: , Rfl:     Allergies:  Lisinopril    Review of Systems:  Review of Systems   Constitutional: Negative for activity change, appetite change, chills and fever.   HENT: Negative for congestion, ear pain and sore throat.    Eyes: Negative for redness and visual disturbance.   Respiratory: Negative for cough, shortness of breath and wheezing.    Cardiovascular: Negative for chest pain and palpitations.   Gastrointestinal: Positive for diarrhea. Negative for abdominal pain, nausea and vomiting.    Genitourinary: Negative for difficulty urinating and dysuria.   Musculoskeletal: Negative for arthralgias and gait problem.   Skin: Negative for color change and rash.   Neurological: Negative for dizziness, weakness and headaches.   Psychiatric/Behavioral: Negative for dysphoric mood and sleep disturbance. The patient is not nervous/anxious.        Objective:     /60   Pulse 54   Temp 97.6 °F (36.4 °C)   Resp 18   SpO2 99%   Physical Exam   Constitutional: He is oriented to person, place, and time. He appears well-developed and well-nourished.   HENT:   Head: Normocephalic and atraumatic.   Eyes: Pupils are equal, round, and reactive to light.   Neck: Normal range of motion.   Cardiovascular: Normal rate and regular rhythm.   Pulmonary/Chest: Effort normal and breath sounds normal.   Abdominal: Soft. Bowel sounds are normal. He exhibits distension (mild). There is no tenderness.   Musculoskeletal: Normal range of motion. He exhibits edema (trace, pedal).   Neurological: He is alert and oriented to person, place, and time.   Skin: Skin is warm and dry.   Psychiatric: He has a normal mood and affect.   Nursing note and vitals reviewed.           Assessment/Plan:      69 y.o. male with:  Problem List Items Addressed This Visit        Endocrine    Diabetes mellitus (CMS/[facility])    Overview     - Continue long acting insulin 25 units nightly  - SSI            Nervous and Auditory    Metabolic encephalopathy - Primary    Current Assessment & Plan     Continue lactulose, continue O2 prn, dialysis, sugar control               CODE STATUS: full code    Goals: no further confusion episodes, good glycemic control  Barriers: poor dietary compliance    Risk score: 5    Dr Haider is the attending on record for this patient, he is aware of the patient's status and agrees with the above.          This document has been electronically signed by Cory Rodríguez MD on May 28, 2019 7:53 AM

## 2019-05-29 NOTE — TELEPHONE ENCOUNTER
Patient is refusing to take his lactulose (CHRONULAC) 10 GM/15ML solution. It was decreased to 3 times a day.  They have taken the 3rd stool and you could visually see the blood, and all the were positive.  Patient has had a EGG and Colonoscopy. However patient was not cleaned out for the procedure.     Give them a call back at [phone]

## 2019-05-30 NOTE — TELEPHONE ENCOUNTER
Attempted to contact Jerica from [facility], there was no answer at the [facility] desk.     ----- Message from LUIS Ruelas sent at 5/30/2019 11:41 AM CDT -----  Hepatic Encephalopathy   ----- Message -----  From: Zuly Sommers MA  Sent: 5/30/2019   9:01 AM  To: LUIS Ruelas        ----- Message -----  From: Jackie Reeder LPN  Sent: 5/30/2019   8:21 AM  To: Zuly Sommers MA    05/30/2019, 4559 - Jerica with [facility], telephoned [phone], with inquiry regarding diagnosis for and length of Xifaxan 550 MG Tablet, 1 tablet by mouth every 12 hours prescription medication therapy prescribed per LUIS De La Torre on 05/23/2019.  Patient has 1 month clinical appointment scheduled with LUIS Porras Tuesday, June 25, 2019 at 2:00 P.M.       Private car

## 2019-05-31 NOTE — TELEPHONE ENCOUNTER
Attempted to call [facility], this is the second attempt. Again, there was no answer and there was no way to leave a message for nursing staff.    ----- Message from LUIS Ruelas sent at 5/30/2019 11:41 AM CDT -----  Hepatic Encephalopathy   ----- Message -----  From: Zuly Sommers MA  Sent: 5/30/2019   9:01 AM  To: LUIS Ruelas        ----- Message -----  From: Jackie Reeder LPN  Sent: 5/30/2019   8:21 AM  To: Zuly Sommers MA    05/30/2019, 0819 - Jerica with [facility], telephoned [phone], with inquiry regarding diagnosis for and length of Xifaxan 550 MG Tablet, 1 tablet by mouth every 12 hours prescription medication therapy prescribed per LUIS De La Torre on 05/23/2019.  Patient has 1 month clinical appointment scheduled with LUIS Porras Tuesday, June 25, 2019 at 2:00 P.M.

## 2019-06-03 NOTE — TELEPHONE ENCOUNTER
Contacted Nan at [facility] regarding length of prescription xifacin. Patient is to be on medication until he sees us back June 25. Nan voiced understanding.            ----- Message from Jackie Reeder LPN sent at 5/31/2019  4:15 PM CDT -----  05/31/2019, Brie Montana, Director Of Nursing with [facility],  [phone], extension [phone], telephoned requesting length of utilization for Xifaxan 550 MG Tablet, 1 tablet by mouth every 12 hours prescription medication therapy prescribed per LUIS De La Torre on 05/23/2019.

## 2019-06-03 NOTE — PROGRESS NOTES
I have reviewed the notes, assessments, and/or procedures performed by Dr. Rodríguez, I concur with her/his documentation and assessment and plan for Cristo Musa.       This document has been electronically signed by César Haider MD on Rosibel 3, 2019 10:54 AM

## 2019-06-04 NOTE — PROGRESS NOTES
Received call from [facility] that the patient had a urine specimen collected. Unfortunately, the specimen was not labeled, so they will have to re-collect it. Provider acknowledges understanding.      Sebastian Atwood M.D. PGY2  [facility] Medicine Res[facility]  [address] [place] [address]  Office: [phone]      This document has been electronically signed by Sebastian Atwood MD on Rosibel 3, 2019 7:21 PM

## 2019-06-07 NOTE — PROGRESS NOTES
Subjective   Cristo Musa is a 69 y.o. male.  Patient had blood in stool.  Follows with GI, needs repeat scopes at 1 year.  CBC with hemoglobin of 8.9.  Patient denying any symptoms.  Also per nursing, patient was refusing lactulose.    History of Present Illness    The following portions of the patient's history were reviewed and updated as appropriate: allergies, current medications, past family history, past medical history, past social history, past surgical history and problem list.    Review of Systems   Constitutional: Negative for activity change, appetite change, chills and fever.   HENT: Negative for congestion, ear pain and sore throat.    Eyes: Negative for redness and visual disturbance.   Respiratory: Negative for cough, shortness of breath and wheezing.    Cardiovascular: Positive for leg swelling. Negative for chest pain and palpitations.   Gastrointestinal: Positive for abdominal distention and blood in stool. Negative for abdominal pain, diarrhea, nausea and vomiting.   Genitourinary: Negative for difficulty urinating and dysuria.   Musculoskeletal: Negative for arthralgias and gait problem.   Skin: Negative for color change and rash.   Neurological: Negative for dizziness, weakness and headaches.   Psychiatric/Behavioral: Negative for dysphoric mood and sleep disturbance. The patient is not nervous/anxious.        Objective    Vitals:    06/07/19 1651   BP: 120/64   Pulse: 68   Resp: 18   Temp: 98.2 °F (36.8 °C)   SpO2: 96%        Physical Exam   Constitutional: He is oriented to person, place, and time. He appears well-developed and well-nourished.   HENT:   Head: Normocephalic and atraumatic.   Eyes: Pupils are equal, round, and reactive to light.   Neck: Normal range of motion.   Cardiovascular: Normal rate and regular rhythm.   Pulmonary/Chest: Effort normal and breath sounds normal.   Abdominal: Soft. Bowel sounds are normal. He exhibits distension. There is no tenderness.    Musculoskeletal: Normal range of motion. He exhibits edema.   Neurological: He is alert and oriented to person, place, and time.   Skin: Skin is warm and dry.   Psychiatric: He has a normal mood and affect.   Nursing note and vitals reviewed.      Assessment/Plan   Diagnoses and all orders for this visit:  Hyperammonemia (CMS/HCC) -continue lactulose and rifaximin, stressed importance of compliance    Gastrointestinal hemorrhage, unspecified gastrointestinal hemorrhage type -continue monitoring hemoglobin, keep next GI appointment    Goals: medication and fluid restriction compliance  Barriers: noncompliant    Risk score: 5          This document has been electronically signed by Cory Rodríguez MD on June 7, 2019 4:54 PM

## 2019-06-10 NOTE — PROGRESS NOTES
I have seen the patient.  I have reviewed the notes, assessments, and/or procedures performed by Dr. Rodríguez, I concur with her/his documentation and assessment and plan for Cristo Musa.       This document has been electronically signed by César Haider MD on Rosibel 10, 2019 9:13 AM

## 2019-06-17 NOTE — TELEPHONE ENCOUNTER
Carmen from [facility] called and wanted to let  know that the patient changed his code status to DNR and they are checking to see if the patient wants to have continued care. If any questions she said that someone or him can give her a call back at [phone].      Thank you,      Patricia

## 2019-06-20 PROBLEM — I21.4 NSTEMI (NON-ST ELEVATED MYOCARDIAL INFARCTION) (HCC): Status: ACTIVE | Noted: 2019-01-01

## 2019-06-20 PROBLEM — R07.89 OTHER CHEST PAIN: Status: ACTIVE | Noted: 2018-07-26

## 2019-06-20 NOTE — TELEPHONE ENCOUNTER
Received call that pt wanted to change his code status.    Visited patient to discuss his changing code status. Discussed differences between full code, DNR/DNI, and comfort care. He understands and wishes to be DNR/DNI. He wants to continue his medical care including dialysis.        This document has been electronically signed by Cory Rodríguez MD on June 20, 2019 1:19 PM

## 2019-06-21 PROBLEM — R07.89 OTHER CHEST PAIN: Status: RESOLVED | Noted: 2018-07-26 | Resolved: 2019-01-01

## 2019-06-28 NOTE — PROGRESS NOTES
MONTHLY NURSING HOME VISIT    NAME: Cristo Musa  : 1950  MRN: [id number]    DATE & TIME SEEN: 2019 1700    PCP: Cory Rodríguez MD    NURSING HOME: [facility]    Chief Complaint: Follow-up from hospitalization for hypoxia    Subjective:     Cristo Musa is a 69 y.o. male.  He is visiting nursing home today is follow-up from his recentralization.  He was in Dr. Clements, sleep medicine physician's office.  He was found to be hypoxic with an O2 saturation of approximately 75%.  He was then sent to the ER.  He was admitted and observed overnight.  The next morning he had regular dialysis.  He was in his normal mental state and had been maintaining his oxygen saturations in the mid 90s on room air and discharged back to nursing home.  He has no acute concerns or complaints today.  Nursing home staff states that he has not been hypoxic since his return.  They states he is been a little bit confused, not remembering things.  He is also having pink tent in his sputum.  He has upcoming appointment with JOHANNA Barcenas.  We will have to reschedule his point with Dr. Clements.    Current Meds:    Current Outpatient Medications:   •  acetaminophen (TYLENOL) 500 MG tablet, Take 500 mg by mouth Every 4 (Four) Hours As Needed for Mild Pain  or Fever., Disp: , Rfl:   •  aspirin 81 MG chewable tablet, Chew 81 mg Daily., Disp: , Rfl:   •  azithromycin (ZITHROMAX) 250 MG tablet, Take 1 tablet daily, Disp: 4 tablet, Rfl: 0  •  benzonatate (TESSALON) 100 MG capsule, Take 100 mg by mouth 3 (Three) Times a Day As Needed for Cough., Disp: , Rfl:   •  brimonidine (ALPHAGAN P) 0.1 % solution ophthalmic solution, Administer  to both eyes 3 (Three) Times a Day., Disp: , Rfl:   •  calcitriol (ROCALTROL) 0.5 MCG capsule, Take 2 capsules by mouth 3 (Three) Times a Week., Disp: 30 capsule, Rfl: 5  •  Cholecalciferol (VITAMIN D3) 2000 units chewable tablet, Chew 2,000 Units Daily., Disp: , Rfl:   •   cyclobenzaprine (FLEXERIL) 5 MG tablet, Take 5 mg by mouth Every 8 (Eight) Hours As Needed for Muscle Spasms., Disp: , Rfl:   •  dorzolamide (TRUSOPT) 2 % ophthalmic solution, Administer 1 drop into the left eye 3 (Three) Times a Day., Disp: 1 each, Rfl: 12  •  escitalopram (LEXAPRO) 10 MG tablet, Take 1 tablet by mouth Daily., Disp: 30 tablet, Rfl: 3  •  famotidine (PEPCID) 20 MG tablet, Take 20 mg by mouth every night at bedtime., Disp: , Rfl:   •  fluticasone (FLONASE) 50 MCG/ACT nasal spray, 2 sprays into each nostril daily. Administer 2 sprays in each nostril for each dose., Disp: , Rfl:   •  gabapentin (NEURONTIN) 100 MG capsule, Take 1 capsule by mouth Every Night., Disp: 30 capsule, Rfl: 5  •  guaiFENesin (HUMIBID 3) 400 MG tablet, Take 400 mg by mouth Every 4 (Four) Hours As Needed for Cough., Disp: , Rfl:   •  insulin aspart (NovoLOG) 100 UNIT/ML injection, Inject  under the skin 3 (Three) Times a Day Before Meals. Sliding scale:  = 0 units; 150-200 = 3 units; 200-250 = 6 units; 250-300 = 9 units; 300-350 = 12 units; >350 = 15 units and call MD, Disp: , Rfl:   •  insulin detemir (LEVEMIR) 100 UNIT/ML injection, Inject 25 Units under the skin into the appropriate area as directed Daily., Disp: , Rfl:   •  lactulose (CHRONULAC) 10 GM/15ML solution, Take 30 mL by mouth 3 (Three) Times a Day. (Patient taking differently: Take 30 mL by mouth 3 (Three) Times a Day.), Disp: 946 mL, Rfl: 3  •  latanoprost (XALATAN) 0.005 % ophthalmic solution, Administer 1 drop to both eyes every night., Disp: , Rfl:   •  Loratadine 10 MG capsule, Take 10 mg by mouth Every Night., Disp: , Rfl:   •  melatonin 5 MG tablet tablet, Take 5 mg by mouth Every Night., Disp: , Rfl:   •  Nutritional Supplements (NEPRO PO), Take 1 tablet by mouth Daily. On Sun, Tues, Thur, Sat, Disp: , Rfl:   •  ondansetron (ZOFRAN) 4 MG tablet, Take 4 mg by mouth Every 6 (Six) Hours As Needed for Nausea or Vomiting., Disp: , Rfl:   •  ondansetron ODT  (ZOFRAN-ODT) 4 MG disintegrating tablet, Take 1 tablet by mouth Every 6 (Six) Hours As Needed for Nausea or Vomiting., Disp: 10 tablet, Rfl: 0  •  polyethylene glycol (MIRALAX) packet, Take 17 g by mouth Daily As Needed (constipation)., Disp: , Rfl:   •  rifaximin (XIFAXAN) 550 MG tablet, Take 1 tablet by mouth Every 12 (Twelve) Hours., Disp: 60 tablet, Rfl: 5  •  sevelamer (RENVELA) 800 MG tablet, Take 800 mg by mouth 3 (Three) Times a Day With Meals., Disp: , Rfl:   •  traZODone (DESYREL) 50 MG tablet, Take 50 mg by mouth every night at bedtime., Disp: , Rfl:     Allergies:  Lisinopril and Motrin [ibuprofen]    Review of Systems:  Review of Systems   Constitutional: Negative for activity change, appetite change, chills and fever.   HENT: Negative for congestion, ear pain and sore throat.    Eyes: Negative for redness and visual disturbance.   Respiratory: Positive for cough. Negative for shortness of breath and wheezing.    Cardiovascular: Negative for chest pain and palpitations.   Gastrointestinal: Negative for abdominal pain, diarrhea, nausea and vomiting.   Genitourinary: Negative for difficulty urinating and dysuria.   Musculoskeletal: Positive for arthralgias. Negative for gait problem.   Skin: Negative for color change and rash.   Neurological: Negative for dizziness, weakness and headaches.   Psychiatric/Behavioral: Positive for confusion and decreased concentration. Negative for dysphoric mood and sleep disturbance. The patient is not nervous/anxious.        Objective:     /85   Pulse 74   Temp 97.7 °F (36.5 °C)   Resp 20   SpO2 99%   Physical Exam   Constitutional: He is oriented to person, place, and time. He appears well-developed and well-nourished.   HENT:   Head: Normocephalic and atraumatic.   Eyes: Pupils are equal, round, and reactive to light.   Neck: Normal range of motion.   Cardiovascular: Normal rate and regular rhythm.   Pulmonary/Chest: Effort normal and breath sounds normal.    Abdominal: Soft. Bowel sounds are normal. He exhibits distension. There is no tenderness.   Musculoskeletal: Normal range of motion. He exhibits edema.   Neurological: He is alert and oriented to person, place, and time.   Skin: Skin is warm and dry.   Psychiatric: He has a normal mood and affect.   Nursing note and vitals reviewed.         Assessment/Plan:      69 y.o. male with:  Problem List Items Addressed This Visit     None      Visit Diagnoses     Follow up    -reschedule appointment with Dr. Clements's office  Keep follow-up appointment coming up with gastroenterology  Continue using oxygen as needed          CODE STATUS: DNR/DNI    Dr Haider is the attending on record for this patient, he is aware of the patient's status and agrees with the above.

## 2019-06-28 NOTE — PROGRESS NOTES
MONTHLY NURSING HOME VISIT    NAME: Cristo Musa  : 1950  MRN: [id number]    DATE & TIME SEEN:  12:00    PCP: Cory Rodríguez MD    NURSING HOME: [facility]    Chief Complaint: Monthly Nursing Home Visit for      Subjective:     Cristo Musa is a 69 y.o. male.  Patient doing well with no complaints today.  He continues to take the lactulose and rifaximin.  He is using oxygen as needed.  Nothing new to report from nursing.    Current Meds:    Current Outpatient Medications:   •  acetaminophen (TYLENOL) 500 MG tablet, Take 500 mg by mouth Every 4 (Four) Hours As Needed for Mild Pain  or Fever., Disp: , Rfl:   •  aspirin 81 MG chewable tablet, Chew 81 mg Daily., Disp: , Rfl:   •  azithromycin (ZITHROMAX) 250 MG tablet, Take 1 tablet daily, Disp: 4 tablet, Rfl: 0  •  benzonatate (TESSALON) 100 MG capsule, Take 100 mg by mouth 3 (Three) Times a Day As Needed for Cough., Disp: , Rfl:   •  brimonidine (ALPHAGAN P) 0.1 % solution ophthalmic solution, Administer  to both eyes 3 (Three) Times a Day., Disp: , Rfl:   •  calcitriol (ROCALTROL) 0.5 MCG capsule, Take 2 capsules by mouth 3 (Three) Times a Week., Disp: 30 capsule, Rfl: 5  •  Cholecalciferol (VITAMIN D3) 2000 units chewable tablet, Chew 2,000 Units Daily., Disp: , Rfl:   •  cyclobenzaprine (FLEXERIL) 5 MG tablet, Take 5 mg by mouth Every 8 (Eight) Hours As Needed for Muscle Spasms., Disp: , Rfl:   •  dorzolamide (TRUSOPT) 2 % ophthalmic solution, Administer 1 drop into the left eye 3 (Three) Times a Day., Disp: 1 each, Rfl: 12  •  escitalopram (LEXAPRO) 10 MG tablet, Take 1 tablet by mouth Daily., Disp: 30 tablet, Rfl: 3  •  famotidine (PEPCID) 20 MG tablet, Take 20 mg by mouth every night at bedtime., Disp: , Rfl:   •  fluticasone (FLONASE) 50 MCG/ACT nasal spray, 2 sprays into each nostril daily. Administer 2 sprays in each nostril for each dose., Disp: , Rfl:   •  gabapentin (NEURONTIN) 100 MG capsule,  Take 1 capsule by mouth Every Night., Disp: 30 capsule, Rfl: 5  •  guaiFENesin (HUMIBID 3) 400 MG tablet, Take 400 mg by mouth Every 4 (Four) Hours As Needed for Cough., Disp: , Rfl:   •  insulin aspart (NovoLOG) 100 UNIT/ML injection, Inject  under the skin 3 (Three) Times a Day Before Meals. Sliding scale:  = 0 units; 150-200 = 3 units; 200-250 = 6 units; 250-300 = 9 units; 300-350 = 12 units; >350 = 15 units and call MD, Disp: , Rfl:   •  insulin detemir (LEVEMIR) 100 UNIT/ML injection, Inject 25 Units under the skin into the appropriate area as directed Daily., Disp: , Rfl:   •  lactulose (CHRONULAC) 10 GM/15ML solution, Take 30 mL by mouth 3 (Three) Times a Day. (Patient taking differently: Take 30 mL by mouth 3 (Three) Times a Day.), Disp: 946 mL, Rfl: 3  •  latanoprost (XALATAN) 0.005 % ophthalmic solution, Administer 1 drop to both eyes every night., Disp: , Rfl:   •  Loratadine 10 MG capsule, Take 10 mg by mouth Every Night., Disp: , Rfl:   •  melatonin 5 MG tablet tablet, Take 5 mg by mouth Every Night., Disp: , Rfl:   •  Nutritional Supplements (NEPRO PO), Take 1 tablet by mouth Daily. On Sun, Tues, Thur, Sat, Disp: , Rfl:   •  ondansetron (ZOFRAN) 4 MG tablet, Take 4 mg by mouth Every 6 (Six) Hours As Needed for Nausea or Vomiting., Disp: , Rfl:   •  ondansetron ODT (ZOFRAN-ODT) 4 MG disintegrating tablet, Take 1 tablet by mouth Every 6 (Six) Hours As Needed for Nausea or Vomiting., Disp: 10 tablet, Rfl: 0  •  polyethylene glycol (MIRALAX) packet, Take 17 g by mouth Daily As Needed (constipation)., Disp: , Rfl:   •  rifaximin (XIFAXAN) 550 MG tablet, Take 1 tablet by mouth Every 12 (Twelve) Hours., Disp: 60 tablet, Rfl: 5  •  sevelamer (RENVELA) 800 MG tablet, Take 800 mg by mouth 3 (Three) Times a Day With Meals., Disp: , Rfl:   •  traZODone (DESYREL) 50 MG tablet, Take 50 mg by mouth every night at bedtime., Disp: , Rfl:     Allergies:  Lisinopril and Motrin [ibuprofen]    Review of  Systems:  Review of Systems   Constitutional: Negative for activity change, appetite change, chills and fever.   HENT: Negative for congestion, ear pain and sore throat.    Eyes: Negative for redness and visual disturbance.   Respiratory: Negative for cough, shortness of breath and wheezing.    Cardiovascular: Negative for chest pain and palpitations.   Gastrointestinal: Negative for abdominal pain, diarrhea, nausea and vomiting.   Genitourinary: Negative for difficulty urinating and dysuria.   Musculoskeletal: Negative for arthralgias and gait problem.   Skin: Negative for color change and rash.   Neurological: Negative for dizziness, weakness and headaches.   Psychiatric/Behavioral: Negative for dysphoric mood and sleep disturbance. The patient is not nervous/anxious.        Objective:     /54   Pulse 68   Temp 96.9 °F (36.1 °C)   Resp 16   SpO2 94%   Physical Exam   Constitutional: He is oriented to person, place, and time. He appears well-developed and well-nourished.   HENT:   Head: Normocephalic and atraumatic.   Eyes: Pupils are equal, round, and reactive to light.   Neck: Normal range of motion.   Cardiovascular: Normal rate and regular rhythm.   Pulmonary/Chest: Effort normal and breath sounds normal.   Abdominal: Soft. Bowel sounds are normal. He exhibits distension. There is no tenderness.   Musculoskeletal: Normal range of motion. He exhibits edema.   Neurological: He is alert and oriented to person, place, and time.   Skin: Skin is warm and dry.   Psychiatric: He has a normal mood and affect.   Nursing note and vitals reviewed.         Assessment/Plan:      69 y.o. male with:  Problem List Items Addressed This Visit        Other    Hyperammonemia (CMS/HCC) - Primary    Overview     Continue lactulose and rifaximin               CODE STATUS: DNR/DNI    Dr Haider is the attending on record for this patient, he is aware of the patient's status and agrees with the above.

## 2019-07-11 NOTE — TELEPHONE ENCOUNTER
[facility] TO [facility] ON THIS PATIENT'S GABAPENTIN. HE TAKES 100MG CAPSULE EVERY NIGHT.     ACCORDING TO THE LIST RAY SENT ME, THIS PATIENT IS NOW YOUR NURSING HOME PATIENT, FORMERLY [facility] PATIENT.     [facility] CAN BE REACHED [phone]    THANK YOU

## 2019-07-19 NOTE — TELEPHONE ENCOUNTER
Stephen Mccabee called stating that patient has 3 left of his gabapentin (NEURONTIN) 100 MG capsule.  They are requesting a refill.    Thanks,  Coby

## 2019-07-21 NOTE — PROGRESS NOTES
Received call from nurse that the patient got up from bed on his own to go to the bathroom. He had a bed alarm, but by the time the staff arrived to his room, he was already in the bathroom. He had tried to get up off the toilet seat by himself, but he went down on his left knee. His knee has a small abrasion that has been cleaned. Nurse reports that no dressing is needed at this time. The patient is now in bed.      Sebastian Atwood M.D. PGY3  [facility]  [address] [place] [address]  Office: [phone]      This document has been electronically signed by Sebastian Atwood MD on July 21, 2019 12:01 AM

## 2019-07-23 NOTE — TELEPHONE ENCOUNTER
Nursing Manager, Nan Olsen, called and said that Cristo Musa is have a gradual decline. She said that he came back from dialysis covered in blood because he moved and popped a needle. He was not able to finish dialysis; he is weak, confused, and hasn't gotten out of the bed. His vitals have been stable, but he does not even want to sit up. Nan acosta believes that it would be beneficial for the patient to be on pallative care, but she wants to have Dr. Stewart come out for a monthly visit to see the patient before July 26th. She was hoping he could come out today or tomorrow.     I told her I would send the message. [phone]    Thank you.

## 2019-07-24 NOTE — PATIENT INSTRUCTIONS

## 2019-07-24 NOTE — PROGRESS NOTES
Chief Complaint   Patient presents with   • Abdominal Pain   • Hepatitis C       Subjective    Cristo Musa is a 69 y.o. male. he is here today for follow-up.    History of Present Illness  69-year-old male presents for follow-up regarding chronic hepatitis C.  He has end-stage renal disease on hemodialysis Monday Wednesday Friday, hypertension, GERD, glaucoma.  States hepatitis C was treated greater than 20 years ago however he is unsure of how it was treated or which facility was treated at unable to find any records of this.  States he has been doing better denies any nausea vomiting abdominal pain lactulose was decreased has been receiving Xifaxan and states he is still having loose stool but not nearly as frequently and able to make it to the bathroom more easily.  HCV viral load was elevated at recheck.  Plan; we will check lab work and treat hepatitis C. Discussed possible treatment regimens will discuss further at follow-up once a lab work is available.  The following portions of the patient's history were reviewed and updated as appropriate:   Past Medical History:   Diagnosis Date   • Anemia of chronic disease    • Cataract    • CHF (congestive heart failure) (CMS/HCC)    • CKD (chronic kidney disease)    • Clostridium difficile infection    • COPD (chronic obstructive pulmonary disease) (CMS/HCC)    • Diabetes mellitus (CMS/HCC)    • Dialysis patient (CMS/HCC)    • Glaucoma    • H/O cardiac pacemaker     patient states he got his pacemaker removed in [place]   • Heart block    • Hepatitis C    • History of transfusion    • Hypertension    • Nephropathy, diabetic (CMS/HCC)    • Pacemaker    • Pulmonary embolism (CMS/HCC)      Past Surgical History:   Procedure Laterality Date   • ABDOMINAL SURGERY     • ARTERIOVENOUS FISTULA/SHUNT SURGERY Right     forearm loop   • ARTERIOVENOUS FISTULA/SHUNT SURGERY Left     removed past upper arm   • ARTERIOVENOUS FISTULA/SHUNT SURGERY Right 3/13/2018     Procedure: revision RIGHT forearm ARTERIOVENOUS graft (excision), debridement, wound vac;  Surgeon: Jerson Singh MD;  Location: [facility] OR;  Service: Vascular   • ARTERIOVENOUS FISTULA/SHUNT SURGERY W/ HEMODIALYSIS CATHETER INSERTION Right 4/4/2016    Procedure: RIGHT ARM AV FISTULA DECLOT REVISION REPAIR BRACHIAL ANASTOMOTIC PSUEDOANEURYSM LEFT FEMORAL RICHARDSON FISTULOGRAM WITH ANGIOPLASTY;  Surgeon: Jerson Carpenter MD;  Location: [facility] OR 18/19;  Service:    • CATARACT EXTRACTION W/ INTRAOCULAR LENS IMPLANT Left 3/31/2017    Procedure: REMOVE CATARACT AND IMPLANT INTRAOCULAR LENS LEFT EYE;  Surgeon: Hilton Montemayor MD;  Location: [facility] OR;  Service:    • COLONOSCOPY N/A 3/12/2019    Procedure: COLONOSCOPY;  Surgeon: Flako Montoya MD;  Location: [facility] ENDOSCOPY;  Service: Gastroenterology   • ENDOSCOPY N/A 3/12/2019    Procedure: ESOPHAGOGASTRODUODENOSCOPY;  Surgeon: Flako Montoya MD;  Location: [facility] ENDOSCOPY;  Service: Gastroenterology   • EYE SURGERY     • HERNIA REPAIR     • IMPLANTABLE CARDIOVERTER DEFIBRILLATOR LEAD REPLACEMENT/POCKET REVISION Right 4/11/2016    Procedure: PACEMAKER LEADS X 3 AND BATTERY EXTRACTION WITH EXIMER LASER;  Surgeon: Vern Reeves MD;  Location: [facility] OR 18/19;  Service:    • INSERTION HEMODIALYSIS CATHETER Right 05/2015    jugular   • INTERVENTIONAL RADIOLOGY PROCEDURE Right 6/1/2017    Procedure: RIGHT dialysis fistulagram & angioplasty;  Surgeon: Jerson Singh MD;  Location: [facility] ANGIO INVASIVE LOCATION;  Service:    • INTERVENTIONAL RADIOLOGY PROCEDURE Right 8/24/2017    Procedure: IR dialysis fistulagram;  Surgeon: Jerson Singh MD;  Location: [facility] ANGIO INVASIVE LOCATION;  Service:    • INTERVENTIONAL RADIOLOGY PROCEDURE Right 11/13/2018    Procedure: IR dialysis fistulagram;  Surgeon: Jerson Singh MD;  Location: [facility] ANGIO INVASIVE LOCATION;  Service: Interventional Radiology   • PACEMAKER  IMPLANTATION  2008    dual chamber [employer] [place]   • REMOVAL HEMODIALYSIS CATHETER Right 05/2015    femoral vein     Family History   Problem Relation Age of Onset   • COPD Sister    • Diabetes Brother    • Early death Brother    • Hyperlipidemia Brother    • Hypertension Brother    • Coronary artery disease Mother    • Diabetes Mother    • Hypertension Mother    • Stroke Other    • Other Other         Respiratory disorder       Prior to Admission medications    Medication Sig Start Date End Date Taking? Authorizing Provider   acetaminophen (TYLENOL) 500 MG tablet Take 500 mg by mouth Every 4 (Four) Hours As Needed for Mild Pain  or Fever.   Yes Cleve Nguyễn MD   aspirin 81 MG chewable tablet Chew 81 mg Daily.   Yes Cleve Nguyễn MD   azithromycin (ZITHROMAX) 250 MG tablet Take 1 tablet daily 6/22/19  Yes Cory Rodríguez MD   benzonatate (TESSALON) 100 MG capsule Take 100 mg by mouth 3 (Three) Times a Day As Needed for Cough.   Yes Cleve Nguyễn MD   brimonidine (ALPHAGAN P) 0.1 % solution ophthalmic solution Administer  to both eyes 3 (Three) Times a Day.   Yes Cleve Nguyễn MD   calcitriol (ROCALTROL) 0.5 MCG capsule Take 2 capsules by mouth 3 (Three) Times a Week. 6/24/19  Yes Cory Rodríguez MD   Cholecalciferol (VITAMIN D3) 2000 units chewable tablet Chew 2,000 Units Daily.   Yes Cleve Nguyễn MD   cyclobenzaprine (FLEXERIL) 5 MG tablet Take 5 mg by mouth Every 8 (Eight) Hours As Needed for Muscle Spasms.   Yes Cleve Nguyễn MD   dorzolamide (TRUSOPT) 2 % ophthalmic solution Administer 1 drop into the left eye 3 (Three) Times a Day. 1/24/17  Yes Inderjit Grant MD   escitalopram (LEXAPRO) 10 MG tablet Take 1 tablet by mouth Daily. 12/23/18  Yes Janel Gordon MD   famotidine (PEPCID) 20 MG tablet Take 20 mg by mouth every night at bedtime.   Yes Cleve Nguyễn MD   fluticasone (FLONASE) 50 MCG/ACT nasal spray 2  sprays into each nostril daily. Administer 2 sprays in each nostril for each dose.   Yes Cleve Nguyễn MD   gabapentin (NEURONTIN) 100 MG capsule Take 1 capsule by mouth Every Night. 7/19/19  Yes Warren Stewart MD   guaiFENesin (HUMIBID 3) 400 MG tablet Take 400 mg by mouth Every 4 (Four) Hours As Needed for Cough.   Yes Cleve Nguyễn MD   insulin aspart (NovoLOG) 100 UNIT/ML injection Inject  under the skin 3 (Three) Times a Day Before Meals. Sliding scale:  = 0 units; 150-200 = 3 units; 200-250 = 6 units; 250-300 = 9 units; 300-350 = 12 units; >350 = 15 units and call MD   Yes Cleve Nguyễn MD   insulin detemir (LEVEMIR) 100 UNIT/ML injection Inject 25 Units under the skin into the appropriate area as directed Daily.   Yes Cleve Nguyễn MD   lactulose (CHRONULAC) 10 GM/15ML solution Take 30 mL by mouth 3 (Three) Times a Day.  Patient taking differently: Take 30 mL by mouth 3 (Three) Times a Day. 5/23/19  Yes Beth Porras APRN   latanoprost (XALATAN) 0.005 % ophthalmic solution Administer 1 drop to both eyes every night.   Yes Cleve Nguyễn MD   Loratadine 10 MG capsule Take 10 mg by mouth Every Night. 3/16/18  Yes Cleve Nguyễn MD   melatonin 5 MG tablet tablet Take 5 mg by mouth Every Night.   Yes Cleve Nguyễn MD   Nutritional Supplements (NEPRO PO) Take 1 tablet by mouth Daily. On Sun, Tues, Thur, Sat   Yes Cleve Nguyễn MD   ondansetron (ZOFRAN) 4 MG tablet Take 4 mg by mouth Every 6 (Six) Hours As Needed for Nausea or Vomiting.   Yes Cleve Nguyễn MD   ondansetron ODT (ZOFRAN-ODT) 4 MG disintegrating tablet Take 1 tablet by mouth Every 6 (Six) Hours As Needed for Nausea or Vomiting. 2/17/18  Yes Salvador Elmore MD   polyethylene glycol (MIRALAX) packet Take 17 g by mouth Daily As Needed (constipation).   Yes Cleve Nguyễn MD   rifaximin (XIFAXAN) 550 MG tablet Take 1 tablet by mouth Every 12 (Twelve) Hours. 5/24/19   Yes Beth Porras APRN   sevelamer (RENVELA) 800 MG tablet Take 800 mg by mouth 3 (Three) Times a Day With Meals.   Yes Provider, Historical, MD   traZODone (DESYREL) 50 MG tablet Take 50 mg by mouth every night at bedtime.   Yes Provider, Historical, MD     Allergies   Allergen Reactions   • Lisinopril Angioedema     unknown   • Motrin [Ibuprofen] Diarrhea and Nausea And Vomiting     Social History     Socioeconomic History   • Marital status: Legally      Spouse name: Not on file   • Number of children: Not on file   • Years of education: Not on file   • Highest education level: Not on file   Tobacco Use   • Smoking status: Former Smoker     Types: Cigarettes   • Smokeless tobacco: Never Used   • Tobacco comment: quit 20 years ago    Substance and Sexual Activity   • Alcohol use: No     Comment: former heavy use in his 50's, up to a fifth of liquor a day, currently no alcohol   • Drug use: No   • Sexual activity: No       Review of Systems  Review of Systems   Constitutional: Negative for activity change, appetite change, chills, diaphoresis, fatigue, fever and unexpected weight change.   HENT: Negative for sore throat and trouble swallowing.    Respiratory: Negative for shortness of breath.    Gastrointestinal: Positive for diarrhea (lactulose ). Negative for abdominal distention, abdominal pain, anal bleeding, blood in stool, constipation, nausea, rectal pain and vomiting.   Musculoskeletal: Negative for arthralgias.   Skin: Negative for pallor.   Neurological: Negative for light-headedness.        /68 (BP Location: Left arm)   Pulse 68   Wt 87.1 kg (192 lb 1.6 oz) Comment: reported from [place],after dialysis  BMI 29.22 kg/m²     Objective    Physical Exam   Constitutional: He is oriented to person, place, and time. He appears well-developed and well-nourished. He is cooperative. No distress.   HENT:   Head: Normocephalic and atraumatic.   Neck: Normal range of motion. Neck supple. No  thyromegaly present.   Cardiovascular: Normal rate, regular rhythm and normal heart sounds.   Pulmonary/Chest: Effort normal and breath sounds normal. He has no wheezes. He has no rhonchi. He has no rales.   Abdominal: Soft. Normal appearance and bowel sounds are normal. He exhibits no shifting dullness, no distension, no fluid wave and no ascites. There is no hepatosplenomegaly. There is no tenderness. There is no rigidity and no guarding. No hernia.   Lymphadenopathy:     He has no cervical adenopathy.   Neurological: He is alert and oriented to person, place, and time.   Skin: Skin is warm, dry and intact. No rash noted. No pallor.   Psychiatric: He has a normal mood and affect. His speech is normal.     Admission on 06/20/2019, Discharged on 06/21/2019   Component Date Value Ref Range Status   • Glucose 06/20/2019 96  65 - 99 mg/dL Final   • BUN 06/20/2019 26* 8 - 23 mg/dL Final   • Creatinine 06/20/2019 4.56* 0.76 - 1.27 mg/dL Final   • Sodium 06/20/2019 135* 136 - 145 mmol/L Final   • Potassium 06/20/2019 3.7  3.5 - 5.2 mmol/L Final   • Chloride 06/20/2019 90* 98 - 107 mmol/L Final   • CO2 06/20/2019 33.0* 22.0 - 29.0 mmol/L Final   • Calcium 06/20/2019 9.3  8.6 - 10.5 mg/dL Final   • Total Protein 06/20/2019 9.8* 6.0 - 8.5 g/dL Final   • Albumin 06/20/2019 3.40* 3.50 - 5.20 g/dL Final   • ALT (SGPT) 06/20/2019 8  1 - 41 U/L Final   • AST (SGOT) 06/20/2019 18  1 - 40 U/L Final   • Alkaline Phosphatase 06/20/2019 143* 39 - 117 U/L Final   • Total Bilirubin 06/20/2019 1.1  0.2 - 1.2 mg/dL Final   • eGFR   Amer 06/20/2019 16* >60 mL/min/1.73 Final   • Globulin 06/20/2019 6.4  gm/dL Final   • A/G Ratio 06/20/2019 0.5  g/dL Final   • BUN/Creatinine Ratio 06/20/2019 5.7* 7.0 - 25.0 Final   • Anion Gap 06/20/2019 12.0  mmol/L Final   • proBNP 06/20/2019 4,985.0* 5.0 - 900.0 pg/mL Final   • Troponin T 06/20/2019 0.101* 0.000 - 0.030 ng/mL Final   • Extra Tube 06/20/2019 hold for add-on   Final    Auto resulted    • Extra Tube 06/20/2019 Hold for add-ons.   Final    Auto resulted.   • Extra Tube 06/20/2019 hold for add-on   Final    Auto resulted   • Extra Tube 06/20/2019 Hold for add-ons.   Final    Auto resulted.   • WBC 06/20/2019 4.25  3.40 - 10.80 10*3/mm3 Final   • RBC 06/20/2019 3.51* 4.14 - 5.80 10*6/mm3 Final   • Hemoglobin 06/20/2019 9.9* 13.0 - 17.7 g/dL Final   • Hematocrit 06/20/2019 30.6* 37.5 - 51.0 % Final   • MCV 06/20/2019 87.2  79.0 - 97.0 fL Final   • MCH 06/20/2019 28.2  26.6 - 33.0 pg Final   • MCHC 06/20/2019 32.4  31.5 - 35.7 g/dL Final   • RDW 06/20/2019 17.4* 12.3 - 15.4 % Final   • RDW-SD 06/20/2019 54.7* 37.0 - 54.0 fl Final   • MPV 06/20/2019 8.9  6.0 - 12.0 fL Final   • Platelets 06/20/2019 105* 140 - 450 10*3/mm3 Final   • Neutrophil % 06/20/2019 53.9  42.7 - 76.0 % Final   • Lymphocyte % 06/20/2019 27.1  19.6 - 45.3 % Final   • Monocyte % 06/20/2019 15.3* 5.0 - 12.0 % Final   • Eosinophil % 06/20/2019 2.6  0.3 - 6.2 % Final   • Basophil % 06/20/2019 0.9  0.0 - 1.5 % Final   • Immature Grans % 06/20/2019 0.2  0.0 - 0.5 % Final   • Neutrophils, Absolute 06/20/2019 2.29  1.70 - 7.00 10*3/mm3 Final   • Lymphocytes, Absolute 06/20/2019 1.15  0.70 - 3.10 10*3/mm3 Final   • Monocytes, Absolute 06/20/2019 0.65  0.10 - 0.90 10*3/mm3 Final   • Eosinophils, Absolute 06/20/2019 0.11  0.00 - 0.40 10*3/mm3 Final   • Basophils, Absolute 06/20/2019 0.04  0.00 - 0.20 10*3/mm3 Final   • Immature Grans, Absolute 06/20/2019 0.01  0.00 - 0.05 10*3/mm3 Final   • nRBC 06/20/2019 0.0  0.0 - 0.2 /100 WBC Final   • CRE SCREEN 06/20/2019 Not Detected  Not Detected, Invalid Final    Test performed by real-time polymerase chain reaction (qPCR).   • OXA 48 Strain 06/20/2019 Not Detected   Final   • IMP STRAIN 06/20/2019 Not Detected   Final   • VIM STRAIN 06/20/2019 Not Detected   Final   • NDM Strain 06/20/2019 Not Detected   Final   • KPC Strain 06/20/2019 Not Detected   Final   • Troponin T 06/20/2019 0.086* 0.000 -  0.030 ng/mL Final   • Troponin T 06/20/2019 0.090* 0.000 - 0.030 ng/mL Final   • Glucose 06/20/2019 109  70 - 130 mg/dL Final    RN NotifiedOperator: [id number] EMILY Merritt ID: [id number]   • Glucose 06/20/2019 144* 70 - 130 mg/dL Final    RN NotifiedOperator: [id number] ELIAS Arvizu ID: [id number]   • Glucose 06/21/2019 171* 65 - 99 mg/dL Final   • BUN 06/21/2019 39* 8 - 23 mg/dL Final   • Creatinine 06/21/2019 5.62* 0.76 - 1.27 mg/dL Final   • Sodium 06/21/2019 133* 136 - 145 mmol/L Final   • Potassium 06/21/2019 4.1  3.5 - 5.2 mmol/L Final   • Chloride 06/21/2019 92* 98 - 107 mmol/L Final   • CO2 06/21/2019 29.0  22.0 - 29.0 mmol/L Final   • Calcium 06/21/2019 8.9  8.6 - 10.5 mg/dL Final   • eGFR   Amer 06/21/2019 12* >60 mL/min/1.73 Final    <15 Indicative of kidney failure.   • eGFR Non  Amer 06/21/2019   >60 mL/min/1.73 Final    <15 Indicative of kidney failure.   • BUN/Creatinine Ratio 06/21/2019 6.9* 7.0 - 25.0 Final   • Anion Gap 06/21/2019 12.0  mmol/L Final   • WBC 06/21/2019 3.69  3.40 - 10.80 10*3/mm3 Final   • RBC 06/21/2019 3.40* 4.14 - 5.80 10*6/mm3 Final   • Hemoglobin 06/21/2019 9.2* 13.0 - 17.7 g/dL Final   • Hematocrit 06/21/2019 28.7* 37.5 - 51.0 % Final   • MCV 06/21/2019 84.4  79.0 - 97.0 fL Final   • MCH 06/21/2019 27.1  26.6 - 33.0 pg Final   • MCHC 06/21/2019 32.1  31.5 - 35.7 g/dL Final   • RDW 06/21/2019 17.2* 12.3 - 15.4 % Final   • RDW-SD 06/21/2019 53.2  37.0 - 54.0 fl Final   • MPV 06/21/2019 9.7  6.0 - 12.0 fL Final   • Platelets 06/21/2019 117* 140 - 450 10*3/mm3 Final   • Neutrophil % 06/21/2019 57.5  42.7 - 76.0 % Final   • Lymphocyte % 06/21/2019 25.7  19.6 - 45.3 % Final   • Monocyte % 06/21/2019 12.2* 5.0 - 12.0 % Final   • Eosinophil % 06/21/2019 3.5  0.3 - 6.2 % Final   • Basophil % 06/21/2019 0.8  0.0 - 1.5 % Final   • Immature Grans % 06/21/2019 0.3  0.0 - 0.5 % Final   • Neutrophils, Absolute 06/21/2019 2.12  1.70 - 7.00 10*3/mm3 Final   •  Lymphocytes, Absolute 06/21/2019 0.95  0.70 - 3.10 10*3/mm3 Final   • Monocytes, Absolute 06/21/2019 0.45  0.10 - 0.90 10*3/mm3 Final   • Eosinophils, Absolute 06/21/2019 0.13  0.00 - 0.40 10*3/mm3 Final   • Basophils, Absolute 06/21/2019 0.03  0.00 - 0.20 10*3/mm3 Final   • Immature Grans, Absolute 06/21/2019 0.01  0.00 - 0.05 10*3/mm3 Final   • nRBC 06/21/2019 0.0  0.0 - 0.2 /100 WBC Final   • Glucose 06/21/2019 111  70 - 130 mg/dL Final    RN NotifiedOperator: [id number] ANMOL MENJIVARFERMeter ID: [id number]   • Troponin T 06/21/2019 0.088* 0.000 - 0.030 ng/mL Final   • Hepatitis B Surface Ag 06/20/2019 Non-Reactive  Non-Reactive Final     Assessment/Plan      1. Chronic hepatitis C without hepatic coma (CMS/HCC)    .       Orders placed during this encounter include:  Orders Placed This Encounter   Procedures   • HCV FibroSURE   • Hepatitis C RNA, Quantitative, PCR (graph)   • Hepatitis C Genotype     Standing Status:   Future     Number of Occurrences:   1     Standing Expiration Date:   7/24/2020   • Comprehensive Metabolic Panel   • Protime-INR   • CBC Auto Differential   • CBC & Differential     Order Specific Question:   Manual Differential     Answer:   No       * Surgery not found *    Review and/or summary of lab tests, radiology, procedures, medications. Review and summary of old records and obtaining of history. The risks and benefits of my recommendations, as well as other treatment options were discussed with the patient today. Questions were answered.    No orders of the defined types were placed in this encounter.      Follow-up: Return in about 4 weeks (around 8/21/2019).          This document has been electronically signed by LUIS Hernandez on July 26, 2019 2:41 PM             Results for orders placed or performed in visit on 07/24/19   CBC Auto Differential   Result Value Ref Range    WBC 3.76 3.40 - 10.80 10*3/mm3    RBC 4.05 (L) 4.14 - 5.80 10*6/mm3    Hemoglobin 11.5 (L) 13.0 - 17.7 g/dL     Hematocrit 36.3 (L) 37.5 - 51.0 %    MCV 89.6 79.0 - 97.0 fL    MCH 28.4 26.6 - 33.0 pg    MCHC 31.7 31.5 - 35.7 g/dL    RDW 16.9 (H) 12.3 - 15.4 %    RDW-SD 53.1 37.0 - 54.0 fl    MPV 9.0 6.0 - 12.0 fL    Platelets 130 (L) 140 - 450 10*3/mm3    Neutrophil % 60.1 42.7 - 76.0 %    Lymphocyte % 26.1 19.6 - 45.3 %    Monocyte % 9.3 5.0 - 12.0 %    Eosinophil % 3.2 0.3 - 6.2 %    Basophil % 0.8 0.0 - 1.5 %    Immature Grans % 0.5 0.0 - 0.5 %    Neutrophils, Absolute 2.26 1.70 - 7.00 10*3/mm3    Lymphocytes, Absolute 0.98 0.70 - 3.10 10*3/mm3    Monocytes, Absolute 0.35 0.10 - 0.90 10*3/mm3    Eosinophils, Absolute 0.12 0.00 - 0.40 10*3/mm3    Basophils, Absolute 0.03 0.00 - 0.20 10*3/mm3    Immature Grans, Absolute 0.02 0.00 - 0.05 10*3/mm3    nRBC 0.3 (H) 0.0 - 0.2 /100 WBC   Protime-INR   Result Value Ref Range    Protime 14.1 11.1 - 15.3 Seconds    INR 1.11 0.80 - 1.20   Comprehensive Metabolic Panel   Result Value Ref Range    Glucose 297 (H) 65 - 99 mg/dL    BUN 22 8 - 23 mg/dL    Creatinine 4.39 (H) 0.76 - 1.27 mg/dL    Sodium 138 136 - 145 mmol/L    Potassium 3.8 3.5 - 5.2 mmol/L    Chloride 92 (L) 98 - 107 mmol/L    CO2 33.0 (H) 22.0 - 29.0 mmol/L    Calcium 9.9 8.6 - 10.5 mg/dL    Total Protein 9.5 (H) 6.0 - 8.5 g/dL    Albumin 3.50 3.50 - 5.20 g/dL    ALT (SGPT) 15 1 - 41 U/L    AST (SGOT) 23 1 - 40 U/L    Alkaline Phosphatase 147 (H) 39 - 117 U/L    Total Bilirubin 0.9 0.2 - 1.2 mg/dL    eGFR  African Amer 16 (L) >60 mL/min/1.73    Globulin 6.0 gm/dL    A/G Ratio 0.6 g/dL    BUN/Creatinine Ratio 5.0 (L) 7.0 - 25.0    Anion Gap 13.0 5.0 - 15.0 mmol/L   Results for orders placed or performed during the hospital encounter of 06/20/19   CRE Screen by PCR - Swab, Large Intestine, Rectum   Result Value Ref Range    CRE SCREEN Not Detected Not Detected, Invalid    OXA 48 Strain Not Detected     IMP STRAIN Not Detected     VIM STRAIN Not Detected     NDM Strain Not Detected     KPC Strain Not Detected    Gold Top  - SST   Result Value Ref Range    Extra Tube Hold for add-ons.    Green Top (Gel)   Result Value Ref Range    Extra Tube Hold for add-ons.    CBC Auto Differential   Result Value Ref Range    WBC 3.69 3.40 - 10.80 10*3/mm3    RBC 3.40 (L) 4.14 - 5.80 10*6/mm3    Hemoglobin 9.2 (L) 13.0 - 17.7 g/dL    Hematocrit 28.7 (L) 37.5 - 51.0 %    MCV 84.4 79.0 - 97.0 fL    MCH 27.1 26.6 - 33.0 pg    MCHC 32.1 31.5 - 35.7 g/dL    RDW 17.2 (H) 12.3 - 15.4 %    RDW-SD 53.2 37.0 - 54.0 fl    MPV 9.7 6.0 - 12.0 fL    Platelets 117 (L) 140 - 450 10*3/mm3    Neutrophil % 57.5 42.7 - 76.0 %    Lymphocyte % 25.7 19.6 - 45.3 %    Monocyte % 12.2 (H) 5.0 - 12.0 %    Eosinophil % 3.5 0.3 - 6.2 %    Basophil % 0.8 0.0 - 1.5 %    Immature Grans % 0.3 0.0 - 0.5 %    Neutrophils, Absolute 2.12 1.70 - 7.00 10*3/mm3    Lymphocytes, Absolute 0.95 0.70 - 3.10 10*3/mm3    Monocytes, Absolute 0.45 0.10 - 0.90 10*3/mm3    Eosinophils, Absolute 0.13 0.00 - 0.40 10*3/mm3    Basophils, Absolute 0.03 0.00 - 0.20 10*3/mm3    Immature Grans, Absolute 0.01 0.00 - 0.05 10*3/mm3    nRBC 0.0 0.0 - 0.2 /100 WBC   CBC Auto Differential   Result Value Ref Range    WBC 4.25 3.40 - 10.80 10*3/mm3    RBC 3.51 (L) 4.14 - 5.80 10*6/mm3    Hemoglobin 9.9 (L) 13.0 - 17.7 g/dL    Hematocrit 30.6 (L) 37.5 - 51.0 %    MCV 87.2 79.0 - 97.0 fL    MCH 28.2 26.6 - 33.0 pg    MCHC 32.4 31.5 - 35.7 g/dL    RDW 17.4 (H) 12.3 - 15.4 %    RDW-SD 54.7 (H) 37.0 - 54.0 fl    MPV 8.9 6.0 - 12.0 fL    Platelets 105 (L) 140 - 450 10*3/mm3    Neutrophil % 53.9 42.7 - 76.0 %    Lymphocyte % 27.1 19.6 - 45.3 %    Monocyte % 15.3 (H) 5.0 - 12.0 %    Eosinophil % 2.6 0.3 - 6.2 %    Basophil % 0.9 0.0 - 1.5 %    Immature Grans % 0.2 0.0 - 0.5 %    Neutrophils, Absolute 2.29 1.70 - 7.00 10*3/mm3    Lymphocytes, Absolute 1.15 0.70 - 3.10 10*3/mm3    Monocytes, Absolute 0.65 0.10 - 0.90 10*3/mm3    Eosinophils, Absolute 0.11 0.00 - 0.40 10*3/mm3    Basophils, Absolute 0.04 0.00 - 0.20 10*3/mm3     Immature Grans, Absolute 0.01 0.00 - 0.05 10*3/mm3    nRBC 0.0 0.0 - 0.2 /100 WBC     *Note: Due to a large number of results and/or encounters for the requested time period, some results have not been displayed. A complete set of results can be found in Results Review.

## 2019-07-29 NOTE — TELEPHONE ENCOUNTER
Jerica from [facility] called and wanted to let Dr. Stewart know that the patient refused to go to dialysis and refused to eat his breakfast. He also has been confused and has been trying to get up on his own. He also has a history of falling. If any questions she said that he can call them back at [phone].      Thank you,      Patricia

## 2019-07-29 NOTE — TELEPHONE ENCOUNTER
Stephen Angulo called to let you know that the patient refused to go to dialysis and to eat his breakfast.

## 2019-07-29 NOTE — PROGRESS NOTES
Received call from [facility] this morning that Pt continues to refuse Dialysis. He also appeared less cognizant of his surroundings this AM with O2 saturation to 85%. Went and evaluated PT and ordered CBC, CMP, and Ammonia level. Pt placed on oxygen and saturation normalized to >95%. Pt expressed desire not to continue with Dialysis, he is currently DNR/DNI. Attempted to contact next of kin to discuss their future plans for Mr. Musa's treatment. Will call again in regards to possible hospice/palliative care placement.

## 2019-07-30 PROBLEM — K92.2 GASTROINTESTINAL HEMORRHAGE: Status: ACTIVE | Noted: 2019-01-01

## 2019-07-30 NOTE — TELEPHONE ENCOUNTER
Jerica from [facility] called and wanted to let Dr. Warren Stewart know that the patient is refusing to do the labs that he had ordered. If any questions she said that someone can call her back.      Thank you,      Patricia

## 2019-08-01 NOTE — PROGRESS NOTES
FAMILY MEDICINE DAILY PROGRESS NOTE  NAME: Cristo Musa  : 1950  MRN: [id number]     LOS: 2 days     PROVIDER OF SERVICE: Warren Stewart MD    Chief Complaint: Gastrointestinal hemorrhage    Subjective:     Cristo Musa is a 69 y.o. male who presents for initial evaluation  for bloody stool x1 day. Pt was brought in from [facility]. They state he had multiple bloody bowel moments last night and called in to MD and was told to present to the ER. Pt was asymptomatic at that time. Pt has remained stable overnight.Hgbhas decreased 9.1-->8.7 after 2 units PRBC yesterday. Will order recheck of this later this AM. May proceed with colonoscopy if Hgb contineus to drop. Currently using CPAP.     Interval History:  History taken from: chart    Review of Systems:   Review of Systems   Unable to perform ROS: Acuity of condition       Objective:     Vital Signs  Temp:  [97.9 °F (36.6 °C)-98.4 °F (36.9 °C)] 98 °F (36.7 °C)  Heart Rate:  [63-87] 65  Resp:  [12-26] 18  BP: ()/(44-65) 117/57  FiO2 (%):  [21 %] 21 %    Physical Exam  Physical Exam   Constitutional: He appears well-developed and well-nourished. No distress.   HENT:   Head: Normocephalic and atraumatic.   Right Ear: External ear normal.   Left Ear: External ear normal.   Mouth/Throat: Oropharynx is clear and moist. No oropharyngeal exudate.   Eyes: Conjunctivae and EOM are normal. Pupils are equal, round, and reactive to light. Right eye exhibits no discharge. Left eye exhibits no discharge. No scleral icterus.   Neck: Normal range of motion. No tracheal deviation present.   Cardiovascular: Normal rate, regular rhythm and normal heart sounds. Exam reveals no gallop and no friction rub.   No murmur heard.  Pulmonary/Chest: Effort normal and breath sounds normal. No stridor. No respiratory distress. He has no wheezes. He has no rales. He exhibits no tenderness.   Abdominal: Soft. Bowel sounds are normal. He exhibits no  distension. There is no tenderness.   Musculoskeletal: Normal range of motion. He exhibits no edema, tenderness or deformity.   Neurological: He is alert. No sensory deficit.   Not alert to place or time     Skin: Skin is warm and dry. Capillary refill takes less than 2 seconds. No rash noted. He is not diaphoretic. No erythema. There is pallor.   Psychiatric:   Pt is drowsy and minimally responsive to questioning         Medication Review    Current Facility-Administered Medications:   •  acetaminophen (TYLENOL) tablet 500 mg, 500 mg, Oral, Q4H PRN, Marylou Eugene MD  •  albumin human 25 % IV SOLN 12.5 g, 12.5 g, Intravenous, PRN, Pascual Vilchis MD  •  albuterol (PROVENTIL) nebulizer solution 0.5% 2.5 mg/0.5mL, 10 mg, Nebulization, Q6H PRN, Salvador Elmore MD  •  benzonatate (TESSALON) capsule 100 mg, 100 mg, Oral, TID PRN, Marylou Eugene MD  •  brimonidine (ALPHAGAN) 0.15 % ophthalmic solution 1 drop, 1 drop, Both Eyes, TID, Marylou Eugene MD, 1 drop at 07/31/19 2130  •  cetirizine (zyrTEC) tablet 5 mg, 5 mg, Oral, Daily, Marylou Eugene MD  •  cholecalciferol (VITAMIN D3) tablet 500 Units, 500 Units, Oral, Daily, Marylou Eugene MD  •  cyclobenzaprine (FLEXERIL) tablet 5 mg, 5 mg, Oral, Q8H PRN, Marylou Eugene MD  •  dextrose (D50W) 25 g/ 50mL Intravenous Solution 50 mL, 50 mL, Intravenous, Q1H PRN, Salvador Elmore MD  •  escitalopram (LEXAPRO) tablet 10 mg, 10 mg, Oral, Daily, Marylou Eugene MD  •  fluticasone (FLONASE) 50 MCG/ACT nasal spray 2 spray, 2 spray, Nasal, Daily, Marylou Eugene MD  •  gabapentin (NEURONTIN) capsule 100 mg, 100 mg, Oral, Nightly, Marylou Eugene MD  •  insulin detemir (LEVEMIR) injection 10 Units, 10 Units, Subcutaneous, Daily, Singh Sommer MD  •  insulin regular (humuLIN R,novoLIN R) injection 10 Units, 10 Units, Intravenous, Once, Salvador Elmore MD, Stopped at 07/30/19 2954  •  lactulose  (CHRONULAC) 10 GM/15ML solution 20 g, 20 g, Rectal, TID, Mark Humphreys MD  •  latanoprost (XALATAN) 0.005 % ophthalmic solution 1 drop, 1 drop, Both Eyes, Nightly, Marylou Eugene MD, 1 drop at 07/31/19 2130  •  melatonin tablet 5.25 mg, 5.25 mg, Oral, Nightly, Marylou Eugene MD, 5.25 mg at 07/31/19 0035  •  ondansetron (ZOFRAN) injection 4 mg, 4 mg, Intravenous, Q6H PRN, Marylou Eugene MD  •  pantoprazole (PROTONIX) injection 40 mg, 40 mg, Intravenous, Q12H, Marylou Eugene MD, 40 mg at 07/31/19 2130  •  rifaximin (XIFAXAN) tablet 550 mg, 550 mg, Oral, Q12H, Marylou Eugene MD  •  sevelamer (RENVELA) tablet 800 mg, 800 mg, Oral, TID With Meals, Marylou Eugene MD  •  sodium chloride 0.9 % flush 10 mL, 10 mL, Intravenous, PRN, Salvador Elmore MD, 10 mL at 07/30/19 2013  •  sodium chloride 0.9 % flush 3 mL, 3 mL, Intravenous, Q12H, Marylou Eugene MD, 3 mL at 07/31/19 2142  •  sodium chloride 0.9 % flush 3-10 mL, 3-10 mL, Intravenous, PRN, Marylou Eugene MD  •  traZODone (DESYREL) tablet 50 mg, 50 mg, Oral, Nightly PRN, Marylou Eugene MD     Diagnostic Data    Lab Results (last 24 hours)     Procedure Component Value Units Date/Time    Basic Metabolic Panel [[id number]]  (Abnormal) Collected:  08/01/19 0507    Specimen:  Blood Updated:  08/01/19 0533     Glucose 186 mg/dL      BUN 37 mg/dL      Creatinine 6.22 mg/dL      Sodium 138 mmol/L      Potassium 4.6 mmol/L      Chloride 97 mmol/L      CO2 24.0 mmol/L      Calcium 8.4 mg/dL      eGFR  African Amer 11 mL/min/1.73      Comment: <15 Indicative of kidney failure.        eGFR Non African Amer -- mL/min/1.73      Comment: <15 Indicative of kidney failure.        BUN/Creatinine Ratio 5.9     Anion Gap 17.0 mmol/L     Narrative:       GFR Normal >60  Chronic Kidney Disease <60  Kidney Failure <15    CBC & Differential [[id number]] Collected:  08/01/19 0507    Specimen:  Blood Updated:  08/01/19 0594     Narrative:       The following orders were created for panel order CBC & Differential.  Procedure                               Abnormality         Status                     ---------                               -----------         ------                     CBC Auto Differential[[id number]]        Abnormal            Final result                 Please view results for these tests on the individual orders.    CBC Auto Differential [[id number]]  (Abnormal) Collected:  08/01/19 0507    Specimen:  Blood Updated:  08/01/19 0524     WBC 4.75 10*3/mm3      RBC 2.96 10*6/mm3      Hemoglobin 8.7 g/dL      Hematocrit 24.6 %      MCV 83.1 fL      MCH 29.4 pg      MCHC 35.4 g/dL      RDW 15.3 %      RDW-SD 45.3 fl      MPV 9.4 fL      Platelets 117 10*3/mm3      Neutrophil % 67.5 %      Lymphocyte % 16.6 %      Monocyte % 14.5 %      Eosinophil % 0.2 %      Basophil % 0.8 %      Immature Grans % 0.4 %      Neutrophils, Absolute 3.20 10*3/mm3      Lymphocytes, Absolute 0.79 10*3/mm3      Monocytes, Absolute 0.69 10*3/mm3      Eosinophils, Absolute 0.01 10*3/mm3      Basophils, Absolute 0.04 10*3/mm3      Immature Grans, Absolute 0.02 10*3/mm3      nRBC 0.0 /100 WBC     Hemoglobin [[id number]]  (Abnormal) Collected:  08/01/19 0507    Specimen:  Blood Updated:  08/01/19 0523     Hemoglobin 8.7 g/dL     Hemoglobin [[id number]]  (Abnormal) Collected:  08/01/19 0030    Specimen:  Blood Updated:  08/01/19 0042     Hemoglobin 9.1 g/dL     Hemoglobin [[id number]]  (Abnormal) Collected:  07/31/19 1809    Specimen:  Blood Updated:  07/31/19 1902     Hemoglobin 8.6 g/dL     POC Glucose Once [[id number]]  (Abnormal) Collected:  07/31/19 1528    Specimen:  Blood Updated:  07/31/19 1542     Glucose 149 mg/dL      Comment: : [id number] NADER Dill ID: [id number]       Hemoglobin [[id number]]  (Abnormal) Collected:  07/31/19 1155    Specimen:  Blood Updated:  07/31/19 1231     Hemoglobin 8.4 g/dL     Blood Gas, Arterial  [[id number]]  (Abnormal) Collected:  07/31/19 1117    Specimen:  Arterial Blood Updated:  07/31/19 1122     Site Left Radial     Inderjit's Test N/A     pH, Arterial 7.507 pH units      Comment: 83 Value above reference range        pCO2, Arterial 32.6 mm Hg      Comment: 84 Value below reference range        pO2, Arterial 84.2 mm Hg      HCO3, Arterial 25.8 mmol/L      Base Excess, Arterial 2.8 mmol/L      Comment: 83 Value above reference range        O2 Saturation, Arterial 97.2 %      Barometric Pressure for Blood Gas 750 mmHg      Modality Room Air     FIO2 21 %      Ventilator Mode NA     Collected by MR     Comment: Meter: [id number]     :  [id number]       Hepatitis B Surface Antigen [[id number]]  (Normal) Collected:  07/31/19 0844    Specimen:  Blood Updated:  07/31/19 0910     Hepatitis B Surface Ag Non-Reactive           Imaging Results (last 24 hours)     Procedure Component Value Units Date/Time    CT Angiogram Abdomen Pelvis With & Without Contrast [[id number]] Collected:  07/31/19 1030     Updated:  07/31/19 1108    Narrative:       CT angiogram abdomen, pelvis with contrast.    HISTORY: CLINICAL INDICATION: Gastrointestinal hemorrhage, GI  bleeding. Renal insufficiency on dialysis.    COMPARISON: CT April 22, 2019.       TECHNIQUE: 90 mL Isovue-370,  nonionic IV contrast. Helical  scanning with axial and coronal reformations. Computer-generated  3-D images, CT angiography including volume rendered images,  subtraction and MIP images are obtained. Soft tissue, lung, and  bone windows reviewed.    This exam was performed according to our departmental  dose-optimization program, which includes automated exposure  control, adjustment of the mA and/or kV according to patient size  and/or use of iterative reconstruction technique.    ABDOMEN CT FINDINGS: Superior mesenteric artery, celiac artery  are both patent and normal in course and caliber. Inferior  mesenteric artery is also patent.    Renal  arteries are normal and symmetrical without evidence of  renal artery stenosis. Both kidneys demonstrate cortical  thinning, atrophy. There is no evidence of any angiodysplasia in  the mesenteric vessels and no evidence of any taya extravasation  i.e. no evidence of active GI bleeding.      Impression:       CONCLUSION: Diffuse atherosclerotic plaque throughout the  mesenteric vessels but no evidence of occlusion or focal  stenosis. Superior mesenteric artery and inferior mesenteric  artery, celiac artery are patent and normal in course and  caliber. No evidence of any angiodysplasia or neovascularity. No  evidence of any taya extravasation of contrast. No  evidence of  active GI bleeding.    Electronically signed by:  Wallace Louise MD  7/31/2019 11:07 AM CDT  Workstation: [id number]          I reviewed the patient's new clinical results.    Assessment/Plan:     Active Hospital Problems    Diagnosis   • **Gastrointestinal hemorrhage     -case discussed with Dr. Azevedo, no scope planned as Pt received scope 1 month ago  -hold ASA  -tagged red blood cell study could not be completed due to no participation from Pt even after Halodol  -IVF  -NPO  -repeat H/H in 4 hours from last draw  -Protonix     • Hyperammonemia (CMS/HCC)     Continue lactulose and rifaximin  Check ammonia level:193     • COPD (chronic obstructive pulmonary disease) (CMS/[facility])     - Continue flonase, loratadine  - Patient uses Oxygen prn at the nursing home.   - Duonebs and albuterol nebs prn.        • Chronic pain     Continue home Gabapentin nightly and Flexeril prn.      • Primary insomnia     Continue home Trazodone and Melatonin.      • ESRD (end stage renal disease) (CMS/[facility])     HD [facility]  Nephrologist Dr. Vilchis   Will consider DDAVP at this time; concern for kidney so will consult Nephrology prior to this       • Primary open angle glaucoma of left eye, moderate stage     Continue home drops.      • Diabetes mellitus (CMS/[facility])     - Continue long  acting insulin 25 units nightly  - SSI  - Finger stick glucose checks.      • Hypertension     Monitor per floor protocol.   - He does not appear to be on any BP meds at home.            DVT prophylaxis: SCDs/TEDs  Code Status and Medical Interventions:   Ordered at: 07/30/19 4998     Limited Support to NOT Include:    Intubation     Code Status:    No CPR     Medical Interventions (Level of Support Prior to Arrest):    Limited       Plan for disposition:Where: home and When:  3-4 days      Time: 30 minutes        This document has been electronically signed by Warren Stewart MD on August 1, 2019 8:49 AM

## 2019-08-01 NOTE — CONSULTS
Adult Nutrition  Assessment    Patient Name:  Cristo Musa  YOB: 1950  MRN: [id number]  Admit Date:  7/30/2019    Assessment Date:  8/1/2019    Comments:  Pt admitted from the Nursing home with decreased alertness and a GI bleed.  S/p transfusion.  Pt with a hx of ESRD and is on hemodialysis.  He currently is lethargic and is basically unresponsive to staff.  Hx of Hep C with elevated NH3 indicating Metabolic Encephalopathy.  Rd will monitor tx plans.      Reason for Assessment     Row Name 08/01/19 1600          Reason for Assessment    Reason For Assessment  nurse/nurse practitioner consult     Diagnosis  gastrointestinal disease;renal disease     Identified At Risk by Screening Criteria  other (see comments) NPO for GI bleed         Nutrition/Diet History     Row Name 08/01/19 1601          Nutrition/Diet History    Typical Food/Fluid Intake  Pt would not respond to my voice.  He would not look at me.  Per staff he has been basically unresponsive.  would not take his meds.  They had to hold them           Labs/Tests/Procedures/Meds     Row Name 08/01/19 1601          Labs/Procedures/Meds    Lab Results Reviewed  reviewed, pertinent     Lab Results Comments  Glucose 186; Bun 37; Creat 6.28; Plateltes 117; Mg 3.0; Alb 3.40        Diagnostic Tests/Procedures    Diagnostic Test/Procedure Reviewed  reviewed, pertinent     Diagnostic Test/Procedures Comments  Transfusion; Dialysis        Medications    Pertinent Medications Reviewed  reviewed, pertinent     Pertinent Medications Comments  Levemir; Humulin R         Physical Findings     Row Name 08/01/19 1602          Physical Findings    Overall Physical Appearance  on oxygen therapy         Estimated/Assessed Needs     Row Name 08/01/19 1603          Calculation Measurements    Weight Used For Calculations  91.2 kg (201 lb)        Estimated/Assessed Needs    Additional Documentation  Additional Requirements (Group);Fluid Requirements  (Group);Henderson-St. Jeor Equation (Group);Protein Requirements (Group);Calorie Requirements (Group);KCAL/KG (Group)        Calorie Requirements    Estimated Calorie Requirement (kcal/day)  2000     Estimated Calorie Need Method  Henderson-St Jeor        KCAL/KG    KCAL/KG  --     25 Kcal/Kg (kcal)  2279.325        Henderson-St. Jeor Equation    RMR (Henderson-St. Jeor Equation)  1656.23        Protein Requirements    Weight Used For Protein Calculations  70 kg (154 lb 5.2 oz)     Est Protein Requirement Amount (gms/kg)  -- 70 gms (1.0 gms/kg IBW)        Fluid Requirements    Estimated Fluid Requirements (mL/day)  1200     Estimated Fluid Requirement Method  other (see comments)     RDA Method (mL)  1200     Romero Method (over 20 kg)  3323.46         Nutrition Prescription Ordered     Row Name 08/01/19 1607          Nutrition Prescription PO    Current PO Diet  NPO         Evaluation of Received Nutrient/Fluid Intake     Row Name 08/01/19 1603          Calculation Measurements    Weight Used For Calculations  91.2 kg (201 lb)         Evaluation of Prescribed Nutrient/Fluid Intake     Row Name 08/01/19 1603          Calculation Measurements    Weight Used For Calculations  91.2 kg (201 lb)             Electronically signed by:  Jackie Bartholomew RD  08/01/19 4:11 PM

## 2019-08-01 NOTE — PLAN OF CARE
Problem: NPPV/CPAP (Adult)  Intervention: Optimize Tolerance of Noninvasive Ventilation   08/01/19 0518   Respiratory Interventions   Airway/Ventilation Management airway patency maintained     Intervention: Prevent/Minimize Device Friction/Shearing and Pressure Points   08/01/19 0518   Respiratory Interventions   NPPV/CPAP Maintenance mask adjusted;mask secure       Goal: Signs and Symptoms of Listed Potential Problems Will be Absent, Minimized or Managed (NPPV/CPAP)  Outcome: Ongoing (interventions implemented as appropriate)   08/01/19 0518   Goal/Outcome Evaluation   Problems Assessed (NPPV/CPAP) all   Problems Present (NPPV/CPAP) none

## 2019-08-01 NOTE — PROGRESS NOTES
"[facility]  [address] [place] [address]. [address]  T - [phone]  F - [phone]     Progress Note          PATIENT  DEMOGRAPHICS   PATIENT NAME: Cristo Musa                      PHYSICIAN: Pascual Vilchis MD  : 1950  MRN: [id number]   LOS: 2 days    Patient Care Team:  Warren Stewart MD as PCP - General (Family Medicine)  Justyna Patel APRN as PCP - Claims Attributed  Michelle Lo MD as Resident (Family Medicine)  John Sanchez MD as Resident (Family Medicine)  Marissa Boothe MD as Resident (Family Medicine)  Lucio Anderson MD as Resident (Family Medicine)  Demarcus Oliveira MD as Resident (Family Medicine)  Subjective   SUBJECTIVE   On bipap still confused, opening eyes         Objective   OBJECTIVE   Vital Signs  Temp:  [97.9 °F (36.6 °C)-98.5 °F (36.9 °C)] 98.5 °F (36.9 °C)  Heart Rate:  [63-87] 68  Resp:  [15-26] 25  BP: (108-158)/(51-65) 117/57  FiO2 (%):  [21 %] 21 %    Flowsheet Rows      First Filed Value   Admission Height  185.4 cm (73\") Documented at 2019   Admission Weight  93.7 kg (206 lb 9.6 oz) Documented at 2019           I/O last 3 completed shifts:  In: 2425 [I.V.:642; Blood:883; IV Piggyback:900]  Out: 1024 [Other:1024]    PHYSICAL EXAM    Physical Exam   Constitutional: He is oriented to person, place, and time. He appears well-developed.   HENT:   Head: Normocephalic.   Eyes: Pupils are equal, round, and reactive to light.   Cardiovascular: Normal rate, regular rhythm and normal heart sounds.   Pulmonary/Chest: Effort normal and breath sounds normal.   Abdominal: Soft. Bowel sounds are normal.   Musculoskeletal: He exhibits no edema.   Neurological: He is alert and oriented to person, place, and time.       RESULTS   Results Review:    Results from last 7 days   Lab Units 19  0507 19  0309 19   SODIUM mmol/L 138 134* 136   POTASSIUM mmol/L 4.6 5.6* 6.2*   CHLORIDE mmol/L 97* " 91* 91*   CO2 mmol/L 24.0 21.0* 24.0   BUN mg/dL 37* 84* 80*   CREATININE mg/dL 6.22* 9.87* 10.27*   CALCIUM mg/dL 8.4* 8.6 9.0   BILIRUBIN mg/dL  --   --  0.9   ALK PHOS U/L  --   --  120*   ALT (SGPT) U/L  --   --  8   AST (SGOT) U/L  --   --  11   GLUCOSE mg/dL 186* 190* 209*       Estimated Creatinine Clearance: 11.9 mL/min (A) (by C-G formula based on SCr of 6.22 mg/dL (H)).    Results from last 7 days   Lab Units 07/30/19 2013   MAGNESIUM mg/dL 3.0*             Results from last 7 days   Lab Units 08/01/19  0507 08/01/19  0030 07/31/19  1809 07/31/19  1155 07/31/19  0309  07/30/19 2013   WBC 10*3/mm3 4.75  --   --   --  5.28  --  5.32   HEMOGLOBIN g/dL 8.7*  8.7* 9.1* 8.6* 8.4* 7.2*   < > 8.4*   PLATELETS 10*3/mm3 117*  --   --   --  116*  --  100*    < > = values in this interval not displayed.       Results from last 7 days   Lab Units 07/31/19  0309 07/30/19 2013   INR  1.20 1.12         Imaging Results (last 24 hours)     Procedure Component Value Units Date/Time    CT Angiogram Abdomen Pelvis With & Without Contrast [[id number]] Collected:  07/31/19 1030     Updated:  07/31/19 1108    Narrative:       CT angiogram abdomen, pelvis with contrast.    HISTORY: CLINICAL INDICATION: Gastrointestinal hemorrhage, GI  bleeding. Renal insufficiency on dialysis.    COMPARISON: CT April 22, 2019.       TECHNIQUE: 90 mL Isovue-370,  nonionic IV contrast. Helical  scanning with axial and coronal reformations. Computer-generated  3-D images, CT angiography including volume rendered images,  subtraction and MIP images are obtained. Soft tissue, lung, and  bone windows reviewed.    This exam was performed according to our departmental  dose-optimization program, which includes automated exposure  control, adjustment of the mA and/or kV according to patient size  and/or use of iterative reconstruction technique.    ABDOMEN CT FINDINGS: Superior mesenteric artery, celiac artery  are both patent and normal in course and  caliber. Inferior  mesenteric artery is also patent.    Renal arteries are normal and symmetrical without evidence of  renal artery stenosis. Both kidneys demonstrate cortical  thinning, atrophy. There is no evidence of any angiodysplasia in  the mesenteric vessels and no evidence of any taya extravasation  i.e. no evidence of active GI bleeding.      Impression:       CONCLUSION: Diffuse atherosclerotic plaque throughout the  mesenteric vessels but no evidence of occlusion or focal  stenosis. Superior mesenteric artery and inferior mesenteric  artery, celiac artery are patent and normal in course and  caliber. No evidence of any angiodysplasia or neovascularity. No  evidence of any taya extravasation of contrast. No  evidence of  active GI bleeding.    Electronically signed by:  Wallace Louise MD  7/31/2019 11:07 AM CDT  Workstation: [id number]           MEDICATIONS      brimonidine 1 drop Both Eyes TID   cetirizine 5 mg Oral Daily   cholecalciferol 500 Units Oral Daily   escitalopram 10 mg Oral Daily   fluticasone 2 spray Nasal Daily   gabapentin 100 mg Oral Nightly   insulin detemir 10 Units Subcutaneous Daily   insulin regular 10 Units Intravenous Once   lactulose 20 g Rectal TID   latanoprost 1 drop Both Eyes Nightly   melatonin 5.25 mg Oral Nightly   pantoprazole 40 mg Intravenous Q12H   rifaximin 550 mg Oral Q12H   sevelamer 800 mg Oral TID With Meals   sodium chloride 3 mL Intravenous Q12H          Assessment/Plan   ASSESSMENT / PLAN      Gastrointestinal hemorrhage    Diabetes mellitus (CMS/HCC)    Hypertension    Primary open angle glaucoma of left eye, moderate stage    ESRD (end stage renal disease) (CMS/HCC)    Primary insomnia    Chronic pain    COPD (chronic obstructive pulmonary disease) (CMS/HCC)    Hyperammonemia (CMS/HCC)    1.  ESRD on HD- stable hemodynamically, electrolytes normal. Hd tomorrow. Off ivf. no heparin with hd     2.  GI bleed- s/p CTA abdomen no active bleeding. S/p ddavp. He had a  recent colonoscopy which showed polyps. Colonoscopy if recurrent bleeding     3.  Hyperammonemia/metabolic encephalopathy- on lactulose and rifaximin,  ammonia level was elevated at 193 check in am     4.  Diabetes mellitus type 2     5.  Hypertension     6.  COPD- patient is on BiPAP at present     7.  Anemia- patient has received 2  unit packed red blood cells so far, epogen from tomorrow              This document has been electronically signed by Pascual Vilchis MD on August 1, 2019 9:56 AM

## 2019-08-01 NOTE — PLAN OF CARE
Problem: Patient Care Overview  Goal: Plan of Care Review  Outcome: Ongoing (interventions implemented as appropriate)   08/01/19 7740   Coping/Psychosocial   Plan of Care Reviewed With caregiver;patient   OTHER   Outcome Summary New Assessment. Pt iwth a GI bleed. He has decreased alertness and is lethargic. NH3 significantly elevated. REmains NPO at this time    Plan of Care Review   Progress no change

## 2019-08-01 NOTE — PROGRESS NOTES
SUBJECTIVE:   8/1/2019  Chief Complaint:     Subjective      Patient is 69 y.o. male who presents with blood in stool.  Patient has continued bleeding today having a bloody bowel movement.  Patient was given an enema today.  Patient's hemoglobin has remained stable.    History:  Past Medical History:   Diagnosis Date   • Anemia of chronic disease    • Cataract    • CHF (congestive heart failure) (CMS/HCC)    • Chronic pain syndrome    • CKD (chronic kidney disease)    • Clostridium difficile infection    • COPD (chronic obstructive pulmonary disease) (CMS/HCC)    • Coronary artery disease    • Depression    • Diabetes mellitus (CMS/HCC)    • Dialysis patient (CMS/HCC)    • GERD (gastroesophageal reflux disease)    • Glaucoma    • H/O cardiac pacemaker     patient states he got his pacemaker removed in [place]   • Heart block    • Hepatitis C    • History of transfusion    • Hypertension    • Nephropathy, diabetic (CMS/HCC)    • Pacemaker    • Pulmonary embolism (CMS/HCC)      Past Surgical History:   Procedure Laterality Date   • ABDOMINAL SURGERY     • ARTERIOVENOUS FISTULA/SHUNT SURGERY Right     forearm loop   • ARTERIOVENOUS FISTULA/SHUNT SURGERY Left     removed past upper arm   • ARTERIOVENOUS FISTULA/SHUNT SURGERY Right 3/13/2018    Procedure: revision RIGHT forearm ARTERIOVENOUS graft (excision), debridement, wound vac;  Surgeon: Jerson Singh MD;  Location: [facility];  Service: Vascular   • ARTERIOVENOUS FISTULA/SHUNT SURGERY W/ HEMODIALYSIS CATHETER INSERTION Right 4/4/2016    Procedure: RIGHT ARM AV FISTULA DECLOT REVISION REPAIR BRACHIAL ANASTOMOTIC PSUEDOANEURYSM LEFT FEMORAL RICHARDSON FISTULOGRAM WITH ANGIOPLASTY;  Surgeon: Jerson Carpenter MD;  Location: [facility] OR 18/19;  Service:    • CATARACT EXTRACTION W/ INTRAOCULAR LENS IMPLANT Left 3/31/2017    Procedure: REMOVE CATARACT AND IMPLANT INTRAOCULAR LENS LEFT EYE;  Surgeon: Hilton Montemayor MD;  Location: [facility] OR;   Service:    • COLONOSCOPY N/A 3/12/2019    Procedure: COLONOSCOPY;  Surgeon: Flako Montoya MD;  Location: [facility] ENDOSCOPY;  Service: Gastroenterology   • ENDOSCOPY N/A 3/12/2019    Procedure: ESOPHAGOGASTRODUODENOSCOPY;  Surgeon: Flako Montoya MD;  Location: [facility] ENDOSCOPY;  Service: Gastroenterology   • EYE SURGERY     • HERNIA REPAIR     • IMPLANTABLE CARDIOVERTER DEFIBRILLATOR LEAD REPLACEMENT/POCKET REVISION Right 4/11/2016    Procedure: PACEMAKER LEADS X 3 AND BATTERY EXTRACTION WITH EXIMER LASER;  Surgeon: Vern Reeves MD;  Location: [facility] OR 18/19;  Service:    • INSERTION HEMODIALYSIS CATHETER Right 05/2015    jugular   • INTERVENTIONAL RADIOLOGY PROCEDURE Right 6/1/2017    Procedure: RIGHT dialysis fistulagram & angioplasty;  Surgeon: Jerson Singh MD;  Location: [facility] ANGIO INVASIVE LOCATION;  Service:    • INTERVENTIONAL RADIOLOGY PROCEDURE Right 8/24/2017    Procedure: IR dialysis fistulagram;  Surgeon: Jerson Singh MD;  Location: [facility] ANGIO INVASIVE LOCATION;  Service:    • INTERVENTIONAL RADIOLOGY PROCEDURE Right 11/13/2018    Procedure: IR dialysis fistulagram;  Surgeon: Jerson Singh MD;  Location: [facility] ANGIO INVASIVE LOCATION;  Service: Interventional Radiology   • PACEMAKER IMPLANTATION  2008    dual chamber [employer]   • REMOVAL HEMODIALYSIS CATHETER Right 05/2015    femoral vein     Family History   Problem Relation Age of Onset   • COPD Sister    • Diabetes Brother    • Early death Brother    • Hyperlipidemia Brother    • Hypertension Brother    • Coronary artery disease Mother    • Diabetes Mother    • Hypertension Mother    • Stroke Other    • Other Other         Respiratory disorder     Social History     Tobacco Use   • Smoking status: Former Smoker     Types: Cigarettes   • Smokeless tobacco: Never Used   • Tobacco comment: quit 20 years ago    Substance Use Topics   • Alcohol use: No     Comment: former heavy use in  his 50's, up to a fifth of liquor a day, currently no alcohol   • Drug use: No     Medications Prior to Admission   Medication Sig Dispense Refill Last Dose   • aspirin 81 MG chewable tablet Chew 81 mg Daily.   7/30/2019 at 0900   • brimonidine (ALPHAGAN P) 0.1 % solution ophthalmic solution Administer  to both eyes 3 (Three) Times a Day.   7/30/2019 at 1500   • Cholecalciferol (VITAMIN D3) 2000 units chewable tablet Chew 2,000 Units Daily.   Patient Taking Differently at Unknown time   • dorzolamide (TRUSOPT) 2 % ophthalmic solution Administer 1 drop into the left eye 3 (Three) Times a Day. 1 each 12 7/30/2019 at 1500   • escitalopram (LEXAPRO) 10 MG tablet Take 1 tablet by mouth Daily. 30 tablet 3 7/30/2019 at 0900   • famotidine (PEPCID) 20 MG tablet Take 20 mg by mouth every night at bedtime.   7/29/2019 at 2100   • fluticasone (FLONASE) 50 MCG/ACT nasal spray 2 sprays into each nostril daily. Administer 2 sprays in each nostril for each dose.   7/30/2019 at 0900   • gabapentin (NEURONTIN) 100 MG capsule Take 1 capsule by mouth Every Night. 30 capsule 5 7/29/2019 at 2100   • insulin aspart (NovoLOG) 100 UNIT/ML injection Inject  under the skin 3 (Three) Times a Day Before Meals. Sliding scale:  = 0 units; 150-200 = 3 units; 200-250 = 6 units; 250-300 = 9 units; 300-350 = 12 units; >350 = 15 units and call MD   7/30/2019 at 1600   • insulin detemir (LEVEMIR) 100 UNIT/ML injection Inject 25 Units under the skin into the appropriate area as directed Daily.   7/30/2019 at 0600   • lactulose (CHRONULAC) 10 GM/15ML solution Take 30 mL by mouth 3 (Three) Times a Day. (Patient taking differently: Take 30 mL by mouth 3 (Three) Times a Day.) 946 mL 3 7/30/2019 at 1500   • latanoprost (XALATAN) 0.005 % ophthalmic solution Administer 1 drop to both eyes every night.   7/29/2019 at 2100   • Loratadine 10 MG capsule Take 10 mg by mouth Every Night.   7/29/2019 at 2100   • melatonin 5 MG tablet tablet Take 5 mg by mouth  Every Night.   7/29/2019 at 2100   • rifaximin (XIFAXAN) 550 MG tablet Take 1 tablet by mouth Every 12 (Twelve) Hours. 60 tablet 5 7/30/2019 at 0900   • sevelamer (RENVELA) 800 MG tablet Take 800 mg by mouth 3 (Three) Times a Day With Meals.   7/30/2019 at 1600   • traZODone (DESYREL) 50 MG tablet Take 50 mg by mouth every night at bedtime.   7/29/2019 at 2100   • acetaminophen (TYLENOL) 500 MG tablet Take 500 mg by mouth Every 4 (Four) Hours As Needed for Mild Pain  or Fever.   More than a month at Unknown time   • benzonatate (TESSALON) 100 MG capsule Take 100 mg by mouth 3 (Three) Times a Day As Needed for Cough.   More than a month at Unknown time   • cyclobenzaprine (FLEXERIL) 5 MG tablet Take 5 mg by mouth Every 8 (Eight) Hours As Needed for Muscle Spasms.   More than a month at Unknown time   • guaiFENesin (HUMIBID 3) 400 MG tablet Take 400 mg by mouth Every 4 (Four) Hours As Needed for Cough.   More than a month at Unknown time   • ondansetron (ZOFRAN) 4 MG tablet Take 4 mg by mouth Every 6 (Six) Hours As Needed for Nausea or Vomiting.   More than a month at Unknown time   • ondansetron ODT (ZOFRAN-ODT) 4 MG disintegrating tablet Take 1 tablet by mouth Every 6 (Six) Hours As Needed for Nausea or Vomiting. 10 tablet 0 More than a month at Unknown time   • polyethylene glycol (MIRALAX) packet Take 17 g by mouth Daily As Needed (constipation).   More than a month at Unknown time     Allergies:  Lisinopril and Motrin [ibuprofen]     CURRENT MEDICATIONS/OBJECTIVE/VS/PE:     Current Medications:     Current Facility-Administered Medications   Medication Dose Route Frequency Provider Last Rate Last Dose   • 8-hydroxyquinoline sulfate (BAG BALM) ointment   Apply externally TID PRN César Haider MD       • acetaminophen (TYLENOL) tablet 500 mg  500 mg Oral Q4H PRN Marylou Eugene MD       • albumin human 25 % IV SOLN 12.5 g  12.5 g Intravenous PRN Pascual Vilchis MD       • albuterol (PROVENTIL) nebulizer  solution 0.5% 2.5 mg/0.5mL  10 mg Nebulization Q6H PRN Salvador Elmore MD       • benzonatate (TESSALON) capsule 100 mg  100 mg Oral TID PRN Marylou Eugene MD       • brimonidine (ALPHAGAN) 0.15 % ophthalmic solution 1 drop  1 drop Both Eyes TID Marylou Eugene MD   1 drop at 08/01/19 1558   • cetirizine (zyrTEC) tablet 5 mg  5 mg Oral Daily Marylou Eugene MD       • cholecalciferol (VITAMIN D3) tablet 500 Units  500 Units Oral Daily Marylou Eugene MD       • cyclobenzaprine (FLEXERIL) tablet 5 mg  5 mg Oral Q8H PRN Marylou Eugene MD       • dextrose (D50W) 25 g/ 50mL Intravenous Solution 50 mL  50 mL Intravenous Q1H PRN Salvador Elmore MD       • [START ON 8/2/2019] epoetin ziggy (EPOGEN,PROCRIT) injection 10,000 Units  10,000 Units Intravenous Once per day on Mon Wed Fri Pascual Vilchis MD       • escitalopram (LEXAPRO) tablet 10 mg  10 mg Oral Daily Marylou Eugene MD       • fluticasone (FLONASE) 50 MCG/ACT nasal spray 2 spray  2 spray Nasal Daily Marylou Eugene MD       • gabapentin (NEURONTIN) capsule 100 mg  100 mg Oral Nightly Marylou Eugene MD       • insulin detemir (LEVEMIR) injection 10 Units  10 Units Subcutaneous Daily Singh Sommer MD   10 Units at 08/01/19 1025   • insulin regular (humuLIN R,novoLIN R) injection 10 Units  10 Units Intravenous Once Salvador Elmore MD   Stopped at 07/30/19 2227   • lactulose (CHRONULAC) 10 GM/15ML solution 20 g  20 g Rectal TID Mark Humphreys MD   20 g at 08/01/19 1553   • latanoprost (XALATAN) 0.005 % ophthalmic solution 1 drop  1 drop Both Eyes Nightly Marylou Eugene MD   1 drop at 07/31/19 2130   • melatonin tablet 5.25 mg  5.25 mg Oral Nightly Marylou Eugene MD   5.25 mg at 07/31/19 0035   • ondansetron (ZOFRAN) injection 4 mg  4 mg Intravenous Q6H PRN Marylou Eugene MD       • pantoprazole (PROTONIX) injection 40 mg  40 mg Intravenous Q12H Marylou Eugene MD    40 mg at 08/01/19 1035   • rifaximin (XIFAXAN) tablet 550 mg  550 mg Oral Q12H Marylou Eugene MD       • sevelamer (RENVELA) tablet 800 mg  800 mg Oral TID With Meals Marylou Eugene MD       • sodium chloride 0.9 % flush 10 mL  10 mL Intravenous PRN Salvador Elmore MD   10 mL at 07/30/19 2013   • sodium chloride 0.9 % flush 3 mL  3 mL Intravenous Q12H Marylou Eugene MD   3 mL at 07/31/19 2142   • sodium chloride 0.9 % flush 3-10 mL  3-10 mL Intravenous PRN Marylou Eugene MD       • traZODone (DESYREL) tablet 50 mg  50 mg Oral Nightly PRN Marylou Eugene MD           Objective     Review of Systems:   Review of Systems    Physical Exam:   Temp:  [97.8 °F (36.6 °C)-98.5 °F (36.9 °C)] 97.9 °F (36.6 °C)  Heart Rate:  [60-80] 63  Resp:  [16-25] 20  BP: ()/(52-81) 126/58  FiO2 (%):  [21 %] 21 %     Physical Exam:  General Appearance:    Alert, cooperative, in no acute distress   Head:    Normocephalic, without obvious abnormality, atraumatic   Eyes:            Lids and lashes normal, conjunctivae and sclerae normal, no   icterus, no pallor, corneas clear, PERRLA   Ears:    Ears appear intact with no abnormalities noted   Throat:   No oral lesions, no thrush, oral mucosa moist   Neck:   No adenopathy, supple, trachea midline, no thyromegaly, no     carotid bruit, no JVD   Back:     No kyphosis present, no scoliosis present, no skin lesions,       erythema or scars, no tenderness to percussion or                   palpation,   range of motion normal   Lungs:     Clear to auscultation,respirations regular, even and                   unlabored    Heart:    Regular rhythm and normal rate, normal S1 and S2, no            murmur, no gallop, no rub, no click   Breast Exam:    Deferred   Abdomen:     Normal bowel sounds, no masses, no organomegaly, soft        non-tender, non-distended, no guarding, no rebound                 tenderness   Genitalia:    Deferred   Extremities:   Moves  all extremities well, no edema, no cyanosis, no              redness   Pulses:   Pulses palpable and equal bilaterally   Skin:   No bleeding, bruising or rash   Lymph nodes:   No palpable adenopathy   Neurologic:   Cranial nerves 2 - 12 grossly intact, sensation intact, DTR        present and equal bilaterally      Results Review:     Lab Results (last 24 hours)     Procedure Component Value Units Date/Time    Ammonia [[id number]]  (Abnormal) Collected:  08/01/19 1316    Specimen:  Blood Updated:  08/01/19 1343     Ammonia 81 umol/L     Hemoglobin & Hematocrit, Blood [[id number]]  (Abnormal) Collected:  08/01/19 1316    Specimen:  Blood Updated:  08/01/19 1328     Hemoglobin 8.4 g/dL      Hematocrit 24.3 %     Blood Gas, Arterial [[id number]]  (Abnormal) Collected:  08/01/19 0922    Specimen:  Arterial Blood Updated:  08/01/19 0932     Site Left Radial     Inderjit's Test N/A     pH, Arterial 7.509 pH units      Comment: 83 Value above reference range        pCO2, Arterial 34.1 mm Hg      Comment: 84 Value below reference range        pO2, Arterial 85.0 mm Hg      HCO3, Arterial 27.1 mmol/L      Comment: 83 Value above reference range        Base Excess, Arterial 4.0 mmol/L      Comment: 83 Value above reference range        O2 Saturation, Arterial 97.0 %      Barometric Pressure for Blood Gas 750 mmHg      Modality Room Air     FIO2 21 %      Ventilator Mode BiPAP     Set Mech Resp Rate 18.0     PIP 13 cmH2O      Comment: Meter: [id number]     :  [id number]        IPAP 12     EPAP 5     Collected by MORENO MOURA    Basic Metabolic Panel [[id number]]  (Abnormal) Collected:  08/01/19 0507    Specimen:  Blood Updated:  08/01/19 0533     Glucose 186 mg/dL      BUN 37 mg/dL      Creatinine 6.22 mg/dL      Sodium 138 mmol/L      Potassium 4.6 mmol/L      Chloride 97 mmol/L      CO2 24.0 mmol/L      Calcium 8.4 mg/dL      eGFR  African Amer 11 mL/min/1.73      Comment: <15 Indicative of kidney failure.        eGFR Non   Amer -- mL/min/1.73      Comment: <15 Indicative of kidney failure.        BUN/Creatinine Ratio 5.9     Anion Gap 17.0 mmol/L     Narrative:       GFR Normal >60  Chronic Kidney Disease <60  Kidney Failure <15    CBC & Differential [[id number]] Collected:  08/01/19 0507    Specimen:  Blood Updated:  08/01/19 0524    Narrative:       The following orders were created for panel order CBC & Differential.  Procedure                               Abnormality         Status                     ---------                               -----------         ------                     CBC Auto Differential[[id number]]        Abnormal            Final result                 Please view results for these tests on the individual orders.    CBC Auto Differential [[id number]]  (Abnormal) Collected:  08/01/19 0507    Specimen:  Blood Updated:  08/01/19 0524     WBC 4.75 10*3/mm3      RBC 2.96 10*6/mm3      Hemoglobin 8.7 g/dL      Hematocrit 24.6 %      MCV 83.1 fL      MCH 29.4 pg      MCHC 35.4 g/dL      RDW 15.3 %      RDW-SD 45.3 fl      MPV 9.4 fL      Platelets 117 10*3/mm3      Neutrophil % 67.5 %      Lymphocyte % 16.6 %      Monocyte % 14.5 %      Eosinophil % 0.2 %      Basophil % 0.8 %      Immature Grans % 0.4 %      Neutrophils, Absolute 3.20 10*3/mm3      Lymphocytes, Absolute 0.79 10*3/mm3      Monocytes, Absolute 0.69 10*3/mm3      Eosinophils, Absolute 0.01 10*3/mm3      Basophils, Absolute 0.04 10*3/mm3      Immature Grans, Absolute 0.02 10*3/mm3      nRBC 0.0 /100 WBC     Hemoglobin [[id number]]  (Abnormal) Collected:  08/01/19 0507    Specimen:  Blood Updated:  08/01/19 0523     Hemoglobin 8.7 g/dL     Hemoglobin [[id number]]  (Abnormal) Collected:  08/01/19 0030    Specimen:  Blood Updated:  08/01/19 0042     Hemoglobin 9.1 g/dL     Hemoglobin [[id number]]  (Abnormal) Collected:  07/31/19 1809    Specimen:  Blood Updated:  07/31/19 1902     Hemoglobin 8.6 g/dL            I reviewed the patient's new clinical  results.  I reviewed the patient's new imaging results and agree with the interpretation.     ASSESSMENT/PLAN:   ASSESSMENT: Patient with blood in the stool will attempt to do a flexible sigmoidoscopy to see the source of bleeding.    PLAN: #1 we will schedule patient for flexible sigmoidoscopy to be done tomorrow.  #2 continue to monitor patient's hemoglobin if hemoglobin less than 7 please transfuse 2 units packed RBCs.  The risks, benefits, and alternatives of this procedure have been discussed with the patient or the responsible party- the patient understands and agrees to proceed.         Flako Montoya MD  08/01/19  6:18 PM           This document has been electronically signed by Flako Montoya MD on August 1, 2019 6:18 PM

## 2019-08-02 NOTE — PROGRESS NOTES
"[facility]  [address] [place] [address]. [address]  T - [phone]  F - [phone]     Progress Note          PATIENT  DEMOGRAPHICS   PATIENT NAME: Cristo Musa                      PHYSICIAN: Pascual Vilchis MD  : 1950  MRN: [id number]   LOS: 3 days    Patient Care Team:  Warren Stewart MD as PCP - General (Family Medicine)  Justyna Patel APRN as PCP - Claims Attributed  Michelle Lo MD as Resident (Family Medicine)  John Sanchez MD as Resident (Family Medicine)  Marissa oBothe MD as Resident (Family Medicine)  Lucio Anderson MD as Resident (Family Medicine)  Demarcus Oliveira MD as Resident (Family Medicine)  Subjective   SUBJECTIVE   More awake no dyspnea, agitated         Objective   OBJECTIVE   Vital Signs  Temp:  [97.8 °F (36.6 °C)-98.5 °F (36.9 °C)] 98.4 °F (36.9 °C)  Heart Rate:  [60-76] 62  Resp:  [16-23] 23  BP: ()/(48-90) 127/60    Flowsheet Rows      First Filed Value   Admission Height  185.4 cm (73\") Documented at 2019   Admission Weight  93.7 kg (206 lb 9.6 oz) Documented at 2019           No intake/output data recorded.    PHYSICAL EXAM    Physical Exam   Constitutional: He is oriented to person, place, and time. He appears well-developed.   HENT:   Head: Normocephalic.   Eyes: Pupils are equal, round, and reactive to light.   Cardiovascular: Normal rate, regular rhythm and normal heart sounds.   Pulmonary/Chest: Effort normal and breath sounds normal.   Abdominal: Soft. Bowel sounds are normal.   Musculoskeletal: He exhibits no edema.   Right arm AVF good thrill    Neurological: He is alert and oriented to person, place, and time.       RESULTS   Results Review:    Results from last 7 days   Lab Units 19  0414 19  0507 19  0309 19   SODIUM mmol/L 136 138 134* 136   POTASSIUM mmol/L 4.0 4.6 5.6* 6.2*   CHLORIDE mmol/L 94* 97* 91* 91*   CO2 mmol/L 23.0 24.0 21.0* 24.0   BUN " mg/dL 45* 37* 84* 80*   CREATININE mg/dL 7.64* 6.22* 9.87* 10.27*   CALCIUM mg/dL 8.3* 8.4* 8.6 9.0   BILIRUBIN mg/dL  --   --   --  0.9   ALK PHOS U/L  --   --   --  120*   ALT (SGPT) U/L  --   --   --  8   AST (SGOT) U/L  --   --   --  11   GLUCOSE mg/dL 79 186* 190* 209*       Estimated Creatinine Clearance: 10.2 mL/min (A) (by C-G formula based on SCr of 7.64 mg/dL (H)).    Results from last 7 days   Lab Units 08/02/19 0414 07/30/19 2013   MAGNESIUM mg/dL  --  3.0*   PHOSPHORUS mg/dL 5.6*  --              Results from last 7 days   Lab Units 08/02/19 0414 08/01/19  1316 08/01/19  0507 08/01/19  0030 07/31/19  1809  07/31/19 0309 07/30/19 2013   WBC 10*3/mm3 4.87  --  4.75  --   --   --  5.28  --  5.32   HEMOGLOBIN g/dL 7.9* 8.4* 8.7*  8.7* 9.1* 8.6*   < > 7.2*   < > 8.4*   PLATELETS 10*3/mm3 117*  --  117*  --   --   --  116*  --  100*    < > = values in this interval not displayed.       Results from last 7 days   Lab Units 07/31/19 0309 07/30/19 2013   INR  1.20 1.12         Imaging Results (last 24 hours)     ** No results found for the last 24 hours. **           MEDICATIONS      brimonidine 1 drop Both Eyes TID   cetirizine 5 mg Oral Daily   cholecalciferol 500 Units Oral Daily   epoetin ziggy/ziggy-epbx 10,000 Units Intravenous Once per day on Mon Wed Fri   escitalopram 10 mg Oral Daily   fluticasone 2 spray Nasal Daily   gabapentin 100 mg Oral Nightly   insulin detemir 10 Units Subcutaneous Daily   insulin regular 10 Units Intravenous Once   lactulose 20 g Rectal TID   latanoprost 1 drop Both Eyes Nightly   melatonin 5.25 mg Oral Nightly   pantoprazole 40 mg Intravenous Q12H   rifaximin 550 mg Oral Q12H   sevelamer 800 mg Oral TID With Meals   sodium chloride 3 mL Intravenous Q12H          Assessment/Plan   ASSESSMENT / PLAN      Gastrointestinal hemorrhage    Diabetes mellitus (CMS/HCC)    Hypertension    Primary open angle glaucoma of left eye, moderate stage    ESRD (end stage renal disease)  (CMS/HCC)    Primary insomnia    Chronic pain    COPD (chronic obstructive pulmonary disease) (CMS/HCC)    Hyperammonemia (CMS/HCC)    1.  ESRD on HD- stable hemodynamically, electrolytes normal. Hd today     2.  GI bleed- s/p CTA abdomen no active bleeding. S/p ddavp. He had a recent colonoscopy which showed polyps. Flexible sigmoidoscopy today     3.  Hyperammonemia/metabolic encephalopathy- on lactulose and rifaximin,  ammonia level was elevated at 193 now 38.     4.  Diabetes mellitus type 2     5.  Hypertension     6.  COPD- pt was on bipap before     7.  Anemia- patient has received 2  unit packed red blood cells so far, epogen from tomorrow, hgb again dropping              This document has been electronically signed by Pascual Vilchis MD on August 2, 2019 9:53 AM

## 2019-08-02 NOTE — PROGRESS NOTES
SUBJECTIVE:   8/2/2019  Chief Complaint:     Subjective      Patient is 69 y.o. male who has had lower GI bleeding.  Patient underwent colonoscopy which shows bloody stools throughout the colon.  No active bleeding seen or site of bleeding seen.    History:  Past Medical History:   Diagnosis Date   • Anemia of chronic disease    • Cataract    • CHF (congestive heart failure) (CMS/HCC)    • Chronic pain syndrome    • CKD (chronic kidney disease)    • Clostridium difficile infection    • COPD (chronic obstructive pulmonary disease) (CMS/HCC)    • Coronary artery disease    • Depression    • Diabetes mellitus (CMS/HCC)    • Dialysis patient (CMS/HCC)    • GERD (gastroesophageal reflux disease)    • Glaucoma    • H/O cardiac pacemaker     patient states he got his pacemaker removed in [place]   • Heart block    • Hepatitis C    • History of transfusion    • Hypertension    • Nephropathy, diabetic (CMS/HCC)    • Pacemaker    • Pulmonary embolism (CMS/HCC)      Past Surgical History:   Procedure Laterality Date   • ABDOMINAL SURGERY     • ARTERIOVENOUS FISTULA/SHUNT SURGERY Right     forearm loop   • ARTERIOVENOUS FISTULA/SHUNT SURGERY Left     removed past upper arm   • ARTERIOVENOUS FISTULA/SHUNT SURGERY Right 3/13/2018    Procedure: revision RIGHT forearm ARTERIOVENOUS graft (excision), debridement, wound vac;  Surgeon: Jerson Singh MD;  Location: [facility];  Service: Vascular   • ARTERIOVENOUS FISTULA/SHUNT SURGERY W/ HEMODIALYSIS CATHETER INSERTION Right 4/4/2016    Procedure: RIGHT ARM AV FISTULA DECLOT REVISION REPAIR BRACHIAL ANASTOMOTIC PSUEDOANEURYSM LEFT FEMORAL RICHARDSON FISTULOGRAM WITH ANGIOPLASTY;  Surgeon: Jerson Carpenter MD;  Location: [facility] OR 18/19;  Service:    • CATARACT EXTRACTION W/ INTRAOCULAR LENS IMPLANT Left 3/31/2017    Procedure: REMOVE CATARACT AND IMPLANT INTRAOCULAR LENS LEFT EYE;  Surgeon: Hilton Montemayor MD;  Location: [facility] OR;  Service:    •  COLONOSCOPY N/A 3/12/2019    Procedure: COLONOSCOPY;  Surgeon: Flako Montoya MD;  Location: [facility] ENDOSCOPY;  Service: Gastroenterology   • ENDOSCOPY N/A 3/12/2019    Procedure: ESOPHAGOGASTRODUODENOSCOPY;  Surgeon: Flako Montoya MD;  Location: [facility] ENDOSCOPY;  Service: Gastroenterology   • EYE SURGERY     • HERNIA REPAIR     • IMPLANTABLE CARDIOVERTER DEFIBRILLATOR LEAD REPLACEMENT/POCKET REVISION Right 4/11/2016    Procedure: PACEMAKER LEADS X 3 AND BATTERY EXTRACTION WITH EXIMER LASER;  Surgeon: Vern Reeves MD;  Location: [facility] OR 18/19;  Service:    • INSERTION HEMODIALYSIS CATHETER Right 05/2015    jugular   • INTERVENTIONAL RADIOLOGY PROCEDURE Right 6/1/2017    Procedure: RIGHT dialysis fistulagram & angioplasty;  Surgeon: Jerson Singh MD;  Location: [facility] ANGIO INVASIVE LOCATION;  Service:    • INTERVENTIONAL RADIOLOGY PROCEDURE Right 8/24/2017    Procedure: IR dialysis fistulagram;  Surgeon: Jerson Singh MD;  Location: [facility] ANGIO INVASIVE LOCATION;  Service:    • INTERVENTIONAL RADIOLOGY PROCEDURE Right 11/13/2018    Procedure: IR dialysis fistulagram;  Surgeon: Jerson Singh MD;  Location: [facility] ANGIO INVASIVE LOCATION;  Service: Interventional Radiology   • PACEMAKER IMPLANTATION  2008    dual chamber [employer] [place]   • REMOVAL HEMODIALYSIS CATHETER Right 05/2015    femoral vein     Family History   Problem Relation Age of Onset   • COPD Sister    • Diabetes Brother    • Early death Brother    • Hyperlipidemia Brother    • Hypertension Brother    • Coronary artery disease Mother    • Diabetes Mother    • Hypertension Mother    • Stroke Other    • Other Other         Respiratory disorder     Social History     Tobacco Use   • Smoking status: Former Smoker     Types: Cigarettes   • Smokeless tobacco: Never Used   • Tobacco comment: quit 20 years ago    Substance Use Topics   • Alcohol use: No     Comment: former heavy use in his 50's, up to  a fifth of liquor a day, currently no alcohol   • Drug use: No     Medications Prior to Admission   Medication Sig Dispense Refill Last Dose   • aspirin 81 MG chewable tablet Chew 81 mg Daily.   7/30/2019 at 0900   • brimonidine (ALPHAGAN P) 0.1 % solution ophthalmic solution Administer  to both eyes 3 (Three) Times a Day.   7/30/2019 at 1500   • Cholecalciferol (VITAMIN D3) 2000 units chewable tablet Chew 2,000 Units Daily.   Patient Taking Differently at Unknown time   • dorzolamide (TRUSOPT) 2 % ophthalmic solution Administer 1 drop into the left eye 3 (Three) Times a Day. 1 each 12 7/30/2019 at 1500   • escitalopram (LEXAPRO) 10 MG tablet Take 1 tablet by mouth Daily. 30 tablet 3 7/30/2019 at 0900   • famotidine (PEPCID) 20 MG tablet Take 20 mg by mouth every night at bedtime.   7/29/2019 at 2100   • fluticasone (FLONASE) 50 MCG/ACT nasal spray 2 sprays into each nostril daily. Administer 2 sprays in each nostril for each dose.   7/30/2019 at 0900   • gabapentin (NEURONTIN) 100 MG capsule Take 1 capsule by mouth Every Night. 30 capsule 5 7/29/2019 at 2100   • insulin aspart (NovoLOG) 100 UNIT/ML injection Inject  under the skin 3 (Three) Times a Day Before Meals. Sliding scale:  = 0 units; 150-200 = 3 units; 200-250 = 6 units; 250-300 = 9 units; 300-350 = 12 units; >350 = 15 units and call MD   7/30/2019 at 1600   • insulin detemir (LEVEMIR) 100 UNIT/ML injection Inject 25 Units under the skin into the appropriate area as directed Daily.   7/30/2019 at 0600   • lactulose (CHRONULAC) 10 GM/15ML solution Take 30 mL by mouth 3 (Three) Times a Day. (Patient taking differently: Take 30 mL by mouth 3 (Three) Times a Day.) 946 mL 3 7/30/2019 at 1500   • latanoprost (XALATAN) 0.005 % ophthalmic solution Administer 1 drop to both eyes every night.   7/29/2019 at 2100   • Loratadine 10 MG capsule Take 10 mg by mouth Every Night.   7/29/2019 at 2100   • melatonin 5 MG tablet tablet Take 5 mg by mouth Every Night.    7/29/2019 at 2100   • rifaximin (XIFAXAN) 550 MG tablet Take 1 tablet by mouth Every 12 (Twelve) Hours. 60 tablet 5 7/30/2019 at 0900   • sevelamer (RENVELA) 800 MG tablet Take 800 mg by mouth 3 (Three) Times a Day With Meals.   7/30/2019 at 1600   • traZODone (DESYREL) 50 MG tablet Take 50 mg by mouth every night at bedtime.   7/29/2019 at 2100   • acetaminophen (TYLENOL) 500 MG tablet Take 500 mg by mouth Every 4 (Four) Hours As Needed for Mild Pain  or Fever.   More than a month at Unknown time   • benzonatate (TESSALON) 100 MG capsule Take 100 mg by mouth 3 (Three) Times a Day As Needed for Cough.   More than a month at Unknown time   • cyclobenzaprine (FLEXERIL) 5 MG tablet Take 5 mg by mouth Every 8 (Eight) Hours As Needed for Muscle Spasms.   More than a month at Unknown time   • guaiFENesin (HUMIBID 3) 400 MG tablet Take 400 mg by mouth Every 4 (Four) Hours As Needed for Cough.   More than a month at Unknown time   • ondansetron (ZOFRAN) 4 MG tablet Take 4 mg by mouth Every 6 (Six) Hours As Needed for Nausea or Vomiting.   More than a month at Unknown time   • ondansetron ODT (ZOFRAN-ODT) 4 MG disintegrating tablet Take 1 tablet by mouth Every 6 (Six) Hours As Needed for Nausea or Vomiting. 10 tablet 0 More than a month at Unknown time   • polyethylene glycol (MIRALAX) packet Take 17 g by mouth Daily As Needed (constipation).   More than a month at Unknown time     Allergies:  Lisinopril and Motrin [ibuprofen]     CURRENT MEDICATIONS/OBJECTIVE/VS/PE:     Current Medications:     Current Facility-Administered Medications   Medication Dose Route Frequency Provider Last Rate Last Dose   • 8-hydroxyquinoline sulfate (BAG BALM) ointment   Apply externally TID PRN César Haider MD       • acetaminophen (TYLENOL) tablet 500 mg  500 mg Oral Q4H PRN Marylou Eugene MD       • albumin human 25 % IV SOLN 12.5 g  12.5 g Intravenous PRN Pascual Vilchis MD       • albuterol (PROVENTIL) nebulizer solution 0.5%  2.5 mg/0.5mL  10 mg Nebulization Q6H PRN Salvador Emlore MD       • benzonatate (TESSALON) capsule 100 mg  100 mg Oral TID PRN Marylou Eugene MD       • brimonidine (ALPHAGAN) 0.15 % ophthalmic solution 1 drop  1 drop Both Eyes TID Marylou Eugene MD   1 drop at 08/02/19 0904   • cetirizine (zyrTEC) tablet 5 mg  5 mg Oral Daily Marylou Eugene MD   5 mg at 08/02/19 0841   • cholecalciferol (VITAMIN D3) tablet 500 Units  500 Units Oral Daily Marylou Eugene MD   500 Units at 08/02/19 0842   • cyclobenzaprine (FLEXERIL) tablet 5 mg  5 mg Oral Q8H PRN Marylou Eugene MD   5 mg at 08/01/19 2009   • dextrose (D50W) 25 g/ 50mL Intravenous Solution 50 mL  50 mL Intravenous Q1H PRN Salvador Elmore MD       • epoetin ziggy (EPOGEN,PROCRIT) injection 10,000 Units  10,000 Units Intravenous Once per day on Mon Wed Fri Pascual Vilchis MD   10,000 Units at 08/02/19 1126   • escitalopram (LEXAPRO) tablet 10 mg  10 mg Oral Daily Marylou Eugene MD   10 mg at 08/02/19 0841   • fluticasone (FLONASE) 50 MCG/ACT nasal spray 2 spray  2 spray Nasal Daily Marylou Eugene MD   2 spray at 08/02/19 0902   • gabapentin (NEURONTIN) capsule 100 mg  100 mg Oral Nightly Marylou Eugene MD   100 mg at 08/01/19 2009   • insulin detemir (LEVEMIR) injection 10 Units  10 Units Subcutaneous Daily Singh Sommer MD   10 Units at 08/01/19 1025   • insulin regular (humuLIN R,novoLIN R) injection 10 Units  10 Units Intravenous Once Salvador Elmore MD   Stopped at 07/30/19 2227   • lactulose (CHRONULAC) 10 GM/15ML solution 20 g  20 g Rectal TID Mark Humphreys MD   20 g at 08/01/19 2010   • latanoprost (XALATAN) 0.005 % ophthalmic solution 1 drop  1 drop Both Eyes Nightly Marylou Eugene MD   1 drop at 08/01/19 2009   • melatonin tablet 5.25 mg  5.25 mg Oral Nightly Marylou Eugene MD   5.25 mg at 07/31/19 0035   • meperidine (DEMEROL) injection 12.5 mg  12.5 mg Intravenous  Q5 Min PRN Farhan Canela CRNA       • ondansetron (ZOFRAN) injection 4 mg  4 mg Intravenous Q6H PRN Marylou Eugene MD       • pantoprazole (PROTONIX) injection 40 mg  40 mg Intravenous Q12H Marylou Eugene MD   40 mg at 08/02/19 0851   • promethazine (PHENERGAN) injection 12.5 mg  12.5 mg Intravenous Once PRN Farhan Canela CRNA        Or   • promethazine (PHENERGAN) injection 12.5 mg  12.5 mg Intramuscular Once PRN Farhan Canela CRNA        Or   • promethazine (PHENERGAN) suppository 25 mg  25 mg Rectal Once PRN Farhan Canela CRNA        Or   • promethazine (PHENERGAN) tablet 25 mg  25 mg Oral Once PRN Farhan Canela CRNA       • rifaximin (XIFAXAN) tablet 550 mg  550 mg Oral Q12H Marylou Eugene MD   550 mg at 08/02/19 0841   • sevelamer (RENVELA) tablet 800 mg  800 mg Oral TID With Meals Marylou Eugene MD       • sodium chloride 0.9 % bolus 100 mL  100 mL Intravenous PRN Pascual Vilchis MD   50 mL at 08/02/19 1550   • sodium chloride 0.9 % flush 10 mL  10 mL Intravenous PRN Salvador Elmore MD   10 mL at 07/30/19 2013   • sodium chloride 0.9 % flush 3 mL  3 mL Intravenous Q12H Marylou Eugene MD   3 mL at 08/02/19 0903   • sodium chloride 0.9 % flush 3-10 mL  3-10 mL Intravenous PRN Marylou Eugene MD       • traZODone (DESYREL) tablet 50 mg  50 mg Oral Nightly PRN Marylou Eugene MD   50 mg at 08/01/19 2009       Objective     Review of Systems:   Review of Systems    Physical Exam:   Temp:  [97 °F (36.1 °C)-98.5 °F (36.9 °C)] 97 °F (36.1 °C)  Heart Rate:  [59-76] 63  Resp:  [16-32] 16  BP: ()/(48-90) 141/64     Physical Exam:  General Appearance:    Alert, cooperative, in no acute distress   Head:    Normocephalic, without obvious abnormality, atraumatic   Eyes:            Lids and lashes normal, conjunctivae and sclerae normal, no   icterus, no pallor, corneas clear, PERRLA   Ears:    Ears appear intact with no abnormalities noted   Throat:    No oral lesions, no thrush, oral mucosa moist   Neck:   No adenopathy, supple, trachea midline, no thyromegaly, no     carotid bruit, no JVD   Back:     No kyphosis present, no scoliosis present, no skin lesions,       erythema or scars, no tenderness to percussion or                   palpation,   range of motion normal   Lungs:     Clear to auscultation,respirations regular, even and                   unlabored    Heart:    Regular rhythm and normal rate, normal S1 and S2, no            murmur, no gallop, no rub, no click   Breast Exam:    Deferred   Abdomen:     Normal bowel sounds, no masses, no organomegaly, soft        non-tender, non-distended, no guarding, no rebound                 tenderness   Genitalia:    Deferred   Extremities:   Moves all extremities well, no edema, no cyanosis, no              redness   Pulses:   Pulses palpable and equal bilaterally   Skin:   No bleeding, bruising or rash   Lymph nodes:   No palpable adenopathy   Neurologic:   Cranial nerves 2 - 12 grossly intact, sensation intact, DTR        present and equal bilaterally      Results Review:     Lab Results (last 24 hours)     Procedure Component Value Units Date/Time    Basic Metabolic Panel [[id number]]  (Abnormal) Collected:  08/02/19 0414    Specimen:  Blood Updated:  08/02/19 0505     Glucose 79 mg/dL      BUN 45 mg/dL      Creatinine 7.64 mg/dL      Sodium 136 mmol/L      Potassium 4.0 mmol/L      Chloride 94 mmol/L      CO2 23.0 mmol/L      Calcium 8.3 mg/dL      eGFR  African Amer 9 mL/min/1.73      Comment: <15 Indicative of kidney failure.        eGFR Non African Amer -- mL/min/1.73      Comment: <15 Indicative of kidney failure.        BUN/Creatinine Ratio 5.9     Anion Gap 19.0 mmol/L     Narrative:       GFR Normal >60  Chronic Kidney Disease <60  Kidney Failure <15    Phosphorus [[id number]]  (Abnormal) Collected:  08/02/19 0414    Specimen:  Blood Updated:  08/02/19 0503     Phosphorus 5.6 mg/dL     CBC & Differential  [[id number]] Collected:  08/02/19 0414    Specimen:  Blood Updated:  08/02/19 0453    Narrative:       The following orders were created for panel order CBC & Differential.  Procedure                               Abnormality         Status                     ---------                               -----------         ------                     CBC Auto Differential[[id number]]        Abnormal            Final result                 Please view results for these tests on the individual orders.    CBC Auto Differential [[id number]]  (Abnormal) Collected:  08/02/19 0414    Specimen:  Blood Updated:  08/02/19 0453     WBC 4.87 10*3/mm3      RBC 2.71 10*6/mm3      Hemoglobin 7.9 g/dL      Hematocrit 23.2 %      MCV 85.6 fL      MCH 29.2 pg      MCHC 34.1 g/dL      RDW 15.1 %      RDW-SD 46.8 fl      MPV 9.1 fL      Platelets 117 10*3/mm3      Neutrophil % 59.8 %      Lymphocyte % 27.3 %      Monocyte % 9.4 %      Eosinophil % 2.7 %      Basophil % 0.4 %      Immature Grans % 0.4 %      Neutrophils, Absolute 2.91 10*3/mm3      Lymphocytes, Absolute 1.33 10*3/mm3      Monocytes, Absolute 0.46 10*3/mm3      Eosinophils, Absolute 0.13 10*3/mm3      Basophils, Absolute 0.02 10*3/mm3      Immature Grans, Absolute 0.02 10*3/mm3      nRBC 0.0 /100 WBC     Extra Tubes [[id number]] Collected:  08/01/19 2330    Specimen:  Blood, Venous Line Updated:  08/02/19 0030    Narrative:       The following orders were created for panel order Extra Tubes.  Procedure                               Abnormality         Status                     ---------                               -----------         ------                     Lavender Top[[id number]]                                     Final result               Green Top (Gel)[[id number]]                                  Final result                 Please view results for these tests on the individual orders.    Lavender Top [[id number]] Collected:  08/01/19 2330    Specimen:  Blood Updated:   08/02/19 0030     Extra Tube hold for add-on     Comment: Auto resulted       Green Top (Gel) [[id number]] Collected:  08/01/19 2330    Specimen:  Blood Updated:  08/02/19 0030     Extra Tube Hold for add-ons.     Comment: Auto resulted.       Ammonia [[id number]]  (Normal) Collected:  08/01/19 2322    Specimen:  Blood Updated:  08/01/19 2357     Ammonia 38 umol/L            I reviewed the patient's new clinical results.  I reviewed the patient's new imaging results and agree with the interpretation.     ASSESSMENT/PLAN:   ASSESSMENT: Patient with blood in the stool.  Unknown source of bleeding.  His hemoglobin appears to be remaining stable at this time.  Patient may need EGD and colonoscopy to evaluate for the source of the bleeding when patient is more stable and patient able to tolerate a prep.    PLAN: #1 continue to monitor patient's hemoglobin if hemoglobin less than 7 please transfuse 2 units packed RBCs.  #2 we will turn patient over to Dr. Azevedo for continued evaluation is I am going away for a week on vacation.  If hemoglobin drops he may consider EGD and colonoscopy with prep  The risks, benefits, and alternatives of this procedure have been discussed with the patient or the responsible party- the patient understands and agrees to proceed.         Flako Montoya MD  08/02/19  4:13 PM           This document has been electronically signed by Flako Montoya MD on August 2, 2019 4:13 PM  .j

## 2019-08-02 NOTE — PLAN OF CARE
Problem: Patient Care Overview  Goal: Interprofessional Rounds/Family Conf  Outcome: Outcome(s) achieved Date Met: 08/01/19 08/01/19 4900   Interdisciplinary Rounds/Family Conf   Summary Patient has done well on room air since D/C of Bipap. VSS through shift. Patient remains confused and in restraints. Plans to scope tomorrow, will be NPO after midnight.    Participants nursing;patient       Problem: Fall Risk (Adult)  Goal: Identify Related Risk Factors and Signs and Symptoms  Outcome: Ongoing (interventions implemented as appropriate)    Goal: Absence of Fall  Outcome: Ongoing (interventions implemented as appropriate)      Problem: Skin Injury Risk (Adult)  Goal: Identify Related Risk Factors and Signs and Symptoms  Outcome: Ongoing (interventions implemented as appropriate)    Goal: Skin Health and Integrity  Outcome: Ongoing (interventions implemented as appropriate)      Problem: Restraint, Nonbehavioral (Nonviolent)  Goal: Nonbehavioral (Nonviolent) Restraint: Absence of Injury/Harm  Outcome: Ongoing (interventions implemented as appropriate)    Goal: Nonbehavioral (Nonviolent) Restraint: Achievement of Discontinuation Criteria  Outcome: Ongoing (interventions implemented as appropriate)    Goal: Nonbehavioral (Nonviolent) Restraint: Preservation of Dignity and Wellbeing  Outcome: Ongoing (interventions implemented as appropriate)      Problem: Gastrointestinal Bleeding (Adult)  Goal: Signs and Symptoms of Listed Potential Problems Will be Absent, Minimized or Managed (Gastrointestinal Bleeding)  Outcome: Ongoing (interventions implemented as appropriate)      Problem: Pain, Chronic (Adult)  Goal: Identify Related Risk Factors and Signs and Symptoms  Outcome: Ongoing (interventions implemented as appropriate)    Goal: Acceptable Pain/Comfort Level and Functional Ability  Outcome: Ongoing (interventions implemented as appropriate)

## 2019-08-02 NOTE — ANESTHESIA PREPROCEDURE EVALUATION
Anesthesia Evaluation                  Airway   No difficulty expected  Dental      Pulmonary - normal exam   (+) pneumonia resolved , pulmonary embolism, COPD,   Cardiovascular - normal exam    (+) hypertension 2 medications or greater, past MI  >12 months, CAD, dysrhythmias Atrial Fib, CHF,       Neuro/Psych  (+) psychiatric history Depression, dementia,     GI/Hepatic/Renal/Endo    (+) obesity,  GERD poorly controlled, GI bleeding, hepatitis, liver disease, diabetes mellitus type 1 using insulin,     Musculoskeletal     Abdominal   (+) obese,    Substance History      OB/GYN          Other                        Anesthesia Plan    ASA 4     MAC     intravenous induction   Anesthetic plan, all risks, benefits, and alternatives have been provided, discussed and informed consent has been obtained with: patient.    Plan discussed with CRNA.

## 2019-08-02 NOTE — PLAN OF CARE
Problem: Patient Care Overview  Goal: Plan of Care Review  Outcome: Ongoing (interventions implemented as appropriate)   08/02/19 0246   Coping/Psychosocial   Plan of Care Reviewed With patient   OTHER   Outcome Summary pt to have scope in am, pt has been increasingly confused and agitated, new order for benadryl x1 dose ordered, pt has had 2 tap water enemas and 1 bloody BM, bilateral wrist restraints and posey vest in place, will continue to monitor   Plan of Care Review   Progress no change       Problem: Fall Risk (Adult)  Goal: Identify Related Risk Factors and Signs and Symptoms  Outcome: Ongoing (interventions implemented as appropriate)    Goal: Absence of Fall  Outcome: Ongoing (interventions implemented as appropriate)      Problem: Skin Injury Risk (Adult)  Goal: Identify Related Risk Factors and Signs and Symptoms  Outcome: Ongoing (interventions implemented as appropriate)    Goal: Skin Health and Integrity  Outcome: Ongoing (interventions implemented as appropriate)      Problem: Restraint, Nonbehavioral (Nonviolent)  Goal: Rationale and Justification  Outcome: Ongoing (interventions implemented as appropriate)    Goal: Nonbehavioral (Nonviolent) Restraint: Absence of Injury/Harm  Outcome: Ongoing (interventions implemented as appropriate)    Goal: Nonbehavioral (Nonviolent) Restraint: Achievement of Discontinuation Criteria  Outcome: Ongoing (interventions implemented as appropriate)    Goal: Nonbehavioral (Nonviolent) Restraint: Preservation of Dignity and Wellbeing  Outcome: Ongoing (interventions implemented as appropriate)      Problem: Gastrointestinal Bleeding (Adult)  Goal: Signs and Symptoms of Listed Potential Problems Will be Absent, Minimized or Managed (Gastrointestinal Bleeding)  Outcome: Ongoing (interventions implemented as appropriate)      Problem: Pain, Chronic (Adult)  Goal: Identify Related Risk Factors and Signs and Symptoms  Outcome: Ongoing (interventions implemented as  appropriate)    Goal: Acceptable Pain/Comfort Level and Functional Ability  Outcome: Ongoing (interventions implemented as appropriate)      Problem: NPPV/CPAP (Adult)  Goal: Signs and Symptoms of Listed Potential Problems Will be Absent, Minimized or Managed (NPPV/CPAP)  Outcome: Ongoing (interventions implemented as appropriate)      Problem: Chronic Obstructive Pulmonary Disease (Adult)  Goal: Signs and Symptoms of Listed Potential Problems Will be Absent, Minimized or Managed (Chronic Obstructive Pulmonary Disease)  Outcome: Ongoing (interventions implemented as appropriate)      Problem: Kidney Disease, Chronic/End Stage Renal Disease (Adult)  Goal: Signs and Symptoms of Listed Potential Problems Will be Absent, Minimized or Managed (Kidney Disease, Chronic/End Stage Renal Disease)  Outcome: Ongoing (interventions implemented as appropriate)

## 2019-08-02 NOTE — ANESTHESIA POSTPROCEDURE EVALUATION
Patient: Cristo Musa    Procedure Summary     Date:  08/02/19 Room / Location:  [facility] ENDOSCOPY 1 / [facility] ENDOSCOPY    Anesthesia Start:  1539 Anesthesia Stop:  1610    Procedure:  SIGMOIDOSCOPY FLEXIBLE (N/A ) Diagnosis:       Gastrointestinal hemorrhage, unspecified gastrointestinal hemorrhage type      (Gastrointestinal hemorrhage, unspecified gastrointestinal hemorrhage type [K92.2])    Surgeon:  Flako Montoya MD Provider:  Farhan Canela CRNA    Anesthesia Type:  MAC ASA Status:  4          Anesthesia Type: MAC  Last vitals  BP   141/64 (08/02/19 1524)   Temp   97 °F (36.1 °C) (08/02/19 1524)   Pulse   63 (08/02/19 1524)   Resp   16 (08/02/19 1524)     SpO2   100 % (08/02/19 1524)     Post Anesthesia Care and Evaluation    Patient location during evaluation: bedside  Patient participation: complete - patient participated  Level of consciousness: awake and alert  Pain score: 1  Pain management: adequate  Airway patency: patent  Anesthetic complications: No anesthetic complications  PONV Status: none  Cardiovascular status: acceptable  Respiratory status: acceptable  Hydration status: acceptable  Post Neuraxial Block status: Motor and sensory function returned to baseline

## 2019-08-02 NOTE — PLAN OF CARE
Problem: NPPV/CPAP (Adult)  Goal: Signs and Symptoms of Listed Potential Problems Will be Absent, Minimized or Managed (NPPV/CPAP)  Outcome: Ongoing (interventions implemented as appropriate)   08/02/19 0404   Goal/Outcome Evaluation   Problems Assessed (NPPV/CPAP) all   Problems Present (NPPV/CPAP) none     Pt. Did not require BiPAP and has rested comfortably on RA.  BiPAP on standby at bedside.

## 2019-08-02 NOTE — PROGRESS NOTES
FAMILY MEDICINE DAILY PROGRESS NOTE  NAME: Cristo Musa  : 1950  MRN: [id number]     LOS: 3 days     PROVIDER OF SERVICE: Warren Stewart MD    Chief Complaint: Gastrointestinal hemorrhage    Subjective:     Cristo Musa is a 69 y.o. male who presented for initial evaluation  for bloody stool x1 day. Pt was brought in from [facility]. They state he had multiple bloody bowel moments  and called in to MD and was told to present to the ER. Pt was asymptomatic at that time. Pt has remained stable overnight. Hgb has decreased from 8.4--->7.9 . Pt's ammonia level has now normalized. He is to receive a Flexible sigmoidoscopy later today and has had appropriate bowel prep. Pt found more responsive this AM, still having confusion.  No other concerns at this time.      Interval History:  History taken from: chart    Review of Systems:   Review of Systems   Unable to perform ROS: Acuity of condition       Objective:     Vital Signs  Temp:  [97.8 °F (36.6 °C)-98.5 °F (36.9 °C)] 98.4 °F (36.9 °C)  Heart Rate:  [60-76] 62  Resp:  [16-25] 23  BP: ()/(48-90) 127/60    Physical Exam  Physical Exam   Constitutional: He appears well-developed and well-nourished. No distress.   HENT:   Head: Normocephalic and atraumatic.   Right Ear: External ear normal.   Left Ear: External ear normal.   Mouth/Throat: Oropharynx is clear and moist. No oropharyngeal exudate.   Eyes: Conjunctivae and EOM are normal. Pupils are equal, round, and reactive to light. Right eye exhibits no discharge. Left eye exhibits no discharge. No scleral icterus.   Neck: Normal range of motion. No tracheal deviation present.   Cardiovascular: Normal rate, regular rhythm and normal heart sounds. Exam reveals no gallop and no friction rub.   No murmur heard.  Pulmonary/Chest: Effort normal and breath sounds normal. No stridor. No respiratory distress. He has no wheezes. He has no rales. He exhibits no tenderness.    Abdominal: Soft. Bowel sounds are normal. He exhibits no distension. There is no tenderness.   Musculoskeletal: Normal range of motion. He exhibits no edema, tenderness or deformity.   Neurological: He is alert. No sensory deficit.   Not alert to place or time     Skin: Skin is warm and dry. Capillary refill takes less than 2 seconds. No rash noted. He is not diaphoretic. No erythema. There is pallor.   Psychiatric:   Pt is drowsy and minimally responsive to questioning         Medication Review    Current Facility-Administered Medications:   •  8-hydroxyquinoline sulfate (BAG BALM) ointment, , Apply externally, TID PRN, César Haider MD  •  acetaminophen (TYLENOL) tablet 500 mg, 500 mg, Oral, Q4H PRN, Marylou Eugene MD  •  albuterol (PROVENTIL) nebulizer solution 0.5% 2.5 mg/0.5mL, 10 mg, Nebulization, Q6H PRN, Salvador Elmore MD  •  benzonatate (TESSALON) capsule 100 mg, 100 mg, Oral, TID PRN, Marylou Eugene MD  •  brimonidine (ALPHAGAN) 0.15 % ophthalmic solution 1 drop, 1 drop, Both Eyes, TID, Marylou Eugene MD, 1 drop at 08/01/19 2009  •  cetirizine (zyrTEC) tablet 5 mg, 5 mg, Oral, Daily, Marylou Eugene MD  •  cholecalciferol (VITAMIN D3) tablet 500 Units, 500 Units, Oral, Daily, Marylou Eugene MD  •  cyclobenzaprine (FLEXERIL) tablet 5 mg, 5 mg, Oral, Q8H PRN, Marylou Eugene MD, 5 mg at 08/01/19 2009  •  dextrose (D50W) 25 g/ 50mL Intravenous Solution 50 mL, 50 mL, Intravenous, Q1H PRN, Salvador Elmore MD  •  epoetin ziggy (EPOGEN,PROCRIT) injection 10,000 Units, 10,000 Units, Intravenous, Once per day on Mon Wed Fri, Pascual Vilchis MD  •  escitalopram (LEXAPRO) tablet 10 mg, 10 mg, Oral, Daily, Marylou Eugene MD  •  fluticasone (FLONASE) 50 MCG/ACT nasal spray 2 spray, 2 spray, Nasal, Daily, Marylou Eugene MD  •  gabapentin (NEURONTIN) capsule 100 mg, 100 mg, Oral, Nightly, Marylou Eugene MD, 100 mg at 08/01/19 2009  •   insulin detemir (LEVEMIR) injection 10 Units, 10 Units, Subcutaneous, Daily, Singh Sommer MD, 10 Units at 08/01/19 1025  •  insulin regular (humuLIN R,novoLIN R) injection 10 Units, 10 Units, Intravenous, Once, Salvador Elmore MD, Stopped at 07/30/19 2227  •  lactulose (CHRONULAC) 10 GM/15ML solution 20 g, 20 g, Rectal, TID, Mark Humphreys MD, 20 g at 08/01/19 2010  •  latanoprost (XALATAN) 0.005 % ophthalmic solution 1 drop, 1 drop, Both Eyes, Nightly, Marylou Eugene MD, 1 drop at 08/01/19 2009  •  melatonin tablet 5.25 mg, 5.25 mg, Oral, Nightly, Marylou Eugene MD, 5.25 mg at 07/31/19 0035  •  ondansetron (ZOFRAN) injection 4 mg, 4 mg, Intravenous, Q6H PRN, Marylou Eugene MD  •  pantoprazole (PROTONIX) injection 40 mg, 40 mg, Intravenous, Q12H, Marylou Eguene MD, 40 mg at 08/01/19 2009  •  rifaximin (XIFAXAN) tablet 550 mg, 550 mg, Oral, Q12H, Marylou Eugene MD, 550 mg at 08/01/19 2009  •  sevelamer (RENVELA) tablet 800 mg, 800 mg, Oral, TID With Meals, Marylou Eugene MD  •  sodium chloride 0.9 % flush 10 mL, 10 mL, Intravenous, PRN, Salvador Elmore MD, 10 mL at 07/30/19 2013  •  sodium chloride 0.9 % flush 3 mL, 3 mL, Intravenous, Q12H, Marylou Eugene MD, 3 mL at 08/01/19 2010  •  sodium chloride 0.9 % flush 3-10 mL, 3-10 mL, Intravenous, PRN, Marylou Eugene MD  •  traZODone (DESYREL) tablet 50 mg, 50 mg, Oral, Nightly PRN, Marylou Eugene MD, 50 mg at 08/01/19 2009     Diagnostic Data    Lab Results (last 24 hours)     Procedure Component Value Units Date/Time    Basic Metabolic Panel [[id number]]  (Abnormal) Collected:  08/02/19 0414    Specimen:  Blood Updated:  08/02/19 0505     Glucose 79 mg/dL      BUN 45 mg/dL      Creatinine 7.64 mg/dL      Sodium 136 mmol/L      Potassium 4.0 mmol/L      Chloride 94 mmol/L      CO2 23.0 mmol/L      Calcium 8.3 mg/dL      eGFR  African Amer 9 mL/min/1.73      Comment: <15 Indicative of  kidney failure.        eGFR Non African Amer -- mL/min/1.73      Comment: <15 Indicative of kidney failure.        BUN/Creatinine Ratio 5.9     Anion Gap 19.0 mmol/L     Narrative:       GFR Normal >60  Chronic Kidney Disease <60  Kidney Failure <15    Phosphorus [[id number]]  (Abnormal) Collected:  08/02/19 0414    Specimen:  Blood Updated:  08/02/19 0503     Phosphorus 5.6 mg/dL     CBC & Differential [[id number]] Collected:  08/02/19 0414    Specimen:  Blood Updated:  08/02/19 0453    Narrative:       The following orders were created for panel order CBC & Differential.  Procedure                               Abnormality         Status                     ---------                               -----------         ------                     CBC Auto Differential[[id number]]        Abnormal            Final result                 Please view results for these tests on the individual orders.    CBC Auto Differential [[id number]]  (Abnormal) Collected:  08/02/19 0414    Specimen:  Blood Updated:  08/02/19 0453     WBC 4.87 10*3/mm3      RBC 2.71 10*6/mm3      Hemoglobin 7.9 g/dL      Hematocrit 23.2 %      MCV 85.6 fL      MCH 29.2 pg      MCHC 34.1 g/dL      RDW 15.1 %      RDW-SD 46.8 fl      MPV 9.1 fL      Platelets 117 10*3/mm3      Neutrophil % 59.8 %      Lymphocyte % 27.3 %      Monocyte % 9.4 %      Eosinophil % 2.7 %      Basophil % 0.4 %      Immature Grans % 0.4 %      Neutrophils, Absolute 2.91 10*3/mm3      Lymphocytes, Absolute 1.33 10*3/mm3      Monocytes, Absolute 0.46 10*3/mm3      Eosinophils, Absolute 0.13 10*3/mm3      Basophils, Absolute 0.02 10*3/mm3      Immature Grans, Absolute 0.02 10*3/mm3      nRBC 0.0 /100 WBC     Extra Tubes [[id number]] Collected:  08/01/19 2330    Specimen:  Blood, Venous Line Updated:  08/02/19 0030    Narrative:       The following orders were created for panel order Extra Tubes.  Procedure                               Abnormality         Status                      ---------                               -----------         ------                     Lavender Top[[id number]]                                     Final result               Green Top (Gel)[[id number]]                                  Final result                 Please view results for these tests on the individual orders.    Lavender Top [[id number]] Collected:  08/01/19 2330    Specimen:  Blood Updated:  08/02/19 0030     Extra Tube hold for add-on     Comment: Auto resulted       Green Top (Gel) [[id number]] Collected:  08/01/19 2330    Specimen:  Blood Updated:  08/02/19 0030     Extra Tube Hold for add-ons.     Comment: Auto resulted.       Ammonia [[id number]]  (Normal) Collected:  08/01/19 2322    Specimen:  Blood Updated:  08/01/19 2357     Ammonia 38 umol/L     Ammonia [[id number]]  (Abnormal) Collected:  08/01/19 1316    Specimen:  Blood Updated:  08/01/19 1343     Ammonia 81 umol/L     Hemoglobin & Hematocrit, Blood [[id number]]  (Abnormal) Collected:  08/01/19 1316    Specimen:  Blood Updated:  08/01/19 1328     Hemoglobin 8.4 g/dL      Hematocrit 24.3 %     Blood Gas, Arterial [[id number]]  (Abnormal) Collected:  08/01/19 0922    Specimen:  Arterial Blood Updated:  08/01/19 0932     Site Left Radial     Inderjit's Test N/A     pH, Arterial 7.509 pH units      Comment: 83 Value above reference range        pCO2, Arterial 34.1 mm Hg      Comment: 84 Value below reference range        pO2, Arterial 85.0 mm Hg      HCO3, Arterial 27.1 mmol/L      Comment: 83 Value above reference range        Base Excess, Arterial 4.0 mmol/L      Comment: 83 Value above reference range        O2 Saturation, Arterial 97.0 %      Barometric Pressure for Blood Gas 750 mmHg      Modality Room Air     FIO2 21 %      Ventilator Mode BiPAP     Set Mech Resp Rate 18.0     PIP 13 cmH2O      Comment: Meter: [id number]     :  [id number]        IPAP 12     EPAP 5     Collected by MORENO MOURA           Imaging Results (last 24  hours)     ** No results found for the last 24 hours. **          I reviewed the patient's new clinical results.    Assessment/Plan:     Active Hospital Problems    Diagnosis   • **Gastrointestinal hemorrhage     - following. Hgb has dropped from 8.4-->7.9  -Flexible sigmoidoscopy later today  -hold ASA  -IVF  -NPO  -Protonix     • Hyperammonemia (CMS/[facility])     Continue lactulose and rifaximin  Check ammonia level:193--->38 now     • COPD (chronic obstructive pulmonary disease) (CMS/[facility])     - Continue flonase, loratadine  - Patient uses Oxygen prn at the nursing home.   - Duonebs and albuterol nebs prn.        • Chronic pain     Continue home Gabapentin nightly and Flexeril prn.      • Primary insomnia     Continue home Trazodone and Melatonin.      • ESRD (end stage renal disease) (CMS/[facility])     [facility]  Nephrologist Dr. Vilchis          • Primary open angle glaucoma of left eye, moderate stage     Continue home drops.      • Diabetes mellitus (CMS/[facility])     - Continue long acting insulin 25 units nightly  - SSI  - Finger stick glucose checks.      • Hypertension     Monitor per floor protocol.   - He does not appear to be on any BP meds at home.            DVT prophylaxis: SCDs/TEDs  Code Status and Medical Interventions:   Ordered at: 07/30/19 2182     Limited Support to NOT Include:    Intubation     Code Status:    No CPR     Medical Interventions (Level of Support Prior to Arrest):    Limited       Plan for disposition:Where: home and When:  3-4 days      Time: 30 minutes        This document has been electronically signed by Warren Stewart MD on August 2, 2019 8:35 AM

## 2019-08-03 PROBLEM — G47.33 OBSTRUCTIVE SLEEP APNEA: Status: ACTIVE | Noted: 2019-01-01

## 2019-08-03 PROBLEM — E72.20 HYPERAMMONEMIA (HCC): Status: RESOLVED | Noted: 2019-01-01 | Resolved: 2019-01-01

## 2019-08-03 NOTE — PLAN OF CARE
Problem: NPPV/CPAP (Adult)  Intervention: Optimize Tolerance of Noninvasive Ventilation  Patient not wearing bipap at this time. V-60 in room on standby since 08/01/19

## 2019-08-03 NOTE — PROGRESS NOTES
Cory Azevedo DO,[facility]  Gastroenterology  Hepatology  Endoscopy  Board Certified in Internal Medicine and gastroenterology  [address], [address]  [place]. [address]  - [phone]   F - [phone]     GASTROENTEROLOGY PROGRESS NOTE   CORY AZEVEDO DO.         SUBJECTIVE:   8/3/2019  Chief Complaint:     Subjective  : Covering for Dr. Montoya.  No further gastrointestinal bleeding has been noted.  He currently is in a Posey vest.  He is confused.         CURRENT MEDICATIONS/OBJECTIVE/VS/PE:     Current Medications:     Current Facility-Administered Medications   Medication Dose Route Frequency Provider Last Rate Last Dose   • 8-hydroxyquinoline sulfate (BAG BALM) ointment   Apply externally TID PRN César Haider MD       • acetaminophen (TYLENOL) tablet 500 mg  500 mg Oral Q4H PRN Marylou Eugene MD       • albuterol (PROVENTIL) nebulizer solution 0.5% 2.5 mg/0.5mL  10 mg Nebulization Q6H PRN Salvador Elmore MD       • benzonatate (TESSALON) capsule 100 mg  100 mg Oral TID PRN Marylou Eugene MD       • brimonidine (ALPHAGAN) 0.15 % ophthalmic solution 1 drop  1 drop Both Eyes TID Marylou Eugene MD   1 drop at 08/03/19 1026   • cetirizine (zyrTEC) tablet 5 mg  5 mg Oral Daily Marylou Eugene MD   5 mg at 08/03/19 1022   • cholecalciferol (VITAMIN D3) tablet 500 Units  500 Units Oral Daily Marylou Eugene MD   500 Units at 08/03/19 1022   • cyclobenzaprine (FLEXERIL) tablet 5 mg  5 mg Oral Q8H PRN Marylou Eugene MD   5 mg at 08/02/19 2206   • dextrose (D50W) 25 g/ 50mL Intravenous Solution 25 g  25 g Intravenous Q15 Min PRN Maxi Hernandez III, MD       • dextrose (D50W) 25 g/ 50mL Intravenous Solution 50 mL  50 mL Intravenous Q1H PRN Salvador Elmore MD       • dextrose (GLUTOSE) oral gel 15 g  15 g Oral Q15 Min PRN Maxi Hernandez III, MD       • epoetin ziggy (EPOGEN,PROCRIT) injection 10,000 Units  10,000 Units Intravenous  Once per day on Mon Wed Fri Pascual Vilchis MD   10,000 Units at 08/02/19 1126   • escitalopram (LEXAPRO) tablet 10 mg  10 mg Oral Daily Marylou Eugene MD   10 mg at 08/03/19 1022   • fluticasone (FLONASE) 50 MCG/ACT nasal spray 2 spray  2 spray Nasal Daily Marylou Eugene MD   2 spray at 08/03/19 1026   • gabapentin (NEURONTIN) capsule 100 mg  100 mg Oral Nightly Marylou Eugene MD   100 mg at 08/02/19 2203   • glucagon (human recombinant) (GLUCAGEN DIAGNOSTIC) injection 1 mg  1 mg Subcutaneous PRN Maxi Hernandez III, MD       • insulin aspart (novoLOG) injection 0-7 Units  0-7 Units Subcutaneous 4x Daily AC & at Bedtime Maxi Hernandez III, MD       • insulin detemir (LEVEMIR) injection 10 Units  10 Units Subcutaneous Daily Singh Sommer MD   10 Units at 08/01/19 1025   • insulin regular (humuLIN R,novoLIN R) injection 10 Units  10 Units Intravenous Once Salvador Elmore MD   Stopped at 07/30/19 2227   • lactulose (CHRONULAC) 10 GM/15ML solution 20 g  20 g Rectal TID Mark Humphreys MD   20 g at 08/03/19 1021   • latanoprost (XALATAN) 0.005 % ophthalmic solution 1 drop  1 drop Both Eyes Nightly Marylou Eugene MD   1 drop at 08/02/19 2206   • melatonin tablet 5.25 mg  5.25 mg Oral Nightly Marylou Eugene MD   5.25 mg at 08/02/19 2204   • ondansetron (ZOFRAN) injection 4 mg  4 mg Intravenous Q6H PRN Marylou Eugene MD       • pantoprazole (PROTONIX) injection 40 mg  40 mg Intravenous Q12H Marylou Eugene MD   40 mg at 08/03/19 1021   • rifaximin (XIFAXAN) tablet 550 mg  550 mg Oral Q12H Marylou Eugene MD   550 mg at 08/03/19 1022   • sevelamer (RENVELA) tablet 800 mg  800 mg Oral TID With Meals Marylou Eugene MD   800 mg at 08/03/19 1022   • sodium chloride 0.9 % bolus 100 mL  100 mL Intravenous PRN Pascual Vilchis MD   50 mL at 08/02/19 1550   • sodium chloride 0.9 % flush 10 mL  10 mL Intravenous PRN Salvador Elmore MD   10 mL at  07/30/19 2013   • sodium chloride 0.9 % flush 3 mL  3 mL Intravenous Q12H Marylou Eugene MD   3 mL at 08/03/19 1027   • sodium chloride 0.9 % flush 3-10 mL  3-10 mL Intravenous PRN Marylou Eugene MD       • traZODone (DESYREL) tablet 50 mg  50 mg Oral Nightly PRN Marylou Eugene MD   50 mg at 08/02/19 2206       Objective     Physical Exam:   Temp:  [96.9 °F (36.1 °C)-98.4 °F (36.9 °C)] 98 °F (36.7 °C)  Heart Rate:  [59-96] 62  Resp:  [16-32] 18  BP: (110-144)/(53-76) 144/76     Physical Exam:  General Appearance:    Alert, cooperative, in no acute distress but not oriented   Head:    Normocephalic, without obvious abnormality, atraumatic   Eyes:            Lids and lashes normal, conjunctivae and sclerae normal, no   icterus, no pallor, corneas clear, PERRLA   Ears:    Ears appear intact with no abnormalities noted   Throat:   No oral lesions, no thrush, oral mucosa moist   Neck:   No adenopathy, supple, trachea midline, no thyromegaly, no     carotid bruit, no JVD   Back:     No kyphosis present, no scoliosis present, no skin lesions,       erythema or scars, no tenderness to percussion or                   palpation,   range of motion normal   Lungs:     Clear to auscultation,respirations regular, even and                   unlabored    Heart:    Regular rhythm and normal rate, normal S1 and S2, no            murmur, no gallop, no rub, no click   Breast Exam:    Deferred   Abdomen:     Normal bowel sounds, no masses, no organomegaly, soft        non-tender, non-distended, no guarding, no rebound                 tenderness   Genitalia:    Deferred   Extremities:   Moves all extremities well, no edema, no cyanosis, no              redness   Pulses:   Pulses palpable and equal bilaterally   Skin:   No bleeding, bruising or rash   Lymph nodes:   No palpable adenopathy   Neurologic:   Cranial nerves 2 - 12 grossly intact, sensation intact, DTR        present and equal bilaterally      Results  Review:     Lab Results (last 24 hours)     Procedure Component Value Units Date/Time    POC Glucose Once [[id number]]  (Normal) Collected:  08/03/19 0730    Specimen:  Blood Updated:  08/03/19 0746     Glucose 92 mg/dL      Comment: : [id number] SONIA ANNAMeter ID: [id number]       POC Glucose Once [[id number]]  (Normal) Collected:  08/02/19 1958    Specimen:  Blood Updated:  08/02/19 2009     Glucose 92 mg/dL      Comment: : [id number] BRIAN OPALMeter ID: [id number]       CBC & Differential [[id number]] Collected:  08/02/19 1727    Specimen:  Blood Updated:  08/02/19 1741    Narrative:       The following orders were created for panel order CBC & Differential.  Procedure                               Abnormality         Status                     ---------                               -----------         ------                     CBC Auto Differential[[id number]]        Abnormal            Final result                 Please view results for these tests on the individual orders.    CBC Auto Differential [[id number]]  (Abnormal) Collected:  08/02/19 1727    Specimen:  Blood Updated:  08/02/19 1741     WBC 5.06 10*3/mm3      RBC 3.33 10*6/mm3      Hemoglobin 9.5 g/dL      Hematocrit 28.1 %      MCV 84.4 fL      MCH 28.5 pg      MCHC 33.8 g/dL      RDW 15.1 %      RDW-SD 45.8 fl      MPV 8.6 fL      Platelets 110 10*3/mm3      Neutrophil % 63.6 %      Lymphocyte % 20.9 %      Monocyte % 11.3 %      Eosinophil % 3.4 %      Basophil % 0.4 %      Immature Grans % 0.4 %      Neutrophils, Absolute 3.22 10*3/mm3      Lymphocytes, Absolute 1.06 10*3/mm3      Monocytes, Absolute 0.57 10*3/mm3      Eosinophils, Absolute 0.17 10*3/mm3      Basophils, Absolute 0.02 10*3/mm3      Immature Grans, Absolute 0.02 10*3/mm3      nRBC 0.0 /100 WBC            I reviewed the patient's new clinical results.  I reviewed the patient's new imaging results and agree with the interpretation.     ASSESSMENT/PLAN:    ASSESSMENT:  1.  Gastrointestinal bleeding, etiology uncertain.  Dr. Montoya feels that this may be diverticular in nature  2.  Anemia secondary to blood loss as well as chronic kidney disease    PLAN:  1.  Observation.  If the patient continues to do well, can be discharged with follow-up with Dr. Lindsay Azevedo DO  08/03/19  10:44 AM

## 2019-08-03 NOTE — PROGRESS NOTES
"[facility]  [address] [place] [address]. [address]  T - [phone]  F - [phone]     Progress Note          PATIENT  DEMOGRAPHICS   PATIENT NAME: Cristo Musa                      PHYSICIAN: LUIS Funez  : 1950  MRN: [id number]   LOS: 4 days    Patient Care Team:  Warren Stewart MD as PCP - General (Family Medicine)  Justyna Patel APRN as PCP - Claims Attributed  Michelle Lo MD as Resident (Family Medicine)  John Sanchez MD as Resident (Family Medicine)  Marissa Boothe MD as Resident (Family Medicine)  Lucio Anderson MD as Resident (Family Medicine)  Demarcus Oliveira MD as Resident (Family Medicine)  Subjective   SUBJECTIVE   Arousable- oriented to person and place but not time. Denies CP or SOB.         Objective   OBJECTIVE   Vital Signs  Temp:  [96.9 °F (36.1 °C)-98.4 °F (36.9 °C)] 98 °F (36.7 °C)  Heart Rate:  [59-96] 62  Resp:  [16-32] 18  BP: (110-144)/(53-76) 144/76    Flowsheet Rows      First Filed Value   Admission Height  185.4 cm (73\") Documented at 2019   Admission Weight  93.7 kg (206 lb 9.6 oz) Documented at 2019           I/O last 3 completed shifts:  In: 300 [Blood:300]  Out: 2290 [Other:2290]    PHYSICAL EXAM    Physical Exam   Constitutional: He is oriented to person, place, and time. He appears well-developed and well-nourished.   HENT:   Head: Normocephalic and atraumatic.   Eyes: Pupils are equal, round, and reactive to light.   Cardiovascular: Normal rate, regular rhythm and normal heart sounds.   Pulmonary/Chest: Effort normal and breath sounds normal.   Abdominal: Soft. Bowel sounds are normal.   Musculoskeletal: He exhibits no edema.   Neurological: He is alert and oriented to person, place, and time.       RESULTS   Results Review:    Results from last 7 days   Lab Units 19  1044 19  0414 19  0507  19   SODIUM mmol/L 126* 136 138   < > 136   POTASSIUM " mmol/L 3.7 4.0 4.6   < > 6.2*   CHLORIDE mmol/L 87* 94* 97*   < > 91*   CO2 mmol/L 23.0 23.0 24.0   < > 24.0   BUN mg/dL 25* 45* 37*   < > 80*   CREATININE mg/dL 5.86* 7.64* 6.22*   < > 10.27*   CALCIUM mg/dL 7.9* 8.3* 8.4*   < > 9.0   BILIRUBIN mg/dL  --   --   --   --  0.9   ALK PHOS U/L  --   --   --   --  120*   ALT (SGPT) U/L  --   --   --   --  8   AST (SGOT) U/L  --   --   --   --  11   GLUCOSE mg/dL 133* 79 186*   < > 209*    < > = values in this interval not displayed.       Estimated Creatinine Clearance: 13.3 mL/min (A) (by C-G formula based on SCr of 5.86 mg/dL (H)).    Results from last 7 days   Lab Units 08/02/19 0414 07/30/19 2013   MAGNESIUM mg/dL  --  3.0*   PHOSPHORUS mg/dL 5.6*  --              Results from last 7 days   Lab Units 08/03/19  1044 08/02/19  1727 08/02/19  0414 08/01/19  1316 08/01/19  0507  07/31/19  0309   WBC 10*3/mm3 4.78 5.06 4.87  --  4.75  --  5.28   HEMOGLOBIN g/dL 9.5* 9.5* 7.9* 8.4* 8.7*  8.7*   < > 7.2*   PLATELETS 10*3/mm3 116* 110* 117*  --  117*  --  116*    < > = values in this interval not displayed.       Results from last 7 days   Lab Units 07/31/19  0309 07/30/19 2013   INR  1.20 1.12         Imaging Results (last 24 hours)     ** No results found for the last 24 hours. **           MEDICATIONS      brimonidine 1 drop Both Eyes TID   cetirizine 5 mg Oral Daily   cholecalciferol 500 Units Oral Daily   epoetin ziggy/ziggy-epbx 10,000 Units Intravenous Once per day on Mon Wed Fri   escitalopram 10 mg Oral Daily   fluticasone 2 spray Nasal Daily   gabapentin 100 mg Oral Nightly   insulin aspart 0-7 Units Subcutaneous 4x Daily AC & at Bedtime   insulin detemir 10 Units Subcutaneous Daily   insulin regular 10 Units Intravenous Once   lactulose 20 g Oral TID   latanoprost 1 drop Both Eyes Nightly   melatonin 5.25 mg Oral Nightly   pantoprazole 40 mg Intravenous Q12H   rifaximin 550 mg Oral Q12H   sevelamer 800 mg Oral TID With Meals   sodium chloride 3 mL Intravenous  Q12H          Assessment/Plan   ASSESSMENT / PLAN      Gastrointestinal hemorrhage    Type 2 diabetes mellitus with diabetic peripheral angiopathy and gangrene, with long-term current use of insulin (CMS/HCC)    Essential hypertension    Primary open angle glaucoma of left eye, moderate stage    ESRD (end stage renal disease) (CMS/HCC)    Primary insomnia    Chronic pain    COPD (chronic obstructive pulmonary disease) (CMS/HCC)    Hyperammonemia (CMS/HCC)    Obstructive sleep apnea    1.  ESRD on HD- stable hemodynamically, NA low this morning 126. On HD . Off ivf. no heparin with hd. Will obtain repeat CMP tomorrow am to recheck NA.     2.  GI bleed- s/p CTA abdomen no active bleeding. S/p ddavp. He had a recent colonoscopy which showed polyps. Colonoscopy if recurrent bleeding     3.  Hyperammonemia/metabolic encephalopathy- on lactulose and rifaximin,  ammonia level was elevated at 193 check in am     4.  Diabetes mellitus type 2     5.  Hypertension- Bps are acceptable-     6.  COPD- patient has BiPAP  But he is currently on N/C oxygen     7.  Anemia- Hgb is acceptable at 9.5. Patient has received 2  units packed red blood cells so far, epogen with HD.               This document has been electronically signed by LUIS Funez on August 3, 2019 11:51 AM

## 2019-08-03 NOTE — PLAN OF CARE
Problem: Patient Care Overview  Goal: Plan of Care Review  Outcome: Ongoing (interventions implemented as appropriate)   08/03/19 0322   Coping/Psychosocial   Plan of Care Reviewed With patient   OTHER   Outcome Summary Pt transfer from ICU; on hemodiaylsis, treatment on Friday; denies pain; remains in posey restraint, agitated at times, attempts to exit bed; safety measures in place; vital signs stable, monitoring pt    Plan of Care Review   Progress no change     Goal: Individualization and Mutuality  Outcome: Ongoing (interventions implemented as appropriate)    Goal: Discharge Needs Assessment  Outcome: Ongoing (interventions implemented as appropriate)      Problem: Fall Risk (Adult)  Goal: Absence of Fall  Outcome: Ongoing (interventions implemented as appropriate)      Problem: Skin Injury Risk (Adult)  Goal: Skin Health and Integrity  Outcome: Ongoing (interventions implemented as appropriate)      Problem: Restraint, Nonbehavioral (Nonviolent)  Goal: Nonbehavioral (Nonviolent) Restraint: Achievement of Discontinuation Criteria  Outcome: Ongoing (interventions implemented as appropriate)    Goal: Nonbehavioral (Nonviolent) Restraint: Preservation of Dignity and Wellbeing  Outcome: Ongoing (interventions implemented as appropriate)      Problem: Gastrointestinal Bleeding (Adult)  Goal: Signs and Symptoms of Listed Potential Problems Will be Absent, Minimized or Managed (Gastrointestinal Bleeding)  Outcome: Ongoing (interventions implemented as appropriate)      Problem: Pain, Chronic (Adult)  Goal: Acceptable Pain/Comfort Level and Functional Ability  Outcome: Ongoing (interventions implemented as appropriate)      Problem: NPPV/CPAP (Adult)  Goal: Signs and Symptoms of Listed Potential Problems Will be Absent, Minimized or Managed (NPPV/CPAP)  Outcome: Ongoing (interventions implemented as appropriate)      Problem: Chronic Obstructive Pulmonary Disease (Adult)  Goal: Signs and Symptoms of Listed Potential  Problems Will be Absent, Minimized or Managed (Chronic Obstructive Pulmonary Disease)  Outcome: Ongoing (interventions implemented as appropriate)      Problem: Kidney Disease, Chronic/End Stage Renal Disease (Adult)  Goal: Signs and Symptoms of Listed Potential Problems Will be Absent, Minimized or Managed (Kidney Disease, Chronic/End Stage Renal Disease)  Outcome: Ongoing (interventions implemented as appropriate)

## 2019-08-03 NOTE — PROGRESS NOTES
FAMILY MEDICINE DAILY PROGRESS NOTE  NAME: Cristo Musa  : 1950  MRN: [id number]     LOS: 4 days     PROVIDER OF SERVICE: Maxi Hernandez III, MD    Chief Complaint: Gastrointestinal hemorrhage    Subjective:     Interval History:  History taken from: patient chart RN  No acute overnight events.  Patient without significant valvular since flexible sigmoidoscopy yesterday.  Patient alert and oriented today in Posey vest participating during blood draw.    Review of Systems:   Review of Systems   Constitutional: Negative for activity change, appetite change, chills, diaphoresis and fever.   HENT: Negative for congestion, rhinorrhea, sinus pressure, sinus pain and sore throat.    Eyes: Negative for visual disturbance.        Mild icteric tinge to sclera   Respiratory: Negative for apnea, cough, choking, chest tightness, shortness of breath and wheezing.    Cardiovascular: Negative for chest pain, palpitations and leg swelling.   Gastrointestinal: Negative for abdominal distention, abdominal pain, blood in stool, constipation, diarrhea, nausea and vomiting.   Genitourinary: Negative for difficulty urinating, dysuria, frequency, hematuria and urgency.   Musculoskeletal: Negative for arthralgias, back pain, joint swelling, myalgias and neck pain.   Skin: Negative for color change, pallor, rash and wound.   Neurological: Negative for dizziness, weakness, numbness and headaches.   Psychiatric/Behavioral: Negative for agitation and behavioral problems.       Objective:     Vital Signs  Temp:  [96.9 °F (36.1 °C)-98.4 °F (36.9 °C)] 98 °F (36.7 °C)  Heart Rate:  [59-96] 62  Resp:  [16-32] 18  BP: (110-144)/(53-76) 144/76    Physical Exam  Physical Exam   Constitutional: He is oriented to person, place, and time. He appears well-developed and well-nourished. No distress.   HENT:   Head: Normocephalic and atraumatic.   Right Ear: External ear normal.   Left Ear: External ear normal.   Nose: Nose normal.    Eyes: Conjunctivae and EOM are normal. Pupils are equal, round, and reactive to light. Right eye exhibits no discharge. Left eye exhibits no discharge. No scleral icterus.   Neck: Normal range of motion. Neck supple. No thyromegaly present.   Cardiovascular: Normal rate, regular rhythm, normal heart sounds and intact distal pulses. Exam reveals no gallop and no friction rub.   No murmur heard.  Pulmonary/Chest: Effort normal and breath sounds normal. No respiratory distress. He has no wheezes. He has no rales. He exhibits no tenderness.   Abdominal: Soft. Bowel sounds are normal. He exhibits no distension and no mass. There is no tenderness. There is no guarding.   Musculoskeletal: Normal range of motion. He exhibits no edema, tenderness or deformity.   Lymphadenopathy:     He has no cervical adenopathy.   Neurological: He is alert and oriented to person, place, and time. No cranial nerve deficit.   Skin: Skin is warm and dry. Capillary refill takes 2 to 3 seconds. He is not diaphoretic.   Psychiatric: He has a normal mood and affect. His behavior is normal. Judgment and thought content normal.       Medication Review    Current Facility-Administered Medications:   •  8-hydroxyquinoline sulfate (BAG BALM) ointment, , Apply externally, TID PRN, César Haider MD  •  acetaminophen (TYLENOL) tablet 500 mg, 500 mg, Oral, Q4H PRN, Marylou Eugene MD  •  albuterol (PROVENTIL) nebulizer solution 0.5% 2.5 mg/0.5mL, 10 mg, Nebulization, Q6H PRN, Salvador Elmore MD  •  benzonatate (TESSALON) capsule 100 mg, 100 mg, Oral, TID PRN, Marylou Eugene MD  •  brimonidine (ALPHAGAN) 0.15 % ophthalmic solution 1 drop, 1 drop, Both Eyes, TID, Marylou Eugene MD, 1 drop at 08/02/19 2206  •  cetirizine (zyrTEC) tablet 5 mg, 5 mg, Oral, Daily, Marylou Eugene MD, 5 mg at 08/02/19 0841  •  cholecalciferol (VITAMIN D3) tablet 500 Units, 500 Units, Oral, Daily, Marylou Eugene MD, 500 Units at  08/02/19 0842  •  cyclobenzaprine (FLEXERIL) tablet 5 mg, 5 mg, Oral, Q8H PRN, Marylou Eugene MD, 5 mg at 08/02/19 2206  •  dextrose (D50W) 25 g/ 50mL Intravenous Solution 25 g, 25 g, Intravenous, Q15 Min PRN, Maxi Hernandez III, MD  •  dextrose (D50W) 25 g/ 50mL Intravenous Solution 50 mL, 50 mL, Intravenous, Q1H PRN, Salvador Elmore MD  •  dextrose (GLUTOSE) oral gel 15 g, 15 g, Oral, Q15 Min PRN, Maxi Hernandez III, MD  •  epoetin ziggy (EPOGEN,PROCRIT) injection 10,000 Units, 10,000 Units, Intravenous, Once per day on Mon Wed Fri, Pascual Vilchis MD, 10,000 Units at 08/02/19 1126  •  escitalopram (LEXAPRO) tablet 10 mg, 10 mg, Oral, Daily, Marylou Eugene MD, 10 mg at 08/02/19 0841  •  fluticasone (FLONASE) 50 MCG/ACT nasal spray 2 spray, 2 spray, Nasal, Daily, Marylou Eugene MD, 2 spray at 08/02/19 0902  •  gabapentin (NEURONTIN) capsule 100 mg, 100 mg, Oral, Nightly, Marylou Eugene MD, 100 mg at 08/02/19 2203  •  glucagon (human recombinant) (GLUCAGEN DIAGNOSTIC) injection 1 mg, 1 mg, Subcutaneous, PRN, Maxi Hernandez III, MD  •  insulin aspart (novoLOG) injection 0-7 Units, 0-7 Units, Subcutaneous, 4x Daily AC & at Bedtime, Maxi Hernandez III, MD  •  insulin detemir (LEVEMIR) injection 10 Units, 10 Units, Subcutaneous, Daily, Singh Sommer MD, 10 Units at 08/01/19 1025  •  insulin regular (humuLIN R,novoLIN R) injection 10 Units, 10 Units, Intravenous, Once, Salvador Elmore MD, Stopped at 07/30/19 2227  •  lactulose (CHRONULAC) 10 GM/15ML solution 20 g, 20 g, Rectal, TID, Mark Humphreys MD, 20 g at 08/02/19 2203  •  latanoprost (XALATAN) 0.005 % ophthalmic solution 1 drop, 1 drop, Both Eyes, Nightly, Marylou Eugene MD, 1 drop at 08/02/19 2206  •  melatonin tablet 5.25 mg, 5.25 mg, Oral, Nightly, Marylou Eugene MD, 5.25 mg at 08/02/19 2204  •  ondansetron (ZOFRAN) injection 4 mg, 4 mg, Intravenous, Q6H PRN, Marylou Eugene MD  •   pantoprazole (PROTONIX) injection 40 mg, 40 mg, Intravenous, Q12H, Marylou Eugene MD, 40 mg at 08/02/19 2207  •  rifaximin (XIFAXAN) tablet 550 mg, 550 mg, Oral, Q12H, Marylou Eugene MD, 550 mg at 08/02/19 2205  •  sevelamer (RENVELA) tablet 800 mg, 800 mg, Oral, TID With Meals, Marylou Eugene MD, 800 mg at 08/02/19 1822  •  sodium chloride 0.9 % bolus 100 mL, 100 mL, Intravenous, PRN, Pascual Vilchis MD, 50 mL at 08/02/19 1550  •  sodium chloride 0.9 % flush 10 mL, 10 mL, Intravenous, PRN, Salvador Elmore MD, 10 mL at 07/30/19 2013  •  sodium chloride 0.9 % flush 3 mL, 3 mL, Intravenous, Q12H, Marylou Eugene MD, 3 mL at 08/02/19 2220  •  sodium chloride 0.9 % flush 3-10 mL, 3-10 mL, Intravenous, PRN, Marylou Eugene MD  •  traZODone (DESYREL) tablet 50 mg, 50 mg, Oral, Nightly PRN, Marylou Eugene MD, 50 mg at 08/02/19 2206     Diagnostic Data    Lab Results (last 24 hours)     Procedure Component Value Units Date/Time    POC Glucose Once [[id number]]  (Normal) Collected:  08/03/19 0730    Specimen:  Blood Updated:  08/03/19 0746     Glucose 92 mg/dL      Comment: : [id number] [facility] ANNAMeter ID: [id number]       POC Glucose Once [[id number]]  (Normal) Collected:  08/02/19 1958    Specimen:  Blood Updated:  08/02/19 2009     Glucose 92 mg/dL      Comment: : [id number] [facility] OPALMeter ID: [id number]       CBC & Differential [[id number]] Collected:  08/02/19 1727    Specimen:  Blood Updated:  08/02/19 1741    Narrative:       The following orders were created for panel order CBC & Differential.  Procedure                               Abnormality         Status                     ---------                               -----------         ------                     CBC Auto Differential[[id number]]        Abnormal            Final result                 Please view results for these tests on the individual orders.    CBC Auto Differential [[id number]]   (Abnormal) Collected:  08/02/19 1727    Specimen:  Blood Updated:  08/02/19 1741     WBC 5.06 10*3/mm3      RBC 3.33 10*6/mm3      Hemoglobin 9.5 g/dL      Hematocrit 28.1 %      MCV 84.4 fL      MCH 28.5 pg      MCHC 33.8 g/dL      RDW 15.1 %      RDW-SD 45.8 fl      MPV 8.6 fL      Platelets 110 10*3/mm3      Neutrophil % 63.6 %      Lymphocyte % 20.9 %      Monocyte % 11.3 %      Eosinophil % 3.4 %      Basophil % 0.4 %      Immature Grans % 0.4 %      Neutrophils, Absolute 3.22 10*3/mm3      Lymphocytes, Absolute 1.06 10*3/mm3      Monocytes, Absolute 0.57 10*3/mm3      Eosinophils, Absolute 0.17 10*3/mm3      Basophils, Absolute 0.02 10*3/mm3      Immature Grans, Absolute 0.02 10*3/mm3      nRBC 0.0 /100 WBC             I reviewed the patient's new clinical results.    Assessment/Plan:     Active Hospital Problems    Diagnosis POA   • **Gastrointestinal hemorrhage [K92.2] Yes     Flexible sigmoidoscopy without significant location for GI bleed.  If H&H is stable we will continue to trend.  No significant bowel movement since flexible sigmoidoscopy  Carb consistent cardiac diet  - GI, Dr. Azevedo, recommendations appreciated       • Obstructive sleep apnea [G47.33] Unknown     Patient utilizing BiPAP therapy at night.  -BiPAP therapy to at bedtime with titration by respiratory services.     • Hyperammonemia (CMS/[facility]) [E72.20] Unknown     Continue lactulose and rifaximin  Check ammonia level:193--->38 now  - Pending ammonia level for today.  -We will continue Posey vest until persistent clearance of sensorium.     • COPD (chronic obstructive pulmonary disease) (CMS/[facility]) [J44.9] Yes     - Continue flonase, loratadine  - Patient uses Oxygen prn at the nursing home.   - Duonebs and albuterol nebs prn.        • Chronic pain [G89.29] Yes     Continue home Gabapentin nightly and Flexeril prn.      • Primary insomnia [F51.01] Yes     Continue home Trazodone and Melatonin.      • ESRD (end stage renal disease) (CMS/[facility])  [N18.6] Yes     HD [facility]  Nephrologist Dr. Vilchis  - Epoetin alpha 10,000 units IV Monday Wednesday Friday  -Sevelamer 800 mg p.o. 3 times daily with meals         • Primary open angle glaucoma of left eye, moderate stage [H40.1122] Yes     Continue home drops.      • Type 2 diabetes mellitus with diabetic peripheral angiopathy and gangrene, with long-term current use of insulin (CMS/formerly [facility]) [E11.52, Z79.4] Not Applicable     - Continue long acting insulin 25 units nightly.  Glucose has been within normal limits with one-time 10 units insulin regular dose was yesterday.  We will continue to trend with sliding scale  -Insulin aspart  low-dose sliding scale  - Finger stick glucose checks.      • Essential hypertension [I10] Yes     Monitor per floor protocol.   - He does not appear to be on any BP meds at home.            DVT prophylaxis: SCDs  Code Status and Medical Interventions:   Ordered at: 07/30/19 2314     Limited Support to NOT Include:    Intubation     Code Status:    No CPR     Medical Interventions (Level of Support Prior to Arrest):    Limited       Plan for disposition:When:  Anticipated discharge in 24 to 96 hours        This document has been electronically signed by Maxi Hernandez III, MD on August 3, 2019 9:10 AM

## 2019-08-03 NOTE — PLAN OF CARE
Problem: Patient Care Overview  Goal: Plan of Care Review  Outcome: Ongoing (interventions implemented as appropriate)   08/03/19 1500   Coping/Psychosocial   Plan of Care Reviewed With patient   OTHER   Outcome Summary Pt has had no complaints this shift, v/s stable, restraints d/c, will continue to monitor    Plan of Care Review   Progress no change     Goal: Individualization and Mutuality  Outcome: Ongoing (interventions implemented as appropriate)    Goal: Discharge Needs Assessment  Outcome: Ongoing (interventions implemented as appropriate)    Goal: Interprofessional Rounds/Family Conf  Outcome: Ongoing (interventions implemented as appropriate)      Problem: Fall Risk (Adult)  Goal: Absence of Fall  Outcome: Ongoing (interventions implemented as appropriate)      Problem: Skin Injury Risk (Adult)  Goal: Skin Health and Integrity  Outcome: Ongoing (interventions implemented as appropriate)      Problem: Restraint, Nonbehavioral (Nonviolent)  Goal: Rationale and Justification  Outcome: Outcome(s) achieved Date Met: 08/03/19    Goal: Nonbehavioral (Nonviolent) Restraint: Absence of Injury/Harm  Outcome: Outcome(s) achieved Date Met: 08/03/19    Goal: Nonbehavioral (Nonviolent) Restraint: Achievement of Discontinuation Criteria  Outcome: Outcome(s) achieved Date Met: 08/03/19    Goal: Nonbehavioral (Nonviolent) Restraint: Preservation of Dignity and Wellbeing  Outcome: Outcome(s) achieved Date Met: 08/03/19      Problem: Gastrointestinal Bleeding (Adult)  Goal: Signs and Symptoms of Listed Potential Problems Will be Absent, Minimized or Managed (Gastrointestinal Bleeding)  Outcome: Ongoing (interventions implemented as appropriate)      Problem: Pain, Chronic (Adult)  Goal: Acceptable Pain/Comfort Level and Functional Ability  Outcome: Ongoing (interventions implemented as appropriate)      Problem: NPPV/CPAP (Adult)  Goal: Signs and Symptoms of Listed Potential Problems Will be Absent, Minimized or Managed  (NPPV/CPAP)  Outcome: Ongoing (interventions implemented as appropriate)      Problem: Chronic Obstructive Pulmonary Disease (Adult)  Goal: Signs and Symptoms of Listed Potential Problems Will be Absent, Minimized or Managed (Chronic Obstructive Pulmonary Disease)  Outcome: Ongoing (interventions implemented as appropriate)      Problem: Kidney Disease, Chronic/End Stage Renal Disease (Adult)  Goal: Signs and Symptoms of Listed Potential Problems Will be Absent, Minimized or Managed (Kidney Disease, Chronic/End Stage Renal Disease)  Outcome: Ongoing (interventions implemented as appropriate)

## 2019-08-04 PROBLEM — E87.1 HYPONATREMIA: Status: ACTIVE | Noted: 2019-01-01

## 2019-08-04 NOTE — PLAN OF CARE
Problem: Patient Care Overview  Goal: Plan of Care Review  Outcome: Ongoing (interventions implemented as appropriate)   08/04/19 3090   Coping/Psychosocial   Plan of Care Reviewed With patient   OTHER   Outcome Summary Pt calm and cooperative throughout the night; awake, continent BM's noted; pt using call light, when assistance is needed; no sign of bleeding in stool; denies pain; safety measures in place, weakness noted, requires assist X2 with transfers; vital signs stable    Plan of Care Review   Progress improving     Goal: Individualization and Mutuality  Outcome: Ongoing (interventions implemented as appropriate)    Goal: Discharge Needs Assessment  Outcome: Ongoing (interventions implemented as appropriate)      Problem: Fall Risk (Adult)  Goal: Absence of Fall  Outcome: Ongoing (interventions implemented as appropriate)      Problem: Skin Injury Risk (Adult)  Goal: Skin Health and Integrity  Outcome: Ongoing (interventions implemented as appropriate)      Problem: Gastrointestinal Bleeding (Adult)  Goal: Signs and Symptoms of Listed Potential Problems Will be Absent, Minimized or Managed (Gastrointestinal Bleeding)  Outcome: Ongoing (interventions implemented as appropriate)      Problem: Pain, Chronic (Adult)  Goal: Acceptable Pain/Comfort Level and Functional Ability  Outcome: Ongoing (interventions implemented as appropriate)      Problem: Chronic Obstructive Pulmonary Disease (Adult)  Goal: Signs and Symptoms of Listed Potential Problems Will be Absent, Minimized or Managed (Chronic Obstructive Pulmonary Disease)  Outcome: Ongoing (interventions implemented as appropriate)      Problem: Kidney Disease, Chronic/End Stage Renal Disease (Adult)  Goal: Signs and Symptoms of Listed Potential Problems Will be Absent, Minimized or Managed (Kidney Disease, Chronic/End Stage Renal Disease)  Outcome: Ongoing (interventions implemented as appropriate)

## 2019-08-04 NOTE — PLAN OF CARE
Problem: NPPV/CPAP (Adult)  Intervention: Monitor and Manage Anxiety Related to NPPV/CPAP  Bipap in room on standby at this time. Patient on RA.

## 2019-08-04 NOTE — PROGRESS NOTES
FAMILY MEDICINE DAILY PROGRESS NOTE  NAME: Cristo Musa  : 1950  MRN: [id number]     LOS: 5 days     PROVIDER OF SERVICE: Maxi Hernandez III, MD    Chief Complaint: Hyponatremia    Subjective:     Interval History:  History taken from: patient chart RN  No acute overnight events.  Patient awake and eating at bedside.  Excited to go back home to nursing home patient counseled that it may be in the afternoon.  On review discussion with nephrology regarding hyponatremia is warranted prior to discharge.  Patient without melanotic or hematochezic bowel movements, increased frequency due to lactulose.    Review of Systems:   Review of Systems   Constitutional: Negative for activity change, appetite change, chills, diaphoresis and fever.   HENT: Negative for congestion, rhinorrhea, sinus pressure, sinus pain and sore throat.    Eyes: Negative for visual disturbance.        Mild icteric tinge to sclera   Respiratory: Negative for apnea, cough, choking, chest tightness, shortness of breath and wheezing.    Cardiovascular: Negative for chest pain, palpitations and leg swelling.   Gastrointestinal: Negative for abdominal distention, abdominal pain, blood in stool, constipation, diarrhea, nausea and vomiting.   Genitourinary: Negative for difficulty urinating, dysuria, frequency, hematuria and urgency.   Musculoskeletal: Negative for arthralgias, back pain, joint swelling, myalgias and neck pain.   Skin: Negative for color change, pallor, rash and wound.   Neurological: Negative for dizziness, weakness, numbness and headaches.   Psychiatric/Behavioral: Negative for agitation and behavioral problems.       Objective:     Vital Signs  Temp:  [96.4 °F (35.8 °C)-97.6 °F (36.4 °C)] 97 °F (36.1 °C)  Heart Rate:  [58-61] 61  Resp:  [18-20] 20  BP: (132-142)/(62-73) 142/62    Physical Exam  Physical Exam   Constitutional: He is oriented to person, place, and time. He appears well-developed and well-nourished. No  distress.   HENT:   Head: Normocephalic and atraumatic.   Right Ear: External ear normal.   Left Ear: External ear normal.   Nose: Nose normal.   Eyes: Conjunctivae and EOM are normal. Pupils are equal, round, and reactive to light. Right eye exhibits no discharge. Left eye exhibits no discharge. No scleral icterus.   Neck: Normal range of motion. Neck supple. No thyromegaly present.   Cardiovascular: Normal rate, regular rhythm, normal heart sounds and intact distal pulses. Exam reveals no gallop and no friction rub.   No murmur heard.  Pulmonary/Chest: Effort normal and breath sounds normal. No respiratory distress. He has no wheezes. He has no rales. He exhibits no tenderness.   Abdominal: Soft. Bowel sounds are normal. He exhibits no distension and no mass. There is no tenderness. There is no guarding.   Musculoskeletal: Normal range of motion. He exhibits no edema, tenderness or deformity.   Lymphadenopathy:     He has no cervical adenopathy.   Neurological: He is alert and oriented to person, place, and time. No cranial nerve deficit.   Skin: Skin is warm and dry. Capillary refill takes 2 to 3 seconds. He is not diaphoretic.   Psychiatric: He has a normal mood and affect. His behavior is normal. Judgment and thought content normal.       Medication Review    Current Facility-Administered Medications:   •  8-hydroxyquinoline sulfate (BAG BALM) ointment, , Apply externally, TID PRN, César Haider MD  •  acetaminophen (TYLENOL) tablet 500 mg, 500 mg, Oral, Q4H PRN, Marylou Eugene MD  •  albuterol (PROVENTIL) nebulizer solution 0.5% 2.5 mg/0.5mL, 10 mg, Nebulization, Q6H PRN, Salvador Elmore MD  •  benzonatate (TESSALON) capsule 100 mg, 100 mg, Oral, TID PRN, Marylou Eugene MD  •  brimonidine (ALPHAGAN) 0.15 % ophthalmic solution 1 drop, 1 drop, Both Eyes, TID, Marylou Eugene MD, 1 drop at 08/03/19 2036  •  cetirizine (zyrTEC) tablet 5 mg, 5 mg, Oral, Daily, Marylou Eugene  MD, 5 mg at 08/03/19 1022  •  cholecalciferol (VITAMIN D3) tablet 500 Units, 500 Units, Oral, Daily, Marylou Eugene MD, 500 Units at 08/03/19 1022  •  cyclobenzaprine (FLEXERIL) tablet 5 mg, 5 mg, Oral, Q8H PRN, Maryolu Eugene MD, 5 mg at 08/02/19 2206  •  dextrose (D50W) 25 g/ 50mL Intravenous Solution 25 g, 25 g, Intravenous, Q15 Min PRN, Maxi Hernandez III, MD  •  dextrose (D50W) 25 g/ 50mL Intravenous Solution 50 mL, 50 mL, Intravenous, Q1H PRN, Salavdor Elmore MD  •  dextrose (GLUTOSE) oral gel 15 g, 15 g, Oral, Q15 Min PRN, Maxi Hernandez III, MD  •  epoetin ziggy (EPOGEN,PROCRIT) injection 10,000 Units, 10,000 Units, Intravenous, Once per day on Mon Wed Fri, Pascual Vilchis MD, 10,000 Units at 08/02/19 1126  •  escitalopram (LEXAPRO) tablet 10 mg, 10 mg, Oral, Daily, Marylou Eugene MD, 10 mg at 08/03/19 1022  •  fluticasone (FLONASE) 50 MCG/ACT nasal spray 2 spray, 2 spray, Nasal, Daily, Marylou Eugene MD, 2 spray at 08/03/19 1026  •  gabapentin (NEURONTIN) capsule 100 mg, 100 mg, Oral, Nightly, Marylou Eugene MD, 100 mg at 08/03/19 2036  •  glucagon (human recombinant) (GLUCAGEN DIAGNOSTIC) injection 1 mg, 1 mg, Subcutaneous, PRN, Maxi Hernandez III, MD  •  insulin aspart (novoLOG) injection 0-7 Units, 0-7 Units, Subcutaneous, 4x Daily AC & at Bedtime, Maxi Hernandez III, MD  •  insulin detemir (LEVEMIR) injection 10 Units, 10 Units, Subcutaneous, Daily, Singh Sommer MD, 10 Units at 08/01/19 1025  •  insulin regular (humuLIN R,novoLIN R) injection 10 Units, 10 Units, Intravenous, Once, Salvador Elmore MD, Stopped at 07/30/19 2227  •  lactulose (CHRONULAC) 10 GM/15ML solution 20 g, 20 g, Oral, TID, Maxi Hernandez III, MD, 20 g at 08/03/19 2036  •  latanoprost (XALATAN) 0.005 % ophthalmic solution 1 drop, 1 drop, Both Eyes, Nightly, Marylou Eugene MD, 1 drop at 08/03/19 2036  •  melatonin tablet 5.25 mg, 5.25 mg, Oral, Nightly,  Marylou Eugene MD, 5.25 mg at 08/03/19 2036  •  ondansetron (ZOFRAN) injection 4 mg, 4 mg, Intravenous, Q6H PRN, Marylou Eugene MD  •  pantoprazole (PROTONIX) injection 40 mg, 40 mg, Intravenous, Q12H, Marylou Eugene MD, 40 mg at 08/03/19 2036  •  rifaximin (XIFAXAN) tablet 550 mg, 550 mg, Oral, Q12H, Marylou Eugene MD, 550 mg at 08/03/19 2036  •  sevelamer (RENVELA) tablet 800 mg, 800 mg, Oral, TID With Meals, Marylou Eugene MD, 800 mg at 08/03/19 1804  •  sodium chloride 0.9 % bolus 100 mL, 100 mL, Intravenous, PRN, Pascual Vilchis MD, 50 mL at 08/02/19 1550  •  sodium chloride 0.9 % flush 10 mL, 10 mL, Intravenous, PRN, Salvador Elmore MD, 10 mL at 07/30/19 2013  •  sodium chloride 0.9 % flush 3 mL, 3 mL, Intravenous, Q12H, Marylou Eugene MD, 3 mL at 08/03/19 2037  •  sodium chloride 0.9 % flush 3-10 mL, 3-10 mL, Intravenous, PRN, Marylou Eugene MD  •  traZODone (DESYREL) tablet 50 mg, 50 mg, Oral, Nightly PRN, Marylou Eugene MD, 50 mg at 08/02/19 2206     Diagnostic Data    Lab Results (last 24 hours)     Procedure Component Value Units Date/Time    Comprehensive Metabolic Panel [[id number]]  (Abnormal) Collected:  08/04/19 0809    Specimen:  Blood Updated:  08/04/19 0833     Glucose 129 mg/dL      BUN 30 mg/dL      Creatinine 6.62 mg/dL      Sodium 122 mmol/L      Potassium 4.1 mmol/L      Chloride 80 mmol/L      CO2 23.0 mmol/L      Calcium 7.8 mg/dL      Total Protein 9.1 g/dL      Albumin 3.30 g/dL      ALT (SGPT) 12 U/L      AST (SGOT) 22 U/L      Alkaline Phosphatase 116 U/L      Total Bilirubin 1.1 mg/dL      eGFR Non African Amer -- mL/min/1.73      Comment: <15 Indicative of kidney failure.        eGFR  African Amer 10 mL/min/1.73      Comment: <15 Indicative of kidney failure.        Globulin 5.8 gm/dL      A/G Ratio 0.6 g/dL      BUN/Creatinine Ratio 4.5     Anion Gap 19.0 mmol/L     Narrative:       GFR Normal >60  Chronic Kidney Disease  <60  Kidney Failure <15    CBC & Differential [[id number]] Collected:  08/04/19 0809    Specimen:  Blood Updated:  08/04/19 0812    Narrative:       The following orders were created for panel order CBC & Differential.  Procedure                               Abnormality         Status                     ---------                               -----------         ------                     CBC Auto Differential[[id number]]        Abnormal            Final result                 Please view results for these tests on the individual orders.    CBC Auto Differential [[id number]]  (Abnormal) Collected:  08/04/19 0809    Specimen:  Blood Updated:  08/04/19 0812     WBC 5.17 10*3/mm3      RBC 3.36 10*6/mm3      Hemoglobin 9.7 g/dL      Hematocrit 28.5 %      MCV 84.8 fL      MCH 28.9 pg      MCHC 34.0 g/dL      RDW 14.6 %      RDW-SD 44.5 fl      MPV 8.8 fL      Platelets 127 10*3/mm3      Neutrophil % 64.8 %      Lymphocyte % 20.1 %      Monocyte % 8.9 %      Eosinophil % 4.4 %      Basophil % 0.6 %      Immature Grans % 1.2 %      Neutrophils, Absolute 3.35 10*3/mm3      Lymphocytes, Absolute 1.04 10*3/mm3      Monocytes, Absolute 0.46 10*3/mm3      Eosinophils, Absolute 0.23 10*3/mm3      Basophils, Absolute 0.03 10*3/mm3      Immature Grans, Absolute 0.06 10*3/mm3      nRBC 0.0 /100 WBC     POC Glucose Once [[id number]]  (Abnormal) Collected:  08/04/19 0739    Specimen:  Blood Updated:  08/04/19 0803     Glucose 140 mg/dL      Comment: RN NotifiedOperator: [id number] PRASAD WHITNEYMeter ID: [id number]       POC Glucose Once [[id number]]  (Normal) Collected:  08/03/19 1802    Specimen:  Blood Updated:  08/03/19 1826     Glucose 113 mg/dL      Comment: : [id number] ROSALINA MENJIVARFERMeter ID: [id number]       Ammonia [[id number]]  (Abnormal) Collected:  08/03/19 1044    Specimen:  Blood Updated:  08/03/19 1107     Ammonia 65 umol/L     Basic Metabolic Panel [[id number]]  (Abnormal) Collected:  08/03/19 1044     Specimen:  Blood Updated:  08/03/19 1107     Glucose 133 mg/dL      BUN 25 mg/dL      Creatinine 5.86 mg/dL      Sodium 126 mmol/L      Potassium 3.7 mmol/L      Chloride 87 mmol/L      CO2 23.0 mmol/L      Calcium 7.9 mg/dL      eGFR  African Amer 12 mL/min/1.73      Comment: <15 Indicative of kidney failure.        eGFR Non African Amer -- mL/min/1.73      Comment: <15 Indicative of kidney failure.        BUN/Creatinine Ratio 4.3     Anion Gap 16.0 mmol/L     Narrative:       GFR Normal >60  Chronic Kidney Disease <60  Kidney Failure <15    POC Glucose Once [[id number]]  (Abnormal) Collected:  08/03/19 1043    Specimen:  Blood Updated:  08/03/19 1059     Glucose 139 mg/dL      Comment: RN NotifiedOperator: [id number] PRASAD BrownsvilleMeter ID: [id number]       CBC & Differential [[id number]] Collected:  08/03/19 1044    Specimen:  Blood Updated:  08/03/19 1051    Narrative:       The following orders were created for panel order CBC & Differential.  Procedure                               Abnormality         Status                     ---------                               -----------         ------                     CBC Auto Differential[[id number]]        Abnormal            Final result                 Please view results for these tests on the individual orders.    CBC Auto Differential [[id number]]  (Abnormal) Collected:  08/03/19 1044    Specimen:  Blood Updated:  08/03/19 1051     WBC 4.78 10*3/mm3      RBC 3.23 10*6/mm3      Hemoglobin 9.5 g/dL      Hematocrit 27.2 %      MCV 84.2 fL      MCH 29.4 pg      MCHC 34.9 g/dL      RDW 15.0 %      RDW-SD 45.3 fl      MPV 9.0 fL      Platelets 116 10*3/mm3      Neutrophil % 66.1 %      Lymphocyte % 19.5 %      Monocyte % 9.2 %      Eosinophil % 4.4 %      Basophil % 0.6 %      Immature Grans % 0.2 %      Neutrophils, Absolute 3.16 10*3/mm3      Lymphocytes, Absolute 0.93 10*3/mm3      Monocytes, Absolute 0.44 10*3/mm3      Eosinophils, Absolute 0.21 10*3/mm3       Basophils, Absolute 0.03 10*3/mm3      Immature Grans, Absolute 0.01 10*3/mm3      nRBC 0.0 /100 WBC             I reviewed the patient's new clinical results.    Assessment/Plan:     Active Hospital Problems    Diagnosis POA   • **Hyponatremia [E87.1] No     Patient with persistent hyponatremia now down to 122.  Will restrict fluid intake to 1 L and do repeat BMP this afternoon.  [place]-1 L fluid restriction  -BMP this p.m.     • Obstructive sleep apnea [G47.33] Yes     Patient utilizing BiPAP therapy at night.  -BiPAP therapy to at bedtime with titration by respiratory services.     • Gastrointestinal hemorrhage [K92.2] Yes     H&H stable.  Patient with multiple bowel movement secondary to lactulose that were non-melanotic and not hematochezia.  -Follow-up with GI outpatient.       • Hyperammonemia (CMS/[facility]) [E72.20] Yes     Continue lactulose and rifaximin  Check ammonia level:193--->38 now  - Pending ammonia level for today.  -We will continue Posey vest until persistent clearance of sensorium.     • COPD (chronic obstructive pulmonary disease) (CMS/[facility]) [J44.9] Yes     - Continue flonase, loratadine  - Patient uses Oxygen prn at the nursing home.   - Duonebs and albuterol nebs prn.        • Chronic pain [G89.29] Yes     Continue home Gabapentin nightly and Flexeril prn.      • Primary insomnia [F51.01] Yes     Continue home Trazodone and Melatonin.      • ESRD (end stage renal disease) (CMS/[facility]) [N18.6] Yes     HD F  Nephrologist Dr. Vilchis  - Epoetin alpha 10,000 units IV Monday Wednesday Friday  -Sevelamer 800 mg p.o. 3 times daily with meals         • Primary open angle glaucoma of left eye, moderate stage [H40.1122] Yes     Continue home drops.      • Type 2 diabetes mellitus with diabetic peripheral angiopathy and gangrene, with long-term current use of insulin (CMS/[facility]) [E11.52, Z79.4] Not Applicable     - Continue long acting insulin 25 units nightly.    We will continue to trend with sliding scale, no  insulin requirement yesterday.  -Insulin aspart  low-dose sliding scale  - Finger stick glucose checks.      • Essential hypertension [I10] Yes     Monitor per floor protocol.   - He does not appear to be on any BP meds at home.            DVT prophylaxis: SCDs  Code Status and Medical Interventions:   Ordered at: 07/30/19 3746     Limited Support to NOT Include:    Intubation     Code Status:    No CPR     Medical Interventions (Level of Support Prior to Arrest):    Limited       Plan for disposition:When:  Anticipated discharge in 24 to 96 hours        This document has been electronically signed by Maxi Hernandez III, MD on August 4, 2019 8:54 AM

## 2019-08-04 NOTE — PLAN OF CARE
Problem: NPPV/CPAP (Adult)  Goal: Signs and Symptoms of Listed Potential Problems Will be Absent, Minimized or Managed (NPPV/CPAP)  Outcome: Ongoing (interventions implemented as appropriate)  Pt is on room air. Has not worn bipap since 8/1/2019.

## 2019-08-04 NOTE — PROGRESS NOTES
"[facility]  [address] [place] [address]. [address]  T - [phone]  F - [phone]     Progress Note          PATIENT  DEMOGRAPHICS   PATIENT NAME: Cristo Musa                      PHYSICIAN: LUIS Funez  : 1950  MRN: [id number]   LOS: 5 days    Patient Care Team:  Warren Stewart MD as PCP - General (Family Medicine)  Justyna Patel APRN as PCP - Claims Attributed  Michelle Lo MD as Resident (Family Medicine)  John Sanchez MD as Resident (Family Medicine)  Marissa Boothe MD as Resident (Family Medicine)  Lucio Anderson MD as Resident (Family Medicine)  Demarcus Oliveira MD as Resident (Family Medicine)  Subjective   SUBJECTIVE   Arousable- oriented to person and place but not time. Denies CP or SOB. No significant edema LEs. Reports one episode of diarrhea last night. Denies dysuria.          Objective   OBJECTIVE   Vital Signs  Temp:  [96.4 °F (35.8 °C)-97.6 °F (36.4 °C)] 97 °F (36.1 °C)  Heart Rate:  [58-61] 61  Resp:  [18-20] 20  BP: (132-142)/(62-73) 142/62    Flowsheet Rows      First Filed Value   Admission Height  185.4 cm (73\") Documented at 2019   Admission Weight  93.7 kg (206 lb 9.6 oz) Documented at 2019           I/O last 3 completed shifts:  In: 420 [P.O.:420]  Out: -     PHYSICAL EXAM    Physical Exam   Constitutional: He is oriented to person, place, and time. He appears well-developed and well-nourished.   HENT:   Head: Normocephalic and atraumatic.   Eyes: Pupils are equal, round, and reactive to light.   Cardiovascular: Normal rate, regular rhythm and normal heart sounds.   Pulmonary/Chest: Effort normal and breath sounds normal.   Abdominal: Soft. Bowel sounds are normal.   Musculoskeletal: He exhibits no edema.   Neurological: He is alert and oriented to person, place, and time.       RESULTS   Results Review:    Results from last 7 days   Lab Units 19  0809 19  1044 19  0414 "  07/30/19 2013   SODIUM mmol/L 122* 126* 136   < > 136   POTASSIUM mmol/L 4.1 3.7 4.0   < > 6.2*   CHLORIDE mmol/L 80* 87* 94*   < > 91*   CO2 mmol/L 23.0 23.0 23.0   < > 24.0   BUN mg/dL 30* 25* 45*   < > 80*   CREATININE mg/dL 6.62* 5.86* 7.64*   < > 10.27*   CALCIUM mg/dL 7.8* 7.9* 8.3*   < > 9.0   BILIRUBIN mg/dL 1.1  --   --   --  0.9   ALK PHOS U/L 116  --   --   --  120*   ALT (SGPT) U/L 12  --   --   --  8   AST (SGOT) U/L 22  --   --   --  11   GLUCOSE mg/dL 129* 133* 79   < > 209*    < > = values in this interval not displayed.       Estimated Creatinine Clearance: 11.8 mL/min (A) (by C-G formula based on SCr of 6.62 mg/dL (H)).    Results from last 7 days   Lab Units 08/02/19  0414 07/30/19 2013   MAGNESIUM mg/dL  --  3.0*   PHOSPHORUS mg/dL 5.6*  --              Results from last 7 days   Lab Units 08/04/19  0809 08/03/19  1044 08/02/19  1727 08/02/19  0414 08/01/19  1316 08/01/19  0507   WBC 10*3/mm3 5.17 4.78 5.06 4.87  --  4.75   HEMOGLOBIN g/dL 9.7* 9.5* 9.5* 7.9* 8.4* 8.7*  8.7*   PLATELETS 10*3/mm3 127* 116* 110* 117*  --  117*       Results from last 7 days   Lab Units 07/31/19  0309 07/30/19 2013   INR  1.20 1.12         Imaging Results (last 24 hours)     ** No results found for the last 24 hours. **           MEDICATIONS      brimonidine 1 drop Both Eyes TID   cetirizine 5 mg Oral Daily   cholecalciferol 500 Units Oral Daily   epoetin ziggy/ziggy-epbx 10,000 Units Intravenous Once per day on Mon Wed Fri   escitalopram 10 mg Oral Daily   fluticasone 2 spray Nasal Daily   gabapentin 100 mg Oral Nightly   insulin aspart 0-7 Units Subcutaneous 4x Daily AC & at Bedtime   insulin detemir 10 Units Subcutaneous Daily   insulin regular 10 Units Intravenous Once   lactulose 20 g Oral TID   latanoprost 1 drop Both Eyes Nightly   melatonin 5.25 mg Oral Nightly   pantoprazole 40 mg Intravenous Q12H   rifaximin 550 mg Oral Q12H   sevelamer 800 mg Oral TID With Meals   sodium chloride 3 mL Intravenous Q12H           Assessment/Plan   ASSESSMENT / PLAN      Hyponatremia    Type 2 diabetes mellitus with diabetic peripheral angiopathy and gangrene, with long-term current use of insulin (CMS/HCC)    Essential hypertension    Primary open angle glaucoma of left eye, moderate stage    ESRD (end stage renal disease) (CMS/HCC)    Primary insomnia    Chronic pain    COPD (chronic obstructive pulmonary disease) (CMS/HCC)    Hyperammonemia (CMS/HCC)    Gastrointestinal hemorrhage    Obstructive sleep apnea    1.  ESRD on HD- stable hemodynamically, NA further decreased 122 today.  On M-W-F HD . Off ivf. no heparin with hd. Albumin is low 3.3    2. Hyponatremia- sodium this am was 122 -which is decreased from 127 yesterday. Now on fluid restriction and repeat BMP this afternoon. Spoke with Dr Claire and he reports from renal standpoint, he can be discharged and more fluid can be pulled tomorrow in HD.      3.  GI bleed- s/p CTA abdomen no active bleeding. S/p ddavp. He had a recent colonoscopy which showed polyps. Colonoscopy if recurrent bleeding     4.  Hyperammonemia/metabolic encephalopathy- on lactulose and rifaximin,  ammonia level was elevated 65- this is decreasing from previous 165.      5.  Diabetes mellitus type 2     6.  Hypertension- Bps are acceptable- not on meds at this time.      7.  COPD- patient has BiPAP  But he is currently on N/C oxygen     8.  Anemia- Hgb is acceptable at 9.7. Patient has received 2  units packed red blood cells so far, epogen with HD.               This document has been electronically signed by LUIS Funez on August 4, 2019 9:16 AM

## 2019-08-05 NOTE — PLAN OF CARE
Problem: NPPV/CPAP (Adult)  Intervention: Monitor and Manage Anxiety Related to NPPV/CPAP  Bipap in room not wearing at this time

## 2019-08-05 NOTE — NURSING NOTE
House supervisor said it was acceptable for 2 Rns to witness spouse confirm DNR status and sign for EMS transport order (orange form)

## 2019-08-05 NOTE — PROGRESS NOTES
Cory Azevedo DO,[facility]  Gastroenterology  Hepatology  Endoscopy  Board Certified in Internal Medicine and gastroenterology  [address], [address]  [place]. [address]  T- [phone]   F - [phone]     GASTROENTEROLOGY PROGRESS NOTE   CORY AZEVEDO DO.         SUBJECTIVE:   8/5/2019  Chief Complaint:     Subjective  : No evidence of any active bleeding.  Rise in hemoglobin to 9.7 without need for blood products.        CURRENT MEDICATIONS/OBJECTIVE/VS/PE:     Current Medications:     Current Facility-Administered Medications   Medication Dose Route Frequency Provider Last Rate Last Dose   • 8-hydroxyquinoline sulfate (BAG BALM) ointment   Apply externally TID PRN César Hadier MD       • acetaminophen (TYLENOL) tablet 500 mg  500 mg Oral Q4H PRN Marylou Eugene MD   500 mg at 08/04/19 1354   • albumin human 25 % IV SOLN 25 g  25 g Intravenous PRN Alethea Diamond MD       • albuterol (PROVENTIL) nebulizer solution 0.5% 2.5 mg/0.5mL  10 mg Nebulization Q6H PRN Salvador Elmore MD       • benzonatate (TESSALON) capsule 100 mg  100 mg Oral TID PRN Marylou Eugene MD       • brimonidine (ALPHAGAN) 0.15 % ophthalmic solution 1 drop  1 drop Both Eyes TID Marylou Eugene MD   1 drop at 08/04/19 2151   • cetirizine (zyrTEC) tablet 5 mg  5 mg Oral Daily Marylou Eugene MD   5 mg at 08/04/19 0926   • cholecalciferol (VITAMIN D3) tablet 500 Units  500 Units Oral Daily Marylou Eugene MD   500 Units at 08/04/19 0926   • cyclobenzaprine (FLEXERIL) tablet 5 mg  5 mg Oral Q8H PRN Marylou Eugene MD   5 mg at 08/02/19 2206   • dextrose (D50W) 25 g/ 50mL Intravenous Solution 25 g  25 g Intravenous Q15 Min PRN Maxi Hernandez III, MD       • dextrose (D50W) 25 g/ 50mL Intravenous Solution 50 mL  50 mL Intravenous Q1H PRN Salvador Elmore MD       • dextrose (GLUTOSE) oral gel 15 g  15 g Oral Q15 Min PRN Maxi Hernandez III, MD       • epoetin ziggy  (EPOGEN,PROCRIT) injection 10,000 Units  10,000 Units Intravenous Once per day on Mon Wed Fri Pascual Vilchis MD   10,000 Units at 08/02/19 1126   • escitalopram (LEXAPRO) tablet 10 mg  10 mg Oral Daily Marylou Eugene MD   10 mg at 08/04/19 0926   • fluticasone (FLONASE) 50 MCG/ACT nasal spray 2 spray  2 spray Nasal Daily Marylou Eugene MD   2 spray at 08/04/19 0925   • gabapentin (NEURONTIN) capsule 100 mg  100 mg Oral Nightly Marylou Eugene MD   100 mg at 08/04/19 2151   • glucagon (human recombinant) (GLUCAGEN DIAGNOSTIC) injection 1 mg  1 mg Subcutaneous PRN Maxi Hernandez III, MD       • insulin aspart (novoLOG) injection 0-7 Units  0-7 Units Subcutaneous 4x Daily AC & at Bedtime Maxi Hernandez III, MD   2 Units at 08/04/19 1145   • insulin detemir (LEVEMIR) injection 10 Units  10 Units Subcutaneous Daily Singh Sommer MD   10 Units at 08/01/19 1025   • insulin regular (humuLIN R,novoLIN R) injection 10 Units  10 Units Intravenous Once Salvador Elmore MD   Stopped at 07/30/19 2227   • lactulose (CHRONULAC) 10 GM/15ML solution 20 g  20 g Oral TID Maxi Hernandez III, MD   20 g at 08/04/19 2151   • latanoprost (XALATAN) 0.005 % ophthalmic solution 1 drop  1 drop Both Eyes Nightly Marylou Eugene MD   1 drop at 08/04/19 2151   • melatonin tablet 5.25 mg  5.25 mg Oral Nightly Marylou Eugene MD   5.25 mg at 08/03/19 2036   • ondansetron (ZOFRAN) injection 4 mg  4 mg Intravenous Q6H PRN Marylou Eugene MD       • pantoprazole (PROTONIX) injection 40 mg  40 mg Intravenous Q12H Marylou Eugene MD   40 mg at 08/04/19 2151   • rifaximin (XIFAXAN) tablet 550 mg  550 mg Oral Q12H Marylou Eugene MD   550 mg at 08/04/19 2151   • sevelamer (RENVELA) tablet 800 mg  800 mg Oral TID With Meals Marylou Eugene MD   800 mg at 08/05/19 0637   • sodium chloride 0.9 % bolus 100 mL  100 mL Intravenous PRN Pascual Vilchis MD   50 mL at 08/02/19 1550   •  sodium chloride 0.9 % flush 10 mL  10 mL Intravenous PRN Salvador Elmore MD   10 mL at 07/30/19 2013   • sodium chloride 0.9 % flush 3 mL  3 mL Intravenous Q12H Marylou Eugene MD   3 mL at 08/04/19 2152   • sodium chloride 0.9 % flush 3-10 mL  3-10 mL Intravenous PRN Marylou Eugene MD       • traZODone (DESYREL) tablet 50 mg  50 mg Oral Nightly PRN Marylou Eugene MD   50 mg at 08/04/19 2205       Objective     Physical Exam:   Temp:  [96.5 °F (35.8 °C)-97.5 °F (36.4 °C)] 96.8 °F (36 °C)  Heart Rate:  [54-78] 58  Resp:  [20] 20  BP: (121-135)/(60-72) 122/60     Physical Exam:  General Appearance:    Alert, cooperative, in no acute distress   Head:    Normocephalic, without obvious abnormality, atraumatic   Eyes:            Lids and lashes normal, conjunctivae and sclerae normal, no   icterus, no pallor, corneas clear, PERRLA   Ears:    Ears appear intact with no abnormalities noted   Throat:   No oral lesions, no thrush, oral mucosa moist   Neck:   No adenopathy, supple, trachea midline, no thyromegaly, no     carotid bruit, no JVD   Back:     No kyphosis present, no scoliosis present, no skin lesions,       erythema or scars, no tenderness to percussion or                   palpation,   range of motion normal   Lungs:     Clear to auscultation,respirations regular, even and                   unlabored    Heart:    Regular rhythm and normal rate, normal S1 and S2, no            murmur, no gallop, no rub, no click   Breast Exam:    Deferred   Abdomen:     Normal bowel sounds, no masses, no organomegaly, soft        non-tender, non-distended, no guarding, no rebound                 tenderness   Genitalia:    Deferred   Extremities:   Moves all extremities well, no edema, no cyanosis, no              redness   Pulses:   Pulses palpable and equal bilaterally   Skin:   No bleeding, bruising or rash   Lymph nodes:   No palpable adenopathy   Neurologic:   Cranial nerves 2 - 12 grossly intact,  sensation intact, DTR        present and equal bilaterally      Results Review:     Lab Results (last 24 hours)     Procedure Component Value Units Date/Time    POC Glucose Once [[id number]]  (Abnormal) Collected:  08/05/19 0738    Specimen:  Blood Updated:  08/05/19 0756     Glucose 131 mg/dL      Comment: RN NotifiedOperator: [id number] WILIAN MENJIVARFERMeter ID: [id number]       POC Glucose Once [[id number]]  (Normal) Collected:  08/03/19 1945    Specimen:  Blood Updated:  08/05/19 0754     Glucose 127 mg/dL      Comment: Result Not ConfirmedOperator: [id number] CASPER Roberson ID: [id number]       POC Glucose Once [[id number]]  (Normal) Collected:  08/04/19 1934    Specimen:  Blood Updated:  08/04/19 2022     Glucose 127 mg/dL      Comment: RN NotifiedOperator: [id number] ZAKI REBAMeter ID: [id number]       POC Glucose Once [[id number]]  (Abnormal) Collected:  08/04/19 1632    Specimen:  Blood Updated:  08/04/19 1654     Glucose 145 mg/dL      Comment: RN NotifiedOperator: [id number] PRASAD MCNAMARAMeter ID: [id number]       POC Glucose Once [[id number]]  (Abnormal) Collected:  08/04/19 1046    Specimen:  Blood Updated:  08/04/19 1108     Glucose 176 mg/dL      Comment: RN NotifiedOperator: [id number] PRASAD WHITNEYMeter ID: [id number]       Comprehensive Metabolic Panel [[id number]]  (Abnormal) Collected:  08/04/19 0809    Specimen:  Blood Updated:  08/04/19 0833     Glucose 129 mg/dL      BUN 30 mg/dL      Creatinine 6.62 mg/dL      Sodium 122 mmol/L      Potassium 4.1 mmol/L      Chloride 80 mmol/L      CO2 23.0 mmol/L      Calcium 7.8 mg/dL      Total Protein 9.1 g/dL      Albumin 3.30 g/dL      ALT (SGPT) 12 U/L      AST (SGOT) 22 U/L      Alkaline Phosphatase 116 U/L      Total Bilirubin 1.1 mg/dL      eGFR Non African Amer -- mL/min/1.73      Comment: <15 Indicative of kidney failure.        eGFR  African Amer 10 mL/min/1.73      Comment: <15 Indicative of kidney failure.        Globulin 5.8  gm/dL      A/G Ratio 0.6 g/dL      BUN/Creatinine Ratio 4.5     Anion Gap 19.0 mmol/L     Narrative:       GFR Normal >60  Chronic Kidney Disease <60  Kidney Failure <15    CBC & Differential [[id number]] Collected:  08/04/19 0809    Specimen:  Blood Updated:  08/04/19 0812    Narrative:       The following orders were created for panel order CBC & Differential.  Procedure                               Abnormality         Status                     ---------                               -----------         ------                     CBC Auto Differential[[id number]]        Abnormal            Final result                 Please view results for these tests on the individual orders.    CBC Auto Differential [[id number]]  (Abnormal) Collected:  08/04/19 0809    Specimen:  Blood Updated:  08/04/19 0812     WBC 5.17 10*3/mm3      RBC 3.36 10*6/mm3      Hemoglobin 9.7 g/dL      Hematocrit 28.5 %      MCV 84.8 fL      MCH 28.9 pg      MCHC 34.0 g/dL      RDW 14.6 %      RDW-SD 44.5 fl      MPV 8.8 fL      Platelets 127 10*3/mm3      Neutrophil % 64.8 %      Lymphocyte % 20.1 %      Monocyte % 8.9 %      Eosinophil % 4.4 %      Basophil % 0.6 %      Immature Grans % 1.2 %      Neutrophils, Absolute 3.35 10*3/mm3      Lymphocytes, Absolute 1.04 10*3/mm3      Monocytes, Absolute 0.46 10*3/mm3      Eosinophils, Absolute 0.23 10*3/mm3      Basophils, Absolute 0.03 10*3/mm3      Immature Grans, Absolute 0.06 10*3/mm3      nRBC 0.0 /100 WBC     POC Glucose Once [[id number]]  (Abnormal) Collected:  08/04/19 0739    Specimen:  Blood Updated:  08/04/19 0803     Glucose 140 mg/dL      Comment: RN NotifiedOperator: [id number] PRASAD [facility] ID: [id number]              I reviewed the patient's new clinical results.  I reviewed the patient's new imaging results and agree with the interpretation.     ASSESSMENT/PLAN:   ASSESSMENT:  1.  Gastrointestinal bleeding, felt to be diverticular by Dr. Montoya  2.  Anemia secondary to blood  loss as well as chronic kidney disease    PLAN:  1.  No intervention required at the present time     Cory Azevedo DO  08/05/19  8:02 AM

## 2019-08-05 NOTE — PLAN OF CARE
Problem: NPPV/CPAP (Adult)  Goal: Signs and Symptoms of Listed Potential Problems Will be Absent, Minimized or Managed (NPPV/CPAP)  bipap on stand by

## 2019-08-05 NOTE — PLAN OF CARE
Problem: Patient Care Overview  Goal: Plan of Care Review  Outcome: Ongoing (interventions implemented as appropriate)   08/05/19 1613   Coping/Psychosocial   Plan of Care Reviewed With patient   OTHER   Outcome Summary Pt had 4 L of fluid pulled off in dialysis today per Dialysis RN. Pt reports no pain at this time; will continue to monitor.    Plan of Care Review   Progress no change     Goal: Individualization and Mutuality  Outcome: Ongoing (interventions implemented as appropriate)      Problem: Fall Risk (Adult)  Goal: Identify Related Risk Factors and Signs and Symptoms  Outcome: Outcome(s) achieved Date Met: 08/05/19    Goal: Absence of Fall  Outcome: Ongoing (interventions implemented as appropriate)      Problem: Skin Injury Risk (Adult)  Goal: Identify Related Risk Factors and Signs and Symptoms  Outcome: Outcome(s) achieved Date Met: 08/05/19    Goal: Skin Health and Integrity  Outcome: Ongoing (interventions implemented as appropriate)      Problem: Gastrointestinal Bleeding (Adult)  Goal: Signs and Symptoms of Listed Potential Problems Will be Absent, Minimized or Managed (Gastrointestinal Bleeding)  Outcome: Ongoing (interventions implemented as appropriate)      Problem: Pain, Chronic (Adult)  Goal: Identify Related Risk Factors and Signs and Symptoms  Outcome: Outcome(s) achieved Date Met: 08/05/19    Goal: Acceptable Pain/Comfort Level and Functional Ability  Outcome: Ongoing (interventions implemented as appropriate)      Problem: NPPV/CPAP (Adult)  Goal: Signs and Symptoms of Listed Potential Problems Will be Absent, Minimized or Managed (NPPV/CPAP)  Outcome: Ongoing (interventions implemented as appropriate)      Problem: Chronic Obstructive Pulmonary Disease (Adult)  Goal: Signs and Symptoms of Listed Potential Problems Will be Absent, Minimized or Managed (Chronic Obstructive Pulmonary Disease)  Outcome: Ongoing (interventions implemented as appropriate)      Problem: Kidney Disease,  Chronic/End Stage Renal Disease (Adult)  Goal: Signs and Symptoms of Listed Potential Problems Will be Absent, Minimized or Managed (Kidney Disease, Chronic/End Stage Renal Disease)  Outcome: Ongoing (interventions implemented as appropriate)

## 2019-08-05 NOTE — NURSING NOTE
"18:43 Dr. Pham informed that pt usually travels by [facility] per pt statement. Physician Certification Statement for Non Emergency Ambulance Services signed by Dr. Stewart stating pt \"requires stretcher for bed transport secondary to delayed mobility and inability to maintain upright posture necessary for non-ambulance means.\" MD notified that pt is able to sit on the side of the bed and can be transferred to [facility].  [facility] is not answering their phone at this time, voicemail was left by LAURA Brown/ CL. Pt's wife has not return phone call for DNR form.  MD states \" I will look into it and call you back.\"    18:51 Dr. Pham states \"we will keep him tonight and transport pt by [facility] in the AM.\"    18[facility].  "

## 2019-08-05 NOTE — PROGRESS NOTES
FAMILY MEDICINE DAILY PROGRESS NOTE  NAME: Cristo Musa  : 1950  MRN: [id number]     LOS: 6 days     PROVIDER OF SERVICE: Warren Stewart MD    Chief Complaint: Hyponatremia    Subjective:     Interval History:  History taken from: patient chart RN  No acute overnight events.  Patient awake and cooperative at bedside.  Pt continuing to express desire for discharge. Informed him that we are currently treating for hyponatremia and will consider discharge after dialysis today once we have repeat sodium levels.  Patient having good BMs overnight. No blood.    Review of Systems:   Review of Systems   Constitutional: Negative for activity change, appetite change, chills, diaphoresis and fever.   HENT: Negative for congestion, rhinorrhea, sinus pressure, sinus pain and sore throat.   Eyes: Negative for visual disturbance.   Respiratory: Negative for apnea, cough, choking, chest tightness, shortness of breath and wheezing.    Cardiovascular: Negative for chest pain, palpitations and leg swelling.   Gastrointestinal: Negative for abdominal distention, abdominal pain, blood in stool, constipation, diarrhea, nausea and vomiting.   Genitourinary: Negative for difficulty urinating, dysuria, frequency, hematuria and urgency.   Musculoskeletal: Negative for arthralgias, back pain, joint swelling, myalgias and neck pain.   Skin: Negative for color change, pallor, rash and wound.   Neurological: Negative for dizziness, weakness, numbness and headaches.   Psychiatric/Behavioral: Negative for agitation and behavioral problems.       Objective:     Vital Signs  Temp:  [96.5 °F (35.8 °C)-97.5 °F (36.4 °C)] 96.8 °F (36 °C)  Heart Rate:  [54-78] 58  Resp:  [20] 20  BP: (121-135)/(60-72) 122/60    Physical Exam  Physical Exam   Constitutional: He is oriented to person, place, and time. He appears well-developed and well-nourished. No distress.   HENT:   Head: Normocephalic and atraumatic.   Right Ear: External ear  normal.   Left Ear: External ear normal.   Nose: Nose normal.   Eyes: Conjunctivae and EOM are normal. Pupils are equal, round, and reactive to light. Right eye exhibits no discharge. Left eye exhibits no discharge. No scleral icterus.   Neck: Normal range of motion. Neck supple. No thyromegaly present.   Cardiovascular: Normal rate, regular rhythm, normal heart sounds and intact distal pulses. Exam reveals no gallop and no friction rub.   No murmur heard.  Pulmonary/Chest: Effort normal and breath sounds normal. No respiratory distress. He has no wheezes. He has no rales. He exhibits no tenderness.   Abdominal: Soft. Bowel sounds are normal. He exhibits no distension and no mass. There is no tenderness. There is no guarding.   Musculoskeletal: Normal range of motion. He exhibits no edema, tenderness or deformity.   Lymphadenopathy:     He has no cervical adenopathy.   Neurological: He is alert and oriented to person, place, and time. No cranial nerve deficit.   Skin: Skin is warm and dry. Capillary refill takes 2 to 3 seconds. He is not diaphoretic.   Psychiatric: He has a normal mood and affect. His behavior is normal. Judgment and thought content normal.       Medication Review    Current Facility-Administered Medications:   •  8-hydroxyquinoline sulfate (BAG BALM) ointment, , Apply externally, TID PRN, César Haider MD  •  acetaminophen (TYLENOL) tablet 500 mg, 500 mg, Oral, Q4H PRN, Marylou Eugene MD, 500 mg at 08/04/19 5565  •  albumin human 25 % IV SOLN 25 g, 25 g, Intravenous, PRN, Alethea Diamond MD  •  albuterol (PROVENTIL) nebulizer solution 0.5% 2.5 mg/0.5mL, 10 mg, Nebulization, Q6H PRN, Salvador Elmore MD  •  benzonatate (TESSALON) capsule 100 mg, 100 mg, Oral, TID PRN, Marylou Eugene MD  •  brimonidine (ALPHAGAN) 0.15 % ophthalmic solution 1 drop, 1 drop, Both Eyes, TID, Marylou Eugene MD, 1 drop at 08/04/19 1235  •  cetirizine (zyrTEC) tablet 5 mg, 5 mg, Oral,  Daily, Marylou Eugene MD, 5 mg at 08/04/19 0926  •  cholecalciferol (VITAMIN D3) tablet 500 Units, 500 Units, Oral, Daily, Marylou Eugene MD, 500 Units at 08/04/19 0926  •  cyclobenzaprine (FLEXERIL) tablet 5 mg, 5 mg, Oral, Q8H PRN, Marylou Eugene MD, 5 mg at 08/02/19 2206  •  dextrose (D50W) 25 g/ 50mL Intravenous Solution 25 g, 25 g, Intravenous, Q15 Min PRN, Maxi Hernandez III, MD  •  dextrose (D50W) 25 g/ 50mL Intravenous Solution 50 mL, 50 mL, Intravenous, Q1H PRN, Salvador Elmore MD  •  dextrose (GLUTOSE) oral gel 15 g, 15 g, Oral, Q15 Min PRN, Maxi Hernandez III, MD  •  epoetin ziggy (EPOGEN,PROCRIT) injection 10,000 Units, 10,000 Units, Intravenous, Once per day on Mon Wed Fri, Pascual Vilchis MD, 10,000 Units at 08/02/19 1126  •  escitalopram (LEXAPRO) tablet 10 mg, 10 mg, Oral, Daily, Marylou Eugene MD, 10 mg at 08/04/19 0926  •  fluticasone (FLONASE) 50 MCG/ACT nasal spray 2 spray, 2 spray, Nasal, Daily, Marylou Eugene MD, 2 spray at 08/04/19 0925  •  gabapentin (NEURONTIN) capsule 100 mg, 100 mg, Oral, Nightly, Marylou Eugene MD, 100 mg at 08/04/19 2151  •  glucagon (human recombinant) (GLUCAGEN DIAGNOSTIC) injection 1 mg, 1 mg, Subcutaneous, PRN, Maxi Hernandez III, MD  •  insulin aspart (novoLOG) injection 0-7 Units, 0-7 Units, Subcutaneous, 4x Daily AC & at Bedtime, Maxi Hernandez III, MD, 2 Units at 08/04/19 1145  •  insulin detemir (LEVEMIR) injection 10 Units, 10 Units, Subcutaneous, Daily, Singh Sommer MD, 10 Units at 08/01/19 1025  •  insulin regular (humuLIN R,novoLIN R) injection 10 Units, 10 Units, Intravenous, Once, Salvador Elmore MD, Stopped at 07/30/19 2227  •  lactulose (CHRONULAC) 10 GM/15ML solution 20 g, 20 g, Oral, TID, O'SushmaMaxi III, MD, 20 g at 08/04/19 2151  •  latanoprost (XALATAN) 0.005 % ophthalmic solution 1 drop, 1 drop, Both Eyes, Nightly, Marylou Eugene MD, 1 drop at 08/04/19 2151  •   melatonin tablet 5.25 mg, 5.25 mg, Oral, Nightly, Marylou Eugene MD, 5.25 mg at 08/03/19 2036  •  ondansetron (ZOFRAN) injection 4 mg, 4 mg, Intravenous, Q6H PRN, Marylou Eugene MD  •  pantoprazole (PROTONIX) injection 40 mg, 40 mg, Intravenous, Q12H, Marylou Eugene MD, 40 mg at 08/04/19 2151  •  rifaximin (XIFAXAN) tablet 550 mg, 550 mg, Oral, Q12H, Marylou Eugene MD, 550 mg at 08/04/19 2151  •  sevelamer (RENVELA) tablet 800 mg, 800 mg, Oral, TID With Meals, Marylou Eugene MD, 800 mg at 08/05/19 0637  •  sodium chloride 0.9 % bolus 100 mL, 100 mL, Intravenous, PRN, Pascual Vilhcis MD, 50 mL at 08/02/19 1550  •  sodium chloride 0.9 % flush 10 mL, 10 mL, Intravenous, PRN, Salvador Elmore MD, 10 mL at 07/30/19 2013  •  sodium chloride 0.9 % flush 3 mL, 3 mL, Intravenous, Q12H, Marylou Eugene MD, 3 mL at 08/04/19 2152  •  sodium chloride 0.9 % flush 3-10 mL, 3-10 mL, Intravenous, PRN, Marylou Eugene MD  •  traZODone (DESYREL) tablet 50 mg, 50 mg, Oral, Nightly PRN, Marylou Eugene MD, 50 mg at 08/04/19 2205     Diagnostic Data    Lab Results (last 24 hours)     Procedure Component Value Units Date/Time    Basic Metabolic Panel [[id number]]  (Abnormal) Collected:  08/05/19 0810    Specimen:  Blood Updated:  08/05/19 0919     Glucose 161 mg/dL      BUN 40 mg/dL      Creatinine 7.63 mg/dL      Sodium 118 mmol/L      Comment: Testing repeated to verify result        Potassium 4.1 mmol/L      Chloride 80 mmol/L      CO2 19.0 mmol/L      Calcium 6.9 mg/dL      eGFR  African Amer 9 mL/min/1.73      Comment: <15 Indicative of kidney failure.        eGFR Non African Amer -- mL/min/1.73      Comment: <15 Indicative of kidney failure.        BUN/Creatinine Ratio 5.2     Anion Gap 19.0 mmol/L     Narrative:       GFR Normal >60  Chronic Kidney Disease <60  Kidney Failure <15    CBC & Differential [[id number]] Collected:  08/05/19 0810    Specimen:  Blood Updated:   08/05/19 0828    Narrative:       The following orders were created for panel order CBC & Differential.  Procedure                               Abnormality         Status                     ---------                               -----------         ------                     CBC Auto Differential[[id number]]        Abnormal            Final result                 Please view results for these tests on the individual orders.    CBC Auto Differential [[id number]]  (Abnormal) Collected:  08/05/19 0810    Specimen:  Blood Updated:  08/05/19 0828     WBC 4.48 10*3/mm3      RBC 3.15 10*6/mm3      Hemoglobin 9.4 g/dL      Hematocrit 25.9 %      MCV 82.2 fL      MCH 29.8 pg      MCHC 36.3 g/dL      RDW 14.6 %      RDW-SD 42.7 fl      MPV 9.2 fL      Platelets 126 10*3/mm3      Neutrophil % 69.9 %      Lymphocyte % 17.4 %      Monocyte % 7.6 %      Eosinophil % 4.5 %      Basophil % 0.2 %      Immature Grans % 0.4 %      Neutrophils, Absolute 3.13 10*3/mm3      Lymphocytes, Absolute 0.78 10*3/mm3      Monocytes, Absolute 0.34 10*3/mm3      Eosinophils, Absolute 0.20 10*3/mm3      Basophils, Absolute 0.01 10*3/mm3      Immature Grans, Absolute 0.02 10*3/mm3      nRBC 0.0 /100 WBC     POC Glucose Once [[id number]]  (Abnormal) Collected:  08/05/19 0738    Specimen:  Blood Updated:  08/05/19 0756     Glucose 131 mg/dL      Comment: RN NotifiedOperator: [id number] WILIAN Navarrete ID: [id number]       POC Glucose Once [[id number]]  (Normal) Collected:  08/03/19 1945    Specimen:  Blood Updated:  08/05/19 0754     Glucose 127 mg/dL      Comment: Result Not ConfirmedOperator: [id number] CASPER Roberson ID: [id number]       POC Glucose Once [[id number]]  (Normal) Collected:  08/04/19 1934    Specimen:  Blood Updated:  08/04/19 2022     Glucose 127 mg/dL      Comment: RN NotifiedOperator: [id number] ZAKI TAMEZAMeter ID: [id number]       POC Glucose Once [[id number]]  (Abnormal) Collected:  08/04/19 1632    Specimen:  Blood  Updated:  08/04/19 1654     Glucose 145 mg/dL      Comment: RN NotifiedOperator: [id number] PRASAD WHITNEYMeter ID: [id number]       POC Glucose Once [[id number]]  (Abnormal) Collected:  08/04/19 1046    Specimen:  Blood Updated:  08/04/19 1108     Glucose 176 mg/dL      Comment: RN NotifiedOperator: [id number] PRASAD MCNAMARAMeter ID: [id number]               I reviewed the patient's new clinical results.    Assessment/Plan:     Active Hospital Problems    Diagnosis POA   • **Hyponatremia [E87.1] No     Patient with persistent hyponatremia now down to 118.  Continue fluid restriction at this time. Will recheck BMP after dialysis is completed this morning.   -1 L fluid restriction  -BMP this p.m.     • Obstructive sleep apnea [G47.33] Yes     Patient utilizing BiPAP therapy at night.  -BiPAP therapy to at bedtime with titration by respiratory services.     • Gastrointestinal hemorrhage [K92.2] Yes     H&H stable.  Patient with multiple bowel movement secondary to lactulose that were non-melanotic and not hematochezia.  -Follow-up with GI outpatient.       • Hyperammonemia (CMS/[facility]) [E72.20] Yes     Continue lactulose and rifaximin  Check ammonia level:193--->38 now  - Pending ammonia level for today.  -We will continue Posey vest until persistent clearance of sensorium.     • COPD (chronic obstructive pulmonary disease) (CMS/[facility]) [J44.9] Yes     - Continue flonase, loratadine  - Patient uses Oxygen prn at the nursing home.   - Duonebs and albuterol nebs prn.        • Chronic pain [G89.29] Yes     Continue home Gabapentin nightly and Flexeril prn.      • Primary insomnia [F51.01] Yes     Continue home Trazodone and Melatonin.      • ESRD (end stage renal disease) (CMS/[facility]) [N18.6] Yes     HD MWF  Nephrologist Dr. Vilchis  - Epoetin alpha 10,000 units IV Monday Wednesday Friday  -Sevelamer 800 mg p.o. 3 times daily with meals         • Primary open angle glaucoma of left eye, moderate stage [H40.1122] Yes      Continue home drops.      • Type 2 diabetes mellitus with diabetic peripheral angiopathy and gangrene, with long-term current use of insulin (CMS/[facility]) [E11.52, Z79.4] Not Applicable     - Continue long acting insulin 25 units nightly.    We will continue to trend with sliding scale, no insulin requirement yesterday.  -Insulin aspart  low-dose sliding scale  - Finger stick glucose checks.      • Essential hypertension [I10] Yes     Monitor per floor protocol.   - He does not appear to be on any BP meds at home.            DVT prophylaxis: SCDs  Code Status and Medical Interventions:   Ordered at: 07/30/19 8207     Limited Support to NOT Include:    Intubation     Code Status:    No CPR     Medical Interventions (Level of Support Prior to Arrest):    Limited       Plan for disposition:When:  Anticipated discharge in 24 to 96 hours          This document has been electronically signed by Warren Stewart MD on August 5, 2019 9:56 AM

## 2019-08-05 NOTE — PLAN OF CARE
Problem: Patient Care Overview  Goal: Plan of Care Review  Outcome: Ongoing (interventions implemented as appropriate)   08/05/19 0336   OTHER   Outcome Summary Pt alert and oriented, calm and cooperative during the night; calls for assistance; remains of fluids restriction, has been compliant; no bleeding noted; requires assist X2 with transfers; vital signs stable    Plan of Care Review   Progress improving     Goal: Individualization and Mutuality  Outcome: Ongoing (interventions implemented as appropriate)    Goal: Discharge Needs Assessment  Outcome: Ongoing (interventions implemented as appropriate)      Problem: Fall Risk (Adult)  Goal: Absence of Fall  Outcome: Ongoing (interventions implemented as appropriate)      Problem: Skin Injury Risk (Adult)  Goal: Skin Health and Integrity  Outcome: Ongoing (interventions implemented as appropriate)      Problem: Pain, Chronic (Adult)  Goal: Identify Related Risk Factors and Signs and Symptoms  Outcome: Ongoing (interventions implemented as appropriate)    Goal: Acceptable Pain/Comfort Level and Functional Ability  Outcome: Ongoing (interventions implemented as appropriate)      Problem: Chronic Obstructive Pulmonary Disease (Adult)  Goal: Signs and Symptoms of Listed Potential Problems Will be Absent, Minimized or Managed (Chronic Obstructive Pulmonary Disease)  Outcome: Ongoing (interventions implemented as appropriate)      Problem: Kidney Disease, Chronic/End Stage Renal Disease (Adult)  Goal: Signs and Symptoms of Listed Potential Problems Will be Absent, Minimized or Managed (Kidney Disease, Chronic/End Stage Renal Disease)  Outcome: Ongoing (interventions implemented as appropriate)

## 2019-08-05 NOTE — PROGRESS NOTES
"[facility]  [address] [place] [address]. [address]  T - [phone]  F - [phone]     Progress Note          PATIENT  DEMOGRAPHICS   PATIENT NAME: Cristo Musa                      PHYSICIAN: Pascual Vilchis MD  : 1950  MRN: [id number]   LOS: 6 days    Patient Care Team:  Warren Stewart MD as PCP - General (Family Medicine)  Justyna Patel APRN as PCP - Claims Attributed  Michelle Lo MD as Resident (Family Medicine)  John Sanchez MD as Resident (Family Medicine)  Mairssa Boothe MD as Resident (Family Medicine)  Lucio Anderson MD as Resident (Family Medicine)  Demarcus Oliveira MD as Resident (Family Medicine)  Subjective   SUBJECTIVE   Arousable- oriented to person and place. Seen during dialysis bp is stable       Objective   OBJECTIVE   Vital Signs  Temp:  [96.5 °F (35.8 °C)-97.5 °F (36.4 °C)] 96.8 °F (36 °C)  Heart Rate:  [54-78] 58  Resp:  [20] 20  BP: (121-135)/(60-72) 122/60    Flowsheet Rows      First Filed Value   Admission Height  185.4 cm (73\") Documented at 2019   Admission Weight  93.7 kg (206 lb 9.6 oz) Documented at 2019           I/O last 3 completed shifts:  In: 700 [P.O.:700]  Out: -     PHYSICAL EXAM    Physical Exam   Constitutional: He is oriented to person, place, and time. He appears well-developed and well-nourished.   HENT:   Head: Normocephalic and atraumatic.   Eyes: Pupils are equal, round, and reactive to light.   Cardiovascular: Normal rate, regular rhythm and normal heart sounds.   Pulmonary/Chest: Effort normal and breath sounds normal.   Abdominal: Soft. Bowel sounds are normal.   Musculoskeletal: He exhibits no edema.   Neurological: He is alert and oriented to person, place, and time.       RESULTS   Results Review:    Results from last 7 days   Lab Units 19  0810 19  0809 19  1044  19   SODIUM mmol/L 118* 122* 126*   < > 136   POTASSIUM mmol/L 4.1 4.1 3.7   " < > 6.2*   CHLORIDE mmol/L 80* 80* 87*   < > 91*   CO2 mmol/L 19.0* 23.0 23.0   < > 24.0   BUN mg/dL 40* 30* 25*   < > 80*   CREATININE mg/dL 7.63* 6.62* 5.86*   < > 10.27*   CALCIUM mg/dL 6.9* 7.8* 7.9*   < > 9.0   BILIRUBIN mg/dL  --  1.1  --   --  0.9   ALK PHOS U/L  --  116  --   --  120*   ALT (SGPT) U/L  --  12  --   --  8   AST (SGOT) U/L  --  22  --   --  11   GLUCOSE mg/dL 161* 129* 133*   < > 209*    < > = values in this interval not displayed.       Estimated Creatinine Clearance: 10.5 mL/min (A) (by C-G formula based on SCr of 7.63 mg/dL (H)).    Results from last 7 days   Lab Units 08/02/19  0414 07/30/19 2013   MAGNESIUM mg/dL  --  3.0*   PHOSPHORUS mg/dL 5.6*  --              Results from last 7 days   Lab Units 08/05/19  0810 08/04/19  0809 08/03/19  1044 08/02/19  1727 08/02/19  0414   WBC 10*3/mm3 4.48 5.17 4.78 5.06 4.87   HEMOGLOBIN g/dL 9.4* 9.7* 9.5* 9.5* 7.9*   PLATELETS 10*3/mm3 126* 127* 116* 110* 117*       Results from last 7 days   Lab Units 07/31/19  0309 07/30/19 2013   INR  1.20 1.12         Imaging Results (last 24 hours)     ** No results found for the last 24 hours. **           MEDICATIONS      brimonidine 1 drop Both Eyes TID   cetirizine 5 mg Oral Daily   cholecalciferol 500 Units Oral Daily   epoetin ziggy/ziggy-epbx 10,000 Units Intravenous Once per day on Mon Wed Fri   escitalopram 10 mg Oral Daily   fluticasone 2 spray Nasal Daily   gabapentin 100 mg Oral Nightly   insulin aspart 0-7 Units Subcutaneous 4x Daily AC & at Bedtime   insulin detemir 10 Units Subcutaneous Daily   insulin regular 10 Units Intravenous Once   lactulose 20 g Oral TID   latanoprost 1 drop Both Eyes Nightly   melatonin 5.25 mg Oral Nightly   pantoprazole 40 mg Intravenous Q12H   rifaximin 550 mg Oral Q12H   sevelamer 800 mg Oral TID With Meals   sodium chloride 3 mL Intravenous Q12H          Assessment/Plan   ASSESSMENT / PLAN      Hyponatremia    Type 2 diabetes mellitus with diabetic peripheral  angiopathy and gangrene, with long-term current use of insulin (CMS/HCC)    Essential hypertension    Primary open angle glaucoma of left eye, moderate stage    ESRD (end stage renal disease) (CMS/HCC)    Primary insomnia    Chronic pain    COPD (chronic obstructive pulmonary disease) (CMS/HCC)    Hyperammonemia (CMS/HCC)    Gastrointestinal hemorrhage    Obstructive sleep apnea    1.  ESRD on HD- stable hemodynamically, NA further decreased 118 today.  On M-W-F HD . Off ivf. no heparin with hd. Plan to do Ultrafiltration of 4 L today to help with hyponatremia. Repeat na 2 hrs post hd    2. Hyponatremia- Now on fluid restriction. Plan as above dw primary team. Can be discharged from renal standpoint and we can work on ultrafiltration in out pt setting.      3.  GI bleed- s/p CTA abdomen no active bleeding. S/p ddavp. He had a recent colonoscopy which showed polyps. Colonoscopy if recurrent bleeding      4.  Hyperammonemia/metabolic encephalopathy- on lactulose and rifaximin,  ammonia level is better     5.  Diabetes mellitus type 2     6.  Hypertension- Bps are acceptable- not on meds at this time.      7.  COPD- patient has BiPAP  But he is currently on N/C oxygen     8.  Anemia- Hgb is acceptable at 9.7. Patient has received 2  units packed red blood cells so far, epogen with HD.               This document has been electronically signed by Pascual Vilchis MD on August 5, 2019 9:59 AM

## 2019-08-06 PROBLEM — E87.1 HYPONATREMIA: Status: RESOLVED | Noted: 2019-01-01 | Resolved: 2019-01-01

## 2019-08-06 NOTE — DISCHARGE SUMMARY
[address] [address]  T - [phone]     DISCHARGE SUMMARY         PATIENT  DEMOGRAPHICS   PATIENT NAME: Cristo Taylorzier                      PHYSICIAN: Warren Stewart MD  : 1950  MRN: [id number]    ADMISSION/DISCHARGE INFO   ADMISSION DATE: 2019   DISCHARGE DATE: 19    ADMISSION DIAGNOSES: Confusion [R41.0]  Gastrointestinal hemorrhage, unspecified gastrointestinal hemorrhage type [K92.2]  Stage 5 chronic kidney disease on chronic dialysis (CMS/[facility]) [N18.6, Z99.2]  DISCHARGE DIAGNOSES:   Active Hospital Problems    Diagnosis  POA   • Obstructive sleep apnea [G47.33]  Yes   • Gastrointestinal hemorrhage [K92.2]  Yes   • Hyperammonemia (CMS/[facility]) [E72.20]  Yes   • COPD (chronic obstructive pulmonary disease) (CMS/[facility]) [J44.9]  Yes   • Chronic pain [G89.29]  Yes   • Primary insomnia [F51.01]  Yes   • ESRD (end stage renal disease) (CMS/[facility]) [N18.6]  Yes   • Primary open angle glaucoma of left eye, moderate stage [H40.1122]  Yes   • Type 2 diabetes mellitus with diabetic peripheral angiopathy and gangrene, with long-term current use of insulin (CMS/[facility]) [E11.52, Z79.4]  Not Applicable   • Essential hypertension [I10]  Yes      Resolved Hospital Problems    Diagnosis Date Resolved POA   • **Hyponatremia [E87.1] 2019 No         SERVICE: Family Medicine  ATTENDING PROVIDER: Dr. Haider  RESIDENT: Warren Stewart MD     CONSULTS   Consult Orders (all) (From admission, onward)    Start     Ordered    19 2342  Inpatient Nephrology Consult  Once     Specialty:  Nephrology  Provider:  Pascual Vilchis MD    19 234  Inpatient Gastroenterology Consult  Once     Specialty:  Gastroenterology  Provider:  Cory Azevedo DO    19  Family Practice - Resident (on-call MD unless specified)  Once     Specialty:  Family Medicine  Provider:  Marylou Eugene MD    19 2236     07/30/19 2153  Nephrology - MELANIE (on-call MD from group unless specified)  Once     Specialty:  Nephrology  Provider:  Pascual Vilchis MD    07/30/19 2152          PROCEDURES     Imaging Results (last 24 hours)     ** No results found for the last 24 hours. **          Ct Angiogram Abdomen Pelvis With & Without Contrast    Result Date: 7/31/2019  Narrative: CT angiogram abdomen, pelvis with contrast. HISTORY: CLINICAL INDICATION: Gastrointestinal hemorrhage, GI bleeding. Renal insufficiency on dialysis. COMPARISON: CT April 22, 2019.   TECHNIQUE: 90 mL Isovue-370,  nonionic IV contrast. Helical scanning with axial and coronal reformations. Computer-generated 3-D images, CT angiography including volume rendered images, subtraction and MIP images are obtained. Soft tissue, lung, and bone windows reviewed. This exam was performed according to our departmental dose-optimization program, which includes automated exposure control, adjustment of the mA and/or kV according to patient size and/or use of iterative reconstruction technique. ABDOMEN CT FINDINGS: Superior mesenteric artery, celiac artery are both patent and normal in course and caliber. Inferior mesenteric artery is also patent. Renal arteries are normal and symmetrical without evidence of renal artery stenosis. Both kidneys demonstrate cortical thinning, atrophy. There is no evidence of any angiodysplasia in the mesenteric vessels and no evidence of any taya extravasation i.e. no evidence of active GI bleeding.     Impression: CONCLUSION: Diffuse atherosclerotic plaque throughout the mesenteric vessels but no evidence of occlusion or focal stenosis. Superior mesenteric artery and inferior mesenteric artery, celiac artery are patent and normal in course and caliber. No evidence of any angiodysplasia or neovascularity. No evidence of any taya extravasation of contrast. No  evidence of active GI bleeding. Electronically signed by:  Wallace Louise MD  7/31/2019 11:07  "AM CDT Workstation: [id number]      HISTORY OF PRESENT ILLNESS   Per Jacob at admission on 7/30/19:  \" Cristo Musa is a 69 y.o. male with CMH to include COPD, end-stage renal disease on dialysis, congestive heart failure, anemia, GERD who resides at [facility].      He has had the bloody stool for at least one day.  Nurse reports that he has had some bloody stools on and off for the past year with multiple positive Hemoccult.  He had a colonoscopy done in March.  They found a couple of polyps but it was an inadequate study due to lack of bowel prep.  She states that for the past day he has had a a lot of bloody stool and blood clots that have been going up his back.  The nursing home notified Dr. Eugene regarding the increased bloody stool and it was recommended to go to the ER.  In the ER he had 2 bowel movements with a large amount of blood.  H&H was decreased from 11.5 on 7/24 to 8.4 today.  PT and INR were normal.      He refused dialysis on Monday.  He was scheduled to go the Friday prior to that.  He did not get the full treatment due to him pulling IV out.  The nurse was unsure if he had dialysis last Wednesday.  But she reports last Monday he also had a short course due to pulling the IV out.  Mr. Musa spoke to PCP and told him he did want to continue with dialysis.  In the ER creatinine was 10.27,  BUN 80.  Potassium was 6.2.      Dr. Vilchis was consulted from the emergency room.  He recommended albuterol, dextrose, and insulin for hyperkalemia.  Dr. Azevedo was consulted he emergency room who recommended a tagged red blood cell study.\"        DIAGNOSTIC DATA   CT Angio: CONCLUSION: Diffuse atherosclerotic plaque throughout the  mesenteric vessels but no evidence of occlusion or focal  stenosis. Superior mesenteric artery and inferior mesenteric  artery, celiac artery are patent and normal in course and  caliber. No evidence of any angiodysplasia or neovascularity. No  evidence of " any taya extravasation of contrast. No  evidence of  active GI bleeding.      Ammonia 16 - 60 umol/L 65 Abnormally high       Ammonia 16 - 60 umol/L 50      Sodium 136 - 145 mmol/L 118 Critically low       Sodium 136 - 145 mmol/L 133 Abnormally low                           HOSPITAL COURSE   Pt was admitted to our service due to bloody stools and altered level of consciousness. Pt  Presented from [facility] and had not receivved dialysis for >1 week due to Pt refusal. Upon admission Pt was found to be hyperammonemic. GI and Dr. Vilchis nephrology were both consulted. Dr Azevedo recommended tag red blood cell scan but this was unable to be completed due to Pt noncompliance,even with Halodol administration. Decision to proceed forward with CT Angio was made, and found no evidence of active GI bleeding.     Pt continued to receive dialysis during this admission because it was believed some of his bleeding may be uremic. He also received 1x dose of DDAVP. He tolerated this well.  Plan for discharge back to home was formulated but Pt was found to be hyponatremic.He was placed on a fluid restricted diet and we decided to wait until after his next dialysis treatment to recheck this.After receiving dialysis he was found to have increased his Sodium from 118-->133and was deemed safe for discharge with outpatient follow up per Dr. Vilchis. Since his labs resulted later in the evening the only suitable transport was ambulance but the bill to pay for this service would be unaffordable for Pt. Pt remained admitted one more day until the AM when CM was contacted and PACS placement was made.He was instructed to return to hospital if symptoms worsen or failed to improve.He was subsequenetly discharged.      DISCHARGE CONDITION   Stable    DISPOSITION   To Nursing home      DISCHARGE MEDICATIONS        Your medication list      CONTINUE taking these medications      Instructions Last Dose Given Next  Dose Due   acetaminophen 500 MG tablet  Commonly known as:  TYLENOL  Notes to patient:  As needed.      Take 500 mg by mouth Every 4 (Four) Hours As Needed for Mild Pain  or Fever.       aspirin 81 MG chewable tablet  Notes to patient:  08/07/2019 at 9Am      Chew 81 mg Daily.       benzonatate 100 MG capsule  Commonly known as:  TESSALON  Notes to patient:  As needed.      Take 100 mg by mouth 3 (Three) Times a Day As Needed for Cough.       brimonidine 0.1 % solution ophthalmic solution  Commonly known as:  ALPHAGAN P  Notes to patient:  08/06/2019 at 9PM      Administer  to both eyes 3 (Three) Times a Day.       cyclobenzaprine 5 MG tablet  Commonly known as:  FLEXERIL  Notes to patient:  As needed.      Take 5 mg by mouth Every 8 (Eight) Hours As Needed for Muscle Spasms.       dorzolamide 2 % ophthalmic solution  Commonly known as:  TRUSOPT  Notes to patient:  08/06/2019 at 9PM      Administer 1 drop into the left eye 3 (Three) Times a Day.       escitalopram 10 MG tablet  Commonly known as:  LEXAPRO  Notes to patient:  08/07/2019 a 9AM      Take 1 tablet by mouth Daily.       famotidine 20 MG tablet  Commonly known as:  PEPCID  Notes to patient:  08/06/2019 at 9pm      Take 20 mg by mouth every night at bedtime.       fluticasone 50 MCG/ACT nasal spray  Commonly known as:  FLONASE  Notes to patient:  08/07/2019 at 9AM      2 sprays into each nostril daily. Administer 2 sprays in each nostril for each dose.       gabapentin 100 MG capsule  Commonly known as:  NEURONTIN  Notes to patient:  08/06/2019 at 9PM      Take 1 capsule by mouth Every Night.       guaiFENesin 400 MG tablet  Commonly known as:  HUMIBID 3  Notes to patient:  As needed.      Take 400 mg by mouth Every 4 (Four) Hours As Needed for Cough.       insulin aspart 100 UNIT/ML injection  Commonly known as:  novoLOG  Notes to patient:  08/06/2019 at 5PM      Inject  under the skin 3 (Three) Times a Day Before Meals. Sliding scale:  = 0 units;  150-200 = 3 units; 200-250 = 6 units; 250-300 = 9 units; 300-350 = 12 units; >350 = 15 units and call MD       insulin detemir 100 UNIT/ML injection  Commonly known as:  LEVEMIR  Notes to patient:  08/07/2019 at 9AM      Inject 25 Units under the skin into the appropriate area as directed Daily.       lactulose 10 GM/15ML solution  Commonly known as:  CHRONULAC  Notes to patient:  08/06/2019 at 9PM      Take 30 mL by mouth 3 (Three) Times a Day.       latanoprost 0.005 % ophthalmic solution  Commonly known as:  XALATAN  Notes to patient:  08/06/2019 at 9PM      Administer 1 drop to both eyes every night.       Loratadine 10 MG capsule  Notes to patient:  08/06/2019 at 9PM      Take 10 mg by mouth Every Night.       melatonin 5 MG tablet tablet  Notes to patient:  08/06/2019 at 9PM      Take 5 mg by mouth Every Night.       ondansetron 4 MG tablet  Commonly known as:  ZOFRAN  Notes to patient:  As needed      Take 4 mg by mouth Every 6 (Six) Hours As Needed for Nausea or Vomiting.       ondansetron ODT 4 MG disintegrating tablet  Commonly known as:  ZOFRAN-ODT  Notes to patient:  As needed.      Take 1 tablet by mouth Every 6 (Six) Hours As Needed for Nausea or Vomiting.       polyethylene glycol packet  Commonly known as:  MIRALAX  Notes to patient:  As needed.      Take 17 g by mouth Daily As Needed (constipation).       rifaximin 550 MG tablet  Commonly known as:  XIFAXAN  Notes to patient:  08/05/2019 at 9PM      Take 1 tablet by mouth Every 12 (Twelve) Hours.       sevelamer 800 MG tablet  Commonly known as:  RENVELA  Notes to patient:  08/06/2019 at 5pm      Take 800 mg by mouth 3 (Three) Times a Day With Meals.       traZODone 50 MG tablet  Commonly known as:  DESYREL  Notes to patient:  08/06/2019 at 9PM      Take 50 mg by mouth every night at bedtime.       Vitamin D3 2000 units chewable tablet  Notes to patient:  08/07/2019 at 9Am      Chew 2,000 Units Daily.              INSTRUCTIONS   Activity:   Activity  Instructions     Activity as Tolerated            Diet:   Diet Instructions     Diet: Regular      Discharge Diet:  Regular          Special Instructions: Patient instructed to call M.D. or return to ED with worsening shortness of breath, chest pain, fever greater than 100.4°F or any other medical concerns.    FOLLOW UP   Additional Instructions for the Follow-ups that You Need to Schedule     Call MD With Problems / Concerns   As directed      Instructions:Call MD if symptoms worsen or fail to improve    Order Comments:  Instructions:Call MD if symptoms worsen or fail to improve          Discharge Follow-up with PCP   As directed       Currently Documented PCP:    Warren Stewart MD    PCP Phone Number:    [phone]     Follow Up Details:  1 week            Contact information for follow-up providers     Warren Stewart MD Follow up.    Specialty:  Family Medicine  Why:  1 week  Contact information:  [address]   [place] [address] [phone]                   Contact information for after-discharge care     Destination     [facility] Follow up.    Service:  Intermediate Care  Contact information:  [address] [place] [id number] [phone]                            PENDING TEST RESULTS AT DISCHARGE      TIME   Time: 30 minutes were spent planning this discharge.          Dr. Haider is the attending at time of discharge, He is aware of the patient's status and agrees with the above discharge summary.           This document has been electronically signed by Warren Stewart MD on August 6, 2019 10:25 AM

## 2019-08-06 NOTE — NURSING NOTE
"PACs contacted [phone]. Staff states \"I don't know what time pt will be picked up.\" LAURA Smith/ CL made aware.  "

## 2019-08-06 NOTE — NURSING NOTE
Spoke with Dr Haider pt is ready for dc, just need to sit up PACS for pt.  Had to call [facility] because that is the facility pt uses, per [facility]  In process of trying to arrange transportation now  [phone]  Transportation arranged for 1115am with PACS

## 2019-08-06 NOTE — PROGRESS NOTES
"[facility]  [address] [place] [address]. [address]  T - [phone]  F - [phone]     Progress Note          PATIENT  DEMOGRAPHICS   PATIENT NAME: Cristo Musa                      PHYSICIAN: Pascual Vilchis MD  : 1950  MRN: [id number]   LOS: 7 days    Patient Care Team:  Warren Stewart MD as PCP - General (Family Medicine)  Justyna Patel APRN as PCP - [facility] Attributed  Michelle Lo MD as Resident (Family Medicine)  John Sanchez MD as Resident (Family Medicine)  Marissa Boothe MD as Resident (Family Medicine)  Lucio Anderson MD as Resident (Family Medicine)  Demarcus Oliveira MD as Resident (Family Medicine)  Subjective   SUBJECTIVE   Resting plan to transfer to NH today       Objective   OBJECTIVE   Vital Signs  Temp:  [96.1 °F (35.6 °C)-97.9 °F (36.6 °C)] 96.5 °F (35.8 °C)  Heart Rate:  [56-77] 61  Resp:  [18-20] 20  BP: (104-132)/(47-76) 126/68    Flowsheet Rows      First Filed Value   Admission Height  185.4 cm (73\") Documented at 2019   Admission Weight  93.7 kg (206 lb 9.6 oz) Documented at 2019           I/O last 3 completed shifts:  In: 968 [P.O.:968]  Out: 4000 [Other:4000]    PHYSICAL EXAM    Physical Exam   Constitutional: He is oriented to person, place, and time. He appears well-developed and well-nourished.   HENT:   Head: Normocephalic and atraumatic.   Eyes: Pupils are equal, round, and reactive to light.   Cardiovascular: Normal rate, regular rhythm and normal heart sounds.   Pulmonary/Chest: Effort normal and breath sounds normal.   Abdominal: Soft. Bowel sounds are normal.   Musculoskeletal: He exhibits no edema.   Neurological: He is alert and oriented to person, place, and time.       RESULTS   Results Review:    Results from last 7 days   Lab Units 19  0816 19  1629 19  0810 19  0809  19   SODIUM mmol/L 130* 133* 118* 122*   < > 136   POTASSIUM mmol/L 5.3* 3.6 4.1 " 4.1   < > 6.2*   CHLORIDE mmol/L 91* 93* 80* 80*   < > 91*   CO2 mmol/L 21.0* 27.0 19.0* 23.0   < > 24.0   BUN mg/dL 21 15 40* 30*   < > 80*   CREATININE mg/dL 5.52* 4.36* 7.63* 6.62*   < > 10.27*   CALCIUM mg/dL 7.8* 7.7* 6.9* 7.8*   < > 9.0   BILIRUBIN mg/dL  --   --   --  1.1  --  0.9   ALK PHOS U/L  --   --   --  116  --  120*   ALT (SGPT) U/L  --   --   --  12  --  8   AST (SGOT) U/L  --   --   --  22  --  11   GLUCOSE mg/dL 101* 130* 161* 129*   < > 209*    < > = values in this interval not displayed.       Estimated Creatinine Clearance: 14.1 mL/min (A) (by C-G formula based on SCr of 5.52 mg/dL (H)).    Results from last 7 days   Lab Units 08/02/19  0414 07/30/19 2013   MAGNESIUM mg/dL  --  3.0*   PHOSPHORUS mg/dL 5.6*  --              Results from last 7 days   Lab Units 08/06/19  0819 08/05/19  0810 08/04/19  0809 08/03/19  1044 08/02/19  1727   WBC 10*3/mm3 4.94 4.48 5.17 4.78 5.06   HEMOGLOBIN g/dL 9.5* 9.4* 9.7* 9.5* 9.5*   PLATELETS 10*3/mm3 122* 126* 127* 116* 110*       Results from last 7 days   Lab Units 07/31/19  0309 07/30/19 2013   INR  1.20 1.12         Imaging Results (last 24 hours)     ** No results found for the last 24 hours. **           MEDICATIONS      brimonidine 1 drop Both Eyes TID   cetirizine 5 mg Oral Daily   cholecalciferol 500 Units Oral Daily   epoetin ziggy/ziggy-epbx 10,000 Units Intravenous Once per day on Mon Wed Fri   escitalopram 10 mg Oral Daily   fluticasone 2 spray Nasal Daily   gabapentin 100 mg Oral Nightly   insulin aspart 0-7 Units Subcutaneous 4x Daily AC & at Bedtime   insulin detemir 10 Units Subcutaneous Daily   insulin regular 10 Units Intravenous Once   lactulose 20 g Oral TID   latanoprost 1 drop Both Eyes Nightly   melatonin 5.25 mg Oral Nightly   pantoprazole 40 mg Intravenous Q12H   rifaximin 550 mg Oral Q12H   sevelamer 800 mg Oral TID With Meals   sodium chloride 3 mL Intravenous Q12H          Assessment/Plan   ASSESSMENT / PLAN      Type 2 diabetes  mellitus with diabetic peripheral angiopathy and gangrene, with long-term current use of insulin (CMS/HCC)    Essential hypertension    Primary open angle glaucoma of left eye, moderate stage    ESRD (end stage renal disease) (CMS/HCC)    Primary insomnia    Chronic pain    COPD (chronic obstructive pulmonary disease) (CMS/HCC)    Hyperammonemia (CMS/HCC)    Gastrointestinal hemorrhage    Obstructive sleep apnea    1.  ESRD on HD- stable hemodynamically, NA better after UF plan for discharge noted. On M-W-F HD . Ok to resume heparin with hd    2. Hyponatremia- fluid restriction plus hemodialysis / UF     3.  GI bleed- s/p CTA abdomen no active bleeding. S/p ddavp. He had a recent colonoscopy which showed polyps. Colonoscopy if recurrent bleeding      4.  Hyperammonemia/metabolic encephalopathy- on lactulose and rifaximin,  ammonia level is better     5.  Diabetes mellitus type 2     6.  Hypertension- Bps are acceptable- not on meds at this time.      7.  COPD- patient has BiPAP  But he is currently on N/C oxygen     8.  Anemia- Hgb is stable on epogen here              This document has been electronically signed by Pascual Vilchis MD on August 6, 2019 11:15 AM

## 2019-08-06 NOTE — TELEPHONE ENCOUNTER
Carmen from [facility] called and is needing for Dr. LAVERNE Stewart to come in and see the patient in about a week she said. If any questions she said that someone can call her back.      Thank you,      Patricia

## 2019-08-06 NOTE — PLAN OF CARE
Problem: Patient Care Overview  Goal: Plan of Care Review  Outcome: Ongoing (interventions implemented as appropriate)   08/06/19 9516   Coping/Psychosocial   Plan of Care Reviewed With patient   OTHER   Outcome Summary No complaints at this time. Pt resting, discharge in AM. VSS. Will continue to monitor   Plan of Care Review   Progress no change

## 2019-08-13 NOTE — PROGRESS NOTES
Received call from Carmen at [facility] that the patient's glucose is 455.  She did give him 15 units of NovoLog.  She was asked to do his routine glucose check at 9 PM and then provide him sliding scale insulin, per his protocol.  She called back to state that his 9 PM sugar was 409.  She did give him another 15 units of NovoLog.  Upon reflection, Carmen stated that the patient did not get his morning 25 units of Levemir today or yesterday (patient was off to dialysis today but unsure why he did not receive it yesterday).  She was advised to give him 10 units of Levemir tonight as well as continue his usual regimen of 25 units of Levemir tomorrow morning.  She endorsed understanding.      Sebastian Atwood M.D. PGY3  [facility]  [address] [place] [address]  Office: [phone]      This document has been electronically signed by Sebastian Atwood MD on August 12, 2019 9:57 PM

## 2019-08-22 NOTE — TELEPHONE ENCOUNTER
STANTON SAWYER CALLED TO GET A REFILL ON PT'S GABAPENTIN, 100MG 1 TABLET NIGHTLY.     I TOLD THEM THERE WERE 5 REFILLS ON HIS SCRIPT THAT WAS WRITTEN BACK ON 7/19/2019, BUT THEY SAID THEY DIDN'T HAVE REFILLS ON THE CURRENT SCRIPT THEY HAVE.     THEY ASKED IF SOMEONE COULD CALL AND CLARIFY WITH THEM IF HE HAS REFILLS OR NOT.     STANTON SAWYER  [phone]    THANK YOU

## 2019-08-23 NOTE — PROGRESS NOTES
Received call from [facility] that Pt's glucose was >500. Instructed them to proceed with 15 units of insulin. Also informed them to call back in 1 hour with updated glucose reading, may need further insulin at that time. Currently asymptomatic, but if symptoms arise will consider hospital visit.

## 2019-08-27 NOTE — PROGRESS NOTES
Received call from nursing staff earlier this morning that the patient's blood sugar was in the 490s. Nurse was advised to give the patient another 15 units of his Novolin. She endorsed understanding.      Sebastian Atwood M.D. PGY3  [facility]  [address] [place] [address]  Office: [phone]      This document has been electronically signed by Sebastian Atwood MD on August 27, 2019 5:27 PM

## 2019-08-27 NOTE — PROGRESS NOTES
Received call from Oxana in [facility] that Pt's glucose was 466. Informed her to go ahead and give Pt 18 units of his insulin now. Will recheck in 2 hours and if still >350 then please call back into MD. Otherwise OK to go ahead with SSI.           This document has been electronically signed by Warren Stewart MD on August 27, 2019 4:53 PM

## 2019-08-30 NOTE — PROGRESS NOTES
MONTHLY NURSING HOME VISIT    NAME: Cristo Musa  : 1950  MRN: [id number]    DATE & TIME SEEN: 19    PCP: Warren Stewart MD    NURSING HOME: [facility]    Monthly Nursing Home Visit for CKD, DMT2, HTN    Chief Complaint:     Subjective:     Cristo Musa is a 69 y.o. male sen today for monthly nursing home visit. Found comfortable in bed this afternoon. Nurses expressed concern that Pt has appeared confused for the past week. He is a dialysis Pt but has pulled out his IV on his last 2 dialysis days. Pt has expressed to nursing staff that he no longer wished to continue dialysis. On further inquiry he expresses that this is due to the fact that he does not understand why dialysis is needed. After an extensive discussion with Pt regarding risks and benefits of ceasing treatment, Pt is now willing to resume this. Of note, due to Pt's repeated occurrences during dialysis they are now requesting a sitter to be present for him to have dialysis. Nursing home does not have faculty available for this and are requesting a sitter to be provided, but will provide one for next visit or two.     Current Meds:    Current Outpatient Medications:   •  acetaminophen (TYLENOL) 500 MG tablet, Take 500 mg by mouth Every 4 (Four) Hours As Needed for Mild Pain  or Fever., Disp: , Rfl:   •  aspirin 81 MG chewable tablet, Chew 81 mg Daily., Disp: , Rfl:   •  benzonatate (TESSALON) 100 MG capsule, Take 100 mg by mouth 3 (Three) Times a Day As Needed for Cough., Disp: , Rfl:   •  brimonidine (ALPHAGAN P) 0.1 % solution ophthalmic solution, Administer  to both eyes 3 (Three) Times a Day., Disp: , Rfl:   •  Cholecalciferol (VITAMIN D3) 2000 units chewable tablet, Chew 2,000 Units Daily., Disp: , Rfl:   •  cyclobenzaprine (FLEXERIL) 5 MG tablet, Take 5 mg by mouth Every 8 (Eight) Hours As Needed for Muscle Spasms., Disp: , Rfl:   •  dorzolamide (TRUSOPT) 2 % ophthalmic solution, Administer 1 drop  into the left eye 3 (Three) Times a Day., Disp: 1 each, Rfl: 12  •  escitalopram (LEXAPRO) 10 MG tablet, Take 1 tablet by mouth Daily., Disp: 30 tablet, Rfl: 3  •  famotidine (PEPCID) 20 MG tablet, Take 20 mg by mouth every night at bedtime., Disp: , Rfl:   •  fluticasone (FLONASE) 50 MCG/ACT nasal spray, 2 sprays into each nostril daily. Administer 2 sprays in each nostril for each dose., Disp: , Rfl:   •  gabapentin (NEURONTIN) 100 MG capsule, Take 1 capsule by mouth Every Night., Disp: 30 capsule, Rfl: 5  •  guaiFENesin (HUMIBID 3) 400 MG tablet, Take 400 mg by mouth Every 4 (Four) Hours As Needed for Cough., Disp: , Rfl:   •  insulin aspart (NovoLOG) 100 UNIT/ML injection, Inject  under the skin 3 (Three) Times a Day Before Meals. Sliding scale:  = 0 units; 150-200 = 3 units; 200-250 = 6 units; 250-300 = 9 units; 300-350 = 12 units; >350 = 15 units and call MD, Disp: , Rfl:   •  insulin detemir (LEVEMIR) 100 UNIT/ML injection, Inject 25 Units under the skin into the appropriate area as directed Daily., Disp: , Rfl:   •  lactulose (CHRONULAC) 10 GM/15ML solution, Take 30 mL by mouth 3 (Three) Times a Day. (Patient taking differently: Take 30 mL by mouth 3 (Three) Times a Day.), Disp: 946 mL, Rfl: 3  •  latanoprost (XALATAN) 0.005 % ophthalmic solution, Administer 1 drop to both eyes every night., Disp: , Rfl:   •  Loratadine 10 MG capsule, Take 10 mg by mouth Every Night., Disp: , Rfl:   •  melatonin 5 MG tablet tablet, Take 5 mg by mouth Every Night., Disp: , Rfl:   •  ondansetron (ZOFRAN) 4 MG tablet, Take 4 mg by mouth Every 6 (Six) Hours As Needed for Nausea or Vomiting., Disp: , Rfl:   •  ondansetron ODT (ZOFRAN-ODT) 4 MG disintegrating tablet, Take 1 tablet by mouth Every 6 (Six) Hours As Needed for Nausea or Vomiting., Disp: 10 tablet, Rfl: 0  •  polyethylene glycol (MIRALAX) packet, Take 17 g by mouth Daily As Needed (constipation)., Disp: , Rfl:   •  rifaximin (XIFAXAN) 550 MG tablet, Take 1 tablet  by mouth Every 12 (Twelve) Hours., Disp: 60 tablet, Rfl: 5  •  sevelamer (RENVELA) 800 MG tablet, Take 800 mg by mouth 3 (Three) Times a Day With Meals., Disp: , Rfl:   •  traZODone (DESYREL) 50 MG tablet, Take 50 mg by mouth every night at bedtime., Disp: , Rfl:     Allergies:  Lisinopril and Motrin [ibuprofen]    Review of Systems:  Review of Systems   Unable to perform ROS: Mental status change       Objective:     Temp: 97.3   Pulse:82 Resp: 20 BP: 130/80  Physical Exam   Constitutional: He is oriented to person, place, and time. He appears well-developed and well-nourished. No distress.   HENT:   Head: Normocephalic and atraumatic.   Right Ear: External ear normal.   Left Ear: External ear normal.   Mouth/Throat: Oropharynx is clear and moist.   Eyes: Conjunctivae and EOM are normal. Pupils are equal, round, and reactive to light. No scleral icterus.   Neck: Normal range of motion.   Cardiovascular: Normal rate, regular rhythm and normal heart sounds. Exam reveals no gallop and no friction rub.   No murmur heard.  Pulmonary/Chest: Effort normal and breath sounds normal. No stridor. No respiratory distress. He has no wheezes. He has no rales. He exhibits no tenderness.   Abdominal: Soft. Bowel sounds are normal. He exhibits no distension and no mass. There is no tenderness. There is no rebound and no guarding.   Musculoskeletal: Normal range of motion. He exhibits no edema or tenderness.   Neurological: He is alert and oriented to person, place, and time.   Skin: Skin is warm and dry. No rash noted. He is not diaphoretic. No erythema. No pallor.   Psychiatric: He is withdrawn. He is inattentive.            Assessment/Plan:      69 y.o. male with:  Problem List Items Addressed This Visit        Cardiovascular and Mediastinum    Type 2 diabetes mellitus with diabetic peripheral angiopathy and gangrene, with long-term current use of insulin (CMS/[facility]) - Primary    Overview     - Continue long acting insulin 25 units  nightly. Novolog TID before meals  -Insulin aspart  low-dose sliding scale  - Finger stick glucose checks.          Essential hypertension    Overview     BP stable at this visit. Continue on current medications at this time.             Genitourinary    ESRD (end stage renal disease) (CMS/[facility])    Overview     [facility]  Nephrologist Dr. Vilchis                     CODE STATUS: Full code    Dr. Haider is the attending on record for this patient, He is aware of the patient's status and agrees with the above.            This document has been electronically signed by Warren Stewart MD on August 30, 2019 11:33 AM

## 2019-08-30 NOTE — PROGRESS NOTES
MONTHLY NURSING HOME VISIT    NAME: Cristo Musa  : 1950  MRN: [id number]    PCP: Warren Stewart MD    DATE AND TIME SEEN: 19 at 1300    NURSING HOME: [facility]    Chief Complaint: Monthly Nursing Home Visit for 2019    Subjective:     Cristo Musa is a 69 y.o. male who is being seen for a monthly nursing home follow up. Patient states that he is doing fine currently. He is currently resting in his room. There was a concern from nursing home staff that he does not want to undergo dialysis anymore. Patient was explained about the benefits of continuing dialysis and inquired about why he wants to stop. Patient states that he just didn't understand what dialysis was, and after further questioning he is now agreeable to return to dialysis three days a week for his ESRD. He has a history of hepatic encephalopathy and is currently on scheduled lactulose.      Current Meds:    Current Outpatient Medications:   •  acetaminophen (TYLENOL) 500 MG tablet, Take 500 mg by mouth Every 4 (Four) Hours As Needed for Mild Pain  or Fever., Disp: , Rfl:   •  aspirin 81 MG chewable tablet, Chew 81 mg Daily., Disp: , Rfl:   •  benzonatate (TESSALON) 100 MG capsule, Take 100 mg by mouth 3 (Three) Times a Day As Needed for Cough., Disp: , Rfl:   •  brimonidine (ALPHAGAN P) 0.1 % solution ophthalmic solution, Administer  to both eyes 3 (Three) Times a Day., Disp: , Rfl:   •  Cholecalciferol (VITAMIN D3) 2000 units chewable tablet, Chew 2,000 Units Daily., Disp: , Rfl:   •  cyclobenzaprine (FLEXERIL) 5 MG tablet, Take 5 mg by mouth Every 8 (Eight) Hours As Needed for Muscle Spasms., Disp: , Rfl:   •  dorzolamide (TRUSOPT) 2 % ophthalmic solution, Administer 1 drop into the left eye 3 (Three) Times a Day., Disp: 1 each, Rfl: 12  •  escitalopram (LEXAPRO) 10 MG tablet, Take 1 tablet by mouth Daily., Disp: 30 tablet, Rfl: 3  •  famotidine (PEPCID) 20 MG tablet, Take 20 mg by mouth every  night at bedtime., Disp: , Rfl:   •  fluticasone (FLONASE) 50 MCG/ACT nasal spray, 2 sprays into each nostril daily. Administer 2 sprays in each nostril for each dose., Disp: , Rfl:   •  gabapentin (NEURONTIN) 100 MG capsule, Take 1 capsule by mouth Every Night., Disp: 30 capsule, Rfl: 5  •  guaiFENesin (HUMIBID 3) 400 MG tablet, Take 400 mg by mouth Every 4 (Four) Hours As Needed for Cough., Disp: , Rfl:   •  insulin aspart (NovoLOG) 100 UNIT/ML injection, Inject  under the skin 3 (Three) Times a Day Before Meals. Sliding scale:  = 0 units; 150-200 = 3 units; 200-250 = 6 units; 250-300 = 9 units; 300-350 = 12 units; >350 = 15 units and call MD, Disp: , Rfl:   •  insulin detemir (LEVEMIR) 100 UNIT/ML injection, Inject 25 Units under the skin into the appropriate area as directed Daily., Disp: , Rfl:   •  lactulose (CHRONULAC) 10 GM/15ML solution, Take 30 mL by mouth 3 (Three) Times a Day. (Patient taking differently: Take 30 mL by mouth 3 (Three) Times a Day.), Disp: 946 mL, Rfl: 3  •  latanoprost (XALATAN) 0.005 % ophthalmic solution, Administer 1 drop to both eyes every night., Disp: , Rfl:   •  Loratadine 10 MG capsule, Take 10 mg by mouth Every Night., Disp: , Rfl:   •  melatonin 5 MG tablet tablet, Take 5 mg by mouth Every Night., Disp: , Rfl:   •  ondansetron (ZOFRAN) 4 MG tablet, Take 4 mg by mouth Every 6 (Six) Hours As Needed for Nausea or Vomiting., Disp: , Rfl:   •  ondansetron ODT (ZOFRAN-ODT) 4 MG disintegrating tablet, Take 1 tablet by mouth Every 6 (Six) Hours As Needed for Nausea or Vomiting., Disp: 10 tablet, Rfl: 0  •  polyethylene glycol (MIRALAX) packet, Take 17 g by mouth Daily As Needed (constipation)., Disp: , Rfl:   •  rifaximin (XIFAXAN) 550 MG tablet, Take 1 tablet by mouth Every 12 (Twelve) Hours., Disp: 60 tablet, Rfl: 5  •  sevelamer (RENVELA) 800 MG tablet, Take 800 mg by mouth 3 (Three) Times a Day With Meals., Disp: , Rfl:   •  traZODone (DESYREL) 50 MG tablet, Take 50 mg by  mouth every night at bedtime., Disp: , Rfl:     Allergies:  Lisinopril and Motrin [ibuprofen]    Review of Systems:  Review of Systems   Constitutional: Negative for chills and fever.   Respiratory: Negative for shortness of breath.    Cardiovascular: Negative for chest pain.   Gastrointestinal: Negative for abdominal pain, diarrhea, nausea and vomiting.   Genitourinary: Negative for dysuria.   Neurological: Negative for dizziness.       Objective:     There were no vitals taken for this visit.  Physical Exam   Constitutional: He is oriented to person, place, and time. He appears well-developed and well-nourished.   Cardiovascular: Normal rate, regular rhythm and normal heart sounds. Exam reveals no gallop and no friction rub.   No murmur heard.  Pulmonary/Chest: Effort normal and breath sounds normal. No respiratory distress. He has no wheezes. He has no rales.   Abdominal: Soft. Bowel sounds are normal. There is no tenderness.   Musculoskeletal: Normal range of motion. He exhibits no edema.   Neurological: He is alert and oriented to person, place, and time.   Skin: Skin is warm and dry.   Psychiatric: He has a normal mood and affect. His behavior is normal.   Vitals reviewed.      Diagnostic Data:        Assessment/Plan:      69 y.o. male with:  Problem List Items Addressed This Visit        Cardiovascular and Mediastinum    Type 2 diabetes mellitus with diabetic peripheral angiopathy and gangrene, with long-term current use of insulin (CMS/[facility]) - Primary    Overview     - Continue long acting insulin 25 units nightly.    We will continue to trend with sliding scale, no insulin requirement yesterday.  -Insulin aspart  low-dose sliding scale  - Finger stick glucose checks.          Essential hypertension    Overview     Monitor per floor protocol.   - He does not appear to be on any BP meds at home.             Genitourinary    ESRD (end stage renal disease) (CMS/[facility])    Overview     [facility]  Nephrologist   Deng  - Epoetin alpha 10,000 units IV Monday Wednesday Friday  -Sevelamer 800 mg p.o. 3 times daily with meals                   CODE STATUS: Full code       I have seen the patient.  I have reviewed the notes, assessments, and/or procedures performed by Dr. Stewart, I concur with her/his documentation and assessment and plan for Cristo Musa.       This document has been electronically signed by César Haider MD on August 30, 2019 3:21 PM

## 2019-09-01 NOTE — PROGRESS NOTES
RN called to let MD know the patient's glucose was >400. Pt has standing orders to administer insulin and inform the MD on call. Pt is asymptomatic. Recommended rechecking glucose in 2 hours and call back if have questions.        This document has been electronically signed by John Sanchez MD on September 1, 2019 6:17 PM

## 2019-09-01 NOTE — PROGRESS NOTES
Received a phone call from [facility] nurse Carmen reporting that Mr. Musa had a sudden decrease in his O2 sat into the 70s.  She immediately put him on 2 L of nasal cannula that brought his O2 saturations to 99% and called on-call resident.    Vitals reviewed on 2 L nasal cannula and are the following: /60, HR 60, O2 99% on 2 L NC, temp 90 7.5F, RR 20.  Patient currently resting supine in bed and does not want to be bothered.  He is breathing comfortably and is not altered at the moment.  Advised nurse to slowly wean his supplemental oxygen by half a liter every 1/2 hour with the caveat that should he decompensate and become tachypneic or decreased sats to immediately call on-call resident.      Julius Baer M.D. PGY2  [facility]  [address] [place] [address]  Office: [phone]    This document has been electronically signed by Julius Bare MD on September 1, 2019 3:06 PM

## 2019-09-02 NOTE — PROGRESS NOTES
Received a call from RN stating that the pt has a small nose bleed. They have applied ice packs to it. No new orders given.        This document has been electronically signed by John Sanchez MD on September 1, 2019 8:28 PM

## 2019-09-04 NOTE — PROGRESS NOTES
Chief Complaint   Patient presents with   • Abdominal Pain   • Heartburn   • Hepatitis       Subjective    Cristo Musa is a 69 y.o. male. he is here today for follow-up.    History of Present Illness  69-year-old male presents for follow-up regarding abdominal pain chronic hepatitis C reflux and for hospital follow-up.  He has end-stage renal disease on hemodialysis Monday, Wednesday, Friday, hypertension, GERD, glaucoma.  He was recently hospitalized due to GI bleed.  He also has hepatic encephalopathy and ammonia was elevated during hospitalization.  States he thinks he received treatment for hepatitis C several years ago but unable to find any results and he is unsure where he was treated to determine what sort of treatment he used.  Nurse reports behavior has been much better since taking lactulose and Xifaxan for hepatic encephalopathy.  While hospitalized colonoscopy was completed which noted hematin throughout the colon and hemorrhoids were found.  He had colonoscopy March 2019 which noted adenomatous villous polyps which were removed. Repeat recommended in 3 years for surveillance.   States bowel movements have been regular and denies any further episodes of blood within the stool.  Lab work completed noted elevated viral load genotype 1A child class a, meld calculated to be 29.  Plan; we will start patient on Mayvret  for chronic hepatitis C continue lactulose and Xifaxan for hepatic encephalopathy.  If further GI bleeding is noted  or drop in hemoglobin hematocrit will repeat EGD and colonoscopy.     The following portions of the patient's history were reviewed and updated as appropriate:   Past Medical History:   Diagnosis Date   • Anemia of chronic disease    • Cataract    • CHF (congestive heart failure) (CMS/HCC)    • Chronic pain syndrome    • CKD (chronic kidney disease)    • Clostridium difficile infection    • COPD (chronic obstructive pulmonary disease) (CMS/HCC)    • Coronary artery  disease    • Depression    • Diabetes mellitus (CMS/HCC)    • Dialysis patient (CMS/HCC)    • GERD (gastroesophageal reflux disease)    • Glaucoma    • H/O cardiac pacemaker     patient states he got his pacemaker removed in [place]   • Heart block    • Hepatitis C    • History of transfusion    • Hypertension    • Nephropathy, diabetic (CMS/HCC)    • Pacemaker    • Pulmonary embolism (CMS/HCC)      Past Surgical History:   Procedure Laterality Date   • ABDOMINAL SURGERY     • ARTERIOVENOUS FISTULA/SHUNT SURGERY Right     forearm loop   • ARTERIOVENOUS FISTULA/SHUNT SURGERY Left     removed past upper arm   • ARTERIOVENOUS FISTULA/SHUNT SURGERY Right 3/13/2018    Procedure: revision RIGHT forearm ARTERIOVENOUS graft (excision), debridement, wound vac;  Surgeon: Jerson Singh MD;  Location: [facility] OR;  Service: Vascular   • ARTERIOVENOUS FISTULA/SHUNT SURGERY W/ HEMODIALYSIS CATHETER INSERTION Right 4/4/2016    Procedure: RIGHT ARM AV FISTULA DECLOT REVISION REPAIR BRACHIAL ANASTOMOTIC PSUEDOANEURYSM LEFT FEMORAL RICHARDSON FISTULOGRAM WITH ANGIOPLASTY;  Surgeon: Jerson Carpenter MD;  Location: [facility] OR 18/19;  Service:    • CATARACT EXTRACTION W/ INTRAOCULAR LENS IMPLANT Left 3/31/2017    Procedure: REMOVE CATARACT AND IMPLANT INTRAOCULAR LENS LEFT EYE;  Surgeon: Hilton Montemayor MD;  Location: [facility] OR;  Service:    • COLONOSCOPY N/A 3/12/2019    Procedure: COLONOSCOPY;  Surgeon: Flako Montoya MD;  Location: [facility] ENDOSCOPY;  Service: Gastroenterology   • ENDOSCOPY N/A 3/12/2019    Procedure: ESOPHAGOGASTRODUODENOSCOPY;  Surgeon: Flako Montoya MD;  Location: [facility] ENDOSCOPY;  Service: Gastroenterology   • EYE SURGERY     • HERNIA REPAIR     • IMPLANTABLE CARDIOVERTER DEFIBRILLATOR LEAD REPLACEMENT/POCKET REVISION Right 4/11/2016    Procedure: PACEMAKER LEADS X 3 AND BATTERY EXTRACTION WITH EXIMER LASER;  Surgeon: Vern Reevse MD;  Location: [facility] OR 18/19;  Service:     • INSERTION HEMODIALYSIS CATHETER Right 05/2015    jugular   • INTERVENTIONAL RADIOLOGY PROCEDURE Right 6/1/2017    Procedure: RIGHT dialysis fistulagram & angioplasty;  Surgeon: Jerson Singh MD;  Location: [facility]IO INVASIVE LOCATION;  Service:    • INTERVENTIONAL RADIOLOGY PROCEDURE Right 8/24/2017    Procedure: IR dialysis fistulagram;  Surgeon: Jerson Singh MD;  Location: [facility]GIO INVASIVE LOCATION;  Service:    • INTERVENTIONAL RADIOLOGY PROCEDURE Right 11/13/2018    Procedure: IR dialysis fistulagram;  Surgeon: Jerson Singh MD;  Location: [facility] ANGIO INVASIVE LOCATION;  Service: Interventional Radiology   • PACEMAKER IMPLANTATION  2008    dual chamber [employer] [place]   • REMOVAL HEMODIALYSIS CATHETER Right 05/2015    femoral vein   • SIGMOIDOSCOPY N/A 8/2/2019    Procedure: SIGMOIDOSCOPY FLEXIBLE;  Surgeon: Flako Montoya MD;  Location: [facility] ENDOSCOPY;  Service: Gastroenterology     Family History   Problem Relation Age of Onset   • COPD Sister    • Diabetes Brother    • Early death Brother    • Hyperlipidemia Brother    • Hypertension Brother    • Coronary artery disease Mother    • Diabetes Mother    • Hypertension Mother    • Stroke Other    • Other Other         Respiratory disorder       Prior to Admission medications    Medication Sig Start Date End Date Taking? Authorizing Provider   acetaminophen (TYLENOL) 500 MG tablet Take 500 mg by mouth Every 4 (Four) Hours As Needed for Mild Pain  or Fever.   Yes Cleve Nguyễn MD   aspirin 81 MG chewable tablet Chew 81 mg Daily.   Yes ProviderCleve MD   benzonatate (TESSALON) 100 MG capsule Take 100 mg by mouth 3 (Three) Times a Day As Needed for Cough.   Yes Cleve Nguyễn MD   brimonidine (ALPHAGAN P) 0.1 % solution ophthalmic solution Administer  to both eyes 3 (Three) Times a Day.   Yes Cleve Nguyễn MD   Cholecalciferol (VITAMIN D3) 2000 units chewable tablet Chew 2,000  Units Daily.   Yes Cleve Nguyễn MD   cyclobenzaprine (FLEXERIL) 5 MG tablet Take 5 mg by mouth Every 8 (Eight) Hours As Needed for Muscle Spasms.   Yes Cleve Nguyễn MD   dorzolamide (TRUSOPT) 2 % ophthalmic solution Administer 1 drop into the left eye 3 (Three) Times a Day. 1/24/17  Yes Inderjit Grant MD   escitalopram (LEXAPRO) 10 MG tablet Take 1 tablet by mouth Daily. 12/23/18  Yes Janel Gordon MD   famotidine (PEPCID) 20 MG tablet Take 20 mg by mouth every night at bedtime.   Yes Cleve Nguyễn MD   fluticasone (FLONASE) 50 MCG/ACT nasal spray 2 sprays into each nostril daily. Administer 2 sprays in each nostril for each dose.   Yes Cleve Nguyễn MD   gabapentin (NEURONTIN) 100 MG capsule Take 1 capsule by mouth Every Night. 7/19/19  Yes Warren Stewart MD   guaiFENesin (HUMIBID 3) 400 MG tablet Take 400 mg by mouth Every 4 (Four) Hours As Needed for Cough.   Yes Cleve Nguyễn MD   insulin aspart (NovoLOG) 100 UNIT/ML injection Inject  under the skin 3 (Three) Times a Day Before Meals. Sliding scale:  = 0 units; 150-200 = 3 units; 200-250 = 6 units; 250-300 = 9 units; 300-350 = 12 units; >350 = 15 units and call MD   Yes Cleve Nguyễn MD   insulin detemir (LEVEMIR) 100 UNIT/ML injection Inject 25 Units under the skin into the appropriate area as directed Daily.   Yes Cleve Nguyễn MD   lactulose (CHRONULAC) 10 GM/15ML solution Take 30 mL by mouth 3 (Three) Times a Day.  Patient taking differently: Take 30 mL by mouth 3 (Three) Times a Day. 5/23/19  Yes Beth Porras APRN   latanoprost (XALATAN) 0.005 % ophthalmic solution Administer 1 drop to both eyes every night.   Yes Cleve Nguyễn MD   Loratadine 10 MG capsule Take 10 mg by mouth Every Night. 3/16/18  Yes Cleve Nguyễn MD   melatonin 5 MG tablet tablet Take 5 mg by mouth Every Night.   Yes Provider, Historical, MD   ondansetron (ZOFRAN) 4 MG tablet Take 4  mg by mouth Every 6 (Six) Hours As Needed for Nausea or Vomiting.   Yes Cleve Nguyễn MD   ondansetron ODT (ZOFRAN-ODT) 4 MG disintegrating tablet Take 1 tablet by mouth Every 6 (Six) Hours As Needed for Nausea or Vomiting. 2/17/18  Yes Salvador Elmore MD   polyethylene glycol (MIRALAX) packet Take 17 g by mouth Daily As Needed (constipation).   Yes Cleve Nguyễn MD   rifaximin (XIFAXAN) 550 MG tablet Take 1 tablet by mouth Every 12 (Twelve) Hours. 5/24/19  Yes Beth Porras APRN   sevelamer (RENVELA) 800 MG tablet Take 800 mg by mouth 3 (Three) Times a Day With Meals.   Yes Cleve Nguyễn MD   traZODone (DESYREL) 50 MG tablet Take 50 mg by mouth every night at bedtime.   Yes Cleve Nguyễn MD     Allergies   Allergen Reactions   • Lisinopril Angioedema     unknown   • Motrin [Ibuprofen] Diarrhea and Nausea And Vomiting     Social History     Socioeconomic History   • Marital status:      Spouse name: Not on file   • Number of children: Not on file   • Years of education: Not on file   • Highest education level: Not on file   Tobacco Use   • Smoking status: Former Smoker     Types: Cigarettes   • Smokeless tobacco: Never Used   • Tobacco comment: quit 20 years ago    Substance and Sexual Activity   • Alcohol use: No     Comment: former heavy use in his 50's, up to a fifth of liquor a day, currently no alcohol   • Drug use: No   • Sexual activity: No       Review of Systems  Review of Systems   Constitutional: Negative for activity change, appetite change, chills, diaphoresis, fatigue, fever and unexpected weight change.   HENT: Negative for sore throat and trouble swallowing.    Respiratory: Negative for shortness of breath.    Gastrointestinal: Positive for abdominal pain (occ abd pain, overall has felt better ). Negative for abdominal distention, anal bleeding, blood in stool, constipation, diarrhea, nausea, rectal pain and vomiting.   Musculoskeletal: Negative for  "arthralgias.   Skin: Negative for pallor.   Neurological: Negative for light-headedness.        /58 (BP Location: Left arm)   Pulse 64   Ht 172.7 cm (68\")   Wt 98.9 kg (218 lb)   BMI 33.15 kg/m²     Objective    Physical Exam   Constitutional: He is oriented to person, place, and time. He appears well-developed and well-nourished. He is cooperative. No distress.   HENT:   Head: Normocephalic and atraumatic.   Neck: Normal range of motion. Neck supple. No thyromegaly present.   Cardiovascular: Normal rate, regular rhythm and normal heart sounds.   Pulmonary/Chest: Effort normal and breath sounds normal. He has no wheezes. He has no rhonchi. He has no rales.   Abdominal: Soft. Normal appearance and bowel sounds are normal. He exhibits no distension. There is no hepatosplenomegaly. There is no tenderness. There is no rigidity and no guarding. No hernia.   Lymphadenopathy:     He has no cervical adenopathy.   Neurological: He is alert and oriented to person, place, and time.   Skin: Skin is warm, dry and intact. No rash noted. No pallor.   Psychiatric: He has a normal mood and affect. His speech is normal.     Admission on 07/30/2019, Discharged on 08/06/2019   No results displayed because visit has over 200 results.        Assessment/Plan      1. Chronic hepatitis C without hepatic coma (CMS/HCC)    .       Orders placed during this encounter include:  No orders of the defined types were placed in this encounter.      * Surgery not found *    Review and/or summary of lab tests, radiology, procedures, medications. Review and summary of old records and obtaining of history. The risks and benefits of my recommendations, as well as other treatment options were discussed with the patient today. Questions were answered.    No orders of the defined types were placed in this encounter.      Follow-up: Return in about 4 weeks (around 9/25/2019).          This document has been electronically signed by Beth Porras, " APRN on September 4, 2019 11:59 AM             Results for orders placed or performed during the hospital encounter of 07/30/19   CRE Screen by PCR - Swab, Large Intestine, Rectum   Result Value Ref Range    CRE SCREEN Not Detected Not Detected, Invalid    OXA 48 Strain Not Detected     IMP STRAIN Not Detected     VIM STRAIN Not Detected     NDM Strain Not Detected     KPC Strain Not Detected    PREVIOUS HISTORY   Result Value Ref Range    Previous History Previous Record on File    Lactic Acid, Reflex Timer (This will reflex a repeat order 3-3:15 hours after ordered.)   Result Value Ref Range    Extra Tube Hold for add-ons.    Lactic Acid, Reflex   Result Value Ref Range    Lactate 2.5 (C) 0.5 - 2.0 mmol/L   Gold Top - SST   Result Value Ref Range    Extra Tube Hold for add-ons.    Green Top (Gel)   Result Value Ref Range    Extra Tube Hold for add-ons.    Green Top (Gel)   Result Value Ref Range    Extra Tube Hold for add-ons.    CBC Auto Differential   Result Value Ref Range    WBC 4.94 3.40 - 10.80 10*3/mm3    RBC 3.34 (L) 4.14 - 5.80 10*6/mm3    Hemoglobin 9.5 (L) 13.0 - 17.7 g/dL    Hematocrit 28.5 (L) 37.5 - 51.0 %    MCV 85.3 79.0 - 97.0 fL    MCH 28.4 26.6 - 33.0 pg    MCHC 33.3 31.5 - 35.7 g/dL    RDW 15.2 12.3 - 15.4 %    RDW-SD 46.8 37.0 - 54.0 fl    MPV 9.1 6.0 - 12.0 fL    Platelets 122 (L) 140 - 450 10*3/mm3    Neutrophil % 64.5 42.7 - 76.0 %    Lymphocyte % 20.2 19.6 - 45.3 %    Monocyte % 11.3 5.0 - 12.0 %    Eosinophil % 3.0 0.3 - 6.2 %    Basophil % 0.4 0.0 - 1.5 %    Immature Grans % 0.6 (H) 0.0 - 0.5 %    Neutrophils, Absolute 3.18 1.70 - 7.00 10*3/mm3    Lymphocytes, Absolute 1.00 0.70 - 3.10 10*3/mm3    Monocytes, Absolute 0.56 0.10 - 0.90 10*3/mm3    Eosinophils, Absolute 0.15 0.00 - 0.40 10*3/mm3    Basophils, Absolute 0.02 0.00 - 0.20 10*3/mm3    Immature Grans, Absolute 0.03 0.00 - 0.05 10*3/mm3    nRBC 0.0 0.0 - 0.2 /100 WBC   CBC Auto Differential   Result Value Ref Range    WBC 4.48  3.40 - 10.80 10*3/mm3    RBC 3.15 (L) 4.14 - 5.80 10*6/mm3    Hemoglobin 9.4 (L) 13.0 - 17.7 g/dL    Hematocrit 25.9 (L) 37.5 - 51.0 %    MCV 82.2 79.0 - 97.0 fL    MCH 29.8 26.6 - 33.0 pg    MCHC 36.3 (H) 31.5 - 35.7 g/dL    RDW 14.6 12.3 - 15.4 %    RDW-SD 42.7 37.0 - 54.0 fl    MPV 9.2 6.0 - 12.0 fL    Platelets 126 (L) 140 - 450 10*3/mm3    Neutrophil % 69.9 42.7 - 76.0 %    Lymphocyte % 17.4 (L) 19.6 - 45.3 %    Monocyte % 7.6 5.0 - 12.0 %    Eosinophil % 4.5 0.3 - 6.2 %    Basophil % 0.2 0.0 - 1.5 %    Immature Grans % 0.4 0.0 - 0.5 %    Neutrophils, Absolute 3.13 1.70 - 7.00 10*3/mm3    Lymphocytes, Absolute 0.78 0.70 - 3.10 10*3/mm3    Monocytes, Absolute 0.34 0.10 - 0.90 10*3/mm3    Eosinophils, Absolute 0.20 0.00 - 0.40 10*3/mm3    Basophils, Absolute 0.01 0.00 - 0.20 10*3/mm3    Immature Grans, Absolute 0.02 0.00 - 0.05 10*3/mm3    nRBC 0.0 0.0 - 0.2 /100 WBC   CBC Auto Differential   Result Value Ref Range    WBC 5.17 3.40 - 10.80 10*3/mm3    RBC 3.36 (L) 4.14 - 5.80 10*6/mm3    Hemoglobin 9.7 (L) 13.0 - 17.7 g/dL    Hematocrit 28.5 (L) 37.5 - 51.0 %    MCV 84.8 79.0 - 97.0 fL    MCH 28.9 26.6 - 33.0 pg    MCHC 34.0 31.5 - 35.7 g/dL    RDW 14.6 12.3 - 15.4 %    RDW-SD 44.5 37.0 - 54.0 fl    MPV 8.8 6.0 - 12.0 fL    Platelets 127 (L) 140 - 450 10*3/mm3    Neutrophil % 64.8 42.7 - 76.0 %    Lymphocyte % 20.1 19.6 - 45.3 %    Monocyte % 8.9 5.0 - 12.0 %    Eosinophil % 4.4 0.3 - 6.2 %    Basophil % 0.6 0.0 - 1.5 %    Immature Grans % 1.2 (H) 0.0 - 0.5 %    Neutrophils, Absolute 3.35 1.70 - 7.00 10*3/mm3    Lymphocytes, Absolute 1.04 0.70 - 3.10 10*3/mm3    Monocytes, Absolute 0.46 0.10 - 0.90 10*3/mm3    Eosinophils, Absolute 0.23 0.00 - 0.40 10*3/mm3    Basophils, Absolute 0.03 0.00 - 0.20 10*3/mm3    Immature Grans, Absolute 0.06 (H) 0.00 - 0.05 10*3/mm3    nRBC 0.0 0.0 - 0.2 /100 WBC   CBC Auto Differential   Result Value Ref Range    WBC 4.78 3.40 - 10.80 10*3/mm3    RBC 3.23 (L) 4.14 - 5.80 10*6/mm3     Hemoglobin 9.5 (L) 13.0 - 17.7 g/dL    Hematocrit 27.2 (L) 37.5 - 51.0 %    MCV 84.2 79.0 - 97.0 fL    MCH 29.4 26.6 - 33.0 pg    MCHC 34.9 31.5 - 35.7 g/dL    RDW 15.0 12.3 - 15.4 %    RDW-SD 45.3 37.0 - 54.0 fl    MPV 9.0 6.0 - 12.0 fL    Platelets 116 (L) 140 - 450 10*3/mm3    Neutrophil % 66.1 42.7 - 76.0 %    Lymphocyte % 19.5 (L) 19.6 - 45.3 %    Monocyte % 9.2 5.0 - 12.0 %    Eosinophil % 4.4 0.3 - 6.2 %    Basophil % 0.6 0.0 - 1.5 %    Immature Grans % 0.2 0.0 - 0.5 %    Neutrophils, Absolute 3.16 1.70 - 7.00 10*3/mm3    Lymphocytes, Absolute 0.93 0.70 - 3.10 10*3/mm3    Monocytes, Absolute 0.44 0.10 - 0.90 10*3/mm3    Eosinophils, Absolute 0.21 0.00 - 0.40 10*3/mm3    Basophils, Absolute 0.03 0.00 - 0.20 10*3/mm3    Immature Grans, Absolute 0.01 0.00 - 0.05 10*3/mm3    nRBC 0.0 0.0 - 0.2 /100 WBC     *Note: Due to a large number of results and/or encounters for the requested time period, some results have not been displayed. A complete set of results can be found in Results Review.

## 2019-09-07 NOTE — PROGRESS NOTES
Received call from Stephen Angulo (Nurse Oxana). She states that his blood sugar is 428. Nurse was instructed to provide 15 units of insulin. She endorsed understanding.      Sebastian Atwood M.D. PGY3  [facility]  [address] [place] [address]  Office: [phone]      This document has been electronically signed by Sebastian Atwood MD on September 7, 2019 4:41 PM

## 2019-09-08 NOTE — PROGRESS NOTES
Nurse (Alea) states that she called earlier this morning, and the patient's blood sugar was 520. According to the protocol, they gave him 18 units of insulin. Blood sugar rechecked was 480. Patient is non-compliant with his diet; this is aided by his family, who brings him sonic. He went out to eat yesterday. He is eating tootsie roll pops. She was instructed to give him another 15 units of insulin. She states that his next blood sugar check is around 11AM. She may check it at that time. Nurse Alea endorsed understanding.      Sebastian Atwood M.D. PGY3  [facility]  [address] [place] [address]  Office: [phone]      This document has been electronically signed by Sebastian Atwood MD on September 8, 2019 7:19 AM

## 2019-09-10 NOTE — TELEPHONE ENCOUNTER
ALANNA FROM [facility] CALLED AND SAID THEY HAD AN ORDER TO NOTIFY YOU IF THE PATIENT'S GLUCOSE WAS OVER 351. SHE SAID TODAY IT . 18 UNITS OF NOVOLOG WERE GIVEN.     SHE SAID YOU CAN CALL IF YOU HAVE ANY ADDITIONAL ORDERS YOU WOULD LIKE TO GIVE THE PATIENT.     [phone]    THANK YOU.

## 2019-09-12 NOTE — PROGRESS NOTES
Received call from RN to let me know the patient's glucose is 400. Per their policy, she is required to inform an MD. She provided him the appropriate amount of insulin per their sliding scale algorithm. Will repeat glucose in an hour and continue to monitor.        This document has been electronically signed by John Sanchez MD on September 12, 2019 5:39 AM

## 2019-09-13 NOTE — PROGRESS NOTES
Received call from RN to let MD know the patient's glucose was 350-400. Pt received his does of insulin per their sliding scale algorithm. Will recheck glucose in 1 hour.         This document has been electronically signed by John Sanchez MD on September 13, 2019 5:22 AM

## 2019-09-13 NOTE — TELEPHONE ENCOUNTER
Alea from [facility] called and was wanting to let Dr. Warren Stewart know that the patient is complaining about his lower right leg. He cant have nothing touching it without pain. He is on  gabapentin (NEURONTIN) 100 MG capsule but they were not sure if Dr. Munguia wanted to increase it or not. If any questions she said she will be there until 2 pm today. The number is [phone].      Thank you,      Patricia

## 2019-09-14 NOTE — TELEPHONE ENCOUNTER
Spoke with Carmen from [facility] about patient. O2 saturations improved to 97% on 4L NC. Will continue to monitor. If saturations start to decline, will consider lasix IV. Care with lasix due to ESRD and dialysis scheduled for Monday.        Michelle Lo MD PGY3  [facility]  This document has been electronically signed by Michelle Lo MD on September 14, 2019 4:54 PM

## 2019-09-14 NOTE — TELEPHONE ENCOUNTER
"Was called by nursing staff at [facility] about patient. Patient underwent dialysis this AM due to fluid overload. Nursing staff noted a blood glucose this afternoon of 491 and have given the patient 15 units of insulin per their sliding scale protocol.    Patient was noted at that time to have an oxygen saturation of 83% on room air. He was was placed on 2L NC but continued to have declining saturations. At time of call, patient was on 3L NC and satting 70%. However, patient was verbal and responsive, stating he \"felt fine\" to nursing staff and does not appear to be altered from his baseline mental status nor appear to be having respiratory distress. He is hemodynamically stable with a temp of 98.1, heart rate of 68, respirations of 18, and BP of 138/68.     Discussed with nursing staff increasing NC to 4L then 5L, but if continued poor saturations will need to transfer patient to ER for further evaluation and treatment.        Michelle Lo MD PGY3  [facility]  This document has been electronically signed by Michelle Lo MD on September 14, 2019 4:39 PM    "

## 2019-09-21 NOTE — PROGRESS NOTES
Received call regarding elevated blood glucose to 381.  Patient received 15 of short acting and his regular 30 of long-acting insulin.  Nursing informed to recheck and call if there are problems.        This document has been electronically signed by Maxi Hernandez III, MD on September 21, 2019 6:20 AM

## 2019-09-22 NOTE — PROGRESS NOTES
Received call from nurse Cosby at [facility] at 6:40am to inform this provider that the patient's blood sugar was 370. It was 430 at 6am today, and Dr. Hernandez was called. The patient received sliding scale insulin at that time. Alea states that according to his insulin protocol, he should receive 18 units of insulin. His neck glucose check is around 11am.      Sebastian Atwood M.D. PGY3  [facility]  [address] [place] [address]  Office: [phone]      This document has been electronically signed by Sebastian Atwood MD on September 22, 2019 11:24 AM

## 2019-09-22 NOTE — TELEPHONE ENCOUNTER
Received call regarding elevated blood glucose of 430.  18 units of aspart and 30 units of detemir given told nursing to repeat blood sugar and call if it still elevated.  Thank you for your help in the participation in the care of this patient        This document has been electronically signed by Maxi Hernandez III, MD on September 22, 2019 6:15 AM

## 2019-09-23 NOTE — PROGRESS NOTES
Received call from RN to let MD know pt's glucose >400. Pt received insulin per their algorithm. No new orders.        This document has been electronically signed by John Sanchez MD on September 23, 2019 5:38 AM

## 2019-09-26 NOTE — PATIENT INSTRUCTIONS
Blood Transfusion, Adult, Care After  This sheet gives you information about how to care for yourself after your procedure. Your doctor may also give you more specific instructions. If you have problems or questions, contact your doctor.  Follow these instructions at home:    · Take over-the-counter and prescription medicines only as told by your doctor.  · Go back to your normal activities as told by your doctor.  · Follow instructions from your doctor about how to take care of the area where an IV tube was put into your vein (insertion site). Make sure you:  ? Wash your hands with soap and water before you change your bandage (dressing). If there is no soap and water, use hand .  ? Change your bandage as told by your doctor.  · Check your IV insertion site every day for signs of infection. Check for:  ? More redness, swelling, or pain.  ? More fluid or blood.  ? Warmth.  ? Pus or a bad smell.  Contact a doctor if:  · You have more redness, swelling, or pain around the IV insertion site..  · You have more fluid or blood coming from the IV insertion site.  · Your IV insertion site feels warm to the touch.  · You have pus or a bad smell coming from the IV insertion site.  · Your pee (urine) turns pink, red, or brown.  · You feel weak after doing your normal activities.  Get help right away if:  · You have signs of a serious allergic or body defense (immune) system reaction, including:  ? Itchiness.  ? Hives.  ? Trouble breathing.  ? Anxiety.  ? Pain in your chest or lower back.  ? Fever, flushing, and chills.  ? Fast pulse.  ? Rash.  ? Watery poop (diarrhea).  ? Throwing up (vomiting).  ? Dark pee.  ? Serious headache.  ? Dizziness.  ? Stiff neck.  ? Yellow color in your face or the white parts of your eyes (jaundice).  Summary  · After a blood transfusion, return to your normal activities as told by your doctor.  · Every day, check for signs of infection where the IV tube was put into your vein.  · Some  signs of infection are warm skin, more redness and pain, more fluid or blood, and pus or a bad smell where the needle went in.  · Contact your doctor if you feel weak or have any unusual symptoms.  This information is not intended to replace advice given to you by your health care provider. Make sure you discuss any questions you have with your health care provider.  Document Released: 01/08/2016 Document Revised: 08/11/2017 Document Reviewed: 08/11/2017  [employer] Interactive Patient Education © 2019 [employer]

## 2019-09-27 NOTE — TELEPHONE ENCOUNTER
Oxana from [facility] called and she is needing a script for the gabapentin (NEURONTIN) 100 MG capsule. Dr. Warren Stewart said he would go by there this afternoon to give them the script.      Thank you,    Particia

## 2019-09-30 NOTE — PROGRESS NOTES
RN called to report that the patient's scrotum is itching and he would like something for it. It is not erythematous. No fever. Advised to keep it clean and dry.  Patient can use talcum powder as needed.        This document has been electronically signed by John Sanchez MD on September 29, 2019 7:29 PM

## 2019-10-03 NOTE — PROGRESS NOTES
MONTHLY NURSING HOME VISIT    NAME: Cristo Musa  : 1950  MRN: [id number]    DATE & TIME SEEN: 19    PCP: Warren Stewart MD    NURSING HOME: [facility]    Monthly Nursing Home Visit for CKD, DMT2, HTN    Chief Complaint:     Subjective:     Cristo Musa is a 69 y.o. male seen today for monthly nursing home visit. No acute events with exception of laceration to R shin 2 weeks ago which has subsequently been healing well. No altered mental status since discharge from the hospital due to metabolic encephalopathy. States he has restarted dialysis and plans to continue with this, currently tolerating this well. Was complaining of lower back pain of 3/10 at this visit but has recently been out of his Gabapentin medication, we will refill this. Also states that he is on a soft mechanical diet but does not have any issues with his swallowing. Would like advancement of his diet.   Current Meds:    Current Outpatient Medications:   •  acetaminophen (TYLENOL) 500 MG tablet, Take 500 mg by mouth Every 4 (Four) Hours As Needed for Mild Pain  or Fever., Disp: , Rfl:   •  aspirin 81 MG chewable tablet, Chew 81 mg Daily., Disp: , Rfl:   •  benzonatate (TESSALON) 100 MG capsule, Take 100 mg by mouth 3 (Three) Times a Day As Needed for Cough., Disp: , Rfl:   •  brimonidine (ALPHAGAN P) 0.1 % solution ophthalmic solution, Administer  to both eyes 3 (Three) Times a Day., Disp: , Rfl:   •  Cholecalciferol (VITAMIN D3) 2000 units chewable tablet, Chew 2,000 Units Daily., Disp: , Rfl:   •  cyclobenzaprine (FLEXERIL) 5 MG tablet, Take 5 mg by mouth Every 8 (Eight) Hours As Needed for Muscle Spasms., Disp: , Rfl:   •  dorzolamide (TRUSOPT) 2 % ophthalmic solution, Administer 1 drop into the left eye 3 (Three) Times a Day., Disp: 1 each, Rfl: 12  •  escitalopram (LEXAPRO) 10 MG tablet, Take 1 tablet by mouth Daily., Disp: 30 tablet, Rfl: 3  •  famotidine (PEPCID) 20 MG tablet, Take 20 mg by  mouth every night at bedtime., Disp: , Rfl:   •  fluticasone (FLONASE) 50 MCG/ACT nasal spray, 2 sprays into each nostril daily. Administer 2 sprays in each nostril for each dose., Disp: , Rfl:   •  gabapentin (NEURONTIN) 100 MG capsule, Take 1 capsule by mouth Every Night., Disp: 30 capsule, Rfl: 5  •  Glecaprevir-Pibrentasvir (MAVYRET) 100-40 MG tablet, Take 3 tablets by mouth Daily., Disp: 90 tablet, Rfl: 2  •  guaiFENesin (HUMIBID 3) 400 MG tablet, Take 400 mg by mouth Every 4 (Four) Hours As Needed for Cough., Disp: , Rfl:   •  insulin aspart (NovoLOG) 100 UNIT/ML injection, Inject  under the skin 3 (Three) Times a Day Before Meals. Sliding scale:  = 0 units; 150-200 = 3 units; 200-250 = 6 units; 250-300 = 9 units; 300-350 = 12 units; >350 = 15 units and call MD, Disp: , Rfl:   •  insulin detemir (LEVEMIR) 100 UNIT/ML injection, Inject 25 Units under the skin into the appropriate area as directed Daily., Disp: , Rfl:   •  lactulose (CHRONULAC) 10 GM/15ML solution, Take 30 mL by mouth 3 (Three) Times a Day. (Patient taking differently: Take 30 mL by mouth 3 (Three) Times a Day.), Disp: 946 mL, Rfl: 3  •  latanoprost (XALATAN) 0.005 % ophthalmic solution, Administer 1 drop to both eyes every night., Disp: , Rfl:   •  Loratadine 10 MG capsule, Take 10 mg by mouth Every Night., Disp: , Rfl:   •  melatonin 5 MG tablet tablet, Take 5 mg by mouth Every Night., Disp: , Rfl:   •  ondansetron (ZOFRAN) 4 MG tablet, Take 4 mg by mouth Every 6 (Six) Hours As Needed for Nausea or Vomiting., Disp: , Rfl:   •  ondansetron ODT (ZOFRAN-ODT) 4 MG disintegrating tablet, Take 1 tablet by mouth Every 6 (Six) Hours As Needed for Nausea or Vomiting., Disp: 10 tablet, Rfl: 0  •  polyethylene glycol (MIRALAX) packet, Take 17 g by mouth Daily As Needed (constipation)., Disp: , Rfl:   •  rifaximin (XIFAXAN) 550 MG tablet, Take 1 tablet by mouth Every 12 (Twelve) Hours., Disp: 60 tablet, Rfl: 5  •  sevelamer (RENVELA) 800 MG tablet,  Take 800 mg by mouth 3 (Three) Times a Day With Meals., Disp: , Rfl:   •  traZODone (DESYREL) 50 MG tablet, Take 50 mg by mouth every night at bedtime., Disp: , Rfl:     Allergies:  Lisinopril and Motrin [ibuprofen]    Review of Systems:  Review of Systems   Constitutional: Negative for appetite change, chills, diaphoresis, fatigue and fever.   HENT: Negative for ear discharge, ear pain, facial swelling, hearing loss, rhinorrhea, sinus pressure, sinus pain, sneezing, sore throat and voice change.    Eyes: Negative for pain, discharge and visual disturbance.   Respiratory: Negative for apnea, cough, chest tightness, shortness of breath, wheezing and stridor.    Cardiovascular: Negative for chest pain, palpitations and leg swelling.   Gastrointestinal: Negative for abdominal distention, abdominal pain, constipation, diarrhea, nausea and vomiting.   Genitourinary: Negative for dysuria, frequency and urgency.   Musculoskeletal: Positive for back pain, gait problem and myalgias. Negative for arthralgias and neck pain.   Skin: Positive for wound (R shin). Negative for color change, pallor and rash.   Neurological: Negative for facial asymmetry, speech difficulty, light-headedness and headaches.   Psychiatric/Behavioral: Negative for agitation and decreased concentration. The patient is not nervous/anxious.        Objective:     Temp: 97.5   Pulse:79 Resp: 20 BP: 127/83  Physical Exam   Constitutional: He is oriented to person, place, and time. He appears well-developed and well-nourished. No distress.   HENT:   Head: Normocephalic and atraumatic.   Right Ear: External ear normal.   Left Ear: External ear normal.   Mouth/Throat: Oropharynx is clear and moist.   Eyes: Conjunctivae and EOM are normal. Pupils are equal, round, and reactive to light. Right eye exhibits no discharge. Left eye exhibits no discharge. No scleral icterus.   Neck: Normal range of motion. No tracheal deviation present.   Cardiovascular: Normal rate,  regular rhythm and normal heart sounds. Exam reveals no gallop and no friction rub.   No murmur heard.  Pulmonary/Chest: Effort normal and breath sounds normal. No stridor. No respiratory distress. He has no wheezes. He has no rales. He exhibits no tenderness.   Abdominal: Soft. Bowel sounds are normal. He exhibits no distension and no mass. There is no tenderness. There is no rebound and no guarding.   Musculoskeletal: Normal range of motion. He exhibits no edema or tenderness.   Requiring walker     Neurological: He is alert and oriented to person, place, and time.   Skin: Skin is warm and dry. Capillary refill takes less than 2 seconds. No rash noted. He is not diaphoretic. No erythema. No pallor.   Psychiatric: He has a normal mood and affect. His behavior is normal. He is not withdrawn. He is attentive.            Assessment/Plan:      69 y.o. male with:  Problem List Items Addressed This Visit    Diagnoses and all orders for this visit:    Type 2 diabetes mellitus with diabetic peripheral angiopathy and gangrene, with long-term current use of insulin (CMS/[facility])  -Continue on current antidiabetic regimen. Only allow sugar free treats. Encouraged to increase free water vs soft drink consumption.   Essential hypertension  -BP currently well managed on medication regimen. Continue at this time.   ESRD on dialysis (CMS/[facility])  -MWF dialysis schedule. Has restarted dialysis as of now. Continue to monitor labs.  Swallowing difficulty  -SLP consult placed. IF able to tolerate advancement in diet will order this in subsequent visit.       CODE STATUS: Full code    Dr. Haider is the attending on record for this patient, He is aware of the patient's status and agrees with the above.            This document has been electronically signed by Warren Stewart MD on October 3, 2019 8:53 AM

## 2019-10-03 NOTE — TELEPHONE ENCOUNTER
Please call Stephen Angulo at [phone] regarding patient falling out of his chair onto his knees.

## 2019-10-03 NOTE — TELEPHONE ENCOUNTER
Staff from [facility] called this morning. Patient fell asleep in his chair and slid out into the floor onto his knees. Staff said patient is not complaining of any injury and no noticeable issues but they wanted provider to be aware.    Call back number for them is needed is [phone]

## 2019-10-08 NOTE — TELEPHONE ENCOUNTER
Spoke with NESSA Montana at [facility]. Nan asked if patient needed to stay on xifaxan as it was up for reorder. After looking at Beth Porras's ,NP note < Nan was told that he was being treated for hepatic encephalopathy and that yes, he did need to stay on the medication. Nan was also notified that his hep c medication could not be ordered due to lack of a copy of his insurance card. Nan stated she would fax that information over so that we can order medication.

## 2019-10-24 NOTE — PROGRESS NOTES
NURSING HOME HISTORY AND PHYSICAL    NAME: Cristo Musa  : 1950  MRN: [id number]    DATE & TIME SEEN: 10/24/19     PCP: Warren Stewart MD    CODE STATUS: Full code    NURSING HOME: [facility]    Chief Complaint: None    HPI: Cristo Musa is a 69 y.o. male who was seen today for monthly nursing home visit. Pt found sleeping this AM. Required continued awakenings during history taking. States he is currently doing well. Pain adequately controlled with Neurontin. Will require a refill today. Glucose readings are better controlled. Still going up to the 200's but better maintained than his previous readings up to high 400's. Laceration of right shin is now completely healed. No other concerns at this time.     CONCURRENT MEDICAL HISTORY:  Past Medical History:   Diagnosis Date   • Anemia of chronic disease    • Cataract    • CHF (congestive heart failure) (CMS/HCC)    • Chronic pain syndrome    • CKD (chronic kidney disease)    • Clostridium difficile infection    • COPD (chronic obstructive pulmonary disease) (CMS/HCC)    • Coronary artery disease    • Depression    • Diabetes mellitus (CMS/HCC)    • Dialysis patient (CMS/HCC)    • GERD (gastroesophageal reflux disease)    • Glaucoma    • H/O cardiac pacemaker     patient states he got his pacemaker removed in [place]   • Heart block    • Hepatitis C    • History of transfusion    • Hypertension    • Nephropathy, diabetic (CMS/HCC)    • Pacemaker    • Pulmonary embolism (CMS/HCC)        PAST SURGICAL HISTORY:  Past Surgical History:   Procedure Laterality Date   • ABDOMINAL SURGERY     • ARTERIOVENOUS FISTULA/SHUNT SURGERY Right     forearm loop   • ARTERIOVENOUS FISTULA/SHUNT SURGERY Left     removed past upper arm   • ARTERIOVENOUS FISTULA/SHUNT SURGERY Right 3/13/2018    Procedure: revision RIGHT forearm ARTERIOVENOUS graft (excision), debridement, wound vac;  Surgeon: Jerson Singh MD;  Location: [facility] OR;   Service: Vascular   • ARTERIOVENOUS FISTULA/SHUNT SURGERY W/ HEMODIALYSIS CATHETER INSERTION Right 4/4/2016    Procedure: RIGHT ARM AV FISTULA DECLOT REVISION REPAIR BRACHIAL ANASTOMOTIC PSUEDOANEURYSM LEFT FEMORAL RICHARDSON FISTULOGRAM WITH ANGIOPLASTY;  Surgeon: Jerson Carpenter MD;  Location: [facility] OR 18/19;  Service:    • CATARACT EXTRACTION W/ INTRAOCULAR LENS IMPLANT Left 3/31/2017    Procedure: REMOVE CATARACT AND IMPLANT INTRAOCULAR LENS LEFT EYE;  Surgeon: Hilton Montemayor MD;  Location: [facility] OR;  Service:    • COLONOSCOPY N/A 3/12/2019    Procedure: COLONOSCOPY;  Surgeon: Flako Montoya MD;  Location: [facility] ENDOSCOPY;  Service: Gastroenterology   • ENDOSCOPY N/A 3/12/2019    Procedure: ESOPHAGOGASTRODUODENOSCOPY;  Surgeon: Flako Montoya MD;  Location: [facility] ENDOSCOPY;  Service: Gastroenterology   • EYE SURGERY     • HERNIA REPAIR     • IMPLANTABLE CARDIOVERTER DEFIBRILLATOR LEAD REPLACEMENT/POCKET REVISION Right 4/11/2016    Procedure: PACEMAKER LEADS X 3 AND BATTERY EXTRACTION WITH EXIMER LASER;  Surgeon: Vern Reeves MD;  Location: [facility] OR 18/19;  Service:    • INSERTION HEMODIALYSIS CATHETER Right 05/2015    jugular   • INTERVENTIONAL RADIOLOGY PROCEDURE Right 6/1/2017    Procedure: RIGHT dialysis fistulagram & angioplasty;  Surgeon: Jerson Singh MD;  Location: [facility] ANGIO INVASIVE LOCATION;  Service:    • INTERVENTIONAL RADIOLOGY PROCEDURE Right 8/24/2017    Procedure: IR dialysis fistulagram;  Surgeon: Jerson Singh MD;  Location: [facility] ANGIO INVASIVE LOCATION;  Service:    • INTERVENTIONAL RADIOLOGY PROCEDURE Right 11/13/2018    Procedure: IR dialysis fistulagram;  Surgeon: Jerson Singh MD;  Location: [facility] ANGIO INVASIVE LOCATION;  Service: Interventional Radiology   • PACEMAKER IMPLANTATION  2008    dual chamber [employer]   • REMOVAL HEMODIALYSIS CATHETER Right 05/2015    femoral vein   • SIGMOIDOSCOPY N/A  8/2/2019    Procedure: SIGMOIDOSCOPY FLEXIBLE;  Surgeon: Flako Montoya MD;  Location: [facility] ENDOSCOPY;  Service: Gastroenterology       FAMILY HISTORY:  Family History   Problem Relation Age of Onset   • COPD Sister    • Diabetes Brother    • Early death Brother    • Hyperlipidemia Brother    • Hypertension Brother    • Coronary artery disease Mother    • Diabetes Mother    • Hypertension Mother    • Stroke Other    • Other Other         Respiratory disorder        SOCIAL HISTORY:  Social History     Socioeconomic History   • Marital status:      Spouse name: Not on file   • Number of children: Not on file   • Years of education: Not on file   • Highest education level: Not on file   Tobacco Use   • Smoking status: Former Smoker     Types: Cigarettes   • Smokeless tobacco: Never Used   • Tobacco comment: quit 20 years ago    Substance and Sexual Activity   • Alcohol use: No     Comment: former heavy use in his 50's, up to a fifth of liquor a day, currently no alcohol   • Drug use: No   • Sexual activity: No       CURRENT MEDS:    Current Outpatient Medications:   •  acetaminophen (TYLENOL) 500 MG tablet, Take 500 mg by mouth Every 4 (Four) Hours As Needed for Mild Pain  or Fever., Disp: , Rfl:   •  aspirin 81 MG chewable tablet, Chew 81 mg Daily., Disp: , Rfl:   •  benzonatate (TESSALON) 100 MG capsule, Take 100 mg by mouth 3 (Three) Times a Day As Needed for Cough., Disp: , Rfl:   •  brimonidine (ALPHAGAN P) 0.1 % solution ophthalmic solution, Administer  to both eyes 3 (Three) Times a Day., Disp: , Rfl:   •  Cholecalciferol (VITAMIN D3) 2000 units chewable tablet, Chew 2,000 Units Daily., Disp: , Rfl:   •  cyclobenzaprine (FLEXERIL) 5 MG tablet, Take 5 mg by mouth Every 8 (Eight) Hours As Needed for Muscle Spasms., Disp: , Rfl:   •  dorzolamide (TRUSOPT) 2 % ophthalmic solution, Administer 1 drop into the left eye 3 (Three) Times a Day., Disp: 1 each, Rfl: 12  •  escitalopram (LEXAPRO) 10 MG tablet,  Take 1 tablet by mouth Daily., Disp: 30 tablet, Rfl: 3  •  famotidine (PEPCID) 20 MG tablet, Take 20 mg by mouth every night at bedtime., Disp: , Rfl:   •  fluticasone (FLONASE) 50 MCG/ACT nasal spray, 2 sprays into each nostril daily. Administer 2 sprays in each nostril for each dose., Disp: , Rfl:   •  gabapentin (NEURONTIN) 100 MG capsule, Take 1 capsule by mouth Every Night., Disp: 30 capsule, Rfl: 5  •  Glecaprevir-Pibrentasvir (MAVYRET) 100-40 MG tablet, Take 3 tablets by mouth Daily., Disp: 90 tablet, Rfl: 2  •  guaiFENesin (HUMIBID 3) 400 MG tablet, Take 400 mg by mouth Every 4 (Four) Hours As Needed for Cough., Disp: , Rfl:   •  insulin aspart (NovoLOG) 100 UNIT/ML injection, Inject  under the skin 3 (Three) Times a Day Before Meals. Sliding scale:  = 0 units; 150-200 = 3 units; 200-250 = 6 units; 250-300 = 9 units; 300-350 = 12 units; >350 = 15 units and call MD, Disp: , Rfl:   •  insulin detemir (LEVEMIR) 100 UNIT/ML injection, Inject 25 Units under the skin into the appropriate area as directed Daily., Disp: , Rfl:   •  lactulose (CHRONULAC) 10 GM/15ML solution, Take 30 mL by mouth 3 (Three) Times a Day. (Patient taking differently: Take 30 mL by mouth 3 (Three) Times a Day.), Disp: 946 mL, Rfl: 3  •  latanoprost (XALATAN) 0.005 % ophthalmic solution, Administer 1 drop to both eyes every night., Disp: , Rfl:   •  Loratadine 10 MG capsule, Take 10 mg by mouth Every Night., Disp: , Rfl:   •  melatonin 5 MG tablet tablet, Take 5 mg by mouth Every Night., Disp: , Rfl:   •  ondansetron (ZOFRAN) 4 MG tablet, Take 4 mg by mouth Every 6 (Six) Hours As Needed for Nausea or Vomiting., Disp: , Rfl:   •  ondansetron ODT (ZOFRAN-ODT) 4 MG disintegrating tablet, Take 1 tablet by mouth Every 6 (Six) Hours As Needed for Nausea or Vomiting., Disp: 10 tablet, Rfl: 0  •  polyethylene glycol (MIRALAX) packet, Take 17 g by mouth Daily As Needed (constipation)., Disp: , Rfl:   •  rifaximin (XIFAXAN) 550 MG tablet, Take  1 tablet by mouth Every 12 (Twelve) Hours., Disp: 60 tablet, Rfl: 5  •  sevelamer (RENVELA) 800 MG tablet, Take 800 mg by mouth 3 (Three) Times a Day With Meals., Disp: , Rfl:   •  traZODone (DESYREL) 50 MG tablet, Take 50 mg by mouth every night at bedtime., Disp: , Rfl:     ALLERGIES:  Lisinopril and Motrin [ibuprofen]    Review of Systems:  Review of Systems   Constitutional: Negative for appetite change, chills, diaphoresis, fatigue and fever.   HENT: Negative for ear discharge, ear pain, facial swelling, hearing loss, rhinorrhea, sinus pressure, sinus pain, sneezing, sore throat and voice change.    Eyes: Negative for pain, discharge and visual disturbance.   Respiratory: Negative for apnea, cough, chest tightness, shortness of breath, wheezing and stridor.    Cardiovascular: Negative for chest pain, palpitations and leg swelling.   Gastrointestinal: Negative for abdominal distention, abdominal pain, constipation, diarrhea, nausea and vomiting.   Genitourinary: Negative for dysuria, frequency and urgency.   Musculoskeletal: Positive for back pain and gait problem. Negative for arthralgias, myalgias and neck pain.   Skin: Negative for color change, rash and wound.   Neurological: Negative for facial asymmetry, speech difficulty, light-headedness and headaches.   Psychiatric/Behavioral: Negative for agitation and decreased concentration. The patient is not nervous/anxious.        There were no vitals taken for this visit.  Physical Exam   Constitutional: He is oriented to person, place, and time. He appears well-developed and well-nourished. No distress.   HENT:   Head: Normocephalic and atraumatic.   Right Ear: External ear normal.   Left Ear: External ear normal.   Mouth/Throat: Oropharynx is clear and moist.   Eyes: Conjunctivae and EOM are normal. Pupils are equal, round, and reactive to light. Right eye exhibits no discharge. Left eye exhibits no discharge. No scleral icterus.   Neck: Normal range of motion.  No tracheal deviation present.   Cardiovascular: Normal rate, regular rhythm and normal heart sounds. Exam reveals no gallop and no friction rub.   No murmur heard.  Pulmonary/Chest: Effort normal and breath sounds normal. No stridor. No respiratory distress. He has no wheezes. He has no rales. He exhibits no tenderness.   Abdominal: Soft. Bowel sounds are normal. He exhibits no distension and no mass. There is no tenderness. There is no rebound and no guarding.   Musculoskeletal: Normal range of motion. He exhibits no edema or tenderness.   Neurological: He is alert and oriented to person, place, and time.   Skin: Skin is warm and dry. No rash noted. He is not diaphoretic. No erythema. No pallor.   Psychiatric: He has a normal mood and affect. His behavior is normal.              ASSESSMENT/PLAN: 69 y.o. male with:  Problem List Items Addressed This Visit        Cardiovascular and Mediastinum    Type 2 diabetes mellitus with diabetic peripheral angiopathy and gangrene, with long-term current use of insulin (CMS/[facility]) - Primary    Overview     - Continue long acting insulin 25 units nightly.    We will continue to trend with sliding scale, no insulin requirement yesterday.  -Insulin aspart  low-dose sliding scale  - Finger stick glucose checks.          Essential hypertension    Overview     Monitor per floor protocol.   - He does not appear to be on any BP meds at home.             Genitourinary    ESRD on dialysis (CMS/[facility])    Overview     Added automatically from request for surgery [id number]               Dr. Haider is the attending on record for this patient, He is aware of the patient's status and agrees with the above.            This document has been electronically signed by Warren Stewart MD on October 24, 2019 11:55 AM

## 2019-11-04 PROBLEM — R41.82 ALTERED MENTAL STATUS: Status: ACTIVE | Noted: 2019-01-01

## 2019-11-04 NOTE — OUTREACH NOTE
Care Coordination Note    Received PING notification of ED presentation today. RN-CC notified [facility] ED CM via [employer] in basket message and provided clinical update.     Jennifer Gaxiola, RN  Community Care Coordinator    11/4/2019, 4:03 PM

## 2019-11-04 NOTE — ED PROVIDER NOTES
Subjective   69-year-old male with somnolence altered mental status today.  Patient did dialyze today and has history of chronic kidney disease.  Patient was at an local nursing home and noted to have rectal bleeding.  Patient has had a history of rectal bleeds.  Patient does have Hemoccult positive stools from below.  His stools are dark red.  Patient is a diabetic.  He is blood sugars have been in the 200 area today on checking.  Patient is on blood thinners at this time. Patient unable to give an accurate history at this time secondary to somnolence.             Review of Systems   Unable to perform ROS: Dementia   Constitutional: Positive for fatigue.   Gastrointestinal: Positive for blood in stool and diarrhea. Negative for abdominal pain, constipation, nausea and vomiting.   Skin: Negative.  Negative for pallor.   Neurological: Positive for weakness.   Psychiatric/Behavioral: Positive for confusion and decreased concentration.       Past Medical History:   Diagnosis Date   • Anemia of chronic disease    • Cataract    • CHF (congestive heart failure) (CMS/HCC)    • Chronic pain syndrome    • CKD (chronic kidney disease)    • Clostridium difficile infection    • COPD (chronic obstructive pulmonary disease) (CMS/HCC)    • Coronary artery disease    • Depression    • Diabetes mellitus (CMS/HCC)    • Dialysis patient (CMS/HCC)    • GERD (gastroesophageal reflux disease)    • Glaucoma    • H/O cardiac pacemaker     patient states he got his pacemaker removed in [place]   • Heart block    • Hepatitis C    • History of transfusion    • Hypertension    • Nephropathy, diabetic (CMS/HCC)    • Pacemaker    • Pulmonary embolism (CMS/HCC)        Allergies   Allergen Reactions   • Lisinopril Angioedema     unknown   • Motrin [Ibuprofen] Diarrhea and Nausea And Vomiting       Past Surgical History:   Procedure Laterality Date   • ABDOMINAL SURGERY     • ARTERIOVENOUS FISTULA/SHUNT SURGERY Right     forearm loop   •  ARTERIOVENOUS FISTULA/SHUNT SURGERY Left     removed past upper arm   • ARTERIOVENOUS FISTULA/SHUNT SURGERY Right 3/13/2018    Procedure: revision RIGHT forearm ARTERIOVENOUS graft (excision), debridement, wound vac;  Surgeon: Jerson Singh MD;  Location: [facility];  Service: Vascular   • ARTERIOVENOUS FISTULA/SHUNT SURGERY W/ HEMODIALYSIS CATHETER INSERTION Right 4/4/2016    Procedure: RIGHT ARM AV FISTULA DECLOT REVISION REPAIR BRACHIAL ANASTOMOTIC PSUEDOANEURYSM LEFT FEMORAL RICHARDSON FISTULOGRAM WITH ANGIOPLASTY;  Surgeon: Jerson Carpenter MD;  Location: [facility] OR 18/19;  Service:    • CATARACT EXTRACTION W/ INTRAOCULAR LENS IMPLANT Left 3/31/2017    Procedure: REMOVE CATARACT AND IMPLANT INTRAOCULAR LENS LEFT EYE;  Surgeon: Hilton Montemayor MD;  Location: [facility] OR;  Service:    • COLONOSCOPY N/A 3/12/2019    Procedure: COLONOSCOPY;  Surgeon: Flako Montoya MD;  Location: [facility] ENDOSCOPY;  Service: Gastroenterology   • ENDOSCOPY N/A 3/12/2019    Procedure: ESOPHAGOGASTRODUODENOSCOPY;  Surgeon: Flako Montoay MD;  Location: [facility] ENDOSCOPY;  Service: Gastroenterology   • EYE SURGERY     • HERNIA REPAIR     • IMPLANTABLE CARDIOVERTER DEFIBRILLATOR LEAD REPLACEMENT/POCKET REVISION Right 4/11/2016    Procedure: PACEMAKER LEADS X 3 AND BATTERY EXTRACTION WITH EXIMER LASER;  Surgeon: Vern Reeves MD;  Location: [facility] OR 18/19;  Service:    • INSERTION HEMODIALYSIS CATHETER Right 05/2015    jugular   • INTERVENTIONAL RADIOLOGY PROCEDURE Right 6/1/2017    Procedure: RIGHT dialysis fistulagram & angioplasty;  Surgeon: Jerson Singh MD;  Location: [facility] ANGIO INVASIVE LOCATION;  Service:    • INTERVENTIONAL RADIOLOGY PROCEDURE Right 8/24/2017    Procedure: IR dialysis fistulagram;  Surgeon: Jerson Singh MD;  Location: [facility] ANGIO INVASIVE LOCATION;  Service:    • INTERVENTIONAL RADIOLOGY PROCEDURE Right 11/13/2018    Procedure: IR dialysis fistulagram;   Surgeon: Jerson Singh MD;  Location: [facility] ANGIO INVASIVE LOCATION;  Service: Interventional Radiology   • PACEMAKER IMPLANTATION  2008    dual chamber [employer] [place]   • REMOVAL HEMODIALYSIS CATHETER Right 05/2015    femoral vein   • SIGMOIDOSCOPY N/A 8/2/2019    Procedure: SIGMOIDOSCOPY FLEXIBLE;  Surgeon: Flako Montoya MD;  Location: [facility] ENDOSCOPY;  Service: Gastroenterology       Family History   Problem Relation Age of Onset   • COPD Sister    • Diabetes Brother    • Early death Brother    • Hyperlipidemia Brother    • Hypertension Brother    • Coronary artery disease Mother    • Diabetes Mother    • Hypertension Mother    • Stroke Other    • Other Other         Respiratory disorder       Social History     Socioeconomic History   • Marital status:      Spouse name: Not on file   • Number of children: Not on file   • Years of education: Not on file   • Highest education level: Not on file   Tobacco Use   • Smoking status: Former Smoker     Types: Cigarettes   • Smokeless tobacco: Never Used   • Tobacco comment: quit 20 years ago    Substance and Sexual Activity   • Alcohol use: No     Comment: former heavy use in his 50's, up to a fifth of liquor a day, currently no alcohol   • Drug use: No   • Sexual activity: No           Objective   Physical Exam   Constitutional: He appears lethargic. He appears toxic. He appears ill. He appears distressed.   HENT:   Head: Normocephalic and atraumatic.   Eyes: Conjunctivae and EOM are normal.   Neck: Normal range of motion. Neck supple. No JVD present.   Cardiovascular: Normal rate, regular rhythm, normal heart sounds and intact distal pulses. Exam reveals no gallop and no friction rub.   No murmur heard.  Pulmonary/Chest: He is in respiratory distress. He has no wheezes. He has no rales. He exhibits no tenderness.   Sonorous breathing   Abdominal: Soft. Bowel sounds are normal. He exhibits no distension and no mass. There is no rebound  and no guarding.   Musculoskeletal: Normal range of motion.   Heme + stool,    Neurological: He appears lethargic.   Skin: Skin is warm and dry. Capillary refill takes less than 2 seconds.   Dry cracking skin, diffusely   Psychiatric: He has a normal mood and affect. His behavior is normal. Judgment and thought content normal.   Nursing note and vitals reviewed.      Procedures           ED Course      Labs Reviewed   LACTIC ACID, PLASMA - Abnormal; Notable for the following components:       Result Value    Lactate 3.2 (*)     All other components within normal limits   COMPREHENSIVE METABOLIC PANEL - Abnormal; Notable for the following components:    Glucose 244 (*)     BUN 29 (*)     Creatinine 3.88 (*)     Chloride 91 (*)     CO2 31.0 (*)     Total Protein 9.5 (*)     Albumin 3.10 (*)     Alkaline Phosphatase 173 (*)     eGFR   Amer 19 (*)     All other components within normal limits    Narrative:     GFR Normal >60  Chronic Kidney Disease <60  Kidney Failure <15   CBC WITH AUTO DIFFERENTIAL - Abnormal; Notable for the following components:    RBC 2.53 (*)     Hemoglobin 7.3 (*)     Hematocrit 22.0 (*)     Platelets 115 (*)     Lymphocyte % 18.8 (*)     All other components within normal limits   AMMONIA - Abnormal; Notable for the following components:    Ammonia 89 (*)     All other components within normal limits   BLOOD GAS, ARTERIAL - Abnormal; Notable for the following components:    pCO2, Arterial 51.1 (*)     pO2, Arterial 21.0 (*)     HCO3, Arterial 34.4 (*)     Base Excess, Arterial 9.2 (*)     O2 Saturation, Arterial 25.4 (*)     All other components within normal limits   LACTIC ACID, REFLEX - Abnormal; Notable for the following components:    Lactate 2.9 (*)     All other components within normal limits   HEMOGLOBIN AND HEMATOCRIT, BLOOD - Abnormal; Notable for the following components:    Hemoglobin 7.2 (*)     Hematocrit 22.1 (*)     All other components within normal limits   OCCULT BLOOD  X 1, STOOL - Abnormal; Notable for the following components:    Fecal Occult Blood Positive (*)     All other components within normal limits   COMPREHENSIVE METABOLIC PANEL - Abnormal; Notable for the following components:    Glucose 172 (*)     BUN 37 (*)     Creatinine 4.71 (*)     Chloride 93 (*)     CO2 31.0 (*)     Calcium 8.4 (*)     Total Protein 9.0 (*)     Albumin 2.80 (*)     Alkaline Phosphatase 134 (*)     eGFR   Amer 15 (*)     All other components within normal limits    Narrative:     GFR Normal >60  Chronic Kidney Disease <60  Kidney Failure <15   LACTIC ACID, PLASMA - Abnormal; Notable for the following components:    Lactate 2.8 (*)     All other components within normal limits   CBC WITH AUTO DIFFERENTIAL - Abnormal; Notable for the following components:    RBC 2.47 (*)     Hemoglobin 7.1 (*)     Hematocrit 22.2 (*)     Platelets 111 (*)     Monocyte % 12.9 (*)     All other components within normal limits   POCT GLUCOSE FINGERSTICK - Abnormal; Notable for the following components:    Glucose 263 (*)     All other components within normal limits   POCT GLUCOSE FINGERSTICK - Abnormal; Notable for the following components:    Glucose 237 (*)     All other components within normal limits   POCT GLUCOSE FINGERSTICK - Abnormal; Notable for the following components:    Glucose 190 (*)     All other components within normal limits   LIPASE - Normal   APTT - Normal    Narrative:     The recommended Heparin therapeutic range is 68-97 seconds.   PROTIME-INR - Normal    Narrative:     Therapeutic range for most indications is 2.0-3.0 INR,  or 2.5-3.5 for mechanical heart valves.   AMMONIA - Normal   CRE SCREEN BY PCR   BLOOD CULTURE   BLOOD CULTURE   RAINBOW DRAW    Narrative:     The following orders were created for panel order [facility] Draw.  Procedure                               Abnormality         Status                     ---------                               -----------         ------                      Light Blue Top[[id number]]                                   Final result               Green Top (Gel)[[id number]]                                  Final result               Lavender Top[[id number]]                                     Final result               Gold Top - SST[[id number]]                                                                Please view results for these tests on the individual orders.   RAINBOW DRAW    Narrative:     The following orders were created for panel order [facility] Draw.  Procedure                               Abnormality         Status                     ---------                               -----------         ------                     Light Blue Top[[id number]]                                                              Green Top (Gel)[[id number]]                                                             Lavender Top[[id number]]                                                                Gold Top - SST[[id number]]                                   Final result                 Please view results for these tests on the individual orders.   BLOOD GAS, ARTERIAL   LACTIC ACID REFLEX TIMER   POCT GLUCOSE FINGERSTICK   POCT GLUCOSE FINGERSTICK   POCT GLUCOSE FINGERSTICK   POCT GLUCOSE FINGERSTICK   POCT GLUCOSE FINGERSTICK   POCT GLUCOSE FINGERSTICK   POCT GLUCOSE FINGERSTICK   TYPE AND SCREEN   PREVIOUS HISTORY   CBC AND DIFFERENTIAL    Narrative:     The following orders were created for panel order CBC & Differential.  Procedure                               Abnormality         Status                     ---------                               -----------         ------                     CBC Auto Differential[[id number]]        Abnormal            Final result                 Please view results for these tests on the individual orders.   LIGHT BLUE TOP   GREEN TOP   LAVENDER TOP   GOLD TOP - SST   EXTRA TUBES    Narrative:     The following orders were created  for panel order [facility].  Procedure                               Abnormality         Status                     ---------                               -----------         ------                     Light Blue Top[[id number]]                                   Final result               Lavender Top[[id number]]                                     Final result               Gold Top - SST[[id number]]                                   Final result               Green Top (Gel)[[id number]]                                  Final result                 Please view results for these tests on the individual orders.   LIGHT BLUE TOP   LAVENDER TOP   GOLD TOP - SST   GREEN TOP   EXTRA TUBES    Narrative:     The following orders were created for panel order [facility].  Procedure                               Abnormality         Status                     ---------                               -----------         ------                     Lavender Top[[id number]]                                     Final result                 Please view results for these tests on the individual orders.   LAVENDER TOP   CBC AND DIFFERENTIAL    Narrative:     The following orders were created for panel order CBC & Differential.  Procedure                               Abnormality         Status                     ---------                               -----------         ------                     CBC Auto Differential[[id number]]        Abnormal            Final result                 Please view results for these tests on the individual orders.       XR Chest 1 View   Final Result   CONCLUSION: Suboptimal inspiratory effort. Borderline   cardiomegaly. Lungs otherwise clear. Endovascular stents right   subclavian and axillary veins.      Electronically signed by:  Wallace Louise MD  11/4/2019 5:26 PM [facility]   Workstation: [id number]                    [facility]    Final diagnoses:   Altered mental status, unspecified altered mental status type   Acute  lower GI bleeding   Sepsis, due to unspecified organism, unspecified whether acute organ dysfunction present (CMS/[facility])   Anemia, unspecified type              Vishal Rdz MD  11/05/19 0701

## 2019-11-05 PROBLEM — E87.20 LACTIC ACIDOSIS: Status: ACTIVE | Noted: 2019-01-01

## 2019-11-05 PROBLEM — B18.2 CHRONIC HEPATITIS C WITHOUT HEPATIC COMA (HCC): Status: ACTIVE | Noted: 2019-01-01

## 2019-11-05 PROBLEM — K76.82 ACUTE HEPATIC ENCEPHALOPATHY (HCC): Status: ACTIVE | Noted: 2019-01-01

## 2019-11-05 PROBLEM — K92.2 GI BLEED: Status: ACTIVE | Noted: 2019-01-01

## 2019-11-05 NOTE — CONSULTS
[facility]  [address] [place] [address]. [id number]  T - [phone]    [phone]     Consultation         PATIENT  DEMOGRAPHICS   PATIENT NAME: Cristo Araiza Lencho                      PHYSICIAN: LUIS Chamberlain  : 1950  MRN: [id number]    Subjective   SUBJECTIVE   Referring Provider: Dr. Stewart  Reason for Consultation: ESRD on HD  History of present illness: This is a 69-year-old male with a past medical history significant for ESRD on HD, CHF, diabetes mellitus type 2, chronic hepatitis C, COPD who presented to the hospital from [facility] secondary to altered mental status.  On 2019 patient underwent dialysis and while at dialysis became confused and lethargic but was arousable and did improve.  He was returned to the nursing home and appeared to be at his baseline.  However that afternoon he was noticed to again have altered mental status and was also found to have blood in his stool and was sent to the emergency department.  Evaluation in the emergency department revealed a hemoglobin of 7.3, ammonia level of 87, lactic acid of 3.2 that improved to 2.7.  Was also noticed to have again dark stools in the ER they tested Hemoccult positive.  He was admitted for GI bleed and acute hepatic encephalopathy.  Today nephrology is consulted to help manage ESRD on HD.    Past Medical History:   Diagnosis Date   • Anemia of chronic disease    • Cataract    • CHF (congestive heart failure) (CMS/HCC)    • Chronic pain syndrome    • CKD (chronic kidney disease)    • Clostridium difficile infection    • COPD (chronic obstructive pulmonary disease) (CMS/HCC)    • Coronary artery disease    • Depression    • Diabetes mellitus (CMS/HCC)    • Dialysis patient (CMS/HCC)    • GERD (gastroesophageal reflux disease)    • Glaucoma    • H/O cardiac pacemaker     patient states he got his pacemaker removed in [place]   • Heart block    •  Hepatitis C    • History of transfusion    • Hypertension    • Nephropathy, diabetic (CMS/HCC)    • Pacemaker    • Pulmonary embolism (CMS/HCC)      Past Surgical History:   Procedure Laterality Date   • ABDOMINAL SURGERY     • ARTERIOVENOUS FISTULA/SHUNT SURGERY Right     forearm loop   • ARTERIOVENOUS FISTULA/SHUNT SURGERY Left     removed past upper arm   • ARTERIOVENOUS FISTULA/SHUNT SURGERY Right 3/13/2018    Procedure: revision RIGHT forearm ARTERIOVENOUS graft (excision), debridement, wound vac;  Surgeon: Jerson Singh MD;  Location: [facility] OR;  Service: Vascular   • ARTERIOVENOUS FISTULA/SHUNT SURGERY W/ HEMODIALYSIS CATHETER INSERTION Right 4/4/2016    Procedure: RIGHT ARM AV FISTULA DECLOT REVISION REPAIR BRACHIAL ANASTOMOTIC PSUEDOANEURYSM LEFT FEMORAL RICHARDSON FISTULOGRAM WITH ANGIOPLASTY;  Surgeon: Jerson Carpenter MD;  Location: [facility] OR 18/19;  Service:    • CATARACT EXTRACTION W/ INTRAOCULAR LENS IMPLANT Left 3/31/2017    Procedure: REMOVE CATARACT AND IMPLANT INTRAOCULAR LENS LEFT EYE;  Surgeon: Hilton Montemayor MD;  Location: [facility] OR;  Service:    • COLONOSCOPY N/A 3/12/2019    Procedure: COLONOSCOPY;  Surgeon: Flako Montoya MD;  Location: [facility] ENDOSCOPY;  Service: Gastroenterology   • ENDOSCOPY N/A 3/12/2019    Procedure: ESOPHAGOGASTRODUODENOSCOPY;  Surgeon: Flako Montoya MD;  Location: [facility] ENDOSCOPY;  Service: Gastroenterology   • EYE SURGERY     • HERNIA REPAIR     • IMPLANTABLE CARDIOVERTER DEFIBRILLATOR LEAD REPLACEMENT/POCKET REVISION Right 4/11/2016    Procedure: PACEMAKER LEADS X 3 AND BATTERY EXTRACTION WITH EXIMER LASER;  Surgeon: Vern Reeves MD;  Location: [facility] OR 18/19;  Service:    • INSERTION HEMODIALYSIS CATHETER Right 05/2015    jugular   • INTERVENTIONAL RADIOLOGY PROCEDURE Right 6/1/2017    Procedure: RIGHT dialysis fistulagram & angioplasty;  Surgeon: Jerson Singh MD;  Location: [facility] ANGIO INVASIVE LOCATION;   "Service:    • INTERVENTIONAL RADIOLOGY PROCEDURE Right 8/24/2017    Procedure: IR dialysis fistulagram;  Surgeon: Jerson Singh MD;  Location: [facility] ANGIO INVASIVE LOCATION;  Service:    • INTERVENTIONAL RADIOLOGY PROCEDURE Right 11/13/2018    Procedure: IR dialysis fistulagram;  Surgeon: Jerson Singh MD;  Location: [facility] ANGIO INVASIVE LOCATION;  Service: Interventional Radiology   • PACEMAKER IMPLANTATION  2008    dual chamber [employer] [place]   • REMOVAL HEMODIALYSIS CATHETER Right 05/2015    femoral vein   • SIGMOIDOSCOPY N/A 8/2/2019    Procedure: SIGMOIDOSCOPY FLEXIBLE;  Surgeon: Flako Montoya MD;  Location: [facility] ENDOSCOPY;  Service: Gastroenterology     Family History   Problem Relation Age of Onset   • COPD Sister    • Diabetes Brother    • Early death Brother    • Hyperlipidemia Brother    • Hypertension Brother    • Coronary artery disease Mother    • Diabetes Mother    • Hypertension Mother    • Stroke Other    • Other Other         Respiratory disorder     Social History     Tobacco Use   • Smoking status: Former Smoker     Types: Cigarettes   • Smokeless tobacco: Never Used   • Tobacco comment: quit 20 years ago    Substance Use Topics   • Alcohol use: No     Comment: former heavy use in his 50's, up to a fifth of liquor a day, currently no alcohol   • Drug use: No     Allergies:  Lisinopril and Motrin [ibuprofen]     REVIEW OF SYSTEMS    Review of Systems   Psychiatric/Behavioral: Positive for confusion.   All other systems reviewed and are negative.      Objective   OBJECTIVE   Vital Signs  Temp:  [96.1 °F (35.6 °C)-98.1 °F (36.7 °C)] 97.7 °F (36.5 °C)  Heart Rate:  [67-95] 68  Resp:  [16-22] 20  BP: (103-176)/(45-67) 108/45    Flowsheet Rows      First Filed Value   Admission Height  177.8 cm (70\") Documented at 11/04/2019 1617   Admission Weight  91.5 kg (201 lb 12.8 oz) Documented at 11/04/2019 1617           I/O last 3 completed shifts:  In: 840 [P.O.:240; IV " Piggyback:600]  Out: -     PHYSICAL EXAM    Physical Exam   Constitutional: He appears well-developed and well-nourished.   HENT:   Head: Normocephalic and atraumatic.   Eyes: Conjunctivae and EOM are normal. Pupils are equal, round, and reactive to light.   Cardiovascular: Normal rate and regular rhythm.   Pulmonary/Chest: Effort normal and breath sounds normal.   Abdominal: Soft.   Musculoskeletal: He exhibits edema.   Neurological: He is alert.   Drowsy   Skin: Skin is warm and dry.       RESULTS   Results Review:    Results from last 7 days   Lab Units 11/05/19 0418 11/04/19  1703   SODIUM mmol/L 137 137   POTASSIUM mmol/L 4.5 4.7   CHLORIDE mmol/L 93* 91*   CO2 mmol/L 31.0* 31.0*   BUN mg/dL 37* 29*   CREATININE mg/dL 4.71* 3.88*   CALCIUM mg/dL 8.4* 8.7   BILIRUBIN mg/dL 1.1 1.0   ALK PHOS U/L 134* 173*   ALT (SGPT) U/L 8 10   AST (SGOT) U/L 17 16   GLUCOSE mg/dL 172* 244*       Estimated Creatinine Clearance: 16.4 mL/min (A) (by C-G formula based on SCr of 4.71 mg/dL (H)).                Results from last 7 days   Lab Units 11/05/19 0418 11/05/19  0024 11/04/19  1703   WBC 10*3/mm3 5.10  --  4.90   HEMOGLOBIN g/dL 7.1* 7.2* 7.3*   PLATELETS 10*3/mm3 111*  --  115*       Results from last 7 days   Lab Units 11/04/19  1703   INR  1.11        MEDICATIONS      brimonidine 1 drop Both Eyes TID   cetirizine 10 mg Oral Daily   escitalopram 10 mg Oral Daily   fluticasone 2 spray Nasal Daily   gabapentin 100 mg Oral Nightly   insulin aspart 0-7 Units Subcutaneous 4x Daily AC & at Bedtime   insulin detemir 25 Units Subcutaneous Daily   lactulose 30 mL Oral TID   latanoprost 1 drop Both Eyes Nightly   NON FORMULARY 100 mg Oral TID   pantoprazole 40 mg Intravenous BID AC   rifaximin 550 mg Oral Q12H   sevelamer 800 mg Oral TID With Meals   sodium chloride 10 mL Intravenous Q12H        Medications Prior to Admission   Medication Sig Dispense Refill Last Dose   • acetaminophen (TYLENOL) 500 MG tablet Take 500 mg by mouth  Every 4 (Four) Hours As Needed for Mild Pain  or Fever.   Unknown at Unknown time   • aspirin 81 MG chewable tablet Chew 81 mg Daily.   Unknown at Unknown time   • benzonatate (TESSALON) 100 MG capsule Take 100 mg by mouth 3 (Three) Times a Day As Needed for Cough.   Unknown at Unknown time   • brimonidine (ALPHAGAN P) 0.1 % solution ophthalmic solution Administer  to both eyes 3 (Three) Times a Day.   Unknown at Unknown time   • Cholecalciferol (VITAMIN D3) 2000 units chewable tablet Chew 2,000 Units Daily.   Unknown at Unknown time   • cyclobenzaprine (FLEXERIL) 5 MG tablet Take 5 mg by mouth Every 8 (Eight) Hours As Needed for Muscle Spasms.   Unknown at Unknown time   • dorzolamide (TRUSOPT) 2 % ophthalmic solution Administer 1 drop into the left eye 3 (Three) Times a Day. 1 each 12 Unknown at Unknown time   • escitalopram (LEXAPRO) 10 MG tablet Take 1 tablet by mouth Daily. 30 tablet 3 Unknown at Unknown time   • famotidine (PEPCID) 20 MG tablet Take 20 mg by mouth every night at bedtime.   Unknown at Unknown time   • fluticasone (FLONASE) 50 MCG/ACT nasal spray 2 sprays into each nostril daily. Administer 2 sprays in each nostril for each dose.   Unknown at Unknown time   • gabapentin (NEURONTIN) 100 MG capsule Take 1 capsule by mouth Every Night. 30 capsule 5 Unknown at Unknown time   • Glecaprevir-Pibrentasvir (MAVYRET) 100-40 MG tablet Take 3 tablets by mouth Daily. 90 tablet 2 Unknown at Unknown time   • guaiFENesin (HUMIBID 3) 400 MG tablet Take 400 mg by mouth Every 4 (Four) Hours As Needed for Cough.   Unknown at Unknown time   • insulin aspart (NovoLOG) 100 UNIT/ML injection Inject  under the skin 3 (Three) Times a Day Before Meals. Sliding scale:  = 0 units; 150-200 = 3 units; 200-250 = 6 units; 250-300 = 9 units; 300-350 = 12 units; >350 = 15 units and call MD   Unknown at Unknown time   • insulin detemir (LEVEMIR) 100 UNIT/ML injection Inject 30 Units under the skin into the appropriate area  as directed Daily.   Unknown at Unknown time   • lactulose (CHRONULAC) 10 GM/15ML solution Take 30 mL by mouth 3 (Three) Times a Day. (Patient taking differently: Take 30 mL by mouth 3 (Three) Times a Day.) 946 mL 3 Unknown at Unknown time   • latanoprost (XALATAN) 0.005 % ophthalmic solution Administer 1 drop to both eyes every night.   Unknown at Unknown time   • Lidocaine (ASPERCREME LIDOCAINE) 4 % patch Apply 1 patch topically Daily As Needed (low back pain. on for 12 hours.).   Unknown at Unknown time   • Loratadine 10 MG capsule Take 10 mg by mouth Every Night.   Unknown at Unknown time   • melatonin 5 MG tablet tablet Take 5 mg by mouth Every Night.   Unknown at Unknown time   • ondansetron ODT (ZOFRAN-ODT) 4 MG disintegrating tablet Take 1 tablet by mouth Every 6 (Six) Hours As Needed for Nausea or Vomiting. 10 tablet 0 Unknown at Unknown time   • polyethylene glycol (MIRALAX) packet Take 17 g by mouth Daily As Needed (constipation).   Unknown at Unknown time   • rifaximin (XIFAXAN) 550 MG tablet Take 1 tablet by mouth Every 12 (Twelve) Hours. 60 tablet 5 Unknown at Unknown time   • sevelamer (RENVELA) 800 MG tablet Take 800 mg by mouth 3 (Three) Times a Day With Meals.   Unknown at Unknown time   • traZODone (DESYREL) 50 MG tablet Take 50 mg by mouth every night at bedtime.   Unknown at Unknown time     Assessment/Plan   ASSESSMENT / PLAN      GI bleed    Type 2 diabetes mellitus with diabetic peripheral angiopathy and gangrene, with long-term current use of insulin (CMS/[facility])    Essential hypertension    ESRD (end stage renal disease) (CMS/[facility])    Primary insomnia    Chronic pain    Gastroesophageal reflux disease without esophagitis    COPD (chronic obstructive pulmonary disease) (CMS/[facility])    Anemia due to chronic kidney disease, on chronic dialysis (CMS/[facility])    Thrombocytopenia (CMS/[facility])    Hyperammonemia (CMS/[facility])    Obstructive sleep apnea    Acute hepatic encephalopathy    Chronic hepatitis C without  hepatic coma (CMS/HCC)    Lactic acidosis    1.  ESRD on HD- underwent hemodialysis treatment on 11/4/2019.  Next hemodialysis 11/6/2019.    2.  GI bleed- continue to monitor hemoglobin, GI consult pending    3.  Chronic hepatitis C- on lactulose and rifaximin, GI consult pending, ammonia improving    4.  Lactic acidosis- possibly secondary to GI bleed, improving    5.  Acute hepatic encephalopathy- improving, approaching baseline mentation    6.  Anemia- secondary to ESRD and GI bleed    7.  COPD    8.  Diabetes mellitus type 2    9.  Hypertension- blood pressure is reasonable for now.      Thank you for the consult, we will continue to follow this patient.         I discussed the patients findings and my recommendations with patient      This document has been electronically signed by LUIS Chamberlain on November 5, 2019 1:14 PM

## 2019-11-05 NOTE — H&P
HISTORY AND PHYSICAL  NAME: Cristo Musa  : 1950  MRN: [id number]    DATE OF ADMISSION: 19    DATE & TIME SEEN: 19 10:54 PM    PCP: Warren Stewart MD    CODE STATUS: DNR and DNI    CHIEF COMPLAINT altered mental status    HPI:  Cristo Musa is a 69 y.o. 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  Patient was very somnolent during exam and unable to answer any review of system or history questions.  Nursing staff at [facility] was contacted and was able to give more details about the history of the chief complaint.  Nursing staff reports that he was at his mental and physical baseline 3 days ago.  He underwent dialysis earlier today and during dialysis was noted to become confused and lethargic but responsive.  He was taken back from the dialysis center to [facility] and appeared to return to his baseline, however around 230 this afternoon nursing staff noticed a decline in his mentation.  They also reported some blood in his stool and contacted his PCP who instructed them to send the patient to the emergency room for further evaluation.    In the ER, he is found to have a hemoglobin of 7.3 and an ammonia level of 87.  Lactic acid was also elevated initially at 3.2 but reflexed at 2.7.  He was noted to have dark stools in the ER.  He was started on Zosyn though chest x-ray was negative for acute pathology of infectious etiology.  He is an uric at baseline and so was unable to give a urine sample. White blood cells were within normal limits.  He is being admitted for GI bleed and acute hepatic encephalopathy.    CONCURRENT MEDICAL HISTORY:  Past Medical History:   Diagnosis Date   • Anemia of chronic disease    • Cataract    • CHF (congestive heart failure) (CMS/HCC)    • Chronic pain syndrome    • CKD  (chronic kidney disease)    • Clostridium difficile infection    • COPD (chronic obstructive pulmonary disease) (CMS/HCC)    • Coronary artery disease    • Depression    • Diabetes mellitus (CMS/HCC)    • Dialysis patient (CMS/HCC)    • GERD (gastroesophageal reflux disease)    • Glaucoma    • H/O cardiac pacemaker     patient states he got his pacemaker removed in [place]   • Heart block    • Hepatitis C    • History of transfusion    • Hypertension    • Nephropathy, diabetic (CMS/HCC)    • Pacemaker    • Pulmonary embolism (CMS/HCC)        PAST SURGICAL HISTORY:  Past Surgical History:   Procedure Laterality Date   • ABDOMINAL SURGERY     • ARTERIOVENOUS FISTULA/SHUNT SURGERY Right     forearm loop   • ARTERIOVENOUS FISTULA/SHUNT SURGERY Left     removed past upper arm   • ARTERIOVENOUS FISTULA/SHUNT SURGERY Right 3/13/2018    Procedure: revision RIGHT forearm ARTERIOVENOUS graft (excision), debridement, wound vac;  Surgeon: Jerson Singh MD;  Location: [facility] OR;  Service: Vascular   • ARTERIOVENOUS FISTULA/SHUNT SURGERY W/ HEMODIALYSIS CATHETER INSERTION Right 4/4/2016    Procedure: RIGHT ARM AV FISTULA DECLOT REVISION REPAIR BRACHIAL ANASTOMOTIC PSUEDOANEURYSM LEFT FEMORAL RICHARDSON FISTULOGRAM WITH ANGIOPLASTY;  Surgeon: Jerson Carpenter MD;  Location: [facility] OR 18/19;  Service:    • CATARACT EXTRACTION W/ INTRAOCULAR LENS IMPLANT Left 3/31/2017    Procedure: REMOVE CATARACT AND IMPLANT INTRAOCULAR LENS LEFT EYE;  Surgeon: Hilton Montemayor MD;  Location: [facility] OR;  Service:    • COLONOSCOPY N/A 3/12/2019    Procedure: COLONOSCOPY;  Surgeon: Flako Montoya MD;  Location: [facility] ENDOSCOPY;  Service: Gastroenterology   • ENDOSCOPY N/A 3/12/2019    Procedure: ESOPHAGOGASTRODUODENOSCOPY;  Surgeon: Flako Montoya MD;  Location: [facility] ENDOSCOPY;  Service: Gastroenterology   • EYE SURGERY     • HERNIA REPAIR     • IMPLANTABLE CARDIOVERTER DEFIBRILLATOR LEAD REPLACEMENT/POCKET REVISION  Right 4/11/2016    Procedure: PACEMAKER LEADS X 3 AND BATTERY EXTRACTION WITH EXIMER LASER;  Surgeon: Vern Reeves MD;  Location: [facility] OR 18/19;  Service:    • INSERTION HEMODIALYSIS CATHETER Right 05/2015    jugular   • INTERVENTIONAL RADIOLOGY PROCEDURE Right 6/1/2017    Procedure: RIGHT dialysis fistulagram & angioplasty;  Surgeon: Jerson Singh MD;  Location: [facility] ANGIO INVASIVE LOCATION;  Service:    • INTERVENTIONAL RADIOLOGY PROCEDURE Right 8/24/2017    Procedure: IR dialysis fistulagram;  Surgeon: Jerson Singh MD;  Location: [facility] ANGIO INVASIVE LOCATION;  Service:    • INTERVENTIONAL RADIOLOGY PROCEDURE Right 11/13/2018    Procedure: IR dialysis fistulagram;  Surgeon: Jerson Singh MD;  Location: [facility] ANGIO INVASIVE LOCATION;  Service: Interventional Radiology   • PACEMAKER IMPLANTATION  2008    dual chamber [employer]   • REMOVAL HEMODIALYSIS CATHETER Right 05/2015    femoral vein   • SIGMOIDOSCOPY N/A 8/2/2019    Procedure: SIGMOIDOSCOPY FLEXIBLE;  Surgeon: Flako Montoya MD;  Location: [facility] ENDOSCOPY;  Service: Gastroenterology       FAMILY HISTORY:  Family History   Problem Relation Age of Onset   • COPD Sister    • Diabetes Brother    • Early death Brother    • Hyperlipidemia Brother    • Hypertension Brother    • Coronary artery disease Mother    • Diabetes Mother    • Hypertension Mother    • Stroke Other    • Other Other         Respiratory disorder        SOCIAL HISTORY:  Social History     Socioeconomic History   • Marital status:      Spouse name: Not on file   • Number of children: Not on file   • Years of education: Not on file   • Highest education level: Not on file   Tobacco Use   • Smoking status: Former Smoker     Types: Cigarettes   • Smokeless tobacco: Never Used   • Tobacco comment: quit 20 years ago    Substance and Sexual Activity   • Alcohol use: No     Comment: former heavy use in his 50's, up to a fifth of  liquor a day, currently no alcohol   • Drug use: No   • Sexual activity: No       HOME MEDICATIONS:  Prior to Admission medications    Medication Sig Start Date End Date Taking? Authorizing Provider   gabapentin (NEURONTIN) 300 MG capsule Take 1 capsule by mouth Every Night. 8/28/19 11/4/19 Yes Cleve Nguyễn MD   acetaminophen (TYLENOL) 500 MG tablet Take 500 mg by mouth Every 4 (Four) Hours As Needed for Mild Pain  or Fever.    Cleve Nguyễn MD   aspirin 81 MG chewable tablet Chew 81 mg Daily.    Cleve Nguyễn MD   benzonatate (TESSALON) 100 MG capsule Take 100 mg by mouth 3 (Three) Times a Day As Needed for Cough.    Cleve Nguyễn MD   brimonidine (ALPHAGAN P) 0.1 % solution ophthalmic solution Administer  to both eyes 3 (Three) Times a Day.    Cleve Nguyễn MD   Cholecalciferol (VITAMIN D3) 2000 units chewable tablet Chew 2,000 Units Daily.    Cleve Nguyễn MD   cyclobenzaprine (FLEXERIL) 5 MG tablet Take 5 mg by mouth Every 8 (Eight) Hours As Needed for Muscle Spasms.    Cleve Nguyễn MD   dorzolamide (TRUSOPT) 2 % ophthalmic solution Administer 1 drop into the left eye 3 (Three) Times a Day. 1/24/17   Inderjit Grant MD   escitalopram (LEXAPRO) 10 MG tablet Take 1 tablet by mouth Daily. 12/23/18   Janel Gordon MD   famotidine (PEPCID) 20 MG tablet Take 20 mg by mouth every night at bedtime.    Cleve Nguyễn MD   fluticasone (FLONASE) 50 MCG/ACT nasal spray 2 sprays into each nostril daily. Administer 2 sprays in each nostril for each dose.    Cleve Nguyễn MD   gabapentin (NEURONTIN) 100 MG capsule Take 1 capsule by mouth Every Night. 10/24/19   Warren Stewart MD   Glecaprevir-Pibrentasvir (MAVYRET) 100-40 MG tablet Take 3 tablets by mouth Daily. 9/4/19   Beth Porras APRN   guaiFENesin (HUMIBID 3) 400 MG tablet Take 400 mg by mouth Every 4 (Four) Hours As Needed for Cough.    Cleve Nguyễn MD   insulin  aspart (NovoLOG) 100 UNIT/ML injection Inject  under the skin 3 (Three) Times a Day Before Meals. Sliding scale:  = 0 units; 150-200 = 3 units; 200-250 = 6 units; 250-300 = 9 units; 300-350 = 12 units; >350 = 15 units and call MD    Cleve Nguyễn MD   insulin detemir (LEVEMIR) 100 UNIT/ML injection Inject 30 Units under the skin into the appropriate area as directed Daily.    Cleve Nguyễn MD   lactulose (CHRONULAC) 10 GM/15ML solution Take 30 mL by mouth 3 (Three) Times a Day.  Patient taking differently: Take 30 mL by mouth 3 (Three) Times a Day. 5/23/19   Beth Porras APRN   latanoprost (XALATAN) 0.005 % ophthalmic solution Administer 1 drop to both eyes every night.    Cleve Nguyễn MD   Lidocaine (ASPERCREME LIDOCAINE) 4 % patch Apply 1 patch topically Daily As Needed (low back pain. on for 12 hours.).    Cleve Nguyễn MD   Loratadine 10 MG capsule Take 10 mg by mouth Every Night. 3/16/18   Clvee Nguyễn MD   melatonin 5 MG tablet tablet Take 5 mg by mouth Every Night.    Cleve Nguyễn MD   ondansetron ODT (ZOFRAN-ODT) 4 MG disintegrating tablet Take 1 tablet by mouth Every 6 (Six) Hours As Needed for Nausea or Vomiting. 2/17/18   Salvador Elmore MD   polyethylene glycol (MIRALAX) packet Take 17 g by mouth Daily As Needed (constipation).    Cleve Nguyễn MD   rifaximin (XIFAXAN) 550 MG tablet Take 1 tablet by mouth Every 12 (Twelve) Hours. 5/24/19   Beth Porras APRN   sevelamer (RENVELA) 800 MG tablet Take 800 mg by mouth 3 (Three) Times a Day With Meals.    Cleve Nguyễn MD   traZODone (DESYREL) 50 MG tablet Take 50 mg by mouth every night at bedtime.    Cleve Nguyễn MD   ondansetron (ZOFRAN) 4 MG tablet Take 4 mg by mouth Every 6 (Six) Hours As Needed for Nausea or Vomiting.  11/4/19  Cleve Nguyễn MD       ALLERGIES:  Lisinopril and Motrin [ibuprofen]    REVIEW OF SYSTEMS  Review of Systems   Unable to perform  ROS: Mental status change       PHYSICAL EXAM:  Temp:  [96.9 °F (36.1 °C)-98.1 °F (36.7 °C)] 96.9 °F (36.1 °C)  Heart Rate:  [68-76] 73  Resp:  [16-22] 20  BP: (124-176)/(55-67) 176/61  Body mass index is 31.17 kg/m².  Physical Exam   Constitutional: He appears well-developed and well-nourished. He appears lethargic. No distress.   HENT:   Head: Normocephalic and atraumatic.   Right Ear: External ear normal.   Left Ear: External ear normal.   Eyes: Pupils are equal, round, and reactive to light.   Neck: Normal range of motion. Neck supple.   Cardiovascular: Normal rate, regular rhythm and normal heart sounds.   Pulmonary/Chest: Effort normal and breath sounds normal. No respiratory distress.   Abdominal: Soft. Bowel sounds are normal. He exhibits no distension and no mass. There is tenderness (generalized).   Musculoskeletal: Normal range of motion. He exhibits no edema.   Neurological: He has normal reflexes. He appears lethargic. GCS eye subscore is 3. GCS verbal subscore is 4. GCS motor subscore is 6.   Somnolent but able to be awoken, oriented to person but not to place, time, or situation   Skin: Skin is warm and dry.       DIAGNOSTIC DATA:   Lab Results (last 24 hours)     Procedure Component Value Units Date/Time    Lactic Acid, Reflex [[id number]]  (Abnormal) Collected:  11/04/19 2140    Specimen:  Blood Updated:  11/04/19 2204     Lactate 2.9 mmol/L     Extra Tubes [[id number]] Collected:  11/04/19 2004    Specimen:  Blood, Venous Line Updated:  11/04/19 2122    Narrative:       The following orders were created for panel order Extra Tubes.  Procedure                               Abnormality         Status                     ---------                               -----------         ------                     Light Blue Top[[id number]]                                   Final result               Lavender Top[[id number]]                                     Final result               Gold Top - [facility][[id number]]                                    Final result               Green Top (Gel)[[id number]]                                  Final result                 Please view results for these tests on the individual orders.    Light Blue Top [[id number]] Collected:  11/04/19 2004    Specimen:  Blood Updated:  11/04/19 2122     Extra Tube hold for add-on     Comment: Auto resulted       Gold Top - SST [[id number]] Collected:  11/04/19 2004    Specimen:  Blood Updated:  11/04/19 2122     Extra Tube Hold for add-ons.     Comment: Auto resulted.       Lavender Top [[id number]] Collected:  11/04/19 2004    Specimen:  Blood Updated:  11/04/19 2122     Extra Tube hold for add-on     Comment: Auto resulted       Green Top (Gel) [[id number]] Collected:  11/04/19 2004    Specimen:  Blood Updated:  11/04/19 2122     Extra Tube Hold for add-ons.     Comment: Auto resulted.       Lactic Acid, Reflex Timer (This will reflex a repeat order 3-3:15 hours after ordered.) [[id number]] Collected:  11/04/19 1703    Specimen:  Blood Updated:  11/04/19 2121     Extra Tube Hold for add-ons.     Comment: Auto resulted.       Ammonia [[id number]]  (Abnormal) Collected:  11/04/19 1911    Specimen:  Blood Updated:  11/04/19 1935     Ammonia 89 umol/L     Blood Culture - Blood, Hand, Left [[id number]] Collected:  11/04/19 1914    Specimen:  Blood from Hand, Left Updated:  11/04/19 1915    Blood Culture - Blood, Arm, Left [[id number]] Collected:  11/04/19 1914    Specimen:  Blood from Arm, Left Updated:  11/04/19 1914    Blood Gas, Arterial [[id number]]  (Abnormal) Collected:  11/04/19 1827    Specimen:  Arterial Blood Updated:  11/04/19 1844     Site Left Radial     Inderjit's Test N/A     pH, Arterial 7.437 pH units      pCO2, Arterial 51.1 mm Hg      Comment: 83 Value above reference range        pO2, Arterial 21.0 mm Hg      Comment: 85 Value below critical limit        HCO3, Arterial 34.4 mmol/L      Comment: 83 Value above reference range        Base Excess,  Arterial 9.2 mmol/L      Comment: 83 Value above reference range        O2 Saturation, Arterial 25.4 %      Comment: 84 Value below reference range        Barometric Pressure for Blood Gas 750 mmHg      Modality Room Air     Ventilator Mode NA     Collected by MAXI     Comment: Meter: [id number]     :  [id number]       CBC & Differential [[id number]] Collected:  11/04/19 1703    Specimen:  Blood Updated:  11/04/19 1843    Narrative:       The following orders were created for panel order CBC & Differential.  Procedure                               Abnormality         Status                     ---------                               -----------         ------                     CBC Auto Differential[[id number]]        Abnormal            Final result                 Please view results for these tests on the individual orders.    CBC Auto Differential [[id number]]  (Abnormal) Collected:  11/04/19 1703    Specimen:  Blood Updated:  11/04/19 1843     WBC 4.90 10*3/mm3      RBC 2.53 10*6/mm3      Hemoglobin 7.3 g/dL      Hematocrit 22.0 %      MCV 87.0 fL      MCH 28.9 pg      MCHC 33.2 g/dL      RDW 15.2 %      RDW-SD 47.9 fl      MPV 8.9 fL      Platelets 115 10*3/mm3      Neutrophil % 68.6 %      Lymphocyte % 18.8 %      Monocyte % 10.6 %      Eosinophil % 1.0 %      Basophil % 0.6 %      Immature Grans % 0.4 %      Neutrophils, Absolute 3.36 10*3/mm3      Lymphocytes, Absolute 0.92 10*3/mm3      Monocytes, Absolute 0.52 10*3/mm3      Eosinophils, Absolute 0.05 10*3/mm3      Basophils, Absolute 0.03 10*3/mm3      Immature Grans, Absolute 0.02 10*3/mm3      nRBC 0.0 /100 WBC     Lipase [[id number]]  (Normal) Collected:  11/04/19 1703    Specimen:  Blood Updated:  11/04/19 1840     Lipase 41 U/L     Comprehensive Metabolic Panel [[id number]]  (Abnormal) Collected:  11/04/19 1703    Specimen:  Blood Updated:  11/04/19 1840     Glucose 244 mg/dL      BUN 29 mg/dL      Creatinine 3.88 mg/dL      Sodium 137  mmol/L      Potassium 4.7 mmol/L      Chloride 91 mmol/L      CO2 31.0 mmol/L      Calcium 8.7 mg/dL      Total Protein 9.5 g/dL      Albumin 3.10 g/dL      ALT (SGPT) 10 U/L      AST (SGOT) 16 U/L      Alkaline Phosphatase 173 U/L      Total Bilirubin 1.0 mg/dL      eGFR  African Amer 19 mL/min/1.73      Globulin 6.4 gm/dL      A/G Ratio 0.5 g/dL      BUN/Creatinine Ratio 7.5     Anion Gap 15.0 mmol/L     Narrative:       GFR Normal >60  Chronic Kidney Disease <60  Kidney Failure <15    [place] Draw [[id number]] Collected:  11/04/19 1703    Specimen:  Blood Updated:  11/04/19 1830    Narrative:       The following orders were created for panel order [place] Draw.  Procedure                               Abnormality         Status                     ---------                               -----------         ------                     Light Blue Top[[id number]]                                   Final result               Green Top (Gel)[[id number]]                                  Final result               Lavender Top[[id number]]                                     Final result               Gold Top - [facility]ST[[id number]]                                                                Please view results for these tests on the individual orders.    Green Top (Gel) [[id number]] Collected:  11/04/19 1703    Specimen:  Blood Updated:  11/04/19 1830     Extra Tube Hold for add-ons.     Comment: Auto resulted.       Lavender Top [[id number]] Collected:  11/04/19 1703    Specimen:  Blood Updated:  11/04/19 1830     Extra Tube hold for add-on     Comment: Auto resulted       [place] Draw [[id number]] Collected:  11/04/19 1703    Specimen:  Blood Updated:  11/04/19 1816    Narrative:       The following orders were created for panel order [place] Draw.  Procedure                               Abnormality         Status                     ---------                               -----------         ------                     Light  Blue Top[[id number]]                                                              Green Top (Gel)[[id number]]                                                             Lavender Top[[id number]]                                                                Gold Top - SST[[id number]]                                   Final result                 Please view results for these tests on the individual orders.    Gold Top - SST [[id number]] Collected:  11/04/19 1703    Specimen:  Blood Updated:  11/04/19 1816     Extra Tube Hold for add-ons.     Comment: Auto resulted.       Light Blue Top [[id number]] Collected:  11/04/19 1703    Specimen:  Blood Updated:  11/04/19 1815     Extra Tube hold for add-on     Comment: Auto resulted       Lactic Acid, Plasma [[id number]]  (Abnormal) Collected:  11/04/19 1703    Specimen:  Blood Updated:  11/04/19 1736     Lactate 3.2 mmol/L     aPTT [[id number]]  (Normal) Collected:  11/04/19 1703    Specimen:  Blood Updated:  11/04/19 1731     PTT 32.7 seconds     Narrative:       The recommended Heparin therapeutic range is 68-97 seconds.    Protime-INR [[id number]]  (Normal) Collected:  11/04/19 1703    Specimen:  Blood Updated:  11/04/19 1731     Protime 14.1 Seconds      INR 1.11    Narrative:       Therapeutic range for most indications is 2.0-3.0 INR,  or 2.5-3.5 for mechanical heart valves.    POC Glucose Once [[id number]]  (Abnormal) Collected:  11/04/19 1608    Specimen:  Blood Updated:  11/04/19 1643     Glucose 263 mg/dL      Comment: RN NotifiedOperator: [id number] WHITE MEGANMeter ID: [id number]              Imaging Results (Last 24 Hours)     Procedure Component Value Units Date/Time    XR Chest 1 View [[id number]] Collected:  11/04/19 1650     Updated:  11/04/19 1727    Narrative:       Chest single view.        CLINICAL INDICATION: Shortness of breath. Alteration mental  status.    COMPARISON: June 20, 2019.    FINDINGS: Cardiac silhouette is borderline enlarged in size.  Pulmonary  vascularity is unremarkable. Suboptimal inspiratory  effort.  Benign calcified granulomata within both hilar regions.  No focal infiltrate or consolidation.  No pleural effusion.  No  pneumothorax.    There are right-sided endovascular stents right subclavian and  axillary vein.      Impression:       CONCLUSION: Suboptimal inspiratory effort. Borderline  cardiomegaly. Lungs otherwise clear. Endovascular stents right  subclavian and axillary veins.    Electronically signed by:  Wallace Louise MD  11/4/2019 5:26 PM CST  Workstation: [id number]            I reviewed the patient's new clinical results.    ASSESSMENT AND PLAN: This is a 69 y.o. male with:    Active Hospital Problems    Diagnosis POA   • **GI bleed [K92.2] Yes     -visually dark stool in ER, hemoccult pending  -H/H 7.3/22 on admission, hemoglobin usually closer to 9 at baseline  -permission to transfuse if necessary given by patient's health care surrogate, brother Farhan Musa  -follows with Dr. Montoya/Beth Porras outpatient, will consult in the AM  -PPI  -trend H/H     • Chronic hepatitis C without hepatic coma (CMS/HCC) [B18.2] Yes     -follows with GI outpatient, Dr. Montoya/Jason Porras, will consult GI in AM  -continue lactulose, rifaximin      • Lactic acidosis [E87.2] Yes     -Lactate on admission 2.9, trend  -likely secondary to GI bleed as there is no clear source of infection and WBCs are within normal limits     • Acute hepatic encephalopathy [K72.00] Yes     -ammonia 87 on admission, will trend  -continue home lactulose     • Obstructive sleep apnea [G47.33] Yes     -continue BiPAP     • Hyperammonemia (CMS/HCC) [E72.20] Yes     -ammonia on admission 87, trend  -continue lactulose and rifaximin     • Anemia due to chronic kidney disease, on chronic dialysis (CMS/HCC) [N18.6, D63.1, Z99.2] Not Applicable     -anemia likely multifactorial, both due to chronic kidney disease as well as GI bleed/chronic blood loss  -will trend H/H  -transfuse for  hemoglobin less than 7       • Thrombocytopenia (CMS/[facility]) [D69.6] Yes     -platelets on admission 115  - Appears to be chronic thrombocytopenia, could also be due to chronic slow GI bleed  - Continue to monitor      • COPD (chronic obstructive pulmonary disease) (CMS/[facility]) [J44.9] Yes     - Continue flonase, loratadine  - Patient uses Oxygen prn at the nursing home.   - Duonebs and albuterol nebs prn.         • Gastroesophageal reflux disease without esophagitis [K21.9] Yes     -PPI     • Chronic pain [G89.29] Yes     -holding home gabapentin/flexeril until mentation improves     • Primary insomnia [F51.01] Yes     -holding trazodone, melatonin due to encephalopathy      • ESRD (end stage renal disease) (CMS/[facility]) [N18.6] Yes     -HD M/W/F, underwent HD on 11/4/19  -will consult nephrology, follows with Dr. Vilchis  -epogen on M/W/F with dialysis  -continue Sevelamer 800 mg three times daily       • Essential hypertension [I10] Yes     -not on any home medications, will monitor     • Type 2 diabetes mellitus with diabetic peripheral angiopathy and gangrene, with long-term current use of insulin (CMS/[facility]) [E11.52, Z79.4] Not Applicable     -levemir, SSI         DVT prophylaxis: SCDs/TEDs     Cristo Musa and I have discussed pain goals for this hospitalization after reviewing his current clinical condition, medical history and prior pain experiences.  The goal is to keep the pain level less than 5.  To help achieve this, I plan to continue home pain medications once mental status has improved.    [facility] #[id number], reviewed and consistent with patient reported medications.    Expected Length of Stay: Anticipate discharge to [facility] in 2 to 4 days    I discussed the patients findings and my recommendations with patient and family.     Dr. Hwang  is the attending on record at time of admission, She is aware of the patient's status and agrees with the above history and physical.        Michelle Lo MD  PGY3  [facility]  This document has been electronically signed by Michelle Lo MD on November 5, 2019 1:22 AM

## 2019-11-05 NOTE — PLAN OF CARE
Problem: Patient Care Overview  Goal: Plan of Care Review   11/05/19 0449   Coping/Psychosocial   Plan of Care Reviewed With patient   Plan of Care Review   Progress improving   OTHER   Outcome Summary pt VS stable throughout shift, pt remains in NSR on tele monitor, pt monitored for bleeding, pt had 3 loose bloody stools throughout the night, labs monitored for trends in hemoglobin, pt alert but still remains disoriented during shift, pt pulled out IV x1. Will continue to monitor pt.        Problem: Fall Risk (Adult)  Goal: Identify Related Risk Factors and Signs and Symptoms  Outcome: Outcome(s) achieved Date Met: 11/05/19      Problem: Skin Injury Risk (Adult)  Goal: Identify Related Risk Factors and Signs and Symptoms  Outcome: Outcome(s) achieved Date Met: 11/05/19      Problem: Thought Process Alteration (Adult)  Goal: Identify Related Risk Factors and Signs and Symptoms  Outcome: Outcome(s) achieved Date Met: 11/05/19      Problem: Anemia (Adult)  Goal: Identify Related Risk Factors and Signs and Symptoms  Outcome: Outcome(s) achieved Date Met: 11/05/19

## 2019-11-05 NOTE — SIGNIFICANT NOTE
11/05/19 1349   Rehab Time/Intention   Evaluation Not Performed other (see comments)  (Per nursing patient is about to recieve blood transfusion due to low hemoglobin. Therapy will attempt tomorrow.)   Rehab Treatment   Discipline occupational therapist;physical therapist   Recommendation   PT - Next Appointment 11/06/19   Recommendation   OT - Next Appointment 11/06/19

## 2019-11-05 NOTE — ANESTHESIA POSTPROCEDURE EVALUATION
Patient: Cristo Musa    Procedure Summary     Date:  11/05/19 Room / Location:  [facility] ENDOSCOPY 1 / [facility] ENDOSCOPY    Anesthesia Start:  1631 Anesthesia Stop:  1708    Procedure:  ESOPHAGOGASTRODUODENOSCOPY (N/A ) Diagnosis:       Gastrointestinal hemorrhage, unspecified gastrointestinal hemorrhage type      (Gastrointestinal hemorrhage, unspecified gastrointestinal hemorrhage type [K92.2])    Surgeon:  Tricia Philip MD Provider:  Ajay Pate CRNA    Anesthesia Type:  MAC ASA Status:  4          Anesthesia Type: MAC  Last vitals  BP   122/65 (11/05/19 1621)   Temp   98.2 °F (36.8 °C) (11/05/19 1621)   Pulse   65 (11/05/19 1621)   Resp   18 (11/05/19 1558)     SpO2   98 % (11/05/19 1625)     Post Anesthesia Care and Evaluation    Patient location during evaluation: bedside  Patient participation: complete - patient participated  Level of consciousness: awake and alert  Pain score: 0  Pain management: adequate  Airway patency: patent  Anesthetic complications: No anesthetic complications  PONV Status: none  Cardiovascular status: acceptable  Respiratory status: acceptable  Hydration status: acceptable

## 2019-11-05 NOTE — PROGRESS NOTES
Discharge Planning Assessment  [facility]     Patient Name: Cristo Musa  MRN: [id number]  Today's Date: 11/5/2019    Admit Date: 11/4/2019    Discharge Needs Assessment     Row Name 11/05/19 1100       Living Environment    Lives With  facility resident [facility]    Primary Care Provided by  other (see comments) Nursing home     Provides Primary Care For  no one    Quality of Family Relationships  unable to assess    Able to Return to Prior Arrangements  yes    Living Arrangement Comments  [facility] is a resident of [facility]. Per [facility] pt has 4 days remaining on his bed hold. However if he is not able to return in that time frame they will still be able to accept back.       Resource/Environmental Concerns    Resource/Environmental Concerns  none       Transition Planning    Patient/Family Anticipates Transition to  long term care facility    Patient/Family Anticipated Services at Transition  skilled nursing    Transportation Anticipated  health plan transportation       Discharge Needs Assessment    Concerns to be Addressed  adjustment to diagnosis/illness    Equipment Currently Used at Home  glucometer;oxygen;walker, standard    Equipment Needed After Discharge  -- Awaiting PT/OT recomendations.     Discharge Facility/Level of Care Needs  nursing facility, skilled    Current Discharge Risk  chronically ill        Discharge Plan     Row Name 11/05/19 1104       Plan    Plan Comments  LSW assessment complete. [facility] is a long term resident of [facility]. Per [facility] pt has 4 days remaining on his bed hold. She went on to say if pt requires longer stay they will still be able to accept him back. LSW/case mgt will follow up as consulted and complete arrangements as ordered. Jorje Ordoñez LSW     Row Name 11/05/19 0942       Plan    Plan Comments  EMERY left  for admissions coordinator, Jelena , at [facility] to confirm bed hold....Wanda Frias RN    Row Name 11/05/19 5240       Plan     Plan Comments  continued stay review- GI bleed-  H&H 7.1/22.2 plan GI consult, hepatic encephalpathy- ammonia level down to 53 this am....Wanda Frias RN        Destination      Service Provider Request Status Selected Services Address Phone Number Fax Number    Counts include 234 beds at the [facility] Pending - No Request Sent N/A [address] [facility] [id number] [phone]      Durable Medical Equipment      No service coordination in this encounter.      Dialysis/Infusion      No service coordination in this encounter.      Home Medical Care      No service coordination in this encounter.      Therapy      No service coordination in this encounter.      Community Resources      No service coordination in this encounter.        Expected Discharge Date and Time     Expected Discharge Date Expected Discharge Time    Nov 8, 2019         Demographic Summary     Row Name 11/05/19 1057       General Information    Admission Type  inpatient    Referral Source  high risk screening    Reason for Consult  discharge planning    Preferred Language  English     Used During This Interaction  yes       Contact Information    Contact Information Obtained for          Functional Status     Row Name 11/05/19 1058       Functional Status    Usual Activity Tolerance  fair    Current Activity Tolerance  poor       Functional Status, IADL    Medications  completely dependent    Meal Preparation  completely dependent    Housekeeping  completely dependent    Laundry  completely dependent    Shopping  completely dependent    IADL Comments  Pt is a resident of [facility].        Mental Status Summary    Recent Changes in Mental Status/Cognitive Functioning  no changes        Psychosocial    No documentation.       Abuse/Neglect    No documentation.       Legal    No documentation.       Substance Abuse    No documentation.       Patient Forms    No documentation.           Jorje Ordoñez,  LSW

## 2019-11-05 NOTE — CONSULTS
SUBJECTIVE:   11/5/2019    Name: Cristo Musa  DOD: 1950    REASON FOR CONSULT: GI bleed    Chief Complaint:     Chief Complaint   Patient presents with   • Altered Mental Status   • Rectal Bleeding       Subjective     Patient is 69 y.o. male nursing home resident with chronic renal insufficiency, heart failure, chronic hepatitis C, COPD, diabetes presents with change in mental status.  Patient apparently admitted yesterday with increasing somnolence with bright red bleeding per rectum.  He was evaluated the emergency room and was noted to have elevated ammonia level.  Noted to have a drop in hemoglobin to 7.3 as well he had 3 bowel movements overnight with burgundy stools.  Denied abdominal pain, nausea, vomiting, diarrhea, constipation, weight loss or melena.  He is currently receiving packed red cell transfusion.  He took lactulose overnight with improvement of ammonia level to 53.  He also was noted to have elevated lactic acid level which has improved to 2.8 today.  His hemoglobin has decreased to 7.1 this morning. Denied NSAID usage.  EGD and colonoscopy in March of this year by Dr. Patel was consistent with gastritis and adenomatous polyps.     ROS/HISTORY/ CURRENT MEDICATIONS/OBJECTIVE/VS/PE:   Review of Systems:   Review of Systems   Constitutional: Negative for activity change, appetite change, chills, diaphoresis, fatigue, fever and unexpected weight change.   HENT: Negative for congestion, sore throat and trouble swallowing.    Neck: no adenopathy, thyromegaly or masses.  Respiratory: Negative for apnea, cough, choking, chest tightness, shortness of breath, wheezing and stridor.    Cardiovascular: Negative for chest pain, palpitations and leg swelling.   Gastrointestinal: Negative for abdominal distention, abdominal pain, has anal bleeding and blood in stool, no constipation, diarrhea, nausea, rectal pain and vomiting.   Musculoskeletal: Negative for arthralgias.   Extremities: no  edema, cyanosis or clubbing.  Skin: Negative for color change, pallor, rash and wound.   Neurological: Negative for dizziness, syncope, weakness, light-headedness and headaches.   Psychiatric/Behavioral: Negative for agitation, behavioral problems, has confusion and decreased concentration.       History:     Past Medical History:   Diagnosis Date   • Anemia of chronic disease    • Cataract    • CHF (congestive heart failure) (CMS/HCC)    • Chronic pain syndrome    • CKD (chronic kidney disease)    • Clostridium difficile infection    • COPD (chronic obstructive pulmonary disease) (CMS/HCC)    • Coronary artery disease    • Depression    • Diabetes mellitus (CMS/HCC)    • Dialysis patient (CMS/HCC)    • GERD (gastroesophageal reflux disease)    • Glaucoma    • H/O cardiac pacemaker     patient states he got his pacemaker removed in [place]   • Heart block    • Hepatitis C    • History of transfusion    • Hypertension    • Nephropathy, diabetic (CMS/HCC)    • Pacemaker    • Pulmonary embolism (CMS/HCC)      Past Surgical History:   Procedure Laterality Date   • ABDOMINAL SURGERY     • ARTERIOVENOUS FISTULA/SHUNT SURGERY Right     forearm loop   • ARTERIOVENOUS FISTULA/SHUNT SURGERY Left     removed past upper arm   • ARTERIOVENOUS FISTULA/SHUNT SURGERY Right 3/13/2018    Procedure: revision RIGHT forearm ARTERIOVENOUS graft (excision), debridement, wound vac;  Surgeon: Jerson Singh MD;  Location: [facility];  Service: Vascular   • ARTERIOVENOUS FISTULA/SHUNT SURGERY W/ HEMODIALYSIS CATHETER INSERTION Right 4/4/2016    Procedure: RIGHT ARM AV FISTULA DECLOT REVISION REPAIR BRACHIAL ANASTOMOTIC PSUEDOANEURYSM LEFT FEMORAL RICHARDSON FISTULOGRAM WITH ANGIOPLASTY;  Surgeon: Jerson Carpenter MD;  Location: [facility] 18/19;  Service:    • CATARACT EXTRACTION W/ INTRAOCULAR LENS IMPLANT Left 3/31/2017    Procedure: REMOVE CATARACT AND IMPLANT INTRAOCULAR LENS LEFT EYE;  Surgeon: Hilton Montemayor MD;   Location: [facility] OR;  Service:    • COLONOSCOPY N/A 3/12/2019    Procedure: COLONOSCOPY;  Surgeon: Flako Montoya MD;  Location: [facility] ENDOSCOPY;  Service: Gastroenterology   • ENDOSCOPY N/A 3/12/2019    Procedure: ESOPHAGOGASTRODUODENOSCOPY;  Surgeon: Flako Montoya MD;  Location: [facility] ENDOSCOPY;  Service: Gastroenterology   • EYE SURGERY     • HERNIA REPAIR     • IMPLANTABLE CARDIOVERTER DEFIBRILLATOR LEAD REPLACEMENT/POCKET REVISION Right 4/11/2016    Procedure: PACEMAKER LEADS X 3 AND BATTERY EXTRACTION WITH EXIMER LASER;  Surgeon: Vern Reeves MD;  Location: [facility] OR 18/19;  Service:    • INSERTION HEMODIALYSIS CATHETER Right 05/2015    jugular   • INTERVENTIONAL RADIOLOGY PROCEDURE Right 6/1/2017    Procedure: RIGHT dialysis fistulagram & angioplasty;  Surgeon: Jerson Singh MD;  Location: [facility] ANGIO INVASIVE LOCATION;  Service:    • INTERVENTIONAL RADIOLOGY PROCEDURE Right 8/24/2017    Procedure: IR dialysis fistulagram;  Surgeon: Jerson Singh MD;  Location: [facility] ANGIO INVASIVE LOCATION;  Service:    • INTERVENTIONAL RADIOLOGY PROCEDURE Right 11/13/2018    Procedure: IR dialysis fistulagram;  Surgeon: Jerson Singh MD;  Location: [facility] ANGIO INVASIVE LOCATION;  Service: Interventional Radiology   • PACEMAKER IMPLANTATION  2008    dual chamber [employer]   • REMOVAL HEMODIALYSIS CATHETER Right 05/2015    femoral vein   • SIGMOIDOSCOPY N/A 8/2/2019    Procedure: SIGMOIDOSCOPY FLEXIBLE;  Surgeon: Flako Montoya MD;  Location: [facility] ENDOSCOPY;  Service: Gastroenterology     Family History   Problem Relation Age of Onset   • COPD Sister    • Diabetes Brother    • Early death Brother    • Hyperlipidemia Brother    • Hypertension Brother    • Coronary artery disease Mother    • Diabetes Mother    • Hypertension Mother    • Stroke Other    • Other Other         Respiratory disorder     Social History     Tobacco Use   • Smoking status: Former  Smoker     Types: Cigarettes   • Smokeless tobacco: Never Used   • Tobacco comment: quit 20 years ago    Substance Use Topics   • Alcohol use: No     Comment: former heavy use in his 50's, up to a fifth of liquor a day, currently no alcohol   • Drug use: No     Medications Prior to Admission   Medication Sig Dispense Refill Last Dose   • acetaminophen (TYLENOL) 500 MG tablet Take 500 mg by mouth Every 4 (Four) Hours As Needed for Mild Pain  or Fever.   Unknown at Unknown time   • aspirin 81 MG chewable tablet Chew 81 mg Daily.   Unknown at Unknown time   • benzonatate (TESSALON) 100 MG capsule Take 100 mg by mouth 3 (Three) Times a Day As Needed for Cough.   Unknown at Unknown time   • brimonidine (ALPHAGAN P) 0.1 % solution ophthalmic solution Administer  to both eyes 3 (Three) Times a Day.   Unknown at Unknown time   • Cholecalciferol (VITAMIN D3) 2000 units chewable tablet Chew 2,000 Units Daily.   Unknown at Unknown time   • cyclobenzaprine (FLEXERIL) 5 MG tablet Take 5 mg by mouth Every 8 (Eight) Hours As Needed for Muscle Spasms.   Unknown at Unknown time   • dorzolamide (TRUSOPT) 2 % ophthalmic solution Administer 1 drop into the left eye 3 (Three) Times a Day. 1 each 12 Unknown at Unknown time   • escitalopram (LEXAPRO) 10 MG tablet Take 1 tablet by mouth Daily. 30 tablet 3 Unknown at Unknown time   • famotidine (PEPCID) 20 MG tablet Take 20 mg by mouth every night at bedtime.   Unknown at Unknown time   • fluticasone (FLONASE) 50 MCG/ACT nasal spray 2 sprays into each nostril daily. Administer 2 sprays in each nostril for each dose.   Unknown at Unknown time   • gabapentin (NEURONTIN) 100 MG capsule Take 1 capsule by mouth Every Night. 30 capsule 5 Unknown at Unknown time   • Glecaprevir-Pibrentasvir (MAVYRET) 100-40 MG tablet Take 3 tablets by mouth Daily. 90 tablet 2 Unknown at Unknown time   • guaiFENesin (HUMIBID 3) 400 MG tablet Take 400 mg by mouth Every 4 (Four) Hours As Needed for Cough.   Unknown  at Unknown time   • insulin aspart (NovoLOG) 100 UNIT/ML injection Inject  under the skin 3 (Three) Times a Day Before Meals. Sliding scale:  = 0 units; 150-200 = 3 units; 200-250 = 6 units; 250-300 = 9 units; 300-350 = 12 units; >350 = 15 units and call MD   Unknown at Unknown time   • insulin detemir (LEVEMIR) 100 UNIT/ML injection Inject 30 Units under the skin into the appropriate area as directed Daily.   Unknown at Unknown time   • lactulose (CHRONULAC) 10 GM/15ML solution Take 30 mL by mouth 3 (Three) Times a Day. (Patient taking differently: Take 30 mL by mouth 3 (Three) Times a Day.) 946 mL 3 Unknown at Unknown time   • latanoprost (XALATAN) 0.005 % ophthalmic solution Administer 1 drop to both eyes every night.   Unknown at Unknown time   • Lidocaine (ASPERCREME LIDOCAINE) 4 % patch Apply 1 patch topically Daily As Needed (low back pain. on for 12 hours.).   Unknown at Unknown time   • Loratadine 10 MG capsule Take 10 mg by mouth Every Night.   Unknown at Unknown time   • melatonin 5 MG tablet tablet Take 5 mg by mouth Every Night.   Unknown at Unknown time   • ondansetron ODT (ZOFRAN-ODT) 4 MG disintegrating tablet Take 1 tablet by mouth Every 6 (Six) Hours As Needed for Nausea or Vomiting. 10 tablet 0 Unknown at Unknown time   • polyethylene glycol (MIRALAX) packet Take 17 g by mouth Daily As Needed (constipation).   Unknown at Unknown time   • rifaximin (XIFAXAN) 550 MG tablet Take 1 tablet by mouth Every 12 (Twelve) Hours. 60 tablet 5 Unknown at Unknown time   • sevelamer (RENVELA) 800 MG tablet Take 800 mg by mouth 3 (Three) Times a Day With Meals.   Unknown at Unknown time   • traZODone (DESYREL) 50 MG tablet Take 50 mg by mouth every night at bedtime.   Unknown at Unknown time     Allergies:  Lisinopril and Motrin [ibuprofen]    I have reviewed the patient's medical history, surgical history and family history in the available medical record system.     Current Medications:     Current  Facility-Administered Medications   Medication Dose Route Frequency Provider Last Rate Last Dose   • benzonatate (TESSALON) capsule 100 mg  100 mg Oral TID PRN Michelle Lo MD       • brimonidine (ALPHAGAN) 0.15 % ophthalmic solution 1 drop  1 drop Both Eyes TID Michelle Lo MD   1 drop at 11/05/19 0856   • cetirizine (zyrTEC) tablet 10 mg  10 mg Oral Daily Michelle Lo MD   10 mg at 11/05/19 0855   • dextrose (D50W) 25 g/ 50mL Intravenous Solution 25 g  25 g Intravenous Q15 Min PRN Michelle Lo MD       • dextrose (GLUTOSE) oral gel 15 g  15 g Oral Q15 Min PRN Michelle Lo MD       • escitalopram (LEXAPRO) tablet 10 mg  10 mg Oral Daily Michelle Lo MD   10 mg at 11/05/19 0855   • fluticasone (FLONASE) 50 MCG/ACT nasal spray 2 spray  2 spray Nasal Daily Michelle Lo MD   2 spray at 11/05/19 0856   • gabapentin (NEURONTIN) capsule 100 mg  100 mg Oral Nightly Michelle Lo MD   100 mg at 11/04/19 2227   • glucagon (human recombinant) (GLUCAGEN DIAGNOSTIC) injection 1 mg  1 mg Subcutaneous Q15 Min PRN Michelle Lo MD       • insulin aspart (novoLOG) injection 0-7 Units  0-7 Units Subcutaneous 4x Daily AC & at Bedtime Michelle Lo MD   2 Units at 11/05/19 1213   • insulin detemir (LEVEMIR) injection 25 Units  25 Units Subcutaneous Daily Michelle Lo MD   25 Units at 11/05/19 0855   • lactulose (CHRONULAC) 10 GM/15ML solution 30 mL  30 mL Oral TID Michelle Lo MD   30 mL at 11/05/19 0853   • latanoprost (XALATAN) 0.005 % ophthalmic solution 1 drop  1 drop Both Eyes Nightly Michelle Lo MD   1 drop at 11/04/19 2235   • NON FORMULARY  100 mg Oral TID Michelle Lo MD       • ondansetron ODT (ZOFRAN-ODT) disintegrating tablet 4 mg  4 mg Oral Q6H PRN Michelle Lo MD       • pantoprazole (PROTONIX) injection 40 mg  40 mg Intravenous BID AC Marylou Eugene MD       • polyethylene glycol (MIRALAX) powder 17 g  17 g Oral Daily PRN Michelle Lo,  MD       • rifaximin (XIFAXAN) tablet 550 mg  550 mg Oral Q12H Michelle Lo MD   550 mg at 11/05/19 0855   • sevelamer (RENVELA) tablet 800 mg  800 mg Oral TID With Meals Michelle Lo MD   800 mg at 11/05/19 1226   • sodium chloride 0.9 % flush 10 mL  10 mL Intravenous PRN Vishal Rdz MD       • sodium chloride 0.9 % flush 10 mL  10 mL Intravenous PRN Vishal Rdz MD   10 mL at 11/05/19 0613   • sodium chloride 0.9 % flush 10 mL  10 mL Intravenous Q12H Michelle Lo MD   10 mL at 11/05/19 0855   • sodium chloride 0.9 % flush 10 mL  10 mL Intravenous PRN Michelle Lo MD           Objective     Physical Exam:   Temp:  [96.1 °F (35.6 °C)-98.1 °F (36.7 °C)] 97.3 °F (36.3 °C)  Heart Rate:  [65-95] 75  Resp:  [16-22] 20  BP: (103-176)/(45-67) 109/59    Physical Exam:  General Appearance:   Lethargic, in no acute distress   Head:    Normocephalic, without obvious abnormality, atraumatic   Eyes:            Lids and lashes normal, conjunctivae and sclerae normal, no   icterus, no pallor, corneas clear, PERRLA   Ears:    Ears appear intact with no abnormalities noted   Throat:   No oral lesions, no thrush, oral mucosa moist   Neck:   No adenopathy, supple, trachea midline, no thyromegaly, no     carotid bruit, no JVD   Back:     No kyphosis present, no scoliosis present, no skin lesions,       erythema or scars, no tenderness to percussion or                   palpation,   range of motion normal   Lungs:     Clear to auscultation,respirations regular, even and                   unlabored    Heart:    Regular rhythm and normal rate, normal S1 and S2, no            murmur, no gallop, no rub, no click   Breast Exam:    Deferred   Abdomen:     Normal bowel sounds, no masses, no organomegaly, soft        non-tender, non-distended, no guarding, no rebound                 tenderness   Genitalia:    Deferred   Extremities:   Moves all extremities well, no edema, no cyanosis, no              redness    Pulses:   Pulses palpable and equal bilaterally   Skin:   No bleeding, bruising or rash   Lymph nodes:   No palpable adenopathy   Neurologic:  Lethargic and is is arousable cranial nerves 2 - 12 grossly intact, sensation intact, DTR        present and equal bilaterally      Results Review:     Lab Results   Component Value Date    WBC 5.10 11/05/2019    WBC 4.90 11/04/2019    WBC 4.94 08/06/2019    HGB 7.1 (L) 11/05/2019    HGB 7.2 (L) 11/05/2019    HGB 7.3 (L) 11/04/2019    HCT 22.2 (L) 11/05/2019    HCT 22.1 (L) 11/05/2019    HCT 22.0 (L) 11/04/2019     (L) 11/05/2019     (L) 11/04/2019     (L) 08/06/2019     Results from last 7 days   Lab Units 11/05/19  0418 11/04/19  1703   ALK PHOS U/L 134* 173*   ALT (SGPT) U/L 8 10   AST (SGOT) U/L 17 16     Results from last 7 days   Lab Units 11/05/19  0418 11/04/19  1703   BILIRUBIN mg/dL 1.1 1.0   ALK PHOS U/L 134* 173*     Lipase   Date Value Ref Range Status   11/04/2019 41 13 - 60 U/L Final     Lab Results   Component Value Date    INR 1.11 11/04/2019    INR 1.20 07/31/2019    INR 1.12 07/30/2019         Radiology Review:  Imaging Results (Last 72 Hours)     Procedure Component Value Units Date/Time    XR Chest 1 View [[id number]] Collected:  11/04/19 1650     Updated:  11/04/19 1727    Narrative:       Chest single view.        CLINICAL INDICATION: Shortness of breath. Alteration mental  status.    COMPARISON: June 20, 2019.    FINDINGS: Cardiac silhouette is borderline enlarged in size.  Pulmonary vascularity is unremarkable. Suboptimal inspiratory  effort.  Benign calcified granulomata within both hilar regions.  No focal infiltrate or consolidation.  No pleural effusion.  No  pneumothorax.    There are right-sided endovascular stents right subclavian and  axillary vein.      Impression:       CONCLUSION: Suboptimal inspiratory effort. Borderline  cardiomegaly. Lungs otherwise clear. Endovascular stents right  subclavian and axillary  veins.    Electronically signed by:  Wallace Louise MD  11/4/2019 5:26 PM CST  Workstation: [id number]          I reviewed the patient's new clinical results.    I reviewed the patient's new imaging results and agree with the interpretation.     ASSESSMENT/PLAN:   ASSESSMENT:.  Change in mental status likely due to hepatic encephalopathy.    2.  Rectal bleeding and anemia rule out variceal hemorrhage and peptic ulcer disease.  3.  Chronic hepatitis C with possible cirrhosis.  He may need hepatitis C treatment post clinical stabilization.  PLAN: Continue lactulose enemas.  Follow ammonia closely.  Continue PPI therapy, follow H&H closely and transfuse as needed.  Proceed with EGD today for further evaluation.  Colonoscopy in a.m. if EGD is unremarkable.  The risks, benefits, and alternatives of this procedure have been discussed with the patient or the responsible party. The patient understands and agrees to proceed.         Tricia Philip MD  11/05/19  3:28 PM

## 2019-11-05 NOTE — ANESTHESIA PREPROCEDURE EVALUATION
Anesthesia Evaluation                  Airway   Mallampati: III  TM distance: <3 FB  Neck ROM: full  Possible difficult intubation  Dental      Pulmonary - normal exam   (+) pneumonia resolved , pulmonary embolism, COPD, sleep apnea,   Cardiovascular - normal exam    (+) hypertension 2 medications or greater, past MI  >12 months, CAD, dysrhythmias Atrial Fib, CHF ,       Neuro/Psych  (+) psychiatric history Depression, dementia,     GI/Hepatic/Renal/Endo    (+) obesity,  GERD poorly controlled, GI bleeding , hepatitis, liver disease, renal disease, diabetes mellitus type 2,     Musculoskeletal     Abdominal   (+) obese,    Substance History      OB/GYN          Other                          Anesthesia Plan    ASA 4     MAC     intravenous induction     Anesthetic plan, all risks, benefits, and alternatives have been provided, discussed and informed consent has been obtained with: patient.    Plan discussed with CRNA.

## 2019-11-05 NOTE — PROGRESS NOTES
"FAMILY MEDICINE DAILY PROGRESS NOTE  NAME: Cristo Musa  : 1950  MRN: [id number]     LOS: 1 day     PROVIDER OF SERVICE: Warren Stewart MD    Chief Complaint: GI bleed    Subjective:   Pt had 3x dark colored bowel movements overnight. Oriented to person only. Asking for his \"mama\" during interview. Hemoglobin decreased to 7.2 yesterday, waiting on results from this AM, may consider transfusion for hemoglobin <7.  Will consult GI Dr Patel later this AM. Ammonia has decreased to 53 as of now. Complaining of pain in his feet, of note Gabapentin held due to acute condition.   Interval History:  History taken from: patient chart RN      Review of Systems:   Review of Systems   Unable to perform ROS: Mental status change       Objective:     Vital Signs  Temp:  [96.1 °F (35.6 °C)-98.1 °F (36.7 °C)] 97.3 °F (36.3 °C)  Heart Rate:  [67-95] 95  Resp:  [16-22] 20  BP: (103-176)/(55-67) 103/55    Physical Exam  Physical Exam   Constitutional: He appears well-developed and well-nourished. No distress.   HENT:   Head: Normocephalic and atraumatic.   Right Ear: External ear normal.   Left Ear: External ear normal.   Mouth/Throat: Oropharynx is clear and moist.   Eyes: Conjunctivae and EOM are normal. Pupils are equal, round, and reactive to light. Right eye exhibits no discharge. Left eye exhibits no discharge. No scleral icterus.   Neck: Normal range of motion.   Cardiovascular: Normal rate, regular rhythm and normal heart sounds. Exam reveals no gallop and no friction rub.   No murmur heard.  Pulmonary/Chest: Effort normal and breath sounds normal. No stridor. No respiratory distress. He has no wheezes. He has no rales. He exhibits no tenderness.   Abdominal: Soft. Bowel sounds are normal. He exhibits no distension. There is no tenderness.   Musculoskeletal: Normal range of motion. He exhibits deformity (Upper extremity contracture). He exhibits no edema or tenderness.   Neurological: He is alert. "   Skin: Skin is warm and dry. No rash noted. He is not diaphoretic. No erythema. No pallor.   Psychiatric: His speech is delayed. He is agitated. Cognition and memory are impaired. He is inattentive.       Medication Review    Current Facility-Administered Medications:   •  benzonatate (TESSALON) capsule 100 mg, 100 mg, Oral, TID PRN, Michelle Lo MD  •  brimonidine (ALPHAGAN) 0.15 % ophthalmic solution 1 drop, 1 drop, Both Eyes, TID, Michelle Lo MD, 1 drop at 11/04/19 2230  •  cetirizine (zyrTEC) tablet 10 mg, 10 mg, Oral, Daily, Michelle Lo MD  •  dextrose (D50W) 25 g/ 50mL Intravenous Solution 25 g, 25 g, Intravenous, Q15 Min PRN, Michelle Lo MD  •  dextrose (GLUTOSE) oral gel 15 g, 15 g, Oral, Q15 Min PRN, Michelle Lo MD  •  escitalopram (LEXAPRO) tablet 10 mg, 10 mg, Oral, Daily, Michelle Lo MD  •  fluticasone (FLONASE) 50 MCG/ACT nasal spray 2 spray, 2 spray, Nasal, Daily, Michelle Lo MD  •  gabapentin (NEURONTIN) capsule 100 mg, 100 mg, Oral, Nightly, Michelle Lo MD, 100 mg at 11/04/19 2227  •  glucagon (human recombinant) (GLUCAGEN DIAGNOSTIC) injection 1 mg, 1 mg, Subcutaneous, Q15 Min PRN, Michelle Lo MD  •  insulin aspart (novoLOG) injection 0-7 Units, 0-7 Units, Subcutaneous, 4x Daily AC & at Bedtime, Michelle Lo MD, 4 Units at 11/04/19 2241  •  insulin detemir (LEVEMIR) injection 25 Units, 25 Units, Subcutaneous, Daily, Michelle Lo MD  •  lactulose (CHRONULAC) 10 GM/15ML solution 30 mL, 30 mL, Oral, TID, Michelle Lo MD, 30 mL at 11/04/19 2227  •  latanoprost (XALATAN) 0.005 % ophthalmic solution 1 drop, 1 drop, Both Eyes, Nightly, Michelle Lo MD, 1 drop at 11/04/19 5392  •  NON FORMULARY, 100 mg, Oral, TID, Michelle Lo MD  •  ondansetron ODT (ZOFRAN-ODT) disintegrating tablet 4 mg, 4 mg, Oral, Q6H PRN, Michelle Lo MD  •  pantoprazole (PROTONIX) injection 40 mg, 40 mg, Intravenous, Q AM, Michelle Lo MD, 40 mg  at 11/05/19 0613  •  polyethylene glycol (MIRALAX) powder 17 g, 17 g, Oral, Daily PRN, Michelle Lo MD  •  rifaximin (XIFAXAN) tablet 550 mg, 550 mg, Oral, Q12H, Michelle Lo MD, 550 mg at 11/04/19 2227  •  sevelamer (RENVELA) tablet 800 mg, 800 mg, Oral, TID With Meals, Michelle Lo MD  •  sodium chloride 0.9 % flush 10 mL, 10 mL, Intravenous, PRN, Vishal Rdz MD  •  sodium chloride 0.9 % flush 10 mL, 10 mL, Intravenous, PRN, Vishal Rdz MD, 10 mL at 11/05/19 0613  •  sodium chloride 0.9 % flush 10 mL, 10 mL, Intravenous, Q12H, Michelle Lo MD, 10 mL at 11/04/19 2228  •  sodium chloride 0.9 % flush 10 mL, 10 mL, Intravenous, PRN, Michelle Lo MD     Diagnostic Data    Lab Results (last 24 hours)     Procedure Component Value Units Date/Time    POC Glucose Once [[id number]]  (Abnormal) Collected:  11/05/19 0608    Specimen:  Blood Updated:  11/05/19 0621     Glucose 190 mg/dL      Comment: : [id number] BERNARDINO Mirian ID: [id number]       Extra Tubes [[id number]] Collected:  11/05/19 0418    Specimen:  Blood, Venous Line Updated:  11/05/19 0530    Narrative:       The following orders were created for panel order Extra Tubes.  Procedure                               Abnormality         Status                     ---------                               -----------         ------                     Lavender Top[[id number]]                                     Final result                 Please view results for these tests on the individual orders.    Lavender Top [[id number]] Collected:  11/05/19 0418    Specimen:  Blood Updated:  11/05/19 0530     Extra Tube hold for add-on     Comment: Auto resulted       Lactic Acid, Plasma [[id number]]  (Abnormal) Collected:  11/05/19 0418    Specimen:  Blood Updated:  11/05/19 0455     Lactate 2.8 mmol/L     Ammonia [[id number]]  (Normal) Collected:  11/05/19 0418    Specimen:  Blood Updated:  11/05/19 0454     Ammonia 53 umol/L      Comprehensive Metabolic Panel [[id number]]  (Abnormal) Collected:  11/05/19 0418    Specimen:  Blood Updated:  11/05/19 0444     Glucose 172 mg/dL      BUN 37 mg/dL      Creatinine 4.71 mg/dL      Sodium 137 mmol/L      Potassium 4.5 mmol/L      Chloride 93 mmol/L      CO2 31.0 mmol/L      Calcium 8.4 mg/dL      Total Protein 9.0 g/dL      Albumin 2.80 g/dL      ALT (SGPT) 8 U/L      AST (SGOT) 17 U/L      Alkaline Phosphatase 134 U/L      Total Bilirubin 1.1 mg/dL      eGFR  African Amer 15 mL/min/1.73      Globulin 6.2 gm/dL      A/G Ratio 0.5 g/dL      BUN/Creatinine Ratio 7.9     Anion Gap 13.0 mmol/L     Narrative:       GFR Normal >60  Chronic Kidney Disease <60  Kidney Failure <15    Occult Blood X 1, Stool - Stool, Per Rectum [[id number]]  (Abnormal) Collected:  11/05/19 0011    Specimen:  Stool from Per Rectum Updated:  11/05/19 0254     Fecal Occult Blood Positive    CRE Screen by PCR - Swab, Large Intestine, Rectum [[id number]] Collected:  11/04/19 2304    Specimen:  Swab from Large Intestine, Rectum Updated:  11/05/19 0209     CRE SCREEN Not Detected     Comment: Test performed by real-time polymerase chain reaction (qPCR).        OXA 48 Strain Not Detected     IMP STRAIN Not Detected     VIM STRAIN Not Detected     NDM Strain Not Detected     KPC Strain Not Detected    Hemoglobin & Hematocrit, Blood [[id number]]  (Abnormal) Collected:  11/05/19 0024    Specimen:  Blood Updated:  11/05/19 0108     Hemoglobin 7.2 g/dL      Hematocrit 22.1 %     POC Glucose Once [[id number]]  (Abnormal) Collected:  11/04/19 2230    Specimen:  Blood Updated:  11/04/19 2308     Glucose 237 mg/dL      Comment: RN NotifiedOperator: [id number] [facility] ID: [id number]       Lactic Acid, Reflex [[id number]]  (Abnormal) Collected:  11/04/19 2140    Specimen:  Blood Updated:  11/04/19 2204     Lactate 2.9 mmol/L     Extra Tubes [[id number]] Collected:  11/04/19 2004    Specimen:  Blood, Venous Line Updated:  11/04/19 2122     Narrative:       The following orders were created for panel order Extra Tubes.  Procedure                               Abnormality         Status                     ---------                               -----------         ------                     Light Blue Top[[id number]]                                   Final result               Lavender Top[[id number]]                                     Final result               Gold Top - SST[[id number]]                                   Final result               Green Top (Gel)[[id number]]                                  Final result                 Please view results for these tests on the individual orders.    Light Blue Top [[id number]] Collected:  11/04/19 2004    Specimen:  Blood Updated:  11/04/19 2122     Extra Tube hold for add-on     Comment: Auto resulted       Gold Top - SST [[id number]] Collected:  11/04/19 2004    Specimen:  Blood Updated:  11/04/19 2122     Extra Tube Hold for add-ons.     Comment: Auto resulted.       Lavender Top [[id number]] Collected:  11/04/19 2004    Specimen:  Blood Updated:  11/04/19 2122     Extra Tube hold for add-on     Comment: Auto resulted       Green Top (Gel) [[id number]] Collected:  11/04/19 2004    Specimen:  Blood Updated:  11/04/19 2122     Extra Tube Hold for add-ons.     Comment: Auto resulted.       Lactic Acid, Reflex Timer (This will reflex a repeat order 3-3:15 hours after ordered.) [[id number]] Collected:  11/04/19 1703    Specimen:  Blood Updated:  11/04/19 2121     Extra Tube Hold for add-ons.     Comment: Auto resulted.       Ammonia [[id number]]  (Abnormal) Collected:  11/04/19 1911    Specimen:  Blood Updated:  11/04/19 1935     Ammonia 89 umol/L     Blood Culture - Blood, Hand, Left [[id number]] Collected:  11/04/19 1914    Specimen:  Blood from Hand, Left Updated:  11/04/19 1915    Blood Culture - Blood, Arm, Left [[id number]] Collected:  11/04/19 1914    Specimen:  Blood from Arm, Left Updated:  11/04/19 1914     Blood Gas, Arterial [[id number]]  (Abnormal) Collected:  11/04/19 1827    Specimen:  Arterial Blood Updated:  11/04/19 1844     Site Left Radial     Inderjit's Test N/A     pH, Arterial 7.437 pH units      pCO2, Arterial 51.1 mm Hg      Comment: 83 Value above reference range        pO2, Arterial 21.0 mm Hg      Comment: 85 Value below critical limit        HCO3, Arterial 34.4 mmol/L      Comment: 83 Value above reference range        Base Excess, Arterial 9.2 mmol/L      Comment: 83 Value above reference range        O2 Saturation, Arterial 25.4 %      Comment: 84 Value below reference range        Barometric Pressure for Blood Gas 750 mmHg      Modality Room Air     Ventilator Mode NA     Collected by MAXI     Comment: Meter: [id number]     :  [id number]       CBC & Differential [[id number]] Collected:  11/04/19 1703    Specimen:  Blood Updated:  11/04/19 1843    Narrative:       The following orders were created for panel order CBC & Differential.  Procedure                               Abnormality         Status                     ---------                               -----------         ------                     CBC Auto Differential[[id number]]        Abnormal            Final result                 Please view results for these tests on the individual orders.    CBC Auto Differential [[id number]]  (Abnormal) Collected:  11/04/19 1703    Specimen:  Blood Updated:  11/04/19 1843     WBC 4.90 10*3/mm3      RBC 2.53 10*6/mm3      Hemoglobin 7.3 g/dL      Hematocrit 22.0 %      MCV 87.0 fL      MCH 28.9 pg      MCHC 33.2 g/dL      RDW 15.2 %      RDW-SD 47.9 fl      MPV 8.9 fL      Platelets 115 10*3/mm3      Neutrophil % 68.6 %      Lymphocyte % 18.8 %      Monocyte % 10.6 %      Eosinophil % 1.0 %      Basophil % 0.6 %      Immature Grans % 0.4 %      Neutrophils, Absolute 3.36 10*3/mm3      Lymphocytes, Absolute 0.92 10*3/mm3      Monocytes, Absolute 0.52 10*3/mm3      Eosinophils, Absolute 0.05  10*3/mm3      Basophils, Absolute 0.03 10*3/mm3      Immature Grans, Absolute 0.02 10*3/mm3      nRBC 0.0 /100 WBC     Lipase [[id number]]  (Normal) Collected:  11/04/19 1703    Specimen:  Blood Updated:  11/04/19 1840     Lipase 41 U/L     Comprehensive Metabolic Panel [[id number]]  (Abnormal) Collected:  11/04/19 1703    Specimen:  Blood Updated:  11/04/19 1840     Glucose 244 mg/dL      BUN 29 mg/dL      Creatinine 3.88 mg/dL      Sodium 137 mmol/L      Potassium 4.7 mmol/L      Chloride 91 mmol/L      CO2 31.0 mmol/L      Calcium 8.7 mg/dL      Total Protein 9.5 g/dL      Albumin 3.10 g/dL      ALT (SGPT) 10 U/L      AST (SGOT) 16 U/L      Alkaline Phosphatase 173 U/L      Total Bilirubin 1.0 mg/dL      eGFR  African Amer 19 mL/min/1.73      Globulin 6.4 gm/dL      A/G Ratio 0.5 g/dL      BUN/Creatinine Ratio 7.5     Anion Gap 15.0 mmol/L     Narrative:       GFR Normal >60  Chronic Kidney Disease <60  Kidney Failure <15    [place] Draw [[id number]] Collected:  11/04/19 1703    Specimen:  Blood Updated:  11/04/19 1830    Narrative:       The following orders were created for panel order [place] Draw.  Procedure                               Abnormality         Status                     ---------                               -----------         ------                     Light Blue Top[[id number]]                                   Final result               Green Top (Gel)[[id number]]                                  Final result               Lavender Top[[id number]]                                     Final result               Gold Top - SST[[id number]]                                                                Please view results for these tests on the individual orders.    Green Top (Gel) [[id number]] Collected:  11/04/19 1703    Specimen:  Blood Updated:  11/04/19 1830     Extra Tube Hold for add-ons.     Comment: Auto resulted.       Lavender Top [[id number]] Collected:  11/04/19 1703    Specimen:  Blood  Updated:  11/04/19 1830     Extra Tube hold for add-on     Comment: Auto resulted       [place] Draw [[id number]] Collected:  11/04/19 1703    Specimen:  Blood Updated:  11/04/19 1816    Narrative:       The following orders were created for panel order [place] Draw.  Procedure                               Abnormality         Status                     ---------                               -----------         ------                     Light Blue Top[[id number]]                                                              Green Top (Gel)[[id number]]                                                             Lavender Top[[id number]]                                                                Gold Top - SST[[id number]]                                   Final result                 Please view results for these tests on the individual orders.    Gold Top - SST [[id number]] Collected:  11/04/19 1703    Specimen:  Blood Updated:  11/04/19 1816     Extra Tube Hold for add-ons.     Comment: Auto resulted.       Light Blue Top [[id number]] Collected:  11/04/19 1703    Specimen:  Blood Updated:  11/04/19 1815     Extra Tube hold for add-on     Comment: Auto resulted       Lactic Acid, Plasma [[id number]]  (Abnormal) Collected:  11/04/19 1703    Specimen:  Blood Updated:  11/04/19 1736     Lactate 3.2 mmol/L     aPTT [[id number]]  (Normal) Collected:  11/04/19 1703    Specimen:  Blood Updated:  11/04/19 1731     PTT 32.7 seconds     Narrative:       The recommended Heparin therapeutic range is 68-97 seconds.    Protime-INR [[id number]]  (Normal) Collected:  11/04/19 1703    Specimen:  Blood Updated:  11/04/19 1731     Protime 14.1 Seconds      INR 1.11    Narrative:       Therapeutic range for most indications is 2.0-3.0 INR,  or 2.5-3.5 for mechanical heart valves.    POC Glucose Once [[id number]]  (Abnormal) Collected:  11/04/19 1608    Specimen:  Blood Updated:  11/04/19 1643     Glucose 263 mg/dL      Comment: RN  NotifiedOperator: [id number] WHITE MEGANMeter ID: [id number]              Imaging Results (Last 24 Hours)     Procedure Component Value Units Date/Time    XR Chest 1 View [[id number]] Collected:  11/04/19 1650     Updated:  11/04/19 1727    Narrative:       Chest single view.        CLINICAL INDICATION: Shortness of breath. Alteration mental  status.    COMPARISON: June 20, 2019.    FINDINGS: Cardiac silhouette is borderline enlarged in size.  Pulmonary vascularity is unremarkable. Suboptimal inspiratory  effort.  Benign calcified granulomata within both hilar regions.  No focal infiltrate or consolidation.  No pleural effusion.  No  pneumothorax.    There are right-sided endovascular stents right subclavian and  axillary vein.      Impression:       CONCLUSION: Suboptimal inspiratory effort. Borderline  cardiomegaly. Lungs otherwise clear. Endovascular stents right  subclavian and axillary veins.    Electronically signed by:  Wallace Louise MD  11/4/2019 5:26 PM CST  Workstation: [id number]          I reviewed the patient's new clinical results.    Assessment/Plan:     Active Hospital Problems    Diagnosis   • **GI bleed     -visually dark stool in ER, hemoccult positive  -H/H 7.3/22 on admission, hemoglobin usually closer to 9 at baseline  -permission to transfuse if necessary given by patient's health care surrogate, brother Farhan Musa  -follows with Dr. Montoya/Beth Porras outpatient, will consult today  -PPI  -trend H/H     • Chronic hepatitis C without hepatic coma (CMS/HCC)     -follows with GI outpatient, Dr. Montoya/Jason Porras, will consult GI in AM  -continue lactulose, rifaximin      • Lactic acidosis     -Lactate on admission 2.9, now 2.7  -likely secondary to GI bleed as there is no clear source of infection and WBCs are within normal limits     • Acute hepatic encephalopathy     -ammonia 87 on admission, currently 53  -continue home lactulose     • Obstructive sleep apnea     -continue BiPAP     •  Hyperammonemia (CMS/HCC)     -ammonia on admission 87, currently 53  -continue lactulose and rifaximin     • Anemia due to chronic kidney disease, on chronic dialysis (CMS/HCC)     -anemia likely multifactorial, both due to chronic kidney disease as well as GI bleed/chronic blood loss  -will trend H/H  -transfuse for hemoglobin less than 7, last hemoglobin 7.2 currently waiting on CBC from this morning       • Thrombocytopenia (CMS/HCC)     -platelets on admission 115  - Appears to be chronic thrombocytopenia, could also be due to chronic slow GI bleed  - Continue to monitor      • COPD (chronic obstructive pulmonary disease) (CMS/HCC)     - Continue flonase, loratadine  - Patient uses Oxygen prn at the nursing home.   - Duonebs and albuterol nebs prn.         • Gastroesophageal reflux disease without esophagitis     -PPI     • Chronic pain     -holding home gabapentin/flexeril until mentation improves     • Primary insomnia     -holding trazodone, melatonin due to encephalopathy      • ESRD (end stage renal disease) (CMS/HCC)     -HD M/W/F, underwent HD on 11/4/19  -will consult nephrology, follows with Dr. Vilchis  -epo on M/W/F with dialysis  -continue Sevelamer 800 mg three times daily       • Essential hypertension     -not on any home medications, will monitor     • Type 2 diabetes mellitus with diabetic peripheral angiopathy and gangrene, with long-term current use of insulin (CMS/HCC)     -levemir, SSI           DVT prophylaxis: SCDs/TEDs  Code Status and Medical Interventions:   Ordered at: 11/04/19 2041     Limited Support to NOT Include:    Intubation     Level Of Support Discussed With:    Health Care Surrogate     Code Status:    No CPR     Medical Interventions (Level of Support Prior to Arrest):    Limited       Plan for disposition:Where: SNF and When:  1-2days      Time: 30 minutes        This document has been electronically signed by Warren Stewart MD on November 5, 2019 7:12 AM

## 2019-11-05 NOTE — SIGNIFICANT NOTE
I discussed with the patient's emergency contact and health care surrogate, Farhan Musa, the following regarding risks and benefits of blood transfusion if required:    Benefits:   Blood transfusion is a life-saving treatment that benefits patients by treating or preventing blood loss, which can lead to a seriously low hemoglobin level and cause damage to body organs due to a lack of oxygen.      Risks:   Patient acknowledged understanding that among the use of blood or blood products has the following general risks:      Uncommon (1-5%) chance)   • Mild reactions resulting in itching, rash, fever, headaches.      Rare (<1% chance)   • Respiratory distress (shortness of breath) or lung injury   • Exposure to blood borne micro-organisms (bacteria and parasites) that could result in an infection   • Possible effects on the immune system, which may decrease the body’s ability to fight infection   • Exposure to blood borne viruses such as hepatitis B (an inflammatory disease affecting the liver)   • Shock      Extremely rare (one in a million or less)   • Exposure to blood borne viruses such as hepatitis C (an inflammatory disease affecting the liver) and Human Immunodeficiency Virus (HIV, the virus that causes AIDS)   • Death     Patient's emergency contact and health care surrogate has consented to receive blood transfusion if and when deemed medically necessary.    Also discussed with healthcare surrogate code status, and surrogate confirmed along with nursing staff at [facility] that patient is DNR/DNI.      This document has been electronically signed by Michelle Lo MD on November 4, 2019 8:00 PM

## 2019-11-05 NOTE — H&P (VIEW-ONLY)
SUBJECTIVE:   11/5/2019    Name: Cristo Musa  DOD: 1950    REASON FOR CONSULT: GI bleed    Chief Complaint:     Chief Complaint   Patient presents with   • Altered Mental Status   • Rectal Bleeding       Subjective     Patient is 69 y.o. male nursing home resident with chronic renal insufficiency, heart failure, chronic hepatitis C, COPD, diabetes presents with change in mental status.  Patient apparently admitted yesterday with increasing somnolence with bright red bleeding per rectum.  He was evaluated the emergency room and was noted to have elevated ammonia level.  Noted to have a drop in hemoglobin to 7.3 as well he had 3 bowel movements overnight with burgundy stools.  Denied abdominal pain, nausea, vomiting, diarrhea, constipation, weight loss or melena.  He is currently receiving packed red cell transfusion.  He took lactulose overnight with improvement of ammonia level to 53.  He also was noted to have elevated lactic acid level which has improved to 2.8 today.  His hemoglobin has decreased to 7.1 this morning. Denied NSAID usage.  EGD and colonoscopy in March of this year by Dr. Patel was consistent with gastritis and adenomatous polyps.     ROS/HISTORY/ CURRENT MEDICATIONS/OBJECTIVE/VS/PE:   Review of Systems:   Review of Systems   Constitutional: Negative for activity change, appetite change, chills, diaphoresis, fatigue, fever and unexpected weight change.   HENT: Negative for congestion, sore throat and trouble swallowing.    Neck: no adenopathy, thyromegaly or masses.  Respiratory: Negative for apnea, cough, choking, chest tightness, shortness of breath, wheezing and stridor.    Cardiovascular: Negative for chest pain, palpitations and leg swelling.   Gastrointestinal: Negative for abdominal distention, abdominal pain, has anal bleeding and blood in stool, no constipation, diarrhea, nausea, rectal pain and vomiting.   Musculoskeletal: Negative for arthralgias.   Extremities: no  edema, cyanosis or clubbing.  Skin: Negative for color change, pallor, rash and wound.   Neurological: Negative for dizziness, syncope, weakness, light-headedness and headaches.   Psychiatric/Behavioral: Negative for agitation, behavioral problems, has confusion and decreased concentration.       History:     Past Medical History:   Diagnosis Date   • Anemia of chronic disease    • Cataract    • CHF (congestive heart failure) (CMS/HCC)    • Chronic pain syndrome    • CKD (chronic kidney disease)    • Clostridium difficile infection    • COPD (chronic obstructive pulmonary disease) (CMS/HCC)    • Coronary artery disease    • Depression    • Diabetes mellitus (CMS/HCC)    • Dialysis patient (CMS/HCC)    • GERD (gastroesophageal reflux disease)    • Glaucoma    • H/O cardiac pacemaker     patient states he got his pacemaker removed in [place]   • Heart block    • Hepatitis C    • History of transfusion    • Hypertension    • Nephropathy, diabetic (CMS/HCC)    • Pacemaker    • Pulmonary embolism (CMS/HCC)      Past Surgical History:   Procedure Laterality Date   • ABDOMINAL SURGERY     • ARTERIOVENOUS FISTULA/SHUNT SURGERY Right     forearm loop   • ARTERIOVENOUS FISTULA/SHUNT SURGERY Left     removed past upper arm   • ARTERIOVENOUS FISTULA/SHUNT SURGERY Right 3/13/2018    Procedure: revision RIGHT forearm ARTERIOVENOUS graft (excision), debridement, wound vac;  Surgeon: Jerson Singh MD;  Location: [facility];  Service: Vascular   • ARTERIOVENOUS FISTULA/SHUNT SURGERY W/ HEMODIALYSIS CATHETER INSERTION Right 4/4/2016    Procedure: RIGHT ARM AV FISTULA DECLOT REVISION REPAIR BRACHIAL ANASTOMOTIC PSUEDOANEURYSM LEFT FEMORAL RICHARDSON FISTULOGRAM WITH ANGIOPLASTY;  Surgeon: Jerson Carpenter MD;  Location: [facility] 18/19;  Service:    • CATARACT EXTRACTION W/ INTRAOCULAR LENS IMPLANT Left 3/31/2017    Procedure: REMOVE CATARACT AND IMPLANT INTRAOCULAR LENS LEFT EYE;  Surgeon: Hilton Montemayor MD;   Location: [facility] OR;  Service:    • COLONOSCOPY N/A 3/12/2019    Procedure: COLONOSCOPY;  Surgeon: Flako Montoya MD;  Location: [facility] ENDOSCOPY;  Service: Gastroenterology   • ENDOSCOPY N/A 3/12/2019    Procedure: ESOPHAGOGASTRODUODENOSCOPY;  Surgeon: Flako Montoya MD;  Location: [facility] ENDOSCOPY;  Service: Gastroenterology   • EYE SURGERY     • HERNIA REPAIR     • IMPLANTABLE CARDIOVERTER DEFIBRILLATOR LEAD REPLACEMENT/POCKET REVISION Right 4/11/2016    Procedure: PACEMAKER LEADS X 3 AND BATTERY EXTRACTION WITH EXIMER LASER;  Surgeon: Vern Reeves MD;  Location: [facility] OR 18/19;  Service:    • INSERTION HEMODIALYSIS CATHETER Right 05/2015    jugular   • INTERVENTIONAL RADIOLOGY PROCEDURE Right 6/1/2017    Procedure: RIGHT dialysis fistulagram & angioplasty;  Surgeon: Jerson Singh MD;  Location: [facility] ANGIO INVASIVE LOCATION;  Service:    • INTERVENTIONAL RADIOLOGY PROCEDURE Right 8/24/2017    Procedure: IR dialysis fistulagram;  Surgeon: Jerson Singh MD;  Location: [facility] ANGIO INVASIVE LOCATION;  Service:    • INTERVENTIONAL RADIOLOGY PROCEDURE Right 11/13/2018    Procedure: IR dialysis fistulagram;  Surgeon: Jerson Singh MD;  Location: [facility] ANGIO INVASIVE LOCATION;  Service: Interventional Radiology   • PACEMAKER IMPLANTATION  2008    dual chamber [employer] [place]   • REMOVAL HEMODIALYSIS CATHETER Right 05/2015    femoral vein   • SIGMOIDOSCOPY N/A 8/2/2019    Procedure: SIGMOIDOSCOPY FLEXIBLE;  Surgeon: Flako Montoya MD;  Location: [facility] ENDOSCOPY;  Service: Gastroenterology     Family History   Problem Relation Age of Onset   • COPD Sister    • Diabetes Brother    • Early death Brother    • Hyperlipidemia Brother    • Hypertension Brother    • Coronary artery disease Mother    • Diabetes Mother    • Hypertension Mother    • Stroke Other    • Other Other         Respiratory disorder     Social History     Tobacco Use   • Smoking status: Former  Smoker     Types: Cigarettes   • Smokeless tobacco: Never Used   • Tobacco comment: quit 20 years ago    Substance Use Topics   • Alcohol use: No     Comment: former heavy use in his 50's, up to a fifth of liquor a day, currently no alcohol   • Drug use: No     Medications Prior to Admission   Medication Sig Dispense Refill Last Dose   • acetaminophen (TYLENOL) 500 MG tablet Take 500 mg by mouth Every 4 (Four) Hours As Needed for Mild Pain  or Fever.   Unknown at Unknown time   • aspirin 81 MG chewable tablet Chew 81 mg Daily.   Unknown at Unknown time   • benzonatate (TESSALON) 100 MG capsule Take 100 mg by mouth 3 (Three) Times a Day As Needed for Cough.   Unknown at Unknown time   • brimonidine (ALPHAGAN P) 0.1 % solution ophthalmic solution Administer  to both eyes 3 (Three) Times a Day.   Unknown at Unknown time   • Cholecalciferol (VITAMIN D3) 2000 units chewable tablet Chew 2,000 Units Daily.   Unknown at Unknown time   • cyclobenzaprine (FLEXERIL) 5 MG tablet Take 5 mg by mouth Every 8 (Eight) Hours As Needed for Muscle Spasms.   Unknown at Unknown time   • dorzolamide (TRUSOPT) 2 % ophthalmic solution Administer 1 drop into the left eye 3 (Three) Times a Day. 1 each 12 Unknown at Unknown time   • escitalopram (LEXAPRO) 10 MG tablet Take 1 tablet by mouth Daily. 30 tablet 3 Unknown at Unknown time   • famotidine (PEPCID) 20 MG tablet Take 20 mg by mouth every night at bedtime.   Unknown at Unknown time   • fluticasone (FLONASE) 50 MCG/ACT nasal spray 2 sprays into each nostril daily. Administer 2 sprays in each nostril for each dose.   Unknown at Unknown time   • gabapentin (NEURONTIN) 100 MG capsule Take 1 capsule by mouth Every Night. 30 capsule 5 Unknown at Unknown time   • Glecaprevir-Pibrentasvir (MAVYRET) 100-40 MG tablet Take 3 tablets by mouth Daily. 90 tablet 2 Unknown at Unknown time   • guaiFENesin (HUMIBID 3) 400 MG tablet Take 400 mg by mouth Every 4 (Four) Hours As Needed for Cough.   Unknown  at Unknown time   • insulin aspart (NovoLOG) 100 UNIT/ML injection Inject  under the skin 3 (Three) Times a Day Before Meals. Sliding scale:  = 0 units; 150-200 = 3 units; 200-250 = 6 units; 250-300 = 9 units; 300-350 = 12 units; >350 = 15 units and call MD   Unknown at Unknown time   • insulin detemir (LEVEMIR) 100 UNIT/ML injection Inject 30 Units under the skin into the appropriate area as directed Daily.   Unknown at Unknown time   • lactulose (CHRONULAC) 10 GM/15ML solution Take 30 mL by mouth 3 (Three) Times a Day. (Patient taking differently: Take 30 mL by mouth 3 (Three) Times a Day.) 946 mL 3 Unknown at Unknown time   • latanoprost (XALATAN) 0.005 % ophthalmic solution Administer 1 drop to both eyes every night.   Unknown at Unknown time   • Lidocaine (ASPERCREME LIDOCAINE) 4 % patch Apply 1 patch topically Daily As Needed (low back pain. on for 12 hours.).   Unknown at Unknown time   • Loratadine 10 MG capsule Take 10 mg by mouth Every Night.   Unknown at Unknown time   • melatonin 5 MG tablet tablet Take 5 mg by mouth Every Night.   Unknown at Unknown time   • ondansetron ODT (ZOFRAN-ODT) 4 MG disintegrating tablet Take 1 tablet by mouth Every 6 (Six) Hours As Needed for Nausea or Vomiting. 10 tablet 0 Unknown at Unknown time   • polyethylene glycol (MIRALAX) packet Take 17 g by mouth Daily As Needed (constipation).   Unknown at Unknown time   • rifaximin (XIFAXAN) 550 MG tablet Take 1 tablet by mouth Every 12 (Twelve) Hours. 60 tablet 5 Unknown at Unknown time   • sevelamer (RENVELA) 800 MG tablet Take 800 mg by mouth 3 (Three) Times a Day With Meals.   Unknown at Unknown time   • traZODone (DESYREL) 50 MG tablet Take 50 mg by mouth every night at bedtime.   Unknown at Unknown time     Allergies:  Lisinopril and Motrin [ibuprofen]    I have reviewed the patient's medical history, surgical history and family history in the available medical record system.     Current Medications:     Current  Facility-Administered Medications   Medication Dose Route Frequency Provider Last Rate Last Dose   • benzonatate (TESSALON) capsule 100 mg  100 mg Oral TID PRN Michelle Lo MD       • brimonidine (ALPHAGAN) 0.15 % ophthalmic solution 1 drop  1 drop Both Eyes TID Michelle Lo MD   1 drop at 11/05/19 0856   • cetirizine (zyrTEC) tablet 10 mg  10 mg Oral Daily Michelle Lo MD   10 mg at 11/05/19 0855   • dextrose (D50W) 25 g/ 50mL Intravenous Solution 25 g  25 g Intravenous Q15 Min PRN Michelle Lo MD       • dextrose (GLUTOSE) oral gel 15 g  15 g Oral Q15 Min PRN Michelle Lo MD       • escitalopram (LEXAPRO) tablet 10 mg  10 mg Oral Daily Michelle Lo MD   10 mg at 11/05/19 0855   • fluticasone (FLONASE) 50 MCG/ACT nasal spray 2 spray  2 spray Nasal Daily Michelle Lo MD   2 spray at 11/05/19 0856   • gabapentin (NEURONTIN) capsule 100 mg  100 mg Oral Nightly Michelle Lo MD   100 mg at 11/04/19 2227   • glucagon (human recombinant) (GLUCAGEN DIAGNOSTIC) injection 1 mg  1 mg Subcutaneous Q15 Min PRN Michelle Lo MD       • insulin aspart (novoLOG) injection 0-7 Units  0-7 Units Subcutaneous 4x Daily AC & at Bedtime Michelle Lo MD   2 Units at 11/05/19 1213   • insulin detemir (LEVEMIR) injection 25 Units  25 Units Subcutaneous Daily Michelle Lo MD   25 Units at 11/05/19 0855   • lactulose (CHRONULAC) 10 GM/15ML solution 30 mL  30 mL Oral TID Michelle Lo MD   30 mL at 11/05/19 0853   • latanoprost (XALATAN) 0.005 % ophthalmic solution 1 drop  1 drop Both Eyes Nightly Michelle Lo MD   1 drop at 11/04/19 2235   • NON FORMULARY  100 mg Oral TID Michelle Lo MD       • ondansetron ODT (ZOFRAN-ODT) disintegrating tablet 4 mg  4 mg Oral Q6H PRN Michelle Lo MD       • pantoprazole (PROTONIX) injection 40 mg  40 mg Intravenous BID AC Marylou Eugene MD       • polyethylene glycol (MIRALAX) powder 17 g  17 g Oral Daily PRN Michelle Lo,  MD       • rifaximin (XIFAXAN) tablet 550 mg  550 mg Oral Q12H Michelle Lo MD   550 mg at 11/05/19 0855   • sevelamer (RENVELA) tablet 800 mg  800 mg Oral TID With Meals Michelle Lo MD   800 mg at 11/05/19 1226   • sodium chloride 0.9 % flush 10 mL  10 mL Intravenous PRN Vishal Rdz MD       • sodium chloride 0.9 % flush 10 mL  10 mL Intravenous PRN Vishal Rdz MD   10 mL at 11/05/19 0613   • sodium chloride 0.9 % flush 10 mL  10 mL Intravenous Q12H Michelle Lo MD   10 mL at 11/05/19 0855   • sodium chloride 0.9 % flush 10 mL  10 mL Intravenous PRN Michelle Lo MD           Objective     Physical Exam:   Temp:  [96.1 °F (35.6 °C)-98.1 °F (36.7 °C)] 97.3 °F (36.3 °C)  Heart Rate:  [65-95] 75  Resp:  [16-22] 20  BP: (103-176)/(45-67) 109/59    Physical Exam:  General Appearance:   Lethargic, in no acute distress   Head:    Normocephalic, without obvious abnormality, atraumatic   Eyes:            Lids and lashes normal, conjunctivae and sclerae normal, no   icterus, no pallor, corneas clear, PERRLA   Ears:    Ears appear intact with no abnormalities noted   Throat:   No oral lesions, no thrush, oral mucosa moist   Neck:   No adenopathy, supple, trachea midline, no thyromegaly, no     carotid bruit, no JVD   Back:     No kyphosis present, no scoliosis present, no skin lesions,       erythema or scars, no tenderness to percussion or                   palpation,   range of motion normal   Lungs:     Clear to auscultation,respirations regular, even and                   unlabored    Heart:    Regular rhythm and normal rate, normal S1 and S2, no            murmur, no gallop, no rub, no click   Breast Exam:    Deferred   Abdomen:     Normal bowel sounds, no masses, no organomegaly, soft        non-tender, non-distended, no guarding, no rebound                 tenderness   Genitalia:    Deferred   Extremities:   Moves all extremities well, no edema, no cyanosis, no              redness    Pulses:   Pulses palpable and equal bilaterally   Skin:   No bleeding, bruising or rash   Lymph nodes:   No palpable adenopathy   Neurologic:  Lethargic and is is arousable cranial nerves 2 - 12 grossly intact, sensation intact, DTR        present and equal bilaterally      Results Review:     Lab Results   Component Value Date    WBC 5.10 11/05/2019    WBC 4.90 11/04/2019    WBC 4.94 08/06/2019    HGB 7.1 (L) 11/05/2019    HGB 7.2 (L) 11/05/2019    HGB 7.3 (L) 11/04/2019    HCT 22.2 (L) 11/05/2019    HCT 22.1 (L) 11/05/2019    HCT 22.0 (L) 11/04/2019     (L) 11/05/2019     (L) 11/04/2019     (L) 08/06/2019     Results from last 7 days   Lab Units 11/05/19  0418 11/04/19  1703   ALK PHOS U/L 134* 173*   ALT (SGPT) U/L 8 10   AST (SGOT) U/L 17 16     Results from last 7 days   Lab Units 11/05/19  0418 11/04/19  1703   BILIRUBIN mg/dL 1.1 1.0   ALK PHOS U/L 134* 173*     Lipase   Date Value Ref Range Status   11/04/2019 41 13 - 60 U/L Final     Lab Results   Component Value Date    INR 1.11 11/04/2019    INR 1.20 07/31/2019    INR 1.12 07/30/2019         Radiology Review:  Imaging Results (Last 72 Hours)     Procedure Component Value Units Date/Time    XR Chest 1 View [[id number]] Collected:  11/04/19 1650     Updated:  11/04/19 1727    Narrative:       Chest single view.        CLINICAL INDICATION: Shortness of breath. Alteration mental  status.    COMPARISON: June 20, 2019.    FINDINGS: Cardiac silhouette is borderline enlarged in size.  Pulmonary vascularity is unremarkable. Suboptimal inspiratory  effort.  Benign calcified granulomata within both hilar regions.  No focal infiltrate or consolidation.  No pleural effusion.  No  pneumothorax.    There are right-sided endovascular stents right subclavian and  axillary vein.      Impression:       CONCLUSION: Suboptimal inspiratory effort. Borderline  cardiomegaly. Lungs otherwise clear. Endovascular stents right  subclavian and axillary  veins.    Electronically signed by:  Wallace Louise MD  11/4/2019 5:26 PM CST  Workstation: [id number]          I reviewed the patient's new clinical results.    I reviewed the patient's new imaging results and agree with the interpretation.     ASSESSMENT/PLAN:   ASSESSMENT:.  Change in mental status likely due to hepatic encephalopathy.    2.  Rectal bleeding and anemia rule out variceal hemorrhage and peptic ulcer disease.  3.  Chronic hepatitis C with possible cirrhosis.  He may need hepatitis C treatment post clinical stabilization.  PLAN: Continue lactulose enemas.  Follow ammonia closely.  Continue PPI therapy, follow H&H closely and transfuse as needed.  Proceed with EGD today for further evaluation.  Colonoscopy in a.m. if EGD is unremarkable.  The risks, benefits, and alternatives of this procedure have been discussed with the patient or the responsible party. The patient understands and agrees to proceed.         Tricia Philip MD  11/05/19  3:28 PM

## 2019-11-06 PROBLEM — K62.5 GASTROINTESTINAL HEMORRHAGE ASSOCIATED WITH ANORECTAL SOURCE: Status: ACTIVE | Noted: 2019-01-01

## 2019-11-06 PROBLEM — K76.82 ACUTE HEPATIC ENCEPHALOPATHY (HCC): Status: RESOLVED | Noted: 2019-01-01 | Resolved: 2019-01-01

## 2019-11-06 NOTE — PROGRESS NOTES
FAMILY MEDICINE DAILY PROGRESS NOTE  NAME: Cristo Musa  : 1950  MRN: [id number]     LOS: 2 days     PROVIDER OF SERVICE: Warren Stewart MD    Chief Complaint: GI bleed    Subjective:   Pt did well ovenight, no new episodes of bloody stools. Oriented to person place and time. Received 1 unit of PRBC yesterday prior to EGD. Found to be unremarkable, will go down for colonoscopy today per Dr. Moreno.  Ammonia normalized.  Nol longer complaining of foot pain.   Interval History:  History taken from: patient chart RN      Review of Systems:   Review of Systems   Constitutional: Negative for appetite change, chills, diaphoresis, fatigue and fever.   HENT: Negative for ear discharge, ear pain, facial swelling, hearing loss, rhinorrhea, sinus pressure, sinus pain, sneezing, sore throat and voice change.    Eyes: Negative for pain, discharge and visual disturbance.   Respiratory: Negative for apnea, cough, chest tightness, shortness of breath, wheezing and stridor.    Cardiovascular: Negative for chest pain, palpitations and leg swelling.   Gastrointestinal: Positive for blood in stool. Negative for abdominal distention, abdominal pain, constipation, diarrhea, nausea and vomiting.   Genitourinary: Negative for dysuria, frequency and urgency.   Musculoskeletal: Negative for arthralgias, back pain, myalgias and neck pain.   Skin: Negative for color change, rash and wound.   Neurological: Negative for facial asymmetry, speech difficulty, light-headedness and headaches.   Psychiatric/Behavioral: Negative for agitation and decreased concentration. The patient is not nervous/anxious.        Objective:     Vital Signs  Temp:  [97 °F (36.1 °C)-98.2 °F (36.8 °C)] 97 °F (36.1 °C)  Heart Rate:  [65-75] 72  Resp:  [18-20] 18  BP: ()/(43-65) 128/65    Physical Exam  Physical Exam   Constitutional: He is oriented to person, place, and time. He appears well-developed and well-nourished. No distress.   HENT:    Head: Normocephalic and atraumatic.   Right Ear: External ear normal.   Left Ear: External ear normal.   Mouth/Throat: Oropharynx is clear and moist.   Eyes: Conjunctivae and EOM are normal. Pupils are equal, round, and reactive to light. Right eye exhibits no discharge. Left eye exhibits no discharge. No scleral icterus.   Neck: Normal range of motion. No tracheal deviation present.   Cardiovascular: Normal rate, regular rhythm and normal heart sounds. Exam reveals no gallop and no friction rub.   No murmur heard.  Pulmonary/Chest: Effort normal and breath sounds normal. No stridor. No respiratory distress. He has no wheezes. He has no rales. He exhibits no tenderness.   Abdominal: Soft. Bowel sounds are normal. He exhibits no distension and no mass. There is no tenderness. There is no rebound and no guarding.   Musculoskeletal: Normal range of motion. He exhibits deformity (Upper extremity contracture). He exhibits no edema or tenderness.   Neurological: He is alert and oriented to person, place, and time.   Skin: Skin is warm and dry. No rash noted. He is not diaphoretic. No erythema. No pallor.   Psychiatric: He has a normal mood and affect. His speech is delayed. He is agitated. Cognition and memory are impaired. He is inattentive.       Medication Review    Current Facility-Administered Medications:   •  albumin human 25 % IV SOLN 12.5 g, 12.5 g, Intravenous, PRN, Pascual Vilchis MD  •  benzonatate (TESSALON) capsule 100 mg, 100 mg, Oral, TID PRN, Michelle Lo MD  •  brimonidine (ALPHAGAN) 0.15 % ophthalmic solution 1 drop, 1 drop, Both Eyes, TID, Michelle Lo MD, 1 drop at 11/06/19 0801  •  cetirizine (zyrTEC) tablet 10 mg, 10 mg, Oral, Daily, Michelle Lo MD, 10 mg at 11/05/19 0855  •  dextrose (D50W) 25 g/ 50mL Intravenous Solution 25 g, 25 g, Intravenous, Q15 Min PRN, Michelle Lo MD  •  dextrose (GLUTOSE) oral gel 15 g, 15 g, Oral, Q15 Min PRN, Michelle Lo MD  •  dextrose 5 %  and sodium chloride 0.45 % infusion, 30 mL/hr, Intravenous, Continuous PRN, Tricia Philip MD  •  dextrose 5 % and sodium chloride 0.45 % infusion, 30 mL/hr, Intravenous, Continuous PRN, Tricia Philip MD  •  epoetin ziggy (EPOGEN,PROCRIT) injection 10,000 Units, 10,000 Units, Intravenous, Once per day on Mon Wed Fri, Pascual Vilchis MD  •  escitalopram (LEXAPRO) tablet 10 mg, 10 mg, Oral, Daily, Michelle Lo MD, 10 mg at 11/05/19 0855  •  fluticasone (FLONASE) 50 MCG/ACT nasal spray 2 spray, 2 spray, Nasal, Daily, Michelle Lo MD, 2 spray at 11/06/19 0802  •  gabapentin (NEURONTIN) capsule 100 mg, 100 mg, Oral, Nightly, Michelle Lo MD, 100 mg at 11/05/19 2000  •  glucagon (human recombinant) (GLUCAGEN DIAGNOSTIC) injection 1 mg, 1 mg, Subcutaneous, Q15 Min PRN, Michelle Lo MD  •  insulin aspart (novoLOG) injection 0-7 Units, 0-7 Units, Subcutaneous, 4x Daily AC & at Bedtime, Michelle Lo MD, Stopped at 11/06/19 0651  •  insulin detemir (LEVEMIR) injection 25 Units, 25 Units, Subcutaneous, Daily, Michelle Lo MD, 25 Units at 11/06/19 0812  •  lactulose (CHRONULAC) 10 GM/15ML solution 30 mL, 30 mL, Oral, TID, Michelle Lo MD, 30 mL at 11/05/19 2000  •  latanoprost (XALATAN) 0.005 % ophthalmic solution 1 drop, 1 drop, Both Eyes, Nightly, Michelle Lo MD, 1 drop at 11/05/19 2000  •  lidocaine-prilocaine (EMLA) 2.5-2.5 % cream, , Topical, PRN, Pascual Vilchis MD  •  magic butt ointment, , Topical, TID PRN, Michelle Lo MD  •  ondansetron ODT (ZOFRAN-ODT) disintegrating tablet 4 mg, 4 mg, Oral, Q6H PRN, Michelle Lo MD  •  pantoprazole (PROTONIX) injection 40 mg, 40 mg, Intravenous, BID AC, Marylou Eugene MD, 40 mg at 11/05/19 1800  •  polyethylene glycol (MIRALAX) powder 17 g, 17 g, Oral, Daily PRN, Michelle Lo MD  •  rifaximin (XIFAXAN) tablet 550 mg, 550 mg, Oral, Q12H, Michelle Lo MD, 550 mg at 11/05/19 2000  •  sevelamer (RENVELA) tablet 800  mg, 800 mg, Oral, TID With Meals, Michelle Lo MD, 800 mg at 11/05/19 1809  •  sodium chloride 0.9 % bolus 100 mL, 100 mL, Intravenous, PRN, Pascual Vilchis MD  •  sodium chloride 0.9 % flush 10 mL, 10 mL, Intravenous, PRN, Vishal Rdz MD  •  sodium chloride 0.9 % flush 10 mL, 10 mL, Intravenous, PRN, Vishal Rdz MD, 10 mL at 11/05/19 1800  •  sodium chloride 0.9 % flush 10 mL, 10 mL, Intravenous, Q12H, Michelle Lo MD, 10 mL at 11/06/19 0802  •  sodium chloride 0.9 % flush 10 mL, 10 mL, Intravenous, PRN, Michelle Lo MD     Diagnostic Data    Lab Results (last 24 hours)     Procedure Component Value Units Date/Time    CBC & Differential [[id number]] Collected:  11/06/19 0724    Specimen:  Blood Updated:  11/06/19 0802    Narrative:       The following orders were created for panel order CBC & Differential.  Procedure                               Abnormality         Status                     ---------                               -----------         ------                     CBC Auto Differential[[id number]]        Abnormal            Final result                 Please view results for these tests on the individual orders.    CBC Auto Differential [[id number]]  (Abnormal) Collected:  11/06/19 0724    Specimen:  Blood Updated:  11/06/19 0802     WBC 5.36 10*3/mm3      RBC 2.66 10*6/mm3      Hemoglobin 7.6 g/dL      Hematocrit 23.0 %      MCV 86.5 fL      MCH 28.6 pg      MCHC 33.0 g/dL      RDW 14.8 %      RDW-SD 46.6 fl      MPV 8.8 fL      Platelets 116 10*3/mm3      Neutrophil % 65.1 %      Lymphocyte % 23.9 %      Monocyte % 7.8 %      Eosinophil % 1.9 %      Basophil % 0.4 %      Immature Grans % 0.9 %      Neutrophils, Absolute 3.49 10*3/mm3      Lymphocytes, Absolute 1.28 10*3/mm3      Monocytes, Absolute 0.42 10*3/mm3      Eosinophils, Absolute 0.10 10*3/mm3      Basophils, Absolute 0.02 10*3/mm3      Immature Grans, Absolute 0.05 10*3/mm3      nRBC 0.0 /100 WBC     Comprehensive  Metabolic Panel [[id number]]  (Abnormal) Collected:  11/06/19 0724    Specimen:  Blood Updated:  11/06/19 0800     Glucose 129 mg/dL      BUN 47 mg/dL      Creatinine 6.13 mg/dL      Sodium 136 mmol/L      Potassium 4.1 mmol/L      Chloride 88 mmol/L      CO2 28.0 mmol/L      Calcium 8.3 mg/dL      Total Protein 9.1 g/dL      Albumin 3.00 g/dL      ALT (SGPT) 6 U/L      AST (SGOT) 13 U/L      Alkaline Phosphatase 108 U/L      Total Bilirubin 1.0 mg/dL      eGFR Non  Amer --     Comment: <15 Indicative of kidney failure.        eGFR   Amer 11 mL/min/1.73      Comment: <15 Indicative of kidney failure.        Globulin 6.1 gm/dL      A/G Ratio 0.5 g/dL      BUN/Creatinine Ratio 7.7     Anion Gap 20.0 mmol/L     Narrative:       GFR Normal >60  Chronic Kidney Disease <60  Kidney Failure <15    Hemoglobin & Hematocrit, Blood [[id number]]  (Abnormal) Collected:  11/05/19 2229    Specimen:  Blood Updated:  11/05/19 2245     Hemoglobin 8.2 g/dL      Hematocrit 25.4 %     POC Glucose Once [[id number]]  (Abnormal) Collected:  11/05/19 2000    Specimen:  Blood Updated:  11/05/19 2043     Glucose 190 mg/dL      Comment: RN NotifiedOperator: [id number] JIMMY GUTIERRESMeter ID: [id number]       Blood Culture - Blood, Hand, Left [[id number]] Collected:  11/04/19 1914    Specimen:  Blood from Hand, Left Updated:  11/05/19 1935     Blood Culture No growth at 24 hours    Blood Culture - Blood, Arm, Left [[id number]] Collected:  11/04/19 1914    Specimen:  Blood from Arm, Left Updated:  11/05/19 1935     Blood Culture No growth at 24 hours    POC Glucose Once [[id number]]  (Abnormal) Collected:  11/05/19 1757    Specimen:  Blood Updated:  11/05/19 1836     Glucose 150 mg/dL      Comment: : [id number] KIZZY GONZALEZMeter ID: [id number]       POC Glucose Once [[id number]]  (Abnormal) Collected:  11/05/19 1158    Specimen:  Blood Updated:  11/05/19 1221     Glucose 230 mg/dL      Comment: Sliding Scale AdminOperator:  [id number] KIZZY Herrera ID: [id number]              Imaging Results (Last 24 Hours)     ** No results found for the last 24 hours. **          I reviewed the patient's new clinical results.    Assessment/Plan:     Active Hospital Problems    Diagnosis   • **GI bleed     -visually dark stool in ER, hemoccult positive  -H/H 7.3/22 on admission, hemoglobin usually closer to 9 at baseline  -permission to transfuse if necessary given by patient's health care surrogate, brother Farhan Musa. Given 1 unit yesterday  -Dr Moreno following inpatient  -PPI  -trend H/H     • Gastrointestinal hemorrhage associated with anorectal source     Added automatically from request for surgery [id number]     • Chronic hepatitis C without hepatic coma (CMS/HCC)     -follows with GI outpatient, Dr. Montoya/Jason Porras, will consult GI in AM  -continue lactulose, rifaximin      • Lactic acidosis     -Lactate on admission 2.9, now 2.7  -likely secondary to GI bleed as there is no clear source of infection and WBCs are within normal limits     • Obstructive sleep apnea     -continue BiPAP     • Gastrointestinal hemorrhage     H&H stable.  Received 1 unit of PRBC yesterday prior to EGD  -Follow-up with GI outpatient. Dr Moreno following in hospital, will proceed with colonoscopy       • Hyperammonemia (CMS/HCC)     -ammonia on admission 87, currently 53  -continue lactulose and rifaximin     • Anemia due to chronic kidney disease, on chronic dialysis (CMS/HCC)     -anemia likely multifactorial, both due to chronic kidney disease as well as GI bleed/chronic blood loss  -will trend H/H  -transfuse for hemoglobin less than 7, last hemoglobin 7.2 currently waiting on CBC from this morning       • Thrombocytopenia (CMS/HCC)     -platelets on admission 115  - Appears to be chronic thrombocytopenia, could also be due to chronic slow GI bleed  - Continue to monitor      • COPD (chronic obstructive pulmonary disease) (CMS/HCC)     - Continue  flonase, loratadine  - Patient uses Oxygen prn at the nursing home.   - Duonebs and albuterol nebs prn.         • Gastroesophageal reflux disease without esophagitis     -PPI     • Chronic pain     -holding home gabapentin/flexeril until mentation improves     • Primary insomnia     -holding trazodone, melatonin due to encephalopathy      • ESRD (end stage renal disease) (CMS/[facility])     -HD M/W/F  -Nephrology consult, Dr. Vilchis following  -epogen on M/W/F with dialysis  -continue Sevelamer 800 mg three times daily       • Essential hypertension     -not on any home medications, will monitor     • Type 2 diabetes mellitus with chronic kidney disease on chronic dialysis, with long-term current use of insulin (CMS/[facility])     -levemir, SSI           DVT prophylaxis: SCDs/TEDs  Code Status and Medical Interventions:   Ordered at: 11/04/19 2041     Limited Support to NOT Include:    Intubation     Level Of Support Discussed With:    Health Care Surrogate     Code Status:    No CPR     Medical Interventions (Level of Support Prior to Arrest):    Limited       Plan for disposition:Where: SNF and When:  1-2days      Time: 30 minutes        This document has been electronically signed by Warren Stewart MD on November 6, 2019 12:02 PM

## 2019-11-06 NOTE — PLAN OF CARE
Problem: Patient Care Overview  Goal: Plan of Care Review  Outcome: Ongoing (interventions implemented as appropriate)   11/05/19 4073   Coping/Psychosocial   Plan of Care Reviewed With patient   Plan of Care Review   Progress improving   OTHER   Outcome Summary GI Bleed: Hemoglobin 7.1 this AM, patient received 1 unit of PRBC. GI consulted. No BM this shift. EGD complete this afternoon - study revealed esophageal varicies, requiring no intervention.  Colonoscopy in AM, GoLytely prep this evening.    Hepatic Encephalopathy: Ammonia 53 this AM - continue lactulose - patient still exhibiting confusion, disoriented to time and situation    Continue to monitor patient for s/s of acute bleed and mental status closely.       Goal: Individualization and Mutuality  Outcome: Ongoing (interventions implemented as appropriate)    Goal: Discharge Needs Assessment  Outcome: Ongoing (interventions implemented as appropriate)    Goal: Interprofessional Rounds/Family Conf  Outcome: Ongoing (interventions implemented as appropriate)      Problem: Fall Risk (Adult)  Goal: Absence of Fall  Outcome: Ongoing (interventions implemented as appropriate)      Problem: Skin Injury Risk (Adult)  Goal: Skin Health and Integrity  Outcome: Ongoing (interventions implemented as appropriate)      Problem: Thought Process Alteration (Adult)  Goal: Improved Thought Process  Outcome: Outcome(s) achieved Date Met: 11/05/19      Problem: Gastrointestinal Bleeding (Adult)  Goal: Signs and Symptoms of Listed Potential Problems Will be Absent, Minimized or Managed (Gastrointestinal Bleeding)  Outcome: Ongoing (interventions implemented as appropriate)      Problem: Anemia (Adult)  Goal: Symptom Improvement  Outcome: Ongoing (interventions implemented as appropriate)      Problem: Sepsis/Septic Shock (Adult)  Goal: Signs and Symptoms of Listed Potential Problems Will be Absent, Minimized or Managed (Sepsis/Septic Shock)  Outcome: Ongoing (interventions  implemented as appropriate)      Problem: Liver Failure, Acute/Chronic (Adult)  Goal: Signs and Symptoms of Listed Potential Problems Will be Absent, Minimized or Managed (Liver Failure, Acute/Chronic)  Outcome: Ongoing (interventions implemented as appropriate)

## 2019-11-06 NOTE — ANESTHESIA POSTPROCEDURE EVALUATION
Patient: Cristo Musa    Procedure Summary     Date:  11/06/19 Room / Location:  [facility] ENDOSCOPY 3 / [facility]    Anesthesia Start:  1544 Anesthesia Stop:  1604    Procedure:  COLONOSCOPY (N/A ) Diagnosis:       Gastrointestinal hemorrhage, unspecified gastrointestinal hemorrhage type      Gastrointestinal hemorrhage associated with anorectal source      (Gastrointestinal hemorrhage, unspecified gastrointestinal hemorrhage type [K92.2])      (Gastrointestinal hemorrhage associated with anorectal source [K62.5])    Surgeon:  Tricia Philip MD Provider:  Zenaida Carson CRNA    Anesthesia Type:  MAC ASA Status:  4          Anesthesia Type: MAC  Last vitals  BP   116/68 (11/06/19 1528)   Temp   98.7 °F (37.1 °C) (11/06/19 1528)   Pulse   69 (11/06/19 1509)   Resp   18 (11/06/19 1528)     SpO2   99 % (11/06/19 1532)     Post Anesthesia Care and Evaluation    Patient location during evaluation: bedside  Patient participation: complete - patient participated  Level of consciousness: sleepy but conscious  Pain score: 0  Pain management: adequate  Airway patency: patent  Anesthetic complications: No anesthetic complications  PONV Status: none  Cardiovascular status: acceptable  Respiratory status: acceptable  Hydration status: acceptable

## 2019-11-06 NOTE — ANESTHESIA PREPROCEDURE EVALUATION
Anesthesia Evaluation     Patient summary reviewed and Nursing notes reviewed   NPO Solid Status: > 8 hours  NPO Liquid Status: > 8 hours           Airway   Mallampati: III  TM distance: >3 FB  Neck ROM: limited  Possible difficult intubation  Dental    (+) poor dentition    Pulmonary    (+) pneumonia resolved , pulmonary embolism, a smoker Former, COPD, sleep apnea on CPAP, decreased breath sounds,   Cardiovascular - normal exam    (+) pacemaker pacemaker, hypertension, past MI  >12 months, CAD, CHF ,       Neuro/Psych  (+) psychiatric history Anxiety and Depression,     GI/Hepatic/Renal/Endo    (+)  GERD poorly controlled, GI bleeding , hepatitis C, liver disease, renal disease ESRD and dialysis, diabetes mellitus type 2 poorly controlled using insulin,     ROS Comment: Rectal bleeding    Musculoskeletal     Abdominal   (+) obese,    Substance History      OB/GYN          Other                        Anesthesia Plan    ASA 4     MAC     intravenous induction     Anesthetic plan, all risks, benefits, and alternatives have been provided, discussed and informed consent has been obtained with: patient.

## 2019-11-06 NOTE — PROGRESS NOTES
"[facility]  [address] [place] [address]. [address]   - [phone]  F - [phone]     Progress Note          PATIENT  DEMOGRAPHICS   PATIENT NAME: Cristo Musa                      PHYSICIAN: Pascual Vilchis MD  : 1950  MRN: [id number]   LOS: 2 days    Patient Care Team:  Warren Stewart MD as PCP - General (Family Medicine)  Justyna Patel APRN as PCP - Claims Attributed  Michelle Lo MD as Resident (Family Medicine)  John Sanchez MD as Resident (Family Medicine)  Marissa Boothe MD as Resident (Family Medicine)  Lucio Anderson MD as Resident (Family Medicine)  Demarcus Oliveira MD as Resident (Family Medicine)  Subjective   SUBJECTIVE   Seen during hd bp is stable          Objective   OBJECTIVE   Vital Signs  Temp:  [97 °F (36.1 °C)-98.2 °F (36.8 °C)] 97 °F (36.1 °C)  Heart Rate:  [65-75] 72  Resp:  [18-20] 18  BP: ()/(43-65) 128/65    Flowsheet Rows      First Filed Value   Admission Height  177.8 cm (70\") Documented at 2019 1617   Admission Weight  91.5 kg (201 lb 12.8 oz) Documented at 2019 1617           I/O last 3 completed shifts:  In: 5140 [P.O.:4240; Blood:300; IV Piggyback:600]  Out: -     PHYSICAL EXAM    Physical Exam   Constitutional: He appears well-developed.   HENT:   Head: Normocephalic.   Eyes: Pupils are equal, round, and reactive to light.   Cardiovascular: Normal rate, regular rhythm and normal heart sounds.   Pulmonary/Chest: Effort normal and breath sounds normal.   Abdominal: Soft. Bowel sounds are normal.   Musculoskeletal: He exhibits no edema.   Neurological: He is alert.       RESULTS   Results Review:    Results from last 7 days   Lab Units 19  0724 19  0418 19  1703   SODIUM mmol/L 136 137 137   POTASSIUM mmol/L 4.1 4.5 4.7   CHLORIDE mmol/L 88* 93* 91*   CO2 mmol/L 28.0 31.0* 31.0*   BUN mg/dL 47* 37* 29*   CREATININE mg/dL 6.13* 4.71* 3.88*   CALCIUM mg/dL 8.3* 8.4* 8.7 "   BILIRUBIN mg/dL 1.0 1.1 1.0   ALK PHOS U/L 108 134* 173*   ALT (SGPT) U/L 6 8 10   AST (SGOT) U/L 13 17 16   GLUCOSE mg/dL 129* 172* 244*       Estimated Creatinine Clearance: 12.8 mL/min (A) (by C-G formula based on SCr of 6.13 mg/dL (H)).                Results from last 7 days   Lab Units 11/06/19  0724 11/05/19  2229 11/05/19  0418 11/05/19  0024 11/04/19  1703   WBC 10*3/mm3 5.36  --  5.10  --  4.90   HEMOGLOBIN g/dL 7.6* 8.2* 7.1* 7.2* 7.3*   PLATELETS 10*3/mm3 116*  --  111*  --  115*       Results from last 7 days   Lab Units 11/04/19  1703   INR  1.11         Imaging Results (Last 24 Hours)     ** No results found for the last 24 hours. **           MEDICATIONS      brimonidine 1 drop Both Eyes TID   cetirizine 10 mg Oral Daily   escitalopram 10 mg Oral Daily   fluticasone 2 spray Nasal Daily   gabapentin 100 mg Oral Nightly   insulin aspart 0-7 Units Subcutaneous 4x Daily AC & at Bedtime   insulin detemir 25 Units Subcutaneous Daily   lactulose 30 mL Oral TID   latanoprost 1 drop Both Eyes Nightly   pantoprazole 40 mg Intravenous BID AC   rifaximin 550 mg Oral Q12H   sevelamer 800 mg Oral TID With Meals   sodium chloride 10 mL Intravenous Q12H       dextrose 5 % and sodium chloride 0.45 % 30 mL/hr   dextrose 5 % and sodium chloride 0.45 % 30 mL/hr       Assessment/Plan   ASSESSMENT / PLAN      GI bleed    Type 2 diabetes mellitus with chronic kidney disease on chronic dialysis, with long-term current use of insulin (CMS/HCC)    Essential hypertension    ESRD (end stage renal disease) (CMS/HCC)    Primary insomnia    Chronic pain    Gastroesophageal reflux disease without esophagitis    COPD (chronic obstructive pulmonary disease) (CMS/HCC)    Anemia due to chronic kidney disease, on chronic dialysis (CMS/HCC)    Thrombocytopenia (CMS/HCC)    Hyperammonemia (CMS/HCC)    Gastrointestinal hemorrhage    Obstructive sleep apnea    Acute hepatic encephalopathy    Chronic hepatitis C without hepatic coma  (CMS/HCC)    Lactic acidosis    Gastrointestinal hemorrhage associated with anorectal source       1.  ESRD on HD- underwent hemodialysis treatment on 11/4/2019. Hd today k is stable, plan for 3 L of UF edw 90.5kg.      2.  anemia of ckd GI bleed- s/p egd grade 2 esophageal varices plan for colonoscopy today add procrit     3.  Chronic hepatitis C- on lactulose and rifaximin, recent viral load were high and now on mayvret     4.  Acute hepatic encephalopathy- improving, approaching baseline mentation     5. Diabetes mellitus type 2     6.  Hypertension- blood pressure is reasonable for now.    7. Hyperphosphatemia will resume renvela once po is started                   This document has been electronically signed by Pascual Vilchis MD on November 6, 2019 10:29 AM

## 2019-11-07 PROBLEM — E87.20 LACTIC ACIDOSIS: Status: RESOLVED | Noted: 2019-01-01 | Resolved: 2019-01-01

## 2019-11-07 NOTE — PROGRESS NOTES
"[facility]  [address] [place] [address]. [address]  T - [phone]  F - [phone]     Progress Note          PATIENT  DEMOGRAPHICS   PATIENT NAME: Cristo Musa                      PHYSICIAN: Pascual Vilchis MD  : 1950  MRN: [id number]   LOS: 3 days    Patient Care Team:  Warren Stewart MD as PCP - General (Family Medicine)  Justyna Patel APRN as PCP - Claims Attributed  Michelle Lo MD as Resident (Family Medicine)  John Sanchez MD as Resident (Family Medicine)  Marissa Boothe MD as Resident (Family Medicine)  Lucio Anderson MD as Resident (Family Medicine)  Demarcus Oliveira MD as Resident (Family Medicine)  Subjective   SUBJECTIVE   Resting no distress         Objective   OBJECTIVE   Vital Signs  Temp:  [96.3 °F (35.7 °C)-98.8 °F (37.1 °C)] 96.6 °F (35.9 °C)  Heart Rate:  [66-82] 69  Resp:  [14-20] 18  BP: (100-144)/(41-74) 139/69    Flowsheet Rows      First Filed Value   Admission Height  177.8 cm (70\") Documented at 2019 1617   Admission Weight  91.5 kg (201 lb 12.8 oz) Documented at 2019 1617           I/O last 3 completed shifts:  In: 6310 [P.O.:6160; I.V.:150]  Out: 3000 [Other:3000]    PHYSICAL EXAM    Physical Exam   Constitutional: He appears well-developed.   HENT:   Head: Normocephalic.   Eyes: Pupils are equal, round, and reactive to light.   Cardiovascular: Normal rate, regular rhythm and normal heart sounds.   Pulmonary/Chest: Effort normal and breath sounds normal.   Abdominal: Soft. Bowel sounds are normal.   Musculoskeletal: He exhibits no edema.   Neurological: He is alert.       RESULTS   Results Review:    Results from last 7 days   Lab Units 19  0724 19  0418 19  1703   SODIUM mmol/L 136 137 137   POTASSIUM mmol/L 4.1 4.5 4.7   CHLORIDE mmol/L 88* 93* 91*   CO2 mmol/L 28.0 31.0* 31.0*   BUN mg/dL 47* 37* 29*   CREATININE mg/dL 6.13* 4.71* 3.88*   CALCIUM mg/dL 8.3* 8.4* 8.7   BILIRUBIN mg/dL " 1.0 1.1 1.0   ALK PHOS U/L 108 134* 173*   ALT (SGPT) U/L 6 8 10   AST (SGOT) U/L 13 17 16   GLUCOSE mg/dL 129* 172* 244*       Estimated Creatinine Clearance: 13 mL/min (A) (by C-G formula based on SCr of 6.13 mg/dL (H)).                Results from last 7 days   Lab Units 11/07/19  0931 11/06/19  0724 11/05/19  2229 11/05/19  0418 11/05/19  0024 11/04/19  1703   WBC 10*3/mm3 6.17 5.36  --  5.10  --  4.90   HEMOGLOBIN g/dL 8.1* 7.6* 8.2* 7.1* 7.2* 7.3*   PLATELETS 10*3/mm3 116* 116*  --  111*  --  115*       Results from last 7 days   Lab Units 11/04/19  1703   INR  1.11         Imaging Results (Last 24 Hours)     ** No results found for the last 24 hours. **           MEDICATIONS      brimonidine 1 drop Both Eyes TID   cetirizine 10 mg Oral Daily   epoetin ziggy/ziggy-epbx 10,000 Units Intravenous Once per day on Mon Wed Fri   escitalopram 10 mg Oral Daily   fluticasone 2 spray Nasal Daily   gabapentin 100 mg Oral Nightly   insulin aspart 0-7 Units Subcutaneous 4x Daily AC & at Bedtime   insulin detemir 25 Units Subcutaneous Daily   lactulose 30 mL Oral TID   latanoprost 1 drop Both Eyes Nightly   pantoprazole 40 mg Intravenous BID AC   rifaximin 550 mg Oral Q12H   sevelamer 800 mg Oral TID With Meals   sodium chloride 10 mL Intravenous Q12H       dextrose 5 % and sodium chloride 0.45 % 30 mL/hr    dextrose 5 % and sodium chloride 0.45 % 30 mL/hr Last Rate: Stopped (11/06/19 1617)       Assessment/Plan   ASSESSMENT / PLAN      GI bleed    Type 2 diabetes mellitus with chronic kidney disease on chronic dialysis, with long-term current use of insulin (CMS/[facility])    Essential hypertension    ESRD (end stage renal disease) (CMS/[facility])    Primary insomnia    Chronic pain    Gastroesophageal reflux disease without esophagitis    COPD (chronic obstructive pulmonary disease) (CMS/[facility])    Anemia due to chronic kidney disease, on chronic dialysis (CMS/HCC)    Thrombocytopenia (CMS/HCC)    Hyperammonemia (CMS/HCC)     Gastrointestinal hemorrhage    Obstructive sleep apnea    Chronic hepatitis C without hepatic coma (CMS/HCC)    Gastrointestinal hemorrhage associated with anorectal source       1.  ESRD on HD- underwent hemodialysis treatment yesterday. Hd tomorrow. edw 90.5kg.      2.  anemia of ckd GI bleed- s/p egd grade 2 esophageal varices colonoscopy showed A single small localized angioectasia with bleeding was found in the ascending colon and it is clipped     3.  Chronic hepatitis C- on lactulose and rifaximin, recent viral load were high and now on mayvret     4.  Acute hepatic encephalopathy- improving, approaching baseline mentation     5. Diabetes mellitus type 2     6.  Hypertension- blood pressure is reasonable for now.    7. Hyperphosphatemia will resume renvela once po is started                   This document has been electronically signed by Pascual Vilchis MD on November 7, 2019 10:29 AM

## 2019-11-07 NOTE — PROGRESS NOTES
FAMILY MEDICINE DAILY PROGRESS NOTE  NAME: Cristo Musa  : 1950  MRN: [id number]     LOS: 3 days     PROVIDER OF SERVICE: Warren Stewart MD    Chief Complaint: GI bleed    Subjective:   Pt did well ovenight, no new episodes of bloody stools. Oriented to person place and time. Colonoscopy performed yesterday and clip placed at area of bleeding.  Waiting on results of CBC this morning to assess for hemoglobin stability.   Ammonia normalized.  Nol longer complaining of foot pain.   Interval History:  History taken from: patient chart RN      Review of Systems:   Review of Systems   Constitutional: Negative for appetite change, chills, diaphoresis, fatigue and fever.   HENT: Negative for ear discharge, ear pain, facial swelling, hearing loss, rhinorrhea, sinus pressure, sinus pain, sneezing, sore throat and voice change.    Eyes: Negative for pain, discharge and visual disturbance.   Respiratory: Negative for apnea, cough, chest tightness, shortness of breath, wheezing and stridor.    Cardiovascular: Negative for chest pain, palpitations and leg swelling.   Gastrointestinal: Negative for abdominal distention, abdominal pain, blood in stool, constipation, diarrhea, nausea and vomiting.   Genitourinary: Negative for dysuria, frequency and urgency.   Musculoskeletal: Positive for myalgias. Negative for arthralgias, back pain and neck pain.   Skin: Negative for color change, rash and wound.   Neurological: Negative for facial asymmetry, speech difficulty, light-headedness and headaches.   Psychiatric/Behavioral: Negative for agitation and decreased concentration. The patient is not nervous/anxious.        Objective:     Vital Signs  Temp:  [96.3 °F (35.7 °C)-98.8 °F (37.1 °C)] 97 °F (36.1 °C)  Heart Rate:  [65-82] 82  Resp:  [14-20] 18  BP: (100-144)/(41-74) 131/74    Physical Exam  Physical Exam   Constitutional: He is oriented to person, place, and time. He appears well-developed and well-nourished.  No distress.   HENT:   Head: Normocephalic and atraumatic.   Right Ear: External ear normal.   Left Ear: External ear normal.   Mouth/Throat: Oropharynx is clear and moist.   Eyes: Conjunctivae and EOM are normal. Pupils are equal, round, and reactive to light. Right eye exhibits no discharge. Left eye exhibits no discharge. No scleral icterus.   Neck: Normal range of motion. No tracheal deviation present.   Cardiovascular: Normal rate, regular rhythm and normal heart sounds. Exam reveals no gallop and no friction rub.   No murmur heard.  Pulmonary/Chest: Effort normal and breath sounds normal. No stridor. No respiratory distress. He has no wheezes. He has no rales. He exhibits no tenderness.   Abdominal: Soft. Bowel sounds are normal. He exhibits no distension and no mass. There is no tenderness. There is no rebound and no guarding.   Musculoskeletal: Normal range of motion. He exhibits deformity (Upper extremity contracture). He exhibits no edema or tenderness.   Neurological: He is alert and oriented to person, place, and time.   Skin: Skin is warm and dry. No rash noted. He is not diaphoretic. No erythema. No pallor.   Psychiatric: He has a normal mood and affect. His speech is not delayed. He is not agitated. Cognition and memory are not impaired. He is attentive.       Medication Review    Current Facility-Administered Medications:   •  benzonatate (TESSALON) capsule 100 mg, 100 mg, Oral, TID PRN, Michelle Lo MD  •  brimonidine (ALPHAGAN) 0.15 % ophthalmic solution 1 drop, 1 drop, Both Eyes, TID, Michelle Lo MD, 1 drop at 11/06/19 5015  •  cetirizine (zyrTEC) tablet 10 mg, 10 mg, Oral, Daily, iMchelle Lo MD, 10 mg at 11/05/19 7275  •  dextrose (D50W) 25 g/ 50mL Intravenous Solution 25 g, 25 g, Intravenous, Q15 Min PRN, Michelle Lo MD  •  dextrose (GLUTOSE) oral gel 15 g, 15 g, Oral, Q15 Min PRN, Michelle Lo MD  •  dextrose 5 % and sodium chloride 0.45 % infusion, 30 mL/hr,  Intravenous, Continuous PRN, Tricia Philip MD  •  dextrose 5 % and sodium chloride 0.45 % infusion, 30 mL/hr, Intravenous, Continuous PRN, Tricia Philip MD, Stopped at 11/06/19 1617  •  epoetin ziggy (EPOGEN,PROCRIT) injection 10,000 Units, 10,000 Units, Intravenous, Once per day on Mon Wed Fri, Pascual Vilchis MD, 10,000 Units at 11/06/19 1229  •  escitalopram (LEXAPRO) tablet 10 mg, 10 mg, Oral, Daily, Michelle Lo MD, 10 mg at 11/05/19 0855  •  fluticasone (FLONASE) 50 MCG/ACT nasal spray 2 spray, 2 spray, Nasal, Daily, Michelle Lo MD, 2 spray at 11/06/19 0802  •  gabapentin (NEURONTIN) capsule 100 mg, 100 mg, Oral, Nightly, Michelle Lo MD, 100 mg at 11/06/19 2200  •  glucagon (human recombinant) (GLUCAGEN DIAGNOSTIC) injection 1 mg, 1 mg, Subcutaneous, Q15 Min PRN, Michelle Lo MD  •  insulin aspart (novoLOG) injection 0-7 Units, 0-7 Units, Subcutaneous, 4x Daily AC & at Bedtime, Michelle Lo MD, 2 Units at 11/06/19 2200  •  insulin detemir (LEVEMIR) injection 25 Units, 25 Units, Subcutaneous, Daily, Michelle Lo MD, 25 Units at 11/06/19 0812  •  lactulose (CHRONULAC) 10 GM/15ML solution 30 mL, 30 mL, Oral, TID, Michelle Lo MD, 30 mL at 11/06/19 1703  •  latanoprost (XALATAN) 0.005 % ophthalmic solution 1 drop, 1 drop, Both Eyes, Nightly, Michelle Lo MD, 1 drop at 11/06/19 2200  •  lidocaine-prilocaine (EMLA) 2.5-2.5 % cream, , Topical, PRN, Pascual Vilchis MD  •  magic butt ointment, , Topical, TID PRN, Michelle Lo MD  •  ondansetron ODT (ZOFRAN-ODT) disintegrating tablet 4 mg, 4 mg, Oral, Q6H PRN, Michelle Lo MD  •  pantoprazole (PROTONIX) injection 40 mg, 40 mg, Intravenous, BID AC, Marylou Eugene MD, 40 mg at 11/06/19 1703  •  polyethylene glycol (MIRALAX) powder 17 g, 17 g, Oral, Daily PRN, Michelle oL MD  •  rifaximin (XIFAXAN) tablet 550 mg, 550 mg, Oral, Q12H, Michelle Lo MD, 550 mg at 11/06/19 2200  •  sevelamer (RENVELA)  tablet 800 mg, 800 mg, Oral, TID With Meals, Michelle Lo MD, 800 mg at 11/06/19 1704  •  sodium chloride 0.9 % bolus 100 mL, 100 mL, Intravenous, PRN, Pascual Vilchis MD, 50 mL at 11/06/19 1601  •  sodium chloride 0.9 % flush 10 mL, 10 mL, Intravenous, Kendell CHAPPELL Peter E, MD  •  sodium chloride 0.9 % flush 10 mL, 10 mL, Intravenous, PRNKendell Peter E, MD, 10 mL at 11/05/19 1800  •  sodium chloride 0.9 % flush 10 mL, 10 mL, Intravenous, Q12H, Michelle Lo MD, 10 mL at 11/06/19 2200  •  sodium chloride 0.9 % flush 10 mL, 10 mL, Intravenous, Wally CHAPPELL Samantha B, MD     Diagnostic Data    Lab Results (last 24 hours)     Procedure Component Value Units Date/Time    POC Glucose Once [[id number]]  (Normal) Collected:  11/06/19 0512    Specimen:  Blood Updated:  11/07/19 0018     Glucose 127 mg/dL      Comment: RN NotifiedOperator: [id number] JIMMY GUTIERRESMeter ID: [id number]       POC Glucose Once [[id number]]  (Abnormal) Collected:  11/1950    Specimen:  Blood Updated:  11/06/19 2017     Glucose 178 mg/dL      Comment: RN NotifiedOperator: [id number] JAMA CABRERAMeter ID: [id number]       Blood Culture - Blood, Hand, Left [[id number]] Collected:  11/04/19 1914    Specimen:  Blood from Hand, Left Updated:  11/06/19 1933     Blood Culture No growth at 2 days    Blood Culture - Blood, Arm, Left [[id number]] Collected:  11/04/19 1914    Specimen:  Blood from Arm, Left Updated:  11/06/19 1933     Blood Culture No growth at 2 days    POC Glucose Once [[id number]]  (Normal) Collected:  11/06/19 1715    Specimen:  Blood Updated:  11/06/19 1734     Glucose 115 mg/dL      Comment: RN NotifiedOperator: [id number] DONELL BAZZIMeter ID: [id number]       CBC & Differential [[id number]] Collected:  11/06/19 0724    Specimen:  Blood Updated:  11/06/19 0802    Narrative:       The following orders were created for panel order CBC & Differential.  Procedure                               Abnormality          Status                     ---------                               -----------         ------                     CBC Auto Differential[[id number]]        Abnormal            Final result                 Please view results for these tests on the individual orders.    CBC Auto Differential [[id number]]  (Abnormal) Collected:  11/06/19 0724    Specimen:  Blood Updated:  11/06/19 0802     WBC 5.36 10*3/mm3      RBC 2.66 10*6/mm3      Hemoglobin 7.6 g/dL      Hematocrit 23.0 %      MCV 86.5 fL      MCH 28.6 pg      MCHC 33.0 g/dL      RDW 14.8 %      RDW-SD 46.6 fl      MPV 8.8 fL      Platelets 116 10*3/mm3      Neutrophil % 65.1 %      Lymphocyte % 23.9 %      Monocyte % 7.8 %      Eosinophil % 1.9 %      Basophil % 0.4 %      Immature Grans % 0.9 %      Neutrophils, Absolute 3.49 10*3/mm3      Lymphocytes, Absolute 1.28 10*3/mm3      Monocytes, Absolute 0.42 10*3/mm3      Eosinophils, Absolute 0.10 10*3/mm3      Basophils, Absolute 0.02 10*3/mm3      Immature Grans, Absolute 0.05 10*3/mm3      nRBC 0.0 /100 WBC     Comprehensive Metabolic Panel [[id number]]  (Abnormal) Collected:  11/06/19 0724    Specimen:  Blood Updated:  11/06/19 0800     Glucose 129 mg/dL      BUN 47 mg/dL      Creatinine 6.13 mg/dL      Sodium 136 mmol/L      Potassium 4.1 mmol/L      Chloride 88 mmol/L      CO2 28.0 mmol/L      Calcium 8.3 mg/dL      Total Protein 9.1 g/dL      Albumin 3.00 g/dL      ALT (SGPT) 6 U/L      AST (SGOT) 13 U/L      Alkaline Phosphatase 108 U/L      Total Bilirubin 1.0 mg/dL      eGFR Non  Amer --     Comment: <15 Indicative of kidney failure.        eGFR   Amer 11 mL/min/1.73      Comment: <15 Indicative of kidney failure.        Globulin 6.1 gm/dL      A/G Ratio 0.5 g/dL      BUN/Creatinine Ratio 7.7     Anion Gap 20.0 mmol/L     Narrative:       GFR Normal >60  Chronic Kidney Disease <60  Kidney Failure <15           Imaging Results (Last 24 Hours)     ** No results found for the last 24 hours. **           I reviewed the patient's new clinical results.    Assessment/Plan:     Active Hospital Problems    Diagnosis   • **GI bleed     -visually dark stool in ER, hemoccult positive  -H/H 7.3/22 on admission, hemoglobin usually closer to 9 at baseline  -permission to transfuse if necessary given by patient's health care surrogate, brother Farhan Musa.   -Dr Moreno following inpatient  -PPI  -trend H/H     • Gastrointestinal hemorrhage associated with anorectal source     Added automatically from request for surgery [id number]     • Chronic hepatitis C without hepatic coma (CMS/HCC)     -follows with GI outpatient, Dr. Montoya/Jason Porras, will consult GI in AM  -continue lactulose, rifaximin      • Obstructive sleep apnea     -continue BiPAP     • Gastrointestinal hemorrhage     H&H stable.  Received 1 unit of PRBC 2 days ago prior to EGD  -Follow-up with GI outpatient. Dr Moreno following in hospital, colonoscopy yesterday. Area of bleeding visualized and clipped       • Hyperammonemia (CMS/[facility])     -ammonia on admission 87, currently 53  -continue lactulose and rifaximin     • Anemia due to chronic kidney disease, on chronic dialysis (CMS/[facility])     -anemia likely multifactorial, both due to chronic kidney disease as well as GI bleed/chronic blood loss  -will trend H/H  -transfuse for hemoglobin less than 7, last hemoglobin 7.6 currently waiting on CBC from this morning. If remains unchanged may consider D/C today       • Thrombocytopenia (CMS/HCC)     -platelets on admission 115  - Appears to be chronic thrombocytopenia, could also be due to chronic slow GI bleed  - Continue to monitor      • COPD (chronic obstructive pulmonary disease) (CMS/[facility])     - Continue flonase, loratadine  - Patient uses Oxygen prn at the nursing home.   - Duonebs and albuterol nebs prn.         • Gastroesophageal reflux disease without esophagitis     -PPI     • Chronic pain     -Gabapentin       • Primary insomnia     -holding trazodone,  melatonin due to encephalopathy      • ESRD (end stage renal disease) (CMS/[facility])     -HD M/W/F  -Nephrology consult, Dr. Vilchis following  -epogen on M/W/F with dialysis  -continue Sevelamer 800 mg three times daily       • Essential hypertension     -not on any home medications, will monitor     • Type 2 diabetes mellitus with chronic kidney disease on chronic dialysis, with long-term current use of insulin (CMS/[facility])     -levemir, SSI           DVT prophylaxis: SCDs/TEDs  Code Status and Medical Interventions:   Ordered at: 11/04/19 2041     Limited Support to NOT Include:    Intubation     Level Of Support Discussed With:    Health Care Surrogate     Code Status:    No CPR     Medical Interventions (Level of Support Prior to Arrest):    Limited       Plan for disposition:Where: SNF and When:  0-1days may consider discharge later today if H/H remains stable      Time: 30 minutes        This document has been electronically signed by Warren Stewart MD on November 7, 2019 7:30 AM

## 2019-11-07 NOTE — PLAN OF CARE
Problem: Patient Care Overview  Goal: Plan of Care Review  Outcome: Ongoing (interventions implemented as appropriate)   11/06/19 1930   Coping/Psychosocial   Plan of Care Reviewed With patient   Plan of Care Review   Progress improving   OTHER   Outcome Summary Patient arrives to unit this afternoon. He transports then to [facility] for a colonoscopy. Upon return he is provided with a full liquid diet. He is alert and oriented but forgetful.     Goal: Individualization and Mutuality  Outcome: Ongoing (interventions implemented as appropriate)      Problem: Fall Risk (Adult)  Goal: Absence of Fall  Outcome: Ongoing (interventions implemented as appropriate)      Problem: Skin Injury Risk (Adult)  Goal: Skin Health and Integrity  Outcome: Ongoing (interventions implemented as appropriate)      Problem: Gastrointestinal Bleeding (Adult)  Goal: Signs and Symptoms of Listed Potential Problems Will be Absent, Minimized or Managed (Gastrointestinal Bleeding)  Outcome: Ongoing (interventions implemented as appropriate)      Problem: Anemia (Adult)  Goal: Symptom Improvement  Outcome: Ongoing (interventions implemented as appropriate)      Problem: Sepsis/Septic Shock (Adult)  Goal: Signs and Symptoms of Listed Potential Problems Will be Absent, Minimized or Managed (Sepsis/Septic Shock)  Outcome: Ongoing (interventions implemented as appropriate)      Problem: Liver Failure, Acute/Chronic (Adult)  Goal: Signs and Symptoms of Listed Potential Problems Will be Absent, Minimized or Managed (Liver Failure, Acute/Chronic)  Outcome: Ongoing (interventions implemented as appropriate)

## 2019-11-07 NOTE — SIGNIFICANT NOTE
"   11/07/19 1026   Rehab Time/Intention   Evaluation Not Performed other (see comments)   Rehab Treatment   Discipline physical therapist;occupational therapist     Attempting to see pt for initial PT/OT evaluation. Pt initially agreeable and answering all subjective information, however when asked to sit on side of bed, pt becoming increasingly agitated. Explained role of therapy and importance of OOB/ EOB activity and encouraged pt to participate, but pt requesting PT and OT to leave, reporting \"I just wanna sleep, I don't gotta do none of this\". PT and OT in room from [phone]. Will check back later as time allows.   "

## 2019-11-07 NOTE — PLAN OF CARE
Problem: Patient Care Overview  Goal: Plan of Care Review  Outcome: Ongoing (interventions implemented as appropriate)   11/07/19 1303   Coping/Psychosocial   Plan of Care Reviewed With patient   Plan of Care Review   Progress no change   OTHER   Outcome Summary Pt to be discharged back to [facility]. Pt reports no pain at this time. Pt is requesting for diet to be change Dr. Stewart has been paged x 2.     Goal: Individualization and Mutuality  Outcome: Ongoing (interventions implemented as appropriate)      Problem: Fall Risk (Adult)  Goal: Absence of Fall  Outcome: Ongoing (interventions implemented as appropriate)      Problem: Skin Injury Risk (Adult)  Goal: Skin Health and Integrity  Outcome: Ongoing (interventions implemented as appropriate)      Problem: Gastrointestinal Bleeding (Adult)  Goal: Signs and Symptoms of Listed Potential Problems Will be Absent, Minimized or Managed (Gastrointestinal Bleeding)  Outcome: Ongoing (interventions implemented as appropriate)      Problem: Anemia (Adult)  Goal: Symptom Improvement  Outcome: Ongoing (interventions implemented as appropriate)      Problem: Sepsis/Septic Shock (Adult)  Goal: Signs and Symptoms of Listed Potential Problems Will be Absent, Minimized or Managed (Sepsis/Septic Shock)  Outcome: Ongoing (interventions implemented as appropriate)      Problem: Liver Failure, Acute/Chronic (Adult)  Goal: Signs and Symptoms of Listed Potential Problems Will be Absent, Minimized or Managed (Liver Failure, Acute/Chronic)  Outcome: Ongoing (interventions implemented as appropriate)      Problem: Hemodialysis (Adult)  Goal: Signs and Symptoms of Listed Potential Problems Will be Absent, Minimized or Managed (Hemodialysis)  Outcome: Ongoing (interventions implemented as appropriate)      Problem: Chronic Obstructive Pulmonary Disease (Adult)  Goal: Signs and Symptoms of Listed Potential Problems Will be Absent, Minimized or Managed (Chronic Obstructive  Pulmonary Disease)  Outcome: Ongoing (interventions implemented as appropriate)      Problem: Pain, Chronic (Adult)  Goal: Identify Related Risk Factors and Signs and Symptoms  Outcome: Outcome(s) achieved Date Met: 11/07/19    Goal: Acceptable Pain/Comfort Level and Functional Ability  Outcome: Ongoing (interventions implemented as appropriate)

## 2019-11-07 NOTE — DISCHARGE PLACEMENT REQUEST
"Josue Musa (69 y.o. Male)     Date of Birth Social Security Number Address Home Phone MRN    1950  [address] [address] [phone] [id number]    Religious Marital Status          Yarsanism        Admission Date Admission Type Admitting Provider Attending Provider Department, Room/Bed    11/4/19 Emergency Nims, MD Jabier Aguirre Elizabeth T, MD [address], 416/1    Discharge Date Discharge Disposition Discharge Destination         Home or Self Care              Attending Provider:  Marylou Eugene MD    Allergies:  Lisinopril, Motrin [Ibuprofen]    Isolation:  None   Infection:  None   Code Status:  No CPR    Ht:  172.7 cm (68\")   Wt:  96.6 kg (213 lb)    Admission Cmt:  None   Principal Problem:  GI bleed [K92.2] More...                 Active Insurance as of 11/4/2019     Primary Coverage     Payor Plan Insurance Group Employer/Plan Group    MEDICARE MEDICARE A & B      Payor Plan Address Payor Plan Phone Number Payor Plan Fax Number Effective Dates    [address] [phone]  8/1/1996 - None Entered    [facility] [id number]       Subscriber Name Subscriber Birth Date Member ID       JOSUE MUSA 1950 [id number]           Secondary Coverage     Payor Plan Insurance Group Employer/Plan Group    [place]      Payor Plan Address Payor Plan Phone Number Payor Plan Fax Number Effective Dates    [address] [phone]  7/1/2015 - None Entered    [place] [address]       Subscriber Name Subscriber Birth Date Member ID       JOSUE MUSA 1950 [id number]                 Emergency Contacts      (Rel.) Home Phone Work Phone Mobile Phone    Farhan Musa (Brother) [phone] -- [phone]    LenchoZahra (Spouse) [phone] -- [phone]            Discharge Summary     No notes of this type exist for this encounter.    "

## 2019-11-07 NOTE — DISCHARGE SUMMARY
DISCHARGE SUMMARY    PATIENT NAME: Cristo Musa       PHYSICIAN: Warren Stewart MD  : 1950  MRN: [id number]    ADMITTED: 2019     DISCHARGED: 19    ADMISSION DIAGNOSES:  Active Hospital Problems    Diagnosis  POA   • **GI bleed [K92.2]  Yes   • Gastrointestinal hemorrhage associated with anorectal source [K62.5]  Unknown   • Chronic hepatitis C without hepatic coma (CMS/HCC) [B18.2]  Yes   • Obstructive sleep apnea [G47.33]  Yes   • Gastrointestinal hemorrhage [K92.2]  Yes   • Hyperammonemia (CMS/HCC) [E72.20]  Yes   • Anemia due to chronic kidney disease, on chronic dialysis (CMS/HCC) [N18.6, D63.1, Z99.2]  Not Applicable   • Thrombocytopenia (CMS/HCC) [D69.6]  Yes   • COPD (chronic obstructive pulmonary disease) (CMS/HCC) [J44.9]  Yes   • Gastroesophageal reflux disease without esophagitis [K21.9]  Yes   • Chronic pain [G89.29]  Yes   • Primary insomnia [F51.01]  Yes   • ESRD (end stage renal disease) (CMS/HCC) [N18.6]  Yes   • Essential hypertension [I10]  Yes   • Type 2 diabetes mellitus with chronic kidney disease on chronic dialysis, with long-term current use of insulin (CMS/HCC) [E11.22, N18.6, Z99.2, Z79.4]  Not Applicable      Resolved Hospital Problems    Diagnosis Date Resolved POA   • Lactic acidosis [E87.2] 2019 Yes   • Acute hepatic encephalopathy [K72.00] 2019 Yes     DISCHARGE DIAGNOSES:   Active Hospital Problems    Diagnosis  POA   • **GI bleed [K92.2]  Yes   • Gastrointestinal hemorrhage associated with anorectal source [K62.5]  Unknown   • Chronic hepatitis C without hepatic coma (CMS/HCC) [B18.2]  Yes   • Obstructive sleep apnea [G47.33]  Yes   • Gastrointestinal hemorrhage [K92.2]  Yes   • Hyperammonemia (CMS/HCC) [E72.20]  Yes   • Anemia due to chronic kidney disease, on chronic dialysis (CMS/HCC) [N18.6, D63.1, Z99.2]  Not Applicable   • Thrombocytopenia (CMS/HCC) [D69.6]  Yes   • COPD (chronic obstructive pulmonary disease) (CMS/[facility]) [J44.9]   "Yes   • Gastroesophageal reflux disease without esophagitis [K21.9]  Yes   • Chronic pain [G89.29]  Yes   • Primary insomnia [F51.01]  Yes   • ESRD (end stage renal disease) (CMS/[facility]) [N18.6]  Yes   • Essential hypertension [I10]  Yes   • Type 2 diabetes mellitus with chronic kidney disease on chronic dialysis, with long-term current use of insulin (CMS/[facility]) [E11.22, N18.6, Z99.2, Z79.4]  Not Applicable      Resolved Hospital Problems    Diagnosis Date Resolved POA   • Lactic acidosis [E87.2] 11/07/2019 Yes   • Acute hepatic encephalopathy [K72.00] 11/06/2019 Yes       SERVICE: Family Medicine.  Attending: Dr. Hwang   Resident: Warren Stewart MD    CONSULTS:   Consult Orders (all) (From admission, onward)    Start     Ordered    11/05/19 1138  Inpatient Gastroenterology Consult  Once     Specialty:  Gastroenterology  Provider:  Tricia Philip MD    11/05/19 1137    11/05/19 1129  Inpatient Nephrology Consult  Once     Specialty:  Nephrology  Provider:  Pascual Vilchis MD    11/05/19 1128    11/05/19 1047  Inpatient Nephrology Consult  Once,   Status:  Canceled     Specialty:  Nephrology  Provider:  Pascual Vilchis MD    11/05/19 1119    11/05/19 1046  Inpatient Gastroenterology Consult  Once,   Status:  Canceled     Specialty:  Gastroenterology  Provider:  Flako Montoya MD    11/05/19 1119    11/05/19 0201  Inpatient Case Management  Consult  Once     Provider:  (Not yet assigned)    11/05/19 0200          PROCEDURES:   Upper GI Endoscopy: Grade III Varices in upper third of esophagus  Colonoscopy:A single small localized angioectasia with bleeding was found in the ascending colon. For hemostasis, one hemostatic clip was successfully placed.    HISTORY OF PRESENT ILLNESS:   Per Dr. Lo at admission on 11/4/19:  \"Cristo Musa is a 69 y.o. 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  Patient was very somnolent during exam and unable to answer any review of system or history questions.  Nursing staff at [facility] was contacted and was able to give more details about the history of the chief complaint.  Nursing staff reports that he was at his mental and physical baseline 3 days ago.  He underwent dialysis earlier today and during dialysis was noted to become confused and lethargic but responsive.  He was taken back from the dialysis center to [facility] and appeared to return to his baseline, however around 230 this afternoon nursing staff noticed a decline in his mentation.  They also reported some blood in his stool and contacted his PCP who instructed them to send the patient to the emergency room for further evaluation.     In the ER, he is found to have a hemoglobin of 7.3 and an ammonia level of 87.  Lactic acid was also elevated initially at 3.2 but reflexed at 2.7.  He was noted to have dark stools in the ER.  He was started on Zosyn though chest x-ray was negative for acute pathology of infectious etiology.  He is an uric at baseline and so was unable to give a urine sample. White blood cells were within normal limits.  He is being admitted for GI bleed and acute hepatic encephalopathy.    DIAGNOSTIC DATA:   Lab Results (all)     Procedure Component Value Units Date/Time    Blood Culture - Blood, Hand, Left [[id number]] Collected:  11/04/19 1914    Specimen:  Blood from Hand, Left Updated:  11/07/19 1927     Blood Culture No growth at 3 days    Blood Culture - Blood, Arm, Left [[id number]] Collected:  11/04/19 1914    Specimen:  Blood from Arm, Left Updated:  11/07/19 1927     Blood Culture No growth at 3 days    POC Glucose Once [[id number]]  (Abnormal) Collected:  11/07/19 1118    Specimen:  Blood Updated:  11/07/19 1157     Glucose 199 mg/dL      Comment: RN NotifiedOperator: [id number] [facility] ID:  [id number]       Comprehensive Metabolic Panel [[id number]]  (Abnormal) Collected:  11/07/19 1037    Specimen:  Blood Updated:  11/07/19 1121     Glucose 213 mg/dL      BUN 20 mg/dL      Creatinine 4.11 mg/dL      Sodium 121 mmol/L      Potassium 3.7 mmol/L      Chloride 80 mmol/L      CO2 27.0 mmol/L      Calcium 7.9 mg/dL      Total Protein 8.9 g/dL      Albumin 2.80 g/dL      ALT (SGPT) 8 U/L      AST (SGOT) 18 U/L      Alkaline Phosphatase 125 U/L      Total Bilirubin 1.0 mg/dL      eGFR  African Amer 18 mL/min/1.73      Globulin 6.1 gm/dL      A/G Ratio 0.5 g/dL      BUN/Creatinine Ratio 4.9     Anion Gap 14.0 mmol/L     Narrative:       GFR Normal >60  Chronic Kidney Disease <60  Kidney Failure <15    CBC & Differential [[id number]] Collected:  11/07/19 0931    Specimen:  Blood Updated:  11/07/19 0953    Narrative:       The following orders were created for panel order CBC & Differential.  Procedure                               Abnormality         Status                     ---------                               -----------         ------                     CBC Auto Differential[[id number]]        Abnormal            Final result                 Please view results for these tests on the individual orders.    CBC Auto Differential [[id number]]  (Abnormal) Collected:  11/07/19 0931    Specimen:  Blood Updated:  11/07/19 0953     WBC 6.17 10*3/mm3      RBC 2.78 10*6/mm3      Hemoglobin 8.1 g/dL      Hematocrit 24.0 %      MCV 86.3 fL      MCH 29.1 pg      MCHC 33.8 g/dL      RDW 14.7 %      RDW-SD 46.0 fl      MPV 8.8 fL      Platelets 116 10*3/mm3      Neutrophil % 74.0 %      Lymphocyte % 12.5 %      Monocyte % 10.5 %      Eosinophil % 2.1 %      Basophil % 0.3 %      Immature Grans % 0.6 %      Neutrophils, Absolute 4.56 10*3/mm3      Lymphocytes, Absolute 0.77 10*3/mm3      Monocytes, Absolute 0.65 10*3/mm3      Eosinophils, Absolute 0.13 10*3/mm3      Basophils, Absolute 0.02 10*3/mm3      Immature Grans,  Absolute 0.04 10*3/mm3      nRBC 0.0 /100 WBC     POC Glucose Once [[id number]]  (Abnormal) Collected:  11/07/19 0726    Specimen:  Blood Updated:  11/07/19 0749     Glucose 180 mg/dL      Comment: RN NotifiedOperator: [id number] HARINI BELTRANAMeter ID: [id number]       POC Glucose Once [[id number]]  (Normal) Collected:  11/06/19 0512    Specimen:  Blood Updated:  11/07/19 0018     Glucose 127 mg/dL      Comment: RN NotifiedOperator: [id number] JIMMY GUTIERRESMeter ID: [id number]       POC Glucose Once [[id number]]  (Abnormal) Collected:  11/1950    Specimen:  Blood Updated:  11/06/19 2017     Glucose 178 mg/dL      Comment: RN NotifiedOperator: [id number] JAMA CABRERAMeter ID: [id number]       POC Glucose Once [[id number]]  (Normal) Collected:  11/06/19 1715    Specimen:  Blood Updated:  11/06/19 1734     Glucose 115 mg/dL      Comment: RN NotifiedOperator: [id number] DONELL BAZZIMeter ID: [id number]       CBC & Differential [[id number]] Collected:  11/06/19 0724    Specimen:  Blood Updated:  11/06/19 0802    Narrative:       The following orders were created for panel order CBC & Differential.  Procedure                               Abnormality         Status                     ---------                               -----------         ------                     CBC Auto Differential[[id number]]        Abnormal            Final result                 Please view results for these tests on the individual orders.    CBC Auto Differential [[id number]]  (Abnormal) Collected:  11/06/19 0724    Specimen:  Blood Updated:  11/06/19 0802     WBC 5.36 10*3/mm3      RBC 2.66 10*6/mm3      Hemoglobin 7.6 g/dL      Hematocrit 23.0 %      MCV 86.5 fL      MCH 28.6 pg      MCHC 33.0 g/dL      RDW 14.8 %      RDW-SD 46.6 fl      MPV 8.8 fL      Platelets 116 10*3/mm3      Neutrophil % 65.1 %      Lymphocyte % 23.9 %      Monocyte % 7.8 %      Eosinophil % 1.9 %      Basophil % 0.4 %      Immature Grans % 0.9 %       Neutrophils, Absolute 3.49 10*3/mm3      Lymphocytes, Absolute 1.28 10*3/mm3      Monocytes, Absolute 0.42 10*3/mm3      Eosinophils, Absolute 0.10 10*3/mm3      Basophils, Absolute 0.02 10*3/mm3      Immature Grans, Absolute 0.05 10*3/mm3      nRBC 0.0 /100 WBC     Comprehensive Metabolic Panel [[id number]]  (Abnormal) Collected:  11/06/19 0724    Specimen:  Blood Updated:  11/06/19 0800     Glucose 129 mg/dL      BUN 47 mg/dL      Creatinine 6.13 mg/dL      Sodium 136 mmol/L      Potassium 4.1 mmol/L      Chloride 88 mmol/L      CO2 28.0 mmol/L      Calcium 8.3 mg/dL      Total Protein 9.1 g/dL      Albumin 3.00 g/dL      ALT (SGPT) 6 U/L      AST (SGOT) 13 U/L      Alkaline Phosphatase 108 U/L      Total Bilirubin 1.0 mg/dL      eGFR Non  Amer --     Comment: <15 Indicative of kidney failure.        eGFR   Amer 11 mL/min/1.73      Comment: <15 Indicative of kidney failure.        Globulin 6.1 gm/dL      A/G Ratio 0.5 g/dL      BUN/Creatinine Ratio 7.7     Anion Gap 20.0 mmol/L     Narrative:       GFR Normal >60  Chronic Kidney Disease <60  Kidney Failure <15    Hemoglobin & Hematocrit, Blood [[id number]]  (Abnormal) Collected:  11/05/19 2229    Specimen:  Blood Updated:  11/05/19 2245     Hemoglobin 8.2 g/dL      Hematocrit 25.4 %     POC Glucose Once [[id number]]  (Abnormal) Collected:  11/05/19 2000    Specimen:  Blood Updated:  11/05/19 2043     Glucose 190 mg/dL      Comment: RN NotifiedOperator: [id number] JIMMY GUTIERRESMeter ID: [id number]       POC Glucose Once [[id number]]  (Abnormal) Collected:  11/05/19 1757    Specimen:  Blood Updated:  11/05/19 1836     Glucose 150 mg/dL      Comment: : [id number] KIZZY GONZALEZMeter ID: [id number]       POC Glucose Once [[id number]]  (Abnormal) Collected:  11/05/19 1158    Specimen:  Blood Updated:  11/05/19 1221     Glucose 230 mg/dL      Comment: Sliding Scale AdminOperator: [id number] KIZZY GONZALEZMetsal ID: [id number]       [facility] &  Differential [[id number]] Collected:  11/05/19 0418    Specimen:  Blood Updated:  11/05/19 0718    Narrative:       The following orders were created for panel order CBC & Differential.  Procedure                               Abnormality         Status                     ---------                               -----------         ------                     CBC Auto Differential[[id number]]        Abnormal            Final result                 Please view results for these tests on the individual orders.    CBC Auto Differential [[id number]]  (Abnormal) Collected:  11/05/19 0418    Specimen:  Blood Updated:  11/05/19 0718     WBC 5.10 10*3/mm3      RBC 2.47 10*6/mm3      Hemoglobin 7.1 g/dL      Hematocrit 22.2 %      MCV 89.9 fL      MCH 28.7 pg      MCHC 32.0 g/dL      RDW 15.4 %      RDW-SD 50.2 fl      MPV 9.0 fL      Platelets 111 10*3/mm3      Neutrophil % 61.3 %      Lymphocyte % 22.4 %      Monocyte % 12.9 %      Eosinophil % 2.4 %      Basophil % 0.6 %      Immature Grans % 0.4 %      Neutrophils, Absolute 3.13 10*3/mm3      Lymphocytes, Absolute 1.14 10*3/mm3      Monocytes, Absolute 0.66 10*3/mm3      Eosinophils, Absolute 0.12 10*3/mm3      Basophils, Absolute 0.03 10*3/mm3      Immature Grans, Absolute 0.02 10*3/mm3      nRBC 0.0 /100 WBC     POC Glucose Once [[id number]]  (Abnormal) Collected:  11/05/19 0608    Specimen:  Blood Updated:  11/05/19 0621     Glucose 190 mg/dL      Comment: : [id number] BERNARDINO Vela ID: [id number]       Extra Tubes [[id number]] Collected:  11/05/19 0418    Specimen:  Blood, Venous Line Updated:  11/05/19 0530    Narrative:       The following orders were created for panel order Extra Tubes.  Procedure                               Abnormality         Status                     ---------                               -----------         ------                     Lavender Top[[id number]]                                     Final result                 Please  view results for these tests on the individual orders.    Lavender Top [[id number]] Collected:  11/05/19 0418    Specimen:  Blood Updated:  11/05/19 0530     Extra Tube hold for add-on     Comment: Auto resulted       Lactic Acid, Plasma [[id number]]  (Abnormal) Collected:  11/05/19 0418    Specimen:  Blood Updated:  11/05/19 0455     Lactate 2.8 mmol/L     Ammonia [[id number]]  (Normal) Collected:  11/05/19 0418    Specimen:  Blood Updated:  11/05/19 0454     Ammonia 53 umol/L     Comprehensive Metabolic Panel [[id number]]  (Abnormal) Collected:  11/05/19 0418    Specimen:  Blood Updated:  11/05/19 0444     Glucose 172 mg/dL      BUN 37 mg/dL      Creatinine 4.71 mg/dL      Sodium 137 mmol/L      Potassium 4.5 mmol/L      Chloride 93 mmol/L      CO2 31.0 mmol/L      Calcium 8.4 mg/dL      Total Protein 9.0 g/dL      Albumin 2.80 g/dL      ALT (SGPT) 8 U/L      AST (SGOT) 17 U/L      Alkaline Phosphatase 134 U/L      Total Bilirubin 1.1 mg/dL      eGFR  African Amer 15 mL/min/1.73      Globulin 6.2 gm/dL      A/G Ratio 0.5 g/dL      BUN/Creatinine Ratio 7.9     Anion Gap 13.0 mmol/L     Narrative:       GFR Normal >60  Chronic Kidney Disease <60  Kidney Failure <15    Occult Blood X 1, Stool - Stool, Per Rectum [[id number]]  (Abnormal) Collected:  11/05/19 0011    Specimen:  Stool from Per Rectum Updated:  11/05/19 0254     Fecal Occult Blood Positive    CRE Screen by PCR - Swab, Large Intestine, Rectum [[id number]] Collected:  11/04/19 2300    Specimen:  Swab from Large Intestine, Rectum Updated:  11/05/19 0209     CRE SCREEN Not Detected     Comment: Test performed by real-time polymerase chain reaction (qPCR).        OXA 48 Strain Not Detected     IMP STRAIN Not Detected     VIM STRAIN Not Detected     NDM Strain Not Detected     KPC Strain Not Detected    Hemoglobin & Hematocrit, Blood [[id number]]  (Abnormal) Collected:  11/05/19 0024    Specimen:  Blood Updated:  11/05/19 0108     Hemoglobin 7.2 g/dL      Hematocrit  22.1 %     POC Glucose Once [[id number]]  (Abnormal) Collected:  11/04/19 2230    Specimen:  Blood Updated:  11/04/19 2308     Glucose 237 mg/dL      Comment: RN NotifiedOperator: [id number] GREEN BALDOMEROAMeter ID: [id number]       Lactic Acid, Reflex [[id number]]  (Abnormal) Collected:  11/04/19 2140    Specimen:  Blood Updated:  11/04/19 2204     Lactate 2.9 mmol/L     Extra Tubes [[id number]] Collected:  11/04/19 2004    Specimen:  Blood, Venous Line Updated:  11/04/19 2122    Narrative:       The following orders were created for panel order Extra Tubes.  Procedure                               Abnormality         Status                     ---------                               -----------         ------                     Light Blue Top[[id number]]                                   Final result               Lavender Top[[id number]]                                     Final result               Gold Top - SST[[id number]]                                   Final result               Green Top (Gel)[[id number]]                                  Final result                 Please view results for these tests on the individual orders.    Light Blue Top [[id number]] Collected:  11/04/19 2004    Specimen:  Blood Updated:  11/04/19 2122     Extra Tube hold for add-on     Comment: Auto resulted       Gold Top - SST [[id number]] Collected:  11/04/19 2004    Specimen:  Blood Updated:  11/04/19 2122     Extra Tube Hold for add-ons.     Comment: Auto resulted.       Lavender Top [[id number]] Collected:  11/04/19 2004    Specimen:  Blood Updated:  11/04/19 2122     Extra Tube hold for add-on     Comment: Auto resulted       Green Top (Gel) [[id number]] Collected:  11/04/19 2004    Specimen:  Blood Updated:  11/04/19 2122     Extra Tube Hold for add-ons.     Comment: Auto resulted.       Lactic Acid, Reflex Timer (This will reflex a repeat order 3-3:15 hours after ordered.) [[id number]] Collected:  11/04/19 1703    Specimen:  Blood  Updated:  11/04/19 2121     Extra Tube Hold for add-ons.     Comment: Auto resulted.       Ammonia [[id number]]  (Abnormal) Collected:  11/04/19 1911    Specimen:  Blood Updated:  11/04/19 1935     Ammonia 89 umol/L     Blood Gas, Arterial [[id number]]  (Abnormal) Collected:  11/04/19 1827    Specimen:  Arterial Blood Updated:  11/04/19 1844     Site Left Radial     Inderjit's Test N/A     pH, Arterial 7.437 pH units      pCO2, Arterial 51.1 mm Hg      Comment: 83 Value above reference range        pO2, Arterial 21.0 mm Hg      Comment: 85 Value below critical limit        HCO3, Arterial 34.4 mmol/L      Comment: 83 Value above reference range        Base Excess, Arterial 9.2 mmol/L      Comment: 83 Value above reference range        O2 Saturation, Arterial 25.4 %      Comment: 84 Value below reference range        Barometric Pressure for Blood Gas 750 mmHg      Modality Room Air     Ventilator Mode NA     Collected by MAXI     Comment: Meter: [id number]     :  [id number]       CBC & Differential [[id number]] Collected:  11/04/19 1703    Specimen:  Blood Updated:  11/04/19 1843    Narrative:       The following orders were created for panel order CBC & Differential.  Procedure                               Abnormality         Status                     ---------                               -----------         ------                     CBC Auto Differential[[id number]]        Abnormal            Final result                 Please view results for these tests on the individual orders.    CBC Auto Differential [[id number]]  (Abnormal) Collected:  11/04/19 1703    Specimen:  Blood Updated:  11/04/19 1843     WBC 4.90 10*3/mm3      RBC 2.53 10*6/mm3      Hemoglobin 7.3 g/dL      Hematocrit 22.0 %      MCV 87.0 fL      MCH 28.9 pg      MCHC 33.2 g/dL      RDW 15.2 %      RDW-SD 47.9 fl      MPV 8.9 fL      Platelets 115 10*3/mm3      Neutrophil % 68.6 %      Lymphocyte % 18.8 %      Monocyte % 10.6 %       Eosinophil % 1.0 %      Basophil % 0.6 %      Immature Grans % 0.4 %      Neutrophils, Absolute 3.36 10*3/mm3      Lymphocytes, Absolute 0.92 10*3/mm3      Monocytes, Absolute 0.52 10*3/mm3      Eosinophils, Absolute 0.05 10*3/mm3      Basophils, Absolute 0.03 10*3/mm3      Immature Grans, Absolute 0.02 10*3/mm3      nRBC 0.0 /100 WBC     Lipase [[id number]]  (Normal) Collected:  11/04/19 1703    Specimen:  Blood Updated:  11/04/19 1840     Lipase 41 U/L     Comprehensive Metabolic Panel [[id number]]  (Abnormal) Collected:  11/04/19 1703    Specimen:  Blood Updated:  11/04/19 1840     Glucose 244 mg/dL      BUN 29 mg/dL      Creatinine 3.88 mg/dL      Sodium 137 mmol/L      Potassium 4.7 mmol/L      Chloride 91 mmol/L      CO2 31.0 mmol/L      Calcium 8.7 mg/dL      Total Protein 9.5 g/dL      Albumin 3.10 g/dL      ALT (SGPT) 10 U/L      AST (SGOT) 16 U/L      Alkaline Phosphatase 173 U/L      Total Bilirubin 1.0 mg/dL      eGFR  African Amer 19 mL/min/1.73      Globulin 6.4 gm/dL      A/G Ratio 0.5 g/dL      BUN/Creatinine Ratio 7.5     Anion Gap 15.0 mmol/L     Narrative:       GFR Normal >60  Chronic Kidney Disease <60  Kidney Failure <15    [place] Draw [[id number]] Collected:  11/04/19 1703    Specimen:  Blood Updated:  11/04/19 1830    Narrative:       The following orders were created for panel order [place] Draw.  Procedure                               Abnormality         Status                     ---------                               -----------         ------                     Light Blue Top[[id number]]                                   Final result               Green Top (Gel)[[id number]]                                  Final result               Lavender Top[[id number]]                                     Final result               Gold Top - SST[[id number]]                                                                Please view results for these tests on the individual orders.    Green Top (Gel)  [[id number]] Collected:  11/04/19 1703    Specimen:  Blood Updated:  11/04/19 1830     Extra Tube Hold for add-ons.     Comment: Auto resulted.       Lavender Top [[id number]] Collected:  11/04/19 1703    Specimen:  Blood Updated:  11/04/19 1830     Extra Tube hold for add-on     Comment: Auto resulted       [place] Draw [[id number]] Collected:  11/04/19 1703    Specimen:  Blood Updated:  11/04/19 1816    Narrative:       The following orders were created for panel order [place] Draw.  Procedure                               Abnormality         Status                     ---------                               -----------         ------                     Light Blue Top[[id number]]                                                              Green Top (Gel)[[id number]]                                                             Lavender Top[[id number]]                                                                Gold Top - SST[[id number]]                                   Final result                 Please view results for these tests on the individual orders.    Gold Top - SST [[id number]] Collected:  11/04/19 1703    Specimen:  Blood Updated:  11/04/19 1816     Extra Tube Hold for add-ons.     Comment: Auto resulted.       Light Blue Top [[id number]] Collected:  11/04/19 1703    Specimen:  Blood Updated:  11/04/19 1815     Extra Tube hold for add-on     Comment: Auto resulted       Lactic Acid, Plasma [[id number]]  (Abnormal) Collected:  11/04/19 1703    Specimen:  Blood Updated:  11/04/19 1736     Lactate 3.2 mmol/L     aPTT [[id number]]  (Normal) Collected:  11/04/19 1703    Specimen:  Blood Updated:  11/04/19 1731     PTT 32.7 seconds     Narrative:       The recommended Heparin therapeutic range is 68-97 seconds.    Protime-INR [[id number]]  (Normal) Collected:  11/04/19 1703    Specimen:  Blood Updated:  11/04/19 1731     Protime 14.1 Seconds      INR 1.11    Narrative:       Therapeutic range for most indications is  2.0-3.0 INR,  or 2.5-3.5 for mechanical heart valves.    POC Glucose Once [[id number]]  (Abnormal) Collected:  11/04/19 1608    Specimen:  Blood Updated:  11/04/19 1643     Glucose 263 mg/dL      Comment: RN NotifiedOperator: [id number] WHITE KAMRANNMeter ID: [id number]                HOSPITAL COURSE:  Pt did well throughout his admission. Ammonia was tracked and continued to improve during his stay. He returned to his baseline mentation on day 2 of admission. He continued to have bloody stools and consult to GI was placed. Pt's hemoglobin was found to be 7.2 so he was given 1 unit of PRBC prior to EGD. EGD found a varice in the upper third of the esophagus. Plan to proceed with colonoscopy for full workup was made. A small bleeding angiectasia was found and was clipped. Pt was deemed stable at this time and deemed cleared for discharge back to nursing home. However, last lab before discharge did show hyponatremia of 121. Dr. Vilchis was contacted and made aware of this. He states that replenishment of the sodium could be completed outpatient when he would be seen for dialysis the following morning. Will continue with this. Pt was subsequently discharged with instructions to return to ED if symptoms return.     DISCHARGE CONDITION:   stable    DISPOSITION:  [facility]    DISCHARGE MEDICATIONS     Discharge Medications      Continue These Medications      Instructions Start Date   acetaminophen 500 MG tablet  Commonly known as:  TYLENOL   500 mg, Oral, Every 4 Hours PRN      ASPERCREME LIDOCAINE 4 % patch  Generic drug:  Lidocaine   1 patch, Apply externally, Daily PRN      aspirin 81 MG chewable tablet   81 mg, Oral, Daily      benzonatate 100 MG capsule  Commonly known as:  TESSALON   100 mg, Oral, 3 Times Daily PRN      brimonidine 0.1 % solution ophthalmic solution  Commonly known as:  ALPHAGAN P   Both Eyes, 3 Times Daily      cyclobenzaprine 5 MG tablet  Commonly known as:  FLEXERIL   5 mg, Oral,  Every 8 Hours PRN      dorzolamide 2 % ophthalmic solution  Commonly known as:  TRUSOPT   1 drop, Left Eye, 3 Times Daily      escitalopram 10 MG tablet  Commonly known as:  LEXAPRO   10 mg, Oral, Daily      famotidine 20 MG tablet  Commonly known as:  PEPCID   20 mg, Oral, Every Night at Bedtime      fluticasone 50 MCG/ACT nasal spray  Commonly known as:  FLONASE   2 sprays, Nasal, Daily, Administer 2 sprays in each nostril for each dose.      gabapentin 100 MG capsule  Commonly known as:  NEURONTIN   100 mg, Oral, Nightly      Glecaprevir-Pibrentasvir 100-40 MG tablet  Commonly known as:  MAVYRET   3 tablets, Oral, Daily      guaiFENesin 400 MG tablet  Commonly known as:  HUMIBID 3   400 mg, Oral, Every 4 Hours PRN      insulin aspart 100 UNIT/ML injection  Commonly known as:  novoLOG   Subcutaneous, 3 Times Daily Before Meals, Sliding scale:  = 0 units; 150-200 = 3 units; 200-250 = 6 units; 250-300 = 9 units; 300-350 = 12 units; >350 = 15 units and call MD      insulin detemir 100 UNIT/ML injection  Commonly known as:  LEVEMIR   30 Units, Subcutaneous, Daily      lactulose 10 GM/15ML solution  Commonly known as:  CHRONULAC   20 g, Oral, 3 Times Daily      latanoprost 0.005 % ophthalmic solution  Commonly known as:  XALATAN   1 drop, Both Eyes, Nightly      Loratadine 10 MG capsule   10 mg, Oral, Nightly      melatonin 5 MG tablet tablet   5 mg, Oral, Nightly      ondansetron ODT 4 MG disintegrating tablet  Commonly known as:  ZOFRAN-ODT   4 mg, Oral, Every 6 Hours PRN      polyethylene glycol packet  Commonly known as:  MIRALAX   17 g, Oral, Daily PRN      rifaximin 550 MG tablet  Commonly known as:  XIFAXAN   550 mg, Oral, Every 12 Hours Scheduled      sevelamer 800 MG tablet  Commonly known as:  RENVELA   800 mg, Oral, 3 Times Daily With Meals      traZODone 50 MG tablet  Commonly known as:  DESYREL   50 mg, Oral, Every Night at Bedtime      Vitamin D3 50 MCG (2000 UT) chewable tablet   2,000 Units, Oral,  Daily             INSTRUCTIONS:  Activity:   Activity Instructions     Activity as Tolerated          Diet:   Diet Instructions     Diet: Consistent Carbohydrate      Discharge Diet:  Consistent Carbohydrate        Special instructions: Patient instructed to call MD or return to ED with worsening shortness of breath, chest pain, fever greater than 100.4 degrees F or any other medical concerns..    FOLLOW UP:   Additional Instructions for the Follow-ups that You Need to Schedule     Discharge Follow-up with PCP   As directed       Currently Documented PCP:    Warren Stewart MD    PCP Phone Number:    [phone]     Follow Up Details:  1 week           Follow-up Information     Warren Stewart MD .    Specialty:  Family Medicine  Why:  1 week  Contact information:  [address] [place] [address] [phone]                   PENDING TEST RESULTS AT DISCHARGE   Order Current Status    Blood Culture - Blood, Arm, Left Preliminary result    Blood Culture - Blood, Hand, Left Preliminary result          Time: Discharge 30 min    Dr. Hwang  is the attending at time of discharge, She is aware of the patient's status and agrees with the above discharge summary.          This document has been electronically signed by Warren Stewart MD on November 8, 2019 10:20 AM

## 2019-11-12 NOTE — TELEPHONE ENCOUNTER
CHERY FROM [facility] CALLED AND SAID THEY NEED A NEW SCRIPT FOR PATIENT'S GABAPENTIN 100MG PO QHS    THANK YOU.

## 2019-11-12 NOTE — TELEPHONE ENCOUNTER
Spoke with Jackie from [facility] who requested a refill on patient's flexeril. Patient takes flexeril 5 mg TID PRN for muscle spasms. Will refill. Patient is also requesting something for cough. Will order tessalon perles 100 mg TID PRN for cough.        Michelle Lo MD PGY3  [facility]  This document has been electronically signed by Michelle Lo MD on November 12, 2019 12:48 AM

## 2019-11-20 PROBLEM — I50.32 CHRONIC HEART FAILURE WITH PRESERVED EJECTION FRACTION (HCC): Status: ACTIVE | Noted: 2019-01-01

## 2019-11-29 NOTE — PROGRESS NOTES
Nursing home called to clarify fluid restriction of 1500cc/day, which is consistent with what the last discharge summary stated.        This document has been electronically signed by Marylou Eugene MD on November 29, 2019 6:18 AM

## 2019-12-02 NOTE — PROGRESS NOTES
"RN called to report the patient was more confused today than normal. He was \"confused\" while sitting down for lunch. I recommended going to the ER for evaluation. RN called back a couple minutes later stating the pt was offered to go to the ER, but he refused. He verbalized back the risks and benefits of not going to the ER for evaluation.        This document has been electronically signed by John Sanchez MD on December 2, 2019 4:50 PM      "

## 2019-12-06 NOTE — ED NOTES
Contacted [facility] to transport pt to [facility]. Awaiting transportation at this time.      Brielle Walker RN  12/06/19 [id number]

## 2019-12-06 NOTE — TELEPHONE ENCOUNTER
Qiana from [facility] called regarding this patients PA for the Glecaprevir-Pibrentasvir (MAVYRET) 100-40 MG tablet. She said that you will have to call them today because it will get denied again if this doesn't get done. The number to call is [phone] and the member ID to give them is [id number]. If any questions for her the number is [phone].      Thank you,      Patricia

## 2019-12-06 NOTE — ED PROVIDER NOTES
Subjective   69-year-old male history of CHF, CKD, COPD, CAD, end-stage renal disease on dialysis presents with episode of spitting up blood this morning.  Patient reports that when he woke up this morning he noticed that there was blood in his mouth when he spit up a small clot.  He does not know where the blood came from, does not know if he coughed it up or if it came from his nose.  He denies any chest pain, dyspnea.  States that the symptoms resolved on their own.  States that he went to a full dialysis session this morning and then came to the ED.  He denies any abdominal pain or vomiting.  Denies any history of cirrhosis.  Not currently taking any anticoagulants.          Review of Systems   Constitutional: Negative for chills and fever.   HENT: Negative for nosebleeds and voice change.    Eyes: Negative for visual disturbance.   Respiratory: Negative for cough, shortness of breath and wheezing.    Cardiovascular: Negative for chest pain and palpitations.   Gastrointestinal: Negative for abdominal pain, constipation, diarrhea, nausea and vomiting.   Genitourinary: Negative for dysuria and hematuria.   Musculoskeletal: Negative for back pain.   Skin: Negative for pallor and rash.   Neurological: Negative for dizziness, syncope, weakness and headaches.   Hematological: Does not bruise/bleed easily.       Past Medical History:   Diagnosis Date   • Anemia of chronic disease    • Cataract    • CHF (congestive heart failure) (CMS/HCC)    • Chronic pain syndrome    • CKD (chronic kidney disease)    • Clostridium difficile infection    • COPD (chronic obstructive pulmonary disease) (CMS/HCC)    • Coronary artery disease    • Depression    • Diabetes mellitus (CMS/HCC)    • Dialysis patient (CMS/HCC)    • GERD (gastroesophageal reflux disease)    • Glaucoma    • H/O cardiac pacemaker     patient states he got his pacemaker removed in [place]   • Heart block    • Hepatitis C    • History of transfusion    •  Hypertension    • Nephropathy, diabetic (CMS/HCC)    • Pacemaker    • Pulmonary embolism (CMS/HCC)        Allergies   Allergen Reactions   • Lisinopril Angioedema     unknown   • Motrin [Ibuprofen] Diarrhea and Nausea And Vomiting       Past Surgical History:   Procedure Laterality Date   • ABDOMINAL SURGERY     • ARTERIOVENOUS FISTULA/SHUNT SURGERY Right     forearm loop   • ARTERIOVENOUS FISTULA/SHUNT SURGERY Left     removed past upper arm   • ARTERIOVENOUS FISTULA/SHUNT SURGERY Right 3/13/2018    Procedure: revision RIGHT forearm ARTERIOVENOUS graft (excision), debridement, wound vac;  Surgeon: Jerson Singh MD;  Location: [facility] OR;  Service: Vascular   • ARTERIOVENOUS FISTULA/SHUNT SURGERY W/ HEMODIALYSIS CATHETER INSERTION Right 4/4/2016    Procedure: RIGHT ARM AV FISTULA DECLOT REVISION REPAIR BRACHIAL ANASTOMOTIC PSUEDOANEURYSM LEFT FEMORAL RICHARDSON FISTULOGRAM WITH ANGIOPLASTY;  Surgeon: Jerson Carpenter MD;  Location: [facility] OR 18/19;  Service:    • CATARACT EXTRACTION W/ INTRAOCULAR LENS IMPLANT Left 3/31/2017    Procedure: REMOVE CATARACT AND IMPLANT INTRAOCULAR LENS LEFT EYE;  Surgeon: Hilton Montemayor MD;  Location: [facility] OR;  Service:    • COLONOSCOPY N/A 3/12/2019    Procedure: COLONOSCOPY;  Surgeon: Flako Montoya MD;  Location: [facility] ENDOSCOPY;  Service: Gastroenterology   • COLONOSCOPY N/A 11/6/2019    Procedure: COLONOSCOPY;  Surgeon: Tricia Philip MD;  Location: [facility] ENDOSCOPY;  Service: Gastroenterology   • ENDOSCOPY N/A 3/12/2019    Procedure: ESOPHAGOGASTRODUODENOSCOPY;  Surgeon: Flako Montoya MD;  Location: [facility] ENDOSCOPY;  Service: Gastroenterology   • ENDOSCOPY N/A 11/5/2019    Procedure: ESOPHAGOGASTRODUODENOSCOPY;  Surgeon: Tricia Philip MD;  Location: [facility] ENDOSCOPY;  Service: Gastroenterology   • EYE SURGERY     • HERNIA REPAIR     • IMPLANTABLE CARDIOVERTER DEFIBRILLATOR LEAD REPLACEMENT/POCKET REVISION Right 4/11/2016    Procedure:  PACEMAKER LEADS X 3 AND BATTERY EXTRACTION WITH EXIMER LASER;  Surgeon: Vern Reeves MD;  Location: [facility] OR 18/19;  Service:    • INSERTION HEMODIALYSIS CATHETER Right 05/2015    jugular   • INTERVENTIONAL RADIOLOGY PROCEDURE Right 6/1/2017    Procedure: RIGHT dialysis fistulagram & angioplasty;  Surgeon: Jerson Singh MD;  Location: [facility] ANGIO INVASIVE LOCATION;  Service:    • INTERVENTIONAL RADIOLOGY PROCEDURE Right 8/24/2017    Procedure: IR dialysis fistulagram;  Surgeon: Jerson Singh MD;  Location: [facility] ANGIO INVASIVE LOCATION;  Service:    • INTERVENTIONAL RADIOLOGY PROCEDURE Right 11/13/2018    Procedure: IR dialysis fistulagram;  Surgeon: Jerson Singh MD;  Location: [facility] ANGIO INVASIVE LOCATION;  Service: Interventional Radiology   • PACEMAKER IMPLANTATION  2008    dual chamber [employer] [place]   • REMOVAL HEMODIALYSIS CATHETER Right 05/2015    femoral vein   • SIGMOIDOSCOPY N/A 8/2/2019    Procedure: SIGMOIDOSCOPY FLEXIBLE;  Surgeon: Flako Montoya MD;  Location: [facility] ENDOSCOPY;  Service: Gastroenterology       Family History   Problem Relation Age of Onset   • COPD Sister    • Diabetes Brother    • Early death Brother    • Hyperlipidemia Brother    • Hypertension Brother    • Coronary artery disease Mother    • Diabetes Mother    • Hypertension Mother    • Stroke Other    • Other Other         Respiratory disorder       Social History     Socioeconomic History   • Marital status:      Spouse name: Not on file   • Number of children: Not on file   • Years of education: Not on file   • Highest education level: Not on file   Tobacco Use   • Smoking status: Former Smoker     Types: Cigarettes   • Smokeless tobacco: Never Used   • Tobacco comment: quit 20 years ago    Substance and Sexual Activity   • Alcohol use: No     Comment: former heavy use in his 50's, up to a fifth of liquor a day, currently no alcohol   • Drug use: No   • Sexual  activity: Never           Objective   Physical Exam   Constitutional: He is oriented to person, place, and time. He appears well-developed and well-nourished. No distress.   HENT:   Head: Normocephalic and atraumatic.   Eyes: Conjunctivae and EOM are normal. Pupils are equal, round, and reactive to light.   Neck: Normal range of motion. Neck supple. No JVD present.   Cardiovascular: Normal rate, regular rhythm and normal heart sounds. Exam reveals no gallop and no friction rub.   No murmur heard.  Pulmonary/Chest: Effort normal and breath sounds normal. No stridor. No respiratory distress. He has no wheezes. He has no rales.   Abdominal: Soft. There is no tenderness. There is no rebound and no guarding.   Musculoskeletal: Normal range of motion. He exhibits no edema.   Right AV fistula   Neurological: He is alert and oriented to person, place, and time.   Skin: Skin is warm and dry. Capillary refill takes less than 2 seconds. No rash noted. He is not diaphoretic. No erythema.       Procedures           ED Course  ED Course as of Dec 13 1028   Fri Dec 06, 2019   1250 Hemoglobin(!): 8.2 [GK]   1250 Hemoglobin(!): 8.2 [GK]      ED Course User Index  [GK] Elieser Khan MD      Results for orders placed or performed during the hospital encounter of 12/06/19   Comprehensive Metabolic Panel   Result Value Ref Range    Glucose 103 (H) 65 - 99 mg/dL    BUN 12 8 - 23 mg/dL    Creatinine 2.81 (H) 0.76 - 1.27 mg/dL    Sodium 134 (L) 136 - 145 mmol/L    Potassium 3.3 (L) 3.5 - 5.2 mmol/L    Chloride 92 (L) 98 - 107 mmol/L    CO2 33.0 (H) 22.0 - 29.0 mmol/L    Calcium 8.8 8.6 - 10.5 mg/dL    Total Protein 10.3 (H) 6.0 - 8.5 g/dL    Albumin 3.40 (L) 3.50 - 5.20 g/dL    ALT (SGPT) 6 1 - 41 U/L    AST (SGOT) 14 1 - 40 U/L    Alkaline Phosphatase 155 (H) 39 - 117 U/L    Total Bilirubin 1.4 (H) 0.2 - 1.2 mg/dL    eGFR  African Amer 27 (L) >60 mL/min/1.73    Globulin 6.9 gm/dL    A/G Ratio 0.5 g/dL    BUN/Creatinine Ratio  4.3 (L) 7.0 - 25.0    Anion Gap 9.0 5.0 - 15.0 mmol/L   CBC Auto Differential   Result Value Ref Range    WBC 4.29 3.40 - 10.80 10*3/mm3    RBC 3.02 (L) 4.14 - 5.80 10*6/mm3    Hemoglobin 8.2 (L) 13.0 - 17.7 g/dL    Hematocrit 25.8 (L) 37.5 - 51.0 %    MCV 85.4 79.0 - 97.0 fL    MCH 27.2 26.6 - 33.0 pg    MCHC 31.8 31.5 - 35.7 g/dL    RDW 15.7 (H) 12.3 - 15.4 %    RDW-SD 47.7 37.0 - 54.0 fl    MPV 8.2 6.0 - 12.0 fL    Platelets 129 (L) 140 - 450 10*3/mm3    Neutrophil % 67.8 42.7 - 76.0 %    Lymphocyte % 17.0 (L) 19.6 - 45.3 %    Monocyte % 10.5 5.0 - 12.0 %    Eosinophil % 3.5 0.3 - 6.2 %    Basophil % 0.7 0.0 - 1.5 %    Immature Grans % 0.5 0.0 - 0.5 %    Neutrophils, Absolute 2.91 1.70 - 7.00 10*3/mm3    Lymphocytes, Absolute 0.73 0.70 - 3.10 10*3/mm3    Monocytes, Absolute 0.45 0.10 - 0.90 10*3/mm3    Eosinophils, Absolute 0.15 0.00 - 0.40 10*3/mm3    Basophils, Absolute 0.03 0.00 - 0.20 10*3/mm3    Immature Grans, Absolute 0.02 0.00 - 0.05 10*3/mm3    nRBC 0.0 0.0 - 0.2 /100 WBC   Type & Screen   Result Value Ref Range    ABO Type B     RH type Positive     Antibody Screen Negative     T&S Expiration Date 12/9/2019 11:59:59 PM    PREVIOUS HISTORY   Result Value Ref Range    Previous History Previous Record on File    Light Blue Top   Result Value Ref Range    Extra Tube hold for add-on    Green Top (Gel)   Result Value Ref Range    Extra Tube Hold for add-ons.    Lavender Top   Result Value Ref Range    Extra Tube hold for add-on    Gold Top - SST   Result Value Ref Range    Extra Tube Hold for add-ons.      No Radiology Exams Resulted Within Past 24 Hours            MDM  Number of Diagnoses or Management Options  Diagnosis management comments: Patient with episode of bleeding this morning, unclear if bleeding was hemoptysis or epistaxis, the patient's chest x-ray is clear, denies any dyspnea, no further episodes of bleeding in the ED, observed for several hours.  His hemoglobin is at his baseline.  I think it  is reasonable to discharge him at this point, to follow-up with his primary physician for further evaluation.  He understands to return for any worsening symptoms.       Amount and/or Complexity of Data Reviewed  Clinical lab tests: reviewed  Tests in the radiology section of CPT®: reviewed  Tests in the medicine section of CPT®: reviewed    Risk of Complications, Morbidity, and/or Mortality  Presenting problems: low  Diagnostic procedures: low  Management options: low    Patient Progress  Patient progress: improved      Final diagnoses:   Hemoptysis              Elieser Khan MD  12/06/19 1516       Elieser Khan MD  12/13/19 1028

## 2019-12-13 NOTE — PROGRESS NOTES
"NURSING HOME HISTORY AND PHYSICAL    NAME: Cristo Musa  : 1950  MRN: [id number]    DATE & TIME SEEN: 19     PCP: Warren Stewart MD    CODE STATUS: DNR    NURSING HOME: [facility]    Chief Complaint: Nursing Home visit for December     HPI: Cristo Musa is a 69 y.o. male admitted to [facility].  Patient was found resting comfortably, in no acute distress.  Patient stated \"I feel pretty good, just came back from dialysis \".  Patient endorsed that he does not know how much weight was taken off during dialysis because he fell asleep.     Of note, patient was recently seen in the emergency department on 19 for hemoptysis.  Patient endorsed that he has not had another episode of hemoptysis.  However still has episodes of coughing at night, alleviated with taking a big breath.    Patient denied chest pain, chest pressure, palpitations, shortness of air, fever, chills, nausea vomiting diarrhea at this time.      CONCURRENT MEDICAL HISTORY:  Past Medical History:   Diagnosis Date   • Anemia of chronic disease    • Cataract    • CHF (congestive heart failure) (CMS/HCC)    • Chronic pain syndrome    • CKD (chronic kidney disease)    • Clostridium difficile infection    • COPD (chronic obstructive pulmonary disease) (CMS/HCC)    • Coronary artery disease    • Depression    • Diabetes mellitus (CMS/HCC)    • Dialysis patient (CMS/HCC)    • GERD (gastroesophageal reflux disease)    • Glaucoma    • H/O cardiac pacemaker     patient states he got his pacemaker removed in [place]   • Heart block    • Hepatitis C    • History of transfusion    • Hypertension    • Nephropathy, diabetic (CMS/HCC)    • Pacemaker    • Pulmonary embolism (CMS/HCC)        PAST SURGICAL HISTORY:  Past Surgical History:   Procedure Laterality Date   • ABDOMINAL SURGERY     • ARTERIOVENOUS FISTULA/SHUNT SURGERY Right     forearm loop   • ARTERIOVENOUS FISTULA/SHUNT SURGERY Left     removed " past upper arm   • ARTERIOVENOUS FISTULA/SHUNT SURGERY Right 3/13/2018    Procedure: revision RIGHT forearm ARTERIOVENOUS graft (excision), debridement, wound vac;  Surgeon: Jerson Singh MD;  Location: [facility] OR;  Service: Vascular   • ARTERIOVENOUS FISTULA/SHUNT SURGERY W/ HEMODIALYSIS CATHETER INSERTION Right 4/4/2016    Procedure: RIGHT ARM AV FISTULA DECLOT REVISION REPAIR BRACHIAL ANASTOMOTIC PSUEDOANEURYSM LEFT FEMORAL RICHARDSON FISTULOGRAM WITH ANGIOPLASTY;  Surgeon: Jerson Carpenter MD;  Location: [facility] OR 18/19;  Service:    • CATARACT EXTRACTION W/ INTRAOCULAR LENS IMPLANT Left 3/31/2017    Procedure: REMOVE CATARACT AND IMPLANT INTRAOCULAR LENS LEFT EYE;  Surgeon: Hilton Montemayor MD;  Location: [facility] OR;  Service:    • COLONOSCOPY N/A 3/12/2019    Procedure: COLONOSCOPY;  Surgeon: Flako Montoya MD;  Location: [facility] ENDOSCOPY;  Service: Gastroenterology   • COLONOSCOPY N/A 11/6/2019    Procedure: COLONOSCOPY;  Surgeon: Tricia Philip MD;  Location: [facility] ENDOSCOPY;  Service: Gastroenterology   • ENDOSCOPY N/A 3/12/2019    Procedure: ESOPHAGOGASTRODUODENOSCOPY;  Surgeon: Flako Montoya MD;  Location: [facility] ENDOSCOPY;  Service: Gastroenterology   • ENDOSCOPY N/A 11/5/2019    Procedure: ESOPHAGOGASTRODUODENOSCOPY;  Surgeon: Tricia Philip MD;  Location: [facility] ENDOSCOPY;  Service: Gastroenterology   • EYE SURGERY     • HERNIA REPAIR     • IMPLANTABLE CARDIOVERTER DEFIBRILLATOR LEAD REPLACEMENT/POCKET REVISION Right 4/11/2016    Procedure: PACEMAKER LEADS X 3 AND BATTERY EXTRACTION WITH EXIMER LASER;  Surgeon: Vern Reeves MD;  Location: [facility] OR 18/19;  Service:    • INSERTION HEMODIALYSIS CATHETER Right 05/2015    jugular   • INTERVENTIONAL RADIOLOGY PROCEDURE Right 6/1/2017    Procedure: RIGHT dialysis fistulagram & angioplasty;  Surgeon: Jerson Singh MD;  Location: [facility] ANGIO INVASIVE LOCATION;  Service:    • INTERVENTIONAL RADIOLOGY  PROCEDURE Right 8/24/2017    Procedure: IR dialysis fistulagram;  Surgeon: Jerson Singh MD;  Location: [facility] ANGIO INVASIVE LOCATION;  Service:    • INTERVENTIONAL RADIOLOGY PROCEDURE Right 11/13/2018    Procedure: IR dialysis fistulagram;  Surgeon: Jerson Singh MD;  Location: [facility] ANGIO INVASIVE LOCATION;  Service: Interventional Radiology   • PACEMAKER IMPLANTATION  2008    dual chamber [employer] [place]   • REMOVAL HEMODIALYSIS CATHETER Right 05/2015    femoral vein   • SIGMOIDOSCOPY N/A 8/2/2019    Procedure: SIGMOIDOSCOPY FLEXIBLE;  Surgeon: Flako Montoya MD;  Location: [facility] ENDOSCOPY;  Service: Gastroenterology       FAMILY HISTORY:  Family History   Problem Relation Age of Onset   • COPD Sister    • Diabetes Brother    • Early death Brother    • Hyperlipidemia Brother    • Hypertension Brother    • Coronary artery disease Mother    • Diabetes Mother    • Hypertension Mother    • Stroke Other    • Other Other         Respiratory disorder        SOCIAL HISTORY:  Social History     Socioeconomic History   • Marital status:      Spouse name: Not on file   • Number of children: Not on file   • Years of education: Not on file   • Highest education level: Not on file   Tobacco Use   • Smoking status: Former Smoker     Types: Cigarettes   • Smokeless tobacco: Never Used   • Tobacco comment: quit 20 years ago    Substance and Sexual Activity   • Alcohol use: No     Comment: former heavy use in his 50's, up to a fifth of liquor a day, currently no alcohol   • Drug use: No   • Sexual activity: Never       CURRENT MEDS:    Current Outpatient Medications:   •  acetaminophen (TYLENOL) 500 MG tablet, Take 500 mg by mouth Every 4 (Four) Hours As Needed for Mild Pain  or Fever., Disp: , Rfl:   •  aspirin 81 MG chewable tablet, Chew 81 mg Daily., Disp: , Rfl:   •  benzonatate (TESSALON) 100 MG capsule, Take 100 mg by mouth 3 (Three) Times a Day As Needed for Cough., Disp: , Rfl:   •   brimonidine (ALPHAGAN P) 0.1 % solution ophthalmic solution, Administer  to both eyes 3 (Three) Times a Day., Disp: , Rfl:   •  Cholecalciferol (VITAMIN D3) 2000 units chewable tablet, Chew 2,000 Units Daily., Disp: , Rfl:   •  cyclobenzaprine (FLEXERIL) 5 MG tablet, Take 5 mg by mouth Every 8 (Eight) Hours As Needed for Muscle Spasms., Disp: , Rfl:   •  dorzolamide (TRUSOPT) 2 % ophthalmic solution, Administer 1 drop into the left eye 3 (Three) Times a Day., Disp: 1 each, Rfl: 12  •  escitalopram (LEXAPRO) 10 MG tablet, Take 1 tablet by mouth Daily., Disp: 30 tablet, Rfl: 3  •  famotidine (PEPCID) 20 MG tablet, Take 20 mg by mouth every night at bedtime., Disp: , Rfl:   •  fluticasone (FLONASE) 50 MCG/ACT nasal spray, 2 sprays into each nostril daily. Administer 2 sprays in each nostril for each dose., Disp: , Rfl:   •  gabapentin (NEURONTIN) 100 MG capsule, Take 1 capsule by mouth Every Night., Disp: 30 capsule, Rfl: 0  •  Glecaprevir-Pibrentasvir (MAVYRET) 100-40 MG tablet, Take 3 tablets by mouth Daily., Disp: 90 tablet, Rfl: 2  •  guaiFENesin (HUMIBID 3) 400 MG tablet, Take 400 mg by mouth Every 4 (Four) Hours As Needed for Cough., Disp: , Rfl:   •  insulin aspart (NovoLOG) 100 UNIT/ML injection, Inject  under the skin 3 (Three) Times a Day Before Meals. Sliding scale:  = 0 units; 150-200 = 3 units; 200-250 = 6 units; 250-300 = 9 units; 300-350 = 12 units; >350 = 15 units and call MD, Disp: , Rfl:   •  insulin detemir (LEVEMIR) 100 UNIT/ML injection, Inject 30 Units under the skin into the appropriate area as directed Daily., Disp: , Rfl:   •  lactulose (CHRONULAC) 10 GM/15ML solution, Take 30 mL by mouth 3 (Three) Times a Day. (Patient taking differently: Take 30 mL by mouth 3 (Three) Times a Day.), Disp: 946 mL, Rfl: 3  •  latanoprost (XALATAN) 0.005 % ophthalmic solution, Administer 1 drop to both eyes every night., Disp: , Rfl:   •  Lidocaine (ASPERCREME LIDOCAINE) 4 % patch, Apply 1 patch topically  Daily As Needed (low back pain. on for 12 hours.)., Disp: , Rfl:   •  Loratadine 10 MG capsule, Take 10 mg by mouth Every Night., Disp: , Rfl:   •  melatonin 5 MG tablet tablet, Take 5 mg by mouth Every Night., Disp: , Rfl:   •  ondansetron ODT (ZOFRAN-ODT) 4 MG disintegrating tablet, Take 1 tablet by mouth Every 6 (Six) Hours As Needed for Nausea or Vomiting., Disp: 10 tablet, Rfl: 0  •  polyethylene glycol (MIRALAX) packet, Take 17 g by mouth Daily As Needed (constipation)., Disp: , Rfl:   •  rifaximin (XIFAXAN) 550 MG tablet, Take 1 tablet by mouth Every 12 (Twelve) Hours., Disp: 60 tablet, Rfl: 5  •  sevelamer (RENVELA) 800 MG tablet, Take 800 mg by mouth 3 (Three) Times a Day With Meals., Disp: , Rfl:   •  traZODone (DESYREL) 50 MG tablet, Take 50 mg by mouth every night at bedtime., Disp: , Rfl:     ALLERGIES:  Lisinopril and Motrin [ibuprofen]    Review of Systems:  Review of Systems   Constitutional: Negative for appetite change, chills, diaphoresis, fatigue and fever.   HENT: Negative for ear discharge, ear pain, facial swelling, hearing loss, rhinorrhea, sinus pressure, sinus pain, sneezing, sore throat and voice change.    Eyes: Negative for pain, discharge and visual disturbance.   Respiratory: Negative for apnea, cough, chest tightness, shortness of breath, wheezing and stridor.    Cardiovascular: Negative for chest pain, palpitations and leg swelling.   Gastrointestinal: Negative for abdominal distention, abdominal pain, constipation, diarrhea, nausea and vomiting.   Genitourinary: Negative for dysuria, frequency and urgency.   Musculoskeletal: Positive for back pain and gait problem. Negative for arthralgias, myalgias and neck pain.   Skin: Negative for color change, rash and wound.   Neurological: Negative for facial asymmetry, speech difficulty, light-headedness and headaches.   Psychiatric/Behavioral: Negative for agitation and decreased concentration. The patient is not nervous/anxious.         VITAL SIGNS   Weight 224.8, T 97.8, Pulse 68 /76     Physical Exam   Constitutional: He is oriented to person, place, and time. He appears well-developed and well-nourished. No distress.   HENT:   Head: Normocephalic and atraumatic.   Right Ear: External ear normal.   Left Ear: External ear normal.   Mouth/Throat: Oropharynx is clear and moist.   Eyes: Pupils are equal, round, and reactive to light. Conjunctivae and EOM are normal. Right eye exhibits no discharge. Left eye exhibits no discharge. No scleral icterus.   Neck: Normal range of motion. No tracheal deviation present.   Cardiovascular: Normal rate, regular rhythm and normal heart sounds. Exam reveals no gallop and no friction rub.   No murmur heard.  Pulmonary/Chest: Effort normal and breath sounds normal. No stridor. No respiratory distress. He has no wheezes. He has no rales. He exhibits no tenderness.   Abdominal: Soft. Bowel sounds are normal. He exhibits no distension and no mass. There is no tenderness. There is no rebound and no guarding.   Musculoskeletal: Normal range of motion. He exhibits no edema or tenderness.   Neurological: He is alert and oriented to person, place, and time.   Skin: Skin is warm and dry. No rash noted. He is not diaphoretic. No erythema. No pallor.   Psychiatric: He has a normal mood and affect. His behavior is normal.            ASSESSMENT/PLAN: 69 y.o. male with:  Problem List Items Addressed This Visit        Cardiovascular and Mediastinum    Essential hypertension - Primary    Overview     -will monitor             Digestive    Chronic hepatitis C without hepatic coma (CMS/HCC)    Overview     -follows with GI outpatient, Dr. Montoya/Jason Porras  -continue lactulose, rifaximin, antivirals (Mavyret 100-40 mg tablet)             Endocrine    Type 2 diabetes mellitus with chronic kidney disease on chronic dialysis, with long-term current use of insulin (CMS/HCC)    Overview     -Continue Levemir 30 units  nightly, NovoLog 3 times daily with meals  -Sliding scale coverage     Continue gabapentin 100 mg nightly            Genitourinary    ESRD (end stage renal disease) (CMS/[facility])    Overview     -HD M/W/F-patient is followed by Dr. Deng fulton on M/W/F with dialysis  -continue Sevelamer 800 mg three times daily                  Dr. Haider is the attending on record for this patient, He is aware of the patient's status and agrees with the above.            This document has been electronically signed by Demarcus Oliveira MD on December 13, 2019 2:24 PM

## 2019-12-16 NOTE — PROGRESS NOTES
I have reviewed the notes, assessments, and/or procedures performed by Dr. Oliveira, I concur with her/his documentation and assessment and plan for Cristo Musa.       This document has been electronically signed by César Haider MD on December 16, 2019 11:49 AM

## 2019-12-23 NOTE — PROGRESS NOTES
RN called me 12/22/19 to let me know that the pt is refusing labs.        This document has been electronically signed by John Sanchez MD on December 23, 2019 10:55 AM

## 2019-12-25 NOTE — PROGRESS NOTES
Was notified by nursing staff at SNF at 4:31 PM on 12-.  Due to an episode of desaturation to 88, nursing staff placed patient on 2 L of nasal cannula which brought patient's saturation to 99%.  Patient is otherwise stable.     Signed,   Demarcus Oliveira MD  Family Medicine Resident PGY2  [facility]        This document has been electronically signed by Demarcus Oliveira MD on December 25, 2019 4:32 PM

## 2020-01-01 ENCOUNTER — APPOINTMENT (OUTPATIENT)
Dept: GENERAL RADIOLOGY | Facility: HOSPITAL | Age: 70
End: 2020-01-01

## 2020-01-01 ENCOUNTER — ANESTHESIA (OUTPATIENT)
Dept: GASTROENTEROLOGY | Facility: HOSPITAL | Age: 70
End: 2020-01-01

## 2020-01-01 ENCOUNTER — HOSPITAL ENCOUNTER (INPATIENT)
Facility: HOSPITAL | Age: 70
LOS: 7 days | End: 2020-01-10
Attending: FAMILY MEDICINE | Admitting: HOSPITALIST

## 2020-01-01 ENCOUNTER — APPOINTMENT (OUTPATIENT)
Dept: CT IMAGING | Facility: HOSPITAL | Age: 70
End: 2020-01-01

## 2020-01-01 ENCOUNTER — ANESTHESIA EVENT (OUTPATIENT)
Dept: GASTROENTEROLOGY | Facility: HOSPITAL | Age: 70
End: 2020-01-01

## 2020-01-01 ENCOUNTER — LAB REQUISITION (OUTPATIENT)
Dept: LAB | Facility: HOSPITAL | Age: 70
End: 2020-01-01

## 2020-01-01 ENCOUNTER — APPOINTMENT (OUTPATIENT)
Dept: PULMONOLOGY | Facility: HOSPITAL | Age: 70
End: 2020-01-01

## 2020-01-01 ENCOUNTER — HOSPITAL ENCOUNTER (OUTPATIENT)
Facility: HOSPITAL | Age: 70
Setting detail: HOSPITAL OUTPATIENT SURGERY
End: 2020-01-01
Attending: INTERNAL MEDICINE | Admitting: INTERNAL MEDICINE

## 2020-01-01 VITALS
HEART RATE: 72 BPM | DIASTOLIC BLOOD PRESSURE: 59 MMHG | RESPIRATION RATE: 16 BRPM | OXYGEN SATURATION: 100 % | SYSTOLIC BLOOD PRESSURE: 105 MMHG | BODY MASS INDEX: 30.71 KG/M2 | HEIGHT: 68 IN | WEIGHT: 202.6 LBS | TEMPERATURE: 98.9 F

## 2020-01-01 DIAGNOSIS — R26.89 DECREASED FUNCTIONAL MOBILITY: ICD-10-CM

## 2020-01-01 DIAGNOSIS — K92.2 GASTROINTESTINAL HEMORRHAGE, UNSPECIFIED GASTROINTESTINAL HEMORRHAGE TYPE: Primary | ICD-10-CM

## 2020-01-01 DIAGNOSIS — G93.41 METABOLIC ENCEPHALOPATHY: ICD-10-CM

## 2020-01-01 DIAGNOSIS — R19.7 DIARRHEA, UNSPECIFIED TYPE: ICD-10-CM

## 2020-01-01 DIAGNOSIS — J11.1 INFLUENZA DUE TO UNIDENTIFIED INFLUENZA VIRUS WITH OTHER RESPIRATORY MANIFESTATIONS: ICD-10-CM

## 2020-01-01 DIAGNOSIS — K21.9 GASTROESOPHAGEAL REFLUX DISEASE WITHOUT ESOPHAGITIS: ICD-10-CM

## 2020-01-01 LAB
A-A DO2: ABNORMAL
ABO GROUP BLD: NORMAL
ALBUMIN SERPL-MCNC: 2.4 G/DL (ref 3.5–5.2)
ALBUMIN SERPL-MCNC: 2.4 G/DL (ref 3.5–5.2)
ALBUMIN SERPL-MCNC: 2.5 G/DL (ref 3.5–5.2)
ALBUMIN SERPL-MCNC: 2.6 G/DL (ref 3.5–5.2)
ALBUMIN SERPL-MCNC: 2.6 G/DL (ref 3.5–5.2)
ALBUMIN SERPL-MCNC: 2.7 G/DL (ref 3.5–5.2)
ALBUMIN SERPL-MCNC: 2.7 G/DL (ref 3.5–5.2)
ALBUMIN SERPL-MCNC: 2.8 G/DL (ref 3.5–5.2)
ALBUMIN SERPL-MCNC: 2.9 G/DL (ref 3.5–5.2)
ALBUMIN/GLOB SERPL: 0.3 G/DL
ALBUMIN/GLOB SERPL: 0.4 G/DL
ALP SERPL-CCNC: 103 U/L (ref 39–117)
ALP SERPL-CCNC: 108 U/L (ref 39–117)
ALP SERPL-CCNC: 118 U/L (ref 39–117)
ALP SERPL-CCNC: 120 U/L (ref 39–117)
ALP SERPL-CCNC: 126 U/L (ref 39–117)
ALP SERPL-CCNC: 134 U/L (ref 39–117)
ALP SERPL-CCNC: 87 U/L (ref 39–117)
ALP SERPL-CCNC: 89 U/L (ref 39–117)
ALP SERPL-CCNC: 96 U/L (ref 39–117)
ALT SERPL W P-5'-P-CCNC: 10 U/L (ref 1–41)
ALT SERPL W P-5'-P-CCNC: 13 U/L (ref 1–41)
ALT SERPL W P-5'-P-CCNC: 14 U/L (ref 1–41)
ALT SERPL W P-5'-P-CCNC: 16 U/L (ref 1–41)
ALT SERPL W P-5'-P-CCNC: 17 U/L (ref 1–41)
ALT SERPL W P-5'-P-CCNC: 21 U/L (ref 1–41)
ALT SERPL W P-5'-P-CCNC: 5 U/L (ref 1–41)
ALT SERPL W P-5'-P-CCNC: 5 U/L (ref 1–41)
ALT SERPL W P-5'-P-CCNC: <5 U/L (ref 1–41)
AMMONIA BLD-SCNC: 143 UMOL/L (ref 16–60)
AMMONIA BLD-SCNC: 32 UMOL/L (ref 16–60)
AMMONIA BLD-SCNC: 65 UMOL/L (ref 16–60)
ANION GAP SERPL CALCULATED.3IONS-SCNC: 16 MMOL/L (ref 5–15)
ANION GAP SERPL CALCULATED.3IONS-SCNC: 16 MMOL/L (ref 5–15)
ANION GAP SERPL CALCULATED.3IONS-SCNC: 17 MMOL/L (ref 5–15)
ANION GAP SERPL CALCULATED.3IONS-SCNC: 18 MMOL/L (ref 5–15)
ANION GAP SERPL CALCULATED.3IONS-SCNC: 18 MMOL/L (ref 5–15)
ANION GAP SERPL CALCULATED.3IONS-SCNC: 21 MMOL/L (ref 5–15)
ANION GAP SERPL CALCULATED.3IONS-SCNC: 23 MMOL/L (ref 5–15)
ANION GAP SERPL CALCULATED.3IONS-SCNC: 23 MMOL/L (ref 5–15)
ANION GAP SERPL CALCULATED.3IONS-SCNC: 25 MMOL/L (ref 5–15)
ARTERIAL PATENCY WRIST A: ABNORMAL
AST SERPL-CCNC: 18 U/L (ref 1–40)
AST SERPL-CCNC: 21 U/L (ref 1–40)
AST SERPL-CCNC: 22 U/L (ref 1–40)
AST SERPL-CCNC: 23 U/L (ref 1–40)
AST SERPL-CCNC: 32 U/L (ref 1–40)
AST SERPL-CCNC: 48 U/L (ref 1–40)
AST SERPL-CCNC: 57 U/L (ref 1–40)
AST SERPL-CCNC: 59 U/L (ref 1–40)
AST SERPL-CCNC: 76 U/L (ref 1–40)
ATMOSPHERIC PRESS: 740 MMHG
ATMOSPHERIC PRESS: 741 MMHG
ATMOSPHERIC PRESS: 753 MMHG
ATMOSPHERIC PRESS: 755 MMHG
B PERT DNA SPEC QL NAA+PROBE: NOT DETECTED
BACTERIA SPEC AEROBE CULT: NORMAL
BACTERIA SPEC RESP CULT: ABNORMAL
BACTERIA SPEC RESP CULT: ABNORMAL
BACTERIA SPEC RESP CULT: NORMAL
BASE EXCESS BLDA CALC-SCNC: -3.3 MMOL/L (ref 0–2)
BASE EXCESS BLDA CALC-SCNC: -3.9 MMOL/L (ref 0–2)
BASE EXCESS BLDA CALC-SCNC: -6.7 MMOL/L (ref 0–2)
BASE EXCESS BLDA CALC-SCNC: 0.6 MMOL/L (ref 0–2)
BASOPHILS # BLD AUTO: 0 10*3/MM3 (ref 0–0.2)
BASOPHILS # BLD AUTO: 0 10*3/MM3 (ref 0–0.2)
BASOPHILS # BLD AUTO: 0.01 10*3/MM3 (ref 0–0.2)
BASOPHILS # BLD AUTO: 0.02 10*3/MM3 (ref 0–0.2)
BASOPHILS # BLD AUTO: 0.02 10*3/MM3 (ref 0–0.2)
BASOPHILS # BLD AUTO: 0.04 10*3/MM3 (ref 0–0.2)
BASOPHILS # BLD AUTO: 0.04 10*3/MM3 (ref 0–0.2)
BASOPHILS NFR BLD AUTO: 0 % (ref 0–1.5)
BASOPHILS NFR BLD AUTO: 0 % (ref 0–1.5)
BASOPHILS NFR BLD AUTO: 0.1 % (ref 0–1.5)
BASOPHILS NFR BLD AUTO: 0.2 % (ref 0–1.5)
BASOPHILS NFR BLD AUTO: 0.3 % (ref 0–1.5)
BASOPHILS NFR BLD AUTO: 0.4 % (ref 0–1.5)
BASOPHILS NFR BLD AUTO: 0.5 % (ref 0–1.5)
BDY SITE: ABNORMAL
BILIRUB SERPL-MCNC: 0.7 MG/DL (ref 0.2–1.2)
BILIRUB SERPL-MCNC: 0.7 MG/DL (ref 0.2–1.2)
BILIRUB SERPL-MCNC: 0.8 MG/DL (ref 0.2–1.2)
BILIRUB SERPL-MCNC: 1.1 MG/DL (ref 0.2–1.2)
BILIRUB SERPL-MCNC: 1.4 MG/DL (ref 0.2–1.2)
BILIRUB SERPL-MCNC: 1.7 MG/DL (ref 0.2–1.2)
BILIRUB SERPL-MCNC: 1.8 MG/DL (ref 0.2–1.2)
BILIRUB SERPL-MCNC: 2.2 MG/DL (ref 0.2–1.2)
BILIRUB SERPL-MCNC: 3.1 MG/DL (ref 0.2–1.2)
BLD GP AB SCN SERPL QL: NEGATIVE
BUN BLD-MCNC: 40 MG/DL (ref 8–23)
BUN BLD-MCNC: 44 MG/DL (ref 8–23)
BUN BLD-MCNC: 56 MG/DL (ref 8–23)
BUN BLD-MCNC: 57 MG/DL (ref 8–23)
BUN BLD-MCNC: 70 MG/DL (ref 8–23)
BUN BLD-MCNC: 71 MG/DL (ref 8–23)
BUN BLD-MCNC: 78 MG/DL (ref 8–23)
BUN BLD-MCNC: 89 MG/DL (ref 8–23)
BUN BLD-MCNC: 95 MG/DL (ref 8–23)
BUN/CREAT SERPL: 10.8 (ref 7–25)
BUN/CREAT SERPL: 10.9 (ref 7–25)
BUN/CREAT SERPL: 11.2 (ref 7–25)
BUN/CREAT SERPL: 6.9 (ref 7–25)
BUN/CREAT SERPL: 7.2 (ref 7–25)
BUN/CREAT SERPL: 8.1 (ref 7–25)
BUN/CREAT SERPL: 8.1 (ref 7–25)
BUN/CREAT SERPL: 8.2 (ref 7–25)
BUN/CREAT SERPL: 9.3 (ref 7–25)
C PNEUM DNA NPH QL NAA+NON-PROBE: NOT DETECTED
CA-I BLD-MCNC: 4.34 MG/DL (ref 4.6–5.6)
CALCIUM SPEC-SCNC: 8 MG/DL (ref 8.6–10.5)
CALCIUM SPEC-SCNC: 8.1 MG/DL (ref 8.6–10.5)
CALCIUM SPEC-SCNC: 8.3 MG/DL (ref 8.6–10.5)
CALCIUM SPEC-SCNC: 8.3 MG/DL (ref 8.6–10.5)
CALCIUM SPEC-SCNC: 8.4 MG/DL (ref 8.6–10.5)
CALCIUM SPEC-SCNC: 8.5 MG/DL (ref 8.6–10.5)
CALCIUM SPEC-SCNC: 8.6 MG/DL (ref 8.6–10.5)
CALCIUM SPEC-SCNC: 8.9 MG/DL (ref 8.6–10.5)
CALCIUM SPEC-SCNC: 9 MG/DL (ref 8.6–10.5)
CHLORIDE SERPL-SCNC: 91 MMOL/L (ref 98–107)
CHLORIDE SERPL-SCNC: 92 MMOL/L (ref 98–107)
CHLORIDE SERPL-SCNC: 93 MMOL/L (ref 98–107)
CHLORIDE SERPL-SCNC: 97 MMOL/L (ref 98–107)
CO2 SERPL-SCNC: 17 MMOL/L (ref 22–29)
CO2 SERPL-SCNC: 19 MMOL/L (ref 22–29)
CO2 SERPL-SCNC: 21 MMOL/L (ref 22–29)
CO2 SERPL-SCNC: 22 MMOL/L (ref 22–29)
CO2 SERPL-SCNC: 23 MMOL/L (ref 22–29)
CO2 SERPL-SCNC: 25 MMOL/L (ref 22–29)
CO2 SERPL-SCNC: 25 MMOL/L (ref 22–29)
CO2 SERPL-SCNC: 28 MMOL/L (ref 22–29)
CO2 SERPL-SCNC: 28 MMOL/L (ref 22–29)
COHGB MFR BLD: 2 % (ref 0–5)
CRE SCREEN PCR: NOT DETECTED
CREAT BLD-MCNC: 4.72 MG/DL (ref 0.76–1.27)
CREAT BLD-MCNC: 5.78 MG/DL (ref 0.76–1.27)
CREAT BLD-MCNC: 6.56 MG/DL (ref 0.76–1.27)
CREAT BLD-MCNC: 7.08 MG/DL (ref 0.76–1.27)
CREAT BLD-MCNC: 7.77 MG/DL (ref 0.76–1.27)
CREAT BLD-MCNC: 8.14 MG/DL (ref 0.76–1.27)
CREAT BLD-MCNC: 8.48 MG/DL (ref 0.76–1.27)
CREAT BLD-MCNC: 8.67 MG/DL (ref 0.76–1.27)
CREAT BLD-MCNC: 9.46 MG/DL (ref 0.76–1.27)
DEPRECATED RDW RBC AUTO: 49.7 FL (ref 37–54)
DEPRECATED RDW RBC AUTO: 49.9 FL (ref 37–54)
DEPRECATED RDW RBC AUTO: 51.1 FL (ref 37–54)
DEPRECATED RDW RBC AUTO: 51.8 FL (ref 37–54)
DEPRECATED RDW RBC AUTO: 54 FL (ref 37–54)
DEPRECATED RDW RBC AUTO: 54.4 FL (ref 37–54)
DEPRECATED RDW RBC AUTO: 54.4 FL (ref 37–54)
DEPRECATED RDW RBC AUTO: 57.1 FL (ref 37–54)
EOSINOPHIL # BLD AUTO: 0 10*3/MM3 (ref 0–0.4)
EOSINOPHIL # BLD AUTO: 0.03 10*3/MM3 (ref 0–0.4)
EOSINOPHIL NFR BLD AUTO: 0 % (ref 0.3–6.2)
EOSINOPHIL NFR BLD AUTO: 0.3 % (ref 0.3–6.2)
EOSINOPHIL NFR BLD AUTO: 0.4 % (ref 0.3–6.2)
EOSINOPHIL NFR BLD AUTO: 0.4 % (ref 0.3–6.2)
ERYTHROCYTE [DISTWIDTH] IN BLOOD BY AUTOMATED COUNT: 16.6 % (ref 12.3–15.4)
ERYTHROCYTE [DISTWIDTH] IN BLOOD BY AUTOMATED COUNT: 16.7 % (ref 12.3–15.4)
ERYTHROCYTE [DISTWIDTH] IN BLOOD BY AUTOMATED COUNT: 16.8 % (ref 12.3–15.4)
ERYTHROCYTE [DISTWIDTH] IN BLOOD BY AUTOMATED COUNT: 16.9 % (ref 12.3–15.4)
ERYTHROCYTE [DISTWIDTH] IN BLOOD BY AUTOMATED COUNT: 17.4 % (ref 12.3–15.4)
ERYTHROCYTE [DISTWIDTH] IN BLOOD BY AUTOMATED COUNT: 17.6 % (ref 12.3–15.4)
ERYTHROCYTE [DISTWIDTH] IN BLOOD BY AUTOMATED COUNT: 17.8 % (ref 12.3–15.4)
ERYTHROCYTE [DISTWIDTH] IN BLOOD BY AUTOMATED COUNT: 18.3 % (ref 12.3–15.4)
FLUAV AG NPH QL: NEGATIVE
FLUAV H1 2009 PAND RNA NPH QL NAA+PROBE: NOT DETECTED
FLUAV H1 HA GENE NPH QL NAA+PROBE: NOT DETECTED
FLUAV H3 RNA NPH QL NAA+PROBE: NOT DETECTED
FLUAV SUBTYP SPEC NAA+PROBE: NOT DETECTED
FLUBV AG NPH QL IA: NEGATIVE
FLUBV RNA ISLT QL NAA+PROBE: NOT DETECTED
GFR SERPL CREATININE-BSD FRML MDRD: 10 ML/MIN/1.73
GFR SERPL CREATININE-BSD FRML MDRD: 12 ML/MIN/1.73
GFR SERPL CREATININE-BSD FRML MDRD: 15 ML/MIN/1.73
GFR SERPL CREATININE-BSD FRML MDRD: 7 ML/MIN/1.73
GFR SERPL CREATININE-BSD FRML MDRD: 7 ML/MIN/1.73
GFR SERPL CREATININE-BSD FRML MDRD: 8 ML/MIN/1.73
GFR SERPL CREATININE-BSD FRML MDRD: 9 ML/MIN/1.73
GFR SERPL CREATININE-BSD FRML MDRD: ABNORMAL ML/MIN/{1.73_M2}
GLOBULIN UR ELPH-MCNC: 6.3 GM/DL
GLOBULIN UR ELPH-MCNC: 6.6 GM/DL
GLOBULIN UR ELPH-MCNC: 6.7 GM/DL
GLOBULIN UR ELPH-MCNC: 6.8 GM/DL
GLOBULIN UR ELPH-MCNC: 6.9 GM/DL
GLOBULIN UR ELPH-MCNC: 7 GM/DL
GLOBULIN UR ELPH-MCNC: 7.1 GM/DL
GLUCOSE BLD-MCNC: 113 MG/DL (ref 65–99)
GLUCOSE BLD-MCNC: 126 MG/DL (ref 65–99)
GLUCOSE BLD-MCNC: 129 MG/DL (ref 65–99)
GLUCOSE BLD-MCNC: 158 MG/DL (ref 65–99)
GLUCOSE BLD-MCNC: 177 MG/DL (ref 65–99)
GLUCOSE BLD-MCNC: 218 MG/DL (ref 65–99)
GLUCOSE BLD-MCNC: 248 MG/DL (ref 65–99)
GLUCOSE BLD-MCNC: 250 MG/DL (ref 65–99)
GLUCOSE BLD-MCNC: 421 MG/DL (ref 65–99)
GLUCOSE BLDA-MCNC: 126 MMOL/L (ref 65–95)
GLUCOSE BLDC GLUCOMTR-MCNC: 107 MG/DL (ref 70–130)
GLUCOSE BLDC GLUCOMTR-MCNC: 114 MG/DL (ref 70–130)
GLUCOSE BLDC GLUCOMTR-MCNC: 120 MG/DL (ref 70–130)
GLUCOSE BLDC GLUCOMTR-MCNC: 132 MG/DL (ref 70–130)
GLUCOSE BLDC GLUCOMTR-MCNC: 134 MG/DL (ref 70–130)
GLUCOSE BLDC GLUCOMTR-MCNC: 139 MG/DL (ref 70–130)
GLUCOSE BLDC GLUCOMTR-MCNC: 143 MG/DL (ref 70–130)
GLUCOSE BLDC GLUCOMTR-MCNC: 154 MG/DL (ref 70–130)
GLUCOSE BLDC GLUCOMTR-MCNC: 155 MG/DL (ref 70–130)
GLUCOSE BLDC GLUCOMTR-MCNC: 157 MG/DL (ref 70–130)
GLUCOSE BLDC GLUCOMTR-MCNC: 234 MG/DL (ref 70–130)
GLUCOSE BLDC GLUCOMTR-MCNC: 240 MG/DL (ref 70–130)
GLUCOSE BLDC GLUCOMTR-MCNC: 243 MG/DL (ref 70–130)
GLUCOSE BLDC GLUCOMTR-MCNC: 256 MG/DL (ref 70–130)
GLUCOSE BLDC GLUCOMTR-MCNC: 274 MG/DL (ref 70–130)
GLUCOSE BLDC GLUCOMTR-MCNC: 276 MG/DL (ref 70–130)
GLUCOSE BLDC GLUCOMTR-MCNC: 279 MG/DL (ref 70–130)
GLUCOSE BLDC GLUCOMTR-MCNC: 287 MG/DL (ref 70–130)
GLUCOSE BLDC GLUCOMTR-MCNC: 316 MG/DL (ref 70–130)
GLUCOSE BLDC GLUCOMTR-MCNC: 322 MG/DL (ref 70–130)
GLUCOSE BLDC GLUCOMTR-MCNC: 332 MG/DL (ref 70–130)
GLUCOSE BLDC GLUCOMTR-MCNC: 344 MG/DL (ref 70–130)
GLUCOSE BLDC GLUCOMTR-MCNC: 393 MG/DL (ref 70–130)
GLUCOSE BLDC GLUCOMTR-MCNC: 416 MG/DL (ref 70–130)
GLUCOSE BLDC GLUCOMTR-MCNC: 445 MG/DL (ref 70–130)
GLUCOSE BLDC GLUCOMTR-MCNC: 66 MG/DL (ref 70–130)
GLUCOSE BLDC GLUCOMTR-MCNC: 79 MG/DL (ref 70–130)
GRAM STN SPEC: ABNORMAL
GRAM STN SPEC: NORMAL
HADV DNA SPEC NAA+PROBE: NOT DETECTED
HBV SURFACE AG SERPL QL IA: NORMAL
HCO3 BLDA-SCNC: 18.8 MMOL/L (ref 20–26)
HCO3 BLDA-SCNC: 21.1 MMOL/L (ref 20–26)
HCO3 BLDA-SCNC: 23 MMOL/L (ref 20–26)
HCO3 BLDA-SCNC: 23.1 MMOL/L (ref 20–26)
HCOV 229E RNA SPEC QL NAA+PROBE: NOT DETECTED
HCOV HKU1 RNA SPEC QL NAA+PROBE: NOT DETECTED
HCOV NL63 RNA SPEC QL NAA+PROBE: NOT DETECTED
HCOV OC43 RNA SPEC QL NAA+PROBE: NOT DETECTED
HCT VFR BLD AUTO: 22.1 % (ref 37.5–51)
HCT VFR BLD AUTO: 23.3 % (ref 37.5–51)
HCT VFR BLD AUTO: 23.4 % (ref 37.5–51)
HCT VFR BLD AUTO: 23.5 % (ref 37.5–51)
HCT VFR BLD AUTO: 23.8 % (ref 37.5–51)
HCT VFR BLD AUTO: 24 % (ref 37.5–51)
HCT VFR BLD AUTO: 24.7 % (ref 37.5–51)
HCT VFR BLD AUTO: 25.1 % (ref 37.5–51)
HCT VFR BLD AUTO: 25.5 % (ref 37.5–51)
HCT VFR BLD CALC: 24.5 % (ref 38–51)
HGB BLD-MCNC: 7.3 G/DL (ref 13–17.7)
HGB BLD-MCNC: 7.3 G/DL (ref 13–17.7)
HGB BLD-MCNC: 7.5 G/DL (ref 13–17.7)
HGB BLD-MCNC: 7.6 G/DL (ref 13–17.7)
HGB BLD-MCNC: 7.6 G/DL (ref 13–17.7)
HGB BLD-MCNC: 7.9 G/DL (ref 13–17.7)
HGB BLD-MCNC: 7.9 G/DL (ref 13–17.7)
HGB BLD-MCNC: 8 G/DL (ref 13–17.7)
HGB BLD-MCNC: 8.1 G/DL (ref 13–17.7)
HGB BLDA-MCNC: 8 G/DL (ref 14–18)
HMPV RNA NPH QL NAA+NON-PROBE: NOT DETECTED
HOLD SPECIMEN: NORMAL
HOROWITZ INDEX BLD+IHG-RTO: 30 %
HPIV1 RNA SPEC QL NAA+PROBE: NOT DETECTED
HPIV2 RNA SPEC QL NAA+PROBE: NOT DETECTED
HPIV3 RNA NPH QL NAA+PROBE: NOT DETECTED
HPIV4 P GENE NPH QL NAA+PROBE: NOT DETECTED
HYPOCHROMIA BLD QL: ABNORMAL
IMM GRANULOCYTES # BLD AUTO: 0.03 10*3/MM3 (ref 0–0.05)
IMM GRANULOCYTES # BLD AUTO: 0.06 10*3/MM3 (ref 0–0.05)
IMM GRANULOCYTES # BLD AUTO: 0.08 10*3/MM3 (ref 0–0.05)
IMM GRANULOCYTES # BLD AUTO: 0.11 10*3/MM3 (ref 0–0.05)
IMM GRANULOCYTES # BLD AUTO: 0.13 10*3/MM3 (ref 0–0.05)
IMM GRANULOCYTES # BLD AUTO: 0.18 10*3/MM3 (ref 0–0.05)
IMM GRANULOCYTES # BLD AUTO: 0.34 10*3/MM3 (ref 0–0.05)
IMM GRANULOCYTES NFR BLD AUTO: 0.6 % (ref 0–0.5)
IMM GRANULOCYTES NFR BLD AUTO: 0.7 % (ref 0–0.5)
IMM GRANULOCYTES NFR BLD AUTO: 0.9 % (ref 0–0.5)
IMM GRANULOCYTES NFR BLD AUTO: 1 % (ref 0–0.5)
IMM GRANULOCYTES NFR BLD AUTO: 1.8 % (ref 0–0.5)
IMM GRANULOCYTES NFR BLD AUTO: 1.8 % (ref 0–0.5)
IMM GRANULOCYTES NFR BLD AUTO: 3.7 % (ref 0–0.5)
IMP STRAIN: NOT DETECTED
INR PPP: 1.4 (ref 0.8–1.2)
KPC STRAIN: NOT DETECTED
LAB AP CASE REPORT: NORMAL
LAB AP DIAGNOSIS COMMENT: NORMAL
LAB AP INTRADEPARTMENTAL CONSULT: NORMAL
LYMPHOCYTES # BLD AUTO: 0.57 10*3/MM3 (ref 0.7–3.1)
LYMPHOCYTES # BLD AUTO: 0.71 10*3/MM3 (ref 0.7–3.1)
LYMPHOCYTES # BLD AUTO: 0.77 10*3/MM3 (ref 0.7–3.1)
LYMPHOCYTES # BLD AUTO: 0.8 10*3/MM3 (ref 0.7–3.1)
LYMPHOCYTES # BLD AUTO: 1.08 10*3/MM3 (ref 0.7–3.1)
LYMPHOCYTES # BLD AUTO: 1.09 10*3/MM3 (ref 0.7–3.1)
LYMPHOCYTES # BLD AUTO: 1.43 10*3/MM3 (ref 0.7–3.1)
LYMPHOCYTES # BLD MANUAL: 0.24 10*3/MM3 (ref 0.7–3.1)
LYMPHOCYTES # BLD MANUAL: 0.86 10*3/MM3 (ref 0.7–3.1)
LYMPHOCYTES NFR BLD AUTO: 10.7 % (ref 19.6–45.3)
LYMPHOCYTES NFR BLD AUTO: 10.8 % (ref 19.6–45.3)
LYMPHOCYTES NFR BLD AUTO: 14.7 % (ref 19.6–45.3)
LYMPHOCYTES NFR BLD AUTO: 15.4 % (ref 19.6–45.3)
LYMPHOCYTES NFR BLD AUTO: 6.5 % (ref 19.6–45.3)
LYMPHOCYTES NFR BLD AUTO: 8.1 % (ref 19.6–45.3)
LYMPHOCYTES NFR BLD AUTO: 9.8 % (ref 19.6–45.3)
LYMPHOCYTES NFR BLD MANUAL: 1 % (ref 5–12)
LYMPHOCYTES NFR BLD MANUAL: 4 % (ref 19.6–45.3)
LYMPHOCYTES NFR BLD MANUAL: 7 % (ref 19.6–45.3)
LYMPHOCYTES NFR BLD MANUAL: 9 % (ref 5–12)
Lab: ABNORMAL
Lab: NORMAL
M PNEUMO IGG SER IA-ACNC: NOT DETECTED
MCH RBC QN AUTO: 26.6 PG (ref 26.6–33)
MCH RBC QN AUTO: 27.1 PG (ref 26.6–33)
MCH RBC QN AUTO: 27.1 PG (ref 26.6–33)
MCH RBC QN AUTO: 27.2 PG (ref 26.6–33)
MCH RBC QN AUTO: 27.2 PG (ref 26.6–33)
MCH RBC QN AUTO: 27.3 PG (ref 26.6–33)
MCH RBC QN AUTO: 27.7 PG (ref 26.6–33)
MCH RBC QN AUTO: 27.7 PG (ref 26.6–33)
MCHC RBC AUTO-ENTMCNC: 31.2 G/DL (ref 31.5–35.7)
MCHC RBC AUTO-ENTMCNC: 31.8 G/DL (ref 31.5–35.7)
MCHC RBC AUTO-ENTMCNC: 31.9 G/DL (ref 31.5–35.7)
MCHC RBC AUTO-ENTMCNC: 31.9 G/DL (ref 31.5–35.7)
MCHC RBC AUTO-ENTMCNC: 32 G/DL (ref 31.5–35.7)
MCHC RBC AUTO-ENTMCNC: 32.6 G/DL (ref 31.5–35.7)
MCHC RBC AUTO-ENTMCNC: 32.9 G/DL (ref 31.5–35.7)
MCHC RBC AUTO-ENTMCNC: 33 G/DL (ref 31.5–35.7)
MCV RBC AUTO: 82.5 FL (ref 79–97)
MCV RBC AUTO: 83.5 FL (ref 79–97)
MCV RBC AUTO: 83.7 FL (ref 79–97)
MCV RBC AUTO: 85 FL (ref 79–97)
MCV RBC AUTO: 85.4 FL (ref 79–97)
MCV RBC AUTO: 85.4 FL (ref 79–97)
MCV RBC AUTO: 85.9 FL (ref 79–97)
MCV RBC AUTO: 86.7 FL (ref 79–97)
METAMYELOCYTES NFR BLD MANUAL: 1 % (ref 0–0)
METAMYELOCYTES NFR BLD MANUAL: 3 % (ref 0–0)
METHGB BLD QL: 1.1 % (ref 0–3)
MODALITY: ABNORMAL
MONOCYTES # BLD AUTO: 0.12 10*3/MM3 (ref 0.1–0.9)
MONOCYTES # BLD AUTO: 0.27 10*3/MM3 (ref 0.1–0.9)
MONOCYTES # BLD AUTO: 0.43 10*3/MM3 (ref 0.1–0.9)
MONOCYTES # BLD AUTO: 0.44 10*3/MM3 (ref 0.1–0.9)
MONOCYTES # BLD AUTO: 0.46 10*3/MM3 (ref 0.1–0.9)
MONOCYTES # BLD AUTO: 0.54 10*3/MM3 (ref 0.1–0.9)
MONOCYTES # BLD AUTO: 0.58 10*3/MM3 (ref 0.1–0.9)
MONOCYTES # BLD AUTO: 0.59 10*3/MM3 (ref 0.1–0.9)
MONOCYTES # BLD AUTO: 0.74 10*3/MM3 (ref 0.1–0.9)
MONOCYTES NFR BLD AUTO: 10.9 % (ref 5–12)
MONOCYTES NFR BLD AUTO: 2.2 % (ref 5–12)
MONOCYTES NFR BLD AUTO: 4.9 % (ref 5–12)
MONOCYTES NFR BLD AUTO: 5.6 % (ref 5–12)
MONOCYTES NFR BLD AUTO: 6.3 % (ref 5–12)
MONOCYTES NFR BLD AUTO: 6.4 % (ref 5–12)
MONOCYTES NFR BLD AUTO: 7.3 % (ref 5–12)
MRSA DNA SPEC QL NAA+PROBE: NEGATIVE
NDM STRAIN: NOT DETECTED
NEUTROPHILS # BLD AUTO: 10.91 10*3/MM3 (ref 1.7–7)
NEUTROPHILS # BLD AUTO: 11.04 10*3/MM3 (ref 1.7–7)
NEUTROPHILS # BLD AUTO: 4.14 10*3/MM3 (ref 1.7–7)
NEUTROPHILS # BLD AUTO: 5.13 10*3/MM3 (ref 1.7–7)
NEUTROPHILS # BLD AUTO: 5.64 10*3/MM3 (ref 1.7–7)
NEUTROPHILS # BLD AUTO: 6.56 10*3/MM3 (ref 1.7–7)
NEUTROPHILS # BLD AUTO: 6.9 10*3/MM3 (ref 1.7–7)
NEUTROPHILS # BLD AUTO: 7.53 10*3/MM3 (ref 1.7–7)
NEUTROPHILS # BLD AUTO: 8.04 10*3/MM3 (ref 1.7–7)
NEUTROPHILS NFR BLD AUTO: 74.5 % (ref 42.7–76)
NEUTROPHILS NFR BLD AUTO: 76.8 % (ref 42.7–76)
NEUTROPHILS NFR BLD AUTO: 77.4 % (ref 42.7–76)
NEUTROPHILS NFR BLD AUTO: 79.9 % (ref 42.7–76)
NEUTROPHILS NFR BLD AUTO: 83.1 % (ref 42.7–76)
NEUTROPHILS NFR BLD AUTO: 85.5 % (ref 42.7–76)
NEUTROPHILS NFR BLD AUTO: 90.1 % (ref 42.7–76)
NEUTROPHILS NFR BLD MANUAL: 82 % (ref 42.7–76)
NEUTROPHILS NFR BLD MANUAL: 86 % (ref 42.7–76)
NEUTS BAND NFR BLD MANUAL: 3 % (ref 0–5)
NEUTS BAND NFR BLD MANUAL: 4 % (ref 0–5)
NEUTS VAC BLD QL SMEAR: ABNORMAL
NOTE: ABNORMAL
NRBC BLD AUTO-RTO: 0 /100 WBC (ref 0–0.2)
NRBC BLD AUTO-RTO: 0.2 /100 WBC (ref 0–0.2)
NRBC BLD AUTO-RTO: 0.5 /100 WBC (ref 0–0.2)
NRBC BLD AUTO-RTO: 0.8 /100 WBC (ref 0–0.2)
NRBC BLD AUTO-RTO: 1.9 /100 WBC (ref 0–0.2)
OXA 48 STRAIN: NOT DETECTED
OXYHGB MFR BLDV: 97.5 % (ref 94–99)
PATH REPORT.FINAL DX SPEC: NORMAL
PATH REPORT.GROSS SPEC: NORMAL
PAW @ PEAK INSP FLOW SETTING VENT: 40 CMH2O
PCO2 BLDA: 27.5 MM HG (ref 35–45)
PCO2 BLDA: 36.6 MM HG (ref 35–45)
PCO2 BLDA: 37 MM HG (ref 35–45)
PCO2 BLDA: 46.8 MM HG (ref 35–45)
PCO2 TEMP ADJ BLD: ABNORMAL MM[HG]
PEEP RESPIRATORY: 5 CM[H2O]
PH BLDA: 7.3 PH UNITS (ref 7.35–7.45)
PH BLDA: 7.32 PH UNITS (ref 7.35–7.45)
PH BLDA: 7.36 PH UNITS (ref 7.35–7.45)
PH BLDA: 7.53 PH UNITS (ref 7.35–7.45)
PH, TEMP CORRECTED: ABNORMAL
PLATELET # BLD AUTO: 118 10*3/MM3 (ref 140–450)
PLATELET # BLD AUTO: 119 10*3/MM3 (ref 140–450)
PLATELET # BLD AUTO: 123 10*3/MM3 (ref 140–450)
PLATELET # BLD AUTO: 127 10*3/MM3 (ref 140–450)
PLATELET # BLD AUTO: 131 10*3/MM3 (ref 140–450)
PLATELET # BLD AUTO: 133 10*3/MM3 (ref 140–450)
PLATELET # BLD AUTO: 134 10*3/MM3 (ref 140–450)
PLATELET # BLD AUTO: 148 10*3/MM3 (ref 140–450)
PMV BLD AUTO: 8.5 FL (ref 6–12)
PMV BLD AUTO: 8.6 FL (ref 6–12)
PMV BLD AUTO: 8.9 FL (ref 6–12)
PMV BLD AUTO: 9.2 FL (ref 6–12)
PMV BLD AUTO: 9.3 FL (ref 6–12)
PMV BLD AUTO: 9.7 FL (ref 6–12)
PO2 BLDA: 115 MM HG (ref 83–108)
PO2 BLDA: 116 MM HG (ref 83–108)
PO2 BLDA: 510 MM HG (ref 83–108)
PO2 BLDA: 97.6 MM HG (ref 83–108)
PO2 TEMP ADJ BLD: ABNORMAL MM[HG]
POIKILOCYTOSIS BLD QL SMEAR: ABNORMAL
POTASSIUM BLD-SCNC: 2.9 MMOL/L (ref 3.5–5.2)
POTASSIUM BLD-SCNC: 3.1 MMOL/L (ref 3.5–5.2)
POTASSIUM BLD-SCNC: 3.8 MMOL/L (ref 3.5–5.2)
POTASSIUM BLD-SCNC: 4 MMOL/L (ref 3.5–5.2)
POTASSIUM BLD-SCNC: 4.8 MMOL/L (ref 3.5–5.2)
POTASSIUM BLD-SCNC: 4.8 MMOL/L (ref 3.5–5.2)
POTASSIUM BLD-SCNC: 4.9 MMOL/L (ref 3.5–5.2)
POTASSIUM BLD-SCNC: 4.9 MMOL/L (ref 3.5–5.2)
POTASSIUM BLD-SCNC: 5 MMOL/L (ref 3.5–5.2)
POTASSIUM BLDA-SCNC: 4.4 MMOL/L (ref 3.4–4.5)
PROT SERPL-MCNC: 10 G/DL (ref 6–8.5)
PROT SERPL-MCNC: 9 G/DL (ref 6–8.5)
PROT SERPL-MCNC: 9 G/DL (ref 6–8.5)
PROT SERPL-MCNC: 9.2 G/DL (ref 6–8.5)
PROT SERPL-MCNC: 9.3 G/DL (ref 6–8.5)
PROT SERPL-MCNC: 9.4 G/DL (ref 6–8.5)
PROT SERPL-MCNC: 9.4 G/DL (ref 6–8.5)
PROT SERPL-MCNC: 9.5 G/DL (ref 6–8.5)
PROT SERPL-MCNC: 9.6 G/DL (ref 6–8.5)
PROTHROMBIN TIME: 16.9 SECONDS (ref 11.1–15.3)
PSV: 8 CMH2O
RBC # BLD AUTO: 2.64 10*6/MM3 (ref 4.14–5.8)
RBC # BLD AUTO: 2.74 10*6/MM3 (ref 4.14–5.8)
RBC # BLD AUTO: 2.79 10*6/MM3 (ref 4.14–5.8)
RBC # BLD AUTO: 2.8 10*6/MM3 (ref 4.14–5.8)
RBC # BLD AUTO: 2.85 10*6/MM3 (ref 4.14–5.8)
RBC # BLD AUTO: 2.91 10*6/MM3 (ref 4.14–5.8)
RBC # BLD AUTO: 2.94 10*6/MM3 (ref 4.14–5.8)
RBC # BLD AUTO: 2.97 10*6/MM3 (ref 4.14–5.8)
RBC MORPH BLD: NORMAL
RH BLD: POSITIVE
RHINOVIRUS RNA SPEC NAA+PROBE: NOT DETECTED
RSV RNA NPH QL NAA+NON-PROBE: DETECTED
SAO2 % BLDCOA: 95.5 % (ref 94–99)
SAO2 % BLDCOA: 98.5 % (ref 94–99)
SAO2 % BLDCOA: 99.4 % (ref 94–99)
SAO2 % BLDCOA: >100 % (ref 94–99)
SET MECH RESP RATE: 14
SET MECH RESP RATE: 14
SMALL PLATELETS BLD QL SMEAR: ABNORMAL
SMALL PLATELETS BLD QL SMEAR: ADEQUATE
SODIUM BLD-SCNC: 133 MMOL/L (ref 136–145)
SODIUM BLD-SCNC: 134 MMOL/L (ref 136–145)
SODIUM BLD-SCNC: 134 MMOL/L (ref 136–145)
SODIUM BLD-SCNC: 135 MMOL/L (ref 136–145)
SODIUM BLD-SCNC: 135 MMOL/L (ref 136–145)
SODIUM BLD-SCNC: 136 MMOL/L (ref 136–145)
SODIUM BLD-SCNC: 137 MMOL/L (ref 136–145)
SODIUM BLD-SCNC: 137 MMOL/L (ref 136–145)
SODIUM BLD-SCNC: 140 MMOL/L (ref 136–145)
SODIUM BLDA-SCNC: 141 MMOL/L (ref 136–146)
T&S EXPIRATION DATE: NORMAL
VENTILATOR MODE: ABNORMAL
VENTILATOR MODE: ABNORMAL
VENTILATOR MODE: AC
VENTILATOR MODE: AC
VIM STRAIN: NOT DETECTED
VT ON VENT VENT: 500 ML
VT ON VENT VENT: 600 ML
WBC MORPH BLD: NORMAL
WBC NRBC COR # BLD: 10.07 10*3/MM3 (ref 3.4–10.8)
WBC NRBC COR # BLD: 12.26 10*3/MM3 (ref 3.4–10.8)
WBC NRBC COR # BLD: 5.34 10*3/MM3 (ref 3.4–10.8)
WBC NRBC COR # BLD: 5.97 10*3/MM3 (ref 3.4–10.8)
WBC NRBC COR # BLD: 7.34 10*3/MM3 (ref 3.4–10.8)
WBC NRBC COR # BLD: 7.89 10*3/MM3 (ref 3.4–10.8)
WBC NRBC COR # BLD: 8.8 10*3/MM3 (ref 3.4–10.8)
WBC NRBC COR # BLD: 9.26 10*3/MM3 (ref 3.4–10.8)
WHOLE BLOOD HOLD SPECIMEN: NORMAL

## 2020-01-01 PROCEDURE — 99232 SBSQ HOSP IP/OBS MODERATE 35: CPT | Performed by: STUDENT IN AN ORGANIZED HEALTH CARE EDUCATION/TRAINING PROGRAM

## 2020-01-01 PROCEDURE — 94799 UNLISTED PULMONARY SVC/PX: CPT

## 2020-01-01 PROCEDURE — 25010000002 FENTANYL CITRATE (PF) 100 MCG/2ML SOLUTION: Performed by: INTERNAL MEDICINE

## 2020-01-01 PROCEDURE — 25010000002 METHYLPREDNISOLONE PER 40 MG: Performed by: INTERNAL MEDICINE

## 2020-01-01 PROCEDURE — 25010000002 MORPHINE PER 10 MG: Performed by: STUDENT IN AN ORGANIZED HEALTH CARE EDUCATION/TRAINING PROGRAM

## 2020-01-01 PROCEDURE — 82962 GLUCOSE BLOOD TEST: CPT

## 2020-01-01 PROCEDURE — 99232 SBSQ HOSP IP/OBS MODERATE 35: CPT | Performed by: INTERNAL MEDICINE

## 2020-01-01 PROCEDURE — G0378 HOSPITAL OBSERVATION PER HR: HCPCS

## 2020-01-01 PROCEDURE — 63710000001 INSULIN ASPART PER 5 UNITS: Performed by: INTERNAL MEDICINE

## 2020-01-01 PROCEDURE — C1751 CATH, INF, PER/CENT/MIDLINE: HCPCS | Performed by: NURSE ANESTHETIST, CERTIFIED REGISTERED

## 2020-01-01 PROCEDURE — 99233 SBSQ HOSP IP/OBS HIGH 50: CPT | Performed by: INTERNAL MEDICINE

## 2020-01-01 PROCEDURE — 94002 VENT MGMT INPAT INIT DAY: CPT

## 2020-01-01 PROCEDURE — 63710000001 INSULIN DETEMIR PER 5 UNITS: Performed by: STUDENT IN AN ORGANIZED HEALTH CARE EDUCATION/TRAINING PROGRAM

## 2020-01-01 PROCEDURE — 25010000002 MIDAZOLAM 50 MG/10ML SOLUTION 10 ML VIAL: Performed by: STUDENT IN AN ORGANIZED HEALTH CARE EDUCATION/TRAINING PROGRAM

## 2020-01-01 PROCEDURE — 86850 RBC ANTIBODY SCREEN: CPT | Performed by: FAMILY MEDICINE

## 2020-01-01 PROCEDURE — 82805 BLOOD GASES W/O2 SATURATION: CPT

## 2020-01-01 PROCEDURE — 85007 BL SMEAR W/DIFF WBC COUNT: CPT | Performed by: INTERNAL MEDICINE

## 2020-01-01 PROCEDURE — 99214 OFFICE O/P EST MOD 30 MIN: CPT | Performed by: INTERNAL MEDICINE

## 2020-01-01 PROCEDURE — 94003 VENT MGMT INPAT SUBQ DAY: CPT

## 2020-01-01 PROCEDURE — 5A1955Z RESPIRATORY VENTILATION, GREATER THAN 96 CONSECUTIVE HOURS: ICD-10-PCS | Performed by: INTERNAL MEDICINE

## 2020-01-01 PROCEDURE — 25010000002 EPOETIN ALFA PER 1000 UNITS: Performed by: INTERNAL MEDICINE

## 2020-01-01 PROCEDURE — 80053 COMPREHEN METABOLIC PANEL: CPT | Performed by: INTERNAL MEDICINE

## 2020-01-01 PROCEDURE — 0BH17EZ INSERTION OF ENDOTRACHEAL AIRWAY INTO TRACHEA, VIA NATURAL OR ARTIFICIAL OPENING: ICD-10-PCS | Performed by: INTERNAL MEDICINE

## 2020-01-01 PROCEDURE — 5A1D70Z PERFORMANCE OF URINARY FILTRATION, INTERMITTENT, LESS THAN 6 HOURS PER DAY: ICD-10-PCS | Performed by: INTERNAL MEDICINE

## 2020-01-01 PROCEDURE — 82140 ASSAY OF AMMONIA: CPT | Performed by: FAMILY MEDICINE

## 2020-01-01 PROCEDURE — 85025 COMPLETE CBC W/AUTO DIFF WBC: CPT | Performed by: INTERNAL MEDICINE

## 2020-01-01 PROCEDURE — 87804 INFLUENZA ASSAY W/OPTIC: CPT | Performed by: STUDENT IN AN ORGANIZED HEALTH CARE EDUCATION/TRAINING PROGRAM

## 2020-01-01 PROCEDURE — 25010000002 PROPOFOL 10 MG/ML EMULSION: Performed by: NURSE ANESTHETIST, CERTIFIED REGISTERED

## 2020-01-01 PROCEDURE — 99291 CRITICAL CARE FIRST HOUR: CPT | Performed by: INTERNAL MEDICINE

## 2020-01-01 PROCEDURE — 25010000002 CEFTRIAXONE PER 250 MG: Performed by: STUDENT IN AN ORGANIZED HEALTH CARE EDUCATION/TRAINING PROGRAM

## 2020-01-01 PROCEDURE — 87077 CULTURE AEROBIC IDENTIFY: CPT | Performed by: INTERNAL MEDICINE

## 2020-01-01 PROCEDURE — 25010000002 MIDAZOLAM PER 1 MG: Performed by: INTERNAL MEDICINE

## 2020-01-01 PROCEDURE — 99231 SBSQ HOSP IP/OBS SF/LOW 25: CPT | Performed by: INTERNAL MEDICINE

## 2020-01-01 PROCEDURE — 87070 CULTURE OTHR SPECIMN AEROBIC: CPT | Performed by: STUDENT IN AN ORGANIZED HEALTH CARE EDUCATION/TRAINING PROGRAM

## 2020-01-01 PROCEDURE — 88305 TISSUE EXAM BY PATHOLOGIST: CPT | Performed by: PATHOLOGY

## 2020-01-01 PROCEDURE — 95816 EEG AWAKE AND DROWSY: CPT

## 2020-01-01 PROCEDURE — 87340 HEPATITIS B SURFACE AG IA: CPT | Performed by: INTERNAL MEDICINE

## 2020-01-01 PROCEDURE — 71045 X-RAY EXAM CHEST 1 VIEW: CPT

## 2020-01-01 PROCEDURE — 5A12012 PERFORMANCE OF CARDIAC OUTPUT, SINGLE, MANUAL: ICD-10-PCS | Performed by: INTERNAL MEDICINE

## 2020-01-01 PROCEDURE — 63710000001 PREDNISONE PER 1 MG: Performed by: INTERNAL MEDICINE

## 2020-01-01 PROCEDURE — 80053 COMPREHEN METABOLIC PANEL: CPT | Performed by: FAMILY MEDICINE

## 2020-01-01 PROCEDURE — 25010000002 LORAZEPAM PER 2 MG: Performed by: STUDENT IN AN ORGANIZED HEALTH CARE EDUCATION/TRAINING PROGRAM

## 2020-01-01 PROCEDURE — 25010000002 HEPARIN (PORCINE) PER 1000 UNITS: Performed by: STUDENT IN AN ORGANIZED HEALTH CARE EDUCATION/TRAINING PROGRAM

## 2020-01-01 PROCEDURE — 97162 PT EVAL MOD COMPLEX 30 MIN: CPT

## 2020-01-01 PROCEDURE — 99284 EMERGENCY DEPT VISIT MOD MDM: CPT

## 2020-01-01 PROCEDURE — 99233 SBSQ HOSP IP/OBS HIGH 50: CPT | Performed by: STUDENT IN AN ORGANIZED HEALTH CARE EDUCATION/TRAINING PROGRAM

## 2020-01-01 PROCEDURE — 94640 AIRWAY INHALATION TREATMENT: CPT

## 2020-01-01 PROCEDURE — 85025 COMPLETE CBC W/AUTO DIFF WBC: CPT | Performed by: FAMILY MEDICINE

## 2020-01-01 PROCEDURE — 86900 BLOOD TYPING SEROLOGIC ABO: CPT | Performed by: FAMILY MEDICINE

## 2020-01-01 PROCEDURE — 0099U HC BIOFIRE FILMARRAY RESP PANEL 1: CPT | Performed by: STUDENT IN AN ORGANIZED HEALTH CARE EDUCATION/TRAINING PROGRAM

## 2020-01-01 PROCEDURE — 63710000001 INSULIN ASPART PER 5 UNITS: Performed by: STUDENT IN AN ORGANIZED HEALTH CARE EDUCATION/TRAINING PROGRAM

## 2020-01-01 PROCEDURE — 87641 MR-STAPH DNA AMP PROBE: CPT | Performed by: INTERNAL MEDICINE

## 2020-01-01 PROCEDURE — 25010000002 PIPERACILLIN SOD-TAZOBACTAM PER 1 G: Performed by: INTERNAL MEDICINE

## 2020-01-01 PROCEDURE — 94760 N-INVAS EAR/PLS OXIMETRY 1: CPT

## 2020-01-01 PROCEDURE — 87070 CULTURE OTHR SPECIMN AEROBIC: CPT | Performed by: INTERNAL MEDICINE

## 2020-01-01 PROCEDURE — 82140 ASSAY OF AMMONIA: CPT | Performed by: STUDENT IN AN ORGANIZED HEALTH CARE EDUCATION/TRAINING PROGRAM

## 2020-01-01 PROCEDURE — 43239 EGD BIOPSY SINGLE/MULTIPLE: CPT | Performed by: INTERNAL MEDICINE

## 2020-01-01 PROCEDURE — 92950 HEART/LUNG RESUSCITATION CPR: CPT

## 2020-01-01 PROCEDURE — 87205 SMEAR GRAM STAIN: CPT | Performed by: INTERNAL MEDICINE

## 2020-01-01 PROCEDURE — 0DB98ZX EXCISION OF DUODENUM, VIA NATURAL OR ARTIFICIAL OPENING ENDOSCOPIC, DIAGNOSTIC: ICD-10-PCS | Performed by: INTERNAL MEDICINE

## 2020-01-01 PROCEDURE — 82140 ASSAY OF AMMONIA: CPT | Performed by: INTERNAL MEDICINE

## 2020-01-01 PROCEDURE — 74018 RADEX ABDOMEN 1 VIEW: CPT

## 2020-01-01 PROCEDURE — 87205 SMEAR GRAM STAIN: CPT | Performed by: STUDENT IN AN ORGANIZED HEALTH CARE EDUCATION/TRAINING PROGRAM

## 2020-01-01 PROCEDURE — 99220 PR INITIAL OBSERVATION CARE/DAY 70 MINUTES: CPT | Performed by: STUDENT IN AN ORGANIZED HEALTH CARE EDUCATION/TRAINING PROGRAM

## 2020-01-01 PROCEDURE — 87185 SC STD ENZYME DETCJ PER NZM: CPT | Performed by: INTERNAL MEDICINE

## 2020-01-01 PROCEDURE — 70450 CT HEAD/BRAIN W/O DYE: CPT

## 2020-01-01 PROCEDURE — 85018 HEMOGLOBIN: CPT | Performed by: INTERNAL MEDICINE

## 2020-01-01 PROCEDURE — 83050 HGB METHEMOGLOBIN QUAN: CPT

## 2020-01-01 PROCEDURE — 82803 BLOOD GASES ANY COMBINATION: CPT

## 2020-01-01 PROCEDURE — 63710000001 INSULIN DETEMIR PER 5 UNITS: Performed by: INTERNAL MEDICINE

## 2020-01-01 PROCEDURE — 86901 BLOOD TYPING SEROLOGIC RH(D): CPT | Performed by: FAMILY MEDICINE

## 2020-01-01 PROCEDURE — 87040 BLOOD CULTURE FOR BACTERIA: CPT | Performed by: STUDENT IN AN ORGANIZED HEALTH CARE EDUCATION/TRAINING PROGRAM

## 2020-01-01 PROCEDURE — 85014 HEMATOCRIT: CPT | Performed by: INTERNAL MEDICINE

## 2020-01-01 PROCEDURE — 25010000002 VANCOMYCIN 5 G RECONSTITUTED SOLUTION: Performed by: INTERNAL MEDICINE

## 2020-01-01 PROCEDURE — 87081 CULTURE SCREEN ONLY: CPT | Performed by: STUDENT IN AN ORGANIZED HEALTH CARE EDUCATION/TRAINING PROGRAM

## 2020-01-01 PROCEDURE — 85610 PROTHROMBIN TIME: CPT | Performed by: FAMILY MEDICINE

## 2020-01-01 PROCEDURE — 85007 BL SMEAR W/DIFF WBC COUNT: CPT | Performed by: FAMILY MEDICINE

## 2020-01-01 PROCEDURE — 88305 TISSUE EXAM BY PATHOLOGIST: CPT | Performed by: INTERNAL MEDICINE

## 2020-01-01 PROCEDURE — 25010000002 PIPERACILLIN-TAZOBACTAM: Performed by: INTERNAL MEDICINE

## 2020-01-01 PROCEDURE — 82375 ASSAY CARBOXYHB QUANT: CPT

## 2020-01-01 RX ORDER — BUDESONIDE AND FORMOTEROL FUMARATE DIHYDRATE 160; 4.5 UG/1; UG/1
2 AEROSOL RESPIRATORY (INHALATION)
Status: DISCONTINUED | OUTPATIENT
Start: 2020-01-01 | End: 2020-01-01

## 2020-01-01 RX ORDER — ONDANSETRON 2 MG/ML
4 INJECTION INTRAMUSCULAR; INTRAVENOUS EVERY 6 HOURS PRN
Status: DISCONTINUED | OUTPATIENT
Start: 2020-01-01 | End: 2020-01-01

## 2020-01-01 RX ORDER — ESCITALOPRAM OXALATE 10 MG/1
10 TABLET ORAL DAILY
Status: DISCONTINUED | OUTPATIENT
Start: 2020-01-01 | End: 2020-01-01

## 2020-01-01 RX ORDER — ALBUMIN (HUMAN) 12.5 G/50ML
25 SOLUTION INTRAVENOUS AS NEEDED
Status: ACTIVE | OUTPATIENT
Start: 2020-01-01 | End: 2020-01-01

## 2020-01-01 RX ORDER — FENTANYL CITRATE 50 UG/ML
25 INJECTION, SOLUTION INTRAMUSCULAR; INTRAVENOUS
Status: DISCONTINUED | OUTPATIENT
Start: 2020-01-01 | End: 2020-01-01

## 2020-01-01 RX ORDER — LORAZEPAM 2 MG/ML
2 CONCENTRATE ORAL
Status: DISCONTINUED | OUTPATIENT
Start: 2020-01-01 | End: 2020-01-01

## 2020-01-01 RX ORDER — LIDOCAINE HYDROCHLORIDE 20 MG/ML
INJECTION, SOLUTION EPIDURAL; INFILTRATION; INTRACAUDAL; PERINEURAL AS NEEDED
Status: DISCONTINUED | OUTPATIENT
Start: 2020-01-01 | End: 2020-01-01 | Stop reason: SURG

## 2020-01-01 RX ORDER — ALBUTEROL SULFATE 2.5 MG/3ML
2.5 SOLUTION RESPIRATORY (INHALATION) EVERY 6 HOURS PRN
Status: DISCONTINUED | OUTPATIENT
Start: 2020-01-01 | End: 2020-01-01

## 2020-01-01 RX ORDER — ACETAMINOPHEN 650 MG/1
650 SUPPOSITORY RECTAL EVERY 4 HOURS PRN
Status: DISCONTINUED | OUTPATIENT
Start: 2020-01-01 | End: 2020-01-01

## 2020-01-01 RX ORDER — METHYLPREDNISOLONE SODIUM SUCCINATE 40 MG/ML
40 INJECTION, POWDER, LYOPHILIZED, FOR SOLUTION INTRAMUSCULAR; INTRAVENOUS EVERY 12 HOURS
Status: DISCONTINUED | OUTPATIENT
Start: 2020-01-01 | End: 2020-01-01

## 2020-01-01 RX ORDER — SODIUM CHLORIDE 0.9 % (FLUSH) 0.9 %
10 SYRINGE (ML) INJECTION EVERY 12 HOURS SCHEDULED
Status: DISCONTINUED | OUTPATIENT
Start: 2020-01-01 | End: 2020-01-01

## 2020-01-01 RX ORDER — MIDAZOLAM HYDROCHLORIDE 1 MG/ML
1 INJECTION INTRAMUSCULAR; INTRAVENOUS
Status: DISCONTINUED | OUTPATIENT
Start: 2020-01-01 | End: 2020-01-01

## 2020-01-01 RX ORDER — SODIUM CHLORIDE 0.9 % (FLUSH) 0.9 %
10 SYRINGE (ML) INJECTION AS NEEDED
Status: DISCONTINUED | OUTPATIENT
Start: 2020-01-01 | End: 2020-01-01

## 2020-01-01 RX ORDER — PANTOPRAZOLE SODIUM 40 MG/10ML
40 INJECTION, POWDER, LYOPHILIZED, FOR SOLUTION INTRAVENOUS
Status: DISCONTINUED | OUTPATIENT
Start: 2020-01-01 | End: 2020-01-01

## 2020-01-01 RX ORDER — LORAZEPAM 2 MG/ML
2 INJECTION INTRAMUSCULAR
Status: DISCONTINUED | OUTPATIENT
Start: 2020-01-01 | End: 2020-01-01 | Stop reason: HOSPADM

## 2020-01-01 RX ORDER — LACTULOSE 10 G/15ML
30 SOLUTION ORAL 3 TIMES DAILY
Status: DISCONTINUED | OUTPATIENT
Start: 2020-01-01 | End: 2020-01-01 | Stop reason: DRUGHIGH

## 2020-01-01 RX ORDER — ACETAMINOPHEN 160 MG/5ML
650 SOLUTION ORAL EVERY 4 HOURS PRN
Status: DISCONTINUED | OUTPATIENT
Start: 2020-01-01 | End: 2020-01-01

## 2020-01-01 RX ORDER — POLYETHYLENE GLYCOL 3350 17 G/17G
17 POWDER, FOR SOLUTION ORAL DAILY PRN
Status: DISCONTINUED | OUTPATIENT
Start: 2020-01-01 | End: 2020-01-01

## 2020-01-01 RX ORDER — ALBUTEROL SULFATE 90 UG/1
2 AEROSOL, METERED RESPIRATORY (INHALATION)
Status: DISCONTINUED | OUTPATIENT
Start: 2020-01-01 | End: 2020-01-01

## 2020-01-01 RX ORDER — ACETAMINOPHEN 325 MG/1
650 TABLET ORAL EVERY 4 HOURS PRN
Status: DISCONTINUED | OUTPATIENT
Start: 2020-01-01 | End: 2020-01-01

## 2020-01-01 RX ORDER — HEPARIN SODIUM 5000 [USP'U]/ML
5000 INJECTION, SOLUTION INTRAVENOUS; SUBCUTANEOUS EVERY 12 HOURS SCHEDULED
Status: DISCONTINUED | OUTPATIENT
Start: 2020-01-01 | End: 2020-01-01

## 2020-01-01 RX ORDER — POTASSIUM CHLORIDE 1.5 G/1.77G
40 POWDER, FOR SOLUTION ORAL 2 TIMES DAILY
Status: DISCONTINUED | OUTPATIENT
Start: 2020-01-01 | End: 2020-01-01

## 2020-01-01 RX ORDER — NICOTINE POLACRILEX 4 MG
15 LOZENGE BUCCAL
Status: DISCONTINUED | OUTPATIENT
Start: 2020-01-01 | End: 2020-01-01

## 2020-01-01 RX ORDER — SODIUM CHLORIDE 9 MG/ML
INJECTION, SOLUTION INTRAVENOUS
Status: DISPENSED
Start: 2020-01-01 | End: 2020-01-01

## 2020-01-01 RX ORDER — TRAZODONE HYDROCHLORIDE 50 MG/1
50 TABLET ORAL NIGHTLY PRN
Status: DISCONTINUED | OUTPATIENT
Start: 2020-01-01 | End: 2020-01-01

## 2020-01-01 RX ORDER — CETIRIZINE HYDROCHLORIDE 10 MG/1
10 TABLET ORAL DAILY
Status: DISCONTINUED | OUTPATIENT
Start: 2020-01-01 | End: 2020-01-01

## 2020-01-01 RX ORDER — DEXTROSE MONOHYDRATE 25 G/50ML
25 INJECTION, SOLUTION INTRAVENOUS
Status: DISCONTINUED | OUTPATIENT
Start: 2020-01-01 | End: 2020-01-01

## 2020-01-01 RX ORDER — IPRATROPIUM BROMIDE AND ALBUTEROL SULFATE 2.5; .5 MG/3ML; MG/3ML
3 SOLUTION RESPIRATORY (INHALATION)
Status: DISCONTINUED | OUTPATIENT
Start: 2020-01-01 | End: 2020-01-01 | Stop reason: DRUGHIGH

## 2020-01-01 RX ORDER — DEXTROSE AND SODIUM CHLORIDE 5; .45 G/100ML; G/100ML
30 INJECTION, SOLUTION INTRAVENOUS CONTINUOUS PRN
Status: CANCELLED | OUTPATIENT
Start: 2020-01-01

## 2020-01-01 RX ORDER — SODIUM CHLORIDE 9 MG/ML
30 INJECTION, SOLUTION INTRAVENOUS CONTINUOUS
Status: DISCONTINUED | OUTPATIENT
Start: 2020-01-01 | End: 2020-01-01

## 2020-01-01 RX ORDER — PANTOPRAZOLE SODIUM 40 MG/10ML
80 INJECTION, POWDER, LYOPHILIZED, FOR SOLUTION INTRAVENOUS ONCE
Status: COMPLETED | OUTPATIENT
Start: 2020-01-01 | End: 2020-01-01

## 2020-01-01 RX ORDER — SCOLOPAMINE TRANSDERMAL SYSTEM 1 MG/1
1 PATCH, EXTENDED RELEASE TRANSDERMAL
Status: DISCONTINUED | OUTPATIENT
Start: 2020-01-01 | End: 2020-01-01 | Stop reason: HOSPADM

## 2020-01-01 RX ORDER — MORPHINE SULFATE 20 MG/ML
10 SOLUTION ORAL
Status: DISCONTINUED | OUTPATIENT
Start: 2020-01-01 | End: 2020-01-01

## 2020-01-01 RX ORDER — SEVELAMER CARBONATE 800 MG/1
800 TABLET, FILM COATED ORAL
Status: DISCONTINUED | OUTPATIENT
Start: 2020-01-01 | End: 2020-01-01

## 2020-01-01 RX ORDER — DOCUSATE SODIUM 100 MG/1
100 CAPSULE, LIQUID FILLED ORAL 2 TIMES DAILY PRN
Status: DISCONTINUED | OUTPATIENT
Start: 2020-01-01 | End: 2020-01-01

## 2020-01-01 RX ORDER — ASPIRIN 81 MG/1
81 TABLET, CHEWABLE ORAL DAILY
Status: DISCONTINUED | OUTPATIENT
Start: 2020-01-01 | End: 2020-01-01

## 2020-01-01 RX ORDER — HYDROXYZINE PAMOATE 50 MG/1
50 CAPSULE ORAL 3 TIMES DAILY PRN
Status: DISCONTINUED | OUTPATIENT
Start: 2020-01-01 | End: 2020-01-01

## 2020-01-01 RX ORDER — NOREPINEPHRINE BIT/0.9 % NACL 8 MG/250ML
.02-.3 INFUSION BOTTLE (ML) INTRAVENOUS
Status: DISCONTINUED | OUTPATIENT
Start: 2020-01-01 | End: 2020-01-01

## 2020-01-01 RX ORDER — POTASSIUM CHLORIDE 1.5 G/1.77G
40 POWDER, FOR SOLUTION ORAL 2 TIMES DAILY
Status: COMPLETED | OUTPATIENT
Start: 2020-01-01 | End: 2020-01-01

## 2020-01-01 RX ORDER — LACTULOSE 10 G/15ML
60 SOLUTION ORAL 4 TIMES DAILY
Status: DISCONTINUED | OUTPATIENT
Start: 2020-01-01 | End: 2020-01-01

## 2020-01-01 RX ORDER — LIDOCAINE 50 MG/G
1 PATCH TOPICAL
Status: DISCONTINUED | OUTPATIENT
Start: 2020-01-01 | End: 2020-01-01

## 2020-01-01 RX ORDER — PREDNISONE 20 MG/1
40 TABLET ORAL
Status: DISCONTINUED | OUTPATIENT
Start: 2020-01-01 | End: 2020-01-01

## 2020-01-01 RX ORDER — GABAPENTIN 100 MG/1
100 CAPSULE ORAL NIGHTLY
Status: DISCONTINUED | OUTPATIENT
Start: 2020-01-01 | End: 2020-01-01

## 2020-01-01 RX ORDER — LANOLIN ALCOHOL/MO/W.PET/CERES
6 CREAM (GRAM) TOPICAL NIGHTLY
Status: DISCONTINUED | OUTPATIENT
Start: 2020-01-01 | End: 2020-01-01

## 2020-01-01 RX ORDER — LATANOPROST 50 UG/ML
1 SOLUTION/ DROPS OPHTHALMIC NIGHTLY
Status: DISCONTINUED | OUTPATIENT
Start: 2020-01-01 | End: 2020-01-01

## 2020-01-01 RX ORDER — CYCLOBENZAPRINE HCL 5 MG
5 TABLET ORAL EVERY 8 HOURS PRN
Status: DISCONTINUED | OUTPATIENT
Start: 2020-01-01 | End: 2020-01-01

## 2020-01-01 RX ORDER — FUROSEMIDE 40 MG/1
40 TABLET ORAL 2 TIMES DAILY
COMMUNITY

## 2020-01-01 RX ORDER — PROPOFOL 10 MG/ML
VIAL (ML) INTRAVENOUS AS NEEDED
Status: DISCONTINUED | OUTPATIENT
Start: 2020-01-01 | End: 2020-01-01 | Stop reason: SURG

## 2020-01-01 RX ORDER — ALBUMIN (HUMAN) 12.5 G/50ML
12.5 SOLUTION INTRAVENOUS AS NEEDED
Status: CANCELLED | OUTPATIENT
Start: 2020-01-01 | End: 2020-01-01

## 2020-01-01 RX ADMIN — ALBUTEROL SULFATE 2 PUFF: 90 AEROSOL, METERED RESPIRATORY (INHALATION) at 21:02

## 2020-01-01 RX ADMIN — LATANOPROST 1 DROP: 50 SOLUTION OPHTHALMIC at 21:45

## 2020-01-01 RX ADMIN — IPRATROPIUM BROMIDE 2 PUFF: 17 AEROSOL, METERED RESPIRATORY (INHALATION) at 07:24

## 2020-01-01 RX ADMIN — SODIUM CHLORIDE, PRESERVATIVE FREE 10 ML: 5 INJECTION INTRAVENOUS at 08:45

## 2020-01-01 RX ADMIN — INSULIN ASPART 12 UNITS: 100 INJECTION, SOLUTION INTRAVENOUS; SUBCUTANEOUS at 06:29

## 2020-01-01 RX ADMIN — MORPHINE SULFATE 4 MG: 4 INJECTION, SOLUTION INTRAMUSCULAR; INTRAVENOUS at 05:29

## 2020-01-01 RX ADMIN — FENTANYL CITRATE 25 MCG: 50 INJECTION, SOLUTION INTRAMUSCULAR; INTRAVENOUS at 11:04

## 2020-01-01 RX ADMIN — RIFAXIMIN 550 MG: 550 TABLET ORAL at 10:01

## 2020-01-01 RX ADMIN — LIDOCAINE 1 PATCH: 50 PATCH CUTANEOUS at 10:28

## 2020-01-01 RX ADMIN — MIDAZOLAM HYDROCHLORIDE 1 MG: 2 INJECTION, SOLUTION INTRAMUSCULAR; INTRAVENOUS at 18:20

## 2020-01-01 RX ADMIN — HEPARIN SODIUM 5000 UNITS: 5000 INJECTION, SOLUTION INTRAVENOUS; SUBCUTANEOUS at 08:34

## 2020-01-01 RX ADMIN — ALBUTEROL SULFATE 2 PUFF: 90 AEROSOL, METERED RESPIRATORY (INHALATION) at 07:18

## 2020-01-01 RX ADMIN — LORAZEPAM 2 MG: 2 INJECTION, SOLUTION INTRAMUSCULAR; INTRAVENOUS at 09:56

## 2020-01-01 RX ADMIN — IPRATROPIUM BROMIDE 2 PUFF: 17 AEROSOL, METERED RESPIRATORY (INHALATION) at 07:15

## 2020-01-01 RX ADMIN — PANTOPRAZOLE SODIUM 40 MG: 40 INJECTION, POWDER, FOR SOLUTION INTRAVENOUS at 08:37

## 2020-01-01 RX ADMIN — LIDOCAINE 1 PATCH: 50 PATCH CUTANEOUS at 10:02

## 2020-01-01 RX ADMIN — SODIUM CHLORIDE, PRESERVATIVE FREE 10 ML: 5 INJECTION INTRAVENOUS at 18:47

## 2020-01-01 RX ADMIN — EPOETIN ALFA 10000 UNITS: 10000 SOLUTION INTRAVENOUS; SUBCUTANEOUS at 13:42

## 2020-01-01 RX ADMIN — LATANOPROST 1 DROP: 50 SOLUTION OPHTHALMIC at 21:47

## 2020-01-01 RX ADMIN — GABAPENTIN 100 MG: 100 CAPSULE ORAL at 21:45

## 2020-01-01 RX ADMIN — LIDOCAINE HYDROCHLORIDE 60 MG: 20 INJECTION, SOLUTION EPIDURAL; INFILTRATION; INTRACAUDAL; PERINEURAL at 17:29

## 2020-01-01 RX ADMIN — IPRATROPIUM BROMIDE 2 PUFF: 17 AEROSOL, METERED RESPIRATORY (INHALATION) at 18:52

## 2020-01-01 RX ADMIN — PANTOPRAZOLE SODIUM 40 MG: 40 INJECTION, POWDER, FOR SOLUTION INTRAVENOUS at 21:30

## 2020-01-01 RX ADMIN — FENTANYL CITRATE 25 MCG: 50 INJECTION, SOLUTION INTRAMUSCULAR; INTRAVENOUS at 18:40

## 2020-01-01 RX ADMIN — PANTOPRAZOLE SODIUM 80 MG: 40 INJECTION, POWDER, FOR SOLUTION INTRAVENOUS at 10:14

## 2020-01-01 RX ADMIN — ALBUTEROL SULFATE 2 PUFF: 90 AEROSOL, METERED RESPIRATORY (INHALATION) at 07:16

## 2020-01-01 RX ADMIN — FENTANYL CITRATE: 50 INJECTION, SOLUTION INTRAMUSCULAR; INTRAVENOUS at 10:47

## 2020-01-01 RX ADMIN — LACTULOSE 60 ML: 20 SOLUTION ORAL at 08:39

## 2020-01-01 RX ADMIN — ASPIRIN 81 MG 81 MG: 81 TABLET ORAL at 09:12

## 2020-01-01 RX ADMIN — ASPIRIN 81 MG 81 MG: 81 TABLET ORAL at 08:39

## 2020-01-01 RX ADMIN — IPRATROPIUM BROMIDE 2 PUFF: 17 AEROSOL, METERED RESPIRATORY (INHALATION) at 18:29

## 2020-01-01 RX ADMIN — SODIUM CHLORIDE, PRESERVATIVE FREE 10 ML: 5 INJECTION INTRAVENOUS at 21:48

## 2020-01-01 RX ADMIN — BUDESONIDE AND FORMOTEROL FUMARATE DIHYDRATE 2 PUFF: 160; 4.5 AEROSOL RESPIRATORY (INHALATION) at 07:27

## 2020-01-01 RX ADMIN — PANTOPRAZOLE SODIUM 40 MG: 40 INJECTION, POWDER, FOR SOLUTION INTRAVENOUS at 18:00

## 2020-01-01 RX ADMIN — BUDESONIDE AND FORMOTEROL FUMARATE DIHYDRATE 2 PUFF: 160; 4.5 AEROSOL RESPIRATORY (INHALATION) at 07:25

## 2020-01-01 RX ADMIN — ACETAMINOPHEN 650 MG: 325 TABLET, FILM COATED ORAL at 21:39

## 2020-01-01 RX ADMIN — MIDAZOLAM HYDROCHLORIDE 1 MG: 2 INJECTION, SOLUTION INTRAMUSCULAR; INTRAVENOUS at 17:03

## 2020-01-01 RX ADMIN — RIFAXIMIN 550 MG: 550 TABLET ORAL at 09:17

## 2020-01-01 RX ADMIN — BUDESONIDE AND FORMOTEROL FUMARATE DIHYDRATE 2 PUFF: 160; 4.5 AEROSOL RESPIRATORY (INHALATION) at 07:57

## 2020-01-01 RX ADMIN — INSULIN ASPART 12 UNITS: 100 INJECTION, SOLUTION INTRAVENOUS; SUBCUTANEOUS at 12:05

## 2020-01-01 RX ADMIN — FENTANYL CITRATE 25 MCG: 50 INJECTION, SOLUTION INTRAMUSCULAR; INTRAVENOUS at 08:53

## 2020-01-01 RX ADMIN — LACTULOSE 60 ML: 20 SOLUTION ORAL at 08:36

## 2020-01-01 RX ADMIN — METHYLPREDNISOLONE SODIUM SUCCINATE 40 MG: 40 INJECTION, POWDER, FOR SOLUTION INTRAMUSCULAR; INTRAVENOUS at 21:29

## 2020-01-01 RX ADMIN — RIFAXIMIN 550 MG: 550 TABLET ORAL at 21:47

## 2020-01-01 RX ADMIN — MIDAZOLAM HYDROCHLORIDE 1 MG: 2 INJECTION, SOLUTION INTRAMUSCULAR; INTRAVENOUS at 10:46

## 2020-01-01 RX ADMIN — LACTULOSE 60 ML: 20 SOLUTION ORAL at 08:34

## 2020-01-01 RX ADMIN — NOREPINEPHRINE BITARTRATE 0.09 MCG/KG/MIN: 1 INJECTION, SOLUTION, CONCENTRATE INTRAVENOUS at 21:31

## 2020-01-01 RX ADMIN — ALBUTEROL SULFATE 2 PUFF: 90 AEROSOL, METERED RESPIRATORY (INHALATION) at 11:42

## 2020-01-01 RX ADMIN — SODIUM CHLORIDE, PRESERVATIVE FREE 10 ML: 5 INJECTION INTRAVENOUS at 02:06

## 2020-01-01 RX ADMIN — CEFTRIAXONE SODIUM 1 G: 1 INJECTION, POWDER, FOR SOLUTION INTRAMUSCULAR; INTRAVENOUS at 10:42

## 2020-01-01 RX ADMIN — IPRATROPIUM BROMIDE 2 PUFF: 17 AEROSOL, METERED RESPIRATORY (INHALATION) at 11:42

## 2020-01-01 RX ADMIN — IPRATROPIUM BROMIDE 2 PUFF: 17 AEROSOL, METERED RESPIRATORY (INHALATION) at 07:05

## 2020-01-01 RX ADMIN — INSULIN ASPART 20 UNITS: 100 INJECTION, SOLUTION INTRAVENOUS; SUBCUTANEOUS at 11:51

## 2020-01-01 RX ADMIN — PROPOFOL 20 MG: 10 INJECTION, EMULSION INTRAVENOUS at 17:32

## 2020-01-01 RX ADMIN — MORPHINE SULFATE 4 MG: 4 INJECTION, SOLUTION INTRAMUSCULAR; INTRAVENOUS at 16:48

## 2020-01-01 RX ADMIN — BUDESONIDE AND FORMOTEROL FUMARATE DIHYDRATE 2 PUFF: 160; 4.5 AEROSOL RESPIRATORY (INHALATION) at 21:01

## 2020-01-01 RX ADMIN — MORPHINE SULFATE 4 MG: 4 INJECTION, SOLUTION INTRAMUSCULAR; INTRAVENOUS at 23:45

## 2020-01-01 RX ADMIN — FENTANYL CITRATE 25 MCG: 50 INJECTION, SOLUTION INTRAMUSCULAR; INTRAVENOUS at 19:58

## 2020-01-01 RX ADMIN — RIFAXIMIN 550 MG: 550 TABLET ORAL at 08:40

## 2020-01-01 RX ADMIN — FENTANYL CITRATE 25 MCG: 50 INJECTION, SOLUTION INTRAMUSCULAR; INTRAVENOUS at 21:46

## 2020-01-01 RX ADMIN — LACTULOSE 60 ML: 20 SOLUTION ORAL at 21:19

## 2020-01-01 RX ADMIN — FENTANYL CITRATE 25 MCG: 50 INJECTION, SOLUTION INTRAMUSCULAR; INTRAVENOUS at 16:08

## 2020-01-01 RX ADMIN — CETIRIZINE HYDROCHLORIDE 10 MG: 10 TABLET, FILM COATED ORAL at 08:37

## 2020-01-01 RX ADMIN — DEXTROSE MONOHYDRATE 25 G: 25 INJECTION, SOLUTION INTRAVENOUS at 23:09

## 2020-01-01 RX ADMIN — ALBUTEROL SULFATE 2 PUFF: 90 AEROSOL, METERED RESPIRATORY (INHALATION) at 18:53

## 2020-01-01 RX ADMIN — IPRATROPIUM BROMIDE 2 PUFF: 17 AEROSOL, METERED RESPIRATORY (INHALATION) at 07:27

## 2020-01-01 RX ADMIN — LACTULOSE 30 ML: 20 SOLUTION ORAL at 14:23

## 2020-01-01 RX ADMIN — MORPHINE SULFATE 4 MG: 4 INJECTION, SOLUTION INTRAMUSCULAR; INTRAVENOUS at 13:44

## 2020-01-01 RX ADMIN — LATANOPROST 1 DROP: 50 SOLUTION OPHTHALMIC at 22:30

## 2020-01-01 RX ADMIN — LIDOCAINE 1 PATCH: 50 PATCH CUTANEOUS at 10:13

## 2020-01-01 RX ADMIN — METHYLPREDNISOLONE SODIUM SUCCINATE 40 MG: 40 INJECTION, POWDER, FOR SOLUTION INTRAMUSCULAR; INTRAVENOUS at 16:54

## 2020-01-01 RX ADMIN — LACTULOSE 60 ML: 20 SOLUTION ORAL at 17:36

## 2020-01-01 RX ADMIN — PANTOPRAZOLE SODIUM 40 MG: 40 INJECTION, POWDER, FOR SOLUTION INTRAVENOUS at 21:29

## 2020-01-01 RX ADMIN — ESCITALOPRAM OXALATE 10 MG: 10 TABLET ORAL at 09:12

## 2020-01-01 RX ADMIN — SEVELAMER CARBONATE 800 MG: 800 TABLET, FILM COATED ORAL at 11:48

## 2020-01-01 RX ADMIN — GABAPENTIN 100 MG: 100 CAPSULE ORAL at 21:10

## 2020-01-01 RX ADMIN — GABAPENTIN 100 MG: 100 CAPSULE ORAL at 21:07

## 2020-01-01 RX ADMIN — GABAPENTIN 100 MG: 100 CAPSULE ORAL at 21:30

## 2020-01-01 RX ADMIN — PANTOPRAZOLE SODIUM 40 MG: 40 INJECTION, POWDER, FOR SOLUTION INTRAVENOUS at 06:42

## 2020-01-01 RX ADMIN — INSULIN DETEMIR 5 UNITS: 100 INJECTION, SOLUTION SUBCUTANEOUS at 21:20

## 2020-01-01 RX ADMIN — PIPERACILLIN SODIUM,TAZOBACTAM SODIUM 3.38 G: 3; .375 INJECTION, POWDER, FOR SOLUTION INTRAVENOUS at 10:39

## 2020-01-01 RX ADMIN — LATANOPROST 1 DROP: 50 SOLUTION OPHTHALMIC at 21:21

## 2020-01-01 RX ADMIN — SODIUM CHLORIDE, PRESERVATIVE FREE 10 ML: 5 INJECTION INTRAVENOUS at 09:15

## 2020-01-01 RX ADMIN — ESCITALOPRAM OXALATE 10 MG: 10 TABLET ORAL at 08:39

## 2020-01-01 RX ADMIN — MIDAZOLAM HYDROCHLORIDE 1 MG: 2 INJECTION, SOLUTION INTRAMUSCULAR; INTRAVENOUS at 15:55

## 2020-01-01 RX ADMIN — INSULIN ASPART 24 UNITS: 100 INJECTION, SOLUTION INTRAVENOUS; SUBCUTANEOUS at 18:02

## 2020-01-01 RX ADMIN — MORPHINE SULFATE 4 MG: 4 INJECTION, SOLUTION INTRAMUSCULAR; INTRAVENOUS at 09:55

## 2020-01-01 RX ADMIN — ALBUTEROL SULFATE 2 PUFF: 90 AEROSOL, METERED RESPIRATORY (INHALATION) at 10:51

## 2020-01-01 RX ADMIN — SEVELAMER CARBONATE 800 MG: 800 TABLET, FILM COATED ORAL at 17:48

## 2020-01-01 RX ADMIN — PIPERACILLIN SODIUM AND TAZOBACTAM SODIUM 3.38 G: 3; .375 INJECTION, POWDER, LYOPHILIZED, FOR SOLUTION INTRAVENOUS at 10:47

## 2020-01-01 RX ADMIN — IPRATROPIUM BROMIDE AND ALBUTEROL SULFATE 3 ML: 2.5; .5 SOLUTION RESPIRATORY (INHALATION) at 10:57

## 2020-01-01 RX ADMIN — ALBUTEROL SULFATE 2 PUFF: 90 AEROSOL, METERED RESPIRATORY (INHALATION) at 19:20

## 2020-01-01 RX ADMIN — CETIRIZINE HYDROCHLORIDE 10 MG: 10 TABLET, FILM COATED ORAL at 08:39

## 2020-01-01 RX ADMIN — FENTANYL CITRATE 25 MCG: 50 INJECTION, SOLUTION INTRAMUSCULAR; INTRAVENOUS at 13:42

## 2020-01-01 RX ADMIN — CYCLOBENZAPRINE 5 MG: 5 TABLET, FILM COATED ORAL at 02:01

## 2020-01-01 RX ADMIN — ALBUTEROL SULFATE 2 PUFF: 90 AEROSOL, METERED RESPIRATORY (INHALATION) at 18:52

## 2020-01-01 RX ADMIN — RIFAXIMIN 550 MG: 550 TABLET ORAL at 08:36

## 2020-01-01 RX ADMIN — SODIUM CHLORIDE, PRESERVATIVE FREE 10 ML: 5 INJECTION INTRAVENOUS at 08:37

## 2020-01-01 RX ADMIN — FENTANYL CITRATE 25 MCG: 50 INJECTION, SOLUTION INTRAMUSCULAR; INTRAVENOUS at 20:00

## 2020-01-01 RX ADMIN — CEFTRIAXONE SODIUM 1 G: 1 INJECTION, POWDER, FOR SOLUTION INTRAMUSCULAR; INTRAVENOUS at 10:06

## 2020-01-01 RX ADMIN — FENTANYL CITRATE 25 MCG: 50 INJECTION, SOLUTION INTRAMUSCULAR; INTRAVENOUS at 18:20

## 2020-01-01 RX ADMIN — BUDESONIDE AND FORMOTEROL FUMARATE DIHYDRATE 2 PUFF: 160; 4.5 AEROSOL RESPIRATORY (INHALATION) at 07:05

## 2020-01-01 RX ADMIN — ALBUTEROL SULFATE 2 PUFF: 90 AEROSOL, METERED RESPIRATORY (INHALATION) at 17:20

## 2020-01-01 RX ADMIN — MORPHINE SULFATE 4 MG: 4 INJECTION, SOLUTION INTRAMUSCULAR; INTRAVENOUS at 21:31

## 2020-01-01 RX ADMIN — PREDNISONE 40 MG: 20 TABLET ORAL at 10:39

## 2020-01-01 RX ADMIN — FENTANYL CITRATE 25 MCG: 50 INJECTION, SOLUTION INTRAMUSCULAR; INTRAVENOUS at 09:14

## 2020-01-01 RX ADMIN — ASPIRIN 81 MG 81 MG: 81 TABLET ORAL at 10:27

## 2020-01-01 RX ADMIN — ALBUTEROL SULFATE 2 PUFF: 90 AEROSOL, METERED RESPIRATORY (INHALATION) at 21:18

## 2020-01-01 RX ADMIN — LACTULOSE 60 ML: 20 SOLUTION ORAL at 11:59

## 2020-01-01 RX ADMIN — LACTULOSE 60 ML: 20 SOLUTION ORAL at 20:56

## 2020-01-01 RX ADMIN — ALBUTEROL SULFATE 2 PUFF: 90 AEROSOL, METERED RESPIRATORY (INHALATION) at 11:26

## 2020-01-01 RX ADMIN — MIDAZOLAM HYDROCHLORIDE 1 MG: 2 INJECTION, SOLUTION INTRAMUSCULAR; INTRAVENOUS at 19:57

## 2020-01-01 RX ADMIN — MIDAZOLAM HYDROCHLORIDE 1 MG: 2 INJECTION, SOLUTION INTRAMUSCULAR; INTRAVENOUS at 09:13

## 2020-01-01 RX ADMIN — LACTULOSE 60 ML: 20 SOLUTION ORAL at 17:47

## 2020-01-01 RX ADMIN — FENTANYL CITRATE 25 MCG: 50 INJECTION, SOLUTION INTRAMUSCULAR; INTRAVENOUS at 17:45

## 2020-01-01 RX ADMIN — POTASSIUM CHLORIDE 40 MEQ: 1.5 POWDER, FOR SOLUTION ORAL at 06:39

## 2020-01-01 RX ADMIN — EPOETIN ALFA 10000 UNITS: 10000 SOLUTION INTRAVENOUS; SUBCUTANEOUS at 14:46

## 2020-01-01 RX ADMIN — HEPARIN SODIUM 5000 UNITS: 5000 INJECTION, SOLUTION INTRAVENOUS; SUBCUTANEOUS at 21:21

## 2020-01-01 RX ADMIN — ALBUTEROL SULFATE 2 PUFF: 90 AEROSOL, METERED RESPIRATORY (INHALATION) at 07:25

## 2020-01-01 RX ADMIN — VANCOMYCIN HYDROCHLORIDE 2000 MG: 500 INJECTION, POWDER, LYOPHILIZED, FOR SOLUTION INTRAVENOUS at 10:48

## 2020-01-01 RX ADMIN — LIDOCAINE 1 PATCH: 50 PATCH CUTANEOUS at 08:36

## 2020-01-01 RX ADMIN — PANTOPRAZOLE SODIUM 40 MG: 40 INJECTION, POWDER, FOR SOLUTION INTRAVENOUS at 06:46

## 2020-01-01 RX ADMIN — INSULIN DETEMIR 30 UNITS: 100 INJECTION, SOLUTION SUBCUTANEOUS at 10:13

## 2020-01-01 RX ADMIN — SODIUM CHLORIDE, PRESERVATIVE FREE 10 ML: 5 INJECTION INTRAVENOUS at 10:31

## 2020-01-01 RX ADMIN — BUDESONIDE AND FORMOTEROL FUMARATE DIHYDRATE 2 PUFF: 160; 4.5 AEROSOL RESPIRATORY (INHALATION) at 18:53

## 2020-01-01 RX ADMIN — ESCITALOPRAM OXALATE 10 MG: 10 TABLET ORAL at 10:27

## 2020-01-01 RX ADMIN — FENTANYL CITRATE 25 MCG: 50 INJECTION, SOLUTION INTRAMUSCULAR; INTRAVENOUS at 06:40

## 2020-01-01 RX ADMIN — LORAZEPAM 2 MG: 2 INJECTION, SOLUTION INTRAMUSCULAR; INTRAVENOUS at 03:36

## 2020-01-01 RX ADMIN — IPRATROPIUM BROMIDE 2 PUFF: 17 AEROSOL, METERED RESPIRATORY (INHALATION) at 15:15

## 2020-01-01 RX ADMIN — INSULIN ASPART 12 UNITS: 100 INJECTION, SOLUTION INTRAVENOUS; SUBCUTANEOUS at 17:57

## 2020-01-01 RX ADMIN — ALBUTEROL SULFATE 2 PUFF: 90 AEROSOL, METERED RESPIRATORY (INHALATION) at 18:29

## 2020-01-01 RX ADMIN — IPRATROPIUM BROMIDE 2 PUFF: 17 AEROSOL, METERED RESPIRATORY (INHALATION) at 17:20

## 2020-01-01 RX ADMIN — CYCLOBENZAPRINE 5 MG: 5 TABLET, FILM COATED ORAL at 08:35

## 2020-01-01 RX ADMIN — PIPERACILLIN SODIUM,TAZOBACTAM SODIUM 3.38 G: 3; .375 INJECTION, POWDER, FOR SOLUTION INTRAVENOUS at 21:58

## 2020-01-01 RX ADMIN — INSULIN ASPART 16 UNITS: 100 INJECTION, SOLUTION INTRAVENOUS; SUBCUTANEOUS at 22:09

## 2020-01-01 RX ADMIN — MORPHINE SULFATE 4 MG: 4 INJECTION, SOLUTION INTRAMUSCULAR; INTRAVENOUS at 08:14

## 2020-01-01 RX ADMIN — SODIUM CHLORIDE 30 ML/HR: 9 INJECTION, SOLUTION INTRAVENOUS at 17:04

## 2020-01-01 RX ADMIN — PREDNISONE 40 MG: 20 TABLET ORAL at 10:02

## 2020-01-01 RX ADMIN — SODIUM CHLORIDE, PRESERVATIVE FREE 10 ML: 5 INJECTION INTRAVENOUS at 21:45

## 2020-01-01 RX ADMIN — MIDAZOLAM HYDROCHLORIDE 1 MG: 2 INJECTION, SOLUTION INTRAMUSCULAR; INTRAVENOUS at 16:47

## 2020-01-01 RX ADMIN — LACTULOSE 60 ML: 20 SOLUTION ORAL at 09:11

## 2020-01-01 RX ADMIN — CETIRIZINE HYDROCHLORIDE 10 MG: 10 TABLET, FILM COATED ORAL at 08:35

## 2020-01-01 RX ADMIN — SODIUM CHLORIDE, PRESERVATIVE FREE 10 ML: 5 INJECTION INTRAVENOUS at 21:08

## 2020-01-01 RX ADMIN — FENTANYL CITRATE 25 MCG: 50 INJECTION, SOLUTION INTRAMUSCULAR; INTRAVENOUS at 08:30

## 2020-01-01 RX ADMIN — LORAZEPAM 2 MG: 2 INJECTION, SOLUTION INTRAMUSCULAR; INTRAVENOUS at 01:36

## 2020-01-01 RX ADMIN — ALBUTEROL SULFATE 2 PUFF: 90 AEROSOL, METERED RESPIRATORY (INHALATION) at 17:32

## 2020-01-01 RX ADMIN — LORAZEPAM 2 MG: 2 INJECTION, SOLUTION INTRAMUSCULAR; INTRAVENOUS at 14:58

## 2020-01-01 RX ADMIN — SODIUM CHLORIDE, PRESERVATIVE FREE 10 ML: 5 INJECTION INTRAVENOUS at 21:30

## 2020-01-01 RX ADMIN — LORAZEPAM 2 MG: 2 INJECTION, SOLUTION INTRAMUSCULAR; INTRAVENOUS at 14:53

## 2020-01-01 RX ADMIN — MELATONIN 6 MG: at 21:46

## 2020-01-01 RX ADMIN — LACTULOSE 60 ML: 20 SOLUTION ORAL at 17:46

## 2020-01-01 RX ADMIN — LACTULOSE 60 ML: 20 SOLUTION ORAL at 21:20

## 2020-01-01 RX ADMIN — LORAZEPAM 2 MG: 2 INJECTION, SOLUTION INTRAMUSCULAR; INTRAVENOUS at 05:28

## 2020-01-01 RX ADMIN — MIDAZOLAM HYDROCHLORIDE 1 MG: 2 INJECTION, SOLUTION INTRAMUSCULAR; INTRAVENOUS at 11:20

## 2020-01-01 RX ADMIN — ALBUTEROL SULFATE 2 PUFF: 90 AEROSOL, METERED RESPIRATORY (INHALATION) at 07:57

## 2020-01-01 RX ADMIN — IPRATROPIUM BROMIDE 2 PUFF: 17 AEROSOL, METERED RESPIRATORY (INHALATION) at 18:53

## 2020-01-01 RX ADMIN — MELATONIN 6 MG: at 21:30

## 2020-01-01 RX ADMIN — POTASSIUM CHLORIDE 40 MEQ: 1.5 POWDER, FOR SOLUTION ORAL at 08:09

## 2020-01-01 RX ADMIN — RIFAXIMIN 550 MG: 550 TABLET ORAL at 10:26

## 2020-01-01 RX ADMIN — SODIUM CHLORIDE 500 ML: 9 INJECTION, SOLUTION INTRAVENOUS at 01:30

## 2020-01-01 RX ADMIN — PANTOPRAZOLE SODIUM 40 MG: 40 INJECTION, POWDER, FOR SOLUTION INTRAVENOUS at 17:36

## 2020-01-01 RX ADMIN — LORAZEPAM 2 MG: 2 INJECTION, SOLUTION INTRAMUSCULAR; INTRAVENOUS at 21:31

## 2020-01-01 RX ADMIN — SODIUM CHLORIDE, PRESERVATIVE FREE 10 ML: 5 INJECTION INTRAVENOUS at 08:35

## 2020-01-01 RX ADMIN — LACTULOSE 60 ML: 20 SOLUTION ORAL at 11:47

## 2020-01-01 RX ADMIN — ESCITALOPRAM OXALATE 10 MG: 10 TABLET ORAL at 09:17

## 2020-01-01 RX ADMIN — IPRATROPIUM BROMIDE 2 PUFF: 17 AEROSOL, METERED RESPIRATORY (INHALATION) at 11:12

## 2020-01-01 RX ADMIN — LIDOCAINE 1 PATCH: 50 PATCH CUTANEOUS at 08:38

## 2020-01-01 RX ADMIN — BUDESONIDE AND FORMOTEROL FUMARATE DIHYDRATE 2 PUFF: 160; 4.5 AEROSOL RESPIRATORY (INHALATION) at 18:52

## 2020-01-01 RX ADMIN — FENTANYL CITRATE 25 MCG: 50 INJECTION, SOLUTION INTRAMUSCULAR; INTRAVENOUS at 16:56

## 2020-01-01 RX ADMIN — MORPHINE SULFATE 4 MG: 4 INJECTION, SOLUTION INTRAMUSCULAR; INTRAVENOUS at 03:36

## 2020-01-01 RX ADMIN — LORAZEPAM 2 MG: 2 INJECTION, SOLUTION INTRAMUSCULAR; INTRAVENOUS at 23:44

## 2020-01-01 RX ADMIN — ALBUTEROL SULFATE 2 PUFF: 90 AEROSOL, METERED RESPIRATORY (INHALATION) at 15:40

## 2020-01-01 RX ADMIN — LORAZEPAM 2 MG: 2 INJECTION, SOLUTION INTRAMUSCULAR; INTRAVENOUS at 15:39

## 2020-01-01 RX ADMIN — INSULIN ASPART 16 UNITS: 100 INJECTION, SOLUTION INTRAVENOUS; SUBCUTANEOUS at 21:48

## 2020-01-01 RX ADMIN — SCOPALAMINE 1 PATCH: 1 PATCH, EXTENDED RELEASE TRANSDERMAL at 07:46

## 2020-01-01 RX ADMIN — IPRATROPIUM BROMIDE 2 PUFF: 17 AEROSOL, METERED RESPIRATORY (INHALATION) at 15:40

## 2020-01-01 RX ADMIN — RIFAXIMIN 550 MG: 550 TABLET ORAL at 21:15

## 2020-01-01 RX ADMIN — INSULIN ASPART 12 UNITS: 100 INJECTION, SOLUTION INTRAVENOUS; SUBCUTANEOUS at 12:07

## 2020-01-01 RX ADMIN — IPRATROPIUM BROMIDE 2 PUFF: 17 AEROSOL, METERED RESPIRATORY (INHALATION) at 11:34

## 2020-01-01 RX ADMIN — INSULIN DETEMIR 8 UNITS: 100 INJECTION, SOLUTION SUBCUTANEOUS at 22:42

## 2020-01-01 RX ADMIN — LORAZEPAM 2 MG: 2 INJECTION, SOLUTION INTRAMUSCULAR; INTRAVENOUS at 12:06

## 2020-01-01 RX ADMIN — PANTOPRAZOLE SODIUM 40 MG: 40 INJECTION, POWDER, FOR SOLUTION INTRAVENOUS at 09:11

## 2020-01-01 RX ADMIN — FENTANYL CITRATE 25 MCG: 50 INJECTION, SOLUTION INTRAMUSCULAR; INTRAVENOUS at 16:51

## 2020-01-01 RX ADMIN — ALBUTEROL SULFATE 2 PUFF: 90 AEROSOL, METERED RESPIRATORY (INHALATION) at 06:56

## 2020-01-01 RX ADMIN — MIDAZOLAM HYDROCHLORIDE 2 MG/HR: 5 INJECTION, SOLUTION INTRAMUSCULAR; INTRAVENOUS at 22:02

## 2020-01-01 RX ADMIN — INSULIN ASPART 8 UNITS: 100 INJECTION, SOLUTION INTRAVENOUS; SUBCUTANEOUS at 06:36

## 2020-01-01 RX ADMIN — IPRATROPIUM BROMIDE AND ALBUTEROL SULFATE 3 ML: 2.5; .5 SOLUTION RESPIRATORY (INHALATION) at 14:30

## 2020-01-01 RX ADMIN — LACTULOSE 60 ML: 20 SOLUTION ORAL at 21:08

## 2020-01-01 RX ADMIN — SODIUM CHLORIDE, PRESERVATIVE FREE 10 ML: 5 INJECTION INTRAVENOUS at 21:43

## 2020-01-01 RX ADMIN — ALBUTEROL SULFATE 2 PUFF: 90 AEROSOL, METERED RESPIRATORY (INHALATION) at 13:08

## 2020-01-01 RX ADMIN — GABAPENTIN 100 MG: 100 CAPSULE ORAL at 21:46

## 2020-01-01 RX ADMIN — MIDAZOLAM HYDROCHLORIDE 1 MG: 2 INJECTION, SOLUTION INTRAMUSCULAR; INTRAVENOUS at 17:45

## 2020-01-01 RX ADMIN — ALBUTEROL SULFATE 2 PUFF: 90 AEROSOL, METERED RESPIRATORY (INHALATION) at 11:09

## 2020-01-01 RX ADMIN — CETIRIZINE HYDROCHLORIDE 10 MG: 10 TABLET, FILM COATED ORAL at 09:17

## 2020-01-01 RX ADMIN — LATANOPROST 1 DROP: 50 SOLUTION OPHTHALMIC at 21:41

## 2020-01-01 RX ADMIN — INSULIN ASPART 24 UNITS: 100 INJECTION, SOLUTION INTRAVENOUS; SUBCUTANEOUS at 08:43

## 2020-01-01 RX ADMIN — SODIUM CHLORIDE, PRESERVATIVE FREE 10 ML: 5 INJECTION INTRAVENOUS at 21:47

## 2020-01-01 RX ADMIN — IPRATROPIUM BROMIDE 2 PUFF: 17 AEROSOL, METERED RESPIRATORY (INHALATION) at 21:02

## 2020-01-01 RX ADMIN — IPRATROPIUM BROMIDE 2 PUFF: 17 AEROSOL, METERED RESPIRATORY (INHALATION) at 07:56

## 2020-01-01 RX ADMIN — LORAZEPAM 2 MG: 2 INJECTION, SOLUTION INTRAMUSCULAR; INTRAVENOUS at 13:45

## 2020-01-01 RX ADMIN — ALBUTEROL SULFATE 2 PUFF: 90 AEROSOL, METERED RESPIRATORY (INHALATION) at 21:01

## 2020-01-01 RX ADMIN — LIDOCAINE 1 PATCH: 50 PATCH CUTANEOUS at 08:41

## 2020-01-01 RX ADMIN — MIDAZOLAM HYDROCHLORIDE 1 MG: 2 INJECTION, SOLUTION INTRAMUSCULAR; INTRAVENOUS at 15:21

## 2020-01-01 RX ADMIN — INSULIN ASPART 12 UNITS: 100 INJECTION, SOLUTION INTRAVENOUS; SUBCUTANEOUS at 06:59

## 2020-01-01 RX ADMIN — GABAPENTIN 100 MG: 100 CAPSULE ORAL at 21:19

## 2020-01-01 RX ADMIN — IPRATROPIUM BROMIDE 2 PUFF: 17 AEROSOL, METERED RESPIRATORY (INHALATION) at 15:16

## 2020-01-01 RX ADMIN — IPRATROPIUM BROMIDE 2 PUFF: 17 AEROSOL, METERED RESPIRATORY (INHALATION) at 17:32

## 2020-01-01 RX ADMIN — POTASSIUM CHLORIDE 40 MEQ: 1.5 POWDER, FOR SOLUTION ORAL at 20:55

## 2020-01-01 RX ADMIN — ALBUTEROL SULFATE 2 PUFF: 90 AEROSOL, METERED RESPIRATORY (INHALATION) at 14:44

## 2020-01-01 RX ADMIN — PREDNISONE 20 MG: 20 TABLET ORAL at 08:36

## 2020-01-01 RX ADMIN — MELATONIN 6 MG: at 21:14

## 2020-01-01 RX ADMIN — HEPARIN SODIUM 5000 UNITS: 5000 INJECTION, SOLUTION INTRAVENOUS; SUBCUTANEOUS at 11:48

## 2020-01-01 RX ADMIN — PREDNISONE 40 MG: 20 TABLET ORAL at 08:35

## 2020-01-01 RX ADMIN — MORPHINE SULFATE 4 MG: 4 INJECTION, SOLUTION INTRAMUSCULAR; INTRAVENOUS at 01:35

## 2020-01-01 RX ADMIN — ALBUTEROL SULFATE 2 PUFF: 90 AEROSOL, METERED RESPIRATORY (INHALATION) at 11:12

## 2020-01-01 RX ADMIN — INSULIN ASPART 10 UNITS: 100 INJECTION, SOLUTION INTRAVENOUS; SUBCUTANEOUS at 21:29

## 2020-01-01 RX ADMIN — GABAPENTIN 100 MG: 100 CAPSULE ORAL at 21:21

## 2020-01-01 RX ADMIN — PANTOPRAZOLE SODIUM 40 MG: 40 INJECTION, POWDER, FOR SOLUTION INTRAVENOUS at 17:46

## 2020-01-01 RX ADMIN — IPRATROPIUM BROMIDE AND ALBUTEROL SULFATE 3 ML: 2.5; .5 SOLUTION RESPIRATORY (INHALATION) at 07:15

## 2020-01-01 RX ADMIN — ASPIRIN 81 MG 81 MG: 81 TABLET ORAL at 08:34

## 2020-01-01 RX ADMIN — ASPIRIN 81 MG 81 MG: 81 TABLET ORAL at 09:17

## 2020-01-01 RX ADMIN — LORAZEPAM 2 MG: 2 INJECTION, SOLUTION INTRAMUSCULAR; INTRAVENOUS at 16:49

## 2020-01-01 RX ADMIN — PANTOPRAZOLE SODIUM 40 MG: 40 INJECTION, POWDER, FOR SOLUTION INTRAVENOUS at 09:17

## 2020-01-01 RX ADMIN — CEFTRIAXONE SODIUM 1 G: 1 INJECTION, POWDER, FOR SOLUTION INTRAMUSCULAR; INTRAVENOUS at 11:47

## 2020-01-01 RX ADMIN — ALBUTEROL SULFATE 2 PUFF: 90 AEROSOL, METERED RESPIRATORY (INHALATION) at 07:05

## 2020-01-01 RX ADMIN — MORPHINE SULFATE 4 MG: 4 INJECTION, SOLUTION INTRAMUSCULAR; INTRAVENOUS at 14:53

## 2020-01-01 RX ADMIN — FENTANYL CITRATE 25 MCG: 50 INJECTION, SOLUTION INTRAMUSCULAR; INTRAVENOUS at 01:59

## 2020-01-01 RX ADMIN — LACTULOSE 60 ML: 20 SOLUTION ORAL at 21:45

## 2020-01-01 RX ADMIN — BUDESONIDE AND FORMOTEROL FUMARATE DIHYDRATE 2 PUFF: 160; 4.5 AEROSOL RESPIRATORY (INHALATION) at 06:56

## 2020-01-01 RX ADMIN — BUDESONIDE AND FORMOTEROL FUMARATE DIHYDRATE 2 PUFF: 160; 4.5 AEROSOL RESPIRATORY (INHALATION) at 18:29

## 2020-01-01 RX ADMIN — PROPOFOL 100 MG: 10 INJECTION, EMULSION INTRAVENOUS at 17:29

## 2020-01-01 RX ADMIN — ALBUTEROL SULFATE 2 PUFF: 90 AEROSOL, METERED RESPIRATORY (INHALATION) at 07:27

## 2020-01-01 RX ADMIN — SODIUM CHLORIDE, PRESERVATIVE FREE 10 ML: 5 INJECTION INTRAVENOUS at 21:20

## 2020-01-01 RX ADMIN — IPRATROPIUM BROMIDE 2 PUFF: 17 AEROSOL, METERED RESPIRATORY (INHALATION) at 11:09

## 2020-01-01 RX ADMIN — RIFAXIMIN 550 MG: 550 TABLET ORAL at 21:20

## 2020-01-01 RX ADMIN — SODIUM CHLORIDE, PRESERVATIVE FREE 10 ML: 5 INJECTION INTRAVENOUS at 02:05

## 2020-01-01 RX ADMIN — LATANOPROST 1 DROP: 50 SOLUTION OPHTHALMIC at 21:20

## 2020-01-01 RX ADMIN — ALBUTEROL SULFATE 2 PUFF: 90 AEROSOL, METERED RESPIRATORY (INHALATION) at 15:15

## 2020-01-01 RX ADMIN — RIFAXIMIN 550 MG: 550 TABLET ORAL at 21:41

## 2020-01-01 RX ADMIN — LACTULOSE 60 ML: 20 SOLUTION ORAL at 10:29

## 2020-01-01 RX ADMIN — LIDOCAINE 1 PATCH: 50 PATCH CUTANEOUS at 09:18

## 2020-01-01 RX ADMIN — ASPIRIN 81 MG 81 MG: 81 TABLET ORAL at 08:37

## 2020-01-01 RX ADMIN — INSULIN ASPART 5 UNITS: 100 INJECTION, SOLUTION INTRAVENOUS; SUBCUTANEOUS at 06:42

## 2020-01-01 RX ADMIN — FENTANYL CITRATE 25 MCG: 50 INJECTION, SOLUTION INTRAMUSCULAR; INTRAVENOUS at 12:14

## 2020-01-01 RX ADMIN — ESCITALOPRAM OXALATE 10 MG: 10 TABLET ORAL at 08:34

## 2020-01-01 RX ADMIN — IPRATROPIUM BROMIDE 2 PUFF: 17 AEROSOL, METERED RESPIRATORY (INHALATION) at 19:20

## 2020-01-01 RX ADMIN — CETIRIZINE HYDROCHLORIDE 10 MG: 10 TABLET, FILM COATED ORAL at 09:12

## 2020-01-01 RX ADMIN — MIDAZOLAM HYDROCHLORIDE 1 MG: 2 INJECTION, SOLUTION INTRAMUSCULAR; INTRAVENOUS at 14:51

## 2020-01-01 RX ADMIN — PIPERACILLIN SODIUM,TAZOBACTAM SODIUM 3.38 G: 3; .375 INJECTION, POWDER, FOR SOLUTION INTRAVENOUS at 22:30

## 2020-01-01 RX ADMIN — CETIRIZINE HYDROCHLORIDE 10 MG: 10 TABLET, FILM COATED ORAL at 10:28

## 2020-01-01 RX ADMIN — INSULIN ASPART 8 UNITS: 100 INJECTION, SOLUTION INTRAVENOUS; SUBCUTANEOUS at 11:48

## 2020-01-01 RX ADMIN — FENTANYL CITRATE 25 MCG: 50 INJECTION, SOLUTION INTRAMUSCULAR; INTRAVENOUS at 01:07

## 2020-01-01 RX ADMIN — PANTOPRAZOLE SODIUM 40 MG: 40 INJECTION, POWDER, FOR SOLUTION INTRAVENOUS at 06:29

## 2020-01-01 RX ADMIN — LORAZEPAM 2 MG: 2 INJECTION, SOLUTION INTRAMUSCULAR; INTRAVENOUS at 08:14

## 2020-01-01 RX ADMIN — IPRATROPIUM BROMIDE 2 PUFF: 17 AEROSOL, METERED RESPIRATORY (INHALATION) at 21:01

## 2020-01-01 RX ADMIN — IPRATROPIUM BROMIDE 2 PUFF: 17 AEROSOL, METERED RESPIRATORY (INHALATION) at 14:44

## 2020-01-01 RX ADMIN — HYDROXYZINE PAMOATE 50 MG: 25 CAPSULE ORAL at 14:23

## 2020-01-01 RX ADMIN — ESCITALOPRAM OXALATE 10 MG: 10 TABLET ORAL at 08:36

## 2020-01-01 RX ADMIN — MORPHINE SULFATE 4 MG: 4 INJECTION, SOLUTION INTRAMUSCULAR; INTRAVENOUS at 15:06

## 2020-01-01 RX ADMIN — IPRATROPIUM BROMIDE 2 PUFF: 17 AEROSOL, METERED RESPIRATORY (INHALATION) at 10:51

## 2020-01-01 RX ADMIN — INSULIN DETEMIR 8 UNITS: 100 INJECTION, SOLUTION SUBCUTANEOUS at 22:11

## 2020-01-01 RX ADMIN — INSULIN ASPART 3 UNITS: 100 INJECTION, SOLUTION INTRAVENOUS; SUBCUTANEOUS at 21:44

## 2020-01-01 RX ADMIN — RIFAXIMIN 550 MG: 550 TABLET ORAL at 21:07

## 2020-01-01 RX ADMIN — MIDAZOLAM HYDROCHLORIDE 2 MG/HR: 5 INJECTION, SOLUTION INTRAMUSCULAR; INTRAVENOUS at 11:11

## 2020-01-01 RX ADMIN — MORPHINE SULFATE 4 MG: 4 INJECTION, SOLUTION INTRAMUSCULAR; INTRAVENOUS at 12:05

## 2020-01-01 RX ADMIN — MIDAZOLAM HYDROCHLORIDE 1 MG: 2 INJECTION, SOLUTION INTRAMUSCULAR; INTRAVENOUS at 01:07

## 2020-01-01 RX ADMIN — MELATONIN 6 MG: at 21:19

## 2020-01-01 RX ADMIN — FENTANYL CITRATE 25 MCG: 50 INJECTION, SOLUTION INTRAMUSCULAR; INTRAVENOUS at 08:48

## 2020-01-01 RX ADMIN — MELATONIN 6 MG: at 21:39

## 2020-01-01 RX ADMIN — ALBUTEROL SULFATE 2 PUFF: 90 AEROSOL, METERED RESPIRATORY (INHALATION) at 15:14

## 2020-01-01 RX ADMIN — RIFAXIMIN 550 MG: 550 TABLET ORAL at 14:23

## 2020-01-01 RX ADMIN — FENTANYL CITRATE 25 MCG: 50 INJECTION, SOLUTION INTRAMUSCULAR; INTRAVENOUS at 05:07

## 2020-01-01 RX ADMIN — ALBUTEROL SULFATE 2 PUFF: 90 AEROSOL, METERED RESPIRATORY (INHALATION) at 11:34

## 2020-01-01 RX ADMIN — MIDAZOLAM HYDROCHLORIDE 1 MG: 2 INJECTION, SOLUTION INTRAMUSCULAR; INTRAVENOUS at 15:05

## 2020-01-01 RX ADMIN — LACTULOSE 30 ML: 20 SOLUTION ORAL at 18:03

## 2020-01-01 RX ADMIN — MORPHINE SULFATE 4 MG: 4 INJECTION, SOLUTION INTRAMUSCULAR; INTRAVENOUS at 14:01

## 2020-01-01 RX ADMIN — TRAZODONE HYDROCHLORIDE 50 MG: 50 TABLET ORAL at 21:39

## 2020-01-01 RX ADMIN — BUDESONIDE AND FORMOTEROL FUMARATE DIHYDRATE 2 PUFF: 160; 4.5 AEROSOL RESPIRATORY (INHALATION) at 07:16

## 2020-01-01 RX ADMIN — LATANOPROST 1 DROP: 50 SOLUTION OPHTHALMIC at 21:12

## 2020-01-01 RX ADMIN — SODIUM CHLORIDE, PRESERVATIVE FREE 10 ML: 5 INJECTION INTRAVENOUS at 08:38

## 2020-01-01 RX ADMIN — SEVELAMER CARBONATE 800 MG: 800 TABLET, FILM COATED ORAL at 09:13

## 2020-01-01 RX ADMIN — INSULIN ASPART 8 UNITS: 100 INJECTION, SOLUTION INTRAVENOUS; SUBCUTANEOUS at 22:00

## 2020-01-01 RX ADMIN — PANTOPRAZOLE SODIUM 40 MG: 40 INJECTION, POWDER, FOR SOLUTION INTRAVENOUS at 19:22

## 2020-01-01 RX ADMIN — IPRATROPIUM BROMIDE 2 PUFF: 17 AEROSOL, METERED RESPIRATORY (INHALATION) at 06:56

## 2020-01-01 RX ADMIN — BUDESONIDE AND FORMOTEROL FUMARATE DIHYDRATE 2 PUFF: 160; 4.5 AEROSOL RESPIRATORY (INHALATION) at 19:20

## 2020-01-01 RX ADMIN — FENTANYL CITRATE 25 MCG: 50 INJECTION, SOLUTION INTRAMUSCULAR; INTRAVENOUS at 15:54

## 2020-01-01 RX ADMIN — RIFAXIMIN 550 MG: 550 TABLET ORAL at 21:44

## 2020-01-01 RX ADMIN — NOREPINEPHRINE BITARTRATE 0.02 MCG/KG/MIN: 1 INJECTION, SOLUTION, CONCENTRATE INTRAVENOUS at 01:58

## 2020-01-01 RX ADMIN — IPRATROPIUM BROMIDE 2 PUFF: 17 AEROSOL, METERED RESPIRATORY (INHALATION) at 11:27

## 2020-01-02 PROBLEM — G93.40 ENCEPHALOPATHY: Status: ACTIVE | Noted: 2020-01-01

## 2020-01-02 NOTE — PLAN OF CARE
Problem: Patient Care Overview  Goal: Plan of Care Review  Outcome: Ongoing (interventions implemented as appropriate)  Flowsheets (Taken 1/2/2020 0105)  Plan of Care Reviewed With: patient  Outcome Summary: PT eval completed on this date. Patient lethargic and confused throughout evaluation. Bed mobility: Supervision for Supine<>sit transfer with HOB slightly elevated and bed rails. Sit<>stand transfer not tested secondary to confusion and patient unwilling to open eyes even though he was still talking. Balance: SBA for EOB sitting for 3 to 4 minutes. Patient would benefit from skilled PT to address deficits stated above. His current cognition status is the most limiting aspect at this time. Anticipate therapy at the next level.

## 2020-01-02 NOTE — CONSULTS
SUBJECTIVE:   1/2/2020    Name: Cristo Musa  DOD: 1950    REASON FOR CONSULT: Anemia and melena    Chief Complaint:     Chief Complaint   Patient presents with   • Coughing Up Blood   • Rectal Bleeding       Subjective     Patient is 69 y.o. male with past medical history of anemia chronic disease, cataract, congestive heart failure, chronic pain syndrome, chronic insufficiency, COPD, coronary artery disease, depression, diabetes mellitus, glaucoma, hepatitis C, cirrhosis presents with change in mental status and melena.  Patient is confused and unable to provide any history.  He denies abdominal pain, nausea or vomiting.  He apparently had multiple bouts of melena since admission.  Noted to have drop in hemoglobin and hematocrit.  No history of NSAID usage.  Most recent EGD in 11/19 was consistent with grade 2 varices.  Colonoscopy was consistent with angiectasia, diverticulosis, hemorrhoids and colon polyp.     ROS/HISTORY/ CURRENT MEDICATIONS/OBJECTIVE/VS/PE:   Review of Systems:   Review of Systems   Constitutional: Negative for chills, fatigue, fever and unexpected weight change.   HENT: Negative for congestion, ear discharge, hearing loss, nosebleeds and sore throat.    Eyes: Negative for pain, discharge and redness.   Respiratory: Negative for cough, chest tightness, shortness of breath and wheezing.    Cardiovascular: Negative for chest pain and palpitations.   Gastrointestinal: Positive for anal bleeding and blood in stool. Negative for abdominal distention, abdominal pain, constipation, diarrhea, nausea and vomiting.   Endocrine: Negative for cold intolerance, polydipsia, polyphagia and polyuria.   Genitourinary: Negative for dysuria, flank pain, frequency, hematuria and urgency.   Musculoskeletal: Negative for arthralgias, back pain, joint swelling and myalgias.   Skin: Negative for color change, pallor and rash.   Neurological: Negative for tremors, seizures, syncope, weakness and  headaches.   Hematological: Negative for adenopathy. Does not bruise/bleed easily.   Psychiatric/Behavioral: Positive for confusion. Negative for behavioral problems, dysphoric mood, hallucinations and suicidal ideas. The patient is not nervous/anxious.        History:     Past Medical History:   Diagnosis Date   • Anemia of chronic disease    • Cataract    • CHF (congestive heart failure) (CMS/HCC)    • Chronic pain syndrome    • CKD (chronic kidney disease)    • Clostridium difficile infection    • COPD (chronic obstructive pulmonary disease) (CMS/HCC)    • Coronary artery disease    • Depression    • Diabetes mellitus (CMS/HCC)    • Dialysis patient (CMS/HCC)    • GERD (gastroesophageal reflux disease)    • Glaucoma    • H/O cardiac pacemaker     patient states he got his pacemaker removed in [place]   • Heart block    • Hepatitis C    • History of transfusion    • Hypertension    • Nephropathy, diabetic (CMS/HCC)    • Pacemaker    • Pulmonary embolism (CMS/HCC)      Past Surgical History:   Procedure Laterality Date   • ABDOMINAL SURGERY     • ARTERIOVENOUS FISTULA/SHUNT SURGERY Right     forearm loop   • ARTERIOVENOUS FISTULA/SHUNT SURGERY Left     removed past upper arm   • ARTERIOVENOUS FISTULA/SHUNT SURGERY Right 3/13/2018    Procedure: revision RIGHT forearm ARTERIOVENOUS graft (excision), debridement, wound vac;  Surgeon: Jerson Singh MD;  Location: [facility];  Service: Vascular   • ARTERIOVENOUS FISTULA/SHUNT SURGERY W/ HEMODIALYSIS CATHETER INSERTION Right 4/4/2016    Procedure: RIGHT ARM AV FISTULA DECLOT REVISION REPAIR BRACHIAL ANASTOMOTIC PSUEDOANEURYSM LEFT FEMORAL RICHARDSON FISTULOGRAM WITH ANGIOPLASTY;  Surgeon: Jerson Carpenter MD;  Location: [facility] OR 18/19;  Service:    • CATARACT EXTRACTION W/ INTRAOCULAR LENS IMPLANT Left 3/31/2017    Procedure: REMOVE CATARACT AND IMPLANT INTRAOCULAR LENS LEFT EYE;  Surgeon: Hilton Montemayor MD;  Location: [facility];  Service:    •  COLONOSCOPY N/A 3/12/2019    Procedure: COLONOSCOPY;  Surgeon: Flako Montoya MD;  Location: [facility] ENDOSCOPY;  Service: Gastroenterology   • COLONOSCOPY N/A 11/6/2019    Procedure: COLONOSCOPY;  Surgeon: Tricia Philip MD;  Location: [facility] ENDOSCOPY;  Service: Gastroenterology   • ENDOSCOPY N/A 3/12/2019    Procedure: ESOPHAGOGASTRODUODENOSCOPY;  Surgeon: Flako Montoya MD;  Location: [facility] ENDOSCOPY;  Service: Gastroenterology   • ENDOSCOPY N/A 11/5/2019    Procedure: ESOPHAGOGASTRODUODENOSCOPY;  Surgeon: Tricia Philip MD;  Location: [facility] ENDOSCOPY;  Service: Gastroenterology   • EYE SURGERY     • HERNIA REPAIR     • IMPLANTABLE CARDIOVERTER DEFIBRILLATOR LEAD REPLACEMENT/POCKET REVISION Right 4/11/2016    Procedure: PACEMAKER LEADS X 3 AND BATTERY EXTRACTION WITH EXIMER LASER;  Surgeon: Vern Reeves MD;  Location: [facility] OR 18/19;  Service:    • INSERTION HEMODIALYSIS CATHETER Right 05/2015    jugular   • INTERVENTIONAL RADIOLOGY PROCEDURE Right 6/1/2017    Procedure: RIGHT dialysis fistulagram & angioplasty;  Surgeon: Jerson Singh MD;  Location: [facility] ANGIO INVASIVE LOCATION;  Service:    • INTERVENTIONAL RADIOLOGY PROCEDURE Right 8/24/2017    Procedure: IR dialysis fistulagram;  Surgeon: Jerson Singh MD;  Location: [facility] ANGIO INVASIVE LOCATION;  Service:    • INTERVENTIONAL RADIOLOGY PROCEDURE Right 11/13/2018    Procedure: IR dialysis fistulagram;  Surgeon: Jerson Singh MD;  Location: [facility] ANGIO INVASIVE LOCATION;  Service: Interventional Radiology   • PACEMAKER IMPLANTATION  2008    dual chamber [employer] [place]   • REMOVAL HEMODIALYSIS CATHETER Right 05/2015    femoral vein   • SIGMOIDOSCOPY N/A 8/2/2019    Procedure: SIGMOIDOSCOPY FLEXIBLE;  Surgeon: Flako Montoya MD;  Location: [facility] ENDOSCOPY;  Service: Gastroenterology     Family History   Problem Relation Age of Onset   • COPD Sister    • Diabetes Brother    • Early death  Brother    • Hyperlipidemia Brother    • Hypertension Brother    • Coronary artery disease Mother    • Diabetes Mother    • Hypertension Mother    • Stroke Other    • Other Other         Respiratory disorder     Social History     Tobacco Use   • Smoking status: Former Smoker     Types: Cigarettes   • Smokeless tobacco: Never Used   • Tobacco comment: quit 20 years ago    Substance Use Topics   • Alcohol use: No     Comment: former heavy use in his 50's, up to a fifth of liquor a day, currently no alcohol   • Drug use: No     Medications Prior to Admission   Medication Sig Dispense Refill Last Dose   • acetaminophen (TYLENOL) 500 MG tablet Take 500 mg by mouth Every 4 (Four) Hours As Needed for Mild Pain  or Fever.   Unknown at Unknown time   • aspirin 81 MG chewable tablet Chew 81 mg Daily.   11/19/2019 at Unknown time   • benzonatate (TESSALON) 100 MG capsule Take 100 mg by mouth 3 (Three) Times a Day As Needed for Cough.   Unknown at Unknown time   • brimonidine (ALPHAGAN P) 0.1 % solution ophthalmic solution Administer  to both eyes 3 (Three) Times a Day.   11/19/2019 at Unknown time   • Cholecalciferol (VITAMIN D3) 2000 units chewable tablet Chew 2,000 Units Daily.   11/19/2019 at Unknown time   • cyclobenzaprine (FLEXERIL) 5 MG tablet Take 5 mg by mouth Every 8 (Eight) Hours As Needed for Muscle Spasms.   Unknown at Unknown time   • dorzolamide (TRUSOPT) 2 % ophthalmic solution Administer 1 drop into the left eye 3 (Three) Times a Day. 1 each 12 11/19/2019 at Unknown time   • escitalopram (LEXAPRO) 10 MG tablet Take 1 tablet by mouth Daily. 30 tablet 3 11/19/2019 at Unknown time   • famotidine (PEPCID) 20 MG tablet Take 20 mg by mouth every night at bedtime.   11/18/2019 at Unknown time   • fluticasone (FLONASE) 50 MCG/ACT nasal spray 2 sprays into each nostril daily. Administer 2 sprays in each nostril for each dose.   Unknown at Unknown time   • furosemide (LASIX) 40 MG tablet Take 40 mg by mouth 2 (Two)  Times a Day.      • gabapentin (NEURONTIN) 100 MG capsule Take 1 capsule by mouth Every Night. 30 capsule 0    • Glecaprevir-Pibrentasvir (MAVYRET) 100-40 MG tablet Take 3 tablets by mouth Daily. 90 tablet 2 11/19/2019 at Unknown time   • guaiFENesin (HUMIBID 3) 400 MG tablet Take 400 mg by mouth Every 4 (Four) Hours As Needed for Cough.   Unknown at Unknown time   • insulin aspart (NovoLOG) 100 UNIT/ML injection Inject  under the skin 3 (Three) Times a Day Before Meals. Sliding scale:  = 0 units; 150-200 = 3 units; 200-250 = 6 units; 250-300 = 9 units; 300-350 = 12 units; >350 = 15 units and call MD   11/19/2019 at Unknown time   • insulin detemir (LEVEMIR) 100 UNIT/ML injection Inject 30 Units under the skin into the appropriate area as directed Daily.   11/19/2019 at Unknown time   • lactulose (CHRONULAC) 10 GM/15ML solution Take 30 mL by mouth 3 (Three) Times a Day. (Patient taking differently: Take 30 mL by mouth 3 (Three) Times a Day.) 946 mL 3 11/19/2019 at Unknown time   • latanoprost (XALATAN) 0.005 % ophthalmic solution Administer 1 drop to both eyes every night.   11/18/2019 at Unknown time   • Lidocaine (ASPERCREME LIDOCAINE) 4 % patch Apply 1 patch topically Daily As Needed (low back pain. on for 12 hours.).   Unknown at Unknown time   • Loratadine 10 MG capsule Take 10 mg by mouth Every Other Day.   11/18/2019 at Unknown time   • melatonin 5 MG tablet tablet Take 5 mg by mouth Every Night.   11/18/2019 at Unknown time   • ondansetron ODT (ZOFRAN-ODT) 4 MG disintegrating tablet Take 1 tablet by mouth Every 6 (Six) Hours As Needed for Nausea or Vomiting. 10 tablet 0 Unknown at Unknown time   • polyethylene glycol (MIRALAX) packet Take 17 g by mouth Daily As Needed (constipation).   11/19/2019 at Unknown time   • rifaximin (XIFAXAN) 550 MG tablet Take 1 tablet by mouth Every 12 (Twelve) Hours. 60 tablet 5 Unknown at Unknown time   • sevelamer (RENVELA) 800 MG tablet Take 800 mg by mouth 3 (Three)  Times a Day With Meals.   11/19/2019 at Unknown time   • traZODone (DESYREL) 50 MG tablet Take 50 mg by mouth every night at bedtime.   11/18/2019 at Unknown time     Allergies:  Lisinopril and Motrin [ibuprofen]    I have reviewed the patient's medical history, surgical history and family history in the available medical record system.     Current Medications:     Current Facility-Administered Medications   Medication Dose Route Frequency Provider Last Rate Last Dose   • acetaminophen (TYLENOL) tablet 650 mg  650 mg Oral Q4H PRN Warren Stewart MD        Or   • acetaminophen (TYLENOL) suppository 650 mg  650 mg Rectal Q4H PRN Warren Stewart MD       • albuterol (PROVENTIL) nebulizer solution 0.083% 2.5 mg/3mL  2.5 mg Nebulization Q6H PRN Warren Stewart MD       • aspirin chewable tablet 81 mg  81 mg Oral Daily Warren Stewart MD       • cetirizine (zyrTEC) tablet 10 mg  10 mg Oral Daily Warren Stewart MD       • cyclobenzaprine (FLEXERIL) tablet 5 mg  5 mg Oral Q8H PRN Warren Stewart MD       • dextrose (D50W) 25 g/ 50mL Intravenous Solution 25 g  25 g Intravenous Q15 Min PRN Warren Stewart MD       • dextrose (GLUTOSE) oral gel 15 g  15 g Oral Q15 Min PRN Warren Stewart MD       • docusate sodium (COLACE) capsule 100 mg  100 mg Oral BID PRN Warren Stewart MD       • escitalopram (LEXAPRO) tablet 10 mg  10 mg Oral Daily Warren Stewart MD       • gabapentin (NEURONTIN) capsule 100 mg  100 mg Oral Nightly Warren Stewart MD       • glucagon (human recombinant) (GLUCAGEN DIAGNOSTIC) injection 1 mg  1 mg Subcutaneous Q15 Min PRN Warren Stewart MD       • hydrOXYzine pamoate (VISTARIL) capsule 50 mg  50 mg Oral TID PRN Guero Huynh MD   50 mg at 01/02/20 1423   • insulin aspart (novoLOG) injection 0-14 Units  0-14 Units Subcutaneous 4x Daily AC & at Bedtime Warren Stewart MD       • insulin detemir (LEVEMIR) injection 30 Units  30 Units Subcutaneous Daily Warren Stewart MD   30  Units at 01/02/20 1013   • ipratropium-albuterol (DUO-NEB) nebulizer solution 3 mL  3 mL Nebulization 4x Daily - RT Warren Stewart MD   3 mL at 01/02/20 1430   • lactulose (CHRONULAC) 10 GM/15ML solution 30 mL  30 mL Oral TID Warren Stewart MD   30 mL at 01/02/20 1423   • latanoprost (XALATAN) 0.005 % ophthalmic solution 1 drop  1 drop Both Eyes Nightly Warren Stewart MD       • lidocaine (LIDODERM) 5 % 1 patch  1 patch Transdermal Q24H Warren Stewart MD   1 patch at 01/02/20 1013   • melatonin tablet 6 mg  6 mg Oral Nightly Warren Stewart MD       • ondansetron (ZOFRAN) injection 4 mg  4 mg Intravenous Q6H PRN Warren Stewart MD       • pantoprazole (PROTONIX) injection 40 mg  40 mg Intravenous BID AC Warren Stewart MD       • riFAXIMin (XIFAXAN) tablet 550 mg  550 mg Oral Q12H Warren Stewart MD   550 mg at 01/02/20 1423   • sevelamer (RENVELA) tablet 800 mg  800 mg Oral TID With Meals Warren Stewart MD       • sodium chloride 0.9 % flush 10 mL  10 mL Intravenous Q12H Warren Stewart MD       • sodium chloride 0.9 % flush 10 mL  10 mL Intravenous PRN Warren Stewart MD       • traZODone (DESYREL) tablet 50 mg  50 mg Oral Nightly PRN Warren Stewart MD           Objective     Physical Exam:   Temp:  [99 °F (37.2 °C)-99.6 °F (37.6 °C)] 99.6 °F (37.6 °C)  Heart Rate:  [77-88] 88  Resp:  [19-24] 22  BP: (110-154)/(58-91) 115/58    Physical Exam:  General Appearance:   Confused, in no acute distress   Head:    Normocephalic, without obvious abnormality, atraumatic   Eyes:            Lids and lashes normal, conjunctivae and sclerae normal, no   icterus, no pallor, corneas clear, PERRLA   Ears:    Ears appear intact with no abnormalities noted   Throat:   No oral lesions, no thrush, oral mucosa moist   Neck:   No adenopathy, supple, trachea midline, no thyromegaly, no     carotid bruit, no JVD   Back:     No kyphosis present, no scoliosis present, no skin lesions,       erythema or scars,  no tenderness to percussion or                   palpation,   range of motion normal   Lungs:     Clear to auscultation,respirations regular, even and                   unlabored    Heart:    Regular rhythm and normal rate, normal S1 and S2, no            murmur, no gallop, no rub, no click   Breast Exam:    Deferred   Abdomen:     Normal bowel sounds, no masses, no organomegaly, soft        non-tender, non-distended, no guarding, no rebound                 tenderness   Genitalia:    Deferred   Extremities:   Moves all extremities well, no edema, no cyanosis, no              redness   Pulses:   Pulses palpable and equal bilaterally   Skin:   No bleeding, bruising or rash   Lymph nodes:   No palpable adenopathy   Neurologic:   Cranial nerves 2 - 12 grossly intact, sensation intact, DTR        present and equal bilaterally      Results Review:     Lab Results   Component Value Date    WBC 5.97 01/02/2020    WBC 4.81 12/30/2019    WBC 4.29 12/06/2019    HGB 7.9 (L) 01/02/2020    HGB 8.1 (L) 12/30/2019    HGB 8.2 (L) 12/06/2019    HCT 24.7 (L) 01/02/2020    HCT 25.4 (L) 12/30/2019    HCT 25.8 (L) 12/06/2019     (L) 01/02/2020     (L) 12/30/2019     (L) 12/06/2019     Results from last 7 days   Lab Units 01/02/20  0358 12/30/19  2341   ALK PHOS U/L 118* 167*   ALT (SGPT) U/L 5 5   AST (SGOT) U/L 18 14     Results from last 7 days   Lab Units 01/02/20  0358 12/30/19  2341   BILIRUBIN mg/dL 3.1* 1.2   ALK PHOS U/L 118* 167*     No results found for: LIPASE  Lab Results   Component Value Date    INR 1.40 (H) 01/02/2020    INR 1.19 12/30/2019    INR 1.23 (H) 11/19/2019         Radiology Review:  Imaging Results (Last 72 Hours)     Procedure Component Value Units Date/Time    XR Chest 1 View [[id number]] Collected:  01/02/20 0302     Updated:  01/02/20 0343    Narrative:         Chest single view on  1/2/2020     CLINICAL INDICATION: Cough, shortness of breath    COMPARISON: 12/30/2019    FINDINGS: Heart  is borderline in size. Minimal vascular  congestion is noted. The lungs are otherwise clear. Vascular  calcification is noted in the aorta. No bony abnormality is  noted.      Impression:       Borderline size heart with minimal vascular  congestion.    Electronically signed by:  Castro Ornelas  1/2/2020 3:42 AM CST  Workstation: [id number]          I reviewed the patient's new clinical results.    I reviewed the patient's new imaging results and agree with the interpretation.     ASSESSMENT/PLAN:   ASSESSMENT: 1.  Melena decrease in hemoglobin to 7.9 likely due to upper GI bleed.  Could also be due to lower GI bleed due to angiectasia given the history of angiectasia with hemorrhage.  Patient stable clinically and hemodynamically.  Cirrhosis  Hepatic encephalopathy    PLAN: Continue PPI, bowel rest and follow H&H closely.  Transfuse as needed.  Continue lactulose.  Follow ammonia closely.  Proceed with EGD today.  Colonoscopy in a.m. if EGD is unremarkable.  The risks, benefits, and alternatives of this procedure have been discussed with the patient or the responsible party. The patient understands and agrees to proceed.         Tricia Philip MD  01/02/20  4:28 PM

## 2020-01-02 NOTE — ANESTHESIA PREPROCEDURE EVALUATION
Anesthesia Evaluation     Patient summary reviewed and Nursing notes reviewed   NPO Solid Status: > 8 hours  NPO Liquid Status: > 8 hours           Airway   Mallampati: III  TM distance: >3 FB  Neck ROM: limited  Possible difficult intubation  Dental    (+) poor dentition    Pulmonary    (+) pneumonia resolved , pulmonary embolism, a smoker Former, COPD, sleep apnea on CPAP, decreased breath sounds,   Cardiovascular - normal exam    (+) pacemaker pacemaker, hypertension, past MI  >12 months, CAD, CHF ,       Neuro/Psych  (+) psychiatric history Anxiety and Depression,     GI/Hepatic/Renal/Endo    (+)  GERD poorly controlled, GI bleeding , hepatitis C, liver disease, renal disease ESRD and dialysis, diabetes mellitus type 2 poorly controlled using insulin,     ROS Comment: Rectal bleeding    Musculoskeletal     Abdominal   (+) obese,    Substance History      OB/GYN          Other                          Anesthesia Plan    ASA 4     MAC     intravenous induction     Anesthetic plan, all risks, benefits, and alternatives have been provided, discussed and informed consent has been obtained with: patient.    Plan discussed with CRNA.

## 2020-01-02 NOTE — CONSULTS
[facility]  [address] [place] [address]. [address]  T - [phone]   - [phone]     Consultation         PATIENT  DEMOGRAPHICS   PATIENT NAME: Cristo Araiza Lencho                      PHYSICIAN: LUIS Chamberlain  : 1950  MRN: [id number]    Subjective   SUBJECTIVE   Referring Provider: Dr. Stewart  Reason for Consultation: ESRD on HD  History of present illness: This is a 69-year-old -American male with past medical history significant for ESRD, hemodialysis, diabetes mellitus type 2, hypertension, heart block status post pacemaker placement, subsequent removal of pacemaker due to infection, COPD, chronic hepatitis C, longstanding history of GI bleeds who was referred to emergency department by his nursing home for a 4-day history of blood in stool and worsening confusion.  Patient was admitted for further management and today nephrology is consulted to manage the patient's ESRD.    Past Medical History:   Diagnosis Date   • Anemia of chronic disease    • Cataract    • CHF (congestive heart failure) (CMS/HCC)    • Chronic pain syndrome    • CKD (chronic kidney disease)    • Clostridium difficile infection    • COPD (chronic obstructive pulmonary disease) (CMS/HCC)    • Coronary artery disease    • Depression    • Diabetes mellitus (CMS/HCC)    • Dialysis patient (CMS/HCC)    • GERD (gastroesophageal reflux disease)    • Glaucoma    • H/O cardiac pacemaker     patient states he got his pacemaker removed in [place]   • Heart block    • Hepatitis C    • History of transfusion    • Hypertension    • Nephropathy, diabetic (CMS/HCC)    • Pacemaker    • Pulmonary embolism (CMS/HCC)      Past Surgical History:   Procedure Laterality Date   • ABDOMINAL SURGERY     • ARTERIOVENOUS FISTULA/SHUNT SURGERY Right     forearm loop   • ARTERIOVENOUS FISTULA/SHUNT SURGERY Left     removed past upper arm   • ARTERIOVENOUS FISTULA/SHUNT SURGERY Right 3/13/2018    Procedure:  revision RIGHT forearm ARTERIOVENOUS graft (excision), debridement, wound vac;  Surgeon: Jerson Singh MD;  Location: [facility] OR;  Service: Vascular   • ARTERIOVENOUS FISTULA/SHUNT SURGERY W/ HEMODIALYSIS CATHETER INSERTION Right 4/4/2016    Procedure: RIGHT ARM AV FISTULA DECLOT REVISION REPAIR BRACHIAL ANASTOMOTIC PSUEDOANEURYSM LEFT FEMORAL RICHARDSON FISTULOGRAM WITH ANGIOPLASTY;  Surgeon: Jerson Carpenter MD;  Location: [facility] OR 18/19;  Service:    • CATARACT EXTRACTION W/ INTRAOCULAR LENS IMPLANT Left 3/31/2017    Procedure: REMOVE CATARACT AND IMPLANT INTRAOCULAR LENS LEFT EYE;  Surgeon: Hilton Montemayor MD;  Location: [facility] OR;  Service:    • COLONOSCOPY N/A 3/12/2019    Procedure: COLONOSCOPY;  Surgeon: Flako Montoya MD;  Location: [facility] ENDOSCOPY;  Service: Gastroenterology   • COLONOSCOPY N/A 11/6/2019    Procedure: COLONOSCOPY;  Surgeon: Tricia Philip MD;  Location: [facility] ENDOSCOPY;  Service: Gastroenterology   • ENDOSCOPY N/A 3/12/2019    Procedure: ESOPHAGOGASTRODUODENOSCOPY;  Surgeon: Flako Montoya MD;  Location: [facility] ENDOSCOPY;  Service: Gastroenterology   • ENDOSCOPY N/A 11/5/2019    Procedure: ESOPHAGOGASTRODUODENOSCOPY;  Surgeon: Tricia Philip MD;  Location: [facility] ENDOSCOPY;  Service: Gastroenterology   • EYE SURGERY     • HERNIA REPAIR     • IMPLANTABLE CARDIOVERTER DEFIBRILLATOR LEAD REPLACEMENT/POCKET REVISION Right 4/11/2016    Procedure: PACEMAKER LEADS X 3 AND BATTERY EXTRACTION WITH EXIMER LASER;  Surgeon: Vern Reeves MD;  Location: [facility] OR 18/19;  Service:    • INSERTION HEMODIALYSIS CATHETER Right 05/2015    jugular   • INTERVENTIONAL RADIOLOGY PROCEDURE Right 6/1/2017    Procedure: RIGHT dialysis fistulagram & angioplasty;  Surgeon: Jerson Singh MD;  Location: [facility] ANGIO INVASIVE LOCATION;  Service:    • INTERVENTIONAL RADIOLOGY PROCEDURE Right 8/24/2017    Procedure: IR dialysis fistulagram;  Surgeon: Jerson Rogel  MD Francisco;  Location: [facility] ANGIO INVASIVE LOCATION;  Service:    • INTERVENTIONAL RADIOLOGY PROCEDURE Right 11/13/2018    Procedure: IR dialysis fistulagram;  Surgeon: Jerson Singh MD;  Location: [facility] ANGIO INVASIVE LOCATION;  Service: Interventional Radiology   • PACEMAKER IMPLANTATION  2008    dual chamber [employer]   • REMOVAL HEMODIALYSIS CATHETER Right 05/2015    femoral vein   • SIGMOIDOSCOPY N/A 8/2/2019    Procedure: SIGMOIDOSCOPY FLEXIBLE;  Surgeon: Flako Montoya MD;  Location: [facility] ENDOSCOPY;  Service: Gastroenterology     Family History   Problem Relation Age of Onset   • COPD Sister    • Diabetes Brother    • Early death Brother    • Hyperlipidemia Brother    • Hypertension Brother    • Coronary artery disease Mother    • Diabetes Mother    • Hypertension Mother    • Stroke Other    • Other Other         Respiratory disorder     Social History     Tobacco Use   • Smoking status: Former Smoker     Types: Cigarettes   • Smokeless tobacco: Never Used   • Tobacco comment: quit 20 years ago    Substance Use Topics   • Alcohol use: No     Comment: former heavy use in his 50's, up to a fifth of liquor a day, currently no alcohol   • Drug use: No     Allergies:  Lisinopril and Motrin [ibuprofen]     REVIEW OF SYSTEMS    Review of Systems   Gastrointestinal: Positive for blood in stool and diarrhea.   All other systems reviewed and are negative.      Objective   OBJECTIVE   Vital Signs  Temp:  [99 °F (37.2 °C)-99.6 °F (37.6 °C)] 99.6 °F (37.6 °C)  Heart Rate:  [77-84] 78  Resp:  [19-24] 20  BP: (110-154)/(58-91) 115/58    Flowsheet Rows      First Filed Value   Admission Height  --   Admission Weight  95.7 kg (211 lb) Documented at 01/02/2020 0119           No intake/output data recorded.    PHYSICAL EXAM    Physical Exam   Constitutional: He is oriented to person, place, and time. He appears well-developed and well-nourished.   HENT:   Head: Normocephalic and atraumatic.    Eyes: Pupils are equal, round, and reactive to light. Conjunctivae and EOM are normal.   Cardiovascular: Normal rate and regular rhythm.   Pulmonary/Chest: Effort normal and breath sounds normal.   Abdominal: Soft.   Musculoskeletal: He exhibits edema.   Neurological: He is alert and oriented to person, place, and time.   Skin: Skin is warm and dry.       RESULTS   Results Review:    Results from last 7 days   Lab Units 01/02/20  0358 12/30/19  2341   SODIUM mmol/L 133* 131*   POTASSIUM mmol/L 5.0 4.2   CHLORIDE mmol/L 92* 90*   CO2 mmol/L 25.0 30.0*   BUN mg/dL 56* 33*   CREATININE mg/dL 7.77* 5.86*   CALCIUM mg/dL 9.0 8.9   BILIRUBIN mg/dL 3.1* 1.2   ALK PHOS U/L 118* 167*   ALT (SGPT) U/L 5 5   AST (SGOT) U/L 18 14   GLUCOSE mg/dL 113* 174*       Estimated Creatinine Clearance: 10.2 mL/min (A) (by C-G formula based on SCr of 7.77 mg/dL (H)).                Results from last 7 days   Lab Units 01/02/20  0359 12/30/19  2341   WBC 10*3/mm3 5.97 4.81   HEMOGLOBIN g/dL 7.9* 8.1*   PLATELETS 10*3/mm3 118* 110*       Results from last 7 days   Lab Units 01/02/20  0358 12/30/19  2341   INR  1.40* 1.19        MEDICATIONS      aspirin 81 mg Oral Daily   cetirizine 10 mg Oral Daily   escitalopram 10 mg Oral Daily   gabapentin 100 mg Oral Nightly   insulin aspart 0-14 Units Subcutaneous 4x Daily AC & at Bedtime   insulin detemir 30 Units Subcutaneous Daily   ipratropium-albuterol 3 mL Nebulization 4x Daily - RT   lactulose 30 mL Oral TID   latanoprost 1 drop Both Eyes Nightly   lidocaine 1 patch Transdermal Q24H   melatonin 6 mg Oral Nightly   pantoprazole 40 mg Intravenous BID AC   riFAXIMin 550 mg Oral Q12H   sevelamer 800 mg Oral TID With Meals   sodium chloride 10 mL Intravenous Q12H        Medications Prior to Admission   Medication Sig Dispense Refill Last Dose   • acetaminophen (TYLENOL) 500 MG tablet Take 500 mg by mouth Every 4 (Four) Hours As Needed for Mild Pain  or Fever.   Unknown at Unknown time   • aspirin  81 MG chewable tablet Chew 81 mg Daily.   11/19/2019 at Unknown time   • benzonatate (TESSALON) 100 MG capsule Take 100 mg by mouth 3 (Three) Times a Day As Needed for Cough.   Unknown at Unknown time   • brimonidine (ALPHAGAN P) 0.1 % solution ophthalmic solution Administer  to both eyes 3 (Three) Times a Day.   11/19/2019 at Unknown time   • Cholecalciferol (VITAMIN D3) 2000 units chewable tablet Chew 2,000 Units Daily.   11/19/2019 at Unknown time   • cyclobenzaprine (FLEXERIL) 5 MG tablet Take 5 mg by mouth Every 8 (Eight) Hours As Needed for Muscle Spasms.   Unknown at Unknown time   • dorzolamide (TRUSOPT) 2 % ophthalmic solution Administer 1 drop into the left eye 3 (Three) Times a Day. 1 each 12 11/19/2019 at Unknown time   • escitalopram (LEXAPRO) 10 MG tablet Take 1 tablet by mouth Daily. 30 tablet 3 11/19/2019 at Unknown time   • famotidine (PEPCID) 20 MG tablet Take 20 mg by mouth every night at bedtime.   11/18/2019 at Unknown time   • fluticasone (FLONASE) 50 MCG/ACT nasal spray 2 sprays into each nostril daily. Administer 2 sprays in each nostril for each dose.   Unknown at Unknown time   • furosemide (LASIX) 40 MG tablet Take 40 mg by mouth 2 (Two) Times a Day.      • gabapentin (NEURONTIN) 100 MG capsule Take 1 capsule by mouth Every Night. 30 capsule 0    • Glecaprevir-Pibrentasvir (MAVYRET) 100-40 MG tablet Take 3 tablets by mouth Daily. 90 tablet 2 11/19/2019 at Unknown time   • guaiFENesin (HUMIBID 3) 400 MG tablet Take 400 mg by mouth Every 4 (Four) Hours As Needed for Cough.   Unknown at Unknown time   • insulin aspart (NovoLOG) 100 UNIT/ML injection Inject  under the skin 3 (Three) Times a Day Before Meals. Sliding scale:  = 0 units; 150-200 = 3 units; 200-250 = 6 units; 250-300 = 9 units; 300-350 = 12 units; >350 = 15 units and call MD   11/19/2019 at Unknown time   • insulin detemir (LEVEMIR) 100 UNIT/ML injection Inject 30 Units under the skin into the appropriate area as directed  Daily.   11/19/2019 at Unknown time   • lactulose (CHRONULAC) 10 GM/15ML solution Take 30 mL by mouth 3 (Three) Times a Day. (Patient taking differently: Take 30 mL by mouth 3 (Three) Times a Day.) 946 mL 3 11/19/2019 at Unknown time   • latanoprost (XALATAN) 0.005 % ophthalmic solution Administer 1 drop to both eyes every night.   11/18/2019 at Unknown time   • Lidocaine (ASPERCREME LIDOCAINE) 4 % patch Apply 1 patch topically Daily As Needed (low back pain. on for 12 hours.).   Unknown at Unknown time   • Loratadine 10 MG capsule Take 10 mg by mouth Every Other Day.   11/18/2019 at Unknown time   • melatonin 5 MG tablet tablet Take 5 mg by mouth Every Night.   11/18/2019 at Unknown time   • ondansetron ODT (ZOFRAN-ODT) 4 MG disintegrating tablet Take 1 tablet by mouth Every 6 (Six) Hours As Needed for Nausea or Vomiting. 10 tablet 0 Unknown at Unknown time   • polyethylene glycol (MIRALAX) packet Take 17 g by mouth Daily As Needed (constipation).   11/19/2019 at Unknown time   • rifaximin (XIFAXAN) 550 MG tablet Take 1 tablet by mouth Every 12 (Twelve) Hours. 60 tablet 5 Unknown at Unknown time   • sevelamer (RENVELA) 800 MG tablet Take 800 mg by mouth 3 (Three) Times a Day With Meals.   11/19/2019 at Unknown time   • traZODone (DESYREL) 50 MG tablet Take 50 mg by mouth every night at bedtime.   11/18/2019 at Unknown time     Assessment/Plan   ASSESSMENT / PLAN      Gastrointestinal hemorrhage    Type 2 diabetes mellitus with chronic kidney disease on chronic dialysis, with long-term current use of insulin (CMS/[facility])    Essential hypertension    ESRD (end stage renal disease) (CMS/[facility])    Primary insomnia    Chronic pain    Anxiety    Gastroesophageal reflux disease without esophagitis    COPD (chronic obstructive pulmonary disease) (CMS/[facility])    Chronic heart failure with preserved ejection fraction (CMS/[facility])    Encephalopathy    1.  ESRD on HD- last hemodialysis was Tuesday, however the patient did not complete  his treatment and left more than 2 hours early.  Today the patient's labs are stable and on exam he does not have significant fluid overload.  We will plan for dialysis tomorrow.    2.  GI bleed- management per primary team    3.  Heart failure with preserved ejection fraction- manage volume with hemodialysis    4.  COPD    5.  Chronic hepatitis C    6.  Anemia- hemoglobin fairly stable    7.  Hypertension      Thank you for the consult, we will continue to follow this patient.         I discussed the patients findings and my recommendations with patient      This document has been electronically signed by LUIS Chamberlain on January 2, 2020 1:26 PM

## 2020-01-02 NOTE — ANESTHESIA POSTPROCEDURE EVALUATION
Patient: Cristo Musa    Procedure Summary     Date:  01/02/20 Room / Location:  [facility] ENDOSCOPY 1 / [facility] ENDOSCOPY    Anesthesia Start:  1722 Anesthesia Stop:  1736    Procedure:  ESOPHAGOGASTRODUODENOSCOPY (N/A ) Diagnosis:       Gastroesophageal reflux disease without esophagitis      (Gastroesophageal reflux disease without esophagitis [K21.9])    Surgeon:  Tricia Philip MD Provider:  Forrest Gibson CRNA    Anesthesia Type:  MAC ASA Status:  4          Anesthesia Type: MAC    Vitals  No vitals data found for the desired time range.          Post Anesthesia Care and Evaluation    Patient location during evaluation: bedside  Patient participation: complete - patient cannot participate  Level of consciousness: awake, awake and alert and responsive to noxious stimuli  Pain score: 0  Pain management: adequate  Airway patency: patent  Anesthetic complications: anesthesia complication  PONV Status: none  Cardiovascular status: unstable  Respiratory status: ETT, ventilator and intubated  Hydration status: acceptable    Comments: Upon end of procedure became apneic, bag mask ventilation attempted, unable to obtain O2 saturation, heart rate and BP deteriorating.  Pt intubated with 7.5 ETT @ 23cm at the lip.  Pt now asystolic on monitor, unable to palpate pluse.  Chest compressions initiated.  Positive CO2 color change with compression. ACLS protocol initiated.  See code documentation per nurse.  Transported to ICU once stable.  Report to ICU at bedside.  A-line placed per anesthesia.  Family notified of situation.

## 2020-01-02 NOTE — H&P
HISTORY AND PHYSICAL  NAME: Cristo Musa  : 1950  MRN: [id number]    DATE OF ADMISSION:  2020     DATE & TIME SEEN: 20 at 0545    PCP: Warren Stewart MD    CODE STATUS: Full code    CHIEF COMPLAINT:  Blood in stool    HPI:  Cristo Musa is a 69 y.o. male with a CMH of DMT2, HTN, ESRD, chronic pain, and COPD who presents complaining of  Bloody vomitus and blood in stool x4 days. Of note, Pt is unable to provide much information, NF contacted for further info.  Pt has been defecating on himself as of yesterday and they believe he is below his baseline mental functioning. Pt was previously admitted for similar symptoms one month ago and presented to ER 2 days ago with coughing up blood. Received EGD and colonoscopy at previous admission with clipping of angiectasia.  He is on Lactulose outpatient and has been receiving his proper dose at the nursing facility. No complaints of any abdominal pain. He has had 3-4 episodes of loose bowel movements x2 days. Pt is a F dialysis pt.     In ED pt received CXR which showed pulmonary vascular congestion. H/H showed 7.9/24.7. He is currently being admitted for GI bleed.    CONCURRENT MEDICAL HISTORY:  Past Medical History:   Diagnosis Date   • Anemia of chronic disease    • Cataract    • CHF (congestive heart failure) (CMS/HCC)    • Chronic pain syndrome    • CKD (chronic kidney disease)    • Clostridium difficile infection    • COPD (chronic obstructive pulmonary disease) (CMS/HCC)    • Coronary artery disease    • Depression    • Diabetes mellitus (CMS/HCC)    • Dialysis patient (CMS/HCC)    • GERD (gastroesophageal reflux disease)    • Glaucoma    • H/O cardiac pacemaker     patient states he got his pacemaker removed in [place]   • Heart block    • Hepatitis C    • History of transfusion    • Hypertension    • Nephropathy, diabetic (CMS/HCC)    • Pacemaker    • Pulmonary embolism (CMS/HCC)        PAST SURGICAL  HISTORY:  Past Surgical History:   Procedure Laterality Date   • ABDOMINAL SURGERY     • ARTERIOVENOUS FISTULA/SHUNT SURGERY Right     forearm loop   • ARTERIOVENOUS FISTULA/SHUNT SURGERY Left     removed past upper arm   • ARTERIOVENOUS FISTULA/SHUNT SURGERY Right 3/13/2018    Procedure: revision RIGHT forearm ARTERIOVENOUS graft (excision), debridement, wound vac;  Surgeon: Jerson Singh MD;  Location: [facility] OR;  Service: Vascular   • ARTERIOVENOUS FISTULA/SHUNT SURGERY W/ HEMODIALYSIS CATHETER INSERTION Right 4/4/2016    Procedure: RIGHT ARM AV FISTULA DECLOT REVISION REPAIR BRACHIAL ANASTOMOTIC PSUEDOANEURYSM LEFT FEMORAL RICHARDSON FISTULOGRAM WITH ANGIOPLASTY;  Surgeon: Jerson Carpenter MD;  Location: [facility] OR 18/19;  Service:    • CATARACT EXTRACTION W/ INTRAOCULAR LENS IMPLANT Left 3/31/2017    Procedure: REMOVE CATARACT AND IMPLANT INTRAOCULAR LENS LEFT EYE;  Surgeon: Hilton Montemayor MD;  Location: [facility] OR;  Service:    • COLONOSCOPY N/A 3/12/2019    Procedure: COLONOSCOPY;  Surgeon: Flako Montoya MD;  Location: [facility] ENDOSCOPY;  Service: Gastroenterology   • COLONOSCOPY N/A 11/6/2019    Procedure: COLONOSCOPY;  Surgeon: Tricia Philip MD;  Location: [facility] ENDOSCOPY;  Service: Gastroenterology   • ENDOSCOPY N/A 3/12/2019    Procedure: ESOPHAGOGASTRODUODENOSCOPY;  Surgeon: Flako Montoya MD;  Location: [facility] ENDOSCOPY;  Service: Gastroenterology   • ENDOSCOPY N/A 11/5/2019    Procedure: ESOPHAGOGASTRODUODENOSCOPY;  Surgeon: Tricia Philip MD;  Location: [facility] ENDOSCOPY;  Service: Gastroenterology   • EYE SURGERY     • HERNIA REPAIR     • IMPLANTABLE CARDIOVERTER DEFIBRILLATOR LEAD REPLACEMENT/POCKET REVISION Right 4/11/2016    Procedure: PACEMAKER LEADS X 3 AND BATTERY EXTRACTION WITH EXIMER LASER;  Surgeon: Vern Reeves MD;  Location: [facility] OR 18/19;  Service:    • INSERTION HEMODIALYSIS CATHETER Right 05/2015    jugular   • INTERVENTIONAL RADIOLOGY  PROCEDURE Right 6/1/2017    Procedure: RIGHT dialysis fistulagram & angioplasty;  Surgeon: Jerson Singh MD;  Location: [facility] ANGIO INVASIVE LOCATION;  Service:    • INTERVENTIONAL RADIOLOGY PROCEDURE Right 8/24/2017    Procedure: IR dialysis fistulagram;  Surgeon: Jerson Singh MD;  Location: [facility] ANGIO INVASIVE LOCATION;  Service:    • INTERVENTIONAL RADIOLOGY PROCEDURE Right 11/13/2018    Procedure: IR dialysis fistulagram;  Surgeon: Jerson Singh MD;  Location: [facility] ANGIO INVASIVE LOCATION;  Service: Interventional Radiology   • PACEMAKER IMPLANTATION  2008    dual chamber [employer]   • REMOVAL HEMODIALYSIS CATHETER Right 05/2015    femoral vein   • SIGMOIDOSCOPY N/A 8/2/2019    Procedure: SIGMOIDOSCOPY FLEXIBLE;  Surgeon: Flako Montoya MD;  Location: [facility] ENDOSCOPY;  Service: Gastroenterology       FAMILY HISTORY:  Family History   Problem Relation Age of Onset   • COPD Sister    • Diabetes Brother    • Early death Brother    • Hyperlipidemia Brother    • Hypertension Brother    • Coronary artery disease Mother    • Diabetes Mother    • Hypertension Mother    • Stroke Other    • Other Other         Respiratory disorder        SOCIAL HISTORY:  Social History     Socioeconomic History   • Marital status:      Spouse name: Not on file   • Number of children: Not on file   • Years of education: Not on file   • Highest education level: Not on file   Tobacco Use   • Smoking status: Former Smoker     Types: Cigarettes   • Smokeless tobacco: Never Used   • Tobacco comment: quit 20 years ago    Substance and Sexual Activity   • Alcohol use: No     Comment: former heavy use in his 50's, up to a fifth of liquor a day, currently no alcohol   • Drug use: No   • Sexual activity: Never       HOME MEDICATIONS:  Prior to Admission medications    Medication Sig Start Date End Date Taking? Authorizing Provider   acetaminophen (TYLENOL) 500 MG tablet Take 500 mg by mouth  Every 4 (Four) Hours As Needed for Mild Pain  or Fever.   Yes Cleve Nguyễn MD   aspirin 81 MG chewable tablet Chew 81 mg Daily.   Yes Cleve Nguyễn MD   benzonatate (TESSALON) 100 MG capsule Take 100 mg by mouth 3 (Three) Times a Day As Needed for Cough.   Yes Cleve Nguyễn MD   brimonidine (ALPHAGAN P) 0.1 % solution ophthalmic solution Administer  to both eyes 3 (Three) Times a Day.   Yes Cleve Nguyễn MD   Cholecalciferol (VITAMIN D3) 2000 units chewable tablet Chew 2,000 Units Daily.   Yes Cleve Nguyễn MD   cyclobenzaprine (FLEXERIL) 5 MG tablet Take 5 mg by mouth Every 8 (Eight) Hours As Needed for Muscle Spasms.   Yes Cleve Nguyễn MD   dorzolamide (TRUSOPT) 2 % ophthalmic solution Administer 1 drop into the left eye 3 (Three) Times a Day. 1/24/17  Yes Inderjit Grant MD   escitalopram (LEXAPRO) 10 MG tablet Take 1 tablet by mouth Daily. 12/23/18  Yes Janel Gordon MD   famotidine (PEPCID) 20 MG tablet Take 20 mg by mouth every night at bedtime.   Yes Cleve Nguyễn MD   fluticasone (FLONASE) 50 MCG/ACT nasal spray 2 sprays into each nostril daily. Administer 2 sprays in each nostril for each dose.   Yes Cleve Nguyễn MD   gabapentin (NEURONTIN) 100 MG capsule Take 1 capsule by mouth Every Night. 12/27/19  Yes Warren Stewart MD   Glecaprevir-Pibrentasvir (MAVYRET) 100-40 MG tablet Take 3 tablets by mouth Daily. 9/4/19  Yes Beth Porras APRN   guaiFENesin (HUMIBID 3) 400 MG tablet Take 400 mg by mouth Every 4 (Four) Hours As Needed for Cough.   Yes Cleve Nguyễn MD   insulin aspart (NovoLOG) 100 UNIT/ML injection Inject  under the skin 3 (Three) Times a Day Before Meals. Sliding scale:  = 0 units; 150-200 = 3 units; 200-250 = 6 units; 250-300 = 9 units; 300-350 = 12 units; >350 = 15 units and call MD   Yes Cleve Nguyễn MD   insulin detemir (LEVEMIR) 100 UNIT/ML injection Inject 30 Units under the skin  into the appropriate area as directed Daily.   Yes Cleve Nguyễn MD   lactulose (CHRONULAC) 10 GM/15ML solution Take 30 mL by mouth 3 (Three) Times a Day.  Patient taking differently: Take 30 mL by mouth 3 (Three) Times a Day. 5/23/19  Yes Beth Porras APRN   latanoprost (XALATAN) 0.005 % ophthalmic solution Administer 1 drop to both eyes every night.   Yes Cleve Nguyễn MD   Lidocaine (ASPERCREME LIDOCAINE) 4 % patch Apply 1 patch topically Daily As Needed (low back pain. on for 12 hours.).   Yes Cleve Nguyễn MD   Loratadine 10 MG capsule Take 10 mg by mouth Every Night. 3/16/18  Yes Cleve Nguyễn MD   melatonin 5 MG tablet tablet Take 5 mg by mouth Every Night.   Yes Cleve Nguyễn MD   ondansetron ODT (ZOFRAN-ODT) 4 MG disintegrating tablet Take 1 tablet by mouth Every 6 (Six) Hours As Needed for Nausea or Vomiting. 2/17/18  Yes Salvador Elmore MD   polyethylene glycol (MIRALAX) packet Take 17 g by mouth Daily As Needed (constipation).   Yes Cleve Nguyễn MD   rifaximin (XIFAXAN) 550 MG tablet Take 1 tablet by mouth Every 12 (Twelve) Hours. 5/24/19  Yes Beth Porras APRN   sevelamer (RENVELA) 800 MG tablet Take 800 mg by mouth 3 (Three) Times a Day With Meals.   Yes Cleve Nguyễn MD   traZODone (DESYREL) 50 MG tablet Take 50 mg by mouth every night at bedtime.   Yes Cleve Nguyễn MD       ALLERGIES:  Lisinopril and Motrin [ibuprofen]    REVIEW OF SYSTEMS  Review of Systems   Unable to perform ROS: Mental status change       PHYSICAL EXAM:  Temp:  [99 °F (37.2 °C)] 99 °F (37.2 °C)  Heart Rate:  [78-81] 78  Resp:  [23-24] 23  BP: (110-134)/(61-91) 110/61  Body mass index is 32.08 kg/m².  Physical Exam   Constitutional: He appears well-developed and well-nourished. No distress.   HENT:   Head: Normocephalic and atraumatic.   Right Ear: External ear normal.   Left Ear: External ear normal.   Mouth/Throat: Oropharynx is clear and moist.   Eyes:  Pupils are equal, round, and reactive to light. Conjunctivae and EOM are normal. Right eye exhibits no discharge. Left eye exhibits no discharge. No scleral icterus.   Neck: Normal range of motion. No JVD present.   Cardiovascular: Normal rate, regular rhythm and normal heart sounds. Exam reveals no gallop and no friction rub.   No murmur heard.  Pulmonary/Chest: Effort normal and breath sounds normal. No stridor. No respiratory distress. He has no wheezes. He has no rales. He exhibits no tenderness.   Abdominal: Soft. Bowel sounds are normal. He exhibits no distension. There is no tenderness.   Musculoskeletal: Normal range of motion. He exhibits no edema or tenderness.   Neurological:   Oriented to person and place   Skin: Skin is warm and dry. No rash noted. He is not diaphoretic. No erythema. No pallor.   Psychiatric: Cognition and memory are impaired. He is inattentive.       DIAGNOSTIC DATA:   Lab Results (last 24 hours)     Procedure Component Value Units Date/Time    Comprehensive Metabolic Panel [[id number]]  (Abnormal) Collected:  01/02/20 0358    Specimen:  Blood Updated:  01/02/20 0458     Glucose 113 mg/dL      BUN 56 mg/dL      Creatinine 7.77 mg/dL      Sodium 133 mmol/L      Potassium 5.0 mmol/L      Chloride 92 mmol/L      CO2 25.0 mmol/L      Calcium 9.0 mg/dL      Total Protein 10.0 g/dL      Albumin 2.90 g/dL      ALT (SGPT) 5 U/L      AST (SGOT) 18 U/L      Alkaline Phosphatase 118 U/L      Total Bilirubin 3.1 mg/dL      eGFR Non  Amer --     Comment: <15 Indicative of kidney failure.        eGFR  African Amer 8 mL/min/1.73      Comment: <15 Indicative of kidney failure.        Globulin 7.1 gm/dL      A/G Ratio 0.4 g/dL      BUN/Creatinine Ratio 7.2     Anion Gap 16.0 mmol/L     Narrative:       GFR Normal >60  Chronic Kidney Disease <60  Kidney Failure <15      Manual Differential [[id number]]  (Abnormal) Collected:  01/02/20 0359    Specimen:  Blood Updated:  01/02/20 0429     Neutrophil  % 82.0 %      Lymphocyte % 4.0 %      Monocyte % 9.0 %      Bands %  4.0 %      Metamyelocyte % 1.0 %      Neutrophils Absolute 5.13 10*3/mm3      Lymphocytes Absolute 0.24 10*3/mm3      Monocytes Absolute 0.54 10*3/mm3      RBC Morphology Normal     Vacuolated Neutrophils Slight/1+     Platelet Estimate Adequate    Protime-INR [[id number]]  (Abnormal) Collected:  01/02/20 0358    Specimen:  Blood Updated:  01/02/20 0422     Protime 16.9 Seconds      INR 1.40    Narrative:       Therapeutic range for most indications is 2.0-3.0 INR,  or 2.5-3.5 for mechanical heart valves.    CBC & Differential [[id number]] Collected:  01/02/20 0359    Specimen:  Blood Updated:  01/02/20 0407    Narrative:       The following orders were created for panel order CBC & Differential.  Procedure                               Abnormality         Status                     ---------                               -----------         ------                     CBC Auto Differential[[id number]]        Abnormal            Final result                 Please view results for these tests on the individual orders.    CBC Auto Differential [[id number]]  (Abnormal) Collected:  01/02/20 0359    Specimen:  Blood Updated:  01/02/20 0407     WBC 5.97 10*3/mm3      RBC 2.85 10*6/mm3      Hemoglobin 7.9 g/dL      Hematocrit 24.7 %      MCV 86.7 fL      MCH 27.7 pg      MCHC 32.0 g/dL      RDW 18.3 %      RDW-SD 57.1 fl      MPV 8.9 fL      Platelets 118 10*3/mm3            Imaging Results (Last 24 Hours)     Procedure Component Value Units Date/Time    XR Chest 1 View [[id number]] Collected:  01/02/20 0302     Updated:  01/02/20 0343    Narrative:         Chest single view on  1/2/2020     CLINICAL INDICATION: Cough, shortness of breath    COMPARISON: 12/30/2019    FINDINGS: Heart is borderline in size. Minimal vascular  congestion is noted. The lungs are otherwise clear. Vascular  calcification is noted in the aorta. No bony abnormality is  noted.       Impression:       Borderline size heart with minimal vascular  congestion.    Electronically signed by:  Castro Ornelas  1/2/2020 3:42 AM CST  Workstation: [id number]            I reviewed the patient's new clinical results.    ASSESSMENT AND PLAN: This is a 69 y.o. 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   - Duonebs and albuterol nebs prn.          • Gastroesophageal reflux disease without esophagitis [K21.9] Yes     -PPI     • Chronic pain [G89.29] Yes     -Gabapentin        • Anxiety [F41.9] Yes     - Continue Lexapro      • Primary insomnia [F51.01] Yes     -trazodone, melatonin     • ESRD (end stage renal disease) (CMS/[facility]) [N18.6] Yes     -HD M/W/F  -Nephrology consult  -epogen on M/W/F with dialysis  -continue Sevelamer 800 mg three times daily        • Type 2 diabetes mellitus with chronic kidney disease on chronic dialysis, with long-term current use of insulin (CMS/[facility]) [E11.22, N18.6, Z99.2, Z79.4] Not Applicable     -levemir, SSI     • Essential hypertension [I10] Yes     -not on any home medications, will monitor          DVT prophylaxis: SCDs/TEDs         GIOVANNI # [id number], reviewed and consistent with patient reported medications.    Expected Length of Stay: Where: home and When:  1-2days    I discussed  the patients findings and my recommendations with patient and nursing staff.     Deisy Duran MD is the attending on record at time of admission, She is aware of the patient's status and agrees with the above history and physical.          This document has been electronically signed by Warren Stewart MD on January 2, 2020 6:30 AM

## 2020-01-02 NOTE — ACP (ADVANCE CARE PLANNING)
Farhan eng who is the patient's health care surrogate voiced decision to make patient DNR/DNI status.     Signed,   Demarcus Oliveira MD  Family Medicine Resident PGY2  [facility]        This document has been electronically signed by Demarcus Oliveira MD on January 2, 2020 5:34 PM

## 2020-01-02 NOTE — THERAPY EVALUATION
Patient Name: Cristo Musa  : 1950    MRN: [id number]                              Today's Date: 2020       Admit Date: 2020    Visit Dx:     ICD-10-CM ICD-9-CM   1. Gastrointestinal hemorrhage, unspecified gastrointestinal hemorrhage type K92.2 578.9   2. Diarrhea, unspecified type R19.7 787.91   3. Gastroesophageal reflux disease without esophagitis K21.9 530.81   4. Decreased functional mobility R26.89 781.99     Patient Active Problem List   Diagnosis   • Pacemaker infection (CMS/[facility])   • Renal failure, chronic   • Type 2 diabetes mellitus with chronic kidney disease on chronic dialysis, with long-term current use of insulin (CMS/[facility])   • Essential hypertension   • Complete heart block (CMS/[facility])   • Arteriovenous shunt thrombosis (CMS/[facility])   • Follow-up surgery care   • Arteriovenous fistula, acquired (CMS/[facility])   • Acquired stenosis of nasolacrimal duct   • Exotropia   • Optic atrophy   • Pseudophakia   • Posterior subcapsular polar age-related cataract   • Nuclear cataract   • Primary open angle glaucoma of left eye, moderate stage   • Cortical age-related cataract   • ESRD (end stage renal disease) (CMS/[facility])   • Primary insomnia   • Chronic pain   • Anxiety   • ESRD on dialysis (CMS/[facility])   • Gastroesophageal reflux disease without esophagitis   • Neuropathy   • A-V fistula (CMS/[facility])   • Physical deconditioning   • COPD (chronic obstructive pulmonary disease) (CMS/[facility])   • Non-healing wound of upper extremity   • Post-operative pain   • Elevated brain natriuretic peptide (BNP) level   • Arteriovenous fistula stenosis (CMS/[facility])   • AV fistula stenosis (CMS/[facility])   • Generalized abdominal pain   • Iron deficiency anemia due to chronic blood loss   • Pneumonia of right lower lobe due to infectious organism (CMS/[facility])   • Uremic encephalopathy   • Hyponatremia   • Elevated troponin   • Anemia due to chronic kidney disease, on chronic dialysis (CMS/[facility])   • Thrombocytopenia (CMS/[facility])   •  Anemia   • Stage 5 chronic kidney disease not on chronic dialysis (CMS/HCC)   • Hyperammonemia (CMS/HCC)   • Hypochloremia   • Metabolic encephalopathy   • NSTEMI (non-ST elevated myocardial infarction) (CMS/HCC)   • Gastrointestinal hemorrhage   • Obstructive sleep apnea   • GI bleed   • Chronic hepatitis C without hepatic coma (CMS/HCC)   • Gastrointestinal hemorrhage associated with anorectal source   • Chronic heart failure with preserved ejection fraction (CMS/HCC)   • Encephalopathy     Past Medical History:   Diagnosis Date   • Anemia of chronic disease    • Cataract    • CHF (congestive heart failure) (CMS/HCC)    • Chronic pain syndrome    • CKD (chronic kidney disease)    • Clostridium difficile infection    • COPD (chronic obstructive pulmonary disease) (CMS/HCC)    • Coronary artery disease    • Depression    • Diabetes mellitus (CMS/HCC)    • Dialysis patient (CMS/HCC)    • GERD (gastroesophageal reflux disease)    • Glaucoma    • H/O cardiac pacemaker     patient states he got his pacemaker removed in [place]   • Heart block    • Hepatitis C    • History of transfusion    • Hypertension    • Nephropathy, diabetic (CMS/HCC)    • Pacemaker    • Pulmonary embolism (CMS/HCC)      Past Surgical History:   Procedure Laterality Date   • ABDOMINAL SURGERY     • ARTERIOVENOUS FISTULA/SHUNT SURGERY Right     forearm loop   • ARTERIOVENOUS FISTULA/SHUNT SURGERY Left     removed past upper arm   • ARTERIOVENOUS FISTULA/SHUNT SURGERY Right 3/13/2018    Procedure: revision RIGHT forearm ARTERIOVENOUS graft (excision), debridement, wound vac;  Surgeon: Jerson Singh MD;  Location: [facility];  Service: Vascular   • ARTERIOVENOUS FISTULA/SHUNT SURGERY W/ HEMODIALYSIS CATHETER INSERTION Right 4/4/2016    Procedure: RIGHT ARM AV FISTULA DECLOT REVISION REPAIR BRACHIAL ANASTOMOTIC PSUEDOANEURYSM LEFT FEMORAL RICHARDSON FISTULOGRAM WITH ANGIOPLASTY;  Surgeon: Jerson Carpenter MD;  Location: [facility] OR  18/19;  Service:    • CATARACT EXTRACTION W/ INTRAOCULAR LENS IMPLANT Left 3/31/2017    Procedure: REMOVE CATARACT AND IMPLANT INTRAOCULAR LENS LEFT EYE;  Surgeon: Hilton Montemayor MD;  Location: [facility] OR;  Service:    • COLONOSCOPY N/A 3/12/2019    Procedure: COLONOSCOPY;  Surgeon: Flako Montoya MD;  Location: [facility] ENDOSCOPY;  Service: Gastroenterology   • COLONOSCOPY N/A 11/6/2019    Procedure: COLONOSCOPY;  Surgeon: Tricia Philip MD;  Location: [facility] ENDOSCOPY;  Service: Gastroenterology   • ENDOSCOPY N/A 3/12/2019    Procedure: ESOPHAGOGASTRODUODENOSCOPY;  Surgeon: Flako Montoya MD;  Location: [facility] ENDOSCOPY;  Service: Gastroenterology   • ENDOSCOPY N/A 11/5/2019    Procedure: ESOPHAGOGASTRODUODENOSCOPY;  Surgeon: Tricia Philip MD;  Location: [facility] ENDOSCOPY;  Service: Gastroenterology   • EYE SURGERY     • HERNIA REPAIR     • IMPLANTABLE CARDIOVERTER DEFIBRILLATOR LEAD REPLACEMENT/POCKET REVISION Right 4/11/2016    Procedure: PACEMAKER LEADS X 3 AND BATTERY EXTRACTION WITH EXIMER LASER;  Surgeon: Vern Reeves MD;  Location: [facility] OR 18/19;  Service:    • INSERTION HEMODIALYSIS CATHETER Right 05/2015    jugular   • INTERVENTIONAL RADIOLOGY PROCEDURE Right 6/1/2017    Procedure: RIGHT dialysis fistulagram & angioplasty;  Surgeon: Jerson Singh MD;  Location: [facility] ANGIO INVASIVE LOCATION;  Service:    • INTERVENTIONAL RADIOLOGY PROCEDURE Right 8/24/2017    Procedure: IR dialysis fistulagram;  Surgeon: Jerson Singh MD;  Location: [facility] ANGIO INVASIVE LOCATION;  Service:    • INTERVENTIONAL RADIOLOGY PROCEDURE Right 11/13/2018    Procedure: IR dialysis fistulagram;  Surgeon: Jerson Singh MD;  Location: [facility] ANGIO INVASIVE LOCATION;  Service: Interventional Radiology   • PACEMAKER IMPLANTATION  2008    dual chamber [employer]   • REMOVAL HEMODIALYSIS CATHETER Right 05/2015    femoral vein   • SIGMOIDOSCOPY N/A 8/2/2019     "Procedure: SIGMOIDOSCOPY FLEXIBLE;  Surgeon: Flako Montoya MD;  Location: [facility] ENDOSCOPY;  Service: Gastroenterology     General Information     Row Name 01/02/20 1326          PT Evaluation Time/Intention    Document Type  evaluation  -LR     Mode of Treatment  individual therapy;physical therapy  -LR     Row Name 01/02/20 1326          General Information    Patient Profile Reviewed?  yes  -LR     Prior Level of Function  independent:;bed mobility;gait;transfer;all household mobility Patient was a \"selfer\" at nursing facility he currently was at  -LR     Existing Precautions/Restrictions  fall;oxygen therapy device and L/min  -LR     Barriers to Rehab  cognitive status;medically complex;previous functional deficit  -LR     Row Name 01/02/20 1326          Relationship/Environment    Lives With  facility resident  -LR     Row Name 01/02/20 1326          Resource/Environmental Concerns    Current Living Arrangements  extended care facility  -LR     Row Name 01/02/20 1326          Cognitive Assessment/Intervention- PT/OT    Orientation Status (Cognition)  disoriented to;person;place;situation;time Patient started to perseverate on the word janurary after he was told the month.  -LR     Row Name 01/02/20 1326          Safety Issues, Functional Mobility    Safety Issues Affecting Function (Mobility)  ability to follow commands;at risk behavior observed;awareness of need for assistance;impulsivity;insight into deficits/self awareness  -LR     Impairments Affecting Function (Mobility)  balance;cognition;coordination;endurance/activity tolerance;pain;postural/trunk control;range of motion (ROM);sensation/sensory awareness;strength  -LR       User Key  (r) = Recorded By, (t) = Taken By, (c) = Cosigned By    Initials Name Provider Type    LR Efra Gibson Physical Therapist        Mobility     Row Name 01/02/20 1328          Bed Mobility Assessment/Treatment    Bed Mobility Assessment/Treatment  supine-sit  -LR     " Supine-Sit [place] (Bed Mobility)  supervision  -LR     Sit-Supine [place] (Bed Mobility)  supervision  -LR     Assistive Device (Bed Mobility)  bed rails;head of bed elevated  -LR     Row Name 01/02/20 1328          Sit-Stand Transfer    Sit-Stand [place] (Transfers)  unable to assess  -LR     Row Name 01/02/20 1328          Gait/Stairs Assessment/Training    [place] Level (Gait)  unable to assess  -LR       User Key  (r) = Recorded By, (t) = Taken By, (c) = Cosigned By    Initials Name Provider Type    Efra Aragon Physical Therapist        Obj/Interventions     Row Name 01/02/20 1329          General ROM    GENERAL ROM COMMENTS  BLE grossly WFL  -LR     Row Name 01/02/20 1329          MMT (Manual Muscle Testing)    General MMT Comments  BLE at least 3/5  -LR     Row Name 01/02/20 1329          Static Sitting Balance    Level of [place] (Unsupported Sitting, Static Balance)  standby assist  -LR     Sitting Position (Unsupported Sitting, Static Balance)  sitting on edge of bed  -LR     Time Able to Maintain Position (Unsupported Sitting, Static Balance)  3 to 4 minutes  -     Row Name 01/02/20 1329          Sensory Assessment/Intervention    Sensory General Assessment  -- unable to assess due to confusion  -LR       User Key  (r) = Recorded By, (t) = Taken By, (c) = Cosigned By    Initials Name Provider Type    Efra Aragon Physical Therapist        Goals/Plan     Row Name 01/02/20 1331          Bed Mobility Goal 1 (PT)    Activity/Assistive Device (Bed Mobility Goal 1, PT)  sit to supine;supine to sit  -LR     [place] Level/Cues Needed (Bed Mobility Goal 1, PT)  independent  -LR     Time Frame (Bed Mobility Goal 1, PT)  by discharge  -LR     Progress/Outcomes (Bed Mobility Goal 1, PT)  goal not met  -     Row Name 01/02/20 1331          Transfer Goal 1 (PT)    Activity/Assistive Device (Transfer Goal 1, PT)  sit-to-stand/stand-to-sit  -LR     [place] Level/Cues  Needed (Transfer Goal 1, PT)  independent  -LR     Time Frame (Transfer Goal 1, PT)  by discharge  -LR     Progress/Outcome (Transfer Goal 1, PT)  goal not met  -LR     Row Name 01/02/20 1331          Gait Training Goal 1 (PT)    Activity/Assistive Device (Gait Training Goal 1, PT)  gait (walking locomotion);decrease fall risk with Appropriate assistive device  -LR     [place] Level (Gait Training Goal 1, PT)  independent;conditional independence  -LR     Distance (Gait Goal 1, PT)  200 ft x1  -LR     Time Frame (Gait Training Goal 1, PT)  by discharge  -LR     Progress/Outcome (Gait Training Goal 1, PT)  goal not met  -LR       User Key  (r) = Recorded By, (t) = Taken By, (c) = Cosigned By    Initials Name Provider Type    LR Efra Gibson Physical Therapist        Clinical Impression     Row Name 01/02/20 1330          Pain Assessment    Additional Documentation  -- Unable to assess due to cognition status  -LR     Row Name 01/02/20 1330          Physical Therapy Clinical Impression    Patient/Family Goals Statement (PT Clinical Impression)  Patient unable to state purposeful goal at this time  -LR     Criteria for Skilled Interventions Met (PT Clinical Impression)  yes;treatment indicated  -LR     Rehab Potential (PT Clinical Summary)  good, to achieve stated therapy goals  -LR     Predicted Duration of Therapy (PT)  until d/c or until all needs met  -LR     Row Name 01/02/20 1330          Vital Signs    Pre Systolic BP Rehab  115  -LR     Pre Treatment Diastolic BP  55  -LR     Post Systolic BP Rehab  115  -LR     Post Treatment Diastolic BP  55  -LR     Pretreatment Heart Rate (beats/min)  88  -LR     Posttreatment Heart Rate (beats/min)  92  -LR     Pre SpO2 (%)  98  -LR     O2 Delivery Pre Treatment  room air  -LR     Post SpO2 (%)  100  -LR     O2 Delivery Post Treatment  supplemental O2  -LR     Pre Patient Position  Side Lying  -LR     Post Patient Position  Side Lying  -LR     Row Name 01/02/20  1330          Positioning and Restraints    Pre-Treatment Position  in bed  -LR     Post Treatment Position  bed  -LR     In Bed  notified nsg;call light within reach;encouraged to call for assist;exit alarm on  -LR       User Key  (r) = Recorded By, (t) = Taken By, (c) = Cosigned By    Initials Name Provider Type    Efra Aragon Physical Therapist        Outcome Measures     Row Name 01/02/20 1332          How much help from another person do you currently need...    Turning from your back to your side while in flat bed without using bedrails?  3  -LR     Moving from lying on back to sitting on the side of a flat bed without bedrails?  3  -LR     Moving to and from a bed to a chair (including a wheelchair)?  3  -LR     Standing up from a chair using your arms (e.g., wheelchair, bedside chair)?  3  -LR     Climbing 3-5 steps with a railing?  2  -LR     To walk in hospital room?  3  -LR     AM-PAC 6 Clicks Score (PT)  17  -LR     Row Name 01/02/20 1332          Functional Assessment    Outcome Measure Options  AM-PAC 6 Clicks Basic Mobility (PT)  -LR       User Key  (r) = Recorded By, (t) = Taken By, (c) = Cosigned By    Initials Name Provider Type    Efra Aragon Physical Therapist          PT Recommendation and Plan  Planned Therapy Interventions (PT Eval): balance training, bed mobility training, gait training, home exercise program, manual therapy techniques, strengthening, stair training, ROM (range of motion), prosthetic fitting/training, postural re-education, patient/family education, orthotic fitting/training, neuromuscular re-education, stretching, transfer training  Outcome Summary/Treatment Plan (PT)  Anticipated Discharge Disposition (PT): extended care facility, anticipate therapy at next level of care  Plan of Care Reviewed With: patient  Outcome Summary: PT eval completed on this date. Patient lethargic and confused throughout evaluation. Bed mobility: Supervision for Supine<>sit transfer  with HOB slightly elevated and bed rails. Sit<>stand transfer not tested secondary to confusion and patient unwilling to open eyes even though he was still talking. Balance: SBA for EOB sitting for 3 to 4 minutes. Patient would benefit from skilled PT to address deficits stated above. His current cognition status is the most limiting aspect at this time. Anticipate therapy at the next level.     Time Calculation:   PT Charges     Row Name 01/02/20 1325             Time Calculation    Start Time  1035  -LR      Stop Time  1055  -LR      Time Calculation (min)  20 min  -LR      PT Received On  01/02/20  -LR      PT Goal Re-Cert Due Date  01/15/20  -LR        User Key  (r) = Recorded By, (t) = Taken By, (c) = Cosigned By    Initials Name Provider Type    LR Efra Gibson Physical Therapist        Therapy Charges for Today     Code Description Service Date Service Provider Modifiers Qty    [id number] HC PT EVAL MOD COMPLEXITY 1 1/2/2020 Efra Gibson GP 1          PT G-Codes  Outcome Measure Options: AM-PAC 6 Clicks Basic Mobility (PT)  AM-PAC 6 Clicks Score (PT): 17    Efra Gibson  1/2/2020

## 2020-01-02 NOTE — PLAN OF CARE
Problem: Patient Care Overview  Goal: Plan of Care Review  Outcome: Ongoing (interventions implemented as appropriate)  Flowsheets  Taken 1/2/2020 1649 by Kristine Esparza, RN  Progress: no change  Taken 1/2/2020 1335 by Efra Gibson  Plan of Care Reviewed With: patient

## 2020-01-02 NOTE — NURSING NOTE
Spoke with Farhan Musa(brother listed on living will) and verified the DNR status of patient. Farhan stated the DNR status is correct.

## 2020-01-02 NOTE — PROGRESS NOTES
Spoke with Dr Philip and she will see the patient. Requesting he be made NPO at this time.     This document has been electronically signed by Marissa Boothe MD on January 2, 2020 8:09 AM

## 2020-01-02 NOTE — SIGNIFICANT NOTE
[facility]  Patient is DNR/DNI. Attempted to contact SARMAD MUSA at  [phone] at 3:43 pm on 1/2/19.   Sarmad Musa is listed as Patient.s Health Care surrogate on the living will documentation.     Signed,   Demarcus Oliveira MD  Family Medicine Resident PGY2  [facility]        This document has been electronically signed by Demarcus Oliveira MD on January 2, 2020 3:44 PM

## 2020-01-02 NOTE — ED PROVIDER NOTES
Subjective   Patient was sent to the emergency department from the nursing home for complaints of hemoptysis and rectal bleeding.      Rectal Bleeding   Quality:  Black and tarry  Amount:  Unable to specify  Duration:  1 day  Timing:  Intermittent  Context: spontaneously    Relieved by:  Nothing  Worsened by:  Nothing  Ineffective treatments:  None tried  Risk factors: no anticoagulant use        Review of Systems   Unable to perform ROS: Acuity of condition   Constitutional: Positive for activity change and fatigue.   Gastrointestinal: Positive for diarrhea and hematochezia.       Past Medical History:   Diagnosis Date   • Anemia of chronic disease    • Cataract    • CHF (congestive heart failure) (CMS/HCC)    • Chronic pain syndrome    • CKD (chronic kidney disease)    • Clostridium difficile infection    • COPD (chronic obstructive pulmonary disease) (CMS/HCC)    • Coronary artery disease    • Depression    • Diabetes mellitus (CMS/HCC)    • Dialysis patient (CMS/HCC)    • GERD (gastroesophageal reflux disease)    • Glaucoma    • H/O cardiac pacemaker     patient states he got his pacemaker removed in [place]   • Heart block    • Hepatitis C    • History of transfusion    • Hypertension    • Nephropathy, diabetic (CMS/HCC)    • Pacemaker    • Pulmonary embolism (CMS/HCC)        Allergies   Allergen Reactions   • Lisinopril Angioedema     unknown   • Motrin [Ibuprofen] Diarrhea and Nausea And Vomiting       Past Surgical History:   Procedure Laterality Date   • ABDOMINAL SURGERY     • ARTERIOVENOUS FISTULA/SHUNT SURGERY Right     forearm loop   • ARTERIOVENOUS FISTULA/SHUNT SURGERY Left     removed past upper arm   • ARTERIOVENOUS FISTULA/SHUNT SURGERY Right 3/13/2018    Procedure: revision RIGHT forearm ARTERIOVENOUS graft (excision), debridement, wound vac;  Surgeon: Jerson Singh MD;  Location: [facility];  Service: Vascular   • ARTERIOVENOUS FISTULA/SHUNT SURGERY W/ HEMODIALYSIS CATHETER INSERTION  Right 4/4/2016    Procedure: RIGHT ARM AV FISTULA DECLOT REVISION REPAIR BRACHIAL ANASTOMOTIC PSUEDOANEURYSM LEFT FEMORAL RICHARDSON FISTULOGRAM WITH ANGIOPLASTY;  Surgeon: Jerson Carpenter MD;  Location: [facility] OR 18/19;  Service:    • CATARACT EXTRACTION W/ INTRAOCULAR LENS IMPLANT Left 3/31/2017    Procedure: REMOVE CATARACT AND IMPLANT INTRAOCULAR LENS LEFT EYE;  Surgeon: Hilton Montemayor MD;  Location: [facility] OR;  Service:    • COLONOSCOPY N/A 3/12/2019    Procedure: COLONOSCOPY;  Surgeon: Flako Montoya MD;  Location: [facility] ENDOSCOPY;  Service: Gastroenterology   • COLONOSCOPY N/A 11/6/2019    Procedure: COLONOSCOPY;  Surgeon: Tricia Philip MD;  Location: [facility] ENDOSCOPY;  Service: Gastroenterology   • ENDOSCOPY N/A 3/12/2019    Procedure: ESOPHAGOGASTRODUODENOSCOPY;  Surgeon: Flako Montoya MD;  Location: [facility] ENDOSCOPY;  Service: Gastroenterology   • ENDOSCOPY N/A 11/5/2019    Procedure: ESOPHAGOGASTRODUODENOSCOPY;  Surgeon: Tricia Philip MD;  Location: [facility] ENDOSCOPY;  Service: Gastroenterology   • EYE SURGERY     • HERNIA REPAIR     • IMPLANTABLE CARDIOVERTER DEFIBRILLATOR LEAD REPLACEMENT/POCKET REVISION Right 4/11/2016    Procedure: PACEMAKER LEADS X 3 AND BATTERY EXTRACTION WITH EXIMER LASER;  Surgeon: Vern Reeves MD;  Location: [facility] OR 18/19;  Service:    • INSERTION HEMODIALYSIS CATHETER Right 05/2015    jugular   • INTERVENTIONAL RADIOLOGY PROCEDURE Right 6/1/2017    Procedure: RIGHT dialysis fistulagram & angioplasty;  Surgeon: Jerson Singh MD;  Location: [facility] ANGIO INVASIVE LOCATION;  Service:    • INTERVENTIONAL RADIOLOGY PROCEDURE Right 8/24/2017    Procedure: IR dialysis fistulagram;  Surgeon: Jerson Singh MD;  Location: [facility] ANGIO INVASIVE LOCATION;  Service:    • INTERVENTIONAL RADIOLOGY PROCEDURE Right 11/13/2018    Procedure: IR dialysis fistulagram;  Surgeon: Jerson Singh MD;  Location: [facility] ANGIO  INVASIVE LOCATION;  Service: Interventional Radiology   • PACEMAKER IMPLANTATION  2008    dual chamber [employer] [place]   • REMOVAL HEMODIALYSIS CATHETER Right 05/2015    femoral vein   • SIGMOIDOSCOPY N/A 8/2/2019    Procedure: SIGMOIDOSCOPY FLEXIBLE;  Surgeon: Flako Montoya MD;  Location: [facility] ENDOSCOPY;  Service: Gastroenterology       Family History   Problem Relation Age of Onset   • COPD Sister    • Diabetes Brother    • Early death Brother    • Hyperlipidemia Brother    • Hypertension Brother    • Coronary artery disease Mother    • Diabetes Mother    • Hypertension Mother    • Stroke Other    • Other Other         Respiratory disorder       Social History     Socioeconomic History   • Marital status:      Spouse name: Not on file   • Number of children: Not on file   • Years of education: Not on file   • Highest education level: Not on file   Tobacco Use   • Smoking status: Former Smoker     Types: Cigarettes   • Smokeless tobacco: Never Used   • Tobacco comment: quit 20 years ago    Substance and Sexual Activity   • Alcohol use: No     Comment: former heavy use in his 50's, up to a fifth of liquor a day, currently no alcohol   • Drug use: No   • Sexual activity: Never           Objective   Physical Exam   Constitutional: He is oriented to person, place, and time. He appears well-developed and well-nourished.   HENT:   Head: Normocephalic and atraumatic.   Nose: Nose normal.   Mouth/Throat: Oropharynx is clear and moist.   Eyes: Pupils are equal, round, and reactive to light. Conjunctivae and EOM are normal. Right eye exhibits no discharge. Left eye exhibits no discharge. No scleral icterus.   Neck: Normal range of motion. Neck supple. No tracheal deviation present.   Cardiovascular: Normal rate, regular rhythm and normal heart sounds.   No murmur heard.  Pulmonary/Chest: Effort normal and breath sounds normal. No stridor. No respiratory distress. He has no wheezes. He has no rales.    Abdominal: Soft. Bowel sounds are normal. He exhibits no distension and no mass. There is no tenderness. There is no rebound and no guarding.   Genitourinary: Rectal exam shows guaiac positive stool.   Musculoskeletal: He exhibits no edema.   Neurological: He is alert and oriented to person, place, and time. Coordination normal.   Skin: Skin is warm and dry. No rash noted. No erythema.   Psychiatric: He has a normal mood and affect. His behavior is normal. Thought content normal.   Nursing note and vitals reviewed.      Procedures           ED Course                   Labs Reviewed   COMPREHENSIVE METABOLIC PANEL - Abnormal; Notable for the following components:       Result Value    Glucose 113 (*)     BUN 56 (*)     Creatinine 7.77 (*)     Sodium 133 (*)     Chloride 92 (*)     Total Protein 10.0 (*)     Albumin 2.90 (*)     Alkaline Phosphatase 118 (*)     Total Bilirubin 3.1 (*)     eGFR   Amer 8 (*)     Anion Gap 16.0 (*)     All other components within normal limits    Narrative:     GFR Normal >60  Chronic Kidney Disease <60  Kidney Failure <15     PROTIME-INR - Abnormal; Notable for the following components:    Protime 16.9 (*)     INR 1.40 (*)     All other components within normal limits    Narrative:     Therapeutic range for most indications is 2.0-3.0 INR,  or 2.5-3.5 for mechanical heart valves.   CBC WITH AUTO DIFFERENTIAL - Abnormal; Notable for the following components:    RBC 2.85 (*)     Hemoglobin 7.9 (*)     Hematocrit 24.7 (*)     RDW 18.3 (*)     RDW-SD 57.1 (*)     Platelets 118 (*)     All other components within normal limits   MANUAL DIFFERENTIAL - Abnormal; Notable for the following components:    Neutrophil % 82.0 (*)     Lymphocyte % 4.0 (*)     Metamyelocyte % 1.0 (*)     Lymphocytes Absolute 0.24 (*)     All other components within normal limits   TYPE AND SCREEN   PREVIOUS HISTORY   CBC AND DIFFERENTIAL    Narrative:     The following orders were created for panel order CBC  & Differential.  Procedure                               Abnormality         Status                     ---------                               -----------         ------                     CBC Auto Differential[[id number]]        Abnormal            Final result                 Please view results for these tests on the individual orders.       XR Chest 1 View   Final Result   Borderline size heart with minimal vascular   congestion.      Electronically signed by:  Castro Ornelas  1/2/2020 3:42 AM [facility]   Workstation: [id number]                                           [facility]    Final diagnoses:   Gastrointestinal hemorrhage, unspecified gastrointestinal hemorrhage type   Diarrhea, unspecified type            Salvador Elmore MD  01/02/20 0542

## 2020-01-02 NOTE — ED NOTES
Per NF pt was sent for possible increased ammonia level, blood in stool, coughing up blood, and bloody drainage from pt nose.     Vishal Matthew RN  01/02/20 0138

## 2020-01-03 NOTE — PLAN OF CARE
Problem: Patient Care Overview  Goal: Plan of Care Review  Outcome: Ongoing (interventions implemented as appropriate)  Flowsheets (Taken 1/3/2020 1526)  Progress: no change  Plan of Care Reviewed With: spouse; family  Note:   Pt remains on ventilator, GCS 5. Extends to pain, opens eyes to pain. Cough and gag present. Radial arterial line in place with pulsatile waveform. Epinephrine gtt weaned off this shift. Versed gtt started for sedation, as patient coughing uncontrollably, compromising respiratory status. Tolerates Versed well. No s/s of bleeding noted this shift. OG to LIS with yellow drainage present. BM x 1, dark brown. Spoke with family and updated, all questions and concerns addressed. Will continue to monitor closely.  Goal: Individualization and Mutuality  Outcome: Ongoing (interventions implemented as appropriate)  Goal: Discharge Needs Assessment  Outcome: Ongoing (interventions implemented as appropriate)  Goal: Interprofessional Rounds/Family Conf  Outcome: Ongoing (interventions implemented as appropriate)     Problem: Fall Risk (Adult)  Goal: Absence of Fall  Outcome: Ongoing (interventions implemented as appropriate)     Problem: Gastrointestinal Bleeding (Adult)  Goal: Signs and Symptoms of Listed Potential Problems Will be Absent, Minimized or Managed (Gastrointestinal Bleeding)  Outcome: Ongoing (interventions implemented as appropriate)     Problem: Skin Injury Risk (Adult)  Goal: Skin Health and Integrity  Outcome: Ongoing (interventions implemented as appropriate)     Problem: Ventilation, Mechanical Invasive (Adult)  Goal: Signs and Symptoms of Listed Potential Problems Will be Absent, Minimized or Managed (Ventilation, Mechanical Invasive)  Outcome: Ongoing (interventions implemented as appropriate)     Problem: Restraint, Nonbehavioral (Nonviolent)  Goal: Rationale and Justification  Outcome: Ongoing (interventions implemented as appropriate)  Goal: Nonbehavioral (Nonviolent) Restraint:  Absence of Injury/Harm  Outcome: Ongoing (interventions implemented as appropriate)  Goal: Nonbehavioral (Nonviolent) Restraint: Achievement of Discontinuation Criteria  Outcome: Ongoing (interventions implemented as appropriate)  Goal: Nonbehavioral (Nonviolent) Restraint: Preservation of Dignity and Wellbeing  Outcome: Ongoing (interventions implemented as appropriate)

## 2020-01-03 NOTE — CONSULTS
"Adult Nutrition  Assessment    Patient Name:  Cristo Musa  YOB: 1950  MRN: [id number]  Admit Date:  1/2/2020    Assessment Date:  1/3/2020    Comments:  Pt was admitted from the NH with Hematemesis and Anemia. He went for an EGD which revealed small Varices and non-bleeding ulcers.  However, while he was in EGD he went into cardiac arrest with resp failure.  He is currently NPO on the vent.  NH3 on admit was 143 now 65 with pt on Lactulose and Xifaxan.  He has a hx of ESRD on hemodialysis.  RD will monitor his tx plans and the need for nutrition support if appropriate.  MD notes a concern for hypoxic ischemic brain injury.      Reason for Assessment     Row Name 01/03/20 1507          Reason for Assessment    Reason For Assessment  nurse/nurse practitioner consult     Diagnosis  other (see comments) Acute resp failure following cardiac arrest     Identified At Risk by Screening Criteria  other (see comments) NPO on the vent         Nutrition/Diet History     Row Name 01/03/20 1507          Nutrition/Diet History    Typical Food/Fluid Intake  Pt is resting on the vent.  NO family is present         Anthropometrics     Row Name 01/03/20 1510          Anthropometrics    Height Method  other (see comments)     Height  172.7 cm (67.99\")        Ideal Body Weight (IBW)    Ideal Body Weight (IBW) (kg)  70.87         Labs/Tests/Procedures/Meds     Row Name 01/03/20 1507          Labs/Procedures/Meds    Lab Results Reviewed  reviewed, pertinent     Lab Results Comments  Na 135; Bun 78; Creat 9.46; Platelets 119; Alb 2.70; NG3 65 (H)        Diagnostic Tests/Procedures    Diagnostic Test/Procedure Reviewed  reviewed, pertinent     Diagnostic Test/Procedures Comments  Intubated        Medications    Pertinent Medications Reviewed  reviewed, pertinent     Pertinent Medications Comments  Epogen; SSI; Levemir; LAstuloae; Xifaxan         Physical Findings     Row Name 01/03/20 1501          Physical " "Findings    Overall Physical Appearance  on ventilator support     Gastrointestinal  diarrhea on lactulose and Xifaxan         Estimated/Assessed Needs     Row Name 01/03/20 1510          Calculation Measurements    Weight Used For Calculations  95.7 kg (211 lb)     Height  172.7 cm (67.99\")        Estimated/Assessed Needs    Additional Documentation  Additional Requirements (Group);Fluid Requirements (Group);Doniphan-St. Jeor Equation (Group);Protein Requirements (Group);Calorie Requirements (Group);KCAL/KG (Group)        Calorie Requirements    Estimated Calorie Requirement (kcal/day)  1700        KCAL/KG    KCAL/KG  18 Kcal/Kg (kcal)     18 Kcal/Kg (kcal)  1722.762        Doniphan-St. Jeor Equation    RMR (Doniphan-St. Jeor Equation)  1701.463        Protein Requirements    Weight Used For Protein Calculations  70 kg (154 lb 5.2 oz)     Est Protein Requirement Amount (gms/kg)  1.0 gm protein     Estimated Protein Requirements (gms/day)  70        Fluid Requirements    Estimated Fluid Requirements (mL/day)  1700     Estimated Fluid Requirement Method  RDA Method     RDA Method (mL)  1700     Tyrone-Samantha Method (over 20 kg)  3414.18         Nutrition Prescription Ordered     Row Name 01/03/20 1511          Nutrition Prescription PO    Current PO Diet  NPO                 Electronically signed by:  Jackie Bartholomew RD  01/03/20 3:17 PM  "

## 2020-01-03 NOTE — NURSING NOTE
1743-compressions started at this time  1744- 1 mg epi given  1745-pt intubated by KIESHA Gibson, SHANNAN  1747-1 mg epi given  1749-asytole  1750- 1 mg epi given  1750-Dr Henry arrived. New orders received  1750- pulse 114 b/p 185/85  1752- Femoral line placed per Dr Henry  1754 - P 93  1759 - chest xray complete  1802-P 110 B/P 148/64  1606- venous gas drawn and sent to lab  1708-pt transferred to ICU

## 2020-01-03 NOTE — SIGNIFICANT NOTE
Patient immediately after endoscopic procedure coded and had to be intubated. Spoke to the brother and told him of the situation. He understands that the dnr/dni status does not apply when having a procedure done and receiving anesthesia. Farhan Musa understands this and at first asked what intubation is. Told patient that the patient stopped breathing and had to put a tube down his throat so he could breathe. He states it is okay to wean him off the intubation over the next few days.          Meseret Huynh M.D. PGY2  [facility] Medicine [facility]  [address] [place] [address]  Office: [phone]    This document has been electronically signed by Meseret Huynh MD on January 2, 2020 9:26 PM

## 2020-01-03 NOTE — PROGRESS NOTES
FAMILY MEDICINE DAILY PROGRESS NOTE  NAME: Cristo Musa  : 1950  MRN: [id number]     LOS: 1 day     PROVIDER OF SERVICE: Warren Stewart MD    Chief Complaint: Gastrointestinal hemorrhage    Subjective:   Pt has been intubated as of yesterday. No SBT as of yet. PT currently maintaining MAP, Levophed on board if needed. EGD completed yesterday, nothing to do. H/H remains stable  Interval History:  History taken from: chart      Review of Systems:   Review of Systems   Unable to perform ROS: Intubated       Objective:     Vital Signs  Temp:  [97 °F (36.1 °C)-100.4 °F (38 °C)] 100.3 °F (37.9 °C)  Heart Rate:  [] 75  Resp:  [18-36] 23  BP: ()/(32-89) 118/55  Arterial Line BP: ()/(45-94) 141/60  FiO2 (%):  [30 %-50 %] 30 %    Physical Exam  Physical Exam   Constitutional: He appears well-developed and well-nourished. No distress.   HENT:   Head: Normocephalic and atraumatic.   Right Ear: External ear normal.   Left Ear: External ear normal.   Mouth/Throat: Oropharynx is clear and moist.   Eyes: Pupils are equal, round, and reactive to light. Conjunctivae and EOM are normal. No scleral icterus.   Neck: Normal range of motion.   Cardiovascular: Normal rate, regular rhythm and normal heart sounds. Exam reveals no gallop and no friction rub.   No murmur heard.  Pulmonary/Chest: Effort normal and breath sounds normal. No stridor. No respiratory distress. He has no wheezes. He has no rales. He exhibits no tenderness.   Currently intubated   Abdominal: Soft. Bowel sounds are normal. He exhibits no distension. There is no tenderness.   Musculoskeletal: Normal range of motion. He exhibits no edema or tenderness.   Skin: Skin is warm and dry. No rash noted. He is not diaphoretic. No erythema. No pallor.   Psychiatric: He is noncommunicative.       Medication Review    Current Facility-Administered Medications:   •  acetaminophen (TYLENOL) tablet 650 mg, 650 mg, Oral, Q4H PRN **OR**  [DISCONTINUED] acetaminophen (TYLENOL) 160 MG/5ML solution 650 mg, 650 mg, Oral, Q4H PRN **OR** acetaminophen (TYLENOL) suppository 650 mg, 650 mg, Rectal, Q4H PRN, Tricia Philip MD  •  albuterol (PROVENTIL) nebulizer solution 0.083% 2.5 mg/3mL, 2.5 mg, Nebulization, Q6H PRN, Tricia Philip MD  •  albuterol sulfate HFA (PROVENTIL HFA;VENTOLIN HFA;PROAIR HFA) inhaler 2 puff, 2 puff, Inhalation, 4x Daily - RT, Meseret Huynh MD, 2 puff at 01/03/20 0718  •  aspirin chewable tablet 81 mg, 81 mg, Oral, Daily, Tricia Philip MD, 81 mg at 01/03/20 0839  •  cetirizine (zyrTEC) tablet 10 mg, 10 mg, Oral, Daily, Tricia Philip MD, 10 mg at 01/03/20 0839  •  cyclobenzaprine (FLEXERIL) tablet 5 mg, 5 mg, Oral, Q8H PRN, Tricia Philip MD  •  dextrose (D50W) 25 g/ 50mL Intravenous Solution 25 g, 25 g, Intravenous, Q15 Min PRN, Tricia Phliip MD  •  dextrose (GLUTOSE) oral gel 15 g, 15 g, Oral, Q15 Min PRN, Tricia Philip MD  •  docusate sodium (COLACE) capsule 100 mg, 100 mg, Oral, BID PRN, Tricia Philip MD  •  EPINEPHrine (ADRENALIN) 5 mg in sodium chloride 0.9 % 250 mL (0.02 mg/mL) infusion, 0.02-0.3 mcg/kg/min, Intravenous, Titrated, Tricia Philip MD, Stopped at 01/03/20 0130  •  escitalopram (LEXAPRO) tablet 10 mg, 10 mg, Oral, Daily, Tricia Philip MD, 10 mg at 01/03/20 0839  •  gabapentin (NEURONTIN) capsule 100 mg, 100 mg, Oral, Nightly, Tricia Philip MD, 100 mg at 01/02/20 2107  •  glucagon (human recombinant) (GLUCAGEN DIAGNOSTIC) injection 1 mg, 1 mg, Subcutaneous, Q15 Min PRN, Tricia Philip MD  •  hydrOXYzine pamoate (VISTARIL) capsule 50 mg, 50 mg, Oral, TID PRN, Tricia Philip MD, 50 mg at 01/02/20 1423  •  insulin aspart (novoLOG) injection 0-14 Units, 0-14 Units, Subcutaneous, 4x Daily AC & at Bedtime, Tricia Philip MD  •  [START ON 1/4/2020] insulin detemir (LEVEMIR) injection 15 Units, 15 Units, Subcutaneous, Daily, César Haider MD  •   lactulose (CHRONULAC) 10 GM/15ML solution 60 mL, 60 mL, Rectal, 4x Daily, Tricia Philip MD, 60 mL at 01/03/20 0839  •  latanoprost (XALATAN) 0.005 % ophthalmic solution 1 drop, 1 drop, Both Eyes, Nightly, Tricia Philip MD, 1 drop at 01/02/20 2230  •  lidocaine (LIDODERM) 5 % 1 patch, 1 patch, Transdermal, Q24H, Tricia Philip MD, 1 patch at 01/03/20 0841  •  melatonin tablet 6 mg, 6 mg, Oral, Nightly, Tricia Philip MD  •  midazolam (VERSED) 50 mg in sodium chloride 0.9 % 100 mL (0.5 mg/mL) infusion, 1-10 mg/hr, Intravenous, Titrated, Meseret Huynh MD, Last Rate: 12 mL/hr at 01/03/20 0515, 6 mg/hr at 01/03/20 0515  •  ondansetron (ZOFRAN) injection 4 mg, 4 mg, Intravenous, Q6H PRN, Tricia Philip MD  •  pantoprazole (PROTONIX) injection 40 mg, 40 mg, Intravenous, BID AC, Tricia Philip MD, 40 mg at 01/03/20 0646  •  riFAXIMin (XIFAXAN) tablet 550 mg, 550 mg, Oral, Q12H, Tricia Philip MD, 550 mg at 01/03/20 0840  •  sevelamer (RENVELA) tablet 800 mg, 800 mg, Oral, TID With Meals, Tricia Philip MD  •  sodium chloride 0.9 % flush 10 mL, 10 mL, Intravenous, Q12H, Tricia Philip MD, 10 mL at 01/03/20 0845  •  sodium chloride 0.9 % flush 10 mL, 10 mL, Intravenous, PRN, Tricia Philip MD  •  sodium chloride 0.9 % flush 10 mL, 10 mL, Intravenous, Q12H, Tricia Philip MD, 10 mL at 01/03/20 0845  •  sodium chloride 0.9 % flush 10 mL, 10 mL, Intravenous, PRN, Tricia Philip MD  •  sodium chloride 0.9 % infusion, 30 mL/hr, Intravenous, Continuous, Tricia Philip MD, Last Rate: 30 mL/hr at 01/02/20 1704  •  traZODone (DESYREL) tablet 50 mg, 50 mg, Oral, Nightly PRN, Tricia Philip MD     Diagnostic Data    Lab Results (last 24 hours)     Procedure Component Value Units Date/Time    POC Glucose Once [[id number]]  (Normal) Collected:  01/03/20 0610    Specimen:  Blood Updated:  01/03/20 0716     Glucose 107 mg/dL      Comment: : [id number] PAT Inman  ID: [id number]       Comprehensive Metabolic Panel [[id number]]  (Abnormal) Collected:  01/03/20 0439    Specimen:  Blood Updated:  01/03/20 0512     Glucose 129 mg/dL      BUN 78 mg/dL      Creatinine 9.46 mg/dL      Sodium 135 mmol/L      Potassium 4.9 mmol/L      Chloride 92 mmol/L      CO2 22.0 mmol/L      Calcium 8.3 mg/dL      Total Protein 9.0 g/dL      Albumin 2.70 g/dL      ALT (SGPT) <5 U/L      AST (SGOT) 22 U/L      Alkaline Phosphatase 96 U/L      Total Bilirubin 1.7 mg/dL      eGFR Non  Amer --     Comment: <15 Indicative of kidney failure.        eGFR   Amer 7 mL/min/1.73      Comment: <15 Indicative of kidney failure.        Globulin 6.3 gm/dL      A/G Ratio 0.4 g/dL      BUN/Creatinine Ratio 8.2     Anion Gap 21.0 mmol/L     Narrative:       GFR Normal >60  Chronic Kidney Disease <60  Kidney Failure <15      CBC & Differential [[id number]] Collected:  01/03/20 0439    Specimen:  Blood Updated:  01/03/20 0443    Narrative:       The following orders were created for panel order CBC & Differential.  Procedure                               Abnormality         Status                     ---------                               -----------         ------                     CBC Auto Differential[[id number]]        Abnormal            Final result                 Please view results for these tests on the individual orders.    CBC Auto Differential [[id number]]  (Abnormal) Collected:  01/03/20 0439    Specimen:  Blood Updated:  01/03/20 0443     WBC 5.34 10*3/mm3      RBC 2.64 10*6/mm3      Hemoglobin 7.3 g/dL      Hematocrit 22.1 %      MCV 83.7 fL      MCH 27.7 pg      MCHC 33.0 g/dL      RDW 17.8 %      RDW-SD 54.4 fl      MPV 8.5 fL      Platelets 119 10*3/mm3      Neutrophil % 77.4 %      Lymphocyte % 10.7 %      Monocyte % 10.9 %      Eosinophil % 0.0 %      Basophil % 0.4 %      Immature Grans % 0.6 %      Neutrophils, Absolute 4.14 10*3/mm3      Lymphocytes, Absolute 0.57 10*3/mm3       Monocytes, Absolute 0.58 10*3/mm3      Eosinophils, Absolute 0.00 10*3/mm3      Basophils, Absolute 0.02 10*3/mm3      Immature Grans, Absolute 0.03 10*3/mm3      nRBC 0.0 /100 WBC     Blood Gas, Arterial [[id number]]  (Abnormal) Collected:  01/03/20 0430    Specimen:  Arterial Blood Updated:  01/03/20 0439     Site Arterial Line     Inderjit's Test N/A     pH, Arterial 7.531 pH units      Comment: 83 Value above reference range        pCO2, Arterial 27.5 mm Hg      Comment: 84 Value below reference range        pO2, Arterial 115.0 mm Hg      Comment: 83 Value above reference range        HCO3, Arterial 23.1 mmol/L      Base Excess, Arterial 0.6 mmol/L      O2 Saturation, Arterial 99.4 %      Comment: 83 Value above reference range        Barometric Pressure for Blood Gas 741 mmHg      Modality Ventilator     FIO2 30 %      Ventilator Mode AC     Set Tidal Volume 600     Set Mech Resp Rate 14.0     PEEP 5.0     PIP 40 cmH2O      Comment: Meter: [id number]     :  [id number]        Collected by NC    Hepatitis B Surface Antigen [[id number]] Collected:  01/02/20 0358    Specimen:  Blood Updated:  01/02/20 2248    Narrative:       The following orders were created for panel order Hepatitis B Surface Antigen.  Procedure                               Abnormality         Status                     ---------                               -----------         ------                     Hepatitis B Surface Antigen[[id number]]  Normal              Final result               Hep B Confirmation Tube[[id number]]                                                       Please view results for these tests on the individual orders.    POC Glucose Once [[id number]]  (Abnormal) Collected:  01/02/20 2148    Specimen:  Blood Updated:  01/02/20 2232     Glucose 134 mg/dL      Comment: : [id number] АЛЕКСАНДР KATIEMeter ID: [id number]       Extra Tubes [[id number]] Collected:  01/02/20 1842    Specimen:  Blood, Venous Line Updated:   01/02/20 1945    Narrative:       The following orders were created for panel order Extra Tubes.  Procedure                               Abnormality         Status                     ---------                               -----------         ------                     Light Blue Top[[id number]]                                   Final result               Gold Top - SST[[id number]]                                   Final result               Green Top (Gel)[[id number]]                                  Final result                 Please view results for these tests on the individual orders.    Light Blue Top [[id number]] Collected:  01/02/20 1843    Specimen:  Blood Updated:  01/02/20 1945     Extra Tube hold for add-on     Comment: Auto resulted       Gold Top - SST [[id number]] Collected:  01/02/20 1843    Specimen:  Blood Updated:  01/02/20 1945     Extra Tube Hold for add-ons.     Comment: Auto resulted.       Green Top (Gel) [[id number]] Collected:  01/02/20 1843    Specimen:  Blood Updated:  01/02/20 1945     Extra Tube Hold for add-ons.     Comment: Auto resulted.       Tissue Pathology Exam [[id number]] Collected:  01/02/20 1736    Specimen:  Tissue from Small Intestine, Duodenum Updated:  01/02/20 1856    Hemoglobin & Hematocrit, Blood [[id number]]  (Abnormal) Collected:  01/02/20 1843    Specimen:  Blood Updated:  01/02/20 1856     Hemoglobin 7.5 g/dL      Hematocrit 23.5 %     Comprehensive Metabolic Panel [[id number]]  (Abnormal) Collected:  01/02/20 1815    Specimen:  Blood Updated:  01/02/20 1850     Glucose 126 mg/dL      BUN 70 mg/dL      Creatinine 8.67 mg/dL      Sodium 137 mmol/L      Potassium 4.8 mmol/L      Chloride 93 mmol/L      CO2 21.0 mmol/L      Calcium 8.9 mg/dL      Total Protein 9.5 g/dL      Albumin 2.80 g/dL      ALT (SGPT) 5 U/L      AST (SGOT) 21 U/L      Alkaline Phosphatase 108 U/L      Total Bilirubin 2.2 mg/dL      eGFR Non  Amer --     Comment: <15 Indicative of kidney  failure.        eGFR  African Amer 7 mL/min/1.73      Comment: <15 Indicative of kidney failure.        Globulin 6.7 gm/dL      A/G Ratio 0.4 g/dL      BUN/Creatinine Ratio 8.1     Anion Gap 23.0 mmol/L     Narrative:       GFR Normal >60  Chronic Kidney Disease <60  Kidney Failure <15      Blood Gas, Arterial With Co-Ox [[id number]]  (Abnormal) Collected:  01/02/20 1820    Specimen:  Arterial Blood Updated:  01/02/20 1825     Site Arterial Line     Inderjit's Test N/A     pH, Arterial 7.300 pH units      Comment: 84 Value below reference range        pCO2, Arterial 46.8 mm Hg      Comment: 83 Value above reference range        pO2, Arterial 510.0 mm Hg      Comment: 86 Value above critical limit        HCO3, Arterial 23.0 mmol/L      Base Excess, Arterial -3.3 mmol/L      Comment: 84 Value below reference range        O2 Saturation, Arterial >100.0 %      Comment: 93 Value above reportable range > 100.0        Hemoglobin, Blood Gas 8.0 g/dL      Comment: 84 Value below reference range        Hematocrit, Blood Gas 24.5 %      Comment: 84 Value below reference range        Oxyhemoglobin 97.5 %      Methemoglobin 1.10 %      Carboxyhemoglobin 2.0 %      A-a Gradiant --     Comment: UNABLE TO CALCULATE        Sodium, Arterial 141 mmol/L      Potassium, Arterial 4.4 mmol/L      Ionized Calcium 4.34 mg/dL      Comment: 84 Value below reference range        Glucose, Arterial 126 mmol/L      Barometric Pressure for Blood Gas 740 mmHg      Modality N/A     Ventilator Mode NA     Note --     Collected by ENDO     Comment: Meter: [id number]     :  [id number]        pH, Temp Corrected --     pCO2, Temperature Corrected --     pO2, Temperature Corrected --    Hepatitis B Surface Antigen [[id number]]  (Normal) Collected:  01/02/20 0358    Specimen:  Blood Updated:  01/02/20 1650     Hepatitis B Surface Ag Non-Reactive    Extra Tubes [[id number]] Collected:  01/02/20 0358    Specimen:  Blood Updated:  01/02/20 9290     Narrative:       The following orders were created for panel order Extra Tubes.  Procedure                               Abnormality         Status                     ---------                               -----------         ------                     Gold Top - SST[[id number]]                                   Final result                 Please view results for these tests on the individual orders.    Gold Top - SST [[id number]] Collected:  01/02/20 0358    Specimen:  Blood Updated:  01/02/20 1515     Extra Tube Hold for add-ons.     Comment: Auto resulted.       POC Glucose Once [[id number]]  (Abnormal) Collected:  01/02/20 1055    Specimen:  Blood Updated:  01/02/20 1136     Glucose 143 mg/dL      Comment: RN NotifiedOperator: [id number] CHEYANNE NATHANMeter ID: [id number]       CRE Screen by PCR - Swab, Per Rectum [[id number]] Collected:  01/02/20 1015    Specimen:  Swab from Per Rectum Updated:  01/02/20 1130     CRE SCREEN Not Detected     Comment: Test performed by real-time polymerase chain reaction (qPCR).        OXA 48 Strain Not Detected     IMP STRAIN Not Detected     VIM STRAIN Not Detected     NDM Strain Not Detected     KPC Strain Not Detected    Ammonia [[id number]]  (Abnormal) Collected:  01/02/20 1010    Specimen:  Blood Updated:  01/02/20 1042     Ammonia 143 umol/L            Imaging Results (Last 24 Hours)     Procedure Component Value Units Date/Time    XR Chest 1 View [[id number]] Resulted:  01/03/20 0538     Updated:  01/03/20 0601    XR Chest 1 View [[id number]] Collected:  01/02/20 1809     Updated:  01/02/20 1843    Narrative:       PROCEDURE: XR CHEST 1 VW    VIEWS: Single    INDICATION: Code, intubation    COMPARISON: CXR: 1/2/2020 at 2:33 AM    FINDINGS:       - lines/tubes: Endotracheal tube terminates at the level of the  clavicular heads, approximately 3.5 cm superior to the joon. A  vascular stent is seen in the right subclavian/axillary vein. A  thin radiodense catheter overlies the  mid upper left chest wall  laterally. This is of uncertain etiology.    - cardiac: Size within normal limits.    - mediastinum: Contour within normal limits.     - lungs: Central vascular and interstitial prominence and  indistinctness, for which clinical correlation for pulmonary  edema is suggested.    - pleura: No evidence of  fluid.      - osseous: Unremarkable for age.      Impression:         1. Endotracheal tube appears to be in satisfactory position with  tip terminating at level of the inferior margin of the clavicular  heads  2. Thin linear radiodensity which could represent a catheter  overlies the left mid to upper chest laterally. Clinical  correlation needed  3. Central vascular and interstitial prominence and  indistinctness, for which clinical correlation for CHF is  suggested.     Electronically signed by:  Renetta Fountain MD  1/2/2020 6:42 PM CST  Workstation: [id number]          I reviewed the patient's new clinical results.    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   - Duonebs and albuterol nebs prn.          • Gastroesophageal reflux disease without esophagitis     -PPI     • Chronic pain     -Gabapentin        • Anxiety     - Continue Lexapro       • Primary insomnia     -trazodone, melatonin     • ESRD (end stage renal disease) (CMS/[facility])     -HD M/W/F  -Nephrology consult  -epogen on M/W/F with dialysis  -continue Sevelamer 800 mg three times daily        • Type 2 diabetes mellitus with chronic kidney disease on chronic dialysis, with long-term current use of insulin (CMS/HCC)     -levemir, SSI     • Essential hypertension     -not on any home medications, will monitor            DVT prophylaxis: SCDs/TEDs  Code Status and Medical Interventions:   Ordered at: 01/02/20 1525     Limited Support to NOT Include:    Intubation     Level Of Support Discussed With:    Health Care Surrogate     Code Status:    No CPR     Medical Interventions (Level of Support Prior to Arrest):    Limited       Plan for disposition:Where: home and When:  2-3days      Time: 30 minutes        This document has been electronically signed by Warren Stewart MD on January 3, 2020 9:09 AM

## 2020-01-03 NOTE — CONSULTS
CRITICAL CARE CONSULT NOTE  Imani Green MD    [facility] CRITICAL CARE  1/3/2020        Cristo Musa  69 y.o. male  1950  [id number]            Requesting physician: Guero Huynh MD    Reason for Consultation:  Acute respiratory failure s/p cardiac arrest    CC: unable to obtain    Subjective     History of Present Illness:  Cristo Musa is a 69 y.o. male with PMH significant for COPD, ESRD on HD, T2DM, obesity, and hypertension who presented to the ED from his nursing facility on 1/2/2020 complaining of hematemesis and hematochezia.  He was admitted and GI was consulted.  Patient was noted to be anemic at admission.  The patient underwent an EGD later in the day, but unfortunately towards the end of the procedure he had respiratory events leading to a cardiac arrest.  The patient was intubated and underwent CPR with 3 doses of epinephrine received.  They did have return of ROSC and the patient was admitted to the ICU.  He had a central line as well as a line placed following his code.  Patient initially required vasopressors, but they have since been weaned.  He had been sedated on Versed, but when this was held he got agitated but did not arouse.  Patient was also noted to have an elevated ammonia at admission of 143.  He is currently on rectal lactulose as well as rifaximin.  His EGD showed some small varices and nonbleeding ulcers but no other acute findings.  I was consulted to assist in managing the ventilator.  Currently, he is on AC//14/3%/5.  He is breathing above the ventilator.  Patient is having significant purulent secretions but has been afebrile.  He is a former smoker but quit 20 years ago.  Previously heavy alcohol use.  Resides in a nursing home.  He is .  Family history significant for end-stage renal disease, heart disease, diabetes, and stroke.    Review of Systems:   Review of Systems   Unable to perform ROS: Intubated        All systems were reviewed and negative except as noted above in the HPI.    Home Meds:  Medications Prior to Admission   Medication Sig Dispense Refill Last Dose   • acetaminophen (TYLENOL) 500 MG tablet Take 500 mg by mouth Every 4 (Four) Hours As Needed for Mild Pain  or Fever.   Unknown at Unknown time   • aspirin 81 MG chewable tablet Chew 81 mg Daily.   11/19/2019 at Unknown time   • benzonatate (TESSALON) 100 MG capsule Take 100 mg by mouth 3 (Three) Times a Day As Needed for Cough.   Unknown at Unknown time   • brimonidine (ALPHAGAN P) 0.1 % solution ophthalmic solution Administer  to both eyes 3 (Three) Times a Day.   11/19/2019 at Unknown time   • Cholecalciferol (VITAMIN D3) 2000 units chewable tablet Chew 2,000 Units Daily.   11/19/2019 at Unknown time   • cyclobenzaprine (FLEXERIL) 5 MG tablet Take 5 mg by mouth Every 8 (Eight) Hours As Needed for Muscle Spasms.   Unknown at Unknown time   • dorzolamide (TRUSOPT) 2 % ophthalmic solution Administer 1 drop into the left eye 3 (Three) Times a Day. 1 each 12 11/19/2019 at Unknown time   • escitalopram (LEXAPRO) 10 MG tablet Take 1 tablet by mouth Daily. 30 tablet 3 11/19/2019 at Unknown time   • famotidine (PEPCID) 20 MG tablet Take 20 mg by mouth every night at bedtime.   11/18/2019 at Unknown time   • fluticasone (FLONASE) 50 MCG/ACT nasal spray 2 sprays into each nostril daily. Administer 2 sprays in each nostril for each dose.   Unknown at Unknown time   • furosemide (LASIX) 40 MG tablet Take 40 mg by mouth 2 (Two) Times a Day.      • gabapentin (NEURONTIN) 100 MG capsule Take 1 capsule by mouth Every Night. 30 capsule 0    • Glecaprevir-Pibrentasvir (MAVYRET) 100-40 MG tablet Take 3 tablets by mouth Daily. 90 tablet 2 11/19/2019 at Unknown time   • guaiFENesin (HUMIBID 3) 400 MG tablet Take 400 mg by mouth Every 4 (Four) Hours As Needed for Cough.   Unknown at Unknown time   • insulin aspart (NovoLOG) 100 UNIT/ML injection Inject  under the skin 3  (Three) Times a Day Before Meals. Sliding scale:  = 0 units; 150-200 = 3 units; 200-250 = 6 units; 250-300 = 9 units; 300-350 = 12 units; >350 = 15 units and call MD   11/19/2019 at Unknown time   • insulin detemir (LEVEMIR) 100 UNIT/ML injection Inject 30 Units under the skin into the appropriate area as directed Daily.   11/19/2019 at Unknown time   • lactulose (CHRONULAC) 10 GM/15ML solution Take 30 mL by mouth 3 (Three) Times a Day. (Patient taking differently: Take 30 mL by mouth 3 (Three) Times a Day.) 946 mL 3 11/19/2019 at Unknown time   • latanoprost (XALATAN) 0.005 % ophthalmic solution Administer 1 drop to both eyes every night.   11/18/2019 at Unknown time   • Lidocaine (ASPERCREME LIDOCAINE) 4 % patch Apply 1 patch topically Daily As Needed (low back pain. on for 12 hours.).   Unknown at Unknown time   • Loratadine 10 MG capsule Take 10 mg by mouth Every Other Day.   11/18/2019 at Unknown time   • melatonin 5 MG tablet tablet Take 5 mg by mouth Every Night.   11/18/2019 at Unknown time   • ondansetron ODT (ZOFRAN-ODT) 4 MG disintegrating tablet Take 1 tablet by mouth Every 6 (Six) Hours As Needed for Nausea or Vomiting. 10 tablet 0 Unknown at Unknown time   • polyethylene glycol (MIRALAX) packet Take 17 g by mouth Daily As Needed (constipation).   11/19/2019 at Unknown time   • rifaximin (XIFAXAN) 550 MG tablet Take 1 tablet by mouth Every 12 (Twelve) Hours. 60 tablet 5 Unknown at Unknown time   • sevelamer (RENVELA) 800 MG tablet Take 800 mg by mouth 3 (Three) Times a Day With Meals.   11/19/2019 at Unknown time   • traZODone (DESYREL) 50 MG tablet Take 50 mg by mouth every night at bedtime.   11/18/2019 at Unknown time       Inpatient Meds:    Current Facility-Administered Medications:   •  acetaminophen (TYLENOL) tablet 650 mg, 650 mg, Oral, Q4H PRN **OR** [DISCONTINUED] acetaminophen (TYLENOL) 160 MG/5ML solution 650 mg, 650 mg, Oral, Q4H PRN **OR** acetaminophen (TYLENOL) suppository 650 mg,  650 mg, Rectal, Q4H PRN, Tricia Pihlip MD  •  albuterol (PROVENTIL) nebulizer solution 0.083% 2.5 mg/3mL, 2.5 mg, Nebulization, Q6H PRN, Tricia Philip MD  •  albuterol sulfate HFA (PROVENTIL HFA;VENTOLIN HFA;PROAIR HFA) inhaler 2 puff, 2 puff, Inhalation, 4x Daily - RT, Meseret Huynh MD, 2 puff at 01/03/20 0718  •  aspirin chewable tablet 81 mg, 81 mg, Oral, Daily, Tricia Philip MD, 81 mg at 01/03/20 0839  •  cetirizine (zyrTEC) tablet 10 mg, 10 mg, Oral, Daily, Tricia Philip MD, 10 mg at 01/03/20 0839  •  cyclobenzaprine (FLEXERIL) tablet 5 mg, 5 mg, Oral, Q8H PRN, Tricia Philip MD  •  dextrose (D50W) 25 g/ 50mL Intravenous Solution 25 g, 25 g, Intravenous, Q15 Min PRN, Tricia Philip MD  •  dextrose (GLUTOSE) oral gel 15 g, 15 g, Oral, Q15 Min PRN, Trciia Philip MD  •  docusate sodium (COLACE) capsule 100 mg, 100 mg, Oral, BID PRN, Tricia Philip MD  •  EPINEPHrine (ADRENALIN) 5 mg in sodium chloride 0.9 % 250 mL (0.02 mg/mL) infusion, 0.02-0.3 mcg/kg/min, Intravenous, Titrated, Tricia Philip MD, Stopped at 01/03/20 0130  •  epoetin ziggy (EPOGEN,PROCRIT) injection 10,000 Units, 10,000 Units, Intravenous, Once per day on Mon Wed Fri, Alethea Diamond MD  •  escitalopram (LEXAPRO) tablet 10 mg, 10 mg, Oral, Daily, Tricia Philip MD, 10 mg at 01/03/20 0839  •  gabapentin (NEURONTIN) capsule 100 mg, 100 mg, Oral, Nightly, Tricia Philip MD, 100 mg at 01/02/20 2107  •  glucagon (human recombinant) (GLUCAGEN DIAGNOSTIC) injection 1 mg, 1 mg, Subcutaneous, Q15 Min PRN, Tricia Philip MD  •  hydrOXYzine pamoate (VISTARIL) capsule 50 mg, 50 mg, Oral, TID PRN, Tricia Philip MD, 50 mg at 01/02/20 1423  •  insulin aspart (novoLOG) injection 0-14 Units, 0-14 Units, Subcutaneous, 4x Daily AC & at Bedtime, Tricia Philip MD  •  [START ON 1/4/2020] insulin detemir (LEVEMIR) injection 15 Units, 15 Units, Subcutaneous, Daily, César Haider MD  •  lactulose  (CHRONULAC) 10 GM/15ML solution 60 mL, 60 mL, Rectal, 4x Daily, Tricia Philip MD, 60 mL at 01/03/20 0839  •  latanoprost (XALATAN) 0.005 % ophthalmic solution 1 drop, 1 drop, Both Eyes, Nightly, Tricia Philip MD, 1 drop at 01/02/20 2230  •  lidocaine (LIDODERM) 5 % 1 patch, 1 patch, Transdermal, Q24H, Tricia Philip MD, 1 patch at 01/03/20 0841  •  melatonin tablet 6 mg, 6 mg, Oral, Nightly, Tricia Philip MD  •  midazolam (VERSED) 50 mg in sodium chloride 0.9 % 100 mL (0.5 mg/mL) infusion, 1-10 mg/hr, Intravenous, Titrated, Meseret Huynh MD, Last Rate: 4 mL/hr at 01/03/20 1111, 2 mg/hr at 01/03/20 1111  •  ondansetron (ZOFRAN) injection 4 mg, 4 mg, Intravenous, Q6H PRN, Tricia Philip MD  •  pantoprazole (PROTONIX) injection 40 mg, 40 mg, Intravenous, BID AC, Tricia Philip MD, 40 mg at 01/03/20 0646  •  riFAXIMin (XIFAXAN) tablet 550 mg, 550 mg, Oral, Q12H, Tricia Philip MD, 550 mg at 01/03/20 0840  •  sevelamer (RENVELA) tablet 800 mg, 800 mg, Oral, TID With Meals, Tricia Philip MD  •  sodium chloride 0.9 % flush 10 mL, 10 mL, Intravenous, Q12H, Tricia Philip MD, 10 mL at 01/03/20 0845  •  sodium chloride 0.9 % flush 10 mL, 10 mL, Intravenous, PRN, Tricia Philip MD  •  sodium chloride 0.9 % flush 10 mL, 10 mL, Intravenous, Q12H, Tricia Philip MD, 10 mL at 01/03/20 0845  •  sodium chloride 0.9 % flush 10 mL, 10 mL, Intravenous, PRN, Tricia Philip MD  •  traZODone (DESYREL) tablet 50 mg, 50 mg, Oral, Nightly PRN, Tricia Philip MD    Allergies:  Allergies   Allergen Reactions   • Lisinopril Angioedema     unknown   • Motrin [Ibuprofen] Diarrhea and Nausea And Vomiting       Past Medical History:  Past Medical History:   Diagnosis Date   • Anemia of chronic disease    • Cataract    • CHF (congestive heart failure) (CMS/HCC)    • Chronic pain syndrome    • CKD (chronic kidney disease)    • Clostridium difficile infection    • COPD (chronic obstructive  pulmonary disease) (CMS/HCC)    • Coronary artery disease    • Depression    • Diabetes mellitus (CMS/HCC)    • Dialysis patient (CMS/HCC)    • GERD (gastroesophageal reflux disease)    • Glaucoma    • H/O cardiac pacemaker     patient states he got his pacemaker removed in [place]   • Heart block    • Hepatitis C    • History of transfusion    • Hypertension    • Nephropathy, diabetic (CMS/HCC)    • Pacemaker    • Pulmonary embolism (CMS/HCC)        Past Surgical History:  Past Surgical History:   Procedure Laterality Date   • ABDOMINAL SURGERY     • ARTERIOVENOUS FISTULA/SHUNT SURGERY Right     forearm loop   • ARTERIOVENOUS FISTULA/SHUNT SURGERY Left     removed past upper arm   • ARTERIOVENOUS FISTULA/SHUNT SURGERY Right 3/13/2018    Procedure: revision RIGHT forearm ARTERIOVENOUS graft (excision), debridement, wound vac;  Surgeon: Jerson Singh MD;  Location: [facility] OR;  Service: Vascular   • ARTERIOVENOUS FISTULA/SHUNT SURGERY W/ HEMODIALYSIS CATHETER INSERTION Right 4/4/2016    Procedure: RIGHT ARM AV FISTULA DECLOT REVISION REPAIR BRACHIAL ANASTOMOTIC PSUEDOANEURYSM LEFT FEMORAL RICHARDSON FISTULOGRAM WITH ANGIOPLASTY;  Surgeon: Jerson Carpenter MD;  Location: [facility] OR 18/19;  Service:    • CATARACT EXTRACTION W/ INTRAOCULAR LENS IMPLANT Left 3/31/2017    Procedure: REMOVE CATARACT AND IMPLANT INTRAOCULAR LENS LEFT EYE;  Surgeon: Hilton Montemayor MD;  Location: [facility] OR;  Service:    • COLONOSCOPY N/A 3/12/2019    Procedure: COLONOSCOPY;  Surgeon: Flako Montoya MD;  Location: [facility] ENDOSCOPY;  Service: Gastroenterology   • COLONOSCOPY N/A 11/6/2019    Procedure: COLONOSCOPY;  Surgeon: Tricia Philip MD;  Location: [facility] ENDOSCOPY;  Service: Gastroenterology   • ENDOSCOPY N/A 3/12/2019    Procedure: ESOPHAGOGASTRODUODENOSCOPY;  Surgeon: Flako Montoya MD;  Location: [facility] ENDOSCOPY;  Service: Gastroenterology   • ENDOSCOPY N/A 11/5/2019    Procedure:  ESOPHAGOGASTRODUODENOSCOPY;  Surgeon: Tricia Philip MD;  Location: [facility] ENDOSCOPY;  Service: Gastroenterology   • EYE SURGERY     • HERNIA REPAIR     • IMPLANTABLE CARDIOVERTER DEFIBRILLATOR LEAD REPLACEMENT/POCKET REVISION Right 4/11/2016    Procedure: PACEMAKER LEADS X 3 AND BATTERY EXTRACTION WITH EXIMER LASER;  Surgeon: Vern Reeves MD;  Location: [facility] OR 18/19;  Service:    • INSERTION HEMODIALYSIS CATHETER Right 05/2015    jugular   • INTERVENTIONAL RADIOLOGY PROCEDURE Right 6/1/2017    Procedure: RIGHT dialysis fistulagram & angioplasty;  Surgeon: Jerson Singh MD;  Location: [facility] ANGIO INVASIVE LOCATION;  Service:    • INTERVENTIONAL RADIOLOGY PROCEDURE Right 8/24/2017    Procedure: IR dialysis fistulagram;  Surgeon: Jerson Singh MD;  Location: [facility] ANGIO INVASIVE LOCATION;  Service:    • INTERVENTIONAL RADIOLOGY PROCEDURE Right 11/13/2018    Procedure: IR dialysis fistulagram;  Surgeon: Jerson Singh MD;  Location: [facility] ANGIO INVASIVE LOCATION;  Service: Interventional Radiology   • PACEMAKER IMPLANTATION  2008    dual chamber [employer] [place]   • REMOVAL HEMODIALYSIS CATHETER Right 05/2015    femoral vein   • SIGMOIDOSCOPY N/A 8/2/2019    Procedure: SIGMOIDOSCOPY FLEXIBLE;  Surgeon: Flako Montoya MD;  Location: [facility] ENDOSCOPY;  Service: Gastroenterology        Social History:   Social History     Socioeconomic History   • Marital status:      Spouse name: Not on file   • Number of children: Not on file   • Years of education: Not on file   • Highest education level: Not on file   Tobacco Use   • Smoking status: Former Smoker     Types: Cigarettes   • Smokeless tobacco: Never Used   • Tobacco comment: quit 20 years ago    Substance and Sexual Activity   • Alcohol use: No     Comment: former heavy use in his 50's, up to a fifth of liquor a day, currently no alcohol   • Drug use: No   • Sexual activity: Never       Family  History:  Family History   Problem Relation Age of Onset   • COPD Sister    • Diabetes Brother    • Early death Brother    • Hyperlipidemia Brother    • Hypertension Brother    • Coronary artery disease Mother    • Diabetes Mother    • Hypertension Mother    • Stroke Other    • Other Other         Respiratory disorder       Objective     Vital Sign Min/Max for last 24 hours:  Temp  Min: 97 °F (36.1 °C)  Max: 100.4 °F (38 °C)   BP  Min: 78/41  Max: 156/69   Pulse  Min: 69  Max: 102   Resp  Min: 18  Max: 36   SpO2  Min: 94 %  Max: 100 %   No data recorded   Weight  Min: 96.9 kg (213 lb 10 oz)  Max: 96.9 kg (213 lb 10 oz)     Physical Exam:  100.3 °F (37.9 °C) (Oral) 84 113/61 23 100% 96.9 kg (213 lb 10 oz) Body mass index is 32.48 kg/m².  Physical Exam   Constitutional: Vital signs are normal. He appears well-developed and well-nourished. He is sedated and intubated.   Obese   HENT:   Head: Normocephalic and atraumatic.   Nose: Nose normal.   Mouth/Throat: Oropharynx is clear and moist and mucous membranes are normal.   ETT, OGT   Eyes: Lids are normal. Right conjunctiva is injected. Left conjunctiva is injected. Right pupil is not reactive.   Neck: Trachea normal. Neck supple. No thyroid mass present.   Cardiovascular: Normal rate, regular rhythm and normal heart sounds. Exam reveals no gallop.   No murmur heard.  Pulmonary/Chest: Effort normal. He is intubated. No respiratory distress. He has no wheezes. He has rhonchi (diffuse). He has no rales.   Abdominal: Soft. Normal appearance and bowel sounds are normal. There is no tenderness.   obese   Musculoskeletal:   No extremity edema     Vascular Status -  His right foot exhibits no edema. His left foot exhibits no edema.  Lymphadenopathy:        Head (right side): No submandibular adenopathy present.        Head (left side): No submandibular adenopathy present.     He has no cervical adenopathy.        Right: No supraclavicular adenopathy present.        Left: No  supraclavicular adenopathy present.   Neurological:   Sedated, unresponsive, moves spontaneously and withdraws to pain, cough/gag   Skin: Skin is warm and dry. No cyanosis. Nails show no clubbing.   Psychiatric:   Unable to assess       Central Lines/PICC: present    Data Review-   Labs: I personally reviewed the latest laboratory results.  Lab Results (last 24 hours)     Procedure Component Value Units Date/Time    Tissue Pathology Exam [[id number]] Collected:  01/02/20 1736    Specimen:  Tissue from Small Intestine, Duodenum Updated:  01/03/20 1047     Case Report --     Surgical Pathology Report                         Case: [id number]                                  Authorizing Provider:  Tricia Philip MD      Collected:           01/02/2020 05:36 PM          Ordering Location:     [facility]             Received:            01/02/2020 06:56 PM                                 [facility]                                                     Pathologist:           Ezra Martinez MD                                                           Specimen:    Small Intestine, Duodenum, Mass--[facility]                                                       Final Diagnosis --     DUODENUM, BIOPSY:  ACUTE ENTERITIS WITH FOCAL ULCERATION.  NEGATIVE FOR EVIDENCE OF NEOPLASIA.  NEGATIVE FOR VIRAL INCLUSIONS.       Comment --     The findings were discussed with Dr. Philip at 1035 on January 3, 2020.       Intradepartmental Consult --     Dr. Barney has reviewed the histology and agrees with the diagnosis.       Gross Description --     The specimen consists of 3 reddish-tan fragments measuring 0.6 x 0.5 x 0.2 cm together.  Totally submitted.      POC Glucose Once [[id number]]  (Normal) Collected:  01/03/20 0610    Specimen:  Blood Updated:  01/03/20 0716     Glucose 107 mg/dL      Comment: : [id number] PAT [place] ID: [id number]       Comprehensive Metabolic Panel [[id number]]  (Abnormal)  Collected:  01/03/20 0439    Specimen:  Blood Updated:  01/03/20 0512     Glucose 129 mg/dL      BUN 78 mg/dL      Creatinine 9.46 mg/dL      Sodium 135 mmol/L      Potassium 4.9 mmol/L      Chloride 92 mmol/L      CO2 22.0 mmol/L      Calcium 8.3 mg/dL      Total Protein 9.0 g/dL      Albumin 2.70 g/dL      ALT (SGPT) <5 U/L      AST (SGOT) 22 U/L      Alkaline Phosphatase 96 U/L      Total Bilirubin 1.7 mg/dL      eGFR Non  Amer --     Comment: <15 Indicative of kidney failure.        eGFR   Amer 7 mL/min/1.73      Comment: <15 Indicative of kidney failure.        Globulin 6.3 gm/dL      A/G Ratio 0.4 g/dL      BUN/Creatinine Ratio 8.2     Anion Gap 21.0 mmol/L     Narrative:       GFR Normal >60  Chronic Kidney Disease <60  Kidney Failure <15      CBC & Differential [[id number]] Collected:  01/03/20 0439    Specimen:  Blood Updated:  01/03/20 0443    Narrative:       The following orders were created for panel order CBC & Differential.  Procedure                               Abnormality         Status                     ---------                               -----------         ------                     CBC Auto Differential[[id number]]        Abnormal            Final result                 Please view results for these tests on the individual orders.    CBC Auto Differential [[id number]]  (Abnormal) Collected:  01/03/20 0439    Specimen:  Blood Updated:  01/03/20 0443     WBC 5.34 10*3/mm3      RBC 2.64 10*6/mm3      Hemoglobin 7.3 g/dL      Hematocrit 22.1 %      MCV 83.7 fL      MCH 27.7 pg      MCHC 33.0 g/dL      RDW 17.8 %      RDW-SD 54.4 fl      MPV 8.5 fL      Platelets 119 10*3/mm3      Neutrophil % 77.4 %      Lymphocyte % 10.7 %      Monocyte % 10.9 %      Eosinophil % 0.0 %      Basophil % 0.4 %      Immature Grans % 0.6 %      Neutrophils, Absolute 4.14 10*3/mm3      Lymphocytes, Absolute 0.57 10*3/mm3      Monocytes, Absolute 0.58 10*3/mm3      Eosinophils, Absolute 0.00 10*3/mm3       Basophils, Absolute 0.02 10*3/mm3      Immature Grans, Absolute 0.03 10*3/mm3      nRBC 0.0 /100 WBC     Blood Gas, Arterial [[id number]]  (Abnormal) Collected:  01/03/20 0430    Specimen:  Arterial Blood Updated:  01/03/20 0439     Site Arterial Line     Inderjit's Test N/A     pH, Arterial 7.531 pH units      Comment: 83 Value above reference range        pCO2, Arterial 27.5 mm Hg      Comment: 84 Value below reference range        pO2, Arterial 115.0 mm Hg      Comment: 83 Value above reference range        HCO3, Arterial 23.1 mmol/L      Base Excess, Arterial 0.6 mmol/L      O2 Saturation, Arterial 99.4 %      Comment: 83 Value above reference range        Barometric Pressure for Blood Gas 741 mmHg      Modality Ventilator     FIO2 30 %      Ventilator Mode AC     Set Tidal Volume 600     Set Mech Resp Rate 14.0     PEEP 5.0     PIP 40 cmH2O      Comment: Meter: [id number]     :  [id number]        Collected by NC    Hepatitis B Surface Antigen [[id number]] Collected:  01/02/20 0358    Specimen:  Blood Updated:  01/02/20 2248    Narrative:       The following orders were created for panel order Hepatitis B Surface Antigen.  Procedure                               Abnormality         Status                     ---------                               -----------         ------                     Hepatitis B Surface Antigen[[id number]]  Normal              Final result               Hep B Confirmation Tube[[id number]]                                                       Please view results for these tests on the individual orders.    POC Glucose Once [[id number]]  (Abnormal) Collected:  01/02/20 2148    Specimen:  Blood Updated:  01/02/20 2232     Glucose 134 mg/dL      Comment: : [id number] АЛЕКСАНДР Dhillon ID: [id number]       Extra Tubes [[id number]] Collected:  01/02/20 1843    Specimen:  Blood, Venous Line Updated:  01/02/20 1945    Narrative:       The following orders were created for panel  order Extra Tubes.  Procedure                               Abnormality         Status                     ---------                               -----------         ------                     Light Blue Top[[id number]]                                   Final result               Gold Top - SST[[id number]]                                   Final result               Green Top (Gel)[[id number]]                                  Final result                 Please view results for these tests on the individual orders.    Light Blue Top [[id number]] Collected:  01/02/20 1843    Specimen:  Blood Updated:  01/02/20 1945     Extra Tube hold for add-on     Comment: Auto resulted       Gold Top - SST [[id number]] Collected:  01/02/20 1843    Specimen:  Blood Updated:  01/02/20 1945     Extra Tube Hold for add-ons.     Comment: Auto resulted.       Green Top (Gel) [[id number]] Collected:  01/02/20 1843    Specimen:  Blood Updated:  01/02/20 1945     Extra Tube Hold for add-ons.     Comment: Auto resulted.       Hemoglobin & Hematocrit, Blood [[id number]]  (Abnormal) Collected:  01/02/20 1843    Specimen:  Blood Updated:  01/02/20 1856     Hemoglobin 7.5 g/dL      Hematocrit 23.5 %     Comprehensive Metabolic Panel [[id number]]  (Abnormal) Collected:  01/02/20 1815    Specimen:  Blood Updated:  01/02/20 1850     Glucose 126 mg/dL      BUN 70 mg/dL      Creatinine 8.67 mg/dL      Sodium 137 mmol/L      Potassium 4.8 mmol/L      Chloride 93 mmol/L      CO2 21.0 mmol/L      Calcium 8.9 mg/dL      Total Protein 9.5 g/dL      Albumin 2.80 g/dL      ALT (SGPT) 5 U/L      AST (SGOT) 21 U/L      Alkaline Phosphatase 108 U/L      Total Bilirubin 2.2 mg/dL      eGFR Non  Amer --     Comment: <15 Indicative of kidney failure.        eGFR   Amer 7 mL/min/1.73      Comment: <15 Indicative of kidney failure.        Globulin 6.7 gm/dL      A/G Ratio 0.4 g/dL      BUN/Creatinine Ratio 8.1     Anion Gap 23.0 mmol/L     Narrative:        GFR Normal >60  Chronic Kidney Disease <60  Kidney Failure <15      Blood Gas, Arterial With Co-Ox [[id number]]  (Abnormal) Collected:  01/02/20 1820    Specimen:  Arterial Blood Updated:  01/02/20 1825     Site Arterial Line     Inderjit's Test N/A     pH, Arterial 7.300 pH units      Comment: 84 Value below reference range        pCO2, Arterial 46.8 mm Hg      Comment: 83 Value above reference range        pO2, Arterial 510.0 mm Hg      Comment: 86 Value above critical limit        HCO3, Arterial 23.0 mmol/L      Base Excess, Arterial -3.3 mmol/L      Comment: 84 Value below reference range        O2 Saturation, Arterial >100.0 %      Comment: 93 Value above reportable range > 100.0        Hemoglobin, Blood Gas 8.0 g/dL      Comment: 84 Value below reference range        Hematocrit, Blood Gas 24.5 %      Comment: 84 Value below reference range        Oxyhemoglobin 97.5 %      Methemoglobin 1.10 %      Carboxyhemoglobin 2.0 %      A-a Gradiant --     Comment: UNABLE TO CALCULATE        Sodium, Arterial 141 mmol/L      Potassium, Arterial 4.4 mmol/L      Ionized Calcium 4.34 mg/dL      Comment: 84 Value below reference range        Glucose, Arterial 126 mmol/L      Barometric Pressure for Blood Gas 740 mmHg      Modality N/A     Ventilator Mode NA     Note --     Collected by ENDO     Comment: Meter: [id number]     :  [id number]        pH, Temp Corrected --     pCO2, Temperature Corrected --     pO2, Temperature Corrected --    Hepatitis B Surface Antigen [[id number]]  (Normal) Collected:  01/02/20 0358    Specimen:  Blood Updated:  01/02/20 1650     Hepatitis B Surface Ag Non-Reactive    Extra Tubes [[id number]] Collected:  01/02/20 0358    Specimen:  Blood Updated:  01/02/20 1515    Narrative:       The following orders were created for panel order Extra Tubes.  Procedure                               Abnormality         Status                     ---------                               -----------          ------                     Gold Top - SST[[id number]]                                   Final result                 Please view results for these tests on the individual orders.    [facility] [[id number]] Collected:  01/02/20 0358    Specimen:  Blood Updated:  01/02/20 1515     Extra Tube Hold for add-ons.     Comment: Auto resulted.              Imaging: I personally visualized the relevant images of scans/x-rays performed.  Imaging Results (Last 24 Hours)     Procedure Component Value Units Date/Time    XR Chest 1 View [[id number]] Collected:  01/03/20 0537     Updated:  01/03/20 1007    Narrative:         PROCEDURE: Single chest view portable    REASON FOR EXAM:ventilator, K92.2 Gastrointestinal hemorrhage,  unspecified R19.7 Diarrhea, unspecified K21.9 Gastro-esophageal  reflux disease without esophagitis R26.89 Other abnormalities of  gait and mobility    FINDINGS: Comparison exam dated January 2, 2020. ET tube with tip  3.3 cm above joon. NG tube with tip below level diaphragm.  Cardiac and pulmonary vasculature are normal. Lungs are clear.  Pleural spaces are normal. No acute osseous abnormality.      Impression:       1.  Tubes as described above.  2.  Otherwise negative chest.    Electronically signed by:  Alexanedr Mccormack MD  1/3/2020 10:05 AM [employer]  Workstation: [id number]    XR Chest 1 View [[id number]] Collected:  01/02/20 1809     Updated:  01/02/20 1843    Narrative:       PROCEDURE: XR CHEST 1 VW    VIEWS: Single    INDICATION: Code, intubation    COMPARISON: CXR: 1/2/2020 at 2:33 AM    FINDINGS:       - lines/tubes: Endotracheal tube terminates at the level of the  clavicular heads, approximately 3.5 cm superior to the joon. A  vascular stent is seen in the right subclavian/axillary vein. A  thin radiodense catheter overlies the mid upper left chest wall  laterally. This is of uncertain etiology.    - cardiac: Size within normal limits.    - mediastinum: Contour within normal limits.     - lungs: Central  vascular and interstitial prominence and  indistinctness, for which clinical correlation for pulmonary  edema is suggested.    - pleura: No evidence of  fluid.      - osseous: Unremarkable for age.      Impression:         1. Endotracheal tube appears to be in satisfactory position with  tip terminating at level of the inferior margin of the clavicular  heads  2. Thin linear radiodensity which could represent a catheter  overlies the left mid to upper chest laterally. Clinical  correlation needed  3. Central vascular and interstitial prominence and  indistinctness, for which clinical correlation for CHF is  suggested.     Electronically signed by:  Renetta Fountain MD  1/2/2020 6:42 PM CST  Workstation: [id number]            Assessment/Plan     Assessment:  #Acute hypoxic respiratory failure following anesthesia for EGD  #Cardiac arrest status post ROSC  #Possible hypoxic ischemic brain injury  #Upper GI bleed  #Acute blood loss anemia  #Hypotension-resolved  #Acute respiratory alkalosis  #ESRD  #Mild protein calorie malnutrition  #Hyperammonemia  #T2DM with mild hyperglycemia  #Mild coagulopathy and thrombocytopenia  #Obesity      Recommendations:  -Changed to PSV 14/5 with good tolerance.  Monitor closely if patient does not tolerate change back to AC/VC, but reduce tidal volume to 500 at the most  -Attempt SBT in the morning  -Wean Versed drip off an attempt to minimize sedation to allow for neurologic assessment  -Use fentanyl or Versed as needed for comfort  -Check sputum culture  -Check ammonia level  -Continue scheduled WI lactulose and rifaximin per GI  -Monitor hemoglobin and transfuse for hemoglobin less than 7 or acute bleeding  -Hemodialysis per nephrology  -Sliding scale insulin  -N.p.o.  -PPX: SCDs, protonix  -DNR  Updated patient's wife and brother at the bedside.  They voiced that the patient had recently told him that he was tired  And that he did desire for a natural death.  I counseled them that I am  concerned about the possibility of hypoxic ischemic brain injury following his code but it may take some time to fully evaluate.  He actually seems to be doing fairly well from a respiratory mechanics standpoint and he does have some baseline functioning but his higher cortical functioning remains in question.  I did encourage his family to consider whether or not they would wish to proceed with trach if he fails to wean from the ventilator, and if they would consider transition to comfort care should he fail to improve to his previous baseline.    D/w RN    Critical care time spent: 57 minutes  This time excludes other billable procedures. Time does include preparation of documents, medical consultations, review of old records, and direct bedside care.       Thank you for allowing me to participate in the care of Cristo Musa. Please contact me with any questions.       This document has been electronically signed by Imani Green MD on January 3, 2020 12:16 PM      [phone]    Dictated using Dragon

## 2020-01-03 NOTE — ANESTHESIA PROCEDURE NOTES
Arterial Line      Patient reassessed immediately prior to procedure    Patient location during procedure: OR  Start time: 1/2/2020 6:28 PM  Stop Time:1/2/2020 6:32 PM       Line placed for hemodynamic monitoring, ABGs/Labs/ISTAT and respiratory failure.  Performed By   CRNA: Forrest Gibson CRNA  Preanesthetic Checklist  Completed: patient identified, site marked, surgical consent, pre-op evaluation, timeout performed, IV checked, risks and benefits discussed and monitors and equipment checked  Arterial Line Prep   Sterile Tech: mask and gloves  Prep: ChloraPrep  Patient monitoring: blood pressure monitoring, continuous pulse oximetry and EKG  Arterial Line Procedure   Laterality:left  Location:  radial artery  Catheter size: 20 G   Guidance: ultrasound guided and palpation technique  Number of attempts: 1  Successful placement: yes  Post Assessment   Dressing Type: occlusive dressing applied and secured with tape.   Complications no  Circ/Move/Sens Assessment: normal and unchanged.   Patient Tolerance: patient tolerated the procedure well with no apparent complications

## 2020-01-03 NOTE — SIGNIFICANT NOTE
Pt has notable amount of blood in mouth, pt is biting tongue. Bite block placed at this time, nursing at bedside.

## 2020-01-03 NOTE — ANESTHESIA PROCEDURE NOTES
Central Line      Patient location during procedure: OR  Indications: central pressure monitoring and vascular access  Staff  Anesthesiologist: Abdirashid Ocasio MD  Preanesthetic Checklist  Completed: patient identified, IV checked and monitors and equipment checked  Central Line Prep  Sterile Tech:cap, gloves, gown, mask and sterile barriers  Prep: chloraprep  Patient monitoring: continuous pulse oximetry, blood pressure monitoring and EKG  Central Line Procedure  Laterality:right  Location:femoral  Catheter Type:double lumen  Catheter Size:7 Fr  Guidance:landmark technique and palpation technique  Assessment  Post procedure:biopatch applied, line sutured and occlusive dressing applied  Assessement:blood return through all ports and free fluid flow  Complications:no  Patient Tolerance:patient tolerated the procedure well with no apparent complications  Additional Notes  ctsp emergently for code in endo; patient arrested at conclusion of endo procedure.  Patient then intubated, cpr, epi boluses; right femoral line placed and epi infusion started. Patient stable blood pressure and transferred to icu where left radial line arterial line placed.  Blood pressure low 100s in icu on epi at 0.02 ucg per kg per min. Arterial blood gas reveals po2 500 with normal bicarb level and potassium 4.4.  hct about 25 not surprising in renal failure patient.  Will maintain sedation tonight and repeat labs.

## 2020-01-03 NOTE — PROGRESS NOTES
[facility]  [address] [place] [address]. [id number]  T - [phone]  F - [phone]     Progress Note          PATIENT  DEMOGRAPHICS   PATIENT NAME: Cristo Musa                      PHYSICIAN: Alethea Diamond MD  : 1950  MRN: [id number]   LOS: 1 day    Patient Care Team:  Warren Stewart MD as PCP - General (Family Medicine)  Michelle Lo MD as Resident (Family Medicine)  John Sanchez MD as Resident (Family Medicine)  Marissa Boothe MD as Resident (Family Medicine)  Lucio Anderson MD as Resident (Family Medicine)  Demarcus Oliveira MD as Resident (Family Medicine)  Subjective   SUBJECTIVE   Transferred to ICU yesterday and intubated. Off sedation now. Does not follow commands.          Objective   OBJECTIVE   Vital Signs  Temp:  [97 °F (36.1 °C)-100.4 °F (38 °C)] 100.3 °F (37.9 °C)  Heart Rate:  [] 75  Resp:  [18-36] 23  BP: ()/(32-89) 118/55  Arterial Line BP: ()/(45-94) 119/46  FiO2 (%):  [30 %-50 %] 30 %    Flowsheet Rows      First Filed Value   Admission Height  --   Admission Weight  95.7 kg (211 lb) Documented at 2020 0119           I/O last 3 completed shifts:  In: 210 [I.V.:210]  Out: -     PHYSICAL EXAM    Physical Exam   Constitutional: He appears well-developed.   Does not open eyes or follow commands.    Cardiovascular: Normal rate, regular rhythm and normal heart sounds. Exam reveals no gallop and no friction rub.   No murmur heard.  Pulmonary/Chest: Effort normal and breath sounds normal. No stridor. No respiratory distress. He has no wheezes. He has no rales.   Abdominal: Soft. Bowel sounds are normal. He exhibits no distension and no mass. There is no tenderness. There is no guarding.   Musculoskeletal: He exhibits no edema or tenderness.   Vitals reviewed.      RESULTS   Results Review:    Results from last 7 days   Lab Units 20  0439 20  1820 20  1815 20  0358   SODIUM mmol/L 135*   --  137 133*   SODIUM, ARTERIAL mmol/L  --  141  --   --    POTASSIUM mmol/L 4.9  --  4.8 5.0   CHLORIDE mmol/L 92*  --  93* 92*   CO2 mmol/L 22.0  --  21.0* 25.0   BUN mg/dL 78*  --  70* 56*   CREATININE mg/dL 9.46*  --  8.67* 7.77*   CALCIUM mg/dL 8.3*  --  8.9 9.0   BILIRUBIN mg/dL 1.7*  --  2.2* 3.1*   ALK PHOS U/L 96  --  108 118*   ALT (SGPT) U/L <5  --  5 5   AST (SGOT) U/L 22  --  21 18   GLUCOSE mg/dL 129*  --  126* 113*   GLUCOSE, ARTERIAL mmol/L  --  126*  --   --        Estimated Creatinine Clearance: 8.4 mL/min (A) (by C-G formula based on SCr of 9.46 mg/dL (H)).        Results from last 7 days   Lab Units 01/03/20  0439 01/02/20  1843 01/02/20  0359 12/30/19  2341   WBC 10*3/mm3 5.34  --  5.97 4.81   HEMOGLOBIN g/dL 7.3* 7.5* 7.9* 8.1*   PLATELETS 10*3/mm3 119*  --  118* 110*       Results from last 7 days   Lab Units 01/02/20  0358 12/30/19  2341   INR  1.40* 1.19         Imaging Results (Last 24 Hours)     Procedure Component Value Units Date/Time    XR Chest 1 View [[id number]] Collected:  01/03/20 0537     Updated:  01/03/20 1007    Narrative:         PROCEDURE: Single chest view portable    REASON FOR EXAM:ventilator, K92.2 Gastrointestinal hemorrhage,  unspecified R19.7 Diarrhea, unspecified K21.9 Gastro-esophageal  reflux disease without esophagitis R26.89 Other abnormalities of  gait and mobility    FINDINGS: Comparison exam dated January 2, 2020. ET tube with tip  3.3 cm above joon. NG tube with tip below level diaphragm.  Cardiac and pulmonary vasculature are normal. Lungs are clear.  Pleural spaces are normal. No acute osseous abnormality.      Impression:       1.  Tubes as described above.  2.  Otherwise negative chest.    Electronically signed by:  Alexander Mccormack MD  1/3/2020 10:05 AM CST  Workstation: [id number]    XR Chest 1 View [[id number]] Collected:  01/02/20 1809     Updated:  01/02/20 1843    Narrative:       PROCEDURE: XR CHEST 1 VW    VIEWS: Single    INDICATION: Code,  intubation    COMPARISON: CXR: 1/2/2020 at 2:33 AM    FINDINGS:       - lines/tubes: Endotracheal tube terminates at the level of the  clavicular heads, approximately 3.5 cm superior to the joon. A  vascular stent is seen in the right subclavian/axillary vein. A  thin radiodense catheter overlies the mid upper left chest wall  laterally. This is of uncertain etiology.    - cardiac: Size within normal limits.    - mediastinum: Contour within normal limits.     - lungs: Central vascular and interstitial prominence and  indistinctness, for which clinical correlation for pulmonary  edema is suggested.    - pleura: No evidence of  fluid.      - osseous: Unremarkable for age.      Impression:         1. Endotracheal tube appears to be in satisfactory position with  tip terminating at level of the inferior margin of the clavicular  heads  2. Thin linear radiodensity which could represent a catheter  overlies the left mid to upper chest laterally. Clinical  correlation needed  3. Central vascular and interstitial prominence and  indistinctness, for which clinical correlation for CHF is  suggested.     Electronically signed by:  Renetta Fountain MD  1/2/2020 6:42 PM CST  Workstation: [id number]           MEDICATIONS      albuterol sulfate HFA 2 puff Inhalation 4x Daily - RT   aspirin 81 mg Oral Daily   cetirizine 10 mg Oral Daily   escitalopram 10 mg Oral Daily   gabapentin 100 mg Oral Nightly   insulin aspart 0-14 Units Subcutaneous 4x Daily AC & at Bedtime   [START ON 1/4/2020] insulin detemir 15 Units Subcutaneous Daily   lactulose 60 mL Rectal 4x Daily   latanoprost 1 drop Both Eyes Nightly   lidocaine 1 patch Transdermal Q24H   melatonin 6 mg Oral Nightly   pantoprazole 40 mg Intravenous BID AC   riFAXIMin 550 mg Oral Q12H   sevelamer 800 mg Oral TID With Meals   sodium chloride 10 mL Intravenous Q12H   sodium chloride 10 mL Intravenous Q12H       EPINEPHrine 0.02-0.3 mcg/kg/min Last Rate: Stopped (01/03/20 0130)    midazolam 1-10 mg/hr Last Rate: 6 mg/hr (01/03/20 0515)   sodium chloride 30 mL/hr Last Rate: 30 mL/hr (01/02/20 1707)       Assessment/Plan   ASSESSMENT / PLAN      Gastrointestinal hemorrhage    Type 2 diabetes mellitus with chronic kidney disease on chronic dialysis, with long-term current use of insulin (CMS/[facility])    Essential hypertension    ESRD (end stage renal disease) (CMS/[facility])    Primary insomnia    Chronic pain    Anxiety    Gastroesophageal reflux disease without esophagitis    COPD (chronic obstructive pulmonary disease) (CMS/[facility])    Chronic heart failure with preserved ejection fraction (CMS/[facility])    Encephalopathy    1.  ESRD on HD:  - Last hemodialysis was Tuesday, however the patient did not complete his treatment and left more than 2 hours early.    - Will proceed with HD today.      2.  GI bleed:  - s/p EGD. Has varices and ulcers     3.  HFpEF     4.  COPD     5.  Chronic hepatitis C     6.  Anemia:  - Hemoglobin is low at 7.3. Epogen with HD     7.  Hypertension:  - Blood pressure is controlled.            This document has been electronically signed by Alethea Diamond MD on January 3, 2020 10:55 AM

## 2020-01-03 NOTE — PLAN OF CARE
Problem: Ventilation, Mechanical Invasive (Adult)  Goal: Signs and Symptoms of Listed Potential Problems Will be Absent, Minimized or Managed (Ventilation, Mechanical Invasive)  Outcome: Ongoing (interventions implemented as appropriate)  Flowsheets (Taken 1/3/2020 1515)  Problems Assessed (Mechanical Ventilation, Invasive): inability to wean; malnutrition  Problems Present (Mech Vent, Invasive): inability to wean     Problem: Patient Care Overview  Goal: Plan of Care Review  Outcome: Ongoing (interventions implemented as appropriate)  Flowsheets (Taken 1/3/2020 1515)  Progress: no change  Plan of Care Reviewed With: caregiver  Outcome Summary: New Assessment:  Pt is currently NPO on the vent with resp failure following Cardiac arrest. Will monitor for the need for nutrition support.

## 2020-01-03 NOTE — PLAN OF CARE
Problem: Ventilation, Mechanical Invasive (Adult)  Goal: Signs and Symptoms of Listed Potential Problems Will be Absent, Minimized or Managed (Ventilation, Mechanical Invasive)  Outcome: Ongoing (interventions implemented as appropriate)   Pt continues to be ventilated at this time, notable increased RR at times, large amounts of secretions from ETT, bite block in place at left side of mouth. Pt does not meet SBT criteria, less than 24 hours since ROSC. Will continue to monitor.

## 2020-01-04 PROBLEM — J96.00 ACUTE RESPIRATORY FAILURE (HCC): Status: ACTIVE | Noted: 2020-01-01

## 2020-01-04 PROBLEM — R50.9 FEVER OF UNKNOWN ORIGIN: Status: ACTIVE | Noted: 2020-01-01

## 2020-01-04 NOTE — PROGRESS NOTES
FAMILY MEDICINE DAILY PROGRESS NOTE  NAME: Cristo Musa  : 1950  MRN: [id number]     LOS: 1 day     PROVIDER OF SERVICE: Guero Huynh MD    Chief Complaint: Gastrointestinal hemorrhage    Subjective:     Interval History:  History taken from: chart  Overnight patient had fever of 103.8. On cooling blanket at this time. Tylenol given x 1. Temp improved to 99.6  Continues to remain on ventilator. Failed SBT this morning. Non-responsive to stimulation.   Will have discussion with family regarding current treatment plan   Review of Systems:   Review of Systems   Unable to perform ROS: Intubated       Objective:     Vital Signs  Temp:  [99.3 °F (37.4 °C)-99.9 °F (37.7 °C)] 99.9 °F (37.7 °C)  Heart Rate:  [71-97] 84  Resp:  [18-32] 30  BP: ()/() 126/58  Arterial Line BP: ()/(40-87) 116/45  FiO2 (%):  [30 %] 30 %    Physical Exam  Physical Exam   Constitutional: No distress.   Sickly apperance   HENT:   Head: Normocephalic and atraumatic.   Right Ear: External ear normal.   Left Ear: External ear normal.   Mouth/Throat: Oropharynx is clear and moist.   Eyes: Conjunctivae are normal. No scleral icterus.   Hard to assess PERRL due to lens implants/surgeries   Neck: Normal range of motion.   Cardiovascular: Normal rate, regular rhythm and normal heart sounds. Exam reveals no gallop and no friction rub.   No murmur heard.  Pulmonary/Chest: Effort normal. No stridor. No respiratory distress. He has wheezes. He has rales. He exhibits no tenderness.   Currently intubated  Decrease breath sounds bilaterally   Wheezes throughout all lung fields   Abdominal: Soft. Bowel sounds are normal. He exhibits no distension. There is no tenderness.   Musculoskeletal: Normal range of motion. He exhibits no edema or tenderness.   Skin: Skin is warm and dry. No rash noted. He is not diaphoretic. No erythema. No pallor.   Psychiatric: He is noncommunicative.       Medication Review    Current  Facility-Administered Medications:   •  acetaminophen (TYLENOL) tablet 650 mg, 650 mg, Oral, Q4H PRN, 650 mg at 01/03/20 2139 **OR** [DISCONTINUED] acetaminophen (TYLENOL) 160 MG/5ML solution 650 mg, 650 mg, Oral, Q4H PRN **OR** acetaminophen (TYLENOL) suppository 650 mg, 650 mg, Rectal, Q4H PRN, Tricia Philip MD  •  albumin human 25 % IV SOLN 25 g, 25 g, Intravenous, PRN, Alethea Diamond MD  •  albuterol (PROVENTIL) nebulizer solution 0.083% 2.5 mg/3mL, 2.5 mg, Nebulization, Q6H PRN, Tricia Philip MD  •  albuterol sulfate HFA (PROVENTIL HFA;VENTOLIN HFA;PROAIR HFA) inhaler 2 puff, 2 puff, Inhalation, 4x Daily - RT, Meseret Huynh MD, 2 puff at 01/04/20 1112  •  aspirin chewable tablet 81 mg, 81 mg, Oral, Daily, Tricia Philip MD, 81 mg at 01/04/20 0917  •  budesonide-formoterol (SYMBICORT) 160-4.5 MCG/ACT inhaler 2 puff, 2 puff, Inhalation, BID - RT, Imani Green MD, 2 puff at 01/04/20 0705  •  cetirizine (zyrTEC) tablet 10 mg, 10 mg, Oral, Daily, Tricia Philip MD, 10 mg at 01/04/20 0917  •  cyclobenzaprine (FLEXERIL) tablet 5 mg, 5 mg, Oral, Q8H PRN, Tricia Philip MD  •  dextrose (D50W) 25 g/ 50mL Intravenous Solution 25 g, 25 g, Intravenous, Q15 Min PRN, Tricia Philip MD, 25 g at 01/03/20 2309  •  dextrose (GLUTOSE) oral gel 15 g, 15 g, Oral, Q15 Min PRN, Tricia Philip MD  •  docusate sodium (COLACE) capsule 100 mg, 100 mg, Oral, BID PRN, Tricia Philip MD  •  EPINEPHrine (ADRENALIN) 5 mg in sodium chloride 0.9 % 250 mL (0.02 mg/mL) infusion, 0.02-0.3 mcg/kg/min, Intravenous, Titrated, Tricia Philip MD, Stopped at 01/03/20 0130  •  epoetin ziggy (EPOGEN,PROCRIT) injection 10,000 Units, 10,000 Units, Intravenous, Once per day on Mon Wed Fri, Alethea Diamond MD, 10,000 Units at 01/03/20 1342  •  escitalopram (LEXAPRO) tablet 10 mg, 10 mg, Oral, Daily, Tricia Philip MD, 10 mg at 01/04/20 0917  •  fentaNYL citrate (PF) (SUBLIMAZE) injection 25 mcg, 25 mcg,  Intravenous, Q30 Min PRN, Imani Green MD  •  gabapentin (NEURONTIN) capsule 100 mg, 100 mg, Oral, Nightly, Tricia Philip MD, 100 mg at 01/03/20 2145  •  glucagon (human recombinant) (GLUCAGEN DIAGNOSTIC) injection 1 mg, 1 mg, Subcutaneous, Q15 Min PRN, Tricia Philip MD  •  hydrOXYzine pamoate (VISTARIL) capsule 50 mg, 50 mg, Oral, TID PRN, Tricia Philip MD, 50 mg at 01/02/20 1423  •  insulin aspart (novoLOG) injection 0-14 Units, 0-14 Units, Subcutaneous, 4x Daily AC & at Bedtime, Tricia Philip MD  •  ipratropium (ATROVENT HFA) inhaler 2 puff, 2 puff, Inhalation, 4x Daily - RT, Imain Green MD, 2 puff at 01/04/20 1112  •  lactulose (CHRONULAC) 10 GM/15ML solution 60 mL, 60 mL, Rectal, 4x Daily, Tricia Philip MD, 60 mL at 01/03/20 0839  •  latanoprost (XALATAN) 0.005 % ophthalmic solution 1 drop, 1 drop, Both Eyes, Nightly, Tricia Philip MD, 1 drop at 01/03/20 2141  •  lidocaine (LIDODERM) 5 % 1 patch, 1 patch, Transdermal, Q24H, Tricia Philip MD, 1 patch at 01/04/20 1002  •  melatonin tablet 6 mg, 6 mg, Oral, Nightly, Tricia Philip MD, 6 mg at 01/03/20 2139  •  methylPREDNISolone sodium succinate (SOLU-Medrol) injection 40 mg, 40 mg, Intravenous, Q12H, Imani Green MD  •  midazolam (VERSED) 50 mg in sodium chloride 0.9 % 100 mL (0.5 mg/mL) infusion, 1-10 mg/hr, Intravenous, Titrated, Meseret Huynh MD, Stopped at 01/03/20 1334  •  midazolam (VERSED) injection 1 mg, 1 mg, Intravenous, Q30 Min PRN, Imani Green MD, 1 mg at 01/04/20 1046  •  ondansetron (ZOFRAN) injection 4 mg, 4 mg, Intravenous, Q6H PRN, Tricia Philip MD  •  pantoprazole (PROTONIX) injection 40 mg, 40 mg, Intravenous, BID AC, Tricia Philip MD, 40 mg at 01/04/20 0917  •  [DISCONTINUED] vancomycin 1 g/250 mL 0.9% NS (vial-mate), 1 g, Intravenous, Once **AND** Pharmacy to dose vancomycin, , Does not apply, Continuous PRN, Imani Green MD  •  piperacillin-tazobactam (ZOSYN) 3.375  g/100 mL 0.9% NS IVPB (mbp), 3.375 g, Intravenous, Q12H, Imani Green MD  •  riFAXIMin (XIFAXAN) tablet 550 mg, 550 mg, Oral, Q12H, Tricia Philip MD, 550 mg at 01/04/20 0917  •  sevelamer (RENVELA) tablet 800 mg, 800 mg, Oral, TID With Meals, Tricia Philip MD  •  sodium chloride 0.9 % flush 10 mL, 10 mL, Intravenous, Q12H, Tricia Philip MD, 10 mL at 01/03/20 2143  •  sodium chloride 0.9 % flush 10 mL, 10 mL, Intravenous, PRN, Tricia Philip MD  •  sodium chloride 0.9 % flush 10 mL, 10 mL, Intravenous, Q12H, Tricia Philip MD, 10 mL at 01/03/20 2143  •  sodium chloride 0.9 % flush 10 mL, 10 mL, Intravenous, PRN, Tricia Philip MD  •  traZODone (DESYREL) tablet 50 mg, 50 mg, Oral, Nightly PRN, Tricia Philip MD, 50 mg at 01/03/20 2139     Diagnostic Data    Lab Results (last 24 hours)     Procedure Component Value Units Date/Time    CBC & Differential [[id number]] Collected:  01/04/20 0738    Specimen:  Blood, Arterial Line Updated:  01/04/20 0846    Narrative:       The following orders were created for panel order CBC & Differential.  Procedure                               Abnormality         Status                     ---------                               -----------         ------                     CBC Auto Differential[[id number]]        Abnormal            Final result                 Please view results for these tests on the individual orders.    CBC Auto Differential [[id number]]  (Abnormal) Collected:  01/04/20 0738    Specimen:  Blood, Arterial Line Updated:  01/04/20 0846     WBC 8.80 10*3/mm3      RBC 2.94 10*6/mm3      Hemoglobin 8.0 g/dL      Hematocrit 25.1 %      MCV 85.4 fL      MCH 27.2 pg      MCHC 31.9 g/dL      RDW 17.6 %      RDW-SD 54.4 fl      MPV 8.9 fL      Platelets 123 10*3/mm3      Neutrophil % 85.5 %      Lymphocyte % 8.1 %      Monocyte % 4.9 %      Eosinophil % 0.3 %      Basophil % 0.5 %      Immature Grans % 0.7 %      Neutrophils, Absolute 7.53  10*3/mm3      Lymphocytes, Absolute 0.71 10*3/mm3      Monocytes, Absolute 0.43 10*3/mm3      Eosinophils, Absolute 0.03 10*3/mm3      Basophils, Absolute 0.04 10*3/mm3      Immature Grans, Absolute 0.06 10*3/mm3      nRBC 0.0 /100 WBC     Comprehensive Metabolic Panel [[id number]]  (Abnormal) Collected:  01/04/20 0738    Specimen:  Blood, Arterial Line Updated:  01/04/20 0835     Glucose 158 mg/dL      BUN 40 mg/dL      Creatinine 5.78 mg/dL      Sodium 134 mmol/L      Potassium 4.0 mmol/L      Chloride 93 mmol/L      CO2 23.0 mmol/L      Calcium 8.5 mg/dL      Total Protein 9.3 g/dL      Albumin 2.60 g/dL      ALT (SGPT) 10 U/L      AST (SGOT) 59 U/L      Alkaline Phosphatase 89 U/L      Total Bilirubin 1.8 mg/dL      eGFR Non  Amer --     Comment: <15 Indicative of kidney failure.        eGFR  African Amer 12 mL/min/1.73      Comment: <15 Indicative of kidney failure.        Globulin 6.7 gm/dL      A/G Ratio 0.4 g/dL      BUN/Creatinine Ratio 6.9     Anion Gap 18.0 mmol/L     Narrative:       GFR Normal >60  Chronic Kidney Disease <60  Kidney Failure <15      Blood Culture - Blood, Blood, Arterial Line [[id number]] Collected:  01/04/20 0739    Specimen:  Blood, Arterial Line Updated:  01/04/20 0801    POC Glucose Once [[id number]]  (Abnormal) Collected:  01/03/20 2333    Specimen:  Blood Updated:  01/04/20 0243     Glucose 139 mg/dL      Comment: RN NotifiedOperator: [id number] BRANDIN HARMONMeter ID: [id number]       POC Glucose Once [[id number]]  (Abnormal) Collected:  01/03/20 2254    Specimen:  Blood Updated:  01/04/20 0243     Glucose 66 mg/dL      Comment: : [id number] BRANDIN CASILLASINMetsal ID: [id number]       POC Glucose Once [[id number]]  (Normal) Collected:  01/03/20 1753    Specimen:  Blood Updated:  01/04/20 0051     Glucose 79 mg/dL      Comment: : [id number] BEE Oliveira ID: [id number]       Respiratory Culture - Sputum, ET Suction [[id number]] Collected:  01/03/20 1542     Specimen:  Sputum from ET Suction Updated:  01/03/20 1636     Gram Stain Many (4+) WBCs per low power field      Rare (1+) Epithelial cells per low power field      Mixed bacterial ana      Moderate (3+) Gram negative coccobacilli    POC Glucose Once [[id number]]  (Normal) Collected:  01/03/20 1244    Specimen:  Blood Updated:  01/03/20 1432     Glucose 120 mg/dL      Comment: : [id number] BEE Oliveira ID: [id number]              Imaging Results (Last 24 Hours)     Procedure Component Value Units Date/Time    XR Chest 1 View [[id number]] Collected:  01/04/20 0818     Updated:  01/04/20 1051    Narrative:       Chest x-ray single view.       CLINICAL INDICATION: Shortness of breath. Fever and GI  hemorrhage.    COMPARISON: Chest January 3, 2020.    FINDINGS: Cardiac silhouette is enlarged in size. Pulmonary  vascularity is unremarkable.     Endotracheal tube identified with tip 5.2 cm above the  bifurcation, of the joon in satisfactory position. Nasogastric  tube in stomach.    Interval development of right lower lobe infiltrative changes  suggesting  pneumonitis.      Impression:       CONCLUSION: Interval development of right lower lobe infiltrative  changes suggesting pneumonitis.    Electronically signed by:  Wallace Louise MD  1/4/2020 10:50 AM CST  Workstation: [id number]          I reviewed the patient's new clinical results.    Assessment/Plan:     Active Hospital Problems    Diagnosis   • **Gastrointestinal hemorrhage     -hemoccult positive in ER; no BRBPR or hematemesis   -colonoscopy on 11/6/19 showed angiectasia which was clipped without complication  -upper endoscopy on 11/5/19 showed grade 2 varices in upper third of esophagus  -PPI  -trend H/H;       EGD 1/2/19: Grade 1 varices, duodenal ulcers non bleeding   - repeat in 1 month        • Fever of unknown origin     Pan culture   Respiratory panel  CXR today     vancomycin and zosyn for broad spectrum coverage   Pulmonology  following      • Acute respiratory failure (CMS/[facility])     Currently intubated   Patient is a DNR/DNI    Family would like to wait a few days before considering extubation.     Pulmonology following for vent management      • Encephalopathy     -Presumed metabolic  -Ammonia level: improving  -Continue Lactulose, rifaxamin    GI following       • Chronic heart failure with preserved ejection fraction (CMS/[facility])     -last echo in July 2018 shows EF of 58% with grade 1a diastolic dysfunction  -daily weights  -care with fluids  -daily fluid restriction 1500 mL       • COPD (chronic obstructive pulmonary disease) (CMS/HCC)     - Continue flonase, loratadine  - Patient uses Oxygen prn at the nursing home.   - Duonebs and albuterol nebs prn.          • Gastroesophageal reflux disease without esophagitis     -PPI     • Chronic pain     -Gabapentin        • Anxiety     - Continue Lexapro      • Primary insomnia     -trazodone, melatonin     • ESRD (end stage renal disease) (CMS/HCC)     -HD M/W/F  -Nephrology on board   -epogen on M/W/F with dialysis  -continue Sevelamer 800 mg three times daily        • Type 2 diabetes mellitus with chronic kidney disease on chronic dialysis, with long-term current use of insulin (CMS/HCC)     -levemir, SSI     • Essential hypertension     -not on any home medications, will monitor            DVT prophylaxis: SCDs/TEDs  Code Status and Medical Interventions:   Ordered at: 01/02/20 1525     Limited Support to NOT Include:    Intubation     Level Of Support Discussed With:    Health Care Surrogate     Code Status:    No CPR     Medical Interventions (Level of Support Prior to Arrest):    Limited       Plan for disposition: will have discussion with family       Time: 30 minutes          This document has been electronically signed by Guero Huynh MD on January 4, 2020 1:11 PM        This document has been electronically signed by Guero Huynh MD on January 4, 2020 1:11 PM

## 2020-01-04 NOTE — PROGRESS NOTES
"Pharmacokinetics by Pharmacy - Vancomycin Initial Consult    Cristo Musa is a 69 y.o. male being initiated on vancomycin for pneumonia. Patient is also receiving Zosyn.    Objective:     [Ht: 172.7 cm (67.99\"); Wt: 97 kg (213 lb 13.5 oz)]     Lab Results   Component Value Date    WBC 8.80 01/04/2020    WBC 5.34 01/03/2020    WBC 5.97 01/02/2020      Lab Results   Component Value Date    CRP 2.40 (H) 02/26/2019    CRP 1.91 (H) 01/20/2019    CRP 2.6 (H) 06/17/2016    CRP 5.9 (H) 06/02/2016    CRP 6.7 (H) 05/31/2016    LACTATE 2.0 11/19/2019    LACTATE 2.8 (C) 11/05/2019    LACTATE 2.9 (C) 11/04/2019      Temp Readings from Last 1 Encounters:   01/04/20 99.9 °F (37.7 °C) (Oral)     Estimated Creatinine Clearance: 13.8 mL/min (A) (by C-G formula based on SCr of 5.78 mg/dL (H)).   Lab Results   Component Value Date    CREATININE 5.78 (H) 01/04/2020    CREATININE 9.46 (H) 01/03/2020    CREATININE 8.67 (H) 01/02/2020       Baseline culture results:  Microbiology Results (last 10 days)       Procedure Component Value - Date/Time    Respiratory Culture - Sputum, ET Suction [[id number]] Collected:  01/03/20 1546    Lab Status:  Preliminary result Specimen:  Sputum from ET Suction Updated:  01/03/20 1636     Gram Stain Many (4+) WBCs per low power field      Rare (1+) Epithelial cells per low power field      Mixed bacterial ana      Moderate (3+) Gram negative coccobacilli    CRE Screen by PCR - Swab, Per Rectum [[id number]] Collected:  01/02/20 1015    Lab Status:  Final result Specimen:  Swab from Per Rectum Updated:  01/02/20 1130     CRE SCREEN Not Detected     Comment: Test performed by real-time polymerase chain reaction (qPCR).        OXA 48 Strain Not Detected     IMP STRAIN Not Detected     VIM STRAIN Not Detected     NDM Strain Not Detected     KPC Strain Not Detected    Influenza Antigen, Rapid - Swab, Nasopharynx [[id number]]  (Normal) Collected:  01/01/20 0855    Lab Status:  Final result Specimen:  " Swab from Nasopharynx Updated:  01/01/20 0934     Influenza A Ag, EIA Negative     Influenza B Ag, EIA Negative          No results found for: RESPCX    Assessment  Patient is an ESRD patient, MWF.  Patient is not compliant with HD and often can not tolerate.  Will get random levels following dialysis.  Dose will vary based on dialysis tolerance and levels returned.  MRSA swab ordered.  Await results for de-escalation    Plan  1. Give vancomycin 2000mg IV x 1.  Next dose will be Monday if dialysis is completed  2. Will order levels depending on dialysis schedule  3. Pharmacy will monitor renal function and adjust dose accordingly.    Shadi Sanches [facility]  01/04/20 10:33 AM

## 2020-01-04 NOTE — SIGNIFICANT NOTE
Tried calling brother of patient who is listed in ACP. No response. Will attempt again tomorrow.         This document has been electronically signed by Guero Huynh MD on January 4, 2020 2:52 PM

## 2020-01-04 NOTE — PLAN OF CARE
Problem: Ventilation, Mechanical Invasive (Adult)  Intervention: Prevent Airway Displacement/Mechanical Dysfunction  Flowsheets (Taken 1/4/2020 1607)  Airway Safety Measures: manual resuscitator/mask/valve in room  Intervention: Prevent Airway-Related Skin/Tissue Breakdown  Flowsheets (Taken 1/4/2020 1607)  Device Skin Pressure Protection: skin-to-skin areas padded  Intervention: Prevent Ventilator-Associated Pneumonia (VAP)  Flowsheets  Taken 1/4/2020 1542 by eTrri Feliz, RRT  Head of Bed (HOB): HOB at 30 degrees  Taken 1/3/2020 0800 by Fatemeh Rapp, RN  VAP Prevention Bundle: HOB elevation maintained  Taken 1/4/2020 1607 by Terri Feliz, RRT  VAP Prevention Contraindications: VTE prophylaxis contraindicated (describe)  Intervention: Optimize Oxygenation/Ventilation  Flowsheets (Taken 1/4/2020 1607)  Airway/Ventilation Management: airway patency maintained

## 2020-01-04 NOTE — PROGRESS NOTES
CRITICAL CARE PROGRESS NOTE  Imani Green MD    [facility] CRITICAL CARE  1/4/2020        Cristo Musa  [id number]  1950  69 y.o. male            LOS: 1 day   Warren Stewart MD    Chief Complaint/Reason for visit: Follow-up acute respiratory failure, status post cardiac arrest    Subjective     Interval History:   History taken from: patient/ chart    Did not tolerate PSV yesterday so changed back to AC/VC.  Attempted PSV again this morning but elevated respiratory rate.  Patient now experiencing high fevers above 103 requiring cooling blanket.  Has been off Versed drip for almost 24 hours but is requiring fentanyl/Versed pushes for comfort.  Less movement today but still has basic reflexes.  Normotensive.  Wife at the bedside.    Review of Systems:   Review of Systems   Unable to perform ROS: Intubated     All systems were reviewed and negative except as noted above in the HPI.    Medical history, surgical history, social history, family history reviewed    Objective     Intake/Output:    Intake/Output Summary (Last 24 hours) at 1/4/2020 1042  Last data filed at 1/3/2020 1900  Gross per 24 hour   Intake 0 ml   Output 3000 ml   Net -3000 ml       Nutrition: NPO    Infusions:    EPINEPHrine 0.02-0.3 mcg/kg/min Last Rate: Stopped (01/03/20 0130)   midazolam 1-10 mg/hr Last Rate: Stopped (01/03/20 1334)   Pharmacy to dose vancomycin         Respiratory:  FiO2 (%):  [30 %] 30 %  S RR:  [14] 14  PEEP/CPAP (cm H2O):  [5 cm H20] 5 cm H20  MO SUP:  [0 cm H20-14 cm H20] 0 cm H20  MAP (cm H2O):  [7.9-17] 9.4    Vital Sign Min/Max for last 24 hours:  Temp  Min: 99.3 °F (37.4 °C)  Max: 99.9 °F (37.7 °C)   BP  Min: 99/56  Max: 136/61   Pulse  Min: 71  Max: 97   Resp  Min: 18  Max: 32   SpO2  Min: 73 %  Max: 100 %   No data recorded   Weight  Min: 96.2 kg (212 lb 1.3 oz)  Max: 97 kg (213 lb 13.5 oz)     Physical Exam:  99.9 °F (37.7 °C) (Oral) 91 116/59 (!) 30 98% 97 kg (213 lb 13.5  oz) Body mass index is 32.52 kg/m².  Physical Exam   Constitutional: He is oriented to person, place, and time. Vital signs are normal. He appears well-developed and well-nourished. He is sedated and intubated.   obese   HENT:   Head: Normocephalic and atraumatic.   Nose: Nose normal.   ETT, OGT   Eyes: EOM and lids are normal. Right conjunctiva is injected. Left conjunctiva is injected. Right pupil is not reactive.   Bilateral cataracts and arcus senilis   Neck: JVD present.   Cardiovascular: Normal rate and regular rhythm. Exam reveals distant heart sounds. Exam reveals no gallop.   No murmur heard.  Pulmonary/Chest: Accessory muscle usage present. He is intubated. He is in respiratory distress. He has wheezes (diffuse coarse). He has no rhonchi. He has no rales.   Abdominal: Soft. Normal appearance and bowel sounds are normal. He exhibits distension. There is no tenderness.   Musculoskeletal:   No extremity edema, right upper extremity AV fistula     Vascular Status -  His right foot exhibits no edema. His left foot exhibits no edema.  Neurological: He is oriented to person, place, and time. He is unresponsive.   Sedated, withdraws to pain, cough/gag present   Skin: Skin is warm and dry. No cyanosis. Nails show no clubbing.   Psychiatric:   Unable to assess       Central Lines/PICC: absent     Results Review:  I personally reviewed the patient's new clinical results.   Lab Results (last 24 hours)     Procedure Component Value Units Date/Time    CBC & Differential [[id number]] Collected:  01/04/20 0738    Specimen:  Blood, Arterial Line Updated:  01/04/20 0846    Narrative:       The following orders were created for panel order CBC & Differential.  Procedure                               Abnormality         Status                     ---------                               -----------         ------                     CBC Auto Differential[[id number]]        Abnormal            Final result                 Please  view results for these tests on the individual orders.    CBC Auto Differential [[id number]]  (Abnormal) Collected:  01/04/20 0738    Specimen:  Blood, Arterial Line Updated:  01/04/20 0846     WBC 8.80 10*3/mm3      RBC 2.94 10*6/mm3      Hemoglobin 8.0 g/dL      Hematocrit 25.1 %      MCV 85.4 fL      MCH 27.2 pg      MCHC 31.9 g/dL      RDW 17.6 %      RDW-SD 54.4 fl      MPV 8.9 fL      Platelets 123 10*3/mm3      Neutrophil % 85.5 %      Lymphocyte % 8.1 %      Monocyte % 4.9 %      Eosinophil % 0.3 %      Basophil % 0.5 %      Immature Grans % 0.7 %      Neutrophils, Absolute 7.53 10*3/mm3      Lymphocytes, Absolute 0.71 10*3/mm3      Monocytes, Absolute 0.43 10*3/mm3      Eosinophils, Absolute 0.03 10*3/mm3      Basophils, Absolute 0.04 10*3/mm3      Immature Grans, Absolute 0.06 10*3/mm3      nRBC 0.0 /100 WBC     Comprehensive Metabolic Panel [[id number]]  (Abnormal) Collected:  01/04/20 0738    Specimen:  Blood, Arterial Line Updated:  01/04/20 0835     Glucose 158 mg/dL      BUN 40 mg/dL      Creatinine 5.78 mg/dL      Sodium 134 mmol/L      Potassium 4.0 mmol/L      Chloride 93 mmol/L      CO2 23.0 mmol/L      Calcium 8.5 mg/dL      Total Protein 9.3 g/dL      Albumin 2.60 g/dL      ALT (SGPT) 10 U/L      AST (SGOT) 59 U/L      Alkaline Phosphatase 89 U/L      Total Bilirubin 1.8 mg/dL      eGFR Non  Amer --     Comment: <15 Indicative of kidney failure.        eGFR  African Amer 12 mL/min/1.73      Comment: <15 Indicative of kidney failure.        Globulin 6.7 gm/dL      A/G Ratio 0.4 g/dL      BUN/Creatinine Ratio 6.9     Anion Gap 18.0 mmol/L     Narrative:       GFR Normal >60  Chronic Kidney Disease <60  Kidney Failure <15      Blood Culture - Blood, Blood, Arterial Line [[id number]] Collected:  01/04/20 0739    Specimen:  Blood, Arterial Line Updated:  01/04/20 0801    POC Glucose Once [[id number]]  (Abnormal) Collected:  01/03/20 3683    Specimen:  Blood Updated:  01/04/20 0243     Glucose 139  mg/dL      Comment: RN NotifiedOperator: [id number] BRANDIN KENTTLINMeter ID: [id number]       POC Glucose Once [[id number]]  (Abnormal) Collected:  01/03/20 2254    Specimen:  Blood Updated:  01/04/20 0243     Glucose 66 mg/dL      Comment: : [id number] BRANDIN KENTTLINMeter ID: [id number]       POC Glucose Once [[id number]]  (Normal) Collected:  01/03/20 1753    Specimen:  Blood Updated:  01/04/20 0051     Glucose 79 mg/dL      Comment: : [id number] BEE GRIJALVAMeter ID: [id number]       Respiratory Culture - Sputum, ET Suction [[id number]] Collected:  01/03/20 1546    Specimen:  Sputum from ET Suction Updated:  01/03/20 1636     Gram Stain Many (4+) WBCs per low power field      Rare (1+) Epithelial cells per low power field      Mixed bacterial ana      Moderate (3+) Gram negative coccobacilli    POC Glucose Once [[id number]]  (Normal) Collected:  01/03/20 1244    Specimen:  Blood Updated:  01/03/20 1432     Glucose 120 mg/dL      Comment: : [id number] BEE GRIJALVAMeter ID: [id number]       Ammonia [[id number]]  (Abnormal) Collected:  01/03/20 1247    Specimen:  Blood Updated:  01/03/20 1307     Ammonia 65 umol/L     Tissue Pathology Exam [[id number]] Collected:  01/02/20 1736    Specimen:  Tissue from Small Intestine, Duodenum Updated:  01/03/20 1047     Case Report --     Surgical Pathology Report                         Case: [id number]                                  Authorizing Provider:  Tricia Philip MD      Collected:           01/02/2020 05:36 PM          Ordering Location:     [facility]             Received:            01/02/2020 06:56 PM                                 [facility]                                                     Pathologist:           Ezra Martinez MD                                                           Specimen:    Small Intestine, Duodenum, Mass--[facility]  Diagnosis --     DUODENUM, BIOPSY:  ACUTE ENTERITIS WITH FOCAL ULCERATION.  NEGATIVE FOR EVIDENCE OF NEOPLASIA.  NEGATIVE FOR VIRAL INCLUSIONS.       Comment --     The findings were discussed with Dr. Philip at 1035 on January 3, 2020.       Intradepartmental Consult --     Dr. Barney has reviewed the histology and agrees with the diagnosis.       Gross Description --     The specimen consists of 3 reddish-tan fragments measuring 0.6 x 0.5 x 0.2 cm together.  Totally submitted.          Results from last 7 days   Lab Units 01/03/20  0430   PH, ARTERIAL pH units 7.531*   PO2 ART mm Hg 115.0*   PCO2, ARTERIAL mm Hg 27.5*   HCO3 ART mmol/L 23.1     Lab Results   Component Value Date    BLOODCX No growth at 5 days 11/04/2019    BLOODCX No growth at 5 days 11/04/2019     No results found for: URINECX    I independently reviewed the patient's new imaging, including images and reports.  Imaging Results (Last 24 Hours)     Procedure Component Value Units Date/Time    XR Chest 1 View [[id number]] Resulted:  01/04/20 0837     Updated:  01/04/20 0837          All medications reviewed.     albuterol sulfate HFA 2 puff Inhalation 4x Daily - RT   aspirin 81 mg Oral Daily   budesonide-formoterol 2 puff Inhalation BID - RT   cetirizine 10 mg Oral Daily   epoetin ziggy/ziggy-epbx 10,000 Units Intravenous Once per day on Mon Wed Fri   escitalopram 10 mg Oral Daily   gabapentin 100 mg Oral Nightly   insulin aspart 0-14 Units Subcutaneous 4x Daily AC & at Bedtime   ipratropium 2 puff Inhalation 4x Daily - RT   lactulose 60 mL Rectal 4x Daily   latanoprost 1 drop Both Eyes Nightly   lidocaine 1 patch Transdermal Q24H   melatonin 6 mg Oral Nightly   methylPREDNISolone sodium succinate 40 mg Intravenous Q12H   pantoprazole 40 mg Intravenous BID AC   piperacillin-tazobactam 3.375 g Intravenous Once   piperacillin-tazobactam 3.375 g Intravenous Q12H   riFAXIMin 550 mg Oral Q12H   sevelamer 800 mg Oral TID With Meals   sodium chloride 10 mL  Intravenous Q12H   sodium chloride 10 mL Intravenous Q12H   vancomycin 20 mg/kg Intravenous Once         Assessment/Plan     ASSESSMENT:  #Acute hypoxic respiratory failure following anesthesia for EGD  #Cardiac arrest status post ROSC  #Possible hypoxic ischemic brain injury  #Upper GI bleed  #Acute blood loss anemia  #Hypotension-resolved  #Acute respiratory alkalosis  #ESRD  #Mild protein calorie malnutrition  #Hyperammonemia  #T2DM with mild hyperglycemia  #Mild coagulopathy and thrombocytopenia  #Obesity  #Fevers  #AECOPD      PLAN:  -Change back to AC/VC due to intolerance of PSV  -Mild sedation 12-hour for neurologic assessment  -Use fentanyl or Versed as needed for comfort  -Start Solu-Medrol  -Continue scheduled bronchodilators  -Follow-up cultures  -Start empiric vancomycin and Zosyn  -Check MRSA swab and if negative can DC vancomycin  -Continue scheduled AZ lactulose and rifaximin per GI  -Monitor hemoglobin and transfuse for hemoglobin less than 7 or acute bleeding  -Hemodialysis per nephrology  -Sliding scale insulin.'s stop Levemir given hypoglycemia yesterday  -N.p.o. if aggressive care is continue, recommend initiating tube feeds  -PPX: SCDs, protonix  -DNR    Had extensive discussion with the patient's wife as well as his brother who serves as his healthcare surrogate's.  I counseled them that he remains very sick and is not at the point of being liberated from the ventilator.  I remain concerned that his neurologic status may not improve as he may have suffered underlying hypoxic ischemic injury which will make it difficult to liberate from the ventilator and for him to return to his previous quality of life.  Given his previous desires for DNR, I have recommended that they consider how long he would wish to continue with mechanical ventilation, or if there is a point when he wishes to transition to comfort care.  His brother stated that he would defer any major decisions to the patient's wife and  she was informed of this.        Critical Care Time Spent: 43 minutes  I personally provided care to this critically ill patient as documented above.  Critical care time does not include time spent on separately billed procedures.  None of my critical care time was concurrent with other critical care providers.         This document has been electronically signed by Imani Green MD on January 4, 2020 10:42 AM      [phone]    Dictated using Dragon

## 2020-01-04 NOTE — PROGRESS NOTES
SUBJECTIVE:   1/3/2020  Chief Complaint:     Subjective      Patient remains on vent and sedated. H/H stable. Ammonia improved to 65 with lactulose enemas.     History:  Past Medical History:   Diagnosis Date   • Anemia of chronic disease    • Cataract    • CHF (congestive heart failure) (CMS/HCC)    • Chronic pain syndrome    • CKD (chronic kidney disease)    • Clostridium difficile infection    • COPD (chronic obstructive pulmonary disease) (CMS/HCC)    • Coronary artery disease    • Depression    • Diabetes mellitus (CMS/HCC)    • Dialysis patient (CMS/HCC)    • GERD (gastroesophageal reflux disease)    • Glaucoma    • H/O cardiac pacemaker     patient states he got his pacemaker removed in [place]   • Heart block    • Hepatitis C    • History of transfusion    • Hypertension    • Nephropathy, diabetic (CMS/HCC)    • Pacemaker    • Pulmonary embolism (CMS/HCC)      Past Surgical History:   Procedure Laterality Date   • ABDOMINAL SURGERY     • ARTERIOVENOUS FISTULA/SHUNT SURGERY Right     forearm loop   • ARTERIOVENOUS FISTULA/SHUNT SURGERY Left     removed past upper arm   • ARTERIOVENOUS FISTULA/SHUNT SURGERY Right 3/13/2018    Procedure: revision RIGHT forearm ARTERIOVENOUS graft (excision), debridement, wound vac;  Surgeon: Jerson Singh MD;  Location: [facility];  Service: Vascular   • ARTERIOVENOUS FISTULA/SHUNT SURGERY W/ HEMODIALYSIS CATHETER INSERTION Right 4/4/2016    Procedure: RIGHT ARM AV FISTULA DECLOT REVISION REPAIR BRACHIAL ANASTOMOTIC PSUEDOANEURYSM LEFT FEMORAL RICHARDSON FISTULOGRAM WITH ANGIOPLASTY;  Surgeon: Jerson Carpenter MD;  Location: [facility] OR 18/19;  Service:    • CATARACT EXTRACTION W/ INTRAOCULAR LENS IMPLANT Left 3/31/2017    Procedure: REMOVE CATARACT AND IMPLANT INTRAOCULAR LENS LEFT EYE;  Surgeon: Hilton Montemayor MD;  Location: [facility];  Service:    • COLONOSCOPY N/A 3/12/2019    Procedure: COLONOSCOPY;  Surgeon: Flako Montoya MD;  Location:  [facility] ENDOSCOPY;  Service: Gastroenterology   • COLONOSCOPY N/A 11/6/2019    Procedure: COLONOSCOPY;  Surgeon: Tricia Philip MD;  Location: [facility] ENDOSCOPY;  Service: Gastroenterology   • ENDOSCOPY N/A 3/12/2019    Procedure: ESOPHAGOGASTRODUODENOSCOPY;  Surgeon: Flako Montoya MD;  Location: [facility] ENDOSCOPY;  Service: Gastroenterology   • ENDOSCOPY N/A 11/5/2019    Procedure: ESOPHAGOGASTRODUODENOSCOPY;  Surgeon: Tricia Philip MD;  Location: [facility] ENDOSCOPY;  Service: Gastroenterology   • EYE SURGERY     • HERNIA REPAIR     • IMPLANTABLE CARDIOVERTER DEFIBRILLATOR LEAD REPLACEMENT/POCKET REVISION Right 4/11/2016    Procedure: PACEMAKER LEADS X 3 AND BATTERY EXTRACTION WITH EXIMER LASER;  Surgeon: Vern Reeves MD;  Location: [facility] 18/19;  Service:    • INSERTION HEMODIALYSIS CATHETER Right 05/2015    jugular   • INTERVENTIONAL RADIOLOGY PROCEDURE Right 6/1/2017    Procedure: RIGHT dialysis fistulagram & angioplasty;  Surgeon: Jerson Singh MD;  Location: [facility] ANGIO INVASIVE LOCATION;  Service:    • INTERVENTIONAL RADIOLOGY PROCEDURE Right 8/24/2017    Procedure: IR dialysis fistulagram;  Surgeon: Jerson Singh MD;  Location: [facility] ANGIO INVASIVE LOCATION;  Service:    • INTERVENTIONAL RADIOLOGY PROCEDURE Right 11/13/2018    Procedure: IR dialysis fistulagram;  Surgeon: Jerson Singh MD;  Location: [facility] ANGIO INVASIVE LOCATION;  Service: Interventional Radiology   • PACEMAKER IMPLANTATION  2008    dual chamber [employer] [place]   • REMOVAL HEMODIALYSIS CATHETER Right 05/2015    femoral vein   • SIGMOIDOSCOPY N/A 8/2/2019    Procedure: SIGMOIDOSCOPY FLEXIBLE;  Surgeon: Flako Montoya MD;  Location: [facility] ENDOSCOPY;  Service: Gastroenterology     Family History   Problem Relation Age of Onset   • COPD Sister    • Diabetes Brother    • Early death Brother    • Hyperlipidemia Brother    • Hypertension Brother    • Coronary artery disease Mother     • Diabetes Mother    • Hypertension Mother    • Stroke Other    • Other Other         Respiratory disorder     Social History     Tobacco Use   • Smoking status: Former Smoker     Types: Cigarettes   • Smokeless tobacco: Never Used   • Tobacco comment: quit 20 years ago    Substance Use Topics   • Alcohol use: No     Comment: former heavy use in his 50's, up to a fifth of liquor a day, currently no alcohol   • Drug use: No     Medications Prior to Admission   Medication Sig Dispense Refill Last Dose   • acetaminophen (TYLENOL) 500 MG tablet Take 500 mg by mouth Every 4 (Four) Hours As Needed for Mild Pain  or Fever.   Unknown at Unknown time   • aspirin 81 MG chewable tablet Chew 81 mg Daily.   11/19/2019 at Unknown time   • benzonatate (TESSALON) 100 MG capsule Take 100 mg by mouth 3 (Three) Times a Day As Needed for Cough.   Unknown at Unknown time   • brimonidine (ALPHAGAN P) 0.1 % solution ophthalmic solution Administer  to both eyes 3 (Three) Times a Day.   11/19/2019 at Unknown time   • Cholecalciferol (VITAMIN D3) 2000 units chewable tablet Chew 2,000 Units Daily.   11/19/2019 at Unknown time   • cyclobenzaprine (FLEXERIL) 5 MG tablet Take 5 mg by mouth Every 8 (Eight) Hours As Needed for Muscle Spasms.   Unknown at Unknown time   • dorzolamide (TRUSOPT) 2 % ophthalmic solution Administer 1 drop into the left eye 3 (Three) Times a Day. 1 each 12 11/19/2019 at Unknown time   • escitalopram (LEXAPRO) 10 MG tablet Take 1 tablet by mouth Daily. 30 tablet 3 11/19/2019 at Unknown time   • famotidine (PEPCID) 20 MG tablet Take 20 mg by mouth every night at bedtime.   11/18/2019 at Unknown time   • fluticasone (FLONASE) 50 MCG/ACT nasal spray 2 sprays into each nostril daily. Administer 2 sprays in each nostril for each dose.   Unknown at Unknown time   • furosemide (LASIX) 40 MG tablet Take 40 mg by mouth 2 (Two) Times a Day.      • gabapentin (NEURONTIN) 100 MG capsule Take 1 capsule by mouth Every Night. 30  capsule 0    • Glecaprevir-Pibrentasvir (MAVYRET) 100-40 MG tablet Take 3 tablets by mouth Daily. 90 tablet 2 11/19/2019 at Unknown time   • guaiFENesin (HUMIBID 3) 400 MG tablet Take 400 mg by mouth Every 4 (Four) Hours As Needed for Cough.   Unknown at Unknown time   • insulin aspart (NovoLOG) 100 UNIT/ML injection Inject  under the skin 3 (Three) Times a Day Before Meals. Sliding scale:  = 0 units; 150-200 = 3 units; 200-250 = 6 units; 250-300 = 9 units; 300-350 = 12 units; >350 = 15 units and call MD   11/19/2019 at Unknown time   • insulin detemir (LEVEMIR) 100 UNIT/ML injection Inject 30 Units under the skin into the appropriate area as directed Daily.   11/19/2019 at Unknown time   • lactulose (CHRONULAC) 10 GM/15ML solution Take 30 mL by mouth 3 (Three) Times a Day. (Patient taking differently: Take 30 mL by mouth 3 (Three) Times a Day.) 946 mL 3 11/19/2019 at Unknown time   • latanoprost (XALATAN) 0.005 % ophthalmic solution Administer 1 drop to both eyes every night.   11/18/2019 at Unknown time   • Lidocaine (ASPERCREME LIDOCAINE) 4 % patch Apply 1 patch topically Daily As Needed (low back pain. on for 12 hours.).   Unknown at Unknown time   • Loratadine 10 MG capsule Take 10 mg by mouth Every Other Day.   11/18/2019 at Unknown time   • melatonin 5 MG tablet tablet Take 5 mg by mouth Every Night.   11/18/2019 at Unknown time   • ondansetron ODT (ZOFRAN-ODT) 4 MG disintegrating tablet Take 1 tablet by mouth Every 6 (Six) Hours As Needed for Nausea or Vomiting. 10 tablet 0 Unknown at Unknown time   • polyethylene glycol (MIRALAX) packet Take 17 g by mouth Daily As Needed (constipation).   11/19/2019 at Unknown time   • rifaximin (XIFAXAN) 550 MG tablet Take 1 tablet by mouth Every 12 (Twelve) Hours. 60 tablet 5 Unknown at Unknown time   • sevelamer (RENVELA) 800 MG tablet Take 800 mg by mouth 3 (Three) Times a Day With Meals.   11/19/2019 at Unknown time   • traZODone (DESYREL) 50 MG tablet Take 50 mg  by mouth every night at bedtime.   11/18/2019 at Unknown time     Allergies:  Lisinopril and Motrin [ibuprofen]     CURRENT MEDICATIONS/OBJECTIVE/VS/PE:     Current Medications:     Current Facility-Administered Medications   Medication Dose Route Frequency Provider Last Rate Last Dose   • acetaminophen (TYLENOL) tablet 650 mg  650 mg Oral Q4H PRN Tricia Philip MD        Or   • acetaminophen (TYLENOL) suppository 650 mg  650 mg Rectal Q4H PRN Tricia Philip MD       • albuterol (PROVENTIL) nebulizer solution 0.083% 2.5 mg/3mL  2.5 mg Nebulization Q6H PRN Tricia Philip MD       • albuterol sulfate HFA (PROVENTIL HFA;VENTOLIN HFA;PROAIR HFA) inhaler 2 puff  2 puff Inhalation 4x Daily - RT Meseret Huynh MD   2 puff at 01/03/20 2102   • aspirin chewable tablet 81 mg  81 mg Oral Daily Tricia Philip MD   81 mg at 01/03/20 0839   • budesonide-formoterol (SYMBICORT) 160-4.5 MCG/ACT inhaler 2 puff  2 puff Inhalation BID - RT Imani Green MD   2 puff at 01/03/20 2101   • cetirizine (zyrTEC) tablet 10 mg  10 mg Oral Daily Tricia Philip MD   10 mg at 01/03/20 0839   • cyclobenzaprine (FLEXERIL) tablet 5 mg  5 mg Oral Q8H PRN Tricia Philip MD       • dextrose (D50W) 25 g/ 50mL Intravenous Solution 25 g  25 g Intravenous Q15 Min PRN Tricia Philip MD       • dextrose (GLUTOSE) oral gel 15 g  15 g Oral Q15 Min PRN Tricia Philip MD       • docusate sodium (COLACE) capsule 100 mg  100 mg Oral BID PRN Tricia Philip MD       • EPINEPHrine (ADRENALIN) 5 mg in sodium chloride 0.9 % 250 mL (0.02 mg/mL) infusion  0.02-0.3 mcg/kg/min Intravenous Titrated Tricia Philip MD   Stopped at 01/03/20 0130   • epoetin ziggy (EPOGEN,PROCRIT) injection 10,000 Units  10,000 Units Intravenous Once per day on Mon Wed Fri Alethea Diamond MD   10,000 Units at 01/03/20 1342   • escitalopram (LEXAPRO) tablet 10 mg  10 mg Oral Daily Tricia Philip MD   10 mg at 01/03/20 0839   • fentaNYL citrate  (PF) (SUBLIMAZE) injection 25 mcg  25 mcg Intravenous Q30 Min PRN Imani Green MD   25 mcg at 01/03/20 1958   • gabapentin (NEURONTIN) capsule 100 mg  100 mg Oral Nightly Tricia Philip MD   100 mg at 01/02/20 2107   • glucagon (human recombinant) (GLUCAGEN DIAGNOSTIC) injection 1 mg  1 mg Subcutaneous Q15 Min PRN Tricia Philip MD       • hydrOXYzine pamoate (VISTARIL) capsule 50 mg  50 mg Oral TID PRN Tricia Philip MD   50 mg at 01/02/20 1423   • insulin aspart (novoLOG) injection 0-14 Units  0-14 Units Subcutaneous 4x Daily AC & at Bedtime Tricia Philip MD       • [START ON 1/4/2020] insulin detemir (LEVEMIR) injection 15 Units  15 Units Subcutaneous Daily César Haider MD       • ipratropium (ATROVENT HFA) inhaler 2 puff  2 puff Inhalation 4x Daily - RT Imani Green MD   2 puff at 01/03/20 2102   • lactulose (CHRONULAC) 10 GM/15ML solution 60 mL  60 mL Rectal 4x Daily Tricia Philip MD   60 mL at 01/03/20 0839   • latanoprost (XALATAN) 0.005 % ophthalmic solution 1 drop  1 drop Both Eyes Nightly Tricia Philip MD   1 drop at 01/02/20 2230   • lidocaine (LIDODERM) 5 % 1 patch  1 patch Transdermal Q24H Tricia Philip MD   1 patch at 01/03/20 0841   • melatonin tablet 6 mg  6 mg Oral Nightly Tricia Philip MD       • midazolam (VERSED) 50 mg in sodium chloride 0.9 % 100 mL (0.5 mg/mL) infusion  1-10 mg/hr Intravenous Titrated Meseret Huynh MD   Stopped at 01/03/20 1334   • midazolam (VERSED) injection 1 mg  1 mg Intravenous Q30 Min PRN Imani Green MD   1 mg at 01/03/20 1957   • ondansetron (ZOFRAN) injection 4 mg  4 mg Intravenous Q6H PRN Tricia Philip MD       • pantoprazole (PROTONIX) injection 40 mg  40 mg Intravenous BID AC Tricia Philip MD   40 mg at 01/03/20 1922   • riFAXIMin (XIFAXAN) tablet 550 mg  550 mg Oral Q12H Tricia Philip MD   550 mg at 01/03/20 0840   • sevelamer (RENVELA) tablet 800 mg  800 mg Oral TID With Meals Tamera  MD Tricia       • sodium chloride 0.9 % flush 10 mL  10 mL Intravenous Q12H Tricia Philip MD   10 mL at 01/03/20 0845   • sodium chloride 0.9 % flush 10 mL  10 mL Intravenous PRN Tricia Philip MD       • sodium chloride 0.9 % flush 10 mL  10 mL Intravenous Q12H Tricia Philip MD   10 mL at 01/03/20 0845   • sodium chloride 0.9 % flush 10 mL  10 mL Intravenous PRN Tricia Philip MD       • traZODone (DESYREL) tablet 50 mg  50 mg Oral Nightly PRN Tricia Philip MD           Objective     Review of Systems:   Review of Systems  Pt intubated and sedated  Physical Exam:   Temp:  [99.3 °F (37.4 °C)-100.4 °F (38 °C)] 99.3 °F (37.4 °C)  Heart Rate:  [73-97] 91  Resp:  [18-36] 28  BP: ()/() 115/56  Arterial Line BP: ()/(40-94) 118/50  FiO2 (%):  [30 %] 30 %     Physical Exam:  General Appearance:    Alert, cooperative, in no acute distress   Head:    Normocephalic, without obvious abnormality, atraumatic   Eyes:            Lids and lashes normal, conjunctivae and sclerae normal, no   icterus, no pallor, corneas clear, PERRLA   Ears:    Ears appear intact with no abnormalities noted   Throat:   No oral lesions, no thrush, oral mucosa moist   Neck:   No adenopathy, supple, trachea midline, no thyromegaly, no     carotid bruit, no JVD   Back:     No kyphosis present, no scoliosis present, no skin lesions,       erythema or scars, no tenderness to percussion or                   palpation,   range of motion normal   Lungs:     Clear to auscultation,respirations regular, even and                   unlabored    Heart:    Regular rhythm and normal rate, normal S1 and S2, no            murmur, no gallop, no rub, no click   Breast Exam:    Deferred   Abdomen:     Normal bowel sounds, no masses, no organomegaly, soft        non-tender, non-distended, no guarding, no rebound                 tenderness   Genitalia:    Deferred   Extremities:   Moves all extremities well, no edema, no cyanosis,  no              redness   Pulses:   Pulses palpable and equal bilaterally   Skin:   No bleeding, bruising or rash   Lymph nodes:   No palpable adenopathy   Neurologic:   Cranial nerves 2 - 12 grossly intact, sensation intact, DTR        present and equal bilaterally      Results Review:     Lab Results (last 24 hours)     Procedure Component Value Units Date/Time    Respiratory Culture - Sputum, ET Suction [[id number]] Collected:  01/03/20 1546    Specimen:  Sputum from ET Suction Updated:  01/03/20 1636     Gram Stain Many (4+) WBCs per low power field      Rare (1+) Epithelial cells per low power field      Mixed bacterial ana      Moderate (3+) Gram negative coccobacilli    POC Glucose Once [[id number]]  (Normal) Collected:  01/03/20 1244    Specimen:  Blood Updated:  01/03/20 1432     Glucose 120 mg/dL      Comment: : [id number] BEE Oliveira ID: [id number]       Ammonia [[id number]]  (Abnormal) Collected:  01/03/20 1247    Specimen:  Blood Updated:  01/03/20 1307     Ammonia 65 umol/L     Tissue Pathology Exam [[id number]] Collected:  01/02/20 1736    Specimen:  Tissue from Small Intestine, Duodenum Updated:  01/03/20 1047     Case Report --     Surgical Pathology Report                         Case: [id number]                                  Authorizing Provider:  Tricia Philip MD      Collected:           01/02/2020 05:36 PM          Ordering Location:     [facility]             Received:            01/02/2020 06:56 PM                                 [facility]                                                     Pathologist:           Ezra Martinez MD                                                           Specimen:    Small Intestine, Duodenum, Mass--[facility]                                                       Final Diagnosis --     DUODENUM, BIOPSY:  ACUTE ENTERITIS WITH FOCAL ULCERATION.  NEGATIVE FOR EVIDENCE OF NEOPLASIA.  NEGATIVE FOR VIRAL INCLUSIONS.        Comment --     The findings were discussed with Dr. Philip at 1035 on January 3, 2020.       Intradepartmental Consult --     Dr. Barney has reviewed the histology and agrees with the diagnosis.       Gross Description --     The specimen consists of 3 reddish-tan fragments measuring 0.6 x 0.5 x 0.2 cm together.  Totally submitted.      POC Glucose Once [[id number]]  (Normal) Collected:  01/03/20 0610    Specimen:  Blood Updated:  01/03/20 0716     Glucose 107 mg/dL      Comment: : [id number] PAT [place] ID: [id number]       Comprehensive Metabolic Panel [[id number]]  (Abnormal) Collected:  01/03/20 0439    Specimen:  Blood Updated:  01/03/20 0512     Glucose 129 mg/dL      BUN 78 mg/dL      Creatinine 9.46 mg/dL      Sodium 135 mmol/L      Potassium 4.9 mmol/L      Chloride 92 mmol/L      CO2 22.0 mmol/L      Calcium 8.3 mg/dL      Total Protein 9.0 g/dL      Albumin 2.70 g/dL      ALT (SGPT) <5 U/L      AST (SGOT) 22 U/L      Alkaline Phosphatase 96 U/L      Total Bilirubin 1.7 mg/dL      eGFR Non  Amer --     Comment: <15 Indicative of kidney failure.        eGFR   Amer 7 mL/min/1.73      Comment: <15 Indicative of kidney failure.        Globulin 6.3 gm/dL      A/G Ratio 0.4 g/dL      BUN/Creatinine Ratio 8.2     Anion Gap 21.0 mmol/L     Narrative:       GFR Normal >60  Chronic Kidney Disease <60  Kidney Failure <15      CBC & Differential [[id number]] Collected:  01/03/20 0439    Specimen:  Blood Updated:  01/03/20 0443    Narrative:       The following orders were created for panel order CBC & Differential.  Procedure                               Abnormality         Status                     ---------                               -----------         ------                     CBC Auto Differential[[id number]]        Abnormal            Final result                 Please view results for these tests on the individual orders.    CBC Auto Differential [[id number]]  (Abnormal)  Collected:  01/03/20 0439    Specimen:  Blood Updated:  01/03/20 0443     WBC 5.34 10*3/mm3      RBC 2.64 10*6/mm3      Hemoglobin 7.3 g/dL      Hematocrit 22.1 %      MCV 83.7 fL      MCH 27.7 pg      MCHC 33.0 g/dL      RDW 17.8 %      RDW-SD 54.4 fl      MPV 8.5 fL      Platelets 119 10*3/mm3      Neutrophil % 77.4 %      Lymphocyte % 10.7 %      Monocyte % 10.9 %      Eosinophil % 0.0 %      Basophil % 0.4 %      Immature Grans % 0.6 %      Neutrophils, Absolute 4.14 10*3/mm3      Lymphocytes, Absolute 0.57 10*3/mm3      Monocytes, Absolute 0.58 10*3/mm3      Eosinophils, Absolute 0.00 10*3/mm3      Basophils, Absolute 0.02 10*3/mm3      Immature Grans, Absolute 0.03 10*3/mm3      nRBC 0.0 /100 WBC     Blood Gas, Arterial [[id number]]  (Abnormal) Collected:  01/03/20 0430    Specimen:  Arterial Blood Updated:  01/03/20 0439     Site Arterial Line     Inderjit's Test N/A     pH, Arterial 7.531 pH units      Comment: 83 Value above reference range        pCO2, Arterial 27.5 mm Hg      Comment: 84 Value below reference range        pO2, Arterial 115.0 mm Hg      Comment: 83 Value above reference range        HCO3, Arterial 23.1 mmol/L      Base Excess, Arterial 0.6 mmol/L      O2 Saturation, Arterial 99.4 %      Comment: 83 Value above reference range        Barometric Pressure for Blood Gas 741 mmHg      Modality Ventilator     FIO2 30 %      Ventilator Mode AC     Set Tidal Volume 600     Set Mech Resp Rate 14.0     PEEP 5.0     PIP 40 cmH2O      Comment: Meter: [id number]     :  [id number]        Collected by NC    Hepatitis B Surface Antigen [[id number]] Collected:  01/02/20 0358    Specimen:  Blood Updated:  01/02/20 2248    Narrative:       The following orders were created for panel order Hepatitis B Surface Antigen.  Procedure                               Abnormality         Status                     ---------                               -----------         ------                     Hepatitis B  Surface Antigen[[id number]]  Normal              Final result               Hep B Confirmation Tube[[id number]]                                                       Please view results for these tests on the individual orders.    POC Glucose Once [[id number]]  (Abnormal) Collected:  01/02/20 2148    Specimen:  Blood Updated:  01/02/20 2232     Glucose 134 mg/dL      Comment: : [id number] АЛЕКСАНДР GIANGMeter ID: [id number]              I reviewed the patient's new clinical results.  I reviewed the patient's new imaging results and agree with the interpretation.     ASSESSMENT/PLAN:   ASSESSMENT: melena resolved  Anemia improving  S/P cardio pulmonary arrest, improving  Hepatic encephalopathy improving  ESRD, received HD today  Duodenal ulcerations, path C/W inflammation    PLAN: cont PPI  Follow H/H closely  Cont lactulose enemas  Weaning trial in AM if he remains stable  Repeat EGD in 1 month for re-evaluation of duodenal lesions  The risks, benefits, and alternatives of this procedure have been discussed with the patient or the responsible party- the patient understands and agrees to proceed.         Tricia Philip MD  01/03/20  9:21 PM

## 2020-01-04 NOTE — PROGRESS NOTES
[facility]  [address] [place] [address]. [address]  T - [phone]  F - [phone]     Progress Note          PATIENT  DEMOGRAPHICS   PATIENT NAME: Cristo Musa                      PHYSICIAN: Sudhir SteinerLUIS  : 1950  MRN: [id number]   LOS: 1 day    Patient Care Team:  Warren Stewrat MD as PCP - General (Family Medicine)  Michelle Lo MD as Resident (Family Medicine)  John Sanchez MD as Resident (Family Medicine)  Marissa Boothe MD as Resident (Family Medicine)  Lucio Anderson MD as Resident (Family Medicine)  Demarcus Oliveira MD as Resident (Family Medicine)  Subjective   SUBJECTIVE   Remains intubated.         Objective   OBJECTIVE   Vital Signs  Temp:  [99.3 °F (37.4 °C)-99.9 °F (37.7 °C)] 99.9 °F (37.7 °C)  Heart Rate:  [71-97] 91  Resp:  [18-32] 30  BP: ()/() 116/59  Arterial Line BP: ()/(40-87) 142/51  FiO2 (%):  [30 %] 30 %    Flowsheet Rows      First Filed Value   Admission Height  --   Admission Weight  95.7 kg (211 lb) Documented at 2020 0119           I/O last 3 completed shifts:  In: 210 [I.V.:210]  Out: 3000 [Other:3000]    PHYSICAL EXAM    Physical Exam   Constitutional: He appears well-developed.   Does not open eyes or follow commands.    Cardiovascular: Normal rate, regular rhythm and normal heart sounds. Exam reveals no gallop and no friction rub.   No murmur heard.  Pulmonary/Chest: Effort normal and breath sounds normal. No stridor. No respiratory distress. He has no wheezes. He has no rales.   Abdominal: Soft. Bowel sounds are normal. He exhibits no distension and no mass. There is no tenderness. There is no guarding.   Musculoskeletal: He exhibits no edema or tenderness.   Vitals reviewed.      RESULTS   Results Review:    Results from last 7 days   Lab Units 20  0738 20  0439 20  1820 20  1815   SODIUM mmol/L 134* 135*  --  137   SODIUM, ARTERIAL mmol/L  --   --  141  --     POTASSIUM mmol/L 4.0 4.9  --  4.8   CHLORIDE mmol/L 93* 92*  --  93*   CO2 mmol/L 23.0 22.0  --  21.0*   BUN mg/dL 40* 78*  --  70*   CREATININE mg/dL 5.78* 9.46*  --  8.67*   CALCIUM mg/dL 8.5* 8.3*  --  8.9   BILIRUBIN mg/dL 1.8* 1.7*  --  2.2*   ALK PHOS U/L 89 96  --  108   ALT (SGPT) U/L 10 <5  --  5   AST (SGOT) U/L 59* 22  --  21   GLUCOSE mg/dL 158* 129*  --  126*   GLUCOSE, ARTERIAL mmol/L  --   --  126*  --        Estimated Creatinine Clearance: 13.8 mL/min (A) (by C-G formula based on SCr of 5.78 mg/dL (H)).        Results from last 7 days   Lab Units 01/04/20  0738 01/03/20  0439 01/02/20  1843 01/02/20 0359 12/30/19  2341   WBC 10*3/mm3 8.80 5.34  --  5.97 4.81   HEMOGLOBIN g/dL 8.0* 7.3* 7.5* 7.9* 8.1*   PLATELETS 10*3/mm3 123* 119*  --  118* 110*       Results from last 7 days   Lab Units 01/02/20  0358 12/30/19  2341   INR  1.40* 1.19         Imaging Results (Last 24 Hours)     Procedure Component Value Units Date/Time    XR Chest 1 View [[id number]] Resulted:  01/04/20 0837     Updated:  01/04/20 0837           MEDICATIONS      albuterol sulfate HFA 2 puff Inhalation 4x Daily - RT   aspirin 81 mg Oral Daily   budesonide-formoterol 2 puff Inhalation BID - RT   cetirizine 10 mg Oral Daily   epoetin ziggy/ziggy-epbx 10,000 Units Intravenous Once per day on Mon Wed Fri   escitalopram 10 mg Oral Daily   gabapentin 100 mg Oral Nightly   insulin aspart 0-14 Units Subcutaneous 4x Daily AC & at Bedtime   ipratropium 2 puff Inhalation 4x Daily - RT   lactulose 60 mL Rectal 4x Daily   latanoprost 1 drop Both Eyes Nightly   lidocaine 1 patch Transdermal Q24H   melatonin 6 mg Oral Nightly   methylPREDNISolone sodium succinate 40 mg Intravenous Q12H   pantoprazole 40 mg Intravenous BID AC   piperacillin-tazobactam 3.375 g Intravenous Once   piperacillin-tazobactam 3.375 g Intravenous Q12H   riFAXIMin 550 mg Oral Q12H   sevelamer 800 mg Oral TID With Meals   sodium chloride 10 mL Intravenous Q12H   sodium chloride  10 mL Intravenous Q12H   vancomycin 20 mg/kg Intravenous Once       EPINEPHrine 0.02-0.3 mcg/kg/min Last Rate: Stopped (01/03/20 0130)   midazolam 1-10 mg/hr Last Rate: Stopped (01/03/20 5054)   Pharmacy to dose vancomycin         Assessment/Plan   ASSESSMENT / PLAN      Gastrointestinal hemorrhage    Type 2 diabetes mellitus with chronic kidney disease on chronic dialysis, with long-term current use of insulin (CMS/[facility])    Essential hypertension    ESRD (end stage renal disease) (CMS/[facility])    Primary insomnia    Chronic pain    Anxiety    Gastroesophageal reflux disease without esophagitis    COPD (chronic obstructive pulmonary disease) (CMS/[facility])    Chronic heart failure with preserved ejection fraction (CMS/[facility])    Encephalopathy    1.  ESRD on HD:  - Last hemodialysis was Tuesday, however the patient did not complete his treatment and left more than 2 hours early.    - No HD needs today. Next HD Monday.     2.  GI bleed:  - s/p EGD. Has varices and ulcers     3.  HFpEF     4.  COPD     5.  Chronic hepatitis C     6.  Anemia:  - Hemoglobin is low at 8.0. Epogen with HD     7.  Hypertension:  - Blood pressure is controlled.            This document has been electronically signed by LUIS Chamberlain on January 4, 2020 10:38 AM

## 2020-01-04 NOTE — PLAN OF CARE
Patient needs sedation and pain medication.  He continues to breath in the 30's.  And continues to abdominal breath.  He calms down when he is given his sedation and pain meds but for a short period of time.  Prognosis is not good.

## 2020-01-05 PROBLEM — B33.8 RSV (RESPIRATORY SYNCYTIAL VIRUS INFECTION): Status: ACTIVE | Noted: 2020-01-01

## 2020-01-05 NOTE — PLAN OF CARE
Problem: Ventilation, Mechanical Invasive (Adult)  Intervention: Prevent Airway Displacement/Mechanical Dysfunction  Flowsheets (Taken 1/5/2020 1523)  Airway Safety Measures: manual resuscitator/mask/valve in room; suction at bedside  Intervention: Prevent Airway-Related Skin/Tissue Breakdown  Flowsheets (Taken 1/5/2020 1523)  Device Skin Pressure Protection: skin-to-skin areas padded  Intervention: Prevent Ventilator-Induced Lung Injury  Flowsheets (Taken 1/5/2020 1523)  Lung Protection Measures: low inspiratory pressure provided; low tidal volume provided; lung compliance monitored; optimal PEEP applied; plateau/inspiratory pressure monitored; ventilator synchrony promoted; ventilator waveforms monitored  Intervention: Prevent Ventilator-Associated Pneumonia (VAP)  Flowsheets  Taken 1/5/2020 1517 by Terri Feliz RRT  Head of Bed (HOB): HOB at 30 degrees  Taken 1/5/2020 0800 by Fatemeh Rapp RN  VAP Prevention Bundle: HOB elevation maintained;VTE prophylaxis provided;oral care regularly provided;stress ulcer prophylaxis provided  Intervention: Optimize Oxygenation/Ventilation  Flowsheets (Taken 1/5/2020 1517)  Airway/Ventilation Management: airway patency maintained

## 2020-01-05 NOTE — CONSULTS
Adult Nutrition  Assessment    Patient Name:  Cristo Musa  YOB: 1950  MRN: [id number]  Admit Date:  1/2/2020    Assessment Date:  1/5/2020    Comments:  Pt continues intubated on vent. Noted ESRD w/ HD, DM2. Pt on levophed and versed, acltulose for elev ammonia, currently 65H but improved. Coretrak to be placed for EN. Recommend Novasource renal to goal rate 40ml/hr w/ flush of 40ml/hr (960ml) to provide 1920cal, 87g pro, 691 (+960ml) for total fluids 1651ml/d. RD to follow hospital course.    Reason for Assessment     Row Name 01/05/20 0929          Reason for Assessment    Reason For Assessment  TF/PN     Diagnosis  other (see comments)     Identified At Risk by Screening Criteria  other (see comments)           Anthropometrics     Row Name 01/05/20 0505          Anthropometrics    Weight  92.6 kg (204 lb 2.3 oz)         Labs/Tests/Procedures/Meds     Row Name 01/05/20 0929          Labs/Procedures/Meds    Lab Results Reviewed  reviewed     Lab Results Comments  Glu x24hrs 157-240, GFR 9L, Ammonia 65H but improved, Alb 2.4L        Diagnostic Tests/Procedures    Diagnostic Test/Procedure Reviewed  reviewed        Medications    Pertinent Medications Reviewed  reviewed     Pertinent Medications Comments  levophed/versed, lactulose, SSI, deltasone, renvela           Estimated/Assessed Needs     Row Name 01/05/20 0931          Calculation Measurements    Weight Used For Calculations  92.5 kg (204 lb)        Estimated/Assessed Needs    Additional Documentation  Fluid Requirements (Group);Protein Requirements (Group);Calorie Requirements (Group);KCAL/KG (Group)        Calorie Requirements    Estimated Calorie Requirement (kcal/day)  1800        KCAL/KG    KCAL/KG  20 Kcal/Kg (kcal)     20 Kcal/Kg (kcal)  1850.68        Protein Requirements    Weight Used For Protein Calculations  92.5 kg (204 lb)     Est Protein Requirement Amount (gms/kg)  1.0 gm protein     Estimated Protein Requirements  (gms/day)  92.53        Fluid Requirements    Estimated Fluid Requirements (mL/day)  1800     RDA Method (mL)  1800     Lowes-Segar Method (over 20 kg)  3350.68         Nutrition Prescription Ordered     Row Name 01/05/20 0932          Nutrition Prescription PO    Current PO Diet  NPO                 Electronically signed by:  Yazmin You RD  01/05/20 9:34 AM

## 2020-01-05 NOTE — PROGRESS NOTES
CRITICAL CARE PROGRESS NOTE  Imani Green MD    [facility] CRITICAL CARE  1/5/2020        Cristo Musa  [id number]  1950  69 y.o. male            LOS: 2 days   Warren Stewart MD    Chief Complaint/Reason for visit: Follow-up acute respiratory failure, status post cardiac arrest    Subjective     Interval History:   History taken from: patient/ chart    Continues with some fevers but improved. Respiratory panel + for RSV; MRSA screen negative. Has remained on AC/VC as now with hypotension requiring levophed; currently on 0.06. Still with copious sputum but clearing. Remains off sedation; received fentanyl and versed pushes yesterday for comfort.    Review of Systems:   Review of Systems   Unable to perform ROS: Intubated     All systems were reviewed and negative except as noted above in the HPI.    Medical history, surgical history, social history, family history reviewed    Objective     Intake/Output:    Intake/Output Summary (Last 24 hours) at 1/5/2020 0859  Last data filed at 1/5/2020 0505  Gross per 24 hour   Intake 1205.65 ml   Output 100 ml   Net 1105.65 ml       Nutrition: NPO    Infusions:    EPINEPHrine 0.02-0.3 mcg/kg/min Last Rate: Stopped (01/03/20 0130)   midazolam 1-10 mg/hr Last Rate: Stopped (01/03/20 1334)   norepinephrine 0.02-0.3 mcg/kg/min Last Rate: 0.06 mcg/kg/min (01/05/20 0500)       Respiratory:  FiO2 (%):  [30 %] 30 %  S RR:  [14] 14  PEEP/CPAP (cm H2O):  [5 cm H20] 5 cm H20  RI SUP:  [0 cm H20-10 cm H20] 10 cm H20  MAP (cm H2O):  [6-14] 9.3    Vital Sign Min/Max for last 24 hours:  Temp  Min: 98.4 °F (36.9 °C)  Max: 100.5 °F (38.1 °C)   BP  Min: 78/46  Max: 122/58   Pulse  Min: 62  Max: 91   Resp  Min: 20  Max: 32   SpO2  Min: 85 %  Max: 100 %   No data recorded   Weight  Min: 92.6 kg (204 lb 2.3 oz)  Max: 92.6 kg (204 lb 2.3 oz)     Physical Exam:  99.1 °F (37.3 °C) (Core) 70 100/55 20 96% 92.6 kg (204 lb 2.3 oz) Body mass index is 31.05  kg/m².  Physical Exam   Constitutional: He is oriented to person, place, and time. Vital signs are normal. He appears well-developed and well-nourished. He is sedated and intubated.   obese   HENT:   Head: Normocephalic and atraumatic.   Nose: Nose normal.   ETT, OGT, bite wound left anterior tongue   Eyes: EOM and lids are normal. Right conjunctiva is injected. Left conjunctiva is injected. Right pupil is not reactive.   Bilateral cataracts and arcus senilis   Neck: JVD present.   Cardiovascular: Normal rate and regular rhythm. Exam reveals distant heart sounds. Exam reveals no gallop.   No murmur heard.  Pulmonary/Chest: He is intubated. No respiratory distress. He has decreased breath sounds in the right lower field and the left lower field. He has no wheezes (diffuse coarse). He has no rhonchi. He has no rales.   Abdominal: Soft. Normal appearance and bowel sounds are normal. He exhibits distension. There is no tenderness.   Musculoskeletal:   No extremity edema, right upper extremity AV fistula     Vascular Status -  His right foot exhibits no edema. His left foot exhibits no edema.  Neurological: He is oriented to person, place, and time. He is unresponsive.   Withdrawal to pain, cough present, moves extremities spontaneously   Skin: Skin is warm and dry. No cyanosis. Nails show no clubbing.   Psychiatric:   Unable to assess       Central Lines/PICC: absent     Results Review:  I personally reviewed the patient's new clinical results.   Lab Results (last 24 hours)     Procedure Component Value Units Date/Time    Blood Culture - Blood, Blood, Arterial Line [[id number]] Collected:  01/04/20 0739    Specimen:  Blood, Arterial Line Updated:  01/05/20 0815     Blood Culture No growth at 24 hours    POC Glucose Once [[id number]]  (Abnormal) Collected:  01/05/20 0615    Specimen:  Blood Updated:  01/05/20 0804     Glucose 240 mg/dL      Comment: RN NotifiedOperator: [id number] ANGELITO JESSICAMeter ID: [id number]        Manual Differential [[id number]]  (Abnormal) Collected:  01/05/20 0357    Specimen:  Blood Updated:  01/05/20 0549     Neutrophil % 86.0 %      Lymphocyte % 7.0 %      Monocyte % 1.0 %      Bands %  3.0 %      Metamyelocyte % 3.0 %      Neutrophils Absolute 10.91 10*3/mm3      Lymphocytes Absolute 0.86 10*3/mm3      Monocytes Absolute 0.12 10*3/mm3      Hypochromia Slight/1+     Comment: occasional        Poikilocytes Slight/1+     WBC Morphology Normal     Platelet Estimate Decreased    Comprehensive Metabolic Panel [[id number]]  (Abnormal) Collected:  01/05/20 0357    Specimen:  Blood Updated:  01/05/20 0517     Glucose 218 mg/dL      BUN 57 mg/dL      Creatinine 7.08 mg/dL      Sodium 134 mmol/L      Potassium 4.8 mmol/L      Chloride 92 mmol/L      CO2 17.0 mmol/L      Calcium 8.4 mg/dL      Total Protein 9.4 g/dL      Albumin 2.40 g/dL      ALT (SGPT) 13 U/L      AST (SGOT) 76 U/L      Alkaline Phosphatase 134 U/L      Total Bilirubin 1.4 mg/dL      eGFR Non  Amer --     Comment: <15 Indicative of kidney failure.        eGFR  African Amer 9 mL/min/1.73      Comment: <15 Indicative of kidney failure.        Globulin 7.0 gm/dL      A/G Ratio 0.3 g/dL      BUN/Creatinine Ratio 8.1     Anion Gap 25.0 mmol/L     Narrative:       GFR Normal >60  Chronic Kidney Disease <60  Kidney Failure <15      Blood Gas, Arterial [[id number]]  (Abnormal) Collected:  01/05/20 0451    Specimen:  Arterial Blood Updated:  01/05/20 0505     Site Arterial Line     Inderjit's Test N/A     pH, Arterial 7.318 pH units      Comment: 84 Value below reference range        pCO2, Arterial 36.6 mm Hg      pO2, Arterial 97.6 mm Hg      HCO3, Arterial 18.8 mmol/L      Comment: 84 Value below reference range        Base Excess, Arterial -6.7 mmol/L      Comment: 84 Value below reference range        O2 Saturation, Arterial 95.5 %      Barometric Pressure for Blood Gas 755 mmHg      Modality Ventilator     FIO2 30 %      Ventilator Mode AC     Set  Tidal Volume 500     Set [facility] 14.0     PEEP 5.0     Collected by ROSLYN LANGE     Comment: Meter: [id number]     :  [id number]       CBC & Differential [[id number]] Collected:  01/05/20 0357    Specimen:  Blood Updated:  01/05/20 0445    Narrative:       The following orders were created for panel order CBC & Differential.  Procedure                               Abnormality         Status                     ---------                               -----------         ------                     CBC Auto Differential[[id number]]        Abnormal            Final result                 Please view results for these tests on the individual orders.    CBC Auto Differential [[id number]]  (Abnormal) Collected:  01/05/20 0357    Specimen:  Blood Updated:  01/05/20 0445     WBC 12.26 10*3/mm3      RBC 2.97 10*6/mm3      Hemoglobin 8.1 g/dL      Hematocrit 25.5 %      MCV 85.9 fL      MCH 27.3 pg      MCHC 31.8 g/dL      RDW 17.4 %      RDW-SD 54.0 fl      MPV 8.9 fL      Platelets 134 10*3/mm3      Neutrophil % 90.1 %      Lymphocyte % 6.5 %      Monocyte % 2.2 %      Eosinophil % 0.0 %      Basophil % 0.3 %      Immature Grans % 0.9 %      Neutrophils, Absolute 11.04 10*3/mm3      Lymphocytes, Absolute 0.80 10*3/mm3      Monocytes, Absolute 0.27 10*3/mm3      Eosinophils, Absolute 0.00 10*3/mm3      Basophils, Absolute 0.04 10*3/mm3      Immature Grans, Absolute 0.11 10*3/mm3      nRBC 0.0 /100 WBC     POC Glucose Once [[id number]]  (Abnormal) Collected:  01/04/20 2132    Specimen:  Blood Updated:  01/04/20 2210     Glucose 154 mg/dL      Comment: RN NotifiedOperator: [id number] EATON JESSICAMeter ID: [id number]       MRSA Screen, PCR - Swab, Nares [[id number]]  (Normal) Collected:  01/04/20 1708    Specimen:  Swab from Nares Updated:  01/04/20 1942     MRSA, PCR Negative    Narrative:       Performed by real-time polymerase chain reaction (qPCR).    Respiratory Panel, PCR - Swab, Nasopharynx [[id number]]   (Abnormal) Collected:  01/04/20 1709    Specimen:  Swab from Nasopharynx Updated:  01/04/20 1851     ADENOVIRUS, PCR Not Detected     Coronavirus 229E Not Detected     Coronavirus HKU1 Not Detected     Coronavirus NL63 Not Detected     Coronavirus OC43 Not Detected     Human Metapneumovirus Not Detected     Human Rhinovirus/Enterovirus Not Detected     Influenza B PCR Not Detected     Parainfluenza Virus 1 Not Detected     Parainfluenza Virus 2 Not Detected     Parainfluenza Virus 3 Not Detected     Parainfluenza Virus 4 Not Detected     Bordetella pertussis pcr Not Detected     Influenza A H1 2009 PCR Not Detected     Chlamydophila pneumoniae PCR Not Detected     Mycoplasma pneumo by PCR Not Detected     Influenza A PCR Not Detected     Influenza A H3 Not Detected     Influenza A H1 Not Detected     RSV, PCR Detected    POC Glucose Once [[id number]]  (Abnormal) Collected:  01/04/20 1708    Specimen:  Blood Updated:  01/04/20 1727     Glucose 155 mg/dL      Comment: : [id number] SANJANA MANUELDeaconess Cross Pointe Center ID: [id number]       Respiratory Culture - Sputum, Oropharynx [[id number]] Collected:  01/04/20 1542    Specimen:  Sputum from Oropharynx Updated:  01/04/20 1619     Gram Stain Many (4+) WBCs per low power field      Rare (1+) Epithelial cells per low power field      Mixed bacterial rosibel    POC Glucose Once [[id number]]  (Abnormal) Collected:  01/04/20 1244    Specimen:  Blood Updated:  01/04/20 1427     Glucose 157 mg/dL      Comment: Result Not ConfirmedOperator: [id number] GAMBOA KOJO GRIJALVASheltering Arms Hospital ID: [id number]       Respiratory Culture - Sputum, ET Suction [[id number]] Collected:  01/03/20 1546    Specimen:  Sputum from ET Suction Updated:  01/04/20 1332     Respiratory Culture Light growth (2+) Normal Respiratory Rosibel     Gram Stain Many (4+) WBCs per low power field      Rare (1+) Epithelial cells per low power field      Mixed bacterial rosibel      Moderate (3+) Gram negative coccobacilli        Results  from last 7 days   Lab Units 01/05/20  0451   PH, ARTERIAL pH units 7.318*   PO2 ART mm Hg 97.6   PCO2, ARTERIAL mm Hg 36.6   HCO3 ART mmol/L 18.8*     Lab Results   Component Value Date    BLOODCX No growth at 24 hours 01/04/2020     No results found for: URINECX    I independently reviewed the patient's new imaging, including images and reports.  Imaging Results (Last 24 Hours)     Procedure Component Value Units Date/Time    XR Chest 1 View [[id number]] Collected:  01/04/20 0818     Updated:  01/04/20 1051    Narrative:       Chest x-ray single view.       CLINICAL INDICATION: Shortness of breath. Fever and GI  hemorrhage.    COMPARISON: Chest January 3, 2020.    FINDINGS: Cardiac silhouette is enlarged in size. Pulmonary  vascularity is unremarkable.     Endotracheal tube identified with tip 5.2 cm above the  bifurcation, of the joon in satisfactory position. Nasogastric  tube in stomach.    Interval development of right lower lobe infiltrative changes  suggesting  pneumonitis.      Impression:       CONCLUSION: Interval development of right lower lobe infiltrative  changes suggesting pneumonitis.    Electronically signed by:  Wallace Louise MD  1/4/2020 10:50 AM CST  Workstation: [id number]          All medications reviewed.     albuterol sulfate HFA 2 puff Inhalation 4x Daily - RT   aspirin 81 mg Oral Daily   budesonide-formoterol 2 puff Inhalation BID - RT   cetirizine 10 mg Oral Daily   epoetin ziggy/ziggy-epbx 10,000 Units Intravenous Once per day on Mon Wed Fri   escitalopram 10 mg Oral Daily   gabapentin 100 mg Oral Nightly   insulin aspart 0-14 Units Subcutaneous 4x Daily AC & at Bedtime   ipratropium 2 puff Inhalation 4x Daily - RT   lactulose 60 mL Rectal 4x Daily   latanoprost 1 drop Both Eyes Nightly   lidocaine 1 patch Transdermal Q24H   melatonin 6 mg Oral Nightly   methylPREDNISolone sodium succinate 40 mg Intravenous Q12H   pantoprazole 40 mg Intravenous BID AC   piperacillin-tazobactam 3.375 g  Intravenous Q12H   riFAXIMin 550 mg Oral Q12H   sevelamer 800 mg Oral TID With Meals   sodium chloride 10 mL Intravenous Q12H   sodium chloride 10 mL Intravenous Q12H   sodium chloride            Assessment/Plan     ASSESSMENT:  #Acute hypoxic respiratory failure following anesthesia for EGD  #Cardiac arrest status post ROSC  #Possible hypoxic ischemic brain injury  #Upper GI bleed  #Acute blood loss anemia  #Hypotension-resolved  #Acute respiratory alkalosis  #ESRD  #Mild protein calorie malnutrition  #Hyperammonemia  #T2DM with mild hyperglycemia  #Mild coagulopathy and thrombocytopenia  #Obesity  #Fevers- likely due to RSV but still with purulent sputum  #AECOPD      PLAN:  -Changed ot PSV 10/5 with good tolerance and improved comfort.  -Attempt SBT in AM as long as on stable pressor dose  -CXR this AM  -No indication for daily ABGs. Obtain for clinical change or question.  -Use fentanyl or Versed as needed for comfort  -Change solumedrol to prednisone  -Continue scheduled bronchodilators  -Follow-up cultures  -Cont empiric zosyn. Stop vanc given negative MRSA screen  -Supportive care for RSV infection  -Continue scheduled CO lactulose and rifaximin per GI  -Monitor hemoglobin and transfuse for hemoglobin less than 7 or acute bleeding  -Hemodialysis per nephrology  -Sliding scale insulin.Holding levemir due to hypoglycemia  -Start TFs through OGT given continued aggressive care  -PPX: SCDs, protonix  -DNR    Respiratory status improving but mental status still poor. I remain concerned that he may have suffered some degree of hypoxic brain injury which will make it difficult to extubate as he currently cannot protect his airway. Continue goals of care discussion with pt's wife, as his brother has deferred decisions to her.     D/w RN and RT     Dr. Lock will assume consultative care tomorrow as I will be out of town until 1/13/20.    Critical Care Time Spent: 32 minutes  I personally provided care to this  critically ill patient as documented above.  Critical care time does not include time spent on separately billed procedures.  None of my critical care time was concurrent with other critical care providers.         This document has been electronically signed by Imani Green MD on January 5, 2020 8:59 AM      [phone]    Dictated using Dragon

## 2020-01-05 NOTE — PLAN OF CARE
Problem: Patient Care Overview  Goal: Plan of Care Review  Outcome: Ongoing (interventions implemented as appropriate)  Flowsheets (Taken 1/5/2020 4444)  Progress: no change  Note:   Coretrak to be placed for EN. Recommend Novasource renal to goal rate 40ml/hr w/ flush of 40ml/hr (960ml) to provide 1920cal, 87g pro, 691 (+960ml) for total fluids 1651ml/d. RD following

## 2020-01-05 NOTE — PROGRESS NOTES
[facility]  [address] [place] [address]. [id number]  T - [phone]  F - [phone]     Progress Note          PATIENT  DEMOGRAPHICS   PATIENT NAME: Cristo Musa                      PHYSICIAN: Sudhir SteinerLUIS  : 1950  MRN: [id number]   LOS: 2 days    Patient Care Team:  Warren Stewart MD as PCP - General (Family Medicine)  Michelle Lo MD as Resident (Family Medicine)  John Sanchez MD as Resident (Family Medicine)  Marissa Boothe MD as Resident (Family Medicine)  Lucio Anderson MD as Resident (Family Medicine)  Demarcus Oliveira MD as Resident (Family Medicine)  Subjective   SUBJECTIVE   Remains intubated.         Objective   OBJECTIVE   Vital Signs  Temp:  [98.4 °F (36.9 °C)-100.5 °F (38.1 °C)] 99.1 °F (37.3 °C)  Heart Rate:  [62-91] 68  Resp:  [13-32] 13  BP: ()/(46-59) 108/57  Arterial Line BP: ()/(37-53) 89/38  FiO2 (%):  [30 %] 30 %    Flowsheet Rows      First Filed Value   Admission Height  --   Admission Weight  95.7 kg (211 lb) Documented at 2020 0119           I/O last 3 completed shifts:  In: 1205.7 [I.V.:5.7; IV Piggyback:1200]  Out: 100 [Emesis/NG output:100]    PHYSICAL EXAM    Physical Exam   Constitutional: He appears well-developed.   Does not open eyes or follow commands.    Cardiovascular: Normal rate, regular rhythm and normal heart sounds. Exam reveals no gallop and no friction rub.   No murmur heard.  Pulmonary/Chest: Effort normal and breath sounds normal. No stridor. No respiratory distress. He has no wheezes. He has no rales.   Abdominal: Soft. Bowel sounds are normal. He exhibits no distension and no mass. There is no tenderness. There is no guarding.   Musculoskeletal: He exhibits no edema or tenderness.   Vitals reviewed.      RESULTS   Results Review:    Results from last 7 days   Lab Units 20  0357 20  0738 20  0439   SODIUM mmol/L 134* 134* 135*   POTASSIUM mmol/L 4.8 4.0 4.9    CHLORIDE mmol/L 92* 93* 92*   CO2 mmol/L 17.0* 23.0 22.0   BUN mg/dL 57* 40* 78*   CREATININE mg/dL 7.08* 5.78* 9.46*   CALCIUM mg/dL 8.4* 8.5* 8.3*   BILIRUBIN mg/dL 1.4* 1.8* 1.7*   ALK PHOS U/L 134* 89 96   ALT (SGPT) U/L 13 10 <5   AST (SGOT) U/L 76* 59* 22   GLUCOSE mg/dL 218* 158* 129*       Estimated Creatinine Clearance: 11 mL/min (A) (by C-G formula based on SCr of 7.08 mg/dL (H)).        Results from last 7 days   Lab Units 01/05/20  0357 01/04/20  0738 01/03/20  0439 01/02/20  1843 01/02/20  0359 12/30/19  2341   WBC 10*3/mm3 12.26* 8.80 5.34  --  5.97 4.81   HEMOGLOBIN g/dL 8.1* 8.0* 7.3* 7.5* 7.9* 8.1*   PLATELETS 10*3/mm3 134* 123* 119*  --  118* 110*       Results from last 7 days   Lab Units 01/02/20  0358 12/30/19  2341   INR  1.40* 1.19         Imaging Results (Last 24 Hours)     Procedure Component Value Units Date/Time    XR Chest 1 View [[id number]] Collected:  01/05/20 0914     Updated:  01/05/20 1031    Narrative:       Chest x-ray single view.         CLINICAL INDICATION: Shortness of breath. Respiratory failure.    COMPARISON: January 4, 2020.    FINDINGS: Cardiac silhouette is enlarged in size. Pulmonary  vascularity is increased.     Nasogastric tube in stomach. Endotracheal tube identified with  tip 4.9 cm above the bifurcation, of the joon, in satisfactory  position.    Clearing of right lower lobe infiltrative changes since prior  examination. Favorable change.      Impression:       CONCLUSION: Cardiomegaly. Pulmonary vascular prominence. Interval  clearing of right lower lobe infiltrative changes since prior  exam. Favorable change.    Electronically signed by:  Wallace Louise MD  1/5/2020 9:36 AM CST  Workstation: [id number]    XR Chest 1 View [[id number]] Collected:  01/04/20 0818     Updated:  01/04/20 1051    Narrative:       Chest x-ray single view.       CLINICAL INDICATION: Shortness of breath. Fever and GI  hemorrhage.    COMPARISON: Chest January 3, 2020.    FINDINGS: Cardiac  silhouette is enlarged in size. Pulmonary  vascularity is unremarkable.     Endotracheal tube identified with tip 5.2 cm above the  bifurcation, of the joon in satisfactory position. Nasogastric  tube in stomach.    Interval development of right lower lobe infiltrative changes  suggesting  pneumonitis.      Impression:       CONCLUSION: Interval development of right lower lobe infiltrative  changes suggesting pneumonitis.    Electronically signed by:  Wallace Louise MD  1/4/2020 10:50 AM CST  Workstation: [id number]           MEDICATIONS      albuterol sulfate HFA 2 puff Inhalation 4x Daily - RT   aspirin 81 mg Oral Daily   budesonide-formoterol 2 puff Inhalation BID - RT   cetirizine 10 mg Oral Daily   epoetin ziggy/ziggy-epbx 10,000 Units Intravenous Once per day on Mon Wed Fri   escitalopram 10 mg Oral Daily   gabapentin 100 mg Oral Nightly   insulin aspart 0-14 Units Subcutaneous 4x Daily AC & at Bedtime   ipratropium 2 puff Inhalation 4x Daily - RT   lactulose 60 mL Rectal 4x Daily   latanoprost 1 drop Both Eyes Nightly   lidocaine 1 patch Transdermal Q24H   melatonin 6 mg Oral Nightly   pantoprazole 40 mg Intravenous BID AC   piperacillin-tazobactam 3.375 g Intravenous Q12H   predniSONE 40 mg Oral Daily With Breakfast   riFAXIMin 550 mg Oral Q12H   sevelamer 800 mg Oral TID With Meals   sodium chloride 10 mL Intravenous Q12H   sodium chloride 10 mL Intravenous Q12H   sodium chloride          EPINEPHrine 0.02-0.3 mcg/kg/min Last Rate: Stopped (01/03/20 0130)   midazolam 1-10 mg/hr Last Rate: Stopped (01/03/20 1334)   norepinephrine 0.02-0.3 mcg/kg/min Last Rate: 0.06 mcg/kg/min (01/05/20 0500)       Assessment/Plan   ASSESSMENT / PLAN      Gastrointestinal hemorrhage    Type 2 diabetes mellitus with chronic kidney disease on chronic dialysis, with long-term current use of insulin (CMS/[facility])    Essential hypertension    ESRD (end stage renal disease) (CMS/[facility])    Primary insomnia    Chronic pain    Anxiety     Gastroesophageal reflux disease without esophagitis    COPD (chronic obstructive pulmonary disease) (CMS/HCC)    Chronic heart failure with preserved ejection fraction (CMS/HCC)    Encephalopathy    Fever of unknown origin    Acute respiratory failure (CMS/HCC)    RSV (respiratory syncytial virus infection)    1.  ESRD on HD:  - Last hemodialysis was Tuesday, however the patient did not complete his treatment and left more than 2 hours early.    - No HD needs today. Next HD Monday.     2.  GI bleed:  - s/p EGD. Has varices and ulcers     3.  HFpEF     4.  COPD     5.  Chronic hepatitis C     6.  Anemia:  - Hemoglobin is low at 8.1. Epogen with HD     7.  Hypertension:  - Blood pressure is controlled.            This document has been electronically signed by LUIS Chamberlain on January 5, 2020 10:48 AM

## 2020-01-05 NOTE — PLAN OF CARE
Problem: Patient Care Overview  Goal: Plan of Care Review  Outcome: Ongoing (interventions implemented as appropriate)  Note:   Patient continues on vent; Levophed gtt started this shift to maintain MAP at or >65; Anuric; Continuing to monitor.

## 2020-01-05 NOTE — PROGRESS NOTES
FAMILY MEDICINE DAILY PROGRESS NOTE  NAME: Cristo Musa  : 1950  MRN: [id number]     LOS: 2 days     PROVIDER OF SERVICE: Guero Huynh MD    Chief Complaint: Gastrointestinal hemorrhage    Subjective:     Interval History:  History taken from: chart  Overnight patient remained afebrile per RN.   BP did fall to 78/46. Was placed on levophed overnight. BP has since remained stable.   Continues to remain on ventilator. Pulmonology following.   RSV positive, MRSA negative    Wife present in room. Was provided update. Would like to continue to see how patient does before considering extubation. Brother has not returned my phone call, will attempt again today.    Review of Systems:    Review of Systems   Unable to perform ROS: Intubated       Objective:     Vital Signs  Temp:  [98.4 °F (36.9 °C)-100.5 °F (38.1 °C)] 99.1 °F (37.3 °C)  Heart Rate:  [62-87] 71  Resp:  [13-32] 14  BP: ()/(46-60) 99/52  Arterial Line BP: ()/(35-53) 84/35  FiO2 (%):  [30 %] 30 %    Physical Exam  Physical Exam   Constitutional: No distress.   Sickly apperance, intubated    HENT:   Head: Normocephalic and atraumatic.   Right Ear: External ear normal.   Left Ear: External ear normal.   Mouth/Throat: Oropharynx is clear and moist.   Eyes: Conjunctivae are normal. No scleral icterus.   Bilateral cataracts    Neck: Normal range of motion.   Cardiovascular: Normal rate, regular rhythm and normal heart sounds. Exam reveals no gallop and no friction rub.   No murmur heard.  Pulmonary/Chest: No stridor. He is in respiratory distress. He has wheezes (diffuse). He has no rales. He exhibits no tenderness.   Currently intubated     Abdominal: Soft. Bowel sounds are normal. He exhibits distension.   Musculoskeletal: Normal range of motion. He exhibits no edema or tenderness.   Skin: Skin is warm and dry. No rash noted. He is not diaphoretic. No erythema. No pallor.       Medication Review    Current Facility-Administered  Medications:   •  acetaminophen (TYLENOL) tablet 650 mg, 650 mg, Oral, Q4H PRN, 650 mg at 01/03/20 2139 **OR** [DISCONTINUED] acetaminophen (TYLENOL) 160 MG/5ML solution 650 mg, 650 mg, Oral, Q4H PRN **OR** acetaminophen (TYLENOL) suppository 650 mg, 650 mg, Rectal, Q4H PRN, Tricia Philip MD  •  albumin human 25 % IV SOLN 25 g, 25 g, Intravenous, PRN, Alethea Diamond MD  •  albuterol (PROVENTIL) nebulizer solution 0.083% 2.5 mg/3mL, 2.5 mg, Nebulization, Q6H PRN, Tricia Philip MD  •  albuterol sulfate HFA (PROVENTIL HFA;VENTOLIN HFA;PROAIR HFA) inhaler 2 puff, 2 puff, Inhalation, 4x Daily - RT, Meseret Huynh MD, 2 puff at 01/05/20 1126  •  aspirin chewable tablet 81 mg, 81 mg, Oral, Daily, Tricia Philip MD, 81 mg at 01/05/20 1027  •  budesonide-formoterol (SYMBICORT) 160-4.5 MCG/ACT inhaler 2 puff, 2 puff, Inhalation, BID - RT, Imani Green MD, 2 puff at 01/05/20 0716  •  cetirizine (zyrTEC) tablet 10 mg, 10 mg, Oral, Daily, Tricia Philip MD, 10 mg at 01/05/20 1028  •  cyclobenzaprine (FLEXERIL) tablet 5 mg, 5 mg, Oral, Q8H PRN, Tricia Philip MD  •  dextrose (D50W) 25 g/ 50mL Intravenous Solution 25 g, 25 g, Intravenous, Q15 Min PRN, Tricia Philip MD, 25 g at 01/03/20 2309  •  dextrose (GLUTOSE) oral gel 15 g, 15 g, Oral, Q15 Min PRN, Tricia Philip MD  •  docusate sodium (COLACE) capsule 100 mg, 100 mg, Oral, BID PRN, Tricia Philip MD  •  EPINEPHrine (ADRENALIN) 5 mg in sodium chloride 0.9 % 250 mL (0.02 mg/mL) infusion, 0.02-0.3 mcg/kg/min, Intravenous, Titrated, Tricia Philip MD, Stopped at 01/03/20 0130  •  epoetin ziggy (EPOGEN,PROCRIT) injection 10,000 Units, 10,000 Units, Intravenous, Once per day on Mon Wed Fri, Alethea Diamond MD, 10,000 Units at 01/03/20 1342  •  escitalopram (LEXAPRO) tablet 10 mg, 10 mg, Oral, Daily, Tricia Philip MD, 10 mg at 01/05/20 1027  •  fentaNYL citrate (PF) (SUBLIMAZE) injection 25 mcg, 25 mcg, Intravenous, Q30 Min  PRN, Imani Green MD, 25 mcg at 01/04/20 1651  •  gabapentin (NEURONTIN) capsule 100 mg, 100 mg, Oral, Nightly, Tricia Philip MD, 100 mg at 01/04/20 2130  •  glucagon (human recombinant) (GLUCAGEN DIAGNOSTIC) injection 1 mg, 1 mg, Subcutaneous, Q15 Min PRN, Tricia Philip MD  •  hydrOXYzine pamoate (VISTARIL) capsule 50 mg, 50 mg, Oral, TID PRN, Tricia Philip MD, 50 mg at 01/02/20 1423  •  insulin aspart (novoLOG) injection 0-14 Units, 0-14 Units, Subcutaneous, 4x Daily AC & at Bedtime, Tricia Philip MD, 8 Units at 01/05/20 1148  •  ipratropium (ATROVENT HFA) inhaler 2 puff, 2 puff, Inhalation, 4x Daily - RT, Imani Green MD, 2 puff at 01/05/20 1127  •  lactulose (CHRONULAC) 10 GM/15ML solution 60 mL, 60 mL, Rectal, 4x Daily, Tricia Philip MD, 60 mL at 01/05/20 1029  •  latanoprost (XALATAN) 0.005 % ophthalmic solution 1 drop, 1 drop, Both Eyes, Nightly, Tricia Philip MD, 1 drop at 01/04/20 2145  •  lidocaine (LIDODERM) 5 % 1 patch, 1 patch, Transdermal, Q24H, Tricia Philip MD, 1 patch at 01/05/20 1028  •  melatonin tablet 6 mg, 6 mg, Oral, Nightly, Tricia Philip MD, 6 mg at 01/04/20 2130  •  midazolam (VERSED) 50 mg in sodium chloride 0.9 % 100 mL (0.5 mg/mL) infusion, 1-10 mg/hr, Intravenous, Titrated, Meseret Huynh MD, Stopped at 01/03/20 1334  •  midazolam (VERSED) injection 1 mg, 1 mg, Intravenous, Q30 Min PRN, Imani Green MD, 1 mg at 01/04/20 1647  •  norepinephrine (LEVOPHED) 8 mg/250 mL (32 mcg/mL) in sodium chloride 0.9% infusion (premix), 0.02-0.3 mcg/kg/min, Intravenous, Titrated, Darren Anderson MD, Last Rate: 10.91 mL/hr at 01/05/20 0500, 0.06 mcg/kg/min at 01/05/20 0500  •  ondansetron (ZOFRAN) injection 4 mg, 4 mg, Intravenous, Q6H PRN, Tricia Philip MD  •  pantoprazole (PROTONIX) injection 40 mg, 40 mg, Intravenous, BID AC, Tricia Philip MD, 40 mg at 01/05/20 0642  •  piperacillin-tazobactam (ZOSYN) 3.375 g/100 mL 0.9% NS IVPB  (mbp), 3.375 g, Intravenous, Q12H, Imani Green MD, 3.375 g at 01/05/20 1039  •  predniSONE (DELTASONE) tablet 40 mg, 40 mg, Oral, Daily With Breakfast, Imani Green MD, 40 mg at 01/05/20 1039  •  riFAXIMin (XIFAXAN) tablet 550 mg, 550 mg, Oral, Q12H, Tricia Philip MD, 550 mg at 01/05/20 1026  •  sevelamer (RENVELA) tablet 800 mg, 800 mg, Oral, TID With Meals, Tricia Philip MD  •  sodium chloride 0.9 % flush 10 mL, 10 mL, Intravenous, Q12H, Tricia Philip MD, 10 mL at 01/05/20 1031  •  sodium chloride 0.9 % flush 10 mL, 10 mL, Intravenous, PRN, Tricia Philip MD  •  sodium chloride 0.9 % flush 10 mL, 10 mL, Intravenous, Q12H, Tricia Philip MD, 10 mL at 01/04/20 2130  •  sodium chloride 0.9 % flush 10 mL, 10 mL, Intravenous, PRN, Tricia Philpi MD  •  sodium chloride 0.9 % infusion  - ADS Override Pull, , , ,   •  traZODone (DESYREL) tablet 50 mg, 50 mg, Oral, Nightly PRN, Tricia Philip MD, 50 mg at 01/03/20 2139     Diagnostic Data    Lab Results (last 24 hours)     Procedure Component Value Units Date/Time    POC Glucose Once [[id number]]  (Abnormal) Collected:  01/05/20 1147    Specimen:  Blood Updated:  01/05/20 1202     Glucose 276 mg/dL      Comment: : [id number] BEE Oliveira ID: [id number]       Respiratory Culture - Sputum, Oropharynx [[id number]] Collected:  01/04/20 1542    Specimen:  Sputum from Oropharynx Updated:  01/05/20 1053     Respiratory Culture Light growth (2+) Normal Respiratory Ana     Gram Stain Many (4+) WBCs per low power field      Rare (1+) Epithelial cells per low power field      Mixed bacterial ana    Respiratory Culture - Sputum, ET Suction [[id number]]  (Abnormal) Collected:  01/03/20 1546    Specimen:  Sputum from ET Suction Updated:  01/05/20 0956     Respiratory Culture Moderate growth (3+) Haemophilus influenzae     Comment: Beta Lactamase Negative         Light growth (2+) Normal Respiratory Ana     Gram Stain  Many (4+) WBCs per low power field      Rare (1+) Epithelial cells per low power field      Mixed bacterial ana      Moderate (3+) Gram negative coccobacilli    Blood Culture - Blood, Blood, Arterial Line [[id number]] Collected:  01/04/20 0739    Specimen:  Blood, Arterial Line Updated:  01/05/20 0815     Blood Culture No growth at 24 hours    POC Glucose Once [[id number]]  (Abnormal) Collected:  01/05/20 0615    Specimen:  Blood Updated:  01/05/20 0804     Glucose 240 mg/dL      Comment: RN NotifiedOperator: [id number] EATON JESSICAMeter ID: [id number]       Manual Differential [[id number]]  (Abnormal) Collected:  01/05/20 0357    Specimen:  Blood Updated:  01/05/20 0549     Neutrophil % 86.0 %      Lymphocyte % 7.0 %      Monocyte % 1.0 %      Bands %  3.0 %      Metamyelocyte % 3.0 %      Neutrophils Absolute 10.91 10*3/mm3      Lymphocytes Absolute 0.86 10*3/mm3      Monocytes Absolute 0.12 10*3/mm3      Hypochromia Slight/1+     Comment: occasional        Poikilocytes Slight/1+     WBC Morphology Normal     Platelet Estimate Decreased    Comprehensive Metabolic Panel [[id number]]  (Abnormal) Collected:  01/05/20 0357    Specimen:  Blood Updated:  01/05/20 0517     Glucose 218 mg/dL      BUN 57 mg/dL      Creatinine 7.08 mg/dL      Sodium 134 mmol/L      Potassium 4.8 mmol/L      Chloride 92 mmol/L      CO2 17.0 mmol/L      Calcium 8.4 mg/dL      Total Protein 9.4 g/dL      Albumin 2.40 g/dL      ALT (SGPT) 13 U/L      AST (SGOT) 76 U/L      Alkaline Phosphatase 134 U/L      Total Bilirubin 1.4 mg/dL      eGFR Non  Amer --     Comment: <15 Indicative of kidney failure.        eGFR  African Amer 9 mL/min/1.73      Comment: <15 Indicative of kidney failure.        Globulin 7.0 gm/dL      A/G Ratio 0.3 g/dL      BUN/Creatinine Ratio 8.1     Anion Gap 25.0 mmol/L     Narrative:       GFR Normal >60  Chronic Kidney Disease <60  Kidney Failure <15      Blood Gas, Arterial [[id number]]  (Abnormal) Collected:   01/05/20 0451    Specimen:  Arterial Blood Updated:  01/05/20 0505     Site Arterial Line     Inderjit's Test N/A     pH, Arterial 7.318 pH units      Comment: 84 Value below reference range        pCO2, Arterial 36.6 mm Hg      pO2, Arterial 97.6 mm Hg      HCO3, Arterial 18.8 mmol/L      Comment: 84 Value below reference range        Base Excess, Arterial -6.7 mmol/L      Comment: 84 Value below reference range        O2 Saturation, Arterial 95.5 %      Barometric Pressure for Blood Gas 755 mmHg      Modality Ventilator     FIO2 30 %      Ventilator Mode AC     Set Tidal Volume 500     Set Mech Resp Rate 14.0     PEEP 5.0     Collected by ROSLYN LANGE     Comment: Meter: [id number]     :  [id number]       CBC & Differential [[id number]] Collected:  01/05/20 0357    Specimen:  Blood Updated:  01/05/20 0445    Narrative:       The following orders were created for panel order CBC & Differential.  Procedure                               Abnormality         Status                     ---------                               -----------         ------                     CBC Auto Differential[[id number]]        Abnormal            Final result                 Please view results for these tests on the individual orders.    CBC Auto Differential [[id number]]  (Abnormal) Collected:  01/05/20 0357    Specimen:  Blood Updated:  01/05/20 0445     WBC 12.26 10*3/mm3      RBC 2.97 10*6/mm3      Hemoglobin 8.1 g/dL      Hematocrit 25.5 %      MCV 85.9 fL      MCH 27.3 pg      MCHC 31.8 g/dL      RDW 17.4 %      RDW-SD 54.0 fl      MPV 8.9 fL      Platelets 134 10*3/mm3      Neutrophil % 90.1 %      Lymphocyte % 6.5 %      Monocyte % 2.2 %      Eosinophil % 0.0 %      Basophil % 0.3 %      Immature Grans % 0.9 %      Neutrophils, Absolute 11.04 10*3/mm3      Lymphocytes, Absolute 0.80 10*3/mm3      Monocytes, Absolute 0.27 10*3/mm3      Eosinophils, Absolute 0.00 10*3/mm3      Basophils, Absolute 0.04 10*3/mm3      Immature  Grans, Absolute 0.11 10*3/mm3      nRBC 0.0 /100 WBC     POC Glucose Once [[id number]]  (Abnormal) Collected:  01/04/20 2132    Specimen:  Blood Updated:  01/04/20 2210     Glucose 154 mg/dL      Comment: RN NotifiedOperator: [id number] ANGELITO PRIESTMeter ID: [id number]       MRSA Screen, PCR - Swab, Nares [[id number]]  (Normal) Collected:  01/04/20 1708    Specimen:  Swab from Nares Updated:  01/04/20 1942     MRSA, PCR Negative    Narrative:       Performed by real-time polymerase chain reaction (qPCR).    Respiratory Panel, PCR - Swab, Nasopharynx [[id number]]  (Abnormal) Collected:  01/04/20 1709    Specimen:  Swab from Nasopharynx Updated:  01/04/20 1851     ADENOVIRUS, PCR Not Detected     Coronavirus 229E Not Detected     Coronavirus HKU1 Not Detected     Coronavirus NL63 Not Detected     Coronavirus OC43 Not Detected     Human Metapneumovirus Not Detected     Human Rhinovirus/Enterovirus Not Detected     Influenza B PCR Not Detected     Parainfluenza Virus 1 Not Detected     Parainfluenza Virus 2 Not Detected     Parainfluenza Virus 3 Not Detected     Parainfluenza Virus 4 Not Detected     Bordetella pertussis pcr Not Detected     Influenza A H1 2009 PCR Not Detected     Chlamydophila pneumoniae PCR Not Detected     Mycoplasma pneumo by PCR Not Detected     Influenza A PCR Not Detected     Influenza A H3 Not Detected     Influenza A H1 Not Detected     RSV, PCR Detected    POC Glucose Once [[id number]]  (Abnormal) Collected:  01/04/20 1708    Specimen:  Blood Updated:  01/04/20 1727     Glucose 155 mg/dL      Comment: : [id number] SANJANA Mueller ID: [id number]       POC Glucose Once [[id number]]  (Abnormal) Collected:  01/04/20 1244    Specimen:  Blood Updated:  01/04/20 1427     Glucose 157 mg/dL      Comment: Result Not ConfirmedOperator: [id number] GAMBOA KOJO GRIJALVAPb ID: [id number]              Imaging Results (Last 24 Hours)     Procedure Component Value Units Date/Time    XR Abdomen  KUB [[id number]] Collected:  01/05/20 1130     Updated:  01/05/20 1212    Narrative:       EXAM:  XR ABDOMEN 1 VIEW (KUB)    ORDERING PROVIDER:  LULU JESSICA    CLINICAL HISTORY:  Cortrak Placement    COMPARISON STUDY:      TECHNIQUE:  Two views of the abdomen    FINDINGS:  Cortrak tube placed with distal portion in the proximal jejunum.  There is a nonspecific bowel gas pattern. No evidence of gross  subdiaphragmatic free air. Multilevel degenerative change of the  visualized dorsal spine.      Impression:       Cortrak tube placed with distal portion in the proximal jejunum.      Electronically signed by:  Joao Obregon MD  1/5/2020 12:11 PM CST  Workstation: [id number]    XR Chest 1 View [[id number]] Collected:  01/05/20 0914     Updated:  01/05/20 1031    Narrative:       Chest x-ray single view.         CLINICAL INDICATION: Shortness of breath. Respiratory failure.    COMPARISON: January 4, 2020.    FINDINGS: Cardiac silhouette is enlarged in size. Pulmonary  vascularity is increased.     Nasogastric tube in stomach. Endotracheal tube identified with  tip 4.9 cm above the bifurcation, of the joon, in satisfactory  position.    Clearing of right lower lobe infiltrative changes since prior  examination. Favorable change.      Impression:       CONCLUSION: Cardiomegaly. Pulmonary vascular prominence. Interval  clearing of right lower lobe infiltrative changes since prior  exam. Favorable change.    Electronically signed by:  Wallace Louise MD  1/5/2020 9:36 AM CST  Workstation: [id number]          I reviewed the patient's new clinical results.    Assessment/Plan:     Active Hospital Problems    Diagnosis   • **Gastrointestinal hemorrhage     -hemoccult positive in ER; no BRBPR or hematemesis   -colonoscopy on 11/6/19 showed angiectasia which was clipped without complication  -upper endoscopy on 11/5/19 showed grade 2 varices in upper third of esophagus  -PPI  -trend H/H;       EGD 1/2/19: Grade 1 varices, duodenal ulcers non  bleeding   - repeat in 1 month   - Dr. Philip, Gi following.   - will continue to monitor H/H     • RSV (respiratory syncytial virus infection)     Currently in acute respiratory failure requiring intubation   Will continue supportive care       -pulmonology following, Dr. Green  -vancomycin d/c yesterday with negative MRSA swab  -on zosyn D#2, continue empiric therapy  -continue solumedrol     • Fever of unknown origin     Pan culture   Respiratory panel  CXR today     vancomycin and zosyn for broad spectrum coverage   Pulmonology following      • Acute respiratory failure (CMS/HCC)     RSV positive   Currently intubated   Patient is a DNR/DNI    Family would like to wait a few days before considering extubation.     -pulmonology following, Dr. Green  -vancomycin d/c yesterday with negative MRSA swab  -on zosyn D#2, continue empiric therapy   -continue solumedrol     • Encephalopathy     -Presumed metabolic  -Ammonia level: improving  -Continue Lactulose, rifaxamin    GI following       • Chronic heart failure with preserved ejection fraction (CMS/HCC)     -last echo in July 2018 shows EF of 58% with grade 1a diastolic dysfunction  -daily weights  -care with fluids  -daily fluid restriction 1500 mL       • COPD (chronic obstructive pulmonary disease) (CMS/HCC)     - Continue flonase, loratadine  - Patient uses Oxygen prn at the nursing home.   - Duonebs and albuterol nebs prn.          • Gastroesophageal reflux disease without esophagitis     -PPI     • Chronic pain     -Gabapentin        • Anxiety     - Continue Lexapro      • Primary insomnia     -trazodone, melatonin     • ESRD (end stage renal disease) (CMS/HCC)     -HD M/W/F  -Nephrology on board   -epogen on M/W/F with dialysis  -continue Sevelamer 800 mg three times daily        • Type 2 diabetes mellitus with chronic kidney disease on chronic dialysis, with long-term current use of insulin (CMS/HCC)     -levemir, SSI     • Essential hypertension     -not  on any home medications, will monitor            DVT prophylaxis: SCDs/TEDs  Code Status and Medical Interventions:   Ordered at: 01/02/20 1525     Limited Support to NOT Include:    Intubation     Level Of Support Discussed With:    Health Care Surrogate     Code Status:    No CPR     Medical Interventions (Level of Support Prior to Arrest):    Limited       Plan for disposition: will have discussion with family       Time: 30 minutes          This document has been electronically signed by Guero Huynh MD on January 5, 2020 12:47 PM        This document has been electronically signed by Guero Huynh MD on January 5, 2020 12:47 PM

## 2020-01-05 NOTE — PLAN OF CARE
Problem: Ventilation, Mechanical Invasive (Adult)  Goal: Signs and Symptoms of Listed Potential Problems Will be Absent, Minimized or Managed (Ventilation, Mechanical Invasive)  Outcome: Ongoing (interventions implemented as appropriate)  Note:   Pt stable on current vent settings.

## 2020-01-05 NOTE — PROGRESS NOTES
SUBJECTIVE:   1/4/2020  Chief Complaint:     Subjective      Patient remains on vent and sedated.  Had fever overnight which has improved today.  H/H stable. Ammonia improved to 65 with lactulose enemas.     History:  Past Medical History:   Diagnosis Date   • Anemia of chronic disease    • Cataract    • CHF (congestive heart failure) (CMS/HCC)    • Chronic pain syndrome    • CKD (chronic kidney disease)    • Clostridium difficile infection    • COPD (chronic obstructive pulmonary disease) (CMS/HCC)    • Coronary artery disease    • Depression    • Diabetes mellitus (CMS/HCC)    • Dialysis patient (CMS/HCC)    • GERD (gastroesophageal reflux disease)    • Glaucoma    • H/O cardiac pacemaker     patient states he got his pacemaker removed in [place]   • Heart block    • Hepatitis C    • History of transfusion    • Hypertension    • Nephropathy, diabetic (CMS/HCC)    • Pacemaker    • Pulmonary embolism (CMS/HCC)      Past Surgical History:   Procedure Laterality Date   • ABDOMINAL SURGERY     • ARTERIOVENOUS FISTULA/SHUNT SURGERY Right     forearm loop   • ARTERIOVENOUS FISTULA/SHUNT SURGERY Left     removed past upper arm   • ARTERIOVENOUS FISTULA/SHUNT SURGERY Right 3/13/2018    Procedure: revision RIGHT forearm ARTERIOVENOUS graft (excision), debridement, wound vac;  Surgeon: Jerson Singh MD;  Location: [facility];  Service: Vascular   • ARTERIOVENOUS FISTULA/SHUNT SURGERY W/ HEMODIALYSIS CATHETER INSERTION Right 4/4/2016    Procedure: RIGHT ARM AV FISTULA DECLOT REVISION REPAIR BRACHIAL ANASTOMOTIC PSUEDOANEURYSM LEFT FEMORAL RICHARDSON FISTULOGRAM WITH ANGIOPLASTY;  Surgeon: Jerson Carpenter MD;  Location: [facility] 18/19;  Service:    • CATARACT EXTRACTION W/ INTRAOCULAR LENS IMPLANT Left 3/31/2017    Procedure: REMOVE CATARACT AND IMPLANT INTRAOCULAR LENS LEFT EYE;  Surgeon: Hilton Montemayor MD;  Location: [facility] OR;  Service:    • COLONOSCOPY N/A 3/12/2019    Procedure:  COLONOSCOPY;  Surgeon: Flako Montoya MD;  Location: [facility] ENDOSCOPY;  Service: Gastroenterology   • COLONOSCOPY N/A 11/6/2019    Procedure: COLONOSCOPY;  Surgeon: Tricia Philip MD;  Location: [facility] ENDOSCOPY;  Service: Gastroenterology   • ENDOSCOPY N/A 3/12/2019    Procedure: ESOPHAGOGASTRODUODENOSCOPY;  Surgeon: Flako Montoya MD;  Location: [facility] ENDOSCOPY;  Service: Gastroenterology   • ENDOSCOPY N/A 11/5/2019    Procedure: ESOPHAGOGASTRODUODENOSCOPY;  Surgeon: Tricia Philip MD;  Location: [facility] ENDOSCOPY;  Service: Gastroenterology   • EYE SURGERY     • HERNIA REPAIR     • IMPLANTABLE CARDIOVERTER DEFIBRILLATOR LEAD REPLACEMENT/POCKET REVISION Right 4/11/2016    Procedure: PACEMAKER LEADS X 3 AND BATTERY EXTRACTION WITH EXIMER LASER;  Surgeon: Vern Reeves MD;  Location: [facility] OR 18/19;  Service:    • INSERTION HEMODIALYSIS CATHETER Right 05/2015    jugular   • INTERVENTIONAL RADIOLOGY PROCEDURE Right 6/1/2017    Procedure: RIGHT dialysis fistulagram & angioplasty;  Surgeon: Jerson Singh MD;  Location: [facility]GIO INVASIVE LOCATION;  Service:    • INTERVENTIONAL RADIOLOGY PROCEDURE Right 8/24/2017    Procedure: IR dialysis fistulagram;  Surgeon: Jerson Singh MD;  Location: [facility] LOCATION;  Service:    • INTERVENTIONAL RADIOLOGY PROCEDURE Right 11/13/2018    Procedure: IR dialysis fistulagram;  Surgeon: Jerson Singh MD;  Location: [facility]GIO INVAS[facility] LOCATION;  Service: Interventional Radiology   • PACEMAKER IMPLANTATION  2008    dual chamber [place] [employer] [place]   • REMOVAL HEMODIALYSIS CATHETER Right 05/2015    femoral vein   • SIGMOIDOSCOPY N/A 8/2/2019    Procedure: SIGMOIDOSCOPY FLEXIBLE;  Surgeon: Flako Montoya MD;  Location: [facility] ENDOSCOPY;  Service: Gastroenterology     Family History   Problem Relation Age of Onset   • COPD Sister    • Diabetes Brother    • Early death Brother    • Hyperlipidemia Brother    •  Hypertension Brother    • Coronary artery disease Mother    • Diabetes Mother    • Hypertension Mother    • Stroke Other    • Other Other         Respiratory disorder     Social History     Tobacco Use   • Smoking status: Former Smoker     Types: Cigarettes   • Smokeless tobacco: Never Used   • Tobacco comment: quit 20 years ago    Substance Use Topics   • Alcohol use: No     Comment: former heavy use in his 50's, up to a fifth of liquor a day, currently no alcohol   • Drug use: No     Medications Prior to Admission   Medication Sig Dispense Refill Last Dose   • acetaminophen (TYLENOL) 500 MG tablet Take 500 mg by mouth Every 4 (Four) Hours As Needed for Mild Pain  or Fever.   Unknown at Unknown time   • aspirin 81 MG chewable tablet Chew 81 mg Daily.   11/19/2019 at Unknown time   • benzonatate (TESSALON) 100 MG capsule Take 100 mg by mouth 3 (Three) Times a Day As Needed for Cough.   Unknown at Unknown time   • brimonidine (ALPHAGAN P) 0.1 % solution ophthalmic solution Administer  to both eyes 3 (Three) Times a Day.   11/19/2019 at Unknown time   • Cholecalciferol (VITAMIN D3) 2000 units chewable tablet Chew 2,000 Units Daily.   11/19/2019 at Unknown time   • cyclobenzaprine (FLEXERIL) 5 MG tablet Take 5 mg by mouth Every 8 (Eight) Hours As Needed for Muscle Spasms.   Unknown at Unknown time   • dorzolamide (TRUSOPT) 2 % ophthalmic solution Administer 1 drop into the left eye 3 (Three) Times a Day. 1 each 12 11/19/2019 at Unknown time   • escitalopram (LEXAPRO) 10 MG tablet Take 1 tablet by mouth Daily. 30 tablet 3 11/19/2019 at Unknown time   • famotidine (PEPCID) 20 MG tablet Take 20 mg by mouth every night at bedtime.   11/18/2019 at Unknown time   • fluticasone (FLONASE) 50 MCG/ACT nasal spray 2 sprays into each nostril daily. Administer 2 sprays in each nostril for each dose.   Unknown at Unknown time   • furosemide (LASIX) 40 MG tablet Take 40 mg by mouth 2 (Two) Times a Day.      • gabapentin (NEURONTIN)  100 MG capsule Take 1 capsule by mouth Every Night. 30 capsule 0    • Glecaprevir-Pibrentasvir (MAVYRET) 100-40 MG tablet Take 3 tablets by mouth Daily. 90 tablet 2 11/19/2019 at Unknown time   • guaiFENesin (HUMIBID 3) 400 MG tablet Take 400 mg by mouth Every 4 (Four) Hours As Needed for Cough.   Unknown at Unknown time   • insulin aspart (NovoLOG) 100 UNIT/ML injection Inject  under the skin 3 (Three) Times a Day Before Meals. Sliding scale:  = 0 units; 150-200 = 3 units; 200-250 = 6 units; 250-300 = 9 units; 300-350 = 12 units; >350 = 15 units and call MD   11/19/2019 at Unknown time   • insulin detemir (LEVEMIR) 100 UNIT/ML injection Inject 30 Units under the skin into the appropriate area as directed Daily.   11/19/2019 at Unknown time   • lactulose (CHRONULAC) 10 GM/15ML solution Take 30 mL by mouth 3 (Three) Times a Day. (Patient taking differently: Take 30 mL by mouth 3 (Three) Times a Day.) 946 mL 3 11/19/2019 at Unknown time   • latanoprost (XALATAN) 0.005 % ophthalmic solution Administer 1 drop to both eyes every night.   11/18/2019 at Unknown time   • Lidocaine (ASPERCREME LIDOCAINE) 4 % patch Apply 1 patch topically Daily As Needed (low back pain. on for 12 hours.).   Unknown at Unknown time   • Loratadine 10 MG capsule Take 10 mg by mouth Every Other Day.   11/18/2019 at Unknown time   • melatonin 5 MG tablet tablet Take 5 mg by mouth Every Night.   11/18/2019 at Unknown time   • ondansetron ODT (ZOFRAN-ODT) 4 MG disintegrating tablet Take 1 tablet by mouth Every 6 (Six) Hours As Needed for Nausea or Vomiting. 10 tablet 0 Unknown at Unknown time   • polyethylene glycol (MIRALAX) packet Take 17 g by mouth Daily As Needed (constipation).   11/19/2019 at Unknown time   • rifaximin (XIFAXAN) 550 MG tablet Take 1 tablet by mouth Every 12 (Twelve) Hours. 60 tablet 5 Unknown at Unknown time   • sevelamer (RENVELA) 800 MG tablet Take 800 mg by mouth 3 (Three) Times a Day With Meals.   11/19/2019 at  Unknown time   • traZODone (DESYREL) 50 MG tablet Take 50 mg by mouth every night at bedtime.   11/18/2019 at Unknown time     Allergies:  Lisinopril and Motrin [ibuprofen]     CURRENT MEDICATIONS/OBJECTIVE/VS/PE:     Current Medications:     Current Facility-Administered Medications   Medication Dose Route Frequency Provider Last Rate Last Dose   • acetaminophen (TYLENOL) tablet 650 mg  650 mg Oral Q4H PRN Tricia Philip MD   650 mg at 01/03/20 2139    Or   • acetaminophen (TYLENOL) suppository 650 mg  650 mg Rectal Q4H PRN Tricia Philip MD       • albumin human 25 % IV SOLN 25 g  25 g Intravenous PRN Alethea Diamond MD       • albuterol (PROVENTIL) nebulizer solution 0.083% 2.5 mg/3mL  2.5 mg Nebulization Q6H PRN Tricia Philip MD       • albuterol sulfate HFA (PROVENTIL HFA;VENTOLIN HFA;PROAIR HFA) inhaler 2 puff  2 puff Inhalation 4x Daily - RT Meseret Huynh MD   2 puff at 01/04/20 1920   • aspirin chewable tablet 81 mg  81 mg Oral Daily Tricia Philip MD   81 mg at 01/04/20 0917   • budesonide-formoterol (SYMBICORT) 160-4.5 MCG/ACT inhaler 2 puff  2 puff Inhalation BID - RT Imani Green MD   2 puff at 01/04/20 1920   • cetirizine (zyrTEC) tablet 10 mg  10 mg Oral Daily Tricia Philip MD   10 mg at 01/04/20 0917   • cyclobenzaprine (FLEXERIL) tablet 5 mg  5 mg Oral Q8H PRN Tricia Philip MD       • dextrose (D50W) 25 g/ 50mL Intravenous Solution 25 g  25 g Intravenous Q15 Min PRN Tricia Philip MD   25 g at 01/03/20 2309   • dextrose (GLUTOSE) oral gel 15 g  15 g Oral Q15 Min PRN Tricia Philip MD       • docusate sodium (COLACE) capsule 100 mg  100 mg Oral BID PRN Tricia Philip MD       • EPINEPHrine (ADRENALIN) 5 mg in sodium chloride 0.9 % 250 mL (0.02 mg/mL) infusion  0.02-0.3 mcg/kg/min Intravenous Titrated Tricia Philip MD   Stopped at 01/03/20 0130   • epoetin ziggy (EPOGEN,PROCRIT) injection 10,000 Units  10,000 Units Intravenous Once per day on Mon Wed  Fri Alethea Diamond MD   10,000 Units at 01/03/20 1342   • escitalopram (LEXAPRO) tablet 10 mg  10 mg Oral Daily Tricia Philip MD   10 mg at 01/04/20 0917   • fentaNYL citrate (PF) (SUBLIMAZE) injection 25 mcg  25 mcg Intravenous Q30 Min PRN Imani Green MD   25 mcg at 01/04/20 1651   • gabapentin (NEURONTIN) capsule 100 mg  100 mg Oral Nightly Tricia Philip MD   100 mg at 01/03/20 2145   • glucagon (human recombinant) (GLUCAGEN DIAGNOSTIC) injection 1 mg  1 mg Subcutaneous Q15 Min PRN Tricia Philip MD       • hydrOXYzine pamoate (VISTARIL) capsule 50 mg  50 mg Oral TID PRN Tricia Philip MD   50 mg at 01/02/20 1423   • insulin aspart (novoLOG) injection 0-14 Units  0-14 Units Subcutaneous 4x Daily AC & at Bedtime Tricia Philip MD       • ipratropium (ATROVENT HFA) inhaler 2 puff  2 puff Inhalation 4x Daily - RT Imani Green MD   2 puff at 01/04/20 1920   • lactulose (CHRONULAC) 10 GM/15ML solution 60 mL  60 mL Rectal 4x Daily Tricia Philip MD   60 mL at 01/03/20 0839   • latanoprost (XALATAN) 0.005 % ophthalmic solution 1 drop  1 drop Both Eyes Nightly Tricia Philip MD   1 drop at 01/03/20 2141   • lidocaine (LIDODERM) 5 % 1 patch  1 patch Transdermal Q24H Tricia Philip MD   1 patch at 01/04/20 1002   • melatonin tablet 6 mg  6 mg Oral Nightly Tricia Philip MD   6 mg at 01/03/20 2139   • methylPREDNISolone sodium succinate (SOLU-Medrol) injection 40 mg  40 mg Intravenous Q12H Imani Green MD   40 mg at 01/04/20 1654   • midazolam (VERSED) 50 mg in sodium chloride 0.9 % 100 mL (0.5 mg/mL) infusion  1-10 mg/hr Intravenous Titrated Meseret Huynh MD   Stopped at 01/03/20 1334   • midazolam (VERSED) injection 1 mg  1 mg Intravenous Q30 Min PRN Imani Green MD   1 mg at 01/04/20 1647   • ondansetron (ZOFRAN) injection 4 mg  4 mg Intravenous Q6H PRN Tricia Philip MD       • pantoprazole (PROTONIX) injection 40 mg  40 mg Intravenous BID AC Tamera,  MD Tricia   40 mg at 01/04/20 0917   • Pharmacy to dose vancomycin   Does not apply Continuous PRN Imani Green MD       • piperacillin-tazobactam (ZOSYN) 3.375 g/100 mL 0.9% NS IVPB (mbp)  3.375 g Intravenous Q12H Imani Green MD       • riFAXIMin (XIFAXAN) tablet 550 mg  550 mg Oral Q12H Tricia Philip MD   550 mg at 01/04/20 0917   • sevelamer (RENVELA) tablet 800 mg  800 mg Oral TID With Meals Tricia Philip MD       • sodium chloride 0.9 % flush 10 mL  10 mL Intravenous Q12H Tricia Philip MD   10 mL at 01/04/20 1847   • sodium chloride 0.9 % flush 10 mL  10 mL Intravenous PRN Tricia Philip MD       • sodium chloride 0.9 % flush 10 mL  10 mL Intravenous Q12H Tricia Philip MD   10 mL at 01/04/20 1847   • sodium chloride 0.9 % flush 10 mL  10 mL Intravenous PRN Tricia Philip MD       • traZODone (DESYREL) tablet 50 mg  50 mg Oral Nightly PRN Tricia Philip MD   50 mg at 01/03/20 2139       Objective     Review of Systems:   Review of Systems  Pt intubated and sedated  Physical Exam:   Temp:  [99.9 °F (37.7 °C)-100.5 °F (38.1 °C)] 100.5 °F (38.1 °C)  Heart Rate:  [71-91] 87  Resp:  [18-32] 30  BP: ()/(51-59) 96/51  Arterial Line BP: ()/(40-87) 132/52  FiO2 (%):  [30 %] 30 %     Physical Exam:  General Appearance:    Alert, cooperative, in no acute distress   Head:    Normocephalic, without obvious abnormality, atraumatic   Eyes:            Lids and lashes normal, conjunctivae and sclerae normal, no   icterus, no pallor, corneas clear, PERRLA   Ears:    Ears appear intact with no abnormalities noted   Throat:   No oral lesions, no thrush, oral mucosa moist   Neck:   No adenopathy, supple, trachea midline, no thyromegaly, no     carotid bruit, no JVD   Back:     No kyphosis present, no scoliosis present, no skin lesions,       erythema or scars, no tenderness to percussion or                   palpation,   range of motion normal   Lungs:     Clear to  auscultation,respirations regular, even and                   unlabored    Heart:    Regular rhythm and normal rate, normal S1 and S2, no            murmur, no gallop, no rub, no click   Breast Exam:    Deferred   Abdomen:     Normal bowel sounds, no masses, no organomegaly, soft        non-tender, non-distended, no guarding, no rebound                 tenderness   Genitalia:    Deferred   Extremities:   Moves all extremities well, no edema, no cyanosis, no              redness   Pulses:   Pulses palpable and equal bilaterally   Skin:   No bleeding, bruising or rash   Lymph nodes:   No palpable adenopathy   Neurologic:   Cranial nerves 2 - 12 grossly intact, sensation intact, DTR        present and equal bilaterally      Results Review:     Lab Results (last 24 hours)     Procedure Component Value Units Date/Time    MRSA Screen, PCR - Swab, Nares [[id number]]  (Normal) Collected:  01/04/20 1708    Specimen:  Swab from Nares Updated:  01/04/20 1942     MRSA, PCR Negative    Narrative:       Performed by real-time polymerase chain reaction (qPCR).    Respiratory Panel, PCR - Swab, Nasopharynx [[id number]]  (Abnormal) Collected:  01/04/20 1709    Specimen:  Swab from Nasopharynx Updated:  01/04/20 1851     ADENOVIRUS, PCR Not Detected     Coronavirus 229E Not Detected     Coronavirus HKU1 Not Detected     Coronavirus NL63 Not Detected     Coronavirus OC43 Not Detected     Human Metapneumovirus Not Detected     Human Rhinovirus/Enterovirus Not Detected     Influenza B PCR Not Detected     Parainfluenza Virus 1 Not Detected     Parainfluenza Virus 2 Not Detected     Parainfluenza Virus 3 Not Detected     Parainfluenza Virus 4 Not Detected     Bordetella pertussis pcr Not Detected     Influenza A H1 2009 PCR Not Detected     Chlamydophila pneumoniae PCR Not Detected     Mycoplasma pneumo by PCR Not Detected     Influenza A PCR Not Detected     Influenza A H3 Not Detected     Influenza A H1 Not Detected     RSV, PCR Detected     POC Glucose Once [[id number]]  (Abnormal) Collected:  01/04/20 1708    Specimen:  Blood Updated:  01/04/20 1727     Glucose 155 mg/dL      Comment: : [id number] SANJANA Mueller ID: [id number]       Respiratory Culture - Sputum, Oropharynx [[id number]] Collected:  01/04/20 1542    Specimen:  Sputum from Oropharynx Updated:  01/04/20 1619     Gram Stain Many (4+) WBCs per low power field      Rare (1+) Epithelial cells per low power field      Mixed bacterial rosibel    POC Glucose Once [[id number]]  (Abnormal) Collected:  01/04/20 1244    Specimen:  Blood Updated:  01/04/20 1427     Glucose 157 mg/dL      Comment: Result Not ConfirmedOperator: [id number] BEE Oliveira ID: [id number]       Respiratory Culture - Sputum, ET Suction [[id number]] Collected:  01/03/20 1546    Specimen:  Sputum from ET Suction Updated:  01/04/20 1332     Respiratory Culture Light growth (2+) Normal Respiratory Rosibel     Gram Stain Many (4+) WBCs per low power field      Rare (1+) Epithelial cells per low power field      Mixed bacterial rosibel      Moderate (3+) Gram negative coccobacilli    CBC & Differential [[id number]] Collected:  01/04/20 0738    Specimen:  Blood, Arterial Line Updated:  01/04/20 0846    Narrative:       The following orders were created for panel order CBC & Differential.  Procedure                               Abnormality         Status                     ---------                               -----------         ------                     CBC Auto Differential[[id number]]        Abnormal            Final result                 Please view results for these tests on the individual orders.    CBC Auto Differential [[id number]]  (Abnormal) Collected:  01/04/20 0738    Specimen:  Blood, Arterial Line Updated:  01/04/20 0846     WBC 8.80 10*3/mm3      RBC 2.94 10*6/mm3      Hemoglobin 8.0 g/dL      Hematocrit 25.1 %      MCV 85.4 fL      MCH 27.2 pg      MCHC 31.9 g/dL      RDW 17.6 %      RDW-SD  54.4 fl      MPV 8.9 fL      Platelets 123 10*3/mm3      Neutrophil % 85.5 %      Lymphocyte % 8.1 %      Monocyte % 4.9 %      Eosinophil % 0.3 %      Basophil % 0.5 %      Immature Grans % 0.7 %      Neutrophils, Absolute 7.53 10*3/mm3      Lymphocytes, Absolute 0.71 10*3/mm3      Monocytes, Absolute 0.43 10*3/mm3      Eosinophils, Absolute 0.03 10*3/mm3      Basophils, Absolute 0.04 10*3/mm3      Immature Grans, Absolute 0.06 10*3/mm3      nRBC 0.0 /100 WBC     Comprehensive Metabolic Panel [[id number]]  (Abnormal) Collected:  01/04/20 0738    Specimen:  Blood, Arterial Line Updated:  01/04/20 0835     Glucose 158 mg/dL      BUN 40 mg/dL      Creatinine 5.78 mg/dL      Sodium 134 mmol/L      Potassium 4.0 mmol/L      Chloride 93 mmol/L      CO2 23.0 mmol/L      Calcium 8.5 mg/dL      Total Protein 9.3 g/dL      Albumin 2.60 g/dL      ALT (SGPT) 10 U/L      AST (SGOT) 59 U/L      Alkaline Phosphatase 89 U/L      Total Bilirubin 1.8 mg/dL      eGFR Non  Amer --     Comment: <15 Indicative of kidney failure.        eGFR  African Amer 12 mL/min/1.73      Comment: <15 Indicative of kidney failure.        Globulin 6.7 gm/dL      A/G Ratio 0.4 g/dL      BUN/Creatinine Ratio 6.9     Anion Gap 18.0 mmol/L     Narrative:       GFR Normal >60  Chronic Kidney Disease <60  Kidney Failure <15      Blood Culture - Blood, Blood, Arterial Line [[id number]] Collected:  01/04/20 0739    Specimen:  Blood, Arterial Line Updated:  01/04/20 0801    POC Glucose Once [[id number]]  (Abnormal) Collected:  01/03/20 2333    Specimen:  Blood Updated:  01/04/20 0243     Glucose 139 mg/dL      Comment: RN NotifiedOperator: [id number] BRANDIN KENTTLINMeter ID: [id number]       POC Glucose Once [[id number]]  (Abnormal) Collected:  01/03/20 2254    Specimen:  Blood Updated:  01/04/20 0243     Glucose 66 mg/dL      Comment: : [id number] BRANDIN CAITLINMeter ID: [id number]       POC Glucose Once [[id number]]  (Normal) Collected:  01/03/20  1753    Specimen:  Blood Updated:  01/04/20 0051     Glucose 79 mg/dL      Comment: : [id number] BEE Oliveira ID: [id number]              I reviewed the patient's new clinical results.  I reviewed the patient's new imaging results and agree with the interpretation.     ASSESSMENT/PLAN:   ASSESSMENT: melena resolved  Anemia, stable.  S/P cardio pulmonary arrest, failed weaning trial  Hepatic encephalopathy, ammonia level is improving  ESRD, received HD yesterday  Duodenal ulcerations, path C/W inflammation    PLAN: Continue antibiotics  Cont PPI  Follow H/H closely  Cont lactulose enemas  Weaning trial in AM if he remains stable  Repeat EGD in 1 month for re-evaluation of duodenal lesions  The risks, benefits, and alternatives of this procedure have been discussed with the patient or the responsible party- the patient understands and agrees to proceed.         Tricia Philip MD  01/04/20  8:56 PM

## 2020-01-06 NOTE — PROGRESS NOTES
"Intensive Care Follow-up      LOS: 3 days     Mr. Cristo Musa, 69 y.o. male is followed for: Ventilator management     CHIEF COMPLIANT: Short of breath  Subjective - Interval History     This gentleman is not waking up.  He is receiving a spontaneous breathing trial and has reasonable lung volumes at this time.  He apparently is to have dialysis.    The patient's relevant past medical, surgical and social history were reviewed and updated in Epic as appropriate.     Objective   /64   Pulse 65   Temp 98.6 °F (37 °C) (Oral)   Resp 17   Ht 172.7 cm (67.99\")   Wt 92.4 kg (203 lb 11.3 oz)   SpO2 100%   BMI 30.98 kg/m²       Vent Settings: FiO2 (%):  [30 %] 30 %  S RR:  [14] 14  PEEP/CPAP (cm H2O):  [5 cm H20] 5 cm H20  RI SUP:  [0 cm H20-10 cm H20] 8 cm H20  MAP (cm H2O):  [6-10] 8.3    Physical Exam: Unresponsive black gentleman on mechanical ventilator    General Appearance:       Head:    Normocephalic, without obvious abnormality, atraumatic   Eyes:            Lids and lashes normal, conjunctivae and sclerae normal, no   icterus, no pallor, corneas clear, PERRLA   Ears:    Ears appear intact with no abnormalities noted   Throat:   No oral lesions, no thrush, oral mucosa moist   Neck:   No adenopathy, supple, trachea midline, no thyromegaly, no   carotid bruit, no JVD   Back:     No kyphosis present, no scoliosis present, no skin lesions,      erythema or scars, no tenderness to percussion or                   palpation,   range of motion normal   Lungs:    Diminished breath sounds with a few rhonchi    Heart:    Regular rhythm and normal rate, normal S1 and S2, no            murmur, no gallop, no rub, no click   Chest Wall:    No abnormalities observed   Abdomen:     Normal bowel sounds, no masses, no organomegaly, soft        non-tender, non-distended, no guarding, no rebound                tenderness   Rectal:     Deferred   Extremities:   Moves all extremities well, no edema, no cyanosis, " no             redness   Pulses:   Pulses palpable and equal bilaterally   Skin:   No bleeding, bruising or rash   Lymph nodes:   No palpable adenopathy   Neurologic:   Cranial nerves 2 - 12 grossly intact, sensation intact, DTR       present and equal bilaterally     LAB:    pH, Arterial   Date Value Ref Range Status   01/06/2020 7.363 7.350 - 7.450 pH units Final     pCO2, Arterial   Date Value Ref Range Status   01/06/2020 37.0 35.0 - 45.0 mm Hg Final     pO2, Arterial   Date Value Ref Range Status   01/06/2020 116.0 (H) 83.0 - 108.0 mm Hg Final     Comment:     83 Value above reference range     Lab Results   Component Value Date    WBC 10.07 01/06/2020    HGB 7.9 (L) 01/06/2020    HCT 24.0 (L) 01/06/2020    MCV 82.5 01/06/2020     01/06/2020     Lab Results   Component Value Date    GLUCOSE 421 (C) 01/06/2020    BUN 95 (H) 01/06/2020    CREATININE 8.48 (H) 01/06/2020    EGFRIFNONA  01/06/2020      Comment:      <15 Indicative of kidney failure.    EGFRIFAFRI 8 (L) 01/06/2020    BCR 11.2 01/06/2020    CO2 19.0 (L) 01/06/2020    CALCIUM 8.0 (L) 01/06/2020    ALBUMIN 2.70 (L) 01/06/2020    AST 57 (H) 01/06/2020    ALT 14 01/06/2020         RAD:   Imaging Results (Last 24 Hours)     Procedure Component Value Units Date/Time    XR Abdomen KUB [[id number]] Collected:  01/05/20 1130     Updated:  01/05/20 1212    Narrative:       EXAM:  XR ABDOMEN 1 VIEW (KUB)    ORDERING PROVIDER:  LULU JESSICA    CLINICAL HISTORY:  Cortrak Placement    COMPARISON STUDY:      TECHNIQUE:  Two views of the abdomen    FINDINGS:  Cortrak tube placed with distal portion in the proximal jejunum.  There is a nonspecific bowel gas pattern. No evidence of gross  subdiaphragmatic free air. Multilevel degenerative change of the  visualized dorsal spine.      Impression:       Cortrak tube placed with distal portion in the proximal jejunum.      Electronically signed by:  Joao Obregon MD  1/5/2020 12:11 PM CST  Workstation: [id number]    XR  Chest 1 View [[id number]] Collected:  01/05/20 0914     Updated:  01/05/20 1031    Narrative:       Chest x-ray single view.         CLINICAL INDICATION: Shortness of breath. Respiratory failure.    COMPARISON: January 4, 2020.    FINDINGS: Cardiac silhouette is enlarged in size. Pulmonary  vascularity is increased.     Nasogastric tube in stomach. Endotracheal tube identified with  tip 4.9 cm above the bifurcation, of the joon, in satisfactory  position.    Clearing of right lower lobe infiltrative changes since prior  examination. Favorable change.      Impression:       CONCLUSION: Cardiomegaly. Pulmonary vascular prominence. Interval  clearing of right lower lobe infiltrative changes since prior  exam. Favorable change.    Electronically signed by:  Wallace Louise MD  1/5/2020 9:36 AM CST  Workstation: [id number]         Impression      Active Hospital Problems    Diagnosis   • **Gastrointestinal hemorrhage     -hemoccult positive in ER; no BRBPR or hematemesis   -colonoscopy on 11/6/19 showed angiectasia which was clipped without complication  -upper endoscopy on 11/5/19 showed grade 2 varices in upper third of esophagus  -PPI  -trend H/H;       EGD 1/2/19: Grade 1 varices, duodenal ulcers non bleeding   - repeat in 1 month   - Dr. Philip, Gi following.   - will continue to monitor H/H     • RSV (respiratory syncytial virus infection)     Currently in acute respiratory failure requiring intubation   Will continue supportive care       -pulmonology following, Dr. Green  -vancomycin d/c yesterday with negative MRSA swab  -on zosyn D#2, continue empiric therapy  -continue solumedrol     • Fever of unknown origin     Pan culture   Respiratory panel  CXR today     vancomycin and zosyn for broad spectrum coverage   Pulmonology following      • Acute respiratory failure (CMS/HCC)     RSV positive   Currently intubated   Patient is a DNR/DNI    Family would like to wait a few days before considering extubation.      -pulmonology following, Dr. Green  -vancomycin d/c yesterday with negative MRSA swab  -on zosyn D#2, continue empiric therapy   -continue solumedrol     • Encephalopathy     -Presumed metabolic  -Ammonia level: improving  -Continue Lactulose, rifaxamin    GI following       • Chronic heart failure with preserved ejection fraction (CMS/[facility])     -last echo in July 2018 shows EF of 58% with grade 1a diastolic dysfunction  -daily weights  -care with fluids  -daily fluid restriction 1500 mL       • COPD (chronic obstructive pulmonary disease) (CMS/[facility])     - Continue flonase, loratadine  - Patient uses Oxygen prn at the nursing home.   - Duonebs and albuterol nebs prn.          • Gastroesophageal reflux disease without esophagitis     -PPI     • Chronic pain     -Gabapentin        • Anxiety     - Continue Lexapro      • Primary insomnia     -trazodone, melatonin     • ESRD (end stage renal disease) (CMS/[facility])     -HD M/W/F  -Nephrology on board   -epogen on M/W/F with dialysis  -continue Sevelamer 800 mg three times daily        • Type 2 diabetes mellitus with chronic kidney disease on chronic dialysis, with long-term current use of insulin (CMS/HCC)     -levemir, SSI     • Essential hypertension     -not on any home medications, will monitor               Plan        Assessment probable anoxic brain injury, borderline respiratory status    Plan try another spontaneous breathing trial in the morning        This document has been produced with the assistance of Dragon dictation  This document has been electronically signed by Ankush Lock MD on January 6, 2020 7:29 AM       I discussed the patient's findings and my recommendations with patient and nursing staff

## 2020-01-06 NOTE — PLAN OF CARE
Problem: Patient Care Overview  Goal: Plan of Care Review  Outcome: Ongoing (interventions implemented as appropriate)  Flowsheets (Taken 1/6/2020 0603)  Progress: no change  Plan of Care Reviewed With: spouse  Outcome Summary: No change in pt status. Hemoglobin 7.9 this am. Continues on levophed at .08. Rectal tube placed earlier in shift for watery stools. Pt still not following commands. Will monitor.

## 2020-01-06 NOTE — CONSULTS
"[facility]  [address] [place] [address]. [address]  T - [phone]  F - [phone]     Progress Note          PATIENT  DEMOGRAPHICS   PATIENT NAME: Cristo Musa                      PHYSICIAN: Beryl Meeks, Medical Student  : 1950  MRN: [id number]   LOS: 3 days    Patient Care Team:  Warren Stewart MD as PCP - General (Family Medicine)  Michelle Lo MD as Resident (Family Medicine)  John Sanchez MD as Resident (Family Medicine)  Marissa Boothe MD as Resident (Family Medicine)  Lucio Anderson MD as Resident (Family Medicine)  Demarcus Oliveira MD as Resident (Family Medicine)  Subjective   SUBJECTIVE   Mr. Araiza is a 69 year old M presenting with a gastrointestinal hemhorrage. Patient is intubated for respiratory failure. Per Dr. Oliveira's note, patient's wife will make a decision about care following dialysis today.          Objective   OBJECTIVE   Vital Signs  Temp:  [98.5 °F (36.9 °C)-99.8 °F (37.7 °C)] 98.6 °F (37 °C)  Heart Rate:  [63-79] 64  Resp:  [13-19] 17  BP: ()/(52-73) 137/64  Arterial Line BP: ()/() 183/177  FiO2 (%):  [30 %] 30 %    Flowsheet Rows      First Filed Value   Admission Height  172.7 cm (67.99\") Documented at 2020 1510   Admission Weight  95.7 kg (211 lb) Documented at 2020 0119           I/O last 3 completed shifts:  In: 2291.1 [I.V.:383.1; Other:881; NG/GT:227; IV Piggyback:800]  Out: 500 [Emesis/NG output:100; Stool:400]    PHYSICAL EXAM    Physical Exam   Constitutional: He appears well-developed.   HENT:   Head: Normocephalic and atraumatic.   Eyes: Conjunctivae are normal.   Neck: Normal range of motion.   Cardiovascular: Normal rate, regular rhythm and normal heart sounds.   No murmur heard.  Pulmonary/Chest: He is in respiratory distress. He has wheezes.   Abdominal: Soft. He exhibits distension.   Musculoskeletal: Normal range of motion.   Skin: Skin is warm and dry.       RESULTS "   Results Review:    Results from last 7 days   Lab Units 01/06/20  0548 01/05/20  0357 01/04/20  0738   SODIUM mmol/L 135* 134* 134*   POTASSIUM mmol/L 4.9 4.8 4.0   CHLORIDE mmol/L 93* 92* 93*   CO2 mmol/L 19.0* 17.0* 23.0   BUN mg/dL 95* 57* 40*   CREATININE mg/dL 8.48* 7.08* 5.78*   CALCIUM mg/dL 8.0* 8.4* 8.5*   BILIRUBIN mg/dL 1.1 1.4* 1.8*   ALK PHOS U/L 87 134* 89   ALT (SGPT) U/L 14 13 10   AST (SGOT) U/L 57* 76* 59*   GLUCOSE mg/dL 421* 218* 158*       Estimated Creatinine Clearance: 9.2 mL/min (A) (by C-G formula based on SCr of 8.48 mg/dL (H)).                Results from last 7 days   Lab Units 01/06/20  0548 01/05/20  0357 01/04/20  0738 01/03/20  0439 01/02/20  1843 01/02/20  0359   WBC 10*3/mm3 10.07 12.26* 8.80 5.34  --  5.97   HEMOGLOBIN g/dL 7.9* 8.1* 8.0* 7.3* 7.5* 7.9*   PLATELETS 10*3/mm3 148 134* 123* 119*  --  118*       Results from last 7 days   Lab Units 01/02/20  0358 12/30/19  2341   INR  1.40* 1.19         Imaging Results (Last 24 Hours)     Procedure Component Value Units Date/Time    XR Abdomen KUB [[id number]] Collected:  01/05/20 1130     Updated:  01/05/20 1212    Narrative:       EXAM:  XR ABDOMEN 1 VIEW (KUB)    ORDERING PROVIDER:  LULU JESSICA    CLINICAL HISTORY:  Cortrak Placement    COMPARISON STUDY:      TECHNIQUE:  Two views of the abdomen    FINDINGS:  Cortrak tube placed with distal portion in the proximal jejunum.  There is a nonspecific bowel gas pattern. No evidence of gross  subdiaphragmatic free air. Multilevel degenerative change of the  visualized dorsal spine.      Impression:       Cortrak tube placed with distal portion in the proximal jejunum.      Electronically signed by:  Joao Obregon MD  1/5/2020 12:11 PM CST  Workstation: [id number]    XR Chest 1 View [[id number]] Collected:  01/05/20 0914     Updated:  01/05/20 1031    Narrative:       Chest x-ray single view.         CLINICAL INDICATION: Shortness of breath. Respiratory failure.    COMPARISON: January 4,  2020.    FINDINGS: Cardiac silhouette is enlarged in size. Pulmonary  vascularity is increased.     Nasogastric tube in stomach. Endotracheal tube identified with  tip 4.9 cm above the bifurcation, of the joon, in satisfactory  position.    Clearing of right lower lobe infiltrative changes since prior  examination. Favorable change.      Impression:       CONCLUSION: Cardiomegaly. Pulmonary vascular prominence. Interval  clearing of right lower lobe infiltrative changes since prior  exam. Favorable change.    Electronically signed by:  Wallace Louise MD  1/5/2020 9:36 AM [facility]  Workstation: [id number]           MEDICATIONS      albuterol sulfate HFA 2 puff Inhalation 4x Daily - RT   aspirin 81 mg Oral Daily   budesonide-formoterol 2 puff Inhalation BID - RT   cetirizine 10 mg Oral Daily   epoetin ziggy/ziggy-epbx 10,000 Units Intravenous Once per day on Mon Wed Fri   escitalopram 10 mg Oral Daily   gabapentin 100 mg Oral Nightly   insulin aspart 0-24 Units Subcutaneous 4x Daily AC & at Bedtime   ipratropium 2 puff Inhalation 4x Daily - RT   lactulose 60 mL Rectal 4x Daily   latanoprost 1 drop Both Eyes Nightly   lidocaine 1 patch Transdermal Q24H   melatonin 6 mg Oral Nightly   pantoprazole 40 mg Intravenous BID AC   piperacillin-tazobactam 3.375 g Intravenous Q12H   predniSONE 40 mg Oral Daily With Breakfast   riFAXIMin 550 mg Oral Q12H   sevelamer 800 mg Oral TID With Meals   sodium chloride 10 mL Intravenous Q12H   sodium chloride 10 mL Intravenous Q12H       EPINEPHrine 0.02-0.3 mcg/kg/min Last Rate: Stopped (01/03/20 0130)   midazolam 1-10 mg/hr Last Rate: Stopped (01/03/20 1334)   norepinephrine 0.02-0.3 mcg/kg/min Last Rate: 0.09 mcg/kg/min (01/05/20 2131)       Assessment/Plan   ASSESSMENT / PLAN      Gastrointestinal hemorrhage    Type 2 diabetes mellitus with chronic kidney disease on chronic dialysis, with long-term current use of insulin (CMS/[facility])    Essential hypertension    ESRD (end stage renal disease)  (CMS/HCC)    Primary insomnia    Chronic pain    Anxiety    Gastroesophageal reflux disease without esophagitis    COPD (chronic obstructive pulmonary disease) (CMS/HCC)    Chronic heart failure with preserved ejection fraction (CMS/HCC)    Encephalopathy    Fever of unknown origin    Acute respiratory failure (CMS/HCC)    RSV (respiratory syncytial virus infection)    1.  ESRD on HD:  - Last hemodialysis was Tuesday, however the patient did not complete his treatment and left more than 2 hours early.  Plan for HD today.      2.  GI bleed:  - s/p EGD. Has varices and ulcers     3.  HFpEF     4.  COPD     5.  Chronic hepatitis C     6.  Anemia:  - Hemoglobin is low at 7.9. Epogen injection scheduled with HD.      7.  Hypertension:  - Blood pressure is controlled.                 This document has been electronically signed by Beryl Meeks, Medical Student on January 6, 2020 8:51 AM

## 2020-01-06 NOTE — PROGRESS NOTES
"[facility]  [address] [place] [address]. [address]  T - [phone]  F - [phone]      Progress Note           PATIENT  DEMOGRAPHICS   PATIENT NAME: Cristo Musa                      PHYSICIAN: Beryl Meeks, Medical Student  : 1950  MRN: [id number]   LOS: 3 days    Patient Care Team:  Warren Stewart MD as PCP - General (Family Medicine)  Michelle Lo MD as Resident (Family Medicine)  John Sanchez MD as Resident (Family Medicine)  Marissa Boothe MD as Resident (Family Medicine)  Lucio Anderson MD as Resident (Family Medicine)  Demarcus Oliveira MD as Resident (Family Medicine)  Subjective   SUBJECTIVE   Mr. Araiza is a 69 year old M presenting with a gastrointestinal hemhorrage. Patient is intubated for respiratory failure. Per Dr. Oliveira's note, patient's wife will make a decision about care following dialysis today.             Objective   OBJECTIVE   Vital Signs  Temp:  [98.5 °F (36.9 °C)-99.8 °F (37.7 °C)] 98.6 °F (37 °C)  Heart Rate:  [63-79] 64  Resp:  [13-19] 17  BP: ()/(52-73) 137/64  Arterial Line BP: ()/() 183/177  FiO2 (%):  [30 %] 30 %         Flowsheet Rows      First Filed Value   Admission Height  172.7 cm (67.99\") Documented at 2020 1510   Admission Weight  95.7 kg (211 lb) Documented at 2020 0119              I/O last 3 completed shifts:  In: 2291.1 [I.V.:383.1; Other:881; NG/GT:227; IV Piggyback:800]  Out: 500 [Emesis/NG output:100; Stool:400]     PHYSICAL EXAM    Physical Exam   Constitutional: He appears well-developed.   HENT:   Head: Normocephalic and atraumatic.   Eyes: Conjunctivae are normal.   Neck: Normal range of motion.   Cardiovascular: Normal rate, regular rhythm and normal heart sounds.   No murmur heard.  Pulmonary/Chest: He is in respiratory distress. He has wheezes.   Abdominal: Soft. He exhibits distension.   Musculoskeletal: Normal range of motion.   Skin: Skin is warm and dry. "         RESULTS   Results Review:           Results from last 7 days   Lab Units 01/06/20  0548 01/05/20  0357 01/04/20  0738   SODIUM mmol/L 135* 134* 134*   POTASSIUM mmol/L 4.9 4.8 4.0   CHLORIDE mmol/L 93* 92* 93*   CO2 mmol/L 19.0* 17.0* 23.0   BUN mg/dL 95* 57* 40*   CREATININE mg/dL 8.48* 7.08* 5.78*   CALCIUM mg/dL 8.0* 8.4* 8.5*   BILIRUBIN mg/dL 1.1 1.4* 1.8*   ALK PHOS U/L 87 134* 89   ALT (SGPT) U/L 14 13 10   AST (SGOT) U/L 57* 76* 59*   GLUCOSE mg/dL 421* 218* 158*         Estimated Creatinine Clearance: 9.2 mL/min (A) (by C-G formula based on SCr of 8.48 mg/dL (H)).                             Results from last 7 days   Lab Units 01/06/20  0548 01/05/20  0357 01/04/20  0738 01/03/20  0439 01/02/20  1843 01/02/20  0359   WBC 10*3/mm3 10.07 12.26* 8.80 5.34  --  5.97   HEMOGLOBIN g/dL 7.9* 8.1* 8.0* 7.3* 7.5* 7.9*   PLATELETS 10*3/mm3 148 134* 123* 119*  --  118*               Results from last 7 days   Lab Units 01/02/20  0358 12/30/19  2341   INR   1.40* 1.19                    Imaging Results (Last 24 Hours)      Procedure Component Value Units Date/Time     XR Abdomen KUB [[id number]] Collected:  01/05/20 1130       Updated:  01/05/20 1212     Narrative:        EXAM:  XR ABDOMEN 1 VIEW (KUB)     ORDERING PROVIDER:  LULU JESSICA     CLINICAL HISTORY:  Cortrak Placement     COMPARISON STUDY:        TECHNIQUE:  Two views of the abdomen     FINDINGS:  Cortrak tube placed with distal portion in the proximal jejunum.  There is a nonspecific bowel gas pattern. No evidence of gross  subdiaphragmatic free air. Multilevel degenerative change of the  visualized dorsal spine.        Impression:        Cortrak tube placed with distal portion in the proximal jejunum.        Electronically signed by:  Joao Obregon MD  1/5/2020 12:11 PM CST  Workstation: [id number]     XR Chest 1 View [[id number]] Collected:  01/05/20 0914       Updated:  01/05/20 1031     Narrative:        Chest x-ray single view.            CLINICAL  INDICATION: Shortness of breath. Respiratory failure.     COMPARISON: January 4, 2020.     FINDINGS: Cardiac silhouette is enlarged in size. Pulmonary  vascularity is increased.      Nasogastric tube in stomach. Endotracheal tube identified with  tip 4.9 cm above the bifurcation, of the joon, in satisfactory  position.     Clearing of right lower lobe infiltrative changes since prior  examination. Favorable change.        Impression:        CONCLUSION: Cardiomegaly. Pulmonary vascular prominence. Interval  clearing of right lower lobe infiltrative changes since prior  exam. Favorable change.     Electronically signed by:  Wallace Louise MD  1/5/2020 9:36 AM CST  Workstation: [id number]              MEDICATIONS       albuterol sulfate HFA 2 puff Inhalation 4x Daily - RT   aspirin 81 mg Oral Daily   budesonide-formoterol 2 puff Inhalation BID - RT   cetirizine 10 mg Oral Daily   epoetin ziggy/ziggy-epbx 10,000 Units Intravenous Once per day on Mon Wed Fri   escitalopram 10 mg Oral Daily   gabapentin 100 mg Oral Nightly   insulin aspart 0-24 Units Subcutaneous 4x Daily AC & at Bedtime   ipratropium 2 puff Inhalation 4x Daily - RT   lactulose 60 mL Rectal 4x Daily   latanoprost 1 drop Both Eyes Nightly   lidocaine 1 patch Transdermal Q24H   melatonin 6 mg Oral Nightly   pantoprazole 40 mg Intravenous BID AC   piperacillin-tazobactam 3.375 g Intravenous Q12H   predniSONE 40 mg Oral Daily With Breakfast   riFAXIMin 550 mg Oral Q12H   sevelamer 800 mg Oral TID With Meals   sodium chloride 10 mL Intravenous Q12H   sodium chloride 10 mL Intravenous Q12H         EPINEPHrine 0.02-0.3 mcg/kg/min Last Rate: Stopped (01/03/20 0130)   midazolam 1-10 mg/hr Last Rate: Stopped (01/03/20 1334)   norepinephrine 0.02-0.3 mcg/kg/min Last Rate: 0.09 mcg/kg/min (01/05/20 2131)         Assessment/Plan   ASSESSMENT / PLAN      Gastrointestinal hemorrhage    Type 2 diabetes mellitus with chronic kidney disease on chronic dialysis, with long-term  current use of insulin (CMS/[facility])    Essential hypertension    ESRD (end stage renal disease) (CMS/[facility])    Primary insomnia    Chronic pain    Anxiety    Gastroesophageal reflux disease without esophagitis    COPD (chronic obstructive pulmonary disease) (CMS/HCC)    Chronic heart failure with preserved ejection fraction (CMS/HCC)    Encephalopathy    Fever of unknown origin    Acute respiratory failure (CMS/[facility])    RSV (respiratory syncytial virus infection)     1.  ESRD on HD:  - Last hemodialysis was Tuesday before admission, however the patient did not complete his treatment and left more than 2 hours early.  Plan for HD today. discuss with pt's wife in detail - she likes to have hd today and reassess his mentation in am and make final decision about goal of care. She also wants other close family members to be there to help making her final decision     2.  GI bleed:  - s/p EGD. Has varices and ulcers     3.  HFpEF     4.  COPD / RSV + / H.influenzae     5.  Chronic hepatitis C     6.  Anemia:  - Hemoglobin is low at 7.9. Epogen injection scheduled with HD.      7.  Hypertension:  - Blood pressure is controlled. on levophed                     This document has been electronically signed by Beryl Meeks, Medical Student on January 6, 2020 8:51 AM

## 2020-01-06 NOTE — PROGRESS NOTES
FAMILY MEDICINE DAILY PROGRESS NOTE  NAME: Cristo Musa  : 1950  MRN: [id number]     LOS: 3 days     PROVIDER OF SERVICE: Demarcus Oliveira MD    Chief Complaint: Gastrointestinal hemorrhage    Subjective:     Interval History:  History taken from: chart family RN  Patient is a 69 y.o  male admitted for GI Hemorrhage, currently intubated for respiratory failure.   Nursing staff reported no incidents during the night.   Feeding- feeding tube- Novasource renal  Analgesia- fentanyl   Sedation-  Propofol-  Off sedation currently   Thromboprophylaxis- SCD  Heads up- 30 degrees- YES  Ulcer Prophylaxis- protonix 40 mg  Glycemic control-  SSI    Currently Levophed 0.06ml/hr -  Maintaining bp at 137/64       Of note-  CONTACT PRECAUTIONS-  RSV positive, MRSA negative    Wife present in room. Was provided update. Wife stated that she would make a decision after dialysis      Review of Systems:    Review of Systems   Unable to perform ROS: Intubated       Objective:     Vital Signs  Temp:  [98.5 °F (36.9 °C)-99.8 °F (37.7 °C)] 98.6 °F (37 °C)  Heart Rate:  [63-79] 64  Resp:  [13-19] 17  BP: ()/(52-73) 137/64  Arterial Line BP: ()/() 183/177  FiO2 (%):  [30 %] 30 %    Physical Exam  Physical Exam   Constitutional: No distress.   Sickly apperance, intubated    HENT:   Head: Normocephalic and atraumatic.   Right Ear: External ear normal.   Left Ear: External ear normal.   Mouth/Throat: Oropharynx is clear and moist.    Healed Bite marks noted on tongue   Eyes: Conjunctivae are normal. No scleral icterus.   Bilateral cataracts    Neck: Normal range of motion.   Cardiovascular: Normal rate, regular rhythm and normal heart sounds. Exam reveals no gallop and no friction rub.   No murmur heard.  Pulmonary/Chest: No stridor. He is in respiratory distress. He has wheezes (diffuse). He has no rales. He exhibits no tenderness.   Currently intubated     Abdominal: Soft. Bowel sounds are  normal. He exhibits distension.   Musculoskeletal: Normal range of motion. He exhibits no edema or tenderness.   Skin: Skin is warm and dry. No rash noted. He is not diaphoretic. No erythema. No pallor.       Medication Review    Current Facility-Administered Medications:   •  8-hydroxyquinoline sulfate (BAG BALM) ointment, , Apply externally, PRN, Guero Huynh MD  •  acetaminophen (TYLENOL) tablet 650 mg, 650 mg, Oral, Q4H PRN, 650 mg at 01/03/20 2139 **OR** [DISCONTINUED] acetaminophen (TYLENOL) 160 MG/5ML solution 650 mg, 650 mg, Oral, Q4H PRN **OR** acetaminophen (TYLENOL) suppository 650 mg, 650 mg, Rectal, Q4H PRN, Tricia Philip MD  •  albuterol sulfate HFA (PROVENTIL HFA;VENTOLIN HFA;PROAIR HFA) inhaler 2 puff, 2 puff, Inhalation, 4x Daily - RT, Meseret Huynh MD, 2 puff at 01/06/20 0727  •  aspirin chewable tablet 81 mg, 81 mg, Oral, Daily, Tricia Philip MD, 81 mg at 01/05/20 1027  •  budesonide-formoterol (SYMBICORT) 160-4.5 MCG/ACT inhaler 2 puff, 2 puff, Inhalation, BID - RT, Imani Green MD, 2 puff at 01/06/20 0727  •  cetirizine (zyrTEC) tablet 10 mg, 10 mg, Oral, Daily, Tricia Philip MD, 10 mg at 01/05/20 1028  •  cyclobenzaprine (FLEXERIL) tablet 5 mg, 5 mg, Oral, Q8H PRN, Tricia Philip MD  •  dextrose (D50W) 25 g/ 50mL Intravenous Solution 25 g, 25 g, Intravenous, Q15 Min PRN, Tricia Philip MD, 25 g at 01/03/20 2309  •  dextrose (GLUTOSE) oral gel 15 g, 15 g, Oral, Q15 Min PRN, Tricia Philip MD  •  docusate sodium (COLACE) capsule 100 mg, 100 mg, Oral, BID PRN, Tricia Philip MD  •  EPINEPHrine (ADRENALIN) 5 mg in sodium chloride 0.9 % 250 mL (0.02 mg/mL) infusion, 0.02-0.3 mcg/kg/min, Intravenous, Titrated, Tricia Philip MD, Stopped at 01/03/20 0130  •  epoetin ziggy (EPOGEN,PROCRIT) injection 10,000 Units, 10,000 Units, Intravenous, Once per day on Mon Wed Fri, Alethea Diamond MD, 10,000 Units at 01/03/20 1342  •  escitalopram (LEXAPRO) tablet 10 mg,  10 mg, Oral, Daily, Tricia Philip MD, 10 mg at 01/05/20 1027  •  fentaNYL citrate (PF) (SUBLIMAZE) injection 25 mcg, 25 mcg, Intravenous, Q30 Min PRN, Imani Green MD, 25 mcg at 01/04/20 1651  •  gabapentin (NEURONTIN) capsule 100 mg, 100 mg, Oral, Nightly, Tricia Philip MD, 100 mg at 01/05/20 2119  •  glucagon (human recombinant) (GLUCAGEN DIAGNOSTIC) injection 1 mg, 1 mg, Subcutaneous, Q15 Min PRN, Tricia Philip MD  •  hydrOXYzine pamoate (VISTARIL) capsule 50 mg, 50 mg, Oral, TID PRN, Tricia Philip MD, 50 mg at 01/02/20 1423  •  insulin aspart (novoLOG) injection 0-24 Units, 0-24 Units, Subcutaneous, 4x Daily AC & at Bedtime, Guero Huynh MD  •  ipratropium (ATROVENT HFA) inhaler 2 puff, 2 puff, Inhalation, 4x Daily - RT, Imani Green MD, 2 puff at 01/06/20 0727  •  lactulose (CHRONULAC) 10 GM/15ML solution 60 mL, 60 mL, Rectal, 4x Daily, Tricia Philip MD, 60 mL at 01/05/20 2119  •  latanoprost (XALATAN) 0.005 % ophthalmic solution 1 drop, 1 drop, Both Eyes, Nightly, Tricia Philip MD, 1 drop at 01/05/20 2120  •  lidocaine (LIDODERM) 5 % 1 patch, 1 patch, Transdermal, Q24H, Tricia Philip MD, 1 patch at 01/05/20 1028  •  melatonin tablet 6 mg, 6 mg, Oral, Nightly, Tricia Philip MD, 6 mg at 01/05/20 2119  •  midazolam (VERSED) 50 mg in sodium chloride 0.9 % 100 mL (0.5 mg/mL) infusion, 1-10 mg/hr, Intravenous, Titrated, Meseret Huynh MD, Stopped at 01/03/20 1334  •  midazolam (VERSED) injection 1 mg, 1 mg, Intravenous, Q30 Min PRN, Imani Green MD, 1 mg at 01/04/20 1647  •  norepinephrine (LEVOPHED) 8 mg/250 mL (32 mcg/mL) in sodium chloride 0.9% infusion (premix), 0.02-0.3 mcg/kg/min, Intravenous, Titrated, Darren Andersno MD, Last Rate: 16.37 mL/hr at 01/05/20 2131, 0.09 mcg/kg/min at 01/05/20 2131  •  ondansetron (ZOFRAN) injection 4 mg, 4 mg, Intravenous, Q6H PRN, Tricia Philip MD  •  pantoprazole (PROTONIX) injection 40 mg, 40 mg, Intravenous,  BID AC, Tricia Philip MD, 40 mg at 01/05/20 1800  •  piperacillin-tazobactam (ZOSYN) 3.375 g/100 mL 0.9% NS IVPB (mbp), 3.375 g, Intravenous, Q12H, Imani Green MD, 3.375 g at 01/05/20 2230  •  predniSONE (DELTASONE) tablet 40 mg, 40 mg, Oral, Daily With Breakfast, Imani Green MD, 40 mg at 01/05/20 1039  •  riFAXIMin (XIFAXAN) tablet 550 mg, 550 mg, Oral, Q12H, Tricia Philip MD, 550 mg at 01/05/20 2120  •  sevelamer (RENVELA) tablet 800 mg, 800 mg, Oral, TID With Meals, Tricia Philip MD, 800 mg at 01/05/20 1748  •  sodium chloride 0.9 % flush 10 mL, 10 mL, Intravenous, Q12H, Tricia Philip MD, 10 mL at 01/05/20 2120  •  sodium chloride 0.9 % flush 10 mL, 10 mL, Intravenous, PRN, Tricia Philip MD  •  sodium chloride 0.9 % flush 10 mL, 10 mL, Intravenous, Q12H, Tricia Philip MD, 10 mL at 01/05/20 2120  •  sodium chloride 0.9 % flush 10 mL, 10 mL, Intravenous, PRN, Tricia Philip MD  •  traZODone (DESYREL) tablet 50 mg, 50 mg, Oral, Nightly PRN, Tricia Philip MD, 50 mg at 01/03/20 2139     Diagnostic Data    Lab Results (last 24 hours)     Procedure Component Value Units Date/Time    Blood Culture - Blood, Blood, Arterial Line [[id number]] Collected:  01/04/20 0739    Specimen:  Blood, Arterial Line Updated:  01/06/20 0815     Blood Culture No growth at 2 days    Comprehensive Metabolic Panel [[id number]]  (Abnormal) Collected:  01/06/20 0548    Specimen:  Blood Updated:  01/06/20 0612     Glucose 421 mg/dL      BUN 95 mg/dL      Creatinine 8.48 mg/dL      Sodium 135 mmol/L      Potassium 4.9 mmol/L      Chloride 93 mmol/L      CO2 19.0 mmol/L      Calcium 8.0 mg/dL      Total Protein 9.6 g/dL      Albumin 2.70 g/dL      ALT (SGPT) 14 U/L      AST (SGOT) 57 U/L      Alkaline Phosphatase 87 U/L      Total Bilirubin 1.1 mg/dL      eGFR Non  Amer --     Comment: <15 Indicative of kidney failure.        eGFR  African Amer 8 mL/min/1.73      Comment: <15 Indicative of  kidney failure.        Globulin 6.9 gm/dL      A/G Ratio 0.4 g/dL      BUN/Creatinine Ratio 11.2     Anion Gap 23.0 mmol/L     Narrative:       GFR Normal >60  Chronic Kidney Disease <60  Kidney Failure <15      CBC & Differential [[id number]] Collected:  01/06/20 0548    Specimen:  Blood Updated:  01/06/20 0552    Narrative:       The following orders were created for panel order CBC & Differential.  Procedure                               Abnormality         Status                     ---------                               -----------         ------                     CBC Auto Differential[[id number]]        Abnormal            Final result                 Please view results for these tests on the individual orders.    CBC Auto Differential [[id number]]  (Abnormal) Collected:  01/06/20 0548    Specimen:  Blood Updated:  01/06/20 0552     WBC 10.07 10*3/mm3      RBC 2.91 10*6/mm3      Hemoglobin 7.9 g/dL      Hematocrit 24.0 %      MCV 82.5 fL      MCH 27.1 pg      MCHC 32.9 g/dL      RDW 16.6 %      RDW-SD 49.7 fl      MPV 8.6 fL      Platelets 148 10*3/mm3      Neutrophil % 79.9 %      Lymphocyte % 10.8 %      Monocyte % 7.3 %      Eosinophil % 0.0 %      Basophil % 0.2 %      Immature Grans % 1.8 %      Neutrophils, Absolute 8.04 10*3/mm3      Lymphocytes, Absolute 1.09 10*3/mm3      Monocytes, Absolute 0.74 10*3/mm3      Eosinophils, Absolute 0.00 10*3/mm3      Basophils, Absolute 0.02 10*3/mm3      Immature Grans, Absolute 0.18 10*3/mm3      nRBC 0.2 /100 WBC     Blood Gas, Arterial [[id number]]  (Abnormal) Collected:  01/06/20 0511    Specimen:  Arterial Blood Updated:  01/06/20 0529     Site Arterial Line     Inderjit's Test N/A     pH, Arterial 7.363 pH units      pCO2, Arterial 37.0 mm Hg      pO2, Arterial 116.0 mm Hg      Comment: 83 Value above reference range        HCO3, Arterial 21.1 mmol/L      Base Excess, Arterial -3.9 mmol/L      Comment: 84 Value below reference range        O2 Saturation, Arterial  98.5 %      Barometric Pressure for Blood Gas 753 mmHg      Modality Ventilator     FIO2 30 %      Ventilator Mode PSV     PEEP 5.0     PSV 8.0 cmH2O      Collected by RRT     Comment: Meter: [id number]     :  [id number]       POC Glucose Once [[id number]]  (Abnormal) Collected:  01/05/20 2118    Specimen:  Blood Updated:  01/06/20 0436     Glucose 344 mg/dL      Comment: : [id number] DALIA WHITNEYMeter ID: [id number]       POC Glucose Once [[id number]]  (Abnormal) Collected:  01/05/20 1147    Specimen:  Blood Updated:  01/05/20 1202     Glucose 276 mg/dL      Comment: : [id number] BEE VARMA RICHMeter ID: [id number]       Respiratory Culture - Sputum, Oropharynx [[id number]] Collected:  01/04/20 1542    Specimen:  Sputum from Oropharynx Updated:  01/05/20 1053     Respiratory Culture Light growth (2+) Normal Respiratory Ana     Gram Stain Many (4+) WBCs per low power field      Rare (1+) Epithelial cells per low power field      Mixed bacterial ana    Respiratory Culture - Sputum, ET Suction [[id number]]  (Abnormal) Collected:  01/03/20 1546    Specimen:  Sputum from ET Suction Updated:  01/05/20 0956     Respiratory Culture Moderate growth (3+) Haemophilus influenzae     Comment: Beta Lactamase Negative         Light growth (2+) Normal Respiratory Ana     Gram Stain Many (4+) WBCs per low power field      Rare (1+) Epithelial cells per low power field      Mixed bacterial ana      Moderate (3+) Gram negative coccobacilli           Imaging Results (Last 24 Hours)     Procedure Component Value Units Date/Time    XR Abdomen KUB [[id number]] Collected:  01/05/20 1130     Updated:  01/05/20 1212    Narrative:       EXAM:  XR ABDOMEN 1 VIEW (KUB)    ORDERING PROVIDER:  LULU JESSICA    CLINICAL HISTORY:  Cortrak Placement    COMPARISON STUDY:      TECHNIQUE:  Two views of the abdomen    FINDINGS:  Cortrak tube placed with distal portion in the proximal jejunum.  There is a  nonspecific bowel gas pattern. No evidence of gross  subdiaphragmatic free air. Multilevel degenerative change of the  visualized dorsal spine.      Impression:       Cortrak tube placed with distal portion in the proximal jejunum.      Electronically signed by:  Joao Obregon MD  1/5/2020 12:11 PM CST  Workstation: [id number]    XR Chest 1 View [[id number]] Collected:  01/05/20 0914     Updated:  01/05/20 1031    Narrative:       Chest x-ray single view.         CLINICAL INDICATION: Shortness of breath. Respiratory failure.    COMPARISON: January 4, 2020.    FINDINGS: Cardiac silhouette is enlarged in size. Pulmonary  vascularity is increased.     Nasogastric tube in stomach. Endotracheal tube identified with  tip 4.9 cm above the bifurcation, of the joon, in satisfactory  position.    Clearing of right lower lobe infiltrative changes since prior  examination. Favorable change.      Impression:       CONCLUSION: Cardiomegaly. Pulmonary vascular prominence. Interval  clearing of right lower lobe infiltrative changes since prior  exam. Favorable change.    Electronically signed by:  Wallace Louise MD  1/5/2020 9:36 AM CST  Workstation: [id number]          I reviewed the patient's new clinical results.    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 Philip, Gi following.   - will continue to monitor H/H     • RSV (respiratory syncytial virus infection)     Currently in acute respiratory failure requiring intubation   Will continue supportive care       -pulmonology following, Dr. Green  -vancomycin d/c on 1/519  with negative MRSA swab  -on zosyn D#3,  continue empiric therapy  -continue solmedrol     • Fever of unknown origin     Pan culture   Respiratory panel  CXR today     vancomycin and zosyn for broad spectrum coverage   Pulmonology following      • Acute respiratory failure (CMS/HCC)     RSV positive   Currently intubated   Patient is a DNR/DNI    Family would like to wait a few days before considering extubation.     -pulmonology following, Dr. Green  -vancomycin d/c yesterday with negative MRSA swab  -on zosyn D#2, continue empiric therapy   -continue solumedrol     • Encephalopathy     -Presumed metabolic  -Ammonia level: improving  -Continue Lactulose, rifaxamin    GI following       • Chronic heart failure with preserved ejection fraction (CMS/HCC)     -last echo in July 2018 shows EF of 58% with grade 1a diastolic dysfunction  -daily weights  -care with fluids  -daily fluid restriction 1500 mL       • COPD (chronic obstructive pulmonary disease) (CMS/HCC)     - Continue flonase, loratadine  - Patient uses Oxygen prn at the nursing home.   - Duonebs and albuterol nebs prn.          • Gastroesophageal reflux disease without esophagitis     -PPI     • Chronic pain     -Gabapentin        • Anxiety     - Continue Lexapro      • Primary insomnia     -trazodone, melatonin     • ESRD (end stage renal disease) (CMS/HCC)     -HD M/W/F  -Nephrology on board   -epogen on M/W/F with dialysis  -continue Sevelamer 800 mg three times daily        • Type 2 diabetes mellitus with chronic kidney disease on chronic dialysis, with long-term current use of insulin (CMS/HCC)     -levemir, SSI  - Dialysis on Monday, Wednesday, Friday   Results from last 7 days   Lab Units 01/06/20  0548 01/05/20  0357 01/04/20  0738 01/03/20  0439 01/02/20  1815 01/02/20  0358 12/30/19  2341   CREATININE mg/dL 8.48* 7.08* 5.78* 9.46* 8.67* 7.77* 5.86*   ]     • Essential hypertension     -not on any home medications, will monitor            DVT prophylaxis: SCDs/TEDs  Code Status and  Medical Interventions:   Ordered at: 01/02/20 1525     Limited Support to NOT Include:    Intubation     Level Of Support Discussed With:    Health Care Surrogate     Code Status:    No CPR     Medical Interventions (Level of Support Prior to Arrest):    Limited       Plan for disposition: will have discussion with family       Time: 30 minutes      Signed,   Demarcus Oliveira MD  Family Medicine Resident PGY2  [facility]        This document has been electronically signed by Demarcus Oliveira MD on January 6, 2020 8:29 AM    \

## 2020-01-06 NOTE — PROGRESS NOTES
SUBJECTIVE:   1/5/2020  Chief Complaint:     Subjective      Patient remains on vent and off sedation..  Tolerating tube feeds.  Receiving lactulose enemas.  Responding to painful stimuli.  History:  Past Medical History:   Diagnosis Date   • Anemia of chronic disease    • Cataract    • CHF (congestive heart failure) (CMS/HCC)    • Chronic pain syndrome    • CKD (chronic kidney disease)    • Clostridium difficile infection    • COPD (chronic obstructive pulmonary disease) (CMS/HCC)    • Coronary artery disease    • Depression    • Diabetes mellitus (CMS/HCC)    • Dialysis patient (CMS/[facility])    • GERD (gastroesophageal reflux disease)    • Glaucoma    • H/O cardiac pacemaker     patient states he got his pacemaker removed in [place]   • Heart block    • Hepatitis C    • History of transfusion    • Hypertension    • Nephropathy, diabetic (CMS/HCC)    • Pacemaker    • Pulmonary embolism (CMS/HCC)      Past Surgical History:   Procedure Laterality Date   • ABDOMINAL SURGERY     • ARTERIOVENOUS FISTULA/SHUNT SURGERY Right     forearm loop   • ARTERIOVENOUS FISTULA/SHUNT SURGERY Left     removed past upper arm   • ARTERIOVENOUS FISTULA/SHUNT SURGERY Right 3/13/2018    Procedure: revision RIGHT forearm ARTERIOVENOUS graft (excision), debridement, wound vac;  Surgeon: Jerson Singh MD;  Location: [facility];  Service: Vascular   • ARTERIOVENOUS FISTULA/SHUNT SURGERY W/ HEMODIALYSIS CATHETER INSERTION Right 4/4/2016    Procedure: RIGHT ARM AV FISTULA DECLOT REVISION REPAIR BRACHIAL ANASTOMOTIC PSUEDOANEURYSM LEFT FEMORAL RICHARDSON FISTULOGRAM WITH ANGIOPLASTY;  Surgeon: Jerson Carpenter MD;  Location: [facility] 18/19;  Service:    • CATARACT EXTRACTION W/ INTRAOCULAR LENS IMPLANT Left 3/31/2017    Procedure: REMOVE CATARACT AND IMPLANT INTRAOCULAR LENS LEFT EYE;  Surgeon: Hilton Montemayor MD;  Location: [facility];  Service:    • COLONOSCOPY N/A 3/12/2019    Procedure: COLONOSCOPY;  Surgeon:  Flako Montoya MD;  Location: [facility] ENDOSCOPY;  Service: Gastroenterology   • COLONOSCOPY N/A 11/6/2019    Procedure: COLONOSCOPY;  Surgeon: Tricia Philip MD;  Location: [facility] ENDOSCOPY;  Service: Gastroenterology   • ENDOSCOPY N/A 3/12/2019    Procedure: ESOPHAGOGASTRODUODENOSCOPY;  Surgeon: Flako Montoya MD;  Location: [facility] ENDOSCOPY;  Service: Gastroenterology   • ENDOSCOPY N/A 11/5/2019    Procedure: ESOPHAGOGASTRODUODENOSCOPY;  Surgeon: Tricia Philip MD;  Location: [facility] ENDOSCOPY;  Service: Gastroenterology   • EYE SURGERY     • HERNIA REPAIR     • IMPLANTABLE CARDIOVERTER DEFIBRILLATOR LEAD REPLACEMENT/POCKET REVISION Right 4/11/2016    Procedure: PACEMAKER LEADS X 3 AND BATTERY EXTRACTION WITH EXIMER LASER;  Surgeon: Vern Reeves MD;  Location: [facility] OR 18/19;  Service:    • INSERTION HEMODIALYSIS CATHETER Right 05/2015    jugular   • INTERVENTIONAL RADIOLOGY PROCEDURE Right 6/1/2017    Procedure: RIGHT dialysis fistulagram & angioplasty;  Surgeon: Jerson Singh MD;  Location: [facility] ANGIO INVASIVE LOCATION;  Service:    • INTERVENTIONAL RADIOLOGY PROCEDURE Right 8/24/2017    Procedure: IR dialysis fistulagram;  Surgeon: Jerson Singh MD;  Location: [facility] ANGIO INVASIVE LOCATION;  Service:    • INTERVENTIONAL RADIOLOGY PROCEDURE Right 11/13/2018    Procedure: IR dialysis fistulagram;  Surgeon: Jerson Singh MD;  Location: [facility] ANGIO INVASIVE LOCATION;  Service: Interventional Radiology   • PACEMAKER IMPLANTATION  2008    dual chamber [employer]   • REMOVAL HEMODIALYSIS CATHETER Right 05/2015    femoral vein   • SIGMOIDOSCOPY N/A 8/2/2019    Procedure: SIGMOIDOSCOPY FLEXIBLE;  Surgeon: Flako Montoya MD;  Location: [facility] ENDOSCOPY;  Service: Gastroenterology     Family History   Problem Relation Age of Onset   • COPD Sister    • Diabetes Brother    • Early death Brother    • Hyperlipidemia Brother    • Hypertension Brother    •  Coronary artery disease Mother    • Diabetes Mother    • Hypertension Mother    • Stroke Other    • Other Other         Respiratory disorder     Social History     Tobacco Use   • Smoking status: Former Smoker     Types: Cigarettes   • Smokeless tobacco: Never Used   • Tobacco comment: quit 20 years ago    Substance Use Topics   • Alcohol use: No     Comment: former heavy use in his 50's, up to a fifth of liquor a day, currently no alcohol   • Drug use: No     Medications Prior to Admission   Medication Sig Dispense Refill Last Dose   • acetaminophen (TYLENOL) 500 MG tablet Take 500 mg by mouth Every 4 (Four) Hours As Needed for Mild Pain  or Fever.   Unknown at Unknown time   • aspirin 81 MG chewable tablet Chew 81 mg Daily.   11/19/2019 at Unknown time   • benzonatate (TESSALON) 100 MG capsule Take 100 mg by mouth 3 (Three) Times a Day As Needed for Cough.   Unknown at Unknown time   • brimonidine (ALPHAGAN P) 0.1 % solution ophthalmic solution Administer  to both eyes 3 (Three) Times a Day.   11/19/2019 at Unknown time   • Cholecalciferol (VITAMIN D3) 2000 units chewable tablet Chew 2,000 Units Daily.   11/19/2019 at Unknown time   • cyclobenzaprine (FLEXERIL) 5 MG tablet Take 5 mg by mouth Every 8 (Eight) Hours As Needed for Muscle Spasms.   Unknown at Unknown time   • dorzolamide (TRUSOPT) 2 % ophthalmic solution Administer 1 drop into the left eye 3 (Three) Times a Day. 1 each 12 11/19/2019 at Unknown time   • escitalopram (LEXAPRO) 10 MG tablet Take 1 tablet by mouth Daily. 30 tablet 3 11/19/2019 at Unknown time   • famotidine (PEPCID) 20 MG tablet Take 20 mg by mouth every night at bedtime.   11/18/2019 at Unknown time   • fluticasone (FLONASE) 50 MCG/ACT nasal spray 2 sprays into each nostril daily. Administer 2 sprays in each nostril for each dose.   Unknown at Unknown time   • furosemide (LASIX) 40 MG tablet Take 40 mg by mouth 2 (Two) Times a Day.      • gabapentin (NEURONTIN) 100 MG capsule Take 1  capsule by mouth Every Night. 30 capsule 0    • Glecaprevir-Pibrentasvir (MAVYRET) 100-40 MG tablet Take 3 tablets by mouth Daily. 90 tablet 2 11/19/2019 at Unknown time   • guaiFENesin (HUMIBID 3) 400 MG tablet Take 400 mg by mouth Every 4 (Four) Hours As Needed for Cough.   Unknown at Unknown time   • insulin aspart (NovoLOG) 100 UNIT/ML injection Inject  under the skin 3 (Three) Times a Day Before Meals. Sliding scale:  = 0 units; 150-200 = 3 units; 200-250 = 6 units; 250-300 = 9 units; 300-350 = 12 units; >350 = 15 units and call MD   11/19/2019 at Unknown time   • insulin detemir (LEVEMIR) 100 UNIT/ML injection Inject 30 Units under the skin into the appropriate area as directed Daily.   11/19/2019 at Unknown time   • lactulose (CHRONULAC) 10 GM/15ML solution Take 30 mL by mouth 3 (Three) Times a Day. (Patient taking differently: Take 30 mL by mouth 3 (Three) Times a Day.) 946 mL 3 11/19/2019 at Unknown time   • latanoprost (XALATAN) 0.005 % ophthalmic solution Administer 1 drop to both eyes every night.   11/18/2019 at Unknown time   • Lidocaine (ASPERCREME LIDOCAINE) 4 % patch Apply 1 patch topically Daily As Needed (low back pain. on for 12 hours.).   Unknown at Unknown time   • Loratadine 10 MG capsule Take 10 mg by mouth Every Other Day.   11/18/2019 at Unknown time   • melatonin 5 MG tablet tablet Take 5 mg by mouth Every Night.   11/18/2019 at Unknown time   • ondansetron ODT (ZOFRAN-ODT) 4 MG disintegrating tablet Take 1 tablet by mouth Every 6 (Six) Hours As Needed for Nausea or Vomiting. 10 tablet 0 Unknown at Unknown time   • polyethylene glycol (MIRALAX) packet Take 17 g by mouth Daily As Needed (constipation).   11/19/2019 at Unknown time   • rifaximin (XIFAXAN) 550 MG tablet Take 1 tablet by mouth Every 12 (Twelve) Hours. 60 tablet 5 Unknown at Unknown time   • sevelamer (RENVELA) 800 MG tablet Take 800 mg by mouth 3 (Three) Times a Day With Meals.   11/19/2019 at Unknown time   • traZODone  (DESYREL) 50 MG tablet Take 50 mg by mouth every night at bedtime.   11/18/2019 at Unknown time     Allergies:  Lisinopril and Motrin [ibuprofen]     CURRENT MEDICATIONS/OBJECTIVE/VS/PE:     Current Medications:     Current Facility-Administered Medications   Medication Dose Route Frequency Provider Last Rate Last Dose   • 8-hydroxyquinoline sulfate (BAG BALM) ointment   Apply externally PRN Guero Huynh MD       • acetaminophen (TYLENOL) tablet 650 mg  650 mg Oral Q4H PRN Tricia Philip MD   650 mg at 01/03/20 2139    Or   • acetaminophen (TYLENOL) suppository 650 mg  650 mg Rectal Q4H PRN Tricia Philip MD       • albumin human 25 % IV SOLN 25 g  25 g Intravenous PRN Alethea Diamond MD       • albuterol (PROVENTIL) nebulizer solution 0.083% 2.5 mg/3mL  2.5 mg Nebulization Q6H PRN Tricia Philip MD       • albuterol sulfate HFA (PROVENTIL HFA;VENTOLIN HFA;PROAIR HFA) inhaler 2 puff  2 puff Inhalation 4x Daily - RT Meseret Huynh MD   2 puff at 01/05/20 1852   • aspirin chewable tablet 81 mg  81 mg Oral Daily Tricia Philip MD   81 mg at 01/05/20 1027   • budesonide-formoterol (SYMBICORT) 160-4.5 MCG/ACT inhaler 2 puff  2 puff Inhalation BID - RT Imani Green MD   2 puff at 01/05/20 1852   • cetirizine (zyrTEC) tablet 10 mg  10 mg Oral Daily Tricia Philip MD   10 mg at 01/05/20 1028   • cyclobenzaprine (FLEXERIL) tablet 5 mg  5 mg Oral Q8H PRN Tricia Philip MD       • dextrose (D50W) 25 g/ 50mL Intravenous Solution 25 g  25 g Intravenous Q15 Min PRN Tricia Philip MD   25 g at 01/03/20 2309   • dextrose (GLUTOSE) oral gel 15 g  15 g Oral Q15 Min PRN Tricia Philip MD       • docusate sodium (COLACE) capsule 100 mg  100 mg Oral BID PRN Tricia Philip MD       • EPINEPHrine (ADRENALIN) 5 mg in sodium chloride 0.9 % 250 mL (0.02 mg/mL) infusion  0.02-0.3 mcg/kg/min Intravenous Titrated Tricia Philip MD   Stopped at 01/03/20 9940   • epoetin ziggy (EPOGEN,PROCRIT)  injection 10,000 Units  10,000 Units Intravenous Once per day on Mon Wed Fri Alethea Diamond MD   10,000 Units at 01/03/20 1342   • escitalopram (LEXAPRO) tablet 10 mg  10 mg Oral Daily Tricia Philip MD   10 mg at 01/05/20 1027   • fentaNYL citrate (PF) (SUBLIMAZE) injection 25 mcg  25 mcg Intravenous Q30 Min PRN Imani Green MD   25 mcg at 01/04/20 1651   • gabapentin (NEURONTIN) capsule 100 mg  100 mg Oral Nightly Tricia Philip MD   100 mg at 01/05/20 2119   • glucagon (human recombinant) (GLUCAGEN DIAGNOSTIC) injection 1 mg  1 mg Subcutaneous Q15 Min PRN Tricia Philip MD       • hydrOXYzine pamoate (VISTARIL) capsule 50 mg  50 mg Oral TID PRN Tricia Philip MD   50 mg at 01/02/20 1423   • insulin aspart (novoLOG) injection 0-14 Units  0-14 Units Subcutaneous 4x Daily AC & at Bedtime Tricia Philip MD   10 Units at 01/05/20 2129   • ipratropium (ATROVENT HFA) inhaler 2 puff  2 puff Inhalation 4x Daily - RT Imani Green MD   2 puff at 01/05/20 1852   • lactulose (CHRONULAC) 10 GM/15ML solution 60 mL  60 mL Rectal 4x Daily Tricia Philip MD   60 mL at 01/05/20 2119   • latanoprost (XALATAN) 0.005 % ophthalmic solution 1 drop  1 drop Both Eyes Nightly Tricia Philip MD   1 drop at 01/04/20 2145   • lidocaine (LIDODERM) 5 % 1 patch  1 patch Transdermal Q24H Tricia Philip MD   1 patch at 01/05/20 1028   • melatonin tablet 6 mg  6 mg Oral Nightly Tricia Philip MD   6 mg at 01/05/20 2119   • midazolam (VERSED) 50 mg in sodium chloride 0.9 % 100 mL (0.5 mg/mL) infusion  1-10 mg/hr Intravenous Titrated Meseret Huynh MD   Stopped at 01/03/20 1334   • midazolam (VERSED) injection 1 mg  1 mg Intravenous Q30 Min PRN Imani Green MD   1 mg at 01/04/20 1647   • norepinephrine (LEVOPHED) 8 mg/250 mL (32 mcg/mL) in sodium chloride 0.9% infusion (premix)  0.02-0.3 mcg/kg/min Intravenous Titrated Darren Anderson MD 16.37 mL/hr at 01/05/20 2131 0.09 mcg/kg/min at  01/05/20 2131   • ondansetron (ZOFRAN) injection 4 mg  4 mg Intravenous Q6H PRN Tricia Philip MD       • pantoprazole (PROTONIX) injection 40 mg  40 mg Intravenous BID AC Tricia Philip MD   40 mg at 01/05/20 1800   • piperacillin-tazobactam (ZOSYN) 3.375 g/100 mL 0.9% NS IVPB (mbp)  3.375 g Intravenous Q12H Imani Green MD   3.375 g at 01/05/20 1039   • predniSONE (DELTASONE) tablet 40 mg  40 mg Oral Daily With Breakfast Imani Green MD   40 mg at 01/05/20 1039   • riFAXIMin (XIFAXAN) tablet 550 mg  550 mg Oral Q12H Tricia Philip MD   550 mg at 01/05/20 2120   • sevelamer (RENVELA) tablet 800 mg  800 mg Oral TID With Meals Tricia Philip MD   800 mg at 01/05/20 1748   • sodium chloride 0.9 % flush 10 mL  10 mL Intravenous Q12H Tricia Philip MD   10 mL at 01/05/20 2120   • sodium chloride 0.9 % flush 10 mL  10 mL Intravenous PRN Tricia Philip MD       • sodium chloride 0.9 % flush 10 mL  10 mL Intravenous Q12H Tricia Philip MD   10 mL at 01/05/20 2120   • sodium chloride 0.9 % flush 10 mL  10 mL Intravenous PRN Tricia Philip MD       • traZODone (DESYREL) tablet 50 mg  50 mg Oral Nightly PRN Tricia Philip MD   50 mg at 01/03/20 2139       Objective     Review of Systems:   Review of Systems  Pt intubated on vent  Physical Exam:   Temp:  [98.4 °F (36.9 °C)-99.1 °F (37.3 °C)] 98.9 °F (37.2 °C)  Heart Rate:  [62-79] 68  Resp:  [13-22] 17  BP: ()/(46-72) 115/55  Arterial Line BP: ()/(35-63) 103/49  FiO2 (%):  [30 %] 30 %     Physical Exam:  General Appearance:    Alert, cooperative, in no acute distress   Head:    Normocephalic, without obvious abnormality, atraumatic   Eyes:            Lids and lashes normal, conjunctivae and sclerae normal, no   icterus, no pallor, corneas clear, PERRLA   Ears:    Ears appear intact with no abnormalities noted   Throat:   No oral lesions, no thrush, oral mucosa moist   Neck:   No adenopathy, supple, trachea midline, no  thyromegaly, no     carotid bruit, no JVD   Back:     No kyphosis present, no scoliosis present, no skin lesions,       erythema or scars, no tenderness to percussion or                   palpation,   range of motion normal   Lungs:     Clear to auscultation,respirations regular, even and                   unlabored    Heart:    Regular rhythm and normal rate, normal S1 and S2, no            murmur, no gallop, no rub, no click   Breast Exam:    Deferred   Abdomen:     Normal bowel sounds, no masses, no organomegaly, soft        non-tender, non-distended, no guarding, no rebound                 tenderness   Genitalia:    Deferred   Extremities:   Moves all extremities well, no edema, no cyanosis, no              redness   Pulses:   Pulses palpable and equal bilaterally   Skin:   No bleeding, bruising or rash   Lymph nodes:   No palpable adenopathy   Neurologic:   Cranial nerves 2 - 12 grossly intact, sensation intact, DTR        present and equal bilaterally      Results Review:     Lab Results (last 24 hours)     Procedure Component Value Units Date/Time    POC Glucose Once [[id number]]  (Abnormal) Collected:  01/05/20 1147    Specimen:  Blood Updated:  01/05/20 1202     Glucose 276 mg/dL      Comment: : [id number] BEE Perkins ID: [id number]       Respiratory Culture - Sputum, Oropharynx [[id number]] Collected:  01/04/20 1542    Specimen:  Sputum from Oropharynx Updated:  01/05/20 1053     Respiratory Culture Light growth (2+) Normal Respiratory Ana     Gram Stain Many (4+) WBCs per low power field      Rare (1+) Epithelial cells per low power field      Mixed bacterial ana    Respiratory Culture - Sputum, ET Suction [[id number]]  (Abnormal) Collected:  01/03/20 1546    Specimen:  Sputum from ET Suction Updated:  01/05/20 0956     Respiratory Culture Moderate growth (3+) Haemophilus influenzae     Comment: Beta Lactamase Negative         Light growth (2+) Normal Respiratory Ana     Gram  Stain Many (4+) WBCs per low power field      Rare (1+) Epithelial cells per low power field      Mixed bacterial ana      Moderate (3+) Gram negative coccobacilli    Blood Culture - Blood, Blood, Arterial Line [[id number]] Collected:  01/04/20 0739    Specimen:  Blood, Arterial Line Updated:  01/05/20 0815     Blood Culture No growth at 24 hours    POC Glucose Once [[id number]]  (Abnormal) Collected:  01/05/20 0615    Specimen:  Blood Updated:  01/05/20 0804     Glucose 240 mg/dL      Comment: RN NotifiedOperator: [id number] Tansler JESSICAMeter ID: [id number]       Manual Differential [[id number]]  (Abnormal) Collected:  01/05/20 0357    Specimen:  Blood Updated:  01/05/20 0549     Neutrophil % 86.0 %      Lymphocyte % 7.0 %      Monocyte % 1.0 %      Bands %  3.0 %      Metamyelocyte % 3.0 %      Neutrophils Absolute 10.91 10*3/mm3      Lymphocytes Absolute 0.86 10*3/mm3      Monocytes Absolute 0.12 10*3/mm3      Hypochromia Slight/1+     Comment: occasional        Poikilocytes Slight/1+     WBC Morphology Normal     Platelet Estimate Decreased    Comprehensive Metabolic Panel [[id number]]  (Abnormal) Collected:  01/05/20 0357    Specimen:  Blood Updated:  01/05/20 0517     Glucose 218 mg/dL      BUN 57 mg/dL      Creatinine 7.08 mg/dL      Sodium 134 mmol/L      Potassium 4.8 mmol/L      Chloride 92 mmol/L      CO2 17.0 mmol/L      Calcium 8.4 mg/dL      Total Protein 9.4 g/dL      Albumin 2.40 g/dL      ALT (SGPT) 13 U/L      AST (SGOT) 76 U/L      Alkaline Phosphatase 134 U/L      Total Bilirubin 1.4 mg/dL      eGFR Non  Amer --     Comment: <15 Indicative of kidney failure.        eGFR  African Amer 9 mL/min/1.73      Comment: <15 Indicative of kidney failure.        Globulin 7.0 gm/dL      A/G Ratio 0.3 g/dL      BUN/Creatinine Ratio 8.1     Anion Gap 25.0 mmol/L     Narrative:       GFR Normal >60  Chronic Kidney Disease <60  Kidney Failure <15      Blood Gas, Arterial [[id number]]  (Abnormal)  Collected:  01/05/20 0451    Specimen:  Arterial Blood Updated:  01/05/20 0505     Site Arterial Line     Inderjit's Test N/A     pH, Arterial 7.318 pH units      Comment: 84 Value below reference range        pCO2, Arterial 36.6 mm Hg      pO2, Arterial 97.6 mm Hg      HCO3, Arterial 18.8 mmol/L      Comment: 84 Value below reference range        Base Excess, Arterial -6.7 mmol/L      Comment: 84 Value below reference range        O2 Saturation, Arterial 95.5 %      Barometric Pressure for Blood Gas 755 mmHg      Modality Ventilator     FIO2 30 %      Ventilator Mode AC     Set Tidal Volume 500     Set Mech Resp Rate 14.0     PEEP 5.0     Collected by ROSLYN LANGE     Comment: Meter: [id number]     :  [id number]       CBC & Differential [[id number]] Collected:  01/05/20 0357    Specimen:  Blood Updated:  01/05/20 0445    Narrative:       The following orders were created for panel order CBC & Differential.  Procedure                               Abnormality         Status                     ---------                               -----------         ------                     CBC Auto Differential[[id number]]        Abnormal            Final result                 Please view results for these tests on the individual orders.    CBC Auto Differential [[id number]]  (Abnormal) Collected:  01/05/20 0357    Specimen:  Blood Updated:  01/05/20 0445     WBC 12.26 10*3/mm3      RBC 2.97 10*6/mm3      Hemoglobin 8.1 g/dL      Hematocrit 25.5 %      MCV 85.9 fL      MCH 27.3 pg      MCHC 31.8 g/dL      RDW 17.4 %      RDW-SD 54.0 fl      MPV 8.9 fL      Platelets 134 10*3/mm3      Neutrophil % 90.1 %      Lymphocyte % 6.5 %      Monocyte % 2.2 %      Eosinophil % 0.0 %      Basophil % 0.3 %      Immature Grans % 0.9 %      Neutrophils, Absolute 11.04 10*3/mm3      Lymphocytes, Absolute 0.80 10*3/mm3      Monocytes, Absolute 0.27 10*3/mm3      Eosinophils, Absolute 0.00 10*3/mm3      Basophils, Absolute 0.04 10*3/mm3       Immature Grans, Absolute 0.11 10*3/mm3      nRBC 0.0 /100 WBC            I reviewed the patient's new clinical results.  I reviewed the patient's new imaging results and agree with the interpretation.     ASSESSMENT/PLAN:   ASSESSMENT: melena resolved  Anemia, stable.  S/P cardio pulmonary arrest, failed weaning trial  Hepatic encephalopathy, ammonia level is improving  ESRD, on hemodialysis.  Duodenal ulcerations, path C/W inflammation    PLAN: Continue antibiotics  Continue tube feed  Cont PPI  Follow H/H closely  Change lactulose to p.o.  Weaning trial in AM if he remains stable  Repeat EGD in 1 month for re-evaluation of duodenal lesions  The risks, benefits, and alternatives of this procedure have been discussed with the patient or the responsible party- the patient understands and agrees to proceed.         Tricia Philip MD  01/05/20  10:44 PM

## 2020-01-07 PROBLEM — A49.2 HEMOPHILUS INFLUENZAE INFECTION, UNSPECIFIED SITE: Status: ACTIVE | Noted: 2020-01-01

## 2020-01-07 NOTE — PLAN OF CARE
Patient VS are stable , pain medication has been needed a few times tonight See MAR for details. Patient has had moments of anxiety. Scleral edema is worse and patient has moments of rigidity that last about 2 minutes before he relaxes.

## 2020-01-07 NOTE — PROGRESS NOTES
"Intensive Care Follow-up      LOS: 4 days     Mr. Cristo Musa, 69 y.o. male is followed for: Respiratory failure     CHIEF COMPLIANT: Shortness of breath    Subjective - Interval History     This gentleman is unresponsive on mechanical ventilator receiving hemodialysis.  He is in the middle of a spontaneous breathing trial and seems to be doing reasonably well    The patient's relevant past medical, surgical and social history were reviewed and updated in Epic as appropriate.     Objective   BP 91/50   Pulse 77   Temp 97.9 °F (36.6 °C) (Axillary)   Resp 18   Ht 172.7 cm (67.99\")   Wt 92.2 kg (203 lb 4.2 oz)   SpO2 99%   BMI 30.91 kg/m²       Vent Settings: FiO2 (%):  [30 %] 30 %  PEEP/CPAP (cm H2O):  [5 cm H20] 5 cm H20  WV SUP:  [8 cm H20] 8 cm H20  MAP (cm H2O):  [7.9-9.9] 7.9    Physical Exam: Unresponsive black gentleman on a mechanical ventilator    General Appearance:       Head:    Normocephalic, without obvious abnormality, atraumatic   Eyes:            Lids and lashes normal, conjunctivae and sclerae normal, no   icterus, no pallor, corneas clear, PERRLA   Ears:    Ears appear intact with no abnormalities noted   Throat:   No oral lesions, no thrush, oral mucosa moist   Neck:   No adenopathy, supple, trachea midline, no thyromegaly, no   carotid bruit, no JVD   Back:     No kyphosis present, no scoliosis present, no skin lesions,      erythema or scars, no tenderness to percussion or                   palpation,   range of motion normal   Lungs:    Diminished breath sounds and no wheeze    Heart:    Regular rhythm and normal rate, normal S1 and S2, no            murmur, no gallop, no rub, no click   Chest Wall:    No abnormalities observed   Abdomen:     Normal bowel sounds, no masses, no organomegaly, soft        non-tender, non-distended, no guarding, no rebound                tenderness   Rectal:     Deferred   Extremities:   Moves all extremities well, no edema, no cyanosis, no       "       redness   Pulses:   Pulses palpable and equal bilaterally   Skin:   No bleeding, bruising or rash   Lymph nodes:   No palpable adenopathy   Neurologic:   Cranial nerves 2 - 12 grossly intact, sensation intact, DTR       present and equal bilaterally     LAB:    pH, Arterial   Date Value Ref Range Status   01/06/2020 7.363 7.350 - 7.450 pH units Final     pCO2, Arterial   Date Value Ref Range Status   01/06/2020 37.0 35.0 - 45.0 mm Hg Final     pO2, Arterial   Date Value Ref Range Status   01/06/2020 116.0 (H) 83.0 - 108.0 mm Hg Final     Comment:     83 Value above reference range     Lab Results   Component Value Date    WBC 9.26 01/07/2020    HGB 7.6 (L) 01/07/2020    HCT 23.3 (L) 01/07/2020    MCV 83.5 01/07/2020     (L) 01/07/2020     Lab Results   Component Value Date    GLUCOSE 250 (H) 01/07/2020    BUN 44 (H) 01/07/2020    CREATININE 4.72 (H) 01/07/2020    EGFRIFNONA  01/06/2020      Comment:      <15 Indicative of kidney failure.    EGFRIFAFRI 15 (L) 01/07/2020    BCR 9.3 01/07/2020    CO2 28.0 01/07/2020    CALCIUM 8.1 (L) 01/07/2020    ALBUMIN 2.40 (L) 01/07/2020    AST 48 (H) 01/07/2020    ALT 21 01/07/2020         RAD:   Imaging Results (Last 24 Hours)     ** No results found for the last 24 hours. 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 Philip, Gi following.   - will continue to monitor H/H     • RSV (respiratory  syncytial virus infection)     Currently in acute respiratory failure requiring intubation   Will continue supportive care       -pulmonology following, Dr. Green  -vancomycin d/c on 1/519  with negative MRSA swab  -on zosyn D#3, continue empiric therapy  -continue solmedrol     • Fever of unknown origin     Pan culture   Respiratory panel  CXR today     vancomycin and zosyn for broad spectrum coverage   Pulmonology following      • Acute respiratory failure (CMS/HCC)     RSV positive   Currently intubated   Patient is a DNR/DNI    Family would like to wait a few days before considering extubation.     -pulmonology following, Dr. Green  -vancomycin d/c yesterday with negative MRSA swab  -on zosyn D#2, continue empiric therapy   -continue solumedrol     • Encephalopathy     -Presumed metabolic  -Ammonia level: improving  -Continue Lactulose, rifaxamin    GI following       • Chronic heart failure with preserved ejection fraction (CMS/HCC)     -last echo in July 2018 shows EF of 58% with grade 1a diastolic dysfunction  -daily weights  -care with fluids  -daily fluid restriction 1500 mL       • COPD (chronic obstructive pulmonary disease) (CMS/HCC)     - Continue flonase, loratadine  - Patient uses Oxygen prn at the nursing home.   - Duonebs and albuterol nebs prn.          • Gastroesophageal reflux disease without esophagitis     -PPI     • Chronic pain     -Gabapentin        • Anxiety     - Continue Lexapro      • Primary insomnia     -trazodone, melatonin     • ESRD (end stage renal disease) (CMS/HCC)     -HD M/W/F  -Nephrology on board   -epogen on M/W/F with dialysis  -continue Sevelamer 800 mg three times daily        • Type 2 diabetes mellitus with chronic kidney disease on chronic dialysis, with long-term current use of insulin (CMS/HCC)     -levemir, SSI  - Dialysis on Monday, Wednesday, Friday   Results from last 7 days   Lab Units 01/06/20  0548 01/05/20  0357 01/04/20  0738 01/03/20  0439 01/02/20  1815  01/02/20  0358 12/30/19  2341   CREATININE mg/dL 8.48* 7.08* 5.78* 9.46* 8.67* 7.77* 5.86*   ]     • Essential hypertension     -not on any home medications, will monitor               Plan        Assessment unresponsive state, respiratory failure improving, dialysis patient    Plan patient could probably come off the respirator at this time however it would be beneficial to have family members on board with this decision.        This document has been produced with the assistance of Dragon dictation  This document has been electronically signed by Ankush Lock MD on January 7, 2020 7:31 AM       I discussed the patient's findings and my recommendations with patient and nursing staff

## 2020-01-07 NOTE — PROGRESS NOTES
SUBJECTIVE:   1/7/2020  Chief Complaint:     Subjective      Patient remains on vent and off sedation.  Tolerating tube feeds.  Receiving lactulose enemas.  Responding to painful stimuli. CT head was C/W small right frontal sub dural hematoma and pan sinusitis. Had EEG today and awaiting findings.   History:  Past Medical History:   Diagnosis Date   • Anemia of chronic disease    • Cataract    • CHF (congestive heart failure) (CMS/HCC)    • Chronic pain syndrome    • CKD (chronic kidney disease)    • Clostridium difficile infection    • COPD (chronic obstructive pulmonary disease) (CMS/HCC)    • Coronary artery disease    • Depression    • Diabetes mellitus (CMS/HCC)    • Dialysis patient (CMS/HCC)    • GERD (gastroesophageal reflux disease)    • Glaucoma    • H/O cardiac pacemaker     patient states he got his pacemaker removed in [place]   • Heart block    • Hepatitis C    • History of transfusion    • Hypertension    • Nephropathy, diabetic (CMS/HCC)    • Pacemaker    • Pulmonary embolism (CMS/HCC)      Past Surgical History:   Procedure Laterality Date   • ABDOMINAL SURGERY     • ARTERIOVENOUS FISTULA/SHUNT SURGERY Right     forearm loop   • ARTERIOVENOUS FISTULA/SHUNT SURGERY Left     removed past upper arm   • ARTERIOVENOUS FISTULA/SHUNT SURGERY Right 3/13/2018    Procedure: revision RIGHT forearm ARTERIOVENOUS graft (excision), debridement, wound vac;  Surgeon: Jerson Singh MD;  Location: [facility];  Service: Vascular   • ARTERIOVENOUS FISTULA/SHUNT SURGERY W/ HEMODIALYSIS CATHETER INSERTION Right 4/4/2016    Procedure: RIGHT ARM AV FISTULA DECLOT REVISION REPAIR BRACHIAL ANASTOMOTIC PSUEDOANEURYSM LEFT FEMORAL RICHARDSON FISTULOGRAM WITH ANGIOPLASTY;  Surgeon: Jerson Carpenter MD;  Location: [facility] OR 18/19;  Service:    • CATARACT EXTRACTION W/ INTRAOCULAR LENS IMPLANT Left 3/31/2017    Procedure: REMOVE CATARACT AND IMPLANT INTRAOCULAR LENS LEFT EYE;  Surgeon: Hilton Escamilla  MD [place];  Location: [facility] OR;  Service:    • COLONOSCOPY N/A 3/12/2019    Procedure: COLONOSCOPY;  Surgeon: Flako Montoya MD;  Location: [facility] ENDOSCOPY;  Service: Gastroenterology   • COLONOSCOPY N/A 11/6/2019    Procedure: COLONOSCOPY;  Surgeon: Tricia Philip MD;  Location: [facility] ENDOSCOPY;  Service: Gastroenterology   • ENDOSCOPY N/A 3/12/2019    Procedure: ESOPHAGOGASTRODUODENOSCOPY;  Surgeon: Flako Montoya MD;  Location: [facility] ENDOSCOPY;  Service: Gastroenterology   • ENDOSCOPY N/A 11/5/2019    Procedure: ESOPHAGOGASTRODUODENOSCOPY;  Surgeon: Tricia Philip MD;  Location: [facility] ENDOSCOPY;  Service: Gastroenterology   • EYE SURGERY     • HERNIA REPAIR     • IMPLANTABLE CARDIOVERTER DEFIBRILLATOR LEAD REPLACEMENT/POCKET REVISION Right 4/11/2016    Procedure: PACEMAKER LEADS X 3 AND BATTERY EXTRACTION WITH EXIMER LASER;  Surgeon: Vern Reeves MD;  Location: [facility] 18/19;  Service:    • INSERTION HEMODIALYSIS CATHETER Right 05/2015    jugular   • INTERVENTIONAL RADIOLOGY PROCEDURE Right 6/1/2017    Procedure: RIGHT dialysis fistulagram & angioplasty;  Surgeon: Jerson Singh MD;  Location: [facility] ANGIO INVASIVE LOCATION;  Service:    • INTERVENTIONAL RADIOLOGY PROCEDURE Right 8/24/2017    Procedure: IR dialysis fistulagram;  Surgeon: Jerson Singh MD;  Location: [facility] ANGIO INVASIVE LOCATION;  Service:    • INTERVENTIONAL RADIOLOGY PROCEDURE Right 11/13/2018    Procedure: IR dialysis fistulagram;  Surgeon: Jerson Singh MD;  Location: [facility] ANGIO INVASIVE LOCATION;  Service: Interventional Radiology   • PACEMAKER IMPLANTATION  2008    dual chamber [employer] [place]   • REMOVAL HEMODIALYSIS CATHETER Right 05/2015    femoral vein   • SIGMOIDOSCOPY N/A 8/2/2019    Procedure: SIGMOIDOSCOPY FLEXIBLE;  Surgeon: Flako Montoya MD;  Location: [facility] ENDOSCOPY;  Service: Gastroenterology     Family History   Problem Relation Age of Onset   • COPD  Sister    • Diabetes Brother    • Early death Brother    • Hyperlipidemia Brother    • Hypertension Brother    • Coronary artery disease Mother    • Diabetes Mother    • Hypertension Mother    • Stroke Other    • Other Other         Respiratory disorder     Social History     Tobacco Use   • Smoking status: Former Smoker     Types: Cigarettes   • Smokeless tobacco: Never Used   • Tobacco comment: quit 20 years ago    Substance Use Topics   • Alcohol use: No     Comment: former heavy use in his 50's, up to a fifth of liquor a day, currently no alcohol   • Drug use: No     Medications Prior to Admission   Medication Sig Dispense Refill Last Dose   • acetaminophen (TYLENOL) 500 MG tablet Take 500 mg by mouth Every 4 (Four) Hours As Needed for Mild Pain  or Fever.   Unknown at Unknown time   • aspirin 81 MG chewable tablet Chew 81 mg Daily.   11/19/2019 at Unknown time   • benzonatate (TESSALON) 100 MG capsule Take 100 mg by mouth 3 (Three) Times a Day As Needed for Cough.   Unknown at Unknown time   • brimonidine (ALPHAGAN P) 0.1 % solution ophthalmic solution Administer  to both eyes 3 (Three) Times a Day.   11/19/2019 at Unknown time   • Cholecalciferol (VITAMIN D3) 2000 units chewable tablet Chew 2,000 Units Daily.   11/19/2019 at Unknown time   • cyclobenzaprine (FLEXERIL) 5 MG tablet Take 5 mg by mouth Every 8 (Eight) Hours As Needed for Muscle Spasms.   Unknown at Unknown time   • dorzolamide (TRUSOPT) 2 % ophthalmic solution Administer 1 drop into the left eye 3 (Three) Times a Day. 1 each 12 11/19/2019 at Unknown time   • escitalopram (LEXAPRO) 10 MG tablet Take 1 tablet by mouth Daily. 30 tablet 3 11/19/2019 at Unknown time   • famotidine (PEPCID) 20 MG tablet Take 20 mg by mouth every night at bedtime.   11/18/2019 at Unknown time   • fluticasone (FLONASE) 50 MCG/ACT nasal spray 2 sprays into each nostril daily. Administer 2 sprays in each nostril for each dose.   Unknown at Unknown time   • furosemide  (LASIX) 40 MG tablet Take 40 mg by mouth 2 (Two) Times a Day.      • gabapentin (NEURONTIN) 100 MG capsule Take 1 capsule by mouth Every Night. 30 capsule 0    • Glecaprevir-Pibrentasvir (MAVYRET) 100-40 MG tablet Take 3 tablets by mouth Daily. 90 tablet 2 11/19/2019 at Unknown time   • guaiFENesin (HUMIBID 3) 400 MG tablet Take 400 mg by mouth Every 4 (Four) Hours As Needed for Cough.   Unknown at Unknown time   • insulin aspart (NovoLOG) 100 UNIT/ML injection Inject  under the skin 3 (Three) Times a Day Before Meals. Sliding scale:  = 0 units; 150-200 = 3 units; 200-250 = 6 units; 250-300 = 9 units; 300-350 = 12 units; >350 = 15 units and call MD   11/19/2019 at Unknown time   • insulin detemir (LEVEMIR) 100 UNIT/ML injection Inject 30 Units under the skin into the appropriate area as directed Daily.   11/19/2019 at Unknown time   • lactulose (CHRONULAC) 10 GM/15ML solution Take 30 mL by mouth 3 (Three) Times a Day. (Patient taking differently: Take 30 mL by mouth 3 (Three) Times a Day.) 946 mL 3 11/19/2019 at Unknown time   • latanoprost (XALATAN) 0.005 % ophthalmic solution Administer 1 drop to both eyes every night.   11/18/2019 at Unknown time   • Lidocaine (ASPERCREME LIDOCAINE) 4 % patch Apply 1 patch topically Daily As Needed (low back pain. on for 12 hours.).   Unknown at Unknown time   • Loratadine 10 MG capsule Take 10 mg by mouth Every Other Day.   11/18/2019 at Unknown time   • melatonin 5 MG tablet tablet Take 5 mg by mouth Every Night.   11/18/2019 at Unknown time   • ondansetron ODT (ZOFRAN-ODT) 4 MG disintegrating tablet Take 1 tablet by mouth Every 6 (Six) Hours As Needed for Nausea or Vomiting. 10 tablet 0 Unknown at Unknown time   • polyethylene glycol (MIRALAX) packet Take 17 g by mouth Daily As Needed (constipation).   11/19/2019 at Unknown time   • rifaximin (XIFAXAN) 550 MG tablet Take 1 tablet by mouth Every 12 (Twelve) Hours. 60 tablet 5 Unknown at Unknown time   • sevelamer  (RENVELA) 800 MG tablet Take 800 mg by mouth 3 (Three) Times a Day With Meals.   11/19/2019 at Unknown time   • traZODone (DESYREL) 50 MG tablet Take 50 mg by mouth every night at bedtime.   11/18/2019 at Unknown time     Allergies:  Lisinopril and Motrin [ibuprofen]     CURRENT MEDICATIONS/OBJECTIVE/VS/PE:     Current Medications:     Current Facility-Administered Medications   Medication Dose Route Frequency Provider Last Rate Last Dose   • 8-hydroxyquinoline sulfate (BAG BALM) ointment   Apply externally PRN Guero Huynh MD       • acetaminophen (TYLENOL) tablet 650 mg  650 mg Oral Q4H PRN Tricia Philip MD   650 mg at 01/03/20 2139    Or   • acetaminophen (TYLENOL) suppository 650 mg  650 mg Rectal Q4H PRN Tricia Philip MD       • albumin human 25 % IV SOLN 25 g  25 g Intravenous PRN Alethea Diamond MD       • albuterol sulfate HFA (PROVENTIL HFA;VENTOLIN HFA;PROAIR HFA) inhaler 2 puff  2 puff Inhalation 4x Daily - RT Meseret Huynh MD   2 puff at 01/07/20 1444   • aspirin chewable tablet 81 mg  81 mg Oral Daily Tricia Philip MD   81 mg at 01/07/20 0834   • budesonide-formoterol (SYMBICORT) 160-4.5 MCG/ACT inhaler 2 puff  2 puff Inhalation BID - RT Imani Green MD   2 puff at 01/07/20 0656   • cefTRIAXone (ROCEPHIN) 1 g/100 mL 0.9% NS (MBP)  1 g Intravenous Q24H Guero Huynh MD   1 g at 01/07/20 1042   • cetirizine (zyrTEC) tablet 10 mg  10 mg Oral Daily Tricia Philip MD   10 mg at 01/07/20 0835   • cyclobenzaprine (FLEXERIL) tablet 5 mg  5 mg Oral Q8H PRN Tricia Philip MD   5 mg at 01/07/20 0835   • dextrose (D50W) 25 g/ 50mL Intravenous Solution 25 g  25 g Intravenous Q15 Min PRN Tricia Philip MD   25 g at 01/03/20 9950   • dextrose (GLUTOSE) oral gel 15 g  15 g Oral Q15 Min PRN Tricia Philip MD       • docusate sodium (COLACE) capsule 100 mg  100 mg Oral BID PRN Tricia Philip MD       • EPINEPHrine (ADRENALIN) 5 mg in sodium chloride 0.9 % 250 mL (0.02 mg/mL)  infusion  0.02-0.3 mcg/kg/min Intravenous Titrated Tricia Philip MD   Stopped at 01/03/20 0130   • epoetin ziggy (EPOGEN,PROCRIT) injection 10,000 Units  10,000 Units Intravenous Once per day on Mon Wed Fri Alethea Diamond MD   10,000 Units at 01/06/20 1446   • escitalopram (LEXAPRO) tablet 10 mg  10 mg Oral Daily Tricia Philip MD   10 mg at 01/07/20 0834   • fentaNYL citrate (PF) (SUBLIMAZE) injection 25 mcg  25 mcg Intravenous Q30 Min PRN Imani Green MD   25 mcg at 01/07/20 0830   • gabapentin (NEURONTIN) capsule 100 mg  100 mg Oral Nightly Tricia Philip MD   100 mg at 01/06/20 2121   • glucagon (human recombinant) (GLUCAGEN DIAGNOSTIC) injection 1 mg  1 mg Subcutaneous Q15 Min PRN Tricia Philip MD       • hydrOXYzine pamoate (VISTARIL) capsule 50 mg  50 mg Oral TID PRN Tricia Philip MD   50 mg at 01/02/20 1423   • insulin aspart (novoLOG) injection 0-24 Units  0-24 Units Subcutaneous 4x Daily AC & at Bedtime Guero Huynh MD   12 Units at 01/07/20 1207   • insulin detemir (LEVEMIR) injection 8 Units  8 Units Subcutaneous Nightly Guero Huynh MD       • ipratropium (ATROVENT HFA) inhaler 2 puff  2 puff Inhalation 4x Daily - RT Imani Green MD   2 puff at 01/07/20 1444   • lactulose (CHRONULAC) 10 GM/15ML solution 60 mL  60 mL Rectal 4x Daily Tricia Philip MD   60 mL at 01/07/20 1159   • latanoprost (XALATAN) 0.005 % ophthalmic solution 1 drop  1 drop Both Eyes Nightly Tricia Philip MD   1 drop at 01/06/20 2121   • lidocaine (LIDODERM) 5 % 1 patch  1 patch Transdermal Q24H Tricia Philip MD   1 patch at 01/07/20 0836   • melatonin tablet 6 mg  6 mg Oral Nightly Tricia Philip MD   6 mg at 01/05/20 2119   • midazolam (VERSED) 50 mg in sodium chloride 0.9 % 100 mL (0.5 mg/mL) infusion  1-10 mg/hr Intravenous Titrated Meseret Huynh MD   Stopped at 01/03/20 1334   • midazolam (VERSED) injection 1 mg  1 mg Intravenous Q30 Min PRN Imani Green MD   1 mg at  01/07/20 1521   • norepinephrine (LEVOPHED) 8 mg/250 mL (32 mcg/mL) in sodium chloride 0.9% infusion (premix)  0.02-0.3 mcg/kg/min Intravenous Titrated Darren Anderson MD   Stopped at 01/06/20 1200   • ondansetron (ZOFRAN) injection 4 mg  4 mg Intravenous Q6H PRN Tricia Philip MD       • pantoprazole (PROTONIX) injection 40 mg  40 mg Intravenous BID AC Tricia Philip MD   40 mg at 01/07/20 0629   • predniSONE (DELTASONE) tablet 40 mg  40 mg Oral Daily With Breakfast Imani Green MD   40 mg at 01/07/20 0835   • riFAXIMin (XIFAXAN) tablet 550 mg  550 mg Oral Q12H Tricia Philip MD   550 mg at 01/07/20 0836   • sevelamer (RENVELA) tablet 800 mg  800 mg Oral TID With Meals Tricia Philip MD   800 mg at 01/06/20 1148   • sodium chloride 0.9 % flush 10 mL  10 mL Intravenous Q12H Tricia Philip MD   10 mL at 01/07/20 0838   • sodium chloride 0.9 % flush 10 mL  10 mL Intravenous PRN Tricia Philip MD       • sodium chloride 0.9 % flush 10 mL  10 mL Intravenous Q12H Tricia Philip MD   10 mL at 01/07/20 0837   • sodium chloride 0.9 % flush 10 mL  10 mL Intravenous PRN Tricia Philip MD       • traZODone (DESYREL) tablet 50 mg  50 mg Oral Nightly PRN Tricia Philip MD   50 mg at 01/03/20 2139       Objective     Review of Systems:   Review of Systems  Pt intubated on vent  Physical Exam:   Temp:  [97.9 °F (36.6 °C)-98.2 °F (36.8 °C)] 98.2 °F (36.8 °C)  Heart Rate:  [66-91] 74  Resp:  [14-29] 20  BP: ()/(50-76) 128/61  FiO2 (%):  [30 %] 30 %     Physical Exam:  General Appearance:    Alert, cooperative, in no acute distress   Head:    Normocephalic, without obvious abnormality, atraumatic   Eyes:            Lids and lashes normal, conjunctivae and sclerae normal, no   icterus, no pallor, corneas clear, PERRLA   Ears:    Ears appear intact with no abnormalities noted   Throat:   No oral lesions, no thrush, oral mucosa moist   Neck:   No adenopathy, supple, trachea midline, no  thyromegaly, no     carotid bruit, no JVD   Back:     No kyphosis present, no scoliosis present, no skin lesions,       erythema or scars, no tenderness to percussion or                   palpation,   range of motion normal   Lungs:     Clear to auscultation,respirations regular, even and                   unlabored    Heart:    Regular rhythm and normal rate, normal S1 and S2, no            murmur, no gallop, no rub, no click   Breast Exam:    Deferred   Abdomen:     Normal bowel sounds, no masses, no organomegaly, soft        non-tender, non-distended, no guarding, no rebound                 tenderness   Genitalia:    Deferred   Extremities:   Moves all extremities well, no edema, no cyanosis, no              redness   Pulses:   Pulses palpable and equal bilaterally   Skin:   No bleeding, bruising or rash   Lymph nodes:   No palpable adenopathy   Neurologic:   Cranial nerves 2 - 12 grossly intact, sensation intact, DTR        present and equal bilaterally      Results Review:     Lab Results (last 24 hours)     Procedure Component Value Units Date/Time    POC Glucose Once [[id number]]  (Abnormal) Collected:  01/07/20 1158    Specimen:  Blood Updated:  01/07/20 1220     Glucose 274 mg/dL      Comment: Result Not ConfirmedOperator: [id number] [facility] HEATHERMeter ID: [id number]       POC Glucose Once [[id number]]  (Abnormal) Collected:  01/07/20 0547    Specimen:  Blood Updated:  01/07/20 1219     Glucose 287 mg/dL      Comment: : [id number] PAT [place] ID: [id number]       Blood Culture - Blood, Blood, Arterial Line [[id number]] Collected:  01/04/20 0739    Specimen:  Blood, Arterial Line Updated:  01/07/20 0815     Blood Culture No growth at 3 days    Comprehensive Metabolic Panel [[id number]]  (Abnormal) Collected:  01/07/20 0402    Specimen:  Blood Updated:  01/07/20 0516     Glucose 250 mg/dL      BUN 44 mg/dL      Creatinine 4.72 mg/dL      Sodium 136 mmol/L      Potassium 2.9 mmol/L       Chloride 91 mmol/L      CO2 28.0 mmol/L      Calcium 8.1 mg/dL      Total Protein 9.0 g/dL      Albumin 2.40 g/dL      ALT (SGPT) 21 U/L      AST (SGOT) 48 U/L      Alkaline Phosphatase 103 U/L      Total Bilirubin 0.8 mg/dL      eGFR  African Amer 15 mL/min/1.73      Globulin 6.6 gm/dL      A/G Ratio 0.4 g/dL      BUN/Creatinine Ratio 9.3     Anion Gap 17.0 mmol/L     Narrative:       GFR Normal >60  Chronic Kidney Disease <60  Kidney Failure <15      CBC & Differential [[id number]] Collected:  01/07/20 0402    Specimen:  Blood Updated:  01/07/20 0511    Narrative:       The following orders were created for panel order CBC & Differential.  Procedure                               Abnormality         Status                     ---------                               -----------         ------                     CBC Auto Differential[[id number]]        Abnormal            Final result                 Please view results for these tests on the individual orders.    CBC Auto Differential [[id number]]  (Abnormal) Collected:  01/07/20 0402    Specimen:  Blood Updated:  01/07/20 0511     WBC 9.26 10*3/mm3      RBC 2.79 10*6/mm3      Hemoglobin 7.6 g/dL      Hematocrit 23.3 %      MCV 83.5 fL      MCH 27.2 pg      MCHC 32.6 g/dL      RDW 16.8 %      RDW-SD 51.1 fl      MPV 9.2 fL      Platelets 133 10*3/mm3      Neutrophil % 74.5 %      Lymphocyte % 15.4 %      Monocyte % 6.4 %      Eosinophil % 0.0 %      Basophil % 0.0 %      Immature Grans % 3.7 %      Neutrophils, Absolute 6.90 10*3/mm3      Lymphocytes, Absolute 1.43 10*3/mm3      Monocytes, Absolute 0.59 10*3/mm3      Eosinophils, Absolute 0.00 10*3/mm3      Basophils, Absolute 0.00 10*3/mm3      Immature Grans, Absolute 0.34 10*3/mm3      nRBC 0.8 /100 WBC     POC Glucose Once [[id number]]  (Abnormal) Collected:  01/06/20 2152    Specimen:  Blood Updated:  01/06/20 2216     Glucose 234 mg/dL      Comment: : [id number] PAT [place] ID: [id number]        POC Glucose Once [[id number]]  (Abnormal) Collected:  01/06/20 1803    Specimen:  Blood Updated:  01/06/20 2154     Glucose 132 mg/dL      Comment: : [id number] [facility] REBECCAMeter ID: [id number]              I reviewed the patient's new clinical results.  I reviewed the patient's new imaging results and agree with the interpretation.     ASSESSMENT/PLAN:   ASSESSMENT: melena resolved  Anemia, stable.  S/P cardio pulmonary arrest, failed weaning trial  Hepatic encephalopathy, ammonia level is improving  ESRD, on hemodialysis.  Duodenal ulcerations, path C/W inflammation  Subdural hematoma  PLAN: Continue antibiotics  Continue tube feed  Cont PPI  Follow H/H closely  Continue lactulose    The risks, benefits, and alternatives of this procedure have been discussed with the patient or the responsible party- the patient understands and agrees to proceed.         Tricia Philip MD  01/07/20  4:13 PM

## 2020-01-07 NOTE — SIGNIFICANT NOTE
"Spoke with Farhan Grubbs- at 9:40 am on 1/7/20  Mr Farhan Grubbs stated \" I already called and spoke to a doctor, Make her, his wife name ahead of mine\"  Myself- \"Sir are you requesting his wife make medical decisions\"  Mr Farhan Grubbs- \" yeah, shes his wife\"    Gave Farhan Grubbs the number to call [phone] if he had any questions or concerns     Signed,   Demarcus Oliveira MD  Family Medicine Resident PGY2  [facility]        This document has been electronically signed by Demarcus Oliveira MD on January 7, 2020 9:43 AM                  "

## 2020-01-07 NOTE — ACP (ADVANCE CARE PLANNING)
"Spoke with the patients brother SARMAD SÁNCHEZ,  Who endorsed his desire for the patients wife to make medical decisions.   Spoke with the patients wife at 10:53 am -  Who voiced understanding and stated \" I will make the final decisions and the family will have to respect that\"     AS OF 10:54 AM ON 1/7/20-  THE PATIENT,S WIFE (LEATHA SÁNCHEZ) WILL MAKE MEDICAL DECISIONS ON BEHALF OF THE PATIENT     Signed,   Demarcus Oliveira MD  Family Medicine Resident PGY2  [facility]        This document has been electronically signed by Demarcus Oliveira MD on January 7, 2020 10:56 AM       "

## 2020-01-07 NOTE — PLAN OF CARE
Problem: Patient Care Overview  Goal: Plan of Care Review  Outcome: Ongoing (interventions implemented as appropriate)  Flowsheets (Taken 1/6/2020 9927)  Progress: no change  Plan of Care Reviewed With: patient; spouse  Outcome Summary: no changeother then levophed is off.

## 2020-01-07 NOTE — PROGRESS NOTES
FAMILY MEDICINE DAILY PROGRESS NOTE  NAME: Cristo Musa  : 1950  MRN: [id number]     LOS: 4 days     PROVIDER OF SERVICE: Demarcus Oliveira MD    Chief Complaint: Gastrointestinal hemorrhage    Subjective:     Interval History:  History taken from: chart family RN  Patient is a 69 y.o  male admitted for GI Hemorrhage, currently intubated for respiratory failure.   Patient is still unresponsive    Nursing staff reported no incidents during the night.   Feeding- feeding tube- Novasource renal  Analgesia- fentanyl   Sedation-  Propofol-  Off sedation currently   Thromboprophylaxis- SCD  Heads up- 30 degrees- YES  Ulcer Prophylaxis- protonix 40 mg  Glycemic control-  SSI  SBT-  In process    Of note-  CONTACT PRECAUTIONS-  RSV positive, MRSA negative    Wife present in room. Was provided update. Wife stated that she would make a decision after results of CT SCAN today     Review of Systems:    Review of Systems   Unable to perform ROS: Intubated       Objective:     Vital Signs  Temp:  [97.9 °F (36.6 °C)-98.8 °F (37.1 °C)] 97.9 °F (36.6 °C)  Heart Rate:  [66-84] 77  Resp:  [14-29] 18  BP: ()/(50-71) 91/50  FiO2 (%):  [30 %] 30 %    Physical Exam  Physical Exam   Constitutional: No distress.   Sickly apperance, intubated    HENT:   Head: Normocephalic and atraumatic.   Right Ear: External ear normal.   Left Ear: External ear normal.   Mouth/Throat: Oropharynx is clear and moist.    Healed Bite marks noted on tongue   Eyes: Conjunctivae are normal. No scleral icterus.   Bilateral cataracts    Neck: Normal range of motion.   Cardiovascular: Normal rate, regular rhythm and normal heart sounds. Exam reveals no gallop and no friction rub.   No murmur heard.  Pulmonary/Chest: No stridor. He is in respiratory distress. He has wheezes (diffuse). He has rales. He exhibits no tenderness.   Currently intubated     Abdominal: Soft. Bowel sounds are normal. He exhibits distension.    Musculoskeletal: Normal range of motion. He exhibits no edema or tenderness.   Skin: Skin is warm and dry. No rash noted. He is not diaphoretic. No erythema. No pallor.       Medication Review    Current Facility-Administered Medications:   •  8-hydroxyquinoline sulfate (BAG BALM) ointment, , Apply externally, PRN, Guero Huynh MD  •  acetaminophen (TYLENOL) tablet 650 mg, 650 mg, Oral, Q4H PRN, 650 mg at 01/03/20 2139 **OR** [DISCONTINUED] acetaminophen (TYLENOL) 160 MG/5ML solution 650 mg, 650 mg, Oral, Q4H PRN **OR** acetaminophen (TYLENOL) suppository 650 mg, 650 mg, Rectal, Q4H PRN, Tricia Philip MD  •  albumin human 25 % IV SOLN 25 g, 25 g, Intravenous, PRN, Alethea Diamond MD  •  albuterol sulfate HFA (PROVENTIL HFA;VENTOLIN HFA;PROAIR HFA) inhaler 2 puff, 2 puff, Inhalation, 4x Daily - RT, Meseret Huynh MD, 2 puff at 01/07/20 0656  •  aspirin chewable tablet 81 mg, 81 mg, Oral, Daily, Tricia Philip MD, 81 mg at 01/06/20 0912  •  budesonide-formoterol (SYMBICORT) 160-4.5 MCG/ACT inhaler 2 puff, 2 puff, Inhalation, BID - RT, Imani Green MD, 2 puff at 01/07/20 0656  •  cefTRIAXone (ROCEPHIN) 1 g/100 mL 0.9% NS (MBP), 1 g, Intravenous, Q24H, Guero Huynh MD, 1 g at 01/06/20 1147  •  cetirizine (zyrTEC) tablet 10 mg, 10 mg, Oral, Daily, Tricia Philip MD, 10 mg at 01/06/20 0912  •  cyclobenzaprine (FLEXERIL) tablet 5 mg, 5 mg, Oral, Q8H PRN, Tricia Philip MD  •  dextrose (D50W) 25 g/ 50mL Intravenous Solution 25 g, 25 g, Intravenous, Q15 Min PRN, Tricia Philip MD, 25 g at 01/03/20 2511  •  dextrose (GLUTOSE) oral gel 15 g, 15 g, Oral, Q15 Min PRN, Tricia Philip MD  •  docusate sodium (COLACE) capsule 100 mg, 100 mg, Oral, BID PRN, Tricia Philip MD  •  EPINEPHrine (ADRENALIN) 5 mg in sodium chloride 0.9 % 250 mL (0.02 mg/mL) infusion, 0.02-0.3 mcg/kg/min, Intravenous, Titrated, Tricia Philip MD, Stopped at 01/03/20 0130  •  epoetin ziggy (EPOGEN,PROCRIT) injection  10,000 Units, 10,000 Units, Intravenous, Once per day on Mon Wed Fri, Alethea Diamond MD, 10,000 Units at 01/06/20 1446  •  escitalopram (LEXAPRO) tablet 10 mg, 10 mg, Oral, Daily, Tricia Philip MD, 10 mg at 01/06/20 0912  •  fentaNYL citrate (PF) (SUBLIMAZE) injection 25 mcg, 25 mcg, Intravenous, Q30 Min PRN, Imani Green MD, 25 mcg at 01/07/20 0640  •  gabapentin (NEURONTIN) capsule 100 mg, 100 mg, Oral, Nightly, Tricia Philip MD, 100 mg at 01/06/20 2121  •  glucagon (human recombinant) (GLUCAGEN DIAGNOSTIC) injection 1 mg, 1 mg, Subcutaneous, Q15 Min PRN, Tricia Philip MD  •  heparin (porcine) 5000 UNIT/ML injection 5,000 Units, 5,000 Units, Subcutaneous, Q12H, Guero Huynh MD, 5,000 Units at 01/06/20 2121  •  hydrOXYzine pamoate (VISTARIL) capsule 50 mg, 50 mg, Oral, TID PRN, Tricia Philip MD, 50 mg at 01/02/20 1423  •  insulin aspart (novoLOG) injection 0-24 Units, 0-24 Units, Subcutaneous, 4x Daily AC & at Bedtime, Guero Huynh MD, 12 Units at 01/07/20 0629  •  insulin detemir (LEVEMIR) injection 8 Units, 8 Units, Subcutaneous, Nightly, Guero Huynh MD  •  ipratropium (ATROVENT HFA) inhaler 2 puff, 2 puff, Inhalation, 4x Daily - RT, Imani Green MD, 2 puff at 01/07/20 0656  •  lactulose (CHRONULAC) 10 GM/15ML solution 60 mL, 60 mL, Rectal, 4x Daily, Tricia Philip MD, 60 mL at 01/06/20 2120  •  latanoprost (XALATAN) 0.005 % ophthalmic solution 1 drop, 1 drop, Both Eyes, Nightly, Tricia Philip MD, 1 drop at 01/06/20 2121  •  lidocaine (LIDODERM) 5 % 1 patch, 1 patch, Transdermal, Q24H, Tricia Philip MD, 1 patch at 01/06/20 0918  •  melatonin tablet 6 mg, 6 mg, Oral, Nightly, Tricia Philip MD, 6 mg at 01/05/20 2119  •  midazolam (VERSED) 50 mg in sodium chloride 0.9 % 100 mL (0.5 mg/mL) infusion, 1-10 mg/hr, Intravenous, Titrated, Meseret Huynh MD, Stopped at 01/03/20 1334  •  midazolam (VERSED) injection 1 mg, 1 mg, Intravenous, Q30 Min PRN, Imani Green  MD SHAWN, 1 mg at 01/07/20 0107  •  norepinephrine (LEVOPHED) 8 mg/250 mL (32 mcg/mL) in sodium chloride 0.9% infusion (premix), 0.02-0.3 mcg/kg/min, Intravenous, Titrated, Darren Anderson MD, Stopped at 01/06/20 1200  •  ondansetron (ZOFRAN) injection 4 mg, 4 mg, Intravenous, Q6H PRN, Tricia Philip MD  •  pantoprazole (PROTONIX) injection 40 mg, 40 mg, Intravenous, BID AC, Tricia Philip MD, 40 mg at 01/07/20 0629  •  potassium chloride (KLOR-CON) packet 40 mEq, 40 mEq, Oral, BID, Guero Huynh MD, 40 mEq at 01/07/20 0639  •  predniSONE (DELTASONE) tablet 40 mg, 40 mg, Oral, Daily With Breakfast, Imani Green MD, 40 mg at 01/06/20 1002  •  riFAXIMin (XIFAXAN) tablet 550 mg, 550 mg, Oral, Q12H, Tricia Philip MD, 550 mg at 01/06/20 2120  •  sevelamer (RENVELA) tablet 800 mg, 800 mg, Oral, TID With Meals, Tricia Philip MD, 800 mg at 01/06/20 1148  •  sodium chloride 0.9 % flush 10 mL, 10 mL, Intravenous, Q12H, Tricia Philip MD, 10 mL at 01/06/20 2145  •  sodium chloride 0.9 % flush 10 mL, 10 mL, Intravenous, PRN, Tricia Philip MD  •  sodium chloride 0.9 % flush 10 mL, 10 mL, Intravenous, Q12H, rTicia Philip MD, 10 mL at 01/06/20 2145  •  sodium chloride 0.9 % flush 10 mL, 10 mL, Intravenous, PRN, Tricia Philip MD  •  traZODone (DESYREL) tablet 50 mg, 50 mg, Oral, Nightly PRN, Tricia Philip MD, 50 mg at 01/03/20 2139     Diagnostic Data    Lab Results (last 24 hours)     Procedure Component Value Units Date/Time    Blood Culture - Blood, Blood, Arterial Line [[id number]] Collected:  01/04/20 0739    Specimen:  Blood, Arterial Line Updated:  01/07/20 0815     Blood Culture No growth at 3 days    Comprehensive Metabolic Panel [[id number]]  (Abnormal) Collected:  01/07/20 0402    Specimen:  Blood Updated:  01/07/20 0516     Glucose 250 mg/dL      BUN 44 mg/dL      Creatinine 4.72 mg/dL      Sodium 136 mmol/L      Potassium 2.9 mmol/L      Chloride 91 mmol/L      CO2 28.0  mmol/L      Calcium 8.1 mg/dL      Total Protein 9.0 g/dL      Albumin 2.40 g/dL      ALT (SGPT) 21 U/L      AST (SGOT) 48 U/L      Alkaline Phosphatase 103 U/L      Total Bilirubin 0.8 mg/dL      eGFR  African Amer 15 mL/min/1.73      Globulin 6.6 gm/dL      A/G Ratio 0.4 g/dL      BUN/Creatinine Ratio 9.3     Anion Gap 17.0 mmol/L     Narrative:       GFR Normal >60  Chronic Kidney Disease <60  Kidney Failure <15      CBC & Differential [[id number]] Collected:  01/07/20 0402    Specimen:  Blood Updated:  01/07/20 0511    Narrative:       The following orders were created for panel order CBC & Differential.  Procedure                               Abnormality         Status                     ---------                               -----------         ------                     CBC Auto Differential[[id number]]        Abnormal            Final result                 Please view results for these tests on the individual orders.    CBC Auto Differential [[id number]]  (Abnormal) Collected:  01/07/20 0402    Specimen:  Blood Updated:  01/07/20 0511     WBC 9.26 10*3/mm3      RBC 2.79 10*6/mm3      Hemoglobin 7.6 g/dL      Hematocrit 23.3 %      MCV 83.5 fL      MCH 27.2 pg      MCHC 32.6 g/dL      RDW 16.8 %      RDW-SD 51.1 fl      MPV 9.2 fL      Platelets 133 10*3/mm3      Neutrophil % 74.5 %      Lymphocyte % 15.4 %      Monocyte % 6.4 %      Eosinophil % 0.0 %      Basophil % 0.0 %      Immature Grans % 3.7 %      Neutrophils, Absolute 6.90 10*3/mm3      Lymphocytes, Absolute 1.43 10*3/mm3      Monocytes, Absolute 0.59 10*3/mm3      Eosinophils, Absolute 0.00 10*3/mm3      Basophils, Absolute 0.00 10*3/mm3      Immature Grans, Absolute 0.34 10*3/mm3      nRBC 0.8 /100 WBC     POC Glucose Once [[id number]]  (Abnormal) Collected:  01/06/20 2152    Specimen:  Blood Updated:  01/06/20 2216     Glucose 234 mg/dL      Comment: : [id number] RADHAJOSHUA Henny ID: [id number]       POC Glucose Once [[id number]]   (Abnormal) Collected:  01/06/20 1803    Specimen:  Blood Updated:  01/06/20 2154     Glucose 132 mg/dL      Comment: : [id number] [facility] REBECCAMeter ID: [id number]       POC Glucose Once [[id number]]  (Abnormal) Collected:  01/06/20 0841    Specimen:  Blood Updated:  01/06/20 1402     Glucose 445 mg/dL      Comment: : [id number] BEE GRIJALVAMeter ID: [id number]       POC Glucose Once [[id number]]  (Abnormal) Collected:  01/06/20 1150    Specimen:  Blood Updated:  01/06/20 1243     Glucose 393 mg/dL      Comment: : [id number] BEE GRIJALVAMeter ID: [id number]       Respiratory Culture - Sputum, Oropharynx [[id number]] Collected:  01/04/20 1542    Specimen:  Sputum from Oropharynx Updated:  01/06/20 0909     Respiratory Culture Light growth (2+) Normal Respiratory Ana     Gram Stain Many (4+) WBCs per low power field      Rare (1+) Epithelial cells per low power field      Mixed bacterial ana           Imaging Results (Last 24 Hours)     ** No results found for the last 24 hours. **          I reviewed the patient's new clinical results.    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 Philip, Gi following.   - will continue to monitor H/H     • Hemophilus influenzae infection     - currently  On rocephin started on 1/6/19- for 7 days    Results from last 7 days   Lab Units 01/07/20  0402 01/06/20  0548 01/05/20  0357 01/04/20  0738 01/03/20  0439 01/02/20  0359   WBC 10*3/mm3 9.26 10.07 12.26* 8.80 5.34 5.97   ]       • RSV (respiratory syncytial virus infection)     Currently in acute respiratory failure requiring intubation   Will continue supportive care       -pulmonology following, Dr. Green  -vancomycin d/c on 1/519  with negative MRSA swab  -on zosyn D#3, continue empiric therapy  -continue solmedrol     • Fever of unknown origin     Blood cultures-  Negative x 3 days    Respiratory panel-  Positive for RSV   Respiratory culture- H influenzae  -CXR- Pulmonary vascular prominence. Interval  clearing of right lower lobe infiltrative changes since prior  exam. Favorable change.  Pulmonology following, appreciate recommendations      • Acute respiratory failure (CMS/[facility])     RSV positive   Currently intubated   Patient is a DNR/DNI    Family would like to wait a few days before considering extubation.     -pulmonology following, Dr. Green  -vancomycin d/c yesterday with negative MRSA swab  -on zosyn D#2, continue empiric therapy   -continue solumedrol     • Encephalopathy     -Presumed metabolic  -Ammonia level: improving  -Continue Lactulose, rifaxamin    GI following  -Ct scan today        • Chronic heart failure with preserved ejection fraction (CMS/[facility])     -last echo in July 2018 shows EF of 58% with grade 1a diastolic dysfunction  -daily weights  -care with fluids  -daily fluid restriction 1500 mL       • COPD (chronic obstructive pulmonary disease) (CMS/[facility])     - Continue flonase, loratadine  - Patient uses Oxygen prn at the nursing home.   - Duonebs and albuterol nebs prn.          • Gastroesophageal reflux disease without esophagitis     -PPI     • Chronic  pain     -Gabapentin        • Anxiety     - Continue Lexapro      • Primary insomnia     -trazodone, melatonin     • ESRD (end stage renal disease) (CMS/[facility])     -HD M/W/F  -Nephrology on board   -epogen on M/W/F with dialysis  -continue Sevelamer 800 mg three times daily        • Type 2 diabetes mellitus with chronic kidney disease on chronic dialysis, with long-term current use of insulin (CMS/[facility])     -levemir, SSI  - Dialysis on Monday, Wednesday, Friday   Results from last 7 days   Lab Units 01/07/20  0402 01/06/20  0548 01/05/20  0357 01/04/20  0738 01/03/20  0439 01/02/20  1815 01/02/20  0358   CREATININE mg/dL 4.72* 8.48* 7.08* 5.78* 9.46* 8.67* 7.77*   ]     • Essential hypertension     -not on any home medications, will monitor            DVT prophylaxis: SCDs/TEDs  Code Status and Medical Interventions:   Ordered at: 01/02/20 1525     Limited Support to NOT Include:    Intubation     Level Of Support Discussed With:    Health Care Surrogate     Code Status:    No CPR     Medical Interventions (Level of Support Prior to Arrest):    Limited       Plan for disposition: will have discussion with family       Time: 30 minutes      Signed,   Demarcus Oliveira MD  Family Medicine Resident PGY2  [facility]        This document has been electronically signed by Demarcus Oliveira MD on January 7, 2020 8:25 AM    \

## 2020-01-07 NOTE — CONSULTS
Adult Nutrition  Assessment    Patient Name:  Cristo Musa  YOB: 1950  MRN: [id number]  Admit Date:  1/2/2020    Assessment Date:  1/7/2020    Comments:  Pt remains NPO on the vent.  EN started and currently at goal rate.  He remains on low dose of Levophed.  Rectal tube in place due to pt receiving lactulose.  GI following NH3.  FSBS elevated--covered by SSI.  He may need some regular scheduled insulin.  RD will monitor.      Reason for Assessment     Row Name 01/07/20 1425          Reason for Assessment    Reason For Assessment  follow-up protocol         Nutrition/Diet History     Row Name 01/07/20 1425          Nutrition/Diet History    Typical Food/Fluid Intake  Pt resting on the vent.  Per staff pt is basically unresponsive.  He is to have an EEG at a later time.  Tolerating tube feeding.  Still getting lactulose         Anthropometrics     Row Name 01/07/20 0700          Anthropometrics    Weight  92.2 kg (203 lb 4.2 oz)         Labs/Tests/Procedures/Meds     Row Name 01/07/20 1426          Labs/Procedures/Meds    Lab Results Reviewed  reviewed, pertinent     Lab Results Comments  K+ 2.9; Glucose 250; Bun 44; Creat 4.72; Platelets 133; Alb 2.40; Ca+ 4.34' FSBS:  421/393/234/287/274        Diagnostic Tests/Procedures    Diagnostic Test/Procedure Reviewed  reviewed, pertinent     Diagnostic Test/Procedures Comments  Dialysis        Medications    Pertinent Medications Reviewed  reviewed, pertinent     Pertinent Medications Comments  Abx; Epogen; SSI         Physical Findings     Row Name 01/07/20 1429          Physical Findings    Overall Physical Appearance  on ventilator support     Gastrointestinal  diarrhea     Tubes  nasoduodenal tube           Nutrition Prescription Ordered     Row Name 01/07/20 1434          Nutrition Prescription PO    Current PO Diet  NPO        Nutrition Prescription EN    Enteral Route  ND     Product  Novasource Renal (Nepro)     TF Delivery Method   Continuous     Continuous TF Goal Rate (mL/hr)  40 mL/hr     Continuous TF Current Rate (mL/hr)  40 mL/hr     Water flush (mL)   40 mL     Water Flush Frequency  Per hour         Evaluation of Received Nutrient/Fluid Intake     Row Name 01/07/20 1435          Calories Evaluation    Enteral Calories (kcal)  1920     % of Kcal Needs  100        Protein Evaluation    Enteral Protein (gm)  87     % of Protein Needs  95        Intake Assessment    Energy/Calorie Requirement Assessment  meeting needs     Protein Requirement Assessment  meeting needs               Electronically signed by:  Jackie Bartholomew RD  01/07/20 2:41 PM

## 2020-01-07 NOTE — SIGNIFICANT NOTE
"Patients wife stated that she will make a decision to terminally extubate and place patient on comfort measures on Thursday. She is waiting for the family to arrive.   Once again, stressed the emergent nature of subdural hematoma and need for emergent evaluation by neuro-surgery. Patient's wife voiced understanding of dangers of waiting and voiced understanding of the risks of an untreated subdural hematoma, patient's wife understood that such a decision (to wait)  could lead to irreparable brain damage and possible death.   Patient's wife stated that \"if he codes and dies, he is a DNR and let him go\"    Witness - nursing staff (Jeny)    Signed,   Demarcus Oliveira MD  Family Medicine Resident PGY2  [facility]        This document has been electronically signed by Demarcus Oliveira MD on January 7, 2020 3:54 PM              "

## 2020-01-07 NOTE — PLAN OF CARE
Problem: Ventilation, Mechanical Invasive (Adult)  Goal: Signs and Symptoms of Listed Potential Problems Will be Absent, Minimized or Managed (Ventilation, Mechanical Invasive)  Outcome: Ongoing (interventions implemented as appropriate)  Note:   Pt placed back on A/C mode today.

## 2020-01-07 NOTE — SIGNIFICANT NOTE
"Spoke to radiology-   the patient has a subdural hematoma and development of acute on chronic pansinusitis -  evidence on CT scan.     Spoke with the patient's wife who is the healthcare surrogate -  regarding the subdural hematoma. The patient's wife stated \" I will wait for the EEG results to make a decision\"   -I stressed the emergent situation and need for transfer for further evaluation by neuro-surgery, The patient's wife voiced understanding and repeated \" I want to wait for the results of the EEG before making my mind\"   The patients wife voiced understanding of the dangers of waiting for the results and waiting to make medical decisions.     WITNESS: RT (Nadege MORSE)       Signed,   Demarcus Oliveira MD  Family Medicine Resident PGY2  [facility]        This document has been electronically signed by Demarcus Oliveira MD on January 7, 2020 11:19 AM      "

## 2020-01-07 NOTE — SIGNIFICANT NOTE
Spoke with brother who is listed as primary decision maker on living will over the phone. He states he does not want to make those decisions and wants patients wife to make those decisions. Wife is listed as secondary on living will.         This document has been electronically signed by Guero Huynh MD on January 7, 2020 9:33 AM

## 2020-01-07 NOTE — PROGRESS NOTES
"[facility]  [address] [place] [address]. [address]  T - [phone]  F - [phone]     Progress Note          PATIENT  DEMOGRAPHICS   PATIENT NAME: Cristo Musa                      PHYSICIAN: Beryl Meeks, Medical Student  : 1950  MRN: [id number]   LOS: 4 days    Patient Care Team:  Warren Stewart MD as PCP - General (Family Medicine)  Michelle Lo MD as Resident (Family Medicine)  John Sanchez MD as Resident (Family Medicine)  Marissa Boothe MD as Resident (Family Medicine)  Lucio Anderson MD as Resident (Family Medicine)  Demarcus Oliveira MD as Resident (Family Medicine)  Subjective   SUBJECTIVE      Mr. Araiza is a 69 year old M presenting with a gastrointestinal hemhorrage. Patient remains intubated for respiratory failure with RSV and H. Influenzae. Pt tolerated HD yesterday. Pt is set to have CT of head/neck today to assess brain damage. Pt's wife would like result before making decision of goal of care.     Objective   OBJECTIVE   Vital Signs  Temp:  [97.9 °F (36.6 °C)-98.8 °F (37.1 °C)] 97.9 °F (36.6 °C)  Heart Rate:  [66-91] 77  Resp:  [14-29] 18  BP: ()/(50-71) 91/50  FiO2 (%):  [30 %] 30 %    Flowsheet Rows      First Filed Value   Admission Height  172.7 cm (67.99\") Documented at 2020 1510   Admission Weight  95.7 kg (211 lb) Documented at 2020 0119           I/O last 3 completed shifts:  In: 2582.4 [I.V.:196.4; Other:1151; NG/GT:1035; IV Piggyback:200]  Out: 2400 [Other:1000; Stool:1400]    PHYSICAL EXAM    Physical Exam   Constitutional: He appears well-developed and well-nourished.   HENT:   Head: Normocephalic and atraumatic.   Eyes: Conjunctivae are normal.        Neck: Normal range of motion.   Cardiovascular: Normal rate and regular rhythm.   No murmur heard.  Pulmonary/Chest: He is in respiratory distress.   Patient exhibiting significant belly breathing with increased WOB.   Abdominal: Soft. There is " no tenderness.   Musculoskeletal: He exhibits no edema.   Neurological:   Patient is not alert.   Skin: Skin is warm and dry.       RESULTS   Results Review:    Results from last 7 days   Lab Units 01/07/20  0402 01/06/20  0548 01/05/20  0357   SODIUM mmol/L 136 135* 134*   POTASSIUM mmol/L 2.9* 4.9 4.8   CHLORIDE mmol/L 91* 93* 92*   CO2 mmol/L 28.0 19.0* 17.0*   BUN mg/dL 44* 95* 57*   CREATININE mg/dL 4.72* 8.48* 7.08*   CALCIUM mg/dL 8.1* 8.0* 8.4*   BILIRUBIN mg/dL 0.8 1.1 1.4*   ALK PHOS U/L 103 87 134*   ALT (SGPT) U/L 21 14 13   AST (SGOT) U/L 48* 57* 76*   GLUCOSE mg/dL 250* 421* 218*       Estimated Creatinine Clearance: 16.5 mL/min (A) (by C-G formula based on SCr of 4.72 mg/dL (H)).                Results from last 7 days   Lab Units 01/07/20  0402 01/06/20  0548 01/05/20  0357 01/04/20  0738 01/03/20  0439   WBC 10*3/mm3 9.26 10.07 12.26* 8.80 5.34   HEMOGLOBIN g/dL 7.6* 7.9* 8.1* 8.0* 7.3*   PLATELETS 10*3/mm3 133* 148 134* 123* 119*       Results from last 7 days   Lab Units 01/02/20  0358   INR  1.40*         Imaging Results (Last 24 Hours)     ** No results found for the last 24 hours. **           MEDICATIONS      albuterol sulfate HFA 2 puff Inhalation 4x Daily - RT   aspirin 81 mg Oral Daily   budesonide-formoterol 2 puff Inhalation BID - RT   cefTRIAXone 1 g Intravenous Q24H   cetirizine 10 mg Oral Daily   epoetin ziggy/ziggy-epbx 10,000 Units Intravenous Once per day on Mon Wed Fri   escitalopram 10 mg Oral Daily   gabapentin 100 mg Oral Nightly   heparin (porcine) 5,000 Units Subcutaneous Q12H   insulin aspart 0-24 Units Subcutaneous 4x Daily AC & at Bedtime   insulin detemir 8 Units Subcutaneous Nightly   ipratropium 2 puff Inhalation 4x Daily - RT   lactulose 60 mL Rectal 4x Daily   latanoprost 1 drop Both Eyes Nightly   lidocaine 1 patch Transdermal Q24H   melatonin 6 mg Oral Nightly   pantoprazole 40 mg Intravenous BID AC   potassium chloride 40 mEq Oral BID   predniSONE 40 mg Oral Daily  With Breakfast   riFAXIMin 550 mg Oral Q12H   sevelamer 800 mg Oral TID With Meals   sodium chloride 10 mL Intravenous Q12H   sodium chloride 10 mL Intravenous Q12H       EPINEPHrine 0.02-0.3 mcg/kg/min Last Rate: Stopped (01/03/20 0130)   midazolam 1-10 mg/hr Last Rate: Stopped (01/03/20 1334)   norepinephrine 0.02-0.3 mcg/kg/min Last Rate: Stopped (01/06/20 1200)       Assessment/Plan   ASSESSMENT / PLAN      Gastrointestinal hemorrhage    Type 2 diabetes mellitus with chronic kidney disease on chronic dialysis, with long-term current use of insulin (CMS/[facility])    Essential hypertension    ESRD (end stage renal disease) (CMS/[facility])    Primary insomnia    Chronic pain    Anxiety    Gastroesophageal reflux disease without esophagitis    COPD (chronic obstructive pulmonary disease) (CMS/[facility])    Chronic heart failure with preserved ejection fraction (CMS/[facility])    Encephalopathy    Fever of unknown origin    Acute respiratory failure (CMS/[facility])    RSV (respiratory syncytial virus infection)    Hemophilus influenzae infection       1.  ESRD on HD:  - Last hemodialysis was Tuesday before admission, however the patient did not complete his treatment and left more than 2 hours early. Patient had HD yesterday. Cr decreased from 8.48 to 4.72. Pt's potassium decreased to 2.9. Patient is set to have CT of head/neck today and eeg to assess anoxic brain injury - pt's wife would like to wait for result before making decision for goal of care. She also wants other close family members to be there to help making her final decision. Will tentatively plan for hd tomorrow     2.  GI bleed:  - s/p EGD. Has varices and ulcers     3.  HFpEF     4.  COPD / RSV + / H.influenzae  -On Ceftriaxone     5.  Chronic hepatitis C     6.  Anemia:  - Hemoglobin is low at 7.6. Epogen injection scheduled with HD.      7.  Hypertension:  - Blood pressure is controlled. On levophed. BP is lower today at 91/50.    8. Hypokalemia post hd and after correction of  metabolic acidosis will replace with kcl 40 meq oral              This document has been electronically signed by Beryl Meeks, Medical Student on January 7, 2020 8:42 AM      As the teaching physician, I have personally re-performed the physical exam and medical decision making activities included in the student note.    Pascual Vilchis MD  1/7/2020  10:13 AM

## 2020-01-07 NOTE — PLAN OF CARE
Problem: Nutrition, Enteral (Adult)  Goal: Signs and Symptoms of Listed Potential Problems Will be Absent, Minimized or Managed (Nutrition, Enteral)  Outcome: Ongoing (interventions implemented as appropriate)  Flowsheets (Taken 1/7/2020 1500)  Problems Assessed (Enteral Nutrition): all  Problems Present (Enteral Nutrition): other (see comments)  Note:   Rectal tube in place due to Lactulose     Problem: Patient Care Overview  Goal: Plan of Care Review  Outcome: Ongoing (interventions implemented as appropriate)  Flowsheets (Taken 1/7/2020 8414)  Progress: improving  Plan of Care Reviewed With: caregiver  Outcome Summary: Pt remains NPO on the vent.  EN started and at goal rate.  HE continues to receive Lactulose.  Rectal tube in place.

## 2020-01-07 NOTE — PROGRESS NOTES
SUBJECTIVE:   1/6/2020  Chief Complaint:     Subjective      Patient remains on vent and off sedation..  Tolerating tube feeds.  Receiving lactulose enemas.  Responding to painful stimuli.  History:  Past Medical History:   Diagnosis Date   • Anemia of chronic disease    • Cataract    • CHF (congestive heart failure) (CMS/HCC)    • Chronic pain syndrome    • CKD (chronic kidney disease)    • Clostridium difficile infection    • COPD (chronic obstructive pulmonary disease) (CMS/HCC)    • Coronary artery disease    • Depression    • Diabetes mellitus (CMS/HCC)    • Dialysis patient (CMS/[facility])    • GERD (gastroesophageal reflux disease)    • Glaucoma    • H/O cardiac pacemaker     patient states he got his pacemaker removed in [place]   • Heart block    • Hepatitis C    • History of transfusion    • Hypertension    • Nephropathy, diabetic (CMS/HCC)    • Pacemaker    • Pulmonary embolism (CMS/HCC)      Past Surgical History:   Procedure Laterality Date   • ABDOMINAL SURGERY     • ARTERIOVENOUS FISTULA/SHUNT SURGERY Right     forearm loop   • ARTERIOVENOUS FISTULA/SHUNT SURGERY Left     removed past upper arm   • ARTERIOVENOUS FISTULA/SHUNT SURGERY Right 3/13/2018    Procedure: revision RIGHT forearm ARTERIOVENOUS graft (excision), debridement, wound vac;  Surgeon: Jerson Singh MD;  Location: [facility];  Service: Vascular   • ARTERIOVENOUS FISTULA/SHUNT SURGERY W/ HEMODIALYSIS CATHETER INSERTION Right 4/4/2016    Procedure: RIGHT ARM AV FISTULA DECLOT REVISION REPAIR BRACHIAL ANASTOMOTIC PSUEDOANEURYSM LEFT FEMORAL RICHARDSON FISTULOGRAM WITH ANGIOPLASTY;  Surgeon: Jerson Carpenter MD;  Location: [facility] 18/19;  Service:    • CATARACT EXTRACTION W/ INTRAOCULAR LENS IMPLANT Left 3/31/2017    Procedure: REMOVE CATARACT AND IMPLANT INTRAOCULAR LENS LEFT EYE;  Surgeon: Hilton Montemayor MD;  Location: [facility];  Service:    • COLONOSCOPY N/A 3/12/2019    Procedure: COLONOSCOPY;  Surgeon:  Flako Montoya MD;  Location: [facility] ENDOSCOPY;  Service: Gastroenterology   • COLONOSCOPY N/A 11/6/2019    Procedure: COLONOSCOPY;  Surgeon: Tricia Philip MD;  Location: [facility] ENDOSCOPY;  Service: Gastroenterology   • ENDOSCOPY N/A 3/12/2019    Procedure: ESOPHAGOGASTRODUODENOSCOPY;  Surgeon: Flako Montoya MD;  Location: [facility] ENDOSCOPY;  Service: Gastroenterology   • ENDOSCOPY N/A 11/5/2019    Procedure: ESOPHAGOGASTRODUODENOSCOPY;  Surgeon: Tricia Philip MD;  Location: [facility] ENDOSCOPY;  Service: Gastroenterology   • EYE SURGERY     • HERNIA REPAIR     • IMPLANTABLE CARDIOVERTER DEFIBRILLATOR LEAD REPLACEMENT/POCKET REVISION Right 4/11/2016    Procedure: PACEMAKER LEADS X 3 AND BATTERY EXTRACTION WITH EXIMER LASER;  Surgeon: Vern Reeves MD;  [facility];  Service:    • INSERTION HEMODIALYSIS CATHETER Right 05/2015    jugular   • INTERVENTIONAL RADIOLOGY PROCEDURE Right 6/1/2017    Procedure: RIGHT dialysis fistulagram & angioplasty;  Surgeon: Jerson Singh MD;  Location: [facility] ANGIO INVASIVE LOCATION;  Service:    • INTERVENTIONAL RADIOLOGY PROCEDURE Right 8/24/2017    Procedure: IR dialysis fistulagram;  Surgeon: Jerson Singh MD;  Location: [facility] ANGIO INVASIVE LOCATION;  Service:    • INTERVENTIONAL RADIOLOGY PROCEDURE Right 11/13/2018    Procedure: IR dialysis fistulagram;  Surgeon: Jerson Singh MD;  Location: [facility] ANGIO INVASIVE LOCATION;  Service: Interventional Radiology   • PACEMAKER IMPLANTATION  2008    dual chamber [employer]   • REMOVAL HEMODIALYSIS CATHETER Right 05/2015    femoral vein   • SIGMOIDOSCOPY N/A 8/2/2019    Procedure: SIGMOIDOSCOPY FLEXIBLE;  Surgeon: Flako Montoya MD;  Location: [facility] ENDOSCOPY;  Service: Gastroenterology     Family History   Problem Relation Age of Onset   • COPD Sister    • Diabetes Brother    • Early death Brother    • Hyperlipidemia Brother    • Hypertension Brother    •  Coronary artery disease Mother    • Diabetes Mother    • Hypertension Mother    • Stroke Other    • Other Other         Respiratory disorder     Social History     Tobacco Use   • Smoking status: Former Smoker     Types: Cigarettes   • Smokeless tobacco: Never Used   • Tobacco comment: quit 20 years ago    Substance Use Topics   • Alcohol use: No     Comment: former heavy use in his 50's, up to a fifth of liquor a day, currently no alcohol   • Drug use: No     Medications Prior to Admission   Medication Sig Dispense Refill Last Dose   • acetaminophen (TYLENOL) 500 MG tablet Take 500 mg by mouth Every 4 (Four) Hours As Needed for Mild Pain  or Fever.   Unknown at Unknown time   • aspirin 81 MG chewable tablet Chew 81 mg Daily.   11/19/2019 at Unknown time   • benzonatate (TESSALON) 100 MG capsule Take 100 mg by mouth 3 (Three) Times a Day As Needed for Cough.   Unknown at Unknown time   • brimonidine (ALPHAGAN P) 0.1 % solution ophthalmic solution Administer  to both eyes 3 (Three) Times a Day.   11/19/2019 at Unknown time   • Cholecalciferol (VITAMIN D3) 2000 units chewable tablet Chew 2,000 Units Daily.   11/19/2019 at Unknown time   • cyclobenzaprine (FLEXERIL) 5 MG tablet Take 5 mg by mouth Every 8 (Eight) Hours As Needed for Muscle Spasms.   Unknown at Unknown time   • dorzolamide (TRUSOPT) 2 % ophthalmic solution Administer 1 drop into the left eye 3 (Three) Times a Day. 1 each 12 11/19/2019 at Unknown time   • escitalopram (LEXAPRO) 10 MG tablet Take 1 tablet by mouth Daily. 30 tablet 3 11/19/2019 at Unknown time   • famotidine (PEPCID) 20 MG tablet Take 20 mg by mouth every night at bedtime.   11/18/2019 at Unknown time   • fluticasone (FLONASE) 50 MCG/ACT nasal spray 2 sprays into each nostril daily. Administer 2 sprays in each nostril for each dose.   Unknown at Unknown time   • furosemide (LASIX) 40 MG tablet Take 40 mg by mouth 2 (Two) Times a Day.      • gabapentin (NEURONTIN) 100 MG capsule Take 1  capsule by mouth Every Night. 30 capsule 0    • Glecaprevir-Pibrentasvir (MAVYRET) 100-40 MG tablet Take 3 tablets by mouth Daily. 90 tablet 2 11/19/2019 at Unknown time   • guaiFENesin (HUMIBID 3) 400 MG tablet Take 400 mg by mouth Every 4 (Four) Hours As Needed for Cough.   Unknown at Unknown time   • insulin aspart (NovoLOG) 100 UNIT/ML injection Inject  under the skin 3 (Three) Times a Day Before Meals. Sliding scale:  = 0 units; 150-200 = 3 units; 200-250 = 6 units; 250-300 = 9 units; 300-350 = 12 units; >350 = 15 units and call MD   11/19/2019 at Unknown time   • insulin detemir (LEVEMIR) 100 UNIT/ML injection Inject 30 Units under the skin into the appropriate area as directed Daily.   11/19/2019 at Unknown time   • lactulose (CHRONULAC) 10 GM/15ML solution Take 30 mL by mouth 3 (Three) Times a Day. (Patient taking differently: Take 30 mL by mouth 3 (Three) Times a Day.) 946 mL 3 11/19/2019 at Unknown time   • latanoprost (XALATAN) 0.005 % ophthalmic solution Administer 1 drop to both eyes every night.   11/18/2019 at Unknown time   • Lidocaine (ASPERCREME LIDOCAINE) 4 % patch Apply 1 patch topically Daily As Needed (low back pain. on for 12 hours.).   Unknown at Unknown time   • Loratadine 10 MG capsule Take 10 mg by mouth Every Other Day.   11/18/2019 at Unknown time   • melatonin 5 MG tablet tablet Take 5 mg by mouth Every Night.   11/18/2019 at Unknown time   • ondansetron ODT (ZOFRAN-ODT) 4 MG disintegrating tablet Take 1 tablet by mouth Every 6 (Six) Hours As Needed for Nausea or Vomiting. 10 tablet 0 Unknown at Unknown time   • polyethylene glycol (MIRALAX) packet Take 17 g by mouth Daily As Needed (constipation).   11/19/2019 at Unknown time   • rifaximin (XIFAXAN) 550 MG tablet Take 1 tablet by mouth Every 12 (Twelve) Hours. 60 tablet 5 Unknown at Unknown time   • sevelamer (RENVELA) 800 MG tablet Take 800 mg by mouth 3 (Three) Times a Day With Meals.   11/19/2019 at Unknown time   • traZODone  (DESYREL) 50 MG tablet Take 50 mg by mouth every night at bedtime.   11/18/2019 at Unknown time     Allergies:  Lisinopril and Motrin [ibuprofen]     CURRENT MEDICATIONS/OBJECTIVE/VS/PE:     Current Medications:     Current Facility-Administered Medications   Medication Dose Route Frequency Provider Last Rate Last Dose   • 8-hydroxyquinoline sulfate (BAG BALM) ointment   Apply externally PRN Guero Huynh MD       • acetaminophen (TYLENOL) tablet 650 mg  650 mg Oral Q4H PRN Tricia Philip MD   650 mg at 01/03/20 2139    Or   • acetaminophen (TYLENOL) suppository 650 mg  650 mg Rectal Q4H PRN Tricia Philip MD       • albumin human 25 % IV SOLN 25 g  25 g Intravenous PRN Alethea Diamond MD       • albuterol sulfate HFA (PROVENTIL HFA;VENTOLIN HFA;PROAIR HFA) inhaler 2 puff  2 puff Inhalation 4x Daily - RT Meseret Huynh MD   2 puff at 01/06/20 2101   • aspirin chewable tablet 81 mg  81 mg Oral Daily Tricia Philip MD   81 mg at 01/06/20 0912   • budesonide-formoterol (SYMBICORT) 160-4.5 MCG/ACT inhaler 2 puff  2 puff Inhalation BID - RT Imani Green MD   2 puff at 01/06/20 2101   • cefTRIAXone (ROCEPHIN) 1 g/100 mL 0.9% NS (MBP)  1 g Intravenous Q24H Guero Huynh MD   1 g at 01/06/20 1147   • cetirizine (zyrTEC) tablet 10 mg  10 mg Oral Daily Tricia Philip MD   10 mg at 01/06/20 0912   • cyclobenzaprine (FLEXERIL) tablet 5 mg  5 mg Oral Q8H PRN Tricia Philip MD       • dextrose (D50W) 25 g/ 50mL Intravenous Solution 25 g  25 g Intravenous Q15 Min PRN Tricia Philip MD   25 g at 01/03/20 2309   • dextrose (GLUTOSE) oral gel 15 g  15 g Oral Q15 Min PRN Tricia Philip MD       • docusate sodium (COLACE) capsule 100 mg  100 mg Oral BID PRN Tricia Philip MD       • EPINEPHrine (ADRENALIN) 5 mg in sodium chloride 0.9 % 250 mL (0.02 mg/mL) infusion  0.02-0.3 mcg/kg/min Intravenous Titrated Tricia Philip MD   Stopped at 01/03/20 0130   • epoetin ziggy (EPOGEN,PROCRIT) injection  10,000 Units  10,000 Units Intravenous Once per day on Mon Wed Fri Alethea Diamond MD   10,000 Units at 01/06/20 1446   • escitalopram (LEXAPRO) tablet 10 mg  10 mg Oral Daily Tricia Philip MD   10 mg at 01/06/20 0912   • fentaNYL citrate (PF) (SUBLIMAZE) injection 25 mcg  25 mcg Intravenous Q30 Min PRN Imani Green MD   25 mcg at 01/06/20 2000   • gabapentin (NEURONTIN) capsule 100 mg  100 mg Oral Nightly Tricia Philip MD   100 mg at 01/06/20 2121   • glucagon (human recombinant) (GLUCAGEN DIAGNOSTIC) injection 1 mg  1 mg Subcutaneous Q15 Min PRN Tricia Philip MD       • heparin (porcine) 5000 UNIT/ML injection 5,000 Units  5,000 Units Subcutaneous Q12H Guero Huynh MD   5,000 Units at 01/06/20 2121   • hydrOXYzine pamoate (VISTARIL) capsule 50 mg  50 mg Oral TID PRN Tricia Philip MD   50 mg at 01/02/20 1423   • insulin aspart (novoLOG) injection 0-24 Units  0-24 Units Subcutaneous 4x Daily AC & at Bedtime Guero Huynh MD   8 Units at 01/06/20 2200   • insulin detemir (LEVEMIR) injection 5 Units  5 Units Subcutaneous Nightly Guero Huynh MD   5 Units at 01/06/20 2120   • ipratropium (ATROVENT HFA) inhaler 2 puff  2 puff Inhalation 4x Daily - RT Imani Green MD   2 puff at 01/06/20 2101   • lactulose (CHRONULAC) 10 GM/15ML solution 60 mL  60 mL Rectal 4x Daily Tricia Philip MD   60 mL at 01/06/20 2120   • latanoprost (XALATAN) 0.005 % ophthalmic solution 1 drop  1 drop Both Eyes Nightly Tricia Philip MD   1 drop at 01/06/20 2121   • lidocaine (LIDODERM) 5 % 1 patch  1 patch Transdermal Q24H Tricia Philip MD   1 patch at 01/06/20 0918   • melatonin tablet 6 mg  6 mg Oral Nightly Tricia Philip MD   6 mg at 01/05/20 2119   • midazolam (VERSED) 50 mg in sodium chloride 0.9 % 100 mL (0.5 mg/mL) infusion  1-10 mg/hr Intravenous Titrated Meseret Huynh MD   Stopped at 01/03/20 1334   • midazolam (VERSED) injection 1 mg  1 mg Intravenous Q30 Min PRN Imani Green MD    1 mg at 01/04/20 1647   • norepinephrine (LEVOPHED) 8 mg/250 mL (32 mcg/mL) in sodium chloride 0.9% infusion (premix)  0.02-0.3 mcg/kg/min Intravenous Titrated Darren Anderson MD   Stopped at 01/06/20 1200   • ondansetron (ZOFRAN) injection 4 mg  4 mg Intravenous Q6H PRN Tricia Philip MD       • pantoprazole (PROTONIX) injection 40 mg  40 mg Intravenous BID AC Tricia Philip MD   40 mg at 01/06/20 1746   • predniSONE (DELTASONE) tablet 40 mg  40 mg Oral Daily With Breakfast Imani Green MD   40 mg at 01/06/20 1002   • riFAXIMin (XIFAXAN) tablet 550 mg  550 mg Oral Q12H Tricia Philip MD   550 mg at 01/06/20 2120   • sevelamer (RENVELA) tablet 800 mg  800 mg Oral TID With Meals Tricia Philip MD   800 mg at 01/06/20 1148   • sodium chloride 0.9 % flush 10 mL  10 mL Intravenous Q12H Tricia Philip MD   10 mL at 01/06/20 2145   • sodium chloride 0.9 % flush 10 mL  10 mL Intravenous PRN Tricia Philip MD       • sodium chloride 0.9 % flush 10 mL  10 mL Intravenous Q12H Tricia Philip MD   10 mL at 01/06/20 2145   • sodium chloride 0.9 % flush 10 mL  10 mL Intravenous PRN Tricia Philip MD       • traZODone (DESYREL) tablet 50 mg  50 mg Oral Nightly PRN Tricia Philip MD   50 mg at 01/03/20 2139       Objective     Review of Systems:   Review of Systems  Pt intubated on vent  Physical Exam:   Temp:  [97.9 °F (36.6 °C)-98.8 °F (37.1 °C)] 97.9 °F (36.6 °C)  Heart Rate:  [63-84] 66  Resp:  [14-20] 14  BP: ()/(50-73) 105/52  Arterial Line BP: (105-183)/() 183/177  FiO2 (%):  [30 %] 30 %     Physical Exam:  General Appearance:    Alert, cooperative, in no acute distress   Head:    Normocephalic, without obvious abnormality, atraumatic   Eyes:            Lids and lashes normal, conjunctivae and sclerae normal, no   icterus, no pallor, corneas clear, PERRLA   Ears:    Ears appear intact with no abnormalities noted   Throat:   No oral lesions, no thrush, oral mucosa  moist   Neck:   No adenopathy, supple, trachea midline, no thyromegaly, no     carotid bruit, no JVD   Back:     No kyphosis present, no scoliosis present, no skin lesions,       erythema or scars, no tenderness to percussion or                   palpation,   range of motion normal   Lungs:     Clear to auscultation,respirations regular, even and                   unlabored    Heart:    Regular rhythm and normal rate, normal S1 and S2, no            murmur, no gallop, no rub, no click   Breast Exam:    Deferred   Abdomen:     Normal bowel sounds, no masses, no organomegaly, soft        non-tender, non-distended, no guarding, no rebound                 tenderness   Genitalia:    Deferred   Extremities:   Moves all extremities well, no edema, no cyanosis, no              redness   Pulses:   Pulses palpable and equal bilaterally   Skin:   No bleeding, bruising or rash   Lymph nodes:   No palpable adenopathy   Neurologic:   Cranial nerves 2 - 12 grossly intact, sensation intact, DTR        present and equal bilaterally      Results Review:     Lab Results (last 24 hours)     Procedure Component Value Units Date/Time    POC Glucose Once [[id number]]  (Abnormal) Collected:  01/06/20 2152    Specimen:  Blood Updated:  01/06/20 2216     Glucose 234 mg/dL      Comment: : [id number] [facility] ID: [id number]       POC Glucose Once [[id number]]  (Abnormal) Collected:  01/06/20 1803    Specimen:  Blood Updated:  01/06/20 2154     Glucose 132 mg/dL      Comment: : [id number] [facility] REBECCAMeter ID: [id number]       POC Glucose Once [[id number]]  (Abnormal) Collected:  01/06/20 0841    Specimen:  Blood Updated:  01/06/20 1402     Glucose 445 mg/dL      Comment: : [id number] BEE Oliveira ID: [id number]       POC Glucose Once [[id number]]  (Abnormal) Collected:  01/06/20 1150    Specimen:  Blood Updated:  01/06/20 1243     Glucose 393 mg/dL      Comment: : [id number] BEE  KOJO Oliveira ID: [id number]       Respiratory Culture - Sputum, Oropharynx [[id number]] Collected:  01/04/20 1542    Specimen:  Sputum from Oropharynx Updated:  01/06/20 0909     Respiratory Culture Light growth (2+) Normal Respiratory Ana     Gram Stain Many (4+) WBCs per low power field      Rare (1+) Epithelial cells per low power field      Mixed bacterial ana    Blood Culture - Blood, Blood, Arterial Line [[id number]] Collected:  01/04/20 0739    Specimen:  Blood, Arterial Line Updated:  01/06/20 0815     Blood Culture No growth at 2 days    Comprehensive Metabolic Panel [[id number]]  (Abnormal) Collected:  01/06/20 0548    Specimen:  Blood Updated:  01/06/20 0612     Glucose 421 mg/dL      BUN 95 mg/dL      Creatinine 8.48 mg/dL      Sodium 135 mmol/L      Potassium 4.9 mmol/L      Chloride 93 mmol/L      CO2 19.0 mmol/L      Calcium 8.0 mg/dL      Total Protein 9.6 g/dL      Albumin 2.70 g/dL      ALT (SGPT) 14 U/L      AST (SGOT) 57 U/L      Alkaline Phosphatase 87 U/L      Total Bilirubin 1.1 mg/dL      eGFR Non  Amer --     Comment: <15 Indicative of kidney failure.        eGFR  African Amer 8 mL/min/1.73      Comment: <15 Indicative of kidney failure.        Globulin 6.9 gm/dL      A/G Ratio 0.4 g/dL      BUN/Creatinine Ratio 11.2     Anion Gap 23.0 mmol/L     Narrative:       GFR Normal >60  Chronic Kidney Disease <60  Kidney Failure <15      CBC & Differential [[id number]] Collected:  01/06/20 0548    Specimen:  Blood Updated:  01/06/20 0552    Narrative:       The following orders were created for panel order CBC & Differential.  Procedure                               Abnormality         Status                     ---------                               -----------         ------                     CBC Auto Differential[[id number]]        Abnormal            Final result                 Please view results for these tests on the individual orders.    CBC Auto Differential [[id number]]   (Abnormal) Collected:  01/06/20 0548    Specimen:  Blood Updated:  01/06/20 0552     WBC 10.07 10*3/mm3      RBC 2.91 10*6/mm3      Hemoglobin 7.9 g/dL      Hematocrit 24.0 %      MCV 82.5 fL      MCH 27.1 pg      MCHC 32.9 g/dL      RDW 16.6 %      RDW-SD 49.7 fl      MPV 8.6 fL      Platelets 148 10*3/mm3      Neutrophil % 79.9 %      Lymphocyte % 10.8 %      Monocyte % 7.3 %      Eosinophil % 0.0 %      Basophil % 0.2 %      Immature Grans % 1.8 %      Neutrophils, Absolute 8.04 10*3/mm3      Lymphocytes, Absolute 1.09 10*3/mm3      Monocytes, Absolute 0.74 10*3/mm3      Eosinophils, Absolute 0.00 10*3/mm3      Basophils, Absolute 0.02 10*3/mm3      Immature Grans, Absolute 0.18 10*3/mm3      nRBC 0.2 /100 WBC     Blood Gas, Arterial [[id number]]  (Abnormal) Collected:  01/06/20 0511    Specimen:  Arterial Blood Updated:  01/06/20 0529     Site Arterial Line     Inderjit's Test N/A     pH, Arterial 7.363 pH units      pCO2, Arterial 37.0 mm Hg      pO2, Arterial 116.0 mm Hg      Comment: 83 Value above reference range        HCO3, Arterial 21.1 mmol/L      Base Excess, Arterial -3.9 mmol/L      Comment: 84 Value below reference range        O2 Saturation, Arterial 98.5 %      Barometric Pressure for Blood Gas 753 mmHg      Modality Ventilator     FIO2 30 %      Ventilator Mode PSV     PEEP 5.0     PSV 8.0 cmH2O      Collected by RRT     Comment: Meter: [id number]     :  [id number]       POC Glucose Once [[id number]]  (Abnormal) Collected:  01/05/20 2118    Specimen:  Blood Updated:  01/06/20 0436     Glucose 344 mg/dL      Comment: : [id number] [facility] [id number]              I reviewed the patient's new clinical results.  I reviewed the patient's new imaging results and agree with the interpretation.     ASSESSMENT/PLAN:   ASSESSMENT: melena resolved  Anemia, stable.  S/P cardio pulmonary arrest, failed weaning trial  Hepatic encephalopathy, ammonia level is improving  ESRD, on  hemodialysis.  Duodenal ulcerations, path C/W inflammation    PLAN: Continue antibiotics  Continue tube feed  Cont PPI  Follow H/H closely  Continue lactulose  CT head in a.m.  Repeat EGD in 1 month for re-evaluation of duodenal lesions  The risks, benefits, and alternatives of this procedure have been discussed with the patient or the responsible party- the patient understands and agrees to proceed.         Tricia Philip MD  01/06/20  10:17 PM

## 2020-01-07 NOTE — SIGNIFICANT NOTE
"Spoke with Risk Management-  Ms Sakina Ch-   -  Advised speaking with Farhan Grubbs, stated \" If Mr Farhan Grubbs does not want to be a part of the decision making process then the wife is well within her rights\"     Signed,   Demarcus Oliveira MD  Family Medicine Resident PGY2  [facility]        This document has been electronically signed by Demarcus Oliveira MD on January 7, 2020 9:33 AM      "

## 2020-01-07 NOTE — SIGNIFICANT NOTE
"Discussed EEG results with the patient's wife.   Patients' wife verbalized understanding. Stated \" I know he had low function before the test\"   Stressed emergent nature of subdural hematoma and need for a decision in order to proceed. Patients wife stated \" I need to talk to his brother and family before I do anything\"  Patient verbalized understanding of the risk of an untreated subdural hematoma could lead to brain damage and possibly death, but once again stated \" I need to talk to his brother\"    Witnessed by nursing staff (michel)     Signed,   Demarcus Oliveira MD  Family Medicine Resident PGY2  [facility]        This document has been electronically signed by Demarcus Oliveira MD on January 7, 2020 2:40 PM              "

## 2020-01-07 NOTE — PLAN OF CARE
"Pt reman on ventilator at this time, Pt remains on pressure support ventilation at this time and no changes have been made by me to his vent. Pt has times of disordered breathing and \"belly breathing\", Pt has bite blocked placed by RN on the right side of his mouth, Pt would bite ET tube prevent ventilator in assisting patient with a breath. Will continue to monitor patient and progress.  "

## 2020-01-08 PROBLEM — R68.89 EXCESSIVE ORAL SECRETIONS: Status: ACTIVE | Noted: 2020-01-01

## 2020-01-08 PROBLEM — E87.6 HYPOKALEMIA: Status: ACTIVE | Noted: 2020-01-01

## 2020-01-08 PROBLEM — S06.5XAA SUBDURAL HEMATOMA (HCC): Status: ACTIVE | Noted: 2020-01-01

## 2020-01-08 PROBLEM — R41.82 ALTERED MENTAL STATUS: Status: ACTIVE | Noted: 2020-01-01

## 2020-01-08 NOTE — PROGRESS NOTES
"FAMILY MEDICINE DAILY PROGRESS NOTE  NAME: Cristo Musa  : 1950  MRN: [id number]     LOS: 5 days     PROVIDER OF SERVICE: Demarcus Oliveira MD    Chief Complaint: Gastrointestinal hemorrhage    Subjective:     Interval History:  History taken from: chart family RN  Patient is a 69 y.o  male admitted for GI Hemorrhage, currently intubated for respiratory failure.   Patient is still unresponsive    Nursing staff reported no incidents during the night.   Feeding- feeding tube- Novasource renal  Analgesia- fentanyl   Sedation-  Propofol-  Off sedation currently   Thromboprophylaxis- SCD  Heads up- 30 degrees- YES  Ulcer Prophylaxis- protonix 40 mg  Glycemic control-  SSI  SBT-  Failed     Of note-  CONTACT PRECAUTIONS-  RSV positive, MRSA negative    Wife present in room. Was provided update. Wife stated that she would make a decision at 3pm tomorrow   -Advised wife that patient does have a subdural hematoma- and still requires emergent evaluation by neuro-surgery. Wife stated \" Im not doing anything, Im taking him off support tomorrow at 3pm\"  -wife verbalized understanding of the risks involved with not allowing further evaluation of subdural hematoma to include brain damage or even possibly death.       Review of Systems:    Review of Systems   Unable to perform ROS: Intubated       Objective:     Vital Signs  Temp:  [97.6 °F (36.4 °C)-98.5 °F (36.9 °C)] 98.5 °F (36.9 °C)  Heart Rate:  [61-91] 75  Resp:  [11-30] 14  BP: (100-176)/(55-99) 110/57  FiO2 (%):  [30 %] 30 %    Physical Exam  Physical Exam   Constitutional: No distress.   Sickly apperance, intubated    HENT:   Head: Normocephalic and atraumatic.   Right Ear: External ear normal.   Left Ear: External ear normal.   Mouth/Throat: Oropharynx is clear and moist.    Healed Bite marks noted on tongue   Eyes: Conjunctivae are normal. No scleral icterus.   Bilateral cataracts    Neck: Normal range of motion.   Cardiovascular: Normal " rate, regular rhythm and normal heart sounds. Exam reveals no gallop and no friction rub.   No murmur heard.  Pulmonary/Chest: No stridor. He is in respiratory distress. He has wheezes (diffuse). He exhibits no tenderness.   Currently intubated     Abdominal: Soft. Bowel sounds are normal. He exhibits distension.   Musculoskeletal: Normal range of motion. He exhibits no edema or tenderness.   Neurological: GCS eye subscore is 4. GCS verbal subscore is 1. GCS motor subscore is 1.   Corneal reflex present  Patient exhibits decerebrate posturing   -does not follow commands      Skin: Skin is warm and dry. No rash noted. He is not diaphoretic. No erythema. No pallor.       Medication Review    Current Facility-Administered Medications:   •  8-hydroxyquinoline sulfate (BAG BALM) ointment, , Apply externally, PRN, Guero Huynh MD  •  acetaminophen (TYLENOL) tablet 650 mg, 650 mg, Oral, Q4H PRN, 650 mg at 01/03/20 2139 **OR** [DISCONTINUED] acetaminophen (TYLENOL) 160 MG/5ML solution 650 mg, 650 mg, Oral, Q4H PRN **OR** acetaminophen (TYLENOL) suppository 650 mg, 650 mg, Rectal, Q4H PRN, Tricia Philip MD  •  albuterol sulfate HFA (PROVENTIL HFA;VENTOLIN HFA;PROAIR HFA) inhaler 2 puff, 2 puff, Inhalation, 4x Daily - RT, Meseret Huynh MD, 2 puff at 01/08/20 0757  •  aspirin chewable tablet 81 mg, 81 mg, Oral, Daily, Tricia Philip MD, 81 mg at 01/07/20 0834  •  budesonide-formoterol (SYMBICORT) 160-4.5 MCG/ACT inhaler 2 puff, 2 puff, Inhalation, BID - RT, Imani Green MD, 2 puff at 01/08/20 0757  •  cefTRIAXone (ROCEPHIN) 1 g/100 mL 0.9% NS (MBP), 1 g, Intravenous, Q24H, Guero Huynh MD, 1 g at 01/07/20 1042  •  cetirizine (zyrTEC) tablet 10 mg, 10 mg, Oral, Daily, Tricia Philip MD, 10 mg at 01/07/20 0835  •  cyclobenzaprine (FLEXERIL) tablet 5 mg, 5 mg, Oral, Q8H PRN, Tricia Philip MD, 5 mg at 01/07/20 0835  •  dextrose (D50W) 25 g/ 50mL Intravenous Solution 25 g, 25 g, Intravenous, Q15 Min PRN,  Tricia Philip MD, 25 g at 01/03/20 2309  •  dextrose (GLUTOSE) oral gel 15 g, 15 g, Oral, Q15 Min PRN, Tricia Philip MD  •  docusate sodium (COLACE) capsule 100 mg, 100 mg, Oral, BID PRN, Tricia Philip MD  •  EPINEPHrine (ADRENALIN) 5 mg in sodium chloride 0.9 % 250 mL (0.02 mg/mL) infusion, 0.02-0.3 mcg/kg/min, Intravenous, Titrated, Tricia Philip MD, Stopped at 01/03/20 0130  •  epoetin ziggy (EPOGEN,PROCRIT) injection 10,000 Units, 10,000 Units, Intravenous, Once per day on Mon Wed Fri, Alethea Diamond MD, 10,000 Units at 01/06/20 1446  •  escitalopram (LEXAPRO) tablet 10 mg, 10 mg, Oral, Daily, Tricia Philip MD, 10 mg at 01/07/20 0834  •  fentaNYL citrate (PF) (SUBLIMAZE) injection 25 mcg, 25 mcg, Intravenous, Q30 Min PRN, Imani Green MD, 25 mcg at 01/07/20 2146  •  gabapentin (NEURONTIN) capsule 100 mg, 100 mg, Oral, Nightly, Tricia Philip MD, 100 mg at 01/07/20 2146  •  glucagon (human recombinant) (GLUCAGEN DIAGNOSTIC) injection 1 mg, 1 mg, Subcutaneous, Q15 Min PRN, Tricia Philip MD  •  hydrOXYzine pamoate (VISTARIL) capsule 50 mg, 50 mg, Oral, TID PRN, Tricia Philip MD, 50 mg at 01/02/20 1423  •  insulin aspart (novoLOG) injection 0-24 Units, 0-24 Units, Subcutaneous, 4x Daily AC & at Bedtime, Guero Huynh MD, 12 Units at 01/08/20 0659  •  insulin detemir (LEVEMIR) injection 8 Units, 8 Units, Subcutaneous, Nightly, Guero Huynh MD, 8 Units at 01/07/20 2242  •  ipratropium (ATROVENT HFA) inhaler 2 puff, 2 puff, Inhalation, 4x Daily - RT, Imani Green MD, 2 puff at 01/08/20 0756  •  lactulose (CHRONULAC) 10 GM/15ML solution 60 mL, 60 mL, Rectal, 4x Daily, Tricia Philip MD, 60 mL at 01/07/20 2145  •  latanoprost (XALATAN) 0.005 % ophthalmic solution 1 drop, 1 drop, Both Eyes, Nightly, Tricia Philip MD, 1 drop at 01/07/20 2147  •  lidocaine (LIDODERM) 5 % 1 patch, 1 patch, Transdermal, Q24H, Tricia Philip MD, 1 patch at 01/07/20 0836  •   melatonin tablet 6 mg, 6 mg, Oral, Nightly, Tricia Philip MD, 6 mg at 01/07/20 2146  •  midazolam (VERSED) 50 mg in sodium chloride 0.9 % 100 mL (0.5 mg/mL) infusion, 1-10 mg/hr, Intravenous, Titrated, Meseret Huynh MD, Stopped at 01/03/20 1334  •  midazolam (VERSED) injection 1 mg, 1 mg, Intravenous, Q30 Min PRN, Imani Green MD, 1 mg at 01/07/20 1521  •  norepinephrine (LEVOPHED) 8 mg/250 mL (32 mcg/mL) in sodium chloride 0.9% infusion (premix), 0.02-0.3 mcg/kg/min, Intravenous, Titrated, Darren Anderson MD, Stopped at 01/06/20 1200  •  ondansetron (ZOFRAN) injection 4 mg, 4 mg, Intravenous, Q6H PRN, Tricia Philip MD  •  pantoprazole (PROTONIX) injection 40 mg, 40 mg, Intravenous, BID AC, Tricia Philip MD, 40 mg at 01/07/20 1736  •  potassium chloride (KLOR-CON) packet 40 mEq, 40 mEq, Oral, BID, Guero Huynh MD  •  predniSONE (DELTASONE) tablet 40 mg, 40 mg, Oral, Daily With Breakfast, Imani Green MD, 40 mg at 01/07/20 0835  •  riFAXIMin (XIFAXAN) tablet 550 mg, 550 mg, Oral, Q12H, Tricia Philip MD, 550 mg at 01/07/20 2147  •  Scopolamine (TRANSDERM-SCOP) 1.5 MG/3DAYS patch 1 patch, 1 patch, Transdermal, Q72H, Guero Huynh MD, 1 patch at 01/08/20 0746  •  sevelamer (RENVELA) tablet 800 mg, 800 mg, Oral, TID With Meals, Tricia Philip MD, 800 mg at 01/06/20 1148  •  sodium chloride 0.9 % flush 10 mL, 10 mL, Intravenous, Q12H, Tricia Philip MD, 10 mL at 01/07/20 2148  •  sodium chloride 0.9 % flush 10 mL, 10 mL, Intravenous, PRN, Tricia Philip MD  •  sodium chloride 0.9 % flush 10 mL, 10 mL, Intravenous, Q12H, Tricia Philip MD, 10 mL at 01/07/20 2147  •  sodium chloride 0.9 % flush 10 mL, 10 mL, Intravenous, PRN, Tricia Philip MD  •  traZODone (DESYREL) tablet 50 mg, 50 mg, Oral, Nightly PRN, Tricia Philip MD, 50 mg at 01/03/20 2139     Diagnostic Data    Lab Results (last 24 hours)     Procedure Component Value Units Date/Time    Blood Culture -  Blood, Blood, Arterial Line [[id number]] Collected:  01/04/20 0739    Specimen:  Blood, Arterial Line Updated:  01/08/20 0815     Blood Culture No growth at 4 days    POC Glucose Once [[id number]]  (Abnormal) Collected:  01/08/20 0613    Specimen:  Blood Updated:  01/08/20 0634     Glucose 256 mg/dL      Comment: Result Not ConfirmedOperator: [id number] [place] WHITNEYMeter ID: [id number]       Comprehensive Metabolic Panel [[id number]]  (Abnormal) Collected:  01/08/20 0512    Specimen:  Blood Updated:  01/08/20 0549     Glucose 248 mg/dL      BUN 71 mg/dL      Creatinine 6.56 mg/dL      Sodium 137 mmol/L      Potassium 3.1 mmol/L      Chloride 93 mmol/L      CO2 28.0 mmol/L      Calcium 8.3 mg/dL      Total Protein 9.2 g/dL      Albumin 2.50 g/dL      ALT (SGPT) 17 U/L      AST (SGOT) 32 U/L      Alkaline Phosphatase 120 U/L      Total Bilirubin 0.7 mg/dL      eGFR Non  Amer --     Comment: <15 Indicative of kidney failure.        eGFR  African Amer 10 mL/min/1.73      Comment: <15 Indicative of kidney failure.        Globulin 6.7 gm/dL      A/G Ratio 0.4 g/dL      BUN/Creatinine Ratio 10.8     Anion Gap 16.0 mmol/L     Narrative:       GFR Normal >60  Chronic Kidney Disease <60  Kidney Failure <15      POC Glucose Once [[id number]]  (Abnormal) Collected:  01/07/20 1736    Specimen:  Blood Updated:  01/08/20 0546     Glucose 322 mg/dL      Comment: Sliding Scale AdminOperator: [id number] [facility] System[facility] ID: [id number]       CBC & Differential [[id number]] Collected:  01/08/20 0512    Specimen:  Blood Updated:  01/08/20 0535    Narrative:       The following orders were created for panel order CBC & Differential.  Procedure                               Abnormality         Status                     ---------                               -----------         ------                     CBC Auto Differential[[id number]]        Abnormal            Final result                 Please view results for these  tests on the individual orders.    CBC Auto Differential [[id number]]  (Abnormal) Collected:  01/08/20 0512    Specimen:  Blood Updated:  01/08/20 0535     WBC 7.34 10*3/mm3      RBC 2.74 10*6/mm3      Hemoglobin 7.3 g/dL      Hematocrit 23.4 %      MCV 85.4 fL      MCH 26.6 pg      MCHC 31.2 g/dL      RDW 16.9 %      RDW-SD 51.8 fl      MPV 9.7 fL      Platelets 131 10*3/mm3      Neutrophil % 76.8 %      Lymphocyte % 14.7 %      Monocyte % 6.3 %      Eosinophil % 0.4 %      Basophil % 0.0 %      Immature Grans % 1.8 %      Neutrophils, Absolute 5.64 10*3/mm3      Lymphocytes, Absolute 1.08 10*3/mm3      Monocytes, Absolute 0.46 10*3/mm3      Eosinophils, Absolute 0.03 10*3/mm3      Basophils, Absolute 0.00 10*3/mm3      Immature Grans, Absolute 0.13 10*3/mm3      nRBC 1.9 /100 WBC     POC Glucose Once [[id number]]  (Abnormal) Collected:  01/07/20 2120    Specimen:  Blood Updated:  01/07/20 2141     Glucose 316 mg/dL      Comment: : [id number] [place] CARRIEMeter ID: [id number]       POC Glucose Once [[id number]]  (Abnormal) Collected:  01/07/20 1158    Specimen:  Blood Updated:  01/07/20 1220     Glucose 274 mg/dL      Comment: Result Not ConfirmedOperator: [id number] [facility] HEATHERMeter ID: [id number]       POC Glucose Once [[id number]]  (Abnormal) Collected:  01/07/20 0547    Specimen:  Blood Updated:  01/07/20 1219     Glucose 287 mg/dL      Comment: : [id number] [place] CARRIEMeter ID: [id number]              Imaging Results (Last 24 Hours)     Procedure Component Value Units Date/Time    CT Head Without Contrast [[id number]] Collected:  01/07/20 0945     Updated:  01/07/20 1036    Narrative:       Noncontrast CT examination of the brain.    INDICATION: Alteration mental status. Confusion.       Technique: Axial 5 mm contiguous images with brain parenchymal  and bone windows    This exam was performed according to our departmental  dose-optimization program, which includes automated  exposure  control, adjustment of the mA and/or kV according to patient size  and/or use of iterative reconstruction technique.    Prior relevant examination: CT March 31, 2019.    There is a very small right-sided frontal subdural hematoma.  Maximum thickness 0.55 cm series 2 image 25. Series 5 image 17.  There is no mass effect or shift.    There are involutional, atrophic changes.    Mucoperiosteal thickening and air-fluid levels both maxillary  sinuses, sphenoid and frontal and ethmoid sinuses. Air-fluid  levels in the frontal sinuses. This therefore suggests acute on  chronic sinusitis. Significant unfavorable change since prior  exam.    Evidence of prior (old) right orbital fracture. Old fractures of  the orbital floor and medial orbital wall.      Impression:       CONCLUSION: Interval development of small right frontal subdural  hematoma without mass effect or shift.    Interval development of acute on chronic pansinusitis.  Unfavorable change since prior exam.    These findings discussed with Dr. Guero Gallardo at 10:35 AM.    Electronically signed by:  Wallace Louise MD  1/7/2020 10:35 AM CST  Workstation: [id number]          I reviewed the patient's new clinical results.    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 Philip, Gi following.   - will continue to monitor H/H     • Excessive oral secretions     -  Patient is intubed  -Scopalamine patch      • Hypokalemia     Results from last 7 days   Lab Units 01/08/20  0512 01/07/20  0402 01/06/20  0548 01/05/20  0357 01/04/20  0738 01/03/20  0439 01/02/20  1815 01/02/20  0358   POTASSIUM mmol/L 3.1* 2.9* 4.9 4.8 4.0 4.9 4.8 5.0   ]  -  Dialysis today  -will monitor and replace      • Altered mental status     Currently intubated   -off sedation  CT scan subdural hematoma   EEG -  study is consistent with diffuse cerebral dysfunction of moderate degree       • Hemophilus influenzae infection     - currently  On rocephin started on 1/6/19- for 7 days    Results from last 7 days   Lab Units 01/08/20  0512 01/07/20  0402 01/06/20  0548 01/05/20  0357 01/04/20  0738 01/03/20  0439 01/02/20  0359   WBC 10*3/mm3 7.34 9.26 10.07 12.26* 8.80 5.34 5.97   ]       • RSV (respiratory syncytial virus infection)     Currently in acute respiratory failure requiring intubation   Will continue supportive care       -pulmonology following, Dr. Green  -vancomycin d/c on 1/519  with negative MRSA swab  -currently on rocephin  -continue solmedrol     • Fever of unknown origin     Blood cultures-  Negative x 3 days    Respiratory panel-  Positive for RSV   Respiratory culture- H influenzae  -CXR- Pulmonary vascular prominence. Interval  clearing of right lower lobe infiltrative changes since prior  exam. Favorable change.  Pulmonology following, appreciate recommendations      • Acute respiratory failure (CMS/HCC)     RSV positive   Currently intubated   Patient is a DNR/DNI    Family would like to wait a few days before considering extubation.     -pulmonology following, Dr. Green  -vancomycin d/c yesterday with negative MRSA swab  -on zosyn D#2, continue empiric therapy   -continue  solumedrol     • Encephalopathy     -Presumed metabolic  -Ammonia level: improving    Results from last 7 days   Lab Units 01/03/20  1247 01/02/20  1010   AMMONIA umol/L 65* 143*   ]    -Continue Lactulose, rifaxamin    GI following  -Ct scan-  Subdural hematoma          • Chronic heart failure with preserved ejection fraction (CMS/[facility])     -last echo in July 2018 shows EF of 58% with grade 1a diastolic dysfunction  -daily weights  -care with fluids  -daily fluid restriction 1500 mL       • COPD (chronic obstructive pulmonary disease) (CMS/HCC)     - Continue flonase, loratadine  - Patient uses Oxygen prn at the nursing home.   - Duonebs and albuterol nebs prn.          • Gastroesophageal reflux disease without esophagitis     -PPI     • Chronic pain     -Gabapentin        • Anxiety     - Continue Lexapro      • Primary insomnia     -trazodone, melatonin     • ESRD (end stage renal disease) (CMS/HCC)     -HD M/W/F  -Nephrology on board   -epogen on M/W/F with dialysis  -continue Sevelamer 800 mg three times daily        • Type 2 diabetes mellitus with chronic kidney disease on chronic dialysis, with long-term current use of insulin (CMS/HCC)     -levemir, SSI  - Dialysis on Monday, Wednesday, Friday   Results from last 7 days   Lab Units 01/07/20  0402 01/06/20  0548 01/05/20  0357 01/04/20  0738 01/03/20  0439 01/02/20  1815 01/02/20  0358   CREATININE mg/dL 4.72* 8.48* 7.08* 5.78* 9.46* 8.67* 7.77*   ]     • Essential hypertension     -not on any home medications, will monitor            DVT prophylaxis: SCDs/TEDs  Code Status and Medical Interventions:   Ordered at: 01/02/20 1525     Limited Support to NOT Include:    Intubation     Level Of Support Discussed With:    Health Care Surrogate     Code Status:    No CPR     Medical Interventions (Level of Support Prior to Arrest):    Limited       Plan for disposition: will have discussion with family       Time: 30 minutes      Signed,   Demarcus Oliveira MD  Family  Medicine Resident PGY2  [facility]        This document has been electronically signed by Demarcus Oliveira MD on January 8, 2020 8:23 AM    \

## 2020-01-08 NOTE — PLAN OF CARE
Problem: Ventilation, Mechanical Invasive (Adult)  Goal: Signs and Symptoms of Listed Potential Problems Will be Absent, Minimized or Managed (Ventilation, Mechanical Invasive)  Outcome: Ongoing (interventions implemented as appropriate)   Pt tolerating current vent setting, no signs or symptoms of distress noted. Will continue to monitor.

## 2020-01-08 NOTE — SIGNIFICANT NOTE
01/08/20 1305   Rehab Treatment   Discipline physical therapist   Reason Treatment Not Performed unable to treat, medical status change   Patient no appropriate at this time for PT evaluation.

## 2020-01-08 NOTE — PLAN OF CARE
Problem: Ventilation, Mechanical Invasive (Adult)  Intervention: Prevent Airway Displacement/Mechanical Dysfunction  Flowsheets (Taken 1/8/2020 1525)  Airway Safety Measures: manual resuscitator/mask/valve in room; suction at bedside  Intervention: Prevent Airway-Related Skin/Tissue Breakdown  Flowsheets (Taken 1/8/2020 1525)  Device Skin Pressure Protection: skin-to-skin areas padded  Intervention: Prevent Ventilator-Induced Lung Injury  Flowsheets (Taken 1/8/2020 1525)  Lung Protection Measures: low inspiratory pressure provided; low tidal volume provided; lung compliance monitored; optimal PEEP applied; plateau/inspiratory pressure monitored; ventilator synchrony promoted; ventilator waveforms monitored  Intervention: Prevent Ventilator-Associated Pneumonia (VAP)  Flowsheets  Taken 1/8/2020 1525 by Terri Feliz RRT  Head of Bed (HOB): HOB at 30 degrees  Taken 1/8/2020 0800 by Jeny Abraham, RN  VAP Prevention Bundle: HOB elevation maintained;oral care regularly provided;readiness to extubate assessed;stress ulcer prophylaxis provided;vent circuit breaks minimized;VTE prophylaxis provided

## 2020-01-08 NOTE — PROGRESS NOTES
"Intensive Care Follow-up      LOS: 5 days     Mr. Cristo Musa, 69 y.o. male is followed for: Ventilator management     CHIEF COMPLIANT: Short of breath    Subjective - Interval History     This gentleman remains unresponsive on mechanical ventilator was found to have a subdural hematoma.  He is back on a respiratory rate.    The patient's relevant past medical, surgical and social history were reviewed and updated in Epic as appropriate.     Objective   /57   Pulse 74   Temp 98.5 °F (36.9 °C) (Oral)   Resp 13   Ht 172.7 cm (67.99\")   Wt 93.2 kg (205 lb 7.5 oz)   SpO2 100%   BMI 31.25 kg/m²       Vent Settings: FiO2 (%):  [30 %] 30 %  S RR:  [10] 10  PEEP/CPAP (cm H2O):  [5 cm H20] 5 cm H20  OK SUP:  [0 cm H20-8 cm H20] 0 cm H20  MAP (cm H2O):  [6.1-11] 7.6    Physical Exam: Unresponsive light gentleman on mechanical ventilator    General Appearance:       Head:    Normocephalic, without obvious abnormality, atraumatic   Eyes:            Lids and lashes normal, conjunctivae and sclerae normal, no   icterus, no pallor, corneas clear, PERRLA   Ears:    Ears appear intact with no abnormalities noted   Throat:   No oral lesions, no thrush, oral mucosa moist   Neck:   No adenopathy, supple, trachea midline, no thyromegaly, no   carotid bruit, no JVD   Back:     No kyphosis present, no scoliosis present, no skin lesions,      erythema or scars, no tenderness to percussion or                   palpation,   range of motion normal   Lungs:    Clear    Heart:    Regular rhythm and normal rate, normal S1 and S2, no            murmur, no gallop, no rub, no click   Chest Wall:    No abnormalities observed   Abdomen:     Normal bowel sounds, no masses, no organomegaly, soft        non-tender, non-distended, no guarding, no rebound                tenderness   Rectal:     Deferred   Extremities:   Moves all extremities well, no edema, no cyanosis, no             redness   Pulses:   Pulses palpable and equal " bilaterally   Skin:   No bleeding, bruising or rash   Lymph nodes:   No palpable adenopathy   Neurologic:   Cranial nerves 2 - 12 grossly intact, sensation intact, DTR       present and equal bilaterally     LAB:    pH, Arterial   Date Value Ref Range Status   01/06/2020 7.363 7.350 - 7.450 pH units Final     pCO2, Arterial   Date Value Ref Range Status   01/06/2020 37.0 35.0 - 45.0 mm Hg Final     pO2, Arterial   Date Value Ref Range Status   01/06/2020 116.0 (H) 83.0 - 108.0 mm Hg Final     Comment:     83 Value above reference range     Lab Results   Component Value Date    WBC 7.34 01/08/2020    HGB 7.3 (L) 01/08/2020    HCT 23.4 (L) 01/08/2020    MCV 85.4 01/08/2020     (L) 01/08/2020     Lab Results   Component Value Date    GLUCOSE 248 (H) 01/08/2020    BUN 71 (H) 01/08/2020    CREATININE 6.56 (H) 01/08/2020    EGFRIFNONA  01/08/2020      Comment:      <15 Indicative of kidney failure.    EGFRIFAFRI 10 (L) 01/08/2020    BCR 10.8 01/08/2020    CO2 28.0 01/08/2020    CALCIUM 8.3 (L) 01/08/2020    ALBUMIN 2.50 (L) 01/08/2020    AST 32 01/08/2020    ALT 17 01/08/2020         RAD:   Imaging Results (Last 24 Hours)     Procedure Component Value Units Date/Time    CT Head Without Contrast [[id number]] Collected:  01/07/20 0945     Updated:  01/07/20 1036    Narrative:       Noncontrast CT examination of the brain.    INDICATION: Alteration mental status. Confusion.       Technique: Axial 5 mm contiguous images with brain parenchymal  and bone windows    This exam was performed according to our departmental  dose-optimization program, which includes automated exposure  control, adjustment of the mA and/or kV according to patient size  and/or use of iterative reconstruction technique.    Prior relevant examination: CT March 31, 2019.    There is a very small right-sided frontal subdural hematoma.  Maximum thickness 0.55 cm series 2 image 25. Series 5 image 17.  There is no mass effect or shift.    There are  involutional, atrophic changes.    Mucoperiosteal thickening and air-fluid levels both maxillary  sinuses, sphenoid and frontal and ethmoid sinuses. Air-fluid  levels in the frontal sinuses. This therefore suggests acute on  chronic sinusitis. Significant unfavorable change since prior  exam.    Evidence of prior (old) right orbital fracture. Old fractures of  the orbital floor and medial orbital wall.      Impression:       CONCLUSION: Interval development of small right frontal subdural  hematoma without mass effect or shift.    Interval development of acute on chronic pansinusitis.  Unfavorable change since prior exam.    These findings discussed with Dr. Guero Gallardo at 10:35 AM.    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 Philip, Gi following.   - will continue to monitor H/H     • Hemophilus influenzae infection     - currently  On rocephin started on 1/6/19- for 7 days    Results from last 7 days   Lab Units 01/07/20  0402 01/06/20  0548 01/05/20  0357 01/04/20  0738 01/03/20  0439 01/02/20  0359   WBC  10*3/mm3 9.26 10.07 12.26* 8.80 5.34 5.97   ]       • RSV (respiratory syncytial virus infection)     Currently in acute respiratory failure requiring intubation   Will continue supportive care       -pulmonology following, Dr. Green  -vancomycin d/c on 1/519  with negative MRSA swab  -on zosyn D#3, continue empiric therapy  -continue solmedrol     • Fever of unknown origin     Blood cultures-  Negative x 3 days    Respiratory panel-  Positive for RSV   Respiratory culture- H influenzae  -CXR- Pulmonary vascular prominence. Interval  clearing of right lower lobe infiltrative changes since prior  exam. Favorable change.  Pulmonology following, appreciate recommendations      • Acute respiratory failure (CMS/[facility])     RSV positive   Currently intubated   Patient is a DNR/DNI    Family would like to wait a few days before considering extubation.     -pulmonology following, Dr. Green  -vancomycin d/c yesterday with negative MRSA swab  -on zosyn D#2, continue empiric therapy   -continue solumedrol     • Encephalopathy     -Presumed metabolic  -Ammonia level: improving  -Continue Lactulose, rifaxamin    GI following  -Ct scan today        • Chronic heart failure with preserved ejection fraction (CMS/[facility])     -last echo in July 2018 shows EF of 58% with grade 1a diastolic dysfunction  -daily weights  -care with fluids  -daily fluid restriction 1500 mL       • COPD (chronic obstructive pulmonary disease) (CMS/HCC)     - Continue flonase, loratadine  - Patient uses Oxygen prn at the nursing home.   - Duonebs and albuterol nebs prn.          • Gastroesophageal reflux disease without esophagitis     -PPI     • Chronic pain     -Gabapentin        • Anxiety     - Continue Lexapro      • Primary insomnia     -trazodone, melatonin     • ESRD (end stage renal disease) (CMS/HCC)     -HD M/W/F  -Nephrology on board   -epogen on M/W/F with dialysis  -continue Sevelamer 800 mg three times daily        • Type 2 diabetes mellitus with  chronic kidney disease on chronic dialysis, with long-term current use of insulin (CMS/[facility])     -levemir, SSI  - Dialysis on Monday, Wednesday, Friday   Results from last 7 days   Lab Units 01/07/20  0402 01/06/20  0548 01/05/20  0357 01/04/20  0738 01/03/20  0439 01/02/20  1815 01/02/20  0358   CREATININE mg/dL 4.72* 8.48* 7.08* 5.78* 9.46* 8.67* 7.77*   ]     • Essential hypertension     -not on any home medications, will monitor               Plan        Assessment unresponsive state, end-stage renal disease, subdural hematoma    Recommendations family consider extubation soon        This document has been produced with the assistance of Dragon dictation  This document has been electronically signed by Ankush Lock MD on January 8, 2020 7:27 AM       I discussed the patient's findings and my recommendations with patient and nursing staff

## 2020-01-08 NOTE — PLAN OF CARE
Problem: Patient Care Overview  Goal: Plan of Care Review  Outcome: Ongoing (interventions implemented as appropriate)  Flowsheets  Taken 1/8/2020 0625 by Doris Chaves RN  Progress: no change  Plan of Care Reviewed With: spouse  Taken 1/7/2020 1439 by Jackie Bartholomew, RD  Outcome Summary: Pt remains NPO on the vent.  EN started and at goal rate.  HE continues to receive Lactulose.  Rectal tube in place.

## 2020-01-08 NOTE — PROGRESS NOTES
"[facility]  [address] [place] [address]. [id number]  T - [phone]  F - [phone]     Progress Note          PATIENT  DEMOGRAPHICS   PATIENT NAME: Crisot Musa                      PHYSICIAN: Beryl Meeks, Medical Student  : 1950  MRN: [id number]   LOS: 5 days    Patient Care Team:  Warren Stewart MD as PCP - General (Family Medicine)  Beth Porras APRN as PCP - Claims Attributed  Michelle Lo MD as Resident (Family Medicine)  John Sanchez MD as Resident (Family Medicine)  Marissa Boothe MD as Resident (Family Medicine)  Lucio Anderson MD as Resident (Family Medicine)  Demarcus Oliveira MD as Resident (Family Medicine)  Subjective   SUBJECTIVE   No significant change from yesterday          Objective   OBJECTIVE   Vital Signs  Temp:  [97.6 °F (36.4 °C)-98.5 °F (36.9 °C)] 98.5 °F (36.9 °C)  Heart Rate:  [61-91] 75  Resp:  [11-30] 14  BP: (100-176)/(55-99) 110/57  FiO2 (%):  [30 %] 30 %    Flowsheet Rows      First Filed Value   Admission Height  172.7 cm (67.99\") Documented at 2020 1510   Admission Weight  95.7 kg (211 lb) Documented at 2020 0119           I/O last 3 completed shifts:  In: 1406 [Other:403; NG/GT:903; IV Piggyback:100]  Out: 1200 [Stool:1200]    PHYSICAL EXAM    Physical Exam   Constitutional: He appears well-developed.   HENT:   Head: Normocephalic.   Eyes: Pupils are equal, round, and reactive to light.   Cardiovascular: Normal rate, regular rhythm and normal heart sounds.   Pulmonary/Chest: Effort normal and breath sounds normal.   Abdominal: Soft. Bowel sounds are normal.   Musculoskeletal: He exhibits no edema.   Neurological: He exhibits normal muscle tone.       RESULTS   Results Review:    Results from last 7 days   Lab Units 20  0512 20  0402 20  0548   SODIUM mmol/L 137 136 135*   POTASSIUM mmol/L 3.1* 2.9* 4.9   CHLORIDE mmol/L 93* 91* 93*   CO2 mmol/L 28.0 28.0 19.0*   BUN mg/dL 71* " 44* 95*   CREATININE mg/dL 6.56* 4.72* 8.48*   CALCIUM mg/dL 8.3* 8.1* 8.0*   BILIRUBIN mg/dL 0.7 0.8 1.1   ALK PHOS U/L 120* 103 87   ALT (SGPT) U/L 17 21 14   AST (SGOT) U/L 32 48* 57*   GLUCOSE mg/dL 248* 250* 421*       Estimated Creatinine Clearance: 11.9 mL/min (A) (by C-G formula based on SCr of 6.56 mg/dL (H)).                Results from last 7 days   Lab Units 01/08/20  0512 01/07/20  0402 01/06/20  0548 01/05/20  0357 01/04/20  0738   WBC 10*3/mm3 7.34 9.26 10.07 12.26* 8.80   HEMOGLOBIN g/dL 7.3* 7.6* 7.9* 8.1* 8.0*   PLATELETS 10*3/mm3 131* 133* 148 134* 123*       Results from last 7 days   Lab Units 01/02/20  0358   INR  1.40*         Imaging Results (Last 24 Hours)     Procedure Component Value Units Date/Time    CT Head Without Contrast [[id number]] Collected:  01/07/20 0945     Updated:  01/07/20 1036    Narrative:       Noncontrast CT examination of the brain.    INDICATION: Alteration mental status. Confusion.       Technique: Axial 5 mm contiguous images with brain parenchymal  and bone windows    This exam was performed according to our departmental  dose-optimization program, which includes automated exposure  control, adjustment of the mA and/or kV according to patient size  and/or use of iterative reconstruction technique.    Prior relevant examination: CT March 31, 2019.    There is a very small right-sided frontal subdural hematoma.  Maximum thickness 0.55 cm series 2 image 25. Series 5 image 17.  There is no mass effect or shift.    There are involutional, atrophic changes.    Mucoperiosteal thickening and air-fluid levels both maxillary  sinuses, sphenoid and frontal and ethmoid sinuses. Air-fluid  levels in the frontal sinuses. This therefore suggests acute on  chronic sinusitis. Significant unfavorable change since prior  exam.    Evidence of prior (old) right orbital fracture. Old fractures of  the orbital floor and medial orbital wall.      Impression:       CONCLUSION: Interval  development of small right frontal subdural  hematoma without mass effect or shift.    Interval development of acute on chronic pansinusitis.  Unfavorable change since prior exam.    These findings discussed with Dr. Guero Gallardo at 10:35 AM.    Electronically signed by:  Wallace Louise MD  1/7/2020 10:35 AM [facility]  Workstation: [id number]           MEDICATIONS      albuterol sulfate HFA 2 puff Inhalation 4x Daily - RT   aspirin 81 mg Oral Daily   budesonide-formoterol 2 puff Inhalation BID - RT   cefTRIAXone 1 g Intravenous Q24H   cetirizine 10 mg Oral Daily   epoetin ziggy/ziggy-epbx 10,000 Units Intravenous Once per day on Mon Wed Fri   escitalopram 10 mg Oral Daily   gabapentin 100 mg Oral Nightly   insulin aspart 0-24 Units Subcutaneous 4x Daily AC & at Bedtime   insulin detemir 8 Units Subcutaneous Nightly   ipratropium 2 puff Inhalation 4x Daily - RT   lactulose 60 mL Rectal 4x Daily   latanoprost 1 drop Both Eyes Nightly   lidocaine 1 patch Transdermal Q24H   melatonin 6 mg Oral Nightly   pantoprazole 40 mg Intravenous BID AC   potassium chloride 40 mEq Oral BID   predniSONE 40 mg Oral Daily With Breakfast   riFAXIMin 550 mg Oral Q12H   Scopolamine 1 patch Transdermal Q72H   sevelamer 800 mg Oral TID With Meals   sodium chloride 10 mL Intravenous Q12H   sodium chloride 10 mL Intravenous Q12H       EPINEPHrine 0.02-0.3 mcg/kg/min Last Rate: Stopped (01/03/20 0130)   midazolam 1-10 mg/hr Last Rate: Stopped (01/03/20 1334)   norepinephrine 0.02-0.3 mcg/kg/min Last Rate: Stopped (01/06/20 1200)       Assessment/Plan   ASSESSMENT / PLAN      Gastrointestinal hemorrhage    Type 2 diabetes mellitus with chronic kidney disease on chronic dialysis, with long-term current use of insulin (CMS/[facility])    Essential hypertension    ESRD (end stage renal disease) (CMS/[facility])    Primary insomnia    Chronic pain    Anxiety    Gastroesophageal reflux disease without esophagitis    COPD (chronic obstructive pulmonary disease) (CMS/[facility])     Chronic heart failure with preserved ejection fraction (CMS/HCC)    Encephalopathy    Fever of unknown origin    Acute respiratory failure (CMS/HCC)    RSV (respiratory syncytial virus infection)    Hemophilus influenzae infection    Excessive oral secretions    Hypokalemia    Altered mental status    1.  ESRD on HD:  - Last hemodialysis was Tuesday before admission, however the patient did not complete his treatment and left more than 2 hours early. Patient had HD Monday. Cr increased from 4.712 to 6.56. Pt's potassium at 3.1. 40meq po kcl today    DW Pts wife and I have suggested not to do hd today after looking at EEG result - she understands it and is waiting for his brother to come tomorrow and do terminal extubation and comfort care. Will avoid hd today    2. Subdural Hematoma / anoxic brain injury  CT scan showed subdural hematoma. She is awaiting arrival of pt's brother tomorrow.      2.  GI bleed:  - s/p EGD. Has varices and ulcers     3.  HFpEF     4.  COPD / RSV + / H.influenzae  -On Ceftriaxone     5.  Chronic hepatitis C     6.  Anemia:  - Hemoglobin is low at 7.3. Epogen injection scheduled with HD.      7.  Hypertension:  - Blood pressure is controlled. On levophed. BP is 110/57.     8. Hypokalemia post hd and after correction of metabolic acidosis will replace with kcl 40 meq oral- order from this morning BID.                   This document has been electronically signed by Beryl Meeks, Medical Student on January 8, 2020 8:38 AM      As the teaching physician, I have personally re-performed the physical exam and medical decision making activities included in the student note.    Pascual Vilchis MD  1/8/2020  10:07 AM

## 2020-01-08 NOTE — PLAN OF CARE
Pt has been fighting his ventilator today. He has bite his tongue. Taken to CT scan and had a bedside EEG. Dr. Oliveira spoke at bedside many times with wife about continuation of care and results of ct scan and EEG. Wife states she will not make any changes in care until brother comes on Thursday. Rectal tube in place. Will continue to monitor.

## 2020-01-08 NOTE — SIGNIFICANT NOTE
01/08/20 0702   OT Evaluation Time/Intention   Evaluation Not Performed patient/family declined evaluation   Comment: Evaluation Not Performed Pt to be put on comfort measures on Thursday per family request - will D/C OT orders

## 2020-01-09 PROBLEM — Z51.5 COMFORT MEASURES ONLY STATUS: Status: ACTIVE | Noted: 2020-01-01

## 2020-01-09 NOTE — SIGNIFICANT NOTE
Spoke with wife and brother at 1205 pm 1/9/20. Discussed comfort measures and what that would entail. Family agreed at this time would like to extubate patient and place on comfort measures as that was always the patients wishes.   Orders placed for pain, agitation, secretion medication.         This document has been electronically signed by Guero Huynh MD on January 9, 2020 12:13 PM

## 2020-01-09 NOTE — PLAN OF CARE
PT continues on vent with no sedation. Pt remains unresponsive even with painful stimuli. Wife decided to decline hemodialysis today. Per wife tomorrow pt will be transitioned to comfort care with a terminal extubation. States she is waiting for his brother to arrive before making the transition however if he is not here by 3pm tomorrow she will proceed on to the transition to comfort care only. Support given to wife. Pt hypotensive at times but otherwise VSS. Will continue to monitor.

## 2020-01-09 NOTE — PLAN OF CARE
Pt tolerating current vent settings, increased agitation noted, nursing is medicating. Will continue to monitor.

## 2020-01-09 NOTE — PLAN OF CARE
Patient was terminally extubated and transitioned to comfort care this afternoon. Pt remains unresponsive to any stimuli. Patient appears to be comfortable. PRN medications for pain and anxiety given as needed. Will continue to monitor.

## 2020-01-09 NOTE — PLAN OF CARE
Pt continues to show no improvement. Spouse at bedside and reports waiting for family to arrive from out of town. Plans are to extubate pt today. Pt remains unresponsive with no sedation for several days.Rectal tube in place and draining liquid brown stool. Some leakage noted.

## 2020-01-09 NOTE — CONSULTS
Adult Nutrition  Assessment    Patient Name:  Cristo Musa  YOB: 1950  MRN: [id number]  Admit Date:  1/2/2020    Assessment Date:  1/9/2020    Comments:  Pt is being terminally extubated today and made comfort care.  RD will monitor for any changes.      Reason for Assessment     Row Name 01/09/20 1228          Reason for Assessment    Reason For Assessment  follow-up protocol           Anthropometrics     Row Name 01/09/20 0600          Anthropometrics    Weight  91.9 kg (202 lb 9.6 oz)         Labs/Tests/Procedures/Meds     Row Name 01/09/20 1228          Labs/Procedures/Meds    Lab Results Reviewed  reviewed, pertinent        Diagnostic Tests/Procedures    Diagnostic Test/Procedure Reviewed  reviewed, pertinent        Medications    Pertinent Medications Reviewed  reviewed, pertinent                       Electronically signed by:  Jackie Bartholomew RD  01/09/20 12:36 PM

## 2020-01-09 NOTE — SIGNIFICANT NOTE
Pt obviously agitated, gaging on tube, biting tongue. Spoke with nursing for medication to assist pt to relax, nursing at bedside.

## 2020-01-09 NOTE — PROGRESS NOTES
FAMILY MEDICINE DAILY PROGRESS NOTE  NAME: Cristo Musa  : 1950  MRN: [id number]     LOS: 6 days     PROVIDER OF SERVICE: Demarcus Oliveira MD    Chief Complaint: Gastrointestinal hemorrhage    Subjective:     Interval History:  History taken from: chart family RN  Patient is a 69 y.o  male admitted for GI Hemorrhage, currently intubated for respiratory failure.   Patient is still unresponsive    Nursing staff reported no incidents during the night.   Feeding- feeding tube- Novasource renal  Analgesia- fentanyl   Sedation-  Propofol-  Off sedation currently   Thromboprophylaxis- SCD  Heads up- 30 degrees- YES  Ulcer Prophylaxis- protonix 40 mg  Glycemic control-  SSI  SBT-  Failed     Of note-  CONTACT PRECAUTIONS-  RSV positive, MRSA negative    Wife present in room. Was provided update. Wife stated that she would make a decision today to extubate and make patient comfort measures     -once again advised wife that patient does have a subdural hematoma and by waiting could negatively impact the patient.   -wife verbalized once again  understanding of the risks involved with not allowing further evaluation of subdural hematoma to include brain damage or even possibly death.       Review of Systems:    Review of Systems   Unable to perform ROS: Intubated       Objective:     Vital Signs  Temp:  [97.8 °F (36.6 °C)-99.1 °F (37.3 °C)] 98.5 °F (36.9 °C)  Heart Rate:  [66-80] 73  Resp:  [11-25] 14  BP: ()/(50-69) 127/61  FiO2 (%):  [30 %] 30 %    Physical Exam  Physical Exam   Constitutional: No distress.   Sickly apperance, intubated    HENT:   Head: Normocephalic and atraumatic.   Right Ear: External ear normal.   Left Ear: External ear normal.   Mouth/Throat: Oropharynx is clear and moist.   New bite marks noted on tongue    Eyes: Conjunctivae are normal. No scleral icterus.   Bilateral cataracts    Neck: Normal range of motion.   Cardiovascular: Normal rate, regular rhythm and  normal heart sounds. Exam reveals no gallop and no friction rub.   No murmur heard.  Pulmonary/Chest: No stridor. He is in respiratory distress. He has wheezes (diffuse). He exhibits no tenderness.   Currently intubated     Abdominal: Soft. Bowel sounds are normal. He exhibits distension.   Musculoskeletal: Normal range of motion. He exhibits no edema or tenderness.   Neurological: GCS eye subscore is 4. GCS verbal subscore is 1. GCS motor subscore is 1.   Corneal reflex present  Patient exhibits decerebrate posturing   -does not follow commands      Skin: Skin is warm and dry. No rash noted. He is not diaphoretic. No erythema. No pallor.       Medication Review    Current Facility-Administered Medications:   •  8-hydroxyquinoline sulfate (BAG BALM) ointment, , Apply externally, PRN, Guero Huynh MD  •  acetaminophen (TYLENOL) tablet 650 mg, 650 mg, Oral, Q4H PRN, 650 mg at 01/03/20 2139 **OR** [DISCONTINUED] acetaminophen (TYLENOL) 160 MG/5ML solution 650 mg, 650 mg, Oral, Q4H PRN **OR** acetaminophen (TYLENOL) suppository 650 mg, 650 mg, Rectal, Q4H PRN, Tricia Philip MD  •  albuterol sulfate HFA (PROVENTIL HFA;VENTOLIN HFA;PROAIR HFA) inhaler 2 puff, 2 puff, Inhalation, 4x Daily - RT, Meseret Huynh MD, 2 puff at 01/09/20 0725  •  aspirin chewable tablet 81 mg, 81 mg, Oral, Daily, Tricia Philip MD, 81 mg at 01/08/20 0837  •  budesonide-formoterol (SYMBICORT) 160-4.5 MCG/ACT inhaler 2 puff, 2 puff, Inhalation, BID - RT, Imani Green MD, 2 puff at 01/09/20 0725  •  cefTRIAXone (ROCEPHIN) 1 g/100 mL 0.9% NS (MBP), 1 g, Intravenous, Q24H, Guero Huynh MD, 1 g at 01/08/20 1006  •  cetirizine (zyrTEC) tablet 10 mg, 10 mg, Oral, Daily, Tricia Philip MD, 10 mg at 01/08/20 0837  •  cyclobenzaprine (FLEXERIL) tablet 5 mg, 5 mg, Oral, Q8H PRN, Tricia Philip MD, 5 mg at 01/09/20 0201  •  dextrose (D50W) 25 g/ 50mL Intravenous Solution 25 g, 25 g, Intravenous, Q15 Min PRN, Tricia Philip MD, 25 g  at 01/03/20 2309  •  dextrose (GLUTOSE) oral gel 15 g, 15 g, Oral, Q15 Min PRN, Tricia Philip MD  •  docusate sodium (COLACE) capsule 100 mg, 100 mg, Oral, BID PRN, Tricia Philip MD  •  EPINEPHrine (ADRENALIN) 5 mg in sodium chloride 0.9 % 250 mL (0.02 mg/mL) infusion, 0.02-0.3 mcg/kg/min, Intravenous, Titrated, Tricia Philip MD, Stopped at 01/03/20 0130  •  epoetin ziggy (EPOGEN,PROCRIT) injection 10,000 Units, 10,000 Units, Intravenous, Once per day on Mon Wed Fri, Alethea Diamond MD, 10,000 Units at 01/06/20 1446  •  escitalopram (LEXAPRO) tablet 10 mg, 10 mg, Oral, Daily, Tricia Philip MD, 10 mg at 01/08/20 0836  •  fentaNYL citrate (PF) (SUBLIMAZE) injection 25 mcg, 25 mcg, Intravenous, Q30 Min PRN, Imani Green MD, 25 mcg at 01/09/20 0159  •  gabapentin (NEURONTIN) capsule 100 mg, 100 mg, Oral, Nightly, Tricia Philip MD, 100 mg at 01/08/20 2110  •  glucagon (human recombinant) (GLUCAGEN DIAGNOSTIC) injection 1 mg, 1 mg, Subcutaneous, Q15 Min PRN, Tricia Philip MD  •  hydrOXYzine pamoate (VISTARIL) capsule 50 mg, 50 mg, Oral, TID PRN, Tricia Philip MD, 50 mg at 01/02/20 1423  •  insulin aspart (novoLOG) injection 0-24 Units, 0-24 Units, Subcutaneous, 4x Daily AC & at Bedtime, Guero Huynh MD, 8 Units at 01/09/20 0636  •  insulin detemir (LEVEMIR) injection 8 Units, 8 Units, Subcutaneous, Nightly, Guero Huynh MD, 8 Units at 01/08/20 2211  •  ipratropium (ATROVENT HFA) inhaler 2 puff, 2 puff, Inhalation, 4x Daily - RT, Imani Green MD, 2 puff at 01/09/20 0724  •  lactulose (CHRONULAC) 10 GM/15ML solution 60 mL, 60 mL, Rectal, 4x Daily, Tricia Philip MD, 60 mL at 01/08/20 2056  •  latanoprost (XALATAN) 0.005 % ophthalmic solution 1 drop, 1 drop, Both Eyes, Nightly, Tricia Philip MD, 1 drop at 01/08/20 2112  •  lidocaine (LIDODERM) 5 % 1 patch, 1 patch, Transdermal, Q24H, Tricia Philip MD, 1 patch at 01/08/20 0838  •  melatonin tablet 6 mg, 6 mg, Oral,  Nightly, Tricia Philip MD, 6 mg at 01/08/20 2114  •  midazolam (VERSED) 50 mg in sodium chloride 0.9 % 100 mL (0.5 mg/mL) infusion, 1-10 mg/hr, Intravenous, Titrated, Meseret Huynh MD, Stopped at 01/03/20 1334  •  midazolam (VERSED) injection 1 mg, 1 mg, Intravenous, Q30 Min PRN, Imani Green MD, 1 mg at 01/08/20 1451  •  norepinephrine (LEVOPHED) 8 mg/250 mL (32 mcg/mL) in sodium chloride 0.9% infusion (premix), 0.02-0.3 mcg/kg/min, Intravenous, Titrated, Darren Anderson MD, Stopped at 01/06/20 1200  •  ondansetron (ZOFRAN) injection 4 mg, 4 mg, Intravenous, Q6H PRN, Tricia Philip MD  •  pantoprazole (PROTONIX) injection 40 mg, 40 mg, Intravenous, BID AC, Tricia Philip MD, 40 mg at 01/09/20 0646  •  predniSONE (DELTASONE) tablet 40 mg, 40 mg, Oral, Daily With Breakfast, Imani Green MD, 20 mg at 01/08/20 0836  •  riFAXIMin (XIFAXAN) tablet 550 mg, 550 mg, Oral, Q12H, Tricia Philip MD, 550 mg at 01/08/20 2115  •  Scopolamine (TRANSDERM-SCOP) 1.5 MG/3DAYS patch 1 patch, 1 patch, Transdermal, Q72H, Guero Huynh MD, 1 patch at 01/08/20 0746  •  sevelamer (RENVELA) tablet 800 mg, 800 mg, Oral, TID With Meals, Tricia Philip MD, 800 mg at 01/06/20 1148  •  sodium chloride 0.9 % flush 10 mL, 10 mL, Intravenous, Q12H, Tricia Philip MD, 10 mL at 01/09/20 0206  •  sodium chloride 0.9 % flush 10 mL, 10 mL, Intravenous, PRN, Tricia Philip MD  •  sodium chloride 0.9 % flush 10 mL, 10 mL, Intravenous, Q12H, Tricia Philip MD, 10 mL at 01/09/20 0205  •  sodium chloride 0.9 % flush 10 mL, 10 mL, Intravenous, PRN, Tricia Philip MD  •  traZODone (DESYREL) tablet 50 mg, 50 mg, Oral, Nightly PRN, Tricia Philip MD, 50 mg at 01/03/20 8932     Diagnostic Data    Lab Results (last 24 hours)     Procedure Component Value Units Date/Time    POC Glucose Once [[id number]]  (Abnormal) Collected:  01/09/20 0630    Specimen:  Blood Updated:  01/09/20 0650     Glucose 243 mg/dL       Comment: : [id number] COLEEN Wiseman ID: [id number]       Comprehensive Metabolic Panel [[id number]]  (Abnormal) Collected:  01/09/20 0306    Specimen:  Blood Updated:  01/09/20 0401     Glucose 177 mg/dL      BUN 89 mg/dL      Creatinine 8.14 mg/dL      Sodium 140 mmol/L      Potassium 3.8 mmol/L      Chloride 97 mmol/L      CO2 25.0 mmol/L      Calcium 8.6 mg/dL      Total Protein 9.4 g/dL      Albumin 2.60 g/dL      ALT (SGPT) 16 U/L      AST (SGOT) 23 U/L      Alkaline Phosphatase 126 U/L      Total Bilirubin 0.7 mg/dL      eGFR Non  Amer --     Comment: <15 Indicative of kidney failure.        eGFR  African Amer 8 mL/min/1.73      Comment: <15 Indicative of kidney failure.        Globulin 6.8 gm/dL      A/G Ratio 0.4 g/dL      BUN/Creatinine Ratio 10.9     Anion Gap 18.0 mmol/L     Narrative:       GFR Normal >60  Chronic Kidney Disease <60  Kidney Failure <15      CBC & Differential [[id number]] Collected:  01/09/20 0306    Specimen:  Blood Updated:  01/09/20 0343    Narrative:       The following orders were created for panel order CBC & Differential.  Procedure                               Abnormality         Status                     ---------                               -----------         ------                     CBC Auto Differential[[id number]]        Abnormal            Final result                 Please view results for these tests on the individual orders.    CBC Auto Differential [[id number]]  (Abnormal) Collected:  01/09/20 0306    Specimen:  Blood Updated:  01/09/20 0343     WBC 7.89 10*3/mm3      RBC 2.80 10*6/mm3      Hemoglobin 7.6 g/dL      Hematocrit 23.8 %      MCV 85.0 fL      MCH 27.1 pg      MCHC 31.9 g/dL      RDW 16.7 %      RDW-SD 49.9 fl      MPV 9.3 fL      Platelets 127 10*3/mm3      Neutrophil % 83.1 %      Lymphocyte % 9.8 %      Monocyte % 5.6 %      Eosinophil % 0.4 %      Basophil % 0.1 %      Immature Grans % 1.0 %      Neutrophils, Absolute 6.56  10*3/mm3      Lymphocytes, Absolute 0.77 10*3/mm3      Monocytes, Absolute 0.44 10*3/mm3      Eosinophils, Absolute 0.03 10*3/mm3      Basophils, Absolute 0.01 10*3/mm3      Immature Grans, Absolute 0.08 10*3/mm3      nRBC 0.5 /100 WBC     POC Glucose Once [[id number]]  (Abnormal) Collected:  01/08/20 2208    Specimen:  Blood Updated:  01/08/20 2321     Glucose 332 mg/dL      Comment: Result Not ConfirmedOperator: [id number] COLEEN Wiseman ID: [id number]       POC Glucose Once [[id number]]  (Abnormal) Collected:  01/08/20 1151    Specimen:  Blood Updated:  01/08/20 1207     Glucose 279 mg/dL      Comment: Sliding Scale AdminOperator: [id number] FRANCK HEATHERMeter ID: [id number]       Ammonia [[id number]]  (Normal) Collected:  01/08/20 0829    Specimen:  Blood Updated:  01/08/20 0853     Ammonia 32 umol/L     Blood Culture - Blood, Blood, Arterial Line [[id number]] Collected:  01/04/20 0739    Specimen:  Blood, Arterial Line Updated:  01/08/20 0815     Blood Culture No growth at 4 days           Imaging Results (Last 24 Hours)     ** No results found for the last 24 hours. **          I reviewed the patient's new clinical results.    Assessment/Plan:     Active Hospital Problems    Diagnosis   • **Gastrointestinal hemorrhage     -hemoccult positive in ER; no BRBPR or hematemesis   -colonoscopy on 11/6/19 showed angiectasia which was clipped without complication  -upper endoscopy on 11/5/19 showed grade 2 varices in upper third of esophagus  -PPI    Results from last 7 days   Lab Units 01/07/20  0402 01/06/20  0548 01/05/20  0357 01/04/20  0738 01/03/20  0439 01/02/20  1843 01/02/20  0359   HEMATOCRIT % 23.3* 24.0* 25.5* 25.1* 22.1* 23.5* 24.7*   ]    Results from last 7 days   Lab Units 01/07/20  0402 01/06/20  0548 01/05/20  0357 01/04/20  0738 01/03/20  0439 01/02/20  1843 01/02/20  0359   HEMOGLOBIN g/dL 7.6* 7.9* 8.1* 8.0* 7.3* 7.5* 7.9*   ]    Consider transfusion-  If hemoglobin is under 7        EGD  1/2/19: Grade 1 varices, duodenal ulcers non bleeding   - repeat in 1 month   - Dr. Philip, Gi following.   - will continue to monitor H/H     • Excessive oral secretions     -  Patient is intubed  -Scopalamine patch      • Hypokalemia     Results from last 7 days   Lab Units 01/08/20  0512 01/07/20  0402 01/06/20  0548 01/05/20  0357 01/04/20  0738 01/03/20  0439 01/02/20  1815 01/02/20  0358   POTASSIUM mmol/L 3.1* 2.9* 4.9 4.8 4.0 4.9 4.8 5.0   ]  -  Dialysis today  -will monitor and replace      • Subdural hematoma (CMS/HCC)     Currently intubated   -off sedation  CT scan subdural hematoma   EEG -  study is consistent with diffuse cerebral dysfunction of moderate degree       • Hemophilus influenzae infection     - currently  On rocephin started on 1/6/19- for 7 days    Results from last 7 days   Lab Units 01/08/20  0512 01/07/20  0402 01/06/20  0548 01/05/20  0357 01/04/20  0738 01/03/20  0439 01/02/20  0359   WBC 10*3/mm3 7.34 9.26 10.07 12.26* 8.80 5.34 5.97   ]       • RSV (respiratory syncytial virus infection)     Currently in acute respiratory failure requiring intubation   Will continue supportive care       -pulmonology following, Dr. Green  -vancomycin d/c on 1/519  with negative MRSA swab  -currently on rocephin  -continue solmedrol     • Fever of unknown origin     Blood cultures-  Negative x 3 days    Respiratory panel-  Positive for RSV   Respiratory culture- H influenzae on rocpehin    -CXR- Pulmonary vascular prominence. Interval  clearing of right lower lobe infiltrative changes since prior  exam. Favorable change.  Pulmonology following, appreciate recommendations      • Acute respiratory failure (CMS/HCC)     RSV positive   Currently intubated   Patient is a DNR/DNI     family plans to terminally extubate today and make patient comfort measure s    -pulmonology following, Dr. Green  -vancomycin d/c yesterday with negative MRSA swab  -continue solumedrol     • Encephalopathy     -Presumed  metabolic  -Ammonia level: improving    Results from last 7 days   Lab Units 01/08/20  0829 01/03/20  1247 01/02/20  1010   AMMONIA umol/L 32 65* 143*   ]    -Continue Lactulose, rifaxamin    GI following  -Ct scan-  Subdural hematoma          • Chronic heart failure with preserved ejection fraction (CMS/[facility])     -last echo in July 2018 shows EF of 58% with grade 1a diastolic dysfunction  -daily weights  -care with fluids  -daily fluid restriction 1500 mL       • COPD (chronic obstructive pulmonary disease) (CMS/HCC)     - Continue flonase, loratadine  - Patient uses Oxygen prn at the nursing home.   - Duonebs and albuterol nebs prn.          • Gastroesophageal reflux disease without esophagitis     -PPI     • Chronic pain     -Gabapentin        • Anxiety     - Continue Lexapro      • Primary insomnia     -trazodone, melatonin     • ESRD (end stage renal disease) (CMS/HCC)     -HD M/W/F  -Nephrology on board   -epogen on M/W/F with dialysis  -continue Sevelamer 800 mg three times daily        • Type 2 diabetes mellitus with chronic kidney disease on chronic dialysis, with long-term current use of insulin (CMS/HCC)     -levemir, SSI  - Dialysis on Monday, Wednesday, Friday   Results from last 7 days   Lab Units 01/07/20  0402 01/06/20  0548 01/05/20  0357 01/04/20  0738 01/03/20  0439 01/02/20  1815 01/02/20  0358   CREATININE mg/dL 4.72* 8.48* 7.08* 5.78* 9.46* 8.67* 7.77*   ]     • Essential hypertension     -not on any home medications, will monitor            DVT prophylaxis: SCDs/TEDs  Code Status and Medical Interventions:   Ordered at: 01/02/20 1525     Limited Support to NOT Include:    Intubation     Level Of Support Discussed With:    Health Care Surrogate     Code Status:    No CPR     Medical Interventions (Level of Support Prior to Arrest):    Limited       Plan for disposition: will have discussion with family       Time: 30 minutes      Signed,   Demarcus Oliveira MD  Family Medicine Resident PGY2  [facility]  [facility]        This document has been electronically signed by Demarcus Oliveira MD on January 9, 2020 7:34 AM    \

## 2020-01-09 NOTE — PROGRESS NOTES
"[facility]  [address] [place] [address]. [address]  T - [phone]  F - [phone]     Progress Note          PATIENT  DEMOGRAPHICS   PATIENT NAME: Cristo Musa                      PHYSICIAN: Beryl Meeks, Medical Student  : 1950  MRN: [id number]   LOS: 6 days    Patient Care Team:  Warren Stewart MD as PCP - General (Family Medicine)  Beth Porras APRN as PCP - Claims Attributed  Michelle Lo MD as Resident (Family Medicine)  John Sanchez MD as Resident (Family Medicine)  Marissa Boothe MD as Resident (Family Medicine)  Lucio Anderson MD as Resident (Family Medicine)  Demarcus Oliveira MD as Resident (Family Medicine)  Subjective   SUBJECTIVE   Mr. Araiza is a 69 year old M presenting with an initial GI hemorrhage, complicated by ESRD, respiratory failure, and an anoxic brain injury with subdural hematoma. Patient remains intubated, and tested positive for RSV and H. Influenzae. Pt currently on ceftriaxone. Pt's wife made decision yesterday to discontinue HD. Patient expected to be switched to comfort measures this afternoon following arrival of patient's brother.        Objective   OBJECTIVE   Vital Signs  Temp:  [98.5 °F (36.9 °C)-99.1 °F (37.3 °C)] 98.5 °F (36.9 °C)  Heart Rate:  [66-80] 73  Resp:  [11-25] 14  BP: ()/(50-69) 127/61  FiO2 (%):  [30 %] 30 %    Flowsheet Rows      First Filed Value   Admission Height  172.7 cm (67.99\") Documented at 2020 1510   Admission Weight  95.7 kg (211 lb) Documented at 2020 0119           I/O last 3 completed shifts:  In: 1127 [Other:231; NG/GT:896]  Out: 1000 [Stool:1000]    PHYSICAL EXAM    Physical Exam   Constitutional: He appears well-developed and well-nourished.   HENT:   Head: Normocephalic and atraumatic.   Eyes: Conjunctivae are normal.   Patient's eyes remained open throughout exam.    Neck: Normal range of motion. Neck supple.   Cardiovascular: Normal rate, regular " rhythm and normal heart sounds.   No murmur heard.  Pulmonary/Chest: Breath sounds normal. He is in respiratory distress.   Patient is intubated.   Abdominal: Soft. Bowel sounds are normal. There is no tenderness.   Musculoskeletal: Normal range of motion. He exhibits edema.   Neurological:   Patient is unresponsive at this time.     Skin: Skin is warm and dry. Capillary refill takes less than 2 seconds.       RESULTS   Results Review:    Results from last 7 days   Lab Units 01/09/20  0306 01/08/20  0512 01/07/20  0402   SODIUM mmol/L 140 137 136   POTASSIUM mmol/L 3.8 3.1* 2.9*   CHLORIDE mmol/L 97* 93* 91*   CO2 mmol/L 25.0 28.0 28.0   BUN mg/dL 89* 71* 44*   CREATININE mg/dL 8.14* 6.56* 4.72*   CALCIUM mg/dL 8.6 8.3* 8.1*   BILIRUBIN mg/dL 0.7 0.7 0.8   ALK PHOS U/L 126* 120* 103   ALT (SGPT) U/L 16 17 21   AST (SGOT) U/L 23 32 48*   GLUCOSE mg/dL 177* 248* 250*       Estimated Creatinine Clearance: 9.6 mL/min (A) (by C-G formula based on SCr of 8.14 mg/dL (H)).                Results from last 7 days   Lab Units 01/09/20  0306 01/08/20  0512 01/07/20  0402 01/06/20  0548 01/05/20  0357   WBC 10*3/mm3 7.89 7.34 9.26 10.07 12.26*   HEMOGLOBIN g/dL 7.6* 7.3* 7.6* 7.9* 8.1*   PLATELETS 10*3/mm3 127* 131* 133* 148 134*               Imaging Results (Last 24 Hours)     ** No results found for the last 24 hours. **           MEDICATIONS      albuterol sulfate HFA 2 puff Inhalation 4x Daily - RT   aspirin 81 mg Oral Daily   budesonide-formoterol 2 puff Inhalation BID - RT   cefTRIAXone 1 g Intravenous Q24H   cetirizine 10 mg Oral Daily   epoetin ziggy/ziggy-epbx 10,000 Units Intravenous Once per day on Mon Wed Fri   escitalopram 10 mg Oral Daily   gabapentin 100 mg Oral Nightly   insulin aspart 0-24 Units Subcutaneous 4x Daily AC & at Bedtime   insulin detemir 8 Units Subcutaneous Nightly   ipratropium 2 puff Inhalation 4x Daily - RT   lactulose 60 mL Rectal 4x Daily   latanoprost 1 drop Both Eyes Nightly   lidocaine 1  patch Transdermal Q24H   melatonin 6 mg Oral Nightly   pantoprazole 40 mg Intravenous BID AC   predniSONE 40 mg Oral Daily With Breakfast   riFAXIMin 550 mg Oral Q12H   Scopolamine 1 patch Transdermal Q72H   sevelamer 800 mg Oral TID With Meals   sodium chloride 10 mL Intravenous Q12H   sodium chloride 10 mL Intravenous Q12H       EPINEPHrine 0.02-0.3 mcg/kg/min Last Rate: Stopped (01/03/20 0130)   midazolam 1-10 mg/hr Last Rate: Stopped (01/03/20 1334)   norepinephrine 0.02-0.3 mcg/kg/min Last Rate: Stopped (01/06/20 1200)       Assessment/Plan   ASSESSMENT / PLAN      Gastrointestinal hemorrhage    Type 2 diabetes mellitus with chronic kidney disease on chronic dialysis, with long-term current use of insulin (CMS/[facility])    Essential hypertension    ESRD (end stage renal disease) (CMS/[facility])    Primary insomnia    Chronic pain    Anxiety    Gastroesophageal reflux disease without esophagitis    COPD (chronic obstructive pulmonary disease) (CMS/[facility])    Chronic heart failure with preserved ejection fraction (CMS/[facility])    Encephalopathy    Fever of unknown origin    Acute respiratory failure (CMS/[facility])    RSV (respiratory syncytial virus infection)    Hemophilus influenzae infection    Excessive oral secretions    Hypokalemia    Subdural hematoma (CMS/[facility])    1.  ESRD on HD:  - Last hemodialysis was Tuesday before admission, however the patient did not complete his treatment and left more than 2 hours early. Patient had HD Monday. Cr increased from 4.712 to 6.56. Pt's potassium at 3.8.      DW Pts wife and I have suggested not to do hd yesterday after looking at EEG result - she understands it and is waiting for his brother to come and do terminal extubation and comfort care today. Will avoid hd today again.     2. Subdural Hematoma / anoxic brain injury  CT scan showed subdural hematoma.       2.  GI bleed:  - s/p EGD. Has varices and ulcers     3.  HFpEF     4.  COPD / RSV + / H.influenzae  -On Ceftriaxone     5.  Chronic  hepatitis C     6.  Anemia:  - Hemoglobin is low at 7.6.      7.  Hypertension:  - Blood pressure is controlled. BP is 127/61.     8. Hypokalemia post hd and after correction of metabolic acidosisk better now              This document has been electronically signed by Beryl Meeks, Medical Student on January 9, 2020 8:03 AM      As the teaching physician, I have personally re-performed the physical exam and medical decision making activities included in the student note.    Pascual Vilchis MD  1/9/2020  10:01 AM

## 2020-01-09 NOTE — PROGRESS NOTES
"Intensive Care Follow-up      LOS: 6 days     Mr. Cristo Musa, 69 y.o. male is followed for: Ventilator management     CHIEF COMPLIANT: Shortness of breath    Subjective - Interval History     Patient remains unresponsive on a mechanical ventilator    The patient's relevant past medical, surgical and social history were reviewed and updated in Epic as appropriate.     Objective   /61   Pulse 69   Temp 98.5 °F (36.9 °C) (Axillary)   Resp 14   Ht 172.7 cm (67.99\")   Wt 91.9 kg (202 lb 9.6 oz)   SpO2 90%   BMI 30.81 kg/m²       Vent Settings: FiO2 (%):  [30 %] 30 %  S RR:  [10] 10  PEEP/CPAP (cm H2O):  [5 cm H20] 5 cm H20  DE SUP:  [0 cm H20] 0 cm H20  MAP (cm H2O):  [7-11] 8.8    Physical Exam: Unresponsive black gentleman on mechanical ventilator  General Appearance:       Head:    Normocephalic, without obvious abnormality, atraumatic   Eyes:            Lids and lashes normal, conjunctivae and sclerae normal, no   icterus, no pallor, corneas clear, PERRLA   Ears:    Ears appear intact with no abnormalities noted   Throat:   No oral lesions, no thrush, oral mucosa moist   Neck:   No adenopathy, supple, trachea midline, no thyromegaly, no   carotid bruit, no JVD   Back:     No kyphosis present, no scoliosis present, no skin lesions,      erythema or scars, no tenderness to percussion or                   palpation,   range of motion normal   Lungs:    Clear    Heart:    Regular rhythm and normal rate, normal S1 and S2, no            murmur, no gallop, no rub, no click   Chest Wall:    No abnormalities observed   Abdomen:     Normal bowel sounds, no masses, no organomegaly, soft        non-tender, non-distended, no guarding, no rebound                tenderness   Rectal:     Deferred   Extremities:   Moves all extremities well, no edema, no cyanosis, no             redness   Pulses:   Pulses palpable and equal bilaterally   Skin:   No bleeding, bruising or rash   Lymph nodes:   No palpable " adenopathy   Neurologic:   Cranial nerves 2 - 12 grossly intact, sensation intact, DTR       present and equal bilaterally     LAB:  No results found for: PHART, RAU7SJV, PO2ART  Lab Results   Component Value Date    WBC 7.89 01/09/2020    HGB 7.6 (L) 01/09/2020    HCT 23.8 (L) 01/09/2020    MCV 85.0 01/09/2020     (L) 01/09/2020     Lab Results   Component Value Date    GLUCOSE 177 (H) 01/09/2020    BUN 89 (H) 01/09/2020    CREATININE 8.14 (H) 01/09/2020    EGFRIFNONA  01/09/2020      Comment:      <15 Indicative of kidney failure.    EGFRIFAFRI 8 (L) 01/09/2020    BCR 10.9 01/09/2020    CO2 25.0 01/09/2020    CALCIUM 8.6 01/09/2020    ALBUMIN 2.60 (L) 01/09/2020    AST 23 01/09/2020    ALT 16 01/09/2020         RAD:   Imaging Results (Last 24 Hours)     ** No results found for the last 24 hours. 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 Philip, Gi following.   - will continue to monitor H/H     • Excessive oral secretions     -  Patient is intubed  -Scopalamine patch      • Hypokalemia     Results from last 7 days   Lab Units 01/08/20  0512 01/07/20  0402 01/06/20  0548 01/05/20  0357  01/04/20  0738 01/03/20  0439 01/02/20  1815 01/02/20  0358   POTASSIUM mmol/L 3.1* 2.9* 4.9 4.8 4.0 4.9 4.8 5.0   ]  -  Dialysis today  -will monitor and replace      • Subdural hematoma (CMS/HCC)     Currently intubated   -off sedation  CT scan subdural hematoma   EEG -  study is consistent with diffuse cerebral dysfunction of moderate degree       • Hemophilus influenzae infection     - currently  On rocephin started on 1/6/19- for 7 days    Results from last 7 days   Lab Units 01/08/20  0512 01/07/20  0402 01/06/20  0548 01/05/20  0357 01/04/20  0738 01/03/20  0439 01/02/20  0359   WBC 10*3/mm3 7.34 9.26 10.07 12.26* 8.80 5.34 5.97   ]       • RSV (respiratory syncytial virus infection)     Currently in acute respiratory failure requiring intubation   Will continue supportive care       -pulmonology following, Dr. Green  -vancomycin d/c on 1/519  with negative MRSA swab  -currently on rocephin  -continue solmedrol     • Fever of unknown origin     Blood cultures-  Negative x 3 days    Respiratory panel-  Positive for RSV   Respiratory culture- H influenzae  -CXR- Pulmonary vascular prominence. Interval  clearing of right lower lobe infiltrative changes since prior  exam. Favorable change.  Pulmonology following, appreciate recommendations      • Acute respiratory failure (CMS/HCC)     RSV positive   Currently intubated   Patient is a DNR/DNI    Family would like to wait a few days before considering extubation.     -pulmonology following, Dr. Green  -vancomycin d/c yesterday with negative MRSA swab  -on zosyn D#2, continue empiric therapy   -continue solumedrol     • Encephalopathy     -Presumed metabolic  -Ammonia level: improving    Results from last 7 days   Lab Units 01/03/20  1247 01/02/20  1010   AMMONIA umol/L 65* 143*   ]    -Continue Lactulose, rifaxamin    GI following  -Ct scan-  Subdural hematoma          • Chronic heart failure with preserved ejection fraction (CMS/HCC)     -last echo in July 2018  shows EF of 58% with grade 1a diastolic dysfunction  -daily weights  -care with fluids  -daily fluid restriction 1500 mL       • COPD (chronic obstructive pulmonary disease) (CMS/[facility])     - Continue flonase, loratadine  - Patient uses Oxygen prn at the nursing home.   - Duonebs and albuterol nebs prn.          • Gastroesophageal reflux disease without esophagitis     -PPI     • Chronic pain     -Gabapentin        • Anxiety     - Continue Lexapro      • Primary insomnia     -trazodone, melatonin     • ESRD (end stage renal disease) (CMS/[facility])     -HD M/W/F  -Nephrology on board   -epogen on M/W/F with dialysis  -continue Sevelamer 800 mg three times daily        • Type 2 diabetes mellitus with chronic kidney disease on chronic dialysis, with long-term current use of insulin (CMS/HCC)     -levemir, SSI  - Dialysis on Monday, Wednesday, Friday   Results from last 7 days   Lab Units 01/07/20  0402 01/06/20  0548 01/05/20  0357 01/04/20  0738 01/03/20  0439 01/02/20  1815 01/02/20  0358   CREATININE mg/dL 4.72* 8.48* 7.08* 5.78* 9.46* 8.67* 7.77*   ]     • Essential hypertension     -not on any home medications, will monitor               Plan        Assessment unresponsive state on mechanical ventilator, respiratory failure    Recommendations family to decide on comfort measures today        This document has been produced with the assistance of Dragon dictation  This document has been electronically signed by Ankush Lock MD on January 9, 2020 7:28 AM       I discussed the patient's findings and my recommendations with patient and nursing staff

## 2020-01-10 NOTE — PROGRESS NOTES
FAMILY MEDICINE DAILY PROGRESS NOTE  NAME: Cristo Musa  : 1950  MRN: [id number]     LOS: 7 days     PROVIDER OF SERVICE: Demarcus Oliveira MD    Chief Complaint: Comfort measures only status    Subjective:     Interval History:  History taken from: chart family RN  Patient is a 69 y.o  male admitted for GI Hemorrhage, currently COMFORT MEASURES.     Patient was found resting comfortably in bed.  In no acute distress.  Patient was extremely somnolent and was unarousable.  Patient's wife present in room stated that patient does not seem like he is in pain.     Of note-  CONTACT PRECAUTIONS-  RSV positive, MRSA negative        Review of Systems:    Review of Systems   Unable to perform ROS: Patient unresponsive       Objective:     Vital Signs  Temp:  [98.9 °F (37.2 °C)] 98.9 °F (37.2 °C)  Heart Rate:  [66-77] 72  Resp:  [16] 16  BP: ()/(51-65) 105/59  FiO2 (%):  [30 %] 30 %    Physical Exam  Physical Exam   Constitutional: No distress.   Sickly apperance, intubated    HENT:   Head: Normocephalic and atraumatic.   Right Ear: External ear normal.   Left Ear: External ear normal.   Mouth/Throat: Oropharynx is clear and moist.   New bite marks noted on tongue    Eyes: Conjunctivae are normal. No scleral icterus.   Bilateral cataracts    Neck: Normal range of motion.   Cardiovascular: Normal rate, regular rhythm and normal heart sounds. Exam reveals no gallop and no friction rub.   No murmur heard.  Pulmonary/Chest: No stridor. He is in respiratory distress. He has wheezes (diffuse). He exhibits no tenderness.   Currently on 2 L nc      Abdominal: Soft. Bowel sounds are normal. He exhibits distension.   Musculoskeletal: Normal range of motion. He exhibits no edema or tenderness.   Neurological: GCS eye subscore is 2. GCS verbal subscore is 1. GCS motor subscore is 1.     Patient exhibits decerebrate posturing   -does not follow commands      Skin: Skin is warm and dry. No rash noted.  He is not diaphoretic. No erythema. No pallor.       Medication Review    Current Facility-Administered Medications:   •  LORazepam (ATIVAN) injection 2 mg, 2 mg, Intravenous, Q2H, Guero Huynh MD, 2 mg at 01/10/20 0528  •  LORazepam (ATIVAN) injection 2 mg, 2 mg, Intravenous, Q2H PRN, Guero Huynh MD, 2 mg at 01/09/20 1458  •  morphine injection 4 mg, 4 mg, Intravenous, Q2H PRN, Guero Huynh MD  •  morphine injection 4 mg, 4 mg, Intravenous, Q2H, Guero Huynh MD, 4 mg at 01/10/20 0529  •  Scopolamine (TRANSDERM-SCOP) 1.5 MG/3DAYS patch 1 patch, 1 patch, Transdermal, Q72H, Guero Huynh MD, 1 patch at 01/08/20 0746     Diagnostic Data    Lab Results (last 24 hours)     Procedure Component Value Units Date/Time    POC Glucose Once [[id number]]  (Abnormal) Collected:  01/08/20 1801    Specimen:  Blood Updated:  01/09/20 1502     Glucose 416 mg/dL      Comment: Sliding Scale AdminOperator: [id number] [facility] HEATHERMeter ID: [id number]       Blood Culture - Blood, Blood, Arterial Line [[id number]] Collected:  01/04/20 0739    Specimen:  Blood, Arterial Line Updated:  01/09/20 0815     Blood Culture No growth at 5 days           Imaging Results (Last 24 Hours)     ** No results found for the last 24 hours. **          I reviewed the patient's new clinical results.    Assessment/Plan:     Active Hospital Problems    Diagnosis   • **Comfort measures only status     Ativan  Morphine  scopolomine         • Excessive oral secretions     -  Patient is intubed  -Scopalamine patch      • Hypokalemia     Results from last 7 days   Lab Units 01/09/20  0306 01/08/20  0512 01/07/20  0402 01/06/20  0548 01/05/20  0357 01/04/20  0738   POTASSIUM mmol/L 3.8 3.1* 2.9* 4.9 4.8 4.0   ]    -will monitor and replace      • Subdural hematoma (CMS/HCC)       CT scan subdural hematoma   EEG -  study is consistent with diffuse cerebral dysfunction of moderate degree       • Hemophilus influenzae infection     - currently  On rocephin started on  1/6/19- for 7 days    Results from last 7 days   Lab Units 01/08/20  0512 01/07/20  0402 01/06/20  0548 01/05/20  0357 01/04/20  0738 01/03/20  0439 01/02/20  0359   WBC 10*3/mm3 7.34 9.26 10.07 12.26* 8.80 5.34 5.97   ]       • RSV (respiratory syncytial virus infection)     Currently in acute respiratory failure requiring intubation   Will continue supportive care       -pulmonology following, Dr. Green  -vancomycin d/c on 1/519  with negative MRSA swab  -currently on rocephin  -continue solmedrol     • Fever of unknown origin     Blood cultures-  Negative x 3 days    Respiratory panel-  Positive for RSV   Respiratory culture- H influenzae on rocpehin    -CXR- Pulmonary vascular prominence. Interval  clearing of right lower lobe infiltrative changes since prior  exam. Favorable change.  Pulmonology following, appreciate recommendations      • Acute respiratory failure (CMS/HCC)     RSV positive   Currently intubated   Patient is a DNR/DNI     family plans to terminally extubate today and make patient comfort measure s    -pulmonology following, Dr. Green  -vancomycin d/c yesterday with negative MRSA swab  -continue solumedrol     • Encephalopathy     -Presumed metabolic  -Ammonia level: improving    Results from last 7 days   Lab Units 01/08/20  0829 01/03/20  1247 01/02/20  1010   AMMONIA umol/L 32 65* 143*   ]    -Continue Lactulose, rifaxamin    GI following  -Ct scan-  Subdural hematoma          • Chronic heart failure with preserved ejection fraction (CMS/HCC)     -last echo in July 2018 shows EF of 58% with grade 1a diastolic dysfunction  -daily weights  -care with fluids  -daily fluid restriction 1500 mL       • Gastrointestinal hemorrhage     -hemoccult positive in ER; no BRBPR or hematemesis   -colonoscopy on 11/6/19 showed angiectasia which was clipped without complication  -upper endoscopy on 11/5/19 showed 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 Philip, Gi following.   - will continue to monitor H/H     • COPD (chronic obstructive pulmonary disease) (CMS/[facility])     - Continue flonase, loratadine  - Patient uses Oxygen prn at the nursing home.   - Duonebs and albuterol nebs prn.          • Gastroesophageal reflux disease without esophagitis     -PPI     • Chronic pain     -Gabapentin        • Anxiety     - Continue Lexapro      • Primary insomnia     -trazodone, melatonin     • ESRD (end stage renal disease) (CMS/[facility])     -HD M/W/F  -Nephrology on board   -epogen on M/W/F with dialysis  -continue Sevelamer 800 mg three times daily   -WIFE DECLINED HD ON Wednesday        • Type 2 diabetes mellitus with chronic kidney disease on chronic dialysis, with long-term current use of insulin (CMS/[facility])     -levemir, SSI  - Dialysis on Monday, Wednesday, Friday   Results from last 7 days   Lab Units 01/09/20  0306 01/08/20  0512 01/07/20  0402 01/06/20  0548 01/05/20  0357 01/04/20  0738   CREATININE mg/dL 8.14* 6.56* 4.72* 8.48* 7.08* 5.78*   ]     • Essential hypertension     -not on any home medications, will monitor            DVT prophylaxis: SCDs/TEDs  Code Status and Medical Interventions:   Ordered at: 01/09/20 1212     Level Of Support Discussed With:    Health Care Surrogate     Code Status:    No CPR     Medical Interventions (Level of Support Prior to Arrest):    Comfort Measures     Comments:    spoke with wife, brother who both would like to put patient  on comfort measures as of 1205 pm on 1/9/20.       Plan for disposition: will have discussion with family       Time: 30 minutes      Signed,   Demarcus Oliveira MD  Family Medicine Resident PGY2  [facility]        This document has been electronically signed by Demarcus Oliveira MD on January 10, 2020 7:11 AM    \

## 2020-01-10 NOTE — DISCHARGE SUMMARY
[facility] DEATH SUMMARY    PATIENT NAME: Cristo Musa   PHYSICIAN: Demarcus Oliveira MD   : 1950  MRN: [id number]    ADMITTED: 2020  : 01/10/20  AT 15:40     ADMISSION DIAGNOSES:  Active Hospital Problems    Diagnosis  POA   • **Comfort measures only status [Z51.5]  Not Applicable   • Excessive oral secretions [R68.89]  Unknown   • Hypokalemia [E87.6]  Unknown   • Subdural hematoma (CMS/HCC) [S06.5X9A]  Unknown   • Hemophilus influenzae infection [A49.2]  Unknown   • RSV (respiratory syncytial virus infection) [B97.4]  Yes   • Fever of unknown origin [R50.9]  Yes   • Acute respiratory failure (CMS/[facility]) [J96.00]  Yes   • Encephalopathy [G93.40]  Yes   • Chronic heart failure with preserved ejection fraction (CMS/[facility]) [I50.32]  Yes   • Gastrointestinal hemorrhage [K92.2]  Yes   • COPD (chronic obstructive pulmonary disease) (CMS/[facility]) [J44.9]  Yes   • Gastroesophageal reflux disease without esophagitis [K21.9]  Yes   • Chronic pain [G89.29]  Yes   • Anxiety [F41.9]  Yes   • Primary insomnia [F51.01]  Yes   • ESRD (end stage renal disease) (CMS/[facility]) [N18.6]  Yes   • Type 2 diabetes mellitus with chronic kidney disease on chronic dialysis, with long-term current use of insulin (CMS/[facility]) [E11.22, N18.6, Z99.2, Z79.4]  Not Applicable   • Essential hypertension [I10]  Yes      Resolved Hospital Problems   No resolved problems to display.     DISCHARGE DIAGNOSES:   Active Hospital Problems    Diagnosis  POA   • **Comfort measures only status [Z51.5]  Not Applicable   • Excessive oral secretions [R68.89]  Unknown   • Hypokalemia [E87.6]  Unknown   • Subdural hematoma (CMS/HCC) [S06.5X9A]  Unknown   • Hemophilus influenzae infection [A49.2]  Unknown   • RSV (respiratory syncytial virus infection) [B97.4]  Yes   • Fever of unknown origin [R50.9]  Yes   • Acute respiratory failure (CMS/[facility]) [J96.00]  Yes   • Encephalopathy [G93.40]  Yes   • Chronic heart failure with  preserved ejection fraction (CMS/[facility]) [I50.32]  Yes   • Gastrointestinal hemorrhage [K92.2]  Yes   • COPD (chronic obstructive pulmonary disease) (CMS/[facility]) [J44.9]  Yes   • Gastroesophageal reflux disease without esophagitis [K21.9]  Yes   • Chronic pain [G89.29]  Yes   • Anxiety [F41.9]  Yes   • Primary insomnia [F51.01]  Yes   • ESRD (end stage renal disease) (CMS/[facility]) [N18.6]  Yes   • Type 2 diabetes mellitus with chronic kidney disease on chronic dialysis, with long-term current use of insulin (CMS/[facility]) [E11.22, N18.6, Z99.2, Z79.4]  Not Applicable   • Essential hypertension [I10]  Yes      Resolved Hospital Problems   No resolved problems to display.       SERVICE: Family Medicine. Attending Singh Sommer M.D., Resident Demarcus Oliveira MD    CONSULTS:   Consult Orders (all) (From admission, onward)     Start     Ordered    01/08/20 0813  Inpatient Spiritual Care Consult  Once,   Status:  Canceled     Provider:  (Not yet assigned)    01/08/20 0812    01/05/20 0910  Inpatient Consult to Nutrition Services  Once     Provider:  (Not yet assigned)    01/05/20 0909    01/05/20 0856  Inpatient Nutrition Consult  Once     Provider:  (Not yet assigned)    01/05/20 0855    01/03/20 0902  Inpatient Pulmonology Consult  Once     Specialty:  Pulmonary Disease  Provider:  Imani Green MD    01/03/20 0902    01/02/20 0622  Inpatient Nephrology Consult  Once     Specialty:  Nephrology  Provider:  Alethea Diamond MD    01/02/20 0621    01/02/20 0533  Family Practice - Resident (on-call MD unless specified)  Once,   Status:  Canceled     Specialty:  Family Medicine  Provider:  Warren Stewart MD    01/02/20 0533                PROCEDURES:   EGD ON 1/2/20      HISTORY OF PRESENT ILLNESS (from admission H&P):   Cristo Musa is a 69 y.o. male with a CMH of DMT2, HTN, ESRD, chronic pain, and COPD who presents complaining of  Bloody vomitus and blood in stool x4 days. Of note, Pt is unable to provide much  information, [facility] contacted for further info.  Pt has been defecating on himself as of yesterday and they believe he is below his baseline mental functioning. Pt was previously admitted for similar symptoms one month ago and presented to ER 2 days ago with coughing up blood. Received EGD and colonoscopy at previous admission with clipping of angiectasia.  He is on Lactulose outpatient and has been receiving his proper dose at the nursing facility. No complaints of any abdominal pain. He has had 3-4 episodes of loose bowel movements x2 days. Pt is a MWF dialysis pt.      In ED pt received CXR which showed pulmonary vascular congestion. H/H showed 7.9/24.7. He is currently being admitted for GI bleed.  COPIED FROM ADMISSION H&P WRITTEN BY: DR JUSTIN LARA    DIAGNOSTIC DATA:   Lab Results   Component Value Date    WBC 7.89 01/09/2020    HGB 7.6 (L) 01/09/2020    HCT 23.8 (L) 01/09/2020    MCV 85.0 01/09/2020     (L) 01/09/2020     Lab Results   Component Value Date    GLUCOSE 177 (H) 01/09/2020    BUN 89 (H) 01/09/2020    CREATININE 8.14 (H) 01/09/2020    EGFRIFNONA  01/09/2020      Comment:      <15 Indicative of kidney failure.    EGFRIFAFRI 8 (L) 01/09/2020    BCR 10.9 01/09/2020    K 3.8 01/09/2020    CO2 25.0 01/09/2020    CALCIUM 8.6 01/09/2020    ALBUMIN 2.60 (L) 01/09/2020    AST 23 01/09/2020    ALT 16 01/09/2020     Imaging Results (All)     Procedure Component Value Units Date/Time    CT Head Without Contrast [[id number]] Collected:  01/07/20 0945     Updated:  01/07/20 1036    Narrative:       Noncontrast CT examination of the brain.    INDICATION: Alteration mental status. Confusion.       Technique: Axial 5 mm contiguous images with brain parenchymal  and bone windows    This exam was performed according to our departmental  dose-optimization program, which includes automated exposure  control, adjustment of the mA and/or kV according to patient size  and/or use of iterative reconstruction  technique.    Prior relevant examination: CT March 31, 2019.    There is a very small right-sided frontal subdural hematoma.  Maximum thickness 0.55 cm series 2 image 25. Series 5 image 17.  There is no mass effect or shift.    There are involutional, atrophic changes.    Mucoperiosteal thickening and air-fluid levels both maxillary  sinuses, sphenoid and frontal and ethmoid sinuses. Air-fluid  levels in the frontal sinuses. This therefore suggests acute on  chronic sinusitis. Significant unfavorable change since prior  exam.    Evidence of prior (old) right orbital fracture. Old fractures of  the orbital floor and medial orbital wall.      Impression:       CONCLUSION: Interval development of small right frontal subdural  hematoma without mass effect or shift.    Interval development of acute on chronic pansinusitis.  Unfavorable change since prior exam.    These findings discussed with Dr. Lulu Gallardo at 10:35 AM.    Electronically signed by:  Wallace Louise MD  1/7/2020 10:35 AM CST  Workstation: [id number]    XR Abdomen KUB [[id number]] Collected:  01/05/20 1130     Updated:  01/05/20 1212    Narrative:       EXAM:  XR ABDOMEN 1 VIEW (KUB)    ORDERING PROVIDER:  LULU JESSICA    CLINICAL HISTORY:  Cortrak Placement    COMPARISON STUDY:      TECHNIQUE:  Two views of the abdomen    FINDINGS:  Cortrak tube placed with distal portion in the proximal jejunum.  There is a nonspecific bowel gas pattern. No evidence of gross  subdiaphragmatic free air. Multilevel degenerative change of the  visualized dorsal spine.      Impression:       Cortrak tube placed with distal portion in the proximal jejunum.      Electronically signed by:  Joao Obregon MD  1/5/2020 12:11 PM CST  Workstation: [id number]    XR Chest 1 View [[id number]] Collected:  01/05/20 0914     Updated:  01/05/20 1031    Narrative:       Chest x-ray single view.         CLINICAL INDICATION: Shortness of breath. Respiratory failure.    COMPARISON: January 4,  2020.    FINDINGS: Cardiac silhouette is enlarged in size. Pulmonary  vascularity is increased.     Nasogastric tube in stomach. Endotracheal tube identified with  tip 4.9 cm above the bifurcation, of the joon, in satisfactory  position.    Clearing of right lower lobe infiltrative changes since prior  examination. Favorable change.      Impression:       CONCLUSION: Cardiomegaly. Pulmonary vascular prominence. Interval  clearing of right lower lobe infiltrative changes since prior  exam. Favorable change.    Electronically signed by:  Wallace Louise MD  1/5/2020 9:36 AM CST  Workstation: [id number]    XR Chest 1 View [[id number]] Collected:  01/04/20 0818     Updated:  01/04/20 1051    Narrative:       Chest x-ray single view.       CLINICAL INDICATION: Shortness of breath. Fever and GI  hemorrhage.    COMPARISON: Chest January 3, 2020.    FINDINGS: Cardiac silhouette is enlarged in size. Pulmonary  vascularity is unremarkable.     Endotracheal tube identified with tip 5.2 cm above the  bifurcation, of the joon in satisfactory position. Nasogastric  tube in stomach.    Interval development of right lower lobe infiltrative changes  suggesting  pneumonitis.      Impression:       CONCLUSION: Interval development of right lower lobe infiltrative  changes suggesting pneumonitis.    Electronically signed by:  Wallace Louise MD  1/4/2020 10:50 AM CST  Workstation: [id number]    XR Chest 1 View [[id number]] Collected:  01/03/20 0537     Updated:  01/03/20 1007    Narrative:         PROCEDURE: Single chest view portable    REASON FOR EXAM:ventilator, K92.2 Gastrointestinal hemorrhage,  unspecified R19.7 Diarrhea, unspecified K21.9 Gastro-esophageal  reflux disease without esophagitis R26.89 Other abnormalities of  gait and mobility    FINDINGS: Comparison exam dated January 2, 2020. ET tube with tip  3.3 cm above joon. NG tube with tip below level diaphragm.  Cardiac and pulmonary vasculature are normal. Lungs are  clear.  Pleural spaces are normal. No acute osseous abnormality.      Impression:       1.  Tubes as described above.  2.  Otherwise negative chest.    Electronically signed by:  Alexander Mccormack MD  1/3/2020 10:05 AM CST  Workstation: [id number]    XR Chest 1 View [[id number]] Collected:  01/02/20 1809     Updated:  01/02/20 1843    Narrative:       PROCEDURE: XR CHEST 1 VW    VIEWS: Single    INDICATION: Code, intubation    COMPARISON: CXR: 1/2/2020 at 2:33 AM    FINDINGS:       - lines/tubes: Endotracheal tube terminates at the level of the  clavicular heads, approximately 3.5 cm superior to the joon. A  vascular stent is seen in the right subclavian/axillary vein. A  thin radiodense catheter overlies the mid upper left chest wall  laterally. This is of uncertain etiology.    - cardiac: Size within normal limits.    - mediastinum: Contour within normal limits.     - lungs: Central vascular and interstitial prominence and  indistinctness, for which clinical correlation for pulmonary  edema is suggested.    - pleura: No evidence of  fluid.      - osseous: Unremarkable for age.      Impression:         1. Endotracheal tube appears to be in satisfactory position with  tip terminating at level of the inferior margin of the clavicular  heads  2. Thin linear radiodensity which could represent a catheter  overlies the left mid to upper chest laterally. Clinical  correlation needed  3. Central vascular and interstitial prominence and  indistinctness, for which clinical correlation for CHF is  suggested.     Electronically signed by:  Renetta Fountain MD  1/2/2020 6:42 PM CST  Workstation: [id number]    XR Chest 1 View [[id number]] Collected:  01/02/20 0302     Updated:  01/02/20 0343    Narrative:         Chest single view on  1/2/2020     CLINICAL INDICATION: Cough, shortness of breath    COMPARISON: 12/30/2019    FINDINGS: Heart is borderline in size. Minimal vascular  congestion is noted. The lungs are otherwise clear.  Vascular  calcification is noted in the aorta. No bony abnormality is  noted.      Impression:       Borderline size heart with minimal vascular  congestion.    Electronically signed by:  Castro Ornelas  1/2/2020 3:42 AM CST  Workstation: [id number]             HOSPITAL COURSE:   Patient presented to [facility] ED c/o hematemsis and bloody stool x 4days. Patient is from [facility]. In the ED, CXR revealed plumonary vascular congestion, H/H was 7.9/24.7. Patient was admitted for GI blood.     Patient was brought to the floors for further evaluation and treatment. Due to GI bleed, gastroenterology was consulted. 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 Upon completion of EGD, patient became apneic and unable to maintain O2 saturation. Patient became asystolic on monitor, no pulses, ACLS protocol was initiated. Patient was intubated and transferred to the ICU. Family was notified on 1/2/20 and voiced understanding that a DNR/DNI status does not apply when having a procedure and receiving anesthesia. H/H were monitored and trended. Pulmonology was consulted. Patient required levophed in order to maintain MAP and blood pressures.     Patient developed a fever on 1/4/19- repeat CXR - revealedInterval development of right lower lobe infiltrative  changes suggesting pneumonitis. Patient was started on Vancomycin and Zosyn for empiric therapy. Blood cultures had no growth. Respiratory cultures revealed H. Influenza. Respiratory viral panel revealed RSV. Antibiotics were deescalated to Rocephin starting on 1/6/20 for 7 days.  Patient was made comfort measures on 1/9/2020.     Patient was diagnosed with Encephalopathy on admission  , presumed to be metabolic in nature. Ammonia was 143, and was continued to be monitored. Patient was given lactulose. Nephrology was consulted. Patient was maintained  "on dialysis schedule of [facility]. Due to continued altered mental status (off sedation)  CT Scan was obtained on 20 and revealed Interval development of small right frontal subdural hematoma without mass effect or shift. Interval development of acute on chronic pansinusitis.  Unfavorable change since prior exam.Patient's wife was informed of need of emergent transfer to a facility with neuro-surgery. Patients wife verbalized understanding of dangers and risks of waiting for a decision to transfer. EEG was obtained on 20 and revealed diffuse cerebral dysfunction of moderate degree. Patients wife was informed of EEG results and once again verbalized understanding of results and verbalized understanding of dangers of waiting. Patient's wife repeatedly voiced that she will wait for the family to make a decision regarding goals of care and further medical treatment.    Spoke with Patients brother Farhan Musa after multiple attempts and the brother endorsed that he does not want to make medical decisions on behalf of his brother on 20 at 9:40 am and named the patient's wife to make medical decisions on the patients behalf.     Patient was seen exhibiting decerebrate posturing on the morning on 20. Patients wife was informed of such findings and meaning. Patients wife verbalized understanding and stated \" I know hes gone and not there, but Im going to wait for his brother and family to get here tomorrow (20) and make a decision at 3 pm. Patients wife was informed of dangers and risks of untreated subdural hematoma. Patients wife verbalized understanding of such risks.       Decision was made at 12:05 PM on 2020 and patient was extubated and placed on comfort measures.    Patient was  on 1/10/2020 at 15:40      Patients chronic condition of ESRD was treated by regular dialysis schedule of [facility]. Patients chronic HTN was monitored.         DISCHARGE CONDITION:    on " 1/10/2020    DISPOSITION:        Time: Discharge 30 min    Singh Sommer M.D. is the attending at time of discharge, is aware of the patient's status and agrees with the above discharge summary.    Signed       Demarcus Oliveira MD  Family Medicine Resident PGY2  [facility]      This document has been electronically signed by Demarcus Oliveira MD on 01/10/20 4:13 PM

## 2020-01-10 NOTE — PLAN OF CARE
Problem: Patient Care Overview  Goal: Plan of Care Review  Outcome: Ongoing (interventions implemented as appropriate)  Flowsheets (Taken 1/10/2020 0253)  Progress: declining  Plan of Care Reviewed With: patient; spouse  Outcome Summary: Comfort measures started today, ativan and morphine Q2, continue to monitor.

## 2020-01-10 NOTE — PLAN OF CARE
Problem: Patient Care Overview  Goal: Plan of Care Review  Outcome: Ongoing (interventions implemented as appropriate)  Flowsheets (Taken 1/10/2020 7133)  Outcome Summary: Patient getting scheduled morphine and ativan, appears comfortable,long periods of apnea

## 2020-01-10 NOTE — PROGRESS NOTES
SUBJECTIVE:   1/9/2020  Chief Complaint:     Subjective      Patient remains on vent and off sedation. Family on bedside. They are Considering terminal weaning later today   History:  Past Medical History:   Diagnosis Date   • Anemia of chronic disease    • Cataract    • CHF (congestive heart failure) (CMS/HCC)    • Chronic pain syndrome    • CKD (chronic kidney disease)    • Clostridium difficile infection    • COPD (chronic obstructive pulmonary disease) (CMS/HCC)    • Coronary artery disease    • Depression    • Diabetes mellitus (CMS/HCC)    • Dialysis patient (CMS/HCC)    • GERD (gastroesophageal reflux disease)    • Glaucoma    • H/O cardiac pacemaker     patient states he got his pacemaker removed in [place]   • Heart block    • Hepatitis C    • History of transfusion    • Hypertension    • Nephropathy, diabetic (CMS/HCC)    • Pacemaker    • Pulmonary embolism (CMS/HCC)      Past Surgical History:   Procedure Laterality Date   • ABDOMINAL SURGERY     • ARTERIOVENOUS FISTULA/SHUNT SURGERY Right     forearm loop   • ARTERIOVENOUS FISTULA/SHUNT SURGERY Left     removed past upper arm   • ARTERIOVENOUS FISTULA/SHUNT SURGERY Right 3/13/2018    Procedure: revision RIGHT forearm ARTERIOVENOUS graft (excision), debridement, wound vac;  Surgeon: Jerson Singh MD;  Location: [facility];  Service: Vascular   • ARTERIOVENOUS FISTULA/SHUNT SURGERY W/ HEMODIALYSIS CATHETER INSERTION Right 4/4/2016    Procedure: RIGHT ARM AV FISTULA DECLOT REVISION REPAIR BRACHIAL ANASTOMOTIC PSUEDOANEURYSM LEFT FEMORAL RICHARDSON FISTULOGRAM WITH ANGIOPLASTY;  Surgeon: Jerson Carpenter MD;  Location: [facility] OR 18/19;  Service:    • CATARACT EXTRACTION W/ INTRAOCULAR LENS IMPLANT Left 3/31/2017    Procedure: REMOVE CATARACT AND IMPLANT INTRAOCULAR LENS LEFT EYE;  Surgeon: Hilton Montemayor MD;  Location: [facility];  Service:    • COLONOSCOPY N/A 3/12/2019    Procedure: COLONOSCOPY;  Surgeon: Flako Montoya  MD;  Location: [facility] ENDOSCOPY;  Service: Gastroenterology   • COLONOSCOPY N/A 11/6/2019    Procedure: COLONOSCOPY;  Surgeon: Tricia Philip MD;  Location: [facility] ENDOSCOPY;  Service: Gastroenterology   • ENDOSCOPY N/A 3/12/2019    Procedure: ESOPHAGOGASTRODUODENOSCOPY;  Surgeon: Flako Montoya MD;  Location: [facility] ENDOSCOPY;  Service: Gastroenterology   • ENDOSCOPY N/A 11/5/2019    Procedure: ESOPHAGOGASTRODUODENOSCOPY;  Surgeon: Tricia Philip MD;  Location: [facility] ENDOSCOPY;  Service: Gastroenterology   • EYE SURGERY     • HERNIA REPAIR     • IMPLANTABLE CARDIOVERTER DEFIBRILLATOR LEAD REPLACEMENT/POCKET REVISION Right 4/11/2016    Procedure: PACEMAKER LEADS X 3 AND BATTERY EXTRACTION WITH EXIMER LASER;  Surgeon: Vern Reeves MD;  Location: [facility] 18/19;  Service:    • INSERTION HEMODIALYSIS CATHETER Right 05/2015    jugular   • INTERVENTIONAL RADIOLOGY PROCEDURE Right 6/1/2017    Procedure: RIGHT dialysis fistulagram & angioplasty;  Surgeon: Jerson Singh MD;  Location: [facility] ANGIO INVASIVE LOCATION;  Service:    • INTERVENTIONAL RADIOLOGY PROCEDURE Right 8/24/2017    Procedure: IR dialysis fistulagram;  Surgeon: Jerson Singh MD;  Location: [facility] ANGIO INVASIVE LOCATION;  Service:    • INTERVENTIONAL RADIOLOGY PROCEDURE Right 11/13/2018    Procedure: IR dialysis fistulagram;  Surgeon: Jerson Singh MD;  Location: [facility] ANGIO INVASIVE LOCATION;  Service: Interventional Radiology   • PACEMAKER IMPLANTATION  2008    dual chamber [employer]   • REMOVAL HEMODIALYSIS CATHETER Right 05/2015    femoral vein   • SIGMOIDOSCOPY N/A 8/2/2019    Procedure: SIGMOIDOSCOPY FLEXIBLE;  Surgeon: Flako Montoya MD;  Location: [facility] ENDOSCOPY;  Service: Gastroenterology     Family History   Problem Relation Age of Onset   • COPD Sister    • Diabetes Brother    • Early death Brother    • Hyperlipidemia Brother    • Hypertension Brother    • Coronary artery  disease Mother    • Diabetes Mother    • Hypertension Mother    • Stroke Other    • Other Other         Respiratory disorder     Social History     Tobacco Use   • Smoking status: Former Smoker     Types: Cigarettes   • Smokeless tobacco: Never Used   • Tobacco comment: quit 20 years ago    Substance Use Topics   • Alcohol use: No     Comment: former heavy use in his 50's, up to a fifth of liquor a day, currently no alcohol   • Drug use: No     Medications Prior to Admission   Medication Sig Dispense Refill Last Dose   • acetaminophen (TYLENOL) 500 MG tablet Take 500 mg by mouth Every 4 (Four) Hours As Needed for Mild Pain  or Fever.   Unknown at Unknown time   • aspirin 81 MG chewable tablet Chew 81 mg Daily.   11/19/2019 at Unknown time   • benzonatate (TESSALON) 100 MG capsule Take 100 mg by mouth 3 (Three) Times a Day As Needed for Cough.   Unknown at Unknown time   • brimonidine (ALPHAGAN P) 0.1 % solution ophthalmic solution Administer  to both eyes 3 (Three) Times a Day.   11/19/2019 at Unknown time   • Cholecalciferol (VITAMIN D3) 2000 units chewable tablet Chew 2,000 Units Daily.   11/19/2019 at Unknown time   • cyclobenzaprine (FLEXERIL) 5 MG tablet Take 5 mg by mouth Every 8 (Eight) Hours As Needed for Muscle Spasms.   Unknown at Unknown time   • dorzolamide (TRUSOPT) 2 % ophthalmic solution Administer 1 drop into the left eye 3 (Three) Times a Day. 1 each 12 11/19/2019 at Unknown time   • escitalopram (LEXAPRO) 10 MG tablet Take 1 tablet by mouth Daily. 30 tablet 3 11/19/2019 at Unknown time   • famotidine (PEPCID) 20 MG tablet Take 20 mg by mouth every night at bedtime.   11/18/2019 at Unknown time   • fluticasone (FLONASE) 50 MCG/ACT nasal spray 2 sprays into each nostril daily. Administer 2 sprays in each nostril for each dose.   Unknown at Unknown time   • furosemide (LASIX) 40 MG tablet Take 40 mg by mouth 2 (Two) Times a Day.      • gabapentin (NEURONTIN) 100 MG capsule Take 1 capsule by mouth  Every Night. 30 capsule 0    • Glecaprevir-Pibrentasvir (MAVYRET) 100-40 MG tablet Take 3 tablets by mouth Daily. 90 tablet 2 11/19/2019 at Unknown time   • guaiFENesin (HUMIBID 3) 400 MG tablet Take 400 mg by mouth Every 4 (Four) Hours As Needed for Cough.   Unknown at Unknown time   • insulin aspart (NovoLOG) 100 UNIT/ML injection Inject  under the skin 3 (Three) Times a Day Before Meals. Sliding scale:  = 0 units; 150-200 = 3 units; 200-250 = 6 units; 250-300 = 9 units; 300-350 = 12 units; >350 = 15 units and call MD   11/19/2019 at Unknown time   • insulin detemir (LEVEMIR) 100 UNIT/ML injection Inject 30 Units under the skin into the appropriate area as directed Daily.   11/19/2019 at Unknown time   • lactulose (CHRONULAC) 10 GM/15ML solution Take 30 mL by mouth 3 (Three) Times a Day. (Patient taking differently: Take 30 mL by mouth 3 (Three) Times a Day.) 946 mL 3 11/19/2019 at Unknown time   • latanoprost (XALATAN) 0.005 % ophthalmic solution Administer 1 drop to both eyes every night.   11/18/2019 at Unknown time   • Lidocaine (ASPERCREME LIDOCAINE) 4 % patch Apply 1 patch topically Daily As Needed (low back pain. on for 12 hours.).   Unknown at Unknown time   • Loratadine 10 MG capsule Take 10 mg by mouth Every Other Day.   11/18/2019 at Unknown time   • melatonin 5 MG tablet tablet Take 5 mg by mouth Every Night.   11/18/2019 at Unknown time   • ondansetron ODT (ZOFRAN-ODT) 4 MG disintegrating tablet Take 1 tablet by mouth Every 6 (Six) Hours As Needed for Nausea or Vomiting. 10 tablet 0 Unknown at Unknown time   • polyethylene glycol (MIRALAX) packet Take 17 g by mouth Daily As Needed (constipation).   11/19/2019 at Unknown time   • rifaximin (XIFAXAN) 550 MG tablet Take 1 tablet by mouth Every 12 (Twelve) Hours. 60 tablet 5 Unknown at Unknown time   • sevelamer (RENVELA) 800 MG tablet Take 800 mg by mouth 3 (Three) Times a Day With Meals.   11/19/2019 at Unknown time   • traZODone (DESYREL) 50 MG  tablet Take 50 mg by mouth every night at bedtime.   11/18/2019 at Unknown time     Allergies:  Lisinopril and Motrin [ibuprofen]     CURRENT MEDICATIONS/OBJECTIVE/VS/PE:     Current Medications:     Current Facility-Administered Medications   Medication Dose Route Frequency Provider Last Rate Last Dose   • LORazepam (ATIVAN) injection 2 mg  2 mg Intravenous Q2H Guero Huynh MD   2 mg at 01/09/20 2131   • LORazepam (ATIVAN) injection 2 mg  2 mg Intravenous Q2H PRN Guero Huynh MD   2 mg at 01/09/20 1458   • morphine injection 4 mg  4 mg Intravenous Q2H PRN Guero Huynh MD       • morphine injection 4 mg  4 mg Intravenous Q2H Guero Huynh MD   4 mg at 01/09/20 2131   • Scopolamine (TRANSDERM-SCOP) 1.5 MG/3DAYS patch 1 patch  1 patch Transdermal Q72H Gueor Huynh MD   1 patch at 01/08/20 0746       Objective     Review of Systems:   Review of Systems  Pt intubated on vent  Physical Exam:   Temp:  [98.5 °F (36.9 °C)-98.9 °F (37.2 °C)] 98.9 °F (37.2 °C)  Heart Rate:  [66-80] 72  Resp:  [12-16] 16  BP: ()/(51-65) 105/59  FiO2 (%):  [30 %] 30 %     Physical Exam:  General Appearance:    Alert, cooperative, in no acute distress   Head:    Normocephalic, without obvious abnormality, atraumatic   Eyes:            Lids and lashes normal, conjunctivae and sclerae normal, no   icterus, no pallor, corneas clear, PERRLA   Ears:    Ears appear intact with no abnormalities noted   Throat:   No oral lesions, no thrush, oral mucosa moist   Neck:   No adenopathy, supple, trachea midline, no thyromegaly, no     carotid bruit, no JVD   Back:     No kyphosis present, no scoliosis present, no skin lesions,       erythema or scars, no tenderness to percussion or                   palpation,   range of motion normal   Lungs:     Clear to auscultation,respirations regular, even and                   unlabored    Heart:    Regular rhythm and normal rate, normal S1 and S2, no            murmur, no gallop, no rub, no click   Breast Exam:     Deferred   Abdomen:     Normal bowel sounds, no masses, no organomegaly, soft        non-tender, non-distended, no guarding, no rebound                 tenderness   Genitalia:    Deferred   Extremities:   Moves all extremities well, no edema, no cyanosis, no              redness   Pulses:   Pulses palpable and equal bilaterally   Skin:   No bleeding, bruising or rash   Lymph nodes:   No palpable adenopathy   Neurologic:   Cranial nerves 2 - 12 grossly intact, sensation intact, DTR        present and equal bilaterally      Results Review:     Lab Results (last 24 hours)     Procedure Component Value Units Date/Time    POC Glucose Once [[id number]]  (Abnormal) Collected:  01/08/20 1801    Specimen:  Blood Updated:  01/09/20 1502     Glucose 416 mg/dL      Comment: Sliding Scale AdminOperator: [id number] [facility] HEATHERMeter ID: [id number]       Blood Culture - Blood, Blood, Arterial Line [[id number]] Collected:  01/04/20 0739    Specimen:  Blood, Arterial Line Updated:  01/09/20 0815     Blood Culture No growth at 5 days    POC Glucose Once [[id number]]  (Abnormal) Collected:  01/09/20 0630    Specimen:  Blood Updated:  01/09/20 0650     Glucose 243 mg/dL      Comment: : [id number] [place]LE[place] JAIMESCarenter ID: [id number]       Comprehensive Metabolic Panel [[id number]]  (Abnormal) Collected:  01/09/20 0306    Specimen:  Blood Updated:  01/09/20 0401     Glucose 177 mg/dL      BUN 89 mg/dL      Creatinine 8.14 mg/dL      Sodium 140 mmol/L      Potassium 3.8 mmol/L      Chloride 97 mmol/L      CO2 25.0 mmol/L      Calcium 8.6 mg/dL      Total Protein 9.4 g/dL      Albumin 2.60 g/dL      ALT (SGPT) 16 U/L      AST (SGOT) 23 U/L      Alkaline Phosphatase 126 U/L      Total Bilirubin 0.7 mg/dL      eGFR Non  Amer --     Comment: <15 Indicative of kidney failure.        eGFR  African Amer 8 mL/min/1.73      Comment: <15 Indicative of kidney failure.        Globulin 6.8 gm/dL      A/G Ratio 0.4 g/dL       BUN/Creatinine Ratio 10.9     Anion Gap 18.0 mmol/L     Narrative:       GFR Normal >60  Chronic Kidney Disease <60  Kidney Failure <15      CBC & Differential [[id number]] Collected:  01/09/20 0306    Specimen:  Blood Updated:  01/09/20 0343    Narrative:       The following orders were created for panel order CBC & Differential.  Procedure                               Abnormality         Status                     ---------                               -----------         ------                     CBC Auto Differential[[id number]]        Abnormal            Final result                 Please view results for these tests on the individual orders.    CBC Auto Differential [[id number]]  (Abnormal) Collected:  01/09/20 0306    Specimen:  Blood Updated:  01/09/20 0343     WBC 7.89 10*3/mm3      RBC 2.80 10*6/mm3      Hemoglobin 7.6 g/dL      Hematocrit 23.8 %      MCV 85.0 fL      MCH 27.1 pg      MCHC 31.9 g/dL      RDW 16.7 %      RDW-SD 49.9 fl      MPV 9.3 fL      Platelets 127 10*3/mm3      Neutrophil % 83.1 %      Lymphocyte % 9.8 %      Monocyte % 5.6 %      Eosinophil % 0.4 %      Basophil % 0.1 %      Immature Grans % 1.0 %      Neutrophils, Absolute 6.56 10*3/mm3      Lymphocytes, Absolute 0.77 10*3/mm3      Monocytes, Absolute 0.44 10*3/mm3      Eosinophils, Absolute 0.03 10*3/mm3      Basophils, Absolute 0.01 10*3/mm3      Immature Grans, Absolute 0.08 10*3/mm3      nRBC 0.5 /100 WBC     POC Glucose Once [[id number]]  (Abnormal) Collected:  01/08/20 2208    Specimen:  Blood Updated:  01/08/20 2321     Glucose 332 mg/dL      Comment: Result Not ConfirmedOperator: [id number] COLE[facility] ID: [id number]              I reviewed the patient's new clinical results.  I reviewed the patient's new imaging results and agree with the interpretation.     ASSESSMENT/PLAN:   ASSESSMENT: melena resolved  Anemia, stable.  S/P cardio pulmonary arrest, failed weaning trial  Hepatic encephalopathy, ammonia level is  improving  ESRD, on hemodialysis.  Duodenal ulcerations, path C/W inflammation  Subdural hematoma  PLAN: Continue comfort measures    The risks, benefits, and alternatives of this procedure have been discussed with the patient or the responsible party- the patient understands and agrees to proceed.         Tricia Philip MD  01/09/20  10:05 PM

## 2022-01-23 NOTE — PROGRESS NOTES
"[facility]  [address] [place] [address]. [address]  T - [phone]  F - [phone]     Progress Note          PATIENT  DEMOGRAPHICS   PATIENT NAME: Cristo Musa                      PHYSICIAN: Pascual Vilchis MD  : 1950  MRN: [id number]   LOS: 1 day    Patient Care Team:  Cory Rodríguez MD as PCP - General (Family Medicine)  Jett Nieto MD as PCP - Claims Attributed  Janel Jaida Gordon MD (Family Medicine)  Michelle Lo MD as Resident (Family Medicine)  John Sanchez MD as Resident (Family Medicine)  Eulogio Riley MD as Resident (Family Medicine)  Natasha De Los Santos MD as Resident (Family Medicine)  Subjective   SUBJECTIVE   No fever or chills no soa         Objective   OBJECTIVE   Vital Signs  Temp:  [97 °F (36.1 °C)-97.9 °F (36.6 °C)] 97.9 °F (36.6 °C)  Heart Rate:  [58-66] 58  Resp:  [16-20] 18  BP: (119-148)/(56-74) 141/74    Flowsheet Rows    Flowsheet Row First Filed Value   Admission Height 172.7 cm (67.99\") Documented at 2018 1500   Admission Weight 100 kg (220 lb 10.9 oz) Documented at 2018 1500           No intake/output data recorded.    PHYSICAL EXAM    Physical Exam   Constitutional: He is oriented to person, place, and time. He appears well-developed.   HENT:   Head: Normocephalic.   Eyes: Pupils are equal, round, and reactive to light.   Cardiovascular: Normal rate, regular rhythm and normal heart sounds.    Pulmonary/Chest: Effort normal and breath sounds normal.   Abdominal: Soft. Bowel sounds are normal.   Musculoskeletal: He exhibits no edema.   Neurological: He is alert and oriented to person, place, and time.       RESULTS   Results Review:      Results from last 7 days  Lab Units 18  0614   SODIUM mmol/L 137   POTASSIUM mmol/L 4.9   CHLORIDE mmol/L 92*   CO2 mmol/L 28.0   BUN mg/dL 35*   CREATININE mg/dL 6.63*   CALCIUM mg/dL 9.0   GLUCOSE mg/dL 167*       Estimated Creatinine Clearance: 12.4 mL/min " (by C-G formula based on SCr of 6.63 mg/dL (H)).                  Results from last 7 days  Lab Units 03/13/18  0614   WBC 10*3/mm3 7.69   HEMOGLOBIN g/dL 10.2*   PLATELETS 10*3/mm3 138*               Imaging Results (last 24 hours)     ** No results found for the last 24 hours. **           MEDICATIONS      calcium acetate 1,334 mg Oral TID With Meals   famotidine 20 mg Oral Daily   sevelamer 2,400 mg Oral TID With Meals       Pharmacy to dose vancomycin        Assessment/Plan   ASSESSMENT / PLAN    Principal Problem:    ESRD on dialysis    1.end-stage renal disease on hemodialysis Monday Wednesday Friday.  Patient received his dialysis treatment yesterday. k is stable     2.anemia chronic kidney disease patient hemoglobin is stable  He is on Mircera in the outpatient setting     3.secondary hyperparathyroidism/hyperphosphatemia he will remain on PhosLo and Renagel what he takes in the outpatient setting.       4.infected right forearm AV graft planned for explantation in the morning.  He has positive blood culture for staph about a month ago.  We have also taken the blood culture on last Friday.  He will remain on vancomycin  Surgery today              This document has been electronically signed by Pascual Vilchis MD on March 13, 2018 12:16 PM          [80: Normal activity with effort; some signs or symptoms of disease.] : 80: Normal activity with effort; some signs or symptoms of disease.  [Maximal Pain Intensity: 1/10] : 1/10

## 2024-04-29 NOTE — SIGNIFICANT NOTE
Spoke with wife today about current condition. Discussed reason patient was intubated during scope yesterday.   Wife understands and agrees with current code status (DNR/DNI). She also understands and agrees the brother of patient is the medical decision maker listed in the ACP.   Wife would like to see how the patient does in the next 1-2 days otherwise will have a conversation with the brother about extubation.           This document has been electronically signed by Guero Huynh MD on January 3, 2020 12:35 PM       40

## (undated) DEVICE — BITEBLOCK ENDO W/STRAP 60F A/ LF DISP

## (undated) DEVICE — MICROSURGICAL INSTRUMENT PROVISC CANNULA 27GA THREADED HUB: Brand: ALCON

## (undated) DEVICE — SOL IRR NACL 0.9PCT 3000ML

## (undated) DEVICE — INTENDED FOR TISSUE SEPARATION, AND OTHER PROCEDURES THAT REQUIRE A SHARP SURGICAL BLADE TO PUNCTURE OR CUT.: Brand: BARD-PARKER ® CARBON RIB-BACK BLADES

## (undated) DEVICE — KT INTRO MINISTICK MAX W/GW PALLADIUM ECHO 4F 21G 7CM

## (undated) DEVICE — CANN SMPL SOFTECH BIFLO ETCO2 A/M 7FT

## (undated) DEVICE — ELECTRD BLD EZ CLN MOD 2.5IN

## (undated) DEVICE — SOL HYDROGEN PEROX 3PCT 4OZ

## (undated) DEVICE — SUT PROLN 3/0 SH D/A 36IN 8522H

## (undated) DEVICE — DRP FISTULAGRAM 45X60

## (undated) DEVICE — DRSNG WND GZ CURAD OIL EMULSION 3X3IN STRL

## (undated) DEVICE — CONQUEST® PTA BALLOON DILATATION CATHETER 8 MM X 20 MM, 75 CM CATHETER: Brand: CONQUEST®

## (undated) DEVICE — BALN PTA LUTONIX AV DRUGCOAT 7F 8X60MM 75CM

## (undated) DEVICE — RADIFOCUS GLIDEWIRE: Brand: GLIDEWIRE

## (undated) DEVICE — HANDPIECE SET WITH COAXIAL HIGH FLOW TIP AND SUCTION TUBE: Brand: INTERPULSE

## (undated) DEVICE — MAD PERIPHERAL VASCULAR-LF: Brand: MEDLINE INDUSTRIES, INC.

## (undated) DEVICE — GLV SURG TRIUMPH LT PF LTX 7 STRL

## (undated) DEVICE — STERILE POLYISOPRENE POWDER-FREE SURGICAL GLOVES: Brand: PROTEXIS

## (undated) DEVICE — DEV TORQ GW SEADRAGON .018 TO .038IN

## (undated) DEVICE — GLV SURG SENSICARE GREEN W/ALOE PF LF 6.5 STRL

## (undated) DEVICE — TRAP SXN POLYP QUICKCATCH LF

## (undated) DEVICE — PAD GRND REM POLYHESIVE A/ DISP

## (undated) DEVICE — GLV SURG TRIUMPH LT PF LTX 8 STRL

## (undated) DEVICE — STERILE POLYISOPRENE POWDER-FREE SURGICAL GLOVES WITH EMOLLIENT COATING: Brand: PROTEXIS

## (undated) DEVICE — GLV SURG TRIUMPH NATURAL W/ALOE PF LTX 6.5 STRL

## (undated) DEVICE — GOWN,SIRUS,NONREINF,RAGLAN,XL,STERILE: Brand: MEDLINE

## (undated) DEVICE — GLV SURG SENSICARE GREEN W/ALOE PF LF 6 STRL

## (undated) DEVICE — KT CANSTR VAC WND W/ISOLYSER SENSATRAC 500CC 5CS

## (undated) DEVICE — DEV INFL MONARCH 20ML

## (undated) DEVICE — SPNG LAP 18X18IN LF STRL PK/5

## (undated) DEVICE — GLV SURG SENSICARE GREEN W/ALOE PF LF 7 STRL

## (undated) DEVICE — Device: Brand: DISPOSABLE ELECTROSURGICAL SNARE

## (undated) DEVICE — CONQUEST® PTA BALLOON DILATATION CATHETER 8 MM X 40 MM, 75 CM CATHETER: Brand: CONQUEST®

## (undated) DEVICE — PINNACLE INTRODUCER SHEATH: Brand: PINNACLE

## (undated) DEVICE — SYR LL 3CC

## (undated) DEVICE — GOWN,AURORA,NOREINF,RAGLAN,XL,STERILE: Brand: MEDLINE

## (undated) DEVICE — Device

## (undated) DEVICE — SYR MEDALLION LL PLUNGR/YEL 1CC

## (undated) DEVICE — GAUZE,SPONGE,4"X4",16PLY,XRAY,STRL,LF: Brand: MEDLINE

## (undated) DEVICE — SINGLE-USE BIOPSY FORCEPS: Brand: RADIAL JAW 4

## (undated) DEVICE — TOWEL,OR,DSP,ST,BLUE,DLX,8/PK,10PK/CS: Brand: MEDLINE

## (undated) DEVICE — DRSNG WND VAC GRANUFOAM SVR SENSATRAC MD

## (undated) DEVICE — SUT MNCRYL 3/0 PS2 27IN Y427H

## (undated) DEVICE — TBG HI PRESSURE 500PSI 20IN

## (undated) DEVICE — GLV SURG TRIUMPH LT PF LTX 7.5 STRL

## (undated) DEVICE — PART NUMBER 108260, VTI 8 MHZ DISPOSABLE DOPPLER PROBE, STRAIGHT, BOX: Brand: VTI 8 MHZ DISPOSABLE DOPPLER PROBE, STRAIGHT, BOX

## (undated) DEVICE — SOL IRR H2O BTL 1000ML STRL

## (undated) DEVICE — SOL IRR NACL 0.9PCT BT 1000ML

## (undated) DEVICE — PK ANGIO LF 60